# Patient Record
Sex: FEMALE | Race: BLACK OR AFRICAN AMERICAN | Employment: UNEMPLOYED | URBAN - METROPOLITAN AREA
[De-identification: names, ages, dates, MRNs, and addresses within clinical notes are randomized per-mention and may not be internally consistent; named-entity substitution may affect disease eponyms.]

---

## 2018-10-16 ENCOUNTER — HOSPITAL ENCOUNTER (OUTPATIENT)
Dept: ULTRASOUND IMAGING | Age: 73
Discharge: HOME | End: 2018-10-18
Payer: MEDICARE

## 2018-10-16 ENCOUNTER — HOSPITAL ENCOUNTER (OUTPATIENT)
Age: 73
Discharge: HOME | End: 2018-10-18
Payer: MEDICARE

## 2018-10-16 DIAGNOSIS — M79.662 PAIN IN LEFT LOWER LEG: ICD-10-CM

## 2018-10-16 PROCEDURE — 93971 EXTREMITY STUDY: CPT

## 2018-11-14 ENCOUNTER — HOSPITAL ENCOUNTER (OUTPATIENT)
Dept: WOUND CARE | Age: 73
Discharge: HOME | End: 2018-11-14
Payer: MEDICARE

## 2018-11-14 VITALS
HEIGHT: 69 IN | SYSTOLIC BLOOD PRESSURE: 118 MMHG | DIASTOLIC BLOOD PRESSURE: 52 MMHG | TEMPERATURE: 98.7 F | RESPIRATION RATE: 18 BRPM | BODY MASS INDEX: 43.4 KG/M2 | HEART RATE: 72 BPM | WEIGHT: 293 LBS

## 2018-11-14 DIAGNOSIS — I89.0 LYMPHEDEMA OF BOTH LOWER EXTREMITIES: Chronic | ICD-10-CM

## 2018-11-14 DIAGNOSIS — I87.2 VENOUS INSUFFICIENCY OF BOTH LOWER EXTREMITIES: Chronic | ICD-10-CM

## 2018-11-14 PROCEDURE — 99213 OFFICE O/P EST LOW 20 MIN: CPT | Performed by: SURGERY

## 2018-11-14 PROCEDURE — 99213 OFFICE O/P EST LOW 20 MIN: CPT

## 2018-11-14 RX ORDER — OXYCODONE AND ACETAMINOPHEN 7.5; 325 MG/1; MG/1
1 TABLET ORAL 2 TIMES DAILY
COMMUNITY

## 2018-11-14 ASSESSMENT — PAIN DESCRIPTION - ONSET: ONSET: ON-GOING

## 2018-11-14 ASSESSMENT — PAIN DESCRIPTION - LOCATION: LOCATION: LEG

## 2018-11-14 ASSESSMENT — PAIN DESCRIPTION - PROGRESSION: CLINICAL_PROGRESSION: NOT CHANGED

## 2018-11-14 ASSESSMENT — PAIN DESCRIPTION - FREQUENCY: FREQUENCY: CONTINUOUS

## 2018-11-14 ASSESSMENT — PAIN SCALES - GENERAL: PAINLEVEL_OUTOF10: 8

## 2018-11-14 ASSESSMENT — PAIN DESCRIPTION - PAIN TYPE: TYPE: CHRONIC PAIN

## 2018-11-14 ASSESSMENT — PAIN DESCRIPTION - ORIENTATION: ORIENTATION: LEFT

## 2018-11-21 PROBLEM — I87.2 VENOUS INSUFFICIENCY OF BOTH LOWER EXTREMITIES: Chronic | Status: ACTIVE | Noted: 2018-11-21

## 2018-11-21 PROBLEM — I89.0 LYMPHEDEMA OF BOTH LOWER EXTREMITIES: Chronic | Status: ACTIVE | Noted: 2018-11-21

## 2019-02-04 ENCOUNTER — OFFICE VISIT (OUTPATIENT)
Dept: VASCULAR SURGERY | Age: 74
End: 2019-02-04
Payer: MEDICARE

## 2019-02-04 DIAGNOSIS — I87.2 VENOUS INSUFFICIENCY OF BOTH LOWER EXTREMITIES: Primary | ICD-10-CM

## 2019-02-04 DIAGNOSIS — I89.0 LYMPHEDEMA OF BOTH LOWER EXTREMITIES: ICD-10-CM

## 2019-02-04 PROCEDURE — 99213 OFFICE O/P EST LOW 20 MIN: CPT | Performed by: SURGERY

## 2019-09-21 ENCOUNTER — HOSPITAL ENCOUNTER (EMERGENCY)
Age: 74
Discharge: HOME | End: 2019-09-21
Payer: MEDICARE

## 2019-09-21 VITALS
HEIGHT: 69 IN | OXYGEN SATURATION: 93 % | DIASTOLIC BLOOD PRESSURE: 94 MMHG | SYSTOLIC BLOOD PRESSURE: 164 MMHG | BODY MASS INDEX: 43.4 KG/M2 | RESPIRATION RATE: 20 BRPM | WEIGHT: 293 LBS | TEMPERATURE: 98.9 F | HEART RATE: 89 BPM

## 2019-09-21 DIAGNOSIS — T63.444A BEE STING, UNDETERMINED INTENT, INITIAL ENCOUNTER: Primary | ICD-10-CM

## 2019-09-21 PROCEDURE — 99282 EMERGENCY DEPT VISIT SF MDM: CPT

## 2019-09-21 RX ORDER — DIAPER,BRIEF,INFANT-TODD,DISP
EACH MISCELLANEOUS
Qty: 1 TUBE | Refills: 1 | Status: SHIPPED | OUTPATIENT
Start: 2019-09-21 | End: 2019-09-28

## 2019-09-21 ASSESSMENT — PAIN DESCRIPTION - LOCATION: LOCATION: TOE (COMMENT WHICH ONE)

## 2019-09-21 ASSESSMENT — PAIN DESCRIPTION - PAIN TYPE: TYPE: ACUTE PAIN

## 2019-09-21 ASSESSMENT — PAIN SCALES - GENERAL: PAINLEVEL_OUTOF10: 7

## 2019-09-21 ASSESSMENT — PAIN DESCRIPTION - ORIENTATION: ORIENTATION: RIGHT

## 2020-03-12 ENCOUNTER — APPOINTMENT (OUTPATIENT)
Dept: GENERAL RADIOLOGY | Age: 75
DRG: 193 | End: 2020-03-12
Payer: MEDICARE

## 2020-03-12 ENCOUNTER — HOSPITAL ENCOUNTER (INPATIENT)
Age: 75
LOS: 6 days | Discharge: HOME | DRG: 193 | End: 2020-03-18
Attending: EMERGENCY MEDICINE | Admitting: FAMILY MEDICINE
Payer: MEDICARE

## 2020-03-12 ENCOUNTER — APPOINTMENT (OUTPATIENT)
Dept: ULTRASOUND IMAGING | Age: 75
DRG: 193 | End: 2020-03-12
Payer: MEDICARE

## 2020-03-12 PROBLEM — J10.1 INFLUENZA A: Status: ACTIVE | Noted: 2020-03-12

## 2020-03-12 LAB
ADENOVIRUS BY PCR: NOT DETECTED
ALBUMIN SERPL-MCNC: 4 G/DL (ref 3.5–5.2)
ALP BLD-CCNC: 121 U/L (ref 35–104)
ALT SERPL-CCNC: 22 U/L (ref 0–32)
ANION GAP SERPL CALCULATED.3IONS-SCNC: 14 MMOL/L (ref 7–16)
AST SERPL-CCNC: 37 U/L (ref 0–31)
BASOPHILS ABSOLUTE: 0.01 E9/L (ref 0–0.2)
BASOPHILS RELATIVE PERCENT: 0.2 % (ref 0–2)
BILIRUB SERPL-MCNC: 0.6 MG/DL (ref 0–1.2)
BORDETELLA PARAPERTUSSIS BY PCR: NOT DETECTED
BORDETELLA PERTUSSIS BY PCR: NOT DETECTED
BUN BLDV-MCNC: 8 MG/DL (ref 8–23)
CALCIUM SERPL-MCNC: 9.5 MG/DL (ref 8.6–10.2)
CHLAMYDOPHILIA PNEUMONIAE BY PCR: NOT DETECTED
CHLORIDE BLD-SCNC: 97 MMOL/L (ref 98–107)
CO2: 27 MMOL/L (ref 22–29)
CORONAVIRUS 229E BY PCR: NOT DETECTED
CORONAVIRUS HKU1 BY PCR: NOT DETECTED
CORONAVIRUS NL63 BY PCR: NOT DETECTED
CORONAVIRUS OC43 BY PCR: NOT DETECTED
CREAT SERPL-MCNC: 0.8 MG/DL (ref 0.5–1)
EKG ATRIAL RATE: 127 BPM
EKG Q-T INTERVAL: 404 MS
EKG QRS DURATION: 86 MS
EKG QTC CALCULATION (BAZETT): 585 MS
EKG R AXIS: 36 DEGREES
EKG T AXIS: 51 DEGREES
EKG VENTRICULAR RATE: 126 BPM
EOSINOPHILS ABSOLUTE: 0.02 E9/L (ref 0.05–0.5)
EOSINOPHILS RELATIVE PERCENT: 0.3 % (ref 0–6)
GFR AFRICAN AMERICAN: >60
GFR NON-AFRICAN AMERICAN: >60 ML/MIN/1.73
GLUCOSE BLD-MCNC: 137 MG/DL (ref 74–99)
HCT VFR BLD CALC: 39.5 % (ref 34–48)
HEMOGLOBIN: 12.3 G/DL (ref 11.5–15.5)
HUMAN METAPNEUMOVIRUS BY PCR: NOT DETECTED
HUMAN RHINOVIRUS/ENTEROVIRUS BY PCR: NOT DETECTED
IMMATURE GRANULOCYTES #: 0.02 E9/L
IMMATURE GRANULOCYTES %: 0.3 % (ref 0–5)
INFLUENZA A BY PCR: DETECTED
INFLUENZA A H1-2009 BY PCR: DETECTED
INFLUENZA B BY PCR: NOT DETECTED
INFLUENZA B BY PCR: NOT DETECTED
LACTIC ACID: 1.5 MMOL/L (ref 0.5–2.2)
LYMPHOCYTES ABSOLUTE: 0.85 E9/L (ref 1.5–4)
LYMPHOCYTES RELATIVE PERCENT: 14.4 % (ref 20–42)
MAGNESIUM: 1.9 MG/DL (ref 1.6–2.6)
MCH RBC QN AUTO: 30.5 PG (ref 26–35)
MCHC RBC AUTO-ENTMCNC: 31.1 % (ref 32–34.5)
MCV RBC AUTO: 98 FL (ref 80–99.9)
MONOCYTES ABSOLUTE: 0.53 E9/L (ref 0.1–0.95)
MONOCYTES RELATIVE PERCENT: 9 % (ref 2–12)
MYCOPLASMA PNEUMONIAE BY PCR: NOT DETECTED
NEUTROPHILS ABSOLUTE: 4.49 E9/L (ref 1.8–7.3)
NEUTROPHILS RELATIVE PERCENT: 75.8 % (ref 43–80)
PARAINFLUENZA VIRUS 1 BY PCR: NOT DETECTED
PARAINFLUENZA VIRUS 2 BY PCR: NOT DETECTED
PARAINFLUENZA VIRUS 3 BY PCR: NOT DETECTED
PARAINFLUENZA VIRUS 4 BY PCR: NOT DETECTED
PDW BLD-RTO: 12.2 FL (ref 11.5–15)
PLATELET # BLD: 164 E9/L (ref 130–450)
PMV BLD AUTO: 10.3 FL (ref 7–12)
POTASSIUM REFLEX MAGNESIUM: 3.5 MMOL/L (ref 3.5–5)
PRO-BNP: 682 PG/ML (ref 0–125)
RBC # BLD: 4.03 E12/L (ref 3.5–5.5)
RESPIRATORY SYNCYTIAL VIRUS BY PCR: NOT DETECTED
SODIUM BLD-SCNC: 138 MMOL/L (ref 132–146)
TOTAL PROTEIN: 7.7 G/DL (ref 6.4–8.3)
TROPONIN: <0.01 NG/ML (ref 0–0.03)
WBC # BLD: 5.9 E9/L (ref 4.5–11.5)

## 2020-03-12 PROCEDURE — 87502 INFLUENZA DNA AMP PROBE: CPT

## 2020-03-12 PROCEDURE — 83735 ASSAY OF MAGNESIUM: CPT

## 2020-03-12 PROCEDURE — 6360000002 HC RX W HCPCS: Performed by: FAMILY MEDICINE

## 2020-03-12 PROCEDURE — 99285 EMERGENCY DEPT VISIT HI MDM: CPT

## 2020-03-12 PROCEDURE — 83880 ASSAY OF NATRIURETIC PEPTIDE: CPT

## 2020-03-12 PROCEDURE — 85025 COMPLETE CBC W/AUTO DIFF WBC: CPT

## 2020-03-12 PROCEDURE — 93970 EXTREMITY STUDY: CPT

## 2020-03-12 PROCEDURE — 1200000000 HC SEMI PRIVATE

## 2020-03-12 PROCEDURE — 83605 ASSAY OF LACTIC ACID: CPT

## 2020-03-12 PROCEDURE — 93005 ELECTROCARDIOGRAM TRACING: CPT | Performed by: PHYSICAL MEDICINE & REHABILITATION

## 2020-03-12 PROCEDURE — 0100U HC RESPIRPTHGN MULT REV TRANS & AMP PRB TECH 21 TRGT: CPT

## 2020-03-12 PROCEDURE — 71045 X-RAY EXAM CHEST 1 VIEW: CPT

## 2020-03-12 PROCEDURE — 80053 COMPREHEN METABOLIC PANEL: CPT

## 2020-03-12 PROCEDURE — 6370000000 HC RX 637 (ALT 250 FOR IP): Performed by: FAMILY MEDICINE

## 2020-03-12 PROCEDURE — 93010 ELECTROCARDIOGRAM REPORT: CPT | Performed by: INTERNAL MEDICINE

## 2020-03-12 PROCEDURE — 84484 ASSAY OF TROPONIN QUANT: CPT

## 2020-03-12 RX ORDER — GABAPENTIN 300 MG/1
300 CAPSULE ORAL 3 TIMES DAILY
Status: DISCONTINUED | OUTPATIENT
Start: 2020-03-12 | End: 2020-03-18 | Stop reason: HOSPADM

## 2020-03-12 RX ORDER — OSELTAMIVIR PHOSPHATE 75 MG/1
75 CAPSULE ORAL DAILY
Status: COMPLETED | OUTPATIENT
Start: 2020-03-12 | End: 2020-03-16

## 2020-03-12 RX ORDER — OXYCODONE HYDROCHLORIDE 5 MG/1
2.5 TABLET ORAL 2 TIMES DAILY
Status: DISCONTINUED | OUTPATIENT
Start: 2020-03-12 | End: 2020-03-18 | Stop reason: HOSPADM

## 2020-03-12 RX ORDER — GABAPENTIN 300 MG/1
300 CAPSULE ORAL 3 TIMES DAILY
COMMUNITY

## 2020-03-12 RX ORDER — CALCIUM CARBONATE 500(1250)
1000 TABLET ORAL DAILY
Status: DISCONTINUED | OUTPATIENT
Start: 2020-03-13 | End: 2020-03-13

## 2020-03-12 RX ORDER — ALBUTEROL SULFATE 90 UG/1
2 AEROSOL, METERED RESPIRATORY (INHALATION) EVERY 6 HOURS PRN
Status: DISCONTINUED | OUTPATIENT
Start: 2020-03-12 | End: 2020-03-18 | Stop reason: HOSPADM

## 2020-03-12 RX ORDER — METHYLPREDNISOLONE SODIUM SUCCINATE 40 MG/ML
40 INJECTION, POWDER, LYOPHILIZED, FOR SOLUTION INTRAMUSCULAR; INTRAVENOUS EVERY 8 HOURS
Status: DISCONTINUED | OUTPATIENT
Start: 2020-03-12 | End: 2020-03-15

## 2020-03-12 RX ORDER — OXYCODONE AND ACETAMINOPHEN 7.5; 325 MG/1; MG/1
1 TABLET ORAL 2 TIMES DAILY
Status: DISCONTINUED | OUTPATIENT
Start: 2020-03-12 | End: 2020-03-12 | Stop reason: CLARIF

## 2020-03-12 RX ORDER — ASCORBIC ACID 500 MG
2000 TABLET ORAL DAILY
Status: DISCONTINUED | OUTPATIENT
Start: 2020-03-13 | End: 2020-03-13

## 2020-03-12 RX ORDER — FERROUS SULFATE 325(65) MG
325 TABLET ORAL NIGHTLY
Status: DISCONTINUED | OUTPATIENT
Start: 2020-03-12 | End: 2020-03-18 | Stop reason: HOSPADM

## 2020-03-12 RX ORDER — SIMVASTATIN 40 MG
40 TABLET ORAL NIGHTLY
COMMUNITY

## 2020-03-12 RX ORDER — CHOLECALCIFEROL (VITAMIN D3) 50 MCG
2000 TABLET ORAL DAILY
Status: DISCONTINUED | OUTPATIENT
Start: 2020-03-12 | End: 2020-03-13

## 2020-03-12 RX ORDER — OSELTAMIVIR PHOSPHATE 75 MG/1
75 CAPSULE ORAL 2 TIMES DAILY
Status: DISCONTINUED | OUTPATIENT
Start: 2020-03-12 | End: 2020-03-12 | Stop reason: SDUPTHER

## 2020-03-12 RX ORDER — IPRATROPIUM BROMIDE AND ALBUTEROL SULFATE 2.5; .5 MG/3ML; MG/3ML
1 SOLUTION RESPIRATORY (INHALATION) EVERY 4 HOURS PRN
Status: DISCONTINUED | OUTPATIENT
Start: 2020-03-12 | End: 2020-03-18 | Stop reason: HOSPADM

## 2020-03-12 RX ORDER — ONDANSETRON 2 MG/ML
4 INJECTION INTRAMUSCULAR; INTRAVENOUS EVERY 6 HOURS PRN
Status: DISCONTINUED | OUTPATIENT
Start: 2020-03-12 | End: 2020-03-18 | Stop reason: HOSPADM

## 2020-03-12 RX ORDER — PYRIDOXINE HCL (VITAMIN B6) 100 MG
1000 TABLET ORAL DAILY
Status: DISCONTINUED | OUTPATIENT
Start: 2020-03-12 | End: 2020-03-12 | Stop reason: CLARIF

## 2020-03-12 RX ORDER — IPRATROPIUM BROMIDE AND ALBUTEROL SULFATE 2.5; .5 MG/3ML; MG/3ML
1 SOLUTION RESPIRATORY (INHALATION) EVERY 4 HOURS PRN
COMMUNITY
End: 2022-03-30 | Stop reason: ALTCHOICE

## 2020-03-12 RX ORDER — DIPHENHYDRAMINE HCL 25 MG
50 TABLET ORAL DAILY PRN
Status: DISCONTINUED | OUTPATIENT
Start: 2020-03-13 | End: 2020-03-17 | Stop reason: SDUPTHER

## 2020-03-12 RX ORDER — METOPROLOL TARTRATE 50 MG/1
50 TABLET, FILM COATED ORAL 2 TIMES DAILY
Status: DISCONTINUED | OUTPATIENT
Start: 2020-03-12 | End: 2020-03-18 | Stop reason: HOSPADM

## 2020-03-12 RX ORDER — OXYCODONE HYDROCHLORIDE AND ACETAMINOPHEN 5; 325 MG/1; MG/1
1 TABLET ORAL 2 TIMES DAILY
Status: DISCONTINUED | OUTPATIENT
Start: 2020-03-12 | End: 2020-03-18 | Stop reason: HOSPADM

## 2020-03-12 RX ORDER — ASPIRIN 81 MG/1
81 TABLET, CHEWABLE ORAL DAILY
Status: DISCONTINUED | OUTPATIENT
Start: 2020-03-13 | End: 2020-03-18 | Stop reason: HOSPADM

## 2020-03-12 RX ORDER — MORPHINE SULFATE 15 MG/1
15 TABLET, FILM COATED, EXTENDED RELEASE ORAL 2 TIMES DAILY
Status: DISCONTINUED | OUTPATIENT
Start: 2020-03-12 | End: 2020-03-18 | Stop reason: HOSPADM

## 2020-03-12 RX ORDER — PANTOPRAZOLE SODIUM 40 MG/1
40 TABLET, DELAYED RELEASE ORAL
Status: DISCONTINUED | OUTPATIENT
Start: 2020-03-13 | End: 2020-03-18 | Stop reason: HOSPADM

## 2020-03-12 RX ADMIN — OSELTAMIVIR PHOSPHATE 75 MG: 75 CAPSULE ORAL at 22:27

## 2020-03-12 RX ADMIN — MORPHINE SULFATE 15 MG: 15 TABLET, FILM COATED, EXTENDED RELEASE ORAL at 22:28

## 2020-03-12 RX ADMIN — GABAPENTIN 300 MG: 300 CAPSULE ORAL at 22:28

## 2020-03-12 RX ADMIN — Medication 2000 UNITS: at 22:28

## 2020-03-12 RX ADMIN — METOPROLOL TARTRATE 50 MG: 50 TABLET, FILM COATED ORAL at 22:28

## 2020-03-12 RX ADMIN — ONDANSETRON 4 MG: 2 INJECTION INTRAMUSCULAR; INTRAVENOUS at 23:48

## 2020-03-12 RX ADMIN — OXYCODONE HYDROCHLORIDE 2.5 MG: 5 TABLET ORAL at 22:31

## 2020-03-12 RX ADMIN — METHYLPREDNISOLONE SODIUM SUCCINATE 40 MG: 40 INJECTION, POWDER, FOR SOLUTION INTRAMUSCULAR; INTRAVENOUS at 22:29

## 2020-03-12 RX ADMIN — FERROUS SULFATE TAB 325 MG (65 MG ELEMENTAL FE) 325 MG: 325 (65 FE) TAB at 22:27

## 2020-03-12 RX ADMIN — OXYCODONE AND ACETAMINOPHEN 1 TABLET: 5; 325 TABLET ORAL at 22:27

## 2020-03-12 RX ADMIN — ENOXAPARIN SODIUM 40 MG: 40 INJECTION SUBCUTANEOUS at 22:28

## 2020-03-12 ASSESSMENT — ENCOUNTER SYMPTOMS
TROUBLE SWALLOWING: 0
DIARRHEA: 0
BLOOD IN STOOL: 0
VOMITING: 0
SHORTNESS OF BREATH: 1
EYES NEGATIVE: 1
SINUS PAIN: 0
WHEEZING: 1
SINUS PRESSURE: 0
ABDOMINAL PAIN: 0
CONSTIPATION: 0
CHEST TIGHTNESS: 1
SORE THROAT: 0
ABDOMINAL DISTENTION: 0
NAUSEA: 1
ALLERGIC/IMMUNOLOGIC NEGATIVE: 1

## 2020-03-12 ASSESSMENT — PAIN SCALES - GENERAL
PAINLEVEL_OUTOF10: 9
PAINLEVEL_OUTOF10: 9

## 2020-03-12 NOTE — ED PROVIDER NOTES
Patient is a 71 yo female who presents with complaints of SOB. Her symptoms began last night while she was watching television. Symptoms not remitted with rescue inhaler. She also complained of subjective fever, chills, nausea, myalgias/arthralgias, and loss of appetite. She does admit to recent sick contacts; she lives with her grandson who recently tested positive for influenza. She denies recent travel or new pets. In triage, saturating at 89% on room air. She placed on 2L NC and saturating at 98%. Review of Systems   Constitutional: Positive for appetite change, chills, fatigue and fever. Negative for diaphoresis and unexpected weight change. HENT: Negative for congestion, sinus pressure, sinus pain, sore throat and trouble swallowing. +Productive cough with clear sputum   Eyes: Negative. Respiratory: Positive for chest tightness, shortness of breath and wheezing. Cardiovascular: Negative. Gastrointestinal: Positive for nausea. Negative for abdominal distention, abdominal pain, blood in stool, constipation, diarrhea and vomiting. Endocrine: Negative. Genitourinary: Negative. Musculoskeletal: Positive for arthralgias and myalgias. Skin: Negative. Allergic/Immunologic: Negative. Neurological: Negative. Hematological: Negative. Psychiatric/Behavioral: Negative. Physical Exam  Constitutional:       General: She is not in acute distress. Appearance: She is ill-appearing. HENT:      Head: Normocephalic and atraumatic. Nose: Nose normal.      Mouth/Throat:      Mouth: Mucous membranes are moist.      Pharynx: Oropharynx is clear. No oropharyngeal exudate or posterior oropharyngeal erythema. Eyes:      Extraocular Movements: Extraocular movements intact. Conjunctiva/sclera: Conjunctivae normal.      Pupils: Pupils are equal, round, and reactive to light. Neck:      Musculoskeletal: Normal range of motion and neck supple.    Cardiovascular: Rate and Rhythm: Tachycardia present. Pulses: Normal pulses. Heart sounds: Normal heart sounds. No murmur. No friction rub. Pulmonary:      Effort: Pulmonary effort is normal.      Breath sounds: No stridor. Wheezing present. No rhonchi or rales. Chest:      Chest wall: No tenderness. Abdominal:      General: Bowel sounds are normal. There is no distension. Palpations: Abdomen is soft. Tenderness: There is no abdominal tenderness. There is no guarding or rebound. Musculoskeletal:      Comments: 3+ pitting edema of the bilateral LEs   Skin:     General: Skin is warm. Neurological:      General: No focal deficit present. Mental Status: She is alert and oriented to person, place, and time. Psychiatric:         Mood and Affect: Mood normal.         Behavior: Behavior normal.         Judgment: Judgment normal.          Procedures     MDM  Number of Diagnoses or Management Options  Acute respiratory failure with hypoxia [facility]):   Diagnosis management comments: Patient in acute hypoxic respiratory failure 2/2 influenza A infection. Patient tachycardic in the 130s and wheezing bilaterally on auscultation. Started on Tamiflu.                 --------------------------------------------- PAST HISTORY ---------------------------------------------  Past Medical History:  has a past medical history of Bursitis, CAD (coronary artery disease), COPD (chronic obstructive pulmonary disease) ([facility] [address] Emphysema, GERD (gastroesophageal reflux disease), Hiatal hernia, Hip pain, Hyperlipidemia, Hypertension, Lymphedema of both lower extremities, Venous insufficiency of both lower extremities, and Venous ulcer with fat layer exposed ([facility] [address] Past Surgical History:  has a past surgical history that includes Cholecystectomy; Hysterectomy; Endoscopy, colon, diagnostic; Colonoscopy; Appendectomy; ECHO Compl W Dop Color Flow (3/11/2013);  Leg Debridement (Left, 11/18/2015); joint replacement (2009 2011); Not Detected Not Detected    Respiratory Syncytial Virus by PCR Not Detected Not Detected   CBC Auto Differential   Result Value Ref Range    WBC 5.9 4.5 - 11.5 E9/L    RBC 4.03 3.50 - 5.50 E12/L    Hemoglobin 12.3 11.5 - 15.5 g/dL    Hematocrit 39.5 34.0 - 48.0 %    MCV 98.0 80.0 - 99.9 fL    MCH 30.5 26.0 - 35.0 pg    MCHC 31.1 (L) 32.0 - 34.5 %    RDW 12.2 11.5 - 15.0 fL    Platelets 953 962 - 946 E9/L    MPV 10.3 7.0 - 12.0 fL    Neutrophils % 75.8 43.0 - 80.0 %    Immature Granulocytes % 0.3 0.0 - 5.0 %    Lymphocytes % 14.4 (L) 20.0 - 42.0 %    Monocytes % 9.0 2.0 - 12.0 %    Eosinophils % 0.3 0.0 - 6.0 %    Basophils % 0.2 0.0 - 2.0 %    Neutrophils Absolute 4.49 1.80 - 7.30 E9/L    Immature Granulocytes # 0.02 E9/L    Lymphocytes Absolute 0.85 (L) 1.50 - 4.00 E9/L    Monocytes Absolute 0.53 0.10 - 0.95 E9/L    Eosinophils Absolute 0.02 (L) 0.05 - 0.50 E9/L    Basophils Absolute 0.01 0.00 - 0.20 E9/L   Comprehensive Metabolic Panel w/ Reflex to MG   Result Value Ref Range    Sodium 138 132 - 146 mmol/L    Potassium reflex Magnesium 3.5 3.5 - 5.0 mmol/L    Chloride 97 (L) 98 - 107 mmol/L    CO2 27 22 - 29 mmol/L    Anion Gap 14 7 - 16 mmol/L    Glucose 137 (H) 74 - 99 mg/dL    BUN 8 8 - 23 mg/dL    CREATININE 0.8 0.5 - 1.0 mg/dL    GFR Non-African American >60 >=60 mL/min/1.73    GFR African American >60     Calcium 9.5 8.6 - 10.2 mg/dL    Total Protein 7.7 6.4 - 8.3 g/dL    Alb 4.0 3.5 - 5.2 g/dL    Total Bilirubin 0.6 0.0 - 1.2 mg/dL    Alkaline Phosphatase 121 (H) 35 - 104 U/L    ALT 22 0 - 32 U/L    AST 37 (H) 0 - 31 U/L   Troponin   Result Value Ref Range    Troponin <0.01 0.00 - 0.03 ng/mL   Brain Natriuretic Peptide   Result Value Ref Range    Pro- (H) 0 - 125 pg/mL   Lactic Acid, Plasma   Result Value Ref Range    Lactic Acid 1.5 0.5 - 2.2 mmol/L   Magnesium   Result Value Ref Range    Magnesium 1.9 1.6 - 2.6 mg/dL   EKG 12 Lead   Result Value Ref Range    Ventricular Rate 126 BPM    Atrial Rate re-evaluations, IV medications, cardiac monitoring, continuous pulse oximetry and complex medical decision making and emergency management    This patient has remained hemodynamically stable during their ED course. Diagnosis:  1. Acute respiratory failure with hypoxia (HCC)        Disposition:  Patient's disposition: Admit to telemetry  Patient's condition is stable.            Deanne Fernandes DO  Resident  03/12/20 1112

## 2020-03-13 LAB
ANION GAP SERPL CALCULATED.3IONS-SCNC: 13 MMOL/L (ref 7–16)
BUN BLDV-MCNC: 7 MG/DL (ref 8–23)
CALCIUM SERPL-MCNC: 9.4 MG/DL (ref 8.6–10.2)
CHLORIDE BLD-SCNC: 94 MMOL/L (ref 98–107)
CO2: 28 MMOL/L (ref 22–29)
CREAT SERPL-MCNC: 0.7 MG/DL (ref 0.5–1)
GFR AFRICAN AMERICAN: >60
GFR NON-AFRICAN AMERICAN: >60 ML/MIN/1.73
GLUCOSE BLD-MCNC: 137 MG/DL (ref 74–99)
HCT VFR BLD CALC: 40.7 % (ref 34–48)
HEMOGLOBIN: 12.9 G/DL (ref 11.5–15.5)
MCH RBC QN AUTO: 30.9 PG (ref 26–35)
MCHC RBC AUTO-ENTMCNC: 31.7 % (ref 32–34.5)
MCV RBC AUTO: 97.6 FL (ref 80–99.9)
PDW BLD-RTO: 12.1 FL (ref 11.5–15)
PLATELET # BLD: 168 E9/L (ref 130–450)
PMV BLD AUTO: 10.2 FL (ref 7–12)
POTASSIUM SERPL-SCNC: 3.6 MMOL/L (ref 3.5–5)
RBC # BLD: 4.17 E12/L (ref 3.5–5.5)
SODIUM BLD-SCNC: 135 MMOL/L (ref 132–146)
WBC # BLD: 4.3 E9/L (ref 4.5–11.5)

## 2020-03-13 PROCEDURE — 6370000000 HC RX 637 (ALT 250 FOR IP): Performed by: FAMILY MEDICINE

## 2020-03-13 PROCEDURE — 1200000000 HC SEMI PRIVATE

## 2020-03-13 PROCEDURE — 2580000003 HC RX 258: Performed by: FAMILY MEDICINE

## 2020-03-13 PROCEDURE — 80048 BASIC METABOLIC PNL TOTAL CA: CPT

## 2020-03-13 PROCEDURE — 85027 COMPLETE CBC AUTOMATED: CPT

## 2020-03-13 PROCEDURE — 6360000002 HC RX W HCPCS: Performed by: FAMILY MEDICINE

## 2020-03-13 PROCEDURE — 36415 COLL VENOUS BLD VENIPUNCTURE: CPT

## 2020-03-13 RX ORDER — ASCORBIC ACID 500 MG
2000 TABLET ORAL DAILY
Status: DISCONTINUED | OUTPATIENT
Start: 2020-03-14 | End: 2020-03-13

## 2020-03-13 RX ORDER — CALCIUM CARBONATE 500(1250)
1000 TABLET ORAL NIGHTLY
Status: DISCONTINUED | OUTPATIENT
Start: 2020-03-14 | End: 2020-03-18 | Stop reason: HOSPADM

## 2020-03-13 RX ORDER — CALCIUM CARBONATE 500(1250)
1000 TABLET ORAL DAILY
Status: DISCONTINUED | OUTPATIENT
Start: 2020-03-14 | End: 2020-03-13

## 2020-03-13 RX ORDER — CHOLECALCIFEROL (VITAMIN D3) 50 MCG
2000 TABLET ORAL DAILY
Status: DISCONTINUED | OUTPATIENT
Start: 2020-03-14 | End: 2020-03-13

## 2020-03-13 RX ORDER — ACETAMINOPHEN 325 MG/1
650 TABLET ORAL EVERY 4 HOURS PRN
Status: DISCONTINUED | OUTPATIENT
Start: 2020-03-13 | End: 2020-03-18 | Stop reason: HOSPADM

## 2020-03-13 RX ORDER — SODIUM CHLORIDE 9 MG/ML
INJECTION, SOLUTION INTRAVENOUS CONTINUOUS
Status: DISCONTINUED | OUTPATIENT
Start: 2020-03-13 | End: 2020-03-15

## 2020-03-13 RX ORDER — CHOLECALCIFEROL (VITAMIN D3) 50 MCG
2000 TABLET ORAL NIGHTLY
Status: DISCONTINUED | OUTPATIENT
Start: 2020-03-14 | End: 2020-03-18 | Stop reason: HOSPADM

## 2020-03-13 RX ORDER — HYDRALAZINE HYDROCHLORIDE 20 MG/ML
10 INJECTION INTRAMUSCULAR; INTRAVENOUS EVERY 4 HOURS PRN
Status: DISCONTINUED | OUTPATIENT
Start: 2020-03-13 | End: 2020-03-18 | Stop reason: HOSPADM

## 2020-03-13 RX ORDER — ASCORBIC ACID 500 MG
2000 TABLET ORAL NIGHTLY
Status: DISCONTINUED | OUTPATIENT
Start: 2020-03-14 | End: 2020-03-18 | Stop reason: HOSPADM

## 2020-03-13 RX ADMIN — GABAPENTIN 300 MG: 300 CAPSULE ORAL at 20:24

## 2020-03-13 RX ADMIN — Medication 2000 MG: at 08:45

## 2020-03-13 RX ADMIN — METHYLPREDNISOLONE SODIUM SUCCINATE 40 MG: 40 INJECTION, POWDER, FOR SOLUTION INTRAMUSCULAR; INTRAVENOUS at 21:29

## 2020-03-13 RX ADMIN — MORPHINE SULFATE 15 MG: 15 TABLET, FILM COATED, EXTENDED RELEASE ORAL at 20:24

## 2020-03-13 RX ADMIN — Medication 2000 UNITS: at 08:44

## 2020-03-13 RX ADMIN — Medication 1000 MG: at 08:45

## 2020-03-13 RX ADMIN — METHYLPREDNISOLONE SODIUM SUCCINATE 40 MG: 40 INJECTION, POWDER, FOR SOLUTION INTRAMUSCULAR; INTRAVENOUS at 05:21

## 2020-03-13 RX ADMIN — OXYCODONE AND ACETAMINOPHEN 1 TABLET: 5; 325 TABLET ORAL at 21:25

## 2020-03-13 RX ADMIN — OSELTAMIVIR PHOSPHATE 75 MG: 75 CAPSULE ORAL at 08:45

## 2020-03-13 RX ADMIN — OXYCODONE AND ACETAMINOPHEN 1 TABLET: 5; 325 TABLET ORAL at 08:44

## 2020-03-13 RX ADMIN — SODIUM CHLORIDE: 9 INJECTION, SOLUTION INTRAVENOUS at 06:47

## 2020-03-13 RX ADMIN — ASPIRIN 81 MG 81 MG: 81 TABLET ORAL at 08:44

## 2020-03-13 RX ADMIN — PANTOPRAZOLE SODIUM 40 MG: 40 TABLET, DELAYED RELEASE ORAL at 05:21

## 2020-03-13 RX ADMIN — FERROUS SULFATE TAB 325 MG (65 MG ELEMENTAL FE) 325 MG: 325 (65 FE) TAB at 20:24

## 2020-03-13 RX ADMIN — METHYLPREDNISOLONE SODIUM SUCCINATE 40 MG: 40 INJECTION, POWDER, FOR SOLUTION INTRAMUSCULAR; INTRAVENOUS at 16:00

## 2020-03-13 RX ADMIN — DIPHENHYDRAMINE HCL 50 MG: 25 TABLET ORAL at 21:24

## 2020-03-13 RX ADMIN — GABAPENTIN 300 MG: 300 CAPSULE ORAL at 16:00

## 2020-03-13 RX ADMIN — METOPROLOL TARTRATE 50 MG: 50 TABLET, FILM COATED ORAL at 20:24

## 2020-03-13 RX ADMIN — OXYCODONE HYDROCHLORIDE 2.5 MG: 5 TABLET ORAL at 21:25

## 2020-03-13 RX ADMIN — OXYCODONE HYDROCHLORIDE 2.5 MG: 5 TABLET ORAL at 08:44

## 2020-03-13 RX ADMIN — METOPROLOL TARTRATE 50 MG: 50 TABLET, FILM COATED ORAL at 08:45

## 2020-03-13 RX ADMIN — GABAPENTIN 300 MG: 300 CAPSULE ORAL at 08:45

## 2020-03-13 RX ADMIN — ONDANSETRON 4 MG: 2 INJECTION INTRAMUSCULAR; INTRAVENOUS at 06:47

## 2020-03-13 RX ADMIN — ONDANSETRON 4 MG: 2 INJECTION INTRAMUSCULAR; INTRAVENOUS at 21:25

## 2020-03-13 RX ADMIN — ENOXAPARIN SODIUM 40 MG: 40 INJECTION SUBCUTANEOUS at 08:45

## 2020-03-13 RX ADMIN — ACETAMINOPHEN 650 MG: 325 TABLET ORAL at 20:24

## 2020-03-13 ASSESSMENT — ENCOUNTER SYMPTOMS
ABDOMINAL PAIN: 0
WHEEZING: 1
COLOR CHANGE: 0
COUGH: 1
SHORTNESS OF BREATH: 1

## 2020-03-13 ASSESSMENT — PAIN SCALES - GENERAL
PAINLEVEL_OUTOF10: 10
PAINLEVEL_OUTOF10: 10
PAINLEVEL_OUTOF10: 4
PAINLEVEL_OUTOF10: 10
PAINLEVEL_OUTOF10: 8
PAINLEVEL_OUTOF10: 4
PAINLEVEL_OUTOF10: 8
PAINLEVEL_OUTOF10: 0

## 2020-03-13 ASSESSMENT — PAIN DESCRIPTION - ONSET
ONSET: GRADUAL
ONSET: GRADUAL
ONSET: ON-GOING

## 2020-03-13 ASSESSMENT — PAIN DESCRIPTION - DESCRIPTORS
DESCRIPTORS: HEADACHE
DESCRIPTORS: ACHING;CONSTANT;DISCOMFORT
DESCRIPTORS: HEADACHE

## 2020-03-13 ASSESSMENT — PAIN DESCRIPTION - LOCATION
LOCATION: HEAD
LOCATION: HEAD
LOCATION: LEG;HIP

## 2020-03-13 ASSESSMENT — PAIN DESCRIPTION - PROGRESSION
CLINICAL_PROGRESSION: GRADUALLY WORSENING
CLINICAL_PROGRESSION: NOT CHANGED
CLINICAL_PROGRESSION: GRADUALLY WORSENING

## 2020-03-13 ASSESSMENT — PAIN - FUNCTIONAL ASSESSMENT
PAIN_FUNCTIONAL_ASSESSMENT: ACTIVITIES ARE NOT PREVENTED

## 2020-03-13 ASSESSMENT — PAIN DESCRIPTION - FREQUENCY
FREQUENCY: INTERMITTENT
FREQUENCY: CONTINUOUS
FREQUENCY: INTERMITTENT

## 2020-03-13 ASSESSMENT — PAIN DESCRIPTION - ORIENTATION
ORIENTATION: RIGHT;LEFT

## 2020-03-13 ASSESSMENT — PAIN DESCRIPTION - PAIN TYPE
TYPE: ACUTE PAIN
TYPE: CHRONIC PAIN
TYPE: ACUTE PAIN

## 2020-03-13 NOTE — PLAN OF CARE
Problem: Falls - Risk of:  Goal: Will remain free from falls  Description: Will remain free from falls  Outcome: Not Met This Shift  Goal: Absence of physical injury  Description: Absence of physical injury  Outcome: Not Met This Shift

## 2020-03-13 NOTE — H&P
2,000 Units Oral Daily Balwinder Maxwell MD   2,000 Units at 03/12/20 2228    diphenhydrAMINE (BENADRYL) tablet 50 mg  50 mg Oral Daily PRN Balwinder Maxwell MD        ferrous sulfate (IRON 325) tablet 325 mg  325 mg Oral Nightly Balwinder Maxwell MD   325 mg at 03/12/20 2227    gabapentin (NEURONTIN) capsule 300 mg  300 mg Oral TID Balwinder Maxwell MD   300 mg at 03/12/20 2228    ipratropium-albuterol (DUONEB) nebulizer solution 3 mL  1 vial Nebulization Q4H PRN Balwinder Maxwell MD        metoprolol tartrate (LOPRESSOR) tablet 50 mg  50 mg Oral BID Balwinder Maxwell MD   50 mg at 03/12/20 2228    morphine (MS CONTIN) extended release tablet 15 mg  15 mg Oral BID Balwinder Maxwell MD   15 mg at 03/12/20 2228    pantoprazole (PROTONIX) tablet 40 mg  40 mg Oral QAM AC Balwinder Maxwell MD   40 mg at 03/13/20 0521    oseltamivir (TAMIFLU) capsule 75 mg  75 mg Oral Daily Balwinder Maxwell MD   75 mg at 03/12/20 2227    methylPREDNISolone sodium (SOLU-MEDROL) injection 40 mg  40 mg Intravenous Q8H Balwinder Maxwell MD   40 mg at 03/13/20 0521    enoxaparin (LOVENOX) injection 40 mg  40 mg Subcutaneous Daily Balwinder Maxwell MD   40 mg at 03/12/20 2228    oxyCODONE-acetaminophen (PERCOCET) 5-325 MG per tablet 1 tablet  1 tablet Oral BID Balwinder Maxwell MD   1 tablet at 03/12/20 2227    And    oxyCODONE (ROXICODONE) immediate release tablet 2.5 mg  2.5 mg Oral BID Balwinder Maxwell MD   2.5 mg at 03/12/20 2231    ondansetron (ZOFRAN) injection 4 mg  4 mg Intravenous Q6H PRN Balwinder Maxwell MD   4 mg at 03/12/20 2348       Allergies:    Allergies   Allergen Reactions    Codeine Nausea And Vomiting and Other (See Comments)     hallucinate    Dilaudid [Hydromorphone Hcl] Rash    Naproxen Other (See Comments)     Shuts down kidney function    Singulair [Montelukast Sodium] Shortness Of Breath     Mucus in chest    Black Cohosh     MD  3/13/2020

## 2020-03-13 NOTE — CARE COORDINATION
Patient admitted with Acute hypoxic respiratory failure, COPD exacerbation and Influenza A. She is currently on Tamiflu, IV Solumedrol 40 mg q 8 hrs, IVFS and oxygen 3 liters. Cm/Sw following for any discharge needs. Will also need PT/OT eval orders to assist with transition of care determination.  Kinjal Montemayor RN CM

## 2020-03-14 LAB
ANION GAP SERPL CALCULATED.3IONS-SCNC: 14 MMOL/L (ref 7–16)
BUN BLDV-MCNC: 10 MG/DL (ref 8–23)
CALCIUM SERPL-MCNC: 9.2 MG/DL (ref 8.6–10.2)
CHLORIDE BLD-SCNC: 97 MMOL/L (ref 98–107)
CO2: 29 MMOL/L (ref 22–29)
CREAT SERPL-MCNC: 0.7 MG/DL (ref 0.5–1)
GFR AFRICAN AMERICAN: >60
GFR NON-AFRICAN AMERICAN: >60 ML/MIN/1.73
GLUCOSE BLD-MCNC: 130 MG/DL (ref 74–99)
HCT VFR BLD CALC: 40.8 % (ref 34–48)
HEMOGLOBIN: 13 G/DL (ref 11.5–15.5)
MCH RBC QN AUTO: 31.2 PG (ref 26–35)
MCHC RBC AUTO-ENTMCNC: 31.9 % (ref 32–34.5)
MCV RBC AUTO: 97.8 FL (ref 80–99.9)
PDW BLD-RTO: 11.9 FL (ref 11.5–15)
PLATELET # BLD: 156 E9/L (ref 130–450)
PMV BLD AUTO: 9.9 FL (ref 7–12)
POTASSIUM SERPL-SCNC: 4 MMOL/L (ref 3.5–5)
RBC # BLD: 4.17 E12/L (ref 3.5–5.5)
SODIUM BLD-SCNC: 140 MMOL/L (ref 132–146)
WBC # BLD: 3.6 E9/L (ref 4.5–11.5)

## 2020-03-14 PROCEDURE — 36415 COLL VENOUS BLD VENIPUNCTURE: CPT

## 2020-03-14 PROCEDURE — 1200000000 HC SEMI PRIVATE

## 2020-03-14 PROCEDURE — 85027 COMPLETE CBC AUTOMATED: CPT

## 2020-03-14 PROCEDURE — 6370000000 HC RX 637 (ALT 250 FOR IP): Performed by: FAMILY MEDICINE

## 2020-03-14 PROCEDURE — 80048 BASIC METABOLIC PNL TOTAL CA: CPT

## 2020-03-14 PROCEDURE — 6360000002 HC RX W HCPCS: Performed by: FAMILY MEDICINE

## 2020-03-14 PROCEDURE — 2580000003 HC RX 258: Performed by: FAMILY MEDICINE

## 2020-03-14 RX ADMIN — METHYLPREDNISOLONE SODIUM SUCCINATE 40 MG: 40 INJECTION, POWDER, FOR SOLUTION INTRAMUSCULAR; INTRAVENOUS at 13:12

## 2020-03-14 RX ADMIN — OXYCODONE AND ACETAMINOPHEN 1 TABLET: 5; 325 TABLET ORAL at 21:13

## 2020-03-14 RX ADMIN — METHYLPREDNISOLONE SODIUM SUCCINATE 40 MG: 40 INJECTION, POWDER, FOR SOLUTION INTRAMUSCULAR; INTRAVENOUS at 23:23

## 2020-03-14 RX ADMIN — DIPHENHYDRAMINE HCL 50 MG: 25 TABLET ORAL at 23:23

## 2020-03-14 RX ADMIN — GABAPENTIN 300 MG: 300 CAPSULE ORAL at 21:11

## 2020-03-14 RX ADMIN — Medication 1000 MG: at 21:10

## 2020-03-14 RX ADMIN — GABAPENTIN 300 MG: 300 CAPSULE ORAL at 09:04

## 2020-03-14 RX ADMIN — GABAPENTIN 300 MG: 300 CAPSULE ORAL at 13:12

## 2020-03-14 RX ADMIN — METOPROLOL TARTRATE 50 MG: 50 TABLET, FILM COATED ORAL at 21:11

## 2020-03-14 RX ADMIN — ENOXAPARIN SODIUM 40 MG: 40 INJECTION SUBCUTANEOUS at 09:04

## 2020-03-14 RX ADMIN — METOPROLOL TARTRATE 50 MG: 50 TABLET, FILM COATED ORAL at 09:06

## 2020-03-14 RX ADMIN — OXYCODONE AND ACETAMINOPHEN 1 TABLET: 5; 325 TABLET ORAL at 09:04

## 2020-03-14 RX ADMIN — OXYCODONE HYDROCHLORIDE 2.5 MG: 5 TABLET ORAL at 09:06

## 2020-03-14 RX ADMIN — Medication 2000 MG: at 21:10

## 2020-03-14 RX ADMIN — SODIUM CHLORIDE: 9 INJECTION, SOLUTION INTRAVENOUS at 13:12

## 2020-03-14 RX ADMIN — MORPHINE SULFATE 15 MG: 15 TABLET, FILM COATED, EXTENDED RELEASE ORAL at 10:34

## 2020-03-14 RX ADMIN — OXYCODONE HYDROCHLORIDE 2.5 MG: 5 TABLET ORAL at 21:12

## 2020-03-14 RX ADMIN — FERROUS SULFATE TAB 325 MG (65 MG ELEMENTAL FE) 325 MG: 325 (65 FE) TAB at 21:11

## 2020-03-14 RX ADMIN — OSELTAMIVIR PHOSPHATE 75 MG: 75 CAPSULE ORAL at 09:05

## 2020-03-14 RX ADMIN — MORPHINE SULFATE 15 MG: 15 TABLET, FILM COATED, EXTENDED RELEASE ORAL at 23:22

## 2020-03-14 RX ADMIN — Medication 2000 UNITS: at 21:09

## 2020-03-14 RX ADMIN — ASPIRIN 81 MG 81 MG: 81 TABLET ORAL at 09:07

## 2020-03-14 RX ADMIN — DIPHENHYDRAMINE HCL 50 MG: 25 TABLET ORAL at 09:13

## 2020-03-14 RX ADMIN — PANTOPRAZOLE SODIUM 40 MG: 40 TABLET, DELAYED RELEASE ORAL at 05:17

## 2020-03-14 RX ADMIN — METHYLPREDNISOLONE SODIUM SUCCINATE 40 MG: 40 INJECTION, POWDER, FOR SOLUTION INTRAMUSCULAR; INTRAVENOUS at 05:17

## 2020-03-14 ASSESSMENT — PAIN DESCRIPTION - DESCRIPTORS: DESCRIPTORS: SHARP;THROBBING;ACHING;DISCOMFORT

## 2020-03-14 ASSESSMENT — PAIN SCALES - GENERAL
PAINLEVEL_OUTOF10: 5
PAINLEVEL_OUTOF10: 4
PAINLEVEL_OUTOF10: 5

## 2020-03-14 ASSESSMENT — PAIN DESCRIPTION - PAIN TYPE: TYPE: CHRONIC PAIN

## 2020-03-14 ASSESSMENT — ENCOUNTER SYMPTOMS: SHORTNESS OF BREATH: 1

## 2020-03-14 ASSESSMENT — PAIN DESCRIPTION - LOCATION: LOCATION: HEAD;HIP;LEG

## 2020-03-15 LAB
ANION GAP SERPL CALCULATED.3IONS-SCNC: 9 MMOL/L (ref 7–16)
BUN BLDV-MCNC: 11 MG/DL (ref 8–23)
CALCIUM SERPL-MCNC: 9 MG/DL (ref 8.6–10.2)
CHLORIDE BLD-SCNC: 98 MMOL/L (ref 98–107)
CO2: 32 MMOL/L (ref 22–29)
CREAT SERPL-MCNC: 0.8 MG/DL (ref 0.5–1)
GFR AFRICAN AMERICAN: >60
GFR NON-AFRICAN AMERICAN: >60 ML/MIN/1.73
GLUCOSE BLD-MCNC: 135 MG/DL (ref 74–99)
HCT VFR BLD CALC: 41.3 % (ref 34–48)
HEMOGLOBIN: 12.9 G/DL (ref 11.5–15.5)
MCH RBC QN AUTO: 30.8 PG (ref 26–35)
MCHC RBC AUTO-ENTMCNC: 31.2 % (ref 32–34.5)
MCV RBC AUTO: 98.6 FL (ref 80–99.9)
PDW BLD-RTO: 11.8 FL (ref 11.5–15)
PLATELET # BLD: 161 E9/L (ref 130–450)
PMV BLD AUTO: 10.1 FL (ref 7–12)
POTASSIUM SERPL-SCNC: 4.2 MMOL/L (ref 3.5–5)
RBC # BLD: 4.19 E12/L (ref 3.5–5.5)
SODIUM BLD-SCNC: 139 MMOL/L (ref 132–146)
WBC # BLD: 4.2 E9/L (ref 4.5–11.5)

## 2020-03-15 PROCEDURE — 2700000000 HC OXYGEN THERAPY PER DAY

## 2020-03-15 PROCEDURE — 36415 COLL VENOUS BLD VENIPUNCTURE: CPT

## 2020-03-15 PROCEDURE — 1200000000 HC SEMI PRIVATE

## 2020-03-15 PROCEDURE — 6360000002 HC RX W HCPCS: Performed by: FAMILY MEDICINE

## 2020-03-15 PROCEDURE — 6370000000 HC RX 637 (ALT 250 FOR IP): Performed by: FAMILY MEDICINE

## 2020-03-15 PROCEDURE — 80048 BASIC METABOLIC PNL TOTAL CA: CPT

## 2020-03-15 PROCEDURE — 85027 COMPLETE CBC AUTOMATED: CPT

## 2020-03-15 RX ORDER — METHYLPREDNISOLONE SODIUM SUCCINATE 40 MG/ML
40 INJECTION, POWDER, LYOPHILIZED, FOR SOLUTION INTRAMUSCULAR; INTRAVENOUS EVERY 12 HOURS
Status: DISCONTINUED | OUTPATIENT
Start: 2020-03-15 | End: 2020-03-18 | Stop reason: HOSPADM

## 2020-03-15 RX ADMIN — ASPIRIN 81 MG 81 MG: 81 TABLET ORAL at 08:22

## 2020-03-15 RX ADMIN — Medication 2000 MG: at 21:27

## 2020-03-15 RX ADMIN — OXYCODONE HYDROCHLORIDE 2.5 MG: 5 TABLET ORAL at 08:22

## 2020-03-15 RX ADMIN — GABAPENTIN 300 MG: 300 CAPSULE ORAL at 13:06

## 2020-03-15 RX ADMIN — GABAPENTIN 300 MG: 300 CAPSULE ORAL at 08:21

## 2020-03-15 RX ADMIN — OXYCODONE HYDROCHLORIDE 2.5 MG: 5 TABLET ORAL at 21:29

## 2020-03-15 RX ADMIN — DIPHENHYDRAMINE HCL 50 MG: 25 TABLET ORAL at 08:21

## 2020-03-15 RX ADMIN — HYDRALAZINE HYDROCHLORIDE 10 MG: 20 INJECTION INTRAMUSCULAR; INTRAVENOUS at 08:21

## 2020-03-15 RX ADMIN — GABAPENTIN 300 MG: 300 CAPSULE ORAL at 21:28

## 2020-03-15 RX ADMIN — METHYLPREDNISOLONE SODIUM SUCCINATE 40 MG: 40 INJECTION, POWDER, FOR SOLUTION INTRAMUSCULAR; INTRAVENOUS at 06:49

## 2020-03-15 RX ADMIN — OSELTAMIVIR PHOSPHATE 75 MG: 75 CAPSULE ORAL at 08:21

## 2020-03-15 RX ADMIN — OXYCODONE AND ACETAMINOPHEN 1 TABLET: 5; 325 TABLET ORAL at 21:28

## 2020-03-15 RX ADMIN — METHYLPREDNISOLONE SODIUM SUCCINATE 40 MG: 40 INJECTION, POWDER, FOR SOLUTION INTRAMUSCULAR; INTRAVENOUS at 21:27

## 2020-03-15 RX ADMIN — ENOXAPARIN SODIUM 40 MG: 40 INJECTION SUBCUTANEOUS at 08:20

## 2020-03-15 RX ADMIN — Medication 2000 UNITS: at 21:27

## 2020-03-15 RX ADMIN — MORPHINE SULFATE 15 MG: 15 TABLET, FILM COATED, EXTENDED RELEASE ORAL at 09:51

## 2020-03-15 RX ADMIN — METOPROLOL TARTRATE 50 MG: 50 TABLET, FILM COATED ORAL at 21:29

## 2020-03-15 RX ADMIN — DIPHENHYDRAMINE HCL 50 MG: 25 TABLET ORAL at 21:28

## 2020-03-15 RX ADMIN — PANTOPRAZOLE SODIUM 40 MG: 40 TABLET, DELAYED RELEASE ORAL at 06:49

## 2020-03-15 RX ADMIN — FERROUS SULFATE TAB 325 MG (65 MG ELEMENTAL FE) 325 MG: 325 (65 FE) TAB at 21:28

## 2020-03-15 RX ADMIN — Medication 1000 MG: at 21:28

## 2020-03-15 RX ADMIN — OXYCODONE AND ACETAMINOPHEN 1 TABLET: 5; 325 TABLET ORAL at 08:22

## 2020-03-15 ASSESSMENT — PAIN SCALES - GENERAL
PAINLEVEL_OUTOF10: 5
PAINLEVEL_OUTOF10: 4
PAINLEVEL_OUTOF10: 5

## 2020-03-15 ASSESSMENT — PAIN DESCRIPTION - ONSET
ONSET: ON-GOING
ONSET: ON-GOING

## 2020-03-15 ASSESSMENT — PAIN DESCRIPTION - DESCRIPTORS
DESCRIPTORS: ACHING;CONSTANT;DISCOMFORT
DESCRIPTORS: ACHING;DISCOMFORT;SHARP

## 2020-03-15 ASSESSMENT — PAIN DESCRIPTION - ORIENTATION
ORIENTATION: RIGHT;LEFT
ORIENTATION: RIGHT;LEFT

## 2020-03-15 ASSESSMENT — PAIN DESCRIPTION - LOCATION
LOCATION: HIP;KNEE

## 2020-03-15 ASSESSMENT — PAIN DESCRIPTION - PAIN TYPE
TYPE: CHRONIC PAIN

## 2020-03-15 ASSESSMENT — PAIN DESCRIPTION - FREQUENCY
FREQUENCY: CONTINUOUS
FREQUENCY: CONTINUOUS

## 2020-03-15 ASSESSMENT — ENCOUNTER SYMPTOMS: SHORTNESS OF BREATH: 1

## 2020-03-15 ASSESSMENT — PAIN DESCRIPTION - PROGRESSION: CLINICAL_PROGRESSION: NOT CHANGED

## 2020-03-15 NOTE — PROGRESS NOTES
(TAMIFLU) capsule 75 mg  75 mg Oral Daily Armani Chance MD   75 mg at 03/14/20 0905    enoxaparin (LOVENOX) injection 40 mg  40 mg Subcutaneous Daily Armani Chance MD   40 mg at 03/14/20 6722    oxyCODONE-acetaminophen (PERCOCET) 5-325 MG per tablet 1 tablet  1 tablet Oral BID Armani Chance MD   1 tablet at 03/14/20 2113    And    oxyCODONE (ROXICODONE) immediate release tablet 2.5 mg  2.5 mg Oral BID Armani Chance MD   2.5 mg at 03/14/20 2112    ondansetron (ZOFRAN) injection 4 mg  4 mg Intravenous Q6H PRN Armani Chance MD   4 mg at 03/13/20 2125     Allergies   Allergen Reactions    Codeine Nausea And Vomiting and Other (See Comments)     hallucinate    Dilaudid [Hydromorphone Hcl] Rash    Naproxen Other (See Comments)     Shuts down kidney function    Singulair [Montelukast Sodium] Shortness Of Breath     Mucus in chest    Black Cohosh     Black Cohosh [Cimicifuga [place] (Black Cohosh)] Rash     Active Problems:    Influenza A  Resolved Problems:    * No resolved hospital problems. *    Blood pressure (!) 166/80, pulse (!) 48, temperature 96.7 °F (35.9 °C), temperature source Temporal, resp. rate 18, height 5' 7\" (1.702 m), weight 295 lb (133.8 kg), SpO2 97 %, not currently breastfeeding. Subjective:  Symptoms:  Improved. She reports shortness of breath. Diet:  Adequate intake. Activity level: Impaired due to weakness. Pain:  She complains of pain that is mild. Objective:  General Appearance:  Comfortable. Vital signs: (most recent): Blood pressure (!) 166/80, pulse (!) 48, temperature 96.7 °F (35.9 °C), temperature source Temporal, resp. rate 18, height 5' 7\" (1.702 m), weight 295 lb (133.8 kg), SpO2 97 %, not currently breastfeeding. No fever. Lungs:  Normal effort and normal respiratory rate. Breath sounds clear to auscultation. Heart: Normal rate. Regular rhythm.   S1 normal and S2 normal.      Assessment:  (Acute hypoxic respiratory failure  COPD exacerbation  Influenza A  CAD  HTN  Hyperlipidemia     Plan:  IV steroids, nebs, oxygen  Wean steroids, stop fluiods  tamiflu  Monitor labs and exam.  Continue home meds. Anticipate home tomorrow).        Todd Babin MD  3/15/2020

## 2020-03-16 LAB
ANION GAP SERPL CALCULATED.3IONS-SCNC: 14 MMOL/L (ref 7–16)
BUN BLDV-MCNC: 10 MG/DL (ref 8–23)
CALCIUM SERPL-MCNC: 9.6 MG/DL (ref 8.6–10.2)
CHLORIDE BLD-SCNC: 97 MMOL/L (ref 98–107)
CO2: 29 MMOL/L (ref 22–29)
CREAT SERPL-MCNC: 0.8 MG/DL (ref 0.5–1)
GFR AFRICAN AMERICAN: >60
GFR NON-AFRICAN AMERICAN: >60 ML/MIN/1.73
GLUCOSE BLD-MCNC: 143 MG/DL (ref 74–99)
HCT VFR BLD CALC: 45.3 % (ref 34–48)
HEMOGLOBIN: 14 G/DL (ref 11.5–15.5)
MCH RBC QN AUTO: 30.3 PG (ref 26–35)
MCHC RBC AUTO-ENTMCNC: 30.9 % (ref 32–34.5)
MCV RBC AUTO: 98.1 FL (ref 80–99.9)
PDW BLD-RTO: 11.9 FL (ref 11.5–15)
PLATELET # BLD: 160 E9/L (ref 130–450)
PMV BLD AUTO: 10 FL (ref 7–12)
POTASSIUM SERPL-SCNC: 3.9 MMOL/L (ref 3.5–5)
RBC # BLD: 4.62 E12/L (ref 3.5–5.5)
SODIUM BLD-SCNC: 140 MMOL/L (ref 132–146)
WBC # BLD: 4.4 E9/L (ref 4.5–11.5)

## 2020-03-16 PROCEDURE — 6370000000 HC RX 637 (ALT 250 FOR IP): Performed by: FAMILY MEDICINE

## 2020-03-16 PROCEDURE — 80048 BASIC METABOLIC PNL TOTAL CA: CPT

## 2020-03-16 PROCEDURE — 6360000002 HC RX W HCPCS: Performed by: FAMILY MEDICINE

## 2020-03-16 PROCEDURE — 97530 THERAPEUTIC ACTIVITIES: CPT | Performed by: PHYSICAL THERAPIST

## 2020-03-16 PROCEDURE — 97165 OT EVAL LOW COMPLEX 30 MIN: CPT

## 2020-03-16 PROCEDURE — 97535 SELF CARE MNGMENT TRAINING: CPT

## 2020-03-16 PROCEDURE — 36415 COLL VENOUS BLD VENIPUNCTURE: CPT

## 2020-03-16 PROCEDURE — 97161 PT EVAL LOW COMPLEX 20 MIN: CPT | Performed by: PHYSICAL THERAPIST

## 2020-03-16 PROCEDURE — 85027 COMPLETE CBC AUTOMATED: CPT

## 2020-03-16 PROCEDURE — 1200000000 HC SEMI PRIVATE

## 2020-03-16 RX ADMIN — OSELTAMIVIR PHOSPHATE 75 MG: 75 CAPSULE ORAL at 08:58

## 2020-03-16 RX ADMIN — OXYCODONE HYDROCHLORIDE 2.5 MG: 5 TABLET ORAL at 08:57

## 2020-03-16 RX ADMIN — OXYCODONE AND ACETAMINOPHEN 1 TABLET: 5; 325 TABLET ORAL at 22:55

## 2020-03-16 RX ADMIN — OXYCODONE HYDROCHLORIDE 2.5 MG: 5 TABLET ORAL at 22:55

## 2020-03-16 RX ADMIN — FERROUS SULFATE TAB 325 MG (65 MG ELEMENTAL FE) 325 MG: 325 (65 FE) TAB at 22:55

## 2020-03-16 RX ADMIN — Medication 1000 MG: at 22:56

## 2020-03-16 RX ADMIN — ASPIRIN 81 MG 81 MG: 81 TABLET ORAL at 08:54

## 2020-03-16 RX ADMIN — MORPHINE SULFATE 15 MG: 15 TABLET, FILM COATED, EXTENDED RELEASE ORAL at 09:58

## 2020-03-16 RX ADMIN — ENOXAPARIN SODIUM 40 MG: 40 INJECTION SUBCUTANEOUS at 08:54

## 2020-03-16 RX ADMIN — METHYLPREDNISOLONE SODIUM SUCCINATE 40 MG: 40 INJECTION, POWDER, FOR SOLUTION INTRAMUSCULAR; INTRAVENOUS at 19:00

## 2020-03-16 RX ADMIN — HYDRALAZINE HYDROCHLORIDE 10 MG: 20 INJECTION INTRAMUSCULAR; INTRAVENOUS at 08:58

## 2020-03-16 RX ADMIN — Medication 2000 UNITS: at 22:56

## 2020-03-16 RX ADMIN — ALBUTEROL SULFATE 2 PUFF: 90 AEROSOL, METERED RESPIRATORY (INHALATION) at 22:58

## 2020-03-16 RX ADMIN — GABAPENTIN 300 MG: 300 CAPSULE ORAL at 09:02

## 2020-03-16 RX ADMIN — METOPROLOL TARTRATE 50 MG: 50 TABLET, FILM COATED ORAL at 22:55

## 2020-03-16 RX ADMIN — Medication 2000 MG: at 22:56

## 2020-03-16 RX ADMIN — PANTOPRAZOLE SODIUM 40 MG: 40 TABLET, DELAYED RELEASE ORAL at 07:13

## 2020-03-16 RX ADMIN — METHYLPREDNISOLONE SODIUM SUCCINATE 40 MG: 40 INJECTION, POWDER, FOR SOLUTION INTRAMUSCULAR; INTRAVENOUS at 07:12

## 2020-03-16 RX ADMIN — GABAPENTIN 300 MG: 300 CAPSULE ORAL at 13:20

## 2020-03-16 RX ADMIN — GABAPENTIN 300 MG: 300 CAPSULE ORAL at 22:55

## 2020-03-16 RX ADMIN — MORPHINE SULFATE 15 MG: 15 TABLET, FILM COATED, EXTENDED RELEASE ORAL at 00:03

## 2020-03-16 RX ADMIN — OXYCODONE AND ACETAMINOPHEN 1 TABLET: 5; 325 TABLET ORAL at 08:57

## 2020-03-16 RX ADMIN — DIPHENHYDRAMINE HCL 50 MG: 25 TABLET ORAL at 08:54

## 2020-03-16 ASSESSMENT — PAIN DESCRIPTION - FREQUENCY
FREQUENCY: CONTINUOUS
FREQUENCY: CONTINUOUS

## 2020-03-16 ASSESSMENT — PAIN DESCRIPTION - DESCRIPTORS
DESCRIPTORS: ACHING;DISCOMFORT;DULL
DESCRIPTORS: ACHING;CONSTANT;DISCOMFORT

## 2020-03-16 ASSESSMENT — PAIN DESCRIPTION - ONSET
ONSET: ON-GOING
ONSET: ON-GOING

## 2020-03-16 ASSESSMENT — PAIN DESCRIPTION - ORIENTATION: ORIENTATION: RIGHT

## 2020-03-16 ASSESSMENT — PAIN - FUNCTIONAL ASSESSMENT: PAIN_FUNCTIONAL_ASSESSMENT: PREVENTS OR INTERFERES SOME ACTIVE ACTIVITIES AND ADLS

## 2020-03-16 ASSESSMENT — PAIN DESCRIPTION - PAIN TYPE
TYPE: CHRONIC PAIN
TYPE: ACUTE PAIN

## 2020-03-16 ASSESSMENT — ENCOUNTER SYMPTOMS: SHORTNESS OF BREATH: 1

## 2020-03-16 ASSESSMENT — PAIN DESCRIPTION - LOCATION: LOCATION: HIP;KNEE

## 2020-03-16 ASSESSMENT — PAIN SCALES - GENERAL
PAINLEVEL_OUTOF10: 5

## 2020-03-16 ASSESSMENT — PAIN DESCRIPTION - PROGRESSION: CLINICAL_PROGRESSION: GRADUALLY WORSENING

## 2020-03-16 NOTE — PROGRESS NOTES
numbness/tingling   Tone:  WFL  Edema: BLEs                            Comments:Cleared by RN to see pt. Upon arrival, patient supine in bed and agreeable to OT session. At end of session, patient sitting EOB (with PT) with call light and phone within reach, all lines and tubes intact. Pt would benefit from continued OT to increase functional independence and quality of life. Treatment: Pt required vc's for proper technique/safety with hand placement/body mechanics/posture for bed mobility ADLs/functional tranfers/mobility. Pt able to stand at sink ~10 mins to increase standing strength/balance/activity tolerance for ease with ADLs. Pt appeared to have tolerated session well and appears motivated/cooperative/pleasan. Pt demo'ing good understanding of education provided. Continue to educate. Eval Complexity: Low    Assessment of current deficits   Functional mobility [x]  ADLs [x] Strength [x]  Cognition []  Functional transfers  [x] IADLs [x] Safety Awareness [x]  Endurance [x]  Fine Motor Coordination [] Balance [x] Vision/perception [] Sensation []   Gross Motor Coordination [] ROM [] Delirium []                  Motor Control []    Plan of Care:   ADL retraining [x]   Equipment needs [x]   Neuromuscular re-education [x] Energy Conservation Techniques [x]  Functional Transfer training [x] Patient and/or Family Education [x]  Functional Mobility training [x]  Environmental Modifications [x]  Cognitive re-training []   Compensatory techniques for ADLs [x]  Splinting Needs []   Positioning to improve overall function [x]   Therapeutic Activity [x]  Therapeutic Exercise  [x]  Visual/Perceptual: []    Delirium prevention/treatment  []   Other:  []    Rehab Potential: Good for established goals, pt. assisted in establishment of goals. LTG: maximize independence with ADLs to return to PLOF    Patient  instructed on diagnosis, prognosis/goals and plan of care. Demonstrated good understanding.     []

## 2020-03-16 NOTE — CARE COORDINATION
Per internal med note today, Still very short of breath with minimal exertion. I walked patient today from bathroom to bed and very weak. She lives alone. Patient has a bedrest order. Charge nurse notified to check for discontinuation of bedrest order and PT/OT eval orders to assist with transition of care determination. She is currently on IV Solumedrol 40 mg q12 hrs and continues on Tamiflu.    Genesis Leiva RN CM

## 2020-03-16 NOTE — PROGRESS NOTES
Physical Therapy Initial Assessment     Name: Marck Turner  : 1945  MRN: [id number]    Referring Provider:  Dr. Chip Estrada    Date of Service: 3/16/2020    Evaluating PT:  Aixa Carrasco PT, DPT   [id number]    Room #:  6970/7367-C  Diagnosis:  Influenza A  PMHx:  HTN, COPD, CAD  Precautions:  SOB, 2L of O2  Equipment Needs:  None    SUBJECTIVE:    Home: Pt lives with alone (grandson lives there intermittently) in a 2 story home with 4 stairs to enter and 2 rail(s) with 12 + 3 stairs and 1 HR to 2nd floor bed and bath. PLOF: Pt ambulated with IND with no AD PTA. OBJECTIVE:   Initial Evaluation  Date: 3/16/2020 Treatment Short Term/ Long Term   Goals   AM-PAC 6 Clicks 61/73     Was pt agreeable to Eval/treatment? Y     Does pt have pain? N     Bed Mobility  Rolling: SBA  Supine to sit: SBA  Sit to supine: SBA  Scooting: SBA  IND   Transfers Sit to stand: SBA  Stand to sit: SBA  Stand pivot: SBA  IND   Ambulation    45 feet with no AD with SBA  > 150 feet with no AD with IND   Stair negotiation: ascended and descended  NA  15 steps with 1 rail with Mod I   ROM BUE:  See OT eval  BLE:  WFL     Strength BUE:  See OT eval  BLE:  4-/5  4/5   Balance Sitting EOB:  SBA  Dynamic Standing:  SBA  Sitting EOB:  IND  Dynamic Standing:  IND     Pt is A & O x 4  Sensation:  Pt denies numbness and tingling to extremities  Edema:  None    Therapeutic Exercises:  None    Patient education  Pt educated on safety awareness, energy conservation, proper hand placement and sequencing for bed mobility and transfers, PLB throughout function to help regulate HR and O2 Saturation. Patient response to education:   Pt verbalized understanding Pt demonstrated skill Pt requires further education in this area   y y y     ASSESSMENT:    Patient exhibits decreased strength, balance, coordination impairing functional mobility.  Educated pt on techniques to improve safety and independence with bed mobility, transfers, balance, functional transfers, and functional mobility. Pt sat EOB for 10 minutes with SBA in order to improve static and dynamic sitting balance and activity tolerance. Pt ambulated with decreased jasmin and stride length 45' with SBA with no AD. Pt was slightly unsteady and would cruise furniture in room and pt stated \"this is how I always walk\" and did not want any hands on assist. Pt demonstrated fair+ understanding of education/techniques requiring additional education/training as well as fair+ safety awareness. At end of session, pt was left in chair with call light in reach and all needs met. Pt would benefit from continued skilled PT services to increase functional independence and overall quality of life. Treatment:    Pt given VC for hand placement and sequencing to address deficits for bed mobility, transfers, ambulation. Pt given VC for safety throughout session to reduce risk of falls. Pt was given instruction for sit >< stand transfers regarding weight shifting, sequencing, and proper foot/hand placement to achieve an effective stand. Pt was given VC to increase stride length and jasmin to normalize gait mechanics and improve stability as well as instruction regarding heel to toe step through gait to improve dynamic standing balance. Pt was instructed to walk with better posture to be more upright instead of reaching outside of her DAVID during function to increase stability. Pt's/ family goals   To improve strength, endurance, balance, improve breathing. Patient and or family understand(s) diagnosis, prognosis, and plan of care. PLAN:    PT care will be provided in accordance with the objectives noted above. Exercises and functional mobility practice will be used as well as appropriate assistive devices or modalities to obtain goals. Patient and family education will also be administered as needed. Frequency of treatments: 1x daily for 3-7x per week for 2-4 days.     Time in  1420  Time out

## 2020-03-16 NOTE — PROGRESS NOTES
Normal rate. Regular rhythm. S1 normal and S2 normal.      Assessment:  (Acute hypoxic respiratory failure  COPD exacerbation  Influenza A  CAD  HTN  Hyperlipidemia     Plan:  IV steroids, nebs, oxygen  Wean steroids, stop fluiods  tamiflu  Monitor labs and exam.  Continue home meds. I walked patient today from bathroom to bed and very weak. We discussed as she lives alone one more day wound be advisable so she has more time for improvement. Anticipate home tomorrow).        Mandeep Early MD  3/16/2020

## 2020-03-16 NOTE — PLAN OF CARE
Problem: Gas Exchange - Impaired:  Goal: Levels of oxygenation will improve  Description: Levels of oxygenation will improve  Outcome: Met This Shift

## 2020-03-17 LAB
ANION GAP SERPL CALCULATED.3IONS-SCNC: 13 MMOL/L (ref 7–16)
BUN BLDV-MCNC: 10 MG/DL (ref 8–23)
CALCIUM SERPL-MCNC: 10.3 MG/DL (ref 8.6–10.2)
CHLORIDE BLD-SCNC: 94 MMOL/L (ref 98–107)
CO2: 36 MMOL/L (ref 22–29)
CREAT SERPL-MCNC: 0.8 MG/DL (ref 0.5–1)
GFR AFRICAN AMERICAN: >60
GFR NON-AFRICAN AMERICAN: >60 ML/MIN/1.73
GLUCOSE BLD-MCNC: 115 MG/DL (ref 74–99)
HCT VFR BLD CALC: 52 % (ref 34–48)
HEMOGLOBIN: 16.1 G/DL (ref 11.5–15.5)
MCH RBC QN AUTO: 30.3 PG (ref 26–35)
MCHC RBC AUTO-ENTMCNC: 31 % (ref 32–34.5)
MCV RBC AUTO: 97.9 FL (ref 80–99.9)
PDW BLD-RTO: 11.9 FL (ref 11.5–15)
PLATELET # BLD: 181 E9/L (ref 130–450)
PMV BLD AUTO: 10.1 FL (ref 7–12)
POTASSIUM SERPL-SCNC: 4 MMOL/L (ref 3.5–5)
RBC # BLD: 5.31 E12/L (ref 3.5–5.5)
SODIUM BLD-SCNC: 143 MMOL/L (ref 132–146)
WBC # BLD: 4.8 E9/L (ref 4.5–11.5)

## 2020-03-17 PROCEDURE — 80048 BASIC METABOLIC PNL TOTAL CA: CPT

## 2020-03-17 PROCEDURE — 85027 COMPLETE CBC AUTOMATED: CPT

## 2020-03-17 PROCEDURE — 6360000002 HC RX W HCPCS: Performed by: FAMILY MEDICINE

## 2020-03-17 PROCEDURE — 6370000000 HC RX 637 (ALT 250 FOR IP): Performed by: FAMILY MEDICINE

## 2020-03-17 PROCEDURE — 1200000000 HC SEMI PRIVATE

## 2020-03-17 PROCEDURE — 36415 COLL VENOUS BLD VENIPUNCTURE: CPT

## 2020-03-17 RX ORDER — DIPHENHYDRAMINE HCL 25 MG
50 TABLET ORAL EVERY 12 HOURS PRN
Status: DISCONTINUED | OUTPATIENT
Start: 2020-03-17 | End: 2020-03-18 | Stop reason: HOSPADM

## 2020-03-17 RX ORDER — LISINOPRIL 20 MG/1
20 TABLET ORAL DAILY
Status: DISCONTINUED | OUTPATIENT
Start: 2020-03-17 | End: 2020-03-18 | Stop reason: HOSPADM

## 2020-03-17 RX ADMIN — PANTOPRAZOLE SODIUM 40 MG: 40 TABLET, DELAYED RELEASE ORAL at 06:23

## 2020-03-17 RX ADMIN — ACETAMINOPHEN 650 MG: 325 TABLET ORAL at 04:05

## 2020-03-17 RX ADMIN — FERROUS SULFATE TAB 325 MG (65 MG ELEMENTAL FE) 325 MG: 325 (65 FE) TAB at 21:50

## 2020-03-17 RX ADMIN — GABAPENTIN 300 MG: 300 CAPSULE ORAL at 21:49

## 2020-03-17 RX ADMIN — ASPIRIN 81 MG 81 MG: 81 TABLET ORAL at 08:34

## 2020-03-17 RX ADMIN — OXYCODONE AND ACETAMINOPHEN 1 TABLET: 5; 325 TABLET ORAL at 08:35

## 2020-03-17 RX ADMIN — MORPHINE SULFATE 15 MG: 15 TABLET, FILM COATED, EXTENDED RELEASE ORAL at 00:27

## 2020-03-17 RX ADMIN — LISINOPRIL 20 MG: 20 TABLET ORAL at 08:35

## 2020-03-17 RX ADMIN — METOPROLOL TARTRATE 50 MG: 50 TABLET, FILM COATED ORAL at 08:34

## 2020-03-17 RX ADMIN — MORPHINE SULFATE 15 MG: 15 TABLET, FILM COATED, EXTENDED RELEASE ORAL at 09:37

## 2020-03-17 RX ADMIN — ENOXAPARIN SODIUM 40 MG: 40 INJECTION SUBCUTANEOUS at 08:34

## 2020-03-17 RX ADMIN — DIPHENHYDRAMINE HCL 50 MG: 25 TABLET ORAL at 23:52

## 2020-03-17 RX ADMIN — MORPHINE SULFATE 15 MG: 15 TABLET, FILM COATED, EXTENDED RELEASE ORAL at 23:51

## 2020-03-17 RX ADMIN — GABAPENTIN 300 MG: 300 CAPSULE ORAL at 08:35

## 2020-03-17 RX ADMIN — OXYCODONE HYDROCHLORIDE 2.5 MG: 5 TABLET ORAL at 08:35

## 2020-03-17 RX ADMIN — OXYCODONE AND ACETAMINOPHEN 1 TABLET: 5; 325 TABLET ORAL at 22:05

## 2020-03-17 RX ADMIN — Medication 2000 MG: at 21:49

## 2020-03-17 RX ADMIN — DIPHENHYDRAMINE HCL 50 MG: 25 TABLET ORAL at 08:34

## 2020-03-17 RX ADMIN — METHYLPREDNISOLONE SODIUM SUCCINATE 40 MG: 40 INJECTION, POWDER, FOR SOLUTION INTRAMUSCULAR; INTRAVENOUS at 06:23

## 2020-03-17 RX ADMIN — HYDRALAZINE HYDROCHLORIDE 10 MG: 20 INJECTION INTRAMUSCULAR; INTRAVENOUS at 00:36

## 2020-03-17 RX ADMIN — METOPROLOL TARTRATE 50 MG: 50 TABLET, FILM COATED ORAL at 21:50

## 2020-03-17 RX ADMIN — HYDRALAZINE HYDROCHLORIDE 10 MG: 20 INJECTION INTRAMUSCULAR; INTRAVENOUS at 06:28

## 2020-03-17 RX ADMIN — GABAPENTIN 300 MG: 300 CAPSULE ORAL at 14:05

## 2020-03-17 RX ADMIN — OXYCODONE HYDROCHLORIDE 2.5 MG: 5 TABLET ORAL at 22:05

## 2020-03-17 RX ADMIN — Medication 2000 UNITS: at 21:49

## 2020-03-17 RX ADMIN — Medication 1000 MG: at 21:49

## 2020-03-17 RX ADMIN — METHYLPREDNISOLONE SODIUM SUCCINATE 40 MG: 40 INJECTION, POWDER, FOR SOLUTION INTRAMUSCULAR; INTRAVENOUS at 18:10

## 2020-03-17 ASSESSMENT — PAIN DESCRIPTION - ONSET
ONSET: AWAKENED FROM SLEEP
ONSET: ON-GOING

## 2020-03-17 ASSESSMENT — PAIN DESCRIPTION - LOCATION: LOCATION: HEAD

## 2020-03-17 ASSESSMENT — PAIN DESCRIPTION - DESCRIPTORS
DESCRIPTORS: ACHING;DISCOMFORT;DULL
DESCRIPTORS: ACHING;DISCOMFORT;DULL

## 2020-03-17 ASSESSMENT — PAIN DESCRIPTION - FREQUENCY
FREQUENCY: CONTINUOUS
FREQUENCY: CONTINUOUS

## 2020-03-17 ASSESSMENT — PAIN - FUNCTIONAL ASSESSMENT
PAIN_FUNCTIONAL_ASSESSMENT: ACTIVITIES ARE NOT PREVENTED
PAIN_FUNCTIONAL_ASSESSMENT: ACTIVITIES ARE NOT PREVENTED

## 2020-03-17 ASSESSMENT — PAIN SCALES - GENERAL
PAINLEVEL_OUTOF10: 2
PAINLEVEL_OUTOF10: 4
PAINLEVEL_OUTOF10: 4
PAINLEVEL_OUTOF10: 5
PAINLEVEL_OUTOF10: 4
PAINLEVEL_OUTOF10: 5

## 2020-03-17 ASSESSMENT — PAIN DESCRIPTION - PROGRESSION
CLINICAL_PROGRESSION: GRADUALLY WORSENING
CLINICAL_PROGRESSION: GRADUALLY WORSENING

## 2020-03-17 ASSESSMENT — PAIN DESCRIPTION - PAIN TYPE
TYPE: ACUTE PAIN
TYPE: ACUTE PAIN;CHRONIC PAIN

## 2020-03-17 ASSESSMENT — PAIN DESCRIPTION - ORIENTATION
ORIENTATION: RIGHT
ORIENTATION: RIGHT;LEFT

## 2020-03-17 ASSESSMENT — ENCOUNTER SYMPTOMS: SHORTNESS OF BREATH: 1

## 2020-03-17 NOTE — CARE COORDINATION
Per RN, patient is agreeable to home health care. Referral made to [facility] at [id number] [facility]. Will need home health care orders for CPA, med compliance and PT/OT prior to discharge.   Fawn Patterson RN CM

## 2020-03-17 NOTE — PROGRESS NOTES
Clementine Kemp is a 70 y.o. female patient. Patient says breathing is somewhat improved today. She complains of a headache.     Current Facility-Administered Medications   Medication Dose Route Frequency Provider Last Rate Last Dose    lisinopril (PRINIVIL;ZESTRIL) tablet 20 mg  20 mg Oral Daily Carolyn Celestin MD        methylPREDNISolone sodium (SOLU-MEDROL) injection 40 mg  40 mg Intravenous Q12H Carolyn Celestin MD   40 mg at 03/17/20 9910    hydrALAZINE (APRESOLINE) injection 10 mg  10 mg Intravenous Q4H PRN Carolyn Celestin MD   10 mg at 03/17/20 5782    acetaminophen (TYLENOL) tablet 650 mg  650 mg Oral Q4H PRN Carolyn Celestin MD   650 mg at 03/17/20 0405    calcium elemental (OSCAL) tablet 1,000 mg  1,000 mg Oral Nightly Carolyn Celestin MD   1,000 mg at 03/16/20 2256    vitamin C (ASCORBIC ACID) tablet 2,000 mg  2,000 mg Oral Nightly Carolyn Celestin MD   2,000 mg at 03/16/20 2256    vitamin D tablet 2,000 Units  2,000 Units Oral Nightly Carolyn Celestin MD   2,000 Units at 03/16/20 2256    albuterol sulfate  (90 Base) MCG/ACT inhaler 2 puff  2 puff Inhalation Q6H PRN Carolyn Celestin MD   2 puff at 03/16/20 2258    aspirin chewable tablet 81 mg  81 mg Oral Daily Carolyn Celestin MD   81 mg at 03/16/20 0854    diphenhydrAMINE (BENADRYL) tablet 50 mg  50 mg Oral Daily PRN Carolyn Celestin MD   50 mg at 03/16/20 0396    ferrous sulfate (IRON 325) tablet 325 mg  325 mg Oral Nightly Carolyn Celestin MD   325 mg at 03/16/20 2255    gabapentin (NEURONTIN) capsule 300 mg  300 mg Oral TID Carolyn Celestin MD   300 mg at 03/16/20 2255    ipratropium-albuterol (DUONEB) nebulizer solution 3 mL  1 vial Nebulization Q4H PRN Carolyn Celestin MD        metoprolol tartrate (LOPRESSOR) tablet 50 mg  50 mg Oral BID Carolyn Celestin MD   50 mg at 03/16/20 5745    morphine (MS CONTIN) extended release tablet 15 mg  15

## 2020-03-17 NOTE — PLAN OF CARE
Problem: Falls - Risk of:  Goal: Will remain free from falls  Description: Will remain free from falls  3/17/2020 1012 by Dong Pearson RN  Outcome: Met This Shift     Problem: Falls - Risk of:  Goal: Absence of physical injury  Description: Absence of physical injury  Outcome: Met This Shift     Problem: Pain:  Goal: Pain level will decrease  Description: Pain level will decrease  Outcome: Met This Shift     Problem: Gas Exchange - Impaired:  Goal: Levels of oxygenation will improve  Description: Levels of oxygenation will improve  Outcome: Met This Shift

## 2020-03-17 NOTE — PROGRESS NOTES
Message sent to Dr. Shonda Giordano regarding patient's blood pressure being elevated through night. Will await further orders.

## 2020-03-17 NOTE — PLAN OF CARE
Problem: Falls - Risk of:  Goal: Will remain free from falls  Description: Will remain free from falls  3/17/2020 1830 by Cullen Bermudez RN  Outcome: Met This Shift  3/17/2020 1012 by Justo Way RN  Outcome: Met This Shift     Problem: Falls - Risk of:  Goal: Absence of physical injury  Description: Absence of physical injury  3/17/2020 1830 by Cullen Bermudez RN  Outcome: Met This Shift  3/17/2020 1012 by Justo Way RN  Outcome: Met This Shift     Problem: Pain:  Goal: Pain level will decrease  Description: Pain level will decrease  3/17/2020 1830 by Cullen Bermudez RN  Outcome: Met This Shift  3/17/2020 1012 by Justo Way RN  Outcome: Met This Shift     Problem: Pain:  Goal: Control of acute pain  Description: Control of acute pain  Outcome: Met This Shift     Problem: Pain:  Goal: Control of chronic pain  Description: Control of chronic pain  Outcome: Met This Shift     Problem: Gas Exchange - Impaired:  Goal: Levels of oxygenation will improve  Description: Levels of oxygenation will improve  3/17/2020 1830 by Cullen Bermudez RN  Outcome: Met This Shift  3/17/2020 1012 by Justo Way RN  Outcome: Met This Shift

## 2020-03-17 NOTE — PROGRESS NOTES
Dr Xenia Baer was messaged to notify him that the patient is wanting the lisinopril discontinued. She spoke to members of her family and they are concerned about potential side effects.

## 2020-03-17 NOTE — CARE COORDINATION
Therapy evals reviewed. Patient is currently in droplet isolation. RN to check with patient to see if she would like home health care at discharge. Await response.   Sunita Candelaria RN CM

## 2020-03-18 VITALS
DIASTOLIC BLOOD PRESSURE: 72 MMHG | BODY MASS INDEX: 45.99 KG/M2 | SYSTOLIC BLOOD PRESSURE: 159 MMHG | OXYGEN SATURATION: 91 % | TEMPERATURE: 99.1 F | HEIGHT: 67 IN | RESPIRATION RATE: 18 BRPM | WEIGHT: 293 LBS | HEART RATE: 64 BPM

## 2020-03-18 LAB
ANION GAP SERPL CALCULATED.3IONS-SCNC: 11 MMOL/L (ref 7–16)
BUN BLDV-MCNC: 12 MG/DL (ref 8–23)
CALCIUM SERPL-MCNC: 9.4 MG/DL (ref 8.6–10.2)
CHLORIDE BLD-SCNC: 96 MMOL/L (ref 98–107)
CO2: 31 MMOL/L (ref 22–29)
CREAT SERPL-MCNC: 0.8 MG/DL (ref 0.5–1)
GFR AFRICAN AMERICAN: >60
GFR NON-AFRICAN AMERICAN: >60 ML/MIN/1.73
GLUCOSE BLD-MCNC: 113 MG/DL (ref 74–99)
HCT VFR BLD CALC: 45.1 % (ref 34–48)
HEMOGLOBIN: 14.3 G/DL (ref 11.5–15.5)
MCH RBC QN AUTO: 31 PG (ref 26–35)
MCHC RBC AUTO-ENTMCNC: 31.7 % (ref 32–34.5)
MCV RBC AUTO: 97.8 FL (ref 80–99.9)
PDW BLD-RTO: 12.3 FL (ref 11.5–15)
PLATELET # BLD: 160 E9/L (ref 130–450)
PMV BLD AUTO: 10 FL (ref 7–12)
POTASSIUM SERPL-SCNC: 4.4 MMOL/L (ref 3.5–5)
RBC # BLD: 4.61 E12/L (ref 3.5–5.5)
SODIUM BLD-SCNC: 138 MMOL/L (ref 132–146)
WBC # BLD: 5 E9/L (ref 4.5–11.5)

## 2020-03-18 PROCEDURE — 6370000000 HC RX 637 (ALT 250 FOR IP): Performed by: FAMILY MEDICINE

## 2020-03-18 PROCEDURE — 6360000002 HC RX W HCPCS: Performed by: FAMILY MEDICINE

## 2020-03-18 PROCEDURE — 97530 THERAPEUTIC ACTIVITIES: CPT

## 2020-03-18 PROCEDURE — 97535 SELF CARE MNGMENT TRAINING: CPT

## 2020-03-18 PROCEDURE — 80048 BASIC METABOLIC PNL TOTAL CA: CPT

## 2020-03-18 PROCEDURE — 85027 COMPLETE CBC AUTOMATED: CPT

## 2020-03-18 PROCEDURE — 36415 COLL VENOUS BLD VENIPUNCTURE: CPT

## 2020-03-18 RX ORDER — GUAIFENESIN/DEXTROMETHORPHAN 100-10MG/5
5 SYRUP ORAL 3 TIMES DAILY PRN
Qty: 120 ML | Refills: 1 | Status: SHIPPED | OUTPATIENT
Start: 2020-03-18 | End: 2020-03-28

## 2020-03-18 RX ORDER — PREDNISONE 20 MG/1
20 TABLET ORAL 2 TIMES DAILY
Qty: 10 TABLET | Refills: 0 | Status: SHIPPED | OUTPATIENT
Start: 2020-03-18 | End: 2020-03-23

## 2020-03-18 RX ADMIN — MORPHINE SULFATE 15 MG: 15 TABLET, FILM COATED, EXTENDED RELEASE ORAL at 10:40

## 2020-03-18 RX ADMIN — GABAPENTIN 300 MG: 300 CAPSULE ORAL at 14:18

## 2020-03-18 RX ADMIN — OXYCODONE HYDROCHLORIDE 2.5 MG: 5 TABLET ORAL at 09:22

## 2020-03-18 RX ADMIN — LISINOPRIL 20 MG: 20 TABLET ORAL at 09:22

## 2020-03-18 RX ADMIN — METOPROLOL TARTRATE 50 MG: 50 TABLET, FILM COATED ORAL at 09:22

## 2020-03-18 RX ADMIN — OXYCODONE AND ACETAMINOPHEN 1 TABLET: 5; 325 TABLET ORAL at 09:22

## 2020-03-18 RX ADMIN — ASPIRIN 81 MG 81 MG: 81 TABLET ORAL at 09:22

## 2020-03-18 RX ADMIN — METHYLPREDNISOLONE SODIUM SUCCINATE 40 MG: 40 INJECTION, POWDER, FOR SOLUTION INTRAMUSCULAR; INTRAVENOUS at 06:24

## 2020-03-18 RX ADMIN — GABAPENTIN 300 MG: 300 CAPSULE ORAL at 09:22

## 2020-03-18 RX ADMIN — PANTOPRAZOLE SODIUM 40 MG: 40 TABLET, DELAYED RELEASE ORAL at 06:24

## 2020-03-18 RX ADMIN — ENOXAPARIN SODIUM 40 MG: 40 INJECTION SUBCUTANEOUS at 09:22

## 2020-03-18 ASSESSMENT — PAIN DESCRIPTION - PAIN TYPE
TYPE: CHRONIC PAIN
TYPE: CHRONIC PAIN

## 2020-03-18 ASSESSMENT — PAIN SCALES - GENERAL
PAINLEVEL_OUTOF10: 4
PAINLEVEL_OUTOF10: 8
PAINLEVEL_OUTOF10: 2
PAINLEVEL_OUTOF10: 5

## 2020-03-18 ASSESSMENT — PAIN DESCRIPTION - DESCRIPTORS
DESCRIPTORS: ACHING;DISCOMFORT;DULL
DESCRIPTORS: ACHING;DISCOMFORT;DULL

## 2020-03-18 ASSESSMENT — PAIN DESCRIPTION - LOCATION
LOCATION: GENERALIZED
LOCATION: GENERALIZED

## 2020-03-18 NOTE — PROGRESS NOTES
CLINICAL PHARMACY NOTE: MEDS TO [address] Select Patient?: No  Total # of Prescriptions Filled: 1   The following medications were delivered to the patient:  · Prednisone 20  Total # of Interventions Completed: 3  Time Spent (min): 30    Additional Documentation:

## 2020-03-18 NOTE — PROGRESS NOTES
OT BEDSIDE TREATMENT NOTE      Date:3/18/2020  Patient Name: Frank Tompkins  MRN: [id number]  : 1945  Room: [address] [place], [place] [address]     Evaluating OT: Eliodoro Kayser, MOT, OTR/L [id number]     AM-PAC Daily Activity Raw Score:   Recommended Adaptive Equipment:  TBD      Diagnosis: influenza A  Referring Practitioner: Lobo Wilhelm MD  Surgery: n/a   Pertinent Medical History: CAD, COPD, HTN, lymphedema   Precautions:  Falls, droplet/contact     Home Living: Pt lives alone (son lives intermittently) in a 2 story home with 4 step(s) to enter and B rail(s); bed/bath on 2nd level (1 flight +3)  Bathroom setup: tub/shower unit   Equipment owned: shower bench, canes, BSC, reacher, sock aid  Prior Level of Function: IND with ADLs/IADLs; using no AD for functional mobility   Driving: yes    Per OT Eval:           Pain Level: R hips/knee/ankle (longstanding) 5/10  Cognition: A&O: 4/4; Follows multi step directions               Memory:  good               Sequencing:  good               Problem solving:  good               Judgement/safety:  fair                 Functional Assessment:    Initial Eval Status  Date: 3/16/2020 Treatment Status  Date: 3/18/20 Short Term Goals  Treatment frequency: 2-5x/wk   Feeding IND  Independent       Grooming SBA (standing at sink for oral hygiene)  n/t IND   UB Dressing SUP  Supervision  IND   LB Dressing Max A (assist with B socks, has AE at home) SBA pt seated EOB to jaden/doff pants flexing at waist  IND    Bathing Mod A (simulated) MOD A; pt educated with regards to DME, and AE   SUP    Toileting SBA n/t  IND   Bed Mobility  Log roll: sba  Supine to sit: sba   Sit to supine: sba  Supine<>sit supervision v/c's for safety  Log roll IND  Supine to sit: IND   Sit to supine: IND   Functional Transfers Sit to stand:SBA   Stand to sit:SBA  Commode: SBA SBA from EOB  IND   Functional Mobility SBA (no AD, to/from bathroom) N/t  IND   Balance Sitting:     Static:  SBA    Dynamic:SBA  Standing: SBA

## 2020-03-19 ENCOUNTER — CARE COORDINATION (OUTPATIENT)
Dept: CASE MANAGEMENT | Age: 75
End: 2020-03-19

## 2020-04-02 ENCOUNTER — CARE COORDINATION (OUTPATIENT)
Dept: CASE MANAGEMENT | Age: 75
End: 2020-04-02

## 2020-04-11 PROBLEM — J10.1 INFLUENZA A: Status: RESOLVED | Noted: 2020-03-12 | Resolved: 2020-04-11

## 2021-04-15 ENCOUNTER — ANESTHESIA (OUTPATIENT)
Dept: OPERATING ROOM | Age: 76
DRG: 351 | End: 2021-04-15
Payer: MEDICARE

## 2021-04-15 ENCOUNTER — ANESTHESIA EVENT (OUTPATIENT)
Dept: OPERATING ROOM | Age: 76
DRG: 351 | End: 2021-04-15
Payer: MEDICARE

## 2021-04-15 ENCOUNTER — APPOINTMENT (OUTPATIENT)
Dept: CT IMAGING | Age: 76
DRG: 351 | End: 2021-04-15
Attending: SURGERY
Payer: MEDICARE

## 2021-04-15 ENCOUNTER — HOSPITAL ENCOUNTER (INPATIENT)
Age: 76
LOS: 2 days | Discharge: HOME | DRG: 351 | End: 2021-04-17
Attending: SURGERY | Admitting: SURGERY
Payer: MEDICARE

## 2021-04-15 DIAGNOSIS — K46.0 INCARCERATED HERNIA: Primary | ICD-10-CM

## 2021-04-15 LAB
ALBUMIN SERPL-MCNC: 4.2 G/DL (ref 3.5–5.2)
ALP BLD-CCNC: 119 U/L (ref 35–104)
ALT SERPL-CCNC: 14 U/L (ref 0–32)
ANION GAP SERPL CALCULATED.3IONS-SCNC: 8 MMOL/L (ref 7–16)
AST SERPL-CCNC: 21 U/L (ref 0–31)
BASOPHILS ABSOLUTE: 0.02 E9/L (ref 0–0.2)
BASOPHILS RELATIVE PERCENT: 0.4 % (ref 0–2)
BILIRUB SERPL-MCNC: 0.5 MG/DL (ref 0–1.2)
BUN BLDV-MCNC: 11 MG/DL (ref 8–23)
CALCIUM SERPL-MCNC: 9.2 MG/DL (ref 8.6–10.2)
CHLORIDE BLD-SCNC: 102 MMOL/L (ref 98–107)
CO2: 30 MMOL/L (ref 22–29)
CREAT SERPL-MCNC: 0.8 MG/DL (ref 0.5–1)
EOSINOPHILS ABSOLUTE: 0.15 E9/L (ref 0.05–0.5)
EOSINOPHILS RELATIVE PERCENT: 2.9 % (ref 0–6)
GFR AFRICAN AMERICAN: >60
GFR NON-AFRICAN AMERICAN: >60 ML/MIN/1.73
GLUCOSE BLD-MCNC: 107 MG/DL (ref 74–99)
HCT VFR BLD CALC: 42.1 % (ref 34–48)
HEMOGLOBIN: 13.6 G/DL (ref 11.5–15.5)
IMMATURE GRANULOCYTES #: 0.02 E9/L
IMMATURE GRANULOCYTES %: 0.4 % (ref 0–5)
LYMPHOCYTES ABSOLUTE: 1.2 E9/L (ref 1.5–4)
LYMPHOCYTES RELATIVE PERCENT: 23.3 % (ref 20–42)
MCH RBC QN AUTO: 32 PG (ref 26–35)
MCHC RBC AUTO-ENTMCNC: 32.3 % (ref 32–34.5)
MCV RBC AUTO: 99.1 FL (ref 80–99.9)
MONOCYTES ABSOLUTE: 0.4 E9/L (ref 0.1–0.95)
MONOCYTES RELATIVE PERCENT: 7.8 % (ref 2–12)
NEUTROPHILS ABSOLUTE: 3.35 E9/L (ref 1.8–7.3)
NEUTROPHILS RELATIVE PERCENT: 65.2 % (ref 43–80)
PDW BLD-RTO: 11.9 FL (ref 11.5–15)
PLATELET # BLD: 167 E9/L (ref 130–450)
PMV BLD AUTO: 9.9 FL (ref 7–12)
POTASSIUM SERPL-SCNC: 3.9 MMOL/L (ref 3.5–5)
RBC # BLD: 4.25 E12/L (ref 3.5–5.5)
SODIUM BLD-SCNC: 140 MMOL/L (ref 132–146)
TOTAL PROTEIN: 8.1 G/DL (ref 6.4–8.3)
WBC # BLD: 5.1 E9/L (ref 4.5–11.5)

## 2021-04-15 PROCEDURE — G0378 HOSPITAL OBSERVATION PER HR: HCPCS

## 2021-04-15 PROCEDURE — 99222 1ST HOSP IP/OBS MODERATE 55: CPT | Performed by: INTERNAL MEDICINE

## 2021-04-15 PROCEDURE — 6360000004 HC RX CONTRAST MEDICATION: Performed by: RADIOLOGY

## 2021-04-15 PROCEDURE — 6370000000 HC RX 637 (ALT 250 FOR IP): Performed by: FAMILY MEDICINE

## 2021-04-15 PROCEDURE — G0379 DIRECT REFER HOSPITAL OBSERV: HCPCS

## 2021-04-15 PROCEDURE — 36415 COLL VENOUS BLD VENIPUNCTURE: CPT

## 2021-04-15 PROCEDURE — 1200000000 HC SEMI PRIVATE

## 2021-04-15 PROCEDURE — 85025 COMPLETE CBC W/AUTO DIFF WBC: CPT

## 2021-04-15 PROCEDURE — 2500000003 HC RX 250 WO HCPCS: Performed by: SURGERY

## 2021-04-15 PROCEDURE — 80053 COMPREHEN METABOLIC PANEL: CPT

## 2021-04-15 PROCEDURE — 74177 CT ABD & PELVIS W/CONTRAST: CPT

## 2021-04-15 PROCEDURE — 6370000000 HC RX 637 (ALT 250 FOR IP): Performed by: INTERNAL MEDICINE

## 2021-04-15 RX ORDER — IPRATROPIUM BROMIDE AND ALBUTEROL SULFATE 2.5; .5 MG/3ML; MG/3ML
1 SOLUTION RESPIRATORY (INHALATION) EVERY 4 HOURS PRN
Status: DISCONTINUED | OUTPATIENT
Start: 2021-04-15 | End: 2021-04-17 | Stop reason: HOSPADM

## 2021-04-15 RX ORDER — DEXTROSE, SODIUM CHLORIDE, AND POTASSIUM CHLORIDE 5; .45; .15 G/100ML; G/100ML; G/100ML
INJECTION INTRAVENOUS CONTINUOUS
Status: DISCONTINUED | OUTPATIENT
Start: 2021-04-16 | End: 2021-04-16

## 2021-04-15 RX ORDER — OXYCODONE AND ACETAMINOPHEN 7.5; 325 MG/1; MG/1
1 TABLET ORAL 2 TIMES DAILY
Status: DISCONTINUED | OUTPATIENT
Start: 2021-04-15 | End: 2021-04-15 | Stop reason: CLARIF

## 2021-04-15 RX ORDER — ATORVASTATIN CALCIUM 40 MG/1
40 TABLET, FILM COATED ORAL DAILY
Status: DISCONTINUED | OUTPATIENT
Start: 2021-04-15 | End: 2021-04-17 | Stop reason: HOSPADM

## 2021-04-15 RX ORDER — OXYCODONE HYDROCHLORIDE 5 MG/1
2.5 TABLET ORAL EVERY 12 HOURS
Status: DISCONTINUED | OUTPATIENT
Start: 2021-04-15 | End: 2021-04-16

## 2021-04-15 RX ORDER — ASCORBIC ACID 500 MG
2000 TABLET ORAL DAILY
Status: DISCONTINUED | OUTPATIENT
Start: 2021-04-15 | End: 2021-04-17 | Stop reason: HOSPADM

## 2021-04-15 RX ORDER — ASPIRIN 81 MG/1
81 TABLET ORAL DAILY
Status: DISCONTINUED | OUTPATIENT
Start: 2021-04-15 | End: 2021-04-17 | Stop reason: HOSPADM

## 2021-04-15 RX ORDER — LANOLIN ALCOHOL/MO/W.PET/CERES
400 CREAM (GRAM) TOPICAL NIGHTLY
COMMUNITY

## 2021-04-15 RX ORDER — FERROUS SULFATE 325(65) MG
325 TABLET ORAL NIGHTLY
Status: DISCONTINUED | OUTPATIENT
Start: 2021-04-15 | End: 2021-04-17 | Stop reason: HOSPADM

## 2021-04-15 RX ORDER — PANTOPRAZOLE SODIUM 40 MG/1
40 TABLET, DELAYED RELEASE ORAL
Status: DISCONTINUED | OUTPATIENT
Start: 2021-04-16 | End: 2021-04-17 | Stop reason: HOSPADM

## 2021-04-15 RX ORDER — DIPHENHYDRAMINE HCL 25 MG
50 TABLET ORAL EVERY 8 HOURS PRN
Status: DISCONTINUED | OUTPATIENT
Start: 2021-04-15 | End: 2021-04-17 | Stop reason: HOSPADM

## 2021-04-15 RX ORDER — FOLIC ACID 1 MG/1
500 TABLET ORAL NIGHTLY
Status: DISCONTINUED | OUTPATIENT
Start: 2021-04-15 | End: 2021-04-17 | Stop reason: HOSPADM

## 2021-04-15 RX ORDER — MORPHINE SULFATE 15 MG/1
15 TABLET, FILM COATED, EXTENDED RELEASE ORAL 2 TIMES DAILY
Status: DISCONTINUED | OUTPATIENT
Start: 2021-04-15 | End: 2021-04-16

## 2021-04-15 RX ORDER — ZINC GLUCONATE 50 MG
50 TABLET ORAL DAILY
COMMUNITY

## 2021-04-15 RX ORDER — GABAPENTIN 300 MG/1
300 CAPSULE ORAL 3 TIMES DAILY
Status: DISCONTINUED | OUTPATIENT
Start: 2021-04-15 | End: 2021-04-17 | Stop reason: HOSPADM

## 2021-04-15 RX ORDER — METOPROLOL TARTRATE 50 MG/1
50 TABLET, FILM COATED ORAL 2 TIMES DAILY
Status: DISCONTINUED | OUTPATIENT
Start: 2021-04-15 | End: 2021-04-17 | Stop reason: HOSPADM

## 2021-04-15 RX ORDER — OXYCODONE HYDROCHLORIDE AND ACETAMINOPHEN 5; 325 MG/1; MG/1
1 TABLET ORAL EVERY 12 HOURS
Status: DISCONTINUED | OUTPATIENT
Start: 2021-04-15 | End: 2021-04-16

## 2021-04-15 RX ORDER — ZINC SULFATE 50(220)MG
50 CAPSULE ORAL DAILY
Status: DISCONTINUED | OUTPATIENT
Start: 2021-04-15 | End: 2021-04-17 | Stop reason: HOSPADM

## 2021-04-15 RX ORDER — CALCIUM CARBONATE 500(1250)
1000 TABLET ORAL DAILY
Status: DISCONTINUED | OUTPATIENT
Start: 2021-04-15 | End: 2021-04-17 | Stop reason: HOSPADM

## 2021-04-15 RX ORDER — FOLIC ACID/MULTIVIT,IRON,MINER 0.4MG-18MG
350 TABLET ORAL NIGHTLY
COMMUNITY

## 2021-04-15 RX ADMIN — FERROUS SULFATE TAB 325 MG (65 MG ELEMENTAL FE) 325 MG: 325 (65 FE) TAB at 20:31

## 2021-04-15 RX ADMIN — ATORVASTATIN CALCIUM 40 MG: 40 TABLET, FILM COATED ORAL at 20:29

## 2021-04-15 RX ADMIN — OXYCODONE 2.5 MG: 5 TABLET ORAL at 20:30

## 2021-04-15 RX ADMIN — IOPAMIDOL 75 ML: 755 INJECTION, SOLUTION INTRAVENOUS at 20:05

## 2021-04-15 RX ADMIN — DIPHENHYDRAMINE HCL 50 MG: 25 TABLET ORAL at 22:20

## 2021-04-15 RX ADMIN — FOLIC ACID 500 MCG: 1 TABLET ORAL at 20:31

## 2021-04-15 RX ADMIN — OXYCODONE HYDROCHLORIDE AND ACETAMINOPHEN 1 TABLET: 5; 325 TABLET ORAL at 20:29

## 2021-04-15 RX ADMIN — MORPHINE SULFATE 15 MG: 15 TABLET, FILM COATED, EXTENDED RELEASE ORAL at 20:29

## 2021-04-15 RX ADMIN — GABAPENTIN 300 MG: 300 CAPSULE ORAL at 20:30

## 2021-04-15 RX ADMIN — METOPROLOL TARTRATE 50 MG: 50 TABLET, FILM COATED ORAL at 20:30

## 2021-04-15 RX ADMIN — POTASSIUM CHLORIDE, DEXTROSE MONOHYDRATE AND SODIUM CHLORIDE: 150; 5; 450 INJECTION, SOLUTION INTRAVENOUS at 23:50

## 2021-04-15 ASSESSMENT — PAIN DESCRIPTION - PAIN TYPE
TYPE_2: CHRONIC PAIN
TYPE: CHRONIC PAIN

## 2021-04-15 ASSESSMENT — PAIN SCALES - GENERAL
PAINLEVEL_OUTOF10: 0
PAINLEVEL_OUTOF10: 4

## 2021-04-15 ASSESSMENT — PAIN DESCRIPTION - DESCRIPTORS: DESCRIPTORS_2: ACHING

## 2021-04-15 ASSESSMENT — PAIN DESCRIPTION - ONSET: ONSET_2: ON-GOING

## 2021-04-15 NOTE — CONSULTS
for Wheezing or Shortness of Breath     Historical Provider, MD   calcium carbonate (OYSTER SHELL CALCIUM 500 MG) 1250 MG tablet Take 2 tablets by mouth daily    Historical Provider, MD   Ascorbic Acid (VITAMIN C) 500 MG tablet Take 2,000 mg by mouth daily    Historical Provider, MD   diphenhydrAMINE (BENADRYL) 50 MG capsule Take 50 mg by mouth daily as needed for Allergies     Historical Provider, MD       Allergies:  Codeine, Dilaudid [hydromorphone hcl], Naproxen, Singulair [montelukast sodium], Black cohosh, and Black cohosh [cimicifuga racemosa (black cohosh)]    Social History:   TOBACCO:   reports that she quit smoking about 25 years ago. Her smoking use included cigarettes. She has a 20.00 pack-year smoking history. She has never used smokeless tobacco.  ETOH:   reports no history of alcohol use. Family History:   No family history on file. Or deferred/otherwise considered non contributory to current admission  PHYSICAL EXAM:    VS: /63   Pulse 67   Temp 97.4 °F (36.3 °C) (Oral)   Resp 18   Ht 5' 8\" (1.727 m)   Wt 300 lb (136.1 kg)   SpO2 95%   BMI 45.61 kg/m²     General Appearance:     no acute distress. Psych:  HEENT:    A.O. As per HPI details  NC/AT, PERRL, no pallor no icterus, lips/ext mucous membrane grossly N    Neck:   Supple, trachea midline, no obvious JVD   Resp:     Dec bs No wheezes, No rhonchi   Chest wall:    No tenderness or deformity   Heart:    Regular rate and rhythm, S1 and S2 normal, no rub or gallop. Abdomen:     Soft, non-tender, bowel sounds active  L abdominal wall distended    Inc habitus   Genitalia & Rectal:    Deferred.    Extremities x4:   Extremities normal, atraumatic, no cyanosis, no clubbing   Musculoskeletal:      NO active synovitis or swollen b/l wrists, 2-5 MCPs examined   Skin:   Skin color, texture, turgor fairly normal, no ACUTE rashes or lesions in lower legs and arms examined   Lymph nodes:   Cervical nodes grossly normal   Neurologic: Faith Smiles Grossly symmetric  strength in UEs and LEs with symmetric grossly intact to light touch sensation     LABS:  CBC:   Lab Results   Component Value Date    WBC 5.1 04/15/2021    RBC 4.25 04/15/2021    HGB 13.6 04/15/2021    HCT 42.1 04/15/2021     04/15/2021    MCV 99.1 04/15/2021     BMP:    Lab Results   Component Value Date     04/15/2021    K 3.9 04/15/2021    K 3.5 03/12/2020     04/15/2021    CO2 30 04/15/2021    BUN 11 04/15/2021    CREATININE 0.8 04/15/2021    GLUCOSE 107 04/15/2021    GLUCOSE 94 02/14/2011    CALCIUM 9.2 04/15/2021     Hepatic Function Panel:    Lab Results   Component Value Date    ALKPHOS 119 04/15/2021    AST 21 04/15/2021    ALT 14 04/15/2021    PROT 8.1 04/15/2021    LABALBU 4.2 04/15/2021    BILITOT 0.5 04/15/2021     Magnesium:    Lab Results   Component Value Date    MG 1.9 03/12/2020       PT/INR:    Lab Results   Component Value Date    PROTIME 10.8 08/03/2016    PROTIME 14.3 02/16/2011    INR 1.0 08/03/2016     U/A: No results found for: NITRITE, LEUKOCYTESUR, PHUR, WBCUA, RBCUA, BACTERIA, SPECGRAV, BLOODU, GLUCOSEU  ABG:  No results found for: PHART, CTA1RNG, PO2ART, W5ITQADD, ZSJ7BFM, BEART  TSH:    Lab Results   Component Value Date    TSH 0.586 02/19/2014     Cardiac Enzymes:   Lab Results   Component Value Date    CKTOTAL 167 06/19/2013    CKMB 0.9 06/19/2013    TROPONINI <0.01 03/12/2020    TROPONINI <0.01 02/18/2014    TROPONINI <0.01 06/19/2013       Radiology: No results found.     EKG:  Sinus tachycardia  Inferior infarct , age undetermined  Abnormal ECG  No previous ECGs available  Confirmed by Yady Salazar ([id number]) on 3/12/2020    Assessment & Plan   ACTIVE hospital problems being addressed/reassessed for this admission:  Principal Problem:    Incarcerated hernia  Active Problems:    COPD (chronic obstructive pulmonary disease) ([facility] [address]    Essential hypertension, benign    Diastolic CHF, chronic (HCC)    Lymphedema of both lower extremities  Resolved Problems:    * No resolved hospital problems. *    Code status/DVT prophylaxis and PLAN --see orders   Note extensive time spent coordinating care between ER docs, ER and floor nurses, and transitioning care over to day providers  Plan of care/ clinical impressions/communication specifics detailed below:    78 y.o. female    +CAD, copd/emphysema, HTN, b/l LE edema, hot flashes, GERD,On chronic pain meds  Sent in by [facility], GI/surg--Dx: incarcerated  hernia     No difficulties passing gas, or stool, able to eat without discomfort  Moderate abd distended acutely- but now decreased in size sent in by dr. Yuri Silva- plans for OR tomorrow    I doubt ishcemic or strangulated  Medically cleared for OR, no active cardiac or respriatory issues:    CT done this evening:    A large left inguinal hernia with herniation of distal transverse colon which   is partially incarcerated. Mena Pilon is also diverticulosis of colon with   constipation.          Render Grandchild MD   Night Officer, overnight admitting doctor at [facility] call day time doctor   for questions after 7:30am    Covering for [facility] Service  If Qs please call [phone]  Electronically signed by Nas Rosales MD on 4/15/2021 at 7:45 PM

## 2021-04-16 ENCOUNTER — TELEPHONE (OUTPATIENT)
Dept: VASCULAR SURGERY | Age: 76
End: 2021-04-16

## 2021-04-16 VITALS
OXYGEN SATURATION: 100 % | DIASTOLIC BLOOD PRESSURE: 97 MMHG | RESPIRATION RATE: 27 BRPM | TEMPERATURE: 94.6 F | SYSTOLIC BLOOD PRESSURE: 211 MMHG

## 2021-04-16 LAB
ABO/RH: NORMAL
ANTIBODY SCREEN: NORMAL
METER GLUCOSE: 121 MG/DL (ref 74–99)

## 2021-04-16 PROCEDURE — 3600000019 HC SURGERY ROBOT ADDTL 15MIN: Performed by: SURGERY

## 2021-04-16 PROCEDURE — 6370000000 HC RX 637 (ALT 250 FOR IP): Performed by: FAMILY MEDICINE

## 2021-04-16 PROCEDURE — 2580000003 HC RX 258: Performed by: NURSE ANESTHETIST, CERTIFIED REGISTERED

## 2021-04-16 PROCEDURE — 6370000000 HC RX 637 (ALT 250 FOR IP): Performed by: ANESTHESIOLOGY

## 2021-04-16 PROCEDURE — 6370000000 HC RX 637 (ALT 250 FOR IP): Performed by: SURGERY

## 2021-04-16 PROCEDURE — 0YU64JZ SUPPLEMENT LEFT INGUINAL REGION WITH SYNTHETIC SUBSTITUTE, PERCUTANEOUS ENDOSCOPIC APPROACH: ICD-10-PCS | Performed by: SURGERY

## 2021-04-16 PROCEDURE — 2500000003 HC RX 250 WO HCPCS

## 2021-04-16 PROCEDURE — 3700000000 HC ANESTHESIA ATTENDED CARE: Performed by: SURGERY

## 2021-04-16 PROCEDURE — 2500000003 HC RX 250 WO HCPCS: Performed by: SURGERY

## 2021-04-16 PROCEDURE — S2900 ROBOTIC SURGICAL SYSTEM: HCPCS | Performed by: SURGERY

## 2021-04-16 PROCEDURE — 2500000003 HC RX 250 WO HCPCS: Performed by: NURSE ANESTHETIST, CERTIFIED REGISTERED

## 2021-04-16 PROCEDURE — 94640 AIRWAY INHALATION TREATMENT: CPT

## 2021-04-16 PROCEDURE — 99232 SBSQ HOSP IP/OBS MODERATE 35: CPT | Performed by: INTERNAL MEDICINE

## 2021-04-16 PROCEDURE — 6360000002 HC RX W HCPCS

## 2021-04-16 PROCEDURE — 6360000002 HC RX W HCPCS: Performed by: ANESTHESIOLOGY

## 2021-04-16 PROCEDURE — 1200000000 HC SEMI PRIVATE

## 2021-04-16 PROCEDURE — 36415 COLL VENOUS BLD VENIPUNCTURE: CPT

## 2021-04-16 PROCEDURE — G0378 HOSPITAL OBSERVATION PER HR: HCPCS

## 2021-04-16 PROCEDURE — 2709999900 HC NON-CHARGEABLE SUPPLY: Performed by: SURGERY

## 2021-04-16 PROCEDURE — 6370000000 HC RX 637 (ALT 250 FOR IP)

## 2021-04-16 PROCEDURE — 86901 BLOOD TYPING SEROLOGIC RH(D): CPT

## 2021-04-16 PROCEDURE — 7100000000 HC PACU RECOVERY - FIRST 15 MIN: Performed by: SURGERY

## 2021-04-16 PROCEDURE — C1781 MESH (IMPLANTABLE): HCPCS | Performed by: SURGERY

## 2021-04-16 PROCEDURE — 3600000009 HC SURGERY ROBOT BASE: Performed by: SURGERY

## 2021-04-16 PROCEDURE — 82962 GLUCOSE BLOOD TEST: CPT

## 2021-04-16 PROCEDURE — 86900 BLOOD TYPING SEROLOGIC ABO: CPT

## 2021-04-16 PROCEDURE — 7100000001 HC PACU RECOVERY - ADDTL 15 MIN: Performed by: SURGERY

## 2021-04-16 PROCEDURE — 86850 RBC ANTIBODY SCREEN: CPT

## 2021-04-16 PROCEDURE — 6360000002 HC RX W HCPCS: Performed by: NURSE ANESTHETIST, CERTIFIED REGISTERED

## 2021-04-16 PROCEDURE — 8E0W4CZ ROBOTIC ASSISTED PROCEDURE OF TRUNK REGION, PERCUTANEOUS ENDOSCOPIC APPROACH: ICD-10-PCS | Performed by: SURGERY

## 2021-04-16 PROCEDURE — 3700000001 HC ADD 15 MINUTES (ANESTHESIA): Performed by: SURGERY

## 2021-04-16 DEVICE — MESH HERN W10XL15CM POLY POLYLACTIC ACID 70% CLLGN 30% GLYC: Type: IMPLANTABLE DEVICE | Site: INGUINAL | Status: FUNCTIONAL

## 2021-04-16 RX ORDER — ROCURONIUM BROMIDE 10 MG/ML
INJECTION, SOLUTION INTRAVENOUS PRN
Status: DISCONTINUED | OUTPATIENT
Start: 2021-04-16 | End: 2021-04-16 | Stop reason: SDUPTHER

## 2021-04-16 RX ORDER — DIMETHICONE, OXYBENZONE, AND PADIMATE O 2; 2.5; 6.6 G/100G; G/100G; G/100G
STICK TOPICAL
Status: COMPLETED
Start: 2021-04-16 | End: 2021-04-16

## 2021-04-16 RX ORDER — SODIUM CHLORIDE, SODIUM LACTATE, POTASSIUM CHLORIDE, CALCIUM CHLORIDE 600; 310; 30; 20 MG/100ML; MG/100ML; MG/100ML; MG/100ML
INJECTION, SOLUTION INTRAVENOUS CONTINUOUS PRN
Status: DISCONTINUED | OUTPATIENT
Start: 2021-04-16 | End: 2021-04-16 | Stop reason: SDUPTHER

## 2021-04-16 RX ORDER — FENTANYL CITRATE 50 UG/ML
INJECTION, SOLUTION INTRAMUSCULAR; INTRAVENOUS PRN
Status: DISCONTINUED | OUTPATIENT
Start: 2021-04-16 | End: 2021-04-16 | Stop reason: SDUPTHER

## 2021-04-16 RX ORDER — FENTANYL CITRATE 50 UG/ML
50 INJECTION, SOLUTION INTRAMUSCULAR; INTRAVENOUS EVERY 5 MIN PRN
Status: DISCONTINUED | OUTPATIENT
Start: 2021-04-16 | End: 2021-04-16 | Stop reason: HOSPADM

## 2021-04-16 RX ORDER — ONDANSETRON 2 MG/ML
INJECTION INTRAMUSCULAR; INTRAVENOUS PRN
Status: DISCONTINUED | OUTPATIENT
Start: 2021-04-16 | End: 2021-04-16 | Stop reason: SDUPTHER

## 2021-04-16 RX ORDER — IPRATROPIUM BROMIDE AND ALBUTEROL SULFATE 2.5; .5 MG/3ML; MG/3ML
1 SOLUTION RESPIRATORY (INHALATION) ONCE
Status: COMPLETED | OUTPATIENT
Start: 2021-04-16 | End: 2021-04-16

## 2021-04-16 RX ORDER — FENTANYL CITRATE 50 UG/ML
25 INJECTION, SOLUTION INTRAMUSCULAR; INTRAVENOUS EVERY 5 MIN PRN
Status: DISCONTINUED | OUTPATIENT
Start: 2021-04-16 | End: 2021-04-16 | Stop reason: HOSPADM

## 2021-04-16 RX ORDER — DEXAMETHASONE SODIUM PHOSPHATE 4 MG/ML
INJECTION, SOLUTION INTRA-ARTICULAR; INTRALESIONAL; INTRAMUSCULAR; INTRAVENOUS; SOFT TISSUE PRN
Status: DISCONTINUED | OUTPATIENT
Start: 2021-04-16 | End: 2021-04-16 | Stop reason: SDUPTHER

## 2021-04-16 RX ORDER — BUPIVACAINE HYDROCHLORIDE AND EPINEPHRINE 2.5; 5 MG/ML; UG/ML
INJECTION, SOLUTION EPIDURAL; INFILTRATION; INTRACAUDAL; PERINEURAL PRN
Status: DISCONTINUED | OUTPATIENT
Start: 2021-04-16 | End: 2021-04-16 | Stop reason: ALTCHOICE

## 2021-04-16 RX ORDER — LIDOCAINE HYDROCHLORIDE 20 MG/ML
INJECTION, SOLUTION EPIDURAL; INFILTRATION; INTRACAUDAL; PERINEURAL PRN
Status: DISCONTINUED | OUTPATIENT
Start: 2021-04-16 | End: 2021-04-16 | Stop reason: SDUPTHER

## 2021-04-16 RX ORDER — MIDAZOLAM HYDROCHLORIDE 1 MG/ML
INJECTION INTRAMUSCULAR; INTRAVENOUS PRN
Status: DISCONTINUED | OUTPATIENT
Start: 2021-04-16 | End: 2021-04-16 | Stop reason: SDUPTHER

## 2021-04-16 RX ORDER — CEFAZOLIN SODIUM 1 G/3ML
INJECTION, POWDER, FOR SOLUTION INTRAMUSCULAR; INTRAVENOUS PRN
Status: DISCONTINUED | OUTPATIENT
Start: 2021-04-16 | End: 2021-04-16 | Stop reason: SDUPTHER

## 2021-04-16 RX ORDER — GLYCOPYRROLATE 1 MG/5 ML
SYRINGE (ML) INTRAVENOUS PRN
Status: DISCONTINUED | OUTPATIENT
Start: 2021-04-16 | End: 2021-04-16 | Stop reason: SDUPTHER

## 2021-04-16 RX ORDER — OXYCODONE HYDROCHLORIDE AND ACETAMINOPHEN 5; 325 MG/1; MG/1
2 TABLET ORAL EVERY 4 HOURS PRN
Status: DISCONTINUED | OUTPATIENT
Start: 2021-04-16 | End: 2021-04-17 | Stop reason: HOSPADM

## 2021-04-16 RX ORDER — PROMETHAZINE HYDROCHLORIDE 25 MG/ML
6.25 INJECTION, SOLUTION INTRAMUSCULAR; INTRAVENOUS
Status: DISCONTINUED | OUTPATIENT
Start: 2021-04-16 | End: 2021-04-16 | Stop reason: HOSPADM

## 2021-04-16 RX ORDER — PROPOFOL 10 MG/ML
INJECTION, EMULSION INTRAVENOUS PRN
Status: DISCONTINUED | OUTPATIENT
Start: 2021-04-16 | End: 2021-04-16 | Stop reason: SDUPTHER

## 2021-04-16 RX ADMIN — FENTANYL CITRATE 25 MCG: 50 INJECTION, SOLUTION INTRAMUSCULAR; INTRAVENOUS at 17:15

## 2021-04-16 RX ADMIN — PROPOFOL 150 MG: 10 INJECTION, EMULSION INTRAVENOUS at 14:33

## 2021-04-16 RX ADMIN — MORPHINE SULFATE 15 MG: 15 TABLET, FILM COATED, EXTENDED RELEASE ORAL at 09:28

## 2021-04-16 RX ADMIN — MIDAZOLAM 1 MG: 1 INJECTION INTRAMUSCULAR; INTRAVENOUS at 14:13

## 2021-04-16 RX ADMIN — IPRATROPIUM BROMIDE AND ALBUTEROL SULFATE 1 AMPULE: .5; 3 SOLUTION RESPIRATORY (INHALATION) at 13:32

## 2021-04-16 RX ADMIN — LIDOCAINE HYDROCHLORIDE 100 MG: 20 INJECTION, SOLUTION EPIDURAL; INFILTRATION; INTRACAUDAL; PERINEURAL at 14:33

## 2021-04-16 RX ADMIN — OXYCODONE HYDROCHLORIDE AND ACETAMINOPHEN 1 TABLET: 5; 325 TABLET ORAL at 09:28

## 2021-04-16 RX ADMIN — ONDANSETRON 4 MG: 2 INJECTION INTRAMUSCULAR; INTRAVENOUS at 15:50

## 2021-04-16 RX ADMIN — FOLIC ACID 500 MCG: 1 TABLET ORAL at 20:22

## 2021-04-16 RX ADMIN — Medication 0.1 MG: at 15:58

## 2021-04-16 RX ADMIN — FENTANYL CITRATE 100 MCG: 50 INJECTION, SOLUTION INTRAMUSCULAR; INTRAVENOUS at 15:00

## 2021-04-16 RX ADMIN — OXYCODONE 2.5 MG: 5 TABLET ORAL at 09:28

## 2021-04-16 RX ADMIN — CEFAZOLIN 2000 MG: 1 INJECTION, POWDER, FOR SOLUTION INTRAMUSCULAR; INTRAVENOUS at 14:30

## 2021-04-16 RX ADMIN — SODIUM CHLORIDE, POTASSIUM CHLORIDE, SODIUM LACTATE AND CALCIUM CHLORIDE: 600; 310; 30; 20 INJECTION, SOLUTION INTRAVENOUS at 14:15

## 2021-04-16 RX ADMIN — DEXAMETHASONE SODIUM PHOSPHATE 10 MG: 4 INJECTION, SOLUTION INTRAMUSCULAR; INTRAVENOUS at 14:37

## 2021-04-16 RX ADMIN — GABAPENTIN 300 MG: 300 CAPSULE ORAL at 20:23

## 2021-04-16 RX ADMIN — FENTANYL CITRATE 150 MCG: 50 INJECTION, SOLUTION INTRAMUSCULAR; INTRAVENOUS at 14:33

## 2021-04-16 RX ADMIN — FERROUS SULFATE TAB 325 MG (65 MG ELEMENTAL FE) 325 MG: 325 (65 FE) TAB at 20:23

## 2021-04-16 RX ADMIN — METOPROLOL TARTRATE 50 MG: 50 TABLET, FILM COATED ORAL at 20:22

## 2021-04-16 RX ADMIN — FENTANYL CITRATE 50 MCG: 50 INJECTION, SOLUTION INTRAMUSCULAR; INTRAVENOUS at 16:47

## 2021-04-16 RX ADMIN — ROCURONIUM BROMIDE 50 MG: 10 INJECTION, SOLUTION INTRAVENOUS at 14:33

## 2021-04-16 RX ADMIN — SUGAMMADEX 500 MG: 100 INJECTION, SOLUTION INTRAVENOUS at 16:09

## 2021-04-16 RX ADMIN — OXYCODONE HYDROCHLORIDE AND ACETAMINOPHEN 2 TABLET: 5; 325 TABLET ORAL at 20:23

## 2021-04-16 RX ADMIN — Medication: at 17:30

## 2021-04-16 RX ADMIN — POTASSIUM CHLORIDE, DEXTROSE MONOHYDRATE AND SODIUM CHLORIDE: 150; 5; 450 INJECTION, SOLUTION INTRAVENOUS at 09:37

## 2021-04-16 RX ADMIN — ROCURONIUM BROMIDE 30 MG: 10 INJECTION, SOLUTION INTRAVENOUS at 14:49

## 2021-04-16 RX ADMIN — MIDAZOLAM 1 MG: 1 INJECTION INTRAMUSCULAR; INTRAVENOUS at 14:16

## 2021-04-16 RX ADMIN — METOPROLOL TARTRATE 50 MG: 50 TABLET, FILM COATED ORAL at 09:28

## 2021-04-16 RX ADMIN — ROCURONIUM BROMIDE 20 MG: 10 INJECTION, SOLUTION INTRAVENOUS at 15:00

## 2021-04-16 ASSESSMENT — PULMONARY FUNCTION TESTS
PIF_VALUE: 0
PIF_VALUE: 26
PIF_VALUE: 24
PIF_VALUE: 34
PIF_VALUE: 27
PIF_VALUE: 32
PIF_VALUE: 36
PIF_VALUE: 29
PIF_VALUE: 27
PIF_VALUE: 35
PIF_VALUE: 36
PIF_VALUE: 23
PIF_VALUE: 35
PIF_VALUE: 35
PIF_VALUE: 32
PIF_VALUE: 25
PIF_VALUE: 36
PIF_VALUE: 32
PIF_VALUE: 35
PIF_VALUE: 27
PIF_VALUE: 22
PIF_VALUE: 3
PIF_VALUE: 36
PIF_VALUE: 16
PIF_VALUE: 27
PIF_VALUE: 35
PIF_VALUE: 1
PIF_VALUE: 31
PIF_VALUE: 36
PIF_VALUE: 32
PIF_VALUE: 32
PIF_VALUE: 1
PIF_VALUE: 36
PIF_VALUE: 20
PIF_VALUE: 5
PIF_VALUE: 32
PIF_VALUE: 30
PIF_VALUE: 35
PIF_VALUE: 31
PIF_VALUE: 36
PIF_VALUE: 32
PIF_VALUE: 36
PIF_VALUE: 26
PIF_VALUE: 28
PIF_VALUE: 32
PIF_VALUE: 35
PIF_VALUE: 32
PIF_VALUE: 35
PIF_VALUE: 27
PIF_VALUE: 21
PIF_VALUE: 1
PIF_VALUE: 32
PIF_VALUE: 26
PIF_VALUE: 30
PIF_VALUE: 35
PIF_VALUE: 27
PIF_VALUE: 35
PIF_VALUE: 35
PIF_VALUE: 1
PIF_VALUE: 36

## 2021-04-16 ASSESSMENT — PAIN DESCRIPTION - LOCATION
LOCATION: ABDOMEN
LOCATION: GROIN

## 2021-04-16 ASSESSMENT — COPD QUESTIONNAIRES: CAT_SEVERITY: MILD

## 2021-04-16 ASSESSMENT — PAIN SCALES - GENERAL
PAINLEVEL_OUTOF10: 5
PAINLEVEL_OUTOF10: 10
PAINLEVEL_OUTOF10: 4
PAINLEVEL_OUTOF10: 4

## 2021-04-16 ASSESSMENT — PAIN DESCRIPTION - PAIN TYPE: TYPE: ACUTE PAIN

## 2021-04-16 ASSESSMENT — LIFESTYLE VARIABLES: SMOKING_STATUS: 0

## 2021-04-16 ASSESSMENT — PAIN DESCRIPTION - PROGRESSION: CLINICAL_PROGRESSION: GRADUALLY WORSENING

## 2021-04-16 NOTE — PROGRESS NOTES
Report called to [facility] on [address] [facility]. Waiting room called to send family up to floor.

## 2021-04-16 NOTE — ANESTHESIA POSTPROCEDURE EVALUATION
Department of Anesthesiology  Postprocedure Note    Patient: Jeremias Gimenez  MRN: [id number]  YOB: 1945  Date of evaluation: 4/16/2021  Time:  6:43 PM     Procedure Summary     Date: 04/16/21 Room / Location: [facility] 09 / [facility]    Anesthesia Start: 4491 Anesthesia Stop: 1622    Procedures:       [address] (N/A )      Procedure Not Yet Scheduled Diagnosis: (/)    Surgeons: Juwan Mccollum MD Responsible Provider: Que oHng MD    Anesthesia Type: general ASA Status: 3          Anesthesia Type: general    Mehreen Phase I: Mehreen Score: 9    Mehreen Phase II:      Last vitals: Reviewed and per EMR flowsheets.        Anesthesia Post Evaluation    Patient location during evaluation: PACU  Patient participation: complete - patient participated  Level of consciousness: awake and alert  Pain score: 3  Airway patency: patent  Nausea & Vomiting: no vomiting and no nausea  Complications: no  Cardiovascular status: hemodynamically stable  Respiratory status: spontaneous ventilation  Hydration status: stable

## 2021-04-16 NOTE — BRIEF OP NOTE
Brief Postoperative Note      Patient: Sabrina Zapata  YOB: 1945  MRN: [id number]    Date of Procedure: 4/16/2021    Pre-Op Diagnosis: incarcerated left inguinal hernia      Post-Op Diagnosis: Same       Procedure(s):  LAPAROSCOPIC ROBOTIC ASSISTED INGUINAL HERNIA REPAIR    Surgeon(s):  Gatito Rhodes MD    Assistant:  Resident: Yanet Anderson MD    Anesthesia: General    Estimated Blood Loss (mL): Minimal    Complications: None    Specimens:   * No specimens in log *    Implants:  Implant Name Type Inv.  Item Serial No.  Lot No. LRB No. Used Action   MESH KALIN [id number] POLY POLYLACTIC ACID 70% CLLGN 30% GLYC  MESH KALIN [id number] POLY POLYLACTIC ACID 70% CLLGN 30% GLYC  [facility] [id number] Left 1 Implanted         Drains:   Urethral Catheter Non-latex 16 fr (Active)       Findings:     Electronically signed by Gatito Rhodes MD on 4/16/2021 at 4:04 PM     [id number]

## 2021-04-16 NOTE — ANESTHESIA PRE PROCEDURE
Department of Anesthesiology  Preprocedure Note       Name:  Zoë Maya   Age:  78 y.o.  :  1945                                          MRN:  [id number]         Date:  4/15/2021      Surgeon: Marla Verduzco    Procedure: Robotic inguinal hernia repair    Medications prior to admission:   Prior to Admission medications    Medication Sig Start Date End Date Taking? Authorizing Provider   zinc gluconate 50 MG tablet Take 50 mg by mouth daily   Yes Historical Provider, MD   GENISTEIN PO Take 125 mg by mouth nightly   Yes Historical Provider, MD   folic acid (FOLVITE) 073 MCG tablet Take 400 mcg by mouth nightly   Yes Historical Provider, MD Quispe Heading Oil 350 MG CAPS Take 350 mg by mouth nightly   Yes Historical Provider, MD   gabapentin (NEURONTIN) 300 MG capsule Take 300 mg by mouth 3 times daily. Yes Historical Provider, MD   simvastatin (ZOCOR) 40 MG tablet Take 40 mg by mouth nightly   Yes Historical Provider, MD   oxyCODONE-acetaminophen (PERCOCET) 7.5-325 MG per tablet Take 1 tablet by mouth 2 times daily. .   Yes Historical Provider, MD   morphine (MS CONTIN) 15 MG extended release tablet Take 15 mg by mouth 2 times daily. Yes Historical Provider, MD   aspirin 81 MG tablet Take 81 mg by mouth daily   Yes Historical Provider, MD   Cholecalciferol (VITAMIN D) 2000 UNITS CAPS capsule Take 2,000 Units by mouth daily    Yes Historical Provider, MD   ferrous sulfate 325 (65 FE) MG tablet Take 325 mg by mouth nightly    Yes Historical Provider, MD   metoprolol (LOPRESSOR) 50 MG tablet Take 1 tablet by mouth 2 times daily 3/3/16  Yes Janny Goodwin PA-C   omeprazole (PRILOSEC) 40 MG capsule Take 40 mg by mouth daily.    Yes Historical Provider, MD   Garlic 805 MG TABS Take 1,000 mg by mouth daily    Yes Historical Provider, MD   ipratropium-albuterol (DUONEB) 0.5-2.5 (3) MG/3ML SOLN nebulizer solution Take 1 vial by nebulization every 4 hours as needed for Shortness of Breath    Historical Provider, MD albuterol (PROVENTIL HFA;VENTOLIN HFA) 108 (90 BASE) MCG/ACT inhaler Inhale 2 puffs into the lungs every 6 hours as needed for Wheezing or Shortness of Breath     Historical Provider, MD   calcium carbonate (OYSTER SHELL CALCIUM 500 MG) 1250 MG tablet Take 2 tablets by mouth daily    Historical Provider, MD   Ascorbic Acid (VITAMIN C) 500 MG tablet Take 2,000 mg by mouth daily    Historical Provider, MD   diphenhydrAMINE (BENADRYL) 50 MG capsule Take 50 mg by mouth daily as needed for Allergies     Historical Provider, MD       Current medications:    Current Facility-Administered Medications   Medication Dose Route Frequency Provider Last Rate Last Admin    [START ON 4/16/2021] dextrose 5 % and 0.45 % NaCl with KCl 20 mEq infusion   Intravenous Continuous Cali Park MD        ipratropium-albuterol (DUONEB) nebulizer solution 1 ampule  1 ampule Inhalation Q4H PRN Vaishali Crum MD        ascorbic acid (VITAMIN C) tablet 2,000 mg  2,000 mg Oral Daily Vaishali Crum MD        aspirin EC tablet 81 mg  81 mg Oral Daily Urszulanie MD Skyla        calcium elemental (OSCAL) tablet 1,000 mg  1,000 mg Oral Daily Vaishali Crum MD        ferrous sulfate (IRON 325) tablet 325 mg  325 mg Oral Nightly Vaishali Crum MD   325 mg at 58/99/00 2786    folic acid (FOLVITE) tablet 500 mcg  500 mcg Oral Nightly Vaishali Crum MD   500 mcg at 04/15/21 2031    gabapentin (NEURONTIN) capsule 300 mg  300 mg Oral TID Vaishali Crum MD   300 mg at 04/15/21 2030    ipratropium-albuterol (DUONEB) nebulizer solution 3 mL  1 vial Nebulization Q4H PRN Vaishali Crum MD        metoprolol tartrate (LOPRESSOR) tablet 50 mg  50 mg Oral BID Vaishali Crum MD   50 mg at 04/15/21 2030    morphine (MS CONTIN) extended release tablet 15 mg  15 mg Oral BID Vaishali Crum MD   15 mg at 04/15/21 2029    [START ON 4/16/2021] pantoprazole (PROTONIX) tablet 40 mg  40 mg Oral QAM AC Vaishali Crum MD        atorvastatin  BREAST ENHANCEMENT SURGERY      BREAST REDUCTION SURGERY      CHOLECYSTECTOMY      COLONOSCOPY      ECHO COMPL W DOP COLOR FLOW  3/11/2013         ENDOSCOPY, COLON, DIAGNOSTIC      HYSTERECTOMY      JOINT REPLACEMENT  2009    l knee r hip    LEG DEBRIDEMENT Left 2015    LEG DEBRIDEMENT Left 2016       Social History:    Social History     Tobacco Use    Smoking status: Former Smoker     Packs/day: 1.00     Years: 20.00     Pack years: 20.00     Types: Cigarettes     Quit date: 1995     Years since quittin.4    Smokeless tobacco: Never Used   Substance Use Topics    Alcohol use: No                                Counseling given: Not Answered      Vital Signs (Current):   Vitals:    04/15/21 1752 04/15/21 1931   BP: (!) 167/90 133/63   Pulse: 85 67   Resp: 18 18   Temp: 97.4 °F (36.3 °C) 97.4 °F (36.3 °C)   TempSrc: Oral Oral   SpO2: 94% 95%   Weight: 300 lb (136.1 kg)    Height: 5' 8\" (1.727 m)                                               BP Readings from Last 3 Encounters:   04/15/21 133/63   20 (!) 159/72   19 (!) 164/94       NPO Status:                                                                                 BMI:   Wt Readings from Last 3 Encounters:   04/15/21 300 lb (136.1 kg)   20 295 lb (133.8 kg)   19 300 lb (136.1 kg)     Body mass index is 45.61 kg/m².     CBC:   Lab Results   Component Value Date    WBC 5.1 04/15/2021    RBC 4.25 04/15/2021    HGB 13.6 04/15/2021    HCT 42.1 04/15/2021    MCV 99.1 04/15/2021    RDW 11.9 04/15/2021     04/15/2021       CMP:   Lab Results   Component Value Date     04/15/2021    K 3.9 04/15/2021    K 3.5 2020     04/15/2021    CO2 30 04/15/2021    BUN 11 04/15/2021    CREATININE 0.8 04/15/2021    GFRAA >60 04/15/2021    LABGLOM >60 04/15/2021    GLUCOSE 107 04/15/2021    GLUCOSE 94 2011    PROT 8.1 04/15/2021    CALCIUM 9.2 04/15/2021    BILITOT 0.5 04/15/2021 3       Induction: intravenous. MIPS: Postoperative opioids intended and Prophylactic antiemetics administered. Anesthetic plan and risks discussed with patient. Plan discussed with CRNA. Attending anesthesiologist reviewed and agrees with Pre Eval content            818 Aureliano Brant, DO   4/15/2021    History, data, and pertinent studies from chart review. Above represents information available via the shared medical record including previous anesthetic, medication, and allergy history. Confirmation of above and final disposition per DOS anesthesiologist.    818 Aureliano Brant, DO  Staff Anesthesiologist  April 15, 2021  10:00 PM        DOS STAFF ADDENDUM:    Pt seen and examined, physical exam updated, chart reviewed including anesthesia, drug and allergy history. H&P reviewed. No interval changes to history or physical examination (unless noted above). NPO status confirmed. Anesthetic plan, risks, benefits, alternatives discussed with patient. Patient verbalized an understanding and agrees to proceed.      Anastasiia Hunt MD   Anesthesiologist

## 2021-04-16 NOTE — PROGRESS NOTES
[facility] Progress Note    Admitting Date and Time: 4/15/2021  5:33 PM  Admit Dx: Incarcerated hernia [K46.0]    Subjective:  Patient is being followed for Incarcerated hernia [K46.0]   Pt feels ok    ROS: denies fever, chills, cp, sob, n/v, HA unless stated above.      ceFAZolin 2000 mg in 20 mL SWFI IV Syringe        vitamin C  2,000 mg Oral Daily    aspirin  81 mg Oral Daily    calcium elemental  1,000 mg Oral Daily    ferrous sulfate  325 mg Oral Nightly    folic acid  088 mcg Oral Nightly    gabapentin  300 mg Oral TID    metoprolol tartrate  50 mg Oral BID    morphine  15 mg Oral BID    pantoprazole  40 mg Oral QAM AC    atorvastatin  40 mg Oral Daily    zinc sulfate  50 mg Oral Daily    oxyCODONE-acetaminophen  1 tablet Oral Q12H    And    oxyCODONE  2.5 mg Oral Q12H    GENISTEIN 125mg TABS  125 mg Oral Nightly     fentanNYL, 25 mcg, Q5 Min PRN  fentanNYL, 50 mcg, Q5 Min PRN  promethazine, 6.25 mg, Once PRN  bupivacaine-EPINEPHrine PF, , PRN  ipratropium-albuterol, 1 ampule, Q4H PRN  ipratropium-albuterol, 1 vial, Q4H PRN  diphenhydrAMINE, 50 mg, Q8H PRN         Objective:    BP (!) 167/78   Pulse 85   Temp 97.3 °F (36.3 °C) (Temporal)   Resp 15   Ht 5' 8\" (1.727 m)   Wt 300 lb (136.1 kg)   SpO2 97%   BMI 45.61 kg/m²     General Appearance: alert and oriented to person, place and time  Skin: warm and dry  Head: normocephalic and atraumatic  Eyes: pupils equal, round, and reactive to light, extraocular eye movements intact, conjunctivae normal  Neck: neck supple and non tender without mass   Pulmonary/Chest: clear to auscultation bilaterally- no wheezes, rales or rhonchi, normal air movement, no respiratory distress  Cardiovascular: normal rate, normal S1 and S2 and no carotid bruits  Abdomen: soft, non-tender, non-distended, normal bowel sounds, left inguinal hernia  Extremities: no cyanosis, no clubbing and no edema  Neurologic: no cranial nerve deficit and speech normal        Recent Labs     04/15/21  1835      K 3.9      CO2 30*   BUN 11   CREATININE 0.8   GLUCOSE 107*   CALCIUM 9.2       Recent Labs     04/15/21  1835   WBC 5.1   RBC 4.25   HGB 13.6   HCT 42.1   MCV 99.1   MCH 32.0   MCHC 32.3   RDW 11.9      MPV 9.9         Assessment:    Principal Problem:    Incarcerated hernia  Active Problems:    COPD (chronic obstructive pulmonary disease) (HCC)    Essential hypertension, benign    Diastolic CHF, chronic (HCC)    Lymphedema of both lower extremities  Resolved Problems:    * No resolved hospital problems. *      Plan:  1. Large left inguinal hernia with partial incarceration, for OR today, low risk for moderate risk surgery, with risk in mind can proceed with surgery,   2. Hx of CAD, no chest pain, continue asa, statin and BB  3. HX of HTN, BP is controlled, only on BB  4. COPD, not in exacerbation, continue monitoring. NOTE: This report was transcribed using voice recognition software. Every effort was made to ensure accuracy; however, inadvertent computerized transcription errors may be present.   Electronically signed by Rocio Muniz MD on 4/16/2021 at 5:24 PM

## 2021-04-16 NOTE — PROGRESS NOTES
Pt is requesting Benadryl 50 mg po for sinus congestion and post nasal drainage. Per Dr. Alexa Rice ok to order it. Per Dr. Alexa Rice ok to order the med for hot flushes that pt brought from home even though pharmacy can not verify it.  It is ok for pt to take it

## 2021-04-16 NOTE — H&P
daily. Yes Historical Provider, MD   simvastatin (ZOCOR) 40 MG tablet Take 40 mg by mouth nightly   Yes Historical Provider, MD   oxyCODONE-acetaminophen (PERCOCET) 7.5-325 MG per tablet Take 1 tablet by mouth 2 times daily. .   Yes Historical Provider, MD   morphine (MS CONTIN) 15 MG extended release tablet Take 15 mg by mouth 2 times daily. Yes Historical Provider, MD   aspirin 81 MG tablet Take 81 mg by mouth daily   Yes Historical Provider, MD   Cholecalciferol (VITAMIN D) 2000 UNITS CAPS capsule Take 2,000 Units by mouth daily    Yes Historical Provider, MD   ferrous sulfate 325 (65 FE) MG tablet Take 325 mg by mouth nightly    Yes Historical Provider, MD   metoprolol (LOPRESSOR) 50 MG tablet Take 1 tablet by mouth 2 times daily 3/3/16  Yes Susan Esqueda PA-C   omeprazole (PRILOSEC) 40 MG capsule Take 40 mg by mouth daily.    Yes Historical Provider, MD   Garlic 728 MG TABS Take 1,000 mg by mouth daily    Yes Historical Provider, MD   ipratropium-albuterol (DUONEB) 0.5-2.5 (3) MG/3ML SOLN nebulizer solution Take 1 vial by nebulization every 4 hours as needed for Shortness of Breath    Historical Provider, MD   albuterol (PROVENTIL HFA;VENTOLIN HFA) 108 (90 BASE) MCG/ACT inhaler Inhale 2 puffs into the lungs every 6 hours as needed for Wheezing or Shortness of Breath     Historical Provider, MD   calcium carbonate (OYSTER SHELL CALCIUM 500 MG) 1250 MG tablet Take 2 tablets by mouth daily    Historical Provider, MD   Ascorbic Acid (VITAMIN C) 500 MG tablet Take 2,000 mg by mouth daily    Historical Provider, MD   diphenhydrAMINE (BENADRYL) 50 MG capsule Take 50 mg by mouth daily as needed for Allergies     Historical Provider, MD        Allergies   Codeine, Dilaudid [hydromorphone hcl], Naproxen, Singulair [montelukast sodium], Black cohosh, and Black cohosh [cimicifuga racemosa (black cohosh)]    Social History     Social History     Tobacco History     Smoking Status  Former Smoker Quit date  11/4/1995 Smoking Frequency  1 pack/day for 20 years (20 pk yrs) Smoking Tobacco Type  Cigarettes    Smokeless Tobacco Use  Never Used          Alcohol History     Alcohol Use Status  No          Drug Use     Drug Use Status  No          Sexual Activity     Sexually Active  Not Currently                Family History   No family history on file. Review of Systems   Pertinent ROS listed in HPI, all others negative    Physical Exam   /63   Pulse 67   Temp 97.4 °F (36.3 °C) (Oral)   Resp 18   Ht 5' 8\" (1.727 m)   Wt 300 lb (136.1 kg)   SpO2 95%   BMI 45.61 kg/m²     GENERAL:  No acute distress. Alert and oriented. HEAD:  Normocephalic, atraumatic. EYES:  No scleral icterus. Conjugate gaze. ENT/NECK: Obese neck, symmetric. LUNGS:  No cough. Nonlabored breathing on room air. CARDIOVASC:  Normal rate, no cyanosis. ABDOMEN:  Soft, non-distended, non-tender. Left mons pubis with incarcerated hernia and no skin changes not very tender. No guarding / rigidity / rebound. LIMBS:  No deformities, very obese with minimal edema. NEURO:  Face symmetric, moves all extremities  SKIN:  Warm, dry. Labs    CBC  Recent Labs     04/15/21  1835   WBC 5.1   HGB 13.6   HCT 42.1        BMP  Recent Labs     04/15/21  1835      K 3.9      CO2 30*   BUN 11   CREATININE 0.8   CALCIUM 9.2     Liver Function  Recent Labs     04/15/21  1835   BILITOT 0.5   AST 21   ALT 14   ALKPHOS 119*   PROT 8.1   LABALBU 4.2     No results for input(s): LACTATE in the last 72 hours. No results for input(s): INR, PTT in the last 72 hours. Invalid input(s): PT    Imaging/Diagnostics Last 24 Hours   Ct Abdomen Pelvis W Iv Contrast Additional Contrast? None    Result Date: 4/15/2021  EXAMINATION: CT OF THE ABDOMEN AND PELVIS WITH CONTRAST 4/15/2021 8:04 pm TECHNIQUE: CT of the abdomen and pelvis was performed with the administration of intravenous contrast. Multiplanar reformatted images are provided for review.  Dose modulation, iterative reconstruction, and/or weight based adjustment of the mA/kV was utilized to reduce the radiation dose to as low as reasonably achievable. COMPARISON: 01/30/2013 HISTORY: ORDERING SYSTEM PROVIDED HISTORY: incarcerated hernia TECHNOLOGIST PROVIDED HISTORY: Reason for exam:->incarcerated hernia Additional Contrast?->None FINDINGS: The lung bases demonstrate a previously described 3 mm nodule in the right lung base without change. There is coronary artery calcification. Liver is of normal architecture. The gallbladder is absent. Spleen, pancreas, the adrenals and the kidneys are normal except for cystic lesions in the kidneys, the largest 1 in the right kidney measuring 2.6 cm. There is calcification of aorta and degenerative changes in the thoracolumbar spine. Pelvis. Bladder is contracted. There is a large left inguinal hernia with herniation of distal transverse colon into the hernia sac which may be partially incarcerated. There is constipation. Scattered diverticulosis are identified in the colon. A large left inguinal hernia with herniation of distal transverse colon which is partially incarcerated. There is also diverticulosis of colon with constipation. Assessment      Hospital Problems           Last Modified POA    * (Principal) Incarcerated hernia 4/15/2021 Yes    COPD (chronic obstructive pulmonary disease) ([facility] [address] 4/15/2021 Yes    Essential hypertension, benign (Chronic) 5/26/7491 Yes    Diastolic CHF, chronic (HCC) (Chronic) 4/15/2021 Yes    Lymphedema of both lower extremities (Chronic) 4/15/2021 Yes          78 y.o. female with incarcerated left inguinal hernia    Plan   -OR for laparoscopic possible open repair today    Plan was discussed with Dr. Sophie Sauceda.     Electronically signed by Marina Calderón MD on 4/16/21 at 7:06 AM EDT

## 2021-04-17 VITALS
BODY MASS INDEX: 44.41 KG/M2 | WEIGHT: 293 LBS | TEMPERATURE: 98 F | SYSTOLIC BLOOD PRESSURE: 145 MMHG | DIASTOLIC BLOOD PRESSURE: 70 MMHG | RESPIRATION RATE: 20 BRPM | HEIGHT: 68 IN | HEART RATE: 75 BPM | OXYGEN SATURATION: 95 %

## 2021-04-17 PROCEDURE — 6370000000 HC RX 637 (ALT 250 FOR IP): Performed by: SURGERY

## 2021-04-17 PROCEDURE — G0378 HOSPITAL OBSERVATION PER HR: HCPCS

## 2021-04-17 PROCEDURE — 6370000000 HC RX 637 (ALT 250 FOR IP): Performed by: INTERNAL MEDICINE

## 2021-04-17 PROCEDURE — 99232 SBSQ HOSP IP/OBS MODERATE 35: CPT | Performed by: INTERNAL MEDICINE

## 2021-04-17 RX ORDER — PROCHLORPERAZINE MALEATE 10 MG
10 TABLET ORAL EVERY 6 HOURS PRN
Status: DISCONTINUED | OUTPATIENT
Start: 2021-04-17 | End: 2021-04-17 | Stop reason: HOSPADM

## 2021-04-17 RX ORDER — OXYCODONE HYDROCHLORIDE AND ACETAMINOPHEN 5; 325 MG/1; MG/1
2 TABLET ORAL EVERY 6 HOURS PRN
Qty: 20 TABLET | Refills: 0 | Status: SHIPPED | OUTPATIENT
Start: 2021-04-17 | End: 2021-04-20

## 2021-04-17 RX ADMIN — ZINC SULFATE 220 MG (50 MG) CAPSULE 50 MG: CAPSULE at 08:44

## 2021-04-17 RX ADMIN — ASPIRIN 81 MG: 81 TABLET, COATED ORAL at 08:43

## 2021-04-17 RX ADMIN — OXYCODONE HYDROCHLORIDE AND ACETAMINOPHEN 2000 MG: 500 TABLET ORAL at 08:45

## 2021-04-17 RX ADMIN — GABAPENTIN 300 MG: 300 CAPSULE ORAL at 08:43

## 2021-04-17 RX ADMIN — OXYCODONE HYDROCHLORIDE AND ACETAMINOPHEN 2 TABLET: 5; 325 TABLET ORAL at 08:47

## 2021-04-17 RX ADMIN — OXYCODONE HYDROCHLORIDE AND ACETAMINOPHEN 2 TABLET: 5; 325 TABLET ORAL at 02:15

## 2021-04-17 RX ADMIN — ATORVASTATIN CALCIUM 40 MG: 40 TABLET, FILM COATED ORAL at 08:43

## 2021-04-17 RX ADMIN — GABAPENTIN 300 MG: 300 CAPSULE ORAL at 13:16

## 2021-04-17 RX ADMIN — PROCHLORPERAZINE MALEATE 10 MG: 10 TABLET ORAL at 02:15

## 2021-04-17 RX ADMIN — PROCHLORPERAZINE MALEATE 10 MG: 10 TABLET ORAL at 10:41

## 2021-04-17 RX ADMIN — METOPROLOL TARTRATE 50 MG: 50 TABLET, FILM COATED ORAL at 08:44

## 2021-04-17 RX ADMIN — PANTOPRAZOLE SODIUM 40 MG: 40 TABLET, DELAYED RELEASE ORAL at 07:20

## 2021-04-17 ASSESSMENT — PAIN DESCRIPTION - LOCATION: LOCATION: KNEE;HIP

## 2021-04-17 ASSESSMENT — PAIN SCALES - GENERAL
PAINLEVEL_OUTOF10: 0
PAINLEVEL_OUTOF10: 5

## 2021-04-17 ASSESSMENT — PAIN DESCRIPTION - PAIN TYPE: TYPE: CHRONIC PAIN

## 2021-04-17 ASSESSMENT — PAIN DESCRIPTION - FREQUENCY: FREQUENCY: INTERMITTENT

## 2021-04-17 NOTE — PROGRESS NOTES
Pt reports burning sensation and prior extreme discomfort has subsided since ambulating to the bedside commode.

## 2021-04-17 NOTE — PROGRESS NOTES
Notified Dr. Iraida Power of patients ongoing complaints of frequent urination; \"burning in her urethra\" and frequent requests for the re-insertion of a ventura catheter. Per Dr. Iraida Power, no ventura catheter will be reinserted as she does not have any medical necessity to have one. Scheduled pain medications were adjusted to accommodate the PRN medication added on. Will discuss with the patient and provide education as needed regarding the ventura catheter. 8:32 PM--education provided to pt regarding a ventura catheter. Relayed the conversations between myself and Dr. Iraida Power pertaining to her frequent requests for a ventura and the absence of a medical necessity to insert one at this time. Urine out put is good, color is clear and yellow. Informed of the changes to her pain medication and explained the management that is now in place. Pt is upset and visually uncomfortable however she does verbalize an understanding. Encouraged her to try to get up to the restroom rather than use the pure-wick that is currently in place and to brace her abdomen with position changes. She stated she would try to get up to the restroom and is aware of the need to brace her abdomen; informed PCA's to assist her to the restroom when she is comfortable to do so. Will continue to monitor her pain and urine output over night.

## 2021-04-17 NOTE — OP NOTE
[id number] [address]                                OPERATIVE REPORT    PATIENT NAME: Whit Martinez                    :        1945  MED REC NO:   [id number]                            ROOM:       [id number]  ACCOUNT NO:   [de-identified]                           ADMIT DATE: 04/15/2021  PROVIDER:     Lexi Carrasquillo MD    DATE OF PROCEDURE:  2021    PREOPERATIVE DIAGNOSIS:  Incarcerated left inguinal hernia. POSTOPERATIVE DIAGNOSIS:  Incarcerated left inguinal hernia. PROCEDURE PERFORMED:  Robotic-assisted left inguinal hernia repair. SURGEON:  Lexi Carrasquillo MD    ANESTHESIA:  General.    COMPLICATIONS:  None. SPECIMEN:  None. DISPOSITION:  General floor. EBL: minimal     DESCRIPTION OF PROCEDURE:  The patient was taken back to the operating  suite. Anesthesia was administered by Anesthesia Team.  Abdomen was  prepped and draped in sterile fashion. Left upper quadrant stab  incision was created and insufflation needle was placed. We placed an  8-mm trocar in left upper quadrant area. Camera was advanced. We  placed an 8-mm trocar in the subxiphoid area. Right upper quadrant 8-mm  trocar was placed. Robot was docked while the patient was in reverse  Trendelenburg position. Incarcerated left inguinal hernia was  encountered. Meticulous dissection and extended dissection needed to  reduce the incarcerated left colon. We were able to incise the  peritoneum just above the incarcerated colon and with continued  dissection circumferentially, we were able to reduce the colon without  manipulating the colon. Myopectineal space was completely dissected out  exposing the pubic tubercle. ProGrip mesh was placed to cover the  entire myopectineal space. Peritoneum was reapproximated using the  V-Loc suture closing the defect completely. Robot was undocked.   All  the trocars were removed and all the incisions were closed with 4-0  Monocryl.         Susie Pillai MD    D: 04/16/2021 16:14:03       T: 04/16/2021 18:39:36     [email or web address]  Job#: [id number]     Doc#: [id number]    CC:

## 2021-04-17 NOTE — PROGRESS NOTES
RBC 4.25   HGB 13.6   HCT 42.1   MCV 99.1   MCH 32.0   MCHC 32.3   RDW 11.9      MPV 9.9         Assessment:    Principal Problem:    Incarcerated hernia  Active Problems:    COPD (chronic obstructive pulmonary disease) (HCC)    Essential hypertension, benign    Diastolic CHF, chronic (HCC)    Lymphedema of both lower extremities  Resolved Problems:    * No resolved hospital problems. *      Plan:  1. Large left inguinal hernia with partial incarceration, s/p robotic assisted L inguinal hernia repair   2. Hx of CAD, no chest pain, continue asa, statin and BB  3. HX of HTN, BP is controlled, only on BB  4. COPD, not in exacerbation, continue monitoring. NOTE: This report was transcribed using voice recognition software. Every effort was made to ensure accuracy; however, inadvertent computerized transcription errors may be present.   Electronically signed by Greg Porter MD on 4/17/2021 at 9:32 AM

## 2021-04-17 NOTE — PLAN OF CARE
Problem: Pain:  Goal: Pain level will decrease  Description: Pain level will decrease  4/17/2021 1022 by Gutierrez Catalan, RN  Outcome: Met This Shift  4/17/2021 0031 by Esmer Castillo RN  Outcome: Met This Shift

## 2021-04-17 NOTE — PROGRESS NOTES
Pt tolerates lunch ( general diet), still complains sharp pain in LLQ, worse than yesterday.     Talked to Dr. Mikel Sharma regarding the above, ok for discharge

## 2021-04-17 NOTE — PLAN OF CARE
Problem: Pain:  Goal: Pain level will decrease  Description: Pain level will decrease  4/17/2021 1344 by Gutierrez Catalan RN  Outcome: Completed  4/17/2021 1022 by Gutierrez Catalan RN  Outcome: Met This Shift  4/17/2021 0031 by Esmer Castillo RN  Outcome: Met This Shift  Goal: Control of acute pain  Description: Control of acute pain  4/17/2021 1344 by Gutierrez Catalan RN  Outcome: Completed  4/17/2021 0031 by Esmer Castillo RN  Outcome: Met This Shift  Goal: Control of chronic pain  Description: Control of chronic pain  4/17/2021 1344 by Gutierrez Catalan RN  Outcome: Completed  4/17/2021 0031 by Esmer Castillo RN  Outcome: Met This Shift

## 2021-04-18 NOTE — DISCHARGE SUMMARY
Surgery Discharge Summary    [address]  [facility] SUMMARY:                The patient is a 78 y.o. female who was admitted to the hospital on 4/15/2021  5:33 PM for treatment of incarcerated left inguinal hernia. .  The patient was discharged on 4/17/2021  2:31 PM tolerating a diet, moving bowels, and urinating without difficulty. The incisions were clean and intact. The patient was discharged to home in satisfactory condition with instructions to call for a follow up appointment. Hospital Problem List:  Principal Problem:    Incarcerated hernia  Active Problems:    COPD (chronic obstructive pulmonary disease) ([facility])    Essential hypertension, benign    Diastolic CHF, chronic (HCC)    Lymphedema of both lower extremities  Resolved Problems:    * No resolved hospital problems. *     Procedure(s) (LRB):  LAPAROSCOPIC ROBOTIC ASSISTED INGUINAL HERNIA REPAIR (N/A)    Discharge Medications:    Jacinto Garrison   Home Medication Instructions ZCT:[id number]    Printed on:04/17/21 8231   Medication Information                      albuterol (PROVENTIL HFA;VENTOLIN HFA) 108 (90 BASE) MCG/ACT inhaler  Inhale 2 puffs into the lungs every 6 hours as needed for Wheezing or Shortness of Breath              Ascorbic Acid (VITAMIN C) 500 MG tablet  Take 2,000 mg by mouth daily             aspirin 81 MG tablet  Take 81 mg by mouth daily             calcium carbonate (OYSTER SHELL CALCIUM 500 MG) 1250 MG tablet  Take 2 tablets by mouth daily             Cholecalciferol (VITAMIN D) 2000 UNITS CAPS capsule  Take 2,000 Units by mouth daily              diphenhydrAMINE (BENADRYL) 50 MG capsule  Take 50 mg by mouth daily as needed for Allergies              ferrous sulfate 325 (65 FE) MG tablet  Take 325 mg by mouth nightly              folic acid (FOLVITE) 181 MCG tablet  Take 400 mcg by mouth nightly             gabapentin (NEURONTIN) 300 MG capsule  Take 300 mg by mouth 3 times daily.              Garlic 523 MG TABS  Take 1,000 mg by mouth daily              GENISTEIN PO  Take 125 mg by mouth nightly             ipratropium-albuterol (DUONEB) 0.5-2.5 (3) MG/3ML SOLN nebulizer solution  Take 1 vial by nebulization every 4 hours as needed for Shortness of Breath             Krill Oil 350 MG CAPS  Take 350 mg by mouth nightly             metoprolol (LOPRESSOR) 50 MG tablet  Take 1 tablet by mouth 2 times daily             morphine (MS CONTIN) 15 MG extended release tablet  Take 15 mg by mouth 2 times daily. omeprazole (PRILOSEC) 40 MG capsule  Take 40 mg by mouth daily. oxyCODONE-acetaminophen (PERCOCET) 5-325 MG per tablet  Take 2 tablets by mouth every 6 hours as needed for Pain for up to 3 days. oxyCODONE-acetaminophen (PERCOCET) 7.5-325 MG per tablet  Take 1 tablet by mouth 2 times daily. .             simvastatin (ZOCOR) 40 MG tablet  Take 40 mg by mouth nightly             zinc gluconate 50 MG tablet  Take 50 mg by mouth daily                 Neva Munoz  4/17/2021

## 2021-04-19 ENCOUNTER — OFFICE VISIT (OUTPATIENT)
Dept: VASCULAR SURGERY | Age: 76
End: 2021-04-19
Payer: MEDICARE

## 2021-04-19 VITALS — BODY MASS INDEX: 44.41 KG/M2 | HEIGHT: 68 IN | WEIGHT: 293 LBS

## 2021-04-19 DIAGNOSIS — I87.2 VENOUS INSUFFICIENCY OF BOTH LOWER EXTREMITIES: Primary | ICD-10-CM

## 2021-04-19 PROCEDURE — 99214 OFFICE O/P EST MOD 30 MIN: CPT | Performed by: SURGERY

## 2021-04-19 NOTE — PATIENT INSTRUCTIONS
Patient Education        Varicose Veins: Care Instructions  Your Care Instructions  Varicose veins are twisted, enlarged veins near the surface of the skin. They develop most often in the legs and ankles. Some people may be more likely than others to get varicose veins because of aging or hormone changes or because a parent has them. Being overweight or pregnant can make varicose veins worse. Jobs that require standing for long periods of time also can make them worse. Follow-up care is a key part of your treatment and safety. Be sure to make and go to all appointments, and call your doctor if you are having problems. It's also a good idea to know your test results and keep a list of the medicines you take. How can you care for yourself at home? · Wear compression stockings during the day to help relieve symptoms. They improve blood flow and are the main treatment for varicose veins. Talk to your doctor about which ones to get and where to get them. · Prop up your legs at or above the level of your heart when possible. This helps keep the blood from pooling in your lower legs and improves blood flow to the rest of your body. · Avoid sitting and standing for long periods. This puts added stress on your veins. · Get regular exercise, and control your weight. Walk, bicycle, or swim to improve blood flow in your legs. · If you bump your leg so hard that you know it is likely to bruise, prop up your leg and put ice or a cold pack on the area for 10 to 20 minutes at a time. Try to do this every 1 to 2 hours for the next 3 days (when you are awake) or until the swelling goes down. Put a thin cloth between the ice and your skin. · If you cut or scratch the skin over a vein, it may bleed a lot. Prop up your leg and apply firm pressure with a clean bandage over the site of the bleeding. Continue to apply pressure for a full 15 minutes. Do not check sooner to see if the bleeding has stopped.  If the bleeding has not stopped after 15 minutes, apply pressure again for another 15 minutes. You can repeat this up to 3 times for a total of 45 minutes. If you have a blood clot in a varicose vein, you may have tenderness and swelling over the vein. The vein may feel firm. Be sure to call your doctor right away if you have these symptoms. If your doctor has told you how to care for the clot, follow his or her instructions. Care may include the following:  · Prop up your leg and apply heat with a warm, damp cloth or a heating pad set on low (put a towel or cloth between your leg and the heating pad to prevent burns). · Ask your doctor if you can take an over-the-counter pain medicine, such as acetaminophen (Tylenol), ibuprofen (Advil, Motrin), or naproxen (Aleve). Be safe with medicines. Read and follow all instructions on the label. When should you call for help? Call 911 anytime you think you may need emergency care. For example, call if:    · You have sudden chest pain and shortness of breath, or you cough up blood. Call your doctor now or seek immediate medical care if:    · You have signs of a blood clot, such as:  ? Pain in your calf, back of the knee, thigh, or groin. ? Redness and swelling in your leg or groin.     · A varicose vein begins to bleed and you cannot stop it.     · You have a tender lump in your leg.     · You get an open sore. Watch closely for changes in your health, and be sure to contact your doctor if:    · Your varicose vein symptoms do not improve with home treatment. Where can you learn more? Go to [email or web address] org and sign in to your [employer] account. Enter O301 in the Brain[employer] box to learn more about \"Varicose Veins: Care Instructions. \"     If you do not have an account, please click on the \"Sign Up Now\" link. Current as of: March 4, 2020               Content Version: 12.8  © 7273-2906 [employer], [employer].    Care instructions adapted under license by [facility] ([facility]). If you have questions about a medical condition or this instruction, always ask your healthcare professional. [place] [address] any warranty or liability for your use of this information.

## 2021-04-19 NOTE — PROGRESS NOTES
Vascular Surgery Outpatient Followup    PCP : Gus Donnelly MD    HISTORY OF PRESENT ILLNESS:    This is a 78 y.o. female who presents today for evaluation for leg pain. SHe has a known hx of venous insufficiency, lymphedema. She has a hx significant for venous stasis ulcerations of bilateral LE. She first was seen by myself in regards to these issues 8/2015. She eventually healed these wounds 11/2016. Last seen in 2019. She has been having more issues with pain in her calves bilaterally. She has areas that are raised and bumpy and than last about a month and goes down some. It will leave a firm area. Her pain is a 10/10 at its worst.  It will come for a month, resolve and than she will still have some soreness for a couple weeks and than resolve for a couple months and recur again. She hasn't been to lymphedema therapy since end of 2019. She has had a couple of stints with lymphedema therapy in past which has generally been beneficial for her. She admits to not wearing her stockings because of the pain. She was previously using knee high 20-30 mm hg stockings. SHe has no open wounds. She is still seeing pain management and is down to percocet 7/5/325 mg daily. She is unhappy and doesn't feel like her pain is well controlled. When last seen in 2019 she was having similar issues with her calves, problems with firmness and hardness in areas. It over time over about 1 month is tender and and improves and resolves. Her pain at that time was a 10/10.         Past Medical History:        Diagnosis Date    Bursitis     CAD (coronary artery disease) 1993    heart attack    COPD (chronic obstructive pulmonary disease) ([facility] [address] 6/19/2013    Emphysema     slight    GERD (gastroesophageal reflux disease)     Hiatal hernia     Hip pain     Hyperlipidemia     Hypertension     Lymphedema of both lower extremities 11/21/2018    Venous insufficiency of both lower extremities 11/21/2018    Venous ulcer with fat layer exposed ([address] 10/28/2015     Past Surgical History:        Procedure Laterality Date    APPENDECTOMY      BREAST ENHANCEMENT SURGERY      BREAST REDUCTION SURGERY      CHOLECYSTECTOMY      COLONOSCOPY      ECHO COMPL W DOP COLOR FLOW  3/11/2013         ENDOSCOPY, COLON, DIAGNOSTIC      HERNIA REPAIR N/A 4/16/2021    LAPAROSCOPIC ROBOTIC ASSISTED INGUINAL HERNIA REPAIR performed by Frank Orellana MD at [facility] [address] REPLACEMENT  2009 2011    l knee r hip    LEG DEBRIDEMENT Left 11/18/2015    LEG DEBRIDEMENT Left 08/03/2016     Current Medications:   Current Outpatient Medications   Medication Sig Dispense Refill    oxyCODONE-acetaminophen (PERCOCET) 5-325 MG per tablet Take 2 tablets by mouth every 6 hours as needed for Pain for up to 3 days. 20 tablet 0    zinc gluconate 50 MG tablet Take 50 mg by mouth daily      GENISTEIN PO Take 125 mg by mouth nightly      folic acid (FOLVITE) 424 MCG tablet Take 400 mcg by mouth nightly      Krill Oil 350 MG CAPS Take 350 mg by mouth nightly      gabapentin (NEURONTIN) 300 MG capsule Take 300 mg by mouth 3 times daily.  ipratropium-albuterol (DUONEB) 0.5-2.5 (3) MG/3ML SOLN nebulizer solution Take 1 vial by nebulization every 4 hours as needed for Shortness of Breath      simvastatin (ZOCOR) 40 MG tablet Take 40 mg by mouth nightly      oxyCODONE-acetaminophen (PERCOCET) 7.5-325 MG per tablet Take 1 tablet by mouth 2 times daily. Adele Elder morphine (MS CONTIN) 15 MG extended release tablet Take 15 mg by mouth 2 times daily.        aspirin 81 MG tablet Take 81 mg by mouth daily      Cholecalciferol (VITAMIN D) 2000 UNITS CAPS capsule Take 2,000 Units by mouth daily       ferrous sulfate 325 (65 FE) MG tablet Take 325 mg by mouth nightly       metoprolol (LOPRESSOR) 50 MG tablet Take 1 tablet by mouth 2 times daily 60 tablet 0    albuterol (PROVENTIL HFA;VENTOLIN HFA) 108 (90 BASE) MCG/ACT inhaler Inhale 2 puffs into the lungs every 6 hours as needed for Wheezing or Shortness of Breath       calcium carbonate (OYSTER SHELL CALCIUM 500 MG) 1250 MG tablet Take 2 tablets by mouth daily      Ascorbic Acid (VITAMIN C) 500 MG tablet Take 2,000 mg by mouth daily      omeprazole (PRILOSEC) 40 MG capsule Take 40 mg by mouth daily.  diphenhydrAMINE (BENADRYL) 50 MG capsule Take 50 mg by mouth daily as needed for Allergies       Garlic 543 MG TABS Take 1,000 mg by mouth daily        No current facility-administered medications for this visit.       Allergies:  Codeine, Dilaudid [hydromorphone hcl], Naproxen, Singulair [montelukast sodium], Black cohosh, and Black cohosh [cimicifuga racemosa (black cohosh)]  Social History     Socioeconomic History    Marital status:      Spouse name: Not on file    Number of children: Not on file    Years of education: Not on file    Highest education level: Not on file   Occupational History    Not on file   Social Needs    Financial resource strain: Not on file    Food insecurity     Worry: Not on file     Inability: Not on file    Transportation needs     Medical: Not on file     Non-medical: Not on file   Tobacco Use    Smoking status: Former Smoker     Packs/day: 1.00     Years: 20.00     Pack years: 20.00     Types: Cigarettes     Quit date: 1995     Years since quittin.4    Smokeless tobacco: Never Used   Substance and Sexual Activity    Alcohol use: No    Drug use: No    Sexual activity: Not Currently   Lifestyle    Physical activity     Days per week: Not on file     Minutes per session: Not on file    Stress: Not on file   Relationships    Social connections     Talks on phone: Not on file     Gets together: Not on file     Attends Yazidi service: Not on file     Active member of club or organization: Not on file     Attends meetings of clubs or organizations: Not on file     Relationship status: Not on file    Intimate partner violence     Fear of current or ex partner: Not on file     Emotionally abused: Not on file     Physically abused: Not on file     Forced sexual activity: Not on file   Other Topics Concern    Not on file   Social History Narrative    Not on file     History reviewed. No pertinent family history.   Labs  Lab Results   Component Value Date    WBC 5.1 04/15/2021    HGB 13.6 04/15/2021    HCT 42.1 04/15/2021     04/15/2021    PROTIME 10.8 08/03/2016    INR 1.0 08/03/2016    APTT 30.5 02/21/2015    K 3.9 04/15/2021    BUN 11 04/15/2021    CREATININE 0.8 04/15/2021       PHYSICAL EXAM:    CONSTITUTIONAL:   Awake, alert, cooperative  PSYCHIATRIC :  Oriented to time, place and person     Appropriate insight to disease process  EYES: Lids and lashes normal  ENT:  External ears and nose without lesions   Hearing deficits not noted  NECK: Supple, symmetrical, trachea midline   Carotid bruit not noted  LUNGS:  No increased work of breathing                 Clear to auscultation  CARDIOVASCULAR:  regular rate and rhythm   ABDOMEN:  Incisional soreness    SKIN:   Normal skin color   Texture and turgor abnormal - areas in leg lateral posterior bilaterally R > L of thickening, lumpy bumpy, firm, ttp   No cellulitis, no fluctuance  EXTREMITIES:   R LE Edema present  L LE Edema present  Bilateral venous stasis changes  No open wounds  B/L LE Lymphedema    RADIOLOGY:    A/P Symptomatic Venous insufficiency B/L LE   Lymphedema B/L LE  · Currently she is having significant pain  · She has had recurrent issues with this   · Recommended she resume lymphedema therapy as this seems to have the most benefit   · Script given for lymphedema therapy - [facility]  · Etiology is likely associated with venous reflux  · I explained to them the pathophysiology of venous reflux and the symptoms associated with it including swelling, pain, edema, heaviness, and even ulceration  · Elevate Bilateral LE while in bed or sitting  · Compression stockings 20-30 mm hg wear daily - she is having significant pain with usage, hopefully once legs are down after lymphedema therapy she will be able to start using them again  · F/U as outpatient in 2 months  · Call if patient develops worsening of swelling or wounds  · All ?s answered    Biju Abraham

## 2021-06-18 ENCOUNTER — TELEPHONE (OUTPATIENT)
Dept: VASCULAR SURGERY | Age: 76
End: 2021-06-18

## 2021-06-21 ENCOUNTER — TELEPHONE (OUTPATIENT)
Dept: VASCULAR SURGERY | Age: 76
End: 2021-06-21

## 2021-06-21 NOTE — TELEPHONE ENCOUNTER
Patient cancelled appt today with Dr. Edgar Goodman, has a family emergency. She states she will call back to r/s.

## 2021-11-24 ENCOUNTER — HOSPITAL ENCOUNTER (OUTPATIENT)
Dept: WOUND CARE | Age: 76
Discharge: HOME | End: 2021-11-24

## 2021-12-08 ENCOUNTER — HOSPITAL ENCOUNTER (OUTPATIENT)
Dept: WOUND CARE | Age: 76
Discharge: HOME | End: 2021-12-08
Payer: MEDICARE

## 2021-12-08 VITALS
RESPIRATION RATE: 18 BRPM | TEMPERATURE: 95.8 F | HEIGHT: 67 IN | BODY MASS INDEX: 45.99 KG/M2 | HEART RATE: 86 BPM | DIASTOLIC BLOOD PRESSURE: 70 MMHG | WEIGHT: 293 LBS | SYSTOLIC BLOOD PRESSURE: 124 MMHG

## 2021-12-08 DIAGNOSIS — I87.2 VENOUS STASIS ULCER OF LEFT CALF WITH FAT LAYER EXPOSED WITHOUT VARICOSE VEINS (HCC): Chronic | ICD-10-CM

## 2021-12-08 DIAGNOSIS — I87.2 VENOUS INSUFFICIENCY OF BOTH LOWER EXTREMITIES: Chronic | ICD-10-CM

## 2021-12-08 DIAGNOSIS — I89.0 LYMPHEDEMA OF BOTH LOWER EXTREMITIES: Primary | Chronic | ICD-10-CM

## 2021-12-08 DIAGNOSIS — L97.222 VENOUS STASIS ULCER OF LEFT CALF WITH FAT LAYER EXPOSED WITHOUT VARICOSE VEINS (HCC): Chronic | ICD-10-CM

## 2021-12-08 PROCEDURE — 99205 OFFICE O/P NEW HI 60 MIN: CPT

## 2021-12-08 PROCEDURE — 87075 CULTR BACTERIA EXCEPT BLOOD: CPT

## 2021-12-08 PROCEDURE — 99215 OFFICE O/P EST HI 40 MIN: CPT | Performed by: SURGERY

## 2021-12-08 PROCEDURE — 11042 DBRDMT SUBQ TIS 1ST 20SQCM/<: CPT | Performed by: SURGERY

## 2021-12-08 PROCEDURE — 11042 DBRDMT SUBQ TIS 1ST 20SQCM/<: CPT

## 2021-12-08 PROCEDURE — 87070 CULTURE OTHR SPECIMN AEROBIC: CPT

## 2021-12-08 ASSESSMENT — PAIN DESCRIPTION - ORIENTATION: ORIENTATION: LEFT

## 2021-12-08 ASSESSMENT — PAIN DESCRIPTION - ONSET: ONSET: ON-GOING

## 2021-12-08 ASSESSMENT — PAIN DESCRIPTION - LOCATION: LOCATION: LEG

## 2021-12-08 ASSESSMENT — PAIN DESCRIPTION - PAIN TYPE: TYPE: CHRONIC PAIN

## 2021-12-08 ASSESSMENT — PAIN DESCRIPTION - FREQUENCY: FREQUENCY: CONTINUOUS

## 2021-12-08 ASSESSMENT — PAIN - FUNCTIONAL ASSESSMENT: PAIN_FUNCTIONAL_ASSESSMENT: ACTIVITIES ARE NOT PREVENTED

## 2021-12-08 ASSESSMENT — PAIN DESCRIPTION - DESCRIPTORS: DESCRIPTORS: THROBBING

## 2021-12-08 ASSESSMENT — PAIN SCALES - GENERAL: PAINLEVEL_OUTOF10: 6

## 2021-12-08 NOTE — PROGRESS NOTES
Wound Healing Center Followup Visit Note    Referring Physician : Jade Silva MD  [id number] [facility] RECORD NUMBER:  [id number]  AGE: 78 y.o. GENDER: female  : 1945  EPISODE DATE:  2021    Subjective:     Chief Complaint   Patient presents with    Wound Check     left leg       HISTORY of PRESENT ILLNESS HPI   Jose Alberto Britt is a 78 y.o. female who presents today in regards to follow up evaluation and treatment of wound/ulcer. That patient's past medical, family and social hx were reviewed and changes were made if present. History of Wound Context:  Patient has had left calf wound since ~ 2021. She has been putting a dressing on it. The wound has not been improving. She has a hx significant for venous stasis ulcerations of bilateral LE. She first was seen by myself in regards to these issues 2015. She eventually healed these wounds 2016. I last saw her 2021 at which time I recommended lymphedema therapy. She states she went and said it caused her to much pain and stopped going. She has chronic issues with bilateral calf pain, swelling and edema.            She admits to not wearing her stockings because of the pain.     She has used knee high 20-30 mm hg stockings in the past.       She is still seeing pain management and is down to percocet 7/5/325 mg daily.       21  · aquacell  · Double tubigrip  · Culture done  · Emphasized importance of getting in to more significant compression in the future    Wound/Ulcer Pain Timing/Severity: waxing and waning, mild  Quality of pain: aching, throbbing, pressure  Severity:  2 / 10   Modifying Factors: Pain worsens with debridement, dressing changes  Associated Signs/Symptoms: edema, drainage and pain    Ulcer Identification:  Ulcer Type: venous and lymphedema  Contributing Factors: edema, venous stasis and lymphedema    Diabetic/Pressure/Non Pressure Ulcers only:  Ulcer: Non-Pressure ulcer, fat layer exposed    Wound: N/A PAST MEDICAL HISTORY      Diagnosis Date    Bursitis     CAD (coronary artery disease)     heart attack    COPD (chronic obstructive pulmonary disease) ([facility] [address] 2013    Emphysema     slight    GERD (gastroesophageal reflux disease)     Hiatal hernia     Hip pain     Hyperlipidemia     Hypertension     Lymphedema of both lower extremities 2018    Venous insufficiency of both lower extremities 2018    Venous stasis ulcer of left calf with fat layer exposed without varicose veins ([facility] [address] 2021    [facility]28/[facility]     Past Surgical History:   Procedure Laterality Date    APPENDECTOMY      BREAST ENHANCEMENT SURGERY      BREAST REDUCTION SURGERY      CHOLECYSTECTOMY      COLONOSCOPY      ECHO COMPL W DOP COLOR FLOW  3/11/2013         ENDOSCOPY, COLON, DIAGNOSTIC      HERNIA REPAIR N/A 2021    LAPAROSCOPIC ROBOTIC ASSISTED INGUINAL HERNIA REPAIR performed by Kyler Apple MD at [address]  2009    l knee r hip    LEG DEBRIDEMENT Left 2015    LEG DEBRIDEMENT Left 2016     History reviewed. No pertinent family history.   Social History     Tobacco Use    Smoking status: Former Smoker     Packs/day: 1.00     Years: 20.00     Pack years: 20.00     Types: Cigarettes     Quit date: 1995     Years since quittin.1    Smokeless tobacco: Never Used   Vaping Use    Vaping Use: Never used   Substance Use Topics    Alcohol use: No    Drug use: No     Allergies   Allergen Reactions    Codeine Nausea And Vomiting and Other (See Comments)     hallucinate    Dilaudid [Hydromorphone Hcl] Rash    Naproxen Other (See Comments)     Shuts down kidney function    Singulair [Montelukast Sodium] Shortness Of Breath     Mucus in chest    Black Cohosh     Black Cohosh [Cimicifuga Racemosa (Black Cohosh)] Rash     Current Outpatient Medications on File Prior to Encounter   Medication Sig Dispense Refill    zinc gluconate 50 MG tablet Take 50 mg by mouth daily      GENISTEIN PO Take 125 mg by mouth nightly      folic acid (FOLVITE) 278 MCG tablet Take 400 mcg by mouth nightly      gabapentin (NEURONTIN) 300 MG capsule Take 300 mg by mouth 3 times daily.  simvastatin (ZOCOR) 40 MG tablet Take 40 mg by mouth nightly      oxyCODONE-acetaminophen (PERCOCET) 7.5-325 MG per tablet Take 1 tablet by mouth 2 times daily. Sunita Flores morphine (MS CONTIN) 15 MG extended release tablet Take 15 mg by mouth 2 times daily.  aspirin 81 MG tablet Take 81 mg by mouth daily      Cholecalciferol (VITAMIN D) 2000 UNITS CAPS capsule Take 2,000 Units by mouth daily       ferrous sulfate 325 (65 FE) MG tablet Take 325 mg by mouth nightly       metoprolol (LOPRESSOR) 50 MG tablet Take 1 tablet by mouth 2 times daily 60 tablet 0    omeprazole (PRILOSEC) 40 MG capsule Take 40 mg by mouth daily.  Garlic 223 MG TABS Take 1,000 mg by mouth daily       Krill Oil 350 MG CAPS Take 350 mg by mouth nightly      ipratropium-albuterol (DUONEB) 0.5-2.5 (3) MG/3ML SOLN nebulizer solution Take 1 vial by nebulization every 4 hours as needed for Shortness of Breath      albuterol (PROVENTIL HFA;VENTOLIN HFA) 108 (90 BASE) MCG/ACT inhaler Inhale 2 puffs into the lungs every 6 hours as needed for Wheezing or Shortness of Breath       calcium carbonate (OYSTER SHELL CALCIUM 500 MG) 1250 MG tablet Take 2 tablets by mouth daily      Ascorbic Acid (VITAMIN C) 500 MG tablet Take 2,000 mg by mouth daily      diphenhydrAMINE (BENADRYL) 50 MG capsule Take 50 mg by mouth daily as needed for Allergies        No current facility-administered medications on file prior to encounter.        REVIEW OF SYSTEMS See HPI    Objective:    /70   Pulse 86   Temp 95.8 °F (35.4 °C) (Temporal)   Resp 18   Ht 5' 7\" (1.702 m)   Wt 300 lb (136.1 kg)   BMI 46.99 kg/m²   Wt Readings from Last 3 Encounters: 12/08/21 300 lb (136.1 kg)   04/19/21 300 lb (136.1 kg)   04/17/21 (!) 301 lb 1 oz (136.6 kg)     PHYSICAL EXAM  CONSTITUTIONAL:   Awake, alert, cooperative   EYES:  lids and lashes normal   ENT: external ears and nose without lesions   NECK:  supple, symmetrical, trachea midline   SKIN:  Open wound Present    Assessment:     Problem List Items Addressed This Visit     Venous insufficiency of both lower extremities (Chronic)    Lymphedema of both lower extremities - Primary (Chronic)    Venous stasis ulcer of left calf with fat layer exposed without varicose veins (HCC) (Chronic)          Pre Debridement Measurements:  Are located in the [place]  Documentation Flow Sheet  Post Debridement Measurements:  Wound/Ulcer Descriptions are Pre Debridement except measurements:     Incision 04/20/16 Leg Left (Active)   Number of days: 2057       Incision 08/03/16 Leg Left (Active)   Number of days: 1953       Wound 12/08/21 Pretibial Left #1 (Active)   Wound Image   12/08/21 1443   Wound Length (cm) 1 cm 12/08/21 1443   Wound Width (cm) 0.7 cm 12/08/21 1443   Wound Depth (cm) 0.1 cm 12/08/21 1443   Wound Surface Area (cm^2) 0.7 cm^2 12/08/21 1443   Wound Volume (cm^3) 0.07 cm^3 12/08/21 1443   Post-Procedure Length (cm) 1.2 cm 12/08/21 1530   Post-Procedure Width (cm) 0.9 cm 12/08/21 1530   Post-Procedure Depth (cm) 0.1 cm 12/08/21 1530   Post-Procedure Surface Area (cm^2) 1.08 cm^2 12/08/21 1530   Post-Procedure Volume (cm^3) 0.108 cm^3 12/08/21 1530   Wound Assessment Pink/red 12/08/21 1443   Drainage Amount Moderate 12/08/21 1443   Drainage Description Serosanguinous 12/08/21 1443   Odor None 12/08/21 1443   Kori-wound Assessment Intact 12/08/21 1443   Number of days: 0     Incision 04/16/21 Abdomen (Active)   Number of days: 236       Procedure Note  Indications:  Based on my examination of this patient's wound(s)/ulcer(s) today, debridement is required to promote healing and evaluate the wound base.     Performed by: Breezy Barron MD    Consent obtained:  Yes    Time out taken:  Yes    Pain Control: Anesthetic  Anesthetic: 4% Lidocaine Liquid Topical     Debridement:Excisional Debridement    Using curette the wound(s)/ulcer(s) was/were sharply debrided down through and including the removal of epidermis, dermis and subcutaneous tissue. Devitalized Tissue Debrided:  fibrin, biofilm, slough and exudate to stimulate bleeding to promote healing, post debridement good bleeding base and wound edges noted    Wound/Ulcer #: 1    Percent of Wound/Ulcer Debrided: 100%    Total Surface Area Debrided:  0.7 sq cm     Estimated Blood Loss:  Minimal  Hemostasis Achieved:  by pressure    Procedural Pain:  6 / 10   Post Procedural Pain:  5 / 10     Response to treatment:  Well tolerated by patient. Plan:   Treatment Note please see attached Discharge Instructions    Written patient dismissal instructions given to patient and signed by patient or POA. Discharge Instructions       Visit Discharge/Physician Orders    Discharge condition: Stable    Assessment of pain at discharge: mild    Anesthetic used: lido 4%    Discharge to: Home    Left via:Private automobile    Accompanied by: accompanied by self    [facility]F/[facility]: [facility]    Dressing Orders: To left pretib wound: cleanse with normal saline, apply aquacel, cover and secure with dry dressing. Apply Spandigrip     Treatment Orders:Eat a diet high in protein and vitamin C. Take a multiple vitamin daily unless contraindicated. [address] [place] [address],[address] followup visit 1 week____________________________  (Please note your next appointment above and if you are unable to keep, kindly give a 24 hour notice.  Thank you.)    Physician signature:__________________________      If you experience any of the following, please call the [address] [place] [address] during business hours:    * Increase in Pain  * Temperature over 101  * Increase in drainage from your wound  * Drainage with a foul odor  * Bleeding  * Increase in swelling  * Need for compression bandage changes due to slippage, breakthrough drainage. If you need medical attention outside of the business hours of the [address] [place] [address] please contact your PCP or go to the nearest emergency room.         Electronically signed by Cammy Combs MD on 12/8/2021 at 3:34 PM

## 2021-12-08 NOTE — PLAN OF CARE
Problem: Pain:  Goal: Pain level will decrease  Description: Pain level will decrease  Outcome: Ongoing  Goal: Control of acute pain  Description: Control of acute pain  Outcome: Ongoing  Goal: Control of chronic pain  Description: Control of chronic pain  Outcome: Ongoing     Problem: Wound:  Goal: Will show signs of wound healing; wound closure and no evidence of infection  Description: Will show signs of wound healing; wound closure and no evidence of infection  Outcome: Ongoing     Problem: Venous:  Goal: Signs of wound healing will improve  Description: Signs of wound healing will improve  Outcome: Ongoing     Problem: Compression therapy:  Goal: Will be free from complications associated with compression therapy  Description: Will be free from complications associated with compression therapy  Outcome: Ongoing     Problem: Weight control:  Goal: Ability to maintain an optimal weight for height and age will be supported  Description: Ability to maintain an optimal weight for height and age will be supported  Outcome: Ongoing     Problem: Falls - Risk of:  Goal: Will remain free from falls  Description: Will remain free from falls  Outcome: Ongoing

## 2021-12-08 NOTE — PROGRESS NOTES
(cm) 0.7 cm 12/08/21 1443   Wound Depth (cm) 0.1 cm 12/08/21 1443   Wound Surface Area (cm^2) 0.7 cm^2 12/08/21 1443   Wound Volume (cm^3) 0.07 cm^3 12/08/21 1443   Post-Procedure Length (cm) 1.2 cm 12/08/21 1530   Post-Procedure Width (cm) 0.9 cm 12/08/21 1530   Post-Procedure Depth (cm) 0.1 cm 12/08/21 1530   Post-Procedure Surface Area (cm^2) 1.08 cm^2 12/08/21 1530   Post-Procedure Volume (cm^3) 0.108 cm^3 12/08/21 1530   Wound Assessment Pink/red 12/08/21 1443   Drainage Amount Moderate 12/08/21 1443   Drainage Description Serosanguinous 12/08/21 1443   Odor None 12/08/21 1443   Kori-wound Assessment Intact 12/08/21 1443   Number of days: 0     Incision 04/16/21 Abdomen (Active)   Number of days: 236       Supplies Requested :      WOUND #: 1   PRIMARY DRESSING:  Alginate pad   Cover and Secure with:  Other silicone bordered dressing     FREQUENCY OF DRESSING CHANGES:  Daily       ADDITIONAL ITEMS:  [] Gloves Small  [x] Gloves Medium [] Gloves Large [] Gloves XLarge  [] Tape 1\" [x] Tape 2\" [] Tape 3\"  [] Medipore Tape  [x] Saline  [] Skin Prep   [] Adhesive Remover   [] Cotton Tip Applicators   [] Other:    Patient Wound(s) Debrided: [x] Yes if yes please add date 12/8/21   [] No    Debribement Type: Excisional/Sharp    Patient currently being seen by Home Health: [] Yes   [x] No    Duration for needed supplies:  []15  [x]30  []60  []90 Days    Electronically signed by Yarely Daniel RN on 12/8/2021 at 4:32 PM     Provider Information:      PROVIDER'S NAME:Dr. Gilbert Linton    NPI: [id number]

## 2021-12-10 LAB
ANAEROBIC CULTURE: NORMAL
ORGANISM: ABNORMAL
WOUND/ABSCESS: ABNORMAL

## 2021-12-15 ENCOUNTER — HOSPITAL ENCOUNTER (OUTPATIENT)
Dept: WOUND CARE | Age: 76
Discharge: HOME | End: 2021-12-15
Payer: MEDICARE

## 2021-12-15 VITALS
RESPIRATION RATE: 22 BRPM | WEIGHT: 293 LBS | HEIGHT: 67 IN | HEART RATE: 84 BPM | TEMPERATURE: 96.6 F | SYSTOLIC BLOOD PRESSURE: 146 MMHG | BODY MASS INDEX: 45.99 KG/M2 | DIASTOLIC BLOOD PRESSURE: 84 MMHG

## 2021-12-15 DIAGNOSIS — L97.222 VENOUS STASIS ULCER OF LEFT CALF WITH FAT LAYER EXPOSED WITHOUT VARICOSE VEINS (HCC): ICD-10-CM

## 2021-12-15 DIAGNOSIS — I87.2 VENOUS STASIS ULCER OF LEFT CALF WITH FAT LAYER EXPOSED WITHOUT VARICOSE VEINS (HCC): ICD-10-CM

## 2021-12-15 PROCEDURE — 11042 DBRDMT SUBQ TIS 1ST 20SQCM/<: CPT

## 2021-12-15 PROCEDURE — 11042 DBRDMT SUBQ TIS 1ST 20SQCM/<: CPT | Performed by: SURGERY

## 2021-12-15 RX ORDER — GINSENG 100 MG
CAPSULE ORAL ONCE
Status: CANCELLED | OUTPATIENT
Start: 2021-12-15 | End: 2021-12-15

## 2021-12-15 RX ORDER — LIDOCAINE HYDROCHLORIDE 20 MG/ML
JELLY TOPICAL ONCE
Status: CANCELLED | OUTPATIENT
Start: 2021-12-15 | End: 2021-12-15

## 2021-12-15 RX ORDER — LIDOCAINE HYDROCHLORIDE 40 MG/ML
SOLUTION TOPICAL ONCE
Status: CANCELLED | OUTPATIENT
Start: 2021-12-15 | End: 2021-12-15

## 2021-12-15 RX ORDER — BACITRACIN, NEOMYCIN, POLYMYXIN B 400; 3.5; 5 [USP'U]/G; MG/G; [USP'U]/G
OINTMENT TOPICAL ONCE
Status: CANCELLED | OUTPATIENT
Start: 2021-12-15 | End: 2021-12-15

## 2021-12-15 RX ORDER — LIDOCAINE 40 MG/G
CREAM TOPICAL ONCE
Status: CANCELLED | OUTPATIENT
Start: 2021-12-15 | End: 2021-12-15

## 2021-12-15 RX ORDER — LIDOCAINE 50 MG/G
OINTMENT TOPICAL ONCE
Status: CANCELLED | OUTPATIENT
Start: 2021-12-15 | End: 2021-12-15

## 2021-12-15 RX ORDER — BETAMETHASONE DIPROPIONATE 0.05 %
OINTMENT (GRAM) TOPICAL ONCE
Status: CANCELLED | OUTPATIENT
Start: 2021-12-15 | End: 2021-12-15

## 2021-12-15 RX ORDER — CLOBETASOL PROPIONATE 0.5 MG/G
OINTMENT TOPICAL ONCE
Status: CANCELLED | OUTPATIENT
Start: 2021-12-15 | End: 2021-12-15

## 2021-12-15 RX ORDER — GENTAMICIN SULFATE 1 MG/G
OINTMENT TOPICAL ONCE
Status: CANCELLED | OUTPATIENT
Start: 2021-12-15 | End: 2021-12-15

## 2021-12-15 RX ORDER — BACITRACIN ZINC AND POLYMYXIN B SULFATE 500; 1000 [USP'U]/G; [USP'U]/G
OINTMENT TOPICAL ONCE
Status: CANCELLED | OUTPATIENT
Start: 2021-12-15 | End: 2021-12-15

## 2021-12-15 ASSESSMENT — PAIN SCALES - GENERAL: PAINLEVEL_OUTOF10: 2

## 2021-12-15 ASSESSMENT — PAIN DESCRIPTION - DESCRIPTORS: DESCRIPTORS: THROBBING

## 2021-12-15 ASSESSMENT — PAIN DESCRIPTION - ONSET: ONSET: ON-GOING

## 2021-12-15 ASSESSMENT — PAIN DESCRIPTION - PAIN TYPE: TYPE: CHRONIC PAIN

## 2021-12-15 ASSESSMENT — PAIN DESCRIPTION - ORIENTATION: ORIENTATION: LEFT

## 2021-12-15 ASSESSMENT — PAIN DESCRIPTION - LOCATION: LOCATION: LEG

## 2021-12-15 ASSESSMENT — PAIN DESCRIPTION - FREQUENCY: FREQUENCY: CONTINUOUS

## 2021-12-15 NOTE — PLAN OF CARE
Problem: Wound:  Goal: Will show signs of wound healing; wound closure and no evidence of infection  Description: Will show signs of wound healing; wound closure and no evidence of infection  Outcome: Met This Shift     Problem: Venous:  Goal: Signs of wound healing will improve  Description: Signs of wound healing will improve  Outcome: Met This Shift     Problem: Compression therapy:  Goal: Will be free from complications associated with compression therapy  Description: Will be free from complications associated with compression therapy  Outcome: Met This Shift     Problem: Weight control:  Goal: Ability to maintain an optimal weight for height and age will be supported  Description: Ability to maintain an optimal weight for height and age will be supported  Outcome: Met This Shift     Problem: Falls - Risk of:  Goal: Will remain free from falls  Description: Will remain free from falls  Outcome: Met This Shift

## 2021-12-15 NOTE — PROGRESS NOTES
Wound Healing Center Followup Visit Note    Referring Physician : Gerardo Dodd MD  [id number] [facility] RECORD NUMBER:  [id number]  AGE: 78 y.o. GENDER: female  : 1945  EPISODE DATE:  12/15/2021    Subjective:     Chief Complaint   Patient presents with    Wound Check     left leg       HISTORY of PRESENT ILLNESS GIACOMO Tapia is a 78 y.o. female who presents today in regards to follow up evaluation and treatment of wound/ulcer. That patient's past medical, family and social hx were reviewed and changes were made if present. History of Wound Context:  Patient has had left calf wound since ~ 2021. She has been putting a dressing on it. The wound has not been improving. She has a hx significant for venous stasis ulcerations of bilateral LE. She first was seen by myself in regards to these issues 2015. She eventually healed these wounds 2016. I last saw her 2021 at which time I recommended lymphedema therapy. She states she went and said it caused her to much pain and stopped going. She has chronic issues with bilateral calf pain, swelling and edema.            She admits to not wearing her stockings because of the pain.     She has used knee high 20-30 mm hg stockings in the past.       She is still seeing pain management and is down to percocet /325 mg daily.       21  · aquacell  · Double tubigrip  · Culture done  · Emphasized importance of getting in to more significant compression in the future  12/15/21  · Culture reviewed - light growth, no tx  · Wound slightly improved  · Still significant drainage  · Plan on compression wrap next week    Wound/Ulcer Pain Timing/Severity: waxing and waning, mild  Quality of pain: aching, throbbing, pressure  Severity:  2 / 10   Modifying Factors: Pain worsens with debridement, dressing changes  Associated Signs/Symptoms: edema, drainage and pain    Ulcer Identification:  Ulcer Type: venous and lymphedema  Contributing Factors: edema, venous stasis and lymphedema    Diabetic/Pressure/Non Pressure Ulcers only:  Ulcer: Non-Pressure ulcer, fat layer exposed    Wound: N/A        PAST MEDICAL HISTORY      Diagnosis Date    Bursitis     CAD (coronary artery disease)     heart attack    COPD (chronic obstructive pulmonary disease) ([address] 2013    Emphysema     slight    GERD (gastroesophageal reflux disease)     Hiatal hernia     Hip pain     Hyperlipidemia     Hypertension     Lymphedema of both lower extremities 2018    Venous insufficiency of both lower extremities 2018    Venous stasis ulcer of left calf with fat layer exposed without varicose veins ([address] 2021    Venous ulcer with fat layer exposed [address] [address] 10/28/2015     Past Surgical History:   Procedure Laterality Date    APPENDECTOMY      BREAST ENHANCEMENT SURGERY      BREAST REDUCTION SURGERY      CHOLECYSTECTOMY      COLONOSCOPY      ECHO COMPL W DOP COLOR FLOW  3/11/2013         ENDOSCOPY, COLON, DIAGNOSTIC      HERNIA REPAIR N/A 2021    LAPAROSCOPIC ROBOTIC ASSISTED INGUINAL HERNIA REPAIR performed by Catherine Portillo MD at [address]  2009    l knee r hip    LEG DEBRIDEMENT Left 2015    LEG DEBRIDEMENT Left 2016     History reviewed. No pertinent family history.   Social History     Tobacco Use    Smoking status: Former Smoker     Packs/day: 1.00     Years: 20.00     Pack years: 20.00     Types: Cigarettes     Quit date: 1995     Years since quittin.1    Smokeless tobacco: Never Used   Vaping Use    Vaping Use: Never used   Substance Use Topics    Alcohol use: No    Drug use: No     Allergies   Allergen Reactions    Codeine Nausea And Vomiting and Other (See Comments)     hallucinate    Dilaudid [Hydromorphone Hcl] Rash    Naproxen Other (See Comments)     Shuts down kidney function    Singulair [Montelukast Sodium] Shortness Of Breath Mucus in chest    Black Cohosh     Black Cohosh [Cimicifuga [place] (Black Cohosh)] Rash     Current Outpatient Medications on File Prior to Encounter   Medication Sig Dispense Refill    zinc gluconate 50 MG tablet Take 50 mg by mouth daily      GENISTEIN PO Take 125 mg by mouth nightly      folic acid (FOLVITE) 894 MCG tablet Take 400 mcg by mouth nightly      Krill Oil 350 MG CAPS Take 350 mg by mouth nightly      gabapentin (NEURONTIN) 300 MG capsule Take 300 mg by mouth 3 times daily.  ipratropium-albuterol (DUONEB) 0.5-2.5 (3) MG/3ML SOLN nebulizer solution Take 1 vial by nebulization every 4 hours as needed for Shortness of Breath      simvastatin (ZOCOR) 40 MG tablet Take 40 mg by mouth nightly      oxyCODONE-acetaminophen (PERCOCET) 7.5-325 MG per tablet Take 1 tablet by mouth 2 times daily. Estefania Mera morphine (MS CONTIN) 15 MG extended release tablet Take 15 mg by mouth 2 times daily.  aspirin 81 MG tablet Take 81 mg by mouth daily      Cholecalciferol (VITAMIN D) 2000 UNITS CAPS capsule Take 2,000 Units by mouth daily       ferrous sulfate 325 (65 FE) MG tablet Take 325 mg by mouth nightly       metoprolol (LOPRESSOR) 50 MG tablet Take 1 tablet by mouth 2 times daily 60 tablet 0    albuterol (PROVENTIL HFA;VENTOLIN HFA) 108 (90 BASE) MCG/ACT inhaler Inhale 2 puffs into the lungs every 6 hours as needed for Wheezing or Shortness of Breath       calcium carbonate (OYSTER SHELL CALCIUM 500 MG) 1250 MG tablet Take 2 tablets by mouth daily      Ascorbic Acid (VITAMIN C) 500 MG tablet Take 2,000 mg by mouth daily      omeprazole (PRILOSEC) 40 MG capsule Take 40 mg by mouth daily.  diphenhydrAMINE (BENADRYL) 50 MG capsule Take 50 mg by mouth daily as needed for Allergies       Garlic 181 MG TABS Take 1,000 mg by mouth daily        No current facility-administered medications on file prior to encounter.        REVIEW OF SYSTEMS See HPI    Objective:    BP (!) 146/84   Pulse 84 Temp 96.6 °F (35.9 °C) (Temporal)   Resp 22   Ht 5' 7\" (1.702 m)   Wt 300 lb (136.1 kg)   BMI 46.99 kg/m²   Wt Readings from Last 3 Encounters:   12/15/21 300 lb (136.1 kg)   12/08/21 300 lb (136.1 kg)   04/19/21 300 lb (136.1 kg)     PHYSICAL EXAM  CONSTITUTIONAL:   Awake, alert, cooperative   EYES:  lids and lashes normal   ENT: external ears and nose without lesions   NECK:  supple, symmetrical, trachea midline   SKIN:  Open wound Present    Assessment:     Problem List Items Addressed This Visit     Venous stasis ulcer of left calf with fat layer exposed without varicose veins (HCC) (Chronic)          Pre Debridement Measurements:  Are located in the [facility]  Documentation Flow Sheet  Post Debridement Measurements:  Wound/Ulcer Descriptions are Pre Debridement except measurements:     Incision 04/20/16 Leg Left (Active)   Number of days: 2064       Incision 08/03/16 Leg Left (Active)   Number of days: 1960       Wound 12/08/21 Pretibial Left #1 (Active)   Wound Image   12/08/21 1443   Dressing Status New dressing applied; Clean; Dry; Intact 12/08/21 1543   Wound Cleansed Cleansed with saline 12/08/21 1543   Dressing/Treatment Alginate; Dry dressing 12/08/21 1543   Wound Length (cm) 1.1 cm 12/15/21 1554   Wound Width (cm) 0.6 cm 12/15/21 1554   Wound Depth (cm) 0.1 cm 12/15/21 1554   Wound Surface Area (cm^2) 0.66 cm^2 12/15/21 1554   Change in Wound Size % (l*w) 5.71 12/15/21 1554   Wound Volume (cm^3) 0.066 cm^3 12/15/21 1554   Wound Healing % 6 12/15/21 1554   Post-Procedure Length (cm) 1.2 cm 12/15/21 1617   Post-Procedure Width (cm) 0.7 cm 12/15/21 1617   Post-Procedure Depth (cm) 0.1 cm 12/15/21 1617   Post-Procedure Surface Area (cm^2) 0.84 cm^2 12/15/21 1617   Post-Procedure Volume (cm^3) 0.084 cm^3 12/15/21 1617   Wound Assessment Pink/red; Fibrin 12/15/21 1554   Drainage Amount Moderate 12/15/21 1554   Drainage Description Serosanguinous 12/15/21 1554   Odor None 12/15/21 1554   Kori-wound Assessment Hyperpigmented; Intact 12/15/21 1554   Number of days: 7     Incision 04/16/21 Abdomen (Active)   Number of days: 243       Procedure Note  Indications:  Based on my examination of this patient's wound(s)/ulcer(s) today, debridement is required to promote healing and evaluate the wound base. Performed by: Lucia Fernando MD    Consent obtained:  Yes    Time out taken:  Yes    Pain Control: Anesthetic  Anesthetic: 4% Lidocaine Liquid Topical     Debridement:Excisional Debridement    Using curette the wound(s)/ulcer(s) was/were sharply debrided down through and including the removal of epidermis, dermis and subcutaneous tissue. Devitalized Tissue Debrided:  fibrin, biofilm, slough and exudate to stimulate bleeding to promote healing, post debridement good bleeding base and wound edges noted    Wound/Ulcer #: 1    Percent of Wound/Ulcer Debrided: 100%    Total Surface Area Debrided:  0.66 sq cm     Estimated Blood Loss:  Minimal  Hemostasis Achieved:  by pressure    Procedural Pain:  6  / 10   Post Procedural Pain:  5 / 10     Response to treatment:  Well tolerated by patient. Plan:   Treatment Note please see attached Discharge Instructions    Written patient dismissal instructions given to patient and signed by patient or POA. Discharge Instructions       Visit Discharge/Physician Orders     Discharge condition: Stable     Assessment of pain at discharge: mild     Anesthetic used: lido 4%     Discharge to: Home     Left via:Private automobile     Accompanied by: accompanied by self     [facility]F/HHA: [facility]     Dressing Orders: To left pretib wound: cleanse with normal saline, apply aquacel, cover and secure with dry dressing. Apply Spandigrip      Treatment Orders:Eat a diet high in protein and vitamin C.  Take a multiple vitamin daily unless contraindicated.     [facility] followup visit 1 week____________________________  (Please note your next appointment above and if you are unable to keep, kindly give a 24 hour notice. Thank you.)     Physician signature:__________________________        If you experience any of the following, please call the [address] during business hours:     * Increase in Pain  * Temperature over 101  * Increase in drainage from your wound  * Drainage with a foul odor  * Bleeding  * Increase in swelling  * Need for compression bandage changes due to slippage, breakthrough drainage.     If you need medical attention outside of the business hours of the [address] please contact your PCP or go to the nearest emergency room.         Electronically signed by Lynn Crum MD on 12/15/2021 at 4:21 PM

## 2021-12-22 ENCOUNTER — HOSPITAL ENCOUNTER (OUTPATIENT)
Dept: WOUND CARE | Age: 76
Discharge: HOME | End: 2021-12-22
Payer: MEDICARE

## 2021-12-22 VITALS
TEMPERATURE: 96.7 F | DIASTOLIC BLOOD PRESSURE: 86 MMHG | HEART RATE: 86 BPM | BODY MASS INDEX: 46.99 KG/M2 | RESPIRATION RATE: 20 BRPM | SYSTOLIC BLOOD PRESSURE: 150 MMHG | WEIGHT: 293 LBS

## 2021-12-22 DIAGNOSIS — L97.222 VENOUS STASIS ULCER OF LEFT CALF WITH FAT LAYER EXPOSED WITHOUT VARICOSE VEINS (HCC): Primary | ICD-10-CM

## 2021-12-22 DIAGNOSIS — I87.2 VENOUS STASIS ULCER OF LEFT CALF WITH FAT LAYER EXPOSED WITHOUT VARICOSE VEINS (HCC): Primary | ICD-10-CM

## 2021-12-22 PROCEDURE — 6370000000 HC RX 637 (ALT 250 FOR IP): Performed by: SURGERY

## 2021-12-22 PROCEDURE — 11042 DBRDMT SUBQ TIS 1ST 20SQCM/<: CPT | Performed by: SURGERY

## 2021-12-22 PROCEDURE — 11042 DBRDMT SUBQ TIS 1ST 20SQCM/<: CPT | Performed by: NURSE PRACTITIONER

## 2021-12-22 RX ORDER — BACITRACIN ZINC AND POLYMYXIN B SULFATE 500; 1000 [USP'U]/G; [USP'U]/G
OINTMENT TOPICAL ONCE
Status: CANCELLED | OUTPATIENT
Start: 2021-12-22 | End: 2021-12-22

## 2021-12-22 RX ORDER — BACITRACIN, NEOMYCIN, POLYMYXIN B 400; 3.5; 5 [USP'U]/G; MG/G; [USP'U]/G
OINTMENT TOPICAL ONCE
Status: CANCELLED | OUTPATIENT
Start: 2021-12-22 | End: 2021-12-22

## 2021-12-22 RX ORDER — LIDOCAINE 40 MG/G
CREAM TOPICAL ONCE
Status: CANCELLED | OUTPATIENT
Start: 2021-12-22 | End: 2021-12-22

## 2021-12-22 RX ORDER — BETAMETHASONE DIPROPIONATE 0.05 %
OINTMENT (GRAM) TOPICAL ONCE
Status: CANCELLED | OUTPATIENT
Start: 2021-12-22 | End: 2021-12-22

## 2021-12-22 RX ORDER — GINSENG 100 MG
CAPSULE ORAL ONCE
Status: CANCELLED | OUTPATIENT
Start: 2021-12-22 | End: 2021-12-22

## 2021-12-22 RX ORDER — LIDOCAINE HYDROCHLORIDE 40 MG/ML
SOLUTION TOPICAL ONCE
Status: COMPLETED | OUTPATIENT
Start: 2021-12-22 | End: 2021-12-22

## 2021-12-22 RX ORDER — LIDOCAINE HYDROCHLORIDE 20 MG/ML
JELLY TOPICAL ONCE
Status: CANCELLED | OUTPATIENT
Start: 2021-12-22 | End: 2021-12-22

## 2021-12-22 RX ORDER — GENTAMICIN SULFATE 1 MG/G
OINTMENT TOPICAL ONCE
Status: CANCELLED | OUTPATIENT
Start: 2021-12-22 | End: 2021-12-22

## 2021-12-22 RX ORDER — LIDOCAINE HYDROCHLORIDE 40 MG/ML
SOLUTION TOPICAL ONCE
Status: CANCELLED | OUTPATIENT
Start: 2021-12-22 | End: 2021-12-22

## 2021-12-22 RX ORDER — CLOBETASOL PROPIONATE 0.5 MG/G
OINTMENT TOPICAL ONCE
Status: CANCELLED | OUTPATIENT
Start: 2021-12-22 | End: 2021-12-22

## 2021-12-22 RX ORDER — LIDOCAINE 50 MG/G
OINTMENT TOPICAL ONCE
Status: CANCELLED | OUTPATIENT
Start: 2021-12-22 | End: 2021-12-22

## 2021-12-22 RX ADMIN — LIDOCAINE HYDROCHLORIDE 10 ML: 40 SOLUTION TOPICAL at 16:05

## 2021-12-22 ASSESSMENT — PAIN DESCRIPTION - PROGRESSION: CLINICAL_PROGRESSION: NOT CHANGED

## 2021-12-22 ASSESSMENT — PAIN - FUNCTIONAL ASSESSMENT: PAIN_FUNCTIONAL_ASSESSMENT: PREVENTS OR INTERFERES SOME ACTIVE ACTIVITIES AND ADLS

## 2021-12-22 ASSESSMENT — PAIN DESCRIPTION - DESCRIPTORS: DESCRIPTORS: THROBBING;STABBING

## 2021-12-22 ASSESSMENT — PAIN DESCRIPTION - FREQUENCY: FREQUENCY: CONTINUOUS

## 2021-12-22 ASSESSMENT — PAIN SCALES - GENERAL: PAINLEVEL_OUTOF10: 5

## 2021-12-22 ASSESSMENT — PAIN DESCRIPTION - ORIENTATION: ORIENTATION: LEFT

## 2021-12-22 ASSESSMENT — PAIN DESCRIPTION - PAIN TYPE: TYPE: CHRONIC PAIN

## 2021-12-22 ASSESSMENT — PAIN DESCRIPTION - ONSET: ONSET: ON-GOING

## 2021-12-22 ASSESSMENT — PAIN DESCRIPTION - LOCATION: LOCATION: LEG

## 2021-12-29 ENCOUNTER — HOSPITAL ENCOUNTER (OUTPATIENT)
Dept: WOUND CARE | Age: 76
Discharge: HOME | End: 2021-12-29

## 2022-01-05 ENCOUNTER — HOSPITAL ENCOUNTER (OUTPATIENT)
Dept: WOUND CARE | Age: 77
Discharge: HOME | End: 2022-01-05

## 2022-01-12 ENCOUNTER — HOSPITAL ENCOUNTER (OUTPATIENT)
Dept: WOUND CARE | Age: 77
Discharge: HOME | End: 2022-01-12
Payer: MEDICARE

## 2022-01-12 VITALS
RESPIRATION RATE: 18 BRPM | DIASTOLIC BLOOD PRESSURE: 62 MMHG | TEMPERATURE: 96.3 F | SYSTOLIC BLOOD PRESSURE: 122 MMHG | HEART RATE: 74 BPM

## 2022-01-12 DIAGNOSIS — I87.2 VENOUS INSUFFICIENCY OF BOTH LOWER EXTREMITIES: Chronic | ICD-10-CM

## 2022-01-12 DIAGNOSIS — L97.222 VENOUS STASIS ULCER OF LEFT CALF WITH FAT LAYER EXPOSED WITHOUT VARICOSE VEINS (HCC): Primary | ICD-10-CM

## 2022-01-12 DIAGNOSIS — I87.2 VENOUS STASIS ULCER OF LEFT CALF WITH FAT LAYER EXPOSED WITHOUT VARICOSE VEINS (HCC): Primary | ICD-10-CM

## 2022-01-12 DIAGNOSIS — I89.0 LYMPHEDEMA OF BOTH LOWER EXTREMITIES: Chronic | ICD-10-CM

## 2022-01-12 PROCEDURE — 11042 DBRDMT SUBQ TIS 1ST 20SQCM/<: CPT | Performed by: SURGERY

## 2022-01-12 PROCEDURE — 11042 DBRDMT SUBQ TIS 1ST 20SQCM/<: CPT

## 2022-01-12 RX ORDER — LIDOCAINE 40 MG/G
CREAM TOPICAL ONCE
Status: CANCELLED | OUTPATIENT
Start: 2022-01-12 | End: 2022-01-12

## 2022-01-12 RX ORDER — LIDOCAINE 50 MG/G
OINTMENT TOPICAL ONCE
Status: CANCELLED | OUTPATIENT
Start: 2022-01-12 | End: 2022-01-12

## 2022-01-12 RX ORDER — BACITRACIN, NEOMYCIN, POLYMYXIN B 400; 3.5; 5 [USP'U]/G; MG/G; [USP'U]/G
OINTMENT TOPICAL ONCE
Status: CANCELLED | OUTPATIENT
Start: 2022-01-12 | End: 2022-01-12

## 2022-01-12 RX ORDER — LIDOCAINE HYDROCHLORIDE 40 MG/ML
SOLUTION TOPICAL ONCE
Status: DISCONTINUED | OUTPATIENT
Start: 2022-01-12 | End: 2022-01-13 | Stop reason: HOSPADM

## 2022-01-12 RX ORDER — BACITRACIN ZINC AND POLYMYXIN B SULFATE 500; 1000 [USP'U]/G; [USP'U]/G
OINTMENT TOPICAL ONCE
Status: CANCELLED | OUTPATIENT
Start: 2022-01-12 | End: 2022-01-12

## 2022-01-12 RX ORDER — GINSENG 100 MG
CAPSULE ORAL ONCE
Status: CANCELLED | OUTPATIENT
Start: 2022-01-12 | End: 2022-01-12

## 2022-01-12 RX ORDER — CLOBETASOL PROPIONATE 0.5 MG/G
OINTMENT TOPICAL ONCE
Status: CANCELLED | OUTPATIENT
Start: 2022-01-12 | End: 2022-01-12

## 2022-01-12 RX ORDER — LIDOCAINE HYDROCHLORIDE 20 MG/ML
JELLY TOPICAL ONCE
Status: CANCELLED | OUTPATIENT
Start: 2022-01-12 | End: 2022-01-12

## 2022-01-12 RX ORDER — GENTAMICIN SULFATE 1 MG/G
OINTMENT TOPICAL ONCE
Status: CANCELLED | OUTPATIENT
Start: 2022-01-12 | End: 2022-01-12

## 2022-01-12 RX ORDER — BETAMETHASONE DIPROPIONATE 0.05 %
OINTMENT (GRAM) TOPICAL ONCE
Status: CANCELLED | OUTPATIENT
Start: 2022-01-12 | End: 2022-01-12

## 2022-01-12 RX ORDER — LIDOCAINE HYDROCHLORIDE 40 MG/ML
SOLUTION TOPICAL ONCE
Status: CANCELLED | OUTPATIENT
Start: 2022-01-12 | End: 2022-01-12

## 2022-01-12 ASSESSMENT — PAIN SCALES - GENERAL: PAINLEVEL_OUTOF10: 0

## 2022-01-12 NOTE — PROGRESS NOTES
Wound Healing Center Followup Visit Note    Referring Physician : Mardee Severe, MD  [id number] [facility] RECORD NUMBER:  [id number]  AGE: 78 y.o. GENDER: female  : 1945  EPISODE DATE:  2022    Subjective:     Chief Complaint   Patient presents with    Wound Check     left leg      HISTORY of PRESENT ILLNESS HPI   Maria Elena Andrews is a 78 y.o. female who presents today in regards to follow up evaluation and treatment of wound/ulcer. That patient's past medical, family and social hx were reviewed and changes were made if present. History of Wound Context:  Patient has had left calf wound since ~ 2021. She has been putting a dressing on it. The wound has not been improving. She has a hx significant for venous stasis ulcerations of bilateral LE. She first was seen by myself in regards to these issues 2015. She eventually healed these wounds 2016. I last saw her 2021 at which time I recommended lymphedema therapy. She states she went and said it caused her to much pain and stopped going. She has chronic issues with bilateral calf pain, swelling and edema.            She admits to not wearing her stockings because of the pain.     She has used knee high 20-30 mm hg stockings in the past.       She is still seeing pain management and is down to percocet //325 mg daily.       21  · aquacell  · Double tubigrip  · Culture done  · Emphasized importance of getting in to more significant compression in the future  12/15/21  · Culture reviewed - light growth, no tx  · Wound slightly improved  · Still significant drainage  · Plan on compression wrap next week  21  · Stable wound  · Drainage slightly better  · Pt would like to wait till next week because of holidays to start wrap  22  · Wound larger  · Profore    Wound/Ulcer Pain Timing/Severity: waxing and waning, mild  Quality of pain: aching, throbbing, pressure  Severity:  2 / 10   Modifying Factors: Pain worsens with debridement, dressing changes  Associated Signs/Symptoms: edema, drainage and pain    Ulcer Identification:  Ulcer Type: venous and lymphedema  Contributing Factors: edema, venous stasis and lymphedema    Diabetic/Pressure/Non Pressure Ulcers only:  Ulcer: Non-Pressure ulcer, fat layer exposed    Wound: N/A        PAST MEDICAL HISTORY      Diagnosis Date    Bursitis     CAD (coronary artery disease)     heart attack    COPD (chronic obstructive pulmonary disease) ([address] 2013    Emphysema     slight    GERD (gastroesophageal reflux disease)     Hiatal hernia     Hip pain     Hyperlipidemia     Hypertension     Lymphedema of both lower extremities 2018    Venous insufficiency of both lower extremities 2018    Venous stasis ulcer of left calf with fat layer exposed without varicose veins ([facility] [address] 2021    Venous ulcer with fat layer exposed ([facility] [address] 10/28/2015     Past Surgical History:   Procedure Laterality Date    APPENDECTOMY      BREAST ENHANCEMENT SURGERY      BREAST REDUCTION SURGERY      CHOLECYSTECTOMY      COLONOSCOPY      ECHO COMPL W DOP COLOR FLOW  3/11/2013         ENDOSCOPY, COLON, DIAGNOSTIC      HERNIA REPAIR N/A 2021    LAPAROSCOPIC ROBOTIC ASSISTED INGUINAL HERNIA REPAIR performed by Alyssa Linda MD at [address] [facility] [address]  2009    l knee r hip    LEG DEBRIDEMENT Left 2015    LEG DEBRIDEMENT Left 2016     History reviewed. No pertinent family history.   Social History     Tobacco Use    Smoking status: Former Smoker     Packs/day: 1.00     Years: 20.00     Pack years: 20.00     Types: Cigarettes     Quit date: 1995     Years since quittin.2    Smokeless tobacco: Never Used   Vaping Use    Vaping Use: Never used   Substance Use Topics    Alcohol use: No    Drug use: No     Allergies   Allergen Reactions    Codeine Nausea And Vomiting and Other (See Comments)     hallucinate  Dilaudid [Hydromorphone Hcl] Rash    Naproxen Other (See Comments)     Shuts down kidney function    Singulair [Montelukast Sodium] Shortness Of Breath     Mucus in chest    Black Cohosh     Black Cohosh [Cimicifuga [place] (Black Cohosh)] Rash     Current Outpatient Medications on File Prior to Encounter   Medication Sig Dispense Refill    zinc gluconate 50 MG tablet Take 50 mg by mouth daily      GENISTEIN PO Take 125 mg by mouth nightly      folic acid (FOLVITE) 610 MCG tablet Take 400 mcg by mouth nightly      Krill Oil 350 MG CAPS Take 350 mg by mouth nightly      gabapentin (NEURONTIN) 300 MG capsule Take 300 mg by mouth 3 times daily.  ipratropium-albuterol (DUONEB) 0.5-2.5 (3) MG/3ML SOLN nebulizer solution Take 1 vial by nebulization every 4 hours as needed for Shortness of Breath      simvastatin (ZOCOR) 40 MG tablet Take 40 mg by mouth nightly      oxyCODONE-acetaminophen (PERCOCET) 7.5-325 MG per tablet Take 1 tablet by mouth 2 times daily. Arloa Edelmira morphine (MS CONTIN) 15 MG extended release tablet Take 15 mg by mouth 2 times daily.  aspirin 81 MG tablet Take 81 mg by mouth daily      Cholecalciferol (VITAMIN D) 2000 UNITS CAPS capsule Take 2,000 Units by mouth daily       ferrous sulfate 325 (65 FE) MG tablet Take 325 mg by mouth nightly       metoprolol (LOPRESSOR) 50 MG tablet Take 1 tablet by mouth 2 times daily 60 tablet 0    albuterol (PROVENTIL HFA;VENTOLIN HFA) 108 (90 BASE) MCG/ACT inhaler Inhale 2 puffs into the lungs every 6 hours as needed for Wheezing or Shortness of Breath       calcium carbonate (OYSTER SHELL CALCIUM 500 MG) 1250 MG tablet Take 2 tablets by mouth daily      Ascorbic Acid (VITAMIN C) 500 MG tablet Take 2,000 mg by mouth daily      omeprazole (PRILOSEC) 40 MG capsule Take 40 mg by mouth daily.       diphenhydrAMINE (BENADRYL) 50 MG capsule Take 50 mg by mouth daily as needed for Allergies       Garlic 984 MG TABS Take 1,000 mg by mouth daily No current facility-administered medications on file prior to encounter. REVIEW OF SYSTEMS See HPI    Objective:    /62   Pulse 74   Temp 96.3 °F (35.7 °C) (Temporal)   Resp 18   Wt Readings from Last 3 Encounters:   12/22/21 300 lb (136.1 kg)   12/15/21 300 lb (136.1 kg)   12/08/21 300 lb (136.1 kg)     PHYSICAL EXAM  CONSTITUTIONAL:   Awake, alert, cooperative   EYES:  lids and lashes normal   ENT: external ears and nose without lesions   NECK:  supple, symmetrical, trachea midline   SKIN:  Open wound Present    Assessment:     Problem List Items Addressed This Visit     Venous insufficiency of both lower extremities (Chronic)    Lymphedema of both lower extremities (Chronic)    Venous stasis ulcer of left calf with fat layer exposed without varicose veins (HCC) - Primary (Chronic)    Relevant Medications    lidocaine (XYLOCAINE) 4 % external solution    Other Relevant Orders    Initiate Outpatient Wound Care Protocol          Pre Debridement Measurements:  Are located in the [place]  Documentation Flow Sheet  Post Debridement Measurements:  Wound/Ulcer Descriptions are Pre Debridement except measurements:     Incision 04/20/16 Leg Left (Active)   Number of days: 2092       Incision 08/03/16 Leg Left (Active)   Number of days: 1988       Wound 12/08/21 Pretibial Left #1 (Active)   Wound Image   12/08/21 1443   Dressing Status New dressing applied 01/12/22 1552   Wound Cleansed Cleansed with saline 01/12/22 1552   Dressing/Treatment ABD; Alginate 01/12/22 1552   Offloading for Diabetic Foot Ulcers No offloading required 01/12/22 1552   Wound Length (cm) 1.7 cm 01/12/22 1517   Wound Width (cm) 1.4 cm 01/12/22 1517   Wound Depth (cm) 0.2 cm 01/12/22 1517   Wound Surface Area (cm^2) 2.38 cm^2 01/12/22 1517   Change in Wound Size % (l*w) -240 01/12/22 1517   Wound Volume (cm^3) 0.476 cm^3 01/12/22 1517   Wound Healing % -580 01/12/22 1517   Post-Procedure Length (cm) 1.8 cm 01/12/22 1541 Post-Procedure Width (cm) 1.5 cm 01/12/22 1541   Post-Procedure Depth (cm) 0.2 cm 01/12/22 1541   Post-Procedure Surface Area (cm^2) 2.7 cm^2 01/12/22 1541   Post-Procedure Volume (cm^3) 0.54 cm^3 01/12/22 1541   Wound Assessment Fibrin;Pink/red 01/12/22 1517   Drainage Amount Small 01/12/22 1517   Drainage Description Yellow 01/12/22 1517   Odor None 01/12/22 1517   Kori-wound Assessment Hyperpigmented 01/12/22 1517   Number of days: 35     Incision 04/16/21 Abdomen (Active)   Number of days: 271       Procedure Note  Indications:  Based on my examination of this patient's wound(s)/ulcer(s) today, debridement is required to promote healing and evaluate the wound base. Performed by: Sherrie Rodríguez MD    Consent obtained:  Yes    Time out taken:  Yes    Pain Control: Anesthetic  Anesthetic: 4% Lidocaine Liquid Topical     Debridement:Excisional Debridement    Using curette the wound(s)/ulcer(s) was/were sharply debrided down through and including the removal of epidermis, dermis and subcutaneous tissue. Devitalized Tissue Debrided:  fibrin, biofilm, slough and exudate to stimulate bleeding to promote healing, post debridement good bleeding base and wound edges noted    Wound/Ulcer #: 1    Percent of Wound/Ulcer Debrided: 100%    Total Surface Area Debrided:  2.38 sq cm     Estimated Blood Loss:  Minimal  Hemostasis Achieved:  by pressure    Procedural Pain:  6  / 10   Post Procedural Pain:  5 / 10     Response to treatment:  Well tolerated by patient. Plan:   Treatment Note please see attached Discharge Instructions    Written patient dismissal instructions given to patient and signed by patient or POA.          Discharge Instructions       Visit Discharge/Physician Orders     Discharge condition: Stable     Assessment of pain at discharge: mild     Anesthetic used: lido 4%     Discharge to: Home     Left via:Private automobile     Accompanied by: accompanied by self     ECF/HHA: [facility]     Dressing Orders: To left pretib wound: cleanse with normal saline, apply aquacel, cover and secure with dry dressing. Apply profore wrap, padd well, change weekly at [facility]. IF WRAP FALLS 2 INCHES, OR IF PAIN INCREASES AND BECOMES INTOLERABLE,  REMOVE WRAP AND APPLY DOUBLE TUBIGRIP. [address] Treatment Orders:Eat a diet high in protein and vitamin C. Take a multiple vitamin daily unless contraindicated.     [facility] followup visit 1 week____________________________  (Please note your next appointment above and if you are unable to keep, kindly give a 24 hour notice.  Thank you.)     Physician signature:__________________________        If you experience any of the following, please call the [address] during business hours:     * Increase in Pain  * Temperature over 101  * Increase in drainage from your wound  * Drainage with a foul odor  * Bleeding  * Increase in swelling  * Need for compression bandage changes due to slippage, breakthrough drainage.     If you need medical attention outside of the business hours of the [address] please contact your PCP or go to the nearest emergency room.                 Electronically signed by Ora Solorzano MD on 1/12/2022 at 4:07 PM

## 2022-01-12 NOTE — PLAN OF CARE
Problem: Wound:  Goal: Will show signs of wound healing; wound closure and no evidence of infection  Description: Will show signs of wound healing; wound closure and no evidence of infection  Outcome: Met This Shift     Problem: Venous:  Goal: Signs of wound healing will improve  Description: Signs of wound healing will improve  Outcome: Met This Shift     Problem: Falls - Risk of:  Goal: Will remain free from falls  Description: Will remain free from falls  Outcome: Met This Shift     Problem: Weight control:  Goal: Ability to maintain an optimal weight for height and age will be supported  Description: Ability to maintain an optimal weight for height and age will be supported  Outcome: Ongoing

## 2022-01-19 ENCOUNTER — HOSPITAL ENCOUNTER (OUTPATIENT)
Dept: WOUND CARE | Age: 77
Discharge: HOME | End: 2022-01-19

## 2022-01-26 ENCOUNTER — HOSPITAL ENCOUNTER (OUTPATIENT)
Dept: WOUND CARE | Age: 77
Discharge: HOME | End: 2022-01-26

## 2022-02-02 ENCOUNTER — HOSPITAL ENCOUNTER (OUTPATIENT)
Dept: WOUND CARE | Age: 77
Discharge: HOME | End: 2022-02-02

## 2022-02-09 ENCOUNTER — HOSPITAL ENCOUNTER (OUTPATIENT)
Dept: WOUND CARE | Age: 77
Discharge: HOME | End: 2022-02-09
Payer: MEDICARE

## 2022-02-09 VITALS
SYSTOLIC BLOOD PRESSURE: 126 MMHG | WEIGHT: 293 LBS | BODY MASS INDEX: 45.99 KG/M2 | HEART RATE: 80 BPM | HEIGHT: 67 IN | TEMPERATURE: 97 F | DIASTOLIC BLOOD PRESSURE: 62 MMHG | RESPIRATION RATE: 18 BRPM

## 2022-02-09 DIAGNOSIS — I87.2 VENOUS STASIS ULCER OF LEFT CALF WITH FAT LAYER EXPOSED WITHOUT VARICOSE VEINS (HCC): Primary | ICD-10-CM

## 2022-02-09 DIAGNOSIS — L97.222 VENOUS STASIS ULCER OF LEFT CALF WITH FAT LAYER EXPOSED WITHOUT VARICOSE VEINS (HCC): Primary | ICD-10-CM

## 2022-02-09 PROCEDURE — 6370000000 HC RX 637 (ALT 250 FOR IP): Performed by: SURGERY

## 2022-02-09 PROCEDURE — 11042 DBRDMT SUBQ TIS 1ST 20SQCM/<: CPT | Performed by: SURGERY

## 2022-02-09 PROCEDURE — 11042 DBRDMT SUBQ TIS 1ST 20SQCM/<: CPT

## 2022-02-09 RX ORDER — LIDOCAINE 50 MG/G
OINTMENT TOPICAL ONCE
Status: CANCELLED | OUTPATIENT
Start: 2022-02-09 | End: 2022-02-09

## 2022-02-09 RX ORDER — BACITRACIN ZINC AND POLYMYXIN B SULFATE 500; 1000 [USP'U]/G; [USP'U]/G
OINTMENT TOPICAL ONCE
Status: CANCELLED | OUTPATIENT
Start: 2022-02-09 | End: 2022-02-09

## 2022-02-09 RX ORDER — LIDOCAINE HYDROCHLORIDE 40 MG/ML
SOLUTION TOPICAL ONCE
Status: COMPLETED | OUTPATIENT
Start: 2022-02-09 | End: 2022-02-09

## 2022-02-09 RX ORDER — CLOBETASOL PROPIONATE 0.5 MG/G
OINTMENT TOPICAL ONCE
Status: CANCELLED | OUTPATIENT
Start: 2022-02-09 | End: 2022-02-09

## 2022-02-09 RX ORDER — BETAMETHASONE DIPROPIONATE 0.05 %
OINTMENT (GRAM) TOPICAL ONCE
Status: CANCELLED | OUTPATIENT
Start: 2022-02-09 | End: 2022-02-09

## 2022-02-09 RX ORDER — LIDOCAINE HYDROCHLORIDE 20 MG/ML
JELLY TOPICAL ONCE
Status: CANCELLED | OUTPATIENT
Start: 2022-02-09 | End: 2022-02-09

## 2022-02-09 RX ORDER — GENTAMICIN SULFATE 1 MG/G
OINTMENT TOPICAL ONCE
Status: CANCELLED | OUTPATIENT
Start: 2022-02-09 | End: 2022-02-09

## 2022-02-09 RX ORDER — LIDOCAINE 40 MG/G
CREAM TOPICAL ONCE
Status: CANCELLED | OUTPATIENT
Start: 2022-02-09 | End: 2022-02-09

## 2022-02-09 RX ORDER — BACITRACIN, NEOMYCIN, POLYMYXIN B 400; 3.5; 5 [USP'U]/G; MG/G; [USP'U]/G
OINTMENT TOPICAL ONCE
Status: CANCELLED | OUTPATIENT
Start: 2022-02-09 | End: 2022-02-09

## 2022-02-09 RX ORDER — LIDOCAINE HYDROCHLORIDE 40 MG/ML
SOLUTION TOPICAL ONCE
Status: CANCELLED | OUTPATIENT
Start: 2022-02-09 | End: 2022-02-09

## 2022-02-09 RX ORDER — GINSENG 100 MG
CAPSULE ORAL ONCE
Status: CANCELLED | OUTPATIENT
Start: 2022-02-09 | End: 2022-02-09

## 2022-02-09 RX ADMIN — LIDOCAINE HYDROCHLORIDE 3 ML: 40 SOLUTION TOPICAL at 14:41

## 2022-02-09 ASSESSMENT — PAIN SCALES - GENERAL: PAINLEVEL_OUTOF10: 0

## 2022-02-09 NOTE — PLAN OF CARE
Problem: Wound:  Goal: Will show signs of wound healing; wound closure and no evidence of infection  Description: Will show signs of wound healing; wound closure and no evidence of infection  Outcome: Met This Shift     Problem: Venous:  Goal: Signs of wound healing will improve  Description: Signs of wound healing will improve  Outcome: Met This Shift     Problem: Compression therapy:  Goal: Will be free from complications associated with compression therapy  Description: Will be free from complications associated with compression therapy  Outcome: Met This Shift     Problem: Falls - Risk of:  Goal: Will remain free from falls  Description: Will remain free from falls  Outcome: Met This Shift     Problem: Weight control:  Goal: Ability to maintain an optimal weight for height and age will be supported  Description: Ability to maintain an optimal weight for height and age will be supported  Outcome: Ongoing

## 2022-02-09 NOTE — PROGRESS NOTES
Wound Healing Center Followup Visit Note    Referring Physician : Bryn Moya MD  [address] [facility] RECORD NUMBER:  [id number]  AGE: 78 y.o. GENDER: female  : 1945  EPISODE DATE:  2022    Subjective:     Chief Complaint   Patient presents with    Wound Check     left leg      HISTORY of PRESENT ILLNESS HPI   Sabrina Zapata is a 78 y.o. female who presents today in regards to follow up evaluation and treatment of wound/ulcer. That patient's past medical, family and social hx were reviewed and changes were made if present. History of Wound Context:  Patient has had left calf wound since ~ 2021. She has been putting a dressing on it. The wound has not been improving. She has a hx significant for venous stasis ulcerations of bilateral LE. She first was seen by myself in regards to these issues 2015. She eventually healed these wounds 2016. I last saw her 2021 at which time I recommended lymphedema therapy. She states she went and said it caused her to much pain and stopped going. She has chronic issues with bilateral calf pain, swelling and edema.            She admits to not wearing her stockings because of the pain.     She has used knee high 20-30 mm hg stockings in the past.       She is still seeing pain management and is down to percocet /325 mg daily.       21  · aquacell  · Double tubigrip  · Culture done  · Emphasized importance of getting in to more significant compression in the future  12/15/21  · Culture reviewed - light growth, no tx  · Wound slightly improved  · Still significant drainage  · Plan on compression wrap next week  21  · Stable wound  · Drainage slightly better  · Pt would like to wait till next week because of holidays to start wrap  22  · Wound larger  · Profore  22  · Wound appearance better  · Refusing wrap - it rolled down last week and she cut off after 3 days    Wound/Ulcer Pain Timing/Severity: waxing and waning, mild  Quality of pain: aching, throbbing, pressure  Severity:  2 / 10   Modifying Factors: Pain worsens with debridement, dressing changes  Associated Signs/Symptoms: edema, drainage and pain    Ulcer Identification:  Ulcer Type: venous and lymphedema  Contributing Factors: edema, venous stasis and lymphedema    Diabetic/Pressure/Non Pressure Ulcers only:  Ulcer: Non-Pressure ulcer, fat layer exposed    Wound: N/A        PAST MEDICAL HISTORY      Diagnosis Date    Bursitis     CAD (coronary artery disease)     heart attack    COPD (chronic obstructive pulmonary disease) ([address] 2013    Emphysema     slight    GERD (gastroesophageal reflux disease)     Hiatal hernia     Hip pain     Hyperlipidemia     Hypertension     Lymphedema of both lower extremities 2018    Venous insufficiency of both lower extremities 2018    Venous stasis ulcer of left calf with fat layer exposed without varicose veins ([address] 2021    Venous ulcer with fat layer exposed ([facility] [address] 10/28/2015     Past Surgical History:   Procedure Laterality Date    APPENDECTOMY      BREAST ENHANCEMENT SURGERY      BREAST REDUCTION SURGERY      CHOLECYSTECTOMY      COLONOSCOPY      ECHO COMPL W DOP COLOR FLOW  3/11/2013         ENDOSCOPY, COLON, DIAGNOSTIC      HERNIA REPAIR N/A 2021    LAPAROSCOPIC ROBOTIC ASSISTED INGUINAL HERNIA REPAIR performed by Latha Prather MD at [address] [facility] [facility] [address]  2009    l knee r hip    LEG DEBRIDEMENT Left 2015    LEG DEBRIDEMENT Left 2016     History reviewed. No pertinent family history. Social History     Tobacco Use    Smoking status: Former Smoker     Packs/day: 1.00     Years: 20.00     Pack years: 20.00     Types: Cigarettes     Quit date: 1995     Years since quittin.2    Smokeless tobacco: Never Used   Vaping Use    Vaping Use: Never used   Substance Use Topics    Alcohol use: No    Drug use:  No Allergies   Allergen Reactions    Codeine Nausea And Vomiting and Other (See Comments)     hallucinate    Dilaudid [Hydromorphone Hcl] Rash    Naproxen Other (See Comments)     Shuts down kidney function    Singulair [Montelukast Sodium] Shortness Of Breath     Mucus in chest    Black Cohosh     Black Cohosh [Cimicifuga [place] (Black Cohosh)] Rash     Current Outpatient Medications on File Prior to Encounter   Medication Sig Dispense Refill    zinc gluconate 50 MG tablet Take 50 mg by mouth daily      GENISTEIN PO Take 125 mg by mouth nightly      folic acid (FOLVITE) 633 MCG tablet Take 400 mcg by mouth nightly      Krill Oil 350 MG CAPS Take 350 mg by mouth nightly      gabapentin (NEURONTIN) 300 MG capsule Take 300 mg by mouth 3 times daily.  ipratropium-albuterol (DUONEB) 0.5-2.5 (3) MG/3ML SOLN nebulizer solution Take 1 vial by nebulization every 4 hours as needed for Shortness of Breath      simvastatin (ZOCOR) 40 MG tablet Take 40 mg by mouth nightly      oxyCODONE-acetaminophen (PERCOCET) 7.5-325 MG per tablet Take 1 tablet by mouth 2 times daily. Bertchey Spry morphine (MS CONTIN) 15 MG extended release tablet Take 15 mg by mouth 2 times daily.  aspirin 81 MG tablet Take 81 mg by mouth daily      Cholecalciferol (VITAMIN D) 2000 UNITS CAPS capsule Take 2,000 Units by mouth daily       ferrous sulfate 325 (65 FE) MG tablet Take 325 mg by mouth nightly       metoprolol (LOPRESSOR) 50 MG tablet Take 1 tablet by mouth 2 times daily 60 tablet 0    albuterol (PROVENTIL HFA;VENTOLIN HFA) 108 (90 BASE) MCG/ACT inhaler Inhale 2 puffs into the lungs every 6 hours as needed for Wheezing or Shortness of Breath       calcium carbonate (OYSTER SHELL CALCIUM 500 MG) 1250 MG tablet Take 2 tablets by mouth daily      Ascorbic Acid (VITAMIN C) 500 MG tablet Take 2,000 mg by mouth daily      omeprazole (PRILOSEC) 40 MG capsule Take 40 mg by mouth daily.       diphenhydrAMINE (BENADRYL) 50 MG capsule Take 50 mg by mouth daily as needed for Allergies       Garlic 574 MG TABS Take 1,000 mg by mouth daily        No current facility-administered medications on file prior to encounter.        REVIEW OF SYSTEMS See HPI    Objective:    /62   Pulse 80   Temp 97 °F (36.1 °C) (Temporal)   Resp 18   Ht 5' 7\" (1.702 m)   Wt 300 lb (136.1 kg)   BMI 46.99 kg/m²   Wt Readings from Last 3 Encounters:   02/09/22 300 lb (136.1 kg)   12/22/21 300 lb (136.1 kg)   12/15/21 300 lb (136.1 kg)     PHYSICAL EXAM  CONSTITUTIONAL:   Awake, alert, cooperative   EYES:  lids and lashes normal   ENT: external ears and nose without lesions   NECK:  supple, symmetrical, trachea midline   SKIN:  Open wound Present    Assessment:     Problem List Items Addressed This Visit     Venous stasis ulcer of left calf with fat layer exposed without varicose veins ([address] - Primary (Chronic)    Relevant Orders    Initiate Outpatient Wound Care Protocol        Pre Debridement Measurements:  Are located in the [place]  Documentation Flow Sheet  Post Debridement Measurements:  Wound/Ulcer Descriptions are Pre Debridement except measurements:     Incision 04/20/16 Leg Left (Active)   Number of days: 2120       Incision 08/03/16 Leg Left (Active)   Number of days: 2016       Wound 12/08/21 Pretibial Left #1 (Active)   Wound Image   02/09/22 1434   Dressing Status New dressing applied 02/09/22 1504   Wound Cleansed Cleansed with saline 02/09/22 1504   Dressing/Treatment Alginate;Dry dressing 02/09/22 1504   Offloading for Diabetic Foot Ulcers Other (comment) 02/09/22 1504   Wound Length (cm) 1.8 cm 02/09/22 1434   Wound Width (cm) 1.7 cm 02/09/22 1434   Wound Depth (cm) 0.2 cm 02/09/22 1434   Wound Surface Area (cm^2) 3.06 cm^2 02/09/22 1434   Change in Wound Size % (l*w) -337.14 02/09/22 1434   Wound Volume (cm^3) 0.612 cm^3 02/09/22 1434   Wound Healing % -774 02/09/22 1434   Post-Procedure Length (cm) 1.8 cm 02/09/22 1454 Post-Procedure Width (cm) 1.8 cm 02/09/22 1454   Post-Procedure Depth (cm) 0.2 cm 02/09/22 1454   Post-Procedure Surface Area (cm^2) 3.24 cm^2 02/09/22 1454   Post-Procedure Volume (cm^3) 0.648 cm^3 02/09/22 1454   Wound Assessment Fibrin;Pink/red 02/09/22 1434   Drainage Amount Small 02/09/22 1434   Drainage Description Yellow 02/09/22 1434   Odor None 02/09/22 1434   Kori-wound Assessment Hyperpigmented 02/09/22 1434   Number of days: 63     Incision 04/16/21 Abdomen (Active)   Number of days: 299       Procedure Note  Indications:  Based on my examination of this patient's wound(s)/ulcer(s) today, debridement is required to promote healing and evaluate the wound base. Performed by: Michel Najera MD    Consent obtained:  Yes    Time out taken:  Yes    Pain Control: Anesthetic  Anesthetic: 4% Lidocaine Liquid Topical     Debridement:Excisional Debridement    Using curette the wound(s)/ulcer(s) was/were sharply debrided down through and including the removal of epidermis, dermis and subcutaneous tissue. Devitalized Tissue Debrided:  fibrin, biofilm, slough and exudate to stimulate bleeding to promote healing, post debridement good bleeding base and wound edges noted    Wound/Ulcer #: 1    Percent of Wound/Ulcer Debrided: 100%    Total Surface Area Debrided:  3 sq cm     Estimated Blood Loss:  Minimal  Hemostasis Achieved:  by pressure    Procedural Pain:  6  / 10   Post Procedural Pain:  5 / 10     Response to treatment:  Well tolerated by patient. Plan:   Treatment Note please see attached Discharge Instructions    Written patient dismissal instructions given to patient and signed by patient or POA.          Discharge Instructions       Visit Discharge/Physician Orders     Discharge condition: Stable     Assessment of pain at discharge: mild     Anesthetic used: lido 4%     Discharge to: Home     Left via:Private automobile     Accompanied by: accompanied by self     [facility]F/HHA: [facility]     Dressing Orders: To left pretib wound: cleanse with normal saline, apply calcium alginate, cover and secure with dry dressing.       Treatment Orders:Eat a diet high in protein and vitamin C. Take a multiple vitamin daily unless contraindicated.     [facility] followup visit 1 week____________________________  (Please note your next appointment above and if you are unable to keep, kindly give a 24 hour notice.  Thank you.)     Physician signature:__________________________        If you experience any of the following, please call the [facility] [address] during business hours:     * Increase in Pain  * Temperature over 101  * Increase in drainage from your wound  * Drainage with a foul odor  * Bleeding  * Increase in swelling  * Need for compression bandage changes due to slippage, breakthrough drainage.     If you need medical attention outside of the business hours of the [facility] [address] please contact your PCP or go to the nearest emergency room.                    Electronically signed by Medina Pulido MD on 2/9/2022 at 4:25 PM

## 2022-02-16 ENCOUNTER — HOSPITAL ENCOUNTER (OUTPATIENT)
Dept: WOUND CARE | Age: 77
Discharge: HOME | End: 2022-02-16

## 2022-02-23 ENCOUNTER — HOSPITAL ENCOUNTER (OUTPATIENT)
Dept: WOUND CARE | Age: 77
Discharge: HOME | End: 2022-02-23
Payer: MEDICARE

## 2022-02-23 VITALS
BODY MASS INDEX: 46.99 KG/M2 | SYSTOLIC BLOOD PRESSURE: 134 MMHG | RESPIRATION RATE: 18 BRPM | WEIGHT: 293 LBS | TEMPERATURE: 98.8 F | DIASTOLIC BLOOD PRESSURE: 72 MMHG | HEART RATE: 77 BPM

## 2022-02-23 DIAGNOSIS — L97.222 VENOUS STASIS ULCER OF LEFT CALF WITH FAT LAYER EXPOSED WITHOUT VARICOSE VEINS (HCC): Primary | ICD-10-CM

## 2022-02-23 DIAGNOSIS — I87.2 VENOUS STASIS ULCER OF LEFT CALF WITH FAT LAYER EXPOSED WITHOUT VARICOSE VEINS (HCC): Primary | ICD-10-CM

## 2022-02-23 PROCEDURE — 6370000000 HC RX 637 (ALT 250 FOR IP): Performed by: SURGERY

## 2022-02-23 PROCEDURE — 11042 DBRDMT SUBQ TIS 1ST 20SQCM/<: CPT | Performed by: NURSE PRACTITIONER

## 2022-02-23 PROCEDURE — 11042 DBRDMT SUBQ TIS 1ST 20SQCM/<: CPT

## 2022-02-23 RX ORDER — LIDOCAINE 40 MG/G
CREAM TOPICAL ONCE
Status: CANCELLED | OUTPATIENT
Start: 2022-02-23 | End: 2022-02-23

## 2022-02-23 RX ORDER — LIDOCAINE HYDROCHLORIDE 40 MG/ML
SOLUTION TOPICAL ONCE
Status: COMPLETED | OUTPATIENT
Start: 2022-02-23 | End: 2022-02-23

## 2022-02-23 RX ORDER — LIDOCAINE HYDROCHLORIDE 20 MG/ML
JELLY TOPICAL ONCE
Status: CANCELLED | OUTPATIENT
Start: 2022-02-23 | End: 2022-02-23

## 2022-02-23 RX ORDER — CLOBETASOL PROPIONATE 0.5 MG/G
OINTMENT TOPICAL ONCE
Status: CANCELLED | OUTPATIENT
Start: 2022-02-23 | End: 2022-02-23

## 2022-02-23 RX ORDER — LIDOCAINE 50 MG/G
OINTMENT TOPICAL ONCE
Status: CANCELLED | OUTPATIENT
Start: 2022-02-23 | End: 2022-02-23

## 2022-02-23 RX ORDER — LIDOCAINE HYDROCHLORIDE 40 MG/ML
SOLUTION TOPICAL ONCE
Status: CANCELLED | OUTPATIENT
Start: 2022-02-23 | End: 2022-02-23

## 2022-02-23 RX ORDER — BETAMETHASONE DIPROPIONATE 0.05 %
OINTMENT (GRAM) TOPICAL ONCE
Status: CANCELLED | OUTPATIENT
Start: 2022-02-23 | End: 2022-02-23

## 2022-02-23 RX ORDER — BACITRACIN, NEOMYCIN, POLYMYXIN B 400; 3.5; 5 [USP'U]/G; MG/G; [USP'U]/G
OINTMENT TOPICAL ONCE
Status: CANCELLED | OUTPATIENT
Start: 2022-02-23 | End: 2022-02-23

## 2022-02-23 RX ORDER — BACITRACIN ZINC AND POLYMYXIN B SULFATE 500; 1000 [USP'U]/G; [USP'U]/G
OINTMENT TOPICAL ONCE
Status: CANCELLED | OUTPATIENT
Start: 2022-02-23 | End: 2022-02-23

## 2022-02-23 RX ORDER — GINSENG 100 MG
CAPSULE ORAL ONCE
Status: CANCELLED | OUTPATIENT
Start: 2022-02-23 | End: 2022-02-23

## 2022-02-23 RX ORDER — GENTAMICIN SULFATE 1 MG/G
OINTMENT TOPICAL ONCE
Status: CANCELLED | OUTPATIENT
Start: 2022-02-23 | End: 2022-02-23

## 2022-02-23 RX ADMIN — LIDOCAINE HYDROCHLORIDE 10 ML: 40 SOLUTION TOPICAL at 14:46

## 2022-02-23 ASSESSMENT — PAIN SCALES - GENERAL: PAINLEVEL_OUTOF10: 0

## 2022-02-23 NOTE — PROGRESS NOTES
Wound Healing Center Followup Visit Note    Referring Physician : Jamie Daniels MD  [address] [facility] RECORD NUMBER:  [id number]  AGE: 78 y.o. GENDER: female  : 1945  EPISODE DATE:  2022    Subjective:     Chief Complaint   Patient presents with    Wound Check     Left leg      HISTORY of PRESENT ILLNESS HPI   Karen Rosas is a 78 y.o. female who presents today in regards to follow up evaluation and treatment of wound/ulcer. That patient's past medical, family and social hx were reviewed and changes were made if present. History of Wound Context:  Patient has had left calf wound since ~ 2021. She has been putting a dressing on it. The wound has not been improving. She has a hx significant for venous stasis ulcerations of bilateral LE. She first was seen by myself in regards to these issues 2015. She eventually healed these wounds 2016. I last saw her 2021 at which time I recommended lymphedema therapy. She states she went and said it caused her to much pain and stopped going. She has chronic issues with bilateral calf pain, swelling and edema.            She admits to not wearing her stockings because of the pain.     She has used knee high 20-30 mm hg stockings in the past.       She is still seeing pain management and is down to percocet /325 mg daily.       21  · aquacell  · Double tubigrip  · Culture done  · Emphasized importance of getting in to more significant compression in the future  12/15/21  · Culture reviewed - light growth, no tx  · Wound slightly improved  · Still significant drainage  · Plan on compression wrap next week  21  · Stable wound  · Drainage slightly better  · Pt would like to wait till next week because of holidays to start wrap  22  · Wound larger  · Profore  22  · Wound appearance better  · Refusing wrap - it rolled down last week and she cut off after 3 days  22  · Wound appearance better  · Still refusing wrap Wound/Ulcer Pain Timing/Severity: waxing and waning, mild  Quality of pain: aching, throbbing, pressure  Severity:  2 / 10   Modifying Factors: Pain worsens with debridement, dressing changes  Associated Signs/Symptoms: edema, drainage and pain    Ulcer Identification:  Ulcer Type: venous and lymphedema  Contributing Factors: edema, venous stasis and lymphedema    Diabetic/Pressure/Non Pressure Ulcers only:  Ulcer: Non-Pressure ulcer, fat layer exposed    Wound: N/A        PAST MEDICAL HISTORY      Diagnosis Date    Bursitis     CAD (coronary artery disease)     heart attack    COPD (chronic obstructive pulmonary disease) [facility] [address] 2013    Emphysema     slight    GERD (gastroesophageal reflux disease)     Hiatal hernia     Hip pain     Hyperlipidemia     Hypertension     Lymphedema of both lower extremities 2018    Venous insufficiency of both lower extremities 2018    Venous stasis ulcer of left calf with fat layer exposed without varicose veins ([facility] [address] 2021    Venous ulcer with fat layer exposed ([facility] [address] 10/28/2015     Past Surgical History:   Procedure Laterality Date    APPENDECTOMY      BREAST ENHANCEMENT SURGERY      BREAST REDUCTION SURGERY      CHOLECYSTECTOMY      COLONOSCOPY      ECHO COMPL W DOP COLOR FLOW  3/11/2013         ENDOSCOPY, COLON, DIAGNOSTIC      HERNIA REPAIR N/A 2021    LAPAROSCOPIC ROBOTIC ASSISTED INGUINAL HERNIA REPAIR performed by Riki Barker MD at [address]  2009    l knee r hip    LEG DEBRIDEMENT Left 2015    LEG DEBRIDEMENT Left 2016     History reviewed. No pertinent family history.   Social History     Tobacco Use    Smoking status: Former Smoker     Packs/day: 1.00     Years: 20.00     Pack years: 20.00     Types: Cigarettes     Quit date: 1995     Years since quittin.3    Smokeless tobacco: Never Used   Vaping Use    Vaping Use: Never used Substance Use Topics    Alcohol use: No    Drug use: No     Allergies   Allergen Reactions    Codeine Nausea And Vomiting and Other (See Comments)     hallucinate    Dilaudid [Hydromorphone Hcl] Rash    Naproxen Other (See Comments)     Shuts down kidney function    Singulair [Montelukast Sodium] Shortness Of Breath     Mucus in chest    Black Cohosh     Black Cohosh [Cimicifuga [place] (Black Cohosh)] Rash     Current Outpatient Medications on File Prior to Encounter   Medication Sig Dispense Refill    zinc gluconate 50 MG tablet Take 50 mg by mouth daily      GENISTEIN PO Take 125 mg by mouth nightly      folic acid (FOLVITE) 210 MCG tablet Take 400 mcg by mouth nightly      Krill Oil 350 MG CAPS Take 350 mg by mouth nightly      gabapentin (NEURONTIN) 300 MG capsule Take 300 mg by mouth 3 times daily.  ipratropium-albuterol (DUONEB) 0.5-2.5 (3) MG/3ML SOLN nebulizer solution Take 1 vial by nebulization every 4 hours as needed for Shortness of Breath      simvastatin (ZOCOR) 40 MG tablet Take 40 mg by mouth nightly      oxyCODONE-acetaminophen (PERCOCET) 7.5-325 MG per tablet Take 1 tablet by mouth 2 times daily. [place] morphine (MS CONTIN) 15 MG extended release tablet Take 15 mg by mouth 2 times daily.        aspirin 81 MG tablet Take 81 mg by mouth daily      Cholecalciferol (VITAMIN D) 2000 UNITS CAPS capsule Take 2,000 Units by mouth daily       ferrous sulfate 325 (65 FE) MG tablet Take 325 mg by mouth nightly       metoprolol (LOPRESSOR) 50 MG tablet Take 1 tablet by mouth 2 times daily 60 tablet 0    albuterol (PROVENTIL HFA;VENTOLIN HFA) 108 (90 BASE) MCG/ACT inhaler Inhale 2 puffs into the lungs every 6 hours as needed for Wheezing or Shortness of Breath       calcium carbonate (OYSTER SHELL CALCIUM 500 MG) 1250 MG tablet Take 2 tablets by mouth daily      Ascorbic Acid (VITAMIN C) 500 MG tablet Take 2,000 mg by mouth daily      omeprazole (PRILOSEC) 40 MG capsule Take 40 mg by mouth daily.  diphenhydrAMINE (BENADRYL) 50 MG capsule Take 50 mg by mouth daily as needed for Allergies       Garlic 922 MG TABS Take 1,000 mg by mouth daily        No current facility-administered medications on file prior to encounter.        REVIEW OF SYSTEMS See HPI    Objective:    /72   Pulse 77   Temp 98.8 °F (37.1 °C) (Tympanic)   Resp 18   Wt 300 lb (136.1 kg)   BMI 46.99 kg/m²   Wt Readings from Last 3 Encounters:   02/23/22 300 lb (136.1 kg)   02/09/22 300 lb (136.1 kg)   12/22/21 300 lb (136.1 kg)     PHYSICAL EXAM  CONSTITUTIONAL:   Awake, alert, cooperative   EYES:  lids and lashes normal   ENT: external ears and nose without lesions   NECK:  supple, symmetrical, trachea midline   SKIN:  Open wound Present    Assessment:     Problem List Items Addressed This Visit     Venous stasis ulcer of left calf with fat layer exposed without varicose veins (HCC) - Primary (Chronic)    Relevant Orders    Initiate Outpatient Wound Care Protocol        Pre Debridement Measurements:  Are located in the [place]  Documentation Flow Sheet  Post Debridement Measurements:  Wound/Ulcer Descriptions are Pre Debridement except measurements:     Incision 04/20/16 Leg Left (Active)   Number of days: 2134       Incision 08/03/16 Leg Left (Active)   Number of days: 2030       Wound 12/08/21 Pretibial Left #1 (Active)   Wound Image   02/09/22 1434   Dressing Status New dressing applied 02/09/22 1504   Wound Cleansed Cleansed with saline 02/09/22 1504   Dressing/Treatment Alginate;Dry dressing 02/09/22 1504   Offloading for Diabetic Foot Ulcers Other (comment) 02/09/22 1504   Wound Length (cm) 2.1 cm 02/23/22 1442   Wound Width (cm) 1.6 cm 02/23/22 1442   Wound Depth (cm) 0.3 cm 02/23/22 1442   Wound Surface Area (cm^2) 3.36 cm^2 02/23/22 1442   Change in Wound Size % (l*w) -380 02/23/22 1442   Wound Volume (cm^3) 1.008 cm^3 02/23/22 1442   Wound Healing % -1340 02/23/22 1442   Post-Procedure Length (cm) 2.2 cm 02/23/22 1514   Post-Procedure Width (cm) 1.7 cm 02/23/22 1514   Post-Procedure Depth (cm) 0.4 cm 02/23/22 1514   Post-Procedure Surface Area (cm^2) 3.74 cm^2 02/23/22 1514   Post-Procedure Volume (cm^3) 1.496 cm^3 02/23/22 1514   Wound Assessment Fibrin 02/23/22 1442   Drainage Amount Moderate 02/23/22 1442   Drainage Description Yellow 02/23/22 1442   Odor None 02/23/22 1442   Kori-wound Assessment Hyperpigmented;Non-blanchable erythema 02/23/22 1442   Number of days: 77     Incision 04/16/21 Abdomen (Active)   Number of days: 313       Procedure Note  Indications:  Based on my examination of this patient's wound(s)/ulcer(s) today, debridement is required to promote healing and evaluate the wound base. Performed by: MESSI Ni CNP    Consent obtained:  Yes    Time out taken:  Yes    Pain Control: Anesthetic  Anesthetic: 4% Lidocaine Liquid Topical     Debridement:Excisional Debridement    Using curette the wound(s)/ulcer(s) was/were sharply debrided down through and including the removal of epidermis, dermis and subcutaneous tissue. Devitalized Tissue Debrided:  fibrin, biofilm, slough and exudate to stimulate bleeding to promote healing, post debridement good bleeding base and wound edges noted    Wound/Ulcer #: 1    Percent of Wound/Ulcer Debrided: 100%    Total Surface Area Debrided:  3.36 sq cm     Estimated Blood Loss:  Minimal  Hemostasis Achieved:  by pressure    Procedural Pain:  6  / 10   Post Procedural Pain:  5 / 10     Response to treatment:  Well tolerated by patient. Plan:   Treatment Note please see attached Discharge Instructions    Written patient dismissal instructions given to patient and signed by patient or POA.          Discharge Instructions       Visit Discharge/Physician Orders     Discharge condition: Stable     Assessment of pain at discharge: mild     Anesthetic used: lido 4%     Discharge to: Home     Left via:Private automobile     Accompanied by: accompanied by self     ECF/HHA: [facility]     Dressing Orders: To left pretib wound: cleanse with normal saline, apply calcium alginate, cover and secure with dry dressing.    Cover with Spandigrip    Treatment Orders:Eat a diet high in protein and vitamin C. Take a multiple vitamin daily unless contraindicated.     [facility] followup visit 1 week____________________________  (Please note your next appointment above and if you are unable to keep, kindly give a 24 hour notice.  Thank you.)     Physician signature:__________________________        If you experience any of the following, please call the [facility] [address] during business hours:     * Increase in Pain  * Temperature over 101  * Increase in drainage from your wound  * Drainage with a foul odor  * Bleeding  * Increase in swelling  * Need for compression bandage changes due to slippage, breakthrough drainage.     If you need medical attention outside of the business hours of the [facility] [address] please contact your PCP or go to the nearest emergency room.            Electronically signed by MESSI Gonzalez CNP on 2/23/2022 at 3:18 PM

## 2022-03-02 ENCOUNTER — HOSPITAL ENCOUNTER (OUTPATIENT)
Dept: WOUND CARE | Age: 77
Discharge: HOME | End: 2022-03-02
Payer: MEDICARE

## 2022-03-02 VITALS
WEIGHT: 293 LBS | HEIGHT: 67 IN | TEMPERATURE: 97.8 F | RESPIRATION RATE: 20 BRPM | SYSTOLIC BLOOD PRESSURE: 126 MMHG | HEART RATE: 71 BPM | BODY MASS INDEX: 45.99 KG/M2 | DIASTOLIC BLOOD PRESSURE: 74 MMHG

## 2022-03-02 DIAGNOSIS — L97.222 VENOUS STASIS ULCER OF LEFT CALF WITH FAT LAYER EXPOSED WITHOUT VARICOSE VEINS (HCC): Primary | ICD-10-CM

## 2022-03-02 DIAGNOSIS — I87.2 VENOUS STASIS ULCER OF LEFT CALF WITH FAT LAYER EXPOSED WITHOUT VARICOSE VEINS (HCC): Primary | ICD-10-CM

## 2022-03-02 PROCEDURE — 11042 DBRDMT SUBQ TIS 1ST 20SQCM/<: CPT

## 2022-03-02 PROCEDURE — 87186 SC STD MICRODIL/AGAR DIL: CPT

## 2022-03-02 PROCEDURE — 87077 CULTURE AEROBIC IDENTIFY: CPT

## 2022-03-02 PROCEDURE — 87070 CULTURE OTHR SPECIMN AEROBIC: CPT

## 2022-03-02 PROCEDURE — 87075 CULTR BACTERIA EXCEPT BLOOD: CPT

## 2022-03-02 PROCEDURE — 11042 DBRDMT SUBQ TIS 1ST 20SQCM/<: CPT | Performed by: SURGERY

## 2022-03-02 RX ORDER — LIDOCAINE 50 MG/G
OINTMENT TOPICAL ONCE
Status: CANCELLED | OUTPATIENT
Start: 2022-03-02 | End: 2022-03-02

## 2022-03-02 RX ORDER — GENTAMICIN SULFATE 1 MG/G
OINTMENT TOPICAL ONCE
Status: CANCELLED | OUTPATIENT
Start: 2022-03-02 | End: 2022-03-02

## 2022-03-02 RX ORDER — LIDOCAINE HYDROCHLORIDE 40 MG/ML
SOLUTION TOPICAL ONCE
Status: COMPLETED | OUTPATIENT
Start: 2022-03-02 | End: 2022-03-02

## 2022-03-02 RX ORDER — BACITRACIN, NEOMYCIN, POLYMYXIN B 400; 3.5; 5 [USP'U]/G; MG/G; [USP'U]/G
OINTMENT TOPICAL ONCE
Status: CANCELLED | OUTPATIENT
Start: 2022-03-02 | End: 2022-03-02

## 2022-03-02 RX ORDER — BACITRACIN ZINC AND POLYMYXIN B SULFATE 500; 1000 [USP'U]/G; [USP'U]/G
OINTMENT TOPICAL ONCE
Status: CANCELLED | OUTPATIENT
Start: 2022-03-02 | End: 2022-03-02

## 2022-03-02 RX ORDER — LIDOCAINE HYDROCHLORIDE 20 MG/ML
JELLY TOPICAL ONCE
Status: CANCELLED | OUTPATIENT
Start: 2022-03-02 | End: 2022-03-02

## 2022-03-02 RX ORDER — CLOBETASOL PROPIONATE 0.5 MG/G
OINTMENT TOPICAL ONCE
Status: CANCELLED | OUTPATIENT
Start: 2022-03-02 | End: 2022-03-02

## 2022-03-02 RX ORDER — GINSENG 100 MG
CAPSULE ORAL ONCE
Status: CANCELLED | OUTPATIENT
Start: 2022-03-02 | End: 2022-03-02

## 2022-03-02 RX ORDER — BETAMETHASONE DIPROPIONATE 0.05 %
OINTMENT (GRAM) TOPICAL ONCE
Status: CANCELLED | OUTPATIENT
Start: 2022-03-02 | End: 2022-03-02

## 2022-03-02 RX ORDER — LIDOCAINE 40 MG/G
CREAM TOPICAL ONCE
Status: CANCELLED | OUTPATIENT
Start: 2022-03-02 | End: 2022-03-02

## 2022-03-02 RX ORDER — LIDOCAINE HYDROCHLORIDE 40 MG/ML
SOLUTION TOPICAL ONCE
Status: CANCELLED | OUTPATIENT
Start: 2022-03-02 | End: 2022-03-02

## 2022-03-02 RX ADMIN — LIDOCAINE HYDROCHLORIDE: 40 SOLUTION TOPICAL at 15:11

## 2022-03-02 ASSESSMENT — PAIN DESCRIPTION - ONSET: ONSET: ON-GOING

## 2022-03-02 ASSESSMENT — PAIN - FUNCTIONAL ASSESSMENT: PAIN_FUNCTIONAL_ASSESSMENT: ACTIVITIES ARE NOT PREVENTED

## 2022-03-02 ASSESSMENT — PAIN DESCRIPTION - PAIN TYPE: TYPE: CHRONIC PAIN

## 2022-03-02 ASSESSMENT — PAIN DESCRIPTION - LOCATION: LOCATION: LEG

## 2022-03-02 ASSESSMENT — PAIN DESCRIPTION - PROGRESSION: CLINICAL_PROGRESSION: NOT CHANGED

## 2022-03-02 ASSESSMENT — PAIN DESCRIPTION - ORIENTATION: ORIENTATION: LEFT

## 2022-03-02 ASSESSMENT — PAIN DESCRIPTION - FREQUENCY: FREQUENCY: INTERMITTENT

## 2022-03-02 ASSESSMENT — PAIN SCALES - GENERAL: PAINLEVEL_OUTOF10: 5

## 2022-03-02 ASSESSMENT — PAIN DESCRIPTION - DESCRIPTORS: DESCRIPTORS: SHARP;STABBING;THROBBING

## 2022-03-02 NOTE — PROGRESS NOTES
[address]:     [facility] [address] [place], [address] [place] [address]  p: [phone] f: [phone]     Ordering Center:     [address]  [address] [address]  [phone]  WOUND CARE Dept: [address] NUMBER     Patient Information:      Quinton Aviles  [facility] [address] [address]   I have attempted without success to contact this patient by phone for Preadmission Testing phone assessment. Message left for pt to return call. : 1945  AGE: 78 y.o. GENDER: female   EPISODE DATE: 3/2/2022    Insurance:      PRIMARY INSURANCE:  Plan: [employer] ESSENTIAL/PLUS  Coverage: BCBS MEDICARE  Effective Date: 2015  Group Number: [unfilled]  Subscriber Number: [id number] - (Medicare Managed)    Payor/Plan Subscr  Sex Relation Sub. Ins. ID Effective Group Num   1. BCBS MEDICARERamond Sake 1945 Female Self [id number] 1/1/15 [id number]                                   [address]   2.  [place]ICA[place] Ramond Sake 1945 Female Self [id number] 10/16/18                                    [address]       Patient Wound Information:      Problem List Items Addressed This Visit     Venous stasis ulcer of left calf with fat layer exposed without varicose veins ([address] - Primary (Chronic)    Relevant Orders    Initiate Outpatient Wound Care Protocol          WOUNDS REQUIRING DRESSING SUPPLIES:     Incision 16 Leg Left (Active)   Number of days: 2141       Incision 16 Leg Left (Active)   Number of days: 2037       Wound 21 Pretibial Left #1 (Active)   Wound Image   22 1434   Dressing Status New dressing applied 22 1504   Wound Cleansed Cleansed with saline 22 1504   Dressing/Treatment Alginate;Dry dressing 22 1504   Offloading for Diabetic Foot Ulcers Other (comment) 22 1504   Wound Length (cm) 2.1 cm 22 1442   Wound Width (cm) 1.6 cm 02/23/22 1442   Wound Depth (cm) 0.3 cm 02/23/22 1442   Wound Surface Area (cm^2) 3.36 cm^2 02/23/22 1442   Change in Wound Size % (l*w) -380 02/23/22 1442   Wound Volume (cm^3) 1.008 cm^3 02/23/22 1442   Wound Healing % -1340 02/23/22 1442   Post-Procedure Length (cm) 2.2 cm 03/02/22 1545   Post-Procedure Width (cm) 1.6 cm 03/02/22 1545   Post-Procedure Depth (cm) 0.3 cm 03/02/22 1545   Post-Procedure Surface Area (cm^2) 3.52 cm^2 03/02/22 1545   Post-Procedure Volume (cm^3) 1. 056 cm^3 03/02/22 1545   Wound Assessment Fibrin;Pink/red 03/02/22 1459   Drainage Amount Large 03/02/22 1459   Drainage Description Yellow;Serous 03/02/22 1459   Odor None 03/02/22 1459   Kori-wound Assessment Excoriated;Blisters;Edematous;Fragile 03/02/22 1459   Number of days: 84     Incision 04/16/21 Abdomen (Active)   Number of days: 320       Supplies Requested :      WOUND #: 1   PRIMARY DRESSING:  Alginate pad   Cover and Secure with: ABD pad  Bulky roll gauze     FREQUENCY OF DRESSING CHANGES:  Daily       ADDITIONAL ITEMS:  [] Gloves Small  [x] Gloves Medium [] Gloves Large [] Gloves XLarge  [] Tape 1\" [x] Tape 2\" [] Tape 3\"  [] Medipore Tape  [x] Saline  [] Skin Prep   [] Adhesive Remover   [] Cotton Tip Applicators   [] Other:    Patient Wound(s) Debrided: [x] Yes if yes please add date 3/2/2022   [] No    Debribement Type: Excisional/Sharp    Is the patient currently on an antibiotic for their Wound(s): [] Yes if yes please add name and dose    [x] No    Patient currently being seen by Home Health: [] Yes   [x] No    Duration for needed supplies:  []15  [x]30  []60  []90 Days    Electronically signed by Per Miller RN on 3/2/2022 at 4:23 PM     Provider Information:      Teressa Huitron NAME:Dr. Johny Sanchez    NPI: [id number]

## 2022-03-05 LAB
ANAEROBIC CULTURE: NORMAL
ORGANISM: ABNORMAL
WOUND/ABSCESS: ABNORMAL

## 2022-03-06 NOTE — PROGRESS NOTES
Wound Healing Center Followup Visit Note    Referring Physician : Earnestine Flowers MD  [id number] [facility] RECORD NUMBER:  [id number]  AGE: 78 y.o. GENDER: female  : 1945  EPISODE DATE:  3/2/2022    Subjective:     Chief Complaint   Patient presents with    Wound Check      HISTORY of PRESENT ILLNESS HPI   Radha Tavera is a 78 y.o. female who presents today in regards to follow up evaluation and treatment of wound/ulcer. That patient's past medical, family and social hx were reviewed and changes were made if present. History of Wound Context:  Patient has had left calf wound since ~ 2021. She has been putting a dressing on it. The wound has not been improving. She has a hx significant for venous stasis ulcerations of bilateral LE. She first was seen by myself in regards to these issues 2015. She eventually healed these wounds 2016. I last saw her 2021 at which time I recommended lymphedema therapy. She states she went and said it caused her to much pain and stopped going. She has chronic issues with bilateral calf pain, swelling and edema.            She admits to not wearing her stockings because of the pain.     She has used knee high 20-30 mm hg stockings in the past.       She is still seeing pain management and is down to percocet 7/5/325 mg daily.       21  · aquacell  · Double tubigrip  · Culture done  · Emphasized importance of getting in to more significant compression in the future  12/15/21  · Culture reviewed - light growth, no tx  · Wound slightly improved  · Still significant drainage  · Plan on compression wrap next week  21  · Stable wound  · Drainage slightly better  · Pt would like to wait till next week because of holidays to start wrap  22  · Wound larger  · Profore  22  · Wound appearance better  · Refusing wrap - it rolled down last week and she cut off after 3 days  22  · Wound appearance better  · Still refusing wrap 3/2/22  · periwound worse  · Culture  · drawtek    Wound/Ulcer Pain Timing/Severity: waxing and waning, mild  Quality of pain: aching, throbbing, pressure  Severity:   10   Modifying Factors: Pain worsens with debridement, dressing changes  Associated Signs/Symptoms: edema, drainage and pain    Ulcer Identification:  Ulcer Type: venous and lymphedema  Contributing Factors: edema, venous stasis and lymphedema    Diabetic/Pressure/Non Pressure Ulcers only:  Ulcer: Non-Pressure ulcer, fat layer exposed    Wound: N/A        PAST MEDICAL HISTORY      Diagnosis Date    Bursitis     CAD (coronary artery disease)     heart attack    COPD (chronic obstructive pulmonary disease) ([facility] 2013    Emphysema     slight    GERD (gastroesophageal reflux disease)     Hiatal hernia     Hip pain     Hyperlipidemia     Hypertension     Lymphedema of both lower extremities 2018    Venous insufficiency of both lower extremities 2018    Venous stasis ulcer of left calf with fat layer exposed without varicose veins ([address] 2021    Venous ulcer with fat layer exposed ([facility] 10/28/2015     Past Surgical History:   Procedure Laterality Date    APPENDECTOMY      BREAST ENHANCEMENT SURGERY      BREAST REDUCTION SURGERY      CHOLECYSTECTOMY      COLONOSCOPY      ECHO COMPL W DOP COLOR FLOW  3/11/2013         ENDOSCOPY, COLON, DIAGNOSTIC      HERNIA REPAIR N/A 2021    LAPAROSCOPIC ROBOTIC ASSISTED INGUINAL HERNIA REPAIR performed by Idalmis Hensley MD at [address]  2009    l knee r hip    LEG DEBRIDEMENT Left 2015    LEG DEBRIDEMENT Left 2016     History reviewed. No pertinent family history.   Social History     Tobacco Use    Smoking status: Former Smoker     Packs/day: 1.00     Years: 20.00     Pack years: 20.00     Types: Cigarettes     Quit date: 1995     Years since quittin.3    Smokeless tobacco: Never Used   Vaping Use    Vaping Use: Never used   Substance Use Topics    Alcohol use: No    Drug use: No     Allergies   Allergen Reactions    Codeine Nausea And Vomiting and Other (See Comments)     hallucinate    Dilaudid [Hydromorphone Hcl] Rash    Naproxen Other (See Comments)     Shuts down kidney function    Singulair [Montelukast Sodium] Shortness Of Breath     Mucus in chest    Black Cohosh     Black Cohosh [Cimicifuga [place] (Black Cohosh)] Rash     Current Outpatient Medications on File Prior to Encounter   Medication Sig Dispense Refill    zinc gluconate 50 MG tablet Take 50 mg by mouth daily      GENISTEIN PO Take 125 mg by mouth nightly      folic acid (FOLVITE) 998 MCG tablet Take 400 mcg by mouth nightly      Krill Oil 350 MG CAPS Take 350 mg by mouth nightly      gabapentin (NEURONTIN) 300 MG capsule Take 300 mg by mouth 3 times daily.  simvastatin (ZOCOR) 40 MG tablet Take 40 mg by mouth nightly      oxyCODONE-acetaminophen (PERCOCET) 7.5-325 MG per tablet Take 1 tablet by mouth 2 times daily. Mandy Angst morphine (MS CONTIN) 15 MG extended release tablet Take 15 mg by mouth 2 times daily.  aspirin 81 MG tablet Take 81 mg by mouth daily      Cholecalciferol (VITAMIN D) 2000 UNITS CAPS capsule Take 2,000 Units by mouth daily       ferrous sulfate 325 (65 FE) MG tablet Take 325 mg by mouth nightly       metoprolol (LOPRESSOR) 50 MG tablet Take 1 tablet by mouth 2 times daily 60 tablet 0    albuterol (PROVENTIL HFA;VENTOLIN HFA) 108 (90 BASE) MCG/ACT inhaler Inhale 2 puffs into the lungs every 6 hours as needed for Wheezing or Shortness of Breath       calcium carbonate (OYSTER SHELL CALCIUM 500 MG) 1250 MG tablet Take 2 tablets by mouth daily      Ascorbic Acid (VITAMIN C) 500 MG tablet Take 2,000 mg by mouth daily      omeprazole (PRILOSEC) 40 MG capsule Take 40 mg by mouth daily.       diphenhydrAMINE (BENADRYL) 50 MG capsule Take 50 mg by mouth daily as needed for Allergies       Garlic 558 MG TABS Take 1,000 mg by mouth daily       ipratropium-albuterol (DUONEB) 0.5-2.5 (3) MG/3ML SOLN nebulizer solution Take 1 vial by nebulization every 4 hours as needed for Shortness of Breath       No current facility-administered medications on file prior to encounter.        REVIEW OF SYSTEMS See HPI    Objective:    /74   Pulse 71   Temp 97.8 °F (36.6 °C) (Temporal)   Resp 20   Ht 5' 7\" (1.702 m)   Wt 300 lb (136.1 kg)   BMI 46.99 kg/m²   Wt Readings from Last 3 Encounters:   03/02/22 300 lb (136.1 kg)   02/23/22 300 lb (136.1 kg)   02/09/22 300 lb (136.1 kg)     PHYSICAL EXAM  CONSTITUTIONAL:   Awake, alert, cooperative   EYES:  lids and lashes normal   ENT: external ears and nose without lesions   NECK:  supple, symmetrical, trachea midline   SKIN:  Open wound Present    Assessment:     Problem List Items Addressed This Visit     Venous stasis ulcer of left calf with fat layer exposed without varicose veins (HCC) - Primary (Chronic)        Pre Debridement Measurements:  Are located in the [place]  Documentation Flow Sheet  Post Debridement Measurements:  Wound/Ulcer Descriptions are Pre Debridement except measurements:     Incision 04/20/16 Leg Left (Active)   Number of days: 2145       Incision 08/03/16 Leg Left (Active)   Number of days: 2040       Wound 12/08/21 Pretibial Left #1 (Active)   Wound Image   02/09/22 1434   Dressing Status New dressing applied 02/09/22 1504   Wound Cleansed Cleansed with saline 02/09/22 1504   Dressing/Treatment Alginate;Dry dressing 02/09/22 1504   Offloading for Diabetic Foot Ulcers Other (comment) 02/09/22 1504   Wound Length (cm) 2.1 cm 02/23/22 1442   Wound Width (cm) 1.6 cm 02/23/22 1442   Wound Depth (cm) 0.3 cm 02/23/22 1442   Wound Surface Area (cm^2) 3.36 cm^2 02/23/22 1442   Change in Wound Size % (l*w) -380 02/23/22 1442   Wound Volume (cm^3) 1.008 cm^3 02/23/22 1442   Wound Healing % -1340 02/23/22 1442   Post-Procedure Length (cm) 2.2 cm 03/02/22 1545   Post-Procedure Width (cm) 1.6 cm 03/02/22 1545   Post-Procedure Depth (cm) 0.3 cm 03/02/22 1545   Post-Procedure Surface Area (cm^2) 3.52 cm^2 03/02/22 1545   Post-Procedure Volume (cm^3) 1. 056 cm^3 03/02/22 1545   Wound Assessment Fibrin;Pink/red 03/02/22 1459   Drainage Amount Large 03/02/22 1459   Drainage Description Yellow;Serous 03/02/22 1459   Odor None 03/02/22 1459   Kori-wound Assessment Excoriated;Blisters;Edematous;Fragile 03/02/22 1459   Number of days: 87     Incision 04/16/21 Abdomen (Active)   Number of days: 323       Procedure Note  Indications:  Based on my examination of this patient's wound(s)/ulcer(s) today, debridement is required to promote healing and evaluate the wound base. Performed by: Xochitl Ferrari MD    Consent obtained:  Yes    Time out taken:  Yes    Pain Control: Anesthetic  Anesthetic: 4% Lidocaine Liquid Topical     Debridement:Excisional Debridement    Using curette the wound(s)/ulcer(s) was/were sharply debrided down through and including the removal of epidermis, dermis and subcutaneous tissue. Devitalized Tissue Debrided:  fibrin, biofilm, slough and exudate to stimulate bleeding to promote healing, post debridement good bleeding base and wound edges noted    Wound/Ulcer #: 1    Percent of Wound/Ulcer Debrided: 100%    Total Surface Area Debrided:  3.36 sq cm     Estimated Blood Loss:  Minimal  Hemostasis Achieved:  by pressure    Procedural Pain:  6  / 10   Post Procedural Pain:  5 / 10     Response to treatment:  Well tolerated by patient. Plan:   Treatment Note please see attached Discharge Instructions    Written patient dismissal instructions given to patient and signed by patient or POA.          Discharge Instructions       Visit Discharge/Physician Orders     Discharge condition: Stable     Assessment of pain at discharge: mild     Anesthetic used: lido 4%     Discharge to: Home     Left via:Private automobile     Accompanied by: accompanied by self     ECF/HHA: [facility]     Dressing Orders: To left pretib wound: cleanse with normal saline, apply calcium alginate, cover and secure with dry dressing.  Change daily  Cover with Spandigrip     Treatment Orders:Eat a diet high in protein and vitamin C. Take a multiple vitamin daily unless contraindicated. C&S taken 3/2 at [facility] followup visit 1 week____________________________  (Please note your next appointment above and if you are unable to keep, kindly give a 24 hour notice.  Thank you.)     Physician signature:__________________________        If you experience any of the following, please call the [employer] during business hours:     * Increase in Pain  * Temperature over 101  * Increase in drainage from your wound  * Drainage with a foul odor  * Bleeding  * Increase in swelling  * Need for compression bandage changes due to slippage, breakthrough drainage.     If you need medical attention outside of the business hours of the [address] [place] [address] [employer] please contact your PCP or go to the nearest emergency room.            Electronically signed by Luz Kearney MD on 3/6/2022 at 1:03 AM

## 2022-03-09 ENCOUNTER — HOSPITAL ENCOUNTER (OUTPATIENT)
Dept: WOUND CARE | Age: 77
Discharge: HOME | End: 2022-03-09
Payer: MEDICARE

## 2022-03-09 VITALS
WEIGHT: 293 LBS | SYSTOLIC BLOOD PRESSURE: 128 MMHG | RESPIRATION RATE: 18 BRPM | DIASTOLIC BLOOD PRESSURE: 78 MMHG | HEART RATE: 80 BPM | TEMPERATURE: 96.3 F | HEIGHT: 67 IN | BODY MASS INDEX: 45.99 KG/M2

## 2022-03-09 DIAGNOSIS — L97.222 VENOUS STASIS ULCER OF LEFT CALF WITH FAT LAYER EXPOSED WITHOUT VARICOSE VEINS (HCC): Primary | ICD-10-CM

## 2022-03-09 DIAGNOSIS — I87.2 VENOUS STASIS ULCER OF LEFT CALF WITH FAT LAYER EXPOSED WITHOUT VARICOSE VEINS (HCC): Primary | ICD-10-CM

## 2022-03-09 DIAGNOSIS — I89.0 LYMPHEDEMA OF BOTH LOWER EXTREMITIES: Chronic | ICD-10-CM

## 2022-03-09 PROCEDURE — 6370000000 HC RX 637 (ALT 250 FOR IP): Performed by: SURGERY

## 2022-03-09 PROCEDURE — 11042 DBRDMT SUBQ TIS 1ST 20SQCM/<: CPT | Performed by: SURGERY

## 2022-03-09 PROCEDURE — 11042 DBRDMT SUBQ TIS 1ST 20SQCM/<: CPT

## 2022-03-09 RX ORDER — TRIAMCINOLONE ACETONIDE 1 MG/G
CREAM TOPICAL 2 TIMES DAILY
COMMUNITY
End: 2022-03-30 | Stop reason: ALTCHOICE

## 2022-03-09 RX ORDER — LIDOCAINE 40 MG/G
CREAM TOPICAL ONCE
Status: CANCELLED | OUTPATIENT
Start: 2022-03-09 | End: 2022-03-09

## 2022-03-09 RX ORDER — GINSENG 100 MG
CAPSULE ORAL ONCE
Status: CANCELLED | OUTPATIENT
Start: 2022-03-09 | End: 2022-03-09

## 2022-03-09 RX ORDER — BACITRACIN, NEOMYCIN, POLYMYXIN B 400; 3.5; 5 [USP'U]/G; MG/G; [USP'U]/G
OINTMENT TOPICAL ONCE
Status: CANCELLED | OUTPATIENT
Start: 2022-03-09 | End: 2022-03-09

## 2022-03-09 RX ORDER — CLOBETASOL PROPIONATE 0.5 MG/G
OINTMENT TOPICAL ONCE
Status: CANCELLED | OUTPATIENT
Start: 2022-03-09 | End: 2022-03-09

## 2022-03-09 RX ORDER — BACITRACIN ZINC AND POLYMYXIN B SULFATE 500; 1000 [USP'U]/G; [USP'U]/G
OINTMENT TOPICAL ONCE
Status: CANCELLED | OUTPATIENT
Start: 2022-03-09 | End: 2022-03-09

## 2022-03-09 RX ORDER — LIDOCAINE HYDROCHLORIDE 20 MG/ML
JELLY TOPICAL ONCE
Status: CANCELLED | OUTPATIENT
Start: 2022-03-09 | End: 2022-03-09

## 2022-03-09 RX ORDER — GENTAMICIN SULFATE 1 MG/G
OINTMENT TOPICAL ONCE
Status: CANCELLED | OUTPATIENT
Start: 2022-03-09 | End: 2022-03-09

## 2022-03-09 RX ORDER — LIDOCAINE HYDROCHLORIDE 40 MG/ML
SOLUTION TOPICAL ONCE
Status: CANCELLED | OUTPATIENT
Start: 2022-03-09 | End: 2022-03-09

## 2022-03-09 RX ORDER — LEVOFLOXACIN 750 MG/1
750 TABLET ORAL DAILY
Qty: 10 TABLET | Refills: 0 | Status: SHIPPED | OUTPATIENT
Start: 2022-03-09 | End: 2022-03-19

## 2022-03-09 RX ORDER — BETAMETHASONE DIPROPIONATE 0.05 %
OINTMENT (GRAM) TOPICAL ONCE
Status: CANCELLED | OUTPATIENT
Start: 2022-03-09 | End: 2022-03-09

## 2022-03-09 RX ORDER — LIDOCAINE HYDROCHLORIDE 40 MG/ML
SOLUTION TOPICAL ONCE
Status: COMPLETED | OUTPATIENT
Start: 2022-03-09 | End: 2022-03-09

## 2022-03-09 RX ORDER — LIDOCAINE 50 MG/G
OINTMENT TOPICAL ONCE
Status: CANCELLED | OUTPATIENT
Start: 2022-03-09 | End: 2022-03-09

## 2022-03-09 RX ADMIN — LIDOCAINE HYDROCHLORIDE 3 ML: 40 SOLUTION TOPICAL at 15:20

## 2022-03-09 NOTE — PLAN OF CARE
Problem: Falls - Risk of:  Goal: Will remain free from falls  Description: Will remain free from falls  Outcome: Met This Shift     Problem: Wound:  Goal: Will show signs of wound healing; wound closure and no evidence of infection  Description: Will show signs of wound healing; wound closure and no evidence of infection  Outcome: Ongoing     Problem: Venous:  Goal: Signs of wound healing will improve  Description: Signs of wound healing will improve  Outcome: Ongoing     Problem: Weight control:  Goal: Ability to maintain an optimal weight for height and age will be supported  Description: Ability to maintain an optimal weight for height and age will be supported  Outcome: Ongoing

## 2022-03-09 NOTE — PROGRESS NOTES
Wound Healing Center Followup Visit Note    Referring Physician : Pardeep Johnson MD  [id number] [facility] RECORD NUMBER:  [id number]  AGE: 78 y.o. GENDER: female  : 1945  EPISODE DATE:  3/9/2022    Subjective:     Chief Complaint   Patient presents with    Wound Check     left leg wound      HISTORY of PRESENT ILLNESS HPI   Pardeep Gonzalez is a 78 y.o. female who presents today in regards to follow up evaluation and treatment of wound/ulcer. That patient's past medical, family and social hx were reviewed and changes were made if present. History of Wound Context:  Patient has had left calf wound since ~ 2021. She has been putting a dressing on it. The wound has not been improving. She has a hx significant for venous stasis ulcerations of bilateral LE. She first was seen by myself in regards to these issues 2015. She eventually healed these wounds 2016. I last saw her 2021 at which time I recommended lymphedema therapy. She states she went and said it caused her to much pain and stopped going. She has chronic issues with bilateral calf pain, swelling and edema.            She admits to not wearing her stockings because of the pain.     She has used knee high 20-30 mm hg stockings in the past.       She is still seeing pain management and is down to percocet //325 mg daily.       21  · aquacell  · Double tubigrip  · Culture done  · Emphasized importance of getting in to more significant compression in the future  12/15/21  · Culture reviewed - light growth, no tx  · Wound slightly improved  · Still significant drainage  · Plan on compression wrap next week  21  · Stable wound  · Drainage slightly better  · Pt would like to wait till next week because of holidays to start wrap  22  · Wound larger  · Profore  22  · Wound appearance better  · Refusing wrap - it rolled down last week and she cut off after 3 days  22  · Wound appearance better  · Still refusing wrap   3/2/22  · periwound worse  · Culture  · drawtek  3/9/22  · periwound much improved  · levaquin 750 mg daily #10 script given culture   · Wound itself stable    Wound/Ulcer Pain Timing/Severity: waxing and waning, mild  Quality of pain: aching, throbbing, pressure  Severity:  2 / 10   Modifying Factors: Pain worsens with debridement, dressing changes  Associated Signs/Symptoms: edema, drainage and pain    Ulcer Identification:  Ulcer Type: venous and lymphedema  Contributing Factors: edema, venous stasis and lymphedema    Diabetic/Pressure/Non Pressure Ulcers only:  Ulcer: Non-Pressure ulcer, fat layer exposed    Wound: N/A        PAST MEDICAL HISTORY      Diagnosis Date    Bursitis     CAD (coronary artery disease) 1993    heart attack    COPD (chronic obstructive pulmonary disease) ([address] 6/19/2013    Emphysema     slight    GERD (gastroesophageal reflux disease)     Hiatal hernia     Hip pain     Hyperlipidemia     Hypertension     Lymphedema of both lower extremities 11/21/2018    Venous insufficiency of both lower extremities 11/21/2018    Venous stasis ulcer of left calf with fat layer exposed without varicose veins ([address] 12/8/2021    Venous ulcer with fat layer exposed ([address] 10/28/2015     Past Surgical History:   Procedure Laterality Date    APPENDECTOMY      BREAST ENHANCEMENT SURGERY      BREAST REDUCTION SURGERY      CHOLECYSTECTOMY      COLONOSCOPY      ECHO COMPL W DOP COLOR FLOW  3/11/2013         ENDOSCOPY, COLON, DIAGNOSTIC      HERNIA REPAIR N/A 4/16/2021    LAPAROSCOPIC ROBOTIC ASSISTED INGUINAL HERNIA REPAIR performed by Charlie Alcala MD at [facility] [address] REPLACEMENT  2009 2011    l knee r hip    LEG DEBRIDEMENT Left 11/18/2015    LEG DEBRIDEMENT Left 08/03/2016     History reviewed. No pertinent family history.   Social History     Tobacco Use    Smoking status: Former Smoker     Packs/day: 1.00     Years: 20.00     Pack years: 20.00     Types: Cigarettes     Quit date: 1995     Years since quittin.3    Smokeless tobacco: Never Used   Vaping Use    Vaping Use: Never used   Substance Use Topics    Alcohol use: No    Drug use: No     Allergies   Allergen Reactions    Codeine Nausea And Vomiting and Other (See Comments)     hallucinate    Dilaudid [Hydromorphone Hcl] Rash    Naproxen Other (See Comments)     Shuts down kidney function    Singulair [Montelukast Sodium] Shortness Of Breath     Mucus in chest    Black Cohosh     Black Cohosh [Cimicifuga Racemosa (Black Cohosh)] Rash     Current Outpatient Medications on File Prior to Encounter   Medication Sig Dispense Refill    triamcinolone (KENALOG) 0.1 % cream Apply topically 2 times daily Apply topically 2 times daily.  zinc gluconate 50 MG tablet Take 50 mg by mouth daily      GENISTEIN PO Take 125 mg by mouth nightly      folic acid (FOLVITE) 149 MCG tablet Take 400 mcg by mouth nightly      Krill Oil 350 MG CAPS Take 350 mg by mouth nightly      gabapentin (NEURONTIN) 300 MG capsule Take 300 mg by mouth 3 times daily.  ipratropium-albuterol (DUONEB) 0.5-2.5 (3) MG/3ML SOLN nebulizer solution Take 1 vial by nebulization every 4 hours as needed for Shortness of Breath      simvastatin (ZOCOR) 40 MG tablet Take 40 mg by mouth nightly      oxyCODONE-acetaminophen (PERCOCET) 7.5-325 MG per tablet Take 1 tablet by mouth 2 times daily. Aneita Barrier morphine (MS CONTIN) 15 MG extended release tablet Take 15 mg by mouth 2 times daily.        aspirin 81 MG tablet Take 81 mg by mouth daily      Cholecalciferol (VITAMIN D) 2000 UNITS CAPS capsule Take 2,000 Units by mouth daily       ferrous sulfate 325 (65 FE) MG tablet Take 325 mg by mouth nightly       metoprolol (LOPRESSOR) 50 MG tablet Take 1 tablet by mouth 2 times daily 60 tablet 0    albuterol (PROVENTIL HFA;VENTOLIN HFA) 108 (90 BASE) MCG/ACT inhaler Inhale 2 puffs into the lungs every 6 hours as needed for Wheezing or Shortness of Breath       calcium carbonate (OYSTER SHELL CALCIUM 500 MG) 1250 MG tablet Take 2 tablets by mouth daily      Ascorbic Acid (VITAMIN C) 500 MG tablet Take 2,000 mg by mouth daily      omeprazole (PRILOSEC) 40 MG capsule Take 40 mg by mouth daily.  diphenhydrAMINE (BENADRYL) 50 MG capsule Take 50 mg by mouth daily as needed for Allergies       Garlic 885 MG TABS Take 1,000 mg by mouth daily        No current facility-administered medications on file prior to encounter.        REVIEW OF SYSTEMS See HPI    Objective:    /78   Pulse 80   Temp 96.3 °F (35.7 °C) (Temporal)   Resp 18   Ht 5' 7\" (1.702 m)   Wt 300 lb (136.1 kg)   BMI 46.99 kg/m²   Wt Readings from Last 3 Encounters:   03/09/22 300 lb (136.1 kg)   03/02/22 300 lb (136.1 kg)   02/23/22 300 lb (136.1 kg)     PHYSICAL EXAM  CONSTITUTIONAL:   Awake, alert, cooperative   EYES:  lids and lashes normal   ENT: external ears and nose without lesions   NECK:  supple, symmetrical, trachea midline   SKIN:  Open wound Present    Assessment:     Problem List Items Addressed This Visit     Lymphedema of both lower extremities (Chronic)    Venous stasis ulcer of left calf with fat layer exposed without varicose veins (HCC) - Primary (Chronic)    Relevant Orders    Initiate Outpatient Wound Care Protocol        Pre Debridement Measurements:  Are located in the [place]  Documentation Flow Sheet  Post Debridement Measurements:  Wound/Ulcer Descriptions are Pre Debridement except measurements:     Incision 04/20/16 Leg Left (Active)   Number of days: 2148       Incision 08/03/16 Leg Left (Active)   Number of days: 2044       Wound 12/08/21 Pretibial Left #1 (Active)   Wound Image   03/09/22 1518   Dressing Status New dressing applied 03/09/22 1610   Wound Cleansed Cleansed with saline 03/09/22 1610   Dressing/Treatment Alginate;Dry dressing 03/09/22 1610   Offloading for Diabetic Foot Ulcers Offloading not required 03/09/22 1610   Wound Length (cm) 2.3 cm 03/09/22 1518   Wound Width (cm) 1.7 cm 03/09/22 1518   Wound Depth (cm) 0.3 cm 03/09/22 1518   Wound Surface Area (cm^2) 3.91 cm^2 03/09/22 1518   Change in Wound Size % (l*w) -458.57 03/09/22 1518   Wound Volume (cm^3) 1.173 cm^3 03/09/22 1518   Wound Healing % -1576 03/09/22 1518   Post-Procedure Length (cm) 2.4 cm 03/09/22 1553   Post-Procedure Width (cm) 1.8 cm 03/09/22 1553   Post-Procedure Depth (cm) 0.4 cm 03/09/22 1553   Post-Procedure Surface Area (cm^2) 4.32 cm^2 03/09/22 1553   Post-Procedure Volume (cm^3) 1.728 cm^3 03/09/22 1553   Wound Assessment Fibrin;Pink/red 03/09/22 1518   Drainage Amount Moderate 03/09/22 1518   Drainage Description Yellow;Serous 03/09/22 1518   Odor None 03/09/22 1518   Kori-wound Assessment Intact 03/09/22 1518   Number of days: 91     Incision 04/16/21 Abdomen (Active)   Number of days: 327       Procedure Note  Indications:  Based on my examination of this patient's wound(s)/ulcer(s) today, debridement is required to promote healing and evaluate the wound base. Performed by: Nancy Mackey MD    Consent obtained:  Yes    Time out taken:  Yes    Pain Control: Anesthetic  Anesthetic: 4% Lidocaine Liquid Topical     Debridement:Excisional Debridement    Using curette the wound(s)/ulcer(s) was/were sharply debrided down through and including the removal of epidermis, dermis and subcutaneous tissue. Devitalized Tissue Debrided:  fibrin, biofilm, slough and exudate to stimulate bleeding to promote healing, post debridement good bleeding base and wound edges noted    Wound/Ulcer #: 1    Percent of Wound/Ulcer Debrided: 100%    Total Surface Area Debrided:  3.91 sq cm     Estimated Blood Loss:  Minimal  Hemostasis Achieved:  by pressure    Procedural Pain:  6  / 10   Post Procedural Pain:  5 / 10     Response to treatment:  Well tolerated by patient.      Plan:   Treatment Note please see attached Discharge Instructions    Written patient dismissal instructions given to patient and signed by patient or POA. Discharge Instructions       Visit Discharge/Physician Orders     Discharge condition: Stable     Assessment of pain at discharge: mild     Anesthetic used: lido 4%     Discharge to: Home     Left via:Private automobile     Accompanied by: accompanied by self     [facility]F/HHA: [facility]     Dressing Orders: To left pretib wound: cleanse with normal saline, apply calcium alginate, cover and secure with dry dressing.  Change daily  Cover with Spandigrip      Treatment Orders:Eat a diet high in protein and vitamin C. Take a multiple vitamin daily unless contraindicated. Start antibiotic as ordered     [facility] followup visit 1 week____________________________  (Please note your next appointment above and if you are unable to keep, kindly give a 24 hour notice. Thank you.)     Physician signature:__________________________        If you experience any of the following, please call the [facility] during business hours:     * Increase in Pain  * Temperature over 101  * Increase in drainage from your wound  * Drainage with a foul odor  * Bleeding  * Increase in swelling  * Need for compression bandage changes due to slippage, breakthrough drainage.     If you need medical attention outside of the business hours of the [facility] please contact your PCP or go to the nearest emergency room.       Electronically signed by Abdulaziz Strauss MD on 3/9/2022 at 4:18 PM

## 2022-03-16 ENCOUNTER — HOSPITAL ENCOUNTER (OUTPATIENT)
Dept: WOUND CARE | Age: 77
Discharge: HOME | End: 2022-03-16

## 2022-03-23 ENCOUNTER — HOSPITAL ENCOUNTER (OUTPATIENT)
Dept: WOUND CARE | Age: 77
Discharge: HOME | End: 2022-03-23
Payer: MEDICARE

## 2022-03-23 VITALS
DIASTOLIC BLOOD PRESSURE: 70 MMHG | TEMPERATURE: 97.6 F | HEART RATE: 66 BPM | RESPIRATION RATE: 18 BRPM | SYSTOLIC BLOOD PRESSURE: 140 MMHG

## 2022-03-23 DIAGNOSIS — I87.2 VENOUS STASIS ULCER OF LEFT CALF WITH FAT LAYER EXPOSED WITHOUT VARICOSE VEINS (HCC): Primary | ICD-10-CM

## 2022-03-23 DIAGNOSIS — L97.222 VENOUS STASIS ULCER OF LEFT CALF WITH FAT LAYER EXPOSED WITHOUT VARICOSE VEINS (HCC): Primary | ICD-10-CM

## 2022-03-23 PROCEDURE — 11042 DBRDMT SUBQ TIS 1ST 20SQCM/<: CPT

## 2022-03-23 PROCEDURE — 11042 DBRDMT SUBQ TIS 1ST 20SQCM/<: CPT | Performed by: NURSE PRACTITIONER

## 2022-03-23 RX ORDER — LIDOCAINE 50 MG/G
OINTMENT TOPICAL ONCE
Status: CANCELLED | OUTPATIENT
Start: 2022-03-23 | End: 2022-03-23

## 2022-03-23 RX ORDER — CLOBETASOL PROPIONATE 0.5 MG/G
OINTMENT TOPICAL ONCE
Status: CANCELLED | OUTPATIENT
Start: 2022-03-23 | End: 2022-03-23

## 2022-03-23 RX ORDER — BETAMETHASONE DIPROPIONATE 0.05 %
OINTMENT (GRAM) TOPICAL ONCE
Status: CANCELLED | OUTPATIENT
Start: 2022-03-23 | End: 2022-03-23

## 2022-03-23 RX ORDER — GENTAMICIN SULFATE 1 MG/G
OINTMENT TOPICAL ONCE
Status: CANCELLED | OUTPATIENT
Start: 2022-03-23 | End: 2022-03-23

## 2022-03-23 RX ORDER — LIDOCAINE HYDROCHLORIDE 40 MG/ML
SOLUTION TOPICAL ONCE
Status: CANCELLED | OUTPATIENT
Start: 2022-03-23 | End: 2022-03-23

## 2022-03-23 RX ORDER — LIDOCAINE HYDROCHLORIDE 20 MG/ML
JELLY TOPICAL ONCE
Status: CANCELLED | OUTPATIENT
Start: 2022-03-23 | End: 2022-03-23

## 2022-03-23 RX ORDER — LIDOCAINE 40 MG/G
CREAM TOPICAL ONCE
Status: CANCELLED | OUTPATIENT
Start: 2022-03-23 | End: 2022-03-23

## 2022-03-23 RX ORDER — LIDOCAINE HYDROCHLORIDE 40 MG/ML
SOLUTION TOPICAL ONCE
Status: DISCONTINUED | OUTPATIENT
Start: 2022-03-23 | End: 2022-03-24 | Stop reason: HOSPADM

## 2022-03-23 RX ORDER — BACITRACIN, NEOMYCIN, POLYMYXIN B 400; 3.5; 5 [USP'U]/G; MG/G; [USP'U]/G
OINTMENT TOPICAL ONCE
Status: CANCELLED | OUTPATIENT
Start: 2022-03-23 | End: 2022-03-23

## 2022-03-23 RX ORDER — BACITRACIN ZINC AND POLYMYXIN B SULFATE 500; 1000 [USP'U]/G; [USP'U]/G
OINTMENT TOPICAL ONCE
Status: CANCELLED | OUTPATIENT
Start: 2022-03-23 | End: 2022-03-23

## 2022-03-23 RX ORDER — GINSENG 100 MG
CAPSULE ORAL ONCE
Status: CANCELLED | OUTPATIENT
Start: 2022-03-23 | End: 2022-03-23

## 2022-03-23 ASSESSMENT — PAIN SCALES - GENERAL: PAINLEVEL_OUTOF10: 0

## 2022-03-23 NOTE — PROGRESS NOTES
Wound Healing Center Followup Visit Note    Referring Physician : Lisandra Farley MD  [id number] [facility] RECORD NUMBER:  [id number]  AGE: 78 y.o. GENDER: female  : 1945  EPISODE DATE:  3/23/2022    Subjective:     Chief Complaint   Patient presents with    Wound Check     BLE      HISTORY of PRESENT ILLNESS HPI   Danyelle Lee is a 78 y.o. female who presents today in regards to follow up evaluation and treatment of wound/ulcer. That patient's past medical, family and social hx were reviewed and changes were made if present. History of Wound Context:  Patient has had left calf wound since ~ 2021. She has been putting a dressing on it. The wound has not been improving. She has a hx significant for venous stasis ulcerations of bilateral LE. She first was seen by myself in regards to these issues 2015. She eventually healed these wounds 2016. I last saw her 2021 at which time I recommended lymphedema therapy. She states she went and said it caused her to much pain and stopped going. She has chronic issues with bilateral calf pain, swelling and edema.            She admits to not wearing her stockings because of the pain.     She has used knee high 20-30 mm hg stockings in the past.       She is still seeing pain management and is down to percocet /325 mg daily.       21  · aquacell  · Double tubigrip  · Culture done  · Emphasized importance of getting in to more significant compression in the future  12/15/21  · Culture reviewed - light growth, no tx  · Wound slightly improved  · Still significant drainage  · Plan on compression wrap next week  21  · Stable wound  · Drainage slightly better  · Pt would like to wait till next week because of holidays to start wrap  22  · Wound larger  · Profore  22  · Wound appearance better  · Refusing wrap - it rolled down last week and she cut off after 3 days  22  · Wound appearance better  · Still refusing wrap 3/2/22  · periwound worse  · Culture  · drawtek  3/9/22  · periwound much improved  · levaquin 750 mg daily #10 script given culture   · Wound itself stable  3/23/22  · Left anterior calf wound improving overall  · New blister/ulcerations left 3rd, 4th and 5th toes and lateral foot   · Instructed to keep toes/foot dry, no ointment or corn starch     Wound/Ulcer Pain Timing/Severity: waxing and waning, mild  Quality of pain: aching, throbbing, pressure  Severity:  7 / 10   Modifying Factors: Pain worsens with debridement, dressing changes  Associated Signs/Symptoms: edema, drainage and pain    Ulcer Identification:  Ulcer Type: venous and lymphedema  Contributing Factors: edema, venous stasis and lymphedema    Diabetic/Pressure/Non Pressure Ulcers only:  Ulcer: Non-Pressure ulcer, fat layer exposed    Wound: N/A        PAST MEDICAL HISTORY      Diagnosis Date    Bursitis     CAD (coronary artery disease) 1993    heart attack    COPD (chronic obstructive pulmonary disease) ([facility] 6/19/2013    Emphysema     slight    GERD (gastroesophageal reflux disease)     Hiatal hernia     Hip pain     Hyperlipidemia     Hypertension     Lymphedema of both lower extremities 11/21/2018    Venous insufficiency of both lower extremities 11/21/2018    Venous stasis ulcer of left calf with fat layer exposed without varicose veins ([facility] [address] [address] 12/8/2021    Venous ulcer with fat layer exposed ([facility] 10/28/2015     Past Surgical History:   Procedure Laterality Date    APPENDECTOMY      BREAST ENHANCEMENT SURGERY      BREAST REDUCTION SURGERY      CHOLECYSTECTOMY      COLONOSCOPY      ECHO COMPL W DOP COLOR FLOW  3/11/2013         ENDOSCOPY, COLON, DIAGNOSTIC      HERNIA REPAIR N/A 4/16/2021    LAPAROSCOPIC ROBOTIC ASSISTED INGUINAL HERNIA REPAIR performed by Naila Santos MD at [address]  2009 2011    l knee r hip    LEG DEBRIDEMENT Left 11/18/2015    LEG DEBRIDEMENT Left 2016     History reviewed. No pertinent family history. Social History     Tobacco Use    Smoking status: Former Smoker     Packs/day: 1.00     Years: 20.00     Pack years: 20.00     Types: Cigarettes     Quit date: 1995     Years since quittin.4    Smokeless tobacco: Never Used   Vaping Use    Vaping Use: Never used   Substance Use Topics    Alcohol use: No    Drug use: No     Allergies   Allergen Reactions    Codeine Nausea And Vomiting and Other (See Comments)     hallucinate    Dilaudid [Hydromorphone Hcl] Rash    Naproxen Other (See Comments)     Shuts down kidney function    Singulair [Montelukast Sodium] Shortness Of Breath     Mucus in chest    Black Cohosh     Black Cohosh [Cimicifuga [place] (Black Cohosh)] Rash     Current Outpatient Medications on File Prior to Encounter   Medication Sig Dispense Refill    triamcinolone (KENALOG) 0.1 % cream Apply topically 2 times daily Apply topically 2 times daily.  zinc gluconate 50 MG tablet Take 50 mg by mouth daily      GENISTEIN PO Take 125 mg by mouth nightly      folic acid (FOLVITE) 533 MCG tablet Take 400 mcg by mouth nightly      Krill Oil 350 MG CAPS Take 350 mg by mouth nightly      gabapentin (NEURONTIN) 300 MG capsule Take 300 mg by mouth 3 times daily.  ipratropium-albuterol (DUONEB) 0.5-2.5 (3) MG/3ML SOLN nebulizer solution Take 1 vial by nebulization every 4 hours as needed for Shortness of Breath      simvastatin (ZOCOR) 40 MG tablet Take 40 mg by mouth nightly      oxyCODONE-acetaminophen (PERCOCET) 7.5-325 MG per tablet Take 1 tablet by mouth 2 times daily. Almarie Feeler morphine (MS CONTIN) 15 MG extended release tablet Take 15 mg by mouth 2 times daily.        aspirin 81 MG tablet Take 81 mg by mouth daily      Cholecalciferol (VITAMIN D) 2000 UNITS CAPS capsule Take 2,000 Units by mouth daily       ferrous sulfate 325 (65 FE) MG tablet Take 325 mg by mouth nightly       metoprolol (LOPRESSOR) 50 MG tablet Take 1 tablet by mouth 2 times daily 60 tablet 0    albuterol (PROVENTIL HFA;VENTOLIN HFA) 108 (90 BASE) MCG/ACT inhaler Inhale 2 puffs into the lungs every 6 hours as needed for Wheezing or Shortness of Breath       calcium carbonate (OYSTER SHELL CALCIUM 500 MG) 1250 MG tablet Take 2 tablets by mouth daily      Ascorbic Acid (VITAMIN C) 500 MG tablet Take 2,000 mg by mouth daily      omeprazole (PRILOSEC) 40 MG capsule Take 40 mg by mouth daily.  diphenhydrAMINE (BENADRYL) 50 MG capsule Take 50 mg by mouth daily as needed for Allergies       Garlic 760 MG TABS Take 1,000 mg by mouth daily        No current facility-administered medications on file prior to encounter.        REVIEW OF SYSTEMS See HPI    Objective:    BP (!) 140/70   Pulse 66   Temp 97.6 °F (36.4 °C) (Temporal)   Resp 18   Wt Readings from Last 3 Encounters:   03/09/22 300 lb (136.1 kg)   03/02/22 300 lb (136.1 kg)   02/23/22 300 lb (136.1 kg)     PHYSICAL EXAM  CONSTITUTIONAL:   Awake, alert, cooperative   EYES:  lids and lashes normal   ENT: external ears and nose without lesions   NECK:  supple, symmetrical, trachea midline   SKIN:  Open wound Present    Assessment:     Problem List Items Addressed This Visit     Venous stasis ulcer of left calf with fat layer exposed without varicose veins (HCC) - Primary (Chronic)    Relevant Medications    lidocaine (XYLOCAINE) 4 % external solution    Other Relevant Orders    Initiate Outpatient Wound Care Protocol        Pre Debridement Measurements:  Are located in the [place]  Documentation Flow Sheet  Post Debridement Measurements:  Wound/Ulcer Descriptions are Pre Debridement except measurements:     Incision 04/20/16 Leg Left (Active)   Number of days: 2162       Incision 08/03/16 Leg Left (Active)   Number of days: 2058       Wound 12/08/21 Pretibial Left #1 (Active)   Wound Image   03/09/22 1518   Dressing Status New dressing applied 03/09/22 1610   Wound Cleansed Cleansed with saline 03/09/22 1610   Dressing/Treatment Alginate;Dry dressing 03/09/22 1610   Offloading for Diabetic Foot Ulcers Offloading not required 03/09/22 1610   Wound Length (cm) 2.4 cm 03/23/22 1440   Wound Width (cm) 1.3 cm 03/23/22 1440   Wound Depth (cm) 0.3 cm 03/23/22 1440   Wound Surface Area (cm^2) 3.12 cm^2 03/23/22 1440   Change in Wound Size % (l*w) -345.71 03/23/22 1440   Wound Volume (cm^3) 0.936 cm^3 03/23/22 1440   Wound Healing % -1237 03/23/22 1440   Post-Procedure Length (cm) 2.5 cm 03/23/22 1459   Post-Procedure Width (cm) 1.4 cm 03/23/22 1459   Post-Procedure Depth (cm) 0.4 cm 03/23/22 1459   Post-Procedure Surface Area (cm^2) 3.5 cm^2 03/23/22 1459   Post-Procedure Volume (cm^3) 1.4 cm^3 03/23/22 1459   Wound Assessment Fibrin;Pink/red 03/23/22 1440   Drainage Amount Moderate 03/23/22 1440   Drainage Description Yellow;Serous 03/23/22 1440   Odor None 03/23/22 1440   Kori-wound Assessment Intact 03/23/22 1440   Number of days: 104       Wound 03/23/22 Toe (Comment  which one) Left;Dorsal #2 5th toe (Active)   Wound Image   03/23/22 1445   Wound Etiology Traumatic 03/23/22 1445   Wound Length (cm) 0.8 cm 03/23/22 1445   Wound Width (cm) 0.4 cm 03/23/22 1445   Wound Depth (cm) 0.1 cm 03/23/22 1445   Wound Surface Area (cm^2) 0.32 cm^2 03/23/22 1445   Wound Volume (cm^3) 0.032 cm^3 03/23/22 1445   Wound Assessment Pink/red 03/23/22 1445   Drainage Amount Large 03/23/22 1445   Drainage Description Serous;Clear 03/23/22 1445   Odor None 03/23/22 1445   Kori-wound Assessment Maceration 03/23/22 1445   Number of days: 0     Incision 04/16/21 Abdomen (Active)   Number of days: 341       Procedure Note  Indications:  Based on my examination of this patient's wound(s)/ulcer(s) today, debridement is required to promote healing and evaluate the wound base.     Performed by: MESSI Limon CNP    Consent obtained:  Yes    Time out taken:  Yes    Pain Control: Anesthetic  Anesthetic: 4% Lidocaine Liquid Topical     Debridement:Excisional Debridement    Using curette the wound(s)/ulcer(s) was/were sharply debrided down through and including the removal of epidermis, dermis and subcutaneous tissue. Devitalized Tissue Debrided:  fibrin, biofilm, slough and exudate to stimulate bleeding to promote healing, post debridement good bleeding base and wound edges noted    Wound/Ulcer #: 1    Percent of Wound/Ulcer Debrided: 100%    Total Surface Area Debrided:  3.12 sq cm     Estimated Blood Loss:  Minimal  Hemostasis Achieved:  by pressure    Procedural Pain:  7  / 10   Post Procedural Pain:  8 / 10     Response to treatment:  With complaints of pain. Plan:   Treatment Note please see attached Discharge Instructions    Written patient dismissal instructions given to patient and signed by patient or POA. Discharge Instructions       Visit Discharge/Physician Orders     Discharge condition: Stable     Assessment of pain at discharge: mild     Anesthetic used: lido 4%     Discharge to: Home     Left via:Private automobile     Accompanied by: accompanied by self     [facility]F/HHA: [facility]     Dressing Orders: To left pretib wound: cleanse with normal saline, apply calcium alginate, cover and secure with dry dressing.  Change daily  Cover with Spandigrip     To left toe wounds: Cleanse with normal saline, apply calcium alginate between toes, cover and secure with dry dressing. Change daily. Stop cream on toes     Treatment Orders:Eat a diet high in protein and vitamin C. Take a multiple vitamin daily unless contraindicated.     [facility] followup visit 1 week____________________________  (Please note your next appointment above and if you are unable to keep, kindly give a 24 hour notice.  Thank you.)     Physician signature:__________________________        If you experience any of the following, please call the [facility] during business hours:     * Increase in Pain  * Temperature over 101  * Increase in drainage from your wound  * Drainage with a foul odor  * Bleeding  * Increase in swelling  * Need for compression bandage changes due to slippage, breakthrough drainage.     If you need medical attention outside of the business hours of the [facility] please contact your PCP or go to the nearest emergency room.       Electronically signed by MESSI Espinoza CNP on 3/23/2022 at 3:12 PM

## 2022-03-30 ENCOUNTER — HOSPITAL ENCOUNTER (OUTPATIENT)
Dept: WOUND CARE | Age: 77
Discharge: HOME | End: 2022-03-30
Payer: MEDICARE

## 2022-03-30 VITALS
TEMPERATURE: 97.4 F | WEIGHT: 293 LBS | BODY MASS INDEX: 45.99 KG/M2 | RESPIRATION RATE: 18 BRPM | HEART RATE: 68 BPM | HEIGHT: 67 IN | SYSTOLIC BLOOD PRESSURE: 138 MMHG | DIASTOLIC BLOOD PRESSURE: 76 MMHG

## 2022-03-30 DIAGNOSIS — I87.2 VENOUS STASIS ULCER OF LEFT CALF WITH FAT LAYER EXPOSED WITHOUT VARICOSE VEINS (HCC): Primary | ICD-10-CM

## 2022-03-30 DIAGNOSIS — L97.222 VENOUS STASIS ULCER OF LEFT CALF WITH FAT LAYER EXPOSED WITHOUT VARICOSE VEINS (HCC): Primary | ICD-10-CM

## 2022-03-30 PROCEDURE — 6370000000 HC RX 637 (ALT 250 FOR IP): Performed by: SURGERY

## 2022-03-30 PROCEDURE — 11042 DBRDMT SUBQ TIS 1ST 20SQCM/<: CPT | Performed by: SURGERY

## 2022-03-30 PROCEDURE — 11042 DBRDMT SUBQ TIS 1ST 20SQCM/<: CPT

## 2022-03-30 RX ORDER — CLOBETASOL PROPIONATE 0.5 MG/G
OINTMENT TOPICAL ONCE
Status: CANCELLED | OUTPATIENT
Start: 2022-03-30 | End: 2022-03-30

## 2022-03-30 RX ORDER — LIDOCAINE HYDROCHLORIDE 40 MG/ML
SOLUTION TOPICAL ONCE
Status: CANCELLED | OUTPATIENT
Start: 2022-03-30 | End: 2022-03-30

## 2022-03-30 RX ORDER — LIDOCAINE 50 MG/G
OINTMENT TOPICAL ONCE
Status: CANCELLED | OUTPATIENT
Start: 2022-03-30 | End: 2022-03-30

## 2022-03-30 RX ORDER — CYANOCOBALAMIN (VITAMIN B-12) 500 MCG
TABLET ORAL DAILY
COMMUNITY

## 2022-03-30 RX ORDER — BACITRACIN, NEOMYCIN, POLYMYXIN B 400; 3.5; 5 [USP'U]/G; MG/G; [USP'U]/G
OINTMENT TOPICAL ONCE
Status: CANCELLED | OUTPATIENT
Start: 2022-03-30 | End: 2022-03-30

## 2022-03-30 RX ORDER — LIDOCAINE HYDROCHLORIDE 40 MG/ML
SOLUTION TOPICAL ONCE
Status: COMPLETED | OUTPATIENT
Start: 2022-03-30 | End: 2022-03-30

## 2022-03-30 RX ORDER — GENTAMICIN SULFATE 1 MG/G
OINTMENT TOPICAL ONCE
Status: CANCELLED | OUTPATIENT
Start: 2022-03-30 | End: 2022-03-30

## 2022-03-30 RX ORDER — BACITRACIN ZINC AND POLYMYXIN B SULFATE 500; 1000 [USP'U]/G; [USP'U]/G
OINTMENT TOPICAL ONCE
Status: CANCELLED | OUTPATIENT
Start: 2022-03-30 | End: 2022-03-30

## 2022-03-30 RX ORDER — LIDOCAINE HYDROCHLORIDE 20 MG/ML
JELLY TOPICAL ONCE
Status: CANCELLED | OUTPATIENT
Start: 2022-03-30 | End: 2022-03-30

## 2022-03-30 RX ORDER — LIDOCAINE 40 MG/G
CREAM TOPICAL ONCE
Status: CANCELLED | OUTPATIENT
Start: 2022-03-30 | End: 2022-03-30

## 2022-03-30 RX ORDER — GINSENG 100 MG
CAPSULE ORAL ONCE
Status: CANCELLED | OUTPATIENT
Start: 2022-03-30 | End: 2022-03-30

## 2022-03-30 RX ORDER — M-VIT,TX,IRON,MINS/CALC/FOLIC 27MG-0.4MG
1 TABLET ORAL DAILY
COMMUNITY

## 2022-03-30 RX ORDER — BETAMETHASONE DIPROPIONATE 0.05 %
OINTMENT (GRAM) TOPICAL ONCE
Status: CANCELLED | OUTPATIENT
Start: 2022-03-30 | End: 2022-03-30

## 2022-03-30 RX ADMIN — LIDOCAINE HYDROCHLORIDE 5 ML: 40 SOLUTION TOPICAL at 15:19

## 2022-03-30 NOTE — PROGRESS NOTES
[facility] Followup Visit Note    Referring Physician : Claudio Prasad MD  [id number] [facility] RECORD NUMBER:  [id number]  AGE: 78 y.o. GENDER: female  : 1945  EPISODE DATE:  3/30/2022    Subjective:     Chief Complaint   Patient presents with    Wound Check     left 5th toe /left pretib      HISTORY of PRESENT ILLNESS HPI   Magan Leo is a 78 y.o. female who presents today in regards to follow up evaluation and treatment of wound/ulcer. That patient's past medical, family and social hx were reviewed and changes were made if present. History of Wound Context:  Patient has had left calf wound since ~ 2021. She has been putting a dressing on it. The wound has not been improving. She has a hx significant for venous stasis ulcerations of bilateral LE. She first was seen by myself in regards to these issues 2015. She eventually healed these wounds 2016. I last saw her 2021 at which time I recommended lymphedema therapy. She states she went and said it caused her to much pain and stopped going. She has chronic issues with bilateral calf pain, swelling and edema.            She admits to not wearing her stockings because of the pain.     She has used knee high 20-30 mm hg stockings in the past.       She is still seeing pain management and is down to percocet /325 mg daily.       21  · aquacell  · Double tubigrip  · Culture done  · Emphasized importance of getting in to more significant compression in the future  12/15/21  · Culture reviewed - light growth, no tx  · Wound slightly improved  · Still significant drainage  · Plan on compression wrap next week  21  · Stable wound  · Drainage slightly better  · Pt would like to wait till next week because of holidays to start wrap  22  · Wound larger  · Profore  22  · Wound appearance better  · Refusing wrap - it rolled down last week and she cut off after 3 days  22  · Wound appearance better  · Still refusing wrap   3/2/22  · periwound worse  · Culture  · drawtek  3/9/22  · periwound much improved  · levaquin 750 mg daily #10 script given culture   · Wound itself stable  3/23/22  · Left anterior calf wound improving overall  · New blister/ulcerations left 3rd, 4th and 5th toes and lateral foot   · Instructed to keep toes/foot dry, no ointment or corn starch   3/30/22  · bistering resolved, other skin issues resolved  · More dry than previously but overall stable  · Holly, aquacell  · Swelling is down, patient declining wrap    Wound/Ulcer Pain Timing/Severity: waxing and waning, mild  Quality of pain: aching, throbbing, pressure  Severity:  7 / 10   Modifying Factors: Pain worsens with debridement, dressing changes  Associated Signs/Symptoms: edema, drainage and pain    Ulcer Identification:  Ulcer Type: venous and lymphedema  Contributing Factors: edema, venous stasis and lymphedema    Diabetic/Pressure/Non Pressure Ulcers only:  Ulcer: Non-Pressure ulcer, fat layer exposed    Wound: N/A        PAST MEDICAL HISTORY      Diagnosis Date    Bursitis     CAD (coronary artery disease) 1993    heart attack    COPD (chronic obstructive pulmonary disease) ([facility] 6/19/2013    Emphysema     slight    GERD (gastroesophageal reflux disease)     Hiatal hernia     Hip pain     Hyperlipidemia     Hypertension     Lymphedema of both lower extremities 11/21/2018    Venous insufficiency of both lower extremities 11/21/2018    Venous stasis ulcer of left calf with fat layer exposed without varicose veins ([address] 12/8/2021    Venous ulcer with fat layer exposed ([facility] [address] 10/28/2015     Past Surgical History:   Procedure Laterality Date    APPENDECTOMY      BREAST ENHANCEMENT SURGERY      BREAST REDUCTION SURGERY      CHOLECYSTECTOMY      COLONOSCOPY      ECHO COMPL W DOP COLOR FLOW  3/11/2013         ENDOSCOPY, COLON, DIAGNOSTIC      HERNIA REPAIR N/A 4/16/2021    LAPAROSCOPIC ROBOTIC ASSISTED INGUINAL HERNIA REPAIR performed by Wu Suresh MD at [facility] 60 REPLACEMENT  2009    l knee r hip    LEG DEBRIDEMENT Left 2015    LEG DEBRIDEMENT Left 2016     History reviewed. No pertinent family history. Social History     Tobacco Use    Smoking status: Former Smoker     Packs/day: 1.00     Years: 20.00     Pack years: 20.00     Types: Cigarettes     Quit date: 1995     Years since quittin.4    Smokeless tobacco: Never Used   Vaping Use    Vaping Use: Never used   Substance Use Topics    Alcohol use: No    Drug use: No     Allergies   Allergen Reactions    Codeine Nausea And Vomiting and Other (See Comments)     hallucinate    Dilaudid [Hydromorphone Hcl] Rash    Naproxen Other (See Comments)     Shuts down kidney function    Singulair [Montelukast Sodium] Shortness Of Breath     Mucus in chest    Black Cohosh     Black Cohosh [Cimicifuga [place] (Black Cohosh)] Rash     Current Outpatient Medications on File Prior to Encounter   Medication Sig Dispense Refill    Cyanocobalamin (VITAMIN B 12) 500 MCG TABS Take by mouth daily      Multiple Vitamins-Minerals (THERAPEUTIC MULTIVITAMIN-MINERALS) tablet Take 1 tablet by mouth daily      zinc gluconate 50 MG tablet Take 50 mg by mouth daily      GENISTEIN PO Take 125 mg by mouth nightly      folic acid (FOLVITE) 100 MCG tablet Take 400 mcg by mouth nightly      Krill Oil 350 MG CAPS Take 350 mg by mouth nightly      gabapentin (NEURONTIN) 300 MG capsule Take 300 mg by mouth 3 times daily.  simvastatin (ZOCOR) 40 MG tablet Take 40 mg by mouth nightly      oxyCODONE-acetaminophen (PERCOCET) 7.5-325 MG per tablet Take 1 tablet by mouth 2 times daily. Almarie Feeler morphine (MS CONTIN) 15 MG extended release tablet Take 15 mg by mouth 2 times daily.        aspirin 81 MG tablet Take 81 mg by mouth daily      Cholecalciferol (VITAMIN D) 2000 UNITS CAPS capsule Take 2,000 Units by mouth daily       ferrous sulfate 325 (65 FE) MG tablet Take 325 mg by mouth nightly       metoprolol (LOPRESSOR) 50 MG tablet Take 1 tablet by mouth 2 times daily 60 tablet 0    albuterol (PROVENTIL HFA;VENTOLIN HFA) 108 (90 BASE) MCG/ACT inhaler Inhale 2 puffs into the lungs every 6 hours as needed for Wheezing or Shortness of Breath       calcium carbonate (OYSTER SHELL CALCIUM 500 MG) 1250 MG tablet Take 2 tablets by mouth daily      Ascorbic Acid (VITAMIN C) 500 MG tablet Take 2,000 mg by mouth daily      omeprazole (PRILOSEC) 40 MG capsule Take 40 mg by mouth daily.  diphenhydrAMINE (BENADRYL) 50 MG capsule Take 50 mg by mouth daily as needed for Allergies       Garlic 447 MG TABS Take 1,000 mg by mouth daily        No current facility-administered medications on file prior to encounter.        REVIEW OF SYSTEMS See HPI    Objective:    /76   Pulse 68   Temp 97.4 °F (36.3 °C) (Temporal)   Resp 18   Ht 5' 7\" (1.702 m)   Wt 295 lb (133.8 kg)   BMI 46.20 kg/m²   Wt Readings from Last 3 Encounters:   03/30/22 295 lb (133.8 kg)   03/09/22 300 lb (136.1 kg)   03/02/22 300 lb (136.1 kg)     PHYSICAL EXAM  CONSTITUTIONAL:   Awake, alert, cooperative   EYES:  lids and lashes normal   ENT: external ears and nose without lesions   NECK:  supple, symmetrical, trachea midline   SKIN:  Open wound Present    Assessment:     Problem List Items Addressed This Visit     Venous stasis ulcer of left calf with fat layer exposed without varicose veins ([facility] [address] - Primary (Chronic)    Relevant Orders    Initiate Outpatient Wound Care Protocol        Pre Debridement Measurements:  Are located in the [facility]  Documentation Flow Sheet  Post Debridement Measurements:  Wound/Ulcer Descriptions are Pre Debridement except measurements:     Incision 04/20/16 Leg Left (Active)   Number of days: 2169       Incision 08/03/16 Leg Left (Active)   Number of days: 2065       Wound 12/08/21 Pretibial Left #1 (Active) Wound Image   03/30/22 1504   Dressing Status New dressing applied 03/09/22 1610   Wound Cleansed Cleansed with saline 03/09/22 1610   Dressing/Treatment Alginate;Dry dressing 03/09/22 1610   Offloading for Diabetic Foot Ulcers Offloading not required 03/09/22 1610   Wound Length (cm) 2.4 cm 03/30/22 1504   Wound Width (cm) 1.5 cm 03/30/22 1504   Wound Depth (cm) 0.3 cm 03/30/22 1504   Wound Surface Area (cm^2) 3.6 cm^2 03/30/22 1504   Change in Wound Size % (l*w) -414.29 03/30/22 1504   Wound Volume (cm^3) 1.08 cm^3 03/30/22 1504   Wound Healing % -1443 03/30/22 1504   Post-Procedure Length (cm) 2.6 cm 03/30/22 1531   Post-Procedure Width (cm) 1.6 cm 03/30/22 1531   Post-Procedure Depth (cm) 0.3 cm 03/30/22 1531   Post-Procedure Surface Area (cm^2) 4.16 cm^2 03/30/22 1531   Post-Procedure Volume (cm^3) 1.248 cm^3 03/30/22 1531   Wound Assessment Pale granulation tissue;Fibrin 03/30/22 1504   Drainage Amount Moderate 03/30/22 1504   Drainage Description Serosanguinous; Yellow 03/30/22 1504   Odor None 03/30/22 1504   Kori-wound Assessment Non-blanchable erythema 03/30/22 1504   Number of days: 111       Wound 03/23/22 Toe (Comment  which one) Left;Dorsal #2 5th toe (Active)   Wound Image   03/30/22 1504   Wound Etiology Traumatic 03/23/22 1445   Wound Length (cm) 0 cm 03/30/22 1504   Wound Width (cm) 0 cm 03/30/22 1504   Wound Depth (cm) 0 cm 03/30/22 1504   Wound Surface Area (cm^2) 0 cm^2 03/30/22 1504   Change in Wound Size % (l*w) 100 03/30/22 1504   Wound Volume (cm^3) 0 cm^3 03/30/22 1504   Wound Healing % 100 03/30/22 1504   Wound Assessment Pink/red 03/23/22 1445   Drainage Amount Large 03/23/22 1445   Drainage Description Serous;Clear 03/23/22 1445   Odor None 03/23/22 1445   Kori-wound Assessment Maceration 03/23/22 1445   Number of days: 7     Incision 04/16/21 Abdomen (Active)   Number of days: 348       Procedure Note  Indications:  Based on my examination of this patient's wound(s)/ulcer(s) today, debridement is required to promote healing and evaluate the wound base. Performed by: Edwar Elizabeth MD    Consent obtained:  Yes    Time out taken:  Yes    Pain Control: Anesthetic  Anesthetic: 4% Lidocaine Liquid Topical     Debridement:Excisional Debridement    Using curette the wound(s)/ulcer(s) was/were sharply debrided down through and including the removal of epidermis, dermis and subcutaneous tissue. Devitalized Tissue Debrided:  fibrin, biofilm, slough and exudate to stimulate bleeding to promote healing, post debridement good bleeding base and wound edges noted    Wound/Ulcer #: 1    Percent of Wound/Ulcer Debrided: 100%    Total Surface Area Debrided:  3.6 sq cm     Estimated Blood Loss:  Minimal  Hemostasis Achieved:  by pressure    Procedural Pain:  7  / 10   Post Procedural Pain:  8 / 10     Response to treatment:  With complaints of pain. Plan:   Treatment Note please see attached Discharge Instructions    Written patient dismissal instructions given to patient and signed by patient or POA. Discharge Instructions       Visit Discharge/Physician Orders     Discharge condition: Stable     Assessment of pain at discharge: mild     Anesthetic used: lido 4%     Discharge to: Home     Left via:Private automobile     Accompanied by: accompanied by self     ECF/[facility]HA: [facility]     Dressing Orders: To left pretib wound: cleanse with normal saline, apply karen then cover with aquacel, cover and secure with dry dressing.  Change daily  Cover with Spandigrip     Treatment Orders:Eat a diet high in protein and vitamin C. Take a multiple vitamin daily unless contraindicated. Elevate as much as possible     [facility] followup visit 1 week____________________________  (Please note your next appointment above and if you are unable to keep, kindly give a 24 hour notice.  Thank you.)     Physician signature:__________________________        If you experience any of the following, please call the [address] during business hours:     * Increase in Pain  * Temperature over 101  * Increase in drainage from your wound  * Drainage with a foul odor  * Bleeding  * Increase in swelling  * Need for compression bandage changes due to slippage, breakthrough drainage.     If you need medical attention outside of the business hours of the [email or web address] [email or web address] please contact your PCP or go to the nearest emergency room.       Electronically signed by Medina Pulido MD on 3/30/2022 at 3:34 PM

## 2022-03-30 NOTE — PLAN OF CARE
Problem: Wound:  Goal: Will show signs of wound healing; wound closure and no evidence of infection  Description: Will show signs of wound healing; wound closure and no evidence of infection  Outcome: Ongoing     Problem: Venous:  Goal: Signs of wound healing will improve  Description: Signs of wound healing will improve  Outcome: Ongoing     Problem: Weight control:  Goal: Ability to maintain an optimal weight for height and age will be supported  Description: Ability to maintain an optimal weight for height and age will be supported  Outcome: Ongoing

## 2022-04-06 ENCOUNTER — HOSPITAL ENCOUNTER (OUTPATIENT)
Dept: WOUND CARE | Age: 77
Discharge: HOME | End: 2022-04-06

## 2022-04-13 ENCOUNTER — HOSPITAL ENCOUNTER (OUTPATIENT)
Dept: WOUND CARE | Age: 77
Discharge: HOME | End: 2022-04-13
Payer: MEDICARE

## 2022-04-13 VITALS
HEART RATE: 78 BPM | RESPIRATION RATE: 18 BRPM | SYSTOLIC BLOOD PRESSURE: 110 MMHG | DIASTOLIC BLOOD PRESSURE: 60 MMHG | HEIGHT: 67 IN | BODY MASS INDEX: 45.99 KG/M2 | TEMPERATURE: 97.1 F | WEIGHT: 293 LBS

## 2022-04-13 DIAGNOSIS — I87.2 VENOUS STASIS ULCER OF LEFT CALF WITH FAT LAYER EXPOSED WITHOUT VARICOSE VEINS (HCC): Primary | ICD-10-CM

## 2022-04-13 DIAGNOSIS — L97.222 VENOUS STASIS ULCER OF LEFT CALF WITH FAT LAYER EXPOSED WITHOUT VARICOSE VEINS (HCC): Primary | ICD-10-CM

## 2022-04-13 DIAGNOSIS — I89.0 LYMPHEDEMA OF BOTH LOWER EXTREMITIES: Chronic | ICD-10-CM

## 2022-04-13 PROCEDURE — 11042 DBRDMT SUBQ TIS 1ST 20SQCM/<: CPT

## 2022-04-13 PROCEDURE — 11042 DBRDMT SUBQ TIS 1ST 20SQCM/<: CPT | Performed by: SURGERY

## 2022-04-13 PROCEDURE — 6370000000 HC RX 637 (ALT 250 FOR IP): Performed by: SURGERY

## 2022-04-13 RX ORDER — LIDOCAINE HYDROCHLORIDE 40 MG/ML
SOLUTION TOPICAL ONCE
Status: COMPLETED | OUTPATIENT
Start: 2022-04-13 | End: 2022-04-13

## 2022-04-13 RX ORDER — CLOBETASOL PROPIONATE 0.5 MG/G
OINTMENT TOPICAL ONCE
Status: CANCELLED | OUTPATIENT
Start: 2022-04-13 | End: 2022-04-13

## 2022-04-13 RX ORDER — LIDOCAINE HYDROCHLORIDE 20 MG/ML
JELLY TOPICAL ONCE
Status: CANCELLED | OUTPATIENT
Start: 2022-04-13 | End: 2022-04-13

## 2022-04-13 RX ORDER — GINSENG 100 MG
CAPSULE ORAL ONCE
Status: CANCELLED | OUTPATIENT
Start: 2022-04-13 | End: 2022-04-13

## 2022-04-13 RX ORDER — LIDOCAINE 50 MG/G
OINTMENT TOPICAL ONCE
Status: CANCELLED | OUTPATIENT
Start: 2022-04-13 | End: 2022-04-13

## 2022-04-13 RX ORDER — LIDOCAINE HYDROCHLORIDE 40 MG/ML
SOLUTION TOPICAL ONCE
Status: CANCELLED | OUTPATIENT
Start: 2022-04-13 | End: 2022-04-13

## 2022-04-13 RX ORDER — GENTAMICIN SULFATE 1 MG/G
OINTMENT TOPICAL ONCE
Status: CANCELLED | OUTPATIENT
Start: 2022-04-13 | End: 2022-04-13

## 2022-04-13 RX ORDER — BETAMETHASONE DIPROPIONATE 0.05 %
OINTMENT (GRAM) TOPICAL ONCE
Status: CANCELLED | OUTPATIENT
Start: 2022-04-13 | End: 2022-04-13

## 2022-04-13 RX ORDER — LIDOCAINE 40 MG/G
CREAM TOPICAL ONCE
Status: CANCELLED | OUTPATIENT
Start: 2022-04-13 | End: 2022-04-13

## 2022-04-13 RX ORDER — BACITRACIN, NEOMYCIN, POLYMYXIN B 400; 3.5; 5 [USP'U]/G; MG/G; [USP'U]/G
OINTMENT TOPICAL ONCE
Status: CANCELLED | OUTPATIENT
Start: 2022-04-13 | End: 2022-04-13

## 2022-04-13 RX ORDER — BACITRACIN ZINC AND POLYMYXIN B SULFATE 500; 1000 [USP'U]/G; [USP'U]/G
OINTMENT TOPICAL ONCE
Status: CANCELLED | OUTPATIENT
Start: 2022-04-13 | End: 2022-04-13

## 2022-04-13 RX ADMIN — LIDOCAINE HYDROCHLORIDE 4 ML: 40 SOLUTION TOPICAL at 15:25

## 2022-04-13 NOTE — PROGRESS NOTES
1523   Wound Width (cm) 1.3 cm 04/13/22 1523   Wound Depth (cm) 0.3 cm 04/13/22 1523   Wound Surface Area (cm^2) 3.25 cm^2 04/13/22 1523   Change in Wound Size % (l*w) -364.29 04/13/22 1523   Wound Volume (cm^3) 0.975 cm^3 04/13/22 1523   Wound Healing % -1293 04/13/22 1523   Post-Procedure Length (cm) 2.6 cm 04/13/22 1536   Post-Procedure Width (cm) 1.4 cm 04/13/22 1536   Post-Procedure Depth (cm) 0.3 cm 04/13/22 1536   Post-Procedure Surface Area (cm^2) 3.64 cm^2 04/13/22 1536   Post-Procedure Volume (cm^3) 1.092 cm^3 04/13/22 1536   Wound Assessment Pink/red;Fibrin 04/13/22 1523   Drainage Amount Moderate 04/13/22 1523   Drainage Description Yellow 04/13/22 1523   Odor None 04/13/22 1523   Kori-wound Assessment Hemosiderin staining (brown yellow) 04/13/22 1523   Number of days: 125       Wound 03/23/22 Toe (Comment  which one) Left;Dorsal #2 5th toe (Active)   Wound Image   03/30/22 1504   Wound Etiology Traumatic 03/23/22 1445   Wound Length (cm) 0 cm 03/30/22 1504   Wound Width (cm) 0 cm 03/30/22 1504   Wound Depth (cm) 0 cm 03/30/22 1504   Wound Surface Area (cm^2) 0 cm^2 03/30/22 1504   Change in Wound Size % (l*w) 100 03/30/22 1504   Wound Volume (cm^3) 0 cm^3 03/30/22 1504   Wound Healing % 100 03/30/22 1504   Wound Assessment Pink/red 03/23/22 1445   Drainage Amount Large 03/23/22 1445   Drainage Description Serous;Clear 03/23/22 1445   Odor None 03/23/22 1445   Kori-wound Assessment Maceration 03/23/22 1445   Number of days: 21     Incision 04/16/21 Abdomen (Active)   Number of days: 362       Supplies Requested :      WOUND #: 1   PRIMARY DRESSING:  Other: karen & aquacel   Cover and Secure with: ABD pad  Conforming roll gauze     FREQUENCY OF DRESSING CHANGES:  Daily         ADDITIONAL ITEMS:  [] Gloves Small  [x] Gloves Medium [] Gloves Large [] Gloves XLarge  [] Tape 1\" [x] Tape 2\" [] Tape 3\"  [] Medipore Tape  [x] Saline  [] Skin Prep   [] Adhesive Remover   [] Cotton Tip Applicators   [] Other:    Patient Wound(s) Debrided: [x] Yes if yes please add date 4/13/22    [] No    Debribement Type: Excisional/Sharp    Is the patient currently on an antibiotic for their Wound(s): [] Yes if yes please add name and dose    [x] No    Patient currently being seen by Home Health: [] Yes   [x] No    Duration for needed supplies:  []15  []30  [x]60  []90 Days    Electronically signed by Nigel Leo RN on 4/13/2022 at 3:38 PM     Provider Information:      [facility] NAME:Dr. Elisha Aguilar    NPI: [id number]

## 2022-04-14 NOTE — PROGRESS NOTES
[facility] Followup Visit Note    Referring Physician : Miguel Ángel Domínguez MD  [id number] [facility] RECORD NUMBER:  [id number]  AGE: 78 y.o. GENDER: female  : 1945  EPISODE DATE:  2022    Subjective:     Chief Complaint   Patient presents with    Wound Check     left leg wound      HISTORY of PRESENT ILLNESS HPI   Morena Spore is a 78 y.o. female who presents today in regards to follow up evaluation and treatment of wound/ulcer. That patient's past medical, family and social hx were reviewed and changes were made if present. History of Wound Context:  Patient has had left calf wound since ~ 2021. She has been putting a dressing on it. The wound has not been improving. She has a hx significant for venous stasis ulcerations of bilateral LE. She first was seen by myself in regards to these issues 2015. She eventually healed these wounds 2016. I last saw her 2021 at which time I recommended lymphedema therapy. She states she went and said it caused her to much pain and stopped going. She has chronic issues with bilateral calf pain, swelling and edema.            She admits to not wearing her stockings because of the pain.     She has used knee high 20-30 mm hg stockings in the past.       She is still seeing pain management and is down to percocet /325 mg daily.       21  · aquacell  · Double tubigrip  · Culture done  · Emphasized importance of getting in to more significant compression in the future  12/15/21  · Culture reviewed - light growth, no tx  · Wound slightly improved  · Still significant drainage  · Plan on compression wrap next week  21  · Stable wound  · Drainage slightly better  · Pt would like to wait till next week because of holidays to start wrap  22  · Wound larger  · Profore  22  · Wound appearance better  · Refusing wrap - it rolled down last week and she cut off after 3 days  22  · Wound appearance better  · Still refusing wrap   3/2/22  · periwound worse  · Culture  · drawtek  3/9/22  · periwound much improved  · levaquin 750 mg daily #10 script given culture   · Wound itself stable  3/23/22  · Left anterior calf wound improving overall  · New blister/ulcerations left 3rd, 4th and 5th toes and lateral foot   · Instructed to keep toes/foot dry, no ointment or corn starch   3/30/22  · bistering resolved, other skin issues resolved  · More dry than previously but overall stable  · Holly, aquacell  · Swelling is down, patient declining wrap  4/13/22  · Wound slightly improved    Wound/Ulcer Pain Timing/Severity: waxing and waning, mild  Quality of pain: aching, throbbing, pressure  Severity:  7 / 10   Modifying Factors: Pain worsens with debridement, dressing changes  Associated Signs/Symptoms: edema, drainage and pain    Ulcer Identification:  Ulcer Type: venous and lymphedema  Contributing Factors: edema, venous stasis and lymphedema    Diabetic/Pressure/Non Pressure Ulcers only:  Ulcer: Non-Pressure ulcer, fat layer exposed    Wound: N/A        PAST MEDICAL HISTORY      Diagnosis Date    Bursitis     CAD (coronary artery disease) 1993    heart attack    COPD (chronic obstructive pulmonary disease) ([address] 6/19/2013    Emphysema     slight    GERD (gastroesophageal reflux disease)     Hiatal hernia     Hip pain     Hyperlipidemia     Hypertension     Lymphedema of both lower extremities 11/21/2018    Venous insufficiency of both lower extremities 11/21/2018    Venous stasis ulcer of left calf with fat layer exposed without varicose veins ([address] 12/8/2021    Venous ulcer with fat layer exposed ([address] 10/28/2015     Past Surgical History:   Procedure Laterality Date    APPENDECTOMY      BREAST ENHANCEMENT SURGERY      BREAST REDUCTION SURGERY      CHOLECYSTECTOMY      COLONOSCOPY      ECHO COMPL W DOP COLOR FLOW  3/11/2013         ENDOSCOPY, COLON, DIAGNOSTIC      HERNIA REPAIR N/A 4/16/2021    LAPAROSCOPIC ROBOTIC ASSISTED INGUINAL HERNIA REPAIR performed by Ashleigh Pike MD at [facility] 60 REPLACEMENT  2009    l knee r hip    LEG DEBRIDEMENT Left 2015    LEG DEBRIDEMENT Left 2016     History reviewed. No pertinent family history. Social History     Tobacco Use    Smoking status: Former Smoker     Packs/day: 1.00     Years: 20.00     Pack years: 20.00     Types: Cigarettes     Quit date: 1995     Years since quittin.4    Smokeless tobacco: Never Used   Vaping Use    Vaping Use: Never used   Substance Use Topics    Alcohol use: No    Drug use: No     Allergies   Allergen Reactions    Codeine Nausea And Vomiting and Other (See Comments)     hallucinate    Dilaudid [Hydromorphone Hcl] Rash    Naproxen Other (See Comments)     Shuts down kidney function    Singulair [Montelukast Sodium] Shortness Of Breath     Mucus in chest    Black Cohosh     Black Cohosh [Cimicifuga [place] (Black Cohosh)] Rash     Current Outpatient Medications on File Prior to Encounter   Medication Sig Dispense Refill    Cyanocobalamin (VITAMIN B 12) 500 MCG TABS Take by mouth daily      Multiple Vitamins-Minerals (THERAPEUTIC MULTIVITAMIN-MINERALS) tablet Take 1 tablet by mouth daily      zinc gluconate 50 MG tablet Take 50 mg by mouth daily      GENISTEIN PO Take 125 mg by mouth nightly      folic acid (FOLVITE) 043 MCG tablet Take 400 mcg by mouth nightly      Krill Oil 350 MG CAPS Take 350 mg by mouth nightly      gabapentin (NEURONTIN) 300 MG capsule Take 300 mg by mouth 3 times daily.  simvastatin (ZOCOR) 40 MG tablet Take 40 mg by mouth nightly      oxyCODONE-acetaminophen (PERCOCET) 7.5-325 MG per tablet Take 1 tablet by mouth 2 times daily. Francisco Love morphine (MS CONTIN) 15 MG extended release tablet Take 15 mg by mouth 2 times daily.        aspirin 81 MG tablet Take 81 mg by mouth daily      Cholecalciferol (VITAMIN D) 2000 UNITS CAPS capsule Take 2,000 Units by mouth daily       ferrous sulfate 325 (65 FE) MG tablet Take 325 mg by mouth nightly       metoprolol (LOPRESSOR) 50 MG tablet Take 1 tablet by mouth 2 times daily 60 tablet 0    albuterol (PROVENTIL HFA;VENTOLIN HFA) 108 (90 BASE) MCG/ACT inhaler Inhale 2 puffs into the lungs every 6 hours as needed for Wheezing or Shortness of Breath       calcium carbonate (OYSTER SHELL CALCIUM 500 MG) 1250 MG tablet Take 2 tablets by mouth daily      Ascorbic Acid (VITAMIN C) 500 MG tablet Take 2,000 mg by mouth daily      omeprazole (PRILOSEC) 40 MG capsule Take 40 mg by mouth daily.  diphenhydrAMINE (BENADRYL) 50 MG capsule Take 50 mg by mouth daily as needed for Allergies       Garlic 576 MG TABS Take 1,000 mg by mouth daily        No current facility-administered medications on file prior to encounter.        REVIEW OF SYSTEMS See HPI    Objective:    /60   Pulse 78   Temp 97.1 °F (36.2 °C) (Temporal)   Resp 18   Ht 5' 7\" (1.702 m)   Wt 295 lb (133.8 kg)   BMI 46.20 kg/m²   Wt Readings from Last 3 Encounters:   04/13/22 295 lb (133.8 kg)   03/30/22 295 lb (133.8 kg)   03/09/22 300 lb (136.1 kg)     PHYSICAL EXAM  CONSTITUTIONAL:   Awake, alert, cooperative   EYES:  lids and lashes normal   ENT: external ears and nose without lesions   NECK:  supple, symmetrical, trachea midline   SKIN:  Open wound Present    Assessment:     Problem List Items Addressed This Visit     Lymphedema of both lower extremities (Chronic)    Venous stasis ulcer of left calf with fat layer exposed without varicose veins (HCC) - Primary (Chronic)    Relevant Orders    Initiate Outpatient Wound Care Protocol        Pre Debridement Measurements:  Are located in the [place]  Documentation Flow Sheet  Post Debridement Measurements:  Wound/Ulcer Descriptions are Pre Debridement except measurements:     Incision 04/20/16 Leg Left (Active)   Number of days: 2184       Incision 08/03/16 Leg Left (Active)   Number of days: 2079       Wound 12/08/21 Pretibial Left #1 (Active)   Wound Image   03/30/22 1504   Dressing Status New dressing applied 04/13/22 1543   Wound Cleansed Cleansed with saline 04/13/22 1543   Dressing/Treatment Collagen with Ag;Alginate;ABD;Roll gauze 04/13/22 1543   Offloading for Diabetic Foot Ulcers Offloading not required 04/13/22 1543   Wound Length (cm) 2.5 cm 04/13/22 1523   Wound Width (cm) 1.3 cm 04/13/22 1523   Wound Depth (cm) 0.3 cm 04/13/22 1523   Wound Surface Area (cm^2) 3.25 cm^2 04/13/22 1523   Change in Wound Size % (l*w) -364.29 04/13/22 1523   Wound Volume (cm^3) 0.975 cm^3 04/13/22 1523   Wound Healing % -1293 04/13/22 1523   Post-Procedure Length (cm) 2.6 cm 04/13/22 1536   Post-Procedure Width (cm) 1.4 cm 04/13/22 1536   Post-Procedure Depth (cm) 0.3 cm 04/13/22 1536   Post-Procedure Surface Area (cm^2) 3.64 cm^2 04/13/22 1536   Post-Procedure Volume (cm^3) 1.092 cm^3 04/13/22 1536   Wound Assessment Pink/red;Fibrin 04/13/22 1523   Drainage Amount Moderate 04/13/22 1523   Drainage Description Yellow 04/13/22 1523   Odor None 04/13/22 1523   Kori-wound Assessment Hemosiderin staining (brown yellow) 04/13/22 1523   Number of days: 126       Wound 03/23/22 Toe (Comment  which one) Left;Dorsal #2 5th toe (Active)   Wound Image   03/30/22 1504   Wound Etiology Traumatic 03/23/22 1445   Wound Length (cm) 0 cm 03/30/22 1504   Wound Width (cm) 0 cm 03/30/22 1504   Wound Depth (cm) 0 cm 03/30/22 1504   Wound Surface Area (cm^2) 0 cm^2 03/30/22 1504   Change in Wound Size % (l*w) 100 03/30/22 1504   Wound Volume (cm^3) 0 cm^3 03/30/22 1504   Wound Healing % 100 03/30/22 1504   Wound Assessment Pink/red 03/23/22 1445   Drainage Amount Large 03/23/22 1445   Drainage Description Serous;Clear 03/23/22 1445   Odor None 03/23/22 1445   Kori-wound Assessment Maceration 03/23/22 1445   Number of days: 21     Incision 04/16/21 Abdomen (Active)   Number of days: 362       Procedure Note  Indications: Based on my examination of this patient's wound(s)/ulcer(s) today, debridement is required to promote healing and evaluate the wound base. Performed by: Robert Hand MD    Consent obtained:  Yes    Time out taken:  Yes    Pain Control: Anesthetic  Anesthetic: 4% Lidocaine Liquid Topical     Debridement:Excisional Debridement    Using curette the wound(s)/ulcer(s) was/were sharply debrided down through and including the removal of epidermis, dermis and subcutaneous tissue. Devitalized Tissue Debrided:  fibrin, biofilm, slough and exudate to stimulate bleeding to promote healing, post debridement good bleeding base and wound edges noted    Wound/Ulcer #: 1    Percent of Wound/Ulcer Debrided: 100%    Total Surface Area Debrided:  3.25 sq cm     Estimated Blood Loss:  Minimal  Hemostasis Achieved:  by pressure    Procedural Pain:  7  / 10   Post Procedural Pain:  8 / 10     Response to treatment:  With complaints of pain. Plan:   Treatment Note please see attached Discharge Instructions    Written patient dismissal instructions given to patient and signed by patient or POA. Discharge Instructions       Visit Discharge/Physician Orders     Discharge condition: Stable     Assessment of pain at discharge: mild     Anesthetic used: lido 4%     Discharge to: Home     Left via:Private automobile     Accompanied by: accompanied by self     ECF/[facility]: [facility]     Dressing Orders: To left pretib wound: cleanse with normal saline, apply karen then cover with aquacel, cover and secure with dry dressing.  Change daily  Cover with Spandigrip      Treatment Orders:Eat a diet high in protein and vitamin C. Take a multiple vitamin daily unless contraindicated. Elevate as much as possible     [facility] followup visit 1 week____________________________  (Please note your next appointment above and if you are unable to keep, kindly give a 24 hour notice.  Thank you.)     Physician signature:__________________________        If you experience any of the following, please call the [facility] [address] during business hours:     * Increase in Pain  * Temperature over 101  * Increase in drainage from your wound  * Drainage with a foul odor  * Bleeding  * Increase in swelling  * Need for compression bandage changes due to slippage, breakthrough drainage.     If you need medical attention outside of the business hours of the [address] please contact your PCP or go to the nearest emergency room.       Electronically signed by Ollie Gabriel MD on 4/13/2022 at 10:47 PM

## 2022-04-20 ENCOUNTER — HOSPITAL ENCOUNTER (OUTPATIENT)
Dept: WOUND CARE | Age: 77
Discharge: HOME | End: 2022-04-20
Payer: MEDICARE

## 2022-04-20 VITALS
DIASTOLIC BLOOD PRESSURE: 70 MMHG | RESPIRATION RATE: 20 BRPM | WEIGHT: 293 LBS | BODY MASS INDEX: 46.2 KG/M2 | SYSTOLIC BLOOD PRESSURE: 132 MMHG | TEMPERATURE: 96 F | HEART RATE: 86 BPM

## 2022-04-20 DIAGNOSIS — L97.222 VENOUS STASIS ULCER OF LEFT CALF WITH FAT LAYER EXPOSED WITHOUT VARICOSE VEINS (HCC): Primary | ICD-10-CM

## 2022-04-20 DIAGNOSIS — I87.2 VENOUS STASIS ULCER OF LEFT CALF WITH FAT LAYER EXPOSED WITHOUT VARICOSE VEINS (HCC): Primary | ICD-10-CM

## 2022-04-20 PROCEDURE — 11042 DBRDMT SUBQ TIS 1ST 20SQCM/<: CPT | Performed by: SURGERY

## 2022-04-20 PROCEDURE — 11042 DBRDMT SUBQ TIS 1ST 20SQCM/<: CPT

## 2022-04-20 PROCEDURE — 6370000000 HC RX 637 (ALT 250 FOR IP): Performed by: SURGERY

## 2022-04-20 RX ORDER — LIDOCAINE HYDROCHLORIDE 40 MG/ML
SOLUTION TOPICAL ONCE
Status: COMPLETED | OUTPATIENT
Start: 2022-04-20 | End: 2022-04-20

## 2022-04-20 RX ORDER — LIDOCAINE HYDROCHLORIDE 40 MG/ML
SOLUTION TOPICAL ONCE
Status: CANCELLED | OUTPATIENT
Start: 2022-04-20 | End: 2022-04-20

## 2022-04-20 RX ORDER — LIDOCAINE 40 MG/G
CREAM TOPICAL ONCE
Status: CANCELLED | OUTPATIENT
Start: 2022-04-20 | End: 2022-04-20

## 2022-04-20 RX ORDER — GINSENG 100 MG
CAPSULE ORAL ONCE
Status: CANCELLED | OUTPATIENT
Start: 2022-04-20 | End: 2022-04-20

## 2022-04-20 RX ORDER — BACITRACIN, NEOMYCIN, POLYMYXIN B 400; 3.5; 5 [USP'U]/G; MG/G; [USP'U]/G
OINTMENT TOPICAL ONCE
Status: CANCELLED | OUTPATIENT
Start: 2022-04-20 | End: 2022-04-20

## 2022-04-20 RX ORDER — LIDOCAINE HYDROCHLORIDE 20 MG/ML
JELLY TOPICAL ONCE
Status: CANCELLED | OUTPATIENT
Start: 2022-04-20 | End: 2022-04-20

## 2022-04-20 RX ORDER — GENTAMICIN SULFATE 1 MG/G
OINTMENT TOPICAL ONCE
Status: CANCELLED | OUTPATIENT
Start: 2022-04-20 | End: 2022-04-20

## 2022-04-20 RX ORDER — BETAMETHASONE DIPROPIONATE 0.05 %
OINTMENT (GRAM) TOPICAL ONCE
Status: CANCELLED | OUTPATIENT
Start: 2022-04-20 | End: 2022-04-20

## 2022-04-20 RX ORDER — BACITRACIN ZINC AND POLYMYXIN B SULFATE 500; 1000 [USP'U]/G; [USP'U]/G
OINTMENT TOPICAL ONCE
Status: CANCELLED | OUTPATIENT
Start: 2022-04-20 | End: 2022-04-20

## 2022-04-20 RX ORDER — CLOBETASOL PROPIONATE 0.5 MG/G
OINTMENT TOPICAL ONCE
Status: CANCELLED | OUTPATIENT
Start: 2022-04-20 | End: 2022-04-20

## 2022-04-20 RX ORDER — LIDOCAINE 50 MG/G
OINTMENT TOPICAL ONCE
Status: CANCELLED | OUTPATIENT
Start: 2022-04-20 | End: 2022-04-20

## 2022-04-20 RX ADMIN — LIDOCAINE HYDROCHLORIDE 10 ML: 40 SOLUTION TOPICAL at 15:15

## 2022-04-20 NOTE — PLAN OF CARE
Problem: Weight control:  Goal: Ability to maintain an optimal weight for height and age will be supported  Description: Ability to maintain an optimal weight for height and age will be supported  Outcome: Not Progressing     Problem: Chronic Conditions and Co-morbidities  Goal: Patient's chronic conditions and co-morbidity symptoms are monitored and maintained or improved  Outcome: Progressing     Problem: Wound:  Goal: Will show signs of wound healing; wound closure and no evidence of infection  Description: Will show signs of wound healing; wound closure and no evidence of infection  Outcome: Progressing     Problem: Venous:  Goal: Signs of wound healing will improve  Description: Signs of wound healing will improve  Outcome: Progressing

## 2022-04-20 NOTE — PROGRESS NOTES
Wound Healing Center Followup Visit Note    Referring Physician : Radha Duran MD  [address] [facility] RECORD NUMBER:  [id number]  AGE: 78 y.o. GENDER: female  : 1945  EPISODE DATE:  2022    Subjective:     Chief Complaint   Patient presents with    Wound Check     Left leg      HISTORY of PRESENT ILLNESS HPI   Ace Duckworth is a 78 y.o. female who presents today in regards to follow up evaluation and treatment of wound/ulcer. That patient's past medical, family and social hx were reviewed and changes were made if present. History of Wound Context:  Patient has had left calf wound since ~ 2021. She has been putting a dressing on it. The wound has not been improving. She has a hx significant for venous stasis ulcerations of bilateral LE. She first was seen by myself in regards to these issues 2015. She eventually healed these wounds 2016. I last saw her 2021 at which time I recommended lymphedema therapy. She states she went and said it caused her to much pain and stopped going. She has chronic issues with bilateral calf pain, swelling and edema.            She admits to not wearing her stockings because of the pain.     She has used knee high 20-30 mm hg stockings in the past.       She is still seeing pain management and is down to percocet //325 mg daily.       21  · aquacell  · Double tubigrip  · Culture done  · Emphasized importance of getting in to more significant compression in the future  12/15/21  · Culture reviewed - light growth, no tx  · Wound slightly improved  · Still significant drainage  · Plan on compression wrap next week  21  · Stable wound  · Drainage slightly better  · Pt would like to wait till next week because of holidays to start wrap  22  · Wound larger  · Profore  22  · Wound appearance better  · Refusing wrap - it rolled down last week and she cut off after 3 days  22  · Wound appearance better  · Still refusing wrap 3/2/22  · periwound worse  · Culture  · drawtek  3/9/22  · periwound much improved  · levaquin 750 mg daily #10 script given culture   · Wound itself stable  3/23/22  · Left anterior calf wound improving overall  · New blister/ulcerations left 3rd, 4th and 5th toes and lateral foot   · Instructed to keep toes/foot dry, no ointment or corn starch   3/30/22  · bistering resolved, other skin issues resolved  · More dry than previously but overall stable  · Holly, aquacell  · Swelling is down, patient declining wrap  4/13/22  · Wound slightly improved  4/20/2022  · Wound stable, patient refusing compression wrap  Wound/Ulcer Pain Timing/Severity: waxing and waning, mild  Quality of pain: aching, throbbing, pressure  Severity:  7 / 10   Modifying Factors: Pain worsens with debridement, dressing changes  Associated Signs/Symptoms: edema, drainage and pain    Ulcer Identification:  Ulcer Type: venous and lymphedema  Contributing Factors: edema, venous stasis and lymphedema    Diabetic/Pressure/Non Pressure Ulcers only:  Ulcer: Non-Pressure ulcer, fat layer exposed    Wound: N/A        PAST MEDICAL HISTORY      Diagnosis Date    Bursitis     CAD (coronary artery disease) 1993    heart attack    COPD (chronic obstructive pulmonary disease) ([facility] [address] 6/19/2013    Emphysema     slight    GERD (gastroesophageal reflux disease)     Hiatal hernia     Hip pain     Hyperlipidemia     Hypertension     Lymphedema of both lower extremities 11/21/2018    Venous insufficiency of both lower extremities 11/21/2018    Venous stasis ulcer of left calf with fat layer exposed without varicose veins ([facility] [address] 12/8/2021    Venous ulcer with fat layer exposed ([facility] [address] 10/28/2015     Past Surgical History:   Procedure Laterality Date    APPENDECTOMY      BREAST ENHANCEMENT SURGERY      BREAST REDUCTION SURGERY      CHOLECYSTECTOMY      COLONOSCOPY      ECHO COMPL W DOP COLOR FLOW  3/11/2013         ENDOSCOPY, COLON, DIAGNOSTIC      HERNIA REPAIR N/A 2021    LAPAROSCOPIC ROBOTIC ASSISTED INGUINAL HERNIA REPAIR performed by Varghese Griffin MD at [facility] 60 REPLACEMENT  2009    l knee r hip    LEG DEBRIDEMENT Left 2015    LEG DEBRIDEMENT Left 2016     History reviewed. No pertinent family history. Social History     Tobacco Use    Smoking status: Former Smoker     Packs/day: 1.00     Years: 20.00     Pack years: 20.00     Types: Cigarettes     Quit date: 1995     Years since quittin.4    Smokeless tobacco: Never Used   Vaping Use    Vaping Use: Never used   Substance Use Topics    Alcohol use: No    Drug use: No     Allergies   Allergen Reactions    Codeine Nausea And Vomiting and Other (See Comments)     hallucinate    Dilaudid [Hydromorphone Hcl] Rash    Naproxen Other (See Comments)     Shuts down kidney function    Singulair [Montelukast Sodium] Shortness Of Breath     Mucus in chest    Black Cohosh     Black Cohosh [Cimicifuga [place] (Black Cohosh)] Rash     Current Outpatient Medications on File Prior to Encounter   Medication Sig Dispense Refill    Cyanocobalamin (VITAMIN B 12) 500 MCG TABS Take by mouth daily      Multiple Vitamins-Minerals (THERAPEUTIC MULTIVITAMIN-MINERALS) tablet Take 1 tablet by mouth daily      zinc gluconate 50 MG tablet Take 50 mg by mouth daily      GENISTEIN PO Take 125 mg by mouth nightly      folic acid (FOLVITE) 171 MCG tablet Take 400 mcg by mouth nightly      Krill Oil 350 MG CAPS Take 350 mg by mouth nightly      gabapentin (NEURONTIN) 300 MG capsule Take 300 mg by mouth 3 times daily.  simvastatin (ZOCOR) 40 MG tablet Take 40 mg by mouth nightly      oxyCODONE-acetaminophen (PERCOCET) 7.5-325 MG per tablet Take 1 tablet by mouth 2 times daily. Bella All morphine (MS CONTIN) 15 MG extended release tablet Take 15 mg by mouth 2 times daily.        aspirin 81 MG tablet Take 81 mg by mouth daily      Cholecalciferol (VITAMIN D) 2000 UNITS CAPS capsule Take 2,000 Units by mouth daily       ferrous sulfate 325 (65 FE) MG tablet Take 325 mg by mouth nightly       metoprolol (LOPRESSOR) 50 MG tablet Take 1 tablet by mouth 2 times daily 60 tablet 0    albuterol (PROVENTIL HFA;VENTOLIN HFA) 108 (90 BASE) MCG/ACT inhaler Inhale 2 puffs into the lungs every 6 hours as needed for Wheezing or Shortness of Breath       calcium carbonate (OYSTER SHELL CALCIUM 500 MG) 1250 MG tablet Take 2 tablets by mouth daily      Ascorbic Acid (VITAMIN C) 500 MG tablet Take 2,000 mg by mouth daily      omeprazole (PRILOSEC) 40 MG capsule Take 40 mg by mouth daily.  diphenhydrAMINE (BENADRYL) 50 MG capsule Take 50 mg by mouth daily as needed for Allergies       Garlic 522 MG TABS Take 1,000 mg by mouth daily        No current facility-administered medications on file prior to encounter.        REVIEW OF SYSTEMS See HPI    Objective:    /70   Pulse 86   Temp 96 °F (35.6 °C) (Tympanic)   Resp 20   Wt 295 lb (133.8 kg)   BMI 46.20 kg/m²   Wt Readings from Last 3 Encounters:   04/20/22 295 lb (133.8 kg)   04/13/22 295 lb (133.8 kg)   03/30/22 295 lb (133.8 kg)     PHYSICAL EXAM  CONSTITUTIONAL:   Awake, alert, cooperative   EYES:  lids and lashes normal   ENT: external ears and nose without lesions   NECK:  supple, symmetrical, trachea midline   SKIN:  Open wound Present    Assessment:     Problem List Items Addressed This Visit     Venous stasis ulcer of left calf with fat layer exposed without varicose veins (HCC) - Primary (Chronic)    Relevant Orders    Initiate Outpatient Wound Care Protocol        Pre Debridement Measurements:  Are located in the [place]  Documentation Flow Sheet  Post Debridement Measurements:  Wound/Ulcer Descriptions are Pre Debridement except measurements:     Incision 04/20/16 Leg Left (Active)   Number of days: 2190       Incision 08/03/16 Leg Left (Active)   Number of days: 2086 Wound 12/08/21 Pretibial Left #1 (Active)   Wound Image   03/30/22 1504   Dressing Status New dressing applied 04/13/22 1543   Wound Cleansed Cleansed with saline 04/13/22 1543   Dressing/Treatment Collagen with Ag;Alginate;ABD;Roll gauze 04/13/22 1543   Offloading for Diabetic Foot Ulcers Offloading not required 04/13/22 1543   Wound Length (cm) 2.5 cm 04/20/22 1512   Wound Width (cm) 1.5 cm 04/20/22 1512   Wound Depth (cm) 0.2 cm 04/20/22 1512   Wound Surface Area (cm^2) 3.75 cm^2 04/20/22 1512   Change in Wound Size % (l*w) -435.71 04/20/22 1512   Wound Volume (cm^3) 0.75 cm^3 04/20/22 1512   Wound Healing % -971 04/20/22 1512   Post-Procedure Length (cm) 2.6 cm 04/20/22 1529   Post-Procedure Width (cm) 1.6 cm 04/20/22 1529   Post-Procedure Depth (cm) 0.2 cm 04/20/22 1529   Post-Procedure Surface Area (cm^2) 4.16 cm^2 04/20/22 1529   Post-Procedure Volume (cm^3) 0.832 cm^3 04/20/22 1529   Wound Assessment Fibrin;Pink/red 04/20/22 1512   Drainage Amount Moderate 04/20/22 1512   Drainage Description Yellow 04/20/22 1512   Odor None 04/20/22 1512   Kori-wound Assessment Hemosiderin staining (brown yellow) 04/20/22 1512   Number of days: 132     Incision 04/16/21 Abdomen (Active)   Number of days: 369       Procedure Note  Indications:  Based on my examination of this patient's wound(s)/ulcer(s) today, debridement is required to promote healing and evaluate the wound base. Performed by: Cristóbal Fortune MD    Consent obtained:  Yes    Time out taken:  Yes    Pain Control: Anesthetic  Anesthetic: 4% Lidocaine Liquid Topical     Debridement:Excisional Debridement    Using curette the wound(s)/ulcer(s) was/were sharply debrided down through and including the removal of epidermis, dermis and subcutaneous tissue.         Devitalized Tissue Debrided:  fibrin, biofilm, slough and exudate to stimulate bleeding to promote healing, post debridement good bleeding base and wound edges noted    Wound/Ulcer #: 1    Percent of Wound/Ulcer Debrided: 100%    Total Surface Area Debrided:  3.25 sq cm     Estimated Blood Loss:  Minimal  Hemostasis Achieved:  by pressure    Procedural Pain:  7  / 10   Post Procedural Pain:  8 / 10     Response to treatment:  With complaints of pain. Plan:   Treatment Note please see attached Discharge Instructions    Written patient dismissal instructions given to patient and signed by patient or POA. Discharge Instructions       Visit Discharge/Physician Orders     Discharge condition: Stable     Assessment of pain at discharge: mild     Anesthetic used: lido 4%     Discharge to: Home     Left via:Private automobile     Accompanied by: accompanied by self     ECF/HHA: [facility]     Dressing Orders: To left pretib wound: cleanse with normal saline, apply karen then cover with aquacel, cover and secure with dry dressing.  Change daily  Cover with Spandigrip      Treatment Orders:Eat a diet high in protein and vitamin C. Take a multiple vitamin daily unless contraindicated. Elevate as much as possible     [facility] followup visit 1 week____________________________  (Please note your next appointment above and if you are unable to keep, kindly give a 24 hour notice.  Thank you.)     Physician signature:__________________________        If you experience any of the following, please call the [facility] during business hours:     * Increase in Pain  * Temperature over 101  * Increase in drainage from your wound  * Drainage with a foul odor  * Bleeding  * Increase in swelling  * Need for compression bandage changes due to slippage, breakthrough drainage.     If you need medical attention outside of the business hours of the [facility] please contact your PCP or go to the nearest emergency room.               Electronically signed by Cynthia Bolden MD on 4/20/2022 at 3:32 PM

## 2022-04-27 ENCOUNTER — HOSPITAL ENCOUNTER (OUTPATIENT)
Dept: WOUND CARE | Age: 77
Discharge: HOME | End: 2022-04-27

## 2022-05-04 ENCOUNTER — HOSPITAL ENCOUNTER (OUTPATIENT)
Dept: WOUND CARE | Age: 77
Discharge: HOME | End: 2022-05-04
Payer: MEDICARE

## 2022-05-04 VITALS
HEART RATE: 77 BPM | SYSTOLIC BLOOD PRESSURE: 118 MMHG | TEMPERATURE: 96.2 F | RESPIRATION RATE: 20 BRPM | DIASTOLIC BLOOD PRESSURE: 70 MMHG

## 2022-05-04 DIAGNOSIS — I89.0 LYMPHEDEMA OF BOTH LOWER EXTREMITIES: Chronic | ICD-10-CM

## 2022-05-04 DIAGNOSIS — I87.2 VENOUS STASIS ULCER OF LEFT CALF WITH FAT LAYER EXPOSED WITHOUT VARICOSE VEINS (HCC): Primary | ICD-10-CM

## 2022-05-04 DIAGNOSIS — L97.222 VENOUS STASIS ULCER OF LEFT CALF WITH FAT LAYER EXPOSED WITHOUT VARICOSE VEINS (HCC): Primary | ICD-10-CM

## 2022-05-04 DIAGNOSIS — I87.2 VENOUS INSUFFICIENCY OF BOTH LOWER EXTREMITIES: Chronic | ICD-10-CM

## 2022-05-04 PROCEDURE — 11042 DBRDMT SUBQ TIS 1ST 20SQCM/<: CPT | Performed by: SURGERY

## 2022-05-04 PROCEDURE — 11042 DBRDMT SUBQ TIS 1ST 20SQCM/<: CPT

## 2022-05-04 PROCEDURE — 6370000000 HC RX 637 (ALT 250 FOR IP): Performed by: SURGERY

## 2022-05-04 RX ORDER — LIDOCAINE HYDROCHLORIDE 40 MG/ML
SOLUTION TOPICAL ONCE
Status: CANCELLED | OUTPATIENT
Start: 2022-05-04 | End: 2022-05-04

## 2022-05-04 RX ORDER — LIDOCAINE 40 MG/G
CREAM TOPICAL ONCE
Status: CANCELLED | OUTPATIENT
Start: 2022-05-04 | End: 2022-05-04

## 2022-05-04 RX ORDER — GINSENG 100 MG
CAPSULE ORAL ONCE
Status: CANCELLED | OUTPATIENT
Start: 2022-05-04 | End: 2022-05-04

## 2022-05-04 RX ORDER — BETAMETHASONE DIPROPIONATE 0.05 %
OINTMENT (GRAM) TOPICAL ONCE
Status: CANCELLED | OUTPATIENT
Start: 2022-05-04 | End: 2022-05-04

## 2022-05-04 RX ORDER — CLOBETASOL PROPIONATE 0.5 MG/G
OINTMENT TOPICAL ONCE
Status: CANCELLED | OUTPATIENT
Start: 2022-05-04 | End: 2022-05-04

## 2022-05-04 RX ORDER — LIDOCAINE HYDROCHLORIDE 20 MG/ML
JELLY TOPICAL ONCE
Status: CANCELLED | OUTPATIENT
Start: 2022-05-04 | End: 2022-05-04

## 2022-05-04 RX ORDER — LIDOCAINE 50 MG/G
OINTMENT TOPICAL ONCE
Status: CANCELLED | OUTPATIENT
Start: 2022-05-04 | End: 2022-05-04

## 2022-05-04 RX ORDER — BACITRACIN ZINC AND POLYMYXIN B SULFATE 500; 1000 [USP'U]/G; [USP'U]/G
OINTMENT TOPICAL ONCE
Status: CANCELLED | OUTPATIENT
Start: 2022-05-04 | End: 2022-05-04

## 2022-05-04 RX ORDER — GENTAMICIN SULFATE 1 MG/G
OINTMENT TOPICAL ONCE
Status: CANCELLED | OUTPATIENT
Start: 2022-05-04 | End: 2022-05-04

## 2022-05-04 RX ORDER — BACITRACIN, NEOMYCIN, POLYMYXIN B 400; 3.5; 5 [USP'U]/G; MG/G; [USP'U]/G
OINTMENT TOPICAL ONCE
Status: CANCELLED | OUTPATIENT
Start: 2022-05-04 | End: 2022-05-04

## 2022-05-04 RX ORDER — LIDOCAINE HYDROCHLORIDE 40 MG/ML
SOLUTION TOPICAL ONCE
Status: COMPLETED | OUTPATIENT
Start: 2022-05-04 | End: 2022-05-04

## 2022-05-04 RX ADMIN — LIDOCAINE HYDROCHLORIDE 10 ML: 40 SOLUTION TOPICAL at 15:31

## 2022-05-04 NOTE — PROGRESS NOTES
Wound Healing Center Followup Visit Note    Referring Physician : Luis E Barillas MD  [address] [facility] RECORD NUMBER:  [id number]  AGE: 78 y.o. GENDER: female  : 1945  EPISODE DATE:  2022    Subjective:     Chief Complaint   Patient presents with    Wound Check     Left leg      HISTORY of PRESENT ILLNESS HPI   Elmo Chandler is a 78 y.o. female who presents today in regards to follow up evaluation and treatment of wound/ulcer. That patient's past medical, family and social hx were reviewed and changes were made if present. History of Wound Context:  Patient has had left calf wound since ~ 2021. She has been putting a dressing on it. The wound has not been improving. She has a hx significant for venous stasis ulcerations of bilateral LE. She first was seen by myself in regards to these issues 2015. She eventually healed these wounds 2016. I last saw her 2021 at which time I recommended lymphedema therapy. She states she went and said it caused her to much pain and stopped going. She has chronic issues with bilateral calf pain, swelling and edema.            She admits to not wearing her stockings because of the pain.     She has used knee high 20-30 mm hg stockings in the past.       She is still seeing pain management and is down to percocet /325 mg daily.       21  · aquacell  · Double tubigrip  · Culture done  · Emphasized importance of getting in to more significant compression in the future  12/15/21  · Culture reviewed - light growth, no tx  · Wound slightly improved  · Still significant drainage  · Plan on compression wrap next week  21  · Stable wound  · Drainage slightly better  · Pt would like to wait till next week because of holidays to start wrap  22  · Wound larger  · Profore  22  · Wound appearance better  · Refusing wrap - it rolled down last week and she cut off after 3 days  22  · Wound appearance better  · Still refusing wrap 3/2/22  · periwound worse  · Culture  · drawtek  3/9/22  · periwound much improved  · levaquin 750 mg daily #10 script given culture   · Wound itself stable  3/23/22  · Left anterior calf wound improving overall  · New blister/ulcerations left 3rd, 4th and 5th toes and lateral foot   · Instructed to keep toes/foot dry, no ointment or corn starch   3/30/22  · bistering resolved, other skin issues resolved  · More dry than previously but overall stable  · Holly, aquacell  · Swelling is down, patient declining wrap  4/13/22  · Wound slightly improved  4/20/2022  · Wound stable, patient refusing compression wrap  5/4/22  · Wound worse  · Pt refusing wrap  · Will change diet per her report to decrease swelling    Wound/Ulcer Pain Timing/Severity: waxing and waning, mild  Quality of pain: aching, throbbing, pressure  Severity:  7 / 10   Modifying Factors: Pain worsens with debridement, dressing changes  Associated Signs/Symptoms: edema, drainage and pain    Ulcer Identification:  Ulcer Type: venous and lymphedema  Contributing Factors: edema, venous stasis and lymphedema    Diabetic/Pressure/Non Pressure Ulcers only:  Ulcer: Non-Pressure ulcer, fat layer exposed    Wound: N/A        PAST MEDICAL HISTORY      Diagnosis Date    Bursitis     CAD (coronary artery disease) 1993    heart attack    COPD (chronic obstructive pulmonary disease) ([address] 6/19/2013    Emphysema     slight    GERD (gastroesophageal reflux disease)     Hiatal hernia     Hip pain     Hyperlipidemia     Hypertension     Lymphedema of both lower extremities 11/21/2018    Venous insufficiency of both lower extremities 11/21/2018    Venous stasis ulcer of left calf with fat layer exposed without varicose veins ([address] 12/8/2021    Venous ulcer with fat layer exposed ([facility] [address] 10/28/2015     Past Surgical History:   Procedure Laterality Date    APPENDECTOMY      BREAST ENHANCEMENT SURGERY      BREAST REDUCTION SURGERY      CHOLECYSTECTOMY      COLONOSCOPY      ECHO COMPL W DOP COLOR FLOW  3/11/2013         ENDOSCOPY, COLON, DIAGNOSTIC      HERNIA REPAIR N/A 2021    LAPAROSCOPIC ROBOTIC ASSISTED INGUINAL HERNIA REPAIR performed by Ashleigh Roger MD at [facility] 60 REPLACEMENT  2009    l knee r hip    LEG DEBRIDEMENT Left 2015    LEG DEBRIDEMENT Left 2016     History reviewed. No pertinent family history. Social History     Tobacco Use    Smoking status: Former Smoker     Packs/day: 1.00     Years: 20.00     Pack years: 20.00     Types: Cigarettes     Quit date: 1995     Years since quittin.5    Smokeless tobacco: Never Used   Vaping Use    Vaping Use: Never used   Substance Use Topics    Alcohol use: No    Drug use: No     Allergies   Allergen Reactions    Codeine Nausea And Vomiting and Other (See Comments)     hallucinate    Dilaudid [Hydromorphone Hcl] Rash    Naproxen Other (See Comments)     Shuts down kidney function    Singulair [Montelukast Sodium] Shortness Of Breath     Mucus in chest    Black Cohosh     Black Cohosh [Cimicifuga [place] (Black Cohosh)] Rash     Current Outpatient Medications on File Prior to Encounter   Medication Sig Dispense Refill    Cyanocobalamin (VITAMIN B 12) 500 MCG TABS Take by mouth daily      Multiple Vitamins-Minerals (THERAPEUTIC MULTIVITAMIN-MINERALS) tablet Take 1 tablet by mouth daily      zinc gluconate 50 MG tablet Take 50 mg by mouth daily      GENISTEIN PO Take 125 mg by mouth nightly      folic acid (FOLVITE) 940 MCG tablet Take 400 mcg by mouth nightly      Krill Oil 350 MG CAPS Take 350 mg by mouth nightly      gabapentin (NEURONTIN) 300 MG capsule Take 300 mg by mouth 3 times daily.  simvastatin (ZOCOR) 40 MG tablet Take 40 mg by mouth nightly      oxyCODONE-acetaminophen (PERCOCET) 7.5-325 MG per tablet Take 1 tablet by mouth 2 times daily. Belinda Isidro morphine (MS CONTIN) 15 MG extended release tablet Take 15 mg by mouth 2 times daily.  aspirin 81 MG tablet Take 81 mg by mouth daily      Cholecalciferol (VITAMIN D) 2000 UNITS CAPS capsule Take 2,000 Units by mouth daily       ferrous sulfate 325 (65 FE) MG tablet Take 325 mg by mouth nightly       metoprolol (LOPRESSOR) 50 MG tablet Take 1 tablet by mouth 2 times daily 60 tablet 0    albuterol (PROVENTIL HFA;VENTOLIN HFA) 108 (90 BASE) MCG/ACT inhaler Inhale 2 puffs into the lungs every 6 hours as needed for Wheezing or Shortness of Breath       calcium carbonate (OYSTER SHELL CALCIUM 500 MG) 1250 MG tablet Take 2 tablets by mouth daily      Ascorbic Acid (VITAMIN C) 500 MG tablet Take 2,000 mg by mouth daily      omeprazole (PRILOSEC) 40 MG capsule Take 40 mg by mouth daily.  diphenhydrAMINE (BENADRYL) 50 MG capsule Take 50 mg by mouth daily as needed for Allergies       Garlic 689 MG TABS Take 1,000 mg by mouth daily        No current facility-administered medications on file prior to encounter.        REVIEW OF SYSTEMS See HPI    Objective:    /70   Pulse 77   Temp 96.2 °F (35.7 °C) (Tympanic)   Resp 20   Wt Readings from Last 3 Encounters:   04/20/22 295 lb (133.8 kg)   04/13/22 295 lb (133.8 kg)   03/30/22 295 lb (133.8 kg)     PHYSICAL EXAM  CONSTITUTIONAL:   Awake, alert, cooperative   EYES:  lids and lashes normal   ENT: external ears and nose without lesions   NECK:  supple, symmetrical, trachea midline   SKIN:  Open wound Present    Assessment:     Problem List Items Addressed This Visit     Venous insufficiency of both lower extremities (Chronic)    Lymphedema of both lower extremities (Chronic)    Venous stasis ulcer of left calf with fat layer exposed without varicose veins (HCC) - Primary (Chronic)    Relevant Orders    Initiate Outpatient Wound Care Protocol        Pre Debridement Measurements:  Are located in the [place]  Documentation Flow Sheet  Post Debridement Measurements:  Wound/Ulcer Descriptions are Pre Debridement except measurements:     Incision 04/20/16 Leg Left (Active)   Number of days: 8460       Incision 08/03/16 Leg Left (Active)   Number of days: 2100       Wound 12/08/21 Pretibial Left #1 (Active)   Wound Image   03/30/22 1504   Dressing Status New dressing applied;Clean;Dry; Intact 05/04/22 1554   Wound Cleansed Cleansed with saline 05/04/22 1554   Dressing/Treatment Collagen with Ag;Alginate;ABD;Roll gauze 05/04/22 1554   Offloading for Diabetic Foot Ulcers Offloading not required 04/13/22 1543   Wound Length (cm) 4.9 cm 05/04/22 1532   Wound Width (cm) 1.6 cm 05/04/22 1532   Wound Depth (cm) 0.3 cm 05/04/22 1532   Wound Surface Area (cm^2) 7.84 cm^2 05/04/22 1532   Change in Wound Size % (l*w) -1020 05/04/22 1532   Wound Volume (cm^3) 2.352 cm^3 05/04/22 1532   Wound Healing % -3260 05/04/22 1532   Post-Procedure Length (cm) 5 cm 05/04/22 1541   Post-Procedure Width (cm) 1.7 cm 05/04/22 1541   Post-Procedure Depth (cm) 0.3 cm 05/04/22 1541   Post-Procedure Surface Area (cm^2) 8.5 cm^2 05/04/22 1541   Post-Procedure Volume (cm^3) 2.55 cm^3 05/04/22 1541   Wound Assessment Fibrin;Pink/red 05/04/22 1532   Drainage Amount Moderate 05/04/22 1532   Drainage Description Yellow 05/04/22 1532   Odor None 05/04/22 1532   Kori-wound Assessment Hemosiderin staining (brown yellow) 05/04/22 1532   Number of days: 147     Incision 04/16/21 Abdomen (Active)   Number of days: 383       Procedure Note  Indications:  Based on my examination of this patient's wound(s)/ulcer(s) today, debridement is required to promote healing and evaluate the wound base. Performed by: Katya Avelar MD    Consent obtained:  Yes    Time out taken:  Yes    Pain Control: Anesthetic  Anesthetic: 4% Lidocaine Liquid Topical     Debridement:Excisional Debridement    Using curette the wound(s)/ulcer(s) was/were sharply debrided down through and including the removal of epidermis, dermis and subcutaneous tissue.         Devitalized Tissue Debrided:  fibrin, biofilm, slough and exudate to stimulate bleeding to promote healing, post debridement good bleeding base and wound edges noted    Wound/Ulcer #: 1    Percent of Wound/Ulcer Debrided: 100%    Total Surface Area Debrided:  7.84 sq cm     Estimated Blood Loss:  Minimal  Hemostasis Achieved:  by pressure    Procedural Pain:  7  / 10   Post Procedural Pain:  8 / 10     Response to treatment:  With complaints of pain. Plan:   Treatment Note please see attached Discharge Instructions    Written patient dismissal instructions given to patient and signed by patient or POA. Discharge Instructions       Visit Discharge/Physician Orders     Discharge condition: Stable     Assessment of pain at discharge: mild     Anesthetic used: lido 4%     Discharge to: Home     Left via:Private automobile     Accompanied by: accompanied by self     ECF/HHA: [facility]     Dressing Orders: To left pretib wound: cleanse with normal saline, apply karen then cover with aquacel, cover and secure with dry dressing.  Change daily  Cover with Spandigrip      Treatment Orders:Eat a diet high in protein and vitamin C. Take a multiple vitamin daily unless contraindicated. Elevate as much as possible     [facility] followup visit 1 week____________________________  (Please note your next appointment above and if you are unable to keep, kindly give a 24 hour notice. Thank you.)     Physician signature:__________________________        If you experience any of the following, please call the [facility] during business hours:     * Increase in Pain  * Temperature over 101  * Increase in drainage from your wound  * Drainage with a foul odor  * Bleeding  * Increase in swelling  * Need for compression bandage changes due to slippage, breakthrough drainage.     If you need medical attention outside of the business hours of the [facility] please contact your PCP or go to the nearest emergency room.             Electronically signed by Dick Brewer MD on 5/4/2022 at 4:30 PM

## 2022-05-04 NOTE — PLAN OF CARE
Problem: Chronic Conditions and Co-morbidities  Goal: Patient's chronic conditions and co-morbidity symptoms are monitored and maintained or improved  Outcome: Progressing     Problem: Wound:  Goal: Will show signs of wound healing; wound closure and no evidence of infection  Description: Will show signs of wound healing; wound closure and no evidence of infection  Outcome: Progressing     Problem: Venous:  Goal: Signs of wound healing will improve  Description: Signs of wound healing will improve  Outcome: Progressing     Problem: Weight control:  Goal: Ability to maintain an optimal weight for height and age will be supported  Description: Ability to maintain an optimal weight for height and age will be supported  Outcome: Progressing

## 2022-05-11 ENCOUNTER — HOSPITAL ENCOUNTER (OUTPATIENT)
Dept: WOUND CARE | Age: 77
Discharge: HOME | End: 2022-05-11
Payer: MEDICARE

## 2022-05-11 VITALS
WEIGHT: 293 LBS | TEMPERATURE: 96.4 F | HEART RATE: 80 BPM | DIASTOLIC BLOOD PRESSURE: 68 MMHG | HEIGHT: 67 IN | BODY MASS INDEX: 45.99 KG/M2 | SYSTOLIC BLOOD PRESSURE: 124 MMHG | RESPIRATION RATE: 18 BRPM

## 2022-05-11 DIAGNOSIS — L97.222 VENOUS STASIS ULCER OF LEFT CALF WITH FAT LAYER EXPOSED WITHOUT VARICOSE VEINS (HCC): Primary | ICD-10-CM

## 2022-05-11 DIAGNOSIS — I87.2 VENOUS STASIS ULCER OF LEFT CALF WITH FAT LAYER EXPOSED WITHOUT VARICOSE VEINS (HCC): Primary | ICD-10-CM

## 2022-05-11 PROCEDURE — 11042 DBRDMT SUBQ TIS 1ST 20SQCM/<: CPT

## 2022-05-11 PROCEDURE — 6370000000 HC RX 637 (ALT 250 FOR IP): Performed by: SURGERY

## 2022-05-11 PROCEDURE — 11042 DBRDMT SUBQ TIS 1ST 20SQCM/<: CPT | Performed by: SURGERY

## 2022-05-11 RX ORDER — GINSENG 100 MG
CAPSULE ORAL ONCE
Status: CANCELLED | OUTPATIENT
Start: 2022-05-11 | End: 2022-05-11

## 2022-05-11 RX ORDER — LIDOCAINE HYDROCHLORIDE 40 MG/ML
SOLUTION TOPICAL ONCE
Status: CANCELLED | OUTPATIENT
Start: 2022-05-11 | End: 2022-05-11

## 2022-05-11 RX ORDER — LIDOCAINE HYDROCHLORIDE 40 MG/ML
SOLUTION TOPICAL ONCE
Status: COMPLETED | OUTPATIENT
Start: 2022-05-11 | End: 2022-05-11

## 2022-05-11 RX ORDER — BACITRACIN, NEOMYCIN, POLYMYXIN B 400; 3.5; 5 [USP'U]/G; MG/G; [USP'U]/G
OINTMENT TOPICAL ONCE
Status: CANCELLED | OUTPATIENT
Start: 2022-05-11 | End: 2022-05-11

## 2022-05-11 RX ORDER — GENTAMICIN SULFATE 1 MG/G
OINTMENT TOPICAL ONCE
Status: CANCELLED | OUTPATIENT
Start: 2022-05-11 | End: 2022-05-11

## 2022-05-11 RX ORDER — LIDOCAINE HYDROCHLORIDE 20 MG/ML
JELLY TOPICAL ONCE
Status: CANCELLED | OUTPATIENT
Start: 2022-05-11 | End: 2022-05-11

## 2022-05-11 RX ORDER — LIDOCAINE 40 MG/G
CREAM TOPICAL ONCE
Status: CANCELLED | OUTPATIENT
Start: 2022-05-11 | End: 2022-05-11

## 2022-05-11 RX ORDER — BETAMETHASONE DIPROPIONATE 0.05 %
OINTMENT (GRAM) TOPICAL ONCE
Status: CANCELLED | OUTPATIENT
Start: 2022-05-11 | End: 2022-05-11

## 2022-05-11 RX ORDER — LIDOCAINE 50 MG/G
OINTMENT TOPICAL ONCE
Status: CANCELLED | OUTPATIENT
Start: 2022-05-11 | End: 2022-05-11

## 2022-05-11 RX ORDER — BACITRACIN ZINC AND POLYMYXIN B SULFATE 500; 1000 [USP'U]/G; [USP'U]/G
OINTMENT TOPICAL ONCE
Status: CANCELLED | OUTPATIENT
Start: 2022-05-11 | End: 2022-05-11

## 2022-05-11 RX ORDER — CLOBETASOL PROPIONATE 0.5 MG/G
OINTMENT TOPICAL ONCE
Status: CANCELLED | OUTPATIENT
Start: 2022-05-11 | End: 2022-05-11

## 2022-05-11 RX ADMIN — LIDOCAINE HYDROCHLORIDE 10 ML: 40 SOLUTION TOPICAL at 15:22

## 2022-05-11 ASSESSMENT — PAIN SCALES - GENERAL: PAINLEVEL_OUTOF10: 0

## 2022-05-11 NOTE — PROGRESS NOTES
[facility] Followup Visit Note    Referring Physician : Claudetta Parker, MD  [id number] [facility] RECORD NUMBER:  [id number]  AGE: 78 y.o. GENDER: female  : 1945  EPISODE DATE:  2022    Subjective:     Chief Complaint   Patient presents with    Wound Check     left leg      HISTORY of PRESENT ILLNESS HPI   Lori Candelaria is a 78 y.o. female who presents today in regards to follow up evaluation and treatment of wound/ulcer. That patient's past medical, family and social hx were reviewed and changes were made if present. History of Wound Context:  Patient has had left calf wound since ~ 2021. She has been putting a dressing on it. The wound has not been improving. She has a hx significant for venous stasis ulcerations of bilateral LE. She first was seen by myself in regards to these issues 2015. She eventually healed these wounds 2016. I last saw her 2021 at which time I recommended lymphedema therapy. She states she went and said it caused her to much pain and stopped going. She has chronic issues with bilateral calf pain, swelling and edema.            She admits to not wearing her stockings because of the pain.     She has used knee high 20-30 mm hg stockings in the past.       She is still seeing pain management and is down to percocet /325 mg daily.       21  · aquacell  · Double tubigrip  · Culture done  · Emphasized importance of getting in to more significant compression in the future  12/15/21  · Culture reviewed - light growth, no tx  · Wound slightly improved  · Still significant drainage  · Plan on compression wrap next week  21  · Stable wound  · Drainage slightly better  · Pt would like to wait till next week because of holidays to start wrap  22  · Wound larger  · Profore  22  · Wound appearance better  · Refusing wrap - it rolled down last week and she cut off after 3 days  22  · Wound appearance better  · Still refusing wrap 3/2/22  · periwound worse  · Culture  · drawtek  3/9/22  · periwound much improved  · levaquin 750 mg daily #10 script given culture   · Wound itself stable  3/23/22  · Left anterior calf wound improving overall  · New blister/ulcerations left 3rd, 4th and 5th toes and lateral foot   · Instructed to keep toes/foot dry, no ointment or corn starch   3/30/22  · bistering resolved, other skin issues resolved  · More dry than previously but overall stable  · kailyn Barrera  · Swelling is down, patient declining wrap  4/13/22  · Wound slightly improved  4/20/2022  · Wound stable, patient refusing compression wrap  5/4/22  · Wound worse  · Pt refusing wrap  · Will change diet per her report to decrease swelling  5/11/22  · Wound stable  · aquacell and koban 2 - pt agreeable now after significant persuasion    Wound/Ulcer Pain Timing/Severity: waxing and waning, mild  Quality of pain: aching, throbbing, pressure  Severity:  7 / 10   Modifying Factors: Pain worsens with debridement, dressing changes  Associated Signs/Symptoms: edema, drainage and pain    Ulcer Identification:  Ulcer Type: venous and lymphedema  Contributing Factors: edema, venous stasis and lymphedema    Diabetic/Pressure/Non Pressure Ulcers only:  Ulcer: Non-Pressure ulcer, fat layer exposed    Wound: N/A        PAST MEDICAL HISTORY      Diagnosis Date    Bursitis     CAD (coronary artery disease) 1993    heart attack    COPD (chronic obstructive pulmonary disease) ([address] 6/19/2013    Emphysema     slight    GERD (gastroesophageal reflux disease)     Hiatal hernia     Hip pain     Hyperlipidemia     Hypertension     Lymphedema of both lower extremities 11/21/2018    Venous insufficiency of both lower extremities 11/21/2018    Venous stasis ulcer of left calf with fat layer exposed without varicose veins ([address] 12/8/2021    Venous ulcer with fat layer exposed ([address] 10/28/2015     Past Surgical History:   Procedure Laterality Date    APPENDECTOMY      BREAST ENHANCEMENT SURGERY      BREAST REDUCTION SURGERY      CHOLECYSTECTOMY      COLONOSCOPY      ECHO COMPL W DOP COLOR FLOW  3/11/2013         ENDOSCOPY, COLON, DIAGNOSTIC      HERNIA REPAIR N/A 2021    LAPAROSCOPIC ROBOTIC ASSISTED INGUINAL HERNIA REPAIR performed by Bri Yuen MD at [facility] 60 REPLACEMENT  2009    l knee r hip    LEG DEBRIDEMENT Left 2015    LEG DEBRIDEMENT Left 2016     History reviewed. No pertinent family history. Social History     Tobacco Use    Smoking status: Former Smoker     Packs/day: 1.00     Years: 20.00     Pack years: 20.00     Types: Cigarettes     Quit date: 1995     Years since quittin.5    Smokeless tobacco: Never Used   Vaping Use    Vaping Use: Never used   Substance Use Topics    Alcohol use: No    Drug use: No     Allergies   Allergen Reactions    Codeine Nausea And Vomiting and Other (See Comments)     hallucinate    Dilaudid [Hydromorphone Hcl] Rash    Naproxen Other (See Comments)     Shuts down kidney function    Singulair [Montelukast Sodium] Shortness Of Breath     Mucus in chest    Black Cohosh     Black Cohosh [Cimicifuga [place] (Black Cohosh)] Rash     Current Outpatient Medications on File Prior to Encounter   Medication Sig Dispense Refill    Cyanocobalamin (VITAMIN B 12) 500 MCG TABS Take by mouth daily      Multiple Vitamins-Minerals (THERAPEUTIC MULTIVITAMIN-MINERALS) tablet Take 1 tablet by mouth daily      zinc gluconate 50 MG tablet Take 50 mg by mouth daily      GENISTEIN PO Take 125 mg by mouth nightly      folic acid (FOLVITE) 312 MCG tablet Take 400 mcg by mouth nightly      Krill Oil 350 MG CAPS Take 350 mg by mouth nightly      gabapentin (NEURONTIN) 300 MG capsule Take 300 mg by mouth 3 times daily.       simvastatin (ZOCOR) 40 MG tablet Take 40 mg by mouth nightly      oxyCODONE-acetaminophen (PERCOCET) 7.5-325 MG per tablet Take 1 tablet by mouth 2 times daily. Sakshi Broderick morphine (MS CONTIN) 15 MG extended release tablet Take 15 mg by mouth 2 times daily.  aspirin 81 MG tablet Take 81 mg by mouth daily      Cholecalciferol (VITAMIN D) 2000 UNITS CAPS capsule Take 2,000 Units by mouth daily       ferrous sulfate 325 (65 FE) MG tablet Take 325 mg by mouth nightly       metoprolol (LOPRESSOR) 50 MG tablet Take 1 tablet by mouth 2 times daily 60 tablet 0    albuterol (PROVENTIL HFA;VENTOLIN HFA) 108 (90 BASE) MCG/ACT inhaler Inhale 2 puffs into the lungs every 6 hours as needed for Wheezing or Shortness of Breath       calcium carbonate (OYSTER SHELL CALCIUM 500 MG) 1250 MG tablet Take 2 tablets by mouth daily      Ascorbic Acid (VITAMIN C) 500 MG tablet Take 2,000 mg by mouth daily      omeprazole (PRILOSEC) 40 MG capsule Take 40 mg by mouth daily.  diphenhydrAMINE (BENADRYL) 50 MG capsule Take 50 mg by mouth daily as needed for Allergies       Garlic 137 MG TABS Take 1,000 mg by mouth daily        No current facility-administered medications on file prior to encounter.        REVIEW OF SYSTEMS See HPI    Objective:    /68   Pulse 80   Temp 96.4 °F (35.8 °C) (Tympanic)   Resp 18   Ht 5' 7\" (1.702 m)   Wt 295 lb (133.8 kg)   BMI 46.20 kg/m²   Wt Readings from Last 3 Encounters:   05/11/22 295 lb (133.8 kg)   04/20/22 295 lb (133.8 kg)   04/13/22 295 lb (133.8 kg)     PHYSICAL EXAM  CONSTITUTIONAL:   Awake, alert, cooperative   EYES:  lids and lashes normal   ENT: external ears and nose without lesions   NECK:  supple, symmetrical, trachea midline   SKIN:  Open wound Present    Assessment:     Problem List Items Addressed This Visit     Venous stasis ulcer of left calf with fat layer exposed without varicose veins (HCC) - Primary (Chronic)    Relevant Orders    Initiate Outpatient Wound Care Protocol        Pre Debridement Measurements:  Are located in the [facility]  Documentation Flow Sheet  Post Debridement Measurements:  Wound/Ulcer Descriptions are Pre Debridement except measurements:     Incision 04/20/16 Leg Left (Active)   Number of days: 6158       Incision 08/03/16 Leg Left (Active)   Number of days: 2107       Wound 12/08/21 Pretibial Left #1 (Active)   Wound Image   03/30/22 1504   Dressing Status New dressing applied;Clean;Dry; Intact 05/04/22 1554   Wound Cleansed Cleansed with saline 05/04/22 1554   Dressing/Treatment Collagen with Ag;Alginate;ABD;Roll gauze 05/04/22 1554   Offloading for Diabetic Foot Ulcers Offloading not required 04/13/22 1543   Wound Length (cm) 4.8 cm 05/11/22 1519   Wound Width (cm) 1.7 cm 05/11/22 1519   Wound Depth (cm) 0.3 cm 05/11/22 1519   Wound Surface Area (cm^2) 8.16 cm^2 05/11/22 1519   Change in Wound Size % (l*w) -1065.71 05/11/22 1519   Wound Volume (cm^3) 2.448 cm^3 05/11/22 1519   Wound Healing % -3397 05/11/22 1519   Post-Procedure Length (cm) 4.8 cm 05/11/22 1536   Post-Procedure Width (cm) 1.8 cm 05/11/22 1536   Post-Procedure Depth (cm) 0.3 cm 05/11/22 1536   Post-Procedure Surface Area (cm^2) 8.64 cm^2 05/11/22 1536   Post-Procedure Volume (cm^3) 2.592 cm^3 05/11/22 1536   Wound Assessment Fibrin;Pale granulation tissue 05/11/22 1519   Drainage Amount Moderate 05/11/22 1519   Drainage Description Serosanguinous 05/11/22 1519   Odor None 05/11/22 1519   Kori-wound Assessment Blanchable erythema 05/11/22 1519   Number of days: 153     Incision 04/16/21 Abdomen (Active)   Number of days: 390       Procedure Note  Indications:  Based on my examination of this patient's wound(s)/ulcer(s) today, debridement is required to promote healing and evaluate the wound base.     Performed by: Selena Lopez MD    Consent obtained:  Yes    Time out taken:  Yes    Pain Control: Anesthetic  Anesthetic: 4% Lidocaine Liquid Topical     Debridement:Excisional Debridement    Using curette the wound(s)/ulcer(s) was/were sharply debrided down through and including the removal of epidermis, dermis and subcutaneous tissue. Devitalized Tissue Debrided:  fibrin, biofilm, slough and exudate to stimulate bleeding to promote healing, post debridement good bleeding base and wound edges noted    Wound/Ulcer #: 1    Percent of Wound/Ulcer Debrided: 100%    Total Surface Area Debrided:  8.16 sq cm     Estimated Blood Loss:  Minimal  Hemostasis Achieved:  by pressure    Procedural Pain:  7  / 10   Post Procedural Pain:  8 / 10     Response to treatment:  With complaints of pain. Plan:   Treatment Note please see attached Discharge Instructions    Written patient dismissal instructions given to patient and signed by patient or POA. Discharge Instructions       Visit Discharge/Physician Orders     Discharge condition: Stable     Assessment of pain at discharge: mild     Anesthetic used: lido 4%     Discharge to: Home     Left via:Private automobile     Accompanied by: accompanied by self     [facility]F/[facility]: [facility]     Dressing Orders: To left pretib wound: cleanse with normal saline, apply aquacel, cover and secure with dry dressing. Apply coban 2.  Leave in place for 1 week. Keep wrap dry at all times. If wrap becomes wet or falls 2 inches call [address] [place] [address],[address] ([phone])and may remove wrap and apply double tubigrip. Treatment Orders:Eat a diet high in protein and vitamin C. Take a multiple vitamin daily unless contraindicated. Elevate as much as possible     [facility] followup visit 1 week____________________________  (Please note your next appointment above and if you are unable to keep, kindly give a 24 hour notice.  Thank you.)     Physician signature:__________________________        If you experience any of the following, please call the [address] [place] [address] during business hours:     * Increase in Pain  * Temperature over 101  * Increase in drainage from your wound  * Drainage with a foul odor  * Bleeding  * Increase in swelling  * Need for compression bandage changes due to slippage, breakthrough drainage.     If you need medical attention outside of the business hours of the [address] [place] [address] please contact your PCP or go to the nearest emergency room.          Electronically signed by Joey Bourne MD on 5/11/2022 at 3:40 PM

## 2022-05-11 NOTE — PLAN OF CARE
Problem: Weight control:  Goal: Ability to maintain an optimal weight for height and age will be supported  Description: Ability to maintain an optimal weight for height and age will be supported  Outcome: Not Progressing     Problem: Wound:  Goal: Will show signs of wound healing; wound closure and no evidence of infection  Description: Will show signs of wound healing; wound closure and no evidence of infection  Outcome: Progressing     Problem: Venous:  Goal: Signs of wound healing will improve  Description: Signs of wound healing will improve  Outcome: Progressing

## 2022-05-18 ENCOUNTER — HOSPITAL ENCOUNTER (OUTPATIENT)
Dept: WOUND CARE | Age: 77
Discharge: HOME | End: 2022-05-18
Payer: MEDICARE

## 2022-05-18 VITALS
HEART RATE: 84 BPM | BODY MASS INDEX: 45.99 KG/M2 | TEMPERATURE: 97 F | HEIGHT: 67 IN | RESPIRATION RATE: 18 BRPM | DIASTOLIC BLOOD PRESSURE: 74 MMHG | SYSTOLIC BLOOD PRESSURE: 128 MMHG | WEIGHT: 293 LBS

## 2022-05-18 DIAGNOSIS — I89.0 LYMPHEDEMA OF BOTH LOWER EXTREMITIES: Chronic | ICD-10-CM

## 2022-05-18 DIAGNOSIS — L97.222 VENOUS STASIS ULCER OF LEFT CALF WITH FAT LAYER EXPOSED WITHOUT VARICOSE VEINS (HCC): Primary | ICD-10-CM

## 2022-05-18 DIAGNOSIS — I87.2 VENOUS STASIS ULCER OF LEFT CALF WITH FAT LAYER EXPOSED WITHOUT VARICOSE VEINS (HCC): Primary | ICD-10-CM

## 2022-05-18 PROCEDURE — 11042 DBRDMT SUBQ TIS 1ST 20SQCM/<: CPT | Performed by: SURGERY

## 2022-05-18 PROCEDURE — 6370000000 HC RX 637 (ALT 250 FOR IP): Performed by: SURGERY

## 2022-05-18 PROCEDURE — 11042 DBRDMT SUBQ TIS 1ST 20SQCM/<: CPT

## 2022-05-18 RX ORDER — BACITRACIN, NEOMYCIN, POLYMYXIN B 400; 3.5; 5 [USP'U]/G; MG/G; [USP'U]/G
OINTMENT TOPICAL ONCE
Status: CANCELLED | OUTPATIENT
Start: 2022-05-18 | End: 2022-05-18

## 2022-05-18 RX ORDER — GINSENG 100 MG
CAPSULE ORAL ONCE
Status: CANCELLED | OUTPATIENT
Start: 2022-05-18 | End: 2022-05-18

## 2022-05-18 RX ORDER — GENTAMICIN SULFATE 1 MG/G
OINTMENT TOPICAL ONCE
Status: CANCELLED | OUTPATIENT
Start: 2022-05-18 | End: 2022-05-18

## 2022-05-18 RX ORDER — LIDOCAINE HYDROCHLORIDE 20 MG/ML
JELLY TOPICAL ONCE
Status: CANCELLED | OUTPATIENT
Start: 2022-05-18 | End: 2022-05-18

## 2022-05-18 RX ORDER — BACITRACIN ZINC AND POLYMYXIN B SULFATE 500; 1000 [USP'U]/G; [USP'U]/G
OINTMENT TOPICAL ONCE
Status: CANCELLED | OUTPATIENT
Start: 2022-05-18 | End: 2022-05-18

## 2022-05-18 RX ORDER — BETAMETHASONE DIPROPIONATE 0.05 %
OINTMENT (GRAM) TOPICAL ONCE
Status: CANCELLED | OUTPATIENT
Start: 2022-05-18 | End: 2022-05-18

## 2022-05-18 RX ORDER — CLOBETASOL PROPIONATE 0.5 MG/G
OINTMENT TOPICAL ONCE
Status: CANCELLED | OUTPATIENT
Start: 2022-05-18 | End: 2022-05-18

## 2022-05-18 RX ORDER — LIDOCAINE HYDROCHLORIDE 40 MG/ML
SOLUTION TOPICAL ONCE
Status: CANCELLED | OUTPATIENT
Start: 2022-05-18 | End: 2022-05-18

## 2022-05-18 RX ORDER — LIDOCAINE 40 MG/G
CREAM TOPICAL ONCE
Status: CANCELLED | OUTPATIENT
Start: 2022-05-18 | End: 2022-05-18

## 2022-05-18 RX ORDER — LIDOCAINE HYDROCHLORIDE 40 MG/ML
SOLUTION TOPICAL ONCE
Status: COMPLETED | OUTPATIENT
Start: 2022-05-18 | End: 2022-05-18

## 2022-05-18 RX ORDER — LIDOCAINE 50 MG/G
OINTMENT TOPICAL ONCE
Status: CANCELLED | OUTPATIENT
Start: 2022-05-18 | End: 2022-05-18

## 2022-05-18 RX ADMIN — LIDOCAINE HYDROCHLORIDE 8 ML: 40 SOLUTION TOPICAL at 15:55

## 2022-05-18 NOTE — PLAN OF CARE
Problem: Wound:  Goal: Will show signs of wound healing; wound closure and no evidence of infection  Description: Will show signs of wound healing; wound closure and no evidence of infection  Outcome: Not Progressing     Problem: Venous:  Goal: Signs of wound healing will improve  Description: Signs of wound healing will improve  Outcome: Not Progressing     Problem: Weight control:  Goal: Ability to maintain an optimal weight for height and age will be supported  Description: Ability to maintain an optimal weight for height and age will be supported  Outcome: Not Progressing

## 2022-05-18 NOTE — PROGRESS NOTES
Wound Healing Center Followup Visit Note    Referring Physician : Yasmine Kohler MD  [id number] [facility] RECORD NUMBER:  [id number]  AGE: 78 y.o. GENDER: female  : 1945  EPISODE DATE:  2022    Subjective:     Chief Complaint   Patient presents with    Wound Check     left pretib      HISTORY of PRESENT ILLNESS HPI   Fredy Sim is a 78 y.o. female who presents today in regards to follow up evaluation and treatment of wound/ulcer. That patient's past medical, family and social hx were reviewed and changes were made if present. History of Wound Context:  Patient has had left calf wound since ~ 2021. She has been putting a dressing on it. The wound has not been improving. She has a hx significant for venous stasis ulcerations of bilateral LE. She first was seen by myself in regards to these issues 2015. She eventually healed these wounds 2016. I last saw her 2021 at which time I recommended lymphedema therapy. She states she went and said it caused her to much pain and stopped going. She has chronic issues with bilateral calf pain, swelling and edema.            She admits to not wearing her stockings because of the pain.     She has used knee high 20-30 mm hg stockings in the past.       She is still seeing pain management and is down to percocet /325 mg daily.       21  · aquacell  · Double tubigrip  · Culture done  · Emphasized importance of getting in to more significant compression in the future  12/15/21  · Culture reviewed - light growth, no tx  · Wound slightly improved  · Still significant drainage  · Plan on compression wrap next week  21  · Stable wound  · Drainage slightly better  · Pt would like to wait till next week because of holidays to start wrap  22  · Wound larger  · Profore  22  · Wound appearance better  · Refusing wrap - it rolled down last week and she cut off after 3 days  22  · Wound appearance better  · Still refusing wrap   3/2/22  · periwound worse  · Culture  · drawtek  3/9/22  · periwound much improved  · levaquin 750 mg daily #10 script given culture   · Wound itself stable  3/23/22  · Left anterior calf wound improving overall  · New blister/ulcerations left 3rd, 4th and 5th toes and lateral foot   · Instructed to keep toes/foot dry, no ointment or corn starch   3/30/22  · bistering resolved, other skin issues resolved  · More dry than previously but overall stable  · kailyn Barrera  · Swelling is down, patient declining wrap  4/13/22  · Wound slightly improved  4/20/2022  · Wound stable, patient refusing compression wrap  5/4/22  · Wound worse  · Pt refusing wrap  · Will change diet per her report to decrease swelling  5/11/22  · Wound stable  · aquacell and koban 2 - pt agreeable now after significant persuasion  5/18/22  · Took off wrap within 24 hours due to pain  · kailyn and spandigrip  · She agreed to use wrap again if swelling not down 2 more cm next week  · Wound slightly smaller    Wound/Ulcer Pain Timing/Severity: waxing and waning, mild  Quality of pain: aching, throbbing, pressure  Severity:  7 / 10   Modifying Factors: Pain worsens with debridement, dressing changes  Associated Signs/Symptoms: edema, drainage and pain    Ulcer Identification:  Ulcer Type: venous and lymphedema  Contributing Factors: edema, venous stasis and lymphedema    Diabetic/Pressure/Non Pressure Ulcers only:  Ulcer: Non-Pressure ulcer, fat layer exposed    Wound: N/A        PAST MEDICAL HISTORY      Diagnosis Date    Bursitis     CAD (coronary artery disease) 1993    heart attack    COPD (chronic obstructive pulmonary disease) ([facility] [address] 6/19/2013    Emphysema     slight    GERD (gastroesophageal reflux disease)     Hiatal hernia     Hip pain     Hyperlipidemia     Hypertension     Lymphedema of both lower extremities 11/21/2018    Venous insufficiency of both lower extremities 11/21/2018    Venous stasis ulcer of left calf with fat layer exposed without varicose veins ([facility] [address] 2021    Venous ulcer with fat layer exposed ([facility] [address] 10/28/2015     Past Surgical History:   Procedure Laterality Date    APPENDECTOMY      BREAST ENHANCEMENT SURGERY      BREAST REDUCTION SURGERY      CHOLECYSTECTOMY      COLONOSCOPY      ECHO COMPL W DOP COLOR FLOW  3/11/2013         ENDOSCOPY, COLON, DIAGNOSTIC      HERNIA REPAIR N/A 2021    LAPAROSCOPIC ROBOTIC ASSISTED INGUINAL HERNIA REPAIR performed by Tara Patiño MD at [facility] [address] REPLACEMENT  2009    l knee r hip    LEG DEBRIDEMENT Left 2015    LEG DEBRIDEMENT Left 2016     History reviewed. No pertinent family history.   Social History     Tobacco Use    Smoking status: Former Smoker     Packs/day: 1.00     Years: 20.00     Pack years: 20.00     Types: Cigarettes     Quit date: 1995     Years since quittin.5    Smokeless tobacco: Never Used   Vaping Use    Vaping Use: Never used   Substance Use Topics    Alcohol use: No    Drug use: No     Allergies   Allergen Reactions    Codeine Nausea And Vomiting and Other (See Comments)     hallucinate    Dilaudid [Hydromorphone Hcl] Rash    Naproxen Other (See Comments)     Shuts down kidney function    Singulair [Montelukast Sodium] Shortness Of Breath     Mucus in chest    Black Cohosh     Black Cohosh [Cimicifuga [place] (Black Cohosh)] Rash     Current Outpatient Medications on File Prior to Encounter   Medication Sig Dispense Refill    Cyanocobalamin (VITAMIN B 12) 500 MCG TABS Take by mouth daily      Multiple Vitamins-Minerals (THERAPEUTIC MULTIVITAMIN-MINERALS) tablet Take 1 tablet by mouth daily      zinc gluconate 50 MG tablet Take 50 mg by mouth daily      GENISTEIN PO Take 125 mg by mouth nightly      folic acid (FOLVITE) 919 MCG tablet Take 400 mcg by mouth nightly      Krill Oil 350 MG CAPS Take 350 mg by mouth nightly      gabapentin (NEURONTIN) 300 MG capsule Take 300 mg by mouth 3 times daily.  simvastatin (ZOCOR) 40 MG tablet Take 40 mg by mouth nightly      oxyCODONE-acetaminophen (PERCOCET) 7.5-325 MG per tablet Take 1 tablet by mouth 2 times daily. Montez Foreman morphine (MS CONTIN) 15 MG extended release tablet Take 15 mg by mouth 2 times daily.  aspirin 81 MG tablet Take 81 mg by mouth daily      Cholecalciferol (VITAMIN D) 2000 UNITS CAPS capsule Take 2,000 Units by mouth daily       ferrous sulfate 325 (65 FE) MG tablet Take 325 mg by mouth nightly       metoprolol (LOPRESSOR) 50 MG tablet Take 1 tablet by mouth 2 times daily 60 tablet 0    albuterol (PROVENTIL HFA;VENTOLIN HFA) 108 (90 BASE) MCG/ACT inhaler Inhale 2 puffs into the lungs every 6 hours as needed for Wheezing or Shortness of Breath       calcium carbonate (OYSTER SHELL CALCIUM 500 MG) 1250 MG tablet Take 2 tablets by mouth daily      Ascorbic Acid (VITAMIN C) 500 MG tablet Take 2,000 mg by mouth daily      omeprazole (PRILOSEC) 40 MG capsule Take 40 mg by mouth daily.  diphenhydrAMINE (BENADRYL) 50 MG capsule Take 50 mg by mouth daily as needed for Allergies       Garlic 464 MG TABS Take 1,000 mg by mouth daily        No current facility-administered medications on file prior to encounter.        REVIEW OF SYSTEMS See HPI    Objective:    /74   Pulse 84   Temp 97 °F (36.1 °C) (Temporal)   Resp 18   Ht 5' 7\" (1.702 m)   Wt 295 lb (133.8 kg)   BMI 46.20 kg/m²   Wt Readings from Last 3 Encounters:   05/18/22 295 lb (133.8 kg)   05/11/22 295 lb (133.8 kg)   04/20/22 295 lb (133.8 kg)     PHYSICAL EXAM  CONSTITUTIONAL:   Awake, alert, cooperative   EYES:  lids and lashes normal   ENT: external ears and nose without lesions   NECK:  supple, symmetrical, trachea midline   SKIN:  Open wound Present    Assessment:     Problem List Items Addressed This Visit     Lymphedema of both lower extremities (Chronic)    Venous stasis ulcer of left calf with fat layer exposed without varicose veins (HCC) - Primary (Chronic)    Relevant Orders    Initiate Outpatient Wound Care Protocol        Pre Debridement Measurements:  Are located in the [place]  Documentation Flow Sheet  Post Debridement Measurements:  Wound/Ulcer Descriptions are Pre Debridement except measurements:     Incision 04/20/16 Leg Left (Active)   Number of days: 2902       Incision 08/03/16 Leg Left (Active)   Number of days: 2114       Wound 12/08/21 Pretibial Left #1 (Active)   Wound Image   03/30/22 1504   Dressing Status New dressing applied;Clean;Dry; Intact 05/11/22 1613   Wound Cleansed Cleansed with saline 05/11/22 1613   Dressing/Treatment Alginate;ABD 05/11/22 1613   Offloading for Diabetic Foot Ulcers Offloading not required 04/13/22 1543   Wound Length (cm) 4.7 cm 05/18/22 1550   Wound Width (cm) 1.5 cm 05/18/22 1550   Wound Depth (cm) 0.3 cm 05/18/22 1550   Wound Surface Area (cm^2) 7.05 cm^2 05/18/22 1550   Change in Wound Size % (l*w) -907.14 05/18/22 1550   Wound Volume (cm^3) 2.115 cm^3 05/18/22 1550   Wound Healing % -2921 05/18/22 1550   Post-Procedure Length (cm) 4.8 cm 05/18/22 1601   Post-Procedure Width (cm) 1.6 cm 05/18/22 1601   Post-Procedure Depth (cm) 0.3 cm 05/18/22 1601   Post-Procedure Surface Area (cm^2) 7.68 cm^2 05/18/22 1601   Post-Procedure Volume (cm^3) 2. 304 cm^3 05/18/22 1601   Wound Assessment Fibrin;Pale granulation tissue 05/18/22 1550   Drainage Amount Moderate 05/18/22 1550   Drainage Description Serous; Yellow 05/18/22 1550   Odor None 05/18/22 1550   Kori-wound Assessment Hyperpigmented 05/18/22 1550   Number of days: 161     Incision 04/16/21 Abdomen (Active)   Number of days: 397       Procedure Note  Indications:  Based on my examination of this patient's wound(s)/ulcer(s) today, debridement is required to promote healing and evaluate the wound base.     Performed by: Selena Lopez MD    Consent obtained:  Yes    Time out taken:  Yes    Pain Control: Anesthetic  Anesthetic: 4% Lidocaine Liquid Topical     Debridement:Excisional Debridement    Using curette the wound(s)/ulcer(s) was/were sharply debrided down through and including the removal of epidermis, dermis and subcutaneous tissue. Devitalized Tissue Debrided:  fibrin, biofilm, slough and exudate to stimulate bleeding to promote healing, post debridement good bleeding base and wound edges noted    Wound/Ulcer #: 1    Percent of Wound/Ulcer Debrided: 100%    Total Surface Area Debrided:  7 sq cm     Estimated Blood Loss:  Minimal  Hemostasis Achieved:  by pressure    Procedural Pain:  7  / 10   Post Procedural Pain:  8 / 10     Response to treatment:  With complaints of pain. Plan:   Treatment Note please see attached Discharge Instructions    Written patient dismissal instructions given to patient and signed by patient or POA. Discharge Instructions       Visit Discharge/Physician Orders     Discharge condition: Stable     Assessment of pain at discharge: mild     Anesthetic used: lido 4%     Discharge to: Home     Left via:Private automobile     Accompanied by: accompanied by self     [facility]F/HHA: [facility]     Dressing Orders: To left pretib wound: cleanse with normal saline, apply aquacel, cover and secure with dry dressing. Change daily      Treatment Orders:Eat a diet high in protein and vitamin C. Take a multiple vitamin daily unless contraindicated. Elevate as much as possible     [facility] followup visit 1 week____________________________  (Please note your next appointment above and if you are unable to keep, kindly give a 24 hour notice.  Thank you.)     Physician signature:__________________________        If you experience any of the following, please call the [address] [place] [address] Road during business hours:     * Increase in Pain  * Temperature over 101  * Increase in drainage from your wound  * Drainage with a foul odor  * Bleeding  * Increase in swelling  * Need for compression bandage changes due to slippage, breakthrough drainage.     If you need medical attention outside of the business hours of the [address] [place] [address] Road please contact your PCP or go to the nearest emergency room.         Electronically signed by Aretha Noel MD on 5/18/2022 at 4:07 PM

## 2022-05-25 ENCOUNTER — HOSPITAL ENCOUNTER (OUTPATIENT)
Dept: WOUND CARE | Age: 77
Discharge: HOME | End: 2022-05-25
Payer: MEDICARE

## 2022-05-25 VITALS
SYSTOLIC BLOOD PRESSURE: 130 MMHG | DIASTOLIC BLOOD PRESSURE: 66 MMHG | HEART RATE: 72 BPM | RESPIRATION RATE: 20 BRPM | TEMPERATURE: 96.8 F | WEIGHT: 293 LBS | BODY MASS INDEX: 46.2 KG/M2

## 2022-05-25 DIAGNOSIS — L97.222 VENOUS STASIS ULCER OF LEFT CALF WITH FAT LAYER EXPOSED WITHOUT VARICOSE VEINS (HCC): Primary | ICD-10-CM

## 2022-05-25 DIAGNOSIS — I87.2 VENOUS STASIS ULCER OF LEFT CALF WITH FAT LAYER EXPOSED WITHOUT VARICOSE VEINS (HCC): Primary | ICD-10-CM

## 2022-05-25 PROCEDURE — 11042 DBRDMT SUBQ TIS 1ST 20SQCM/<: CPT | Performed by: NURSE PRACTITIONER

## 2022-05-25 PROCEDURE — 11042 DBRDMT SUBQ TIS 1ST 20SQCM/<: CPT

## 2022-05-25 RX ORDER — LIDOCAINE HYDROCHLORIDE 40 MG/ML
SOLUTION TOPICAL ONCE
Status: COMPLETED | OUTPATIENT
Start: 2022-05-25 | End: 2022-05-25

## 2022-05-25 RX ORDER — LIDOCAINE 40 MG/G
CREAM TOPICAL ONCE
Status: CANCELLED | OUTPATIENT
Start: 2022-05-25 | End: 2022-05-25

## 2022-05-25 RX ORDER — BACITRACIN ZINC AND POLYMYXIN B SULFATE 500; 1000 [USP'U]/G; [USP'U]/G
OINTMENT TOPICAL ONCE
Status: CANCELLED | OUTPATIENT
Start: 2022-05-25 | End: 2022-05-25

## 2022-05-25 RX ORDER — LIDOCAINE 50 MG/G
OINTMENT TOPICAL ONCE
Status: CANCELLED | OUTPATIENT
Start: 2022-05-25 | End: 2022-05-25

## 2022-05-25 RX ORDER — LIDOCAINE HYDROCHLORIDE 20 MG/ML
JELLY TOPICAL ONCE
Status: CANCELLED | OUTPATIENT
Start: 2022-05-25 | End: 2022-05-25

## 2022-05-25 RX ORDER — BETAMETHASONE DIPROPIONATE 0.05 %
OINTMENT (GRAM) TOPICAL ONCE
Status: CANCELLED | OUTPATIENT
Start: 2022-05-25 | End: 2022-05-25

## 2022-05-25 RX ORDER — GENTAMICIN SULFATE 1 MG/G
OINTMENT TOPICAL ONCE
Status: CANCELLED | OUTPATIENT
Start: 2022-05-25 | End: 2022-05-25

## 2022-05-25 RX ORDER — GINSENG 100 MG
CAPSULE ORAL ONCE
Status: CANCELLED | OUTPATIENT
Start: 2022-05-25 | End: 2022-05-25

## 2022-05-25 RX ORDER — LIDOCAINE HYDROCHLORIDE 40 MG/ML
SOLUTION TOPICAL ONCE
Status: CANCELLED | OUTPATIENT
Start: 2022-05-25 | End: 2022-05-25

## 2022-05-25 RX ORDER — CLOBETASOL PROPIONATE 0.5 MG/G
OINTMENT TOPICAL ONCE
Status: CANCELLED | OUTPATIENT
Start: 2022-05-25 | End: 2022-05-25

## 2022-05-25 RX ORDER — BACITRACIN, NEOMYCIN, POLYMYXIN B 400; 3.5; 5 [USP'U]/G; MG/G; [USP'U]/G
OINTMENT TOPICAL ONCE
Status: CANCELLED | OUTPATIENT
Start: 2022-05-25 | End: 2022-05-25

## 2022-05-25 RX ADMIN — LIDOCAINE HYDROCHLORIDE: 40 SOLUTION TOPICAL at 15:56

## 2022-05-25 ASSESSMENT — PAIN DESCRIPTION - ORIENTATION: ORIENTATION: LEFT

## 2022-05-25 ASSESSMENT — PAIN SCALES - GENERAL: PAINLEVEL_OUTOF10: 4

## 2022-05-25 ASSESSMENT — PAIN DESCRIPTION - DESCRIPTORS: DESCRIPTORS: STABBING;THROBBING;SHARP

## 2022-05-25 ASSESSMENT — PAIN - FUNCTIONAL ASSESSMENT: PAIN_FUNCTIONAL_ASSESSMENT: ACTIVITIES ARE NOT PREVENTED

## 2022-05-25 ASSESSMENT — PAIN DESCRIPTION - LOCATION: LOCATION: LEG

## 2022-05-25 NOTE — PROGRESS NOTES
Wound Healing Center Followup Visit Note    Referring Physician : Mele Mcclure MD  [id number] [facility] RECORD NUMBER:  [id number]  AGE: 78 y.o. GENDER: female  : 1945  EPISODE DATE:  2022    Subjective:     Chief Complaint   Patient presents with    Wound Check     Left leg      HISTORY of PRESENT ILLNESS HPI   Samuel Pedroza is a 78 y.o. female who presents today in regards to follow up evaluation and treatment of wound/ulcer. That patient's past medical, family and social hx were reviewed and changes were made if present. History of Wound Context:  Patient has had left calf wound since ~ 2021. She has been putting a dressing on it. The wound has not been improving. She has a hx significant for venous stasis ulcerations of bilateral LE. She first was seen by myself in regards to these issues 2015. She eventually healed these wounds 2016. I last saw her 2021 at which time I recommended lymphedema therapy. She states she went and said it caused her to much pain and stopped going. She has chronic issues with bilateral calf pain, swelling and edema.            She admits to not wearing her stockings because of the pain.     She has used knee high 20-30 mm hg stockings in the past.       She is still seeing pain management and is down to percocet /325 mg daily.       21  · aquacell  · Double tubigrip  · Culture done  · Emphasized importance of getting in to more significant compression in the future  12/15/21  · Culture reviewed - light growth, no tx  · Wound slightly improved  · Still significant drainage  · Plan on compression wrap next week  21  · Stable wound  · Drainage slightly better  · Pt would like to wait till next week because of holidays to start wrap  22  · Wound larger  · Profore  22  · Wound appearance better  · Refusing wrap - it rolled down last week and she cut off after 3 days  22  · Wound appearance better  · Still refusing wrap 3/2/22  · periwound worse  · Culture  · drawtek  3/9/22  · periwound much improved  · levaquin 750 mg daily #10 script given culture   · Wound itself stable  3/23/22  · Left anterior calf wound improving overall  · New blister/ulcerations left 3rd, 4th and 5th toes and lateral foot   · Instructed to keep toes/foot dry, no ointment or corn starch   3/30/22  · bistering resolved, other skin issues resolved  · More dry than previously but overall stable  · kailyn Barrera  · Swelling is down, patient declining wrap  4/13/22  · Wound slightly improved  4/20/2022  · Wound stable, patient refusing compression wrap  5/4/22  · Wound worse  · Pt refusing wrap  · Will change diet per her report to decrease swelling  5/11/22  · Wound stable  · aquacell and koban 2 - pt agreeable now after significant persuasion  5/18/22  · Took off wrap within 24 hours due to pain  · kailyn and spandigrip  · She agreed to use wrap again if swelling not down 2 more cm next week  · Wound slightly smaller    5/25/22  · Wound improved   · Left leg wound debrided   · Continue aquacel       Wound/Ulcer Pain Timing/Severity: waxing and waning, mild  Quality of pain: aching, throbbing, pressure  Severity:  7 / 10   Modifying Factors: Pain worsens with debridement, dressing changes  Associated Signs/Symptoms: edema, drainage and pain    Ulcer Identification:  Ulcer Type: venous and lymphedema  Contributing Factors: edema, venous stasis and lymphedema    Diabetic/Pressure/Non Pressure Ulcers only:  Ulcer: Non-Pressure ulcer, fat layer exposed    Wound: N/A        PAST MEDICAL HISTORY      Diagnosis Date    Bursitis     CAD (coronary artery disease) 1993    heart attack    COPD (chronic obstructive pulmonary disease) ([facility] [address] 6/19/2013    Emphysema     slight    GERD (gastroesophageal reflux disease)     Hiatal hernia     Hip pain     Hyperlipidemia     Hypertension     Lymphedema of both lower extremities 11/21/2018    Venous insufficiency Krill Oil 350 MG CAPS Take 350 mg by mouth nightly      gabapentin (NEURONTIN) 300 MG capsule Take 300 mg by mouth 3 times daily.  simvastatin (ZOCOR) 40 MG tablet Take 40 mg by mouth nightly      oxyCODONE-acetaminophen (PERCOCET) 7.5-325 MG per tablet Take 1 tablet by mouth 2 times daily. Karime Bare morphine (MS CONTIN) 15 MG extended release tablet Take 15 mg by mouth 2 times daily.  aspirin 81 MG tablet Take 81 mg by mouth daily      Cholecalciferol (VITAMIN D) 2000 UNITS CAPS capsule Take 2,000 Units by mouth daily       ferrous sulfate 325 (65 FE) MG tablet Take 325 mg by mouth nightly       metoprolol (LOPRESSOR) 50 MG tablet Take 1 tablet by mouth 2 times daily 60 tablet 0    albuterol (PROVENTIL HFA;VENTOLIN HFA) 108 (90 BASE) MCG/ACT inhaler Inhale 2 puffs into the lungs every 6 hours as needed for Wheezing or Shortness of Breath       calcium carbonate (OYSTER SHELL CALCIUM 500 MG) 1250 MG tablet Take 2 tablets by mouth daily      Ascorbic Acid (VITAMIN C) 500 MG tablet Take 2,000 mg by mouth daily      omeprazole (PRILOSEC) 40 MG capsule Take 40 mg by mouth daily.  diphenhydrAMINE (BENADRYL) 50 MG capsule Take 50 mg by mouth daily as needed for Allergies       Garlic 716 MG TABS Take 1,000 mg by mouth daily        No current facility-administered medications on file prior to encounter.        REVIEW OF SYSTEMS See HPI    Objective:    /66   Pulse 72   Temp 96.8 °F (36 °C) (Tympanic)   Resp 20   Wt 295 lb (133.8 kg)   BMI 46.20 kg/m²   Wt Readings from Last 3 Encounters:   05/25/22 295 lb (133.8 kg)   05/18/22 295 lb (133.8 kg)   05/11/22 295 lb (133.8 kg)     PHYSICAL EXAM  CONSTITUTIONAL:   Awake, alert, cooperative   EYES:  lids and lashes normal   ENT: external ears and nose without lesions   NECK:  supple, symmetrical, trachea midline   SKIN:  Open wound Present    Assessment:     Problem List Items Addressed This Visit     Venous stasis ulcer of left calf with fat layer exposed without varicose veins (HCC) - Primary (Chronic)    Relevant Orders    Initiate Outpatient Wound Care Protocol        Pre Debridement Measurements:  Are located in the [place]  Documentation Flow Sheet  Post Debridement Measurements:  Wound/Ulcer Descriptions are Pre Debridement except measurements:     Incision 04/20/16 Leg Left (Active)   Number of days: 9871       Incision 08/03/16 Leg Left (Active)   Number of days: 2121       Wound 12/08/21 Pretibial Left #1 (Active)   Wound Image   05/25/22 1534   Dressing Status New dressing applied;Clean;Dry; Intact 05/18/22 1632   Wound Cleansed Cleansed with saline 05/18/22 1632   Dressing/Treatment Alginate;ABD 05/18/22 1632   Offloading for Diabetic Foot Ulcers Offloading not required 04/13/22 1543   Wound Length (cm) 2.9 cm 05/25/22 1534   Wound Width (cm) 1.5 cm 05/25/22 1534   Wound Depth (cm) 0.2 cm 05/25/22 1534   Wound Surface Area (cm^2) 4.35 cm^2 05/25/22 1534   Change in Wound Size % (l*w) -521.43 05/25/22 1534   Wound Volume (cm^3) 0.87 cm^3 05/25/22 1534   Wound Healing % -1143 05/25/22 1534   Post-Procedure Length (cm) 3 cm 05/25/22 1555   Post-Procedure Width (cm) 1.5 cm 05/25/22 1555   Post-Procedure Depth (cm) 0.2 cm 05/25/22 1555   Post-Procedure Surface Area (cm^2) 4.5 cm^2 05/25/22 1555   Post-Procedure Volume (cm^3) 0.9 cm^3 05/25/22 1555   Wound Assessment Fibrin;Pink/red 05/25/22 1534   Drainage Amount Moderate 05/25/22 1534   Drainage Description Yellow 05/25/22 1534   Odor None 05/25/22 1534   Kori-wound Assessment Blanchable erythema 05/25/22 1534   Number of days: 168     Incision 04/16/21 Abdomen (Active)   Number of days: 404       Procedure Note  Indications:  Based on my examination of this patient's wound(s)/ulcer(s) today, debridement is required to promote healing and evaluate the wound base.     Performed by: MESSI Ramirez CNP    Consent obtained:  Yes    Time out taken:  Yes    Pain Control: Anesthetic  Anesthetic: 4% Lidocaine Liquid Topical     Debridement:Excisional Debridement    Using curette the wound(s)/ulcer(s) was/were sharply debrided down through and including the removal of epidermis, dermis and subcutaneous tissue. Devitalized Tissue Debrided:  fibrin, biofilm, slough and exudate to stimulate bleeding to promote healing, post debridement good bleeding base and wound edges noted    Wound/Ulcer #: 1    Percent of Wound/Ulcer Debrided: 100%    Total Surface Area Debrided:  4.5 sq cm     Estimated Blood Loss:  Minimal  Hemostasis Achieved:  by pressure    Procedural Pain:  7  / 10   Post Procedural Pain:  8 / 10     Response to treatment:  With complaints of pain. Plan:   Treatment Note please see attached Discharge Instructions    Written patient dismissal instructions given to patient and signed by patient or POA. Discharge Instructions       Visit Discharge/Physician Orders     Discharge condition: Stable     Assessment of pain at discharge: mild     Anesthetic used: lido 4%     Discharge to: Home     Left via:Private automobile     Accompanied by: accompanied by self     ECF/HHA: [facility]     Dressing Orders: To left pretib wound: cleanse with normal saline, apply aquacel, cover and secure with dry dressing. Change daily      Treatment Orders:Eat a diet high in protein and vitamin C. Take a multiple vitamin daily unless contraindicated. Elevate as much as possible     [facility] followup visit 1 week____________________________  (Please note your next appointment above and if you are unable to keep, kindly give a 24 hour notice.  Thank you.)     Physician signature:__________________________        If you experience any of the following, please call the [address] [place] [address] Road during business hours:     * Increase in Pain  * Temperature over 101  * Increase in drainage from your wound  * Drainage with a foul odor  * Bleeding  * Increase in swelling  * Need for compression bandage changes due to slippage, breakthrough drainage.     If you need medical attention outside of the business hours of the [address] [place] [address] Road please contact your PCP or go to the nearest emergency room.         Electronically signed by MESSI Alvarenga CNP on 5/25/2022 at 5:25 PM

## 2022-05-25 NOTE — PLAN OF CARE
Problem: Wound:  Goal: Will show signs of wound healing; wound closure and no evidence of infection  Description: Will show signs of wound healing; wound closure and no evidence of infection  Outcome: Progressing     Problem: Weight control:  Goal: Ability to maintain an optimal weight for height and age will be supported  Description: Ability to maintain an optimal weight for height and age will be supported  Outcome: Progressing     Problem: Venous:  Goal: Signs of wound healing will improve  Description: Signs of wound healing will improve  Outcome: Adequate for Discharge

## 2022-06-01 ENCOUNTER — HOSPITAL ENCOUNTER (OUTPATIENT)
Dept: WOUND CARE | Age: 77
Discharge: HOME | End: 2022-06-01

## 2022-06-08 ENCOUNTER — HOSPITAL ENCOUNTER (OUTPATIENT)
Dept: WOUND CARE | Age: 77
Discharge: HOME | End: 2022-06-08
Payer: MEDICARE

## 2022-06-08 VITALS
RESPIRATION RATE: 19 BRPM | DIASTOLIC BLOOD PRESSURE: 82 MMHG | TEMPERATURE: 97.4 F | SYSTOLIC BLOOD PRESSURE: 134 MMHG | HEART RATE: 77 BPM

## 2022-06-08 DIAGNOSIS — L97.222 VENOUS STASIS ULCER OF LEFT CALF WITH FAT LAYER EXPOSED WITHOUT VARICOSE VEINS (HCC): Primary | ICD-10-CM

## 2022-06-08 DIAGNOSIS — I89.0 LYMPHEDEMA OF BOTH LOWER EXTREMITIES: Chronic | ICD-10-CM

## 2022-06-08 DIAGNOSIS — I87.2 VENOUS STASIS ULCER OF LEFT CALF WITH FAT LAYER EXPOSED WITHOUT VARICOSE VEINS (HCC): Primary | ICD-10-CM

## 2022-06-08 PROCEDURE — 87186 SC STD MICRODIL/AGAR DIL: CPT

## 2022-06-08 PROCEDURE — 87077 CULTURE AEROBIC IDENTIFY: CPT

## 2022-06-08 PROCEDURE — 11042 DBRDMT SUBQ TIS 1ST 20SQCM/<: CPT | Performed by: SURGERY

## 2022-06-08 PROCEDURE — 87075 CULTR BACTERIA EXCEPT BLOOD: CPT

## 2022-06-08 PROCEDURE — 87070 CULTURE OTHR SPECIMN AEROBIC: CPT

## 2022-06-08 PROCEDURE — 11042 DBRDMT SUBQ TIS 1ST 20SQCM/<: CPT

## 2022-06-08 RX ORDER — CLOBETASOL PROPIONATE 0.5 MG/G
OINTMENT TOPICAL ONCE
Status: CANCELLED | OUTPATIENT
Start: 2022-06-08 | End: 2022-06-08

## 2022-06-08 RX ORDER — LIDOCAINE HYDROCHLORIDE 20 MG/ML
JELLY TOPICAL ONCE
Status: CANCELLED | OUTPATIENT
Start: 2022-06-08 | End: 2022-06-08

## 2022-06-08 RX ORDER — BACITRACIN, NEOMYCIN, POLYMYXIN B 400; 3.5; 5 [USP'U]/G; MG/G; [USP'U]/G
OINTMENT TOPICAL ONCE
Status: CANCELLED | OUTPATIENT
Start: 2022-06-08 | End: 2022-06-08

## 2022-06-08 RX ORDER — GINSENG 100 MG
CAPSULE ORAL ONCE
Status: CANCELLED | OUTPATIENT
Start: 2022-06-08 | End: 2022-06-08

## 2022-06-08 RX ORDER — GENTAMICIN SULFATE 1 MG/G
OINTMENT TOPICAL ONCE
Status: CANCELLED | OUTPATIENT
Start: 2022-06-08 | End: 2022-06-08

## 2022-06-08 RX ORDER — BETAMETHASONE DIPROPIONATE 0.05 %
OINTMENT (GRAM) TOPICAL ONCE
Status: CANCELLED | OUTPATIENT
Start: 2022-06-08 | End: 2022-06-08

## 2022-06-08 RX ORDER — LIDOCAINE HYDROCHLORIDE 40 MG/ML
SOLUTION TOPICAL ONCE
Status: DISCONTINUED | OUTPATIENT
Start: 2022-06-08 | End: 2022-06-09 | Stop reason: HOSPADM

## 2022-06-08 RX ORDER — LIDOCAINE HYDROCHLORIDE 40 MG/ML
SOLUTION TOPICAL ONCE
Status: CANCELLED | OUTPATIENT
Start: 2022-06-08 | End: 2022-06-08

## 2022-06-08 RX ORDER — LIDOCAINE 40 MG/G
CREAM TOPICAL ONCE
Status: CANCELLED | OUTPATIENT
Start: 2022-06-08 | End: 2022-06-08

## 2022-06-08 RX ORDER — BACITRACIN ZINC AND POLYMYXIN B SULFATE 500; 1000 [USP'U]/G; [USP'U]/G
OINTMENT TOPICAL ONCE
Status: CANCELLED | OUTPATIENT
Start: 2022-06-08 | End: 2022-06-08

## 2022-06-08 RX ORDER — LIDOCAINE 50 MG/G
OINTMENT TOPICAL ONCE
Status: CANCELLED | OUTPATIENT
Start: 2022-06-08 | End: 2022-06-08

## 2022-06-09 NOTE — PROGRESS NOTES
[facility] Followup Visit Note    Referring Physician : Kody Pavon MD  [id number] [facility] RECORD NUMBER:  [id number]  AGE: 78 y.o. GENDER: female  : 1945  EPISODE DATE:  2022    Subjective:     Chief Complaint   Patient presents with    Wound Check     LLE      HISTORY of PRESENT ILLNESS HPI   Debbie Watson is a 78 y.o. female who presents today in regards to follow up evaluation and treatment of wound/ulcer. That patient's past medical, family and social hx were reviewed and changes were made if present. History of Wound Context:  Patient has had left calf wound since ~ 2021. She has been putting a dressing on it. The wound has not been improving. She has a hx significant for venous stasis ulcerations of bilateral LE. She first was seen by myself in regards to these issues 2015. She eventually healed these wounds 2016. I last saw her 2021 at which time I recommended lymphedema therapy. She states she went and said it caused her to much pain and stopped going. She has chronic issues with bilateral calf pain, swelling and edema.            She admits to not wearing her stockings because of the pain.     She has used knee high 20-30 mm hg stockings in the past.       She is still seeing pain management and is down to percocet //325 mg daily.       21  · aquacell  · Double tubigrip  · Culture done  · Emphasized importance of getting in to more significant compression in the future  12/15/21  · Culture reviewed - light growth, no tx  · Wound slightly improved  · Still significant drainage  · Plan on compression wrap next week  21  · Stable wound  · Drainage slightly better  · Pt would like to wait till next week because of holidays to start wrap  22  · Wound larger  · Profore  22  · Wound appearance better  · Refusing wrap - it rolled down last week and she cut off after 3 days  22  · Wound appearance better  · Still refusing wrap 3/2/22  · periwound worse  · Culture  · drawtek  3/9/22  · periwound much improved  · levaquin 750 mg daily #10 script given culture   · Wound itself stable  3/23/22  · Left anterior calf wound improving overall  · New blister/ulcerations left 3rd, 4th and 5th toes and lateral foot   · Instructed to keep toes/foot dry, no ointment or corn starch   3/30/22  · bistering resolved, other skin issues resolved  · More dry than previously but overall stable  · kailyn Barrera  · Swelling is down, patient declining wrap  4/13/22  · Wound slightly improved  4/20/2022  · Wound stable, patient refusing compression wrap  5/4/22  · Wound worse  · Pt refusing wrap  · Will change diet per her report to decrease swelling  5/11/22  · Wound stable  · kailyn and giacomo 2 - pt agreeable now after significant persuasion  5/18/22  · Took off wrap within 24 hours due to pain  · kailyn and spandigrip  · She agreed to use wrap again if swelling not down 2 more cm next week  · Wound slightly smaller  5/25/22  · Wound improved   · Left leg wound debrided   · Continue aquacel   6/8/22  · Wound larger  · Agreeable to wrap - kailyn sena ag  · Culture done    Wound/Ulcer Pain Timing/Severity: waxing and waning, mild  Quality of pain: aching, throbbing, pressure  Severity:  7 / 10   Modifying Factors: Pain worsens with debridement, dressing changes  Associated Signs/Symptoms: edema, drainage and pain    Ulcer Identification:  Ulcer Type: venous and lymphedema  Contributing Factors: edema, venous stasis and lymphedema    Diabetic/Pressure/Non Pressure Ulcers only:  Ulcer: Non-Pressure ulcer, fat layer exposed    Wound: N/A        PAST MEDICAL HISTORY      Diagnosis Date    Bursitis     CAD (coronary artery disease) 1993    heart attack    COPD (chronic obstructive pulmonary disease) ([facility] [address] 6/19/2013    Emphysema     slight    GERD (gastroesophageal reflux disease)     Hiatal hernia     Hip pain     Hyperlipidemia     Hypertension     Lymphedema of both lower extremities 2018    Venous insufficiency of both lower extremities 2018    Venous stasis ulcer of left calf with fat layer exposed without varicose veins ([facility] [address] 2021    Venous ulcer with fat layer exposed ([facility] [address] 10/28/2015     Past Surgical History:   Procedure Laterality Date    APPENDECTOMY      BREAST ENHANCEMENT SURGERY      BREAST REDUCTION SURGERY      CHOLECYSTECTOMY      COLONOSCOPY      ECHO COMPL W DOP COLOR FLOW  3/11/2013         ENDOSCOPY, COLON, DIAGNOSTIC      HERNIA REPAIR N/A 2021    LAPAROSCOPIC ROBOTIC ASSISTED INGUINAL HERNIA REPAIR performed by Bri Yuen MD at [address] ([address] [place] [address])      JOINT REPLACEMENT  2009    l knee r hip    LEG DEBRIDEMENT Left 2015    LEG DEBRIDEMENT Left 2016     History reviewed. No pertinent family history.   Social History     Tobacco Use    Smoking status: Former Smoker     Packs/day: 1.00     Years: 20.00     Pack years: 20.00     Types: Cigarettes     Quit date: 1995     Years since quittin.6    Smokeless tobacco: Never Used   Vaping Use    Vaping Use: Never used   Substance Use Topics    Alcohol use: No    Drug use: No     Allergies   Allergen Reactions    Codeine Nausea And Vomiting and Other (See Comments)     hallucinate    Dilaudid [Hydromorphone Hcl] Rash    Naproxen Other (See Comments)     Shuts down kidney function    Singulair [Montelukast Sodium] Shortness Of Breath     Mucus in chest    Black Cohosh     Black Cohosh [Cimicifuga [place] (Black Cohosh)] Rash     Current Outpatient Medications on File Prior to Encounter   Medication Sig Dispense Refill    Cyanocobalamin (VITAMIN B 12) 500 MCG TABS Take by mouth daily      Multiple Vitamins-Minerals (THERAPEUTIC MULTIVITAMIN-MINERALS) tablet Take 1 tablet by mouth daily      zinc gluconate 50 MG tablet Take 50 mg by mouth daily      GENISTEIN PO Take 125 mg by mouth nightly      folic acid (FOLVITE) 204 MCG tablet Take 400 mcg by mouth nightly      Krill Oil 350 MG CAPS Take 350 mg by mouth nightly      gabapentin (NEURONTIN) 300 MG capsule Take 300 mg by mouth 3 times daily.  simvastatin (ZOCOR) 40 MG tablet Take 40 mg by mouth nightly      oxyCODONE-acetaminophen (PERCOCET) 7.5-325 MG per tablet Take 1 tablet by mouth 2 times daily. Karime Bare morphine (MS CONTIN) 15 MG extended release tablet Take 15 mg by mouth 2 times daily.  aspirin 81 MG tablet Take 81 mg by mouth daily      Cholecalciferol (VITAMIN D) 2000 UNITS CAPS capsule Take 2,000 Units by mouth daily       ferrous sulfate 325 (65 FE) MG tablet Take 325 mg by mouth nightly       metoprolol (LOPRESSOR) 50 MG tablet Take 1 tablet by mouth 2 times daily 60 tablet 0    albuterol (PROVENTIL HFA;VENTOLIN HFA) 108 (90 BASE) MCG/ACT inhaler Inhale 2 puffs into the lungs every 6 hours as needed for Wheezing or Shortness of Breath       calcium carbonate (OYSTER SHELL CALCIUM 500 MG) 1250 MG tablet Take 2 tablets by mouth daily      Ascorbic Acid (VITAMIN C) 500 MG tablet Take 2,000 mg by mouth daily      omeprazole (PRILOSEC) 40 MG capsule Take 40 mg by mouth daily.  diphenhydrAMINE (BENADRYL) 50 MG capsule Take 50 mg by mouth daily as needed for Allergies       Garlic 605 MG TABS Take 1,000 mg by mouth daily        No current facility-administered medications on file prior to encounter.        REVIEW OF SYSTEMS See HPI    Objective:    /82   Pulse 77   Temp 97.4 °F (36.3 °C) (Tympanic)   Resp 19   Wt Readings from Last 3 Encounters:   05/25/22 295 lb (133.8 kg)   05/18/22 295 lb (133.8 kg)   05/11/22 295 lb (133.8 kg)     PHYSICAL EXAM  CONSTITUTIONAL:   Awake, alert, cooperative   EYES:  lids and lashes normal   ENT: external ears and nose without lesions   NECK:  supple, symmetrical, trachea midline   SKIN:  Open wound Present    Assessment:     Problem List Items Addressed This Visit     Lymphedema of both lower extremities (Chronic)    Venous stasis ulcer of left calf with fat layer exposed without varicose veins (HCC) - Primary (Chronic)    Relevant Medications    lidocaine (XYLOCAINE) 4 % external solution    Other Relevant Orders    Initiate Outpatient Wound Care Protocol        Pre Debridement Measurements:  Are located in the [place]  Documentation Flow Sheet  Post Debridement Measurements:  Wound/Ulcer Descriptions are Pre Debridement except measurements:     Incision 04/20/16 Leg Left (Active)   Number of days: 1373       Incision 08/03/16 Leg Left (Active)   Number of days: 2135       Wound 12/08/21 Pretibial Left #1 (Active)   Wound Image   05/25/22 1534   Dressing Status New dressing applied;Clean;Dry; Intact 06/08/22 1614   Wound Cleansed Cleansed with saline 06/08/22 1614   Dressing/Treatment Alginate with Ag;Dry dressing 06/08/22 1614   Offloading for Diabetic Foot Ulcers Offloading not required 04/13/22 1543   Wound Length (cm) 3 cm 06/08/22 1527   Wound Width (cm) 1.9 cm 06/08/22 1527   Wound Depth (cm) 0.4 cm 06/08/22 1527   Wound Surface Area (cm^2) 5.7 cm^2 06/08/22 1527   Change in Wound Size % (l*w) -714.29 06/08/22 1527   Wound Volume (cm^3) 2.28 cm^3 06/08/22 1527   Wound Healing % -3157 06/08/22 1527   Post-Procedure Length (cm) 3.1 cm 06/08/22 1552   Post-Procedure Width (cm) 2 cm 06/08/22 1552   Post-Procedure Depth (cm) 0.5 cm 06/08/22 1552   Post-Procedure Surface Area (cm^2) 6.2 cm^2 06/08/22 1552   Post-Procedure Volume (cm^3) 3.1 cm^3 06/08/22 1552   Wound Assessment Fibrin;Pink/red;Slough 06/08/22 1527   Drainage Amount Moderate 06/08/22 1527   Drainage Description Yellow;Serosanguinous 06/08/22 1527   Odor None 06/08/22 1527   Kori-wound Assessment Maceration 06/08/22 1527   Number of days: 182     Incision 04/16/21 Abdomen (Active)   Number of days: 418       Procedure Note  Indications:  Based on my examination of this patient's wound(s)/ulcer(s) today, debridement is required to promote healing and evaluate the wound base. Performed by: Meme Chaves MD    Consent obtained:  Yes    Time out taken:  Yes    Pain Control: Anesthetic  Anesthetic: 4% Lidocaine Liquid Topical     Debridement:Excisional Debridement    Using curette the wound(s)/ulcer(s) was/were sharply debrided down through and including the removal of epidermis, dermis and subcutaneous tissue. Devitalized Tissue Debrided:  fibrin, biofilm, slough and exudate to stimulate bleeding to promote healing, post debridement good bleeding base and wound edges noted    Wound/Ulcer #: 1    Percent of Wound/Ulcer Debrided: 100%    Total Surface Area Debrided:  5.7 sq cm     Estimated Blood Loss:  Minimal  Hemostasis Achieved:  by pressure    Procedural Pain:  7  / 10   Post Procedural Pain:  8 / 10     Response to treatment:  With complaints of pain. Plan:   Treatment Note please see attached Discharge Instructions    Written patient dismissal instructions given to patient and signed by patient or POA. Discharge Instructions       Visit Discharge/Physician Orders     Discharge condition: Stable     Assessment of pain at discharge: mild     Anesthetic used: lido 4%     Discharge to: Home     Left via:Private automobile     Accompanied by: accompanied by self     [facility]: [facility]     Dressing Orders: To left pretib wound: cleanse with normal saline, apply aquacel AG, cover and secure with dry dressing. Apply profore wrap. Xhange weekly at [facility]. Keep wrap dry at all times. If wrap becomes wet or falls 2 inches call [facility] ([phone])and may remove wrap and apply double tubigrip. Treatment Orders:Eat a diet high in protein and vitamin C. Take a multiple vitamin daily unless contraindicated.   Elevate as much as possible     [facility] followup visit 1 week____________________________  (Please note your next appointment above and if you are unable to keep,

## 2022-06-10 LAB — ANAEROBIC CULTURE: NORMAL

## 2022-06-11 LAB
ORGANISM: ABNORMAL
WOUND/ABSCESS: ABNORMAL

## 2022-06-15 ENCOUNTER — HOSPITAL ENCOUNTER (OUTPATIENT)
Dept: WOUND CARE | Age: 77
Discharge: HOME | End: 2022-06-15

## 2022-06-15 RX ORDER — CLINDAMYCIN HYDROCHLORIDE 300 MG/1
300 CAPSULE ORAL 3 TIMES DAILY
Qty: 21 CAPSULE | Refills: 0 | Status: SHIPPED | OUTPATIENT
Start: 2022-06-15 | End: 2022-06-22

## 2022-06-22 ENCOUNTER — HOSPITAL ENCOUNTER (OUTPATIENT)
Dept: WOUND CARE | Age: 77
Discharge: HOME | End: 2022-06-22
Payer: MEDICARE

## 2022-06-22 VITALS
SYSTOLIC BLOOD PRESSURE: 138 MMHG | DIASTOLIC BLOOD PRESSURE: 90 MMHG | TEMPERATURE: 96.4 F | HEART RATE: 64 BPM | WEIGHT: 293 LBS | HEIGHT: 67 IN | RESPIRATION RATE: 18 BRPM | BODY MASS INDEX: 45.99 KG/M2

## 2022-06-22 DIAGNOSIS — I87.2 VENOUS STASIS ULCER OF LEFT CALF WITH FAT LAYER EXPOSED WITHOUT VARICOSE VEINS (HCC): Primary | ICD-10-CM

## 2022-06-22 DIAGNOSIS — L97.222 VENOUS STASIS ULCER OF LEFT CALF WITH FAT LAYER EXPOSED WITHOUT VARICOSE VEINS (HCC): Primary | ICD-10-CM

## 2022-06-22 PROCEDURE — 11042 DBRDMT SUBQ TIS 1ST 20SQCM/<: CPT | Performed by: SURGERY

## 2022-06-22 PROCEDURE — 11042 DBRDMT SUBQ TIS 1ST 20SQCM/<: CPT

## 2022-06-22 PROCEDURE — 6370000000 HC RX 637 (ALT 250 FOR IP): Performed by: SURGERY

## 2022-06-22 RX ORDER — BACITRACIN, NEOMYCIN, POLYMYXIN B 400; 3.5; 5 [USP'U]/G; MG/G; [USP'U]/G
OINTMENT TOPICAL ONCE
Status: CANCELLED | OUTPATIENT
Start: 2022-06-22 | End: 2022-06-22

## 2022-06-22 RX ORDER — LIDOCAINE HYDROCHLORIDE 40 MG/ML
SOLUTION TOPICAL ONCE
Status: CANCELLED | OUTPATIENT
Start: 2022-06-22 | End: 2022-06-22

## 2022-06-22 RX ORDER — BETAMETHASONE DIPROPIONATE 0.05 %
OINTMENT (GRAM) TOPICAL ONCE
Status: CANCELLED | OUTPATIENT
Start: 2022-06-22 | End: 2022-06-22

## 2022-06-22 RX ORDER — LIDOCAINE HYDROCHLORIDE 40 MG/ML
SOLUTION TOPICAL ONCE
Status: COMPLETED | OUTPATIENT
Start: 2022-06-22 | End: 2022-06-22

## 2022-06-22 RX ORDER — GENTAMICIN SULFATE 1 MG/G
OINTMENT TOPICAL ONCE
Status: CANCELLED | OUTPATIENT
Start: 2022-06-22 | End: 2022-06-22

## 2022-06-22 RX ORDER — BACITRACIN ZINC AND POLYMYXIN B SULFATE 500; 1000 [USP'U]/G; [USP'U]/G
OINTMENT TOPICAL ONCE
Status: CANCELLED | OUTPATIENT
Start: 2022-06-22 | End: 2022-06-22

## 2022-06-22 RX ORDER — LIDOCAINE 40 MG/G
CREAM TOPICAL ONCE
Status: CANCELLED | OUTPATIENT
Start: 2022-06-22 | End: 2022-06-22

## 2022-06-22 RX ORDER — GINSENG 100 MG
CAPSULE ORAL ONCE
Status: CANCELLED | OUTPATIENT
Start: 2022-06-22 | End: 2022-06-22

## 2022-06-22 RX ORDER — LIDOCAINE 50 MG/G
OINTMENT TOPICAL ONCE
Status: CANCELLED | OUTPATIENT
Start: 2022-06-22 | End: 2022-06-22

## 2022-06-22 RX ORDER — LIDOCAINE HYDROCHLORIDE 20 MG/ML
JELLY TOPICAL ONCE
Status: CANCELLED | OUTPATIENT
Start: 2022-06-22 | End: 2022-06-22

## 2022-06-22 RX ORDER — CLOBETASOL PROPIONATE 0.5 MG/G
OINTMENT TOPICAL ONCE
Status: CANCELLED | OUTPATIENT
Start: 2022-06-22 | End: 2022-06-22

## 2022-06-22 RX ADMIN — LIDOCAINE HYDROCHLORIDE 10 ML: 40 SOLUTION TOPICAL at 15:51

## 2022-06-29 ENCOUNTER — HOSPITAL ENCOUNTER (OUTPATIENT)
Dept: WOUND CARE | Age: 77
Discharge: HOME | End: 2022-06-29
Payer: MEDICARE

## 2022-06-29 VITALS
HEIGHT: 67 IN | DIASTOLIC BLOOD PRESSURE: 62 MMHG | HEART RATE: 74 BPM | TEMPERATURE: 97.2 F | SYSTOLIC BLOOD PRESSURE: 142 MMHG | WEIGHT: 293 LBS | BODY MASS INDEX: 45.99 KG/M2 | RESPIRATION RATE: 18 BRPM

## 2022-06-29 DIAGNOSIS — L97.222 VENOUS STASIS ULCER OF LEFT CALF WITH FAT LAYER EXPOSED WITHOUT VARICOSE VEINS (HCC): Primary | ICD-10-CM

## 2022-06-29 DIAGNOSIS — I87.2 VENOUS STASIS ULCER OF LEFT CALF WITH FAT LAYER EXPOSED WITHOUT VARICOSE VEINS (HCC): Primary | ICD-10-CM

## 2022-06-29 DIAGNOSIS — I89.0 LYMPHEDEMA OF BOTH LOWER EXTREMITIES: Chronic | ICD-10-CM

## 2022-06-29 PROCEDURE — 11042 DBRDMT SUBQ TIS 1ST 20SQCM/<: CPT

## 2022-06-29 PROCEDURE — 11042 DBRDMT SUBQ TIS 1ST 20SQCM/<: CPT | Performed by: SURGERY

## 2022-06-29 PROCEDURE — 6370000000 HC RX 637 (ALT 250 FOR IP): Performed by: SURGERY

## 2022-06-29 RX ORDER — CLOBETASOL PROPIONATE 0.5 MG/G
OINTMENT TOPICAL ONCE
Status: CANCELLED | OUTPATIENT
Start: 2022-06-29 | End: 2022-06-29

## 2022-06-29 RX ORDER — LIDOCAINE 50 MG/G
OINTMENT TOPICAL ONCE
Status: CANCELLED | OUTPATIENT
Start: 2022-06-29 | End: 2022-06-29

## 2022-06-29 RX ORDER — LIDOCAINE HYDROCHLORIDE 40 MG/ML
SOLUTION TOPICAL ONCE
Status: CANCELLED | OUTPATIENT
Start: 2022-06-29 | End: 2022-06-29

## 2022-06-29 RX ORDER — BETAMETHASONE DIPROPIONATE 0.05 %
OINTMENT (GRAM) TOPICAL ONCE
Status: CANCELLED | OUTPATIENT
Start: 2022-06-29 | End: 2022-06-29

## 2022-06-29 RX ORDER — BACITRACIN, NEOMYCIN, POLYMYXIN B 400; 3.5; 5 [USP'U]/G; MG/G; [USP'U]/G
OINTMENT TOPICAL ONCE
Status: CANCELLED | OUTPATIENT
Start: 2022-06-29 | End: 2022-06-29

## 2022-06-29 RX ORDER — BACITRACIN ZINC AND POLYMYXIN B SULFATE 500; 1000 [USP'U]/G; [USP'U]/G
OINTMENT TOPICAL ONCE
Status: CANCELLED | OUTPATIENT
Start: 2022-06-29 | End: 2022-06-29

## 2022-06-29 RX ORDER — GENTAMICIN SULFATE 1 MG/G
OINTMENT TOPICAL ONCE
Status: CANCELLED | OUTPATIENT
Start: 2022-06-29 | End: 2022-06-29

## 2022-06-29 RX ORDER — LIDOCAINE HYDROCHLORIDE 20 MG/ML
JELLY TOPICAL ONCE
Status: CANCELLED | OUTPATIENT
Start: 2022-06-29 | End: 2022-06-29

## 2022-06-29 RX ORDER — GINSENG 100 MG
CAPSULE ORAL ONCE
Status: CANCELLED | OUTPATIENT
Start: 2022-06-29 | End: 2022-06-29

## 2022-06-29 RX ORDER — LIDOCAINE HYDROCHLORIDE 40 MG/ML
SOLUTION TOPICAL ONCE
Status: COMPLETED | OUTPATIENT
Start: 2022-06-29 | End: 2022-06-29

## 2022-06-29 RX ORDER — LIDOCAINE 40 MG/G
CREAM TOPICAL ONCE
Status: CANCELLED | OUTPATIENT
Start: 2022-06-29 | End: 2022-06-29

## 2022-06-29 RX ADMIN — LIDOCAINE HYDROCHLORIDE 8 ML: 40 SOLUTION TOPICAL at 15:53

## 2022-06-29 ASSESSMENT — PAIN DESCRIPTION - PAIN TYPE: TYPE: CHRONIC PAIN

## 2022-06-29 ASSESSMENT — PAIN DESCRIPTION - DESCRIPTORS: DESCRIPTORS: THROBBING

## 2022-06-29 ASSESSMENT — PAIN SCALES - GENERAL: PAINLEVEL_OUTOF10: 5

## 2022-06-29 ASSESSMENT — PAIN - FUNCTIONAL ASSESSMENT: PAIN_FUNCTIONAL_ASSESSMENT: ACTIVITIES ARE NOT PREVENTED

## 2022-06-29 ASSESSMENT — PAIN DESCRIPTION - FREQUENCY: FREQUENCY: INTERMITTENT

## 2022-06-29 ASSESSMENT — PAIN DESCRIPTION - ORIENTATION: ORIENTATION: LEFT;LOWER

## 2022-06-29 ASSESSMENT — PAIN DESCRIPTION - LOCATION: LOCATION: LEG

## 2022-06-29 ASSESSMENT — PAIN DESCRIPTION - ONSET: ONSET: ON-GOING

## 2022-06-29 NOTE — PROGRESS NOTES
refusing wrap   3/2/22  · periwound worse  · Culture  · drawtek  3/9/22  · periwound much improved  · levaquin 750 mg daily #10 script given culture   · Wound itself stable  3/23/22  · Left anterior calf wound improving overall  · New blister/ulcerations left 3rd, 4th and 5th toes and lateral foot   · Instructed to keep toes/foot dry, no ointment or corn starch   3/30/22  · bistering resolved, other skin issues resolved  · More dry than previously but overall stable  · kailyn Barrera  · Swelling is down, patient declining wrap  4/13/22  · Wound slightly improved  4/20/2022  · Wound stable, patient refusing compression wrap  5/4/22  · Wound worse  · Pt refusing wrap  · Will change diet per her report to decrease swelling  5/11/22  · Wound stable  · kailyn and giacomo 2 - pt agreeable now after significant persuasion  5/18/22  · Took off wrap within 24 hours due to pain  · kailyn and spandigrip  · She agreed to use wrap again if swelling not down 2 more cm next week  · Wound slightly smaller  5/25/22  · Wound improved   · Left leg wound debrided   · Continue aquacel   6/8/22  · Wound larger  · Agreeable to wrap - kailyn sena ag  · Culture done  6/22/22  · Wound stable in size    Wound/Ulcer Pain Timing/Severity: waxing and waning, mild  Quality of pain: aching, throbbing, pressure  Severity:  7 / 10   Modifying Factors: Pain worsens with debridement, dressing changes  Associated Signs/Symptoms: edema, drainage and pain    Ulcer Identification:  Ulcer Type: venous and lymphedema  Contributing Factors: edema, venous stasis and lymphedema    Diabetic/Pressure/Non Pressure Ulcers only:  Ulcer: Non-Pressure ulcer, fat layer exposed    Wound: N/A        PAST MEDICAL HISTORY      Diagnosis Date    Bursitis     CAD (coronary artery disease) 1993    heart attack    COPD [facility]) [facility] 6/19/2013    Emphysema     slight    GERD (gastroesophageal reflux disease)     Hiatal hernia daily      GENISTEIN PO Take 125 mg by mouth nightly      folic acid (FOLVITE) 080 MCG tablet Take 400 mcg by mouth nightly      Krill Oil 350 MG CAPS Take 350 mg by mouth nightly      gabapentin (NEURONTIN) 300 MG capsule Take 300 mg by mouth 3 times daily.  simvastatin (ZOCOR) 40 MG tablet Take 40 mg by mouth nightly      oxyCODONE-acetaminophen (PERCOCET) 7.5-325 MG per tablet Take 1 tablet by mouth 2 times daily. Olene Ran morphine (MS CONTIN) 15 MG extended release tablet Take 15 mg by mouth 2 times daily.  aspirin 81 MG tablet Take 81 mg by mouth daily      Cholecalciferol (VITAMIN D) 2000 UNITS CAPS capsule Take 2,000 Units by mouth daily       ferrous sulfate 325 (65 FE) MG tablet Take 325 mg by mouth nightly       metoprolol (LOPRESSOR) 50 MG tablet Take 1 tablet by mouth 2 times daily 60 tablet 0    albuterol (PROVENTIL HFA;VENTOLIN HFA) 108 (90 BASE) MCG/ACT inhaler Inhale 2 puffs into the lungs every 6 hours as needed for Wheezing or Shortness of Breath       calcium carbonate (OYSTER SHELL CALCIUM 500 MG) 1250 MG tablet Take 2 tablets by mouth daily      Ascorbic Acid (VITAMIN C) 500 MG tablet Take 2,000 mg by mouth daily      omeprazole (PRILOSEC) 40 MG capsule Take 40 mg by mouth daily.  diphenhydrAMINE (BENADRYL) 50 MG capsule Take 50 mg by mouth daily as needed for Allergies       Garlic 883 MG TABS Take 1,000 mg by mouth daily        No current facility-administered medications on file prior to encounter.        REVIEW OF SYSTEMS See HPI    Objective:    BP (!) 138/90   Pulse 64   Temp (!) 96.4 °F (35.8 °C) (Temporal)   Resp 18   Ht 5' 7\" (1.702 m)   Wt 295 lb (133.8 kg)   BMI 46.20 kg/m²   Wt Readings from Last 3 Encounters:   06/22/22 295 lb (133.8 kg)   05/25/22 295 lb (133.8 kg)   05/18/22 295 lb (133.8 kg)     PHYSICAL EXAM  CONSTITUTIONAL:   Awake, alert, cooperative   EYES:  lids and lashes normal   ENT: external ears and nose without lesions   NECK:  supple, symmetrical, trachea midline   SKIN:  Open wound Present    Assessment:     Problem List Items Addressed This Visit     Venous stasis ulcer of left calf with fat layer exposed without varicose veins (HCC) - Primary (Chronic)        Pre Debridement Measurements:  Are located in the [place]  Documentation Flow Sheet  Post Debridement Measurements:  Wound/Ulcer Descriptions are Pre Debridement except measurements:     Incision 04/20/16 Leg Left (Active)   Number of days: 2260       Incision 08/03/16 Leg Left (Active)   Number of days: 2155       Wound 12/08/21 Pretibial Left #1 (Active)   Wound Image   05/25/22 1534   Dressing Status New dressing applied;Clean;Dry; Intact 06/22/22 1619   Wound Cleansed Cleansed with saline 06/22/22 1619   Dressing/Treatment Alginate;ABD 06/22/22 1619   Offloading for Diabetic Foot Ulcers Offloading not required 04/13/22 1543   Wound Length (cm) 3 cm 06/22/22 1547   Wound Width (cm) 1.9 cm 06/22/22 1547   Wound Depth (cm) 0.3 cm 06/22/22 1547   Wound Surface Area (cm^2) 5.7 cm^2 06/22/22 1547   Change in Wound Size % (l*w) -714.29 06/22/22 1547   Wound Volume (cm^3) 1.71 cm^3 06/22/22 1547   Wound Healing % -2343 06/22/22 1547   Post-Procedure Length (cm) 3.1 cm 06/22/22 1619   Post-Procedure Width (cm) 2 cm 06/22/22 1619   Post-Procedure Depth (cm) 0.4 cm 06/22/22 1619   Post-Procedure Surface Area (cm^2) 6.2 cm^2 06/22/22 1619   Post-Procedure Volume (cm^3) 2.48 cm^3 06/22/22 1619   Wound Assessment Fibrin;Pink/red;Slough 06/22/22 1547   Drainage Amount Moderate 06/22/22 1547   Drainage Description Yellow 06/22/22 1547   Odor None 06/22/22 1547   Kori-wound Assessment Maceration 06/22/22 1547   Number of days: 202     Incision 04/16/21 Abdomen (Active)   Number of days: 438       Procedure Note  Indications:  Based on my examination of this patient's wound(s)/ulcer(s) today, debridement is required to promote healing and evaluate the wound base.     Performed by: Bina Lloyd following, please call the [facility] during business hours:     * Increase in Pain  * Temperature over 101  * Increase in drainage from your wound  * Drainage with a foul odor  * Bleeding  * Increase in swelling  * Need for compression bandage changes due to slippage, breakthrough drainage.     If you need medical attention outside of the business hours of the [facility] please contact your PCP or go to the nearest emergency room.         Electronically signed by Robe Eason MD

## 2022-06-29 NOTE — PLAN OF CARE
Problem: Wound:  Goal: Will show signs of wound healing; wound closure and no evidence of infection  Description: Will show signs of wound healing; wound closure and no evidence of infection  Outcome: Adequate for Discharge     Problem: Venous:  Goal: Signs of wound healing will improve  Description: Signs of wound healing will improve  Outcome: Adequate for Discharge

## 2022-06-29 NOTE — PROGRESS NOTES
[facility] Followup Visit Note    Referring Physician : Isaac Bedoya MD  [id number] [facility] RECORD NUMBER:  [id number]  AGE: 78 y.o. GENDER: female  : 1945  EPISODE DATE:  2022    Subjective:     Chief Complaint   Patient presents with    Wound Check     left pretib      HISTORY of PRESENT ILLNESS HPI   Mally Blas is a 78 y.o. female who presents today in regards to follow up evaluation and treatment of wound/ulcer. That patient's past medical, family and social hx were reviewed and changes were made if present. History of Wound Context:  Patient has had left calf wound since ~ 2021. She has been putting a dressing on it. The wound has not been improving. She has a hx significant for venous stasis ulcerations of bilateral LE. She first was seen by myself in regards to these issues 2015. She eventually healed these wounds 2016. I last saw her 2021 at which time I recommended lymphedema therapy. She states she went and said it caused her to much pain and stopped going. She has chronic issues with bilateral calf pain, swelling and edema.            She admits to not wearing her stockings because of the pain.     She has used knee high 20-30 mm hg stockings in the past.       She is still seeing pain management and is down to percocet //325 mg daily.       21  · aquacell  · Double tubigrip  · Culture done  · Emphasized importance of getting in to more significant compression in the future  12/15/21  · Culture reviewed - light growth, no tx  · Wound slightly improved  · Still significant drainage  · Plan on compression wrap next week  21  · Stable wound  · Drainage slightly better  · Pt would like to wait till next week because of holidays to start wrap  22  · Wound larger  · Profore  22  · Wound appearance better  · Refusing wrap - it rolled down last week and she cut off after 3 days  22  · Wound appearance better  · Still refusing wrap   3/2/22  · periwound worse  · Culture  · drawtek  3/9/22  · periwound much improved  · levaquin 750 mg daily #10 script given culture   · Wound itself stable  3/23/22  · Left anterior calf wound improving overall  · New blister/ulcerations left 3rd, 4th and 5th toes and lateral foot   · Instructed to keep toes/foot dry, no ointment or corn starch   3/30/22  · bistering resolved, other skin issues resolved  · More dry than previously but overall stable  · kailyn Barrera  · Swelling is down, patient declining wrap  4/13/22  · Wound slightly improved  4/20/2022  · Wound stable, patient refusing compression wrap  5/4/22  · Wound worse  · Pt refusing wrap  · Will change diet per her report to decrease swelling  5/11/22  · Wound stable  · kailyn and giacomo 2 - pt agreeable now after significant persuasion  5/18/22  · Took off wrap within 24 hours due to pain  · kailyn and spandigrip  · She agreed to use wrap again if swelling not down 2 more cm next week  · Wound slightly smaller  5/25/22  · Wound improved   · Left leg wound debrided   · Continue aquacel   6/8/22  · Wound larger  · Agreeable to wrap - kailyn sena ag  · Culture done  6/22/22  · Wound stable in size  6/29/22  · Wound slightly larger  · Still having issues with wrap falling down  · Will start hh - MWF wraps    Wound/Ulcer Pain Timing/Severity: waxing and waning, mild  Quality of pain: aching, throbbing, pressure  Severity:  7 / 10   Modifying Factors: Pain worsens with debridement, dressing changes  Associated Signs/Symptoms: edema, drainage and pain    Ulcer Identification:  Ulcer Type: venous and lymphedema  Contributing Factors: edema, venous stasis and lymphedema    Diabetic/Pressure/Non Pressure Ulcers only:  Ulcer: Non-Pressure ulcer, fat layer exposed    Wound: N/A        PAST MEDICAL HISTORY      Diagnosis Date    Bursitis     CAD (coronary artery disease) 1993    heart attack    [facility]    Emphysema     slight    GERD (gastroesophageal reflux disease)     Hiatal hernia     Hip pain     Hyperlipidemia     Hypertension     Lymphedema of both lower extremities 2018    Venous insufficiency of both lower extremities 2018    Venous stasis ulcer of left calf with fat layer exposed without varicose veins ([address] 2021    Venous ulcer with fat layer exposed ([address] 10/28/2015     Past Surgical History:   Procedure Laterality Date    APPENDECTOMY      BREAST ENHANCEMENT SURGERY      BREAST REDUCTION SURGERY      CHOLECYSTECTOMY      COLONOSCOPY      ECHO COMPL W DOP COLOR FLOW  3/11/2013         ENDOSCOPY, COLON, DIAGNOSTIC      HERNIA REPAIR N/A 2021    LAPAROSCOPIC ROBOTIC ASSISTED INGUINAL HERNIA REPAIR performed by Kandace Huizar MD at [address] ([address] [place] [address])      JOINT REPLACEMENT  2009    l knee r hip    LEG DEBRIDEMENT Left 2015    LEG DEBRIDEMENT Left 2016     History reviewed. No pertinent family history.   Social History     Tobacco Use    Smoking status: Former Smoker     Packs/day: 1.00     Years: 20.00     Pack years: 20.00     Types: Cigarettes     Quit date: 1995     Years since quittin.6    Smokeless tobacco: Never Used   Vaping Use    Vaping Use: Never used   Substance Use Topics    Alcohol use: No    Drug use: No     Allergies   Allergen Reactions    Codeine Nausea And Vomiting and Other (See Comments)     hallucinate    Dilaudid [Hydromorphone Hcl] Rash    Naproxen Other (See Comments)     Shuts down kidney function    Singulair [Montelukast Sodium] Shortness Of Breath     Mucus in chest    Black Cohosh     Black Cohosh [Cimicifuga [place] (Black Cohosh)] Rash     Current Outpatient Medications on File Prior to Encounter   Medication Sig Dispense Refill    Cyanocobalamin (VITAMIN B 12) 500 MCG TABS Take by mouth daily      Multiple Vitamins-Minerals (THERAPEUTIC MULTIVITAMIN-MINERALS) tablet Take 1 tablet by mouth daily      zinc gluconate 50 MG tablet Take 50 mg by mouth daily      GENISTEIN PO Take 125 mg by mouth nightly      folic acid (FOLVITE) 219 MCG tablet Take 400 mcg by mouth nightly      Krill Oil 350 MG CAPS Take 350 mg by mouth nightly      gabapentin (NEURONTIN) 300 MG capsule Take 300 mg by mouth 3 times daily.  simvastatin (ZOCOR) 40 MG tablet Take 40 mg by mouth nightly      oxyCODONE-acetaminophen (PERCOCET) 7.5-325 MG per tablet Take 1 tablet by mouth 2 times daily. [place] Shingles morphine (MS CONTIN) 15 MG extended release tablet Take 15 mg by mouth 2 times daily.  aspirin 81 MG tablet Take 81 mg by mouth daily      Cholecalciferol (VITAMIN D) 2000 UNITS CAPS capsule Take 2,000 Units by mouth daily       ferrous sulfate 325 (65 FE) MG tablet Take 325 mg by mouth nightly       metoprolol (LOPRESSOR) 50 MG tablet Take 1 tablet by mouth 2 times daily 60 tablet 0    albuterol (PROVENTIL HFA;VENTOLIN HFA) 108 (90 BASE) MCG/ACT inhaler Inhale 2 puffs into the lungs every 6 hours as needed for Wheezing or Shortness of Breath       calcium carbonate (OYSTER SHELL CALCIUM 500 MG) 1250 MG tablet Take 2 tablets by mouth daily      Ascorbic Acid (VITAMIN C) 500 MG tablet Take 2,000 mg by mouth daily      omeprazole (PRILOSEC) 40 MG capsule Take 40 mg by mouth daily.  diphenhydrAMINE (BENADRYL) 50 MG capsule Take 50 mg by mouth daily as needed for Allergies       Garlic 371 MG TABS Take 1,000 mg by mouth daily        No current facility-administered medications on file prior to encounter.        REVIEW OF SYSTEMS See HPI    Objective:    BP (!) 142/62   Pulse 74   Temp 97.2 °F (36.2 °C) (Temporal)   Resp 18   Ht 5' 7\" (1.702 m)   Wt 295 lb (133.8 kg)   BMI 46.20 kg/m²   Wt Readings from Last 3 Encounters:   06/29/22 295 lb (133.8 kg)   06/22/22 295 lb (133.8 kg)   05/25/22 295 lb (133.8 kg)     PHYSICAL EXAM  CONSTITUTIONAL: Awake, alert, cooperative   EYES:  lids and lashes normal   ENT: external ears and nose without lesions   NECK:  supple, symmetrical, trachea midline   SKIN:  Open wound Present    Assessment:     Problem List Items Addressed This Visit     Lymphedema of both lower extremities (Chronic)    Venous stasis ulcer of left calf with fat layer exposed without varicose veins (HCC) - Primary (Chronic)    Relevant Orders    Initiate Outpatient Wound Care Protocol        Pre Debridement Measurements:  Are located in the [facility]  Documentation Flow Sheet  Post Debridement Measurements:  Wound/Ulcer Descriptions are Pre Debridement except measurements:     Incision 04/20/16 Leg Left (Active)   Number of days: 9416       Incision 08/03/16 Leg Left (Active)   Number of days: 2156       Wound 12/08/21 Pretibial Left #1 (Active)   Wound Image   05/25/22 1534   Dressing Status New dressing applied;Clean;Dry; Intact 06/22/22 1619   Wound Cleansed Cleansed with saline 06/22/22 1619   Dressing/Treatment Alginate;ABD 06/22/22 1619   Offloading for Diabetic Foot Ulcers Offloading not required 04/13/22 1543   Wound Length (cm) 3.2 cm 06/29/22 1547   Wound Width (cm) 1.9 cm 06/29/22 1547   Wound Depth (cm) 0.3 cm 06/29/22 1547   Wound Surface Area (cm^2) 6.08 cm^2 06/29/22 1547   Change in Wound Size % (l*w) -768.57 06/29/22 1547   Wound Volume (cm^3) 1.824 cm^3 06/29/22 1547   Wound Healing % -2506 06/29/22 1547   Post-Procedure Length (cm) 3.2 cm 06/29/22 1603   Post-Procedure Width (cm) 2 cm 06/29/22 1603   Post-Procedure Depth (cm) 0.3 cm 06/29/22 1603   Post-Procedure Surface Area (cm^2) 6.4 cm^2 06/29/22 1603   Post-Procedure Volume (cm^3) 1.92 cm^3 06/29/22 1603   Wound Assessment Fibrin;Purple/maroon 06/29/22 1547   Drainage Amount Moderate 06/29/22 1547   Drainage Description Yellow;Serous 06/29/22 1547   Odor None 06/29/22 1547   Kori-wound Assessment Intact 06/29/22 1547   Number of days: 203     Incision 04/16/21 Abdomen (Active)   Number of days: 439       Procedure Note  Indications:  Based on my examination of this patient's wound(s)/ulcer(s) today, debridement is required to promote healing and evaluate the wound base. Performed by: Eula Sterling MD    Consent obtained:  Yes    Time out taken:  Yes    Pain Control: Anesthetic  Anesthetic: 4% Lidocaine Liquid Topical     Debridement:Excisional Debridement    Using curette the wound(s)/ulcer(s) was/were sharply debrided down through and including the removal of epidermis, dermis and subcutaneous tissue. Devitalized Tissue Debrided:  fibrin, biofilm, slough and exudate to stimulate bleeding to promote healing, post debridement good bleeding base and wound edges noted    Wound/Ulcer #: 1    Percent of Wound/Ulcer Debrided: 100%    Total Surface Area Debrided:  6 sq cm     Estimated Blood Loss:  Minimal  Hemostasis Achieved:  by pressure    Procedural Pain:  7  / 10   Post Procedural Pain:  8 / 10     Response to treatment:  With complaints of pain. Plan:   Treatment Note please see attached Discharge Instructions    Written patient dismissal instructions given to patient and signed by patient or POA. Discharge Instructions       Visit Discharge/Physician Orders     Discharge condition: Stable     Assessment of pain at discharge: mild     Anesthetic used: lido 4%     Discharge to: Home     Left via:Private automobile     Accompanied by: accompanied by self     ECF/HHA: [facility]     Dressing Orders: To left pretib wound: cleanse with normal saline, apply aquacel AG, cover and secure with dry dressing. Apply profore wrap. Change M- W( at [address] [facility],[address] F.     Keep wrap dry at all times. If wrap becomes wet or falls 2 inches call [address] [place] [address],[address] ([phone])and may remove wrap and apply double tubigrip.      Treatment Orders:Eat a diet high in protein and vitamin C. Take a multiple vitamin daily unless contraindicated.   Elevate as much as possible     [facility] followup visit 1 week____________________________  (Please note your next appointment above and if you are unable to keep, kindly give a 24 hour notice.  Thank you.)     Physician signature:__________________________        If you experience any of the following, please call the [facility] during business hours:     * Increase in Pain  * Temperature over 101  * Increase in drainage from your wound  * Drainage with a foul odor  * Bleeding  * Increase in swelling  * Need for compression bandage changes due to slippage, breakthrough drainage.     If you need medical attention outside of the business hours of the [facility] please contact your PCP or go to the nearest emergency room.                  Electronically signed by Bon De La Fuente MD

## 2022-07-06 ENCOUNTER — HOSPITAL ENCOUNTER (OUTPATIENT)
Dept: WOUND CARE | Age: 77
Discharge: HOME | End: 2022-07-06
Payer: MEDICARE

## 2022-07-06 VITALS
HEART RATE: 76 BPM | WEIGHT: 293 LBS | BODY MASS INDEX: 45.99 KG/M2 | SYSTOLIC BLOOD PRESSURE: 144 MMHG | HEIGHT: 67 IN | TEMPERATURE: 96.7 F | DIASTOLIC BLOOD PRESSURE: 76 MMHG | RESPIRATION RATE: 18 BRPM

## 2022-07-06 DIAGNOSIS — I87.2 VENOUS STASIS ULCER OF LEFT CALF WITH FAT LAYER EXPOSED WITHOUT VARICOSE VEINS (HCC): Primary | ICD-10-CM

## 2022-07-06 DIAGNOSIS — I89.0 LYMPHEDEMA OF BOTH LOWER EXTREMITIES: Chronic | ICD-10-CM

## 2022-07-06 DIAGNOSIS — L97.222 VENOUS STASIS ULCER OF LEFT CALF WITH FAT LAYER EXPOSED WITHOUT VARICOSE VEINS (HCC): Primary | ICD-10-CM

## 2022-07-06 PROCEDURE — 11042 DBRDMT SUBQ TIS 1ST 20SQCM/<: CPT

## 2022-07-06 PROCEDURE — 11042 DBRDMT SUBQ TIS 1ST 20SQCM/<: CPT | Performed by: SURGERY

## 2022-07-06 PROCEDURE — 6370000000 HC RX 637 (ALT 250 FOR IP): Performed by: SURGERY

## 2022-07-06 RX ORDER — CLOBETASOL PROPIONATE 0.5 MG/G
OINTMENT TOPICAL ONCE
Status: CANCELLED | OUTPATIENT
Start: 2022-07-06 | End: 2022-07-06

## 2022-07-06 RX ORDER — GENTAMICIN SULFATE 1 MG/G
OINTMENT TOPICAL ONCE
Status: CANCELLED | OUTPATIENT
Start: 2022-07-06 | End: 2022-07-06

## 2022-07-06 RX ORDER — LIDOCAINE HYDROCHLORIDE 20 MG/ML
JELLY TOPICAL ONCE
Status: CANCELLED | OUTPATIENT
Start: 2022-07-06 | End: 2022-07-06

## 2022-07-06 RX ORDER — GINSENG 100 MG
CAPSULE ORAL ONCE
Status: CANCELLED | OUTPATIENT
Start: 2022-07-06 | End: 2022-07-06

## 2022-07-06 RX ORDER — BETAMETHASONE DIPROPIONATE 0.05 %
OINTMENT (GRAM) TOPICAL ONCE
Status: CANCELLED | OUTPATIENT
Start: 2022-07-06 | End: 2022-07-06

## 2022-07-06 RX ORDER — BACITRACIN, NEOMYCIN, POLYMYXIN B 400; 3.5; 5 [USP'U]/G; MG/G; [USP'U]/G
OINTMENT TOPICAL ONCE
Status: CANCELLED | OUTPATIENT
Start: 2022-07-06 | End: 2022-07-06

## 2022-07-06 RX ORDER — LIDOCAINE HYDROCHLORIDE 40 MG/ML
SOLUTION TOPICAL ONCE
Status: COMPLETED | OUTPATIENT
Start: 2022-07-06 | End: 2022-07-06

## 2022-07-06 RX ORDER — LIDOCAINE HYDROCHLORIDE 40 MG/ML
SOLUTION TOPICAL ONCE
Status: CANCELLED | OUTPATIENT
Start: 2022-07-06 | End: 2022-07-06

## 2022-07-06 RX ORDER — BACITRACIN ZINC AND POLYMYXIN B SULFATE 500; 1000 [USP'U]/G; [USP'U]/G
OINTMENT TOPICAL ONCE
Status: CANCELLED | OUTPATIENT
Start: 2022-07-06 | End: 2022-07-06

## 2022-07-06 RX ORDER — LIDOCAINE 50 MG/G
OINTMENT TOPICAL ONCE
Status: CANCELLED | OUTPATIENT
Start: 2022-07-06 | End: 2022-07-06

## 2022-07-06 RX ORDER — LIDOCAINE 40 MG/G
CREAM TOPICAL ONCE
Status: CANCELLED | OUTPATIENT
Start: 2022-07-06 | End: 2022-07-06

## 2022-07-06 RX ADMIN — LIDOCAINE HYDROCHLORIDE 8 ML: 40 SOLUTION TOPICAL at 15:27

## 2022-07-06 ASSESSMENT — PAIN DESCRIPTION - PROGRESSION: CLINICAL_PROGRESSION: NOT CHANGED

## 2022-07-06 NOTE — PROGRESS NOTES
Wound Healing Center Followup Visit Note    Referring Physician : Bard Alyse MD  [address] [facility] RECORD NUMBER:  [id number]  AGE: 78 y.o. GENDER: female  : 1945  EPISODE DATE:  2022    Subjective:     Chief Complaint   Patient presents with    Wound Check     left lower leg      HISTORY of PRESENT ILLNESS HPI   Lulú Miller is a 78 y.o. female who presents today in regards to follow up evaluation and treatment of wound/ulcer. That patient's past medical, family and social hx were reviewed and changes were made if present. History of Wound Context:  Patient has had left calf wound since ~ 2021. She has been putting a dressing on it. The wound has not been improving. She has a hx significant for venous stasis ulcerations of bilateral LE. She first was seen by myself in regards to these issues 2015. She eventually healed these wounds 2016. I last saw her 2021 at which time I recommended lymphedema therapy. She states she went and said it caused her to much pain and stopped going. She has chronic issues with bilateral calf pain, swelling and edema.            She admits to not wearing her stockings because of the pain.     She has used knee high 20-30 mm hg stockings in the past.       She is still seeing pain management and is down to percocet /325 mg daily.       21  · aquacell  · Double tubigrip  · Culture done  · Emphasized importance of getting in to more significant compression in the future  12/15/21  · Culture reviewed - light growth, no tx  · Wound slightly improved  · Still significant drainage  · Plan on compression wrap next week  21  · Stable wound  · Drainage slightly better  · Pt would like to wait till next week because of holidays to start wrap  22  · Wound larger  · Profore  22  · Wound appearance better  · Refusing wrap - it rolled down last week and she cut off after 3 days  22  · Wound appearance better  · Still refusing wrap   3/2/22  · periwound worse  · Culture  · drawtek  3/9/22  · periwound much improved  · levaquin 750 mg daily #10 script given culture   · Wound itself stable  3/23/22  · Left anterior calf wound improving overall  · New blister/ulcerations left 3rd, 4th and 5th toes and lateral foot   · Instructed to keep toes/foot dry, no ointment or corn starch   3/30/22  · bistering resolved, other skin issues resolved  · More dry than previously but overall stable  · kailyn Barrera  · Swelling is down, patient declining wrap  4/13/22  · Wound slightly improved  4/20/2022  · Wound stable, patient refusing compression wrap  5/4/22  · Wound worse  · Pt refusing wrap  · Will change diet per her report to decrease swelling  5/11/22  · Wound stable  · kailyn and giacomo 2 - pt agreeable now after significant persuasion  5/18/22  · Took off wrap within 24 hours due to pain  · maryell and spandigrip  · She agreed to use wrap again if swelling not down 2 more cm next week  · Wound slightly smaller  5/25/22  · Wound improved   · Left leg wound debrided   · Continue aquacel   6/8/22  · Wound larger  · Agreeable to wrap - kailyn sena ag  · Culture done  6/22/22  · Wound stable in size  6/29/22  · Wound slightly larger  · Still having issues with wrap falling down  · Will start hhc - MWF wraps  7/6/22  · Improved  · hhc starts this week    \Wound/Ulcer Pain Timing/Severity: waxing and waning, mild  Quality of pain: aching, throbbing, pressure  Severity:  7 / 10   Modifying Factors: Pain worsens with debridement, dressing changes  Associated Signs/Symptoms: edema, drainage and pain    Ulcer Identification:  Ulcer Type: venous and lymphedema  Contributing Factors: edema, venous stasis and lymphedema    Diabetic/Pressure/Non Pressure Ulcers only:  Ulcer: Non-Pressure ulcer, fat layer exposed    Wound: N/A        PAST MEDICAL HISTORY      Diagnosis Date    Bursitis     CAD (coronary artery disease) 1993    heart attack    COPD [facility] obstructive [facility] disease) [facility] 2013    Emphysema     slight    GERD (gastroesophageal reflux disease)     Hiatal hernia     Hip pain     Hyperlipidemia     Hypertension     Lymphedema of both lower extremities 2018    Venous insufficiency of both lower extremities 2018    Venous stasis ulcer of left calf with fat layer exposed without varicose veins ([facility] 2021    Venous ulcer with [facility] layer exposed ([facility] 10/28/2015     Past Surgical History:   Procedure Laterality Date    APPENDECTOMY      BREAST ENHANCEMENT SURGERY      BREAST REDUCTION SURGERY      CHOLECYSTECTOMY      COLONOSCOPY      ECHO COMPL W DOP COLOR FLOW  3/11/2013         ENDOSCOPY, COLON, DIAGNOSTIC      HERNIA REPAIR N/A 2021    LAPAROSCOPIC ROBOTIC ASSISTED INGUINAL HERNIA REPAIR performed by Yara Ulrich MD at [address] [place] [address] ([address] [place] [address])      JOINT REPLACEMENT  2009    l knee r hip    LEG DEBRIDEMENT Left 2015    LEG DEBRIDEMENT Left 2016     History reviewed. No pertinent family history.   Social History     Tobacco Use    Smoking status: Former Smoker     Packs/day: 1.00     Years: 20.00     Pack years: 20.00     Types: Cigarettes     Quit date: 1995     Years since quittin.6    Smokeless tobacco: Never Used   Vaping Use    Vaping Use: Never used   Substance Use Topics    Alcohol use: No    Drug use: No     Allergies   Allergen Reactions    Codeine Nausea And Vomiting and Other (See Comments)     hallucinate    Dilaudid [Hydromorphone Hcl] Rash    Naproxen Other (See Comments)     Shuts down kidney function    Singulair [Montelukast Sodium] Shortness Of Breath     Mucus in chest    Black Cohosh     Black Cohosh [Cimicifuga [place] (Black Cohosh)] Rash     Current Outpatient Medications on File Prior to Encounter   Medication Sig Dispense Refill    Cyanocobalamin (VITAMIN B 12) 500 MCG TABS Take by mouth daily      Multiple Vitamins-Minerals (THERAPEUTIC MULTIVITAMIN-MINERALS) tablet Take 1 tablet by mouth daily      zinc gluconate 50 MG tablet Take 50 mg by mouth daily      GENISTEIN PO Take 125 mg by mouth nightly      folic acid (FOLVITE) 790 MCG tablet Take 400 mcg by mouth nightly      Krill Oil 350 MG CAPS Take 350 mg by mouth nightly      gabapentin (NEURONTIN) 300 MG capsule Take 300 mg by mouth 3 times daily.  simvastatin (ZOCOR) 40 MG tablet Take 40 mg by mouth nightly      oxyCODONE-acetaminophen (PERCOCET) 7.5-325 MG per tablet Take 1 tablet by mouth 2 times daily. Ulysses Hickory morphine (MS CONTIN) 15 MG extended release tablet Take 15 mg by mouth 2 times daily.  aspirin 81 MG tablet Take 81 mg by mouth daily      Cholecalciferol (VITAMIN D) 2000 UNITS CAPS capsule Take 2,000 Units by mouth daily       ferrous sulfate 325 (65 FE) MG tablet Take 325 mg by mouth nightly       metoprolol (LOPRESSOR) 50 MG tablet Take 1 tablet by mouth 2 times daily 60 tablet 0    albuterol (PROVENTIL HFA;VENTOLIN HFA) 108 (90 BASE) MCG/ACT inhaler Inhale 2 puffs into the lungs every 6 hours as needed for Wheezing or Shortness of Breath       calcium carbonate (OYSTER SHELL CALCIUM 500 MG) 1250 MG tablet Take 2 tablets by mouth daily      Ascorbic Acid (VITAMIN C) 500 MG tablet Take 2,000 mg by mouth daily      omeprazole (PRILOSEC) 40 MG capsule Take 40 mg by mouth daily.  diphenhydrAMINE (BENADRYL) 50 MG capsule Take 50 mg by mouth daily as needed for Allergies       Garlic 025 MG TABS Take 1,000 mg by mouth daily        No current facility-administered medications on file prior to encounter.        REVIEW OF SYSTEMS See HPI    Objective:    BP (!) 144/76   Pulse 76   Temp (!) 96.7 °F (35.9 °C) (Temporal)   Resp 18   Ht 5' 7\" (1.702 m)   Wt 295 lb (133.8 kg)   BMI 46.20 kg/m²   Wt Readings from Last 3 Encounters:   07/06/22 295 lb (133.8 kg)   06/29/22 295 lb (133.8 kg)   06/22/22 295 lb (133.8 kg)     PHYSICAL EXAM  CONSTITUTIONAL:   Awake, alert, cooperative   EYES:  lids and lashes normal   ENT: external ears and nose without lesions   NECK:  supple, symmetrical, trachea midline   SKIN:  Open wound Present    Assessment:     Problem List Items Addressed This Visit     Lymphedema of both lower extremities (Chronic)    Venous stasis ulcer of left calf with fat layer exposed without varicose veins (HCC) - Primary (Chronic)    Relevant Orders    Initiate Outpatient Wound Care Protocol        Pre Debridement Measurements:  Are located in the [place]  Documentation Flow Sheet  Post Debridement Measurements:  Wound/Ulcer Descriptions are Pre Debridement except measurements:     Incision 04/20/16 Leg Left (Active)   Number of days: 2267       Incision 08/03/16 Leg Left (Active)   Number of days: 2163       Wound 12/08/21 Pretibial Left #1 (Active)   Wound Image   05/25/22 1534   Dressing Status New dressing applied;Clean;Dry; Intact 07/06/22 1615   Wound Cleansed Cleansed with saline 07/06/22 1615   Dressing/Treatment Alginate with Ag;ABD 07/06/22 1615   Offloading for Diabetic Foot Ulcers Offloading not required 07/06/22 1615   Wound Length (cm) 3.2 cm 07/06/22 1523   Wound Width (cm) 1.8 cm 07/06/22 1523   Wound Depth (cm) 0.2 cm 07/06/22 1523   Wound Surface Area (cm^2) 5.76 cm^2 07/06/22 1523   Change in Wound Size % (l*w) -722.86 07/06/22 1523   Wound Volume (cm^3) 1.152 cm^3 07/06/22 1523   Wound Healing % -1546 07/06/22 1523   Post-Procedure Length (cm) 3.3 cm 07/06/22 1552   Post-Procedure Width (cm) 1.8 cm 07/06/22 1552   Post-Procedure Depth (cm) 0.3 cm 07/06/22 1552   Post-Procedure Surface Area (cm^2) 5.94 cm^2 07/06/22 1552   Post-Procedure Volume (cm^3) 1.782 cm^3 07/06/22 1552   Wound Assessment Pale granulation tissue;Fibrin 07/06/22 1523   Drainage Amount Moderate 07/06/22 1523   Drainage Description Serous; Yellow 07/06/22 1523   Odor None 07/06/22 1523   Kori-wound Assessment Intact 07/06/22 1523   Number of days: 210     Incision 04/16/21 Abdomen (Active)   Number of days: 446       Procedure Note  Indications:  Based on my examination of this patient's wound(s)/ulcer(s) today, debridement is required to promote healing and evaluate the wound base. Performed by: Du Patel MD    Consent obtained:  Yes    Time out taken:  Yes    Pain Control: Anesthetic  Anesthetic: 4% Lidocaine Liquid Topical     Debridement:Excisional Debridement    Using curette the wound(s)/ulcer(s) was/were sharply debrided down through and including the removal of epidermis, dermis and subcutaneous tissue. Devitalized Tissue Debrided:  fibrin, biofilm, slough and exudate to stimulate bleeding to promote healing, post debridement good bleeding base and wound edges noted    Wound/Ulcer #: 1    Percent of Wound/Ulcer Debrided: 100%    Total Surface Area Debrided:  5.76 sq cm     Estimated Blood Loss:  Minimal  Hemostasis Achieved:  by pressure    Procedural Pain:  7  / 10   Post Procedural Pain:  8 / 10     Response to treatment:  With complaints of pain. Plan:   Treatment Note please see attached Discharge Instructions    Written patient dismissal instructions given to patient and signed by patient or POA. Discharge Instructions       Visit Discharge/Physician Orders     Discharge condition: Stable     Assessment of pain at discharge: mild     Anesthetic used: lido 4%     Discharge to: Home     Left via:Private automobile     Accompanied by: accompanied by self     ECF/HHA: [facility]     Dressing Orders: To left pretib wound: cleanse with normal saline, apply aquacel AG, cover and secure with dry dressing. Apply profore wrap. Change M- W( at [address] [place] [address],[address])- F.     Keep wrap dry at all times. If wrap becomes wet or falls 2 inches call [address] [place] [address],[address] ([phone])and may remove wrap and apply double tubigrip.      Treatment Orders:Eat a diet high in protein and vitamin C.  Take a multiple vitamin daily unless contraindicated. Elevate as much as possible     [facility] followup visit 1 week____________________________  (Please note your next appointment above and if you are unable to keep, kindly give a 24 hour notice.  Thank you.)     Physician signature:__________________________        If you experience any of the following, please call the [employer] during business hours:     * Increase in Pain  * Temperature over 101  * Increase in drainage from your wound  * Drainage with a foul odor  * Bleeding  * Increase in swelling  * Need for compression bandage changes due to slippage, breakthrough drainage.     If you need medical attention outside of the business hours of the [employer] please contact your PCP or go to the nearest emergency room.          Electronically signed by Lynn Crum MD

## 2022-07-13 ENCOUNTER — HOSPITAL ENCOUNTER (OUTPATIENT)
Dept: WOUND CARE | Age: 77
Discharge: HOME | End: 2022-07-13

## 2022-07-20 ENCOUNTER — HOSPITAL ENCOUNTER (OUTPATIENT)
Dept: WOUND CARE | Age: 77
Discharge: HOME | End: 2022-07-20
Payer: MEDICARE

## 2022-07-20 VITALS
HEART RATE: 78 BPM | SYSTOLIC BLOOD PRESSURE: 118 MMHG | BODY MASS INDEX: 45.99 KG/M2 | RESPIRATION RATE: 18 BRPM | WEIGHT: 293 LBS | HEIGHT: 67 IN | DIASTOLIC BLOOD PRESSURE: 62 MMHG | TEMPERATURE: 97 F

## 2022-07-20 DIAGNOSIS — I87.2 VENOUS STASIS ULCER OF LEFT CALF WITH FAT LAYER EXPOSED WITHOUT VARICOSE VEINS (HCC): Primary | ICD-10-CM

## 2022-07-20 DIAGNOSIS — L97.222 VENOUS STASIS ULCER OF LEFT CALF WITH FAT LAYER EXPOSED WITHOUT VARICOSE VEINS (HCC): Primary | ICD-10-CM

## 2022-07-20 PROCEDURE — 6370000000 HC RX 637 (ALT 250 FOR IP): Performed by: SURGERY

## 2022-07-20 PROCEDURE — 11042 DBRDMT SUBQ TIS 1ST 20SQCM/<: CPT | Performed by: SURGERY

## 2022-07-20 PROCEDURE — 11042 DBRDMT SUBQ TIS 1ST 20SQCM/<: CPT

## 2022-07-20 RX ORDER — LIDOCAINE HYDROCHLORIDE 40 MG/ML
SOLUTION TOPICAL ONCE
Status: CANCELLED | OUTPATIENT
Start: 2022-07-20 | End: 2022-07-20

## 2022-07-20 RX ORDER — LIDOCAINE 40 MG/G
CREAM TOPICAL ONCE
Status: CANCELLED | OUTPATIENT
Start: 2022-07-20 | End: 2022-07-20

## 2022-07-20 RX ORDER — LIDOCAINE 50 MG/G
OINTMENT TOPICAL ONCE
Status: CANCELLED | OUTPATIENT
Start: 2022-07-20 | End: 2022-07-20

## 2022-07-20 RX ORDER — LIDOCAINE HYDROCHLORIDE 20 MG/ML
JELLY TOPICAL ONCE
Status: CANCELLED | OUTPATIENT
Start: 2022-07-20 | End: 2022-07-20

## 2022-07-20 RX ORDER — GENTAMICIN SULFATE 1 MG/G
OINTMENT TOPICAL ONCE
Status: CANCELLED | OUTPATIENT
Start: 2022-07-20 | End: 2022-07-20

## 2022-07-20 RX ORDER — LIDOCAINE HYDROCHLORIDE 40 MG/ML
SOLUTION TOPICAL ONCE
Status: COMPLETED | OUTPATIENT
Start: 2022-07-20 | End: 2022-07-20

## 2022-07-20 RX ORDER — BACITRACIN ZINC AND POLYMYXIN B SULFATE 500; 1000 [USP'U]/G; [USP'U]/G
OINTMENT TOPICAL ONCE
Status: CANCELLED | OUTPATIENT
Start: 2022-07-20 | End: 2022-07-20

## 2022-07-20 RX ORDER — CLOBETASOL PROPIONATE 0.5 MG/G
OINTMENT TOPICAL ONCE
Status: CANCELLED | OUTPATIENT
Start: 2022-07-20 | End: 2022-07-20

## 2022-07-20 RX ORDER — GINSENG 100 MG
CAPSULE ORAL ONCE
Status: CANCELLED | OUTPATIENT
Start: 2022-07-20 | End: 2022-07-20

## 2022-07-20 RX ORDER — BACITRACIN, NEOMYCIN, POLYMYXIN B 400; 3.5; 5 [USP'U]/G; MG/G; [USP'U]/G
OINTMENT TOPICAL ONCE
Status: CANCELLED | OUTPATIENT
Start: 2022-07-20 | End: 2022-07-20

## 2022-07-20 RX ORDER — BETAMETHASONE DIPROPIONATE 0.05 %
OINTMENT (GRAM) TOPICAL ONCE
Status: CANCELLED | OUTPATIENT
Start: 2022-07-20 | End: 2022-07-20

## 2022-07-20 RX ADMIN — LIDOCAINE HYDROCHLORIDE 10 ML: 40 SOLUTION TOPICAL at 15:17

## 2022-07-20 NOTE — PROGRESS NOTES
Wound Healing Center Followup Visit Note    Referring Physician : Jade Silva MD  [address] [facility] RECORD NUMBER:  [id number]  AGE: 78 y.o. GENDER: female  : 1945  EPISODE DATE:  2022    Subjective:     Chief Complaint   Patient presents with    Wound Check     Left lower leg      HISTORY of PRESENT ILLNESS HPI   Jose Alberto Britt is a 78 y.o. female who presents today in regards to follow up evaluation and treatment of wound/ulcer. That patient's past medical, family and social hx were reviewed and changes were made if present. History of Wound Context:  Patient has had left calf wound since ~ 2021. She has been putting a dressing on it. The wound has not been improving. She has a hx significant for venous stasis ulcerations of bilateral LE. She first was seen by myself in regards to these issues 2015. She eventually healed these wounds 2016. I last saw her 2021 at which time I recommended lymphedema therapy. She states she went and said it caused her to much pain and stopped going. She has chronic issues with bilateral calf pain, swelling and edema. She admits to not wearing her stockings because of the pain. She has used knee high 20-30 mm hg stockings in the past.       She is still seeing pain management and is down to percocet //325 mg daily.        21  aquacell  Double tubigrip  Culture done  Emphasized importance of getting in to more significant compression in the future  12/15/21  Culture reviewed - light growth, no tx  Wound slightly improved  Still significant drainage  Plan on compression wrap next week  21  Stable wound  Drainage slightly better  Pt would like to wait till next week because of holidays to start wrap  22  Wound larger  Profore  22  Wound appearance better  Refusing wrap - it rolled down last week and she cut off after 3 days  22  Wound appearance better  Still refusing wrap   3/2/22  periwound worse  Culture  drawtek  3/9/22  periwound much improved  levaquin 750 mg daily #10 script given culture   Wound itself stable  3/23/22  Left anterior calf wound improving overall  New blister/ulcerations left 3rd, 4th and 5th toes and lateral foot   Instructed to keep toes/foot dry, no ointment or corn starch   3/30/22  bistering resolved, other skin issues resolved  More dry than previously but overall stable  kailyn Barrera  Swelling is down, patient declining wrap  4/13/22  Wound slightly improved  4/20/2022  Wound stable, patient refusing compression wrap  5/4/22  Wound worse  Pt refusing wrap  Will change diet per her report to decrease swelling  5/11/22  Wound stable  kailyn and giacomo 2 - pt agreeable now after significant persuasion  5/18/22  Took off wrap within 24 hours due to pain  aquacell and spandigrip  She agreed to use wrap again if swelling not down 2 more cm next week  Wound slightly smaller  5/25/22  Wound improved   Left leg wound debrided   Continue aquacel   6/8/22  Wound larger  Agreeable to wrap - kailyn sena ag  Culture done  6/22/22  Wound stable in size  6/29/22  Wound slightly larger  Still having issues with wrap falling down  Will start hhc - MWF wraps  7/6/22  Improved  hhc starts this week  7/20/22  Appearance better, wound measuring larger  Continue with wraps  Leg edema improved    \Wound/Ulcer Pain Timing/Severity: waxing and waning, mild  Quality of pain: aching, throbbing, pressure  Severity:  7 / 10   Modifying Factors: Pain worsens with debridement, dressing changes  Associated Signs/Symptoms: edema, drainage and pain    Ulcer Identification:  Ulcer Type: venous and lymphedema  Contributing Factors: edema, venous stasis and lymphedema    Diabetic/Pressure/Non Pressure Ulcers only:  Ulcer: Non-Pressure ulcer, fat layer exposed    Wound: N/A        PAST MEDICAL HISTORY      Diagnosis Date    Bursitis     CAD (coronary artery disease) 1993    heart attack    COPD (chronic obstructive pulmonary disease) ([facility] [address] 2013    Emphysema     slight    GERD (gastroesophageal reflux disease)     Hiatal hernia     Hip pain     Hyperlipidemia     Hypertension     Lymphedema of both lower extremities 2018    Venous insufficiency of both lower extremities 2018    Venous stasis ulcer of left calf with fat layer exposed without varicose veins ([facility] 2021    [facility] 10/28/2015     Past Surgical History:   Procedure Laterality Date    APPENDECTOMY      BREAST ENHANCEMENT SURGERY      BREAST REDUCTION SURGERY      CHOLECYSTECTOMY      COLONOSCOPY      ECHO COMPL W DOP COLOR FLOW  3/11/2013         ENDOSCOPY, COLON, DIAGNOSTIC      HERNIA REPAIR N/A 2021    LAPAROSCOPIC ROBOTIC ASSISTED INGUINAL HERNIA REPAIR performed by Analia Reyes MD at [address] ([address] [place] [address])      JOINT REPLACEMENT  2009    l knee r hip    LEG DEBRIDEMENT Left 2015    LEG DEBRIDEMENT Left 2016     History reviewed. No pertinent family history.   Social History     Tobacco Use    Smoking status: Former     Packs/day: 1.00     Years: 20.00     Pack years: 20.00     Types: Cigarettes     Quit date: 1995     Years since quittin.7    Smokeless tobacco: Never    Tobacco comments:     Quit   Vaping Use    Vaping Use: Never used   Substance Use Topics    Alcohol use: No    Drug use: No     Allergies   Allergen Reactions    Codeine Nausea And Vomiting and Other (See Comments)     hallucinate    Dilaudid [Hydromorphone Hcl] Rash    Naproxen Other (See Comments)     Shuts down kidney function    Singulair [Montelukast Sodium] Shortness Of Breath     Mucus in chest    Black Cohosh     Black Cohosh [Cimicifuga [place] (Black Cohosh)] Rash     Current Outpatient Medications on File Prior to Encounter   Medication Sig Dispense Refill    Cyanocobalamin (VITAMIN B 12) 500 MCG TABS Take by mouth daily Multiple Vitamins-Minerals (THERAPEUTIC MULTIVITAMIN-MINERALS) tablet Take 1 tablet by mouth daily      zinc gluconate 50 MG tablet Take 50 mg by mouth daily      GENISTEIN PO Take 125 mg by mouth nightly      folic acid (FOLVITE) 562 MCG tablet Take 400 mcg by mouth nightly      Krill Oil 350 MG CAPS Take 350 mg by mouth nightly      gabapentin (NEURONTIN) 300 MG capsule Take 300 mg by mouth 3 times daily. simvastatin (ZOCOR) 40 MG tablet Take 40 mg by mouth nightly      oxyCODONE-acetaminophen (PERCOCET) 7.5-325 MG per tablet Take 1 tablet by mouth 2 times daily. Gordo Chime morphine (MS CONTIN) 15 MG extended release tablet Take 15 mg by mouth 2 times daily. aspirin 81 MG tablet Take 81 mg by mouth daily      Cholecalciferol (VITAMIN D) 2000 UNITS CAPS capsule Take 2,000 Units by mouth daily       ferrous sulfate 325 (65 FE) MG tablet Take 325 mg by mouth nightly       metoprolol (LOPRESSOR) 50 MG tablet Take 1 tablet by mouth 2 times daily 60 tablet 0    albuterol (PROVENTIL HFA;VENTOLIN HFA) 108 (90 BASE) MCG/ACT inhaler Inhale 2 puffs into the lungs every 6 hours as needed for Wheezing or Shortness of Breath       calcium carbonate (OYSTER SHELL CALCIUM 500 MG) 1250 MG tablet Take 2 tablets by mouth daily      Ascorbic Acid (VITAMIN C) 500 MG tablet Take 2,000 mg by mouth daily      omeprazole (PRILOSEC) 40 MG delayed release capsule Take 40 mg by mouth daily. diphenhydrAMINE (BENADRYL) 50 MG capsule Take 50 mg by mouth daily as needed for Allergies       Garlic 805 MG TABS Take 1,000 mg by mouth daily        No current facility-administered medications on file prior to encounter.        REVIEW OF SYSTEMS See HPI    Objective:    /62   Pulse 78   Temp 97 °F (36.1 °C) (Temporal)   Resp 18   Ht 5' 7\" (1.702 m)   Wt 295 lb (133.8 kg)   BMI 46.20 kg/m²   Wt Readings from Last 3 Encounters:   07/20/22 295 lb (133.8 kg)   07/06/22 295 lb (133.8 kg)   06/29/22 295 lb (133.8 kg)     PHYSICAL 460       Procedure Note  Indications:  Based on my examination of this patient's wound(s)/ulcer(s) today, debridement is required to promote healing and evaluate the wound base. Performed by: Daniel Colin MD    Consent obtained:  Yes    Time out taken:  Yes    Pain Control: Anesthetic  Anesthetic: 4% Lidocaine Liquid Topical     Debridement:Excisional Debridement    Using curette the wound(s)/ulcer(s) was/were sharply debrided down through and including the removal of epidermis, dermis and subcutaneous tissue. Devitalized Tissue Debrided:  fibrin, biofilm, slough and exudate to stimulate bleeding to promote healing, post debridement good bleeding base and wound edges noted    Wound/Ulcer #: 1    Percent of Wound/Ulcer Debrided: 100%    Total Surface Area Debrided:  7.14sq cm     Estimated Blood Loss:  Minimal  Hemostasis Achieved:  by pressure    Procedural Pain:  7  / 10   Post Procedural Pain:  8 / 10     Response to treatment:  With complaints of pain. Plan:   Treatment Note please see attached Discharge Instructions    Written patient dismissal instructions given to patient and signed by patient or POA. Discharge Instructions         Visit Discharge/Physician Orders     Discharge condition: Stable     Assessment of pain at discharge: mild     Anesthetic used: lido 4%     Discharge to: Home     Left via:Private automobile     Accompanied by: accompanied by self     ECF/HHA: [facility]     Dressing Orders: To left pretib wound: cleanse with normal saline, apply aquacel AG, cover and secure with dry dressing. Apply profore wrap. Change M- W( at [address] [place] [address],[address])- F.     Keep wrap dry at all times. If wrap becomes wet or falls 2 inches call [address] [place] [address],[address] ([phone])and may remove wrap and apply double tubigrip. Treatment Orders:Eat a diet high in protein and vitamin C. Take a multiple vitamin daily unless contraindicated.   Elevate as much as possible     [address] [place] [address],[address] followup visit 1

## 2022-07-20 NOTE — DISCHARGE INSTRUCTIONS
Visit Discharge/Physician Orders     Discharge condition: Stable     Assessment of pain at discharge: mild     Anesthetic used: lido 4%     Discharge to: Home     Left via:Private automobile     Accompanied by: accompanied by self     [facility]HHA: [facility]     Dressing Orders: To left pretib wound: cleanse with normal saline, apply aquacel AG, cover and secure with dry dressing. Apply profore wrap. Change M- W( at [facility])- F.     Keep wrap dry at all times. If wrap becomes wet or falls 2 inches call [facility] ([phone])and may remove wrap and apply double tubigrip. Treatment Orders:Eat a diet high in protein and vitamin C. Take a multiple vitamin daily unless contraindicated. Elevate as much as possible     [facility] followup visit 1 week____________________________  (Please note your next appointment above and if you are unable to keep, kindly give a 24 hour notice. Thank you.)     Physician signature:__________________________        If you experience any of the following, please call the [facility] during business hours:     * Increase in Pain  * Temperature over 101  * Increase in drainage from your wound  * Drainage with a foul odor  * Bleeding  * Increase in swelling  * Need for compression bandage changes due to slippage, breakthrough drainage. If you need medical attention outside of the business hours of the [address] please contact your PCP or go to the nearest emergency room.

## 2022-07-25 NOTE — DISCHARGE INSTRUCTIONS
Visit Discharge/Physician Orders     Discharge condition: Stable     Assessment of pain at discharge: mild     Anesthetic used: lido 4%     Discharge to: Home     Left via:Private automobile     Accompanied by: accompanied by self     ECF/HHA: [facility]     Dressing Orders: To left pretib wound: cleanse with normal saline, apply aquacel AG, cover and secure with dry dressing. Apply profore wrap. Change M- W( at [address] [place] [address],[address])- F.     Keep wrap dry at all times. If wrap becomes wet or falls 2 inches call [address] [place] [address],[address] ([phone])and may remove wrap and apply double tubigrip. Treatment Orders:Eat a diet high in protein and vitamin C. Take a multiple vitamin daily unless contraindicated. Elevate as much as possible     [address] [place] [address],[address] [place] [address] followup visit 1 week____________________________  (Please note your next appointment above and if you are unable to keep, kindly give a 24 hour notice. Thank you.)     Physician signature:__________________________        If you experience any of the following, please call the [facility] during business hours:     * Increase in Pain  * Temperature over 101  * Increase in drainage from your wound  * Drainage with a foul odor  * Bleeding  * Increase in swelling  * Need for compression bandage changes due to slippage, breakthrough drainage. If you need medical attention outside of the business hours of the [facility] please contact your PCP or go to the nearest emergency room.

## 2022-07-27 ENCOUNTER — HOSPITAL ENCOUNTER (OUTPATIENT)
Dept: WOUND CARE | Age: 77
Discharge: HOME | End: 2022-07-27
Payer: MEDICARE

## 2022-07-27 VITALS
HEIGHT: 67 IN | WEIGHT: 293 LBS | TEMPERATURE: 97.1 F | BODY MASS INDEX: 45.99 KG/M2 | HEART RATE: 80 BPM | DIASTOLIC BLOOD PRESSURE: 68 MMHG | RESPIRATION RATE: 18 BRPM | SYSTOLIC BLOOD PRESSURE: 112 MMHG

## 2022-07-27 DIAGNOSIS — I89.0 LYMPHEDEMA OF BOTH LOWER EXTREMITIES: Chronic | ICD-10-CM

## 2022-07-27 DIAGNOSIS — L97.222 VENOUS STASIS ULCER OF LEFT CALF WITH FAT LAYER EXPOSED WITHOUT VARICOSE VEINS (HCC): Primary | ICD-10-CM

## 2022-07-27 DIAGNOSIS — I87.2 VENOUS STASIS ULCER OF LEFT CALF WITH FAT LAYER EXPOSED WITHOUT VARICOSE VEINS (HCC): Primary | ICD-10-CM

## 2022-07-27 DIAGNOSIS — I87.2 VENOUS INSUFFICIENCY OF BOTH LOWER EXTREMITIES: Chronic | ICD-10-CM

## 2022-07-27 PROCEDURE — 11042 DBRDMT SUBQ TIS 1ST 20SQCM/<: CPT | Performed by: SURGERY

## 2022-07-27 PROCEDURE — 11042 DBRDMT SUBQ TIS 1ST 20SQCM/<: CPT

## 2022-07-27 RX ORDER — LIDOCAINE 50 MG/G
OINTMENT TOPICAL ONCE
Status: CANCELLED | OUTPATIENT
Start: 2022-07-27 | End: 2022-07-27

## 2022-07-27 RX ORDER — CLOBETASOL PROPIONATE 0.5 MG/G
OINTMENT TOPICAL ONCE
Status: CANCELLED | OUTPATIENT
Start: 2022-07-27 | End: 2022-07-27

## 2022-07-27 RX ORDER — LIDOCAINE HYDROCHLORIDE 20 MG/ML
JELLY TOPICAL ONCE
Status: CANCELLED | OUTPATIENT
Start: 2022-07-27 | End: 2022-07-27

## 2022-07-27 RX ORDER — BETAMETHASONE DIPROPIONATE 0.05 %
OINTMENT (GRAM) TOPICAL ONCE
Status: CANCELLED | OUTPATIENT
Start: 2022-07-27 | End: 2022-07-27

## 2022-07-27 RX ORDER — BACITRACIN, NEOMYCIN, POLYMYXIN B 400; 3.5; 5 [USP'U]/G; MG/G; [USP'U]/G
OINTMENT TOPICAL ONCE
Status: CANCELLED | OUTPATIENT
Start: 2022-07-27 | End: 2022-07-27

## 2022-07-27 RX ORDER — LIDOCAINE 40 MG/G
CREAM TOPICAL ONCE
Status: CANCELLED | OUTPATIENT
Start: 2022-07-27 | End: 2022-07-27

## 2022-07-27 RX ORDER — GINSENG 100 MG
CAPSULE ORAL ONCE
Status: CANCELLED | OUTPATIENT
Start: 2022-07-27 | End: 2022-07-27

## 2022-07-27 RX ORDER — LIDOCAINE HYDROCHLORIDE 40 MG/ML
SOLUTION TOPICAL ONCE
Status: CANCELLED | OUTPATIENT
Start: 2022-07-27 | End: 2022-07-27

## 2022-07-27 RX ORDER — GENTAMICIN SULFATE 1 MG/G
OINTMENT TOPICAL ONCE
Status: CANCELLED | OUTPATIENT
Start: 2022-07-27 | End: 2022-07-27

## 2022-07-27 RX ORDER — BACITRACIN ZINC AND POLYMYXIN B SULFATE 500; 1000 [USP'U]/G; [USP'U]/G
OINTMENT TOPICAL ONCE
Status: CANCELLED | OUTPATIENT
Start: 2022-07-27 | End: 2022-07-27

## 2022-07-27 RX ORDER — LIDOCAINE HYDROCHLORIDE 40 MG/ML
SOLUTION TOPICAL ONCE
Status: DISCONTINUED | OUTPATIENT
Start: 2022-07-27 | End: 2022-07-28 | Stop reason: HOSPADM

## 2022-07-27 ASSESSMENT — PAIN DESCRIPTION - ONSET: ONSET: ON-GOING

## 2022-07-27 ASSESSMENT — PAIN DESCRIPTION - LOCATION: LOCATION: LEG

## 2022-07-27 ASSESSMENT — PAIN DESCRIPTION - ORIENTATION: ORIENTATION: LEFT

## 2022-07-27 ASSESSMENT — PAIN DESCRIPTION - PAIN TYPE: TYPE: CHRONIC PAIN

## 2022-07-27 ASSESSMENT — PAIN - FUNCTIONAL ASSESSMENT: PAIN_FUNCTIONAL_ASSESSMENT: ACTIVITIES ARE NOT PREVENTED

## 2022-07-27 ASSESSMENT — PAIN SCALES - GENERAL: PAINLEVEL_OUTOF10: 6

## 2022-07-27 ASSESSMENT — PAIN DESCRIPTION - FREQUENCY: FREQUENCY: INTERMITTENT

## 2022-08-01 NOTE — PROGRESS NOTES
[facility] Followup Visit Note    Referring Physician : Ashley Carrington MD  [id number] [facility] RECORD NUMBER:  [id number]  AGE: 78 y.o. GENDER: female  : 1945  EPISODE DATE:  2022    Subjective:     Chief Complaint   Patient presents with    Wound Check     Left pretib      HISTORY of PRESENT ILLNESS HPI   Sheyla Boo is a 78 y.o. female who presents today in regards to follow up evaluation and treatment of wound/ulcer. That patient's past medical, family and social hx were reviewed and changes were made if present. History of Wound Context:  Patient has had left calf wound since ~ 2021. She has been putting a dressing on it. The wound has not been improving. She has a hx significant for venous stasis ulcerations of bilateral LE. She first was seen by myself in regards to these issues 2015. She eventually healed these wounds 2016. I last saw her 2021 at which time I recommended lymphedema therapy. She states she went and said it caused her to much pain and stopped going. She has chronic issues with bilateral calf pain, swelling and edema. She admits to not wearing her stockings because of the pain. She has used knee high 20-30 mm hg stockings in the past.       She is still seeing pain management and is down to percocet /325 mg daily.        21  aquacell  Double tubigrip  Culture done  Emphasized importance of getting in to more significant compression in the future  12/15/21  Culture reviewed - light growth, no tx  Wound slightly improved  Still significant drainage  Plan on compression wrap next week  21  Stable wound  Drainage slightly better  Pt would like to wait till next week because of holidays to start wrap  22  Wound larger  Profore  22  Wound appearance better  Refusing wrap - it rolled down last week and she cut off after 3 days  22  Wound appearance better  Still refusing wrap   3/2/22  periwound worse  Culture  drawtek  3/9/22  periwound much improved  levaquin 750 mg daily #10 script given culture   Wound itself stable  3/23/22  Left anterior calf wound improving overall  New blister/ulcerations left 3rd, 4th and 5th toes and lateral foot   Instructed to keep toes/foot dry, no ointment or corn starch   3/30/22  bistering resolved, other skin issues resolved  More dry than previously but overall stable  kailyn Barrera  Swelling is down, patient declining wrap  4/13/22  Wound slightly improved  4/20/2022  Wound stable, patient refusing compression wrap  5/4/22  Wound worse  Pt refusing wrap  Will change diet per her report to decrease swelling  5/11/22  Wound stable  kailyn and giacomo 2 - pt agreeable now after significant persuasion  5/18/22  Took off wrap within 24 hours due to pain  aquacbritney and spandigrip  She agreed to use wrap again if swelling not down 2 more cm next week  Wound slightly smaller  5/25/22  Wound improved   Left leg wound debrided   Continue aquacel   6/8/22  Wound larger  Agreeable to wrap - kailyn sena ag  Culture done  6/22/22  Wound stable in size  6/29/22  Wound slightly larger  Still having issues with wrap falling down  Will start hhc - MWF wraps  7/6/22  Improved  hhc starts this week  7/20/22  Appearance better, wound measuring larger  Continue with wraps  Leg edema improved  7/27/22  Slight improvement    \Wound/Ulcer Pain Timing/Severity: waxing and waning, mild  Quality of pain: aching, throbbing, pressure  Severity:  7 / 10   Modifying Factors: Pain worsens with debridement, dressing changes  Associated Signs/Symptoms: edema, drainage and pain    Ulcer Identification:  Ulcer Type: venous and lymphedema  Contributing Factors: edema, venous stasis and lymphedema    Diabetic/Pressure/Non Pressure Ulcers only:  Ulcer: Non-Pressure ulcer, fat layer exposed    Wound: N/A        PAST MEDICAL HISTORY      Diagnosis Date    Bursitis     CAD (coronary artery disease) 1993 heart attack    COPD (chronic obstructive pulmonary disease) ([facility] 2013    Emphysema     slight    GERD (gastroesophageal reflux disease)     Hiatal hernia     Hip pain     Hyperlipidemia     Hypertension     Lymphedema of both lower extremities 2018    Venous insufficiency of both lower extremities 2018    Venous stasis ulcer of left calf with fat layer exposed without varicose veins ([facility] [address] 2021    [facility] 10/28/2015     Past Surgical History:   Procedure Laterality Date    APPENDECTOMY      BREAST ENHANCEMENT SURGERY      BREAST REDUCTION SURGERY      CHOLECYSTECTOMY      COLONOSCOPY      ECHO COMPL W DOP COLOR FLOW  3/11/2013         ENDOSCOPY, COLON, DIAGNOSTIC      HERNIA REPAIR N/A 2021    LAPAROSCOPIC ROBOTIC ASSISTED INGUINAL HERNIA REPAIR performed by Juan Townsend MD at [address] ([address] [place] [address])      JOINT REPLACEMENT  2009    l knee r hip    LEG DEBRIDEMENT Left 2015    LEG DEBRIDEMENT Left 2016     History reviewed. No pertinent family history.   Social History     Tobacco Use    Smoking status: Former     Packs/day: 1.00     Years: 20.00     Pack years: 20.00     Types: Cigarettes     Quit date: 1995     Years since quittin.7    Smokeless tobacco: Never    Tobacco comments:     Quit   Vaping Use    Vaping Use: Never used   Substance Use Topics    Alcohol use: No    Drug use: No     Allergies   Allergen Reactions    Codeine Nausea And Vomiting and Other (See Comments)     hallucinate    Dilaudid [Hydromorphone Hcl] Rash    Naproxen Other (See Comments)     Shuts down kidney function    Singulair [Montelukast Sodium] Shortness Of Breath     Mucus in chest    Black Cohosh     Black Cohosh [Cimicifuga [place] (Black Cohosh)] Rash     Current Outpatient Medications on File Prior to Encounter   Medication Sig Dispense Refill    Cyanocobalamin (VITAMIN B 12) 500 MCG TABS Take by mouth daily      Multiple Vitamins-Minerals (THERAPEUTIC MULTIVITAMIN-MINERALS) tablet Take 1 tablet by mouth daily      zinc gluconate 50 MG tablet Take 50 mg by mouth daily      GENISTEIN PO Take 125 mg by mouth nightly      folic acid (FOLVITE) 596 MCG tablet Take 400 mcg by mouth nightly      Krill Oil 350 MG CAPS Take 350 mg by mouth nightly      gabapentin (NEURONTIN) 300 MG capsule Take 300 mg by mouth 3 times daily. simvastatin (ZOCOR) 40 MG tablet Take 40 mg by mouth nightly      oxyCODONE-acetaminophen (PERCOCET) 7.5-325 MG per tablet Take 1 tablet by mouth 2 times daily. Bharti Remedies morphine (MS CONTIN) 15 MG extended release tablet Take 15 mg by mouth 2 times daily. aspirin 81 MG tablet Take 81 mg by mouth daily      Cholecalciferol (VITAMIN D) 2000 UNITS CAPS capsule Take 2,000 Units by mouth daily       ferrous sulfate 325 (65 FE) MG tablet Take 325 mg by mouth nightly       metoprolol (LOPRESSOR) 50 MG tablet Take 1 tablet by mouth 2 times daily 60 tablet 0    albuterol (PROVENTIL HFA;VENTOLIN HFA) 108 (90 BASE) MCG/ACT inhaler Inhale 2 puffs into the lungs every 6 hours as needed for Wheezing or Shortness of Breath       calcium carbonate (OYSTER SHELL CALCIUM 500 MG) 1250 MG tablet Take 2 tablets by mouth daily      Ascorbic Acid (VITAMIN C) 500 MG tablet Take 2,000 mg by mouth daily      omeprazole (PRILOSEC) 40 MG delayed release capsule Take 40 mg by mouth daily. diphenhydrAMINE (BENADRYL) 50 MG capsule Take 50 mg by mouth daily as needed for Allergies       Garlic 682 MG TABS Take 1,000 mg by mouth daily        No current facility-administered medications on file prior to encounter.        REVIEW OF SYSTEMS See HPI    Objective:    /68   Pulse 80   Temp 97.1 °F (36.2 °C) (Temporal)   Resp 18   Ht 5' 7\" (1.702 m)   Wt 295 lb (133.8 kg)   BMI 46.20 kg/m²   Wt Readings from Last 3 Encounters:   07/27/22 295 lb (133.8 kg)   07/20/22 295 lb (133.8 kg)   07/06/22 295 lb (133.8 kg) PHYSICAL EXAM  CONSTITUTIONAL:   Awake, alert, cooperative   EYES:  lids and lashes normal   ENT: external ears and nose without lesions   NECK:  supple, symmetrical, trachea midline   SKIN:  Open wound Present    Assessment:     Problem List Items Addressed This Visit       Venous insufficiency of both lower extremities (Chronic)    Lymphedema of both lower extremities (Chronic)    Venous stasis ulcer of left calf with fat layer exposed without varicose veins (HCC) - Primary (Chronic)     Pre Debridement Measurements:  Are located in the [facility]  Documentation Flow Sheet  Post Debridement Measurements:  Wound/Ulcer Descriptions are Pre Debridement except measurements:     Incision 04/20/16 Leg Left (Active)   Number of days: 2293       Incision 08/03/16 Leg Left (Active)   Number of days: 2189       Wound 12/08/21 Pretibial Left #1 (Active)   Wound Image   05/25/22 1534   Dressing Status New dressing applied;Clean;Dry; Intact 07/20/22 1611   Wound Cleansed Cleansed with saline 07/20/22 1611   Dressing/Treatment Alginate with Ag;ABD 07/20/22 1611   Offloading for Diabetic Foot Ulcers Offloading not required 07/20/22 1611   Wound Length (cm) 3.4 cm 07/27/22 1525   Wound Width (cm) 2 cm 07/27/22 1525   Wound Depth (cm) 0.3 cm 07/27/22 1525   Wound Surface Area (cm^2) 6.8 cm^2 07/27/22 1525   Change in Wound Size % (l*w) -871.43 07/27/22 1525   Wound Volume (cm^3) 2.04 cm^3 07/27/22 1525   Wound Healing % -2814 07/27/22 1525   Post-Procedure Length (cm) 3.5 cm 07/27/22 1559   Post-Procedure Width (cm) 2.1 cm 07/27/22 1559   Post-Procedure Depth (cm) 0.3 cm 07/27/22 1559   Post-Procedure Surface Area (cm^2) 7.35 cm^2 07/27/22 1559   Post-Procedure Volume (cm^3) 2.205 cm^3 07/27/22 1559   Wound Assessment Pink/red;Fibrin 07/27/22 1525   Drainage Amount Moderate 07/27/22 1525   Drainage Description Yellow;Serosanguinous 07/27/22 1525   Odor None 07/27/22 1525   Kori-wound Assessment Intact; Maceration 07/27/22 1522 Number of days: 236     Incision 04/16/21 Abdomen (Active)   Number of days: 472       Procedure Note  Indications:  Based on my examination of this patient's wound(s)/ulcer(s) today, debridement is required to promote healing and evaluate the wound base. Performed by: Ofelia Miller MD    Consent obtained:  Yes    Time out taken:  Yes    Pain Control: Anesthetic  Anesthetic: 4% Lidocaine Liquid Topical     Debridement:Excisional Debridement    Using curette the wound(s)/ulcer(s) was/were sharply debrided down through and including the removal of epidermis, dermis and subcutaneous tissue. Devitalized Tissue Debrided:  fibrin, biofilm, slough and exudate to stimulate bleeding to promote healing, post debridement good bleeding base and wound edges noted    Wound/Ulcer #: 1    Percent of Wound/Ulcer Debrided: 100%    Total Surface Area Debrided:  6.8 sq cm     Estimated Blood Loss:  Minimal  Hemostasis Achieved:  by pressure    Procedural Pain:  7  / 10   Post Procedural Pain:  8 / 10     Response to treatment:  With complaints of pain. Plan:   Treatment Note please see attached Discharge Instructions    Written patient dismissal instructions given to patient and signed by patient or POA. Discharge Instructions         Visit Discharge/Physician Orders     Discharge condition: Stable     Assessment of pain at discharge: mild     Anesthetic used: lido 4%     Discharge to: Home     Left via:Private automobile     Accompanied by: accompanied by self     ECF/HHA: [facility]     Dressing Orders: To left pretib wound: cleanse with normal saline, apply aquacel AG, cover and secure with dry dressing. Apply profore wrap. Change M- W( at [address] [place] [address],[address])- F.     Keep wrap dry at all times. If wrap becomes wet or falls 2 inches call [address] [place] [address],[address] ([phone])and may remove wrap and apply double tubigrip. Treatment Orders:Eat a diet high in protein and vitamin C.  Take a multiple vitamin daily unless contraindicated. Elevate as much as possible     [facility] followup visit 1 week____________________________  (Please note your next appointment above and if you are unable to keep, kindly give a 24 hour notice. Thank you.)     Physician signature:__________________________        If you experience any of the following, please call the "[facility]" during business hours:     * Increase in Pain  * Temperature over 101  * Increase in drainage from your wound  * Drainage with a foul odor  * Bleeding  * Increase in swelling  * Need for compression bandage changes due to slippage, breakthrough drainage. If you need medical attention outside of the business hours of the "[facility]" please contact your PCP or go to the nearest emergency room.            Electronically signed by Jeffrey Daniels MD

## 2022-08-01 NOTE — DISCHARGE INSTRUCTIONS
Visit Discharge/Physician Orders     Discharge condition: Stable     Assessment of pain at discharge: mild     Anesthetic used: lido 4%     Discharge to: Home     Left via:Private automobile     Accompanied by: accompanied by self     ECF/HHA: [facility]     Dressing Orders: To left pretib wound: cleanse with normal saline, apply aquacel AG, cover and secure with dry dressing. Apply profore wrap. Change M- W( at [facility])- F.     Keep wrap dry at all times. If wrap becomes wet or falls 2 inches call [facility] ([phone])and may remove wrap and apply double tubigrip. Treatment Orders:Eat a diet high in protein and vitamin C. Take a multiple vitamin daily unless contraindicated. Elevate as much as possible     [facility] followup visit 1 week____________________________  (Please note your next appointment above and if you are unable to keep, kindly give a 24 hour notice. Thank you.)     Physician signature:__________________________        If you experience any of the following, please call the [facility] during business hours:     * Increase in Pain  * Temperature over 101  * Increase in drainage from your wound  * Drainage with a foul odor  * Bleeding  * Increase in swelling  * Need for compression bandage changes due to slippage, breakthrough drainage. If you need medical attention outside of the business hours of the [facility] please contact your PCP or go to the nearest emergency room.

## 2022-08-03 ENCOUNTER — HOSPITAL ENCOUNTER (OUTPATIENT)
Dept: WOUND CARE | Age: 77
Discharge: HOME | End: 2022-08-03
Payer: MEDICARE

## 2022-08-03 DIAGNOSIS — I87.2 VENOUS STASIS ULCER OF LEFT CALF WITH FAT LAYER EXPOSED WITHOUT VARICOSE VEINS (HCC): Primary | ICD-10-CM

## 2022-08-03 DIAGNOSIS — L97.222 VENOUS STASIS ULCER OF LEFT CALF WITH FAT LAYER EXPOSED WITHOUT VARICOSE VEINS (HCC): Primary | ICD-10-CM

## 2022-08-03 PROCEDURE — 87070 CULTURE OTHR SPECIMN AEROBIC: CPT

## 2022-08-03 PROCEDURE — 87186 SC STD MICRODIL/AGAR DIL: CPT

## 2022-08-03 PROCEDURE — 87075 CULTR BACTERIA EXCEPT BLOOD: CPT

## 2022-08-03 PROCEDURE — 11042 DBRDMT SUBQ TIS 1ST 20SQCM/<: CPT | Performed by: SURGERY

## 2022-08-03 PROCEDURE — 11042 DBRDMT SUBQ TIS 1ST 20SQCM/<: CPT

## 2022-08-03 PROCEDURE — 87077 CULTURE AEROBIC IDENTIFY: CPT

## 2022-08-03 RX ORDER — BACITRACIN ZINC AND POLYMYXIN B SULFATE 500; 1000 [USP'U]/G; [USP'U]/G
OINTMENT TOPICAL ONCE
Status: CANCELLED | OUTPATIENT
Start: 2022-08-03 | End: 2022-08-03

## 2022-08-03 RX ORDER — GENTAMICIN SULFATE 1 MG/G
OINTMENT TOPICAL ONCE
Status: CANCELLED | OUTPATIENT
Start: 2022-08-03 | End: 2022-08-03

## 2022-08-03 RX ORDER — LIDOCAINE 50 MG/G
OINTMENT TOPICAL ONCE
Status: CANCELLED | OUTPATIENT
Start: 2022-08-03 | End: 2022-08-03

## 2022-08-03 RX ORDER — CLOBETASOL PROPIONATE 0.5 MG/G
OINTMENT TOPICAL ONCE
Status: CANCELLED | OUTPATIENT
Start: 2022-08-03 | End: 2022-08-03

## 2022-08-03 RX ORDER — BACITRACIN, NEOMYCIN, POLYMYXIN B 400; 3.5; 5 [USP'U]/G; MG/G; [USP'U]/G
OINTMENT TOPICAL ONCE
Status: CANCELLED | OUTPATIENT
Start: 2022-08-03 | End: 2022-08-03

## 2022-08-03 RX ORDER — LIDOCAINE HYDROCHLORIDE 20 MG/ML
JELLY TOPICAL ONCE
Status: CANCELLED | OUTPATIENT
Start: 2022-08-03 | End: 2022-08-03

## 2022-08-03 RX ORDER — BETAMETHASONE DIPROPIONATE 0.05 %
OINTMENT (GRAM) TOPICAL ONCE
Status: CANCELLED | OUTPATIENT
Start: 2022-08-03 | End: 2022-08-03

## 2022-08-03 RX ORDER — GINSENG 100 MG
CAPSULE ORAL ONCE
Status: CANCELLED | OUTPATIENT
Start: 2022-08-03 | End: 2022-08-03

## 2022-08-03 RX ORDER — LIDOCAINE HYDROCHLORIDE 40 MG/ML
SOLUTION TOPICAL ONCE
Status: CANCELLED | OUTPATIENT
Start: 2022-08-03 | End: 2022-08-03

## 2022-08-03 RX ORDER — LIDOCAINE HYDROCHLORIDE 40 MG/ML
SOLUTION TOPICAL ONCE
Status: COMPLETED | OUTPATIENT
Start: 2022-08-03 | End: 2022-08-03

## 2022-08-03 RX ORDER — LIDOCAINE 40 MG/G
CREAM TOPICAL ONCE
Status: CANCELLED | OUTPATIENT
Start: 2022-08-03 | End: 2022-08-03

## 2022-08-03 RX ADMIN — LIDOCAINE HYDROCHLORIDE: 40 SOLUTION TOPICAL at 15:26

## 2022-08-05 LAB
ANAEROBIC CULTURE: NORMAL
ORGANISM: ABNORMAL
ORGANISM: ABNORMAL
WOUND/ABSCESS: ABNORMAL
WOUND/ABSCESS: ABNORMAL

## 2022-08-05 NOTE — PROGRESS NOTES
Wound Healing Center Followup Visit Note    Referring Physician : Renée Tom MD  [id number] RECORD NUMBER:  [id number]  AGE: 78 y.o. GENDER: female  : 1945  EPISODE DATE:  8/3/2022    Subjective:     Chief Complaint   Patient presents with    Wound Check     Leg lower left      HISTORY of PRESENT ILLNESS HPI   Joya Mancilla is a 78 y.o. female who presents today in regards to follow up evaluation and treatment of wound/ulcer. That patient's past medical, family and social hx were reviewed and changes were made if present. History of Wound Context:  Patient has had left calf wound since ~ 2021. She has been putting a dressing on it. The wound has not been improving. She has a hx significant for venous stasis ulcerations of bilateral LE. She first was seen by myself in regards to these issues 2015. She eventually healed these wounds 2016. I last saw her 2021 at which time I recommended lymphedema therapy. She states she went and said it caused her to much pain and stopped going. She has chronic issues with bilateral calf pain, swelling and edema. She admits to not wearing her stockings because of the pain. She has used knee high 20-30 mm hg stockings in the past.       She is still seeing pain management and is down to percocet //325 mg daily.        21  aquacell  Double tubigrip  Culture done  Emphasized importance of getting in to more significant compression in the future  12/15/21  Culture reviewed - light growth, no tx  Wound slightly improved  Still significant drainage  Plan on compression wrap next week  21  Stable wound  Drainage slightly better  Pt would like to wait till next week because of holidays to start wrap  22  Wound larger  Profore  22  Wound appearance better  Refusing wrap - it rolled down last week and she cut off after 3 days  22  Wound appearance better  Still refusing wrap   3/2/22  periwound worse  Culture  drawtek  3/9/22  periwound much improved  levaquin 750 mg daily #10 script given culture   Wound itself stable  3/23/22  Left anterior calf wound improving overall  New blister/ulcerations left 3rd, 4th and 5th toes and lateral foot   Instructed to keep toes/foot dry, no ointment or corn starch   3/30/22  bistering resolved, other skin issues resolved  More dry than previously but overall stable  kailyn Barrera  Swelling is down, patient declining wrap  4/13/22  Wound slightly improved  4/20/2022  Wound stable, patient refusing compression wrap  5/4/22  Wound worse  Pt refusing wrap  Will change diet per her report to decrease swelling  5/11/22  Wound stable  kailyn and giacomo 2 - pt agreeable now after significant persuasion  5/18/22  Took off wrap within 24 hours due to pain  aquacell and spandigrip  She agreed to use wrap again if swelling not down 2 more cm next week  Wound slightly smaller  5/25/22  Wound improved   Left leg wound debrided   Continue aquacel   6/8/22  Wound larger  Agreeable to wrap - kailyn sena ag  Culture done  6/22/22  Wound stable in size  6/29/22  Wound slightly larger  Still having issues with wrap falling down  Will start hhc - MWF wraps  7/6/22  Improved  hhc starts this week  7/20/22  Appearance better, wound measuring larger  Continue with wraps  Leg edema improved  7/27/22  Slight improvement  8/3/22  Wound appearance and size worse  Issues still with wraps falling down  When doesn't have wraps on than usually uses spandigrips    \Wound/Ulcer Pain Timing/Severity: waxing and waning, mild  Quality of pain: aching, throbbing, pressure  Severity:  7 / 10   Modifying Factors: Pain worsens with debridement, dressing changes  Associated Signs/Symptoms: edema, drainage and pain    Ulcer Identification:  Ulcer Type: venous and lymphedema  Contributing Factors: edema, venous stasis and lymphedema    Diabetic/Pressure/Non Pressure Ulcers only:  Ulcer: Non-Pressure ulcer, fat layer exposed    Wound: N/A      PAST MEDICAL HISTORY      Diagnosis Date    Bursitis     CAD (coronary artery disease)     heart attack    COPD (chronic obstructive pulmonary disease) ([facility] 2013    Emphysema     slight    GERD (gastroesophageal reflux disease)     Hiatal hernia     Hip pain     Hyperlipidemia     Hypertension     Lymphedema of both lower extremities 2018    Venous insufficiency of both lower extremities 2018    Venous stasis ulcer of left calf with fat layer exposed without varicose veins ([facility] [address] 2021    Venous ulcer with fat layer exposed ([facility] [address] 10/28/2015     Past Surgical History:   Procedure Laterality Date    APPENDECTOMY      BREAST ENHANCEMENT SURGERY      BREAST REDUCTION SURGERY      CHOLECYSTECTOMY      COLONOSCOPY      ECHO COMPL W DOP COLOR FLOW  3/11/2013         ENDOSCOPY, COLON, DIAGNOSTIC      HERNIA REPAIR N/A 2021    LAPAROSCOPIC ROBOTIC ASSISTED INGUINAL HERNIA REPAIR performed by Carleen Prather MD at [address] [facility] ([address] [place] [address])      JOINT REPLACEMENT  2009    l knee r hip    LEG DEBRIDEMENT Left 2015    LEG DEBRIDEMENT Left 2016     History reviewed. No pertinent family history.   Social History     Tobacco Use    Smoking status: Former     Packs/day: 1.00     Years: 20.00     Pack years: 20.00     Types: Cigarettes     Quit date: 1995     Years since quittin.7    Smokeless tobacco: Never    Tobacco comments:     Quit   Vaping Use    Vaping Use: Never used   Substance Use Topics    Alcohol use: No    Drug use: No     Allergies   Allergen Reactions    Codeine Nausea And Vomiting and Other (See Comments)     hallucinate    Dilaudid [Hydromorphone Hcl] Rash    Naproxen Other (See Comments)     Shuts down kidney function    Singulair [Montelukast Sodium] Shortness Of Breath     Mucus in chest    Black Cohosh     Black Cohosh [Cimicifuga [place] (Black Cohosh)] Rash     Current Outpatient Medications on File Prior to Encounter   Medication Sig Dispense Refill    Cyanocobalamin (VITAMIN B 12) 500 MCG TABS Take by mouth daily      Multiple Vitamins-Minerals (THERAPEUTIC MULTIVITAMIN-MINERALS) tablet Take 1 tablet by mouth daily      zinc gluconate 50 MG tablet Take 50 mg by mouth daily      GENISTEIN PO Take 125 mg by mouth nightly      folic acid (FOLVITE) 651 MCG tablet Take 400 mcg by mouth nightly      Krill Oil 350 MG CAPS Take 350 mg by mouth nightly      gabapentin (NEURONTIN) 300 MG capsule Take 300 mg by mouth 3 times daily. simvastatin (ZOCOR) 40 MG tablet Take 40 mg by mouth nightly      oxyCODONE-acetaminophen (PERCOCET) 7.5-325 MG per tablet Take 1 tablet by mouth 2 times daily. [place] Wilkins morphine (MS CONTIN) 15 MG extended release tablet Take 15 mg by mouth 2 times daily. aspirin 81 MG tablet Take 81 mg by mouth daily      Cholecalciferol (VITAMIN D) 2000 UNITS CAPS capsule Take 2,000 Units by mouth daily       ferrous sulfate 325 (65 FE) MG tablet Take 325 mg by mouth nightly       metoprolol (LOPRESSOR) 50 MG tablet Take 1 tablet by mouth 2 times daily 60 tablet 0    albuterol (PROVENTIL HFA;VENTOLIN HFA) 108 (90 BASE) MCG/ACT inhaler Inhale 2 puffs into the lungs every 6 hours as needed for Wheezing or Shortness of Breath       calcium carbonate (OYSTER SHELL CALCIUM 500 MG) 1250 MG tablet Take 2 tablets by mouth daily      Ascorbic Acid (VITAMIN C) 500 MG tablet Take 2,000 mg by mouth daily      omeprazole (PRILOSEC) 40 MG delayed release capsule Take 40 mg by mouth daily. diphenhydrAMINE (BENADRYL) 50 MG capsule Take 50 mg by mouth daily as needed for Allergies       Garlic 670 MG TABS Take 1,000 mg by mouth daily        No current facility-administered medications on file prior to encounter. REVIEW OF SYSTEMS See HPI    Objective: There were no vitals taken for this visit.   Wt Readings from Last 3 Encounters:   07/27/22 295 lb (133.8 kg)   07/20/22 295 lb (133.8 kg)   07/06/22 295 lb (133.8 kg)     PHYSICAL EXAM  CONSTITUTIONAL:   Awake, alert, cooperative   EYES:  lids and lashes normal   ENT: external ears and nose without lesions   NECK:  supple, symmetrical, trachea midline   SKIN:  Open wound Present    Assessment:     Problem List Items Addressed This Visit       Venous stasis ulcer of left calf with fat layer exposed without varicose veins (HCC) - Primary (Chronic)       Pre Debridement Measurements:  Are located in the [place]  Documentation Flow Sheet  Post Debridement Measurements:  Wound/Ulcer Descriptions are Pre Debridement except measurements:    Incision 04/20/16 Leg Left (Active)   Number of days: 2297       Incision 08/03/16 Leg Left (Active)   Number of days: 2192       Wound 12/08/21 Pretibial Left #1 (Active)   Wound Image   08/03/22 1518   Dressing Status New dressing applied;Clean;Dry; Intact 08/03/22 1618   Wound Cleansed Cleansed with saline 08/03/22 1618   Dressing/Treatment Alginate with Ag;ABD 08/03/22 1618   Offloading for Diabetic Foot Ulcers Offloading not required 07/20/22 1611   Wound Length (cm) 3.5 cm 08/03/22 1518   Wound Width (cm) 2.4 cm 08/03/22 1518   Wound Depth (cm) 0.3 cm 08/03/22 1518   Wound Surface Area (cm^2) 8.4 cm^2 08/03/22 1518   Change in Wound Size % (l*w) -1100 08/03/22 1518   Wound Volume (cm^3) 2.52 cm^3 08/03/22 1518   Wound Healing % -3500 08/03/22 1518   Post-Procedure Length (cm) 3.6 cm 08/03/22 1600   Post-Procedure Width (cm) 2.5 cm 08/03/22 1600   Post-Procedure Depth (cm) 0.3 cm 08/03/22 1600   Post-Procedure Surface Area (cm^2) 9 cm^2 08/03/22 1600   Post-Procedure Volume (cm^3) 2.7 cm^3 08/03/22 1600   Wound Assessment Pink/red;Fibrin 08/03/22 1518   Drainage Amount Moderate 08/03/22 1518   Drainage Description Yellow;Serosanguinous 08/03/22 1518   Odor None 08/03/22 1518   Kori-wound Assessment Fragile 08/03/22 1518   Number of days: 239     Incision 04/16/21 Abdomen (Active)   Number of days: 475       Procedure Note  Indications:  Based on my examination of this patient's wound(s)/ulcer(s) today, debridement is required to promote healing and evaluate the wound base. Performed by: Krystal Duncan MD    Consent obtained:  Yes    Time out taken:  Yes    Pain Control: Anesthetic  Anesthetic: 4% Lidocaine Liquid Topical     Debridement:Excisional Debridement    Using curette the wound(s)/ulcer(s) was/were sharply debrided down through and including the removal of epidermis, dermis, and subcutaneous tissue. Devitalized Tissue Debrided:  fibrin, biofilm, slough, and exudate to stimulate bleeding to promote healing, post debridement good bleeding base and wound edges noted    Wound/Ulcer #: 1    Percent of Wound/Ulcer Debrided: 100%    Total Surface Area Debrided:  8 sq cm     Estimated Blood Loss:  Minimal  Hemostasis Achieved:  by pressure    Procedural Pain:  10  / 10   Post Procedural Pain:  9 / 10     Response to treatment:  With complaints of pain. Plan:   Treatment Note please see attached Discharge Instructions    Written patient dismissal instructions given to patient and signed by patient or POA. Discharge Instructions         Visit Discharge/Physician Orders     Discharge condition: Stable     Assessment of pain at discharge: mild     Anesthetic used: lido 4%     Discharge to: Home     Left via:Private automobile     Accompanied by: accompanied by self     ECF/HHA: [facility]     Dressing Orders: To left pretib wound: cleanse with normal saline, apply aquacel AG, cover and secure with dry dressing. Apply profore wrap. Change M- W( at [facility])- F.     Keep wrap dry at all times. If wrap becomes wet or falls 2 inches call [facility] ([phone])and may remove wrap and apply double tubigrip. Treatment Orders:Eat a diet high in protein and vitamin C. Take a multiple vitamin daily unless contraindicated.   Elevate as much as possible     [facility] followup visit 1 week____________________________  (Please note your next appointment above and if you are unable to keep, kindly give a 24 hour notice. Thank you.)     Physician signature:__________________________        If you experience any of the following, please call the [facility] during business hours:     * Increase in Pain  * Temperature over 101  * Increase in drainage from your wound  * Drainage with a foul odor  * Bleeding  * Increase in swelling  * Need for compression bandage changes due to slippage, breakthrough drainage. If you need medical attention outside of the business hours of the [facility] please contact your PCP or go to the nearest emergency room.                Electronically signed by Stanley Alonso MD

## 2022-08-10 ENCOUNTER — HOSPITAL ENCOUNTER (OUTPATIENT)
Dept: WOUND CARE | Age: 77
Discharge: HOME | End: 2022-08-10
Payer: MEDICARE

## 2022-08-10 ENCOUNTER — HOSPITAL ENCOUNTER (OUTPATIENT)
Age: 77
Discharge: HOME | End: 2022-08-10
Payer: MEDICARE

## 2022-08-10 VITALS
HEART RATE: 64 BPM | SYSTOLIC BLOOD PRESSURE: 132 MMHG | RESPIRATION RATE: 16 BRPM | DIASTOLIC BLOOD PRESSURE: 68 MMHG | TEMPERATURE: 96 F

## 2022-08-10 DIAGNOSIS — L97.222 VENOUS STASIS ULCER OF LEFT CALF WITH FAT LAYER EXPOSED WITHOUT VARICOSE VEINS (HCC): ICD-10-CM

## 2022-08-10 DIAGNOSIS — I87.2 VENOUS STASIS ULCER OF LEFT CALF WITH FAT LAYER EXPOSED WITHOUT VARICOSE VEINS (HCC): Primary | ICD-10-CM

## 2022-08-10 DIAGNOSIS — I89.0 LYMPHEDEMA OF BOTH LOWER EXTREMITIES: Chronic | ICD-10-CM

## 2022-08-10 DIAGNOSIS — L97.222 VENOUS STASIS ULCER OF LEFT CALF WITH FAT LAYER EXPOSED WITHOUT VARICOSE VEINS (HCC): Primary | ICD-10-CM

## 2022-08-10 DIAGNOSIS — I87.2 VENOUS STASIS ULCER OF LEFT CALF WITH FAT LAYER EXPOSED WITHOUT VARICOSE VEINS (HCC): ICD-10-CM

## 2022-08-10 LAB
ALBUMIN SERPL-MCNC: 3.9 G/DL (ref 3.5–5.2)
ALP BLD-CCNC: 110 U/L (ref 35–104)
ALT SERPL-CCNC: 18 U/L (ref 0–32)
ANION GAP SERPL CALCULATED.3IONS-SCNC: 11 MMOL/L (ref 7–16)
AST SERPL-CCNC: 28 U/L (ref 0–31)
BILIRUB SERPL-MCNC: 0.4 MG/DL (ref 0–1.2)
BUN BLDV-MCNC: 11 MG/DL (ref 6–23)
CALCIUM SERPL-MCNC: 9.3 MG/DL (ref 8.6–10.2)
CHLORIDE BLD-SCNC: 99 MMOL/L (ref 98–107)
CO2: 28 MMOL/L (ref 22–29)
CREAT SERPL-MCNC: 0.9 MG/DL (ref 0.5–1)
GFR AFRICAN AMERICAN: >60
GFR NON-AFRICAN AMERICAN: >60 ML/MIN/1.73
GLUCOSE BLD-MCNC: 96 MG/DL (ref 74–99)
HCT VFR BLD CALC: 39.5 % (ref 34–48)
HEMOGLOBIN: 12.6 G/DL (ref 11.5–15.5)
MCH RBC QN AUTO: 32.6 PG (ref 26–35)
MCHC RBC AUTO-ENTMCNC: 31.9 % (ref 32–34.5)
MCV RBC AUTO: 102.1 FL (ref 80–99.9)
PDW BLD-RTO: 11.9 FL (ref 11.5–15)
PLATELET # BLD: 143 E9/L (ref 130–450)
PMV BLD AUTO: 10.1 FL (ref 7–12)
POTASSIUM SERPL-SCNC: 4.1 MMOL/L (ref 3.5–5)
RBC # BLD: 3.87 E12/L (ref 3.5–5.5)
SODIUM BLD-SCNC: 138 MMOL/L (ref 132–146)
TOTAL PROTEIN: 8.3 G/DL (ref 6.4–8.3)
WBC # BLD: 4.3 E9/L (ref 4.5–11.5)

## 2022-08-10 PROCEDURE — 85027 COMPLETE CBC AUTOMATED: CPT

## 2022-08-10 PROCEDURE — 80053 COMPREHEN METABOLIC PANEL: CPT

## 2022-08-10 PROCEDURE — 11042 DBRDMT SUBQ TIS 1ST 20SQCM/<: CPT

## 2022-08-10 PROCEDURE — 11042 DBRDMT SUBQ TIS 1ST 20SQCM/<: CPT | Performed by: SURGERY

## 2022-08-10 PROCEDURE — 36415 COLL VENOUS BLD VENIPUNCTURE: CPT

## 2022-08-10 RX ORDER — LIDOCAINE 40 MG/G
CREAM TOPICAL ONCE
Status: CANCELLED | OUTPATIENT
Start: 2022-08-10 | End: 2022-08-10

## 2022-08-10 RX ORDER — LIDOCAINE HYDROCHLORIDE 40 MG/ML
SOLUTION TOPICAL ONCE
Status: CANCELLED | OUTPATIENT
Start: 2022-08-10 | End: 2022-08-10

## 2022-08-10 RX ORDER — GINSENG 100 MG
CAPSULE ORAL ONCE
Status: CANCELLED | OUTPATIENT
Start: 2022-08-10 | End: 2022-08-10

## 2022-08-10 RX ORDER — LIDOCAINE 50 MG/G
OINTMENT TOPICAL ONCE
Status: CANCELLED | OUTPATIENT
Start: 2022-08-10 | End: 2022-08-10

## 2022-08-10 RX ORDER — LIDOCAINE HYDROCHLORIDE 20 MG/ML
JELLY TOPICAL ONCE
Status: CANCELLED | OUTPATIENT
Start: 2022-08-10 | End: 2022-08-10

## 2022-08-10 RX ORDER — CLOBETASOL PROPIONATE 0.5 MG/G
OINTMENT TOPICAL ONCE
Status: CANCELLED | OUTPATIENT
Start: 2022-08-10 | End: 2022-08-10

## 2022-08-10 RX ORDER — SULFAMETHOXAZOLE AND TRIMETHOPRIM 800; 160 MG/1; MG/1
1 TABLET ORAL 2 TIMES DAILY
Qty: 20 TABLET | Refills: 0 | Status: SHIPPED | OUTPATIENT
Start: 2022-08-10 | End: 2022-08-30

## 2022-08-10 RX ORDER — LIDOCAINE HYDROCHLORIDE 40 MG/ML
SOLUTION TOPICAL ONCE
Status: DISCONTINUED | OUTPATIENT
Start: 2022-08-10 | End: 2022-08-11 | Stop reason: HOSPADM

## 2022-08-10 RX ORDER — BACITRACIN, NEOMYCIN, POLYMYXIN B 400; 3.5; 5 [USP'U]/G; MG/G; [USP'U]/G
OINTMENT TOPICAL ONCE
Status: CANCELLED | OUTPATIENT
Start: 2022-08-10 | End: 2022-08-10

## 2022-08-10 RX ORDER — BETAMETHASONE DIPROPIONATE 0.05 %
OINTMENT (GRAM) TOPICAL ONCE
Status: CANCELLED | OUTPATIENT
Start: 2022-08-10 | End: 2022-08-10

## 2022-08-10 RX ORDER — BACITRACIN ZINC AND POLYMYXIN B SULFATE 500; 1000 [USP'U]/G; [USP'U]/G
OINTMENT TOPICAL ONCE
Status: CANCELLED | OUTPATIENT
Start: 2022-08-10 | End: 2022-08-10

## 2022-08-10 RX ORDER — GENTAMICIN SULFATE 1 MG/G
OINTMENT TOPICAL ONCE
Status: CANCELLED | OUTPATIENT
Start: 2022-08-10 | End: 2022-08-10

## 2022-08-10 NOTE — PROGRESS NOTES
Wound Healing Center Followup Visit Note    Referring Physician : Giovanna Clay MD  [address] [facility] RECORD NUMBER:  [id number]  AGE: 78 y.o. GENDER: female  : 1945  EPISODE DATE:  8/10/2022    Subjective:     Chief Complaint   Patient presents with    Wound Check     Left leg      HISTORY of PRESENT ILLNESS HPI   Kath Eaton is a 78 y.o. female who presents today in regards to follow up evaluation and treatment of wound/ulcer. That patient's past medical, family and social hx were reviewed and changes were made if present. History of Wound Context:  Patient has had left calf wound since ~ 2021. She has been putting a dressing on it. The wound has not been improving. She has a hx significant for venous stasis ulcerations of bilateral LE. She first was seen by myself in regards to these issues 2015. She eventually healed these wounds 2016. I last saw her 2021 at which time I recommended lymphedema therapy. She states she went and said it caused her to much pain and stopped going. She has chronic issues with bilateral calf pain, swelling and edema. She admits to not wearing her stockings because of the pain. She has used knee high 20-30 mm hg stockings in the past.       She is still seeing pain management and is down to percocet /5/325 mg daily.        21  aquacell  Double tubigrip  Culture done  Emphasized importance of getting in to more significant compression in the future  12/15/21  Culture reviewed - light growth, no tx  Wound slightly improved  Still significant drainage  Plan on compression wrap next week  21  Stable wound  Drainage slightly better  Pt would like to wait till next week because of holidays to start wrap  22  Wound larger  Profore  22  Wound appearance better  Refusing wrap - it rolled down last week and she cut off after 3 days  22  Wound appearance better  Still refusing wrap   3/2/22  periwound worse  Culture  drawtek  3/9/22  periwound much improved  levaquin 750 mg daily #10 script given culture   Wound itself stable  3/23/22  Left anterior calf wound improving overall  New blister/ulcerations left 3rd, 4th and 5th toes and lateral foot   Instructed to keep toes/foot dry, no ointment or corn starch   3/30/22  bistering resolved, other skin issues resolved  More dry than previously but overall stable  kailyn Barrera  Swelling is down, patient declining wrap  4/13/22  Wound slightly improved  4/20/2022  Wound stable, patient refusing compression wrap  5/4/22  Wound worse  Pt refusing wrap  Will change diet per her report to decrease swelling  5/11/22  Wound stable  kailyn and giacomo 2 - pt agreeable now after significant persuasion  5/18/22  Took off wrap within 24 hours due to pain  aquacell and spandigrip  She agreed to use wrap again if swelling not down 2 more cm next week  Wound slightly smaller  5/25/22  Wound improved   Left leg wound debrided   Continue aquacel   6/8/22  Wound larger  Agreeable to wrap - kailyn sena ag  Culture done  6/22/22  Wound stable in size  6/29/22  Wound slightly larger  Still having issues with wrap falling down  Will start hhc - MWF wraps  7/6/22  Improved  hhc starts this week  7/20/22  Appearance better, wound measuring larger  Continue with wraps  Leg edema improved  7/27/22  Slight improvement  8/3/22  Wound appearance and size worse  Issues still with wraps falling down  When doesn't have wraps on than usually uses spandigrips  8/10/2022  Wound cultures noted, Bactrim DS 1 tablet p.o. twice daily, wound looks fairly clean with some exudate only, mild recent lab work, patient recommended CBC and CMP      \Wound/Ulcer Pain Timing/Severity: waxing and waning, mild  Quality of pain: aching, throbbing, pressure  Severity:  7 / 10   Modifying Factors: Pain worsens with debridement, dressing changes  Associated Signs/Symptoms: edema, drainage and pain    Ulcer Identification:  Ulcer Type: venous and lymphedema  Contributing Factors: edema, venous stasis and lymphedema    Diabetic/Pressure/Non Pressure Ulcers only:  Ulcer: Non-Pressure ulcer, fat layer exposed    Wound: N/A      PAST MEDICAL HISTORY      Diagnosis Date    Bursitis     CAD (coronary artery disease)     heart attack    COPD (chronic obstructive pulmonary disease) ([facility] [address] 2013    Emphysema     slight    GERD (gastroesophageal reflux disease)     Hiatal hernia     Hip pain     Hyperlipidemia     Hypertension     Lymphedema of both lower extremities 2018    Venous insufficiency of both lower extremities 2018    Venous stasis ulcer of left calf with fat layer exposed without varicose veins ([facility] [address] 2021    [facility] 10/28/2015     Past Surgical History:   Procedure Laterality Date    APPENDECTOMY      BREAST ENHANCEMENT SURGERY      BREAST REDUCTION SURGERY      CHOLECYSTECTOMY      COLONOSCOPY      ECHO COMPL W DOP COLOR FLOW  3/11/2013         ENDOSCOPY, COLON, DIAGNOSTIC      HERNIA REPAIR N/A 2021    LAPAROSCOPIC ROBOTIC ASSISTED INGUINAL HERNIA REPAIR performed by Merlene Mendoza MD at [address] [facility] ([address] [place] [address])      JOINT REPLACEMENT  2009    l knee r hip    LEG DEBRIDEMENT Left 2015    LEG DEBRIDEMENT Left 2016     No family history on file.   Social History     Tobacco Use    Smoking status: Former     Packs/day: 1.00     Years: 20.00     Pack years: 20.00     Types: Cigarettes     Quit date: 1995     Years since quittin.7    Smokeless tobacco: Never    Tobacco comments:     Quit   Vaping Use    Vaping Use: Never used   Substance Use Topics    Alcohol use: No    Drug use: No     Allergies   Allergen Reactions    Codeine Nausea And Vomiting and Other (See Comments)     hallucinate    Dilaudid [Hydromorphone Hcl] Rash    Naproxen Other (See Comments)     Shuts down kidney function OF SYSTEMS See HPI    Objective:    /68   Pulse 64   Temp (!) 96 °F (35.6 °C) (Temporal)   Resp 16   Wt Readings from Last 3 Encounters:   07/27/22 295 lb (133.8 kg)   07/20/22 295 lb (133.8 kg)   07/06/22 295 lb (133.8 kg)     PHYSICAL EXAM  CONSTITUTIONAL:   Awake, alert, cooperative   EYES:  lids and lashes normal   ENT: external ears and nose without lesions   NECK:  supple, symmetrical, trachea midline   SKIN:  Open wound Present    Assessment:     Problem List Items Addressed This Visit          Medium    Venous stasis ulcer of left calf with fat layer exposed without varicose veins (HCC) - Primary (Chronic)    Relevant Medications    lidocaine (XYLOCAINE) 4 % external solution (Start on 8/10/2022  3:30 PM)    Other Relevant Orders    Initiate Outpatient Wound Care Protocol    Comprehensive Metabolic Panel    CBC       Unprioritized    Lymphedema of both lower extremities (Chronic)    Relevant Orders    Comprehensive Metabolic Panel    CBC       Pre Debridement Measurements:  Are located in the [place]  Documentation Flow Sheet  Post Debridement Measurements:  Wound/Ulcer Descriptions are Pre Debridement except measurements:    Incision 04/20/16 Leg Left (Active)   Number of days: 8099       Incision 08/03/16 Leg Left (Active)   Number of days: 2198       Wound 12/08/21 Pretibial Left #1 (Active)   Wound Image   08/03/22 1518   Dressing Status New dressing applied;Clean;Dry; Intact 08/03/22 1618   Wound Cleansed Cleansed with saline 08/03/22 1618   Dressing/Treatment Alginate with Ag;ABD 08/03/22 1618   Offloading for Diabetic Foot Ulcers Offloading not required 07/20/22 1611   Wound Length (cm) 4 cm 08/10/22 1504   Wound Width (cm) 2.6 cm 08/10/22 1504   Wound Depth (cm) 0.2 cm 08/10/22 1504   Wound Surface Area (cm^2) 10.4 cm^2 08/10/22 1504   Change in Wound Size % (l*w) -1385.71 08/10/22 1504   Wound Volume (cm^3) 2.08 cm^3 08/10/22 1504   Wound Healing % -2871 08/10/22 1504   Post-Procedure Length (cm) 4 cm 08/10/22 1519   Post-Procedure Width (cm) 2.9 cm 08/10/22 1519   Post-Procedure Depth (cm) 0.3 cm 08/10/22 1519   Post-Procedure Surface Area (cm^2) 11.6 cm^2 08/10/22 1519   Post-Procedure Volume (cm^3) 3.48 cm^3 08/10/22 1519   Wound Assessment Pink/red;Fibrin 08/10/22 1504   Drainage Amount Moderate 08/10/22 1504   Drainage Description Yellow;Serosanguinous 08/10/22 1504   Odor None 08/10/22 1504   Kori-wound Assessment Intact 08/10/22 1504   Number of days: 244     Incision 04/16/21 Abdomen (Active)   Number of days: 874       Procedure Note  Indications:  Based on my examination of this patient's wound(s)/ulcer(s) today, debridement is required to promote healing and evaluate the wound base. Performed by: Samir Galindo MD    Consent obtained:  Yes    Time out taken:  Yes    Pain Control: Anesthetic  Anesthetic: 4% Lidocaine Liquid Topical     Debridement:Excisional Debridement    Using curette the wound(s)/ulcer(s) was/were sharply debrided down through and including the removal of epidermis, dermis, and subcutaneous tissue. Devitalized Tissue Debrided:  fibrin, biofilm, slough, and exudate to stimulate bleeding to promote healing, post debridement good bleeding base and wound edges noted    Wound/Ulcer #: 1    Percent of Wound/Ulcer Debrided: 100%    Total Surface Area Debrided:  8 sq cm     Estimated Blood Loss:  Minimal  Hemostasis Achieved:  by pressure    Procedural Pain:  10  / 10   Post Procedural Pain:  9 / 10     Response to treatment:  With complaints of pain. Plan:   Treatment Note please see attached Discharge Instructions    Written patient dismissal instructions given to patient and signed by patient or POA.          Discharge Instructions         Visit Discharge/Physician Orders     Discharge condition: Stable     Assessment of pain at discharge: mild     Anesthetic used: lido 4%     Discharge to: Home     Left via:Private automobile     Accompanied by: accompanied by self     [facility]: [facility]     Dressing Orders: To left pretib wound: cleanse with normal saline, apply aquacel AG, cover and secure with dry dressing. Apply profore wrap. Change M- W( at [facility])- F.     Keep wrap dry at all times. If wrap becomes wet or falls 2 inches call [facility] ([phone])and may remove wrap and apply double tubigrip. Treatment Orders:Eat a diet high in protein and vitamin C. Take a multiple vitamin daily unless contraindicated. Elevate as much as possible     [facility] followup visit 1 week____________________________  (Please note your next appointment above and if you are unable to keep, kindly give a 24 hour notice. Thank you.)     Physician signature:__________________________      If you experience any of the following, please call the [facility] during business hours:     * Increase in Pain  * Temperature over 101  * Increase in drainage from your wound  * Drainage with a foul odor  * Bleeding  * Increase in swelling  * Need for compression bandage changes due to slippage, breakthrough drainage. If you need medical attention outside of the business hours of the [facility] please contact your PCP or go to the nearest emergency room.                            Electronically signed by Opal Sheridan MD

## 2022-08-10 NOTE — PLAN OF CARE
Problem: Chronic Conditions and Co-morbidities  Goal: Patient's chronic conditions and co-morbidity symptoms are monitored and maintained or improved  Outcome: Progressing     Problem: Wound:  Goal: Will show signs of wound healing; wound closure and no evidence of infection  Description: Will show signs of wound healing; wound closure and no evidence of infection  Outcome: Progressing     Problem: Venous:  Goal: Signs of wound healing will improve  Description: Signs of wound healing will improve  Outcome: Adequate for Discharge

## 2022-08-16 NOTE — DISCHARGE INSTRUCTIONS
Visit Discharge/Physician Orders     Discharge condition: Stable     Assessment of pain at discharge: mild     Anesthetic used: lido 4%     Discharge to: Home     Left via:Private automobile     Accompanied by: accompanied by self     ECF/HHA: [id number]     Dressing Orders: To left pretib wound: cleanse with normal saline, apply aquacel, cover and secure with dry dressing. Apply profore wrap. Change M- W( at [facility])- F.     Keep wrap dry at all times. If wrap becomes wet or falls 2 inches call [facility] ([phone])and may remove wrap and apply double tubigrip. Treatment Orders:Eat a diet high in protein and vitamin C. Take a multiple vitamin daily unless contraindicated. Elevate as much as possible     [facility] followup visit 1 week____________________________  (Please note your next appointment above and if you are unable to keep, kindly give a 24 hour notice. Thank you.)     Physician signature:__________________________      If you experience any of the following, please call the [facility] during business hours:     * Increase in Pain  * Temperature over 101  * Increase in drainage from your wound  * Drainage with a foul odor  * Bleeding  * Increase in swelling  * Need for compression bandage changes due to slippage, breakthrough drainage. If you need medical attention outside of the business hours of the [facility] please contact your PCP or go to the nearest emergency room.

## 2022-08-17 ENCOUNTER — HOSPITAL ENCOUNTER (OUTPATIENT)
Dept: WOUND CARE | Age: 77
Discharge: HOME | End: 2022-08-17
Payer: MEDICARE

## 2022-08-17 VITALS
HEIGHT: 67 IN | DIASTOLIC BLOOD PRESSURE: 70 MMHG | HEART RATE: 92 BPM | TEMPERATURE: 96.7 F | SYSTOLIC BLOOD PRESSURE: 138 MMHG | BODY MASS INDEX: 45.99 KG/M2 | WEIGHT: 293 LBS | RESPIRATION RATE: 18 BRPM

## 2022-08-17 DIAGNOSIS — L97.222 VENOUS STASIS ULCER OF LEFT CALF WITH FAT LAYER EXPOSED WITHOUT VARICOSE VEINS (HCC): Primary | ICD-10-CM

## 2022-08-17 DIAGNOSIS — I89.0 LYMPHEDEMA OF BOTH LOWER EXTREMITIES: Chronic | ICD-10-CM

## 2022-08-17 DIAGNOSIS — I87.2 VENOUS STASIS ULCER OF LEFT CALF WITH FAT LAYER EXPOSED WITHOUT VARICOSE VEINS (HCC): Primary | ICD-10-CM

## 2022-08-17 PROCEDURE — 11042 DBRDMT SUBQ TIS 1ST 20SQCM/<: CPT | Performed by: SURGERY

## 2022-08-17 PROCEDURE — 11042 DBRDMT SUBQ TIS 1ST 20SQCM/<: CPT

## 2022-08-17 PROCEDURE — 6370000000 HC RX 637 (ALT 250 FOR IP): Performed by: SURGERY

## 2022-08-17 RX ORDER — LIDOCAINE HYDROCHLORIDE 20 MG/ML
JELLY TOPICAL ONCE
Status: CANCELLED | OUTPATIENT
Start: 2022-08-17 | End: 2022-08-17

## 2022-08-17 RX ORDER — GENTAMICIN SULFATE 1 MG/G
OINTMENT TOPICAL ONCE
Status: CANCELLED | OUTPATIENT
Start: 2022-08-17 | End: 2022-08-17

## 2022-08-17 RX ORDER — BACITRACIN ZINC AND POLYMYXIN B SULFATE 500; 1000 [USP'U]/G; [USP'U]/G
OINTMENT TOPICAL ONCE
Status: CANCELLED | OUTPATIENT
Start: 2022-08-17 | End: 2022-08-17

## 2022-08-17 RX ORDER — LIDOCAINE 50 MG/G
OINTMENT TOPICAL ONCE
Status: CANCELLED | OUTPATIENT
Start: 2022-08-17 | End: 2022-08-17

## 2022-08-17 RX ORDER — LIDOCAINE HYDROCHLORIDE 40 MG/ML
SOLUTION TOPICAL ONCE
Status: CANCELLED | OUTPATIENT
Start: 2022-08-17 | End: 2022-08-17

## 2022-08-17 RX ORDER — LIDOCAINE 40 MG/G
CREAM TOPICAL ONCE
Status: CANCELLED | OUTPATIENT
Start: 2022-08-17 | End: 2022-08-17

## 2022-08-17 RX ORDER — CLOBETASOL PROPIONATE 0.5 MG/G
OINTMENT TOPICAL ONCE
Status: CANCELLED | OUTPATIENT
Start: 2022-08-17 | End: 2022-08-17

## 2022-08-17 RX ORDER — BETAMETHASONE DIPROPIONATE 0.05 %
OINTMENT (GRAM) TOPICAL ONCE
Status: CANCELLED | OUTPATIENT
Start: 2022-08-17 | End: 2022-08-17

## 2022-08-17 RX ORDER — GINSENG 100 MG
CAPSULE ORAL ONCE
Status: CANCELLED | OUTPATIENT
Start: 2022-08-17 | End: 2022-08-17

## 2022-08-17 RX ORDER — LIDOCAINE HYDROCHLORIDE 40 MG/ML
SOLUTION TOPICAL ONCE
Status: COMPLETED | OUTPATIENT
Start: 2022-08-17 | End: 2022-08-17

## 2022-08-17 RX ORDER — BACITRACIN, NEOMYCIN, POLYMYXIN B 400; 3.5; 5 [USP'U]/G; MG/G; [USP'U]/G
OINTMENT TOPICAL ONCE
Status: CANCELLED | OUTPATIENT
Start: 2022-08-17 | End: 2022-08-17

## 2022-08-17 RX ADMIN — LIDOCAINE HYDROCHLORIDE 10 ML: 40 SOLUTION TOPICAL at 15:17

## 2022-08-17 ASSESSMENT — PAIN DESCRIPTION - FREQUENCY: FREQUENCY: INTERMITTENT

## 2022-08-17 ASSESSMENT — PAIN DESCRIPTION - LOCATION: LOCATION: LEG

## 2022-08-17 ASSESSMENT — PAIN DESCRIPTION - ONSET: ONSET: ON-GOING

## 2022-08-17 ASSESSMENT — PAIN DESCRIPTION - PAIN TYPE: TYPE: CHRONIC PAIN

## 2022-08-17 ASSESSMENT — PAIN - FUNCTIONAL ASSESSMENT: PAIN_FUNCTIONAL_ASSESSMENT: ACTIVITIES ARE NOT PREVENTED

## 2022-08-17 ASSESSMENT — PAIN DESCRIPTION - ORIENTATION: ORIENTATION: LEFT

## 2022-08-17 ASSESSMENT — PAIN SCALES - GENERAL: PAINLEVEL_OUTOF10: 10

## 2022-08-17 NOTE — PROGRESS NOTES
worse  Culture  drawtek  3/9/22  periwound much improved  levaquin 750 mg daily #10 script given culture   Wound itself stable  3/23/22  Left anterior calf wound improving overall  New blister/ulcerations left 3rd, 4th and 5th toes and lateral foot   Instructed to keep toes/foot dry, no ointment or corn starch   3/30/22  bistering resolved, other skin issues resolved  More dry than previously but overall stable  kailyn Barrera  Swelling is down, patient declining wrap  4/13/22  Wound slightly improved  4/20/2022  Wound stable, patient refusing compression wrap  5/4/22  Wound worse  Pt refusing wrap  Will change diet per her report to decrease swelling  5/11/22  Wound stable  kailyn and giacomo 2 - pt agreeable now after significant persuasion  5/18/22  Took off wrap within 24 hours due to pain  aquacell and spandigrip  She agreed to use wrap again if swelling not down 2 more cm next week  Wound slightly smaller  5/25/22  Wound improved   Left leg wound debrided   Continue aquacel   6/8/22  Wound larger  Agreeable to wrap - kailyn sena ag  Culture done  6/22/22  Wound stable in size  6/29/22  Wound slightly larger  Still having issues with wrap falling down  Will start [facility] - MWF wraps  7/6/22  Improved  hhc starts this week  7/20/22  Appearance better, wound measuring larger  Continue with wraps  Leg edema improved  7/27/22  Slight improvement  8/3/22  Wound appearance and size worse  Issues still with wraps falling down  When doesn't have wraps on than usually uses spandigrips  8/10/2022  Wound cultures noted, Bactrim DS 1 tablet p.o. twice daily, wound looks fairly clean with some exudate only, mild recent lab work, patient recommended CBC and CMP  8/17/22  Refusing wrap today due to pain associated with  Will discuss with Dr Karl Mckeon her pain management  Slightly larger, appearance improved      \Wound/Ulcer Pain Timing/Severity: waxing and waning, mild  Quality of pain: aching, throbbing, pressure  Severity:  7 / 10   Modifying Factors: Pain worsens with debridement, dressing changes  Associated Signs/Symptoms: edema, drainage and pain    Ulcer Identification:  Ulcer Type: venous and lymphedema  Contributing Factors: edema, venous stasis and lymphedema    Diabetic/Pressure/Non Pressure Ulcers only:  Ulcer: Non-Pressure ulcer, fat layer exposed    Wound: N/A      PAST MEDICAL HISTORY      Diagnosis Date    Bursitis     CAD (coronary artery disease)     heart attack    COPD (chronic obstructive pulmonary disease) ([address] 2013    Emphysema     slight    GERD (gastroesophageal reflux disease)     Hiatal hernia     Hip pain     Hyperlipidemia     Hypertension     Lymphedema of both lower extremities 2018    Venous insufficiency of both lower extremities 2018    Venous stasis ulcer of left calf with fat layer exposed without varicose veins ([address] 2021    Venous ulcer with fat layer exposed ([address] 10/28/2015     Past Surgical History:   Procedure Laterality Date    APPENDECTOMY      BREAST ENHANCEMENT SURGERY      BREAST REDUCTION SURGERY      CHOLECYSTECTOMY      COLONOSCOPY      ECHO COMPL W DOP COLOR FLOW  3/11/2013         ENDOSCOPY, COLON, DIAGNOSTIC      HERNIA REPAIR N/A 2021    LAPAROSCOPIC ROBOTIC ASSISTED INGUINAL HERNIA REPAIR performed by Elizabeth Jefferson MD at [facility] ([address] [place] [address])      JOINT REPLACEMENT  2009    l knee r hip    LEG DEBRIDEMENT Left 2015    LEG DEBRIDEMENT Left 2016     History reviewed. No pertinent family history.   Social History     Tobacco Use    Smoking status: Former     Packs/day: 1.00     Years: 20.00     Pack years: 20.00     Types: Cigarettes     Quit date: 1995     Years since quittin.8    Smokeless tobacco: Never    Tobacco comments:     Quit   Vaping Use    Vaping Use: Never used   Substance Use Topics    Alcohol use: No    Drug use: No     Allergies   Allergen Reactions    Codeine Nausea And Vomiting and Other (See Comments)     hallucinate    Dilaudid [Hydromorphone Hcl] Rash    Naproxen Other (See Comments)     Shuts down kidney function    Singulair [Montelukast Sodium] Shortness Of Breath     Mucus in chest    Black Cohosh     Black Cohosh [Cimicifuga [place] (Black Cohosh)] Rash     Current Outpatient Medications on File Prior to Encounter   Medication Sig Dispense Refill    sulfamethoxazole-trimethoprim (BACTRIM DS) 800-160 MG per tablet Take 1 tablet by mouth in the morning and 1 tablet before bedtime. Do all this for 20 days. 20 tablet 0    Cyanocobalamin (VITAMIN B 12) 500 MCG TABS Take by mouth daily      Multiple Vitamins-Minerals (THERAPEUTIC MULTIVITAMIN-MINERALS) tablet Take 1 tablet by mouth daily      zinc gluconate 50 MG tablet Take 50 mg by mouth daily      GENISTEIN PO Take 125 mg by mouth nightly      folic acid (FOLVITE) 272 MCG tablet Take 400 mcg by mouth nightly      Krill Oil 350 MG CAPS Take 350 mg by mouth nightly      gabapentin (NEURONTIN) 300 MG capsule Take 300 mg by mouth 3 times daily. simvastatin (ZOCOR) 40 MG tablet Take 40 mg by mouth nightly      oxyCODONE-acetaminophen (PERCOCET) 7.5-325 MG per tablet Take 1 tablet by mouth 2 times daily. Estil Fang morphine (MS CONTIN) 15 MG extended release tablet Take 15 mg by mouth 2 times daily.        aspirin 81 MG tablet Take 81 mg by mouth daily      Cholecalciferol (VITAMIN D) 2000 UNITS CAPS capsule Take 2,000 Units by mouth daily       ferrous sulfate 325 (65 FE) MG tablet Take 325 mg by mouth nightly       metoprolol (LOPRESSOR) 50 MG tablet Take 1 tablet by mouth 2 times daily 60 tablet 0    albuterol (PROVENTIL HFA;VENTOLIN HFA) 108 (90 BASE) MCG/ACT inhaler Inhale 2 puffs into the lungs every 6 hours as needed for Wheezing or Shortness of Breath       calcium carbonate (OYSTER SHELL CALCIUM 500 MG) 1250 MG tablet Take 2 tablets by mouth daily      Ascorbic Acid (VITAMIN C) 500 MG tablet Take 2,000 mg by mouth daily      omeprazole (PRILOSEC) 40 MG delayed release capsule Take 40 mg by mouth daily. diphenhydrAMINE (BENADRYL) 50 MG capsule Take 50 mg by mouth daily as needed for Allergies       Garlic 074 MG TABS Take 1,000 mg by mouth daily        No current facility-administered medications on file prior to encounter. REVIEW OF SYSTEMS See HPI    Objective:    /70   Pulse 92   Temp (!) 96.7 °F (35.9 °C) (Temporal)   Resp 18   Ht 5' 7\" (1.702 m)   Wt 295 lb (133.8 kg)   BMI 46.20 kg/m²   Wt Readings from Last 3 Encounters:   08/17/22 295 lb (133.8 kg)   07/27/22 295 lb (133.8 kg)   07/20/22 295 lb (133.8 kg)     PHYSICAL EXAM  CONSTITUTIONAL:   Awake, alert, cooperative   EYES:  lids and lashes normal   ENT: external ears and nose without lesions   NECK:  supple, symmetrical, trachea midline   SKIN:  Open wound Present    Assessment:     Problem List Items Addressed This Visit       Lymphedema of both lower extremities (Chronic)    Venous stasis ulcer of left calf with fat layer exposed without varicose veins (HCC) - Primary (Chronic)    Relevant Orders    Initiate Outpatient Wound Care Protocol       Pre Debridement Measurements:  Are located in the [facility]  Documentation Flow Sheet  Post Debridement Measurements:  Wound/Ulcer Descriptions are Pre Debridement except measurements:    Incision 04/20/16 Leg Left (Active)   Number of days: 1459       Incision 08/03/16 Leg Left (Active)   Number of days: 2725       Wound 12/08/21 Pretibial Left #1 (Active)   Wound Image   08/03/22 1518   Dressing Status New dressing applied;Clean;Dry; Intact 08/10/22 1540   Wound Cleansed Cleansed with saline 08/10/22 1540   Dressing/Treatment Alginate with Ag;ABD 08/10/22 1540   Offloading for Diabetic Foot Ulcers Offloading not required 08/10/22 1540   Wound Length (cm) 4.2 cm 08/17/22 1513   Wound Width (cm) 2.7 cm 08/17/22 1513   Wound Depth (cm) 0.3 cm 08/17/22 1513   Wound Surface Area (cm^2) 11.34 cm^2 08/17/22 1513   Change in Wound Size % (l*w) -1520 08/17/22 1513   Wound Volume (cm^3) 3.402 cm^3 08/17/22 1513   Wound Healing % -4760 08/17/22 1513   Post-Procedure Length (cm) 4.3 cm 08/17/22 1529   Post-Procedure Width (cm) 2.8 cm 08/17/22 1529   Post-Procedure Depth (cm) 0.4 cm 08/17/22 1529   Post-Procedure Surface Area (cm^2) 12.04 cm^2 08/17/22 1529   Post-Procedure Volume (cm^3) 4.816 cm^3 08/17/22 1529   Wound Assessment Pink/red;Fibrin;Purple/maroon 08/17/22 1513   Drainage Amount Moderate 08/17/22 1513   Drainage Description Yellow;Serosanguinous 08/17/22 1513   Odor None 08/17/22 1513   Kori-wound Assessment Intact 08/17/22 1513   Number of days: 251     Incision 04/16/21 Abdomen (Active)   Number of days: 488       Procedure Note  Indications:  Based on my examination of this patient's wound(s)/ulcer(s) today, debridement is required to promote healing and evaluate the wound base. Performed by: Ashley Maldonado MD    Consent obtained:  Yes    Time out taken:  Yes    Pain Control: Anesthetic  Anesthetic: 4% Lidocaine Liquid Topical     Debridement:Excisional Debridement    Using curette the wound(s)/ulcer(s) was/were sharply debrided down through and including the removal of epidermis, dermis, and subcutaneous tissue. Devitalized Tissue Debrided:  fibrin, biofilm, slough, and exudate to stimulate bleeding to promote healing, post debridement good bleeding base and wound edges noted    Wound/Ulcer #: 1    Percent of Wound/Ulcer Debrided: 100%    Total Surface Area Debrided:  11.34 sq cm     Estimated Blood Loss:  Minimal  Hemostasis Achieved:  by pressure    Procedural Pain:  10  / 10   Post Procedural Pain:  9 / 10     Response to treatment:  With complaints of pain. Plan:   Treatment Note please see attached Discharge Instructions    Written patient dismissal instructions given to patient and signed by patient or POA.          Discharge Instructions         Visit Discharge/Physician Orders     Discharge condition: Stable     Assessment of pain at discharge: mild     Anesthetic used: lido 4%     Discharge to: Home     Left via:Private automobile     Accompanied by: accompanied by self     ECF/HHA: [id number] [facility]     Dressing Orders: To left pretib wound: cleanse with normal saline, apply aquacel, cover and secure with dry dressing. Apply profore wrap. Change M- W( at [facility])- F.     Keep wrap dry at all times. If wrap becomes wet or falls 2 inches call [facility] ([phone])and may remove wrap and apply double tubigrip. Treatment Orders:Eat a diet high in protein and vitamin C. Take a multiple vitamin daily unless contraindicated. Elevate as much as possible     [facility] followup visit 1 week____________________________  (Please note your next appointment above and if you are unable to keep, kindly give a 24 hour notice. Thank you.)     Physician signature:__________________________      If you experience any of the following, please call the [facility] during business hours:     * Increase in Pain  * Temperature over 101  * Increase in drainage from your wound  * Drainage with a foul odor  * Bleeding  * Increase in swelling  * Need for compression bandage changes due to slippage, breakthrough drainage. If you need medical attention outside of the business hours of the [facility] please contact your PCP or go to the nearest emergency room.           Electronically signed by Maura Billings MD

## 2022-08-24 ENCOUNTER — HOSPITAL ENCOUNTER (OUTPATIENT)
Dept: WOUND CARE | Age: 77
Discharge: HOME | End: 2022-08-24
Payer: MEDICARE

## 2022-08-24 VITALS
BODY MASS INDEX: 45.99 KG/M2 | RESPIRATION RATE: 18 BRPM | HEIGHT: 67 IN | TEMPERATURE: 97.8 F | DIASTOLIC BLOOD PRESSURE: 82 MMHG | WEIGHT: 293 LBS | SYSTOLIC BLOOD PRESSURE: 136 MMHG | HEART RATE: 72 BPM

## 2022-08-24 DIAGNOSIS — L97.222 VENOUS STASIS ULCER OF LEFT CALF WITH FAT LAYER EXPOSED WITHOUT VARICOSE VEINS (HCC): Primary | ICD-10-CM

## 2022-08-24 DIAGNOSIS — I87.2 VENOUS STASIS ULCER OF LEFT CALF WITH FAT LAYER EXPOSED WITHOUT VARICOSE VEINS (HCC): Primary | ICD-10-CM

## 2022-08-24 DIAGNOSIS — I89.0 LYMPHEDEMA OF BOTH LOWER EXTREMITIES: Chronic | ICD-10-CM

## 2022-08-24 DIAGNOSIS — I87.2 VENOUS INSUFFICIENCY OF BOTH LOWER EXTREMITIES: Chronic | ICD-10-CM

## 2022-08-24 PROCEDURE — 6370000000 HC RX 637 (ALT 250 FOR IP): Performed by: SURGERY

## 2022-08-24 PROCEDURE — 11042 DBRDMT SUBQ TIS 1ST 20SQCM/<: CPT | Performed by: SURGERY

## 2022-08-24 PROCEDURE — 11042 DBRDMT SUBQ TIS 1ST 20SQCM/<: CPT

## 2022-08-24 RX ORDER — LIDOCAINE HYDROCHLORIDE 40 MG/ML
SOLUTION TOPICAL ONCE
Status: CANCELLED | OUTPATIENT
Start: 2022-08-24 | End: 2022-08-24

## 2022-08-24 RX ORDER — BACITRACIN ZINC AND POLYMYXIN B SULFATE 500; 1000 [USP'U]/G; [USP'U]/G
OINTMENT TOPICAL ONCE
Status: CANCELLED | OUTPATIENT
Start: 2022-08-24 | End: 2022-08-24

## 2022-08-24 RX ORDER — LIDOCAINE HYDROCHLORIDE 20 MG/ML
JELLY TOPICAL ONCE
Status: CANCELLED | OUTPATIENT
Start: 2022-08-24 | End: 2022-08-24

## 2022-08-24 RX ORDER — GENTAMICIN SULFATE 1 MG/G
OINTMENT TOPICAL ONCE
Status: CANCELLED | OUTPATIENT
Start: 2022-08-24 | End: 2022-08-24

## 2022-08-24 RX ORDER — BETAMETHASONE DIPROPIONATE 0.05 %
OINTMENT (GRAM) TOPICAL ONCE
Status: CANCELLED | OUTPATIENT
Start: 2022-08-24 | End: 2022-08-24

## 2022-08-24 RX ORDER — BACITRACIN, NEOMYCIN, POLYMYXIN B 400; 3.5; 5 [USP'U]/G; MG/G; [USP'U]/G
OINTMENT TOPICAL ONCE
Status: CANCELLED | OUTPATIENT
Start: 2022-08-24 | End: 2022-08-24

## 2022-08-24 RX ORDER — CLOBETASOL PROPIONATE 0.5 MG/G
OINTMENT TOPICAL ONCE
Status: CANCELLED | OUTPATIENT
Start: 2022-08-24 | End: 2022-08-24

## 2022-08-24 RX ORDER — LIDOCAINE HYDROCHLORIDE 40 MG/ML
SOLUTION TOPICAL ONCE
Status: COMPLETED | OUTPATIENT
Start: 2022-08-24 | End: 2022-08-24

## 2022-08-24 RX ORDER — LIDOCAINE 40 MG/G
CREAM TOPICAL ONCE
Status: CANCELLED | OUTPATIENT
Start: 2022-08-24 | End: 2022-08-24

## 2022-08-24 RX ORDER — LIDOCAINE 50 MG/G
OINTMENT TOPICAL ONCE
Status: CANCELLED | OUTPATIENT
Start: 2022-08-24 | End: 2022-08-24

## 2022-08-24 RX ORDER — GINSENG 100 MG
CAPSULE ORAL ONCE
Status: CANCELLED | OUTPATIENT
Start: 2022-08-24 | End: 2022-08-24

## 2022-08-24 RX ADMIN — LIDOCAINE HYDROCHLORIDE 10 ML: 40 SOLUTION TOPICAL at 15:29

## 2022-08-24 ASSESSMENT — PAIN DESCRIPTION - ORIENTATION: ORIENTATION: LEFT

## 2022-08-24 ASSESSMENT — PAIN - FUNCTIONAL ASSESSMENT: PAIN_FUNCTIONAL_ASSESSMENT: ACTIVITIES ARE NOT PREVENTED

## 2022-08-24 ASSESSMENT — PAIN DESCRIPTION - DESCRIPTORS: DESCRIPTORS: STABBING;THROBBING;ACHING

## 2022-08-24 ASSESSMENT — PAIN DESCRIPTION - FREQUENCY: FREQUENCY: CONTINUOUS

## 2022-08-24 ASSESSMENT — PAIN SCALES - GENERAL: PAINLEVEL_OUTOF10: 8

## 2022-08-24 ASSESSMENT — PAIN DESCRIPTION - LOCATION: LOCATION: LEG

## 2022-08-24 ASSESSMENT — PAIN DESCRIPTION - PAIN TYPE: TYPE: CHRONIC PAIN

## 2022-08-24 ASSESSMENT — PAIN DESCRIPTION - ONSET: ONSET: ON-GOING

## 2022-08-24 NOTE — DISCHARGE INSTRUCTIONS
Visit Discharge/Physician Orders     Discharge condition: Stable     Assessment of pain at discharge: mild     Anesthetic used: lido 4%     Discharge to: Home     Left via:Private automobile     Accompanied by: accompanied by self     [facility]F/H[facility]: [facility]      Dressing Orders: To left pretib wound: cleanse with normal saline, apply aquacel, cover and secure with dry dressing. Apply profore wrap. Change M- W( at [address] [place] [address],[address])- F.     Keep wrap dry at all times. If wrap becomes wet or falls 2 inches call [address] [place] [address],[address] ([phone])and may remove wrap and apply double tubigrip. Treatment Orders:Eat a diet high in protein and vitamin C. Take a multiple vitamin daily unless contraindicated. Elevate as much as possible     [address] [place] [address],[facility] Floor followup visit 1 week____________________________  (Please note your next appointment above and if you are unable to keep, kindly give a 24 hour notice. Thank you.)     Physician signature:__________________________      If you experience any of the following, please call the [facility] during business hours:     * Increase in Pain  * Temperature over 101  * Increase in drainage from your wound  * Drainage with a foul odor  * Bleeding  * Increase in swelling  * Need for compression bandage changes due to slippage, breakthrough drainage. If you need medical attention outside of the business hours of the [facility] please contact your PCP or go to the nearest emergency room.

## 2022-08-24 NOTE — PROGRESS NOTES
[facility] Followup Visit Note    Referring Physician : Faraz Dan MD  [id number] [facility] RECORD NUMBER:  [id number]  AGE: 78 y.o. GENDER: female  : 1945  EPISODE DATE:  2022    Subjective:     Chief Complaint   Patient presents with    Wound Check      HISTORY of PRESENT ILLNESS HPI   Beckie Padron is a 78 y.o. female who presents today in regards to follow up evaluation and treatment of wound/ulcer. That patient's past medical, family and social hx were reviewed and changes were made if present. History of Wound Context:  Patient has had left calf wound since ~ 2021. She has been putting a dressing on it. The wound has not been improving. She has a hx significant for venous stasis ulcerations of bilateral LE. She first was seen by myself in regards to these issues 2015. She eventually healed these wounds 2016. I last saw her 2021 at which time I recommended lymphedema therapy. She states she went and said it caused her to much pain and stopped going. She has chronic issues with bilateral calf pain, swelling and edema. She admits to not wearing her stockings because of the pain. She has used knee high 20-30 mm hg stockings in the past.       She is still seeing pain management and is down to percocet //325 mg daily.        21  aquacell  Double tubigrip  Culture done  Emphasized importance of getting in to more significant compression in the future  12/15/21  Culture reviewed - light growth, no tx  Wound slightly improved  Still significant drainage  Plan on compression wrap next week  21  Stable wound  Drainage slightly better  Pt would like to wait till next week because of holidays to start wrap  22  Wound larger  Profore  22  Wound appearance better  Refusing wrap - it rolled down last week and she cut off after 3 days  22  Wound appearance better  Still refusing wrap   3/2/22  periwound worse  Culture  drawtek  3/9/22  periwound much improved  levaquin 750 mg daily #10 script given culture   Wound itself stable  3/23/22  Left anterior calf wound improving overall  New blister/ulcerations left 3rd, 4th and 5th toes and lateral foot   Instructed to keep toes/foot dry, no ointment or corn starch   3/30/22  bistering resolved, other skin issues resolved  More dry than previously but overall stable  kailyn Barrera  Swelling is down, patient declining wrap  4/13/22  Wound slightly improved  4/20/2022  Wound stable, patient refusing compression wrap  5/4/22  Wound worse  Pt refusing wrap  Will change diet per her report to decrease swelling  5/11/22  Wound stable  kailyn and giacomo 2 - pt agreeable now after significant persuasion  5/18/22  Took off wrap within 24 hours due to pain  aquacell and spandigrip  She agreed to use wrap again if swelling not down 2 more cm next week  Wound slightly smaller  5/25/22  Wound improved   Left leg wound debrided   Continue aquacel   6/8/22  Wound larger  Agreeable to wrap - kailyn sena ag  Culture done  6/22/22  Wound stable in size  6/29/22  Wound slightly larger  Still having issues with wrap falling down  Will start hhc - MWF wraps  7/6/22  Improved  hhc starts this week  7/20/22  Appearance better, wound measuring larger  Continue with wraps  Leg edema improved  7/27/22  Slight improvement  8/3/22  Wound appearance and size worse  Issues still with wraps falling down  When doesn't have wraps on than usually uses spandigrips  8/10/2022  Wound cultures noted, Bactrim DS 1 tablet p.o. twice daily, wound looks fairly clean with some exudate only, mild recent lab work, patient recommended CBC and CMP  8/17/22  Refusing wrap today due to pain associated with  Will discuss with Dr Benedicto Christiansen her pain management  Slightly larger, appearance improved  8/24/22  Poor tolerance of wraps  Wound stable in size    \Wound/Ulcer Pain Timing/Severity: waxing and waning, mild  Quality of pain: aching, throbbing, pressure  Severity:  7 / 10   Modifying Factors: Pain worsens with debridement, dressing changes  Associated Signs/Symptoms: edema, drainage and pain    Ulcer Identification:  Ulcer Type: venous and lymphedema  Contributing Factors: edema, venous stasis and lymphedema    Diabetic/Pressure/Non Pressure Ulcers only:  Ulcer: Non-Pressure ulcer, fat layer exposed    Wound: N/A    Physical Exam Findings present involving Bilateral lower extremity. There is evidence of trunkal edema. Positive if checked   Findings   [x]  hyperkeratosis   [x]  hyperplasia   [x]  hyperpigmentation   [x]  papillomas   [x]  Skin breakdown with lymphorrhea (weeping)   []  Elephantiasis     Patient continues to have significant symptomatology. The following symptoms remain uncontrolled for this patient. Positive if checked    symptoms    [x]   swelling    [x]   fibrosis    [x]   pain    [x]   skin breakdown      Patient has undergone the following conservative therapy of elevation, exercise, (gradient compression of at least 30 mm hg distally) and continues to have significant symptomatology after at least 4 weeks of this therapy. The patient has not had significant improvement in their symptomatology with use of compression wraps. Prognosis : fair    Venous Ulcer patients  Location a venous stasis ulcer - L calf  Edema in the affected area is present  The ulcer has been continuously present for more than 6 months   Previous treatments for the past 6 months include   Elevation  Exercise  Appropriate dressings for the ulcer  Compression wraps - bandages  Local wound care with sharp debridement has been regularly preformed for more than 6 months   Patient has been coming on a regular basis for treatment of their venous stasis ulcers for more than 6 months     This is a recurrent issues and previously it took over 1 year for her wounds to heal.  She is at high risk for recurrence also. PAST MEDICAL HISTORY      Diagnosis Date    Bursitis     CAD (coronary artery disease)     heart attack    COPD (chronic obstructive pulmonary disease) ([facility] 2013    Emphysema     slight    GERD (gastroesophageal reflux disease)     Hiatal hernia     Hip pain     Hyperlipidemia     Hypertension     Lymphedema of both lower extremities 2018    Venous insufficiency of both lower extremities 2018    Venous stasis ulcer of left calf with fat layer exposed without varicose veins ([facility] [address] 2021    Venous ulcer with [facility] layer exposed [facility] 10/28/2015     Past Surgical History:   Procedure Laterality Date    APPENDECTOMY      BREAST ENHANCEMENT SURGERY      BREAST REDUCTION SURGERY      CHOLECYSTECTOMY      COLONOSCOPY      ECHO COMPL W DOP COLOR FLOW  3/11/2013         ENDOSCOPY, COLON, DIAGNOSTIC      HERNIA REPAIR N/A 2021    LAPAROSCOPIC ROBOTIC ASSISTED INGUINAL HERNIA REPAIR performed by Aida Levin MD at [facility] ([address] [place] [address])      JOINT REPLACEMENT  2009    l knee r hip    LEG DEBRIDEMENT Left 2015    LEG DEBRIDEMENT Left 2016     History reviewed. No pertinent family history.   Social History     Tobacco Use    Smoking status: Former     Packs/day: 1.00     Years: 20.00     Pack years: 20.00     Types: Cigarettes     Quit date: 1995     Years since quittin.8    Smokeless tobacco: Never    Tobacco comments:     Quit   Vaping Use    Vaping Use: Never used   Substance Use Topics    Alcohol use: No    Drug use: No     Allergies   Allergen Reactions    Codeine Nausea And Vomiting and Other (See Comments)     hallucinate    Dilaudid [Hydromorphone Hcl] Rash    Naproxen Other (See Comments)     Shuts down kidney function    Singulair [Montelukast Sodium] Shortness Of Breath     Mucus in chest    Black Cohosh     Black Cohosh [Cimicifuga [place] (Black Cohosh)] Rash     Current Outpatient Medications on File Prior to Encounter   Medication Sig Dispense Refill    sulfamethoxazole-trimethoprim (BACTRIM DS) 800-160 MG per tablet Take 1 tablet by mouth in the morning and 1 tablet before bedtime. Do all this for 20 days. 20 tablet 0    Cyanocobalamin (VITAMIN B 12) 500 MCG TABS Take by mouth daily      Multiple Vitamins-Minerals (THERAPEUTIC MULTIVITAMIN-MINERALS) tablet Take 1 tablet by mouth daily      zinc gluconate 50 MG tablet Take 50 mg by mouth daily      GENISTEIN PO Take 125 mg by mouth nightly      folic acid (FOLVITE) 162 MCG tablet Take 400 mcg by mouth nightly      Krill Oil 350 MG CAPS Take 350 mg by mouth nightly      gabapentin (NEURONTIN) 300 MG capsule Take 300 mg by mouth 3 times daily. simvastatin (ZOCOR) 40 MG tablet Take 40 mg by mouth nightly      oxyCODONE-acetaminophen (PERCOCET) 7.5-325 MG per tablet Take 1 tablet by mouth 2 times daily. [employer] Power morphine (MS CONTIN) 15 MG extended release tablet Take 15 mg by mouth 2 times daily. aspirin 81 MG tablet Take 81 mg by mouth daily      Cholecalciferol (VITAMIN D) 2000 UNITS CAPS capsule Take 2,000 Units by mouth daily       ferrous sulfate 325 (65 FE) MG tablet Take 325 mg by mouth nightly       metoprolol (LOPRESSOR) 50 MG tablet Take 1 tablet by mouth 2 times daily 60 tablet 0    albuterol (PROVENTIL HFA;VENTOLIN HFA) 108 (90 BASE) MCG/ACT inhaler Inhale 2 puffs into the lungs every 6 hours as needed for Wheezing or Shortness of Breath       calcium carbonate (OYSTER SHELL CALCIUM 500 MG) 1250 MG tablet Take 2 tablets by mouth daily      Ascorbic Acid (VITAMIN C) 500 MG tablet Take 2,000 mg by mouth daily      omeprazole (PRILOSEC) 40 MG delayed release capsule Take 40 mg by mouth daily. diphenhydrAMINE (BENADRYL) 50 MG capsule Take 50 mg by mouth daily as needed for Allergies       Garlic 779 MG TABS Take 1,000 mg by mouth daily        No current facility-administered medications on file prior to encounter.        REVIEW OF SYSTEMS See HPI    Objective:    /82   Pulse 72   Temp 97.8 °F (36.6 °C) (Temporal)   Resp 18   Ht 5' 7\" (1.702 m)   Wt 295 lb (133.8 kg)   BMI 46.20 kg/m²   Wt Readings from Last 3 Encounters:   08/24/22 295 lb (133.8 kg)   08/17/22 295 lb (133.8 kg)   07/27/22 295 lb (133.8 kg)     PHYSICAL EXAM  CONSTITUTIONAL:   Awake, alert, cooperative   EYES:  lids and lashes normal   ENT: external ears and nose without lesions   NECK:  supple, symmetrical, trachea midline   SKIN:  Open wound Present    Assessment:     Problem List Items Addressed This Visit       Venous insufficiency of both lower extremities (Chronic)    Lymphedema of both lower extremities (Chronic)    Venous stasis ulcer of left calf with fat layer exposed without varicose veins (HCC) - Primary (Chronic)    Relevant Orders    Initiate Outpatient Wound Care Protocol       Pre Debridement Measurements:  Are located in the [place]  Documentation Flow Sheet  Post Debridement Measurements:  Wound/Ulcer Descriptions are Pre Debridement except measurements:    Incision 04/20/16 Leg Left (Active)   Number of days: 2316       Incision 08/03/16 Leg Left (Active)   Number of days: 8521       Wound 12/08/21 Pretibial Left #1 (Active)   Wound Image   08/03/22 1518   Dressing Status New dressing applied;Clean;Dry; Intact 08/17/22 1549   Wound Cleansed Cleansed with saline 08/17/22 1549   Dressing/Treatment Alginate with Ag;ABD 08/17/22 1549   Offloading for Diabetic Foot Ulcers Offloading not required 08/10/22 1540   Wound Length (cm) 4.2 cm 08/24/22 1532   Wound Width (cm) 2.8 cm 08/24/22 1532   Wound Depth (cm) 0.3 cm 08/24/22 1532   Wound Surface Area (cm^2) 11.76 cm^2 08/24/22 1532   Change in Wound Size % (l*w) -1580 08/24/22 1532   Wound Volume (cm^3) 3.528 cm^3 08/24/22 1532   Wound Healing % -4940 08/24/22 1532   Post-Procedure Length (cm) 4.3 cm 08/24/22 1609   Post-Procedure Width (cm) 2.9 cm 08/24/22 1609   Post-Procedure Depth (cm) 0.3 cm 08/24/22 1606 Post-Procedure Surface Area (cm^2) 12.47 cm^2 08/24/22 1609   Post-Procedure Volume (cm^3) 3.741 cm^3 08/24/22 1609   Wound Assessment Fibrin;Pink/red;Slough;Granulation tissue 08/24/22 1532   Drainage Amount Large 08/24/22 1532   Drainage Description Yellow;Serosanguinous 08/24/22 1532   Odor None 08/24/22 1532   Kori-wound Assessment Blanchable erythema 08/24/22 1532   Number of days: 259     Incision 04/16/21 Abdomen (Active)   Number of days: 495       Procedure Note  Indications:  Based on my examination of this patient's wound(s)/ulcer(s) today, debridement is required to promote healing and evaluate the wound base. Performed by: Amaryllis Prader, MD    Consent obtained:  Yes    Time out taken:  Yes    Pain Control: Anesthetic  Anesthetic: 4% Lidocaine Liquid Topical     Debridement:Excisional Debridement    Using curette the wound(s)/ulcer(s) was/were sharply debrided down through and including the removal of epidermis, dermis, and subcutaneous tissue. Devitalized Tissue Debrided:  fibrin, biofilm, slough, and exudate to stimulate bleeding to promote healing, post debridement good bleeding base and wound edges noted    Wound/Ulcer #: 1    Percent of Wound/Ulcer Debrided: 100%    Total Surface Area Debrided:  11.76sq cm     Estimated Blood Loss:  Minimal  Hemostasis Achieved:  by pressure    Procedural Pain:  10  / 10   Post Procedural Pain:  9 / 10     Response to treatment:  With complaints of pain. Plan:   Treatment Note please see attached Discharge Instructions    Written patient dismissal instructions given to patient and signed by patient or POA. Discharge Instructions         Visit Discharge/Physician Orders     Discharge condition: Stable     Assessment of pain at discharge: mild     Anesthetic used: lido 4%     Discharge to: Home     Left via:Private automobile     Accompanied by: accompanied by self     ECF/HHA: [facility]      Dressing Orders:  To left pretib wound: 3

## 2022-08-31 ENCOUNTER — HOSPITAL ENCOUNTER (OUTPATIENT)
Dept: WOUND CARE | Age: 77
Discharge: HOME | End: 2022-08-31

## 2022-09-07 ENCOUNTER — HOSPITAL ENCOUNTER (OUTPATIENT)
Dept: WOUND CARE | Age: 77
Discharge: HOME | End: 2022-09-07
Payer: MEDICARE

## 2022-09-07 DIAGNOSIS — I89.0 LYMPHEDEMA OF BOTH LOWER EXTREMITIES: Chronic | ICD-10-CM

## 2022-09-07 DIAGNOSIS — I87.2 VENOUS STASIS ULCER OF LEFT CALF WITH FAT LAYER EXPOSED WITHOUT VARICOSE VEINS (HCC): Primary | ICD-10-CM

## 2022-09-07 DIAGNOSIS — L97.222 VENOUS STASIS ULCER OF LEFT CALF WITH FAT LAYER EXPOSED WITHOUT VARICOSE VEINS (HCC): Primary | ICD-10-CM

## 2022-09-07 PROCEDURE — 11042 DBRDMT SUBQ TIS 1ST 20SQCM/<: CPT | Performed by: SURGERY

## 2022-09-07 PROCEDURE — 6370000000 HC RX 637 (ALT 250 FOR IP): Performed by: SURGERY

## 2022-09-07 PROCEDURE — 11042 DBRDMT SUBQ TIS 1ST 20SQCM/<: CPT

## 2022-09-07 RX ORDER — CLOBETASOL PROPIONATE 0.5 MG/G
OINTMENT TOPICAL ONCE
Status: CANCELLED | OUTPATIENT
Start: 2022-09-07 | End: 2022-09-07

## 2022-09-07 RX ORDER — LIDOCAINE HYDROCHLORIDE 20 MG/ML
JELLY TOPICAL ONCE
Status: CANCELLED | OUTPATIENT
Start: 2022-09-07 | End: 2022-09-07

## 2022-09-07 RX ORDER — LIDOCAINE 40 MG/G
CREAM TOPICAL ONCE
Status: CANCELLED | OUTPATIENT
Start: 2022-09-07 | End: 2022-09-07

## 2022-09-07 RX ORDER — BETAMETHASONE DIPROPIONATE 0.05 %
OINTMENT (GRAM) TOPICAL ONCE
Status: CANCELLED | OUTPATIENT
Start: 2022-09-07 | End: 2022-09-07

## 2022-09-07 RX ORDER — BACITRACIN, NEOMYCIN, POLYMYXIN B 400; 3.5; 5 [USP'U]/G; MG/G; [USP'U]/G
OINTMENT TOPICAL ONCE
Status: CANCELLED | OUTPATIENT
Start: 2022-09-07 | End: 2022-09-07

## 2022-09-07 RX ORDER — LIDOCAINE HYDROCHLORIDE 40 MG/ML
SOLUTION TOPICAL ONCE
Status: COMPLETED | OUTPATIENT
Start: 2022-09-07 | End: 2022-09-07

## 2022-09-07 RX ORDER — GENTAMICIN SULFATE 1 MG/G
OINTMENT TOPICAL ONCE
Status: CANCELLED | OUTPATIENT
Start: 2022-09-07 | End: 2022-09-07

## 2022-09-07 RX ORDER — LIDOCAINE HYDROCHLORIDE 40 MG/ML
SOLUTION TOPICAL ONCE
Status: CANCELLED | OUTPATIENT
Start: 2022-09-07 | End: 2022-09-07

## 2022-09-07 RX ORDER — BACITRACIN ZINC AND POLYMYXIN B SULFATE 500; 1000 [USP'U]/G; [USP'U]/G
OINTMENT TOPICAL ONCE
Status: CANCELLED | OUTPATIENT
Start: 2022-09-07 | End: 2022-09-07

## 2022-09-07 RX ORDER — LIDOCAINE 50 MG/G
OINTMENT TOPICAL ONCE
Status: CANCELLED | OUTPATIENT
Start: 2022-09-07 | End: 2022-09-07

## 2022-09-07 RX ORDER — GINSENG 100 MG
CAPSULE ORAL ONCE
Status: CANCELLED | OUTPATIENT
Start: 2022-09-07 | End: 2022-09-07

## 2022-09-07 RX ADMIN — LIDOCAINE HYDROCHLORIDE 10 ML: 40 SOLUTION TOPICAL at 15:28

## 2022-09-07 NOTE — PROGRESS NOTES
[facility] Followup Visit Note    Referring Physician : Siddharth Darnell MD  [id number] [facility] RECORD NUMBER:  [id number]  AGE: 78 y.o. GENDER: female  : 1945  EPISODE DATE:  2022    Subjective:     Chief Complaint   Patient presents with    Wound Check      HISTORY of PRESENT ILLNESS HPI   Opal Hernandez is a 78 y.o. female who presents today in regards to follow up evaluation and treatment of wound/ulcer. That patient's past medical, family and social hx were reviewed and changes were made if present. History of Wound Context:  Patient has had left calf wound since ~ 2021. She has been putting a dressing on it. The wound has not been improving. She has a hx significant for venous stasis ulcerations of bilateral LE. She first was seen by myself in regards to these issues 2015. She eventually healed these wounds 2016. I last saw her 2021 at which time I recommended lymphedema therapy. She states she went and said it caused her to much pain and stopped going. She has chronic issues with bilateral calf pain, swelling and edema. She admits to not wearing her stockings because of the pain. She has used knee high 20-30 mm hg stockings in the past.       She is still seeing pain management and is down to percocet /325 mg daily.        [facility]  Double tub[facility]rip  Culture done  Emphasized importance of getting in to more significant compression in the future  12/15/21  Culture reviewed - light growth, no tx  Wound slightly improved  Still significant drainage  Plan on compression wrap next week  21  Stable wound  Drainage slightly better  Pt would like to wait till next week because of holidays to start wrap  22  Wound larger  Profore  22  Wound appearance better  Refusing wrap - it rolled down last week and she cut off after 3 days  22  Wound appearance better  Still refusing wrap   3/2/22  periwound worse  Culture  drawtek  3/9/22  periwound much improved  levaquin 750 mg daily #10 script given culture   Wound itself stable  3/23/22  Left anterior calf wound improving overall  New blister/ulcerations left 3rd, 4th and 5th toes and lateral foot   Instructed to keep toes/foot dry, no ointment or corn starch   3/30/22  bistering resolved, other skin issues resolved  More dry than previously but overall stable  kailyn Barrera  Swelling is down, patient declining wrap  4/13/22  Wound slightly improved  4/20/2022  Wound stable, patient refusing compression wrap  5/4/22  Wound worse  Pt refusing wrap  Will change diet per her report to decrease swelling  5/11/22  Wound stable  kailyn and giacomo 2 - pt agreeable now after significant persuasion  5/18/22  Took off wrap within 24 hours due to pain  aquacell and spandigrip  She agreed to use wrap again if swelling not down 2 more cm next week  Wound slightly smaller  5/25/22  Wound improved   Left leg wound debrided   Continue aquacel   6/8/22  Wound larger  Agreeable to wrap - kailyn sena ag  Culture done  6/22/22  Wound stable in size  6/29/22  Wound slightly larger  Still having issues with wrap falling down  Will start hhc - MWF wraps  7/6/22  Improved  hhc starts this week  7/20/22  Appearance better, wound measuring larger  Continue with wraps  Leg edema improved  7/27/22  Slight improvement  8/3/22  Wound appearance and size worse  Issues still with wraps falling down  When doesn't have wraps on than usually uses spandigrips  8/10/2022  Wound cultures noted, Bactrim DS 1 tablet p.o. twice daily, wound looks fairly clean with some exudate only, mild recent lab work, patient recommended CBC and CMP  8/17/22  Refusing wrap today due to pain associated with  Will discuss with Dr Luis E Manriquez her pain management  Slightly larger, appearance improved  8/24/22  Poor tolerance of wraps  Wound stable in size  9/7/22  Not tolerating wraps - emphasized importance of use  Change to drawtek  Wound slightly improved in size, appearance still concerning  Pumps are going to be deliver    \Wound/Ulcer Pain Timing/Severity: waxing and waning, mild  Quality of pain: aching, throbbing, pressure  Severity:  7 / 10   Modifying Factors: Pain worsens with debridement, dressing changes  Associated Signs/Symptoms: edema, drainage and pain    Ulcer Identification:  Ulcer Type: venous and lymphedema  Contributing Factors: edema, venous stasis and lymphedema    Diabetic/Pressure/Non Pressure Ulcers only:  Ulcer: Non-Pressure ulcer, fat layer exposed    Wound: N/A  PAST MEDICAL HISTORY      Diagnosis Date    Bursitis     CAD (coronary artery disease) 1993    heart attack    COPD (chronic obstructive pulmonary disease) ([address] 6/19/2013    Emphysema     slight    GERD (gastroesophageal reflux disease)     Hiatal hernia     Hip pain     Hyperlipidemia     Hypertension     Lymphedema of both lower extremities 11/21/2018    Venous insufficiency of both lower extremities 11/21/2018    Venous stasis ulcer of left calf with fat layer exposed without varicose veins ([address] 12/8/2021    Venous ulcer with fat layer exposed ([facility] 10/28/2015     Past Surgical History:   Procedure Laterality Date    APPENDECTOMY      BREAST ENHANCEMENT SURGERY      BREAST REDUCTION SURGERY      CHOLECYSTECTOMY      COLONOSCOPY      ECHO COMPL W DOP COLOR FLOW  3/11/2013         ENDOSCOPY, COLON, DIAGNOSTIC      HERNIA REPAIR N/A 4/16/2021    LAPAROSCOPIC ROBOTIC ASSISTED INGUINAL HERNIA REPAIR performed by Carl Mendes MD at [facility] ([address] [place] [address])      JOINT REPLACEMENT  2009 2011    l knee r hip    LEG DEBRIDEMENT Left 11/18/2015    LEG DEBRIDEMENT Left 08/03/2016     History reviewed. No pertinent family history.   Social History     Tobacco Use    Smoking status: Former     Packs/day: 1.00     Years: 20.00     Pack years: 20.00     Types: Cigarettes     Quit date: 11/4/1995 Years since quittin.8    Smokeless tobacco: Never    Tobacco comments:     Quit   Vaping Use    Vaping Use: Never used   Substance Use Topics    Alcohol use: No    Drug use: No     Allergies   Allergen Reactions    Codeine Nausea And Vomiting and Other (See Comments)     hallucinate    Dilaudid [Hydromorphone Hcl] Rash    Naproxen Other (See Comments)     Shuts down kidney function    Singulair [Montelukast Sodium] Shortness Of Breath     Mucus in chest    Black Cohosh     Black Cohosh [Cimicifuga [place] (Black Cohosh)] Rash     Current Outpatient Medications on File Prior to Encounter   Medication Sig Dispense Refill    Cyanocobalamin (VITAMIN B 12) 500 MCG TABS Take by mouth daily      Multiple Vitamins-Minerals (THERAPEUTIC MULTIVITAMIN-MINERALS) tablet Take 1 tablet by mouth daily      zinc gluconate 50 MG tablet Take 50 mg by mouth daily      GENISTEIN PO Take 125 mg by mouth nightly      folic acid (FOLVITE) 563 MCG tablet Take 400 mcg by mouth nightly      Krill Oil 350 MG CAPS Take 350 mg by mouth nightly      gabapentin (NEURONTIN) 300 MG capsule Take 300 mg by mouth 3 times daily. simvastatin (ZOCOR) 40 MG tablet Take 40 mg by mouth nightly      oxyCODONE-acetaminophen (PERCOCET) 7.5-325 MG per tablet Take 1 tablet by mouth 2 times daily. Rhenda Vaishnavi morphine (MS CONTIN) 15 MG extended release tablet Take 15 mg by mouth 2 times daily.        aspirin 81 MG tablet Take 81 mg by mouth daily      Cholecalciferol (VITAMIN D) 2000 UNITS CAPS capsule Take 2,000 Units by mouth daily       ferrous sulfate 325 (65 FE) MG tablet Take 325 mg by mouth nightly       metoprolol (LOPRESSOR) 50 MG tablet Take 1 tablet by mouth 2 times daily 60 tablet 0    albuterol (PROVENTIL HFA;VENTOLIN HFA) 108 (90 BASE) MCG/ACT inhaler Inhale 2 puffs into the lungs every 6 hours as needed for Wheezing or Shortness of Breath       calcium carbonate (OYSTER SHELL CALCIUM 500 MG) 1250 MG tablet Take 2 tablets by mouth daily Ascorbic Acid (VITAMIN C) 500 MG tablet Take 2,000 mg by mouth daily      omeprazole (PRILOSEC) 40 MG delayed release capsule Take 40 mg by mouth daily. diphenhydrAMINE (BENADRYL) 50 MG capsule Take 50 mg by mouth daily as needed for Allergies       Garlic 897 MG TABS Take 1,000 mg by mouth daily        No current facility-administered medications on file prior to encounter. REVIEW OF SYSTEMS See HPI    Objective: There were no vitals taken for this visit. Wt Readings from Last 3 Encounters:   08/24/22 295 lb (133.8 kg)   08/17/22 295 lb (133.8 kg)   07/27/22 295 lb (133.8 kg)     PHYSICAL EXAM  CONSTITUTIONAL:   Awake, alert, cooperative   EYES:  lids and lashes normal   ENT: external ears and nose without lesions   NECK:  supple, symmetrical, trachea midline   SKIN:  Open wound Present    Assessment:     Problem List Items Addressed This Visit       Lymphedema of both lower extremities (Chronic)    Venous stasis ulcer of left calf with fat layer exposed without varicose veins (HCC) - Primary (Chronic)    Relevant Orders    Initiate Outpatient Wound Care Protocol       Pre Debridement Measurements:  Are located in the [place]  Documentation Flow Sheet  Post Debridement Measurements:  Wound/Ulcer Descriptions are Pre Debridement except measurements:    Incision 04/20/16 Leg Left (Active)   Number of days: 6545       Incision 08/03/16 Leg Left (Active)   Number of days: 3053       Wound 12/08/21 Pretibial Left #1 (Active)   Wound Image   08/03/22 1518   Dressing Status New dressing applied;Clean;Dry; Intact 09/07/22 1621   Wound Cleansed Cleansed with saline 09/07/22 1621   Dressing/Treatment Dry dressing 09/07/22 1621   Offloading for Diabetic Foot Ulcers Offloading not required 08/10/22 1540   Wound Length (cm) 4.2 cm 09/07/22 1531   Wound Width (cm) 2.2 cm 09/07/22 1531   Wound Depth (cm) 0.2 cm 09/07/22 1531   Wound Surface Area (cm^2) 9.24 cm^2 09/07/22 1531   Change in Wound Size % (l*w) -1220 09/07/22 1531   Wound Volume (cm^3) 1.848 cm^3 09/07/22 1531   Wound Healing % -2540 09/07/22 1531   Post-Procedure Length (cm) 4.2 cm 09/07/22 1605   Post-Procedure Width (cm) 2.3 cm 09/07/22 1605   Post-Procedure Depth (cm) 0.2 cm 09/07/22 1605   Post-Procedure Surface Area (cm^2) 9.66 cm^2 09/07/22 1605   Post-Procedure Volume (cm^3) 1.932 cm^3 09/07/22 1605   Wound Assessment Fibrin;Pink/red 09/07/22 1531   Drainage Amount Moderate 09/07/22 1531   Drainage Description Serosanguinous 09/07/22 1531   Odor None 09/07/22 1531   Kori-wound Assessment Intact 09/07/22 1531   Number of days: 273     Incision 04/16/21 Abdomen (Active)   Number of days: 509       Procedure Note  Indications:  Based on my examination of this patient's wound(s)/ulcer(s) today, debridement is required to promote healing and evaluate the wound base. Performed by: Yanci Mcgraw MD    Consent obtained:  Yes    Time out taken:  Yes    Pain Control: Anesthetic  Anesthetic: 4% Lidocaine Liquid Topical     Debridement:Excisional Debridement    Using curette the wound(s)/ulcer(s) was/were sharply debrided down through and including the removal of epidermis, dermis, and subcutaneous tissue. Devitalized Tissue Debrided:  fibrin, biofilm, slough, and exudate to stimulate bleeding to promote healing, post debridement good bleeding base and wound edges noted    Wound/Ulcer #: 1    Percent of Wound/Ulcer Debrided: 100%    Total Surface Area Debrided:  9.24sq cm     Estimated Blood Loss:  Minimal  Hemostasis Achieved:  by pressure    Procedural Pain:  10  / 10   Post Procedural Pain:  9 / 10     Response to treatment:  With complaints of pain. Plan:   Treatment Note please see attached Discharge Instructions    Written patient dismissal instructions given to patient and signed by patient or POA.          Discharge Instructions         Visit Discharge/Physician Orders     Discharge condition: Stable     Assessment of pain at discharge: mild     Anesthetic used: lido 4%     Discharge to: Home     Left via:Private automobile     Accompanied by: accompanied by self     ECF/HHA: [facility]     Dressing Orders: To left pretib wound: cleanse with normal saline, apply drawtex, cover and secure with dry dressing. Apply profore wrap. Change M- W( at [facility])- F.     Keep wrap dry at all times. If wrap becomes wet or falls 2 inches call [facility] ([phone])and may remove wrap and apply double tubigrip. Treatment Orders:Eat a diet high in protein and vitamin C. Take a multiple vitamin daily unless contraindicated. Elevate as much as possible     [facility] followup visit 1 week____________________________  (Please note your next appointment above and if you are unable to keep, kindly give a 24 hour notice. Thank you.)     Physician signature:__________________________      If you experience any of the following, please call the [facility] during business hours:     * Increase in Pain  * Temperature over 101  * Increase in drainage from your wound  * Drainage with a foul odor  * Bleeding  * Increase in swelling  * Need for compression bandage changes due to slippage, breakthrough drainage. If you need medical attention outside of the business hours of the [facility] please contact your PCP or go to the nearest emergency room.          Electronically signed by Elliot Ramos MD

## 2022-09-07 NOTE — DISCHARGE INSTRUCTIONS
Visit Discharge/Physician Orders     Discharge condition: Stable     Assessment of pain at discharge: mild     Anesthetic used: lido 4%     Discharge to: Home     Left via:Private automobile     Accompanied by: accompanied by self     [facility]: [facility]     Dressing Orders: To left pretib wound: cleanse with normal saline, apply drawtex, cover and secure with dry dressing. Apply profore wrap. Change M- W( at [facility])- F.     Keep wrap dry at all times. If wrap becomes wet or falls 2 inches call [facility] ([phone])and may remove wrap and apply double tubigrip. Treatment Orders:Eat a diet high in protein and vitamin C. Take a multiple vitamin daily unless contraindicated. Elevate as much as possible     [facility] followup visit 1 week____________________________  (Please note your next appointment above and if you are unable to keep, kindly give a 24 hour notice. Thank you.)     Physician signature:__________________________      If you experience any of the following, please call the [facility] during business hours:     * Increase in Pain  * Temperature over 101  * Increase in drainage from your wound  * Drainage with a foul odor  * Bleeding  * Increase in swelling  * Need for compression bandage changes due to slippage, breakthrough drainage. If you need medical attention outside of the business hours of the [facility] please contact your PCP or go to the nearest emergency room.

## 2022-09-14 ENCOUNTER — HOSPITAL ENCOUNTER (OUTPATIENT)
Dept: WOUND CARE | Age: 77
Discharge: HOME | End: 2022-09-14
Payer: MEDICARE

## 2022-09-14 VITALS
WEIGHT: 293 LBS | RESPIRATION RATE: 20 BRPM | DIASTOLIC BLOOD PRESSURE: 66 MMHG | TEMPERATURE: 97.6 F | BODY MASS INDEX: 45.99 KG/M2 | SYSTOLIC BLOOD PRESSURE: 146 MMHG | HEIGHT: 67 IN | HEART RATE: 72 BPM

## 2022-09-14 DIAGNOSIS — L97.222 VENOUS STASIS ULCER OF LEFT CALF WITH FAT LAYER EXPOSED WITHOUT VARICOSE VEINS (HCC): Primary | ICD-10-CM

## 2022-09-14 DIAGNOSIS — I87.2 VENOUS STASIS ULCER OF LEFT CALF WITH FAT LAYER EXPOSED WITHOUT VARICOSE VEINS (HCC): Primary | ICD-10-CM

## 2022-09-14 PROCEDURE — 11042 DBRDMT SUBQ TIS 1ST 20SQCM/<: CPT

## 2022-09-14 PROCEDURE — 11042 DBRDMT SUBQ TIS 1ST 20SQCM/<: CPT | Performed by: SURGERY

## 2022-09-14 PROCEDURE — 6370000000 HC RX 637 (ALT 250 FOR IP): Performed by: SURGERY

## 2022-09-14 RX ORDER — BETAMETHASONE DIPROPIONATE 0.05 %
OINTMENT (GRAM) TOPICAL ONCE
Status: CANCELLED | OUTPATIENT
Start: 2022-09-14 | End: 2022-09-14

## 2022-09-14 RX ORDER — BACITRACIN, NEOMYCIN, POLYMYXIN B 400; 3.5; 5 [USP'U]/G; MG/G; [USP'U]/G
OINTMENT TOPICAL ONCE
Status: CANCELLED | OUTPATIENT
Start: 2022-09-14 | End: 2022-09-14

## 2022-09-14 RX ORDER — LIDOCAINE HYDROCHLORIDE 40 MG/ML
SOLUTION TOPICAL ONCE
Status: CANCELLED | OUTPATIENT
Start: 2022-09-14 | End: 2022-09-14

## 2022-09-14 RX ORDER — GINSENG 100 MG
CAPSULE ORAL ONCE
Status: CANCELLED | OUTPATIENT
Start: 2022-09-14 | End: 2022-09-14

## 2022-09-14 RX ORDER — GENTAMICIN SULFATE 1 MG/G
OINTMENT TOPICAL ONCE
Status: CANCELLED | OUTPATIENT
Start: 2022-09-14 | End: 2022-09-14

## 2022-09-14 RX ORDER — LIDOCAINE 50 MG/G
OINTMENT TOPICAL ONCE
Status: CANCELLED | OUTPATIENT
Start: 2022-09-14 | End: 2022-09-14

## 2022-09-14 RX ORDER — LIDOCAINE HYDROCHLORIDE 20 MG/ML
JELLY TOPICAL ONCE
Status: CANCELLED | OUTPATIENT
Start: 2022-09-14 | End: 2022-09-14

## 2022-09-14 RX ORDER — LIDOCAINE HYDROCHLORIDE 40 MG/ML
SOLUTION TOPICAL ONCE
Status: COMPLETED | OUTPATIENT
Start: 2022-09-14 | End: 2022-09-14

## 2022-09-14 RX ORDER — BACITRACIN ZINC AND POLYMYXIN B SULFATE 500; 1000 [USP'U]/G; [USP'U]/G
OINTMENT TOPICAL ONCE
Status: CANCELLED | OUTPATIENT
Start: 2022-09-14 | End: 2022-09-14

## 2022-09-14 RX ORDER — LIDOCAINE 40 MG/G
CREAM TOPICAL ONCE
Status: CANCELLED | OUTPATIENT
Start: 2022-09-14 | End: 2022-09-14

## 2022-09-14 RX ORDER — CLOBETASOL PROPIONATE 0.5 MG/G
OINTMENT TOPICAL ONCE
Status: CANCELLED | OUTPATIENT
Start: 2022-09-14 | End: 2022-09-14

## 2022-09-14 RX ADMIN — LIDOCAINE HYDROCHLORIDE 10 ML: 40 SOLUTION TOPICAL at 15:23

## 2022-09-14 ASSESSMENT — PAIN DESCRIPTION - LOCATION: LOCATION: LEG

## 2022-09-14 ASSESSMENT — PAIN SCALES - GENERAL: PAINLEVEL_OUTOF10: 6

## 2022-09-14 ASSESSMENT — PAIN DESCRIPTION - PROGRESSION: CLINICAL_PROGRESSION: NOT CHANGED

## 2022-09-14 ASSESSMENT — PAIN DESCRIPTION - PAIN TYPE: TYPE: CHRONIC PAIN

## 2022-09-14 ASSESSMENT — PAIN DESCRIPTION - ORIENTATION: ORIENTATION: LEFT

## 2022-09-14 ASSESSMENT — PAIN - FUNCTIONAL ASSESSMENT: PAIN_FUNCTIONAL_ASSESSMENT: ACTIVITIES ARE NOT PREVENTED

## 2022-09-14 ASSESSMENT — PAIN DESCRIPTION - DESCRIPTORS: DESCRIPTORS: BURNING;SHARP;THROBBING

## 2022-09-14 NOTE — DISCHARGE INSTRUCTIONS
Visit Discharge/Physician Orders     Discharge condition: Stable     Assessment of pain at discharge: mild     Anesthetic used: lido 4%     Discharge to: Home     Left via:Private automobile     Accompanied by: accompanied by self     [facility]: [facility]     Dressing Orders: To left pretib wound: cleanse with normal saline, apply drawtex, cover and secure with dry dressing. Apply profore wrap. Change M- W( at [address] [place] [address],[address])- F.     Keep wrap dry at all times. If wrap becomes wet or falls 2 inches call [address] [place] [address],[address] ([phone])and may remove wrap and apply double tubigrip. Treatment Orders:Eat a diet high in protein and vitamin C. Take a multiple vitamin daily unless contraindicated. Elevate as much as possible     [address] [place] [address],[address] followup visit 1 week____________________________  (Please note your next appointment above and if you are unable to keep, kindly give a 24 hour notice. Thank you.)     Physician signature:__________________________      If you experience any of the following, please call the [address] during business hours:     * Increase in Pain  * Temperature over 101  * Increase in drainage from your wound  * Drainage with a foul odor  * Bleeding  * Increase in swelling  * Need for compression bandage changes due to slippage, breakthrough drainage. If you need medical attention outside of the business hours of the [facility] [address] please contact your PCP or go to the nearest emergency room.

## 2022-09-15 NOTE — PROGRESS NOTES
[facility] Followup Visit Note    Referring Physician : Gerardo Dodd MD  [address] [facility] RECORD NUMBER:  [id number]  AGE: 78 y.o. GENDER: female  : 1945  EPISODE DATE:  2022    Subjective:     Chief Complaint   Patient presents with    Wound Check     Left pretib      HISTORY of PRESENT ILLNESS GIACOMO Tapia is a 78 y.o. female who presents today in regards to follow up evaluation and treatment of wound/ulcer. That patient's past medical, family and social hx were reviewed and changes were made if present. History of Wound Context:  Patient has had left calf wound since ~ 2021. She has been putting a dressing on it. The wound has not been improving. She has a hx significant for venous stasis ulcerations of bilateral LE. She first was seen by myself in regards to these issues 2015. She eventually healed these wounds 2016. I last saw her 2021 at which time I recommended lymphedema therapy. She states she went and said it caused her to much pain and stopped going. She has chronic issues with bilateral calf pain, swelling and edema. She admits to not wearing her stockings because of the pain. She has used knee high 20-30 mm hg stockings in the past.       She is still seeing pain management and is down to percocet //325 mg daily.        21  aquacell  Double tubigrip  Culture done  Emphasized importance of getting in to more significant compression in the future  12/15/21  Culture reviewed - light growth, no tx  Wound slightly improved  Still significant drainage  Plan on compression wrap next week  21  Stable wound  Drainage slightly better  Pt would like to wait till next week because of holidays to start wrap  22  Wound larger  Profore  22  Wound appearance better  Refusing wrap - it rolled down last week and she cut off after 3 days  22  Wound appearance better  Still refusing wrap   3/2/22  periwound worse  Culture  drawtek  3/9/22  periwound much improved  levaquin 750 mg daily #10 script given culture   Wound itself stable  3/23/22  Left anterior calf wound improving overall  New blister/ulcerations left 3rd, 4th and 5th toes and lateral foot   Instructed to keep toes/foot dry, no ointment or corn starch   3/30/22  bistering resolved, other skin issues resolved  More dry than previously but overall stable  kailyn Barrera  Swelling is down, patient declining wrap  4/13/22  Wound slightly improved  4/20/2022  Wound stable, patient refusing compression wrap  5/4/22  Wound worse  Pt refusing wrap  Will change diet per her report to decrease swelling  5/11/22  Wound stable  kailyn and giacomo 2 - pt agreeable now after significant persuasion  5/18/22  Took off wrap within 24 hours due to pain  aquacell and spandigrip  She agreed to use wrap again if swelling not down 2 more cm next week  Wound slightly smaller  5/25/22  Wound improved   Left leg wound debrided   Continue aquacel   6/8/22  Wound larger  Agreeable to wrap - kailyn sena ag  Culture done  6/22/22  Wound stable in size  6/29/22  Wound slightly larger  Still having issues with wrap falling down  Will start hhc - MWF wraps  7/6/22  Improved  hhc starts this week  7/20/22  Appearance better, wound measuring larger  Continue with wraps  Leg edema improved  7/27/22  Slight improvement  8/3/22  Wound appearance and size worse  Issues still with wraps falling down  When doesn't have wraps on than usually uses spandigrips  8/10/2022  Wound cultures noted, Bactrim DS 1 tablet p.o. twice daily, wound looks fairly clean with some exudate only, mild recent lab work, patient recommended CBC and CMP  8/17/22  Refusing wrap today due to pain associated with  Will discuss with Dr Gladys Parra her pain management  Slightly larger, appearance improved  8/24/22  Poor tolerance of wraps  Wound stable in size  9/7/22  Not tolerating wraps - emphasized importance of use  Change to drawtek  Wound slightly improved in size, appearance still concerning  Pumps are going to be deliver  9/14/22  Still not tolerating wraps  Wound appearance improved, size slightly better  Pumps del[employer]- she has yet to be in serviced on them     \Wound/Ulcer Pain Timing/Severity: waxing and waning, mild  Quality of pain: aching, throbbing, pressure  Severity:  7 / 10   Modifying Factors: Pain worsens with debridement, dressing changes  Associated Signs/Symptoms: edema, drainage and pain    Ulcer Identification:  Ulcer Type: venous and lymphedema  Contributing Factors: edema, venous stasis and lymphedema    Diabetic/Pressure/Non Pressure Ulcers only:  Ulcer: Non-Pressure ulcer, fat layer exposed    Wound: N/A  PAST MEDICAL HISTORY      Diagnosis Date    Bursitis     CAD (coronary artery disease) 1993    heart attack    COPD (chronic obstructive pulmonary disease) ([address] 6/19/2013    Emphysema     slight    GERD (gastroesophageal reflux disease)     Hiatal hernia     Hip pain     Hyperlipidemia     Hypertension     Lymphedema of both lower extremities 11/21/2018    Venous insufficiency of both lower extremities 11/21/2018    Venous stasis ulcer of left calf with fat layer exposed without varicose veins ([address] 12/8/2021    Venous ulcer with fat layer exposed ([address] 10/28/2015     Past Surgical History:   Procedure Laterality Date    APPENDECTOMY      BREAST ENHANCEMENT SURGERY      BREAST REDUCTION SURGERY      CHOLECYSTECTOMY      COLONOSCOPY      ECHO COMPL W DOP COLOR FLOW  3/11/2013         ENDOSCOPY, COLON, DIAGNOSTIC      HERNIA REPAIR N/A 4/16/2021    LAPAROSCOPIC ROBOTIC ASSISTED INGUINAL HERNIA REPAIR performed by Jonathan Andrade MD at [facility] ([address] [place] [address])      JOINT REPLACEMENT  2009 2011    l knee r hip    LEG DEBRIDEMENT Left 11/18/2015    LEG DEBRIDEMENT Left 08/03/2016     No family history on file.   Social History     Tobacco Use    Smoking status: Former     Packs/day: 1.00     Years: 20.00     Pack years: 20.00     Types: Cigarettes     Quit date: 1995     Years since quittin.8    Smokeless tobacco: Never    Tobacco comments:     Quit   Vaping Use    Vaping Use: Never used   Substance Use Topics    Alcohol use: No    Drug use: No     Allergies   Allergen Reactions    Codeine Nausea And Vomiting and Other (See Comments)     hallucinate    Dilaudid [Hydromorphone Hcl] Rash    Naproxen Other (See Comments)     Shuts down kidney function    Singulair [Montelukast Sodium] Shortness Of Breath     Mucus in chest    Black Cohosh     Black Cohosh [Cimicifuga [place] (Black Cohosh)] Rash     Current Outpatient Medications on File Prior to Encounter   Medication Sig Dispense Refill    Cyanocobalamin (VITAMIN B 12) 500 MCG TABS Take by mouth daily      Multiple Vitamins-Minerals (THERAPEUTIC MULTIVITAMIN-MINERALS) tablet Take 1 tablet by mouth daily      zinc gluconate 50 MG tablet Take 50 mg by mouth daily      GENISTEIN PO Take 125 mg by mouth nightly      folic acid (FOLVITE) 385 MCG tablet Take 400 mcg by mouth nightly      Krill Oil 350 MG CAPS Take 350 mg by mouth nightly      gabapentin (NEURONTIN) 300 MG capsule Take 300 mg by mouth 3 times daily. simvastatin (ZOCOR) 40 MG tablet Take 40 mg by mouth nightly      oxyCODONE-acetaminophen (PERCOCET) 7.5-325 MG per tablet Take 1 tablet by mouth 2 times daily. Shahab Wilkins morphine (MS CONTIN) 15 MG extended release tablet Take 15 mg by mouth 2 times daily.        aspirin 81 MG tablet Take 81 mg by mouth daily      Cholecalciferol (VITAMIN D) 2000 UNITS CAPS capsule Take 2,000 Units by mouth daily       ferrous sulfate 325 (65 FE) MG tablet Take 325 mg by mouth nightly       metoprolol (LOPRESSOR) 50 MG tablet Take 1 tablet by mouth 2 times daily 60 tablet 0    albuterol (PROVENTIL HFA;VENTOLIN HFA) 108 (90 BASE) MCG/ACT inhaler Inhale 2 puffs into the lungs every 6 hours as needed for Wheezing or Shortness of Breath       calcium carbonate (OYSTER SHELL CALCIUM 500 MG) 1250 MG tablet Take 2 tablets by mouth daily      Ascorbic Acid (VITAMIN C) 500 MG tablet Take 2,000 mg by mouth daily      omeprazole (PRILOSEC) 40 MG delayed release capsule Take 40 mg by mouth daily. diphenhydrAMINE (BENADRYL) 50 MG capsule Take 50 mg by mouth daily as needed for Allergies       Garlic 552 MG TABS Take 1,000 mg by mouth daily        No current facility-administered medications on file prior to encounter. REVIEW OF SYSTEMS See HPI    Objective:    BP (!) 146/66   Pulse 72   Temp 97.6 °F (36.4 °C) (Temporal)   Resp 20   Ht 5' 7\" (1.702 m)   Wt 295 lb (133.8 kg)   BMI 46.20 kg/m²   Wt Readings from Last 3 Encounters:   09/14/22 295 lb (133.8 kg)   08/24/22 295 lb (133.8 kg)   08/17/22 295 lb (133.8 kg)     PHYSICAL EXAM  CONSTITUTIONAL:   Awake, alert, cooperative   EYES:  lids and lashes normal   ENT: external ears and nose without lesions   NECK:  supple, symmetrical, trachea midline   SKIN:  Open wound Present    Assessment:     Problem List Items Addressed This Visit       Venous stasis ulcer of left calf with fat layer exposed without varicose veins (HCC) - Primary (Chronic)    Relevant Orders    Initiate Outpatient Wound Care Protocol       Pre Debridement Measurements:  Are located in the [place]  Documentation Flow Sheet  Post Debridement Measurements:  Wound/Ulcer Descriptions are Pre Debridement except measurements:     Incision 04/20/16 Leg Left (Active)   Number of days: 0549       Incision 08/03/16 Leg Left (Active)   Number of days: 4777       Wound 12/08/21 Pretibial Left #1 (Active)   Wound Image   09/14/22 1520   Dressing Status New dressing applied;Clean;Dry; Intact 09/14/22 1615   Wound Cleansed Cleansed with saline 09/14/22 1615   Dressing/Treatment ABD;Dry dressing 09/14/22 1615   Offloading for Diabetic Foot Ulcers Offloading not required 08/10/22 1540   Wound Length (cm) 4.1 cm 09/14/22 1520   Wound Width (cm) 2.9 cm 09/14/22 1520   Wound Depth (cm) 0.2 cm 09/14/22 1520   Wound Surface Area (cm^2) 11.89 cm^2 09/14/22 1520   Change in Wound Size % (l*w) -1598.57 09/14/22 1520   Wound Volume (cm^3) 2.378 cm^3 09/14/22 1520   Wound Healing % -3297 09/14/22 1520   Post-Procedure Length (cm) 4.1 cm 09/14/22 1540   Post-Procedure Width (cm) 3 cm 09/14/22 1540   Post-Procedure Depth (cm) 0.2 cm 09/14/22 1540   Post-Procedure Surface Area (cm^2) 12.3 cm^2 09/14/22 1540   Post-Procedure Volume (cm^3) 2.46 cm^3 09/14/22 1540   Wound Assessment Pink/red;Fibrin 09/14/22 1520   Drainage Amount Large 09/14/22 1520   Drainage Description Yellow 09/14/22 1520   Odor None 09/14/22 1520   Kori-wound Assessment Maceration 09/14/22 1520   Number of days: 280     Incision 04/16/21 Abdomen (Active)   Number of days: 516       Procedure Note  Indications:  Based on my examination of this patient's wound(s)/ulcer(s) today, debridement is required to promote healing and evaluate the wound base. Performed by: Yanci Mcgraw MD    Consent obtained:  Yes    Time out taken:  Yes    Pain Control: Anesthetic  Anesthetic: 4% Lidocaine Liquid Topical     Debridement:Excisional Debridement    Using curette the wound(s)/ulcer(s) was/were sharply debrided down through and including the removal of epidermis, dermis, and subcutaneous tissue. Devitalized Tissue Debrided:  fibrin, biofilm, slough, and exudate to stimulate bleeding to promote healing, post debridement good bleeding base and wound edges noted    Wound/Ulcer #: 1    Percent of Wound/Ulcer Debrided: 100%    Total Surface Area Debrided:  11.9 sq cm     Estimated Blood Loss:  Minimal  Hemostasis Achieved:  by pressure    Procedural Pain:  10  / 10   Post Procedural Pain:  9 / 10     Response to treatment:  With complaints of pain.    Plan:   Treatment Note please see attached Discharge Instructions    Written patient dismissal instructions given to patient and signed by patient or POA. Discharge Instructions         Visit Discharge/Physician Orders     Discharge condition: Stable     Assessment of pain at discharge: mild     Anesthetic used: lido 4%     Discharge to: Home     Left via:Private automobile     Accompanied by: accompanied by self     ECF/HHA: [address]     Dressing Orders: To left pretib wound: cleanse with normal saline, apply drawtex, cover and secure with dry dressing. Apply profore wrap. Change M- W( at [facility])- F.     Keep wrap dry at all times. If wrap becomes wet or falls 2 inches call [facility] ([phone])and may remove wrap and apply double tubigrip. Treatment Orders:Eat a diet high in protein and vitamin C. Take a multiple vitamin daily unless contraindicated. Elevate as much as possible     [facility] followup visit 1 week____________________________  (Please note your next appointment above and if you are unable to keep, kindly give a 24 hour notice. Thank you.)     Physician signature:__________________________      If you experience any of the following, please call the [address] during business hours:     * Increase in Pain  * Temperature over 101  * Increase in drainage from your wound  * Drainage with a foul odor  * Bleeding  * Increase in swelling  * Need for compression bandage changes due to slippage, breakthrough drainage. If you need medical attention outside of the business hours of the [address] please contact your PCP or go to the nearest emergency room.             Electronically signed by Dmitri Broussard MD

## 2022-09-21 ENCOUNTER — HOSPITAL ENCOUNTER (OUTPATIENT)
Dept: WOUND CARE | Age: 77
Discharge: HOME | End: 2022-09-21

## 2022-09-21 NOTE — DISCHARGE INSTRUCTIONS
Visit Discharge/Physician Orders     Discharge condition: Stable     Assessment of pain at discharge: mild     Anesthetic used: lido 4%     Discharge to: Home     Left via:Private automobile     Accompanied by: accompanied by self     [facility]: [address] [facility]     Dressing Orders: To left pretib wound: cleanse with normal saline, apply drawtex, cover and secure with dry dressing. Apply profore wrap. Change M- W( at [facility])- F.     Keep wrap dry at all times. If wrap becomes wet or falls 2 inches call [facility] ([phone])and may remove wrap and apply double tubigrip. Treatment Orders:Eat a diet high in protein and vitamin C. Take a multiple vitamin daily unless contraindicated. Elevate as much as possible     [facility] followup visit 1 week____________________________  (Please note your next appointment above and if you are unable to keep, kindly give a 24 hour notice. Thank you.)     Physician signature:__________________________      If you experience any of the following, please call the [facility] during business hours:     * Increase in Pain  * Temperature over 101  * Increase in drainage from your wound  * Drainage with a foul odor  * Bleeding  * Increase in swelling  * Need for compression bandage changes due to slippage, breakthrough drainage. If you need medical attention outside of the business hours of the [address] please contact your PCP or go to the nearest emergency room.

## 2022-09-27 NOTE — DISCHARGE INSTRUCTIONS
Visit Discharge/Physician Orders     Discharge condition: Stable     Assessment of pain at discharge: mild     Anesthetic used: lido 4%     Discharge to: Home     Left via:Private automobile     Accompanied by: accompanied by self     [facility]: [facility]     Dressing Orders: To left pretib wound: cleanse with normal saline, apply drawtex, cover and secure with dry dressing. Apply profore wrap. Change M- W( at [address] [place] [address],[address])- F.     Keep wrap dry at all times. If wrap becomes wet or falls 2 inches call [address] [place] [address],[address] ([phone])and may remove wrap and apply double tubigrip. Treatment Orders:Eat a diet high in protein and vitamin C. Take a multiple vitamin daily unless contraindicated. Elevate as much as possible     [address] [place] [address],[address] followup visit 1 week____________________________  (Please note your next appointment above and if you are unable to keep, kindly give a 24 hour notice. Thank you.)     Physician signature:__________________________      If you experience any of the following, please call the [address] during business hours:     * Increase in Pain  * Temperature over 101  * Increase in drainage from your wound  * Drainage with a foul odor  * Bleeding  * Increase in swelling  * Need for compression bandage changes due to slippage, breakthrough drainage. If you need medical attention outside of the business hours of the [facility] [address] please contact your PCP or go to the nearest emergency room.

## 2022-09-28 ENCOUNTER — HOSPITAL ENCOUNTER (OUTPATIENT)
Dept: WOUND CARE | Age: 77
Discharge: HOME | End: 2022-09-28

## 2022-10-02 ENCOUNTER — APPOINTMENT (OUTPATIENT)
Dept: GENERAL RADIOLOGY | Age: 77
DRG: 872 | End: 2022-10-02
Payer: MEDICARE

## 2022-10-02 ENCOUNTER — HOSPITAL ENCOUNTER (INPATIENT)
Age: 77
LOS: 10 days | Discharge: NURSING, REHAB OR LONG-TERM CARE | DRG: 872 | End: 2022-10-13
Attending: STUDENT IN AN ORGANIZED HEALTH CARE EDUCATION/TRAINING PROGRAM | Admitting: FAMILY MEDICINE
Payer: MEDICARE

## 2022-10-02 DIAGNOSIS — L03.116 CELLULITIS OF LEFT LOWER EXTREMITY: Primary | ICD-10-CM

## 2022-10-02 LAB
ALBUMIN SERPL-MCNC: 3.4 G/DL (ref 3.5–5.2)
ALP BLD-CCNC: 203 U/L (ref 35–104)
ALT SERPL-CCNC: 37 U/L (ref 0–32)
ANION GAP SERPL CALCULATED.3IONS-SCNC: 13 MMOL/L (ref 7–16)
APTT: 30.4 SEC (ref 24.5–35.1)
AST SERPL-CCNC: 44 U/L (ref 0–31)
BASOPHILS ABSOLUTE: 0.03 E9/L (ref 0–0.2)
BASOPHILS RELATIVE PERCENT: 0.4 % (ref 0–2)
BILIRUB SERPL-MCNC: 0.7 MG/DL (ref 0–1.2)
BILIRUBIN URINE: NEGATIVE
BLOOD, URINE: NEGATIVE
BUN BLDV-MCNC: 7 MG/DL (ref 6–23)
CALCIUM SERPL-MCNC: 9.9 MG/DL (ref 8.6–10.2)
CHLORIDE BLD-SCNC: 98 MMOL/L (ref 98–107)
CLARITY: CLEAR
CO2: 29 MMOL/L (ref 22–29)
COLOR: YELLOW
CREAT SERPL-MCNC: 0.7 MG/DL (ref 0.5–1)
EOSINOPHILS ABSOLUTE: 0.06 E9/L (ref 0.05–0.5)
EOSINOPHILS RELATIVE PERCENT: 0.7 % (ref 0–6)
GFR AFRICAN AMERICAN: >60
GFR NON-AFRICAN AMERICAN: >60 ML/MIN/1.73
GLUCOSE BLD-MCNC: 104 MG/DL (ref 74–99)
GLUCOSE URINE: NEGATIVE MG/DL
HCT VFR BLD CALC: 36.1 % (ref 34–48)
HEMOGLOBIN: 11.5 G/DL (ref 11.5–15.5)
IMMATURE GRANULOCYTES #: 0.04 E9/L
IMMATURE GRANULOCYTES %: 0.5 % (ref 0–5)
INR BLD: 1.3
KETONES, URINE: NEGATIVE MG/DL
LACTIC ACID, SEPSIS: 1.1 MMOL/L (ref 0.5–1.9)
LEUKOCYTE ESTERASE, URINE: NEGATIVE
LIPASE: 9 U/L (ref 13–60)
LYMPHOCYTES ABSOLUTE: 0.8 E9/L (ref 1.5–4)
LYMPHOCYTES RELATIVE PERCENT: 9.3 % (ref 20–42)
MCH RBC QN AUTO: 32.1 PG (ref 26–35)
MCHC RBC AUTO-ENTMCNC: 31.9 % (ref 32–34.5)
MCV RBC AUTO: 100.8 FL (ref 80–99.9)
MONOCYTES ABSOLUTE: 0.84 E9/L (ref 0.1–0.95)
MONOCYTES RELATIVE PERCENT: 9.8 % (ref 2–12)
NEUTROPHILS ABSOLUTE: 6.8 E9/L (ref 1.8–7.3)
NEUTROPHILS RELATIVE PERCENT: 79.3 % (ref 43–80)
NITRITE, URINE: NEGATIVE
PDW BLD-RTO: 11.8 FL (ref 11.5–15)
PH UA: 7 (ref 5–9)
PLATELET # BLD: 268 E9/L (ref 130–450)
PMV BLD AUTO: 9.2 FL (ref 7–12)
POTASSIUM REFLEX MAGNESIUM: 3.7 MMOL/L (ref 3.5–5)
PROTEIN UA: NEGATIVE MG/DL
PROTHROMBIN TIME: 14 SEC (ref 9.3–12.4)
RBC # BLD: 3.58 E12/L (ref 3.5–5.5)
SODIUM BLD-SCNC: 140 MMOL/L (ref 132–146)
SPECIFIC GRAVITY UA: 1.01 (ref 1–1.03)
TOTAL PROTEIN: 8.8 G/DL (ref 6.4–8.3)
TROPONIN, HIGH SENSITIVITY: 14 NG/L (ref 0–9)
TROPONIN, HIGH SENSITIVITY: 15 NG/L (ref 0–9)
UROBILINOGEN, URINE: 2 E.U./DL
WBC # BLD: 8.6 E9/L (ref 4.5–11.5)

## 2022-10-02 PROCEDURE — 85610 PROTHROMBIN TIME: CPT

## 2022-10-02 PROCEDURE — 83690 ASSAY OF LIPASE: CPT

## 2022-10-02 PROCEDURE — 87040 BLOOD CULTURE FOR BACTERIA: CPT

## 2022-10-02 PROCEDURE — 96361 HYDRATE IV INFUSION ADD-ON: CPT

## 2022-10-02 PROCEDURE — 80053 COMPREHEN METABOLIC PANEL: CPT

## 2022-10-02 PROCEDURE — 2580000003 HC RX 258: Performed by: STUDENT IN AN ORGANIZED HEALTH CARE EDUCATION/TRAINING PROGRAM

## 2022-10-02 PROCEDURE — 96374 THER/PROPH/DIAG INJ IV PUSH: CPT

## 2022-10-02 PROCEDURE — 6360000002 HC RX W HCPCS: Performed by: STUDENT IN AN ORGANIZED HEALTH CARE EDUCATION/TRAINING PROGRAM

## 2022-10-02 PROCEDURE — 87088 URINE BACTERIA CULTURE: CPT

## 2022-10-02 PROCEDURE — 81003 URINALYSIS AUTO W/O SCOPE: CPT

## 2022-10-02 PROCEDURE — 93005 ELECTROCARDIOGRAM TRACING: CPT | Performed by: STUDENT IN AN ORGANIZED HEALTH CARE EDUCATION/TRAINING PROGRAM

## 2022-10-02 PROCEDURE — 71045 X-RAY EXAM CHEST 1 VIEW: CPT

## 2022-10-02 PROCEDURE — 85025 COMPLETE CBC W/AUTO DIFF WBC: CPT

## 2022-10-02 PROCEDURE — 83605 ASSAY OF LACTIC ACID: CPT

## 2022-10-02 PROCEDURE — 84484 ASSAY OF TROPONIN QUANT: CPT

## 2022-10-02 PROCEDURE — 73590 X-RAY EXAM OF LOWER LEG: CPT

## 2022-10-02 PROCEDURE — 99285 EMERGENCY DEPT VISIT HI MDM: CPT

## 2022-10-02 PROCEDURE — 85730 THROMBOPLASTIN TIME PARTIAL: CPT

## 2022-10-02 PROCEDURE — 96376 TX/PRO/DX INJ SAME DRUG ADON: CPT

## 2022-10-02 RX ORDER — 0.9 % SODIUM CHLORIDE 0.9 %
500 INTRAVENOUS SOLUTION INTRAVENOUS ONCE
Status: COMPLETED | OUTPATIENT
Start: 2022-10-02 | End: 2022-10-02

## 2022-10-02 RX ORDER — FENTANYL CITRATE 50 UG/ML
100 INJECTION, SOLUTION INTRAMUSCULAR; INTRAVENOUS ONCE
Status: COMPLETED | OUTPATIENT
Start: 2022-10-02 | End: 2022-10-02

## 2022-10-02 RX ORDER — SODIUM CHLORIDE 0.9 % (FLUSH) 0.9 %
5-40 SYRINGE (ML) INJECTION EVERY 12 HOURS SCHEDULED
Status: DISCONTINUED | OUTPATIENT
Start: 2022-10-02 | End: 2022-10-03 | Stop reason: SDUPTHER

## 2022-10-02 RX ORDER — FENTANYL CITRATE 50 UG/ML
75 INJECTION, SOLUTION INTRAMUSCULAR; INTRAVENOUS ONCE
Status: COMPLETED | OUTPATIENT
Start: 2022-10-02 | End: 2022-10-02

## 2022-10-02 RX ORDER — SODIUM CHLORIDE 9 MG/ML
INJECTION, SOLUTION INTRAVENOUS PRN
Status: DISCONTINUED | OUTPATIENT
Start: 2022-10-02 | End: 2022-10-03 | Stop reason: SDUPTHER

## 2022-10-02 RX ORDER — SODIUM CHLORIDE 0.9 % (FLUSH) 0.9 %
5-40 SYRINGE (ML) INJECTION PRN
Status: DISCONTINUED | OUTPATIENT
Start: 2022-10-02 | End: 2022-10-03 | Stop reason: SDUPTHER

## 2022-10-02 RX ADMIN — FENTANYL CITRATE 75 MCG: 50 INJECTION, SOLUTION INTRAMUSCULAR; INTRAVENOUS at 20:27

## 2022-10-02 RX ADMIN — SODIUM CHLORIDE 500 ML: 9 INJECTION, SOLUTION INTRAVENOUS at 22:05

## 2022-10-02 RX ADMIN — SODIUM CHLORIDE, PRESERVATIVE FREE 10 ML: 5 INJECTION INTRAVENOUS at 22:39

## 2022-10-02 RX ADMIN — FENTANYL CITRATE 100 MCG: 50 INJECTION, SOLUTION INTRAMUSCULAR; INTRAVENOUS at 22:38

## 2022-10-02 ASSESSMENT — PAIN SCALES - GENERAL
PAINLEVEL_OUTOF10: 10

## 2022-10-02 ASSESSMENT — PAIN DESCRIPTION - DESCRIPTORS: DESCRIPTORS: SHARP

## 2022-10-02 ASSESSMENT — PAIN DESCRIPTION - LOCATION
LOCATION: LEG
LOCATION: LEG

## 2022-10-02 ASSESSMENT — PAIN - FUNCTIONAL ASSESSMENT: PAIN_FUNCTIONAL_ASSESSMENT: 0-10

## 2022-10-02 ASSESSMENT — PAIN DESCRIPTION - ORIENTATION
ORIENTATION: LEFT
ORIENTATION: LEFT

## 2022-10-02 NOTE — ED NOTES
Assumed care of patient no report given on patient condition     Zechariah Alvarenga RN  10/02/22 8548

## 2022-10-02 NOTE — LETTER
[address] Medicaid  CERTIFICATION OF NECESSITY  FOR TRANSPORTATION   BY WHEELCHAIR VAN     Individual Information   1. Name: Tanna Cash 2. [place] Medicaid Billing Number:    3. Address: [place] [address]      Transportation Provider Information   4. Provider Name:    5. [place] Medicaid Provider Number:  National Provider Identifier (NPI):      Certification  7. Criteria:  By signing this document, the practitioner certifies that two statements are true:  A. This individual must be accompanied by a mobility-related assistive device from the point of pick-up to the point of drop-off. B. Transport of this individual by standard passenger vehicle or common carrier is precluded or contraindicated. 8. Period Beginning Date: 10/10/2022     9. Length  [x] Not more than 5 day(s)  [x] One Year     Additional Information Relevant to Certification   10. Comments or Explanations, If Necessary or Appropriate     Leg pain     Certifying Practitioner Information   11. Name of Practitioner: Eve Avilez MD   12. [place] Medicaid Provider Number, If Applicable:  [address] Provider Identifier (NPI):      Signature Information   14. Date of Signature: 10/10/2022   15. Name of Person Signing: Joo Gregory RN     16. Signature and Professional Designation: Electronically signed by Joo Gregory RN on 10/10/2022 at 10:22 AM     [facility] [address]  Rev. 7/2015    [address] [place] [address] Encounter Date/Time: 10/2/2022 [address] [place] [address] Account: [de-identified]    MRN: [id number]    Patient: Tanna Cash    Contact Serial #: [id number]      ENCOUNTER          Patient Class: I Private Enc? No Unit  [facility]   Hospital Service:  INM   Encounter DX: Cellulitis of left lower*   ADM Provider: Renee Deng MD   Procedure:     ATT Provider: Eve Avilez MD   REF Provider:        Admission DX: Cellulitis of left lower extremity, Wounds, multiple open, lower extremity, left, initial encounter, Sepsis due to cellulitis [facility]) and DX codes: K61.987, S81.802A, L03.90, A41.9      PATIENT                 Name: Tanna Cash : 1945 (77 yrs)   Address: [address] [place] [address] [place] Sex: Female   [facility] [address]         Marital Status:    Employer: NONE         Jewish: Kyra Ayala   Primary Care Provider: Donn Olszewski, MD         Primary Phone: [phone]   EMERGENCY CONTACT   Contact Name Legal Guardian? Relationship to Patient Home Phone Work Phone   1. Erin Parks  2. [facility]      Brother/Sister  Child [phone]                 GUARANTOR            Guarantor: Tanna Cash     : 1945   Address: [address] [place] [address] Sex: Female   [place] [address]     Relation to Patient: Self       Home Phone: [phone]   Guarantor ID: [id number]       Work Phone:     Guarantor Employer: NONE         Status: NOT EMPLO*      COVERAGE        PRIMARY INSURANCE   Payor: [facility] MEDICARE Plan: Laura CHERRY*   Payor Address: ,          Group Number:   Insurance Type: INDEMNITY   Subscriber Name: Rogerio Mccain : 1945   Subscriber ID: [id number] Pat. Rel. to Sub: Self   SECONDARY INSURANCE   Payor: [place]ICAID [place] Plan: [facility] DEPT OF*   Payor Address:  [place] [address]          Group Number:   Insurance Type: INDEMNITY   Subscriber Name: Rogerio Mccain : 1945   Subscriber ID: [id number] Pat.  Rel. to Sub: SELF         CSN: [id number]

## 2022-10-02 NOTE — ED PROVIDER NOTES
[facility] [address]  Department of Emergency Medicine     Written by: Denzel Quinn DO  Patient Name: Gail Mohr  Attending Provider: Nancy Fields MD  Admit Date: 10/2/2022  7:33 PM  MRN: [id number]                   : 1945        Chief Complaint   Patient presents with    Leg Pain     Pt has wound to left leg sees wound care    - Chief complaint    Ms. Susan Lucas is a 78year old female who presents to the ED due to leg pain. Patient has chronic left shin wound that she follows with wound care for and they have been dressing it appropriately. States that over the past week she has been developing a ulceration on her left calf. She notes that it is draining purulent fluid and extremely painful. Her entire left leg has become swollen and erythematous. She has excruciating pain that is aggravated with palpation and movement and relieved by nothing. Symptoms have been constant progressively worsening since onset. She endorses associated fatigue with her symptoms. She denies any recent fever, chills, nausea, vomiting, chest pain, shortness of breath, abdominal pain, bowel or urinary changes, headaches or vision changes. Review of Systems   Constitutional:  Positive for fatigue. Negative for chills and fever. HENT:  Negative for congestion, sinus pressure and sinus pain. Eyes:  Negative for pain and redness. Respiratory:  Negative for cough, chest tightness and shortness of breath. Cardiovascular:  Negative for chest pain, palpitations and leg swelling. Gastrointestinal:  Negative for abdominal pain, constipation, diarrhea, nausea and vomiting. Genitourinary:  Negative for dysuria, flank pain, frequency, hematuria and urgency. Musculoskeletal:  Positive for gait problem (Due to leg pain). Negative for arthralgias, back pain, joint swelling and myalgias. Skin:  Positive for wound (Ulcerated lesions left leg). Negative for color change and rash.    Neurological: Negative for dizziness, syncope, weakness, light-headedness, numbness and headaches. Physical Exam  Constitutional:       General: She is in acute distress. Appearance: Normal appearance. She is normal weight. She is ill-appearing. She is not toxic-appearing. HENT:      Head: Normocephalic and atraumatic. Right Ear: External ear normal.      Left Ear: External ear normal.      Nose: Nose normal.      Mouth/Throat:      Mouth: Mucous membranes are moist.      Pharynx: Oropharynx is clear. Eyes:      Extraocular Movements: Extraocular movements intact. Conjunctiva/sclera: Conjunctivae normal.   Cardiovascular:      Rate and Rhythm: Normal rate and regular rhythm. Pulses: Normal pulses. Heart sounds: Normal heart sounds. Pulmonary:      Effort: Pulmonary effort is normal. No respiratory distress. Breath sounds: Normal breath sounds. No wheezing. Abdominal:      General: Abdomen is flat. Bowel sounds are normal. There is no distension. Palpations: Abdomen is soft. Tenderness: There is no abdominal tenderness. There is no guarding or rebound. Musculoskeletal:         General: Swelling (Induration of the leg) and tenderness (Left leg) present. Normal range of motion. Cervical back: Normal range of motion and neck supple. No rigidity or tenderness. Skin:     General: Skin is warm and dry. Findings: Erythema and lesion (Open ulceration to left shin with with purulent ulceration to posterior left calf and a smaller one on her lateral lower leg) present. Neurological:      General: No focal deficit present. Mental Status: She is alert and oriented to person, place, and time. Mental status is at baseline. Cranial Nerves: No cranial nerve deficit. Sensory: No sensory deficit. Motor: No weakness.    Psychiatric:         Mood and Affect: Mood normal.         Behavior: Behavior normal.        Procedures       MDM  Number of Diagnoses or She has a 20.00 pack-year smoking history. She has never used smokeless tobacco. She reports that she does not drink alcohol and does not use drugs. Family History: family history is not on file. The patients home medications have been reviewed.     Allergies: Codeine, Dilaudid [hydromorphone hcl], Naproxen, Singulair [montelukast sodium], Black cohosh, and Black cohosh [cimicifuga racemosa (black cohosh)]    -------------------------------------------------- RESULTS -------------------------------------------------    LABS:  Results for orders placed or performed during the hospital encounter of 10/02/22   CBC auto differential   Result Value Ref Range    WBC 8.6 4.5 - 11.5 E9/L    RBC 3.58 3.50 - 5.50 E12/L    Hemoglobin 11.5 11.5 - 15.5 g/dL    Hematocrit 36.1 34.0 - 48.0 %    .8 (H) 80.0 - 99.9 fL    MCH 32.1 26.0 - 35.0 pg    MCHC 31.9 (L) 32.0 - 34.5 %    RDW 11.8 11.5 - 15.0 fL    Platelets 571 805 - 766 E9/L    MPV 9.2 7.0 - 12.0 fL    Neutrophils % 79.3 43.0 - 80.0 %    Immature Granulocytes % 0.5 0.0 - 5.0 %    Lymphocytes % 9.3 (L) 20.0 - 42.0 %    Monocytes % 9.8 2.0 - 12.0 %    Eosinophils % 0.7 0.0 - 6.0 %    Basophils % 0.4 0.0 - 2.0 %    Neutrophils Absolute 6.80 1.80 - 7.30 E9/L    Immature Granulocytes # 0.04 E9/L    Lymphocytes Absolute 0.80 (L) 1.50 - 4.00 E9/L    Monocytes Absolute 0.84 0.10 - 0.95 E9/L    Eosinophils Absolute 0.06 0.05 - 0.50 E9/L    Basophils Absolute 0.03 0.00 - 0.20 E9/L   Comprehensive Metabolic Panel w/ Reflex to MG   Result Value Ref Range    Sodium 140 132 - 146 mmol/L    Potassium reflex Magnesium 3.7 3.5 - 5.0 mmol/L    Chloride 98 98 - 107 mmol/L    CO2 29 22 - 29 mmol/L    Anion Gap 13 7 - 16 mmol/L    Glucose 104 (H) 74 - 99 mg/dL    BUN 7 6 - 23 mg/dL    Creatinine 0.7 0.5 - 1.0 mg/dL    GFR Non-African American >60 >=60 mL/min/1.73    GFR African American >60     Calcium 9.9 8.6 - 10.2 mg/dL    Total Protein 8.8 (H) 6.4 - 8.3 g/dL    Albumin 3.4 (L) 3.5 - 5.2 g/dL    Total Bilirubin 0.7 0.0 - 1.2 mg/dL    Alkaline Phosphatase 203 (H) 35 - 104 U/L    ALT 37 (H) 0 - 32 U/L    AST 44 (H) 0 - 31 U/L   Urinalysis   Result Value Ref Range    Color, UA Yellow Straw/Yellow    Clarity, UA Clear Clear    Glucose, Ur Negative Negative mg/dL    Bilirubin Urine Negative Negative    Ketones, Urine Negative Negative mg/dL    Specific Gravity, UA 1.015 1.005 - 1.030    Blood, Urine Negative Negative    pH, UA 7.0 5.0 - 9.0    Protein, UA Negative Negative mg/dL    Urobilinogen, Urine 2.0 (A) <2.0 E.U./dL    Nitrite, Urine Negative Negative    Leukocyte Esterase, Urine Negative Negative   Lactate, Sepsis   Result Value Ref Range    Lactic Acid, Sepsis 1.1 0.5 - 1.9 mmol/L   APTT   Result Value Ref Range    aPTT 30.4 24.5 - 35.1 sec   Protime-INR   Result Value Ref Range    Protime 14.0 (H) 9.3 - 12.4 sec    INR 1.3    Lipase   Result Value Ref Range    Lipase 9 (L) 13 - 60 U/L   Troponin   Result Value Ref Range    Troponin, High Sensitivity 14 (H) 0 - 9 ng/L   Troponin   Result Value Ref Range    Troponin, High Sensitivity 15 (H) 0 - 9 ng/L   EKG 12 lead   Result Value Ref Range    Ventricular Rate 116 BPM    Atrial Rate 116 BPM    P-R Interval 156 ms    QRS Duration 88 ms    Q-T Interval 304 ms    QTc Calculation (Bazett) 422 ms    P Axis 67 degrees    R Axis 58 degrees    T Axis 60 degrees       RADIOLOGY:  XR TIBIA FIBULA LEFT (2 VIEWS)   Final Result   No acute osseous abnormality is identified in the lower leg. XR CHEST PORTABLE   Final Result   No acute process. EKG:  This EKG is signed and interpreted by me.     Rate: 116  Rhythm: Sinus  Interpretation: sinus tachycardia  Comparison: changes compared to previous EKG      ------------------------- NURSING NOTES AND VITALS REVIEWED ---------------------------  Date / Time Roomed:  10/2/2022  7:33 PM  ED Bed Assignment:  18B/18B-18    The nursing notes within the ED encounter and vital signs as below have been reviewed. Patient Vitals for the past 24 hrs:   BP Temp Pulse Resp SpO2 Height Weight   10/02/22 1930 (!) 170/104 98.5 °F (36.9 °C) (!) 115 18 93 % 5' 9\" (1.753 m) 290 lb (131.5 kg)       Oxygen Saturation Interpretation: Normal    ------------------------------------------ PROGRESS NOTES ------------------------------------------  Re-evaluation(s):  Time: 7916  Patients symptoms show no change  Repeat physical examination is not changed    Counseling:  I have spoken with the patient and discussed todays results, in addition to providing specific details for the plan of care and counseling regarding the diagnosis and prognosis. Their questions are answered at this time and they are agreeable with the plan of admission.    --------------------------------- ADDITIONAL PROVIDER NOTES ---------------------------------  Consultations:  Time: 0015. Spoke with Dr. Kvng Jennings. Discussed case. They will admit the patient. This patient's ED course included: a personal history and physicial examination, re-evaluation prior to disposition, multiple bedside re-evaluations, IV medications, cardiac monitoring, and continuous pulse oximetry    This patient has remained hemodynamically stable during their ED course. Diagnosis:  1. Cellulitis of left lower extremity        Disposition:  Patient's disposition: Admit to telemetry  Patient's condition is stable. Patient was seen and evaluated by myself and my attending Yang Roca MD. Assessment and Plan discussed with attending provider, please see attestation for final plan of care.      DO Lillie Campbell DO  Resident  10/03/22 [id number]

## 2022-10-03 ENCOUNTER — APPOINTMENT (OUTPATIENT)
Dept: CT IMAGING | Age: 77
DRG: 872 | End: 2022-10-03
Payer: MEDICARE

## 2022-10-03 ENCOUNTER — APPOINTMENT (OUTPATIENT)
Dept: ULTRASOUND IMAGING | Age: 77
DRG: 872 | End: 2022-10-03
Payer: MEDICARE

## 2022-10-03 ENCOUNTER — APPOINTMENT (OUTPATIENT)
Dept: INTERVENTIONAL RADIOLOGY/VASCULAR | Age: 77
DRG: 872 | End: 2022-10-03
Payer: MEDICARE

## 2022-10-03 PROBLEM — L03.116 CELLULITIS OF LEG, LEFT: Status: ACTIVE | Noted: 2022-10-03

## 2022-10-03 PROBLEM — K46.0 INCARCERATED HERNIA: Status: RESOLVED | Noted: 2021-04-15 | Resolved: 2022-10-03

## 2022-10-03 PROBLEM — L03.90 SEPSIS DUE TO CELLULITIS (HCC): Status: ACTIVE | Noted: 2022-10-03

## 2022-10-03 PROBLEM — L97.222 ULCER OF CALF WITH FAT LAYER EXPOSED, LEFT (HCC): Status: ACTIVE | Noted: 2021-12-08

## 2022-10-03 PROBLEM — A41.9 SEPSIS DUE TO CELLULITIS (HCC): Status: ACTIVE | Noted: 2022-10-03

## 2022-10-03 PROBLEM — S81.802A WOUNDS, MULTIPLE OPEN, LOWER EXTREMITY, LEFT, INITIAL ENCOUNTER: Status: ACTIVE | Noted: 2022-10-03

## 2022-10-03 LAB
ALBUMIN SERPL-MCNC: 2.7 G/DL (ref 3.5–5.2)
ALP BLD-CCNC: 156 U/L (ref 35–104)
ALT SERPL-CCNC: 29 U/L (ref 0–32)
ANION GAP SERPL CALCULATED.3IONS-SCNC: 11 MMOL/L (ref 7–16)
AST SERPL-CCNC: 38 U/L (ref 0–31)
BILIRUB SERPL-MCNC: 0.5 MG/DL (ref 0–1.2)
BUN BLDV-MCNC: 6 MG/DL (ref 6–23)
C-REACTIVE PROTEIN: 17 MG/DL (ref 0–0.4)
CALCIUM SERPL-MCNC: 8.9 MG/DL (ref 8.6–10.2)
CHLORIDE BLD-SCNC: 97 MMOL/L (ref 98–107)
CO2: 28 MMOL/L (ref 22–29)
CREAT SERPL-MCNC: 0.7 MG/DL (ref 0.5–1)
EKG ATRIAL RATE: 116 BPM
EKG P AXIS: 67 DEGREES
EKG P-R INTERVAL: 156 MS
EKG Q-T INTERVAL: 304 MS
EKG QRS DURATION: 88 MS
EKG QTC CALCULATION (BAZETT): 422 MS
EKG R AXIS: 58 DEGREES
EKG T AXIS: 60 DEGREES
EKG VENTRICULAR RATE: 116 BPM
GFR AFRICAN AMERICAN: >60
GFR NON-AFRICAN AMERICAN: >60 ML/MIN/1.73
GLUCOSE BLD-MCNC: 130 MG/DL (ref 74–99)
LACTIC ACID, SEPSIS: 1 MMOL/L (ref 0.5–1.9)
POTASSIUM SERPL-SCNC: 3.6 MMOL/L (ref 3.5–5)
SEDIMENTATION RATE, ERYTHROCYTE: 130 MM/HR (ref 0–20)
SODIUM BLD-SCNC: 136 MMOL/L (ref 132–146)
TOTAL PROTEIN: 7.1 G/DL (ref 6.4–8.3)

## 2022-10-03 PROCEDURE — 93922 UPR/L XTREMITY ART 2 LEVELS: CPT

## 2022-10-03 PROCEDURE — 6360000002 HC RX W HCPCS: Performed by: STUDENT IN AN ORGANIZED HEALTH CARE EDUCATION/TRAINING PROGRAM

## 2022-10-03 PROCEDURE — 6370000000 HC RX 637 (ALT 250 FOR IP): Performed by: STUDENT IN AN ORGANIZED HEALTH CARE EDUCATION/TRAINING PROGRAM

## 2022-10-03 PROCEDURE — 6360000004 HC RX CONTRAST MEDICATION: Performed by: RADIOLOGY

## 2022-10-03 PROCEDURE — 2580000003 HC RX 258: Performed by: STUDENT IN AN ORGANIZED HEALTH CARE EDUCATION/TRAINING PROGRAM

## 2022-10-03 PROCEDURE — 6360000002 HC RX W HCPCS: Performed by: FAMILY MEDICINE

## 2022-10-03 PROCEDURE — 87186 SC STD MICRODIL/AGAR DIL: CPT

## 2022-10-03 PROCEDURE — 87075 CULTR BACTERIA EXCEPT BLOOD: CPT

## 2022-10-03 PROCEDURE — 83605 ASSAY OF LACTIC ACID: CPT

## 2022-10-03 PROCEDURE — 80053 COMPREHEN METABOLIC PANEL: CPT

## 2022-10-03 PROCEDURE — 2060000000 HC ICU INTERMEDIATE R&B

## 2022-10-03 PROCEDURE — 6370000000 HC RX 637 (ALT 250 FOR IP): Performed by: FAMILY MEDICINE

## 2022-10-03 PROCEDURE — 93971 EXTREMITY STUDY: CPT

## 2022-10-03 PROCEDURE — 93971 EXTREMITY STUDY: CPT | Performed by: RADIOLOGY

## 2022-10-03 PROCEDURE — 2580000003 HC RX 258: Performed by: FAMILY MEDICINE

## 2022-10-03 PROCEDURE — 86140 C-REACTIVE PROTEIN: CPT

## 2022-10-03 PROCEDURE — 85651 RBC SED RATE NONAUTOMATED: CPT

## 2022-10-03 PROCEDURE — 99222 1ST HOSP IP/OBS MODERATE 55: CPT | Performed by: SURGERY

## 2022-10-03 PROCEDURE — 87070 CULTURE OTHR SPECIMN AEROBIC: CPT

## 2022-10-03 PROCEDURE — 87077 CULTURE AEROBIC IDENTIFY: CPT

## 2022-10-03 PROCEDURE — 73702 CT LWR EXTREMITY W/O&W/DYE: CPT

## 2022-10-03 RX ORDER — OXYCODONE HYDROCHLORIDE AND ACETAMINOPHEN 5; 325 MG/1; MG/1
2 TABLET ORAL ONCE
Status: COMPLETED | OUTPATIENT
Start: 2022-10-03 | End: 2022-10-03

## 2022-10-03 RX ORDER — ONDANSETRON 2 MG/ML
4 INJECTION INTRAMUSCULAR; INTRAVENOUS ONCE
Status: COMPLETED | OUTPATIENT
Start: 2022-10-03 | End: 2022-10-03

## 2022-10-03 RX ORDER — POTASSIUM CHLORIDE 7.45 MG/ML
10 INJECTION INTRAVENOUS PRN
Status: DISCONTINUED | OUTPATIENT
Start: 2022-10-03 | End: 2022-10-13 | Stop reason: HOSPADM

## 2022-10-03 RX ORDER — OXYCODONE AND ACETAMINOPHEN 7.5; 325 MG/1; MG/1
1 TABLET ORAL 2 TIMES DAILY
Status: DISCONTINUED | OUTPATIENT
Start: 2022-10-03 | End: 2022-10-03 | Stop reason: SDUPTHER

## 2022-10-03 RX ORDER — POLYETHYLENE GLYCOL 3350 17 G/17G
17 POWDER, FOR SOLUTION ORAL DAILY PRN
Status: DISCONTINUED | OUTPATIENT
Start: 2022-10-03 | End: 2022-10-13 | Stop reason: HOSPADM

## 2022-10-03 RX ORDER — ACETAMINOPHEN 650 MG/1
650 SUPPOSITORY RECTAL EVERY 6 HOURS PRN
Status: DISCONTINUED | OUTPATIENT
Start: 2022-10-03 | End: 2022-10-13 | Stop reason: HOSPADM

## 2022-10-03 RX ORDER — MAGNESIUM HYDROXIDE/ALUMINUM HYDROXICE/SIMETHICONE 120; 1200; 1200 MG/30ML; MG/30ML; MG/30ML
30 SUSPENSION ORAL EVERY 6 HOURS PRN
Status: DISCONTINUED | OUTPATIENT
Start: 2022-10-03 | End: 2022-10-13 | Stop reason: HOSPADM

## 2022-10-03 RX ORDER — ATORVASTATIN CALCIUM 20 MG/1
20 TABLET, FILM COATED ORAL DAILY
Status: DISCONTINUED | OUTPATIENT
Start: 2022-10-03 | End: 2022-10-13 | Stop reason: HOSPADM

## 2022-10-03 RX ORDER — ACETAMINOPHEN 325 MG/1
650 TABLET ORAL EVERY 6 HOURS PRN
Status: DISCONTINUED | OUTPATIENT
Start: 2022-10-03 | End: 2022-10-13 | Stop reason: HOSPADM

## 2022-10-03 RX ORDER — SODIUM CHLORIDE 0.9 % (FLUSH) 0.9 %
10 SYRINGE (ML) INJECTION PRN
Status: DISCONTINUED | OUTPATIENT
Start: 2022-10-03 | End: 2022-10-05 | Stop reason: SDUPTHER

## 2022-10-03 RX ORDER — ONDANSETRON 2 MG/ML
4 INJECTION INTRAMUSCULAR; INTRAVENOUS EVERY 6 HOURS PRN
Status: DISCONTINUED | OUTPATIENT
Start: 2022-10-03 | End: 2022-10-13 | Stop reason: HOSPADM

## 2022-10-03 RX ORDER — FERROUS SULFATE 325(65) MG
325 TABLET ORAL NIGHTLY
Status: DISCONTINUED | OUTPATIENT
Start: 2022-10-03 | End: 2022-10-13 | Stop reason: HOSPADM

## 2022-10-03 RX ORDER — MORPHINE SULFATE 2 MG/ML
2 INJECTION, SOLUTION INTRAMUSCULAR; INTRAVENOUS EVERY 4 HOURS PRN
Status: DISCONTINUED | OUTPATIENT
Start: 2022-10-03 | End: 2022-10-13 | Stop reason: HOSPADM

## 2022-10-03 RX ORDER — SODIUM CHLORIDE 0.9 % (FLUSH) 0.9 %
5-40 SYRINGE (ML) INJECTION PRN
Status: DISCONTINUED | OUTPATIENT
Start: 2022-10-03 | End: 2022-10-13 | Stop reason: HOSPADM

## 2022-10-03 RX ORDER — ALBUTEROL SULFATE 2.5 MG/3ML
2.5 SOLUTION RESPIRATORY (INHALATION) EVERY 6 HOURS PRN
Status: DISCONTINUED | OUTPATIENT
Start: 2022-10-03 | End: 2022-10-13 | Stop reason: HOSPADM

## 2022-10-03 RX ORDER — POTASSIUM CHLORIDE 20 MEQ/1
40 TABLET, EXTENDED RELEASE ORAL PRN
Status: DISCONTINUED | OUTPATIENT
Start: 2022-10-03 | End: 2022-10-13 | Stop reason: HOSPADM

## 2022-10-03 RX ORDER — OXYCODONE HYDROCHLORIDE AND ACETAMINOPHEN 5; 325 MG/1; MG/1
1 TABLET ORAL EVERY 12 HOURS PRN
Status: DISCONTINUED | OUTPATIENT
Start: 2022-10-03 | End: 2022-10-05

## 2022-10-03 RX ORDER — FOLIC ACID 1 MG/1
500 TABLET ORAL NIGHTLY
Status: DISCONTINUED | OUTPATIENT
Start: 2022-10-03 | End: 2022-10-13 | Stop reason: HOSPADM

## 2022-10-03 RX ORDER — ENOXAPARIN SODIUM 100 MG/ML
30 INJECTION SUBCUTANEOUS 2 TIMES DAILY
Status: DISCONTINUED | OUTPATIENT
Start: 2022-10-03 | End: 2022-10-13 | Stop reason: HOSPADM

## 2022-10-03 RX ORDER — MORPHINE SULFATE 15 MG/1
15 TABLET, FILM COATED, EXTENDED RELEASE ORAL 2 TIMES DAILY
Status: DISCONTINUED | OUTPATIENT
Start: 2022-10-03 | End: 2022-10-13 | Stop reason: HOSPADM

## 2022-10-03 RX ORDER — SODIUM CHLORIDE 0.9 % (FLUSH) 0.9 %
5-40 SYRINGE (ML) INJECTION EVERY 12 HOURS SCHEDULED
Status: DISCONTINUED | OUTPATIENT
Start: 2022-10-03 | End: 2022-10-13 | Stop reason: HOSPADM

## 2022-10-03 RX ORDER — ASPIRIN 81 MG/1
81 TABLET, CHEWABLE ORAL DAILY
Status: DISCONTINUED | OUTPATIENT
Start: 2022-10-03 | End: 2022-10-13 | Stop reason: HOSPADM

## 2022-10-03 RX ORDER — GABAPENTIN 300 MG/1
300 CAPSULE ORAL 3 TIMES DAILY
Status: DISCONTINUED | OUTPATIENT
Start: 2022-10-03 | End: 2022-10-13 | Stop reason: HOSPADM

## 2022-10-03 RX ORDER — OXYCODONE HYDROCHLORIDE 5 MG/1
2.5 TABLET ORAL EVERY 12 HOURS PRN
Status: DISCONTINUED | OUTPATIENT
Start: 2022-10-03 | End: 2022-10-05

## 2022-10-03 RX ORDER — ZINC SULFATE 50(220)MG
50 CAPSULE ORAL DAILY
Status: DISCONTINUED | OUTPATIENT
Start: 2022-10-03 | End: 2022-10-13 | Stop reason: HOSPADM

## 2022-10-03 RX ORDER — PANTOPRAZOLE SODIUM 40 MG/1
40 TABLET, DELAYED RELEASE ORAL
Status: DISCONTINUED | OUTPATIENT
Start: 2022-10-03 | End: 2022-10-13 | Stop reason: HOSPADM

## 2022-10-03 RX ORDER — ONDANSETRON 4 MG/1
4 TABLET, ORALLY DISINTEGRATING ORAL EVERY 8 HOURS PRN
Status: DISCONTINUED | OUTPATIENT
Start: 2022-10-03 | End: 2022-10-13 | Stop reason: HOSPADM

## 2022-10-03 RX ORDER — SODIUM CHLORIDE 9 MG/ML
INJECTION, SOLUTION INTRAVENOUS PRN
Status: DISCONTINUED | OUTPATIENT
Start: 2022-10-03 | End: 2022-10-13 | Stop reason: HOSPADM

## 2022-10-03 RX ORDER — METOPROLOL TARTRATE 50 MG/1
50 TABLET, FILM COATED ORAL 2 TIMES DAILY
Status: DISCONTINUED | OUTPATIENT
Start: 2022-10-03 | End: 2022-10-13 | Stop reason: HOSPADM

## 2022-10-03 RX ADMIN — IOPAMIDOL 75 ML: 755 INJECTION, SOLUTION INTRAVENOUS at 01:39

## 2022-10-03 RX ADMIN — SODIUM CHLORIDE, PRESERVATIVE FREE 10 ML: 5 INJECTION INTRAVENOUS at 08:45

## 2022-10-03 RX ADMIN — MORPHINE SULFATE 15 MG: 15 TABLET, FILM COATED, EXTENDED RELEASE ORAL at 21:51

## 2022-10-03 RX ADMIN — OXYCODONE AND ACETAMINOPHEN 2 TABLET: 5; 325 TABLET ORAL at 00:27

## 2022-10-03 RX ADMIN — CEFEPIME 2000 MG: 2 INJECTION, POWDER, FOR SOLUTION INTRAVENOUS at 18:06

## 2022-10-03 RX ADMIN — VANCOMYCIN HYDROCHLORIDE 2500 MG: 10 INJECTION, POWDER, LYOPHILIZED, FOR SOLUTION INTRAVENOUS at 00:38

## 2022-10-03 RX ADMIN — CEFEPIME 2000 MG: 2 INJECTION, POWDER, FOR SOLUTION INTRAVENOUS at 00:01

## 2022-10-03 RX ADMIN — SODIUM CHLORIDE, PRESERVATIVE FREE 10 ML: 5 INJECTION INTRAVENOUS at 21:59

## 2022-10-03 RX ADMIN — FOLIC ACID 500 MCG: 1 TABLET ORAL at 21:51

## 2022-10-03 RX ADMIN — GABAPENTIN 300 MG: 300 CAPSULE ORAL at 18:12

## 2022-10-03 RX ADMIN — ATORVASTATIN CALCIUM 20 MG: 20 TABLET, FILM COATED ORAL at 08:39

## 2022-10-03 RX ADMIN — GABAPENTIN 300 MG: 300 CAPSULE ORAL at 08:39

## 2022-10-03 RX ADMIN — GABAPENTIN 300 MG: 300 CAPSULE ORAL at 21:51

## 2022-10-03 RX ADMIN — METOPROLOL TARTRATE 50 MG: 50 TABLET, FILM COATED ORAL at 00:57

## 2022-10-03 RX ADMIN — MORPHINE SULFATE 15 MG: 15 TABLET, FILM COATED, EXTENDED RELEASE ORAL at 08:43

## 2022-10-03 RX ADMIN — PANTOPRAZOLE SODIUM 40 MG: 40 TABLET, DELAYED RELEASE ORAL at 08:39

## 2022-10-03 RX ADMIN — ASPIRIN 81 MG 81 MG: 81 TABLET ORAL at 08:39

## 2022-10-03 RX ADMIN — METOPROLOL TARTRATE 50 MG: 50 TABLET, FILM COATED ORAL at 21:51

## 2022-10-03 RX ADMIN — METOPROLOL TARTRATE 50 MG: 50 TABLET, FILM COATED ORAL at 08:43

## 2022-10-03 RX ADMIN — OXYCODONE AND ACETAMINOPHEN 1 TABLET: 5; 325 TABLET ORAL at 22:53

## 2022-10-03 RX ADMIN — ENOXAPARIN SODIUM 30 MG: 100 INJECTION SUBCUTANEOUS at 08:39

## 2022-10-03 RX ADMIN — ONDANSETRON HYDROCHLORIDE 4 MG: 2 SOLUTION INTRAMUSCULAR; INTRAVENOUS at 00:29

## 2022-10-03 RX ADMIN — FERROUS SULFATE TAB 325 MG (65 MG ELEMENTAL FE) 325 MG: 325 (65 FE) TAB at 21:52

## 2022-10-03 RX ADMIN — ENOXAPARIN SODIUM 30 MG: 100 INJECTION SUBCUTANEOUS at 21:52

## 2022-10-03 RX ADMIN — ACETAMINOPHEN 650 MG: 325 TABLET, FILM COATED ORAL at 05:46

## 2022-10-03 ASSESSMENT — PAIN DESCRIPTION - PAIN TYPE: TYPE: CHRONIC PAIN

## 2022-10-03 ASSESSMENT — PAIN SCALES - GENERAL
PAINLEVEL_OUTOF10: 10
PAINLEVEL_OUTOF10: 9
PAINLEVEL_OUTOF10: 10
PAINLEVEL_OUTOF10: 10
PAINLEVEL_OUTOF10: 9

## 2022-10-03 ASSESSMENT — PAIN DESCRIPTION - DESCRIPTORS: DESCRIPTORS: SHOOTING;BURNING

## 2022-10-03 ASSESSMENT — PAIN DESCRIPTION - ORIENTATION
ORIENTATION: LEFT;POSTERIOR
ORIENTATION: LEFT

## 2022-10-03 ASSESSMENT — ENCOUNTER SYMPTOMS
COUGH: 0
CONSTIPATION: 0
NAUSEA: 0
COLOR CHANGE: 0
SINUS PRESSURE: 0
SINUS PAIN: 0
BACK PAIN: 0
EYE REDNESS: 0
SHORTNESS OF BREATH: 0
CHEST TIGHTNESS: 0
VOMITING: 0
EYE PAIN: 0
ABDOMINAL PAIN: 0
DIARRHEA: 0

## 2022-10-03 ASSESSMENT — PAIN DESCRIPTION - LOCATION
LOCATION: LEG
LOCATION: LEG

## 2022-10-03 NOTE — CARE COORDINATION
GAURAV transition of care. Pt presented to [facility] ER secondary to leg pain. Pt has chronic wounds and follows at the wound care clinic once a week. Pt admitted with the wounds. Vascular and ID on consult. Vancomycin and cefepime ordered. Met with pt at bedside to discuss d/c planning. Pt lives alone in 2 story home, 5 steps to enter, with bed and bath on second floor. Pt ciaran periodically stays with her. She report she is independent with ADLs and is actively driving. She recently received a lymphedema pump. She is active with [facility]. Pt is currently requiring supplemental oxygen that she does not have at home. If needed at d/c pt would like to use [facility] DME. PCP is Dr Denver Cason and uses [employer] mail order meds and [facility] on [address] for short term meds. Pt plans to d/c to home with [facility] when medically stable. Will need SARAH orders. Pt will get an uber at d/c.  CM/SW to follow. Samina Barton RN CM.

## 2022-10-03 NOTE — CONSULTS
Chief Complaint: Patient seen for evaluation of left leg ulcers      HPI: This patient, known to Dr. Dawn Dorsey, nonhealing wound over the shin of the left leg, of longstanding duration due to chronic venous sufficiency, tells me that she has leaky valves in the veins but has satisfactory artery circulation, does follow-up with Dr. Dawn Dorsey the wound care center but did not see him last week    Patient developed an area of tenderness and ulceration skin breakdown of the posterior asp left calf, because of pain, with the purulent drainage, and patient came to the emergency room because of increased erythema as well and swelling, did undergo venous ultrasound study, revealed no evidence of deep vein thrombosis, CT scan of the tibia and fibula revealed no evidence of osteomyelitis    Patient complains of generalized weakness, chilly feeling    Denies any history of chest pain, palpitation or shortness of breath      Patient denies any focal lateralizing neurological symptoms like loss of speech, vision or loss of function of extremity    Patient can walk a few steps at a time at the maximum, has severe osteoarthritis limiting her ambulation of the knee joints, and denies any symptoms of rest pain    Allergies   Allergen Reactions    Codeine Nausea And Vomiting and Other (See Comments)     hallucinate    Dilaudid [Hydromorphone Hcl] Rash    Naproxen Other (See Comments)     Shuts down kidney function    Singulair [Montelukast Sodium] Shortness Of Breath     Mucus in chest    Black Cohosh     Black Cohosh [Cimicifuga Racemosa (Black Cohosh)] Rash       Current Facility-Administered Medications   Medication Dose Route Frequency Provider Last Rate Last Admin    albuterol (PROVENTIL) nebulizer solution 2.5 mg  2.5 mg Nebulization Q6H PRN Lisa Esteves MD        aspirin chewable tablet 81 mg  81 mg Oral Daily Lisa Esteves MD   81 mg at 10/03/22 0839    ferrous sulfate (IRON 325) tablet 325 mg  325 mg Oral Nightly Josi Wallace MD        folic acid (FOLVITE) tablet 500 mcg  500 mcg Oral Nightly Josi Wallace MD        gabapentin (NEURONTIN) capsule 300 mg  300 mg Oral TID Josi Wallace MD   300 mg at 10/03/22 0839    metoprolol tartrate (LOPRESSOR) tablet 50 mg  50 mg Oral BID Josi Wallace MD   50 mg at 10/03/22 0843    morphine (MS CONTIN) extended release tablet 15 mg  15 mg Oral BID Josi Wallace MD   15 mg at 10/03/22 0843    pantoprazole (PROTONIX) tablet 40 mg  40 mg Oral QAM AC Josi Wallace MD   40 mg at 10/03/22 0839    atorvastatin (LIPITOR) tablet 20 mg  20 mg Oral Daily Josi Wallace MD   20 mg at 10/03/22 3850    zinc sulfate (ZINCATE) capsule 50 mg  50 mg Oral Daily Josi Wallace MD        cefepime (MAXIPIME) 2,000 mg in sodium chloride 0.9 % 50 mL IVPB ([id number])  2,000 mg IntraVENous Q12H Josi Wallace MD        sodium chloride flush 0.9 % injection 5-40 mL  5-40 mL IntraVENous 2 times per day Josi Wallace MD   10 mL at 10/03/22 0845    sodium chloride flush 0.9 % injection 5-40 mL  5-40 mL IntraVENous PRN Josi Wallace MD        0.9 % sodium chloride infusion   IntraVENous PRN Josi Wallace MD        potassium chloride (KLOR-CON M) extended release tablet 40 mEq  40 mEq Oral PRN Josi Wallace MD        Or    potassium bicarb-citric acid (EFFER-K) effervescent tablet 40 mEq  40 mEq Oral PRN Josi Wallace MD        Or    potassium chloride 10 mEq/100 mL IVPB (Peripheral Line)  10 mEq IntraVENous PRN Josi Wallace MD        enoxaparin Sodium (LOVENOX) injection 30 mg  30 mg SubCUTAneous BID Josi Wallace MD   30 mg at 10/03/22 0839    ondansetron (ZOFRAN-ODT) disintegrating tablet 4 mg  4 mg Oral Q8H PRN Josi Wallace MD        Or    ondansetron [facility]) injection 4 mg  4 mg IntraVENous Q6H PRN Josi Wallace MD        polyethylene glycol (GLYCOLAX) packet 17 g  17 g Oral Daily PRN Josi Wallace MD        acetaminophen (TYLENOL) tablet 650 mg  650 mg Oral Q6H PRN Benoit Catalan MD Mayda   650 mg at 10/03/22 0546    Or    acetaminophen (TYLENOL) suppository 650 mg  650 mg Rectal Q6H PRN Ty Walls MD        aluminum & magnesium hydroxide-simethicone (MAALOX) 200-200-20 MG/5ML suspension 30 mL  30 mL Oral Q6H PRN Ty Walls MD        morphine (PF) injection 2 mg  2 mg IntraVENous Q4H PRN Ty Walls MD        sodium chloride flush 0.9 % injection 10 mL  10 mL IntraVENous PRN Yakov Burr DO        oxyCODONE-acetaminophen (PERCOCET) 5-325 MG per tablet 1 tablet  1 tablet Oral Q12H PRN Ty Walls MD        And    oxyCODONE (ROXICODONE) immediate release tablet 2.5 mg  2.5 mg Oral Q12H PRN Ty Walls MD        vancomycin 1500 mg in dextrose 5% 300 mL IVPB  1,500 mg IntraVENous Q18H Ty Walls MD         Current Outpatient Medications   Medication Sig Dispense Refill    Cyanocobalamin (VITAMIN B 12) 500 MCG TABS Take by mouth daily      Multiple Vitamins-Minerals (THERAPEUTIC MULTIVITAMIN-MINERALS) tablet Take 1 tablet by mouth daily      zinc gluconate 50 MG tablet Take 50 mg by mouth daily      GENISTEIN PO Take 125 mg by mouth nightly      folic acid (FOLVITE) 269 MCG tablet Take 400 mcg by mouth nightly      Krill Oil 350 MG CAPS Take 350 mg by mouth nightly      gabapentin (NEURONTIN) 300 MG capsule Take 300 mg by mouth 3 times daily. simvastatin (ZOCOR) 40 MG tablet Take 40 mg by mouth nightly      oxyCODONE-acetaminophen (PERCOCET) 7.5-325 MG per tablet Take 1 tablet by mouth 2 times daily. Sallyanne Chain morphine (MS CONTIN) 15 MG extended release tablet Take 15 mg by mouth 2 times daily.        aspirin 81 MG tablet Take 81 mg by mouth daily      Cholecalciferol (VITAMIN D) 2000 UNITS CAPS capsule Take 2,000 Units by mouth daily       ferrous sulfate 325 (65 FE) MG tablet Take 325 mg by mouth nightly       metoprolol (LOPRESSOR) 50 MG tablet Take 1 tablet by mouth 2 times daily 60 tablet 0    albuterol (PROVENTIL HFA;VENTOLIN HFA) 108 (90 BASE) MCG/ACT inhaler Inhale 2 puffs into the lungs every 6 hours as needed for Wheezing or Shortness of Breath       calcium carbonate (OYSTER SHELL CALCIUM 500 MG) 1250 MG tablet Take 2 tablets by mouth daily      Ascorbic Acid (VITAMIN C) 500 MG tablet Take 2,000 mg by mouth daily      omeprazole (PRILOSEC) 40 MG delayed release capsule Take 40 mg by mouth daily. diphenhydrAMINE (BENADRYL) 50 MG capsule Take 50 mg by mouth daily as needed for Allergies       Garlic 509 MG TABS Take 1,000 mg by mouth daily          Past Medical History:   Diagnosis Date    Bursitis     CAD (coronary artery disease) 1993    heart attack    COPD (chronic obstructive pulmonary disease) ([facility] 6/19/2013    Emphysema     slight    GERD (gastroesophageal reflux disease)     Hiatal hernia     Hip pain     Hyperlipidemia     Hypertension     Lymphedema of both lower extremities 11/21/2018    Venous insufficiency of both lower extremities 11/21/2018    Venous stasis ulcer of left calf with fat layer exposed without varicose veins ([facility] [address] 12/8/2021    [facility] 10/28/2015       Past Surgical History:   Procedure Laterality Date    APPENDECTOMY      BREAST ENHANCEMENT SURGERY      BREAST REDUCTION SURGERY      CHOLECYSTECTOMY      COLONOSCOPY      ECHO COMPL W DOP COLOR FLOW  3/11/2013         ENDOSCOPY, COLON, DIAGNOSTIC      HERNIA REPAIR N/A 4/16/2021    LAPAROSCOPIC ROBOTIC ASSISTED INGUINAL HERNIA REPAIR performed by Shayla Carmichael MD at [address] [facility] ([address] [place] [address])      JOINT REPLACEMENT  2009 2011    l knee r hip    LEG DEBRIDEMENT Left 11/18/2015    LEG DEBRIDEMENT Left 08/03/2016       History reviewed. No pertinent family history.     Social History     Socioeconomic History    Marital status:      Spouse name: Not on file    Number of children: Not on file    Years of education: Not on file    Highest education level: Not on file   Occupational History    Not on file Tobacco Use    Smoking status: Former     Packs/day: 1.00     Years: 20.00     Pack years: 20.00     Types: Cigarettes     Quit date: 1995     Years since quittin.9    Smokeless tobacco: Never    Tobacco comments:     Quit   Vaping Use    Vaping Use: Never used   Substance and Sexual Activity    Alcohol use: No    Drug use: No    Sexual activity: Not Currently   Other Topics Concern    Not on file   Social History Narrative    Not on file     Social Determinants of Health     Financial Resource Strain: Not on file   Food Insecurity: Not on file   Transportation Needs: Not on file   Physical Activity: Not on file   Stress: Not on file   Social Connections: Not on file   Intimate Partner Violence: Not on file   Housing Stability: Not on file       Review of Systems:  Skin:  No abnormal pigmentation or rash. Eyes:  No blurring, diplopia or vision loss. Ears/Nose/Throat:  No hearing loss or vertigo. Respiratory:  No cough, pleuritic chest pain, dyspnea, or wheezing. Chronic obstructive lung disease    Cardiovascular: No angina, palpitations . Coronary artery disease, hypertension, hyperlipidemia    Gastrointestinal:  No nausea or vomiting; no abdominal pain or rectal bleeding. GERD, hiatal hernia    Musculoskeletal:  No arthritis or weakness. Generalized osteoarthritis particular of the knee joints, nonhealing ulcers particularly left leg, currently following up with Dr. Eric Norton at the wound care center    Neurologic:  No paralysis, paresis, seizures or headaches. Hematologic/Lymphatic/Immunologic:  No anemia, abnormal bleeding/bruising. Endocrine:  No heat or cold intolerance. No polyphagia, polydipsia or polyuria. Physical Exam:  /63   Pulse 75   Temp 99.2 °F (37.3 °C) (Oral)   Resp 18   Ht 5' 9\" (1.753 m)   Wt 290 lb (131.5 kg)   SpO2 94%   BMI 42.83 kg/m²   General appearance:  Alert, awake, oriented x 3.   No distress.,  Supplemental oxygen, patient tells me she is not on oxygen at home  Skin:  Warm and dry. Head:  Normocephalic. No masses, lesions or tenderness. Eyes:  Conjunctivae appear normal; PERRL. Ears:  External ears normal.  Nose/Sinuses:  Septum midline, mucosa normal; no drainage. Oropharynx:  Clear, no exudate noted. Neck:  No jugular venous distention, lymphadenopathy or thyromegaly. No evidence of carotid bruit      Lungs:  Clear to ausculation bilaterally. No rhonchi, crackles, wheezes. Heart:  Regular rate and rhythm. No rub or murmur. .    Abdomen:  Soft, non-tender. No masses, organomegaly. Musculoskeletal: No joint effusions, tenderness swelling or warmth. Neuro: Speech is intact. Moving all extremities. No focal motor or sensory deficits. Extremities:  Both feet are warm to touch. The color of both feet is normal.    Patient does have severe osteoarthritis of both knee joints    Swelling of both legs, left leg more than right leg noted due to combination lymphedema, venous insufficiency, with tenderness into light touch particularly over the posterior aspect left calf    Patient does have ulcer of the shin of the left leg with some exudate, also has ulcer, with the areas of skin breakdown minimal fat layer exposed over the posterior asp left calf there is some bogginess but no actual abscess and the clinical examination, but tenderness noted    Erythema with generalized tenderness noticed of the left leg mainly below the knee due to cellulitis    Good dorsalis pedis pulses noted bilaterally      Pulses Right  Left    Brachial 3 3    Radial    3=normal   Femoral 2 2  2=diminished   Popliteal    1=barely palpable   Dorsalis pedis 2-3 2-3  0=absent   Posterior tibial    4=aneurysmal           Other pertinent information:1. The past medical records were reviewed.     2.    Lab Results   Component Value Date    WBC 8.6 10/02/2022    HGB 11.5 10/02/2022    HCT 36.1 10/02/2022    .8 (H) 10/02/2022     10/02/2022 Lab Results   Component Value Date     10/03/2022    K 3.6 10/03/2022    CL 97 (L) 10/03/2022    CO2 28 10/03/2022    BUN 6 10/03/2022    CREATININE 0.7 10/03/2022    GLUCOSE 130 (H) 10/03/2022    CALCIUM 8.9 10/03/2022    PROT 7.1 10/03/2022    LABALBU 2.7 (L) 10/03/2022    BILITOT 0.5 10/03/2022    ALKPHOS 156 (H) 10/03/2022    AST 38 (H) 10/03/2022    ALT 29 10/03/2022    LABGLOM >60 10/03/2022    GFRAA >60 10/03/2022     Lab Results   Component Value Date    APTT 30.4 10/02/2022      Lab Results   Component Value Date    INR 1.3 10/02/2022    INR 1.0 08/03/2016    INR 1.0 04/20/2016    PROTIME 14.0 (H) 10/02/2022    PROTIME 10.8 08/03/2016    PROTIME 11.1 04/20/2016        3. US DUP LOWER EXTREMITY LEFT YAMINI   Final Result   Within the visualized vessels there is no evidence for deep venous   thrombosis               CT TIBIA FIBULA LEFT W WO CONTRAST   Final Result   Diffuse subcutaneous edema with skin thickening. No drainable fluid   collection. XR TIBIA FIBULA LEFT (2 VIEWS)   Final Result   No acute osseous abnormality is identified in the lower leg. XR CHEST PORTABLE   Final Result   No acute process. 4.  The ER notes, the history and physical were reviewed    5. Dr. Zena Litten wound care nurse was reviewed    6. Last several venous ultrasound studies, revealed no evidence of deep vein thrombosis    Venous ultrasound with reflux done in 2015, competent saphenofemoral junction with no reflux but did have incompetent perforators,     The ankle-brachial knitter was done at that time was normal     7. CT scan of the left leg tibia and fibula revealed no evidence of osteomyelitis and no evidence of abscess    Assessment:    1. Cellulitis left leg, with infected wound over the shin of the left leg with new ulcer of the posterior aspect left calf    2.   No evidence of deep vein thrombosis on the venous ultrasound study that was done today and no evidence of abscess on the CT scan of the two-bedroom was done today    3. Satisfactory artery circulation with palpable dorsalis pedis pulses    4.   Multiple comorbid risk factors including coronary artery disease, hypertension, hyperlipidemia, chronic obstructive pulm disease, lymphedema, chronic venous insufficiency, generalized osteoarthritis      Patient Active Problem List   Diagnosis    COPD (chronic obstructive pulmonary disease) (HCC)    Essential hypertension, benign    Hyperlipidemia with target LDL less than 100    GERD (gastroesophageal reflux disease)    Diastolic CHF, chronic (HCC)    Bursitis    Venous insufficiency of both lower extremities    Lymphedema of both lower extremities    Incarcerated hernia    Venous stasis ulcer of left calf with fat layer exposed without varicose veins (HCC)    Sepsis due to cellulitis (HCC)    Wounds, multiple open, lower extremity, left, initial encounter            Plan:           Discussed in detail with the patient regarding all options, risks benefits and alternatives    Inform her, clinically she has evidence of infection, with new ulceration of the posterior left calf but no abscess to be drained at the present time, recommend local wound care with Aquacel Ag, 4 x 4, Kerlix and compression sleeve, antibiotics per the ID consultants that are already consulted    As the patient is still concerned about her vascular status inform her the venous ultrasound was normal, will document an ankle-brachial index to alleviate her anxiety regarding the status of the arterial circulation    Inform the patient that I will notify Dr. Codie Mcmahon, who will see the patient tomorrow and make additional recommendations    All her questions were answered        Electronically signed by Elissa Delgado MD on 10/3/2022 at 12:09 PM

## 2022-10-03 NOTE — PROGRESS NOTES
Patient has been admitted earlier today by colleague. I agree with assessment and plan for admission. Surgery had seen the patient and stated no left calf abscess to be drained, recommended wound care.   ID on board for antibiotics

## 2022-10-03 NOTE — ACP (ADVANCE CARE PLANNING)
Advance Care Planning   Healthcare Decision Maker:    Primary Decision Maker: Jas Armendariz - [phone]    Click here to complete Healthcare Decision Makers including selection of the Healthcare Decision Maker Relationship (ie \"Primary\"). Today we documented Decision Maker(s) consistent with Legal Next of Kin hierarchy.        If the relationship to the patient does NOT follow our state's Next of Kin hierarchy, the patient MUST complete an ACP Document to allow him/her to act on the patient's behalf.: # lluvia reticular non-blanching rash from upper thighs to knees b/l  - f/u outpatient w/ derm  - no change on exam Home meds: lantus 45, humalog 40 TID  Plan:  - lantus 25 at night  - sliding scale coverage for now    #Diarrhea  PLAN:  - c/w PO diet DASH/TLC  - c/w 2mg loperamide Q3hrs PRN as pt was having diarrhea   - diarrhea improved while on loperamide

## 2022-10-03 NOTE — ED NOTES
Patient placed on tele monitor with VS updated. Antibiotics initiated.       Winsome Varghese RN  10/03/22 3843

## 2022-10-03 NOTE — PROGRESS NOTES
Pharmacy Consultation Note  (Antibiotic Dosing and Monitoring)    Initial consult date: 10-3-2022  Consulting physician/provider: Dr Mayito Lawrence  Drug: Vancomycin  Indication: Skin/Soft Tissue infection    Age/  Gender Height Weight IBW  Allergy Information   77 y.o./female 5' 9\" (175.3 cm) 290 lb (131.5 kg)     Ideal body weight: 66.2 kg (145 lb 15.1 oz)  Adjusted ideal body weight: 92.3 kg (203 lb 9.1 oz)   Codeine, Dilaudid [hydromorphone hcl], Naproxen, Singulair [montelukast sodium], Black cohosh, and Black cohosh [cimicifuga racemosa (black cohosh)]      Renal Function:  Recent Labs     10/02/22  2020   BUN 7   CREATININE 0.7       Intake/Output Summary (Last 24 hours) at 10/3/2022 0056  Last data filed at 10/3/2022 0035  Gross per 24 hour   Intake 54.44 ml   Output --   Net 54.44 ml       Vancomycin Monitoring:  Trough:  No results for input(s): VANCOTROUGH in the last 72 hours. Random:  No results for input(s): VANCORANDOM in the last 72 hours. No results for input(s): Letta Banco in the last 72 hours. Historical Cultures:  Organism   Date Value Ref Range Status   08/03/2022 Acinetobacter baumannii (A)  Final   08/03/2022 Staphylococcus coagulase-negative (A)  Final     No results for input(s): BC in the last 72 hours. Vancomycin Administration Times:  Recent vancomycin administrations                     vancomycin (VANCOCIN) 2,500 mg in dextrose 5 % 500 mL IVPB (mg) 2,500 mg New Bag 10/03/22 0038                    Assessment:  Patient is a 78 y.o. female who has been initiated on vancomycin  Estimated Creatinine Clearance: 101 mL/min (based on SCr of 0.7 mg/dL). Plan:   Will continue vancomycin 1500 mg IV every 12 hours  Will check vancomycin levels when appropriate  Will continue to monitor renal function   Pharmacy to follow      DEMETRIUS Reyes [facility] 10/3/2022 12:56 AM

## 2022-10-03 NOTE — ED NOTES
Communicated shift report to [address], [address] [place] [address]  10/02/22 1202

## 2022-10-03 NOTE — HOME CARE
Current with [facility] for SN. Will need SARAH orders prior to discharge if appropriate.   Candace Fuentes LPN, [facility]

## 2022-10-03 NOTE — H&P
Hospital Medicine History & Physical      PCP: Georgia Lopez MD    Date of Admission: 10/2/2022    Date of Service: Pt seen/examined on 10/3/2022 and Admitted to Inpatient with expected LOS greater than two midnights due to medical therapy. Chief Complaint:  left leg pain       History Of Present Illness:    78 y.o. female with past medical history of CAD COPD GERD HLD hypertension venous insufficiency . Patient presents to the Ed due to left leg pain . Patient has a chronic left calf wound since 6/2021 and follows with wound care  . She states that within the last weeks she has an ulceration on her left calf . She states that it is draining purulent fluid and it is painful . She repots fatigue fever chills no chest pain shortness of breath nausea ort vomiting. In the ED patient was febrile at 37.3  /104 93 % on RA Lab data reveal sodium 140 potassium 3.7 BUN 7 Scr 0.7 lactic acid 1.1 glucose 104 Trop 14 WBC 8.8 HGB 11.5  Alk phos 203 ALT 37 AST 44 CXR neg XR left tibula /fibula neg INR 1.3 . Patient received Vanc and cefpime in ED .  Patient will be admitted for further evaluation       Past Medical History:          Diagnosis Date    Bursitis     CAD (coronary artery disease) 1993    heart attack    COPD (chronic obstructive pulmonary disease) ([address] 6/19/2013    Emphysema     slight    GERD (gastroesophageal reflux disease)     Hiatal hernia     Hip pain     Hyperlipidemia     Hypertension     Lymphedema of both lower extremities 11/21/2018    Venous insufficiency of both lower extremities 11/21/2018    Venous stasis ulcer of left calf with fat layer exposed without varicose veins ([address] 12/8/2021    Venous ulcer with fat layer exposed ([address] 10/28/2015       Past Surgical History:          Procedure Laterality Date    APPENDECTOMY      BREAST ENHANCEMENT SURGERY      BREAST REDUCTION SURGERY      CHOLECYSTECTOMY      COLONOSCOPY      ECHO COMPL W DOP COLOR FLOW  3/11/2013 ENDOSCOPY, COLON, DIAGNOSTIC      HERNIA REPAIR N/A 4/16/2021    LAPAROSCOPIC ROBOTIC ASSISTED INGUINAL HERNIA REPAIR performed by Marisol Berman MD at [address] [facility] ([facility]      JOINT REPLACEMENT  2009 2011    l knee r hip    LEG DEBRIDEMENT Left 11/18/2015    LEG DEBRIDEMENT Left 08/03/2016       Medications Prior to Admission:      Prior to Admission medications    Medication Sig Start Date End Date Taking? Authorizing Provider   Cyanocobalamin (VITAMIN B 12) 500 MCG TABS Take by mouth daily    Historical Provider, MD   Multiple Vitamins-Minerals (THERAPEUTIC MULTIVITAMIN-MINERALS) tablet Take 1 tablet by mouth daily    Historical Provider, MD   zinc gluconate 50 MG tablet Take 50 mg by mouth daily    Historical Provider, MD   GENISTEIN PO Take 125 mg by mouth nightly    Historical Provider, MD   folic acid (FOLVITE) 358 MCG tablet Take 400 mcg by mouth nightly    Historical Provider, MD   Jose Carlos Carpenter Oil 350 MG CAPS Take 350 mg by mouth nightly    Historical Provider, MD   gabapentin (NEURONTIN) 300 MG capsule Take 300 mg by mouth 3 times daily. Historical Provider, MD   simvastatin (ZOCOR) 40 MG tablet Take 40 mg by mouth nightly    Historical Provider, MD   oxyCODONE-acetaminophen (PERCOCET) 7.5-325 MG per tablet Take 1 tablet by mouth 2 times daily. Gricelda Araujo Historical Provider, MD   morphine (MS CONTIN) 15 MG extended release tablet Take 15 mg by mouth 2 times daily.      Historical Provider, MD   aspirin 81 MG tablet Take 81 mg by mouth daily    Historical Provider, MD   Cholecalciferol (VITAMIN D) 2000 UNITS CAPS capsule Take 2,000 Units by mouth daily     Historical Provider, MD   ferrous sulfate 325 (65 FE) MG tablet Take 325 mg by mouth nightly     Historical Provider, MD   metoprolol (LOPRESSOR) 50 MG tablet Take 1 tablet by mouth 2 times daily 3/3/16   Mercedes Guerin PA-C   albuterol (PROVENTIL HFA;VENTOLIN HFA) 108 (90 BASE) MCG/ACT inhaler Inhale 2 puffs Rales/Wheezes/Rhonchi. Cardiovascular:  Regular rate and rhythm with normal S1/S2 without murmurs, rubs or gallops. Abdomen: Soft, non-tender, non-distended with normal bowel sounds. Musculoskeletal:  No clubbing, cyanosis or edema bilaterally. Full range of motion without deformity. Skin:   open wounds left leg ulcer to back of calf  swelling and erythema left leg purulent ulceration to posterior left calf . Neurologic:  Neurovascularly intact without any focal sensory/motor deficits. Cranial nerves: II-XII intact, grossly non-focal.  Psychiatric:  Alert and oriented, thought content appropriate, normal insight    C  Labs:     Recent Labs     10/02/22  2020   WBC 8.6   HGB 11.5   HCT 36.1        Recent Labs     10/02/22  2020      K 3.7   CL 98   CO2 29   BUN 7   CREATININE 0.7   CALCIUM 9.9     Recent Labs     10/02/22  2020   AST 44*   ALT 37*   BILITOT 0.7   ALKPHOS 203*     Recent Labs     10/02/22  2020   INR 1.3     No results for input(s): Ten Rodriguez in the last 72 hours. Urinalysis:      Lab Results   Component Value Date/Time    NITRU Negative 10/02/2022 10:07 PM    BLOODU Negative 10/02/2022 10:07 PM    SPECGRAV 1.015 10/02/2022 10:07 PM    GLUCOSEU Negative 10/02/2022 10:07 PM     XR tib/fib     No acute osseous abnormality is identified in the lower leg. ASSESSMENT:    Active Hospital Problems    Diagnosis Date Noted    [facility] [L03.90, A41.9] 10/03/2022     Priority: Medium    Wounds, multiple open, lower extremity, left, initial encounter Elena Godoy 10/03/2022     Priority: Medium       PLAN:  Left lower extremity cellulitis with ulcerations:  patient has a  history of venous stasis ulceration and has has a left calf wound since 6/2021  lactic acid 1.1 WBC 8.8 XR tib fib revealed no acute pathology  wound culture 8/3 revealed coag neg staph and acinetobacter baumanni  .  Admit inpatient vitals telemetry Vanc and cefepime blood cultures wound cultures ID and vascular surgery consult      Transaminitis :alk phos 203 ALT 37 AST 44     Hypertension:  BP was elevated in Ed likely due to pain C/w  Lopressor     CAD : ASA Lipitor     Hyperlipidemia :Lipitor         DVT Prophylaxis: Lovenix   Diet: ADULT DIET;  Regular; Low Fat/Low Chol/High Fiber/MARIBEL  Code Status: Full Code    PT/OT Eval Status: ordered     Dispo - pending clinical course        Ryan Camarena MD

## 2022-10-03 NOTE — ED NOTES
Pt changed into gown and cleaned up.  Placed in transport     Etta, [address] [place] [address]  10/03/22 1943

## 2022-10-03 NOTE — PROGRESS NOTES
Pharmacy Consultation Note  (Antibiotic Dosing and Monitoring)    Initial consult date: 10-3-2022  Consulting physician/provider: Dr. Alberto Perez  Drug: Vancomycin  Indication: ABSSSI; LLE cellulitis/purulent drainage. Age/  Gender Height Weight IBW  Allergy Information   77 y.o./female 5' 9\" (175.3 cm) 290 lb (131.5 kg)     Ideal body weight: 66.2 kg (145 lb 15.1 oz)  Adjusted ideal body weight: 92.3 kg (203 lb 9.1 oz)   Codeine, Dilaudid [hydromorphone hcl], Naproxen, Singulair [montelukast sodium], Black cohosh, and Black cohosh [cimicifuga racemosa (black cohosh)]      Renal Function:  Recent Labs     10/02/22  2020 10/03/22  0755   BUN 7 6   CREATININE 0.7 0.7       Intake/Output Summary (Last 24 hours) at 10/3/2022 1239  Last data filed at 10/3/2022 0035  Gross per 24 hour   Intake 54.44 ml   Output --   Net 54.44 ml       Vancomycin Monitoring:  Trough:  No results for input(s): VANCOTROUGH in the last 72 hours. Random:  No results for input(s): VANCORANDOM in the last 72 hours. No results for input(s): Ghassan Pablito in the last 72 hours. Historical Cultures:  Organism   Date Value Ref Range Status   08/03/2022 Acinetobacter baumannii (A)  Final   08/03/2022 Staphylococcus coagulase-negative (A)  Final     No results for input(s): BC in the last 72 hours. Vancomycin Administration Times:  Recent vancomycin administrations                     vancomycin (VANCOCIN) 2,500 mg in dextrose 5 % 500 mL IVPB (mg) 2,500 mg New Bag 10/03/22 0038                    Assessment:  78 yo/F, admitted for worsening chronic L calf wound (swelling, erythema, purulent drainage). Empiric ATBs (vanco and cefepime) started in ED. Received vancomycin 2500 mg x1 on 10/3 @ 0038. ID has been consulted. Estimated Creatinine Clearance: 101 mL/min (based on SCr of 0.7 mg/dL).   To dose vancomycin, pharmacy will be utilizing [facility] calculation software for goal AUC/CRUZ 400-600 mg/L-hr    Plan:  Change vancomycin to 1500 mg

## 2022-10-04 ENCOUNTER — APPOINTMENT (OUTPATIENT)
Dept: MRI IMAGING | Age: 77
DRG: 872 | End: 2022-10-04
Payer: MEDICARE

## 2022-10-04 LAB
ALBUMIN SERPL-MCNC: 2.6 G/DL (ref 3.5–5.2)
ALP BLD-CCNC: 154 U/L (ref 35–104)
ALT SERPL-CCNC: 29 U/L (ref 0–32)
ANION GAP SERPL CALCULATED.3IONS-SCNC: 10 MMOL/L (ref 7–16)
AST SERPL-CCNC: 31 U/L (ref 0–31)
BASOPHILS ABSOLUTE: 0.02 E9/L (ref 0–0.2)
BASOPHILS RELATIVE PERCENT: 0.2 % (ref 0–2)
BILIRUB SERPL-MCNC: 0.3 MG/DL (ref 0–1.2)
BUN BLDV-MCNC: 9 MG/DL (ref 6–23)
C-REACTIVE PROTEIN: 16.5 MG/DL (ref 0–0.4)
CALCIUM SERPL-MCNC: 9 MG/DL (ref 8.6–10.2)
CHLORIDE BLD-SCNC: 100 MMOL/L (ref 98–107)
CO2: 28 MMOL/L (ref 22–29)
CREAT SERPL-MCNC: 0.7 MG/DL (ref 0.5–1)
EOSINOPHILS ABSOLUTE: 0.23 E9/L (ref 0.05–0.5)
EOSINOPHILS RELATIVE PERCENT: 2.7 % (ref 0–6)
GFR AFRICAN AMERICAN: >60
GFR NON-AFRICAN AMERICAN: >60 ML/MIN/1.73
GLUCOSE BLD-MCNC: 124 MG/DL (ref 74–99)
HCT VFR BLD CALC: 32.5 % (ref 34–48)
HEMOGLOBIN: 9.9 G/DL (ref 11.5–15.5)
IMMATURE GRANULOCYTES #: 0.03 E9/L
IMMATURE GRANULOCYTES %: 0.4 % (ref 0–5)
LACTIC ACID: 0.9 MMOL/L (ref 0.5–2.2)
LYMPHOCYTES ABSOLUTE: 0.87 E9/L (ref 1.5–4)
LYMPHOCYTES RELATIVE PERCENT: 10.2 % (ref 20–42)
MCH RBC QN AUTO: 31.8 PG (ref 26–35)
MCHC RBC AUTO-ENTMCNC: 30.5 % (ref 32–34.5)
MCV RBC AUTO: 104.5 FL (ref 80–99.9)
MONOCYTES ABSOLUTE: 0.89 E9/L (ref 0.1–0.95)
MONOCYTES RELATIVE PERCENT: 10.5 % (ref 2–12)
NEUTROPHILS ABSOLUTE: 6.47 E9/L (ref 1.8–7.3)
NEUTROPHILS RELATIVE PERCENT: 76 % (ref 43–80)
PDW BLD-RTO: 11.8 FL (ref 11.5–15)
PLATELET # BLD: 255 E9/L (ref 130–450)
PMV BLD AUTO: 9.6 FL (ref 7–12)
POTASSIUM SERPL-SCNC: 4.4 MMOL/L (ref 3.5–5)
RBC # BLD: 3.11 E12/L (ref 3.5–5.5)
SEDIMENTATION RATE, ERYTHROCYTE: 127 MM/HR (ref 0–20)
SODIUM BLD-SCNC: 138 MMOL/L (ref 132–146)
TOTAL PROTEIN: 7.1 G/DL (ref 6.4–8.3)
WBC # BLD: 8.5 E9/L (ref 4.5–11.5)

## 2022-10-04 PROCEDURE — 6360000002 HC RX W HCPCS: Performed by: FAMILY MEDICINE

## 2022-10-04 PROCEDURE — 86140 C-REACTIVE PROTEIN: CPT

## 2022-10-04 PROCEDURE — 85651 RBC SED RATE NONAUTOMATED: CPT

## 2022-10-04 PROCEDURE — 2500000003 HC RX 250 WO HCPCS: Performed by: INTERNAL MEDICINE

## 2022-10-04 PROCEDURE — 2580000003 HC RX 258: Performed by: FAMILY MEDICINE

## 2022-10-04 PROCEDURE — 80053 COMPREHEN METABOLIC PANEL: CPT

## 2022-10-04 PROCEDURE — 97161 PT EVAL LOW COMPLEX 20 MIN: CPT

## 2022-10-04 PROCEDURE — 36415 COLL VENOUS BLD VENIPUNCTURE: CPT

## 2022-10-04 PROCEDURE — 6370000000 HC RX 637 (ALT 250 FOR IP): Performed by: FAMILY MEDICINE

## 2022-10-04 PROCEDURE — 85025 COMPLETE CBC W/AUTO DIFF WBC: CPT

## 2022-10-04 PROCEDURE — 97530 THERAPEUTIC ACTIVITIES: CPT

## 2022-10-04 PROCEDURE — 99231 SBSQ HOSP IP/OBS SF/LOW 25: CPT

## 2022-10-04 PROCEDURE — 2060000000 HC ICU INTERMEDIATE R&B

## 2022-10-04 PROCEDURE — 73718 MRI LOWER EXTREMITY W/O DYE: CPT

## 2022-10-04 PROCEDURE — 83605 ASSAY OF LACTIC ACID: CPT

## 2022-10-04 RX ORDER — METRONIDAZOLE 500 MG/100ML
500 INJECTION, SOLUTION INTRAVENOUS EVERY 8 HOURS
Status: DISCONTINUED | OUTPATIENT
Start: 2022-10-04 | End: 2022-10-06

## 2022-10-04 RX ADMIN — FERROUS SULFATE TAB 325 MG (65 MG ELEMENTAL FE) 325 MG: 325 (65 FE) TAB at 20:22

## 2022-10-04 RX ADMIN — SODIUM CHLORIDE, PRESERVATIVE FREE 10 ML: 5 INJECTION INTRAVENOUS at 20:22

## 2022-10-04 RX ADMIN — CEFEPIME 2000 MG: 2 INJECTION, POWDER, FOR SOLUTION INTRAVENOUS at 01:48

## 2022-10-04 RX ADMIN — OXYCODONE 2.5 MG: 5 TABLET ORAL at 21:53

## 2022-10-04 RX ADMIN — METOPROLOL TARTRATE 50 MG: 50 TABLET, FILM COATED ORAL at 20:22

## 2022-10-04 RX ADMIN — OXYCODONE 2.5 MG: 5 TABLET ORAL at 13:50

## 2022-10-04 RX ADMIN — Medication 50 MG: at 09:21

## 2022-10-04 RX ADMIN — SODIUM CHLORIDE, PRESERVATIVE FREE 10 ML: 5 INJECTION INTRAVENOUS at 09:27

## 2022-10-04 RX ADMIN — MORPHINE SULFATE 15 MG: 15 TABLET, FILM COATED, EXTENDED RELEASE ORAL at 20:21

## 2022-10-04 RX ADMIN — OXYCODONE AND ACETAMINOPHEN 1 TABLET: 5; 325 TABLET ORAL at 06:50

## 2022-10-04 RX ADMIN — VANCOMYCIN HYDROCHLORIDE 1750 MG: 10 INJECTION, POWDER, LYOPHILIZED, FOR SOLUTION INTRAVENOUS at 09:33

## 2022-10-04 RX ADMIN — ATORVASTATIN CALCIUM 20 MG: 20 TABLET, FILM COATED ORAL at 09:21

## 2022-10-04 RX ADMIN — ENOXAPARIN SODIUM 30 MG: 100 INJECTION SUBCUTANEOUS at 20:21

## 2022-10-04 RX ADMIN — ASPIRIN 81 MG 81 MG: 81 TABLET ORAL at 09:21

## 2022-10-04 RX ADMIN — METRONIDAZOLE 500 MG: 5 INJECTION, SOLUTION INTRAVENOUS at 13:53

## 2022-10-04 RX ADMIN — GABAPENTIN 300 MG: 300 CAPSULE ORAL at 20:21

## 2022-10-04 RX ADMIN — CEFEPIME 2000 MG: 2 INJECTION, POWDER, FOR SOLUTION INTRAVENOUS at 17:08

## 2022-10-04 RX ADMIN — PANTOPRAZOLE SODIUM 40 MG: 40 TABLET, DELAYED RELEASE ORAL at 06:38

## 2022-10-04 RX ADMIN — MORPHINE SULFATE 2 MG: 2 INJECTION, SOLUTION INTRAMUSCULAR; INTRAVENOUS at 17:05

## 2022-10-04 RX ADMIN — OXYCODONE AND ACETAMINOPHEN 1 TABLET: 5; 325 TABLET ORAL at 21:53

## 2022-10-04 RX ADMIN — ENOXAPARIN SODIUM 30 MG: 100 INJECTION SUBCUTANEOUS at 09:21

## 2022-10-04 RX ADMIN — FOLIC ACID 500 MCG: 1 TABLET ORAL at 20:22

## 2022-10-04 RX ADMIN — MORPHINE SULFATE 15 MG: 15 TABLET, FILM COATED, EXTENDED RELEASE ORAL at 09:21

## 2022-10-04 RX ADMIN — METOPROLOL TARTRATE 50 MG: 50 TABLET, FILM COATED ORAL at 09:21

## 2022-10-04 RX ADMIN — MORPHINE SULFATE 2 MG: 2 INJECTION, SOLUTION INTRAMUSCULAR; INTRAVENOUS at 11:09

## 2022-10-04 RX ADMIN — GABAPENTIN 300 MG: 300 CAPSULE ORAL at 09:20

## 2022-10-04 RX ADMIN — METRONIDAZOLE 500 MG: 5 INJECTION, SOLUTION INTRAVENOUS at 20:27

## 2022-10-04 RX ADMIN — ACETAMINOPHEN 650 MG: 325 TABLET, FILM COATED ORAL at 13:50

## 2022-10-04 RX ADMIN — GABAPENTIN 300 MG: 300 CAPSULE ORAL at 17:00

## 2022-10-04 RX ADMIN — VANCOMYCIN HYDROCHLORIDE 1000 MG: 1 INJECTION, POWDER, LYOPHILIZED, FOR SOLUTION INTRAVENOUS at 22:54

## 2022-10-04 ASSESSMENT — PAIN DESCRIPTION - LOCATION
LOCATION: LEG
LOCATION: LEG
LOCATION: BACK;LEG
LOCATION: LEG

## 2022-10-04 ASSESSMENT — PAIN DESCRIPTION - ORIENTATION
ORIENTATION: LEFT
ORIENTATION: LEFT;POSTERIOR
ORIENTATION: LEFT
ORIENTATION: LEFT;POSTERIOR

## 2022-10-04 ASSESSMENT — PAIN SCALES - GENERAL
PAINLEVEL_OUTOF10: 8
PAINLEVEL_OUTOF10: 10
PAINLEVEL_OUTOF10: 10
PAINLEVEL_OUTOF10: 4
PAINLEVEL_OUTOF10: 10
PAINLEVEL_OUTOF10: 10
PAINLEVEL_OUTOF10: 5
PAINLEVEL_OUTOF10: 10
PAINLEVEL_OUTOF10: 8
PAINLEVEL_OUTOF10: 10
PAINLEVEL_OUTOF10: 5

## 2022-10-04 ASSESSMENT — PAIN DESCRIPTION - DESCRIPTORS
DESCRIPTORS: ACHING
DESCRIPTORS: ACHING
DESCRIPTORS: DISCOMFORT

## 2022-10-04 NOTE — CARE COORDINATION
Met with pt at bedside to verify discharge plan; pt offered SNF list d/t c/o inability to ambulate at home however, pt declined and verified that her plan is to return home at discharge with [facility], will need SARAH prior to discharge.

## 2022-10-04 NOTE — PROGRESS NOTES
OT SESSION ATTEMPT     Date:10/4/2022  Patient Name: Jeanine Payne  MRN: [id number]  : 1945  Room: [address] [place] [address]-A     Occupational therapy orders received/chart review completed and OT session attempted this date:    [] unavailable due to other medical staff currently with pt   [] on hold, await MRI/ neurosurgical recommendations. [] on hold per nursing staff secondary to lab / radiology results    [x] declined Occupational Therapy  this date due to increased LLE pain. Benefits of participation in therapy reviewed with pt. RN in room at time & made aware. [] off unit   [] Other:     Will reattempt OT eval at a later time/date.     Willie Rowell OTR/L; [id number]

## 2022-10-04 NOTE — PLAN OF CARE
Patient's chart updated to reflect:      . - HF care plan, HF education points and HF discharge instructions.  -Orders: 2 gram sodium diet, daily weights, I/O.  -PCP and/or Cardiologist appointment to be scheduled within 7 days of hospital discharge.  -History of HF, not primary admission Dx.   Patient admitted for treatment of ASSESSMENT:           Active Hospital Problems     Diagnosis Date Noted    [facility] [L03.90, A41.9] 10/03/2022       Priority: Medium    Wounds, multiple open, lower extremity, left, initial encounter Elena Godoy 10/03/2022       Priority: Rachel Berrios, RN RN, BSN  Heart Failure Navigator

## 2022-10-04 NOTE — PROGRESS NOTES
Pharmacy Consultation Note  (Antibiotic Dosing and Monitoring)    Initial consult date: 10-3-2022  Consulting physician/provider: Dr. Alberto Perez  Drug: Vancomycin  Indication: ABSSSI; LLE cellulitis/purulent drainage. Age/  Gender Height Weight IBW  Allergy Information   77 y.o./female 5' 9\" (175.3 cm) 290 lb (131.5 kg)     Ideal body weight: 66.2 kg (145 lb 15.1 oz)  Adjusted ideal body weight: 92.7 kg (204 lb 6.5 oz)   Codeine, Dilaudid [hydromorphone hcl], Naproxen, Singulair [montelukast sodium], Black cohosh, and Black cohosh [cimicifuga racemosa (black cohosh)]      Renal Function:  Recent Labs     10/02/22  2020 10/03/22  0755 10/04/22  0503   BUN 7 6 9   CREATININE 0.7 0.7 0.7       No intake or output data in the 24 hours ending 10/04/22 0723    Vancomycin Monitoring:  Trough:  No results for input(s): VANCOTROUGH in the last 72 hours. Random:  No results for input(s): VANCORANDOM in the last 72 hours. Recent Labs     10/02/22  2034   BLOODCULT2 24 Hours no growth       Historical Cultures:  Organism   Date Value Ref Range Status   08/03/2022 Acinetobacter baumannii (A)  Final   08/03/2022 Staphylococcus coagulase-negative (A)  Final     Recent Labs     10/02/22  2020   BC 24 Hours no growth     Vancomycin Administration Times:  Recent vancomycin administrations                     vancomycin (VANCOCIN) 2,500 mg in dextrose 5 % 500 mL IVPB (mg) 2,500 mg New Bag 10/03/22 0038               Assessment:  76 yo/F, admitted for worsening chronic L calf wound (swelling, erythema, purulent drainage). Empiric ATBs (vanco and cefepime) started in ED. Received vancomycin 2500 mg x1 on 10/3 @ 0038. ID has been consulted. Estimated Creatinine Clearance: 102 mL/min (based on SCr of 0.7 mg/dL). To dose vancomycin, pharmacy will be utilizing [employer] calculation software for goal AUC/CRUZ 400-600 mg/L-hr  10/4: Yesterday's vanco 1500 mg dose at 1800 was not given?     Plan:  Give vanco 1750 mg IV x1 this morning  Initiate vancomycin 1000 mg IV q12h: AUC/CRUZ = 527, Tr = 15.5 mCg/mL. Will check vancomycin levels when appropriate - tomorrow AM  Will continue to monitor renal function. Pharmacy to follow.     Bridgette Schwab, [facility] 10/4/2022 7:23 AM

## 2022-10-04 NOTE — PROGRESS NOTES
Vascular Surgery Progress Note    Pt is being seen in f/u today regarding L LE wound    Subjective  Pt s/e. Pt having a lot of pain in the left posterior calf. Pain medications helping. She was unable to walk for the past 2 weeks because the pain was so bad in the left calf. That is why she was not able to make her wound care appts.      Current Medications:    sodium chloride        albuterol, sodium chloride flush, sodium chloride, potassium chloride **OR** potassium alternative oral replacement **OR** potassium chloride, ondansetron **OR** ondansetron, polyethylene glycol, acetaminophen **OR** acetaminophen, aluminum & magnesium hydroxide-simethicone, morphine, sodium chloride flush, oxyCODONE-acetaminophen **AND** oxyCODONE    vancomycin  1,000 mg IntraVENous Q12H    vancomycin  1,750 mg IntraVENous Once    cefepime  2,000 mg IntraVENous Q12H    aspirin  81 mg Oral Daily    ferrous sulfate  325 mg Oral Nightly    folic acid  309 mcg Oral Nightly    gabapentin  300 mg Oral TID    metoprolol tartrate  50 mg Oral BID    morphine  15 mg Oral BID    pantoprazole  40 mg Oral QAM AC    atorvastatin  20 mg Oral Daily    zinc sulfate  50 mg Oral Daily    sodium chloride flush  5-40 mL IntraVENous 2 times per day    enoxaparin  30 mg SubCUTAneous BID      PHYSICAL EXAM:    BP (!) 110/54   Pulse 86   Temp 96.8 °F (36 °C) (Temporal)   Resp 18   Ht 5' 9\" (1.753 m)   Wt 292 lb 1.6 oz (132.5 kg)   SpO2 97%   BMI 43.14 kg/m²   No intake or output data in the 24 hours ending 10/04/22 1024     Gen Awake, alert, oriented x3, in no apparent distress   CVS S1S2   Resp + resp excursion - on 2L nc  Abd Soft, non-tender, non-distended    L LE DP/PT 1+   Pretibial wound with exudate   Posterior calf wound cellulitis   Lateral calf blister    LABS:    Lab Results   Component Value Date    WBC 8.5 10/04/2022    HGB 9.9 (L) 10/04/2022    HCT 32.5 (L) 10/04/2022     10/04/2022    PROTIME 14.0 (H) 10/02/2022    INR 1.3 10/02/2022    APTT 30.4 10/02/2022    K 4.4 10/04/2022    BUN 9 10/04/2022    CREATININE 0.7 10/04/2022     A/P L LE wounds  Continue Medical management with ASA and statin  Cholesterol panel was ordered  HgbA1C ordered    optimization per PCP  Prelabumin ordered  Nutrition will need optimized, consult nutrition  Discussed with pt tobacco use and relationship to PVD  pt currently is not a smoker  Pt understands tobacco use can contribute to worsening of pvd and development of  limb loss   Emphasized importance of foot care and to call if develops any wounds, ulcerations, changes in appearance, claudication and/or symptoms  local wound care - aquacel to anterior and posterior calf, abd, wrap with kerlix.   Apply tubigrip  Offloading  Abx per ID: flagyl, cefepime and vanc  Will need MRI of the left leg to rule out OM  Pain control: PRN meds  Arterial studies reviewed: AMANUEL R 1.09, L 1.13    Fredis Spicer, APRN - CNP

## 2022-10-04 NOTE — CONSULTS
[address] [place] [address] [facility]    Consultation Note     Admit Date: 10/2/2022  7:33 PM    Reason for Consult:   Left leg ulcer    Attending Physician:  Nurys Carbajal MD     Chief Complaint: Nonhealing ulcer of left leg, leg pain    HISTORY OF PRESENT ILLNESS:   78 y.o. female with past medical history of CAD COPD GERD HLD hypertension venous insufficiency presents to the Ed due to left leg pain. Has a chronic nonhealing ulcer in the left leg for 2 years and now has developed a new ulceration for about 2 to 3 months the back of the left calf. She was initially followed to have MRSA in the infected leg wound and was treated for that. But her ulcer never healed. She had multiple course of antibiotics and she was followed in the wound care center. Admitted for infected left leg wound and ulcer. ID consulted for infected left leg wound.   Past Medical History:        Diagnosis Date    Bursitis     CAD (coronary artery disease) 1993    heart attack    Cellulitis of leg, left 10/3/2022    COPD (chronic obstructive pulmonary disease) ([address] 6/19/2013    Emphysema     slight    GERD (gastroesophageal reflux disease)     Hiatal hernia     Hip pain     Hyperlipidemia     Hypertension     Lymphedema of both lower extremities 11/21/2018    Venous insufficiency of both lower extremities 11/21/2018    Venous stasis ulcer of left calf with fat layer exposed without varicose veins ([address] 12/8/2021    Venous ulcer with fat layer exposed ([address] 10/28/2015     Past Surgical History:        Procedure Laterality Date    APPENDECTOMY      BREAST ENHANCEMENT SURGERY      BREAST REDUCTION SURGERY      CHOLECYSTECTOMY      COLONOSCOPY      ECHO COMPL W DOP COLOR FLOW  3/11/2013         ENDOSCOPY, COLON, DIAGNOSTIC      HERNIA REPAIR N/A 4/16/2021    LAPAROSCOPIC ROBOTIC ASSISTED INGUINAL HERNIA REPAIR performed by Melissa Starr MD at [address] [facility] ([address] [place] [address] JOINT REPLACEMENT  2009    l knee r hip    LEG DEBRIDEMENT Left 2015    LEG DEBRIDEMENT Left 2016     Current Medications:   Scheduled Meds:   vancomycin  1,000 mg IntraVENous Q12H    cefepime  2,000 mg IntraVENous Q12H    metroNIDAZOLE  500 mg IntraVENous Q8H    aspirin  81 mg Oral Daily    ferrous sulfate  325 mg Oral Nightly    folic acid  353 mcg Oral Nightly    gabapentin  300 mg Oral TID    metoprolol tartrate  50 mg Oral BID    morphine  15 mg Oral BID    pantoprazole  40 mg Oral QAM AC    atorvastatin  20 mg Oral Daily    zinc sulfate  50 mg Oral Daily    sodium chloride flush  5-40 mL IntraVENous 2 times per day    enoxaparin  30 mg SubCUTAneous BID     Continuous Infusions:   sodium chloride       PRN Meds:albuterol, sodium chloride flush, sodium chloride, potassium chloride **OR** potassium alternative oral replacement **OR** potassium chloride, ondansetron **OR** ondansetron, polyethylene glycol, acetaminophen **OR** acetaminophen, aluminum & magnesium hydroxide-simethicone, morphine, sodium chloride flush, oxyCODONE-acetaminophen **AND** oxyCODONE    Allergies:  Codeine, Dilaudid [hydromorphone hcl], Naproxen, Singulair [montelukast sodium], Black cohosh, and Black cohosh [cimicifuga racemosa (black cohosh)]    Social History:   Social History     Socioeconomic History    Marital status:      Spouse name: None    Number of children: None    Years of education: None    Highest education level: None   Tobacco Use    Smoking status: Former     Packs/day: 1.00     Years: 20.00     Pack years: 20.00     Types: Cigarettes     Quit date: 1995     Years since quittin.9    Smokeless tobacco: Never    Tobacco comments:     Quit   Vaping Use    Vaping Use: Never used   Substance and Sexual Activity    Alcohol use: No    Drug use: No    Sexual activity: Not Currently     Tobacco: No  Alcohol: No  Pets: No  Travel: No    Family History:   History reviewed.  No pertinent family history. . Otherwise non-pertinent to the chief complaint. REVIEW OF SYSTEMS:    CONSTITUTIONAL:  No chills, fevers or night sweats. No loss of weight. EYES:  No double vision or drainage from eyes, ears or throat. HEENT:  No neck stiffness. No dysphagia. No drainage from eyes, ears or throat  RESPIRATORY:  No cough, productive sputum or hemoptysis. CARDIOVASCULAR:  No chest pain, palpitations, orthopnea or dyspnea on exertion. GASTROINTESTINAL:  No nausea, vomiting, diarrhea or constipation or hematochezia   GENITOURINARY:  No frequency burning dysuria or hematuria. INTEGUMENT/BREAST:  No rash or breast masses. HEMATOLOGIC/LYMPHATIC:  No lymphadenopathy or blood dyscrasics. ALLERGIC/IMMUNOLOGIC:  No anaphylaxis. ENDOCRINE:  No polyuria or polydipsia or temperature intolerance. MUSCULOSKELETAL:  No myalgia or arthralgia. Full ROM. NEUROLOGICAL:  No focal motor sensory deficit. BEHAVIOR/PSYCH:  No psychosis. PHYSICAL EXAM:    Vitals:    BP (!) 171/92   Pulse 76   Temp 97.4 °F (36.3 °C) (Temporal)   Resp 18   Ht 5' 9\" (1.753 m)   Wt 292 lb 1.6 oz (132.5 kg)   SpO2 99%   BMI 43.14 kg/m²   General appearance:  No apparent distress, appears stated age and cooperative. HEENT:  Normal cephalic, atraumatic without obvious deformity. Pupils equal, round, and reactive to light. Extra ocular muscles intact. Conjunctivae/corneas clear. Neck: Supple, with full range of motion. No jugular venous distention. Trachea midline. Respiratory:  Normal respiratory effort. Clear to auscultation, bilaterally without Rales/Wheezes/Rhonchi. Cardiovascular:  Regular rate and rhythm with normal S1/S2 without murmurs, rubs or gallops. Abdomen: Soft, non-tender, non-distended with normal bowel sounds. Musculoskeletal:  No clubbing, cyanosis or edema bilaterally. Full range of motion without deformity.   Skin:   open wounds left leg ulcer to back of calf  swelling and erythema left leg purulent ulceration to posterior left calf . Neurologic:  Neurovascularly intact without any focal sensory/motor deficits.  Cranial nerves: II-XII intact, grossly non-focal.  Psychiatric:  Alert and oriented, thought content appropriate, normal insight  Lines: Peripheral      CBC+dif:  Recent Labs     10/02/22  2020 10/04/22  0503   WBC 8.6 8.5   HGB 11.5 9.9*   HCT 36.1 32.5*   .8* 104.5*    255   NEUTROABS 6.80 6.47     Lab Results   Component Value Date    CRP 16.5 (H) 10/04/2022    CRP 17.0 (H) 10/03/2022    CRP 0.6 (H) 06/08/2015     No results found for: CRPHS  Lab Results   Component Value Date    SEDRATE 127 (H) 10/04/2022    SEDRATE 130 (H) 10/03/2022    SEDRATE 57 (H) 06/08/2015     Lab Results   Component Value Date    ALT 29 10/04/2022    AST 31 10/04/2022    ALKPHOS 154 (H) 10/04/2022    BILITOT 0.3 10/04/2022     Lab Results   Component Value Date/Time     10/04/2022 05:03 AM    K 4.4 10/04/2022 05:03 AM    K 3.7 10/02/2022 08:20 PM     10/04/2022 05:03 AM    CO2 28 10/04/2022 05:03 AM    BUN 9 10/04/2022 05:03 AM    CREATININE 0.7 10/04/2022 05:03 AM    GFRAA >60 10/04/2022 05:03 AM    LABGLOM >60 10/04/2022 05:03 AM    GLUCOSE 124 10/04/2022 05:03 AM    GLUCOSE 94 02/14/2011 05:20 AM    PROT 7.1 10/04/2022 05:03 AM    LABALBU 2.6 10/04/2022 05:03 AM    CALCIUM 9.0 10/04/2022 05:03 AM    BILITOT 0.3 10/04/2022 05:03 AM    ALKPHOS 154 10/04/2022 05:03 AM    AST 31 10/04/2022 05:03 AM    ALT 29 10/04/2022 05:03 AM       Lab Results   Component Value Date/Time    PROTIME 14.0 10/02/2022 08:20 PM    PROTIME 14.3 02/16/2011 05:10 AM    INR 1.3 10/02/2022 08:20 PM       Lab Results   Component Value Date/Time    TSH 0.586 02/19/2014 09:40 AM       Lab Results   Component Value Date/Time    COLORU Yellow 10/02/2022 10:07 PM    PHUR 7.0 10/02/2022 10:07 PM    CLARITYU Clear 10/02/2022 10:07 PM    SPECGRAV 1.015 10/02/2022 10:07 PM    LEUKOCYTESUR Negative 10/02/2022 10:07 PM    UROBILINOGEN 2.0 10/02/2022 10:07 PM    BILIRUBINUR Negative 10/02/2022 10:07 PM    BLOODU Negative 10/02/2022 10:07 PM    GLUCOSEU Negative 10/02/2022 10:07 PM       No results found for: [address], [id number], PHART, THGBART, WKO2PTS, PO2ART, NHB0PVN  Radiology:  VL AMANUEL BILATERAL LIMITED 1-2 LEVELS   Final Result      US DUP LOWER EXTREMITY LEFT YAMINI   Final Result   Within the visualized vessels there is no evidence for deep venous   thrombosis               CT TIBIA FIBULA LEFT W WO CONTRAST   Final Result   Diffuse subcutaneous edema with skin thickening. No drainable fluid   collection. XR TIBIA FIBULA LEFT (2 VIEWS)   Final Result   No acute osseous abnormality is identified in the lower leg. XR CHEST PORTABLE   Final Result   No acute process. MRI TIBIA FIBULA LEFT WO CONTRAST    (Results Pending)       Microbiology:  Pending  Recent Labs     10/02/22  2020   BC 24 Hours no growth     Recent Labs     10/03/22  0251   ORG Staphylococcus aureus*     Recent Labs     10/02/22  2034   BLOODCULT2 24 Hours no growth     No results for input(s): STREPNEUMAGU in the last 72 hours. No results for input(s): LP1UAG in the last 72 hours. No results for input(s): ASO in the last 72 hours. No results for input(s): CULTRESP in the last 72 hours. Assessment:  Left leg nonhealing ulcer  Left calf new ulcer for the last 2 months with suspected sinus tracts  High suspicion of osteomyelitis  Elevated CRP and ESR    Plan:    Continue cefepime Vanco and Flagyl  Get MRI left foot to rule out osteomyelitis  Arterial duplex with normal AMANUEL, no DVT in the venous duplex  Further plan will be decided based on the MRI report    Thank you for having us see this patient in consultation. I will be discussing this case with the treating physicians.       Electronically signed by Chitra Perales MD on 10/4/2022 at 3:30 PM

## 2022-10-04 NOTE — PROGRESS NOTES
Hospitalist Progress Note      Synopsis: Patient admitted on 10/2/2022 78 y.o. female with past medical history of CAD COPD GERD HLD hypertension venous insufficiency . Patient presents to the Ed due to left leg pain . Patient has a chronic left calf wound since 6/2021 and follows with wound care  . She states that within the last weeks she has an ulceration on her left calf . She states that it is draining purulent fluid and it is painful . She repots fatigue fever chills no chest pain shortness of breath nausea ort vomiting. In the ED patient was febrile at 37.3  /104 93 % on RA Lab data reveal sodium 140 potassium 3.7 BUN 7 Scr 0.7 lactic acid 1.1 glucose 104 Trop 14 WBC 8.8 HGB 11.5  Alk phos 203 ALT 37 AST 44 CXR neg XR left tibula /fibula neg INR 1.3 . Patient received Vanc and cefpime in ED . Patient will be admitted for further evaluation       Subjective    Feeling better since admission  No CP or SOB  No fever or chills   No uncontrolled pain  No vomiting or diarrhea   No events reported overnight     Exam:  BP (!) 110/54   Pulse 86   Temp 96.8 °F (36 °C) (Temporal)   Resp 18   Ht 5' 9\" (1.753 m)   Wt 292 lb 1.6 oz (132.5 kg)   SpO2 97%   BMI 43.14 kg/m²   General appearance: No apparent distress, appears stated age and cooperative. HEENT: Pupils equal, round, and reactive to light. Conjunctivae/corneas clear. Neck: Supple. No jugular venous distention. Trachea midline. Respiratory:  Normal respiratory effort. Clear to auscultation, bilaterally without Rales/Wheezes/Rhonchi. Cardiovascular: Regular rate and rhythm with normal S1/S2 without murmurs, rubs or gallops. Abdomen: Soft, non-tender, non-distended with normal bowel sounds. Musculoskeletal: No clubbing, cyanosis or edema bilaterally. Brisk capillary refill. 2+radial pulses.    Skin: Ulcer left lower extremity as below  Neurologic: awake, alert and following commands    Medications:  Reviewed    Infusion Medications sodium chloride       Scheduled Medications    vancomycin  1,000 mg IntraVENous Q12H    vancomycin  1,750 mg IntraVENous Once    cefepime  2,000 mg IntraVENous Q12H    aspirin  81 mg Oral Daily    ferrous sulfate  325 mg Oral Nightly    folic acid  394 mcg Oral Nightly    gabapentin  300 mg Oral TID    metoprolol tartrate  50 mg Oral BID    morphine  15 mg Oral BID    pantoprazole  40 mg Oral QAM AC    atorvastatin  20 mg Oral Daily    zinc sulfate  50 mg Oral Daily    sodium chloride flush  5-40 mL IntraVENous 2 times per day    enoxaparin  30 mg SubCUTAneous BID     PRN Meds: albuterol, sodium chloride flush, sodium chloride, potassium chloride **OR** potassium alternative oral replacement **OR** potassium chloride, ondansetron **OR** ondansetron, polyethylene glycol, acetaminophen **OR** acetaminophen, aluminum & magnesium hydroxide-simethicone, morphine, sodium chloride flush, oxyCODONE-acetaminophen **AND** oxyCODONE    I/O  No intake or output data in the 24 hours ending 10/04/22 1129    Labs:   Recent Labs     10/02/22  2020 10/04/22  0503   WBC 8.6 8.5   HGB 11.5 9.9*   HCT 36.1 32.5*    255       Recent Labs     10/02/22  2020 10/03/22  0755 10/04/22  0503    136 138   K 3.7 3.6 4.4   CL 98 97* 100   CO2 29 28 28   BUN 7 6 9   CREATININE 0.7 0.7 0.7   CALCIUM 9.9 8.9 9.0       Recent Labs     10/02/22  2020 10/03/22  0755 10/04/22  0503   PROT 8.8* 7.1 7.1   ALKPHOS 203* 156* 154*   ALT 37* 29 29   AST 44* 38* 31   BILITOT 0.7 0.5 0.3   LIPASE 9*  --   --        Recent Labs     10/02/22  2020   INR 1.3       No results for input(s): CKTOTAL, TROPONINI in the last 72 hours. Chronic labs:  Lab Results   Component Value Date    CHOL 218 (H) 02/19/2014    TRIG 79 02/19/2014    HDL 77 02/19/2014    LDLCALC 125 (H) 02/19/2014    TSH 0.586 02/19/2014    INR 1.3 10/02/2022       Radiology:  Imaging studies reviewed today.     ASSESSMENT:    Principal Problem:    Sepsis due to cellulitis [facility])  Active Problems:    Wounds, multiple open, lower extremity, left, initial encounter    Cellulitis of left lower extremity    Venous insufficiency of both lower extremities    Lymphedema of both lower extremities    Ulcer of calf with fat layer exposed, left ([address]  Resolved Problems:    * No resolved hospital problems. *       PLAN:    Left lower extremity cellulitis with ulcerations:  patient has a  history of venous stasis ulceration and has has a left calf wound since 6/2021  lactic acid 1.1 WBC 8.8 XR tib fib revealed no acute pathology    wound culture 8/3 revealed coag neg staph and acinetobacter baumanni   Vanc, Flagyl and cefepime   blood cultures   wound cultures   ID consult   vascular surgery consult appreciated     Transaminitis :alk phos 203 ALT 37 AST 44, mild monitor     Hypertension:  BP was elevated in Ed likely due to pain C/w  Lopressor-improved-monitor     CAD : ASA Lipitor      Hyperlipidemia :Lipitor     Anemia  Transfuse PRN for hemoglobin less than 7  Iron studies  B12 folate  Monitor        DVT Prophylaxis: Lovenix   Diet: ADULT DIET; Regular; Low Fat/Low Chol/High Fiber/MARIBEL  Code Status: Full Code     PT/OT Eval Status: ordered       Diet: ADULT DIET; Regular; Low Fat/Low Chol/High Fiber/MARIBEL  Code Status: Full Code  PT/OT Eval Status:     DVT Prophylaxis:     Recommended disposition at discharge:    pending clinical course   +++++++++++++++++++++++++++++++++++++++++++++++++  Luis Antonio Lovell MD   [facility].  +++++++++++++++++++++++++++++++++++++++++++++++++  NOTE: This report was transcribed using voice recognition software. Every effort was made to ensure accuracy; however, inadvertent computerized transcription errors may be present.

## 2022-10-04 NOTE — PROGRESS NOTES
Physical Therapy  Physical Therapy Initial Assessment     Name: Jeremy Marvin  : 1945  MRN: [id number]      Date of Service: 10/4/2022    Evaluating PT:  Tiago Motta PT, DPT [id number]    Room #:  3841/3334-F  Diagnosis:  Cellulitis of left lower extremity [H52.405]  Wounds, multiple open, lower extremity, left, initial encounter [S81.802A]  Sepsis due to cellulitis ([facility] [L03.90, A41.9]  PMHx/PSHx:  CAD, cellulitis, COPD, GERD, HLD, HTN, venous insufficiency   Procedure/Surgery:  none  Precautions:  Falls, O2  Equipment Needs:  TBD, pt has cane    SUBJECTIVE:    Pt lives alone in a 2 story home with 5 stairs to enter and 1 rail. Bed is on 2nd floor and bath is on 2nd floor. Pt uses commode chair on main level during the day. Pt ambulated with cane PTA. OBJECTIVE:   Initial Evaluation  Date: 10/4/22 Treatment Short Term/ Long Term   Goals   AM-PAC 6 Clicks 80/64     Was pt agreeable to Eval/treatment? yes     Does pt have pain? Severe LLE pain     Bed Mobility  Rolling: SBA  Supine to sit: SBA  Sit to supine: SBA  Scooting: SBA  Rolling: Independent   Supine to sit: Independent   Sit to supine: Independent   Scooting: Independent    Transfers Sit to stand: SBA  Stand to sit: SBA  Stand pivot: SBA  Sit to stand: Independent   Stand to sit: Independent   Stand pivot: Mod I with cane   Ambulation    NT  25 feet with cane Mod I   Stair negotiation: ascended and descended  NT  13 steps with 1 rail Mod I   ROM BUE:  Defer to OT note  BLE:  limited by body habitus      Strength BUE:  Defer to OT note  BLE:  grossly 3/5, not formally assessed  [facility]   Balance Sitting EOB:  SBA  Dynamic Standing:  SBA  Sitting EOB:  Independent   Dynamic Standing:   Mod I with cane     Pt is A & O x 4  Sensation:  NT  Edema:  BLE swelling    Vitals:  SPO2 on 2.5L: 93-95%    Patient education  Pt educated on role of PT, safety during mobility    Patient response to education:   Pt verbalized understanding Pt demonstrated skill family understand(s) diagnosis, prognosis, and plan of care. yes    PHYSICAL THERAPY PLAN OF CARE:    PT POC is established based on physician order and patient diagnosis     Referring provider/PT Order:    10/03/22 0045  PT evaluation and treat  Start:  10/03/22 0045,   End:  10/03/22 0045,   ONE TIME,   Standing Count:  1 Occurrences,   Caitie Thakkar MD     Diagnosis:  Cellulitis of left lower extremity [L03.116]  Wounds, multiple open, lower extremity, left, initial encounter [S81.562A]  Sepsis due to cellulitis ([address] [L03.90, A41.9]  Specific instructions for next treatment:  progress mobility as appropriate    Current Treatment Recommendations:     [x] Strengthening to improve independence with functional mobility   [x] ROM to improve independence with functional mobility   [x] Balance Training to improve static/dynamic balance and to reduce fall risk  [x] Endurance Training to improve activity tolerance during functional mobility   [x] Transfer Training to improve safety and independence with all functional transfers   [x] Gait Training to improve gait mechanics, endurance and assess need for appropriate assistive device  [x] Stair Training in preparation for safe discharge home and/or into the community   [] Positioning to prevent skin breakdown and contractures  [x] Safety and Education Training   [x] Patient/Caregiver Education   [] HEP  [x] Other     PT long term treatment goals are located in above grid    Frequency of treatments: 2-5x/week x 1-2 weeks. Time in  0825  Time out  0855    Total Treatment Time  15 minutes     Evaluation Time includes thorough review of current medical information, gathering information on past medical history/social history and prior level of function, completion of standardized testing/informal observation of tasks, assessment of data and education on plan of care and goals.     CPT codes:  [x] Low Complexity PT evaluation 37845  [] Moderate Complexity PT evaluation [id number]  [] High Complexity PT evaluation [id number]  [] PT Re-evaluation [id number]  [] Gait training 13724 - minutes  [] Manual therapy 58101 - minutes  [x] Therapeutic activities 03769 15 minutes  [] Therapeutic exercises 16237 - minutes  [] Neuromuscular reeducation 25636 - minutes     Tiago Motta PT, DPT  [id number]

## 2022-10-05 ENCOUNTER — HOSPITAL ENCOUNTER (OUTPATIENT)
Dept: WOUND CARE | Age: 77
Discharge: HOME | DRG: 872 | End: 2022-10-05
Payer: MEDICARE

## 2022-10-05 LAB
ALBUMIN SERPL-MCNC: 2.9 G/DL (ref 3.5–5.2)
ALP BLD-CCNC: 157 U/L (ref 35–104)
ALT SERPL-CCNC: 23 U/L (ref 0–32)
ANAEROBIC CULTURE: NORMAL
ANION GAP SERPL CALCULATED.3IONS-SCNC: 6 MMOL/L (ref 7–16)
AST SERPL-CCNC: 25 U/L (ref 0–31)
BASOPHILS ABSOLUTE: 0.03 E9/L (ref 0–0.2)
BASOPHILS RELATIVE PERCENT: 0.3 % (ref 0–2)
BILIRUB SERPL-MCNC: 0.4 MG/DL (ref 0–1.2)
BUN BLDV-MCNC: 8 MG/DL (ref 6–23)
C-REACTIVE PROTEIN: 16.7 MG/DL (ref 0–0.4)
CALCIUM SERPL-MCNC: 9.2 MG/DL (ref 8.6–10.2)
CHLORIDE BLD-SCNC: 97 MMOL/L (ref 98–107)
CHOLESTEROL, TOTAL: 117 MG/DL (ref 0–199)
CO2: 33 MMOL/L (ref 22–29)
CREAT SERPL-MCNC: 0.7 MG/DL (ref 0.5–1)
EOSINOPHILS ABSOLUTE: 0.27 E9/L (ref 0.05–0.5)
EOSINOPHILS RELATIVE PERCENT: 3.1 % (ref 0–6)
FERRITIN: 425 NG/ML
FOLATE: 17.2 NG/ML (ref 4.8–24.2)
GFR AFRICAN AMERICAN: >60
GFR NON-AFRICAN AMERICAN: >60 ML/MIN/1.73
GLUCOSE BLD-MCNC: 93 MG/DL (ref 74–99)
HBA1C MFR BLD: 5.9 % (ref 4–5.6)
HCT VFR BLD CALC: 36.2 % (ref 34–48)
HDLC SERPL-MCNC: 37 MG/DL
HEMOGLOBIN: 11.2 G/DL (ref 11.5–15.5)
IMMATURE GRANULOCYTES #: 0.03 E9/L
IMMATURE GRANULOCYTES %: 0.3 % (ref 0–5)
IRON SATURATION: 13 % (ref 15–50)
IRON: 30 MCG/DL (ref 37–145)
LDL CHOLESTEROL CALCULATED: 69 MG/DL (ref 0–99)
LYMPHOCYTES ABSOLUTE: 1.08 E9/L (ref 1.5–4)
LYMPHOCYTES RELATIVE PERCENT: 12.4 % (ref 20–42)
MCH RBC QN AUTO: 32.8 PG (ref 26–35)
MCHC RBC AUTO-ENTMCNC: 30.9 % (ref 32–34.5)
MCV RBC AUTO: 106.2 FL (ref 80–99.9)
MONOCYTES ABSOLUTE: 0.87 E9/L (ref 0.1–0.95)
MONOCYTES RELATIVE PERCENT: 10 % (ref 2–12)
NEUTROPHILS ABSOLUTE: 6.42 E9/L (ref 1.8–7.3)
NEUTROPHILS RELATIVE PERCENT: 73.9 % (ref 43–80)
ORGANISM: ABNORMAL
PDW BLD-RTO: 11.8 FL (ref 11.5–15)
PLATELET # BLD: 288 E9/L (ref 130–450)
PMV BLD AUTO: 9.6 FL (ref 7–12)
POTASSIUM SERPL-SCNC: 4.5 MMOL/L (ref 3.5–5)
PREALBUMIN: 9 MG/DL (ref 20–40)
RBC # BLD: 3.41 E12/L (ref 3.5–5.5)
SEDIMENTATION RATE, ERYTHROCYTE: 125 MM/HR (ref 0–20)
SODIUM BLD-SCNC: 136 MMOL/L (ref 132–146)
TOTAL IRON BINDING CAPACITY: 225 MCG/DL (ref 250–450)
TOTAL PROTEIN: 8 G/DL (ref 6.4–8.3)
TRIGL SERPL-MCNC: 57 MG/DL (ref 0–149)
URINE CULTURE, ROUTINE: NORMAL
VANCOMYCIN RANDOM: 15.8 MCG/ML (ref 5–40)
VITAMIN B-12: 1082 PG/ML (ref 211–946)
VLDLC SERPL CALC-MCNC: 11 MG/DL
WBC # BLD: 8.7 E9/L (ref 4.5–11.5)
WOUND/ABSCESS: ABNORMAL

## 2022-10-05 PROCEDURE — 6360000002 HC RX W HCPCS: Performed by: FAMILY MEDICINE

## 2022-10-05 PROCEDURE — 80061 LIPID PANEL: CPT

## 2022-10-05 PROCEDURE — 84134 ASSAY OF PREALBUMIN: CPT

## 2022-10-05 PROCEDURE — 2700000000 HC OXYGEN THERAPY PER DAY

## 2022-10-05 PROCEDURE — 83540 ASSAY OF IRON: CPT

## 2022-10-05 PROCEDURE — 82607 VITAMIN B-12: CPT

## 2022-10-05 PROCEDURE — 2580000003 HC RX 258: Performed by: FAMILY MEDICINE

## 2022-10-05 PROCEDURE — 36415 COLL VENOUS BLD VENIPUNCTURE: CPT

## 2022-10-05 PROCEDURE — 2060000000 HC ICU INTERMEDIATE R&B

## 2022-10-05 PROCEDURE — 80053 COMPREHEN METABOLIC PANEL: CPT

## 2022-10-05 PROCEDURE — 83550 IRON BINDING TEST: CPT

## 2022-10-05 PROCEDURE — 6370000000 HC RX 637 (ALT 250 FOR IP): Performed by: FAMILY MEDICINE

## 2022-10-05 PROCEDURE — 85025 COMPLETE CBC W/AUTO DIFF WBC: CPT

## 2022-10-05 PROCEDURE — 2500000003 HC RX 250 WO HCPCS: Performed by: INTERNAL MEDICINE

## 2022-10-05 PROCEDURE — 82728 ASSAY OF FERRITIN: CPT

## 2022-10-05 PROCEDURE — 82746 ASSAY OF FOLIC ACID SERUM: CPT

## 2022-10-05 PROCEDURE — 86140 C-REACTIVE PROTEIN: CPT

## 2022-10-05 PROCEDURE — 99231 SBSQ HOSP IP/OBS SF/LOW 25: CPT

## 2022-10-05 PROCEDURE — 85651 RBC SED RATE NONAUTOMATED: CPT

## 2022-10-05 PROCEDURE — 80202 ASSAY OF VANCOMYCIN: CPT

## 2022-10-05 PROCEDURE — 6370000000 HC RX 637 (ALT 250 FOR IP)

## 2022-10-05 PROCEDURE — 83036 HEMOGLOBIN GLYCOSYLATED A1C: CPT

## 2022-10-05 RX ORDER — OXYCODONE HYDROCHLORIDE AND ACETAMINOPHEN 5; 325 MG/1; MG/1
2 TABLET ORAL EVERY 6 HOURS PRN
Status: DISCONTINUED | OUTPATIENT
Start: 2022-10-05 | End: 2022-10-11

## 2022-10-05 RX ORDER — OSTOMY ADHESIVE
1 STRIP MISCELLANEOUS ONCE
Status: COMPLETED | OUTPATIENT
Start: 2022-10-05 | End: 2022-10-05

## 2022-10-05 RX ORDER — OXYCODONE HYDROCHLORIDE AND ACETAMINOPHEN 5; 325 MG/1; MG/1
1 TABLET ORAL EVERY 6 HOURS PRN
Status: DISCONTINUED | OUTPATIENT
Start: 2022-10-05 | End: 2022-10-11

## 2022-10-05 RX ADMIN — MORPHINE SULFATE 15 MG: 15 TABLET, FILM COATED, EXTENDED RELEASE ORAL at 08:31

## 2022-10-05 RX ADMIN — SODIUM CHLORIDE, PRESERVATIVE FREE 10 ML: 5 INJECTION INTRAVENOUS at 08:37

## 2022-10-05 RX ADMIN — CEFEPIME 2000 MG: 2 INJECTION, POWDER, FOR SOLUTION INTRAVENOUS at 01:14

## 2022-10-05 RX ADMIN — ENOXAPARIN SODIUM 30 MG: 100 INJECTION SUBCUTANEOUS at 08:32

## 2022-10-05 RX ADMIN — GABAPENTIN 300 MG: 300 CAPSULE ORAL at 14:04

## 2022-10-05 RX ADMIN — METOPROLOL TARTRATE 50 MG: 50 TABLET, FILM COATED ORAL at 08:32

## 2022-10-05 RX ADMIN — OXYCODONE AND ACETAMINOPHEN 2 TABLET: 5; 325 TABLET ORAL at 19:11

## 2022-10-05 RX ADMIN — GABAPENTIN 300 MG: 300 CAPSULE ORAL at 08:32

## 2022-10-05 RX ADMIN — POLYETHYLENE GLYCOL 3350 17 G: 17 POWDER, FOR SOLUTION ORAL at 23:32

## 2022-10-05 RX ADMIN — OXYCODONE AND ACETAMINOPHEN 1 TABLET: 5; 325 TABLET ORAL at 08:37

## 2022-10-05 RX ADMIN — METRONIDAZOLE 500 MG: 5 INJECTION, SOLUTION INTRAVENOUS at 20:53

## 2022-10-05 RX ADMIN — MORPHINE SULFATE 2 MG: 2 INJECTION, SOLUTION INTRAMUSCULAR; INTRAVENOUS at 05:09

## 2022-10-05 RX ADMIN — MORPHINE SULFATE 15 MG: 15 TABLET, FILM COATED, EXTENDED RELEASE ORAL at 20:48

## 2022-10-05 RX ADMIN — ATORVASTATIN CALCIUM 20 MG: 20 TABLET, FILM COATED ORAL at 08:31

## 2022-10-05 RX ADMIN — VANCOMYCIN HYDROCHLORIDE 1250 MG: 10 INJECTION, POWDER, LYOPHILIZED, FOR SOLUTION INTRAVENOUS at 11:17

## 2022-10-05 RX ADMIN — SODIUM CHLORIDE, PRESERVATIVE FREE 10 ML: 5 INJECTION INTRAVENOUS at 20:49

## 2022-10-05 RX ADMIN — MORPHINE SULFATE 2 MG: 2 INJECTION, SOLUTION INTRAMUSCULAR; INTRAVENOUS at 10:02

## 2022-10-05 RX ADMIN — METRONIDAZOLE 500 MG: 5 INJECTION, SOLUTION INTRAVENOUS at 05:29

## 2022-10-05 RX ADMIN — ACETAMINOPHEN 650 MG: 325 TABLET, FILM COATED ORAL at 08:32

## 2022-10-05 RX ADMIN — Medication 50 MG: at 08:32

## 2022-10-05 RX ADMIN — ASPIRIN 81 MG 81 MG: 81 TABLET ORAL at 08:32

## 2022-10-05 RX ADMIN — VANCOMYCIN HYDROCHLORIDE 1250 MG: 10 INJECTION, POWDER, LYOPHILIZED, FOR SOLUTION INTRAVENOUS at 22:16

## 2022-10-05 RX ADMIN — ENOXAPARIN SODIUM 30 MG: 100 INJECTION SUBCUTANEOUS at 20:50

## 2022-10-05 RX ADMIN — PANTOPRAZOLE SODIUM 40 MG: 40 TABLET, DELAYED RELEASE ORAL at 08:32

## 2022-10-05 RX ADMIN — OXYCODONE AND ACETAMINOPHEN 2 TABLET: 5; 325 TABLET ORAL at 12:57

## 2022-10-05 RX ADMIN — GABAPENTIN 300 MG: 300 CAPSULE ORAL at 20:48

## 2022-10-05 RX ADMIN — FERROUS SULFATE TAB 325 MG (65 MG ELEMENTAL FE) 325 MG: 325 (65 FE) TAB at 20:48

## 2022-10-05 RX ADMIN — Medication 1 EACH: at 14:47

## 2022-10-05 RX ADMIN — CEFEPIME 2000 MG: 2 INJECTION, POWDER, FOR SOLUTION INTRAVENOUS at 14:00

## 2022-10-05 RX ADMIN — METRONIDAZOLE 500 MG: 5 INJECTION, SOLUTION INTRAVENOUS at 13:01

## 2022-10-05 RX ADMIN — METOPROLOL TARTRATE 50 MG: 50 TABLET, FILM COATED ORAL at 20:49

## 2022-10-05 RX ADMIN — FOLIC ACID 500 MCG: 1 TABLET ORAL at 20:48

## 2022-10-05 ASSESSMENT — PAIN DESCRIPTION - ORIENTATION
ORIENTATION: LEFT

## 2022-10-05 ASSESSMENT — PAIN DESCRIPTION - LOCATION
LOCATION: LEG

## 2022-10-05 ASSESSMENT — PAIN DESCRIPTION - DESCRIPTORS
DESCRIPTORS: ACHING;BURNING;DISCOMFORT;THROBBING
DESCRIPTORS: DISCOMFORT
DESCRIPTORS: DISCOMFORT
DESCRIPTORS: ACHING;POUNDING;THROBBING;DISCOMFORT
DESCRIPTORS: ACHING

## 2022-10-05 ASSESSMENT — PAIN SCALES - GENERAL
PAINLEVEL_OUTOF10: 10
PAINLEVEL_OUTOF10: 6
PAINLEVEL_OUTOF10: 2
PAINLEVEL_OUTOF10: 10
PAINLEVEL_OUTOF10: 6
PAINLEVEL_OUTOF10: 10
PAINLEVEL_OUTOF10: 10

## 2022-10-05 NOTE — PROGRESS NOTES
Comprehensive Nutrition Assessment    Type and Reason for Visit:  Initial, Wound, Consult    Nutrition Recommendations/Plan:   Recommend and start Ensure high protein supplement BID and [place] wound healing supplement BID to help meet increased nutritional needs from wound healing. Malnutrition Assessment:  Malnutrition Status: At risk for malnutrition (Comment) (10/05/22 1025)    Context:  Acute Illness     Findings of the 6 clinical characteristics of malnutrition:  Energy Intake:  Mild decrease in energy intake (Comment) (x 2 days since admission)  Weight Loss:  No significant weight loss     Body Fat Loss:  Unable to assess     Muscle Mass Loss:  Unable to assess    Fluid Accumulation:  No significant fluid accumulation     Strength:  Not Performed    Nutrition Assessment:    Patient adm w/ chronic non-healing L leg ulcer ; high suspicion of osteomyelitis per physician ; noted sepsis ; hx of COPD/CHF/CAD ; will start ONS    Nutrition Related Findings:    I&Os WNL, trace edema, obesity, active BS, wound ; Wound Type: Open Wounds, Wound Consult Pending (wound x 1 noted to L pretibial)       Current Nutrition Intake & Therapies:    Average Meal Intake: 51-75%     ADULT DIET; Regular; Low Fat/Low Chol/High Fiber/MARIBEL    Anthropometric Measures:  Height: 5' 9\" (175.3 cm)  Ideal Body Weight (IBW): 145 lbs (66 kg)    Admission Body Weight: 292 lb (132.5 kg) (10/4, first bedscale)  Current Body Weight: 292 lb (132.5 kg) (10/4, bedscale ; will not use 302# on 10/5 d/t likely fluid accumulation), 201.4 % IBW.     Current BMI (kg/m2): 43.1  Usual Body Weight:  (EMR shows past weight of 295# no method on 5/11/22)                       BMI Categories: Obese Class 3 (BMI 40.0 or greater)    Estimated Daily Nutrient Needs:  Energy Requirements Based On: Formula  Weight Used for Energy Requirements: Current  Energy (kcal/day): 9319-2540 (REE 1887 x 1.2 SF)  Weight Used for Protein Requirements: Ideal  Protein (g/day): 130-140 (2.0g/kg IBW)  Method Used for Fluid Requirements: 1 ml/kcal  Fluid (ml/day): 4271-2124    Nutrition Diagnosis:   Increased nutrient needs related to increase demand for energy/nutrients as evidenced by wounds    Nutrition Interventions:   Food and/or Nutrient Delivery: Continue Current Diet, Start Oral Nutrition Supplement  Nutrition Education/Counseling: Education not indicated  Coordination of Nutrition Care: Continue to monitor while inpatient       Goals:  Previous Goal Met: Progressing toward Goal(s)  Goals: PO intake 75% or greater, by next RD assessment       Nutrition Monitoring and Evaluation:   Behavioral-Environmental Outcomes: None Identified  Food/Nutrient Intake Outcomes: Food and Nutrient Intake, Supplement Intake  Physical Signs/Symptoms Outcomes: Biochemical Data, Chewing or Swallowing, GI Status, Fluid Status or Edema, Meal Time Behavior, Hemodynamic Status, Nutrition Focused Physical Findings, Weight, Skin    Discharge Planning:     Too soon to determine     Vi ISREAL Harris, LD  Contact: [phone]

## 2022-10-05 NOTE — DISCHARGE INSTRUCTIONS
Visit Discharge/Physician Orders     Discharge condition: Stable     Assessment of pain at discharge: mild     Anesthetic used: lido 4%     Discharge to: Home     Left via:Private automobile     Accompanied by: accompanied by self     [facility]: [address] [facility]     Dressing Orders: To left pretib wound: cleanse with normal saline, apply drawtex, cover and secure with dry dressing. Apply profore wrap. Change M- W( at [facility])- F.     Keep wrap dry at all times. If wrap becomes wet or falls 2 inches call [facility] ([phone])and may remove wrap and apply double tubigrip. Treatment Orders:Eat a diet high in protein and vitamin C. Take a multiple vitamin daily unless contraindicated. Elevate as much as possible     [facility] followup visit 1 week____________________________  (Please note your next appointment above and if you are unable to keep, kindly give a 24 hour notice. Thank you.)     Physician signature:__________________________      If you experience any of the following, please call the [email or web address] during business hours:     * Increase in Pain  * Temperature over 101  * Increase in drainage from your wound  * Drainage with a foul odor  * Bleeding  * Increase in swelling  * Need for compression bandage changes due to slippage, breakthrough drainage. If you need medical attention outside of the business hours of the [address] please contact your PCP or go to the nearest emergency room.

## 2022-10-05 NOTE — PROGRESS NOTES
Vascular Surgery Progress Note    Pt is being seen in f/u today regarding L LE wound    Subjective  Pt s/e. Pain is not controlled. Currently PO pain meds scheduled every 12 hours. She states she takes them every 12 hours at home but given her acute issues the pain is much worse now. Refused to work with therapy today because of the pain.       Current Medications:    sodium chloride        oxyCODONE-acetaminophen **OR** oxyCODONE-acetaminophen, albuterol, sodium chloride flush, sodium chloride, potassium chloride **OR** potassium alternative oral replacement **OR** potassium chloride, ondansetron **OR** ondansetron, polyethylene glycol, acetaminophen **OR** acetaminophen, aluminum & magnesium hydroxide-simethicone, morphine, sodium chloride flush    vancomycin  1,250 mg IntraVENous Q12H    cefepime  2,000 mg IntraVENous Q12H    metroNIDAZOLE  500 mg IntraVENous Q8H    aspirin  81 mg Oral Daily    ferrous sulfate  325 mg Oral Nightly    folic acid  242 mcg Oral Nightly    gabapentin  300 mg Oral TID    metoprolol tartrate  50 mg Oral BID    morphine  15 mg Oral BID    pantoprazole  40 mg Oral QAM AC    atorvastatin  20 mg Oral Daily    zinc sulfate  50 mg Oral Daily    sodium chloride flush  5-40 mL IntraVENous 2 times per day    enoxaparin  30 mg SubCUTAneous BID      PHYSICAL EXAM:    BP (!) 117/56   Pulse 90   Temp 97 °F (36.1 °C) (Temporal)   Resp 18   Ht 5' 9\" (1.753 m)   Wt (!) 302 lb 8 oz (137.2 kg)   SpO2 97%   BMI 44.67 kg/m²     Intake/Output Summary (Last 24 hours) at 10/5/2022 0958  Last data filed at 10/5/2022 0265  Gross per 24 hour   Intake 1521.89 ml   Output 1500 ml   Net 21.89 ml        Gen Awake, alert, oriented x3, in no apparent distress   CVS S1S2   Resp + resp excursion - on 2L nc  Abd Soft, non-tender, non-distended    L LE DP/PT 1+   Pretibial wound with exudate   Posterior calf wound cellulitis, purulent drainage   Lateral calf blister    LABS:    Lab Results   Component Value Date    WBC 8.7 10/05/2022    HGB 11.2 (L) 10/05/2022    HCT 36.2 10/05/2022     10/05/2022    PROTIME 14.0 (H) 10/02/2022    INR 1.3 10/02/2022    APTT 30.4 10/02/2022    K 4.5 10/05/2022    BUN 8 10/05/2022    CREATININE 0.7 10/05/2022     A/P L LE wounds  Continue Medical management with ASA and statin  Abx per ID: flagyl, cefepime and vanc  MRI of the left leg pending to rule out OM  Pain control: PRN meds adjusted to percocet 5/325 one or two tabs Q6PRN  HgbA1C 5.9   optimization per PCP  Prelabumin 9  Nutrition will need optimized, nutrition has been consulted  Arterial studies reviewed: AMANUEL R 1.09, L 1.13  Daily dressing changes: opticell, abd, kerlix - changed today  Apply tubigrip to L LE   Offload L LE with prevalon boot    Fredis Spicer, APRN - CNP    Pt seen and examined  Wrap placed  Pt feels like helping with swelling  Pain medication    No osteo on MRI    Michael Chaudhry MD

## 2022-10-05 NOTE — PROGRESS NOTES
Pharmacy Consultation Note  (Antibiotic Dosing and Monitoring)    Initial consult date: 10-3-2022  Consulting physician/provider: Dr. Lillie Mcdonald  Drug: Vancomycin  Indication: ABSSSI; LLE cellulitis/purulent drainage. Age/  Gender Height Weight IBW  Allergy Information   77 y.o./female 5' 9\" (175.3 cm) 290 lb (131.5 kg)     Ideal body weight: 66.2 kg (145 lb 15.1 oz)  Adjusted ideal body weight: 94.6 kg (208 lb 9.1 oz)   Codeine, Dilaudid [hydromorphone hcl], Naproxen, Singulair [montelukast sodium], Black cohosh, and Black cohosh [cimicifuga racemosa (black cohosh)]      Renal Function:  Recent Labs     10/02/22  2020 10/03/22  0755 10/04/22  0503   BUN 7 6 9   CREATININE 0.7 0.7 0.7         Intake/Output Summary (Last 24 hours) at 10/5/2022 0729  Last data filed at 10/5/2022 0616  Gross per 24 hour   Intake 1521.89 ml   Output 1500 ml   Net 21.89 ml       Vancomycin Monitoring:  Trough:  No results for input(s): VANCOTROUGH in the last 72 hours. Random:    Recent Labs     10/05/22  0540   VANCORANDOM 15.8       Recent Labs     10/02/22  2034   BLOODCULT2 24 Hours no growth       Historical Cultures:  Organism   Date Value Ref Range Status   10/03/2022 Staphylococcus aureus (A)  Preliminary     Recent Labs     10/02/22  2020   BC 24 Hours no growth       Vancomycin Administration Times:  Vancomycin Administration Times:  Recent vancomycin administrations                     vancomycin (VANCOCIN) 1,000 mg in dextrose 5 % 250 mL IVPB (Dcli0Gur) (mg) 1,000 mg New Bag 10/04/22 2254    vancomycin (VANCOCIN) 1,750 mg in dextrose 5 % 500 mL IVPB (mg) 1,750 mg New Bag 10/04/22 0933    vancomycin (VANCOCIN) 2,500 mg in dextrose 5 % 500 mL IVPB (mg) 2,500 mg New Bag 10/03/22 0038               Assessment:  78 yo/F, admitted for worsening chronic L calf wound (swelling, erythema, purulent drainage). Empiric ATBs (vanco and cefepime) started in ED. Received vancomycin 2500 mg x1 on 10/3 @ 0038. ID has been consulted. Estimated Creatinine Clearance: 104 mL/min (based on SCr of 0.7 mg/dL). To dose vancomycin, pharmacy will be utilizing [employer] calculation software for goal AUC/CRUZ 400-600 mg/L-hr  10/5: Random level = 15.8 mcg/mL (~6.75 hrs post dose)    Plan:  Increase dose to vancomycin 1250 mg IV q12h: AUC/CRUZ = 498, Tr = 13.5 mCg/mL. Will check vancomycin levels when appropriate  Will continue to monitor renal function. Pharmacy to follow.     [address], [facility] 10/5/2022 7:29 AM

## 2022-10-05 NOTE — PROGRESS NOTES
OT SESSION ATTEMPT     Date:10/5/2022  Patient Name: Hannah Mc  MRN: [id number]  : 1945  Room: [address] [place] [address]-A     Occupational therapy orders received/chart review completed and OT session attempted this date:    [] unavailable due to other medical staff currently with pt   [] on hold, await MRI/ neurosurgical recommendations. [] on hold per nursing staff secondary to lab / radiology results    [x] declined Occupational Therapy  this date due to LLE pain. Benefits of participation in therapy reviewed with pt. When reviewing safety concerns upon returning home alone (as pt reports unable to engage in [address] [place] [address] activities at this time d/t pain) pt stated \"you're agitating me\". RN & CM made aware. [] off unit   [] Other:     Will reattempt OT eval at a later time/date.     Pedro Fernandez OTR/L; [id number]

## 2022-10-05 NOTE — CARE COORDINATION
This CM reached out to pt's daughter Steffanie Hong d/t concern regarding discharge plan to return home with [facility]. Per Steffanie Hong, pt will be returning home, her nephew will be staying with her (although he does work and won't be available 24/7), also pt's sister will assist, and Steffanie Hong will be heading home in a couple of days and staying for 4-5 days to assist pt when she returns home. Discussed possibility of home going IV Abx, and Steffanie Hong was comfortable that pt would have assistance with that if needed as well. Discharge plan remains home with [facility], will need SARAH order prior to discharge.

## 2022-10-05 NOTE — PROGRESS NOTES
Hospitalist Progress Note      Synopsis: Patient admitted on 10/2/2022 78 y.o. female with past medical history of CAD COPD GERD HLD hypertension venous insufficiency . Patient presents to the Ed due to left leg pain . Patient has a chronic left calf wound since 6/2021 and follows with wound care  . She states that within the last weeks she has an ulceration on her left calf . She states that it is draining purulent fluid and it is painful . She repots fatigue fever chills no chest pain shortness of breath nausea ort vomiting. In the ED patient was febrile at 37.3  /104 93 % on RA Lab data reveal sodium 140 potassium 3.7 BUN 7 Scr 0.7 lactic acid 1.1 glucose 104 Trop 14 WBC 8.8 HGB 11.5  Alk phos 203 ALT 37 AST 44 CXR neg XR left tibula /fibula neg INR 1.3 . Patient received Vanc and cefpime in ED . Patient will be admitted for further evaluation   . Evaluated by ID-continuing cefepime, vancomycin, Flagyl. MRI leg pending. Subjective    Feeling better since admission  No CP or SOB  No fever or chills   No uncontrolled pain  No vomiting or diarrhea   No events reported overnight     Exam:  BP (!) 117/56   Pulse 90   Temp 97 °F (36.1 °C) (Temporal)   Resp 18   Ht 5' 9\" (1.753 m)   Wt (!) 302 lb 8 oz (137.2 kg)   SpO2 97%   BMI 44.67 kg/m²   General appearance: No apparent distress, appears stated age and cooperative. HEENT: Pupils equal, round, and reactive to light. Conjunctivae/corneas clear. Neck: Supple. No jugular venous distention. Trachea midline. Respiratory:  Normal respiratory effort. Clear to auscultation, bilaterally without Rales/Wheezes/Rhonchi. Cardiovascular: Regular rate and rhythm with normal S1/S2 without murmurs, rubs or gallops. Abdomen: Soft, non-tender, non-distended with normal bowel sounds. Musculoskeletal: No clubbing, cyanosis or edema bilaterally. Brisk capillary refill. 2+radial pulses.    Skin: Ulcer left lower extremity as below  Neurologic: awake, alert and following commands    Medications:  Reviewed    Infusion Medications    sodium chloride       Scheduled Medications    vancomycin  1,250 mg IntraVENous Q12H    cefepime  2,000 mg IntraVENous Q12H    metroNIDAZOLE  500 mg IntraVENous Q8H    aspirin  81 mg Oral Daily    ferrous sulfate  325 mg Oral Nightly    folic acid  961 mcg Oral Nightly    gabapentin  300 mg Oral TID    metoprolol tartrate  50 mg Oral BID    morphine  15 mg Oral BID    pantoprazole  40 mg Oral QAM AC    atorvastatin  20 mg Oral Daily    zinc sulfate  50 mg Oral Daily    sodium chloride flush  5-40 mL IntraVENous 2 times per day    enoxaparin  30 mg SubCUTAneous BID     PRN Meds: oxyCODONE-acetaminophen **OR** oxyCODONE-acetaminophen, albuterol, sodium chloride flush, sodium chloride, potassium chloride **OR** potassium alternative oral replacement **OR** potassium chloride, ondansetron **OR** ondansetron, polyethylene glycol, acetaminophen **OR** acetaminophen, aluminum & magnesium hydroxide-simethicone, morphine    I/O    Intake/Output Summary (Last 24 hours) at 10/5/2022 1058  Last data filed at 10/5/2022 0616  Gross per 24 hour   Intake 1521.89 ml   Output 1500 ml   Net 21.89 ml       Labs:   Recent Labs     10/02/22  2020 10/04/22  0503 10/05/22  0540   WBC 8.6 8.5 8.7   HGB 11.5 9.9* 11.2*   HCT 36.1 32.5* 36.2    255 288         Recent Labs     10/03/22  0755 10/04/22  0503 10/05/22  0540    138 136   K 3.6 4.4 4.5   CL 97* 100 97*   CO2 28 28 33*   BUN 6 9 8   CREATININE 0.7 0.7 0.7   CALCIUM 8.9 9.0 9.2         Recent Labs     10/02/22  2020 10/03/22  0755 10/04/22  0503 10/05/22  0540   PROT 8.8* 7.1 7.1 8.0   ALKPHOS 203* 156* 154* 157*   ALT 37* 29 29 23   AST 44* 38* 31 25   BILITOT 0.7 0.5 0.3 0.4   LIPASE 9*  --   --   --          Recent Labs     10/02/22  2020   INR 1.3         No results for input(s): CKTOTAL, TROPONINI in the last 72 hours.     Chronic labs:  Lab Results   Component Value Date    CHOL 117 10/05/2022    TRIG 57 10/05/2022    HDL 37 10/05/2022    LDLCALC 69 10/05/2022    TSH 0.586 02/19/2014    INR 1.3 10/02/2022    LABA1C 5.9 (H) 10/05/2022       Radiology:  Imaging studies reviewed today. ASSESSMENT:    Principal Problem:    Sepsis due to cellulitis [facility])  Active Problems:    Wounds, multiple open, lower extremity, left, initial encounter    Cellulitis of left lower extremity    Venous insufficiency of both lower extremities    Lymphedema of both lower extremities    Ulcer of calf with fat layer exposed, left ([address]  Resolved Problems:    * No resolved hospital problems. *       PLAN:    Left lower extremity cellulitis with ulcerations:  patient has a  history of venous stasis ulceration and has has a left calf wound since 6/2021  lactic acid 1.1 WBC 8.8 XR tib fib revealed no acute pathology    wound culture 8/3 revealed coag neg staph and acinetobacter baumanni   Vanc, Flagyl and cefepime   MRI left leg pending  blood cultures   wound cultures   ID consult   vascular surgery consult appreciated     Transaminitis :alk phos 203 ALT 37 AST 44, mild monitor     Hypertension:  BP was elevated in Ed likely due to pain C/w  Lopressor-improved-monitor     CAD : ASA Lipitor      Hyperlipidemia :Lipitor     Anemia  Transfuse PRN for hemoglobin less than 7  Iron studies, B12 folate-reviewed   Monitor        DVT Prophylaxis: Lovenix   Diet: ADULT DIET; Regular; Low Fat/Low Chol/High Fiber/MARIBEL  Code Status: Full Code     PT/OT Eval Status: ordered       Diet: ADULT DIET; Regular; Low Fat/Low Chol/High Fiber/MARIBEL  ADULT ORAL NUTRITION SUPPLEMENT; Lunch, Dinner; Low Calorie/High Protein Oral Supplement  ADULT ORAL NUTRITION SUPPLEMENT; Breakfast, Dinner;  Wound Healing Oral Supplement  Code Status: Full Code  PT/OT Eval Status:     DVT Prophylaxis:     Recommended disposition at discharge:    Tentatively home with home health care pending clinical course- culture results, MRI results, final antibiotic plan  +++++++++++++++++++++++++++++++++++++++++++++++++  Antonio Issa MD   [facility].  +++++++++++++++++++++++++++++++++++++++++++++++++  NOTE: This report was transcribed using voice recognition software. Every effort was made to ensure accuracy; however, inadvertent computerized transcription errors may be present.

## 2022-10-05 NOTE — CARE COORDINATION
Chart reviewed, ID continued IV cefepime Q 12 hr, IV Vanco Q 12 hr, IV Flagyl Q 8 hr, MRI of the foot pending since 10/4 at 1300 to r/o osteomyelitis; Vascular following; discharge plan is to return home with [facility], will need SARAH order prior to discharge;  pt seen by PT yesterday with SBA for bed mobility and transfers however, ambulation not attempted d/t pt c/o LLE pain, will need ambulation assessed prior to discharge vs short stay SNF (although pt is uninterested at this time). Will continue to follow for discharge needs.

## 2022-10-05 NOTE — PROGRESS NOTES
[address] [place] [address] [facility]  Progress Note      Chief Complaint   Patient presents with    Leg Pain     Pt has wound to left leg sees wound care       SUBJECTIVE:  78 y.o. female with past medical history of CAD COPD GERD HLD hypertension venous insufficiency presents to the Ed due to left leg pain. Has a chronic nonhealing ulcer in the left leg for 2 years and now has developed a new ulceration for about 2 to 3 months the back of the left calf. She was initially followed to have MRSA in the infected leg wound and was treated for that. But her ulcer never healed. She had multiple course of antibiotics and she was followed in the wound care center. Admitted for infected left leg wound and ulcer. ID consulted for infected left leg wound. Patient is tolerating medications. No reported adverse drug reactions. No nausea, vomiting, diarrhea. Review of systems:  As stated above in the chief complaint, otherwise negative.     Medications:  Scheduled Meds:   vancomycin  1,250 mg IntraVENous Q12H    cefepime  2,000 mg IntraVENous Q12H    metroNIDAZOLE  500 mg IntraVENous Q8H    aspirin  81 mg Oral Daily    ferrous sulfate  325 mg Oral Nightly    folic acid  950 mcg Oral Nightly    gabapentin  300 mg Oral TID    metoprolol tartrate  50 mg Oral BID    morphine  15 mg Oral BID    pantoprazole  40 mg Oral QAM AC    atorvastatin  20 mg Oral Daily    zinc sulfate  50 mg Oral Daily    sodium chloride flush  5-40 mL IntraVENous 2 times per day    enoxaparin  30 mg SubCUTAneous BID     Continuous Infusions:   sodium chloride       PRN Meds:oxyCODONE-acetaminophen **OR** oxyCODONE-acetaminophen, albuterol, sodium chloride flush, sodium chloride, potassium chloride **OR** potassium alternative oral replacement **OR** potassium chloride, ondansetron **OR** ondansetron, polyethylene glycol, acetaminophen **OR** acetaminophen, aluminum & magnesium hydroxide-simethicone, morphine    OBJECTIVE:  BP (!) 117/56   Pulse 90   Temp 97 °F (36.1 °C) (Temporal)   Resp 16   Ht 5' 9\" (1.753 m)   Wt (!) 302 lb 8 oz (137.2 kg)   SpO2 97%   BMI 44.67 kg/m²   Temp  Av.7 °F (35.9 °C)  Min: 96.5 °F (35.8 °C)  Max: 97 °F (36.1 °C)  General appearance:  No apparent distress, appears stated age and cooperative. HEENT:  Normal cephalic, atraumatic without obvious deformity. Pupils equal, round, and reactive to light. Extra ocular muscles intact. Conjunctivae/corneas clear. Neck: Supple, with full range of motion. No jugular venous distention. Trachea midline. Respiratory:  Normal respiratory effort. Clear to auscultation, bilaterally without Rales/Wheezes/Rhonchi. Cardiovascular:  Regular rate and rhythm with normal S1/S2 without murmurs, rubs or gallops. Abdomen: Soft, non-tender, non-distended with normal bowel sounds. Musculoskeletal:  No clubbing, cyanosis or edema bilaterally. Full range of motion without deformity. Skin:   open wounds left leg ulcer to back of calf  swelling and erythema left leg purulent ulceration to posterior left calf . Neurologic:  Neurovascularly intact without any focal sensory/motor deficits.  Cranial nerves: II-XII intact, grossly non-focal.  Psychiatric:  Alert and oriented, thought content appropriate, normal insight  Lines: Peripheral  Lines: peripheral    Laboratory and Tests Review:  Lab Results   Component Value Date    WBC 8.7 10/05/2022    WBC 8.5 10/04/2022    WBC 8.6 10/02/2022    HGB 11.2 (L) 10/05/2022    HCT 36.2 10/05/2022    .2 (H) 10/05/2022     10/05/2022     Lab Results   Component Value Date    NEUTROABS 6.42 10/05/2022    NEUTROABS 6.47 10/04/2022    NEUTROABS 6.80 10/02/2022     No results found for: [facility]  Lab Results   Component Value Date    ALT 23 10/05/2022    AST 25 10/05/2022    ALKPHOS 157 (H) 10/05/2022    BILITOT 0.4 10/05/2022     Lab Results   Component Value Date/Time     10/05/2022 05:40 AM    K 4.5 10/05/2022 05:40 AM    K 3.7 10/02/2022 08:20 PM    CL 97 10/05/2022 05:40 AM    CO2 33 10/05/2022 05:40 AM    BUN 8 10/05/2022 05:40 AM    CREATININE 0.7 10/05/2022 05:40 AM    CREATININE 0.7 10/04/2022 05:03 AM    CREATININE 0.7 10/03/2022 07:55 AM    GFRAA >60 10/05/2022 05:40 AM    LABGLOM >60 10/05/2022 05:40 AM    GLUCOSE 93 10/05/2022 05:40 AM    GLUCOSE 94 02/14/2011 05:20 AM    PROT 8.0 10/05/2022 05:40 AM    LABALBU 2.9 10/05/2022 05:40 AM    CALCIUM 9.2 10/05/2022 05:40 AM    BILITOT 0.4 10/05/2022 05:40 AM    ALKPHOS 157 10/05/2022 05:40 AM    AST 25 10/05/2022 05:40 AM    ALT 23 10/05/2022 05:40 AM     Lab Results   Component Value Date    CRP 16.7 (H) 10/05/2022    CRP 16.5 (H) 10/04/2022    CRP 17.0 (H) 10/03/2022     Lab Results   Component Value Date    SEDRATE 125 (H) 10/05/2022    SEDRATE 127 (H) 10/04/2022    SEDRATE 130 (H) 10/03/2022     Radiology:      Microbiology:   Lab Results   Component Value Date/Time    BC 24 Hours no growth 10/02/2022 08:20 PM    BC 5 Days- no growth 02/21/2015 08:20 PM    ORG Staphylococcus aureus 10/03/2022 02:51 AM    ORG Acinetobacter baumannii 08/03/2022 04:00 PM    ORG Staphylococcus coagulase-negative 08/03/2022 04:00 PM     Lab Results   Component Value Date/Time    BLOODCULT2 24 Hours no growth 10/02/2022 08:34 PM    BLOODCULT2 5 Days- no growth 02/21/2015 08:35 PM    ORG Staphylococcus aureus 10/03/2022 02:51 AM    ORG Acinetobacter baumannii 08/03/2022 04:00 PM    ORG Staphylococcus coagulase-negative 08/03/2022 04:00 PM     WOUND/ABSCESS   Date Value Ref Range Status   10/03/2022   Final    Heavy growth  This isolate is presumed to be resistant based on the  detection of inducible Clindamycin resistance. Clindamycin  may still be effective in some patients.      08/03/2022 Moderate growth  Final   08/03/2022 Light growth  Final     No results found for: RESPSMEAR  No results found for: MPNEUMO, CLAMYDCU, LABLEGI, AFBCX, FUNGSM, LABFUNG  No results found for: CULTRESP  No results found for: CXCATHTIP  No results found for: BFCS  Culture Surgical   Date Value Ref Range Status   05/18/2016 Light growth  Final     Urine Culture, Routine   Date Value Ref Range Status   10/02/2022   Final    10 to 100,000 CFU/mL  Mixed vinny isolated. Further workup and sensitivity testing  is not routinely indicated and will not be performed.   Mixed vinny isolated includes:  Mixed gram positive organisms  Mixed gram negative rods       No results found for: [facility]    ASSESSMENT:  Left leg nonhealing ulcer  Left calf new ulcer for the last 2 months with suspected sinus tracts  High suspicion of osteomyelitis  Elevated CRP and ESR     Plan:    Continue cefepime Vanco and Flagyl  Waiting for MRI left leg   Arterial duplex with normal AMANUEL, no DVT in the venous duplex  Further plan will be decided based on the MRI report    Shabnam Lopez MD  3:39 PM  10/5/2022

## 2022-10-06 LAB
BASOPHILS ABSOLUTE: 0.03 E9/L (ref 0–0.2)
BASOPHILS RELATIVE PERCENT: 0.3 % (ref 0–2)
EOSINOPHILS ABSOLUTE: 0.21 E9/L (ref 0.05–0.5)
EOSINOPHILS RELATIVE PERCENT: 2.2 % (ref 0–6)
HCT VFR BLD CALC: 36.1 % (ref 34–48)
HEMOGLOBIN: 11.4 G/DL (ref 11.5–15.5)
IMMATURE GRANULOCYTES #: 0.04 E9/L
IMMATURE GRANULOCYTES %: 0.4 % (ref 0–5)
LYMPHOCYTES ABSOLUTE: 1.06 E9/L (ref 1.5–4)
LYMPHOCYTES RELATIVE PERCENT: 11 % (ref 20–42)
MCH RBC QN AUTO: 32.5 PG (ref 26–35)
MCHC RBC AUTO-ENTMCNC: 31.6 % (ref 32–34.5)
MCV RBC AUTO: 102.8 FL (ref 80–99.9)
MONOCYTES ABSOLUTE: 0.8 E9/L (ref 0.1–0.95)
MONOCYTES RELATIVE PERCENT: 8.3 % (ref 2–12)
NEUTROPHILS ABSOLUTE: 7.51 E9/L (ref 1.8–7.3)
NEUTROPHILS RELATIVE PERCENT: 77.8 % (ref 43–80)
PDW BLD-RTO: 11.6 FL (ref 11.5–15)
PLATELET # BLD: 310 E9/L (ref 130–450)
PMV BLD AUTO: 9.5 FL (ref 7–12)
RBC # BLD: 3.51 E12/L (ref 3.5–5.5)
WBC # BLD: 9.7 E9/L (ref 4.5–11.5)

## 2022-10-06 PROCEDURE — 6370000000 HC RX 637 (ALT 250 FOR IP)

## 2022-10-06 PROCEDURE — 6360000002 HC RX W HCPCS: Performed by: FAMILY MEDICINE

## 2022-10-06 PROCEDURE — 6370000000 HC RX 637 (ALT 250 FOR IP): Performed by: FAMILY MEDICINE

## 2022-10-06 PROCEDURE — 97165 OT EVAL LOW COMPLEX 30 MIN: CPT

## 2022-10-06 PROCEDURE — 2060000000 HC ICU INTERMEDIATE R&B

## 2022-10-06 PROCEDURE — 36415 COLL VENOUS BLD VENIPUNCTURE: CPT

## 2022-10-06 PROCEDURE — 2700000000 HC OXYGEN THERAPY PER DAY

## 2022-10-06 PROCEDURE — 6360000002 HC RX W HCPCS: Performed by: INTERNAL MEDICINE

## 2022-10-06 PROCEDURE — 2580000003 HC RX 258: Performed by: INTERNAL MEDICINE

## 2022-10-06 PROCEDURE — 97535 SELF CARE MNGMENT TRAINING: CPT

## 2022-10-06 PROCEDURE — 2580000003 HC RX 258: Performed by: FAMILY MEDICINE

## 2022-10-06 PROCEDURE — 85025 COMPLETE CBC W/AUTO DIFF WBC: CPT

## 2022-10-06 PROCEDURE — 2500000003 HC RX 250 WO HCPCS: Performed by: INTERNAL MEDICINE

## 2022-10-06 RX ADMIN — FOLIC ACID 500 MCG: 1 TABLET ORAL at 20:10

## 2022-10-06 RX ADMIN — METOPROLOL TARTRATE 50 MG: 50 TABLET, FILM COATED ORAL at 20:10

## 2022-10-06 RX ADMIN — ATORVASTATIN CALCIUM 20 MG: 20 TABLET, FILM COATED ORAL at 08:28

## 2022-10-06 RX ADMIN — VANCOMYCIN HYDROCHLORIDE 1250 MG: 10 INJECTION, POWDER, LYOPHILIZED, FOR SOLUTION INTRAVENOUS at 08:32

## 2022-10-06 RX ADMIN — METRONIDAZOLE 500 MG: 5 INJECTION, SOLUTION INTRAVENOUS at 04:26

## 2022-10-06 RX ADMIN — Medication 50 MG: at 08:27

## 2022-10-06 RX ADMIN — ENOXAPARIN SODIUM 30 MG: 100 INJECTION SUBCUTANEOUS at 20:10

## 2022-10-06 RX ADMIN — OXYCODONE AND ACETAMINOPHEN 2 TABLET: 5; 325 TABLET ORAL at 21:42

## 2022-10-06 RX ADMIN — GABAPENTIN 300 MG: 300 CAPSULE ORAL at 08:28

## 2022-10-06 RX ADMIN — ASPIRIN 81 MG 81 MG: 81 TABLET ORAL at 08:28

## 2022-10-06 RX ADMIN — ENOXAPARIN SODIUM 30 MG: 100 INJECTION SUBCUTANEOUS at 08:28

## 2022-10-06 RX ADMIN — OXYCODONE AND ACETAMINOPHEN 2 TABLET: 5; 325 TABLET ORAL at 08:36

## 2022-10-06 RX ADMIN — PANTOPRAZOLE SODIUM 40 MG: 40 TABLET, DELAYED RELEASE ORAL at 05:20

## 2022-10-06 RX ADMIN — CEFAZOLIN 2000 MG: 2 INJECTION, POWDER, FOR SOLUTION INTRAMUSCULAR; INTRAVENOUS at 20:10

## 2022-10-06 RX ADMIN — CEFEPIME 2000 MG: 2 INJECTION, POWDER, FOR SOLUTION INTRAVENOUS at 00:46

## 2022-10-06 RX ADMIN — METOPROLOL TARTRATE 50 MG: 50 TABLET, FILM COATED ORAL at 08:28

## 2022-10-06 RX ADMIN — GABAPENTIN 300 MG: 300 CAPSULE ORAL at 20:10

## 2022-10-06 RX ADMIN — GABAPENTIN 300 MG: 300 CAPSULE ORAL at 14:16

## 2022-10-06 RX ADMIN — FERROUS SULFATE TAB 325 MG (65 MG ELEMENTAL FE) 325 MG: 325 (65 FE) TAB at 20:10

## 2022-10-06 RX ADMIN — SODIUM CHLORIDE, PRESERVATIVE FREE 10 ML: 5 INJECTION INTRAVENOUS at 11:02

## 2022-10-06 RX ADMIN — CEFAZOLIN 2000 MG: 2 INJECTION, POWDER, FOR SOLUTION INTRAMUSCULAR; INTRAVENOUS at 11:01

## 2022-10-06 RX ADMIN — OXYCODONE AND ACETAMINOPHEN 2 TABLET: 5; 325 TABLET ORAL at 15:31

## 2022-10-06 RX ADMIN — MORPHINE SULFATE 15 MG: 15 TABLET, FILM COATED, EXTENDED RELEASE ORAL at 08:28

## 2022-10-06 RX ADMIN — OXYCODONE AND ACETAMINOPHEN 2 TABLET: 5; 325 TABLET ORAL at 02:26

## 2022-10-06 RX ADMIN — MORPHINE SULFATE 15 MG: 15 TABLET, FILM COATED, EXTENDED RELEASE ORAL at 20:10

## 2022-10-06 ASSESSMENT — PAIN SCALES - GENERAL
PAINLEVEL_OUTOF10: 0
PAINLEVEL_OUTOF10: 3
PAINLEVEL_OUTOF10: 0
PAINLEVEL_OUTOF10: 0
PAINLEVEL_OUTOF10: 4
PAINLEVEL_OUTOF10: 10
PAINLEVEL_OUTOF10: 0
PAINLEVEL_OUTOF10: 10
PAINLEVEL_OUTOF10: 0
PAINLEVEL_OUTOF10: 10
PAINLEVEL_OUTOF10: 7
PAINLEVEL_OUTOF10: 8

## 2022-10-06 ASSESSMENT — PAIN - FUNCTIONAL ASSESSMENT
PAIN_FUNCTIONAL_ASSESSMENT: PREVENTS OR INTERFERES SOME ACTIVE ACTIVITIES AND ADLS
PAIN_FUNCTIONAL_ASSESSMENT: PREVENTS OR INTERFERES SOME ACTIVE ACTIVITIES AND ADLS
PAIN_FUNCTIONAL_ASSESSMENT: ACTIVITIES ARE NOT PREVENTED
PAIN_FUNCTIONAL_ASSESSMENT: ACTIVITIES ARE NOT PREVENTED

## 2022-10-06 ASSESSMENT — PAIN DESCRIPTION - ORIENTATION
ORIENTATION: LEFT

## 2022-10-06 ASSESSMENT — PAIN DESCRIPTION - LOCATION
LOCATION: LEG

## 2022-10-06 ASSESSMENT — PAIN DESCRIPTION - DESCRIPTORS
DESCRIPTORS: BURNING;ACHING;THROBBING
DESCRIPTORS: ACHING;BURNING;DISCOMFORT
DESCRIPTORS: ACHING;BURNING
DESCRIPTORS: ACHING;DISCOMFORT
DESCRIPTORS: BURNING;ACHING;THROBBING

## 2022-10-06 NOTE — PROGRESS NOTES
Hospitalist Progress Note      Synopsis: Patient admitted on 10/2/2022 78 y.o. female with past medical history of CAD COPD GERD HLD hypertension venous insufficiency . Patient presents to the Ed due to left leg pain . Patient has a chronic left calf wound since 6/2021 and follows with wound care  . She states that within the last weeks she has an ulceration on her left calf . She states that it is draining purulent fluid and it is painful . She repots fatigue fever chills no chest pain shortness of breath nausea ort vomiting. In the ED patient was febrile at 37.3  /104 93 % on RA Lab data reveal sodium 140 potassium 3.7 BUN 7 Scr 0.7 lactic acid 1.1 glucose 104 Trop 14 WBC 8.8 HGB 11.5  Alk phos 203 ALT 37 AST 44 CXR neg XR left tibula /fibula neg INR 1.3 . Patient received Vanc and cefpime in ED . Patient will be admitted for further evaluation   . Evaluated by ID-continuing cefepime, vancomycin, Flagyl. MRI leg pending. Subjective    Feeling better since admission  No CP or SOB  No fever or chills   No uncontrolled pain  No vomiting or diarrhea   No events reported overnight     Exam:  BP (!) 155/91   Pulse 88   Temp 97.3 °F (36.3 °C) (Temporal)   Resp 16   Ht 5' 9\" (1.753 m)   Wt 300 lb (136.1 kg)   SpO2 98%   BMI 44.30 kg/m²   General appearance: No apparent distress, appears stated age and cooperative. HEENT: Pupils equal, round, and reactive to light. Conjunctivae/corneas clear. Neck: Supple. No jugular venous distention. Trachea midline. Respiratory:  Normal respiratory effort. Clear to auscultation, bilaterally without Rales/Wheezes/Rhonchi. Cardiovascular: Regular rate and rhythm with normal S1/S2 without murmurs, rubs or gallops. Abdomen: Soft, non-tender, non-distended with normal bowel sounds. Musculoskeletal: No clubbing, cyanosis or edema bilaterally. Brisk capillary refill. 2+radial pulses.    Skin: Ulcer left lower extremity as below  Neurologic: awake, alert and following commands    Medications:  Reviewed    Infusion Medications    sodium chloride       Scheduled Medications    ceFAZolin  2,000 mg IntraVENous Q8H    aspirin  81 mg Oral Daily    ferrous sulfate  325 mg Oral Nightly    folic acid  954 mcg Oral Nightly    gabapentin  300 mg Oral TID    metoprolol tartrate  50 mg Oral BID    morphine  15 mg Oral BID    pantoprazole  40 mg Oral QAM AC    atorvastatin  20 mg Oral Daily    zinc sulfate  50 mg Oral Daily    sodium chloride flush  5-40 mL IntraVENous 2 times per day    enoxaparin  30 mg SubCUTAneous BID     PRN Meds: oxyCODONE-acetaminophen **OR** oxyCODONE-acetaminophen, albuterol, sodium chloride flush, sodium chloride, potassium chloride **OR** potassium alternative oral replacement **OR** potassium chloride, ondansetron **OR** ondansetron, polyethylene glycol, acetaminophen **OR** acetaminophen, aluminum & magnesium hydroxide-simethicone, morphine    I/O    Intake/Output Summary (Last 24 hours) at 10/6/2022 1220  Last data filed at 10/6/2022 0947  Gross per 24 hour   Intake 420 ml   Output 2900 ml   Net -2480 ml       Labs:   Recent Labs     10/04/22  0503 10/05/22  0540 10/06/22  0536   WBC 8.5 8.7 9.7   HGB 9.9* 11.2* 11.4*   HCT 32.5* 36.2 36.1    288 310       Recent Labs     10/04/22  0503 10/05/22  0540    136   K 4.4 4.5    97*   CO2 28 33*   BUN 9 8   CREATININE 0.7 0.7   CALCIUM 9.0 9.2       Recent Labs     10/04/22  0503 10/05/22  0540   PROT 7.1 8.0   ALKPHOS 154* 157*   ALT 29 23   AST 31 25   BILITOT 0.3 0.4       No results for input(s): INR in the last 72 hours. No results for input(s): [place] in the last 72 hours.     Chronic labs:  Lab Results   Component Value Date    CHOL 117 10/05/2022    TRIG 57 10/05/2022    HDL 37 10/05/2022    LDLCALC 69 10/05/2022    TSH 0.586 02/19/2014    INR 1.3 10/02/2022    LABA1C 5.9 (H) 10/05/2022       Radiology:  Imaging studies reviewed today.    ASSESSMENT:    Principal Problem:    Sepsis due to cellulitis [facility])  Active Problems:    Wounds, multiple open, lower extremity, left, initial encounter    Cellulitis of left lower extremity    Venous insufficiency of both lower extremities    Lymphedema of both lower extremities    Ulcer of calf with fat layer exposed, left ([address]  Resolved Problems:    * No resolved hospital problems. *       PLAN:    Left lower extremity cellulitis with ulcerations:  patient has a  history of venous stasis ulceration and has has a left calf wound since 6/2021  lactic acid 1.1 WBC 8.8 XR tib fib revealed no acute pathology    wound culture 8/3 revealed coag neg staph and acinetobacter baumanni   Vanc, Flagyl and cefepime   MRI left leg pending  blood cultures   wound cultures   ID consult   vascular surgery consult appreciated     Transaminitis :alk phos 203 ALT 37 AST 44, mild monitor     Hypertension:  BP was elevated in Ed likely due to pain C/w  Lopressor-improved-monitor     CAD : ASA Lipitor      Hyperlipidemia :Lipitor     Anemia  Transfuse PRN for hemoglobin less than 7  Iron studies, B12 folate-reviewed   Monitor       DVT Prophylaxis: Lovenix   Diet: ADULT DIET; Regular; Low Fat/Low Chol/High Fiber/MARIBEL  Code Status: Full Code     PT/OT Eval Status: ordered       Diet: ADULT DIET; Regular; Low Fat/Low Chol/High Fiber/MARIBEL  ADULT ORAL NUTRITION SUPPLEMENT; Lunch, Dinner; Low Calorie/High Protein Oral Supplement  ADULT ORAL NUTRITION SUPPLEMENT; Breakfast, Dinner;  Wound Healing Oral Supplement  Code Status: Full Code  PT/OT Eval Status:     DVT Prophylaxis:     Recommended disposition at discharge:    Tentatively home with home health care pending clinical course- culture results, MRI results, final antibiotic plan  +++++++++++++++++++++++++++++++++++++++++++++++++  Gerard Meade MD   [facility].  +++++++++++++++++++++++++++++++++++++++++++++++++  NOTE: This report was transcribed using voice recognition software. Every effort was made to ensure accuracy; however, inadvertent computerized transcription errors may be present.

## 2022-10-06 NOTE — PLAN OF CARE
Problem: Discharge Planning  Goal: Discharge to home or other facility with appropriate resources  Outcome: Progressing     Problem: Pain  Goal: Verbalizes/displays adequate comfort level or baseline comfort level  Outcome: Progressing     Problem: ABCDS Injury Assessment  Goal: Absence of physical injury  Outcome: Progressing  Flowsheets (Taken 10/6/2022 1053)  Absence of Physical Injury: Implement safety measures based on patient assessment     Problem: Safety - Adult  Goal: Free from fall injury  Outcome: Progressing  Flowsheets (Taken 10/6/2022 1053)  Free From Fall Injury: Instruct family/caregiver on patient safety     Problem: Chronic Conditions and Co-morbidities  Goal: Patient's chronic conditions and co-morbidity symptoms are monitored and maintained or improved  Outcome: Progressing     Problem: Cardiovascular - Adult  Goal: Maintains optimal cardiac output and hemodynamic stability  Outcome: Progressing     Problem: Metabolic/Fluid and Electrolytes - Adult  Goal: Hemodynamic stability and optimal renal function maintained  Outcome: Progressing     Problem: Nutrition Deficit:  Goal: Optimize nutritional status  Outcome: Progressing

## 2022-10-06 NOTE — PROGRESS NOTES
[address] [place] [address] [facility]  Progress Note      Chief Complaint   Patient presents with    Leg Pain     Pt has wound to left leg sees wound care       SUBJECTIVE:  78 y.o. female with past medical history of CAD COPD GERD HLD hypertension venous insufficiency presents to the Ed due to left leg pain. Has a chronic nonhealing ulcer in the left leg for 2 years and now has developed a new ulceration for about 2 to 3 months the back of the left calf. She was initially followed to have MRSA in the infected leg wound and was treated for that. But her ulcer never healed. She had multiple course of antibiotics and she was followed in the wound care center. Admitted for infected left leg wound and ulcer. ID consulted for infected left leg wound. Patient is tolerating medications. No reported adverse drug reactions. No nausea, vomiting, diarrhea. Review of systems:  As stated above in the chief complaint, otherwise negative.     Medications:  Scheduled Meds:   ceFAZolin  2,000 mg IntraVENous Q8H    aspirin  81 mg Oral Daily    ferrous sulfate  325 mg Oral Nightly    folic acid  722 mcg Oral Nightly    gabapentin  300 mg Oral TID    metoprolol tartrate  50 mg Oral BID    morphine  15 mg Oral BID    pantoprazole  40 mg Oral QAM AC    atorvastatin  20 mg Oral Daily    zinc sulfate  50 mg Oral Daily    sodium chloride flush  5-40 mL IntraVENous 2 times per day    enoxaparin  30 mg SubCUTAneous BID     Continuous Infusions:   sodium chloride       PRN Meds:oxyCODONE-acetaminophen **OR** oxyCODONE-acetaminophen, albuterol, sodium chloride flush, sodium chloride, potassium chloride **OR** potassium alternative oral replacement **OR** potassium chloride, ondansetron **OR** ondansetron, polyethylene glycol, acetaminophen **OR** acetaminophen, aluminum & magnesium hydroxide-simethicone, morphine    OBJECTIVE:  /63   Pulse 80   Temp 97.2 °F (36.2 °C) (Temporal)   Resp 16   Ht 5' 9\" (1.753 m) Wt 300 lb (136.1 kg)   SpO2 96%   BMI 44.30 kg/m²   Temp  Av.2 °F (36.2 °C)  Min: 97.2 °F (36.2 °C)  Max: 97.3 °F (36.3 °C)  General appearance:  No apparent distress, appears stated age and cooperative. HEENT:  Normal cephalic, atraumatic without obvious deformity. Pupils equal, round, and reactive to light. Extra ocular muscles intact. Conjunctivae/corneas clear. Neck: Supple, with full range of motion. No jugular venous distention. Trachea midline. Respiratory:  Normal respiratory effort. Clear to auscultation, bilaterally without Rales/Wheezes/Rhonchi. Cardiovascular:  Regular rate and rhythm with normal S1/S2 without murmurs, rubs or gallops. Abdomen: Soft, non-tender, non-distended with normal bowel sounds. Musculoskeletal:  No clubbing, cyanosis or edema bilaterally. Full range of motion without deformity. Skin:   open wounds left leg ulcer to back of calf  swelling and erythema left leg purulent ulceration to posterior left calf . Neurologic:  Neurovascularly intact without any focal sensory/motor deficits.  Cranial nerves: II-XII intact, grossly non-focal.  Psychiatric:  Alert and oriented, thought content appropriate, normal insight  Lines: Peripheral  Lines: peripheral    Laboratory and Tests Review:  Lab Results   Component Value Date    WBC 9.7 10/06/2022    WBC 8.7 10/05/2022    WBC 8.5 10/04/2022    HGB 11.4 (L) 10/06/2022    HCT 36.1 10/06/2022    .8 (H) 10/06/2022     10/06/2022     Lab Results   Component Value Date    NEUTROABS 7.51 (H) 10/06/2022    NEUTROABS 6.42 10/05/2022    NEUTROABS 6.47 10/04/2022     No results found for: [facility]  Lab Results   Component Value Date    ALT 23 10/05/2022    AST 25 10/05/2022    ALKPHOS 157 (H) 10/05/2022    BILITOT 0.4 10/05/2022     Lab Results   Component Value Date/Time     10/05/2022 05:40 AM    K 4.5 10/05/2022 05:40 AM    K 3.7 10/02/2022 08:20 PM    CL 97 10/05/2022 05:40 AM    CO2 33 10/05/2022 05:40 AM    BUN 8 10/05/2022 05:40 AM    CREATININE 0.7 10/05/2022 05:40 AM    CREATININE 0.7 10/04/2022 05:03 AM    CREATININE 0.7 10/03/2022 07:55 AM    GFRAA >60 10/05/2022 05:40 AM    LABGLOM >60 10/05/2022 05:40 AM    GLUCOSE 93 10/05/2022 05:40 AM    GLUCOSE 94 02/14/2011 05:20 AM    PROT 8.0 10/05/2022 05:40 AM    LABALBU 2.9 10/05/2022 05:40 AM    CALCIUM 9.2 10/05/2022 05:40 AM    BILITOT 0.4 10/05/2022 05:40 AM    ALKPHOS 157 10/05/2022 05:40 AM    AST 25 10/05/2022 05:40 AM    ALT 23 10/05/2022 05:40 AM     Lab Results   Component Value Date    CRP 16.7 (H) 10/05/2022    CRP 16.5 (H) 10/04/2022    CRP 17.0 (H) 10/03/2022     Lab Results   Component Value Date    SEDRATE 125 (H) 10/05/2022    SEDRATE 127 (H) 10/04/2022    SEDRATE 130 (H) 10/03/2022     Radiology:      Microbiology:   Lab Results   Component Value Date/Time    BC 24 Hours no growth 10/02/2022 08:20 PM    BC 5 Days- no growth 02/21/2015 08:20 PM    ORG Staphylococcus aureus 10/03/2022 02:51 AM    ORG Acinetobacter baumannii 08/03/2022 04:00 PM    ORG Staphylococcus coagulase-negative 08/03/2022 04:00 PM     Lab Results   Component Value Date/Time    BLOODCULT2 24 Hours no growth 10/02/2022 08:34 PM    BLOODCULT2 5 Days- no growth 02/21/2015 08:35 PM    ORG Staphylococcus aureus 10/03/2022 02:51 AM    ORG Acinetobacter baumannii 08/03/2022 04:00 PM    ORG Staphylococcus coagulase-negative 08/03/2022 04:00 PM     WOUND/ABSCESS   Date Value Ref Range Status   10/03/2022   Final    Heavy growth  This isolate is presumed to be resistant based on the  detection of inducible Clindamycin resistance. Clindamycin  may still be effective in some patients.      08/03/2022 Moderate growth  Final   08/03/2022 Light growth  Final     No results found for: RESPSMEAR  No results found for: MPNEUMO, CLAMYDCU, LABLEGI, AFBCX, FUNGSM, LABFUNG  No results found for: CULTRESP  No results found for: CXCATHTIP  No results found for: BFCS  Culture Surgical   Date Value Ref Range Status   05/18/2016 Light growth  Final     Urine Culture, Routine   Date Value Ref Range Status   10/02/2022   Final    10 to 100,000 CFU/mL  Mixed vinny isolated. Further workup and sensitivity testing  is not routinely indicated and will not be performed. Mixed vinny isolated includes:  Mixed gram positive organisms  Mixed gram negative rods       No results found for: [facility]    ASSESSMENT:  Left leg nonhealing ulcer  Left calf new ulcer   MSSA cellulitis     Plan:    Start cefazolin, DC cefepime Vanco and Flagyl. MRI left leg show no evidence of osteomyelitis  Local wound care  Arterial duplex with normal AMANUEL, no DVT in the venous duplex  Plan to discharge with cephalexin for 7 to 10 days.     Chi Bardales MD  4:16 PM  10/6/2022

## 2022-10-06 NOTE — PROGRESS NOTES
[address]  [address] [place] [address]    Date:10/6/2022                                                  Patient Name: Irina Barone    MRN: [id number]    : 1945    Room: [address] [place] [address]      Evaluating OT: Kristin Mack OTR/L, [id number]    Referring Elfego Hopson MD  Specific Provider Orders/Date: OT Eval and Treat 10/3/22    Diagnosis: Cellulitis of left lower extremity [L03.116]  Wounds, multiple open, lower extremity, left, initial encounter [S81.802A]  Sepsis due to cellulitis ([address] [L03.90, A41.9]   Surgery: NA     Pertinent Medical History:      Past Medical History:   Diagnosis Date    Bursitis     CAD (coronary artery disease) 1993    heart attack    Cellulitis of leg, left 10/3/2022    COPD (chronic obstructive pulmonary disease) ([address] 2013    Emphysema     slight    GERD (gastroesophageal reflux disease)     Hiatal hernia     Hip pain     Hyperlipidemia     Hypertension     Lymphedema of both lower extremities 2018    Venous insufficiency of both lower extremities 2018    Venous stasis ulcer of left calf with fat layer exposed without varicose veins ([address] 2021    Venous ulcer with fat layer exposed ([address] 10/28/2015         Past Surgical History:   Procedure Laterality Date    APPENDECTOMY      BREAST ENHANCEMENT SURGERY      BREAST REDUCTION SURGERY      CHOLECYSTECTOMY      COLONOSCOPY      ECHO COMPL W DOP COLOR FLOW  3/11/2013         ENDOSCOPY, COLON, DIAGNOSTIC      HERNIA REPAIR N/A 2021    LAPAROSCOPIC ROBOTIC ASSISTED INGUINAL HERNIA REPAIR performed by Justin Luna MD at [facility] ([address] [place] [address])      JOINT REPLACEMENT  2009    l knee r hip    LEG DEBRIDEMENT Left 2015    LEG DEBRIDEMENT Left 2016     Precautions:  Fall Risk, O2, L LE prevalon boot, Purewick catheter    Assessment of current deficits    [x] Functional mobility  [x]ADLs  [x] Strength               []Cognition    [x] Functional transfers   [x] IADLs         [x] Safety Awareness   [x]Endurance    [] Fine Coordination              [x] Balance      [] Vision/perception   [x]Sensation     []Gross Motor Coordination  [] ROM  [] Delirium                   [] Motor Control     OT PLAN OF CARE   OT POC based on physician orders, patient diagnosis and results of clinical assessment    Frequency/Duration 1-3 days/wk for 2 weeks PRN   Specific OT Treatment Interventions to include:   * Instruction/training on adapted ADL techniques and AE recommendations to increase functional independence within precautions       * Training on energy conservation strategies, correct breathing pattern and techniques to improve independence/tolerance for self-care routine  * Functional transfer/mobility training/DME recommendations for increased independence, safety, and fall prevention  * Patient/Family education to increase follow through with safety techniques and functional independence  * Recommendation of environmental modifications for increased safety with functional transfers/mobility and ADLs  * Therapeutic exercise to improve motor endurance, ROM, and functional strength for ADLs/functional transfers  * Therapeutic activities to facilitate/challenge dynamic balance, stand tolerance for increased safety and independence with ADLs  * Therapeutic activities to facilitate gross/fine motor skills for increased independence with ADLs  * Positioning to improve skin integrity, interaction with environment and functional independence  * Delirium prevention/treatment    Recommended Adaptive Equipment: TBD, potential needs include hospital bed (to assist with positioning for edema and for hand rails to assist with bed mobility), LB dressing equipment, mobility device as determined by PT (potentially ww)    Comments: Based on patient's functional performance as stated below and level of assistance needed prior to admission, this therapist believes that the patient would benefit from further skilled OT following hospital stay in an effort to increase safety, functional independence, and quality of life. Home Living: Pt lives alone - Plans to d/c to sister's home- 1st floor set up, one DO no rail. Tub only  Equipment owned: 3-in-1 commode, BSC, shower chair, cane, ww    Prior Level of Function: independent with ADLs and with IADLs; using cane for functional mobility. Driving: yes  Occupation: NA    Pain Level: initially reports 53/10 pain in LLE; after further pain meds administered and increased time given pt reports 10/10 pain  Cognition: A&O: 4/4; Follows 2-3 step directions- perseverative regarding pain.     Memory: G   Sequencing: G   Problem solving: F   Judgement/safety: F     Functional Assessment: AM-PAC Daily Activity Raw Score: 14/24   Initial Eval Status  Date: 10/6/22 Treatment Status  Date: STGs = LTGs  Time frame: 10-14 days   Feeding Set up A        Grooming Set up A  seated    SBA while seated/standing at sink    UB Dressing Min A  To [facility] gown as robe    Set up A   LB Dressing DEP  To doff/jaden socks at bed level    Mod A   Bathing Mod A  simulated    Min A with AE PRN   Toileting Mod A  Overall; limited functional reach d/t habitus- min A for tfr onto bedpan on chair    SBA   Bed Mobility  Rolling: SBA  Supine to sit: HOB elevated SBA with use of handrails  Sit to supine: SBA with use of handrails  supervision   Functional Transfers Sit to stand: SBA with increased time  Stand to sit: Min A  supervision   Functional Mobility Min A  For short steps to pivot to bedside chair, decline use of ww  Supervision with AE PRN for in-home distance   Balance Sitting:     Static:  supervision    Dynamic:SBA  Standing: CGA     Endurance/Activity Tolerance Poor+  Fair-   Visual/  Perceptual Glasses: yes   -appears WFL           Hand Dominance R   AROM (PROM) Strength Additional Info:    RUE  WFL WFL good  and wfl FMC/dexterity noted during ADL tasks   LUE WFL WFL good  and wfl FMC/dexterity noted during ADL tasks     Hearing: [facility] [address]  Sensation: B N/T in feet at baseline  Tone: WNL  Edema: grossly edematous with >BLE                            Comments: Upon arrival patient supine with HOB slightly elevated, patient with RN in room ,receiving morning medications including morphine. Patient defers therapy until she is able to take her ordered oxycodone. Returned at a later time, patient agreeable to OT intervention and reports decrease in pain as noted above. Patient found long-sitting with bedpan in place. Bed mobility, functional transfers, toileting, UB dressing, LB dressing addressed. Patient demo's significant difficulty with completion of light functional activities d/t pain. Discussed likely continues difficulty upon d/c home, potential barriers, and potential equipment and strategies to increase independence and comfort. Discussed detriments of sedentary lifestyle. After session, patient long sitting with all devices within reach, all lines and tubes intact. Food tray in place. Some bleeding noted from leg wound with short distance mobility, pt states pain too intense to remain up in chair. Informed RN of session events and patient needs. Pt required cues and education as noted above for safe facilitation and completion of tasks. Therapist provided skilled monitoring of HR, SpO2, and patient's response during treatment session. Prior to and at the end of session, environmental modifications/line management completed for patients safety and efficiency of treatment session. Overall, patient demonstrates moderate difficulties with completion of BADLs and IADLs. Factors contributing to these difficulties include LLE pain, decreased endurance, and generalized weakness.  As noted above, patient likely to benefit from further OT intervention to increase independence, safety, and overall quality of life. Eval Complexity:   Low Complexity    Treatment:   Bed mobility: Facilitated bed mobility with cues for proper body mechanics and sequencing to prepare for ADL completion. Functional transfers: Facilitated transfers from various surfaces with cues for body alignment, safety and hand placement. ADL completion: Self-care retraining for the above-mentioned ADLs; training on proper hand placement, safety technique, sequencing, and energy conservation techniques. Skilled positioning: Proper positioning to improve interaction with environment, overall functioning and decrease/prevent edema and contractures. Delirium Prevention/Management: Implementation of non-pharmacologic interventions for delirium prevention incorporated throughout session to patient. Including environmental and sensory modifications to decrease patient's internal distraction caused by delirium, and improve overall mentation. Rehab Potential:  Good for established goals     Patient / Family Goal: None stated      Patient and/or family were instructed on functional diagnosis, prognosis/goals and OT plan of care. Demonstrated fair understanding. Eval Complexity: Low      Time In: 5691-8123  Time Out: 1004  Total Time: 32 minutes    Min Units   OT Eval Low 97165  X 1    OT Eval Medium 66293      OT Eval High 31899       OT Re-Eval [id number]       Therapeutic Ex 08422       Therapeutic Activities [id number]       ADL/Self Care 36280  17 1    Orthotic Management 53469       Neuro Re-Ed 72270       Non-Billable Time          Evaluation Time additionally includes thorough review of current medical information, gathering information on past medical history/social history and prior level of function, completion of standardized testing/informal observation of tasks, assessment of data and education on plan of care and goals.     Tanvir Schultz, OTR/L  [id number]

## 2022-10-06 NOTE — PROGRESS NOTES
Vancomycin has been discontinued   Clinical Pharmacy to sign-off  Physician to re-consult pharmacy if future dosing is needed    Thank you for the consult,    Hai Marie PharmD, BCPS 10/6/2022 11:27 AM

## 2022-10-06 NOTE — PROGRESS NOTES
Pharmacy Consultation Note  (Antibiotic Dosing and Monitoring)    Initial consult date: 10-3-2022  Consulting physician/provider: Dr. Leni Dancer  Drug: Vancomycin  Indication: ABSSSI; LLE cellulitis/purulent drainage. Age/  Gender Height Weight IBW  Allergy Information   77 y.o./female 5' 9\" (175.3 cm) 290 lb (131.5 kg)     Ideal body weight: 66.2 kg (145 lb 15.1 oz)  Adjusted ideal body weight: 94.2 kg (207 lb 9.1 oz)   Codeine, Dilaudid [hydromorphone hcl], Naproxen, Singulair [montelukast sodium], Black cohosh, and Black cohosh [cimicifuga racemosa (black cohosh)]      Renal Function:  Recent Labs     10/04/22  0503 10/05/22  0540   BUN 9 8   CREATININE 0.7 0.7         Intake/Output Summary (Last 24 hours) at 10/6/2022 1021  Last data filed at 10/6/2022 0947  Gross per 24 hour   Intake 420 ml   Output 2900 ml   Net -2480 ml       Vancomycin Monitoring:  Trough:  No results for input(s): VANCOTROUGH in the last 72 hours. Random:    Recent Labs     10/05/22  0540   VANCORANDOM 15.8         No results for input(s): Brissa Alfaro in the last 72 hours. Historical Cultures:  Organism   Date Value Ref Range Status   10/03/2022 Staphylococcus aureus (A)  Final     No results for input(s): BC in the last 72 hours. Vancomycin Administration Times:  Recent vancomycin administrations                     vancomycin (VANCOCIN) 1,250 mg in dextrose 5 % 250 mL IVPB (mg) 1,250 mg New Bag 10/06/22 0832     1,250 mg New Bag 10/05/22 2216     1,250 mg New Bag  1117    vancomycin (VANCOCIN) 1,000 mg in dextrose 5 % 250 mL IVPB ([id number]) (mg) 1,000 mg New Bag 10/04/22 2254    vancomycin (VANCOCIN) 1,750 mg in dextrose 5 % 500 mL IVPB (mg) 1,750 mg New Bag 10/04/22 0933                    Assessment:  76 yo/F, admitted for worsening chronic L calf wound (swelling, erythema, purulent drainage). Empiric ATBs (vanco and cefepime) started in ED. Received vancomycin 2500 mg x1 on 10/3 @ 0038. ID has been consulted.    Estimated Creatinine Clearance: 103 mL/min (based on SCr of 0.7 mg/dL). To dose vancomycin, pharmacy will be utilizing [employer] GI calculation software for goal AUC/CRUZ 400-600 mg/L-hr  10/5: Random level = 15.8 mcg/mL (~6.75 hrs post dose)    Plan:  Continue vancomycin 1250 mg IV q12h: AUC/CRUZ = 498   Will check vancomycin levels when appropriate  Will continue to monitor renal function. Pharmacy to follow.     Austin WelchD, BCPS 10/6/2022 10:21 AM

## 2022-10-07 LAB
BLOOD CULTURE, ROUTINE: NORMAL
CULTURE, BLOOD 2: NORMAL

## 2022-10-07 PROCEDURE — 6370000000 HC RX 637 (ALT 250 FOR IP): Performed by: INTERNAL MEDICINE

## 2022-10-07 PROCEDURE — 6360000002 HC RX W HCPCS: Performed by: FAMILY MEDICINE

## 2022-10-07 PROCEDURE — 6370000000 HC RX 637 (ALT 250 FOR IP)

## 2022-10-07 PROCEDURE — 99231 SBSQ HOSP IP/OBS SF/LOW 25: CPT

## 2022-10-07 PROCEDURE — 6370000000 HC RX 637 (ALT 250 FOR IP): Performed by: FAMILY MEDICINE

## 2022-10-07 PROCEDURE — 2580000003 HC RX 258: Performed by: INTERNAL MEDICINE

## 2022-10-07 PROCEDURE — 2580000003 HC RX 258: Performed by: FAMILY MEDICINE

## 2022-10-07 PROCEDURE — 6360000002 HC RX W HCPCS: Performed by: INTERNAL MEDICINE

## 2022-10-07 PROCEDURE — 2060000000 HC ICU INTERMEDIATE R&B

## 2022-10-07 RX ORDER — OSTOMY ADHESIVE
1 STRIP MISCELLANEOUS ONCE
Status: COMPLETED | OUTPATIENT
Start: 2022-10-07 | End: 2022-10-07

## 2022-10-07 RX ORDER — CEPHALEXIN 500 MG/1
500 CAPSULE ORAL 4 TIMES DAILY
Qty: 56 CAPSULE | Refills: 0 | Status: SHIPPED | OUTPATIENT
Start: 2022-10-07 | End: 2022-10-21

## 2022-10-07 RX ORDER — CEPHALEXIN 250 MG/1
500 CAPSULE ORAL EVERY 6 HOURS SCHEDULED
Status: DISCONTINUED | OUTPATIENT
Start: 2022-10-07 | End: 2022-10-13 | Stop reason: HOSPADM

## 2022-10-07 RX ADMIN — OXYCODONE AND ACETAMINOPHEN 2 TABLET: 5; 325 TABLET ORAL at 14:32

## 2022-10-07 RX ADMIN — Medication 50 MG: at 08:45

## 2022-10-07 RX ADMIN — SODIUM CHLORIDE, PRESERVATIVE FREE 10 ML: 5 INJECTION INTRAVENOUS at 20:35

## 2022-10-07 RX ADMIN — SODIUM CHLORIDE, PRESERVATIVE FREE 10 ML: 5 INJECTION INTRAVENOUS at 08:47

## 2022-10-07 RX ADMIN — CEPHALEXIN 500 MG: 250 CAPSULE ORAL at 12:58

## 2022-10-07 RX ADMIN — GABAPENTIN 300 MG: 300 CAPSULE ORAL at 08:46

## 2022-10-07 RX ADMIN — CEPHALEXIN 500 MG: 250 CAPSULE ORAL at 18:07

## 2022-10-07 RX ADMIN — OXYCODONE AND ACETAMINOPHEN 2 TABLET: 5; 325 TABLET ORAL at 08:46

## 2022-10-07 RX ADMIN — MORPHINE SULFATE 2 MG: 2 INJECTION, SOLUTION INTRAMUSCULAR; INTRAVENOUS at 18:13

## 2022-10-07 RX ADMIN — ATORVASTATIN CALCIUM 20 MG: 20 TABLET, FILM COATED ORAL at 08:45

## 2022-10-07 RX ADMIN — ONDANSETRON 4 MG: 2 INJECTION INTRAMUSCULAR; INTRAVENOUS at 08:47

## 2022-10-07 RX ADMIN — MORPHINE SULFATE 2 MG: 2 INJECTION, SOLUTION INTRAMUSCULAR; INTRAVENOUS at 10:54

## 2022-10-07 RX ADMIN — Medication 1 EACH: at 12:59

## 2022-10-07 RX ADMIN — OXYCODONE AND ACETAMINOPHEN 2 TABLET: 5; 325 TABLET ORAL at 04:10

## 2022-10-07 RX ADMIN — GABAPENTIN 300 MG: 300 CAPSULE ORAL at 12:58

## 2022-10-07 RX ADMIN — PANTOPRAZOLE SODIUM 40 MG: 40 TABLET, DELAYED RELEASE ORAL at 05:05

## 2022-10-07 RX ADMIN — GABAPENTIN 300 MG: 300 CAPSULE ORAL at 20:34

## 2022-10-07 RX ADMIN — METOPROLOL TARTRATE 50 MG: 50 TABLET, FILM COATED ORAL at 08:45

## 2022-10-07 RX ADMIN — OXYCODONE AND ACETAMINOPHEN 2 TABLET: 5; 325 TABLET ORAL at 20:35

## 2022-10-07 RX ADMIN — FOLIC ACID 500 MCG: 1 TABLET ORAL at 20:34

## 2022-10-07 RX ADMIN — MORPHINE SULFATE 15 MG: 15 TABLET, FILM COATED, EXTENDED RELEASE ORAL at 09:19

## 2022-10-07 RX ADMIN — CEFAZOLIN 2000 MG: 2 INJECTION, POWDER, FOR SOLUTION INTRAMUSCULAR; INTRAVENOUS at 04:10

## 2022-10-07 RX ADMIN — ASPIRIN 81 MG 81 MG: 81 TABLET ORAL at 08:46

## 2022-10-07 RX ADMIN — METOPROLOL TARTRATE 50 MG: 50 TABLET, FILM COATED ORAL at 20:34

## 2022-10-07 RX ADMIN — ENOXAPARIN SODIUM 30 MG: 100 INJECTION SUBCUTANEOUS at 08:47

## 2022-10-07 RX ADMIN — FERROUS SULFATE TAB 325 MG (65 MG ELEMENTAL FE) 325 MG: 325 (65 FE) TAB at 20:34

## 2022-10-07 RX ADMIN — ENOXAPARIN SODIUM 30 MG: 100 INJECTION SUBCUTANEOUS at 20:35

## 2022-10-07 ASSESSMENT — PAIN - FUNCTIONAL ASSESSMENT
PAIN_FUNCTIONAL_ASSESSMENT: ACTIVITIES ARE NOT PREVENTED

## 2022-10-07 ASSESSMENT — PAIN SCALES - GENERAL
PAINLEVEL_OUTOF10: 10
PAINLEVEL_OUTOF10: 0
PAINLEVEL_OUTOF10: 10
PAINLEVEL_OUTOF10: 10
PAINLEVEL_OUTOF10: 8
PAINLEVEL_OUTOF10: 10
PAINLEVEL_OUTOF10: 9
PAINLEVEL_OUTOF10: 5

## 2022-10-07 ASSESSMENT — PAIN DESCRIPTION - LOCATION
LOCATION: LEG

## 2022-10-07 ASSESSMENT — PAIN DESCRIPTION - DESCRIPTORS
DESCRIPTORS: ACHING;THROBBING
DESCRIPTORS: ACHING;THROBBING;BURNING
DESCRIPTORS: ACHING;DISCOMFORT
DESCRIPTORS: ACHING;BURNING;THROBBING

## 2022-10-07 ASSESSMENT — PAIN DESCRIPTION - ORIENTATION
ORIENTATION: LEFT

## 2022-10-07 NOTE — PROGRESS NOTES
Vascular Surgery Progress Note    Pt is being seen in f/u today regarding L LE wound    Subjective  Pt s/e. Pain is better controlled. Unna boot that was applied on Wed had to be cut off Wed evening because it hurt her calf too much. New unna boot applied today, pt had n/v while changing. Says she felt better after vomiting. Possibly going home today, she will be staying at her sisters house.     Current Medications:    sodium chloride        oxyCODONE-acetaminophen **OR** oxyCODONE-acetaminophen, albuterol, sodium chloride flush, sodium chloride, potassium chloride **OR** potassium alternative oral replacement **OR** potassium chloride, ondansetron **OR** ondansetron, polyethylene glycol, acetaminophen **OR** acetaminophen, aluminum & magnesium hydroxide-simethicone, morphine    Unna-Flex Elastic Unna Boot  1 each Topical Once    ceFAZolin  2,000 mg IntraVENous Q8H    aspirin  81 mg Oral Daily    ferrous sulfate  325 mg Oral Nightly    folic acid  091 mcg Oral Nightly    gabapentin  300 mg Oral TID    metoprolol tartrate  50 mg Oral BID    morphine  15 mg Oral BID    pantoprazole  40 mg Oral QAM AC    atorvastatin  20 mg Oral Daily    zinc sulfate  50 mg Oral Daily    sodium chloride flush  5-40 mL IntraVENous 2 times per day    enoxaparin  30 mg SubCUTAneous BID      PHYSICAL EXAM:    BP (!) 151/99   Pulse (!) 103   Temp 96.8 °F (36 °C) (Temporal)   Resp 16   Ht 5' 9\" (1.753 m)   Wt 300 lb (136.1 kg)   SpO2 94%   BMI 44.30 kg/m²     Intake/Output Summary (Last 24 hours) at 10/7/2022 1013  Last data filed at 10/7/2022 0506  Gross per 24 hour   Intake 240 ml   Output 1200 ml   Net -960 ml        Gen Awake, alert, oriented x3, in no apparent distress   CVS S1S2   Resp + resp excursion - on RA  Abd Soft, non-tender, non-distended    L LE DP/PT 1+   Pretibial wound with exudate   Posterior calf wound cellulitis, drainage   Lateral calf blister    LABS:    Lab Results   Component Value Date    WBC 9.7 10/06/2022    HGB 11.4 (L) 10/06/2022    HCT 36.1 10/06/2022     10/06/2022    PROTIME 14.0 (H) 10/02/2022    INR 1.3 10/02/2022    APTT 30.4 10/02/2022    K 4.5 10/05/2022    BUN 8 10/05/2022    CREATININE 0.7 10/05/2022     A/P L LE wounds  Continue Medical management with ASA and statin  Abx per ID: cefazolin, possible cephalexin at d/c  MRI of the left leg shows no OM  Pain control: PRN meds  Arterial studies reviewed: AMANUEL R 1.09, L 1.13  Dressing changes: opticell, abd, unna boot, coban - changed today  Offload L LE with prevalon boot  Ok for discharge from vascular surgery pov  Resume home health care and dressing changes MWF - will see pt in [address] [place] [address],[address] on Wed 10/19    Isma Diaz, MESSI - CNP

## 2022-10-07 NOTE — PROGRESS NOTES
[address] [place] [address] [facility]  [facility]  Progress Note      Chief Complaint   Patient presents with    Leg Pain     Pt has wound to left leg sees wound care       SUBJECTIVE:  78 y.o. female with past medical history of CAD COPD GERD HLD hypertension venous insufficiency presents to the Ed due to left leg pain. Has a chronic nonhealing ulcer in the left leg for 2 years and now has developed a new ulceration for about 2 to 3 months the back of the left calf. She was initially followed to have MRSA in the infected leg wound and was treated for that. But her ulcer never healed. She had multiple course of antibiotics and she was followed in the wound care center. Admitted for infected left leg wound and ulcer. ID consulted for infected left leg wound. Patient is tolerating medications. No reported adverse drug reactions. No nausea, vomiting, diarrhea. Review of systems:  As stated above in the chief complaint, otherwise negative.     Medications:  Scheduled Meds:   cephALEXin  500 mg Oral 4 times per day    aspirin  81 mg Oral Daily    ferrous sulfate  325 mg Oral Nightly    folic acid  943 mcg Oral Nightly    gabapentin  300 mg Oral TID    metoprolol tartrate  50 mg Oral BID    morphine  15 mg Oral BID    pantoprazole  40 mg Oral QAM AC    atorvastatin  20 mg Oral Daily    zinc sulfate  50 mg Oral Daily    sodium chloride flush  5-40 mL IntraVENous 2 times per day    enoxaparin  30 mg SubCUTAneous BID     Continuous Infusions:   sodium chloride       PRN Meds:oxyCODONE-acetaminophen **OR** oxyCODONE-acetaminophen, albuterol, sodium chloride flush, sodium chloride, potassium chloride **OR** potassium alternative oral replacement **OR** potassium chloride, ondansetron **OR** ondansetron, polyethylene glycol, acetaminophen **OR** acetaminophen, aluminum & magnesium hydroxide-simethicone, morphine    OBJECTIVE:  BP (!) 155/102   Pulse 88   Temp 97.3 °F (36.3 °C) (Temporal)   Resp 16   Ht 5' 9\" BUN 8 10/05/2022 05:40 AM    CREATININE 0.7 10/05/2022 05:40 AM    CREATININE 0.7 10/04/2022 05:03 AM    CREATININE 0.7 10/03/2022 07:55 AM    GFRAA >60 10/05/2022 05:40 AM    LABGLOM >60 10/05/2022 05:40 AM    GLUCOSE 93 10/05/2022 05:40 AM    GLUCOSE 94 02/14/2011 05:20 AM    PROT 8.0 10/05/2022 05:40 AM    LABALBU 2.9 10/05/2022 05:40 AM    CALCIUM 9.2 10/05/2022 05:40 AM    BILITOT 0.4 10/05/2022 05:40 AM    ALKPHOS 157 10/05/2022 05:40 AM    AST 25 10/05/2022 05:40 AM    ALT 23 10/05/2022 05:40 AM     Lab Results   Component Value Date    CRP 16.7 (H) 10/05/2022    CRP 16.5 (H) 10/04/2022    CRP 17.0 (H) 10/03/2022     Lab Results   Component Value Date    SEDRATE 125 (H) 10/05/2022    SEDRATE 127 (H) 10/04/2022    SEDRATE 130 (H) 10/03/2022     Radiology:      Microbiology:   Lab Results   Component Value Date/Time    BC 24 Hours no growth 10/02/2022 08:20 PM    BC 5 Days- no growth 02/21/2015 08:20 PM    ORG Staphylococcus aureus 10/03/2022 02:51 AM    ORG Acinetobacter baumannii 08/03/2022 04:00 PM    ORG Staphylococcus coagulase-negative 08/03/2022 04:00 PM     Lab Results   Component Value Date/Time    BLOODCULT2 24 Hours no growth 10/02/2022 08:34 PM    BLOODCULT2 5 Days- no growth 02/21/2015 08:35 PM    ORG Staphylococcus aureus 10/03/2022 02:51 AM    ORG Acinetobacter baumannii 08/03/2022 04:00 PM    ORG Staphylococcus coagulase-negative 08/03/2022 04:00 PM     WOUND/ABSCESS   Date Value Ref Range Status   10/03/2022   Final    Heavy growth  This isolate is presumed to be resistant based on the  detection of inducible Clindamycin resistance. Clindamycin  may still be effective in some patients.      08/03/2022 Moderate growth  Final   08/03/2022 Light growth  Final     No results found for: RESPSMEAR  No results found for: MPNEUMO, CLAMYDCU, LABLEGI, AFBCX, FUNGSM, LABFUNG  No results found for: CULTRESP  No results found for: CXCATHTIP  No results found for: BFCS  Culture Surgical   Date Value Ref Range Status   05/18/2016 Light growth  Final     Urine Culture, Routine   Date Value Ref Range Status   10/02/2022   Final    10 to 100,000 CFU/mL  Mixed vinny isolated. Further workup and sensitivity testing  is not routinely indicated and will not be performed. Mixed vinny isolated includes:  Mixed gram positive organisms  Mixed gram negative rods       No results found for: [facility]    ASSESSMENT:  Left leg nonhealing ulcer  Left calf new ulcer   MSSA cellulitis     Plan:    Patient will be discharged with 14-day course of Keflex. Patient needs to follow with wound clinic about 3 times a week for proper wound cleaning and management.   Follow-up with ID clinic is not necessary at this point but if wound clinic decides any further need of ID, patient can be sent to ID clinic    Carolina Dick MD  4:36 PM  10/7/2022

## 2022-10-07 NOTE — PLAN OF CARE
Problem: Discharge Planning  Goal: Discharge to home or other facility with appropriate resources  Outcome: Progressing     Problem: Pain  Goal: Verbalizes/displays adequate comfort level or baseline comfort level  Outcome: Progressing     Problem: ABCDS Injury Assessment  Goal: Absence of physical injury  Outcome: Progressing  Flowsheets (Taken 10/7/2022 0849)  Absence of Physical Injury: Implement safety measures based on patient assessment     Problem: Safety - Adult  Goal: Free from fall injury  Outcome: Progressing  Flowsheets (Taken 10/7/2022 0849)  Free From Fall Injury: Instruct family/caregiver on patient safety     Problem: Chronic Conditions and Co-morbidities  Goal: Patient's chronic conditions and co-morbidity symptoms are monitored and maintained or improved  Outcome: Progressing     Problem: Cardiovascular - Adult  Goal: Maintains optimal cardiac output and hemodynamic stability  Outcome: Progressing     Problem: Metabolic/Fluid and Electrolytes - Adult  Goal: Hemodynamic stability and optimal renal function maintained  Outcome: Progressing     Problem: Nutrition Deficit:  Goal: Optimize nutritional status  Outcome: Progressing     Problem: Skin/Tissue Integrity  Goal: Absence of new skin breakdown  Description: 1. Monitor for areas of redness and/or skin breakdown  2. Assess vascular access sites hourly  3. Every 4-6 hours minimum:  Change oxygen saturation probe site  4. Every 4-6 hours:  If on nasal continuous positive airway pressure, respiratory therapy assess nares and determine need for appliance change or resting period.   Outcome: Progressing

## 2022-10-07 NOTE — CARE COORDINATION
Met with pt at bedside, call light on with c/o nausea, this CM attempted to notify bedside RN however, unable to reach via phone, notified charge RN Emerson Mora; per pt report she is returning to her sister's home at [address] [place], [address]; this CM notified Steph with [facility] of discharging Abx IV Ancef 2 g Q 8hr and address change, Steph will notify this CM of cost and availability. Will discuss need for midline in morning Nx rounds. Discharge plan is to sister's home ([place]) with [facility].

## 2022-10-07 NOTE — DISCHARGE INSTRUCTIONS
Dressing Orders: To left pretib and calf wound: cleanse with normal saline, apply drawtex, cover and secure with dry dressing. Apply profore wrap. Change M - W( at [address]) - F.     Keep wrap dry at all times. If wrap becomes wet or falls 2 inches call [address] [place] [address],[address] ([phone]) and may remove wrap and apply double tubigrip. Follow up in the wound care center on 10/19/22 at 2:45pm.    INFECTIOUS DISEASE DISCHARGE INSTRUCTIONS   Complete 14 days course of Keflex. Follow with wound clinic 2-3 times a week.

## 2022-10-07 NOTE — CARE COORDINATION
Discussed with bedside RN Bala Paul, pt's nausea r/t sinus drainage, need IV Abx script and order for midline; this CM called ID's office (j) [phone] and spoke with [facility] and provided the aforementioned request; Aldo with [facility] stated that if pt is discharged today they can put pt on today's schedule as she has an opening, will need IV agreement which she is faxing. Called Violet back she will have Dr Aileen Wilson call this CM back to clarify discharging abx d/t most recent note looks more like the plan is for PO Keflex at discharge, awaiting call back at time of review.

## 2022-10-07 NOTE — CARE COORDINATION
Per Dr Annie Rose, plan is to send pt home on PO Keflex after he checks pt's wounds likely to discharge today after ID rounds and if attending is in agreement; SARAH order was obtained from Vascular this morning; this CM updated Latrel with [facility] of the aforementioned.

## 2022-10-07 NOTE — PROGRESS NOTES
Hospitalist Progress Note      PCP: Tania Brown MD    Date of Admission: 10/2/2022    Chief Complaint: Fever, chills, fatigue, wound infection of lower extremity    Hospital Course:   78 y.o. female with past medical history of CAD COPD GERD HLD hypertension venous insufficiency . Patient presents to the Ed due to left leg pain . Patient has a chronic left calf wound since 6/2021 and follows with wound care presented to ER with complaint of purulent discharge from her lower extremity wound along with ulceration, fever, chills, fatigue x1 week. In ER, patient was tachycardic with  and hypothermic. Labs were remarkable for hypoalbuminemia, elevated alk phos. Patient was admitted for sepsis secondary to lower extremity wound and was started on cefepime, Vanco and Flagyl. ID was consulted. MRI ruled out osteomyelitis. Venous Doppler was unremarkable for DVT. Blood culture grew MSSA. Antibiotics has been switched to cefazolin as per ID. Patient reported vomiting today, will monitor, if she stays stable, will be discharged tomorrow. Subjective:   Patient was seen at the bedside, complaining of vomiting.   Review of systems  Constitutional: Denies fever, chills, generalized weakness, Weight loss, changes in appetite  Eye: Denies visual loss, blurriness of vision, photophobia, discharge, redness  ENT: Denies ear discharge, ear pain, epistaxis, throat swelling, tongue swelling, dysphagia  Respiratory: Denies cough, shortness of breath, pleuritic chest pain, hemoptysis, VALLEJO  Cardiac: Denies chest pain, palpitations, edema, PND, orthopnea, VALLEJO  GI: Denies abdominal pain, nausea, vomiting, diarrhea, constipation, hematemesis, hematochezia, melena, abdominal distention  : Denies flank pain, burning urination, dysuria,  suprapubic discomfort hematuria  Neuro: Denies headache, neck pain, tingling, numbness, seizure, LOC, confusion, paralysis or paresis, bladder dysfunction, bowel dysfunction  Musculoskeletal: Denies arthralgia, myalgia, arthritis of any joint  Hematology/immunology: Denies bleeding, bruising  Psych: Denies suicidal ideation, homicidal ideation, visual/auditory hallucinations    Medications:  Reviewed    Infusion Medications    sodium chloride       Scheduled Medications    cephALEXin  500 mg Oral 4 times per day    aspirin  81 mg Oral Daily    ferrous sulfate  325 mg Oral Nightly    folic acid  401 mcg Oral Nightly    gabapentin  300 mg Oral TID    metoprolol tartrate  50 mg Oral BID    morphine  15 mg Oral BID    pantoprazole  40 mg Oral QAM AC    atorvastatin  20 mg Oral Daily    zinc sulfate  50 mg Oral Daily    sodium chloride flush  5-40 mL IntraVENous 2 times per day    enoxaparin  30 mg SubCUTAneous BID     PRN Meds: oxyCODONE-acetaminophen **OR** oxyCODONE-acetaminophen, albuterol, sodium chloride flush, sodium chloride, potassium chloride **OR** potassium alternative oral replacement **OR** potassium chloride, ondansetron **OR** ondansetron, polyethylene glycol, acetaminophen **OR** acetaminophen, aluminum & magnesium hydroxide-simethicone, morphine      Intake/Output Summary (Last 24 hours) at 10/7/2022 1502  Last data filed at 10/7/2022 1024  Gross per 24 hour   Intake 360 ml   Output 700 ml   Net -340 ml       Exam:    BP (!) 151/99   Pulse (!) 103   Temp 96.8 °F (36 °C) (Temporal)   Resp 16   Ht 5' 9\" (1.753 m)   Wt 300 lb (136.1 kg)   SpO2 94%   BMI 44.30 kg/m²     General appearance: No apparent distress, appears stated age and cooperative. HEENT: Pupils equal, round, and reactive to light. Conjunctivae/corneas clear. Neck: Supple, with full range of motion. No jugular venous distention. Trachea midline. Respiratory:  Normal respiratory effort. Clear to auscultation, bilaterally without Rales/Wheezes/Rhonchi. Cardiovascular: Regular rate and rhythm with normal S1/S2 without murmurs, rubs or gallops.   Abdomen: Soft, non-tender, non-distended with normal bowel sounds. Musculoskeletal: No clubbing, cyanosis or edema bilaterally. Full range of motion without deformity. Skin: Skin color, texture, turgor normal.  No rashes or lesions. Neurologic:  Neurovascularly intact without any focal sensory/motor deficits. Cranial nerves: II-XII intact, grossly non-focal.  Psychiatric: Alert and oriented, thought content appropriate, normal insight    Labs:   Recent Labs     10/05/22  0540 10/06/22  0536   WBC 8.7 9.7   HGB 11.2* 11.4*   HCT 36.2 36.1    310     Recent Labs     10/05/22  0540      K 4.5   CL 97*   CO2 33*   BUN 8   CREATININE 0.7   CALCIUM 9.2     Recent Labs     10/05/22  0540   AST 25   ALT 23   BILITOT 0.4   ALKPHOS 157*     No results for input(s): INR in the last 72 hours. No results for input(s): Alexia Macleod in the last 72 hours. Assessment/Plan:    Active Hospital Problems    Diagnosis Date Noted    Sepsis due to cellulitis ([facility] [place] [address] [L03.90, A41.9] 10/03/2022     Priority: Medium    Wounds, multiple open, lower extremity, left, initial encounter Shelbi Marrero 10/03/2022     Priority: Medium    Cellulitis of left lower extremity [L03.116] 10/03/2022     Priority: Medium    Ulcer of calf with fat layer exposed, left ([facility] [place] [address] [L97.222] 12/08/2021    Venous insufficiency of both lower extremities [I87.2] 11/21/2018    Lymphedema of both lower extremities [I89.0] 11/21/2018     Wound infection of left lower extremity  Patient, presented to ER with complaint of fever, chills, fatigue, apparently discharged from wound of lower extremity  MRI: No osteomyelitis. Superficial soft tissue edema throughout the leg  BCx: MSSA  Continue Keflex  Follow-up ID    COPD  Continue DuoNeb needed    HTN  Monitor BP  DASH diet    Hyperlipidemia  Continue Lipitor    CAD  Continue aspirin, Lipitor      DVT Prophylaxis: Lovenox  Diet: ADULT DIET; Regular; Low Fat/Low Chol/High Fiber/MARIBEL  ADULT ORAL NUTRITION SUPPLEMENT; Lunch, Dinner;  Low Calorie/High Protein Oral Supplement  ADULT ORAL NUTRITION SUPPLEMENT; Breakfast, Dinner; Wound Healing Oral Supplement  Code Status: Full Code    PT/OT Eval Status:  Kalpana Palacios MD

## 2022-10-08 LAB
ANION GAP SERPL CALCULATED.3IONS-SCNC: 10 MMOL/L (ref 7–16)
BUN BLDV-MCNC: 11 MG/DL (ref 6–23)
CALCIUM SERPL-MCNC: 9.5 MG/DL (ref 8.6–10.2)
CHLORIDE BLD-SCNC: 97 MMOL/L (ref 98–107)
CO2: 30 MMOL/L (ref 22–29)
CREAT SERPL-MCNC: 0.8 MG/DL (ref 0.5–1)
GFR AFRICAN AMERICAN: >60
GFR NON-AFRICAN AMERICAN: >60 ML/MIN/1.73
GLUCOSE BLD-MCNC: 147 MG/DL (ref 74–99)
HCT VFR BLD CALC: 35.5 % (ref 34–48)
HEMOGLOBIN: 11.4 G/DL (ref 11.5–15.5)
MCH RBC QN AUTO: 32.6 PG (ref 26–35)
MCHC RBC AUTO-ENTMCNC: 32.1 % (ref 32–34.5)
MCV RBC AUTO: 101.4 FL (ref 80–99.9)
PDW BLD-RTO: 12.1 FL (ref 11.5–15)
PLATELET # BLD: 299 E9/L (ref 130–450)
PMV BLD AUTO: 9.1 FL (ref 7–12)
POTASSIUM SERPL-SCNC: 4.2 MMOL/L (ref 3.5–5)
RBC # BLD: 3.5 E12/L (ref 3.5–5.5)
SODIUM BLD-SCNC: 137 MMOL/L (ref 132–146)
WBC # BLD: 7.4 E9/L (ref 4.5–11.5)

## 2022-10-08 PROCEDURE — 2580000003 HC RX 258: Performed by: FAMILY MEDICINE

## 2022-10-08 PROCEDURE — 36415 COLL VENOUS BLD VENIPUNCTURE: CPT

## 2022-10-08 PROCEDURE — 2060000000 HC ICU INTERMEDIATE R&B

## 2022-10-08 PROCEDURE — 6370000000 HC RX 637 (ALT 250 FOR IP): Performed by: FAMILY MEDICINE

## 2022-10-08 PROCEDURE — 6370000000 HC RX 637 (ALT 250 FOR IP): Performed by: INTERNAL MEDICINE

## 2022-10-08 PROCEDURE — 80048 BASIC METABOLIC PNL TOTAL CA: CPT

## 2022-10-08 PROCEDURE — 6360000002 HC RX W HCPCS: Performed by: FAMILY MEDICINE

## 2022-10-08 PROCEDURE — 6370000000 HC RX 637 (ALT 250 FOR IP)

## 2022-10-08 PROCEDURE — 85027 COMPLETE CBC AUTOMATED: CPT

## 2022-10-08 RX ORDER — KETOROLAC TROMETHAMINE 10 MG/1
10 TABLET, FILM COATED ORAL EVERY 8 HOURS PRN
Qty: 15 TABLET | Refills: 0 | Status: SHIPPED | OUTPATIENT
Start: 2022-10-08 | End: 2022-10-13

## 2022-10-08 RX ADMIN — OXYCODONE AND ACETAMINOPHEN 2 TABLET: 5; 325 TABLET ORAL at 18:13

## 2022-10-08 RX ADMIN — OXYCODONE AND ACETAMINOPHEN 2 TABLET: 5; 325 TABLET ORAL at 22:45

## 2022-10-08 RX ADMIN — METOPROLOL TARTRATE 50 MG: 50 TABLET, FILM COATED ORAL at 20:48

## 2022-10-08 RX ADMIN — FOLIC ACID 500 MCG: 1 TABLET ORAL at 20:49

## 2022-10-08 RX ADMIN — ENOXAPARIN SODIUM 30 MG: 100 INJECTION SUBCUTANEOUS at 08:51

## 2022-10-08 RX ADMIN — MORPHINE SULFATE 2 MG: 2 INJECTION, SOLUTION INTRAMUSCULAR; INTRAVENOUS at 00:56

## 2022-10-08 RX ADMIN — MORPHINE SULFATE 15 MG: 15 TABLET, FILM COATED, EXTENDED RELEASE ORAL at 20:49

## 2022-10-08 RX ADMIN — CEPHALEXIN 500 MG: 250 CAPSULE ORAL at 19:29

## 2022-10-08 RX ADMIN — SODIUM CHLORIDE, PRESERVATIVE FREE 10 ML: 5 INJECTION INTRAVENOUS at 08:53

## 2022-10-08 RX ADMIN — CEPHALEXIN 500 MG: 250 CAPSULE ORAL at 00:56

## 2022-10-08 RX ADMIN — MORPHINE SULFATE 2 MG: 2 INJECTION, SOLUTION INTRAMUSCULAR; INTRAVENOUS at 07:46

## 2022-10-08 RX ADMIN — MORPHINE SULFATE 15 MG: 15 TABLET, FILM COATED, EXTENDED RELEASE ORAL at 10:55

## 2022-10-08 RX ADMIN — CEPHALEXIN 500 MG: 250 CAPSULE ORAL at 05:29

## 2022-10-08 RX ADMIN — ATORVASTATIN CALCIUM 20 MG: 20 TABLET, FILM COATED ORAL at 08:51

## 2022-10-08 RX ADMIN — CEPHALEXIN 500 MG: 250 CAPSULE ORAL at 13:15

## 2022-10-08 RX ADMIN — GABAPENTIN 300 MG: 300 CAPSULE ORAL at 08:50

## 2022-10-08 RX ADMIN — GABAPENTIN 300 MG: 300 CAPSULE ORAL at 20:47

## 2022-10-08 RX ADMIN — ASPIRIN 81 MG 81 MG: 81 TABLET ORAL at 08:51

## 2022-10-08 RX ADMIN — METOPROLOL TARTRATE 50 MG: 50 TABLET, FILM COATED ORAL at 08:50

## 2022-10-08 RX ADMIN — PANTOPRAZOLE SODIUM 40 MG: 40 TABLET, DELAYED RELEASE ORAL at 05:29

## 2022-10-08 RX ADMIN — OXYCODONE AND ACETAMINOPHEN 2 TABLET: 5; 325 TABLET ORAL at 08:51

## 2022-10-08 RX ADMIN — FERROUS SULFATE TAB 325 MG (65 MG ELEMENTAL FE) 325 MG: 325 (65 FE) TAB at 20:47

## 2022-10-08 RX ADMIN — OXYCODONE AND ACETAMINOPHEN 2 TABLET: 5; 325 TABLET ORAL at 02:29

## 2022-10-08 RX ADMIN — GABAPENTIN 300 MG: 300 CAPSULE ORAL at 13:16

## 2022-10-08 RX ADMIN — Medication 50 MG: at 08:49

## 2022-10-08 ASSESSMENT — PAIN DESCRIPTION - DESCRIPTORS: DESCRIPTORS: ACHING;BURNING;DISCOMFORT

## 2022-10-08 ASSESSMENT — PAIN DESCRIPTION - LOCATION
LOCATION: LEG

## 2022-10-08 ASSESSMENT — PAIN SCALES - GENERAL
PAINLEVEL_OUTOF10: 10
PAINLEVEL_OUTOF10: 8
PAINLEVEL_OUTOF10: 6

## 2022-10-08 ASSESSMENT — PAIN DESCRIPTION - ORIENTATION
ORIENTATION: LEFT

## 2022-10-08 NOTE — PROGRESS NOTES
Notified Dr Flor Reynolds of patient refusing discharge and wanting to go to rehab now.  notified as well.      Stanislav Jiménez RN   10/08/22  3:11 PM

## 2022-10-08 NOTE — CARE COORDINATION
Pt wants to go To ARU. Explained that she does not meet criteria. Spoke with Pt's Sister Erin. Explained that Pt is discharge and how she wants ARU but does not meet criteria. Erin will  Pt  by 8pm.and take her home. Called Pt's room. to inform that her Sister is pick ing her up at 8pm. Pt die not understand that she can not go to ARU . Pt refuses RICARDO.    Jazmín Sheffield, L.S.W.  [phone]

## 2022-10-08 NOTE — DISCHARGE SUMMARY
Hospital Medicine Discharge Summary    Patient ID: Tia Peoples      Patient's PCP: Duane Dillard MD    Admit Date: 10/2/2022     Discharge Date:   10/8/2022    Admitting Physician: Adolfo Singer MD     Discharge Physician: Lesly Ordonez MD     Discharge Diagnoses:  Sepsis  Cellulitis of left lower extremity  Nonhealing infected wound     Active Hospital Problems    Diagnosis Date Noted    Sepsis due to cellulitis ([address] [L03.90, A41.9] 10/03/2022     Priority: Medium    Wounds, multiple open, lower extremity, left, initial encounter [S81.802A] 10/03/2022     Priority: Medium    Cellulitis of left lower extremity [L03.116] 10/03/2022     Priority: Medium    Ulcer of calf with fat layer exposed, left ([address] [L97.222] 12/08/2021    Venous insufficiency of both lower extremities [I87.2] 11/21/2018    Lymphedema of both lower extremities [I89.0] 11/21/2018       The patient was seen and examined on day of discharge and this discharge summary is in conjunction with any daily progress note from day of discharge. Hospital Course:     78 y.o. female with past medical history of CAD COPD GERD HLD hypertension venous insufficiency . Patient presents to the Ed due to left leg pain . Patient has a chronic left calf wound since 6/2021 and follows with wound care presented to ER with complaint of purulent discharge from her lower extremity wound along with ulceration, fever, chills, fatigue x1 week. In ER, patient was tachycardic with  and hypothermic. Labs were remarkable for hypoalbuminemia, elevated alk phos. Patient was admitted for sepsis secondary to lower extremity wound and was started on cefepime, Vanco and Flagyl. ID was consulted. MRI ruled out osteomyelitis. Venous Doppler was unremarkable for DVT. Blood culture grew MSSA. Antibiotics has been switched to cefazolin as per ID.   Patient is stable to be discharged on oral cephalexin as per ID, to follow-up outpatient with vascular surgery, wound clinic, PCP. Exam:     /63   Pulse 94   Temp (!) 96.4 °F (35.8 °C) (Temporal)   Resp 18   Ht 5' 9\" (1.753 m)   Wt 292 lb 12.8 oz (132.8 kg)   SpO2 92%   BMI 43.24 kg/m²     General appearance: No apparent distress, appears stated age and cooperative. HEENT: Pupils equal, round, and reactive to light. Conjunctivae/corneas clear. Neck: Supple, with full range of motion. No jugular venous distention. Trachea midline. Respiratory:  Normal respiratory effort. Clear to auscultation, bilaterally without Rales/Wheezes/Rhonchi. Cardiovascular: Regular rate and rhythm with normal S1/S2 without murmurs, rubs or gallops. Abdomen: Soft, non-tender, non-distended with normal bowel sounds. Musculoskeletal: No clubbing, cyanosis or edema bilaterally. Full range of motion without deformity. Skin: Skin color, texture, turgor normal.  No rashes or lesions. Neurologic:  Neurovascularly intact without any focal sensory/motor deficits. Cranial nerves: II-XII intact, grossly non-focal.  Psychiatric: Alert and oriented, thought content appropriate, normal insight      Consults:     IP CONSULT TO INTERNAL MEDICINE  IP CONSULT TO INTERNAL MEDICINE  IP CONSULT TO INFECTIOUS DISEASES  IP CONSULT TO VASCULAR SURGERY  IP CONSULT TO DIETITIAN    Significant Diagnostic Studies:       Disposition: Home    Discharge Instructions/Follow-up: Vascular surgery, wound clinic, PCP    Code Status:  Full Code     Activity: activity as tolerated    Diet: Cardiac healthy    Labs:  For convenience and continuity at follow-up the following most recent labs are provided:      CBC:    Lab Results   Component Value Date/Time    WBC 7.4 10/08/2022 11:01 AM    HGB 11.4 10/08/2022 11:01 AM    HCT 35.5 10/08/2022 11:01 AM     10/08/2022 11:01 AM       Renal:    Lab Results   Component Value Date/Time     10/08/2022 11:01 AM    K 4.2 10/08/2022 11:01 AM    K 3.7 10/02/2022 08:20 PM    CL 97 10/08/2022 11:01 AM    CO2 2,000 mg by mouth daily      omeprazole (PRILOSEC) 40 MG delayed release capsule Take 40 mg by mouth daily. diphenhydrAMINE (BENADRYL) 50 MG capsule Take 50 mg by mouth daily as needed for Allergies       Garlic 690 MG TABS Take 1,000 mg by mouth daily              Time Spent on discharge is more than > 35 min in the examination, evaluation, counseling and review of medications and discharge plan. Signed:    Manuel Calles MD   10/8/2022      Thank you Liane Miller MD for the opportunity to be involved in this patient's care. If you have any questions or concerns please feel free to contact me at [phone].

## 2022-10-08 NOTE — PLAN OF CARE
Problem: Discharge Planning  Goal: Discharge to home or other facility with appropriate resources  Outcome: Progressing     Problem: Pain  Goal: Verbalizes/displays adequate comfort level or baseline comfort level  Outcome: Progressing     Problem: ABCDS Injury Assessment  Goal: Absence of physical injury  Outcome: Progressing     Problem: Safety - Adult  Goal: Free from fall injury  Outcome: Progressing     Problem: Chronic Conditions and Co-morbidities  Goal: Patient's chronic conditions and co-morbidity symptoms are monitored and maintained or improved  Outcome: Progressing     Problem: Cardiovascular - Adult  Goal: Maintains optimal cardiac output and hemodynamic stability  Outcome: Progressing     Problem: Nutrition Deficit:  Goal: Optimize nutritional status  Outcome: Progressing

## 2022-10-08 NOTE — PLAN OF CARE
Problem: Discharge Planning  Goal: Discharge to home or other facility with appropriate resources  10/8/2022 1036 by Jocelyn Falcon RN  Outcome: Progressing  Flowsheets (Taken 10/8/2022 0800)  Discharge to home or other facility with appropriate resources:   Identify barriers to discharge with patient and caregiver   Arrange for needed discharge resources and transportation as appropriate  10/8/2022 0252 by David Hutchinson RN  Outcome: Progressing     Problem: Pain  Goal: Verbalizes/displays adequate comfort level or baseline comfort level  10/8/2022 1036 by Jocelyn Falcon RN  Outcome: Progressing  10/8/2022 0252 by David Hutchinson RN  Outcome: Progressing     Problem: ABCDS Injury Assessment  Goal: Absence of physical injury  10/8/2022 1036 by Jocelyn Falcon RN  Outcome: Progressing  Flowsheets (Taken 10/8/2022 1035)  Absence of Physical Injury: Implement safety measures based on patient assessment  10/8/2022 0252 by David Hutchinson RN  Outcome: Progressing     Problem: Safety - Adult  Goal: Free from fall injury  10/8/2022 1036 by Jocelyn Falcon RN  Outcome: Progressing  [address] (Taken 10/8/2022 1035)  Free From Fall Injury: Instruct family/caregiver on patient safety  10/8/2022 0252 by David Hutchinson RN  Outcome: Progressing     Problem: Chronic Conditions and Co-morbidities  Goal: Patient's chronic conditions and co-morbidity symptoms are monitored and maintained or improved  10/8/2022 1036 by Jocelyn Falcon RN  Outcome: Progressing  Flowsheets (Taken 10/8/2022 0800)  Care Plan - Patient's Chronic Conditions and Co-Morbidity Symptoms are Monitored and Maintained or Improved: Monitor and assess patient's chronic conditions and comorbid symptoms for stability, deterioration, or improvement  10/8/2022 0252 by David Hutchinson RN  Outcome: Progressing     Problem: Cardiovascular - Adult  Goal: Maintains optimal cardiac output and hemodynamic stability  10/8/2022 1036 by Jocelyn Falcon RN  Outcome: Progressing  10/8/2022 0252 by Luis Mohr RN  Outcome: Progressing     Problem: Metabolic/Fluid and Electrolytes - Adult  Goal: Hemodynamic stability and optimal renal function maintained  10/8/2022 1036 by Adele Brown RN  Outcome: Progressing  Flowsheets (Taken 10/8/2022 0800)  Hemodynamic stability and optimal renal function maintained:   Monitor labs and assess for signs and symptoms of volume excess or deficit   Monitor intake, output and patient weight  10/8/2022 0252 by Luis Mohr RN  Outcome: Progressing     Problem: Nutrition Deficit:  Goal: Optimize nutritional status  10/8/2022 1036 by Adele Brown RN  Outcome: Progressing  10/8/2022 0252 by Luis Mohr RN  Outcome: Progressing     Problem: Skin/Tissue Integrity  Goal: Absence of new skin breakdown  Description: 1. Monitor for areas of redness and/or skin breakdown  2. Assess vascular access sites hourly  3. Every 4-6 hours minimum:  Change oxygen saturation probe site  4. Every 4-6 hours:  If on nasal continuous positive airway pressure, respiratory therapy assess nares and determine need for appliance change or resting period.   10/8/2022 1036 by Adele Brown RN  Outcome: Progressing  10/8/2022 0252 by Luis Mohr RN  Outcome: Progressing

## 2022-10-09 LAB
ANION GAP SERPL CALCULATED.3IONS-SCNC: 9 MMOL/L (ref 7–16)
BUN BLDV-MCNC: 15 MG/DL (ref 6–23)
CALCIUM SERPL-MCNC: 9.3 MG/DL (ref 8.6–10.2)
CHLORIDE BLD-SCNC: 94 MMOL/L (ref 98–107)
CO2: 31 MMOL/L (ref 22–29)
CREAT SERPL-MCNC: 0.8 MG/DL (ref 0.5–1)
GFR AFRICAN AMERICAN: >60
GFR NON-AFRICAN AMERICAN: >60 ML/MIN/1.73
GLUCOSE BLD-MCNC: 164 MG/DL (ref 74–99)
HCT VFR BLD CALC: 39.6 % (ref 34–48)
HEMOGLOBIN: 12.6 G/DL (ref 11.5–15.5)
MCH RBC QN AUTO: 32.4 PG (ref 26–35)
MCHC RBC AUTO-ENTMCNC: 31.8 % (ref 32–34.5)
MCV RBC AUTO: 101.8 FL (ref 80–99.9)
PDW BLD-RTO: 12.2 FL (ref 11.5–15)
PLATELET # BLD: 341 E9/L (ref 130–450)
PMV BLD AUTO: 9.2 FL (ref 7–12)
POTASSIUM SERPL-SCNC: 4.1 MMOL/L (ref 3.5–5)
RBC # BLD: 3.89 E12/L (ref 3.5–5.5)
SODIUM BLD-SCNC: 134 MMOL/L (ref 132–146)
WBC # BLD: 7.8 E9/L (ref 4.5–11.5)

## 2022-10-09 PROCEDURE — 6370000000 HC RX 637 (ALT 250 FOR IP): Performed by: INTERNAL MEDICINE

## 2022-10-09 PROCEDURE — 6370000000 HC RX 637 (ALT 250 FOR IP)

## 2022-10-09 PROCEDURE — 80048 BASIC METABOLIC PNL TOTAL CA: CPT

## 2022-10-09 PROCEDURE — 97530 THERAPEUTIC ACTIVITIES: CPT

## 2022-10-09 PROCEDURE — 85027 COMPLETE CBC AUTOMATED: CPT

## 2022-10-09 PROCEDURE — 97535 SELF CARE MNGMENT TRAINING: CPT

## 2022-10-09 PROCEDURE — 36415 COLL VENOUS BLD VENIPUNCTURE: CPT

## 2022-10-09 PROCEDURE — 6360000002 HC RX W HCPCS: Performed by: FAMILY MEDICINE

## 2022-10-09 PROCEDURE — 2060000000 HC ICU INTERMEDIATE R&B

## 2022-10-09 PROCEDURE — 6370000000 HC RX 637 (ALT 250 FOR IP): Performed by: FAMILY MEDICINE

## 2022-10-09 RX ADMIN — METOPROLOL TARTRATE 50 MG: 50 TABLET, FILM COATED ORAL at 08:51

## 2022-10-09 RX ADMIN — FOLIC ACID 500 MCG: 1 TABLET ORAL at 20:15

## 2022-10-09 RX ADMIN — ENOXAPARIN SODIUM 30 MG: 100 INJECTION SUBCUTANEOUS at 20:22

## 2022-10-09 RX ADMIN — OXYCODONE AND ACETAMINOPHEN 2 TABLET: 5; 325 TABLET ORAL at 12:53

## 2022-10-09 RX ADMIN — Medication 50 MG: at 08:51

## 2022-10-09 RX ADMIN — ENOXAPARIN SODIUM 30 MG: 100 INJECTION SUBCUTANEOUS at 08:51

## 2022-10-09 RX ADMIN — ATORVASTATIN CALCIUM 20 MG: 20 TABLET, FILM COATED ORAL at 08:51

## 2022-10-09 RX ADMIN — GABAPENTIN 300 MG: 300 CAPSULE ORAL at 20:15

## 2022-10-09 RX ADMIN — ASPIRIN 81 MG 81 MG: 81 TABLET ORAL at 08:51

## 2022-10-09 RX ADMIN — MORPHINE SULFATE 15 MG: 15 TABLET, FILM COATED, EXTENDED RELEASE ORAL at 20:15

## 2022-10-09 RX ADMIN — FERROUS SULFATE TAB 325 MG (65 MG ELEMENTAL FE) 325 MG: 325 (65 FE) TAB at 20:15

## 2022-10-09 RX ADMIN — PANTOPRAZOLE SODIUM 40 MG: 40 TABLET, DELAYED RELEASE ORAL at 05:48

## 2022-10-09 RX ADMIN — GABAPENTIN 300 MG: 300 CAPSULE ORAL at 12:53

## 2022-10-09 RX ADMIN — OXYCODONE AND ACETAMINOPHEN 2 TABLET: 5; 325 TABLET ORAL at 05:48

## 2022-10-09 RX ADMIN — CEPHALEXIN 500 MG: 250 CAPSULE ORAL at 00:13

## 2022-10-09 RX ADMIN — CEPHALEXIN 500 MG: 250 CAPSULE ORAL at 05:48

## 2022-10-09 RX ADMIN — CEPHALEXIN 500 MG: 250 CAPSULE ORAL at 18:57

## 2022-10-09 RX ADMIN — MORPHINE SULFATE 15 MG: 15 TABLET, FILM COATED, EXTENDED RELEASE ORAL at 08:51

## 2022-10-09 RX ADMIN — CEPHALEXIN 500 MG: 250 CAPSULE ORAL at 12:53

## 2022-10-09 RX ADMIN — GABAPENTIN 300 MG: 300 CAPSULE ORAL at 08:51

## 2022-10-09 RX ADMIN — METOPROLOL TARTRATE 50 MG: 50 TABLET, FILM COATED ORAL at 20:16

## 2022-10-09 RX ADMIN — OXYCODONE AND ACETAMINOPHEN 2 TABLET: 5; 325 TABLET ORAL at 20:21

## 2022-10-09 ASSESSMENT — PAIN DESCRIPTION - LOCATION
LOCATION: LEG

## 2022-10-09 ASSESSMENT — PAIN DESCRIPTION - ORIENTATION
ORIENTATION: LEFT
ORIENTATION: LEFT
ORIENTATION: LEFT;POSTERIOR

## 2022-10-09 ASSESSMENT — PAIN DESCRIPTION - DESCRIPTORS
DESCRIPTORS: ACHING
DESCRIPTORS: DISCOMFORT;ACHING;SORE
DESCRIPTORS: DISCOMFORT

## 2022-10-09 ASSESSMENT — PAIN SCALES - GENERAL
PAINLEVEL_OUTOF10: 10

## 2022-10-09 NOTE — PLAN OF CARE
Problem: Discharge Planning  Goal: Discharge to home or other facility with appropriate resources  10/9/2022 0844 by Michelle Monahan RN  Outcome: Progressing  Flowsheets (Taken 10/9/2022 0800)  Discharge to home or other facility with appropriate resources:   Identify barriers to discharge with patient and caregiver   Arrange for needed discharge resources and transportation as appropriate     Problem: Pain  Goal: Verbalizes/displays adequate comfort level or baseline comfort level  10/9/2022 0844 by Michelle Monahan RN  Outcome: Progressing     Problem: ABCDS Injury Assessment  Goal: Absence of physical injury  10/9/2022 0844 by Michelle Monahan RN  Outcome: Progressing  Flowsheets (Taken 10/9/2022 0843)  Absence of Physical Injury: Implement safety measures based on patient assessment     Problem: Safety - Adult  Goal: Free from fall injury  10/9/2022 0844 by Michelle Monahan RN  Outcome: Progressing  Flowsheets (Taken 10/9/2022 0843)  Free From Fall Injury: Instruct family/caregiver on patient safety     Problem: Chronic Conditions and Co-morbidities  Goal: Patient's chronic conditions and co-morbidity symptoms are monitored and maintained or improved  10/9/2022 0844 by Michelle Monahan RN  Outcome: Progressing  Flowsheets (Taken 10/9/2022 0800)  Care Plan - Patient's Chronic Conditions and Co-Morbidity Symptoms are Monitored and Maintained or Improved: Monitor and assess patient's chronic conditions and comorbid symptoms for stability, deterioration, or improvement     Problem: Cardiovascular - Adult  Goal: Maintains optimal cardiac output and hemodynamic stability  10/9/2022 0844 by Michelle Monahan RN  Outcome: Progressing  Flowsheets (Taken 10/9/2022 0800)  Maintains optimal cardiac output and hemodynamic stability: Monitor blood pressure and heart rate     Problem: Metabolic/Fluid and Electrolytes - Adult  Goal: Hemodynamic stability and optimal renal function maintained  10/9/2022 0844 by Samuel Loredo LINDSAY Rocha  Outcome: Progressing  Flowsheets (Taken 10/9/2022 0800)  Hemodynamic stability and optimal renal function maintained: Monitor intake, output and patient weight     Problem: Nutrition Deficit:  Goal: Optimize nutritional status  10/9/2022 0844 by Bridger Almendarez RN  Outcome: Progressing     Problem: Skin/Tissue Integrity  Goal: Absence of new skin breakdown  Description: 1. Monitor for areas of redness and/or skin breakdown  2. Assess vascular access sites hourly  3. Every 4-6 hours minimum:  Change oxygen saturation probe site  4. Every 4-6 hours:  If on nasal continuous positive airway pressure, respiratory therapy assess nares and determine need for appliance change or resting period.   10/9/2022 0844 by Bridger Almendarez RN  Outcome: Progressing

## 2022-10-09 NOTE — PROGRESS NOTES
Occupational Therapy  OT BEDSIDE TREATMENT NOTE    [address] [address] [address]      Date:10/9/2022  Patient Name: Bhavesh Quinones  MRN: [id number]  : 1945  Room: [address] [place] [address]       Evaluating OT: JUICE Cooper/HERMELINDA, [id number]     Referring Ellis Finch MD  Specific Provider Orders/Date: OT Eval and Treat 10/3/22     Diagnosis: Cellulitis of left lower extremity [L03.116]  Wounds, multiple open, lower extremity, left, initial encounter [S81.382A]  Sepsis due to cellulitis ([address] [L03.90, A41.9]   Surgery: NA     Pertinent Medical History:      Past Medical History        Past Medical History:   Diagnosis Date    Bursitis      CAD (coronary artery disease)      heart attack    Cellulitis of leg, left 10/3/2022    COPD (chronic obstructive pulmonary disease) ([address] 2013    Emphysema       slight    GERD (gastroesophageal reflux disease)      Hiatal hernia      Hip pain      Hyperlipidemia      Hypertension      Lymphedema of both lower extremities 2018    Venous insufficiency of both lower extremities 2018    Venous stasis ulcer of left calf with fat layer exposed without varicose veins ([address] 2021    Venous ulcer with fat layer exposed ([address] 10/28/2015            Past Surgical History         Past Surgical History:   Procedure Laterality Date    APPENDECTOMY        BREAST ENHANCEMENT SURGERY        BREAST REDUCTION SURGERY        CHOLECYSTECTOMY        COLONOSCOPY        ECHO COMPL W DOP COLOR FLOW   3/11/2013          ENDOSCOPY, COLON, DIAGNOSTIC        HERNIA REPAIR N/A 2021     LAPAROSCOPIC ROBOTIC ASSISTED INGUINAL HERNIA REPAIR performed by Prabhakar Darnell MD at [address] [place] [address] ([address] [place] [address])        JOINT REPLACEMENT   2009     l knee r hip    LEG DEBRIDEMENT Left 2015    LEG DEBRIDEMENT Left 2016         Precautions:  Fall Risk, O2, L LE prevalon boot, Purewick catheter     Assessment of current deficits    [x] Functional mobility          [x]ADLs           [x] Strength                  []Cognition    [x] Functional transfers         [x] IADLs         [x] Safety Awareness   [x]Endurance    [] Fine Coordination                        [x] Balance      [] Vision/perception   [x]Sensation      []Gross Motor Coordination            [] ROM           [] Delirium                   [] Motor Control      OT PLAN OF CARE   OT POC based on physician orders, patient diagnosis and results of clinical assessment     Frequency/Duration 1-3 days/wk for 2 weeks PRN   Specific OT Treatment Interventions to include:   * Instruction/training on adapted ADL techniques and AE recommendations to increase functional independence within precautions       * Training on energy conservation strategies, correct breathing pattern and techniques to improve independence/tolerance for self-care routine  * Functional transfer/mobility training/DME recommendations for increased independence, safety, and fall prevention  * Patient/Family education to increase follow through with safety techniques and functional independence  * Recommendation of environmental modifications for increased safety with functional transfers/mobility and ADLs  * Therapeutic exercise to improve motor endurance, ROM, and functional strength for ADLs/functional transfers  * Therapeutic activities to facilitate/challenge dynamic balance, stand tolerance for increased safety and independence with ADLs  * Therapeutic activities to facilitate gross/fine motor skills for increased independence with ADLs  * Positioning to improve skin integrity, interaction with environment and functional independence  * Delirium prevention/treatment     Recommended Adaptive Equipment: TBD, potential needs include hospital bed (to assist with positioning for edema and for hand rails to assist with bed mobility), LB dressing equipment, mobility device as determined by PT (potentially ww)     Comments: Based on patient's functional performance as stated below and level of assistance needed prior to admission, this therapist believes that the patient would benefit from further skilled OT following hospital stay in an effort to increase safety, functional independence, and quality of life. Home Living: Pt lives alone - Plans to d/c to sister's home- 1st floor set up, one DO no rail. Tub only  Equipment owned: 3-in-1 commode, BSC, shower chair, cane, ww     Prior Level of Function: independent with ADLs and with IADLs; using cane for functional mobility. Driving: yes  Occupation: NA     Pain Level: severe LLE pain  Cognition: A&O: 4/4; Follows 2-3 step directions- perseverative regarding pain. Memory: G              Sequencing: G              Problem solving: F              Judgement/safety: F                Functional Assessment: AM-PAC Daily Activity Raw Score: 14/24    Initial Eval Status  Date: 10/6/22 Treatment Status  Date: 10/9/22 STGs = LTGs  Time frame: 10-14 days   Feeding Set up A  set up        Grooming Set up A  seated Set up  To wash face seated EOB     SBA while seated/standing at sink    UB Dressing Min A  To [facility] gown as robe SBA  To don/doff gown seated EOB     Set up A   LB Dressing DEP  To doff/jaden socks at bed level  dependent  To don/doff socks seated EOB    Mod A   Bathing Mod A  simulated Mod A  Simulated seated EOB and standing for posterior     Min A with AE PRN   Toileting Mod A  Overall; limited functional reach d/t habitus- min A for tfr onto bedpan on chair  Max A- hygiene  SBA   Bed Mobility  Rolling: SBA  Supine to sit: HOB elevated SBA with use of handrails  Sit to supine: SBA with use of handrails  SBA- supine>sit using bedrail  Educated pt on technique to increase independence.    supervision   Functional Transfers Sit to stand: SBA with increased time  Stand to sit: Min A Min A- sit<->stand  Cuing for hand placement and body mechanics  supervision   Functional Mobility Min A  For short steps to pivot to bedside chair, decline use of ww Min A- stand pivot transfer no AD  Supervision with AE PRN for in-home distance   Balance Sitting:     Static:  supervision    Dynamic:SBA  Standing: CGA Sitting:     Static: Ind    Dynamic:SBA  Standing:  Min A      Endurance/Activity Tolerance Poor+ Poor  Decreased by pain  Fair-   Visual/  Perceptual Glasses: yes   -appears WFL                     Comments: Upon arrival pt supine in bed. Pt educated on techniques to increase independence and safety during ADL's, bed mobility, and functional transfers. At end of session pt left seated in bedside chair, call light within reach LE's elevated, lab present. Increased time required for positioning at end of session. Pt has made limited progress towards set goals.      Continue with current plan of care    Treatment Time In:  8:30           Treatment Time Out: 8:53             Treatment Charges: Mins Units   Ther Ex  44348     Manual Therapy 73691     Thera Activities 77906 13 1   ADL/Home Mgt 53134 10 1   Neuro Re-ed 74798     Group Therapy      Orthotic manage/training  16242     Non-Billable Time     Total Timed Treatment 23 2     Silvia Mayen

## 2022-10-09 NOTE — PROGRESS NOTES
Pt is refusing discharge at this time. She states she cannot stand up and is unsafe for her to go home. Dr. Safia Aguilar via perfect serve to notify. Per Dr. Zora Roberts ok for pt to not discharge at this time and stay over night. Can consult social work in the am to determine what to do. Pt and sister who is at the bedside notified. Per Dr. Zora Roberts ok to cancel discharge.

## 2022-10-09 NOTE — PROGRESS NOTES
Call placed to pt sister Emaline to confirm  of pt. Spoke with pt nephew. He stated her sister was not at home but would be coming to  pt this evening Stated that pt is discharged and needs to be picked up this evening. Nephew stated that they understood and would be here to  pt.

## 2022-10-09 NOTE — PROGRESS NOTES
Physical Therapy  Physical Therapy Treatment    Name: Cesar Blood  : 1945  MRN: [id number]      Date of Service: 10/9/2022    Evaluating PT:  Sindhu Hui PT, DPT [id number]    Room #:  3733/3925-O  Diagnosis:  Cellulitis of left lower extremity [J46.991]  Wounds, multiple open, lower extremity, left, initial encounter [S81.802A]  Sepsis due to cellulitis ([address] [L03.90, A41.9]  PMHx/PSHx:  CAD, cellulitis, COPD, GERD, HLD, HTN, venous insufficiency   Procedure/Surgery:  none  Precautions:  Falls, O2  Equipment Needs:  TBD, pt has cane    SUBJECTIVE:    Pt lives alone in a 2 story home with 5 stairs to enter and 1 rail. Bed is on 2nd floor and bath is on 2nd floor. Pt uses commode chair on main level during the day. Pt ambulated with cane PTA. OBJECTIVE:   Initial Evaluation  Date: 10/4/22 Treatment  10/9/22 Short Term/ Long Term   Goals   AM-PAC 6 Clicks 90/04 72/64    Was pt agreeable to Eval/treatment? yes yes    Does pt have pain? Severe LLE pain Severe LLE pain    Bed Mobility  Rolling: SBA  Supine to sit: SBA  Sit to supine: SBA  Scooting: SBA Rolling: SBA  Supine to sit: SBA  Sit to supine: NT  Scooting: SBA Rolling: Independent   Supine to sit: Independent   Sit to supine: Independent   Scooting: Independent    Transfers Sit to stand: SBA  Stand to sit: SBA  Stand pivot: SBA Sit to stand: Gregg  Stand to sit: Gregg  Stand pivot: Gregg Sit to stand: Independent   Stand to sit: Independent   Stand pivot: Mod I with cane   Ambulation    NT  25 feet with cane Mod I   Stair negotiation: ascended and descended  NT  13 steps with 1 rail Mod I   ROM BUE:  Defer to OT note  BLE:  limited by body habitus      Strength BUE:  Defer to OT note  BLE:  grossly 3/5, not formally assessed  [facility]   Balance Sitting EOB:  SBA  Dynamic Standing:  SBA Sitting EOB:  SBA  Dynamic Standing:  Gregg Sitting EOB:  Independent   Dynamic Standing:   Mod I with cane     Pt is A & O x 4  Sensation:  NT  Edema:  none noted    Patient education  Pt educated on role of PT, safety during mobility     Patient response to education:   Pt verbalized understanding Pt demonstrated skill Pt requires further education in this area   yes yes yes     ASSESSMENT:    Comments:  patient semi-supine in bed upon entry and agreeable to PT treatment. Pt able to complete bed mobility with no assist with elevated HOB. Pt with increased pain with LLE in dependent position. Pt able to stand for assist with hygiene with light HHA. Pivot to chair with light HHA with one minor LOB requiring light assist to correct. Pt extensively positioned in chair for decreased LLE pain. All needs met and call light in reach. Treatment:  Patient practiced and was instructed in the following treatment:    Bed Mobility: VCs provided for sequencing and safety during mobility. Transfer Training: Verbal and tactile cueing provided for sequencing and safety during mobility. Manual assist provided for completion of task  Therapeutic Activities: pt sat EOB for approximately 5 minutes during session with no assist for static and dynamic sitting balance. PLAN:    Patient is making fair progress towards established goals. Will continue with current POC.       Time in  0830  Time out  0853    Total Treatment Time  23 minutes     CPT codes:  [] Gait training 25191 - minutes  [] Manual therapy 59551 - minutes  [x] Therapeutic activities 82593 23 minutes  [] Therapeutic exercises 17930 - minutes  [] Neuromuscular reeducation 74283 - minutes    George Garibay PT, DPT  [id number]

## 2022-10-09 NOTE — PLAN OF CARE
Problem: Pain  Goal: Verbalizes/displays adequate comfort level or baseline comfort level  Outcome: Progressing     Problem: Discharge Planning  Goal: Discharge to home or other facility with appropriate resources  Outcome: Progressing     Problem: Safety - Adult  Goal: Free from fall injury  Outcome: Progressing  Flowsheets (Taken 10/8/2022 2833)  Free From Fall Injury: Instruct family/caregiver on patient safety

## 2022-10-09 NOTE — PROGRESS NOTES
Hospitalist Progress Note      PCP: Mynor Guthrie MD    Date of Admission: 10/2/2022    Chief Complaint: Fever, chills, fatigue, wound infection of lower extremity    Hospital Course:   78 y.o. female with past medical history of CAD COPD GERD HLD hypertension venous insufficiency . Patient presents to the Ed due to left leg pain . Patient has a chronic left calf wound since 6/2021 and follows with wound care presented to ER with complaint of purulent discharge from her lower extremity wound along with ulceration, fever, chills, fatigue x1 week. In ER, patient was tachycardic with  and hypothermic. Labs were remarkable for hypoalbuminemia, elevated alk phos. Patient was admitted for sepsis secondary to lower extremity wound and was started on cefepime, Vanco and Flagyl. ID was consulted. MRI ruled out osteomyelitis. Venous Doppler was unremarkable for DVT. Blood culture grew MSSA. Antibiotics has been switched to cefazolin as per ID. Patient is stable to be discharged on oral cephalexin as per ID, to follow-up outpatient with vascular surgery, wound clinic, PCP. Patient wished to go to Rehab and waiting for placement. Subjective:     Patient was seen at the bedside, complaining of vomiting.   Review of systems  Constitutional: Denies fever, chills, generalized weakness, Weight loss, changes in appetite  Eye: Denies visual loss, blurriness of vision, photophobia, discharge, redness  ENT: Denies ear discharge, ear pain, epistaxis, throat swelling, tongue swelling, dysphagia  Respiratory: Denies cough, shortness of breath, pleuritic chest pain, hemoptysis, VALLEJO  Cardiac: Denies chest pain, palpitations, edema, PND, orthopnea, VALLEJO  GI: Denies abdominal pain, nausea, vomiting, diarrhea, constipation, hematemesis, hematochezia, melena, abdominal distention  : Denies flank pain, burning urination, dysuria,  suprapubic discomfort hematuria  Neuro: Denies headache, neck pain, tingling, numbness, seizure, LOC, confusion, paralysis or paresis, bladder dysfunction, bowel dysfunction  Musculoskeletal: Denies arthralgia, myalgia, arthritis of any joint  Hematology/immunology: Denies bleeding, bruising  Psych: Denies suicidal ideation, homicidal ideation, visual/auditory hallucinations  Medications:  Reviewed    Infusion Medications    sodium chloride       Scheduled Medications    cephALEXin  500 mg Oral 4 times per day    aspirin  81 mg Oral Daily    ferrous sulfate  325 mg Oral Nightly    folic acid  683 mcg Oral Nightly    gabapentin  300 mg Oral TID    metoprolol tartrate  50 mg Oral BID    morphine  15 mg Oral BID    pantoprazole  40 mg Oral QAM AC    atorvastatin  20 mg Oral Daily    zinc sulfate  50 mg Oral Daily    sodium chloride flush  5-40 mL IntraVENous 2 times per day    enoxaparin  30 mg SubCUTAneous BID     PRN Meds: oxyCODONE-acetaminophen **OR** oxyCODONE-acetaminophen, albuterol, sodium chloride flush, sodium chloride, potassium chloride **OR** potassium alternative oral replacement **OR** potassium chloride, ondansetron **OR** ondansetron, polyethylene glycol, acetaminophen **OR** acetaminophen, aluminum & magnesium hydroxide-simethicone, morphine      Intake/Output Summary (Last 24 hours) at 10/9/2022 1210  Last data filed at 10/9/2022 1115  Gross per 24 hour   Intake 360 ml   Output 650 ml   Net -290 ml       Exam:    BP (!) 120/58   Pulse 96   Temp (!) 96.7 °F (35.9 °C) (Temporal)   Resp 18   Ht 5' 9\" (1.753 m)   Wt 293 lb (132.9 kg)   SpO2 93%   BMI 43.27 kg/m²     General appearance: No apparent distress, appears stated age and cooperative. HEENT: Pupils equal, round, and reactive to light. Conjunctivae/corneas clear. Neck: Supple, with full range of motion. No jugular venous distention. Trachea midline. Respiratory:  Normal respiratory effort. Clear to auscultation, bilaterally without Rales/Wheezes/Rhonchi.   Cardiovascular: Regular rate and rhythm with normal S1/S2 without murmurs, rubs or gallops. Abdomen: Soft, non-tender, non-distended with normal bowel sounds. Musculoskeletal: No clubbing, cyanosis or edema bilaterally. Full range of motion without deformity. Skin: Skin color, texture, turgor normal.  No rashes or lesions. Neurologic:  Neurovascularly intact without any focal sensory/motor deficits. Cranial nerves: II-XII intact, grossly non-focal.  Psychiatric: Alert and oriented, thought content appropriate, normal insight    Labs:   Recent Labs     10/08/22  1101 10/09/22  0851   WBC 7.4 7.8   HGB 11.4* 12.6   HCT 35.5 39.6    341     Recent Labs     10/08/22  1101 10/09/22  0851    134   K 4.2 4.1   CL 97* 94*   CO2 30* 31*   BUN 11 15   CREATININE 0.8 0.8   CALCIUM 9.5 9.3     No results for input(s): AST, ALT, BILIDIR, BILITOT, ALKPHOS in the last 72 hours. No results for input(s): INR in the last 72 hours. No results for input(s): Angelina Coombes in the last 72 hours. Assessment/Plan:    Active Hospital Problems    Diagnosis Date Noted    Sepsis due to cellulitis ([facility] [address] [L03.90, A41.9] 10/03/2022     Priority: Medium    Wounds, multiple open, lower extremity, left, initial encounter Shahzad Westfallnirav 10/03/2022     Priority: Medium    Cellulitis of left lower extremity [L03.116] 10/03/2022     Priority: Medium    Ulcer of calf with fat layer exposed, left ([facility] [address] [L97.222] 12/08/2021    Venous insufficiency of both lower extremities [I87.2] 11/21/2018    Lymphedema of both lower extremities [I89.0] 11/21/2018     Wound infection of left lower extremity  Patient, presented to ER with complaint of fever, chills, fatigue, apparently discharged from wound of lower extremity  MRI: No osteomyelitis.   Superficial soft tissue edema throughout the leg  BCx: MSSA  Continue Keflex  Follow-up ID     COPD  Continue DuoNeb needed     HTN  Monitor BP  DASH diet     Hyperlipidemia  Continue Lipitor    CAD  Continue aspirin, Lipitor      DVT Prophylaxis: Lovenox  Diet: ADULT DIET; Regular; Low Fat/Low Chol/High Fiber/MARIBEL  ADULT ORAL NUTRITION SUPPLEMENT; Lunch, Dinner; Low Calorie/High Protein Oral Supplement  ADULT ORAL NUTRITION SUPPLEMENT; Breakfast, Dinner; Wound Healing Oral Supplement  Code Status: Full Code    PT/OT Eval Status:  Crystal Mccartney MD

## 2022-10-10 LAB
ANION GAP SERPL CALCULATED.3IONS-SCNC: 11 MMOL/L (ref 7–16)
BUN BLDV-MCNC: 14 MG/DL (ref 6–23)
CALCIUM SERPL-MCNC: 8.9 MG/DL (ref 8.6–10.2)
CHLORIDE BLD-SCNC: 97 MMOL/L (ref 98–107)
CO2: 30 MMOL/L (ref 22–29)
CREAT SERPL-MCNC: 0.8 MG/DL (ref 0.5–1)
GFR AFRICAN AMERICAN: >60
GFR NON-AFRICAN AMERICAN: >60 ML/MIN/1.73
GLUCOSE BLD-MCNC: 95 MG/DL (ref 74–99)
HCT VFR BLD CALC: 35.2 % (ref 34–48)
HEMOGLOBIN: 10.7 G/DL (ref 11.5–15.5)
MCH RBC QN AUTO: 31.2 PG (ref 26–35)
MCHC RBC AUTO-ENTMCNC: 30.4 % (ref 32–34.5)
MCV RBC AUTO: 102.6 FL (ref 80–99.9)
PDW BLD-RTO: 12.3 FL (ref 11.5–15)
PLATELET # BLD: 293 E9/L (ref 130–450)
PMV BLD AUTO: 9.1 FL (ref 7–12)
POTASSIUM SERPL-SCNC: 4.2 MMOL/L (ref 3.5–5)
RBC # BLD: 3.43 E12/L (ref 3.5–5.5)
SODIUM BLD-SCNC: 138 MMOL/L (ref 132–146)
WBC # BLD: 7.7 E9/L (ref 4.5–11.5)

## 2022-10-10 PROCEDURE — 99231 SBSQ HOSP IP/OBS SF/LOW 25: CPT

## 2022-10-10 PROCEDURE — 6370000000 HC RX 637 (ALT 250 FOR IP): Performed by: INTERNAL MEDICINE

## 2022-10-10 PROCEDURE — 36415 COLL VENOUS BLD VENIPUNCTURE: CPT

## 2022-10-10 PROCEDURE — 85027 COMPLETE CBC AUTOMATED: CPT

## 2022-10-10 PROCEDURE — 6370000000 HC RX 637 (ALT 250 FOR IP)

## 2022-10-10 PROCEDURE — 6360000002 HC RX W HCPCS: Performed by: FAMILY MEDICINE

## 2022-10-10 PROCEDURE — 6370000000 HC RX 637 (ALT 250 FOR IP): Performed by: FAMILY MEDICINE

## 2022-10-10 PROCEDURE — 80048 BASIC METABOLIC PNL TOTAL CA: CPT

## 2022-10-10 PROCEDURE — 1200000000 HC SEMI PRIVATE

## 2022-10-10 RX ORDER — OSTOMY ADHESIVE
1 STRIP MISCELLANEOUS ONCE
Status: DISCONTINUED | OUTPATIENT
Start: 2022-10-10 | End: 2022-10-13 | Stop reason: HOSPADM

## 2022-10-10 RX ADMIN — GABAPENTIN 300 MG: 300 CAPSULE ORAL at 08:48

## 2022-10-10 RX ADMIN — MORPHINE SULFATE 15 MG: 15 TABLET, FILM COATED, EXTENDED RELEASE ORAL at 21:42

## 2022-10-10 RX ADMIN — GABAPENTIN 300 MG: 300 CAPSULE ORAL at 21:41

## 2022-10-10 RX ADMIN — CEPHALEXIN 500 MG: 250 CAPSULE ORAL at 06:22

## 2022-10-10 RX ADMIN — ENOXAPARIN SODIUM 30 MG: 100 INJECTION SUBCUTANEOUS at 08:49

## 2022-10-10 RX ADMIN — OXYCODONE AND ACETAMINOPHEN 2 TABLET: 5; 325 TABLET ORAL at 16:22

## 2022-10-10 RX ADMIN — CEPHALEXIN 500 MG: 250 CAPSULE ORAL at 12:59

## 2022-10-10 RX ADMIN — ENOXAPARIN SODIUM 30 MG: 100 INJECTION SUBCUTANEOUS at 21:43

## 2022-10-10 RX ADMIN — MORPHINE SULFATE 15 MG: 15 TABLET, FILM COATED, EXTENDED RELEASE ORAL at 08:48

## 2022-10-10 RX ADMIN — ATORVASTATIN CALCIUM 20 MG: 20 TABLET, FILM COATED ORAL at 08:48

## 2022-10-10 RX ADMIN — Medication 50 MG: at 08:48

## 2022-10-10 RX ADMIN — GABAPENTIN 300 MG: 300 CAPSULE ORAL at 13:58

## 2022-10-10 RX ADMIN — CEPHALEXIN 500 MG: 250 CAPSULE ORAL at 18:28

## 2022-10-10 RX ADMIN — FERROUS SULFATE TAB 325 MG (65 MG ELEMENTAL FE) 325 MG: 325 (65 FE) TAB at 21:41

## 2022-10-10 RX ADMIN — METOPROLOL TARTRATE 50 MG: 50 TABLET, FILM COATED ORAL at 08:48

## 2022-10-10 RX ADMIN — ASPIRIN 81 MG 81 MG: 81 TABLET ORAL at 08:48

## 2022-10-10 RX ADMIN — OXYCODONE AND ACETAMINOPHEN 2 TABLET: 5; 325 TABLET ORAL at 10:12

## 2022-10-10 RX ADMIN — METOPROLOL TARTRATE 50 MG: 50 TABLET, FILM COATED ORAL at 21:41

## 2022-10-10 RX ADMIN — PANTOPRAZOLE SODIUM 40 MG: 40 TABLET, DELAYED RELEASE ORAL at 06:22

## 2022-10-10 RX ADMIN — OXYCODONE AND ACETAMINOPHEN 2 TABLET: 5; 325 TABLET ORAL at 22:41

## 2022-10-10 RX ADMIN — OXYCODONE AND ACETAMINOPHEN 2 TABLET: 5; 325 TABLET ORAL at 03:02

## 2022-10-10 RX ADMIN — FOLIC ACID 500 MCG: 1 TABLET ORAL at 21:41

## 2022-10-10 RX ADMIN — CEPHALEXIN 500 MG: 250 CAPSULE ORAL at 00:22

## 2022-10-10 ASSESSMENT — PAIN DESCRIPTION - ORIENTATION: ORIENTATION: LEFT;RIGHT

## 2022-10-10 ASSESSMENT — PAIN SCALES - GENERAL
PAINLEVEL_OUTOF10: 9
PAINLEVEL_OUTOF10: 0
PAINLEVEL_OUTOF10: 10
PAINLEVEL_OUTOF10: 2
PAINLEVEL_OUTOF10: 9
PAINLEVEL_OUTOF10: 10

## 2022-10-10 ASSESSMENT — PAIN DESCRIPTION - DESCRIPTORS
DESCRIPTORS: ACHING;SORE;SHARP
DESCRIPTORS: ACHING;BURNING;DISCOMFORT

## 2022-10-10 ASSESSMENT — PAIN DESCRIPTION - LOCATION
LOCATION: LEG;BACK
LOCATION: LEG
LOCATION: LEG

## 2022-10-10 NOTE — PROGRESS NOTES
Message sent Dr. Anjum Mora via perfect serve to see if the patient could be downgraded to a general floor.

## 2022-10-10 NOTE — PLAN OF CARE
Problem: Discharge Planning  Goal: Discharge to home or other facility with appropriate resources  Outcome: Progressing  Flowsheets (Taken 10/9/2022 2000)  Discharge to home or other facility with appropriate resources: Identify barriers to discharge with patient and caregiver     Problem: Pain  Goal: Verbalizes/displays adequate comfort level or baseline comfort level  Outcome: Progressing     Problem: Skin/Tissue Integrity  Goal: Absence of new skin breakdown  Description: 1. Monitor for areas of redness and/or skin breakdown  2. Assess vascular access sites hourly  3. Every 4-6 hours minimum:  Change oxygen saturation probe site  4. Every 4-6 hours:  If on nasal continuous positive airway pressure, respiratory therapy assess nares and determine need for appliance change or resting period.   Outcome: Progressing

## 2022-10-10 NOTE — CARE COORDINATION
This CM met with pt at bedside to obtain AR vs SNF choices, [employer] provided; pt chose 1. [facility], 2. [facility], 1. [facility], 4. [facility]; referrals made to [facility] with Leob[facility], and Aman Majano with [facility] [address]; awaiting acceptance at time of review. The Plan for Transition of Care is related to the following treatment goals: medical stability    The Patient was provided with a choice of provider and agrees   with the discharge plan. [x] Yes [] No    Freedom of choice list was provided with basic dialogue that supports the patient's individualized plan of care/goals, treatment preferences and shares the quality data associated with the providers.  [x] Yes [] No

## 2022-10-10 NOTE — PROGRESS NOTES
Vascular Surgery Progress Note    Pt is being seen in f/u today regarding L LE wound    Subjective  Pt s/e. Pain is better controlled. She was not able to tolerate the last unna boot that was placed on Fri. It had to be cut off later that day as well. She does not want another one placed. She is now going to rehab.     Current Medications:    sodium chloride        oxyCODONE-acetaminophen **OR** oxyCODONE-acetaminophen, albuterol, sodium chloride flush, sodium chloride, potassium chloride **OR** potassium alternative oral replacement **OR** potassium chloride, ondansetron **OR** ondansetron, polyethylene glycol, acetaminophen **OR** acetaminophen, aluminum & magnesium hydroxide-simethicone, morphine    Unna-Flex Elastic Unna Boot  1 each Topical Once    cephALEXin  500 mg Oral 4 times per day    aspirin  81 mg Oral Daily    ferrous sulfate  325 mg Oral Nightly    folic acid  633 mcg Oral Nightly    gabapentin  300 mg Oral TID    metoprolol tartrate  50 mg Oral BID    morphine  15 mg Oral BID    pantoprazole  40 mg Oral QAM AC    atorvastatin  20 mg Oral Daily    zinc sulfate  50 mg Oral Daily    sodium chloride flush  5-40 mL IntraVENous 2 times per day    enoxaparin  30 mg SubCUTAneous BID      PHYSICAL EXAM:    BP (!) 120/56   Pulse 88   Temp 97.1 °F (36.2 °C) (Temporal)   Resp 20   Ht 5' 9\" (1.753 m)   Wt 293 lb (132.9 kg)   SpO2 94%   BMI 43.27 kg/m²     Intake/Output Summary (Last 24 hours) at 10/10/2022 0904  Last data filed at 10/9/2022 2000  Gross per 24 hour   Intake 720 ml   Output 650 ml   Net 70 ml        Gen Awake, alert, oriented x3, in no apparent distress   CVS S1S2   Resp + resp excursion - on RA  Abd Soft, non-tender, non-distended    L LE DP/PT 1+   Pretibial wound with exudate   Posterior calf wound cellulitis, drainage   Lateral calf blister    LABS:    Lab Results   Component Value Date    WBC 7.7 10/10/2022    HGB 10.7 (L) 10/10/2022    HCT 35.2 10/10/2022     10/10/2022 PROTIME 14.0 (H) 10/02/2022    INR 1.3 10/02/2022    APTT 30.4 10/02/2022    K 4.2 10/10/2022    BUN 14 10/10/2022    CREATININE 0.8 10/10/2022     A/P L LE wounds  Continue Medical management with ASA and statin  Abx per ID: cephalexin   MRI of the left leg shows no OM  Pain control: PRN meds  Arterial studies reviewed: AMANUEL R 1.09, L 1.13  Dressing changes: opticell, abd, kerlix - changed today  Pt refusing unna boot  Offload L LE with prevalon boot  Ok for discharge from vascular surgery pov  She now wants to go to rehab   We will see pt in [facility] on Wed 10/19    MESSI Mandujano - CNP

## 2022-10-10 NOTE — CARE COORDINATION
Received call from [facility] with [facility], pt does not have appropriate diagnosis for Acute Rehab (pt was made aware prior to referral this may be the case). Referral pending for [facility]; referral made to [facility] for [facility] made.

## 2022-10-10 NOTE — PROGRESS NOTES
Hospitalist Progress Note      PCP: Valorie Bahena MD    Date of Admission: 10/2/2022    Chief Complaint: Fever, chills, fatigue, wound infection of lower extremity    Hospital Course:   78 y.o. female with past medical history of CAD COPD GERD HLD hypertension venous insufficiency . Patient presents to the Ed due to left leg pain . Patient has a chronic left calf wound since 6/2021 and follows with wound care presented to ER with complaint of purulent discharge from her lower extremity wound along with ulceration, fever, chills, fatigue x1 week. In ER, patient was tachycardic with  and hypothermic. Labs were remarkable for hypoalbuminemia, elevated alk phos. Patient was admitted for sepsis secondary to lower extremity wound and was started on cefepime, Vanco and Flagyl. ID was consulted. MRI ruled out osteomyelitis. Venous Doppler was unremarkable for DVT. Blood culture grew MSSA. Antibiotics has been switched to cefazolin as per ID. Patient is stable to be discharged on oral cephalexin as per ID, to follow-up outpatient with vascular surgery, wound clinic, PCP. Patient wished to go to Rehab and waiting for placement.      Subjective:     Patient was seen at the bedside, stable  Review of systems  Constitutional: Denies fever, chills, generalized weakness, Weight loss, changes in appetite  Eye: Denies visual loss, blurriness of vision, photophobia, discharge, redness  ENT: Denies ear discharge, ear pain, epistaxis, throat swelling, tongue swelling, dysphagia  Respiratory: Denies cough, shortness of breath, pleuritic chest pain, hemoptysis, VALLEJO  Cardiac: Denies chest pain, palpitations, edema, PND, orthopnea, VALLEJO  GI: Denies abdominal pain, nausea, vomiting, diarrhea, constipation, hematemesis, hematochezia, melena, abdominal distention  : Denies flank pain, burning urination, dysuria,  suprapubic discomfort hematuria  Neuro: Denies headache, neck pain, tingling, numbness, seizure, LOC, confusion, paralysis or paresis, bladder dysfunction, bowel dysfunction  Musculoskeletal: Denies arthralgia, myalgia, arthritis of any joint  Hematology/immunology: Denies bleeding, bruising  Psych: Denies suicidal ideation, homicidal ideation, visual/auditory hallucinations  Medications:  Reviewed    Infusion Medications    sodium chloride       Scheduled Medications    Unna-Flex Elastic Unna Boot  1 each Topical Once    cephALEXin  500 mg Oral 4 times per day    aspirin  81 mg Oral Daily    ferrous sulfate  325 mg Oral Nightly    folic acid  894 mcg Oral Nightly    gabapentin  300 mg Oral TID    metoprolol tartrate  50 mg Oral BID    morphine  15 mg Oral BID    pantoprazole  40 mg Oral QAM AC    atorvastatin  20 mg Oral Daily    zinc sulfate  50 mg Oral Daily    sodium chloride flush  5-40 mL IntraVENous 2 times per day    enoxaparin  30 mg SubCUTAneous BID     PRN Meds: oxyCODONE-acetaminophen **OR** oxyCODONE-acetaminophen, albuterol, sodium chloride flush, sodium chloride, potassium chloride **OR** potassium alternative oral replacement **OR** potassium chloride, ondansetron **OR** ondansetron, polyethylene glycol, acetaminophen **OR** acetaminophen, aluminum & magnesium hydroxide-simethicone, morphine      Intake/Output Summary (Last 24 hours) at 10/10/2022 1151  Last data filed at 10/9/2022 2000  Gross per 24 hour   Intake 480 ml   Output 650 ml   Net -170 ml       Exam:    BP (!) 120/56   Pulse 88   Temp 97.1 °F (36.2 °C) (Temporal)   Resp 20   Ht 5' 9\" (1.753 m)   Wt 293 lb (132.9 kg)   SpO2 94%   BMI 43.27 kg/m²     General appearance: No apparent distress, appears stated age and cooperative. HEENT: Pupils equal, round, and reactive to light. Conjunctivae/corneas clear. Neck: Supple, with full range of motion. No jugular venous distention. Trachea midline. Respiratory:  Normal respiratory effort. Clear to auscultation, bilaterally without Rales/Wheezes/Rhonchi.   Cardiovascular: Regular rate and rhythm with normal S1/S2 without murmurs, rubs or gallops. Abdomen: Soft, non-tender, non-distended with normal bowel sounds. Musculoskeletal: No clubbing, cyanosis or edema bilaterally. Full range of motion without deformity. Skin: Skin color, texture, turgor normal.  No rashes or lesions. Neurologic:  Neurovascularly intact without any focal sensory/motor deficits. Cranial nerves: II-XII intact, grossly non-focal.  Psychiatric: Alert and oriented, thought content appropriate, normal insight    Labs:   Recent Labs     10/08/22  1101 10/09/22  0851 10/10/22  0640   WBC 7.4 7.8 7.7   HGB 11.4* 12.6 10.7*   HCT 35.5 39.6 35.2    341 293     Recent Labs     10/08/22  1101 10/09/22  0851 10/10/22  0640    134 138   K 4.2 4.1 4.2   CL 97* 94* 97*   CO2 30* 31* 30*   BUN 11 15 14   CREATININE 0.8 0.8 0.8   CALCIUM 9.5 9.3 8.9     No results for input(s): AST, ALT, BILIDIR, BILITOT, ALKPHOS in the last 72 hours. No results for input(s): INR in the last 72 hours. No results for input(s): Estle Carrow in the last 72 hours. Assessment/Plan:    Active Hospital Problems    Diagnosis Date Noted    Sepsis due to cellulitis ([facility] [address] [L03.90, A41.9] 10/03/2022     Priority: Medium    Wounds, multiple open, lower extremity, left, initial encounter Amena Guerin 10/03/2022     Priority: Medium    Cellulitis of left lower extremity [L03.116] 10/03/2022     Priority: Medium    Ulcer of calf with fat layer exposed, left ([address] [L97.222] 12/08/2021    Venous insufficiency of both lower extremities [I87.2] 11/21/2018    Lymphedema of both lower extremities [I89.0] 11/21/2018       Wound infection of left lower extremity  Patient, presented to ER with complaint of fever, chills, fatigue, apparently discharged from wound of lower extremity  MRI: No osteomyelitis.   Superficial soft tissue edema throughout the leg  BCx: MSSA  Continue Keflex  Follow-up ID  Follow-up with vascular outpatient     COPD  Continue DuoNeb needed     HTN  Monitor BP  DASH diet     Hyperlipidemia  Continue Lipitor    CAD  Continue aspirin, Lipitor      DVT Prophylaxis: Lovenox  Diet: ADULT DIET; Regular; Low Fat/Low Chol/High Fiber/MARIBEL  ADULT ORAL NUTRITION SUPPLEMENT; Lunch, Dinner; Low Calorie/High Protein Oral Supplement  ADULT ORAL NUTRITION SUPPLEMENT; Breakfast, Dinner;  Wound Healing Oral Supplement  Code Status: Full Code    PT/OT Eval Status: Hua Hong MD

## 2022-10-11 LAB — METER GLUCOSE: 154 MG/DL (ref 74–99)

## 2022-10-11 PROCEDURE — 6370000000 HC RX 637 (ALT 250 FOR IP)

## 2022-10-11 PROCEDURE — 6360000002 HC RX W HCPCS: Performed by: FAMILY MEDICINE

## 2022-10-11 PROCEDURE — 97535 SELF CARE MNGMENT TRAINING: CPT

## 2022-10-11 PROCEDURE — 82962 GLUCOSE BLOOD TEST: CPT

## 2022-10-11 PROCEDURE — 99231 SBSQ HOSP IP/OBS SF/LOW 25: CPT

## 2022-10-11 PROCEDURE — 97530 THERAPEUTIC ACTIVITIES: CPT

## 2022-10-11 PROCEDURE — 2580000003 HC RX 258: Performed by: FAMILY MEDICINE

## 2022-10-11 PROCEDURE — 6370000000 HC RX 637 (ALT 250 FOR IP): Performed by: FAMILY MEDICINE

## 2022-10-11 PROCEDURE — 6370000000 HC RX 637 (ALT 250 FOR IP): Performed by: INTERNAL MEDICINE

## 2022-10-11 PROCEDURE — 1200000000 HC SEMI PRIVATE

## 2022-10-11 RX ORDER — OXYCODONE HYDROCHLORIDE AND ACETAMINOPHEN 5; 325 MG/1; MG/1
1 TABLET ORAL EVERY 8 HOURS PRN
Status: DISCONTINUED | OUTPATIENT
Start: 2022-10-11 | End: 2022-10-13 | Stop reason: HOSPADM

## 2022-10-11 RX ORDER — OXYCODONE HYDROCHLORIDE AND ACETAMINOPHEN 5; 325 MG/1; MG/1
2 TABLET ORAL EVERY 8 HOURS PRN
Status: DISCONTINUED | OUTPATIENT
Start: 2022-10-11 | End: 2022-10-13 | Stop reason: HOSPADM

## 2022-10-11 RX ADMIN — SODIUM CHLORIDE, PRESERVATIVE FREE 10 ML: 5 INJECTION INTRAVENOUS at 21:00

## 2022-10-11 RX ADMIN — Medication 50 MG: at 09:57

## 2022-10-11 RX ADMIN — MORPHINE SULFATE 15 MG: 15 TABLET, FILM COATED, EXTENDED RELEASE ORAL at 13:21

## 2022-10-11 RX ADMIN — GABAPENTIN 300 MG: 300 CAPSULE ORAL at 09:58

## 2022-10-11 RX ADMIN — OXYCODONE AND ACETAMINOPHEN 2 TABLET: 5; 325 TABLET ORAL at 05:42

## 2022-10-11 RX ADMIN — GABAPENTIN 300 MG: 300 CAPSULE ORAL at 20:58

## 2022-10-11 RX ADMIN — CEPHALEXIN 500 MG: 250 CAPSULE ORAL at 11:20

## 2022-10-11 RX ADMIN — ENOXAPARIN SODIUM 30 MG: 100 INJECTION SUBCUTANEOUS at 09:58

## 2022-10-11 RX ADMIN — CEPHALEXIN 500 MG: 250 CAPSULE ORAL at 17:54

## 2022-10-11 RX ADMIN — METOPROLOL TARTRATE 50 MG: 50 TABLET, FILM COATED ORAL at 20:59

## 2022-10-11 RX ADMIN — ASPIRIN 81 MG 81 MG: 81 TABLET ORAL at 09:58

## 2022-10-11 RX ADMIN — OXYCODONE AND ACETAMINOPHEN 2 TABLET: 5; 325 TABLET ORAL at 17:53

## 2022-10-11 RX ADMIN — FOLIC ACID 500 MCG: 1 TABLET ORAL at 20:59

## 2022-10-11 RX ADMIN — FERROUS SULFATE TAB 325 MG (65 MG ELEMENTAL FE) 325 MG: 325 (65 FE) TAB at 20:59

## 2022-10-11 RX ADMIN — OXYCODONE AND ACETAMINOPHEN 2 TABLET: 5; 325 TABLET ORAL at 09:56

## 2022-10-11 RX ADMIN — MORPHINE SULFATE 15 MG: 15 TABLET, FILM COATED, EXTENDED RELEASE ORAL at 20:59

## 2022-10-11 RX ADMIN — PANTOPRAZOLE SODIUM 40 MG: 40 TABLET, DELAYED RELEASE ORAL at 05:43

## 2022-10-11 RX ADMIN — ATORVASTATIN CALCIUM 20 MG: 20 TABLET, FILM COATED ORAL at 09:57

## 2022-10-11 RX ADMIN — ENOXAPARIN SODIUM 30 MG: 100 INJECTION SUBCUTANEOUS at 20:59

## 2022-10-11 RX ADMIN — METOPROLOL TARTRATE 50 MG: 50 TABLET, FILM COATED ORAL at 09:57

## 2022-10-11 RX ADMIN — MORPHINE SULFATE 2 MG: 2 INJECTION, SOLUTION INTRAMUSCULAR; INTRAVENOUS at 16:51

## 2022-10-11 RX ADMIN — CEPHALEXIN 500 MG: 250 CAPSULE ORAL at 00:26

## 2022-10-11 RX ADMIN — CEPHALEXIN 500 MG: 250 CAPSULE ORAL at 05:42

## 2022-10-11 ASSESSMENT — PAIN DESCRIPTION - LOCATION
LOCATION: LEG
LOCATION: LEG

## 2022-10-11 ASSESSMENT — PAIN SCALES - GENERAL
PAINLEVEL_OUTOF10: 7
PAINLEVEL_OUTOF10: 7
PAINLEVEL_OUTOF10: 10
PAINLEVEL_OUTOF10: 6
PAINLEVEL_OUTOF10: 10

## 2022-10-11 ASSESSMENT — PAIN DESCRIPTION - DESCRIPTORS: DESCRIPTORS: ACHING;SORE;DISCOMFORT;TENDER

## 2022-10-11 ASSESSMENT — PAIN DESCRIPTION - ORIENTATION
ORIENTATION: LEFT
ORIENTATION: LEFT

## 2022-10-11 ASSESSMENT — PAIN DESCRIPTION - FREQUENCY: FREQUENCY: CONTINUOUS

## 2022-10-11 NOTE — CARE COORDINATION
Received message from [facility] that she may be able to accept after all, she is to update this CM in a couple of minutes. Acceptance to [facility] pending as well. Per [facility], she has accepted to [facility]; this CM requested Jakob Rosa with PT obtain therapy updates today. TANJA, and 7000 started; envelope and ambulette form completed in soft-chart, will need rapid covid on day of discharge; discharge plan is [facility] pending pre-cert started 54/13. Pt updated at bedside.

## 2022-10-11 NOTE — CARE COORDINATION
Re-visited pt at bedside for more choices as [facility] and [facility] were unable to provide transport for wound clinic; this CM was also asked to with [facility] however, they have the same issue with transport; [facility]; ref[facility] made to [facility] and [facility], awaiting acceptance to [place] [id number] at time of review. This CM also spoke with [facility] as pt's original plan was to discharge to her sister's home with [facility]; per Santiago Em would require new orders however, will be able to see Thursday. This CM will update [facility] should [facility] to [facility] as pt does not want to go to any other facilities. Discharge plan is [facility] vs Sister's home with [facility] resuming services.

## 2022-10-11 NOTE — PROGRESS NOTES
Hospitalist Progress Note      PCP: Jose Bro MD    Date of Admission: 10/2/2022    Chief Complaint: Fever, chills, fatigue, wound infection of lower extremity    Hospital Course:   78 y.o. female with past medical history of CAD COPD GERD HLD hypertension venous insufficiency . Patient presents to the Ed due to left leg pain . Patient has a chronic left calf wound since 6/2021 and follows with wound care presented to ER with complaint of purulent discharge from her lower extremity wound along with ulceration, fever, chills, fatigue x1 week. In ER, patient was tachycardic with  and hypothermic. Labs were remarkable for hypoalbuminemia, elevated alk phos. Patient was admitted for sepsis secondary to lower extremity wound and was started on cefepime, Vanco and Flagyl. ID was consulted. MRI ruled out osteomyelitis. Venous Doppler was unremarkable for DVT. Blood culture grew MSSA. Antibiotics has been switched to cefazolin as per ID. Patient is stable to be discharged on oral cephalexin as per ID, to follow-up outpatient with vascular surgery, wound clinic, PCP. Patient wished to go to Rehab and waiting for placement.        Subjective:   Patient was seen at the bedside, stable  Review of systems  Constitutional: Denies fever, chills, generalized weakness, Weight loss, changes in appetite  Eye: Denies visual loss, blurriness of vision, photophobia, discharge, redness  ENT: Denies ear discharge, ear pain, epistaxis, throat swelling, tongue swelling, dysphagia  Respiratory: Denies cough, shortness of breath, pleuritic chest pain, hemoptysis, VALLEJO  Cardiac: Denies chest pain, palpitations, edema, PND, orthopnea, VALLEJO  GI: Denies abdominal pain, nausea, vomiting, diarrhea, constipation, hematemesis, hematochezia, melena, abdominal distention  : Denies flank pain, burning urination, dysuria,  suprapubic discomfort hematuria  Neuro: Denies headache, neck pain, tingling, numbness, seizure, LOC, confusion, paralysis or paresis, bladder dysfunction, bowel dysfunction  Musculoskeletal: Denies arthralgia, myalgia, arthritis of any joint  Hematology/immunology: Denies bleeding, bruising  Psych: Denies suicidal ideation, homicidal ideation, visual/auditory hallucinations    Medications:  Reviewed    Infusion Medications    sodium chloride       Scheduled Medications    Unna-Flex Elastic Unna Boot  1 each Topical Once    cephALEXin  500 mg Oral 4 times per day    aspirin  81 mg Oral Daily    ferrous sulfate  325 mg Oral Nightly    folic acid  078 mcg Oral Nightly    gabapentin  300 mg Oral TID    metoprolol tartrate  50 mg Oral BID    morphine  15 mg Oral BID    pantoprazole  40 mg Oral QAM AC    atorvastatin  20 mg Oral Daily    zinc sulfate  50 mg Oral Daily    sodium chloride flush  5-40 mL IntraVENous 2 times per day    enoxaparin  30 mg SubCUTAneous BID     PRN Meds: oxyCODONE-acetaminophen **OR** oxyCODONE-acetaminophen, albuterol, sodium chloride flush, sodium chloride, potassium chloride **OR** potassium alternative oral replacement **OR** potassium chloride, ondansetron **OR** ondansetron, polyethylene glycol, acetaminophen **OR** acetaminophen, aluminum & magnesium hydroxide-simethicone, morphine      Intake/Output Summary (Last 24 hours) at 10/11/2022 1404  Last data filed at 10/11/2022 1324  Gross per 24 hour   Intake 480 ml   Output 1102 ml   Net -622 ml       Exam:    BP (!) 141/75   Pulse 76   Temp 97.6 °F (36.4 °C) (Temporal)   Resp 17   Ht 5' 9\" (1.753 m)   Wt 293 lb (132.9 kg)   SpO2 96%   BMI 43.27 kg/m²     General appearance: No apparent distress, appears stated age and cooperative. HEENT: Pupils equal, round, and reactive to light. Conjunctivae/corneas clear. Neck: Supple, with full range of motion. No jugular venous distention. Trachea midline. Respiratory:  Normal respiratory effort. Clear to auscultation, bilaterally without Rales/Wheezes/Rhonchi.   Cardiovascular: Regular rate and rhythm with normal S1/S2 without murmurs, rubs or gallops. Abdomen: Soft, non-tender, non-distended with normal bowel sounds. Musculoskeletal: No clubbing, cyanosis or edema bilaterally. Full range of motion without deformity. Skin: Skin color, texture, turgor normal.  No rashes or lesions. Neurologic:  Neurovascularly intact without any focal sensory/motor deficits. Cranial nerves: II-XII intact, grossly non-focal.  Psychiatric: Alert and oriented, thought content appropriate, normal insight    Labs:   Recent Labs     10/09/22  0851 10/10/22  0640   WBC 7.8 7.7   HGB 12.6 10.7*   HCT 39.6 35.2    293     Recent Labs     10/09/22  0851 10/10/22  0640    138   K 4.1 4.2   CL 94* 97*   CO2 31* 30*   BUN 15 14   CREATININE 0.8 0.8   CALCIUM 9.3 8.9     No results for input(s): AST, ALT, BILIDIR, BILITOT, ALKPHOS in the last 72 hours. No results for input(s): INR in the last 72 hours. No results for input(s): Arlen Earnest in the last 72 hours. Assessment/Plan:    Active Hospital Problems    Diagnosis Date Noted    Sepsis due to cellulitis ([facility] [address] [L03.90, A41.9] 10/03/2022     Priority: Medium    Wounds, multiple open, lower extremity, left, initial encounter [facility] 10/03/2022     Priority: Medium    Cellulitis of left lower extremity [L03.116] 10/03/2022     Priority: Medium    Ulcer of calf with fat layer exposed, left ([address] [L97.222] 12/08/2021    Venous insufficiency of both lower extremities [I87.2] 11/21/2018    Lymphedema of both lower extremities [I89.0] 11/21/2018     Wound infection of left lower extremity  Patient, presented to ER with complaint of fever, chills, fatigue, apparently discharged from wound of lower extremity  MRI: No osteomyelitis.   Superficial soft tissue edema throughout the leg  BCx: MSSA  Continue Keflex  Follow-up ID  Follow-up with vascular outpatient     COPD  Continue DuoNeb needed     HTN  Monitor BP  DASH diet     Hyperlipidemia  Continue Lipitor    CAD  Continue aspirin, Lipitor    DVT Prophylaxis: Lovenox  Diet: ADULT DIET; Regular; Low Fat/Low Chol/High Fiber/MARIBEL  ADULT ORAL NUTRITION SUPPLEMENT; Lunch, Dinner; Low Calorie/High Protein Oral Supplement  ADULT ORAL NUTRITION SUPPLEMENT; Breakfast, Dinner; Wound Healing Oral Supplement  Code Status: Full Code    PT/OT Eval Status:  Jai Scott MD

## 2022-10-11 NOTE — PROGRESS NOTES
OCCUPATIONAL THERAPY TREATMENT NOTE     [facility]  OT BEDSIDE TREATMENT NOTE      Date:10/11/2022  Patient Name: Hannah Mc  MRN: [id number]  : 1945  Room: [address] [place], [place] [address]     Per OT Eval:     Evaluating OT: Reggy Lesch, OTR/L, [id number]     Referring Shon Olivo MD  Specific Provider Orders/Date: OT Eval and Treat 10/3/22     Diagnosis: Cellulitis of left lower extremity [L03.116]  Wounds, multiple open, lower extremity, left, initial encounter [S81.802A]  Sepsis due to cellulitis ([address] [L03.90, A41.9]   Surgery: NA     Pertinent Medical History:      Past Medical History           Past Medical History:   Diagnosis Date    Bursitis      CAD (coronary artery disease)      heart attack    Cellulitis of leg, left 10/3/2022    COPD (chronic obstructive pulmonary disease) ([address] 2013    Emphysema       slight    GERD (gastroesophageal reflux disease)      Hiatal hernia      Hip pain      Hyperlipidemia      Hypertension      Lymphedema of both lower extremities 2018    Venous insufficiency of both lower extremities 2018    Venous stasis ulcer of left calf with fat layer exposed without varicose veins ([address] 2021    Venous ulcer with fat layer exposed ([address] 10/28/2015            Past Surgical History             Past Surgical History:   Procedure Laterality Date    APPENDECTOMY        BREAST ENHANCEMENT SURGERY        BREAST REDUCTION SURGERY        CHOLECYSTECTOMY        COLONOSCOPY        ECHO COMPL W DOP COLOR FLOW   3/11/2013          ENDOSCOPY, COLON, DIAGNOSTIC        HERNIA REPAIR N/A 2021     LAPAROSCOPIC ROBOTIC ASSISTED INGUINAL HERNIA REPAIR performed by Gonzalo Beltran MD at [address] [place] [address] ([address] [place] [address])        JOINT REPLACEMENT   2009     l knee r hip    LEG DEBRIDEMENT Left 2015    LEG DEBRIDEMENT Left 2016         Precautions:  Fall Risk,  L LE prevalon boot, Purewick catheter Assessment of current deficits    [x] Functional mobility          [x]ADLs           [x] Strength                  []Cognition    [x] Functional transfers         [x] IADLs         [x] Safety Awareness   [x]Endurance    [] Fine Coordination                        [x] Balance      [] Vision/perception   [x]Sensation      []Gross Motor Coordination            [] ROM           [] Delirium                   [] Motor Control      OT PLAN OF CARE   OT POC based on physician orders, patient diagnosis and results of clinical assessment     Frequency/Duration 1-3 days/wk for 2 weeks PRN   Specific OT Treatment Interventions to include:   * Instruction/training on adapted ADL techniques and AE recommendations to increase functional independence within precautions       * Training on energy conservation strategies, correct breathing pattern and techniques to improve independence/tolerance for self-care routine  * Functional transfer/mobility training/DME recommendations for increased independence, safety, and fall prevention  * Patient/Family education to increase follow through with safety techniques and functional independence  * Recommendation of environmental modifications for increased safety with functional transfers/mobility and ADLs  * Therapeutic exercise to improve motor endurance, ROM, and functional strength for ADLs/functional transfers  * Therapeutic activities to facilitate/challenge dynamic balance, stand tolerance for increased safety and independence with ADLs  * Therapeutic activities to facilitate gross/fine motor skills for increased independence with ADLs  * Positioning to improve skin integrity, interaction with environment and functional independence  * Delirium prevention/treatment     Recommended Adaptive Equipment: TBD, potential needs include hospital bed (to assist with positioning for edema and for hand rails to assist with bed mobility), LB dressing equipment, mobility device as determined by PT (potentially ww)     Comments: Based on patient's functional performance as stated below and level of assistance needed prior to admission, this therapist believes that the patient would benefit from further skilled OT following hospital stay in an effort to increase safety, functional independence, and quality of life. Home Living: Pt lives alone - Plans to d/c to sister's home- 1st floor set up, one DO no rail. Tub only  Equipment owned: 3-in-1 commode, BSC, shower chair, cane, ww     Prior Level of Function: independent with ADLs and with IADLs; using cane for functional mobility. Driving: yes  Occupation: NA     Pain Level: Pt c/o LLE pain however did not rate on scale; therapist provided repositioning techniques. Cognition: A&O: 4/4; Follows 2-3 step directions. Memory: G              Sequencing: G              Problem solving: F              Judgement/safety: F                Functional Assessment: AM-PAC Daily Activity Raw Score: 15/24    Initial Eval Status  Date: 10/6/22 Treatment Status  Date: 10/11/22 STGs = LTGs  Time frame: 10-14 days   Feeding Set up A  Per previous session Setup     Grooming Set up A  seated Per previous session Setup    SBA while seated/standing at sink    UB Dressing Min A  To [facility] gown as robe SBA  To tie gown seated unsupported. Set up A   LB Dressing DEP  To doff/jaden socks at bed level Max A overall/Min A to don/doff RLE sock seated EOB demo'ing giorgi hip flexion. Mod A   Bathing Mod A  simulated Per previous session Mod A    Min A with AE PRN   Toileting Mod A  Overall; limited functional reach d/t habitus- min A for tfr onto bedpan on chair  Mod A  Pt able to complete pericare & posterior hygiene care while standing with assist/cues to maintain dynamic standing balance & safety awareness.   SBA   Bed Mobility  Rolling: SBA  Supine to sit: HOB elevated SBA with use of handrails  Sit to supine: SBA with use of handrails  Supine to sit: SBA  Sit to supine: SBA supervision   Functional Transfers Sit to stand: SBA with increased time  Stand to sit: Min A Sit to stand: Min A  Stand to sit: Min A supervision   Functional Mobility Min A  For short steps to pivot to bedside chair, decline use of ww Min A  Use of ww shorter than household distance ~5 steps forward & back. Supervision with AE PRN for in-home distance   Balance Sitting:     Static:  supervision    Dynamic:SBA  Standing: CGA Sitting:     Static: Ind    Dynamic:SBA  Standing:  Min A      Endurance/Activity Tolerance Poor+ Fair-    Fair-   Visual/  Perceptual Glasses: yes   -appears WFL                       Comments: Upon arrival pt supine in bed & agreeable to OT session. Pt required increased time to engage in ADLs d/t BLE edema & LLE pain with light movement. Pt sat EOB to don/doff RLE sock demo'ing giorgi hip flexion. Pt required assist/cues for proper ww management/safety during functional transfers/mobility. Pt positioning in bedside chair with BLEs elevated. RN requesting assistance to assist pt to [facility]. Pt able to void self of urine - pt completed pericare & posterior hygiene care while standing with assist/cues for clothing management, to maintain dynamic standing balance & proper body mechanics to implement fall prevention strategies. At end of session pt supine in bed with giorgi prevalon boots, all lines and tubes intact, call light within reach. Pt has made fair progress towards set goals. Continue with current plan of care      Treatment Time In:11:40a            Treatment Time Out: 12:04p  RN request assistance to assist pt to [facility].   Treatment Time In: 1:00p          Treatment Time Out: 1:10p                Treatment Charges: Mins Units   Ther Ex  [id number]     Manual Therapy Soniya Ba [phone] [id number]     ADL/Home Mgt 24190 34 2   Neuro Re-ed [id number]     Group Therapy      Orthotic manage/training  [id number]     Non-Billable Time     Total Timed Treatment 34 2         Micca Luba Chance OTR/L; [id number]

## 2022-10-11 NOTE — PROGRESS NOTES
Physical Therapy  Physical Therapy Treatment    Name: Jeanine Payne  : 1945  MRN: [id number]      Date of Service: 10/11/2022    Evaluating PT:  Gabrielle Prather PT, DPT [id number]    Room #:  0229/1460-Y  Diagnosis:  Cellulitis of left lower extremity [K76.072]  Wounds, multiple open, lower extremity, left, initial encounter [S81.802A]  Sepsis due to cellulitis ([address] [L03.90, A41.9]  PMHx/PSHx:  CAD, cellulitis, COPD, GERD, HLD, HTN, venous insufficiency   Procedure/Surgery:  none  Precautions:  Falls, Equipment Needs:  TBD, pt has cane    SUBJECTIVE:    Pt lives alone in a 2 story home with 5 stairs to enter and 1 rail. Bed is on 2nd floor and bath is on 2nd floor. Pt uses commode chair on main level during the day. Pt ambulated with cane PTA. OBJECTIVE:   Initial Evaluation  Date: 10/4/22 Treatment  10/11/22 Short Term/ Long Term   Goals   AM-PAC 6 Clicks 90/35 15/71    Was pt agreeable to Eval/treatment? yes yes    Does pt have pain? Severe LLE pain LLE pain with mobility    Bed Mobility  Rolling: SBA  Supine to sit: SBA  Sit to supine: SBA  Scooting: SBA Rolling: SBA  Supine to sit: SBA  Sit to supine: NT  Scooting: SBA Rolling: Independent   Supine to sit: Independent   Sit to supine: Independent   Scooting: Independent    Transfers Sit to stand: SBA  Stand to sit: SBA  Stand pivot: SBA Sit to stand: Gregg  Stand to sit: Gregg  Stand pivot: Gregg with [employer] Sit to stand: Independent   Stand to sit: Independent   Stand pivot: Mod I with cane   Ambulation    NT 5 feet forward and backward with [employer] Gregg 25 feet with cane Mod I   Stair negotiation: ascended and descended  NT NT 13 steps with 1 rail Mod I   ROM BUE:  Defer to OT note  BLE:  limited by body habitus      Strength BUE:  Defer to OT note  BLE:  grossly 3/5, not formally assessed  [facility]   Balance Sitting EOB:  SBA  Dynamic Standing:  SBA Sitting EOB:  SBA  Dynamic Standing:  Gregg with [employer] Sitting EOB:  Independent   Dynamic Standing:   Mod I with cane Pt is A & O x 4  Sensation:  NT  Edema:  none noted    Patient education  Pt educated on role of PT, safety during mobility    Patient response to education:   Pt verbalized understanding Pt demonstrated skill Pt requires further education in this area   yes yes yes     ASSESSMENT:    Comments:  patient semi-supine in bed upon entry and agreeable to PT treatment. Extra time required for all mobility d/t pain and pt distracted by other providers/phone ringing. Pt able to complete bed mobility with maximally elevated HOB and no hands on assist. Pt with increased LLE pain in dependent positioning. Pt able to stand and complete short distance of ambulation with [employer] and light steadying assist. Strong use of UE support on WW to offload painful LLE. Pt positioned in chair at end of session with BLEs elevated and all needs met. Treatment:  Patient practiced and was instructed in the following treatment:    Bed Mobility: VCs provided for sequencing and safety during mobility. Transfer Training: Verbal and tactile cueing provided for sequencing and safety during mobility. Manual assist provided for completion of task  Gait Training: Ambulation with WW and verbal cues for proper technique and safety. Manual assist provided for completion of task     PLAN:    Patient is making good progress towards established goals. Will continue with current POC.       Time in  1143  Time out  1206    Total Treatment Time  23 minutes     CPT codes:  [] Gait training 67381 - minutes  [] Manual therapy 43533 - minutes  [x] Therapeutic activities 99372 23 minutes  [] Therapeutic exercises 79159 - minutes  [] Neuromuscular reeducation 16744 - minutes    Gabrielle Prather PT, DPT  [id number]

## 2022-10-11 NOTE — PROGRESS NOTES
Vascular Surgery Progress Note    Pt is being seen in f/u today regarding L LE wound    Subjective  Pt s/e. Pain is better controlled. Frustrated that she hasn't been out of bed. Has been accepted to a facility, now waiting on pre-cert.     Current Medications:    sodium chloride        oxyCODONE-acetaminophen **OR** oxyCODONE-acetaminophen, albuterol, sodium chloride flush, sodium chloride, potassium chloride **OR** potassium alternative oral replacement **OR** potassium chloride, ondansetron **OR** ondansetron, polyethylene glycol, acetaminophen **OR** acetaminophen, aluminum & magnesium hydroxide-simethicone, morphine    Unna-Flex Elastic Unna Boot  1 each Topical Once    cephALEXin  500 mg Oral 4 times per day    aspirin  81 mg Oral Daily    ferrous sulfate  325 mg Oral Nightly    folic acid  505 mcg Oral Nightly    gabapentin  300 mg Oral TID    metoprolol tartrate  50 mg Oral BID    morphine  15 mg Oral BID    pantoprazole  40 mg Oral QAM AC    atorvastatin  20 mg Oral Daily    zinc sulfate  50 mg Oral Daily    sodium chloride flush  5-40 mL IntraVENous 2 times per day    enoxaparin  30 mg SubCUTAneous BID      PHYSICAL EXAM:    BP (!) 141/75   Pulse 76   Temp 97.6 °F (36.4 °C) (Temporal)   Resp 17   Ht 5' 9\" (1.753 m)   Wt 293 lb (132.9 kg)   SpO2 96%   BMI 43.27 kg/m²     Intake/Output Summary (Last 24 hours) at 10/11/2022 0928  Last data filed at 10/11/2022 0854  Gross per 24 hour   Intake 240 ml   Output 1102 ml   Net -862 ml        Gen Awake, alert, oriented x3, in no apparent distress   CVS S1S2   Resp + resp excursion - on RA  Abd Soft, non-tender, non-distended    L LE DP/PT 1+   Pretibial wound with exudate   Posterior calf wound cellulitis, drainage   Lateral calf blister scabbed over    LABS:    Lab Results   Component Value Date    WBC 7.7 10/10/2022    HGB 10.7 (L) 10/10/2022    HCT 35.2 10/10/2022     10/10/2022    PROTIME 14.0 (H) 10/02/2022    INR 1.3 10/02/2022    APTT 30.4

## 2022-10-12 VITALS
WEIGHT: 293 LBS | TEMPERATURE: 97.6 F | HEIGHT: 69 IN | SYSTOLIC BLOOD PRESSURE: 124 MMHG | HEART RATE: 82 BPM | DIASTOLIC BLOOD PRESSURE: 58 MMHG | BODY MASS INDEX: 43.4 KG/M2 | OXYGEN SATURATION: 96 % | RESPIRATION RATE: 18 BRPM

## 2022-10-12 LAB — SARS-COV-2, NAAT: NOT DETECTED

## 2022-10-12 PROCEDURE — 87635 SARS-COV-2 COVID-19 AMP PRB: CPT

## 2022-10-12 PROCEDURE — 6370000000 HC RX 637 (ALT 250 FOR IP): Performed by: FAMILY MEDICINE

## 2022-10-12 PROCEDURE — 99231 SBSQ HOSP IP/OBS SF/LOW 25: CPT

## 2022-10-12 PROCEDURE — 2580000003 HC RX 258: Performed by: FAMILY MEDICINE

## 2022-10-12 PROCEDURE — 6370000000 HC RX 637 (ALT 250 FOR IP): Performed by: INTERNAL MEDICINE

## 2022-10-12 PROCEDURE — 1200000000 HC SEMI PRIVATE

## 2022-10-12 PROCEDURE — 6370000000 HC RX 637 (ALT 250 FOR IP)

## 2022-10-12 PROCEDURE — 6360000002 HC RX W HCPCS: Performed by: FAMILY MEDICINE

## 2022-10-12 RX ORDER — OSTOMY ADHESIVE
1 STRIP MISCELLANEOUS ONCE
Refills: 0 | DISCHARGE
Start: 2022-10-12 | End: 2022-10-12

## 2022-10-12 RX ADMIN — SODIUM CHLORIDE, PRESERVATIVE FREE 10 ML: 5 INJECTION INTRAVENOUS at 08:09

## 2022-10-12 RX ADMIN — CEPHALEXIN 500 MG: 250 CAPSULE ORAL at 12:45

## 2022-10-12 RX ADMIN — CEPHALEXIN 500 MG: 250 CAPSULE ORAL at 05:59

## 2022-10-12 RX ADMIN — OXYCODONE AND ACETAMINOPHEN 2 TABLET: 5; 325 TABLET ORAL at 18:50

## 2022-10-12 RX ADMIN — FOLIC ACID 500 MCG: 1 TABLET ORAL at 21:19

## 2022-10-12 RX ADMIN — FERROUS SULFATE TAB 325 MG (65 MG ELEMENTAL FE) 325 MG: 325 (65 FE) TAB at 21:19

## 2022-10-12 RX ADMIN — MORPHINE SULFATE 15 MG: 15 TABLET, FILM COATED, EXTENDED RELEASE ORAL at 12:45

## 2022-10-12 RX ADMIN — ENOXAPARIN SODIUM 30 MG: 100 INJECTION SUBCUTANEOUS at 21:19

## 2022-10-12 RX ADMIN — OXYCODONE AND ACETAMINOPHEN 2 TABLET: 5; 325 TABLET ORAL at 02:02

## 2022-10-12 RX ADMIN — GABAPENTIN 300 MG: 300 CAPSULE ORAL at 15:08

## 2022-10-12 RX ADMIN — MORPHINE SULFATE 2 MG: 2 INJECTION, SOLUTION INTRAMUSCULAR; INTRAVENOUS at 08:09

## 2022-10-12 RX ADMIN — ASPIRIN 81 MG 81 MG: 81 TABLET ORAL at 09:50

## 2022-10-12 RX ADMIN — Medication 50 MG: at 09:49

## 2022-10-12 RX ADMIN — OXYCODONE AND ACETAMINOPHEN 2 TABLET: 5; 325 TABLET ORAL at 09:48

## 2022-10-12 RX ADMIN — ATORVASTATIN CALCIUM 20 MG: 20 TABLET, FILM COATED ORAL at 09:49

## 2022-10-12 RX ADMIN — CEPHALEXIN 500 MG: 250 CAPSULE ORAL at 00:02

## 2022-10-12 RX ADMIN — GABAPENTIN 300 MG: 300 CAPSULE ORAL at 09:50

## 2022-10-12 RX ADMIN — GABAPENTIN 300 MG: 300 CAPSULE ORAL at 21:19

## 2022-10-12 RX ADMIN — METOPROLOL TARTRATE 50 MG: 50 TABLET, FILM COATED ORAL at 21:19

## 2022-10-12 RX ADMIN — PANTOPRAZOLE SODIUM 40 MG: 40 TABLET, DELAYED RELEASE ORAL at 05:59

## 2022-10-12 RX ADMIN — METOPROLOL TARTRATE 50 MG: 50 TABLET, FILM COATED ORAL at 09:50

## 2022-10-12 RX ADMIN — MORPHINE SULFATE 15 MG: 15 TABLET, FILM COATED, EXTENDED RELEASE ORAL at 21:19

## 2022-10-12 RX ADMIN — CEPHALEXIN 500 MG: 250 CAPSULE ORAL at 23:35

## 2022-10-12 RX ADMIN — MORPHINE SULFATE 2 MG: 2 INJECTION, SOLUTION INTRAMUSCULAR; INTRAVENOUS at 15:08

## 2022-10-12 RX ADMIN — ENOXAPARIN SODIUM 30 MG: 100 INJECTION SUBCUTANEOUS at 09:50

## 2022-10-12 ASSESSMENT — PAIN DESCRIPTION - ORIENTATION
ORIENTATION: RIGHT
ORIENTATION: RIGHT
ORIENTATION: LEFT

## 2022-10-12 ASSESSMENT — PAIN DESCRIPTION - LOCATION
LOCATION: LEG

## 2022-10-12 ASSESSMENT — PAIN DESCRIPTION - DESCRIPTORS
DESCRIPTORS: ACHING;BURNING;DISCOMFORT
DESCRIPTORS: ACHING;BURNING;DISCOMFORT
DESCRIPTORS: ACHING;BURNING

## 2022-10-12 ASSESSMENT — PAIN SCALES - GENERAL
PAINLEVEL_OUTOF10: 10
PAINLEVEL_OUTOF10: 7
PAINLEVEL_OUTOF10: 10

## 2022-10-12 NOTE — CARE COORDINATION
Received call from Elvira Sanders, pt has auth for [facility]; this CM notified attending whom was in agreement regarding discharge; will complete 7000 once discharge order is enter and set-up transport.

## 2022-10-12 NOTE — CARE COORDINATION
7000 completed, rapid covid provided to bedside RN Sammi Huntley, transportation set-up for  to [facility], [facility] notified bedside RN Sammi Huntley, pt, and Aniceto Griffin liaison; envelope and ambulette form completed in soft-chart.

## 2022-10-12 NOTE — PROGRESS NOTES
Comprehensive Nutrition Assessment    Type and Reason for Visit:  Reassess    Nutrition Recommendations/Plan:   Continue current diet w/ ONS to optimize intake and promote wound healing. Will coninue to monitor. Malnutrition Assessment:  Malnutrition Status: At risk for malnutrition (Comment) (10/05/22 1025)    Context:  Acute Illness     Findings of the 6 clinical characteristics of malnutrition:  Energy Intake:  Mild decrease in energy intake (Comment) (x 2 days since admission)  Weight Loss:  No significant weight loss     Body Fat Loss:  Unable to assess     Muscle Mass Loss:  Unable to assess    Fluid Accumulation:  No significant fluid accumulation     Strength:  Not Performed    Nutrition Assessment:    Pt. remains at risk d/t increased nutritent needs for wound healing. Patient adm w/ chronic non-healing L leg ulcer ;sepsis. Noted MSSA -MRI ruled out OM; ; hx of COPD/CHF/CAD ;will continue ONS and monitor. Nutrition Related Findings:    -I&Os (5L), A&Ox4, +1 non-pitting edema, obesity, GI WDL, wound ; Wound Type: Open Wounds (wound x 1 noted to L pretibial)       Current Nutrition Intake & Therapies:    Average Meal Intake: 51-75%, % (slightly varied)  Average Supplements Intake: Unable to assess  ADULT DIET; Regular; Low Fat/Low Chol/High Fiber/MARIBEL  ADULT ORAL NUTRITION SUPPLEMENT; Lunch, Dinner; Low Calorie/High Protein Oral Supplement  ADULT ORAL NUTRITION SUPPLEMENT; Breakfast, Dinner; Wound Healing Oral Supplement    Anthropometric Measures:  Height: 5' 9\" (175.3 cm)  Ideal Body Weight (IBW): 145 lbs (66 kg)    Admission Body Weight: 292 lb (132.5 kg) (10/4, first bedscale)  Current Body Weight: 293 lb (132.9 kg) (10/09 ), 201.4 % IBW.     Current BMI (kg/m2): 43.2  Usual Body Weight:  (EMR shows past weight of 295# no method on 5/11/22)                       BMI Categories: Obese Class 3 (BMI 40.0 or greater)    Estimated Daily Nutrient Needs:  Energy Requirements Based On: Formula  Weight Used for Energy Requirements: Current  Energy (kcal/day): 6851-1670 (REE 1887 x 1.2 SF)  Weight Used for Protein Requirements: Ideal  Protein (g/day): 130-140 (2.0g/kg IBW)  Method Used for Fluid Requirements: 1 ml/kcal  Fluid (ml/day): 0020-4817    Nutrition Diagnosis:   Increased nutrient needs related to increase demand for energy/nutrients as evidenced by wounds    Nutrition Interventions:   Food and/or Nutrient Delivery: Continue Current Diet, Continue Oral Nutrition Supplement  Nutrition Education/Counseling: Education not indicated  Coordination of Nutrition Care: Continue to monitor while inpatient       Goals:  Previous Goal Met: Progressing toward Goal(s)  Goals: PO intake 75% or greater, by next RD assessment       Nutrition Monitoring and Evaluation:   Behavioral-Environmental Outcomes: None Identified  Food/Nutrient Intake Outcomes: Food and Nutrient Intake, Supplement Intake  Physical Signs/Symptoms Outcomes: Biochemical Data, Chewing or Swallowing, GI Status, Fluid Status or Edema, Meal Time Behavior, Hemodynamic Status, Nutrition Focused Physical Findings, Weight, Skin    Discharge Planning:     Too soon to determine     Viji Love RD  Contact: ext [phone]

## 2022-10-12 NOTE — DISCHARGE SUMMARY
Discharge Summary    Raisa Elena  :  1945  MRN:  [id number]    Admit date:  10/2/2022  Discharge date:  10/12/2022 12:02 PM    Admitting Physician:  Garth Tineo MD    Discharge Diagnoses:    Patient Active Problem List    Diagnosis Date Noted    Sepsis due to cellulitis ([facility] 10/03/2022    Wounds, multiple open, lower extremity, left, initial encounter 10/03/2022    Cellulitis of left lower extremity 10/03/2022    Ulcer of calf with fat layer exposed, left ([facility] 2021    Venous insufficiency of both lower extremities 2018    Lymphedema of both lower extremities 2018    Bursitis     COPD (chronic obstructive pulmonary disease) ([facility] 2013    Essential hypertension, benign 2013    Hyperlipidemia with target LDL less than 100 2013    GERD (gastroesophageal reflux disease)     Diastolic CHF, chronic ([facility] 2013       Past Medical Hx :   Past Medical History:   Diagnosis Date    Bursitis     CAD (coronary artery disease) 1993    heart attack    Cellulitis of leg, left 10/3/2022    COPD (chronic obstructive pulmonary disease) ([facility] 2013    Emphysema     slight    GERD (gastroesophageal reflux disease)     Hiatal hernia     Hip pain     Hyperlipidemia     Hypertension     Lymphedema of both lower extremities 2018    Venous insufficiency of both lower extremities 2018    Venous stasis ulcer of left calf with fat layer exposed without varicose veins ([facility] 2021    Venous ulcer with fat layer exposed ([facility] 10/28/2015       Past Surgical Hx :   Past Surgical History:   Procedure Laterality Date    APPENDECTOMY      BREAST ENHANCEMENT SURGERY      BREAST REDUCTION SURGERY      CHOLECYSTECTOMY      COLONOSCOPY      ECHO COMPL W DOP COLOR FLOW  3/11/2013         ENDOSCOPY, COLON, DIAGNOSTIC      HERNIA REPAIR N/A 2021    LAPAROSCOPIC ROBOTIC ASSISTED INGUINAL HERNIA REPAIR performed by Andrew Estrada MD at [place] [address]. HYSTERECTOMY (CERVIX STATUS UNKNOWN)      JOINT REPLACEMENT  2009 2011    l knee r hip    LEG DEBRIDEMENT Left 11/18/2015    LEG DEBRIDEMENT Left 08/03/2016       Admission Condition:  poor    Discharged Condition:  fair    Labs:  CBC with Differential:    Lab Results   Component Value Date/Time    WBC 7.7 10/10/2022 06:40 AM    RBC 3.43 10/10/2022 06:40 AM    HGB 10.7 10/10/2022 06:40 AM    HCT 35.2 10/10/2022 06:40 AM     10/10/2022 06:40 AM    .6 10/10/2022 06:40 AM    MCH 31.2 10/10/2022 06:40 AM    MCHC 30.4 10/10/2022 06:40 AM    RDW 12.3 10/10/2022 06:40 AM    SEGSPCT 93 02/19/2014 09:40 AM    LYMPHOPCT 11.0 10/06/2022 05:36 AM    MONOPCT 8.3 10/06/2022 05:36 AM    BASOPCT 0.3 10/06/2022 05:36 AM    MONOSABS 0.80 10/06/2022 05:36 AM    LYMPHSABS 1.06 10/06/2022 05:36 AM    EOSABS 0.21 10/06/2022 05:36 AM    BASOSABS 0.03 10/06/2022 05:36 AM     CMP:    Lab Results   Component Value Date/Time     10/10/2022 06:40 AM    K 4.2 10/10/2022 06:40 AM    K 3.7 10/02/2022 08:20 PM    CL 97 10/10/2022 06:40 AM    CO2 30 10/10/2022 06:40 AM    BUN 14 10/10/2022 06:40 AM    CREATININE 0.8 10/10/2022 06:40 AM    GFRAA >60 10/10/2022 06:40 AM    LABGLOM >60 10/10/2022 06:40 AM    GLUCOSE 95 10/10/2022 06:40 AM    GLUCOSE 94 02/14/2011 05:20 AM    PROT 8.0 10/05/2022 05:40 AM    LABALBU 2.9 10/05/2022 05:40 AM    CALCIUM 8.9 10/10/2022 06:40 AM    BILITOT 0.4 10/05/2022 05:40 AM    ALKPHOS 157 10/05/2022 05:40 AM    AST 25 10/05/2022 05:40 AM    ALT 23 10/05/2022 05:40 AM        Radiology Results: No results found. Hospital Course:  78 y.o. female with past medical history of CAD COPD GERD HLD hypertension venous insufficiency . Patient presents to the Ed due to left leg pain . Patient has a chronic left calf wound since 6/2021 and follows with wound care presented to ER with complaint of purulent discharge from her lower extremity wound along with ulceration, fever, chills, fatigue x1 week.   In ER, patient was tachycardic with  and hypothermic. Labs were remarkable for hypoalbuminemia, elevated alk phos. Patient was admitted for sepsis secondary to lower extremity wound and was started on cefepime, Vanco and Flagyl. ID was consulted. MRI ruled out osteomyelitis. Venous Doppler was unremarkable for DVT. Blood culture grew MSSA. Antibiotics has been switched to cefazolin as per ID. Patient is stable to be discharged on oral cephalexin as per ID, to follow-up outpatient with vascular surgery, wound clinic, PCP. Patient wished to go to Rehab and waiting for placement.          Subjective:   Patient was seen at the bedside, stable  Currently she states she feels better and is improving  She voices no complaints  System review was essentially negative except for the left foot     Vascular update noted indicates of pain better controlled  Discharge Medications:      Medication List        START taking these medications      cephALEXin 500 MG capsule  Commonly known as: KEFLEX  Take 1 capsule by mouth 4 times daily for 14 days     ketorolac 10 MG tablet  Commonly known as: TORADOL  Take 1 tablet by mouth every 8 hours as needed for Pain     Unna-Flex Elastic Unna Boot Misc  Apply 1 each topically once for 1 dose     white petrolatum Oint ointment  Apply topically 2 times daily as needed (dry skin coccyx area)            CONTINUE taking these medications      albuterol sulfate  (90 Base) MCG/ACT inhaler  Commonly known as: PROVENTIL;VENTOLIN;PROAIR     aspirin 81 MG tablet     calcium carbonate 1250 (500 Ca) MG tablet  Commonly known as: OYSTER SHELL CALCIUM 500 mg     diphenhydrAMINE 50 MG capsule  Commonly known as: BENADRYL     ferrous sulfate 325 (65 Fe) MG tablet  Commonly known as: IRON 079     folic acid 332 MCG tablet  Commonly known as: FOLVITE     gabapentin 300 MG capsule  Commonly known as: NEURONTIN     Garlic 377 MG Tabs     GENISTEIN PO     Krill Oil 350 MG Caps     metoprolol tartrate 50 MG tablet  Commonly known as: LOPRESSOR  Take 1 tablet by mouth 2 times daily     morphine 15 MG extended release tablet  Commonly known as: MS CONTIN     omeprazole 40 MG delayed release capsule  Commonly known as: PRILOSEC     oxyCODONE-acetaminophen 7.5-325 MG per tablet  Commonly known as: PERCOCET     simvastatin 40 MG tablet  Commonly known as: ZOCOR     therapeutic multivitamin-minerals tablet     Vitamin B 12 500 MCG Tabs     vitamin C 500 MG tablet  Commonly known as: ASCORBIC ACID     vitamin D 50 MCG (2000 UT) Caps capsule     zinc gluconate 50 MG tablet               Where to Get Your Medications        These medications were sent to [address] [place] [address], [address] [place] [address]. Ami Punt [phone] Asael Minaya [phone]  [facility], [address] [place] [address]      Phone: [phone]   ketorolac 10 MG tablet       You can get these medications from any pharmacy    Bring a paper prescription for each of these medications  cephALEXin 500 MG capsule       Information about where to get these medications is not yet available    Ask your nurse or doctor about these medications  Unna-Flex Elastic Unna Boot Misc  white petrolatum Oint ointment         Discharge Exam:     /60   Pulse 82   Temp 97.6 °F (36.4 °C) (Temporal)   Resp 16   Ht 5' 9\" (1.753 m)   Wt 293 lb (132.9 kg)   SpO2 95%   BMI 43.27 kg/m²      General appearance: No apparent distress, appears stated age and cooperative. HEENT: Pupils equal, round, and reactive to light. Conjunctivae/corneas clear. Neck: No jugular venous distention. Trachea midline. Respiratory:  Normal respiratory effort. Clear to auscultation, bilaterally without Rales/Wheezes/Rhonchi. Cardiovascular: Regular rate and rhythm with normal S1/S2 without murmurs, rubs or gallops. Abdomen: Soft, non-tender, non-distended with normal bowel sounds. Musculoskeletal: No clubbing, cyanosis or edema bilaterally.   Full range of motion without deformity. Skin: Skin color, texture, turgor normal.  No rashes or lesions. Except as defined regarding her left foot and leg  L LE     DP/PT 1+              Pretibial wound with exudate              Posterior calf wound cellulitis, drainage              Lateral calf blister scabbed over  Neurologic:  Neurovascularly intact without any focal sensory/motor deficits.  Cranial nerves: II-XII intact, grossly non-focal.  Psychiatric: Alert and oriented, thought content appropriate, normal insight         Disposition: SNF    Patient Instructions:   REFER TO AVR or TANJA document    Signed:  Sophy Figueredo  [facility]  [employer] Ger[employer]  10/12/2022, 12:02 PM

## 2022-10-12 NOTE — PROGRESS NOTES
Hospitalist Progress Note      PCP: Bharathi Mitchell MD    Date of Admission: 10/2/2022    Chief Complaint: Fever, chills, fatigue, wound infection of lower extremity    Hospital Course:   78 y.o. female with past medical history of CAD COPD GERD HLD hypertension venous insufficiency . Patient presents to the Ed due to left leg pain . Patient has a chronic left calf wound since 6/2021 and follows with wound care presented to ER with complaint of purulent discharge from her lower extremity wound along with ulceration, fever, chills, fatigue x1 week. In ER, patient was tachycardic with  and hypothermic. Labs were remarkable for hypoalbuminemia, elevated alk phos. Patient was admitted for sepsis secondary to lower extremity wound and was started on cefepime, Vanco and Flagyl. ID was consulted. MRI ruled out osteomyelitis. Venous Doppler was unremarkable for DVT. Blood culture grew MSSA. Antibiotics has been switched to cefazolin as per ID. Patient is stable to be discharged on oral cephalexin as per ID, to follow-up outpatient with vascular surgery, wound clinic, PCP. Patient wished to go to Rehab and waiting for placement.        Subjective:   Patient was seen at the bedside, stable  Currently she states she feels better and is improving  She voices no complaints  System review was essentially negative except for the left foot    Vascular update noted indicates of pain better controlled      Medications:  Reviewed    Infusion Medications    sodium chloride       Scheduled Medications    Unna-Flex Elastic Unna Boot  1 each Topical Once    cephALEXin  500 mg Oral 4 times per day    aspirin  81 mg Oral Daily    ferrous sulfate  325 mg Oral Nightly    folic acid  322 mcg Oral Nightly    gabapentin  300 mg Oral TID    metoprolol tartrate  50 mg Oral BID    morphine  15 mg Oral BID    pantoprazole  40 mg Oral QAM AC    atorvastatin  20 mg Oral Daily    zinc sulfate  50 mg Oral Daily    sodium chloride flush  5-40 mL IntraVENous 2 times per day    enoxaparin  30 mg SubCUTAneous BID     PRN Meds: white petrolatum, oxyCODONE-acetaminophen **OR** oxyCODONE-acetaminophen, albuterol, sodium chloride flush, sodium chloride, potassium chloride **OR** potassium alternative oral replacement **OR** potassium chloride, ondansetron **OR** ondansetron, polyethylene glycol, acetaminophen **OR** acetaminophen, aluminum & magnesium hydroxide-simethicone, morphine      Intake/Output Summary (Last 24 hours) at 10/12/2022 0954  Last data filed at 10/12/2022 0558  Gross per 24 hour   Intake 240 ml   Output 1200 ml   Net -960 ml       Exam:    /60   Pulse 82   Temp 97.6 °F (36.4 °C) (Temporal)   Resp 16   Ht 5' 9\" (1.753 m)   Wt 293 lb (132.9 kg)   SpO2 95%   BMI 43.27 kg/m²     General appearance: No apparent distress, appears stated age and cooperative. HEENT: Pupils equal, round, and reactive to light. Conjunctivae/corneas clear. Neck: No jugular venous distention. Trachea midline. Respiratory:  Normal respiratory effort. Clear to auscultation, bilaterally without Rales/Wheezes/Rhonchi. Cardiovascular: Regular rate and rhythm with normal S1/S2 without murmurs, rubs or gallops. Abdomen: Soft, non-tender, non-distended with normal bowel sounds. Musculoskeletal: No clubbing, cyanosis or edema bilaterally. Full range of motion without deformity. Skin: Skin color, texture, turgor normal.  No rashes or lesions. Except as defined regarding her left foot and leg  L LE     DP/PT 1+              Pretibial wound with exudate              Posterior calf wound cellulitis, drainage              Lateral calf blister scabbed over  Neurologic:  Neurovascularly intact without any focal sensory/motor deficits.  Cranial nerves: II-XII intact, grossly non-focal.  Psychiatric: Alert and oriented, thought content appropriate, normal insight    Labs:   Recent Labs     10/10/22  0640   WBC 7.7   HGB 10.7*   HCT 35.2    Recent Labs     10/10/22  0640      K 4.2   CL 97*   CO2 30*   BUN 14   CREATININE 0.8   CALCIUM 8.9     No results for input(s): AST, ALT, BILIDIR, BILITOT, ALKPHOS in the last 72 hours. No results for input(s): INR in the last 72 hours. No results for input(s): [place] Bounds in the last 72 hours. Assessment/Plan:    Active Hospital Problems    Diagnosis Date Noted    Sepsis due to cellulitis ([facility] [L03.90, A41.9] 10/03/2022     Priority: Medium    Wounds, multiple open, lower extremity, left, initial encounter Gwynda Gone 10/03/2022     Priority: Medium    Cellulitis of left lower extremity [L03.116] 10/03/2022     Priority: Medium    Ulcer of calf with fat layer exposed, left ([facility] [L97.222] 12/08/2021    Venous insufficiency of both lower extremities [I87.2] 11/21/2018    Lymphedema of both lower extremities [I89.0] 11/21/2018     Wound infection of left lower extremity  Patient, presented to ER with complaint of fever, chills, fatigue, apparently discharged from wound of lower extremity  MRI: No osteomyelitis. Superficial soft tissue edema throughout the leg  BCx: MSSA  Continue Keflex  Follow-up ID  Follow-up with vascular outpatient     COPD  Continue DuoNeb needed     HTN  Monitor BP  DASH diet     Hyperlipidemia  Continue Lipitor    CAD  Continue aspirin, Lipitor    DVT Prophylaxis: Lovenox  Diet: ADULT DIET; Regular; Low Fat/Low Chol/High Fiber/MARIBEL  ADULT ORAL NUTRITION SUPPLEMENT; Lunch, Dinner; Low Calorie/High Protein Oral Supplement  ADULT ORAL NUTRITION SUPPLEMENT; Breakfast, Dinner; Wound Healing Oral Supplement  Code Status: Full Code    PT/OT Eval Status:  Following    Dispo - Rehab-as soon as certification is completed    Kavita Mosley MD

## 2022-10-12 NOTE — PROGRESS NOTES
Vascular Surgery Progress Note    Pt is being seen in f/u today regarding L LE wound    Subjective  Pt s/e. Pain is better controlled. Not willing to try a wrap on the leg again. Waiting on pre-cert to [facility].     Current Medications:    sodium chloride        white petrolatum, oxyCODONE-acetaminophen **OR** oxyCODONE-acetaminophen, albuterol, sodium chloride flush, sodium chloride, potassium chloride **OR** potassium alternative oral replacement **OR** potassium chloride, ondansetron **OR** ondansetron, polyethylene glycol, acetaminophen **OR** acetaminophen, aluminum & magnesium hydroxide-simethicone, morphine    Unna-Flex Elastic Unna Boot  1 each Topical Once    cephALEXin  500 mg Oral 4 times per day    aspirin  81 mg Oral Daily    ferrous sulfate  325 mg Oral Nightly    folic acid  200 mcg Oral Nightly    gabapentin  300 mg Oral TID    metoprolol tartrate  50 mg Oral BID    morphine  15 mg Oral BID    pantoprazole  40 mg Oral QAM AC    atorvastatin  20 mg Oral Daily    zinc sulfate  50 mg Oral Daily    sodium chloride flush  5-40 mL IntraVENous 2 times per day    enoxaparin  30 mg SubCUTAneous BID      PHYSICAL EXAM:    /60   Pulse 82   Temp 97.6 °F (36.4 °C) (Temporal)   Resp 16 Comment: 18  Ht 5' 9\" (1.753 m)   Wt 293 lb (132.9 kg)   SpO2 95%   BMI 43.27 kg/m²     Intake/Output Summary (Last 24 hours) at 10/12/2022 1129  Last data filed at 10/12/2022 0558  Gross per 24 hour   Intake 240 ml   Output 1200 ml   Net -960 ml        Gen Awake, alert, oriented x3, in no apparent distress   CVS S1S2   Resp + resp excursion - on RA  Abd Soft, non-tender, non-distended    L LE DP/PT 1+   Pretibial wound with exudate   Posterior calf wound cellulitis, soft with increased drainage   Lateral calf blister scabbed over    LABS:    Lab Results   Component Value Date    WBC 7.7 10/10/2022    HGB 10.7 (L) 10/10/2022    HCT 35.2 10/10/2022     10/10/2022    PROTIME 14.0 (H) 10/02/2022    INR 1.3 10/02/2022    APTT 30.4 10/02/2022    K 4.2 10/10/2022    BUN 14 10/10/2022    CREATININE 0.8 10/10/2022     A/P L LE wounds  Continue Medical management with ASA and statin  Abx per ID: PO cephalexin  MRI of the left leg shows no OM  Pain control: PRN meds  Arterial studies reviewed: AMANUEL R 1.09, L 1.13  Dressing changes: opticell, abd, kerlix - changed today  Pt still refusing unna boot  Offload L LE with prevalon boot  Ok for discharge from vascular surgery pov  Pending pre-cert to [facility] - continue dressing changes at the facility.   Order placed in discharge instructions  We will see pt in [facility] on Wed 10/19    MESSI Nair - CNP

## 2022-10-17 NOTE — DISCHARGE INSTRUCTIONS
Visit Discharge/Physician Orders     Discharge condition: Stable     Assessment of pain at discharge: mild     Anesthetic used: lido 4%     Discharge to: Home     Left via:Private automobile     Accompanied by: self     ECF/H[facility]: [facility]     Dressing Orders: To LEFT PRETIB : cleanse with normal saline, apply calcium alginate, cover with dry dressing  and secure . Change daily. Apply spandagrip. On in am and off in pm. Elevate legs as much as possible above level of the heart. Treatment Orders:Eat a diet high in protein and vitamin C. Take a multiple vitamin daily unless contraindicated. Elevate as much as possible     [address] [place] [address],[address] followup visit: to follow with Sosa Conway at facility____________________________  (Please note your next appointment above and if you are unable to keep, kindly give a 24 hour notice. Thank you.)     Physician signature:__________________________      If you experience any of the following, please call the [facility] during business hours:     * Increase in Pain  * Temperature over 101  * Increase in drainage from your wound  * Drainage with a foul odor  * Bleeding  * Increase in swelling  * Need for compression bandage changes due to slippage, breakthrough drainage. If you need medical attention outside of the business hours of the [facility] please contact your PCP or go to the nearest emergency room. Size Of Lesion In Cm (Optional): 0

## 2022-10-18 ENCOUNTER — TELEPHONE (OUTPATIENT)
Dept: VASCULAR SURGERY | Age: 77
End: 2022-10-18

## 2022-10-19 ENCOUNTER — HOSPITAL ENCOUNTER (OUTPATIENT)
Dept: WOUND CARE | Age: 77
Discharge: HOME | End: 2022-10-19
Payer: MEDICARE

## 2022-10-19 VITALS
HEART RATE: 82 BPM | TEMPERATURE: 99.3 F | RESPIRATION RATE: 18 BRPM | SYSTOLIC BLOOD PRESSURE: 124 MMHG | DIASTOLIC BLOOD PRESSURE: 76 MMHG

## 2022-10-19 DIAGNOSIS — L97.222 VENOUS STASIS ULCER OF LEFT CALF WITH FAT LAYER EXPOSED WITHOUT VARICOSE VEINS (HCC): ICD-10-CM

## 2022-10-19 DIAGNOSIS — I87.2 VENOUS STASIS ULCER OF LEFT CALF WITH FAT LAYER EXPOSED WITHOUT VARICOSE VEINS (HCC): ICD-10-CM

## 2022-10-19 DIAGNOSIS — L97.222 ULCER OF CALF WITH FAT LAYER EXPOSED, LEFT (HCC): Primary | ICD-10-CM

## 2022-10-19 PROCEDURE — 11042 DBRDMT SUBQ TIS 1ST 20SQCM/<: CPT | Performed by: SURGERY

## 2022-10-19 PROCEDURE — 6370000000 HC RX 637 (ALT 250 FOR IP): Performed by: SURGERY

## 2022-10-19 PROCEDURE — 11042 DBRDMT SUBQ TIS 1ST 20SQCM/<: CPT

## 2022-10-19 RX ORDER — GINSENG 100 MG
CAPSULE ORAL ONCE
OUTPATIENT
Start: 2022-10-19 | End: 2022-10-19

## 2022-10-19 RX ORDER — LIDOCAINE 40 MG/G
CREAM TOPICAL ONCE
OUTPATIENT
Start: 2022-10-19 | End: 2022-10-19

## 2022-10-19 RX ORDER — LIDOCAINE HYDROCHLORIDE 40 MG/ML
SOLUTION TOPICAL ONCE
Status: COMPLETED | OUTPATIENT
Start: 2022-10-19 | End: 2022-10-19

## 2022-10-19 RX ORDER — LIDOCAINE HYDROCHLORIDE 20 MG/ML
JELLY TOPICAL ONCE
OUTPATIENT
Start: 2022-10-19 | End: 2022-10-19

## 2022-10-19 RX ORDER — BACITRACIN, NEOMYCIN, POLYMYXIN B 400; 3.5; 5 [USP'U]/G; MG/G; [USP'U]/G
OINTMENT TOPICAL ONCE
OUTPATIENT
Start: 2022-10-19 | End: 2022-10-19

## 2022-10-19 RX ORDER — LIDOCAINE HYDROCHLORIDE 40 MG/ML
SOLUTION TOPICAL ONCE
OUTPATIENT
Start: 2022-10-19 | End: 2022-10-19

## 2022-10-19 RX ORDER — BETAMETHASONE DIPROPIONATE 0.05 %
OINTMENT (GRAM) TOPICAL ONCE
OUTPATIENT
Start: 2022-10-19 | End: 2022-10-19

## 2022-10-19 RX ORDER — CLOBETASOL PROPIONATE 0.5 MG/G
OINTMENT TOPICAL ONCE
OUTPATIENT
Start: 2022-10-19 | End: 2022-10-19

## 2022-10-19 RX ORDER — GENTAMICIN SULFATE 1 MG/G
OINTMENT TOPICAL ONCE
OUTPATIENT
Start: 2022-10-19 | End: 2022-10-19

## 2022-10-19 RX ORDER — BACITRACIN ZINC AND POLYMYXIN B SULFATE 500; 1000 [USP'U]/G; [USP'U]/G
OINTMENT TOPICAL ONCE
OUTPATIENT
Start: 2022-10-19 | End: 2022-10-19

## 2022-10-19 RX ORDER — LIDOCAINE 50 MG/G
OINTMENT TOPICAL ONCE
OUTPATIENT
Start: 2022-10-19 | End: 2022-10-19

## 2022-10-19 RX ADMIN — LIDOCAINE HYDROCHLORIDE 10 ML: 40 SOLUTION TOPICAL at 15:46

## 2022-10-19 ASSESSMENT — PAIN SCALES - GENERAL: PAINLEVEL_OUTOF10: 10

## 2022-10-19 ASSESSMENT — PAIN DESCRIPTION - LOCATION: LOCATION: LEG

## 2022-10-19 ASSESSMENT — PAIN DESCRIPTION - ORIENTATION: ORIENTATION: LEFT

## 2022-10-19 ASSESSMENT — PAIN - FUNCTIONAL ASSESSMENT: PAIN_FUNCTIONAL_ASSESSMENT: PREVENTS OR INTERFERES SOME ACTIVE ACTIVITIES AND ADLS

## 2022-10-19 NOTE — PLAN OF CARE
Problem: Chronic Conditions and Co-morbidities  Goal: Patient's chronic conditions and co-morbidity symptoms are monitored and maintained or improved  Outcome: Progressing     Problem: Pain  Goal: Verbalizes/displays adequate comfort level or baseline comfort level  Outcome: Progressing     Problem: Wound:  Goal: Will show signs of wound healing; wound closure and no evidence of infection  Description: Will show signs of wound healing; wound closure and no evidence of infection  Outcome: Progressing     Problem: Venous:  Goal: Signs of wound healing will improve  Description: Signs of wound healing will improve  Outcome: Adequate for Discharge

## 2022-10-19 NOTE — PROGRESS NOTES
[facility] Followup Visit Note    Referring Physician : Tarik Nicholson MD  [id number] RECORD NUMBER:  [id number]  AGE: 78 y.o. GENDER: female  : 1945  EPISODE DATE:  10/19/2022    Subjective:     Chief Complaint   Patient presents with    Wound Check     Leg left       HISTORY of PRESENT ILLNESS HPI   Vandana Drew is a 78 y.o. female who presents today in regards to follow up evaluation and treatment of wound/ulcer. That patient's past medical, family and social hx were reviewed and changes were made if present. History of Wound Context:  Patient has had left calf wound since ~ 2021. She has been putting a dressing on it. The wound has not been improving. She has a hx significant for venous stasis ulcerations of bilateral LE. She first was seen by myself in regards to these issues 2015. She eventually healed these wounds 2016. I last saw her 2021 at which time I recommended lymphedema therapy. She states she went and said it caused her to much pain and stopped going. She has chronic issues with bilateral calf pain, swelling and edema. She admits to not wearing her stockings because of the pain. She has used knee high 20-30 mm hg stockings in the past.       She is still seeing pain management and is down to percocet 7/5/325 mg daily.        21  [facility]  Double tubigrip  Culture done  Emphasized importance of getting in to more significant compression in the future  12/15/21  Culture reviewed - light growth, no tx  Wound slightly improved  Still significant drainage  Plan on compression wrap next week  21  Stable wound  Drainage slightly better  Pt would like to wait till next week because of holidays to start wrap  22  Wound larger  Profore  22  Wound appearance better  Refusing wrap - it rolled down last week and she cut off after 3 days  22  Wound appearance better  Still refusing wrap   3/2/22  periwound worse  Culture  drawtek  3/9/22  periwound much improved  levaquin 750 mg daily #10 script given culture   Wound itself stable  3/23/22  Left anterior calf wound improving overall  New blister/ulcerations left 3rd, 4th and 5th toes and lateral foot   Instructed to keep toes/foot dry, no ointment or corn starch   3/30/22  bistering resolved, other skin issues resolved  More dry than previously but overall stable  kailyn Barrera  Swelling is down, patient declining wrap  4/13/22  Wound slightly improved  4/20/2022  Wound stable, patient refusing compression wrap  5/4/22  Wound worse  Pt refusing wrap  Will change diet per her report to decrease swelling  5/11/22  Wound stable  kailyn and giacomo 2 - pt agreeable now after significant persuasion  5/18/22  Took off wrap within 24 hours due to pain  aquacell and spandigrip  She agreed to use wrap again if swelling not down 2 more cm next week  Wound slightly smaller  5/25/22  Wound improved   Left leg wound debrided   Continue aquacel   6/8/22  Wound larger  Agreeable to wrap - kailyn sena ag  Culture done  6/22/22  Wound stable in size  6/29/22  Wound slightly larger  Still having issues with wrap falling down  Will start hhc - MWF wraps  7/6/22  Improved  hhc starts this week  7/20/22  Appearance better, wound measuring larger  Continue with wraps  Leg edema improved  7/27/22  Slight improvement  8/3/22  Wound appearance and size worse  Issues still with wraps falling down  When doesn't have wraps on than usually uses spandigrips  8/10/2022  Wound cultures noted, Bactrim DS 1 tablet p.o. twice daily, wound looks fairly clean with some exudate only, mild recent lab work, patient recommended CBC and CMP  8/17/22  Refusing wrap today due to pain associated with  Will discuss with Dr Nahum Hair her pain management  Slightly larger, appearance improved  8/24/22  Poor tolerance of wraps  Wound stable in size  9/7/22  Not tolerating wraps - emphasized importance of use  Change to drawtek  Wound slightly improved in size, appearance still concerning  Pumps are going to be deliver  9/14/22  Still not tolerating wraps  Wound appearance improved, size slightly better  Pumps deliovered- she has yet to be in serviced on them   10/2-12/22  Admitted with cellultis  10/19/22  Currently at [facility]  Wound much improved  Posterior wound healed  Tyson Medley  She will follow at [facility] while admitted    \Wound/Ulcer Pain Timing/Severity: waxing and waning, mild  Quality of pain: aching, throbbing, pressure  Severity:  7 / 10   Modifying Factors: Pain worsens with debridement, dressing changes  Associated Signs/Symptoms: edema, drainage and pain    Ulcer Identification:  Ulcer Type: venous and lymphedema  Contributing Factors: edema, venous stasis and lymphedema    Diabetic/Pressure/Non Pressure Ulcers only:  Ulcer: Non-Pressure ulcer, fat layer exposed    Wound: N/A  PAST MEDICAL HISTORY      Diagnosis Date    Bursitis     CAD (coronary artery disease) 1993    heart attack    Cellulitis of leg, left 10/3/2022    COPD (chronic obstructive pulmonary disease) ([address] 6/19/2013    Emphysema     slight    GERD (gastroesophageal reflux disease)     Hiatal hernia     Hip pain     Hyperlipidemia     Hypertension     Lymphedema of both lower extremities 11/21/2018    Venous insufficiency of both lower extremities 11/21/2018    Venous stasis ulcer of left calf with fat layer exposed without varicose veins ([address] 12/8/2021    Venous stasis ulcer of left calf with fat layer exposed without varicose veins ([address] 12/8/2021    Longstanding ulcer over the shin of the left calf, with a new ulcer over the posterior aspect of the left calf, for the last 1 week    Venous ulcer with fat layer exposed ([address] 10/28/2015     Past Surgical History:   Procedure Laterality Date    APPENDECTOMY      BREAST ENHANCEMENT SURGERY      BREAST REDUCTION SURGERY      CHOLECYSTECTOMY      COLONOSCOPY      ECHO COMPL W (NEURONTIN) 300 MG capsule Take 300 mg by mouth 3 times daily. simvastatin (ZOCOR) 40 MG tablet Take 40 mg by mouth nightly      oxyCODONE-acetaminophen (PERCOCET) 7.5-325 MG per tablet Take 1 tablet by mouth 2 times daily. Armindasergio Ma morphine (MS CONTIN) 15 MG extended release tablet Take 15 mg by mouth 2 times daily. aspirin 81 MG tablet Take 81 mg by mouth daily      Cholecalciferol (VITAMIN D) 2000 UNITS CAPS capsule Take 2,000 Units by mouth daily       ferrous sulfate 325 (65 FE) MG tablet Take 325 mg by mouth nightly       metoprolol (LOPRESSOR) 50 MG tablet Take 1 tablet by mouth 2 times daily 60 tablet 0    albuterol (PROVENTIL HFA;VENTOLIN HFA) 108 (90 BASE) MCG/ACT inhaler Inhale 2 puffs into the lungs every 6 hours as needed for Wheezing or Shortness of Breath       calcium carbonate (OYSTER SHELL CALCIUM 500 MG) 1250 MG tablet Take 2 tablets by mouth daily      Ascorbic Acid (VITAMIN C) 500 MG tablet Take 2,000 mg by mouth daily      omeprazole (PRILOSEC) 40 MG delayed release capsule Take 40 mg by mouth daily. diphenhydrAMINE (BENADRYL) 50 MG capsule Take 50 mg by mouth daily as needed for Allergies       Garlic 072 MG TABS Take 1,000 mg by mouth daily        No current facility-administered medications on file prior to encounter.        REVIEW OF SYSTEMS See HPI    Objective:    /76   Pulse 82   Temp 99.3 °F (37.4 °C) (Temporal)   Resp 18   Wt Readings from Last 3 Encounters:   10/09/22 293 lb (132.9 kg)   09/14/22 295 lb (133.8 kg)   08/24/22 295 lb (133.8 kg)     PHYSICAL EXAM  CONSTITUTIONAL:   Awake, alert, cooperative   EYES:  lids and lashes normal   ENT: external ears and nose without lesions   NECK:  supple, symmetrical, trachea midline   SKIN:  Open wound Present    Assessment:     Problem List Items Addressed This Visit       * (Principal) Venous stasis ulcer of left calf with fat layer exposed without varicose veins (HCC) - Primary (Chronic)       Pre Debridement Measurements:  Are located in the [place]  Documentation Flow Sheet  Post Debridement Measurements:  Wound/Ulcer Descriptions are Pre Debridement except measurements:     Incision 04/20/16 Leg Left (Active)   Number of days: 2979       Incision 08/03/16 Leg Left (Active)   Number of days: 2268       Wound 12/08/21 Pretibial Left #1 (Active)   Wound Image   09/14/22 1520   Dressing Status New dressing applied 10/19/22 1643   Wound Cleansed Cleansed with saline 10/19/22 1643   Dressing/Treatment Alginate;ABD;Roll gauze 10/19/22 1643   Offloading for Diabetic Foot Ulcers Offloading not required 08/10/22 1540   Wound Length (cm) 3.2 cm 10/19/22 1539   Wound Width (cm) 2.5 cm 10/19/22 1539   Wound Depth (cm) 0.2 cm 10/19/22 1539   Wound Surface Area (cm^2) 8 cm^2 10/19/22 1539   Change in Wound Size % (l*w) -1042.86 10/19/22 1539   Wound Volume (cm^3) 1.6 cm^3 10/19/22 1539   Wound Healing % -2186 10/19/22 1539   Post-Procedure Length (cm) 3.3 cm 10/19/22 1624   Post-Procedure Width (cm) 2.7 cm 10/19/22 1624   Post-Procedure Depth (cm) 0.2 cm 10/19/22 1624   Post-Procedure Surface Area (cm^2) 8.91 cm^2 10/19/22 1624   Post-Procedure Volume (cm^3) 1.782 cm^3 10/19/22 1624   Wound Assessment Fibrin;Pink/red 10/19/22 1539   Drainage Amount Moderate 10/19/22 1539   Drainage Description Serosanguinous 10/19/22 1539   Odor None 10/19/22 1539   Kori-wound Assessment Fragile 10/19/22 1539   Number of days: 315     Incision 04/16/21 Abdomen (Active)   Number of days: 551       Procedure Note  Indications:  Based on my examination of this patient's wound(s)/ulcer(s) today, debridement is required to promote healing and evaluate the wound base.     Performed by: Epi Allison MD    Consent obtained:  Yes    Time out taken:  Yes    Pain Control: Anesthetic  Anesthetic: 4% Lidocaine Liquid Topical     Debridement:Excisional Debridement    Using curette the wound(s)/ulcer(s) was/were sharply debrided down through and breakthrough drainage. If you need medical attention outside of the business hours of the [facility] please contact your PCP or go to the nearest emergency room.        Electronically signed by Sakshi Garrido MD

## 2022-11-03 NOTE — DISCHARGE INSTRUCTIONS
Visit Discharge/Physician Orders     Discharge condition: Stable     Assessment of pain at discharge: mild     Anesthetic used: lido 4%     Discharge to: Home     Left via:Private automobile     Accompanied by: self     ECF/HHA: [facility]     Dressing Orders: To LEFT PRETIB : cleanse with normal saline, apply calcium alginate, cover with dry dressing  and secure . Change daily. Apply spandagrip. On in am and off in pm. Elevate legs as much as possible above level of the heart. Treatment Orders:Eat a diet high in protein and vitamin C. Take a multiple vitamin daily unless contraindicated. Elevate as much as possible     [address] [place] [address],[address] followup visit: 1 week____________________________  (Please note your next appointment above and if you are unable to keep, kindly give a 24 hour notice. Thank you.)     Physician signature:__________________________      If you experience any of the following, please call the [facility] during business hours:     * Increase in Pain  * Temperature over 101  * Increase in drainage from your wound  * Drainage with a foul odor  * Bleeding  * Increase in swelling  * Need for compression bandage changes due to slippage, breakthrough drainage. If you need medical attention outside of the business hours of the [facility] please contact your PCP or go to the nearest emergency room.

## 2022-11-03 NOTE — DISCHARGE INSTR - COC
Continuity of Care Form    Patient Name: Todd Dick   :  1945  MRN:  [id number]    Admit date:  (Not on file)  Discharge date:  ***    Code Status Order: Prior   Advance Directives:     Admitting Physician:  No admitting provider for patient encounter. PCP: Saskia Saenz MD    Discharging Nurse: [facility] Unit/Room#: No information available for this encounter. Discharging Unit Phone Number: ***    Emergency Contact:   Extended Emergency Contact Information  Primary Emergency Contact: Erin Parks  Address: [address] [facility], [address] Phone: [phone]  Relation: Brother/Sister  Secondary Emergency Contact: [facility]  Mobile Phone: [phone]  Relation: Child    Past Surgical History:  Past Surgical History:   Procedure Laterality Date    APPENDECTOMY      BREAST ENHANCEMENT SURGERY      BREAST REDUCTION SURGERY      CHOLECYSTECTOMY      COLONOSCOPY      ECHO COMPL W DOP COLOR FLOW  3/11/2013         ENDOSCOPY, COLON, DIAGNOSTIC      HERNIA REPAIR N/A 2021    LAPAROSCOPIC ROBOTIC ASSISTED INGUINAL HERNIA REPAIR performed by Raegan Gonzalez MD at [address] [facility] ([facility])      JOINT REPLACEMENT  2009    l knee r hip    LEG DEBRIDEMENT Left 2015    LEG DEBRIDEMENT Left 2016       Immunization History: There is no immunization history on file for this patient.     Active Problems:  Patient Active Problem List   Diagnosis Code    COPD (chronic obstructive pulmonary disease) ([facility] J44.9    Essential hypertension, benign I10    Hyperlipidemia with target LDL less than 100 E78.5    GERD (gastroesophageal reflux disease) N06.2    Diastolic CHF, chronic (HCC) I50.32    Bursitis M71.9    Venous insufficiency of both lower extremities I87.2    Lymphedema of both lower extremities I89.0    Venous stasis ulcer of left calf with fat layer exposed without varicose veins ([facility] I87.2, [id number]    Sepsis due to cellulitis ([facility] L03.90, A41.9    Wounds, multiple open, lower extremity, left, initial encounter S81.802A    Cellulitis of left lower extremity L03.116       Isolation/Infection:   Isolation            No Isolation          Patient Infection Status       Infection Onset Added Last Indicated Last Indicated By Review Planned Expiration Resolved Resolved By    None active    Resolved    Influenza 20 Respiratory Panel, Molecular   20             Nurse Assessment:  Last Vital Signs: There were no vitals taken for this visit.     Last documented pain score (0-10 scale):    Last Weight:   Wt Readings from Last 1 Encounters:   10/09/22 293 lb (132.9 kg)     Mental Status:  {IP PT MENTAL STATUS:}    IV Access:  { TANJA IV ACCESS:824971888}    Nursing Mobility/ADLs:  Walking   {CHP DME GUQJ:386101740}  Transfer  {CHP DME TQZO:319558879}  Bathing  {CHP DME IXAY:589889783}  Dressing  {CHP DME GSMO:327131137}  Toileting  {CHP DME POE}  Feeding  {P DME VFQE:854806853}  Med Admin  {P DME QAPU:691081352}  Med Delivery   { TANJA MED Delivery:183745755}    Wound Care Documentation and Therapy:  Incision 16 Leg Left (Active)   Number of days: 2387       Incision 16 Leg Left (Active)   Number of days: 8838       Wound 21 Pretibial Left #1 (Active)   Dressing Status New dressing applied 10/19/22 1643   Wound Cleansed Cleansed with saline 10/19/22 1643   Dressing/Treatment Alginate;ABD;Roll gauze 10/19/22 1643   Wound Length (cm) 3.2 cm 10/19/22 1539   Wound Width (cm) 2.5 cm 10/19/22 1539   Wound Depth (cm) 0.2 cm 10/19/22 1539   Wound Surface Area (cm^2) 8 cm^2 10/19/22 1539   Change in Wound Size % (l*w) -1042.86 10/19/22 1539   Wound Volume (cm^3) 1.6 cm^3 10/19/22 1539   Wound Healing % -2186 10/19/22 1539   Post-Procedure Length (cm) 3.3 cm 10/19/22 162   Post-Procedure Width (cm) 2.7 cm 10/19/22 1624   Post-Procedure Depth (cm) 0.2 cm 10/19/22 1624   Post-Procedure Surface Area (cm^2) 8.91 cm^2 10/19/22 1624   Post-Procedure Volume (cm^3) 1.782 cm^3 10/19/22 1624   Wound Assessment Fibrin;Pink/red 10/19/22 1539   Drainage Amount Moderate 10/19/22 1539   Drainage Description Serosanguinous 10/19/22 1539   Odor None 10/19/22 1539   Kori-wound Assessment Fragile 10/19/22 1539   Number of days: 329       Incision 21 Abdomen (Active)   Number of days: 565        Elimination:  Continence: Bowel: {YES / PT:72905}  Bladder: {YES / ZN:41579}  Urinary Catheter: {Urinary Catheter:220459581}   Colostomy/Ileostomy/Ileal Conduit: {YES / VK:25175}       Date of Last BM: ***  No intake or output data in the 24 hours ending 22 1152  No intake/output data recorded.     Safety Concerns:     [address] [place] Safety Concerns:582066070}    Impairments/Disabilities:      [address] [place] Impairments/Disabilities:233569922}    Nutrition Therapy:  Current Nutrition Therapy:   [address] [place] Diet List:512737749}    Routes of Feeding: {CHP DME Other Feedings:528653937}  Liquids: {Slp liquid thickness:93332}  Daily Fluid Restriction: {CHP DME Yes amt example:193505815}  Last Modified Barium Swallow with Video (Video Swallowing Test): {Done Not Done EZGR:774143062}    Treatments at the Time of Hospital Discharge:   Respiratory Treatments: ***  Oxygen Therapy:  {Therapy; copd oxygen:15407}  Ventilator:    { CC Vent JIHA:270391178}    Rehab Therapies: {THERAPEUTIC INTERVENTION:0206689953}  Weight Bearing Status/Restrictions: [address] Weight Bearin}  Other Medical Equipment (for information only, NOT a DME order):  {EQUIPMENT:460876718}  Other Treatments: ***    Patient's personal belongings (please select all that are sent with patient):  {P DME Belongings:103790580}    RN SIGNATURE:  {Esignature:267714277}    CASE MANAGEMENT/SOCIAL WORK SECTION    Inpatient Status Date: ***    Readmission Risk Assessment Score:  Readmission Risk              Risk of Unplanned Readmission:  0           Discharging to Facility/ Agency   Name:   Address:  Phone:  Fax:    Dialysis Facility (if applicable)   Name:  Address:  Dialysis Schedule:  Phone:  Fax:    / signature: {Esignature:546521240}    PHYSICIAN SECTION    Prognosis: {Prognosis:3995698489}    Condition at Discharge: Raj8 Shiela Mohan Patient Condition:192963365}    Rehab Potential (if transferring to Rehab): {Prognosis:7749607445}    Recommended Labs or Other Treatments After Discharge: ***    Physician Certification: I certify the above information and transfer of Tia Peoples  is necessary for the continuing treatment of the diagnosis listed and that she requires {Admit to Appropriate Level of Care:93380} for {GREATER/LESS:565629392} 30 days.      Update Admission H&P: {CHP DME Changes in PMDQY:729429626}    PHYSICIAN SIGNATURE:  {Esignature:171452012}

## 2022-11-09 ENCOUNTER — HOSPITAL ENCOUNTER (OUTPATIENT)
Dept: WOUND CARE | Age: 77
Discharge: HOME | End: 2022-11-09
Payer: MEDICARE

## 2022-11-09 VITALS
DIASTOLIC BLOOD PRESSURE: 78 MMHG | TEMPERATURE: 97.4 F | RESPIRATION RATE: 18 BRPM | SYSTOLIC BLOOD PRESSURE: 158 MMHG | HEART RATE: 78 BPM

## 2022-11-09 DIAGNOSIS — L97.222 VENOUS STASIS ULCER OF LEFT CALF WITH FAT LAYER EXPOSED WITHOUT VARICOSE VEINS (HCC): Primary | ICD-10-CM

## 2022-11-09 DIAGNOSIS — I89.0 LYMPHEDEMA OF BOTH LOWER EXTREMITIES: Chronic | ICD-10-CM

## 2022-11-09 DIAGNOSIS — I87.2 VENOUS STASIS ULCER OF LEFT CALF WITH FAT LAYER EXPOSED WITHOUT VARICOSE VEINS (HCC): Primary | ICD-10-CM

## 2022-11-09 PROCEDURE — 11042 DBRDMT SUBQ TIS 1ST 20SQCM/<: CPT | Performed by: SURGERY

## 2022-11-09 PROCEDURE — 11042 DBRDMT SUBQ TIS 1ST 20SQCM/<: CPT

## 2022-11-09 RX ORDER — MORPHINE SULFATE 15 MG/1
15 TABLET, FILM COATED, EXTENDED RELEASE ORAL 2 TIMES DAILY
Qty: 60 TABLET | Refills: 0 | Status: SHIPPED | OUTPATIENT
Start: 2022-11-09 | End: 2022-11-16

## 2022-11-09 RX ORDER — LIDOCAINE 40 MG/G
CREAM TOPICAL ONCE
Status: CANCELLED | OUTPATIENT
Start: 2022-11-09 | End: 2022-11-09

## 2022-11-09 RX ORDER — GENTAMICIN SULFATE 1 MG/G
OINTMENT TOPICAL ONCE
Status: CANCELLED | OUTPATIENT
Start: 2022-11-09 | End: 2022-11-09

## 2022-11-09 RX ORDER — LIDOCAINE HYDROCHLORIDE 20 MG/ML
JELLY TOPICAL ONCE
Status: CANCELLED | OUTPATIENT
Start: 2022-11-09 | End: 2022-11-09

## 2022-11-09 RX ORDER — BACITRACIN ZINC AND POLYMYXIN B SULFATE 500; 1000 [USP'U]/G; [USP'U]/G
OINTMENT TOPICAL ONCE
Status: CANCELLED | OUTPATIENT
Start: 2022-11-09 | End: 2022-11-09

## 2022-11-09 RX ORDER — LIDOCAINE 50 MG/G
OINTMENT TOPICAL ONCE
Status: CANCELLED | OUTPATIENT
Start: 2022-11-09 | End: 2022-11-09

## 2022-11-09 RX ORDER — LIDOCAINE HYDROCHLORIDE 40 MG/ML
SOLUTION TOPICAL ONCE
Status: COMPLETED | OUTPATIENT
Start: 2022-11-09 | End: 2022-11-09

## 2022-11-09 RX ORDER — GINSENG 100 MG
CAPSULE ORAL ONCE
Status: CANCELLED | OUTPATIENT
Start: 2022-11-09 | End: 2022-11-09

## 2022-11-09 RX ORDER — BACITRACIN, NEOMYCIN, POLYMYXIN B 400; 3.5; 5 [USP'U]/G; MG/G; [USP'U]/G
OINTMENT TOPICAL ONCE
Status: CANCELLED | OUTPATIENT
Start: 2022-11-09 | End: 2022-11-09

## 2022-11-09 RX ORDER — CLOBETASOL PROPIONATE 0.5 MG/G
OINTMENT TOPICAL ONCE
Status: CANCELLED | OUTPATIENT
Start: 2022-11-09 | End: 2022-11-09

## 2022-11-09 RX ORDER — LIDOCAINE HYDROCHLORIDE 40 MG/ML
SOLUTION TOPICAL ONCE
Status: CANCELLED | OUTPATIENT
Start: 2022-11-09 | End: 2022-11-09

## 2022-11-09 RX ORDER — OXYCODONE AND ACETAMINOPHEN 7.5; 325 MG/1; MG/1
1 TABLET ORAL 2 TIMES DAILY
Qty: 60 TABLET | Refills: 0 | Status: SHIPPED | OUTPATIENT
Start: 2022-11-09 | End: 2022-12-09

## 2022-11-09 RX ORDER — BETAMETHASONE DIPROPIONATE 0.05 %
OINTMENT (GRAM) TOPICAL ONCE
Status: CANCELLED | OUTPATIENT
Start: 2022-11-09 | End: 2022-11-09

## 2022-11-09 RX ADMIN — LIDOCAINE HYDROCHLORIDE 5 ML: 40 SOLUTION TOPICAL at 15:17

## 2022-11-09 ASSESSMENT — PAIN DESCRIPTION - LOCATION: LOCATION: LEG

## 2022-11-09 ASSESSMENT — PAIN DESCRIPTION - DESCRIPTORS: DESCRIPTORS: ACHING

## 2022-11-09 ASSESSMENT — PAIN SCALES - GENERAL: PAINLEVEL_OUTOF10: 7

## 2022-11-09 ASSESSMENT — PAIN DESCRIPTION - ORIENTATION: ORIENTATION: LEFT

## 2022-11-09 NOTE — PROGRESS NOTES
[facility] Followup Visit Note    Referring Physician : Charleen Alexandra MD  [id number] [facility] RECORD NUMBER:  [id number]  AGE: 78 y.o. GENDER: female  : 1945  EPISODE DATE:  2022    Subjective:     Chief Complaint   Patient presents with    Wound Check      HISTORY of PRESENT ILLNESS HPI   Jacob Ramirez is a 78 y.o. female who presents today in regards to follow up evaluation and treatment of wound/ulcer. That patient's past medical, family and social hx were reviewed and changes were made if present. History of Wound Context:  Patient has had left calf wound since ~ 2021. She has been putting a dressing on it. The wound has not been improving. She has a hx significant for venous stasis ulcerations of bilateral LE. She first was seen by myself in regards to these issues 2015. She eventually healed these wounds 2016. I last saw her 2021 at which time I recommended lymphedema therapy. She states she went and said it caused her to much pain and stopped going. She has chronic issues with bilateral calf pain, swelling and edema. She admits to not wearing her stockings because of the pain. She has used knee high 20-30 mm hg stockings in the past.       She is still seeing pain management and is down to percocet 7//325 mg daily.        [facility]  [facility]  Culture done  Emphasized importance of getting in to more significant compression in the future  12/15/21  Culture reviewed - light growth, no tx  Wound slightly improved  Still significant drainage  Plan on compression wrap next week  21  Stable wound  Drainage slightly better  Pt would like to wait till next week because of holidays to start wrap  22  Wound larger  Profore  22  Wound appearance better  Refusing wrap - it rolled down last week and she cut off after 3 days  22  Wound appearance better  Still refusing wrap   3/2/22  periwound worse  Culture  drawtek  3/9/22  periwound much improved  levaquin 750 mg daily #10 script given culture   Wound itself stable  3/23/22  Left anterior calf wound improving overall  New blister/ulcerations left 3rd, 4th and 5th toes and lateral foot   Instructed to keep toes/foot dry, no ointment or corn starch   3/30/22  bistering resolved, other skin issues resolved  More dry than previously but overall stable  kailyn Barrera  Swelling is down, patient declining wrap  4/13/22  Wound slightly improved  4/20/2022  Wound stable, patient refusing compression wrap  5/4/22  Wound worse  Pt refusing wrap  Will change diet per her report to decrease swelling  5/11/22  Wound stable  kailyn and giacomo 2 - pt agreeable now after significant persuasion  5/18/22  Took off wrap within 24 hours due to pain  aquacell and spandigrip  She agreed to use wrap again if swelling not down 2 more cm next week  Wound slightly smaller  5/25/22  Wound improved   Left leg wound debrided   Continue aquacel   6/8/22  Wound larger  Agreeable to wrap - kailyn sena ag  Culture done  6/22/22  Wound stable in size  6/29/22  Wound slightly larger  Still having issues with wrap falling down  Will start hhc - MWF wraps  7/6/22  Improved  hhc starts this week  7/20/22  Appearance better, wound measuring larger  Continue with wraps  Leg edema improved  7/27/22  Slight improvement  8/3/22  Wound appearance and size worse  Issues still with wraps falling down  When doesn't have wraps on than usually uses spandigrips  8/10/2022  Wound cultures noted, Bactrim DS 1 tablet p.o. twice daily, wound looks fairly clean with some exudate only, mild recent lab work, patient recommended CBC and CMP  8/17/22  Refusing wrap today due to pain associated with  Will discuss with Dr Christen Velásquez her pain management  Slightly larger, appearance improved  8/24/22  Poor tolerance of wraps  Wound stable in size  9/7/22  Not tolerating wraps - emphasized importance of use  Change to drawtek  Wound slightly improved in size, appearance still concerning  Pumps are going to be deliver  9/14/22  Still not tolerating wraps  Wound appearance improved, size slightly better  Pumps deliovered- she has yet to be in serviced on them   10/2-12/22  Admitted with cellultis  10/19/22  Currently at [facility]  Wound much improved  Posterior wound healed  [place] [address]  She will follow at [facility] while admitted  11/9/22  She is now home  She is no longer with pain management - there was a disagreement as she states she was taking more because of more pain  Will make referral to [facility] - I left my phone number for them to call me re this pt  Wound has improved  Oarrs reviewed  Percocet 7.5/325 mg #60 (30 days), Ms Contin 15 mg #60 (30 days)    \Wound/Ulcer Pain Timing/Severity: waxing and waning, mild  Quality of pain: aching, throbbing, pressure  Severity:  7 / 10   Modifying Factors: Pain worsens with debridement, dressing changes  Associated Signs/Symptoms: edema, drainage and pain    Ulcer Identification:  Ulcer Type: venous and lymphedema  Contributing Factors: edema, venous stasis and lymphedema    Diabetic/Pressure/Non Pressure Ulcers only:  Ulcer: Non-Pressure ulcer, fat layer exposed    Wound: N/A  PAST MEDICAL HISTORY      Diagnosis Date    Bursitis     CAD (coronary artery disease) 1993    heart attack    Cellulitis of leg, left 10/3/2022    COPD (chronic obstructive pulmonary disease) ([address] 6/19/2013    Emphysema     slight    GERD (gastroesophageal reflux disease)     Hiatal hernia     Hip pain     Hyperlipidemia     Hypertension     Lymphedema of both lower extremities 11/21/2018    Venous insufficiency of both lower extremities 11/21/2018    Venous stasis ulcer of left calf with fat layer exposed without varicose veins ([address] 12/8/2021    Venous stasis ulcer of left calf with fat layer exposed without varicose veins ([address] 12/8/2021    Longstanding ulcer over the shin of the left calf, with a new ulcer over the posterior aspect of the left calf, for the last 1 week    Venous ulcer with fat layer exposed ([address] 10/28/2015     Past Surgical History:   Procedure Laterality Date    APPENDECTOMY      BREAST ENHANCEMENT SURGERY      BREAST REDUCTION SURGERY      CHOLECYSTECTOMY      COLONOSCOPY      ECHO COMPL W DOP COLOR FLOW  3/11/2013         ENDOSCOPY, COLON, DIAGNOSTIC      HERNIA REPAIR N/A 2021    LAPAROSCOPIC ROBOTIC ASSISTED INGUINAL HERNIA REPAIR performed by Juan Jose Ha MD at [address]      JOINT REPLACEMENT  2009    l knee r hip    LEG DEBRIDEMENT Left 2015    LEG DEBRIDEMENT Left 2016     History reviewed. No pertinent family history.   Social History     Tobacco Use    Smoking status: Former     Packs/day: 1.00     Years: 20.00     Pack years: 20.00     Types: Cigarettes     Quit date: 1995     Years since quittin.0    Smokeless tobacco: Never    Tobacco comments:     Quit   Vaping Use    Vaping Use: Never used   Substance Use Topics    Alcohol use: No    Drug use: No     Allergies   Allergen Reactions    Codeine Nausea And Vomiting and Other (See Comments)     hallucinate    Dilaudid [Hydromorphone Hcl] Rash    Naproxen Other (See Comments)     Shuts down kidney function    Singulair [Montelukast Sodium] Shortness Of Breath     Mucus in chest    Black Cohosh     Black Cohosh [Cimicifuga [place] (Black Cohosh)] Rash     Current Outpatient Medications on File Prior to Encounter   Medication Sig Dispense Refill    white petrolatum OINT ointment Apply topically 2 times daily as needed (dry skin coccyx area)  0    ketorolac (TORADOL) 10 MG tablet Take 1 tablet by mouth every 8 hours as needed for Pain 15 tablet 0    Cyanocobalamin (VITAMIN B 12) 500 MCG TABS Take by mouth daily      Multiple Vitamins-Minerals (THERAPEUTIC MULTIVITAMIN-MINERALS) tablet Take 1 tablet by mouth daily      zinc gluconate 50 MG tablet Take 50 mg by mouth daily      GENISTEIN PO Take 125 mg by mouth nightly      folic acid (FOLVITE) 723 MCG tablet Take 400 mcg by mouth nightly      Krill Oil 350 MG CAPS Take 350 mg by mouth nightly      gabapentin (NEURONTIN) 300 MG capsule Take 300 mg by mouth 3 times daily. simvastatin (ZOCOR) 40 MG tablet Take 40 mg by mouth nightly      oxyCODONE-acetaminophen (PERCOCET) 7.5-325 MG per tablet Take 1 tablet by mouth 2 times daily. Charles Le morphine (MS CONTIN) 15 MG extended release tablet Take 15 mg by mouth 2 times daily. aspirin 81 MG tablet Take 81 mg by mouth daily      Cholecalciferol (VITAMIN D) 2000 UNITS CAPS capsule Take 2,000 Units by mouth daily       ferrous sulfate 325 (65 FE) MG tablet Take 325 mg by mouth nightly       metoprolol (LOPRESSOR) 50 MG tablet Take 1 tablet by mouth 2 times daily 60 tablet 0    albuterol (PROVENTIL HFA;VENTOLIN HFA) 108 (90 BASE) MCG/ACT inhaler Inhale 2 puffs into the lungs every 6 hours as needed for Wheezing or Shortness of Breath       calcium carbonate (OYSTER SHELL CALCIUM 500 MG) 1250 MG tablet Take 2 tablets by mouth daily      Ascorbic Acid (VITAMIN C) 500 MG tablet Take 2,000 mg by mouth daily      omeprazole (PRILOSEC) 40 MG delayed release capsule Take 40 mg by mouth daily. diphenhydrAMINE (BENADRYL) 50 MG capsule Take 50 mg by mouth daily as needed for Allergies       Garlic 715 MG TABS Take 1,000 mg by mouth daily        No current facility-administered medications on file prior to encounter.        REVIEW OF SYSTEMS See HPI    Objective:    BP (!) 158/78   Pulse 78   Temp 97.4 °F (36.3 °C) (Temporal)   Resp 18   Wt Readings from Last 3 Encounters:   10/09/22 293 lb (132.9 kg)   09/14/22 295 lb (133.8 kg)   08/24/22 295 lb (133.8 kg)     PHYSICAL EXAM  CONSTITUTIONAL:   Awake, alert, cooperative   EYES:  lids and lashes normal   ENT: external ears and nose without lesions   NECK:  supple, symmetrical, trachea midline   SKIN:  Open wound Present    Assessment:     Problem List Items Addressed This Visit       Lymphedema of both lower extremities (Chronic)    Relevant Medications    oxyCODONE-acetaminophen (PERCOCET) 7.5-325 MG per tablet    morphine (MS CONTIN) 15 MG extended release tablet    Other Relevant Orders    [facility] [place]    Venous stasis ulcer of left calf with fat layer exposed without varicose veins (HCC) - Primary (Chronic)    Relevant Medications    oxyCODONE-acetaminophen (PERCOCET) 7.5-325 MG per tablet    morphine (MS CONTIN) 15 MG extended release tablet    Other Relevant Orders    Initiate Outpatient Wound Care Protocol       Pre Debridement Measurements:  Are located in the [place]  Documentation Flow Sheet  Post Debridement Measurements:  Wound/Ulcer Descriptions are Pre Debridement except measurements:     Incision 04/20/16 Leg Left (Active)   Number of days: 2394       Incision 08/03/16 Leg Left (Active)   Number of days: 6743       Wound 12/08/21 Pretibial Left #1 (Active)   Wound Image   11/09/22 1515   Dressing Status New dressing applied 11/09/22 1625   Wound Cleansed Cleansed with saline 11/09/22 1625   Dressing/Treatment Alginate;Silicone border 94/43/13 1625   Offloading for Diabetic Foot Ulcers Offloading not required 08/10/22 1540   Wound Length (cm) 2.8 cm 11/09/22 1515   Wound Width (cm) 2.4 cm 11/09/22 1515   Wound Depth (cm) 0.1 cm 11/09/22 1515   Wound Surface Area (cm^2) 6.72 cm^2 11/09/22 1515   Change in Wound Size % (l*w) -860 11/09/22 1515   Wound Volume (cm^3) 0.672 cm^3 11/09/22 1515   Wound Healing % -860 11/09/22 1515   Post-Procedure Length (cm) 2.8 cm 11/09/22 1617   Post-Procedure Width (cm) 2.6 cm 11/09/22 1617   Post-Procedure Depth (cm) 0.1 cm 11/09/22 1617   Post-Procedure Surface Area (cm^2) 7.28 cm^2 11/09/22 1617   Post-Procedure Volume (cm^3) 0.728 cm^3 11/09/22 1617   Wound Assessment Granulation tissue;Pink/red;Fibrin 11/09/22 1515   Drainage Amount Moderate 11/09/22 1515   Drainage Description Yellow;Brown 11/09/22 1515   Odor None 11/09/22 1515   Kori-wound Assessment Dry/flaky; Intact 11/09/22 1515   Number of days: 336     Incision 04/16/21 Abdomen (Active)   Number of days: 509       Procedure Note  Indications:  Based on my examination of this patient's wound(s)/ulcer(s) today, debridement is required to promote healing and evaluate the wound base. Performed by: Mary Ann Russell MD    Consent obtained:  Yes    Time out taken:  Yes    Pain Control: Anesthetic  Anesthetic: 4% Lidocaine Liquid Topical     Debridement:Excisional Debridement    Using curette the wound(s)/ulcer(s) was/were sharply debrided down through and including the removal of epidermis, dermis, and subcutaneous tissue. Devitalized Tissue Debrided:  fibrin, biofilm, slough, and exudate to stimulate bleeding to promote healing, post debridement good bleeding base and wound edges noted    Wound/Ulcer #: 1    Percent of Wound/Ulcer Debrided: 100%    Total Surface Area Debrided:  6.72 sq cm     Estimated Blood Loss:  Minimal  Hemostasis Achieved:  by pressure    Procedural Pain:  10  / 10   Post Procedural Pain:  9 / 10     Response to treatment:  With complaints of pain. Plan:   Treatment Note please see attached Discharge Instructions    Written patient dismissal instructions given to patient and signed by patient or POA. Discharge Instructions         Visit Discharge/Physician Orders     Discharge condition: Stable     Assessment of pain at discharge: mild     Anesthetic used: lido 4%     Discharge to: Home     Left via:Private automobile     Accompanied by: self     ECF/HHA: [facility]     Dressing Orders: To LEFT PRETIB : cleanse with normal saline, apply calcium alginate, cover with dry dressing  and secure . Change daily. Apply spandagrip. On in am and off in pm. Elevate legs as much as possible above level of the heart. Treatment Orders:Eat a diet high in protein and vitamin C. Take a multiple vitamin daily unless contraindicated. Elevate as much as possible     [address] [place] [address] followup visit: 1 week____________________________  (Please note your next appointment above and if you are unable to keep, kindly give a 24 hour notice. Thank you.)     Physician signature:__________________________      If you experience any of the following, please call the [facility] during business hours:     * Increase in Pain  * Temperature over 101  * Increase in drainage from your wound  * Drainage with a foul odor  * Bleeding  * Increase in swelling  * Need for compression bandage changes due to slippage, breakthrough drainage. If you need medical attention outside of the business hours of the [facility] please contact your PCP or go to the nearest emergency room.        Electronically signed by Leanord Sacks, MD

## 2022-11-11 NOTE — DISCHARGE INSTRUCTIONS
Visit Discharge/Physician Orders     Discharge condition: Stable     Assessment of pain at discharge: mild     Anesthetic used: lido 4%     Discharge to: Home     Left via:Private automobile     Accompanied by: self     ECF/HHA: [facility]     Dressing Orders: To LEFT PRETIB : cleanse with normal saline, apply calcium alginate, cover with dry dressing  and secure . Change daily. Apply spandagrip. On in am and off in pm. Elevate legs as much as possible above level of the heart. Treatment Orders:Eat a diet high in protein and vitamin C. Take a multiple vitamin daily unless contraindicated. Elevate as much as possible     [facility] followup visit: 1 week____________________________  (Please note your next appointment above and if you are unable to keep, kindly give a 24 hour notice. Thank you.)     Physician signature:__________________________      If you experience any of the following, please call the [facility] during business hours:     * Increase in Pain  * Temperature over 101  * Increase in drainage from your wound  * Drainage with a foul odor  * Bleeding  * Increase in swelling  * Need for compression bandage changes due to slippage, breakthrough drainage. If you need medical attention outside of the business hours of the [facility] please contact your PCP or go to the nearest emergency room.

## 2022-11-16 ENCOUNTER — HOSPITAL ENCOUNTER (OUTPATIENT)
Dept: WOUND CARE | Age: 77
Discharge: HOME | End: 2022-11-16
Payer: MEDICARE

## 2022-11-16 VITALS
HEIGHT: 67 IN | SYSTOLIC BLOOD PRESSURE: 132 MMHG | RESPIRATION RATE: 18 BRPM | BODY MASS INDEX: 45.99 KG/M2 | WEIGHT: 293 LBS | TEMPERATURE: 96.9 F | DIASTOLIC BLOOD PRESSURE: 76 MMHG | HEART RATE: 90 BPM

## 2022-11-16 DIAGNOSIS — I89.0 LYMPHEDEMA OF BOTH LOWER EXTREMITIES: Chronic | ICD-10-CM

## 2022-11-16 DIAGNOSIS — I87.2 VENOUS STASIS ULCER OF LEFT CALF WITH FAT LAYER EXPOSED WITHOUT VARICOSE VEINS (HCC): Primary | ICD-10-CM

## 2022-11-16 DIAGNOSIS — L97.222 VENOUS STASIS ULCER OF LEFT CALF WITH FAT LAYER EXPOSED WITHOUT VARICOSE VEINS (HCC): Primary | ICD-10-CM

## 2022-11-16 PROCEDURE — 11042 DBRDMT SUBQ TIS 1ST 20SQCM/<: CPT

## 2022-11-16 PROCEDURE — 11042 DBRDMT SUBQ TIS 1ST 20SQCM/<: CPT | Performed by: SURGERY

## 2022-11-16 RX ORDER — BETAMETHASONE DIPROPIONATE 0.05 %
OINTMENT (GRAM) TOPICAL ONCE
Status: CANCELLED | OUTPATIENT
Start: 2022-11-16 | End: 2022-11-16

## 2022-11-16 RX ORDER — BACITRACIN ZINC AND POLYMYXIN B SULFATE 500; 1000 [USP'U]/G; [USP'U]/G
OINTMENT TOPICAL ONCE
Status: CANCELLED | OUTPATIENT
Start: 2022-11-16 | End: 2022-11-16

## 2022-11-16 RX ORDER — LIDOCAINE HYDROCHLORIDE 40 MG/ML
SOLUTION TOPICAL ONCE
Status: CANCELLED | OUTPATIENT
Start: 2022-11-16 | End: 2022-11-16

## 2022-11-16 RX ORDER — LIDOCAINE 50 MG/G
OINTMENT TOPICAL ONCE
Status: CANCELLED | OUTPATIENT
Start: 2022-11-16 | End: 2022-11-16

## 2022-11-16 RX ORDER — LIDOCAINE HYDROCHLORIDE 20 MG/ML
JELLY TOPICAL ONCE
Status: CANCELLED | OUTPATIENT
Start: 2022-11-16 | End: 2022-11-16

## 2022-11-16 RX ORDER — LIDOCAINE HYDROCHLORIDE 40 MG/ML
SOLUTION TOPICAL ONCE
Status: COMPLETED | OUTPATIENT
Start: 2022-11-16 | End: 2022-11-16

## 2022-11-16 RX ORDER — CLOBETASOL PROPIONATE 0.5 MG/G
OINTMENT TOPICAL ONCE
Status: CANCELLED | OUTPATIENT
Start: 2022-11-16 | End: 2022-11-16

## 2022-11-16 RX ORDER — GINSENG 100 MG
CAPSULE ORAL ONCE
Status: CANCELLED | OUTPATIENT
Start: 2022-11-16 | End: 2022-11-16

## 2022-11-16 RX ORDER — GENTAMICIN SULFATE 1 MG/G
OINTMENT TOPICAL ONCE
Status: CANCELLED | OUTPATIENT
Start: 2022-11-16 | End: 2022-11-16

## 2022-11-16 RX ORDER — BACITRACIN, NEOMYCIN, POLYMYXIN B 400; 3.5; 5 [USP'U]/G; MG/G; [USP'U]/G
OINTMENT TOPICAL ONCE
Status: CANCELLED | OUTPATIENT
Start: 2022-11-16 | End: 2022-11-16

## 2022-11-16 RX ORDER — LIDOCAINE 40 MG/G
CREAM TOPICAL ONCE
Status: CANCELLED | OUTPATIENT
Start: 2022-11-16 | End: 2022-11-16

## 2022-11-16 RX ADMIN — LIDOCAINE HYDROCHLORIDE 10 ML: 40 SOLUTION TOPICAL at 16:18

## 2022-11-16 ASSESSMENT — PAIN DESCRIPTION - FREQUENCY: FREQUENCY: CONTINUOUS

## 2022-11-16 ASSESSMENT — PAIN DESCRIPTION - ORIENTATION: ORIENTATION: LEFT

## 2022-11-16 ASSESSMENT — PAIN DESCRIPTION - ONSET: ONSET: ON-GOING

## 2022-11-16 ASSESSMENT — PAIN DESCRIPTION - PAIN TYPE: TYPE: CHRONIC PAIN

## 2022-11-16 ASSESSMENT — PAIN - FUNCTIONAL ASSESSMENT: PAIN_FUNCTIONAL_ASSESSMENT: PREVENTS OR INTERFERES SOME ACTIVE ACTIVITIES AND ADLS

## 2022-11-16 ASSESSMENT — PAIN DESCRIPTION - DESCRIPTORS: DESCRIPTORS: THROBBING

## 2022-11-16 ASSESSMENT — PAIN DESCRIPTION - LOCATION: LOCATION: LEG

## 2022-11-17 NOTE — PROGRESS NOTES
[facility] Followup Visit Note    Referring Physician : Donn Olszewski, MD  [id number] [facility] RECORD NUMBER:  [id number]  AGE: 78 y.o. GENDER: female  : 1945  EPISODE DATE:  2022    Subjective:     Chief Complaint   Patient presents with    Wound Check     Left lower leg      HISTORY of PRESENT ILLNESS HPI   Tanna Cash is a 78 y.o. female who presents today in regards to follow up evaluation and treatment of wound/ulcer. That patient's past medical, family and social hx were reviewed and changes were made if present. History of Wound Context:  Patient has had left calf wound since ~ 2021. She has been putting a dressing on it. The wound has not been improving. She has a hx significant for venous stasis ulcerations of bilateral LE. She first was seen by myself in regards to these issues 2015. She eventually healed these wounds 2016. I last saw her 2021 at which time I recommended lymphedema therapy. She states she went and said it caused her to much pain and stopped going. She has chronic issues with bilateral calf pain, swelling and edema. She admits to not wearing her stockings because of the pain. She has used knee high 20-30 mm hg stockings in the past.       She is still seeing pain management and is down to percocet 7/5/325 mg daily.        21  aquacell  Double tubigrip  Culture done  Emphasized importance of getting in to more significant compression in the future  12/15/21  Culture reviewed - light growth, no tx  Wound slightly improved  Still significant drainage  Plan on compression wrap next week  21  Stable wound  Drainage slightly better  Pt would like to wait till next week because of holidays to start wrap  22  Wound larger  Profore  22  Wound appearance better  Refusing wrap - it rolled down last week and she cut off after 3 days  22  Wound appearance better  Still refusing wrap 3/2/22  periwound worse  Culture  drawtek  3/9/22  periwound much improved  levaquin 750 mg daily #10 script given culture   Wound itself stable  3/23/22  Left anterior calf wound improving overall  New blister/ulcerations left 3rd, 4th and 5th toes and lateral foot   Instructed to keep toes/foot dry, no ointment or corn starch   3/30/22  bistering resolved, other skin issues resolved  More dry than previously but overall stable  kailyn Barrera  Swelling is down, patient declining wrap  4/13/22  Wound slightly improved  4/20/2022  Wound stable, patient refusing compression wrap  5/4/22  Wound worse  Pt refusing wrap  Will change diet per her report to decrease swelling  5/11/22  Wound stable  kailyn and giacomo 2 - pt agreeable now after significant persuasion  5/18/22  Took off wrap within 24 hours due to pain  aquacell and spandigrip  She agreed to use wrap again if swelling not down 2 more cm next week  Wound slightly smaller  5/25/22  Wound improved   Left leg wound debrided   Continue aquacel   6/8/22  Wound larger  Agreeable to wrap - kailyn sena ag  Culture done  6/22/22  Wound stable in size  6/29/22  Wound slightly larger  Still having issues with wrap falling down  Will start hhc - MWF wraps  7/6/22  Improved  hhc starts this week  7/20/22  Appearance better, wound measuring larger  Continue with wraps  Leg edema improved  7/27/22  Slight improvement  8/3/22  Wound appearance and size worse  Issues still with wraps falling down  When doesn't have wraps on than usually uses spandigrips  8/10/2022  Wound cultures noted, Bactrim DS 1 tablet p.o. twice daily, wound looks fairly clean with some exudate only, mild recent lab work, patient recommended CBC and CMP  8/17/22  Refusing wrap today due to pain associated with  Will discuss with Dr Davey Galvez her pain management  Slightly larger, appearance improved  8/24/22  Poor tolerance of wraps  Wound stable in size  9/7/22  Not tolerating wraps - emphasized importance of use  Change to drawtek  Wound slightly improved in size, appearance still concerning  Pumps are going to be deliver  9/14/22  Still not tolerating wraps  Wound appearance improved, size slightly better  Pumps deliovered- she has yet to be in serviced on them   10/2-12/22  Admitted with cellultis  10/19/22  Currently at [facility]  Wound much improved  Posterior wound healed  [place] [address]  She will follow at [facility] while admitted  11/9/22  She is now home  She is no longer with pain management - there was a disagreement as she states she was taking more because of more pain  [facility] - I left my phone number for them to call me re this pt  Wound has improved  Oarrs reviewed  Percocet 7.5/325 mg #60 (30 days), Ms Contin 15 mg #60 (30 days)  11/16/22  Wound improved but superficial areas medially and laterally cause measurements to be larger    \Wound/Ulcer Pain Timing/Severity: waxing and waning, mild  Quality of pain: aching, throbbing, pressure  Severity:  7 / 10   Modifying Factors: Pain worsens with debridement, dressing changes  Associated Signs/Symptoms: edema, drainage and pain    Ulcer Identification:  Ulcer Type: venous and lymphedema  Contributing Factors: edema, venous stasis and lymphedema    Diabetic/Pressure/Non Pressure Ulcers only:  Ulcer: Non-Pressure ulcer, fat layer exposed    Wound: N/A  PAST MEDICAL HISTORY      Diagnosis Date    Bursitis     CAD (coronary artery disease) 1993    heart attack    Cellulitis of leg, left 10/3/2022    COPD (chronic obstructive pulmonary disease) ([address] 6/19/2013    Emphysema     slight    GERD (gastroesophageal reflux disease)     Hiatal hernia     Hip pain     Hyperlipidemia     Hypertension     Lymphedema of both lower extremities 11/21/2018    Venous insufficiency of both lower extremities 11/21/2018    Venous stasis ulcer of left calf with fat layer exposed without varicose veins ([address] 2021    Venous stasis ulcer of left calf with fat layer exposed without varicose veins ([facility] 2021    Longstanding ulcer over the shin of the left calf, with a new ulcer over the posterior aspect of the left calf, for the last 1 week    Venous ulcer with fat layer exposed ([facility] 10/28/2015     Past Surgical History:   Procedure Laterality Date    APPENDECTOMY      BREAST ENHANCEMENT SURGERY      BREAST REDUCTION SURGERY      CHOLECYSTECTOMY      COLONOSCOPY      ECHO COMPL W DOP COLOR FLOW  3/11/2013         ENDOSCOPY, COLON, DIAGNOSTIC      HERNIA REPAIR N/A 2021    LAPAROSCOPIC ROBOTIC ASSISTED INGUINAL HERNIA REPAIR performed by Lilly Pineda MD at [address] ([facility])      JOINT REPLACEMENT  2009    l knee r hip    LEG DEBRIDEMENT Left 2015    LEG DEBRIDEMENT Left 2016     History reviewed. No pertinent family history. Social History     Tobacco Use    Smoking status: Former     Packs/day: 1.00     Years: 20.00     Pack years: 20.00     Types: Cigarettes     Quit date: 1995     Years since quittin.0    Smokeless tobacco: Never    Tobacco comments:     Quit   Vaping Use    Vaping Use: Never used   Substance Use Topics    Alcohol use: No    Drug use: No     Allergies   Allergen Reactions    Codeine Nausea And Vomiting and Other (See Comments)     hallucinate    Dilaudid [Hydromorphone Hcl] Rash    Naproxen Other (See Comments)     Shuts down kidney function    Singulair [Montelukast Sodium] Shortness Of Breath     Mucus in chest    Black Cohosh     Black Cohosh [Cimicifuga [place] (Black Cohosh)] Rash     Current Outpatient Medications on File Prior to Encounter   Medication Sig Dispense Refill    oxyCODONE-acetaminophen (PERCOCET) 7.5-325 MG per tablet Take 1 tablet by mouth 2 times daily for 30 days.  Intended supply: 30 days 60 tablet 0    white petrolatum OINT ointment Apply topically 2 times daily as needed (dry skin coccyx area)  0    Cyanocobalamin (VITAMIN B 12) 500 MCG TABS Take by mouth daily      Multiple Vitamins-Minerals (THERAPEUTIC MULTIVITAMIN-MINERALS) tablet Take 1 tablet by mouth daily      zinc gluconate 50 MG tablet Take 50 mg by mouth daily      GENISTEIN PO Take 125 mg by mouth nightly      folic acid (FOLVITE) 932 MCG tablet Take 400 mcg by mouth nightly      Krill Oil 350 MG CAPS Take 350 mg by mouth nightly      gabapentin (NEURONTIN) 300 MG capsule Take 300 mg by mouth 3 times daily. simvastatin (ZOCOR) 40 MG tablet Take 40 mg by mouth nightly      aspirin 81 MG tablet Take 81 mg by mouth daily      Cholecalciferol (VITAMIN D) 2000 UNITS CAPS capsule Take 2,000 Units by mouth daily       ferrous sulfate 325 (65 FE) MG tablet Take 325 mg by mouth nightly       metoprolol (LOPRESSOR) 50 MG tablet Take 1 tablet by mouth 2 times daily 60 tablet 0    albuterol (PROVENTIL HFA;VENTOLIN HFA) 108 (90 BASE) MCG/ACT inhaler Inhale 2 puffs into the lungs every 6 hours as needed for Wheezing or Shortness of Breath       calcium carbonate (OYSTER SHELL CALCIUM 500 MG) 1250 MG tablet Take 2 tablets by mouth daily      Ascorbic Acid (VITAMIN C) 500 MG tablet Take 2,000 mg by mouth daily      omeprazole (PRILOSEC) 40 MG delayed release capsule Take 40 mg by mouth daily. diphenhydrAMINE (BENADRYL) 50 MG capsule Take 50 mg by mouth daily as needed for Allergies       Garlic 114 MG TABS Take 1,000 mg by mouth daily        No current facility-administered medications on file prior to encounter.        REVIEW OF SYSTEMS See HPI    Objective:    /76   Pulse 90   Temp 96.9 °F (36.1 °C) (Temporal)   Resp 18   Ht 5' 7\" (1.702 m)   Wt 295 lb (133.8 kg)   BMI 46.20 kg/m²   Wt Readings from Last 3 Encounters:   11/16/22 295 lb (133.8 kg)   10/09/22 293 lb (132.9 kg)   09/14/22 295 lb (133.8 kg)     PHYSICAL EXAM  CONSTITUTIONAL:   Awake, alert, cooperative   EYES:  lids and lashes normal   ENT: external ears and nose without lesions   NECK:  supple, symmetrical, trachea midline   SKIN:  Open wound Present    Assessment:     Problem List Items Addressed This Visit       Lymphedema of both lower extremities (Chronic)    Venous stasis ulcer of left calf with fat layer exposed without varicose veins (HCC) - Primary (Chronic)    Relevant Orders    Initiate Outpatient Wound Care Protocol       Pre Debridement Measurements:  Are located in the [place]  Documentation Flow Sheet  Post Debridement Measurements:  Wound/Ulcer Descriptions are Pre Debridement except measurements:     Incision 04/20/16 Leg Left (Active)   Number of days: 2473       Incision 08/03/16 Leg Left (Active)   Number of days: 2296       Wound 12/08/21 Pretibial Left #1 (Active)   Wound Image   11/09/22 1515   Dressing Status New dressing applied 11/16/22 1704   Wound Cleansed Cleansed with saline 11/16/22 1704   Dressing/Treatment Alginate;Dry dressing 11/16/22 1704   Offloading for Diabetic Foot Ulcers Offloading not required 11/16/22 1704   Wound Length (cm) 3.5 cm 11/16/22 1614   Wound Width (cm) 2.7 cm 11/16/22 1614   Wound Depth (cm) 0.1 cm 11/16/22 1614   Wound Surface Area (cm^2) 9.45 cm^2 11/16/22 1614   Change in Wound Size % (l*w) -1250 11/16/22 1614   Wound Volume (cm^3) 0.945 cm^3 11/16/22 1614   Wound Healing % -1250 11/16/22 1614   Post-Procedure Length (cm) 3.6 cm 11/16/22 1654   Post-Procedure Width (cm) 2.7 cm 11/16/22 1654   Post-Procedure Depth (cm) 0.1 cm 11/16/22 1654   Post-Procedure Surface Area (cm^2) 9.72 cm^2 11/16/22 1654   Post-Procedure Volume (cm^3) 0.972 cm^3 11/16/22 1654   Wound Assessment Granulation tissue;Pink/red;Fibrin 11/16/22 1614   Drainage Amount Moderate 11/16/22 1614   Drainage Description Serosanguinous 11/16/22 1614   Odor None 11/16/22 1614   Kori-wound Assessment Maceration 11/16/22 1614   Number of days: 343     Incision 04/16/21 Abdomen (Active)   Number of days: 579       Procedure Note  Indications:  Based on my examination of this patient's wound(s)/ulcer(s) today, debridement is required to promote healing and evaluate the wound base. Performed by: Toi Emery MD    Consent obtained:  Yes    Time out taken:  Yes    Pain Control: Anesthetic  Anesthetic: 4% Lidocaine Liquid Topical     Debridement:Excisional Debridement    Using curette the wound(s)/ulcer(s) was/were sharply debrided down through and including the removal of epidermis, dermis, and subcutaneous tissue. Devitalized Tissue Debrided:  fibrin, biofilm, slough, and exudate to stimulate bleeding to promote healing, post debridement good bleeding base and wound edges noted    Wound/Ulcer #: 1    Percent of Wound/Ulcer Debrided: 100%    Total Surface Area Debrided:  9/45 sq cm     Estimated Blood Loss:  Minimal  Hemostasis Achieved:  by pressure    Procedural Pain:  10  / 10   Post Procedural Pain:  9 / 10     Response to treatment:  With complaints of pain. Plan:   Treatment Note please see attached Discharge Instructions    Written patient dismissal instructions given to patient and signed by patient or POA. Discharge Instructions         Visit Discharge/Physician Orders     Discharge condition: Stable     Assessment of pain at discharge: mild     Anesthetic used: lido 4%     Discharge to: Home     Left via:Private automobile     Accompanied by: self     ECF/HHA: [facility]     Dressing Orders: To LEFT PRETIB : cleanse with normal saline, apply calcium alginate, cover with dry dressing  and secure . Change daily. Apply spandagrip. On in am and off in pm. Elevate legs as much as possible above level of the heart. Treatment Orders:Eat a diet high in protein and vitamin C. Take a multiple vitamin daily unless contraindicated. Elevate as much as possible     [address] [place] [address] followup visit: 1 week____________________________  (Please note your next appointment above and if you are unable to keep, kindly give a 24 hour notice. Thank you.)     Physician signature:__________________________      If you experience any of the following, please call the [facility] during business hours:     * Increase in Pain  * Temperature over 101  * Increase in drainage from your wound  * Drainage with a foul odor  * Bleeding  * Increase in swelling  * Need for compression bandage changes due to slippage, breakthrough drainage. If you need medical attention outside of the business hours of the [facility] please contact your PCP or go to the nearest emergency room.        Electronically signed by Clay Paul MD

## 2022-11-21 NOTE — DISCHARGE INSTRUCTIONS
Visit Discharge/Physician Orders     Discharge condition: Stable     Assessment of pain at discharge: mild     Anesthetic used: lido 4%     Discharge to: Home     Left via:Private automobile     Accompanied by: self     ECF/HHA: [facility]     Dressing Orders: To LEFT PRETIB : cleanse with normal saline, apply calcium alginate, cover with dry dressing  and secure . Change daily. Apply spandagrip. On in am and off in pm. Elevate legs as much as possible above level of the heart. Treatment Orders:Eat a diet high in protein and vitamin C. Take a multiple vitamin daily unless contraindicated. Elevate as much as possible     [facility] followup visit: 1 week____________________________  (Please note your next appointment above and if you are unable to keep, kindly give a 24 hour notice. Thank you.)     Physician signature:__________________________      If you experience any of the following, please call the [facility] during business hours:     * Increase in Pain  * Temperature over 101  * Increase in drainage from your wound  * Drainage with a foul odor  * Bleeding  * Increase in swelling  * Need for compression bandage changes due to slippage, breakthrough drainage. If you need medical attention outside of the business hours of the [facility] please contact your PCP or go to the nearest emergency room.

## 2022-11-23 ENCOUNTER — HOSPITAL ENCOUNTER (OUTPATIENT)
Dept: WOUND CARE | Age: 77
Discharge: HOME | End: 2022-11-23

## 2022-11-29 NOTE — DISCHARGE INSTRUCTIONS
Visit Discharge/Physician Orders     Discharge condition: Stable     Assessment of pain at discharge: mild     Anesthetic used: lido 4%     Discharge to: Home     Left via:Private automobile     Accompanied by: self     ECF/HHA: [facility]     Dressing Orders: To LEFT PRETIB : cleanse with normal saline, apply calcium alginate, cover with dry dressing  and secure . Change daily. Apply spandagrip. On in am and off in pm. Elevate legs as much as possible above level of the heart. Treatment Orders:Eat a diet high in protein and vitamin C. Take a multiple vitamin daily unless contraindicated. Elevate as much as possible     [facility] followup visit: 1 week____________________________  (Please note your next appointment above and if you are unable to keep, kindly give a 24 hour notice. Thank you.)     Physician signature:__________________________      If you experience any of the following, please call the [facility] during business hours:     * Increase in Pain  * Temperature over 101  * Increase in drainage from your wound  * Drainage with a foul odor  * Bleeding  * Increase in swelling  * Need for compression bandage changes due to slippage, breakthrough drainage. If you need medical attention outside of the business hours of the [facility] please contact your PCP or go to the nearest emergency room.

## 2022-11-30 ENCOUNTER — HOSPITAL ENCOUNTER (OUTPATIENT)
Dept: WOUND CARE | Age: 77
Discharge: HOME | End: 2022-11-30
Payer: MEDICARE

## 2022-11-30 VITALS
RESPIRATION RATE: 18 BRPM | HEART RATE: 74 BPM | TEMPERATURE: 96 F | SYSTOLIC BLOOD PRESSURE: 167 MMHG | DIASTOLIC BLOOD PRESSURE: 85 MMHG

## 2022-11-30 DIAGNOSIS — L97.222 VENOUS STASIS ULCER OF LEFT CALF WITH FAT LAYER EXPOSED WITHOUT VARICOSE VEINS (HCC): Primary | ICD-10-CM

## 2022-11-30 DIAGNOSIS — I89.0 LYMPHEDEMA OF BOTH LOWER EXTREMITIES: Chronic | ICD-10-CM

## 2022-11-30 DIAGNOSIS — I87.2 VENOUS STASIS ULCER OF LEFT CALF WITH FAT LAYER EXPOSED WITHOUT VARICOSE VEINS (HCC): Primary | ICD-10-CM

## 2022-11-30 DIAGNOSIS — I87.2 VENOUS INSUFFICIENCY OF BOTH LOWER EXTREMITIES: Chronic | ICD-10-CM

## 2022-11-30 PROCEDURE — 11042 DBRDMT SUBQ TIS 1ST 20SQCM/<: CPT

## 2022-11-30 RX ORDER — LIDOCAINE HYDROCHLORIDE 40 MG/ML
SOLUTION TOPICAL ONCE
Status: COMPLETED | OUTPATIENT
Start: 2022-11-30 | End: 2022-11-30

## 2022-11-30 RX ORDER — LIDOCAINE 50 MG/G
OINTMENT TOPICAL ONCE
OUTPATIENT
Start: 2022-11-30 | End: 2022-11-30

## 2022-11-30 RX ORDER — LIDOCAINE 40 MG/G
CREAM TOPICAL ONCE
OUTPATIENT
Start: 2022-11-30 | End: 2022-11-30

## 2022-11-30 RX ORDER — GINSENG 100 MG
CAPSULE ORAL ONCE
OUTPATIENT
Start: 2022-11-30 | End: 2022-11-30

## 2022-11-30 RX ORDER — CLOBETASOL PROPIONATE 0.5 MG/G
OINTMENT TOPICAL ONCE
OUTPATIENT
Start: 2022-11-30 | End: 2022-11-30

## 2022-11-30 RX ORDER — BACITRACIN ZINC AND POLYMYXIN B SULFATE 500; 1000 [USP'U]/G; [USP'U]/G
OINTMENT TOPICAL ONCE
OUTPATIENT
Start: 2022-11-30 | End: 2022-11-30

## 2022-11-30 RX ORDER — BETAMETHASONE DIPROPIONATE 0.05 %
OINTMENT (GRAM) TOPICAL ONCE
OUTPATIENT
Start: 2022-11-30 | End: 2022-11-30

## 2022-11-30 RX ORDER — LIDOCAINE HYDROCHLORIDE 20 MG/ML
JELLY TOPICAL ONCE
OUTPATIENT
Start: 2022-11-30 | End: 2022-11-30

## 2022-11-30 RX ORDER — LIDOCAINE HYDROCHLORIDE 40 MG/ML
SOLUTION TOPICAL ONCE
OUTPATIENT
Start: 2022-11-30 | End: 2022-11-30

## 2022-11-30 RX ORDER — BACITRACIN, NEOMYCIN, POLYMYXIN B 400; 3.5; 5 [USP'U]/G; MG/G; [USP'U]/G
OINTMENT TOPICAL ONCE
OUTPATIENT
Start: 2022-11-30 | End: 2022-11-30

## 2022-11-30 RX ORDER — GENTAMICIN SULFATE 1 MG/G
OINTMENT TOPICAL ONCE
OUTPATIENT
Start: 2022-11-30 | End: 2022-11-30

## 2022-11-30 RX ADMIN — LIDOCAINE HYDROCHLORIDE 10 ML: 40 SOLUTION TOPICAL at 15:10

## 2022-11-30 ASSESSMENT — PAIN - FUNCTIONAL ASSESSMENT: PAIN_FUNCTIONAL_ASSESSMENT: PREVENTS OR INTERFERES SOME ACTIVE ACTIVITIES AND ADLS

## 2022-11-30 ASSESSMENT — PAIN DESCRIPTION - DESCRIPTORS: DESCRIPTORS: ACHING;BURNING;THROBBING

## 2022-11-30 ASSESSMENT — PAIN DESCRIPTION - ORIENTATION: ORIENTATION: LEFT

## 2022-11-30 ASSESSMENT — PAIN DESCRIPTION - LOCATION: LOCATION: LEG

## 2022-11-30 ASSESSMENT — PAIN SCALES - GENERAL: PAINLEVEL_OUTOF10: 7

## 2022-11-30 NOTE — PROGRESS NOTES
[facility] Followup Visit Note    Referring Physician : Charleen Alexandra MD  [id number] [facility] RECORD NUMBER:  [id number]  AGE: 78 y.o. GENDER: female  : 1945  EPISODE DATE:  2022    Subjective:     Chief Complaint   Patient presents with    Wound Check     Leg left      HISTORY of PRESENT ILLNESS HPI   Jacob Ramirez is a 78 y.o. female who presents today in regards to follow up evaluation and treatment of wound/ulcer. That patient's past medical, family and social hx were reviewed and changes were made if present. History of Wound Context:  Patient has had left calf wound since ~ 2021. She has been putting a dressing on it. The wound has not been improving. She has a hx significant for venous stasis ulcerations of bilateral LE. She first was seen by myself in regards to these issues 2015. She eventually healed these wounds 2016. I last saw her 2021 at which time I recommended lymphedema therapy. She states she went and said it caused her to much pain and stopped going. She has chronic issues with bilateral calf pain, swelling and edema. She admits to not wearing her stockings because of the pain. She has used knee high 20-30 mm hg stockings in the past.       She is still seeing pain management and is down to percocet 7/5/325 mg daily.        21  [facility]  Double tubigrip  Culture done  Emphasized importance of getting in to more significant compression in the future  12/15/21  Culture reviewed - light growth, no tx  Wound slightly improved  Still significant drainage  Plan on compression wrap next week  21  Stable wound  Drainage slightly better  Pt would like to wait till next week because of holidays to start wrap  22  Wound larger  Profore  22  Wound appearance better  Refusing wrap - it rolled down last week and she cut off after 3 days  22  Wound appearance better  Still refusing wrap   3/2/22  periwound worse  Culture  drawtek  3/9/22  periwound much improved  levaquin 750 mg daily #10 script given culture   Wound itself stable  3/23/22  Left anterior calf wound improving overall  New blister/ulcerations left 3rd, 4th and 5th toes and lateral foot   Instructed to keep toes/foot dry, no ointment or corn starch   3/30/22  bistering resolved, other skin issues resolved  More dry than previously but overall stable  kailyn Barrera  Swelling is down, patient declining wrap  4/13/22  Wound slightly improved  4/20/2022  Wound stable, patient refusing compression wrap  5/4/22  Wound worse  Pt refusing wrap  Will change diet per her report to decrease swelling  5/11/22  Wound stable  kailyn and giacomo 2 - pt agreeable now after significant persuasion  5/18/22  Took off wrap within 24 hours due to pain  aquacell and spandigrip  She agreed to use wrap again if swelling not down 2 more cm next week  Wound slightly smaller  5/25/22  Wound improved   Left leg wound debrided   Continue aquacel   6/8/22  Wound larger  Agreeable to wrap - kailyn sena ag  Culture done  6/22/22  Wound stable in size  6/29/22  Wound slightly larger  Still having issues with wrap falling down  Will start hhc - MWF wraps  7/6/22  Improved  hhc starts this week  7/20/22  Appearance better, wound measuring larger  Continue with wraps  Leg edema improved  7/27/22  Slight improvement  8/3/22  Wound appearance and size worse  Issues still with wraps falling down  When doesn't have wraps on than usually uses spandigrips  8/10/2022  Wound cultures noted, Bactrim DS 1 tablet p.o. twice daily, wound looks fairly clean with some exudate only, mild recent lab work, patient recommended CBC and CMP  8/17/22  Refusing wrap today due to pain associated with  Will discuss with Dr Keena Smith her pain management  Slightly larger, appearance improved  8/24/22  Poor tolerance of wraps  Wound stable in size  9/7/22  Not tolerating wraps - emphasized importance of use  Change to drawtek  Wound slightly improved in size, appearance still concerning  Pumps are going to be deliver  9/14/22  Still not tolerating wraps  Wound appearance improved, size slightly better  Pumps deliovered- she has yet to be in serviced on them   10/2-12/22  Admitted with cellultis  10/19/22  Currently at [facility]  Wound much improved  Posterior wound healed  Tyson jones  [address]  She will follow at [facility] while admitted  11/9/22  She is now home  She is no longer with pain management - there was a disagreement as she states she was taking more because of more pain  Will make referral to [facility] - I left my phone number for them to call me re this pt  Wound has improved  Oarrs reviewed  Percocet 7.5/325 mg #60 (30 days), Ms Contin 15 mg #60 (30 days)  11/16/22  Wound improved but superficial areas medially and laterally cause measurements to be larger  11/30/22  Smaller  More superficial    \Wound/Ulcer Pain Timing/Severity: waxing and waning, mild  Quality of pain: aching, throbbing, pressure  Severity:  7 / 10   Modifying Factors: Pain worsens with debridement, dressing changes  Associated Signs/Symptoms: edema, drainage and pain    Ulcer Identification:  Ulcer Type: venous and lymphedema  Contributing Factors: edema, venous stasis and lymphedema    Diabetic/Pressure/Non Pressure Ulcers only:  Ulcer: Non-Pressure ulcer, fat layer exposed    Wound: N/A  PAST MEDICAL HISTORY      Diagnosis Date    Bursitis     CAD (coronary artery disease) 1993    heart attack    Cellulitis of leg, left 10/3/2022    COPD (chronic obstructive pulmonary disease) ([facility] 6/19/2013    Emphysema     slight    GERD (gastroesophageal reflux disease)     Hiatal hernia     Hip pain     Hyperlipidemia     Hypertension     Lymphedema of both lower extremities 11/21/2018    Venous insufficiency of both lower extremities 11/21/2018    Venous stasis ulcer of left calf with fat layer exposed without varicose veins ([facility] [address] 2021    Venous stasis ulcer of left calf with fat layer exposed without varicose veins ([address] 2021    Longstanding ulcer over the shin of the left calf, with a new ulcer over the posterior aspect of the left calf, for the last 1 week    Venous ulcer with fat layer exposed ([address] 10/28/2015     Past Surgical History:   Procedure Laterality Date    APPENDECTOMY      BREAST ENHANCEMENT SURGERY      BREAST REDUCTION SURGERY      CHOLECYSTECTOMY      COLONOSCOPY      ECHO COMPL W DOP COLOR FLOW  3/11/2013         ENDOSCOPY, COLON, DIAGNOSTIC      HERNIA REPAIR N/A 2021    LAPAROSCOPIC ROBOTIC ASSISTED INGUINAL HERNIA REPAIR performed by Madelaine Escobedo MD at [facility] ([facility])      JOINT REPLACEMENT  2009    l knee r hip    LEG DEBRIDEMENT Left 2015    LEG DEBRIDEMENT Left 2016     History reviewed. No pertinent family history. Social History     Tobacco Use    Smoking status: Former     Packs/day: 1.00     Years: 20.00     Pack years: 20.00     Types: Cigarettes     Quit date: 1995     Years since quittin.0    Smokeless tobacco: Never    Tobacco comments:     Quit   Vaping Use    Vaping Use: Never used   Substance Use Topics    Alcohol use: No    Drug use: No     Allergies   Allergen Reactions    Codeine Nausea And Vomiting and Other (See Comments)     hallucinate    Dilaudid [Hydromorphone Hcl] Rash    Naproxen Other (See Comments)     Shuts down kidney function    Singulair [Montelukast Sodium] Shortness Of Breath     Mucus in chest    Black Cohosh     Black Cohosh [Cimicifuga [place] (Black Cohosh)] Rash     Current Outpatient Medications on File Prior to Encounter   Medication Sig Dispense Refill    oxyCODONE-acetaminophen (PERCOCET) 7.5-325 MG per tablet Take 1 tablet by mouth 2 times daily for 30 days.  Intended supply: 30 days 60 tablet 0    white petrolatum OINT ointment Apply topically 2 times daily as needed (dry skin coccyx area)  0    Cyanocobalamin (VITAMIN B 12) 500 MCG TABS Take by mouth daily      Multiple Vitamins-Minerals (THERAPEUTIC MULTIVITAMIN-MINERALS) tablet Take 1 tablet by mouth daily      zinc gluconate 50 MG tablet Take 50 mg by mouth daily      GENISTEIN PO Take 125 mg by mouth nightly      folic acid (FOLVITE) 828 MCG tablet Take 400 mcg by mouth nightly      Krill Oil 350 MG CAPS Take 350 mg by mouth nightly      gabapentin (NEURONTIN) 300 MG capsule Take 300 mg by mouth 3 times daily. simvastatin (ZOCOR) 40 MG tablet Take 40 mg by mouth nightly      aspirin 81 MG tablet Take 81 mg by mouth daily      Cholecalciferol (VITAMIN D) 2000 UNITS CAPS capsule Take 2,000 Units by mouth daily       ferrous sulfate 325 (65 FE) MG tablet Take 325 mg by mouth nightly       metoprolol (LOPRESSOR) 50 MG tablet Take 1 tablet by mouth 2 times daily 60 tablet 0    albuterol (PROVENTIL HFA;VENTOLIN HFA) 108 (90 BASE) MCG/ACT inhaler Inhale 2 puffs into the lungs every 6 hours as needed for Wheezing or Shortness of Breath       calcium carbonate (OYSTER SHELL CALCIUM 500 MG) 1250 MG tablet Take 2 tablets by mouth daily      Ascorbic Acid (VITAMIN C) 500 MG tablet Take 2,000 mg by mouth daily      omeprazole (PRILOSEC) 40 MG delayed release capsule Take 40 mg by mouth daily. diphenhydrAMINE (BENADRYL) 50 MG capsule Take 50 mg by mouth daily as needed for Allergies       Garlic 685 MG TABS Take 1,000 mg by mouth daily        No current facility-administered medications on file prior to encounter.        REVIEW OF SYSTEMS See HPI    Objective:    BP (!) 167/85   Pulse 74   Temp (!) 96 °F (35.6 °C) (Temporal)   Resp 18   Wt Readings from Last 3 Encounters:   11/16/22 295 lb (133.8 kg)   10/09/22 293 lb (132.9 kg)   09/14/22 295 lb (133.8 kg)     PHYSICAL EXAM  CONSTITUTIONAL:   Awake, alert, cooperative   EYES:  lids and lashes normal   ENT: external ears and nose without lesions   NECK:  supple, symmetrical, trachea midline   SKIN:  Open wound Present    Assessment:     Problem List Items Addressed This Visit       Venous insufficiency of both lower extremities (Chronic)    Lymphedema of both lower extremities (Chronic)    Venous stasis ulcer of left calf with fat layer exposed without varicose veins (HCC) - Primary (Chronic)    Relevant Orders    Initiate Outpatient Wound Care Protocol       Pre Debridement Measurements:  Are located in the [place]  Documentation Flow Sheet  Post Debridement Measurements:  Wound/Ulcer Descriptions are Pre Debridement except measurements:     Incision 04/20/16 Leg Left (Active)   Number of days: 8175       Incision 08/03/16 Leg Left (Active)   Number of days: 0672       Wound 12/08/21 Pretibial Left #1 (Active)   Wound Image   11/30/22 1505   Dressing Status New dressing applied 11/16/22 1704   Wound Cleansed Cleansed with saline 11/16/22 1704   Dressing/Treatment Alginate;Dry dressing 11/16/22 1704   Offloading for Diabetic Foot Ulcers Offloading not required 11/16/22 1704   Wound Length (cm) 3.4 cm 11/30/22 1505   Wound Width (cm) 2.6 cm 11/30/22 1505   Wound Depth (cm) 0.1 cm 11/30/22 1505   Wound Surface Area (cm^2) 8.84 cm^2 11/30/22 1505   Change in Wound Size % (l*w) -1162.86 11/30/22 1505   Wound Volume (cm^3) 0.884 cm^3 11/30/22 1505   Wound Healing % -1163 11/30/22 1505   Post-Procedure Length (cm) 3.6 cm 11/30/22 1525   Post-Procedure Width (cm) 2.7 cm 11/30/22 1525   Post-Procedure Depth (cm) 0.2 cm 11/30/22 1525   Post-Procedure Surface Area (cm^2) 9.72 cm^2 11/30/22 1525   Post-Procedure Volume (cm^3) 1.944 cm^3 11/30/22 1525   Wound Assessment Granulation tissue;Pink/red;Fibrin 11/30/22 1505   Drainage Amount Moderate 11/30/22 1505   Drainage Description Serosanguinous; Yellow 11/30/22 1505   Odor None 11/30/22 1505   Kori-wound Assessment Maceration 11/30/22 1505   Number of days: 357     Incision 04/16/21 Abdomen (Active)   Number of days: 593       Procedure Note  Indications:  Based on my examination of this patient's wound(s)/ulcer(s) today, debridement is required to promote healing and evaluate the wound base. Performed by: Jessica Chris MD    Consent obtained:  Yes    Time out taken:  Yes    Pain Control: Anesthetic  Anesthetic: 4% Lidocaine Liquid Topical     Debridement:Excisional Debridement    Using curette the wound(s)/ulcer(s) was/were sharply debrided down through and including the removal of epidermis, dermis, and subcutaneous tissue. Devitalized Tissue Debrided:  fibrin, biofilm, slough, and exudate to stimulate bleeding to promote healing, post debridement good bleeding base and wound edges noted    Wound/Ulcer #: 1    Percent of Wound/Ulcer Debrided: 100%    Total Surface Area Debrided: 8.84 sq cm     Estimated Blood Loss:  Minimal  Hemostasis Achieved:  by pressure    Procedural Pain:  10  / 10   Post Procedural Pain:  9 / 10     Response to treatment:  With complaints of pain. Plan:   Treatment Note please see attached Discharge Instructions    Written patient dismissal instructions given to patient and signed by patient or POA. Discharge Instructions         Visit Discharge/Physician Orders     Discharge condition: Stable     Assessment of pain at discharge: mild     Anesthetic used: lido 4%     Discharge to: Home     Left via:Private automobile     Accompanied by: self     ECF/HHA: [facility]     Dressing Orders: To LEFT PRETIB : cleanse with normal saline, apply calcium alginate, cover with dry dressing  and secure . Change daily. Apply spandagrip. On in am and off in pm. Elevate legs as much as possible above level of the heart. Treatment Orders:Eat a diet high in protein and vitamin C. Take a multiple vitamin daily unless contraindicated.      Elevate as much as possible     [address] [place] [address],[address] followup visit: 1 week____________________________  (Please note your next appointment above and if you are unable to keep, kindly give a 24 hour notice. Thank you.)     Physician signature:__________________________      If you experience any of the following, please call the [facility] during business hours:     * Increase in Pain  * Temperature over 101  * Increase in drainage from your wound  * Drainage with a foul odor  * Bleeding  * Increase in swelling  * Need for compression bandage changes due to slippage, breakthrough drainage. If you need medical attention outside of the business hours of the [facility] [address] please contact your PCP or go to the nearest emergency room.        Electronically signed by Alison Isidro MD

## 2022-12-05 NOTE — DISCHARGE INSTRUCTIONS
Visit Discharge/Physician Orders     Discharge condition: Stable     Assessment of pain at discharge: mild     Anesthetic used: lido 4%     Discharge to: Home     Left via:Private automobile     Accompanied by: self     ECF/HHA: [facility]     Dressing Orders: To LEFT PRETIB : cleanse with normal saline, apply calcium alginate, cover with dry dressing  and secure . Change daily. Apply spandagrip. On in am and off in pm. Elevate legs as much as possible above level of the heart. Treatment Orders:Eat a diet high in protein and vitamin C. Take a multiple vitamin daily unless contraindicated. Elevate as much as possible     [address] [place] [address],[address] followup visit: 1 week____________________________  (Please note your next appointment above and if you are unable to keep, kindly give a 24 hour notice. Thank you.)     Physician signature:__________________________      If you experience any of the following, please call the [facility] during business hours:     * Increase in Pain  * Temperature over 101  * Increase in drainage from your wound  * Drainage with a foul odor  * Bleeding  * Increase in swelling  * Need for compression bandage changes due to slippage, breakthrough drainage. If you need medical attention outside of the business hours of the [facility] please contact your PCP or go to the nearest emergency room.

## 2022-12-07 ENCOUNTER — HOSPITAL ENCOUNTER (OUTPATIENT)
Dept: WOUND CARE | Age: 77
Discharge: HOME | End: 2022-12-07
Payer: MEDICARE

## 2022-12-07 VITALS
HEART RATE: 85 BPM | RESPIRATION RATE: 18 BRPM | BODY MASS INDEX: 45.99 KG/M2 | TEMPERATURE: 96.7 F | DIASTOLIC BLOOD PRESSURE: 100 MMHG | WEIGHT: 293 LBS | HEIGHT: 67 IN | SYSTOLIC BLOOD PRESSURE: 162 MMHG

## 2022-12-07 DIAGNOSIS — L97.222 VENOUS STASIS ULCER OF LEFT CALF WITH FAT LAYER EXPOSED WITHOUT VARICOSE VEINS (HCC): Primary | ICD-10-CM

## 2022-12-07 DIAGNOSIS — I87.2 VENOUS STASIS ULCER OF LEFT CALF WITH FAT LAYER EXPOSED WITHOUT VARICOSE VEINS (HCC): Primary | ICD-10-CM

## 2022-12-07 PROCEDURE — 11042 DBRDMT SUBQ TIS 1ST 20SQCM/<: CPT

## 2022-12-07 RX ORDER — LIDOCAINE HYDROCHLORIDE 20 MG/ML
JELLY TOPICAL ONCE
OUTPATIENT
Start: 2022-12-07 | End: 2022-12-07

## 2022-12-07 RX ORDER — HYDROCHLOROTHIAZIDE 25 MG/1
1 TABLET ORAL DAILY
COMMUNITY

## 2022-12-07 RX ORDER — CLOBETASOL PROPIONATE 0.5 MG/G
OINTMENT TOPICAL ONCE
OUTPATIENT
Start: 2022-12-07 | End: 2022-12-07

## 2022-12-07 RX ORDER — LIDOCAINE 50 MG/G
OINTMENT TOPICAL ONCE
OUTPATIENT
Start: 2022-12-07 | End: 2022-12-07

## 2022-12-07 RX ORDER — OXYCODONE AND ACETAMINOPHEN 7.5; 325 MG/1; MG/1
1 TABLET ORAL 2 TIMES DAILY
Qty: 60 TABLET | Refills: 0 | Status: SHIPPED | OUTPATIENT
Start: 2022-12-07 | End: 2023-01-06

## 2022-12-07 RX ORDER — LIDOCAINE HYDROCHLORIDE 40 MG/ML
SOLUTION TOPICAL ONCE
OUTPATIENT
Start: 2022-12-07 | End: 2022-12-07

## 2022-12-07 RX ORDER — MORPHINE SULFATE 15 MG/1
15 TABLET, FILM COATED, EXTENDED RELEASE ORAL 2 TIMES DAILY
Qty: 60 TABLET | Refills: 0 | Status: SHIPPED | OUTPATIENT
Start: 2022-12-07 | End: 2023-01-06

## 2022-12-07 RX ORDER — BETAMETHASONE DIPROPIONATE 0.05 %
OINTMENT (GRAM) TOPICAL ONCE
OUTPATIENT
Start: 2022-12-07 | End: 2022-12-07

## 2022-12-07 RX ORDER — BACITRACIN ZINC AND POLYMYXIN B SULFATE 500; 1000 [USP'U]/G; [USP'U]/G
OINTMENT TOPICAL ONCE
OUTPATIENT
Start: 2022-12-07 | End: 2022-12-07

## 2022-12-07 RX ORDER — BACITRACIN, NEOMYCIN, POLYMYXIN B 400; 3.5; 5 [USP'U]/G; MG/G; [USP'U]/G
OINTMENT TOPICAL ONCE
OUTPATIENT
Start: 2022-12-07 | End: 2022-12-07

## 2022-12-07 RX ORDER — LIDOCAINE HYDROCHLORIDE 40 MG/ML
SOLUTION TOPICAL ONCE
Status: COMPLETED | OUTPATIENT
Start: 2022-12-07 | End: 2022-12-07

## 2022-12-07 RX ORDER — GINSENG 100 MG
CAPSULE ORAL ONCE
OUTPATIENT
Start: 2022-12-07 | End: 2022-12-07

## 2022-12-07 RX ORDER — LIDOCAINE 40 MG/G
CREAM TOPICAL ONCE
OUTPATIENT
Start: 2022-12-07 | End: 2022-12-07

## 2022-12-07 RX ORDER — GENTAMICIN SULFATE 1 MG/G
OINTMENT TOPICAL ONCE
OUTPATIENT
Start: 2022-12-07 | End: 2022-12-07

## 2022-12-07 RX ADMIN — LIDOCAINE HYDROCHLORIDE 10 ML: 40 SOLUTION TOPICAL at 15:02

## 2022-12-07 ASSESSMENT — PAIN DESCRIPTION - DESCRIPTORS: DESCRIPTORS: ACHING;BURNING

## 2022-12-07 ASSESSMENT — PAIN DESCRIPTION - PAIN TYPE: TYPE: CHRONIC PAIN

## 2022-12-07 ASSESSMENT — PAIN - FUNCTIONAL ASSESSMENT: PAIN_FUNCTIONAL_ASSESSMENT: PREVENTS OR INTERFERES SOME ACTIVE ACTIVITIES AND ADLS

## 2022-12-07 ASSESSMENT — PAIN DESCRIPTION - ORIENTATION: ORIENTATION: LEFT

## 2022-12-07 ASSESSMENT — PAIN DESCRIPTION - FREQUENCY: FREQUENCY: CONTINUOUS

## 2022-12-07 ASSESSMENT — PAIN DESCRIPTION - ONSET: ONSET: ON-GOING

## 2022-12-07 ASSESSMENT — PAIN SCALES - GENERAL: PAINLEVEL_OUTOF10: 8

## 2022-12-07 ASSESSMENT — PAIN DESCRIPTION - LOCATION: LOCATION: LEG

## 2022-12-07 NOTE — PROGRESS NOTES
[facility] Followup Visit Note    Referring Physician : Ivanna Benjamin MD  [address] [facility] RECORD NUMBER:  [id number]  AGE: 78 y.o. GENDER: female  : 1945  EPISODE DATE:  2022    Subjective:     Chief Complaint   Patient presents with    Wound Check     Legs        HISTORY of PRESENT ILLNESS HPI   Elisabet Sim is a 78 y.o. female who presents today in regards to follow up evaluation and treatment of wound/ulcer. That patient's past medical, family and social hx were reviewed and changes were made if present. History of Wound Context:  Patient has had left calf wound since ~ 2021. She has been putting a dressing on it. The wound has not been improving. She has a hx significant for venous stasis ulcerations of bilateral LE. She first was seen by myself in regards to these issues 2015. She eventually healed these wounds 2016. I last saw her 2021 at which time I recommended lymphedema therapy. She states she went and said it caused her to much pain and stopped going. She has chronic issues with bilateral calf pain, swelling and edema. She admits to not wearing her stockings because of the pain. She has used knee high 20-30 mm hg stockings in the past.       She is still seeing pain management and is down to percocet /325 mg daily.        21  aquacell  Double tubigrip  Culture done  Emphasized importance of getting in to more significant compression in the future  12/15/21  Culture reviewed - light growth, no tx  Wound slightly improved  Still significant drainage  Plan on compression wrap next week  21  Stable wound  Drainage slightly better  Pt would like to wait till next week because of holidays to start wrap  22  Wound larger  Profore  22  Wound appearance better  Refusing wrap - it rolled down last week and she cut off after 3 days  22  Wound appearance better  Still refusing wrap   3/2/22  periwound worse  Culture  drawtek  3/9/22  periwound much improved  levaquin 750 mg daily #10 script given culture   Wound itself stable  3/23/22  Left anterior calf wound improving overall  New blister/ulcerations left 3rd, 4th and 5th toes and lateral foot   Instructed to keep toes/foot dry, no ointment or corn starch   3/30/22  bistering resolved, other skin issues resolved  More dry than previously but overall stable  kailyn Barrera  Swelling is down, patient declining wrap  4/13/22  Wound slightly improved  4/20/2022  Wound stable, patient refusing compression wrap  5/4/22  Wound worse  Pt refusing wrap  Will change diet per her report to decrease swelling  5/11/22  Wound stable  kailyn and giacomo 2 - pt agreeable now after significant persuasion  5/18/22  Took off wrap within 24 hours due to pain  aquacell and spandigrip  She agreed to use wrap again if swelling not down 2 more cm next week  Wound slightly smaller  5/25/22  Wound improved   Left leg wound debrided   Continue aquacel   6/8/22  Wound larger  Agreeable to wrap - kailyn sena ag  Culture done  6/22/22  Wound stable in size  6/29/22  Wound slightly larger  Still having issues with wrap falling down  Will start hhc - MWF wraps  7/6/22  Improved  hhc starts this week  7/20/22  Appearance better, wound measuring larger  Continue with wraps  Leg edema improved  7/27/22  Slight improvement  8/3/22  Wound appearance and size worse  Issues still with wraps falling down  When doesn't have wraps on than usually uses spandigrips  8/10/2022  Wound cultures noted, Bactrim DS 1 tablet p.o. twice daily, wound looks fairly clean with some exudate only, mild recent lab work, patient recommended CBC and CMP  8/17/22  Refusing wrap today due to pain associated with  Will discuss with Dr Jesica Louis her pain management  Slightly larger, appearance improved  8/24/22  Poor tolerance of wraps  Wound stable in size  9/7/22  Not tolerating wraps - emphasized importance of use  Change to drawtek  Wound slightly improved in size, appearance still concerning  Pumps are going to be deliver  9/14/22  Still not tolerating wraps  Wound appearance improved, size slightly better  Pumps deliovered- she has yet to be in serviced on them   10/2-12/22  Admitted with cellultis  10/19/22  Currently at [facility]  Wound much improved  Posterior wound healed  Tyson jones  [address]  She will follow at [facility] while admitted  11/9/22  She is now home  She is no longer with pain management - there was a disagreement as she states she was taking more because of more pain  [facility] - I left my phone number for them to call me re this pt  Wound has improved  Oarrs reviewed  Percocet 7.5/325 mg #60 (30 days), Ms Contin 15 mg #60 (30 days)  11/16/22  Wound improved but superficial areas medially and laterally cause measurements to be larger  11/30/22  Smaller  More superficial  12/7/22  Improved  Oarrs reviewed  Percocet 7.5/325 mg #60 (30 days), Ms Contin 15 mg #60 (30 days)  Emphasized importance of pain management    \Wound/Ulcer Pain Timing/Severity: waxing and waning, mild  Quality of pain: aching, throbbing, pressure  Severity:  7 / 10   Modifying Factors: Pain worsens with debridement, dressing changes  Associated Signs/Symptoms: edema, drainage and pain    Ulcer Identification:  Ulcer Type: venous and lymphedema  Contributing Factors: edema, venous stasis and lymphedema    Diabetic/Pressure/Non Pressure Ulcers only:  Ulcer: Non-Pressure ulcer, fat layer exposed    Wound: N/A  PAST MEDICAL HISTORY      Diagnosis Date    Bursitis     CAD (coronary artery disease) 1993    heart attack    Cellulitis of leg, left 10/3/2022    COPD (chronic obstructive pulmonary disease) ([facility] [address] 6/19/2013    Emphysema     slight    GERD (gastroesophageal reflux disease)     Hiatal hernia     Hip pain     Hyperlipidemia     Hypertension     Lymphedema of both lower extremities 2018    Venous insufficiency of both lower extremities 2018    Venous stasis ulcer of left calf with fat layer exposed without varicose veins ([facility] [address] 2021    Venous stasis ulcer of left calf with fat layer exposed without varicose veins ([address] 2021    Longstanding ulcer over the shin of the left calf, with a new ulcer over the posterior aspect of the left calf, for the last 1 week    Venous ulcer with fat layer exposed ([facility] [address] 10/28/2015     Past Surgical History:   Procedure Laterality Date    APPENDECTOMY      BREAST ENHANCEMENT SURGERY      BREAST REDUCTION SURGERY      CHOLECYSTECTOMY      COLONOSCOPY      ECHO COMPL W DOP COLOR FLOW  3/11/2013         ENDOSCOPY, COLON, DIAGNOSTIC      HERNIA REPAIR N/A 2021    LAPAROSCOPIC ROBOTIC ASSISTED INGUINAL HERNIA REPAIR performed by Mayito Harris MD at [address] [place] [address] ([address] [place] [address])      JOINT REPLACEMENT  2009    l knee r hip    LEG DEBRIDEMENT Left 2015    LEG DEBRIDEMENT Left 2016     History reviewed. No pertinent family history.   Social History     Tobacco Use    Smoking status: Former     Packs/day: 1.00     Years: 20.00     Pack years: 20.00     Types: Cigarettes     Quit date: 1995     Years since quittin.1    Smokeless tobacco: Never    Tobacco comments:     Quit   Vaping Use    Vaping Use: Never used   Substance Use Topics    Alcohol use: No    Drug use: No     Allergies   Allergen Reactions    Codeine Nausea And Vomiting and Other (See Comments)     hallucinate    Dilaudid [Hydromorphone Hcl] Rash    Naproxen Other (See Comments)     Shuts down kidney function    Singulair [Montelukast Sodium] Shortness Of Breath     Mucus in chest    Black Cohosh     Black Cohosh [Cimicifuga [place] (Black Cohosh)] Rash     Current Outpatient Medications on File Prior to Encounter   Medication Sig Dispense Refill    hydroCHLOROthiazide (HYDRODIURIL) 25 MG tablet Take 1 tablet by mouth daily      oxyCODONE-acetaminophen (PERCOCET) 7.5-325 MG per tablet Take 1 tablet by mouth 2 times daily for 30 days. Intended supply: 30 days 60 tablet 0    white petrolatum OINT ointment Apply topically 2 times daily as needed (dry skin coccyx area)  0    Cyanocobalamin (VITAMIN B 12) 500 MCG TABS Take by mouth daily      Multiple Vitamins-Minerals (THERAPEUTIC MULTIVITAMIN-MINERALS) tablet Take 1 tablet by mouth daily      zinc gluconate 50 MG tablet Take 50 mg by mouth daily      GENISTEIN PO Take 125 mg by mouth nightly      folic acid (FOLVITE) 119 MCG tablet Take 400 mcg by mouth nightly      Krill Oil 350 MG CAPS Take 350 mg by mouth nightly      gabapentin (NEURONTIN) 300 MG capsule Take 300 mg by mouth 3 times daily. simvastatin (ZOCOR) 40 MG tablet Take 40 mg by mouth nightly      aspirin 81 MG tablet Take 81 mg by mouth daily      Cholecalciferol (VITAMIN D) 2000 UNITS CAPS capsule Take 2,000 Units by mouth daily       ferrous sulfate 325 (65 FE) MG tablet Take 325 mg by mouth nightly       metoprolol (LOPRESSOR) 50 MG tablet Take 1 tablet by mouth 2 times daily 60 tablet 0    albuterol (PROVENTIL HFA;VENTOLIN HFA) 108 (90 BASE) MCG/ACT inhaler Inhale 2 puffs into the lungs every 6 hours as needed for Wheezing or Shortness of Breath       calcium carbonate (OYSTER SHELL CALCIUM 500 MG) 1250 MG tablet Take 2 tablets by mouth daily      Ascorbic Acid (VITAMIN C) 500 MG tablet Take 2,000 mg by mouth daily      omeprazole (PRILOSEC) 40 MG delayed release capsule Take 40 mg by mouth daily. diphenhydrAMINE (BENADRYL) 50 MG capsule Take 50 mg by mouth daily as needed for Allergies       Garlic 659 MG TABS Take 1,000 mg by mouth daily        No current facility-administered medications on file prior to encounter.        REVIEW OF SYSTEMS See HPI    Objective:    BP (!) 162/100   Pulse 85   Temp (!) 96.7 °F (35.9 °C) (Temporal)   Resp 18   Ht 5' 7\" (1.702 m)   Wt 295 lb (133.8 kg)   BMI 46.20 kg/m² Wt Readings from Last 3 Encounters:   12/07/22 295 lb (133.8 kg)   11/16/22 295 lb (133.8 kg)   10/09/22 293 lb (132.9 kg)     PHYSICAL EXAM  CONSTITUTIONAL:   Awake, alert, cooperative   EYES:  lids and lashes normal   ENT: external ears and nose without lesions   NECK:  supple, symmetrical, trachea midline   SKIN:  Open wound Present    Assessment:     Problem List Items Addressed This Visit       Venous stasis ulcer of left calf with fat layer exposed without varicose veins (HCC) - Primary (Chronic)    Relevant Medications    morphine (MS CONTIN) 15 MG extended release tablet    oxyCODONE-acetaminophen (PERCOCET) 7.5-325 MG per tablet    Other Relevant Orders    Initiate Outpatient Wound Care Protocol       Pre Debridement Measurements:  Are located in the [place]  Documentation Flow Sheet  Post Debridement Measurements:  Wound/Ulcer Descriptions are Pre Debridement except measurements:     Incision 04/20/16 Leg Left (Active)   Number of days: 7382       Incision 08/03/16 Leg Left (Active)   Number of days: 8248       Wound 12/08/21 Pretibial Left #1 (Active)   Wound Image   11/30/22 1505   Dressing Status New dressing applied 12/07/22 1538   Wound Cleansed Cleansed with saline 12/07/22 1538   Dressing/Treatment Alginate;Dry dressing 12/07/22 1538   Offloading for Diabetic Foot Ulcers Offloading not required 11/30/22 1530   Wound Length (cm) 2.8 cm 12/07/22 1459   Wound Width (cm) 2.7 cm 12/07/22 1459   Wound Depth (cm) 0.1 cm 12/07/22 1459   Wound Surface Area (cm^2) 7.56 cm^2 12/07/22 1459   Change in Wound Size % (l*w) -980 12/07/22 1459   Wound Volume (cm^3) 0.756 cm^3 12/07/22 1459   Wound Healing % -980 12/07/22 1459   Post-Procedure Length (cm) 2.8 cm 12/07/22 1531   Post-Procedure Width (cm) 2.8 cm 12/07/22 1531   Post-Procedure Depth (cm) 0.1 cm 12/07/22 1531   Post-Procedure Surface Area (cm^2) 7.84 cm^2 12/07/22 1531   Post-Procedure Volume (cm^3) 0.784 cm^3 12/07/22 1531   Wound Assessment Granulation tissue;Pink/red;Fibrin 12/07/22 1459   Drainage Amount Moderate 12/07/22 1459   Drainage Description Serosanguinous; Yellow 12/07/22 1459   Odor None 12/07/22 1459   Kori-wound Assessment Maceration 12/07/22 1459   Number of days: 364     Incision 04/16/21 Abdomen (Active)   Number of days: 600       Procedure Note  Indications:  Based on my examination of this patient's wound(s)/ulcer(s) today, debridement is required to promote healing and evaluate the wound base. Performed by: Yeimy Granados MD    Consent obtained:  Yes    Time out taken:  Yes    Pain Control: Anesthetic  Anesthetic: 4% Lidocaine Liquid Topical     Debridement:Excisional Debridement    Using curette the wound(s)/ulcer(s) was/were sharply debrided down through and including the removal of epidermis, dermis, and subcutaneous tissue. Devitalized Tissue Debrided:  fibrin, biofilm, slough, and exudate to stimulate bleeding to promote healing, post debridement good bleeding base and wound edges noted    Wound/Ulcer #: 1    Percent of Wound/Ulcer Debrided: 100%    Total Surface Area Debrided: 7.56 sq cm     Estimated Blood Loss:  Minimal  Hemostasis Achieved:  by pressure    Procedural Pain:  10  / 10   Post Procedural Pain:  9 / 10     Response to treatment:  With complaints of pain. Plan:   Treatment Note please see attached Discharge Instructions    Written patient dismissal instructions given to patient and signed by patient or POA. Discharge Instructions         Visit Discharge/Physician Orders     Discharge condition: Stable     Assessment of pain at discharge: mild     Anesthetic used: lido 4%     Discharge to: Home     Left via:Private automobile     Accompanied by: self     ECF/HHA: [facility]     Dressing Orders: To LEFT PRETIB : cleanse with normal saline, apply calcium alginate, cover with dry dressing  and secure . Change daily. Apply spandagrip.  On in am and off in pm. Elevate legs as much as possible above level of the heart. Treatment Orders:Eat a diet high in protein and vitamin C. Take a multiple vitamin daily unless contraindicated. Elevate as much as possible     [address] [facility] followup visit: 1 week____________________________  (Please note your next appointment above and if you are unable to keep, kindly give a 24 hour notice. Thank you.)     Physician signature:__________________________      If you experience any of the following, please call the [facility] during business hours:     * Increase in Pain  * Temperature over 101  * Increase in drainage from your wound  * Drainage with a foul odor  * Bleeding  * Increase in swelling  * Need for compression bandage changes due to slippage, breakthrough drainage. If you need medical attention outside of the business hours of the [address] please contact your PCP or go to the nearest emergency room.        Electronically signed by Chelsy Jurado MD

## 2022-12-08 NOTE — DISCHARGE INSTRUCTIONS
Visit Discharge/Physician Orders     Discharge condition: Stable     Assessment of pain at discharge: mild     Anesthetic used: lido 4%     Discharge to: Home     Left via:Private automobile     Accompanied by: self     ECF/HHA: [facility]     Dressing Orders: To LEFT PRETIB : cleanse with normal saline, apply calcium alginate, cover with dry dressing  and secure . Change daily. Apply spandagrip. On in am and off in pm. Elevate legs as much as possible above level of the heart. Treatment Orders:Eat a diet high in protein and vitamin C. Take a multiple vitamin daily unless contraindicated. Elevate as much as possible     [address] [place] [address],[address] followup visit: 1 week____________________________  (Please note your next appointment above and if you are unable to keep, kindly give a 24 hour notice. Thank you.)     Physician signature:__________________________      If you experience any of the following, please call the [facility] [employer] [facility] during business hours:     * Increase in Pain  * Temperature over 101  * Increase in drainage from your wound  * Drainage with a foul odor  * Bleeding  * Increase in swelling  * Need for compression bandage changes due to slippage, breakthrough drainage. If you need medical attention outside of the business hours of the [facility] please contact your PCP or go to the nearest emergency room.

## 2022-12-14 ENCOUNTER — HOSPITAL ENCOUNTER (OUTPATIENT)
Dept: WOUND CARE | Age: 77
Discharge: HOME | End: 2022-12-14

## 2022-12-15 NOTE — DISCHARGE INSTRUCTIONS
Visit Discharge/Physician Orders     Discharge condition: Stable     Assessment of pain at discharge: mild     Anesthetic used: lido 4%     Discharge to: Home     Left via:Private automobile     Accompanied by: self     ECF/HHA: [facility]     Dressing Orders: To LEFT PRETIB : cleanse with normal saline, apply calcium alginate, cover with dry dressing  and secure . Change daily. Apply spandagrip. On in am and off in pm. Elevate legs as much as possible above level of the heart. Treatment Orders:Eat a diet high in protein and vitamin C. Take a multiple vitamin daily unless contraindicated. Elevate as much as possible     [facility] followup visit: 1 week____________________________  (Please note your next appointment above and if you are unable to keep, kindly give a 24 hour notice. Thank you.)     Physician signature:__________________________      If you experience any of the following, please call the [facility] during business hours:     * Increase in Pain  * Temperature over 101  * Increase in drainage from your wound  * Drainage with a foul odor  * Bleeding  * Increase in swelling  * Need for compression bandage changes due to slippage, breakthrough drainage. If you need medical attention outside of the business hours of the [facility] please contact your PCP or go to the nearest emergency room.

## 2022-12-21 ENCOUNTER — HOSPITAL ENCOUNTER (OUTPATIENT)
Dept: WOUND CARE | Age: 77
Discharge: HOME | End: 2022-12-21
Payer: MEDICARE

## 2022-12-21 DIAGNOSIS — I87.2 VENOUS STASIS ULCER OF LEFT CALF WITH FAT LAYER EXPOSED WITHOUT VARICOSE VEINS (HCC): Primary | ICD-10-CM

## 2022-12-21 DIAGNOSIS — L97.222 VENOUS STASIS ULCER OF LEFT CALF WITH FAT LAYER EXPOSED WITHOUT VARICOSE VEINS (HCC): Primary | ICD-10-CM

## 2022-12-21 DIAGNOSIS — I89.0 LYMPHEDEMA OF BOTH LOWER EXTREMITIES: Chronic | ICD-10-CM

## 2022-12-21 PROCEDURE — 11042 DBRDMT SUBQ TIS 1ST 20SQCM/<: CPT

## 2022-12-21 RX ORDER — LIDOCAINE 40 MG/G
CREAM TOPICAL ONCE
OUTPATIENT
Start: 2022-12-21 | End: 2022-12-21

## 2022-12-21 RX ORDER — CLOBETASOL PROPIONATE 0.5 MG/G
OINTMENT TOPICAL ONCE
OUTPATIENT
Start: 2022-12-21 | End: 2022-12-21

## 2022-12-21 RX ORDER — GABAPENTIN 300 MG/1
300 CAPSULE ORAL 3 TIMES DAILY
Qty: 90 CAPSULE | Refills: 0 | Status: SHIPPED | OUTPATIENT
Start: 2022-12-21 | End: 2023-01-20

## 2022-12-21 RX ORDER — LIDOCAINE HYDROCHLORIDE 40 MG/ML
SOLUTION TOPICAL ONCE
Status: COMPLETED | OUTPATIENT
Start: 2022-12-21 | End: 2022-12-21

## 2022-12-21 RX ORDER — BACITRACIN, NEOMYCIN, POLYMYXIN B 400; 3.5; 5 [USP'U]/G; MG/G; [USP'U]/G
OINTMENT TOPICAL ONCE
OUTPATIENT
Start: 2022-12-21 | End: 2022-12-21

## 2022-12-21 RX ORDER — LIDOCAINE HYDROCHLORIDE 20 MG/ML
JELLY TOPICAL ONCE
OUTPATIENT
Start: 2022-12-21 | End: 2022-12-21

## 2022-12-21 RX ORDER — BACITRACIN ZINC AND POLYMYXIN B SULFATE 500; 1000 [USP'U]/G; [USP'U]/G
OINTMENT TOPICAL ONCE
OUTPATIENT
Start: 2022-12-21 | End: 2022-12-21

## 2022-12-21 RX ORDER — GINSENG 100 MG
CAPSULE ORAL ONCE
OUTPATIENT
Start: 2022-12-21 | End: 2022-12-21

## 2022-12-21 RX ORDER — LIDOCAINE HYDROCHLORIDE 40 MG/ML
SOLUTION TOPICAL ONCE
OUTPATIENT
Start: 2022-12-21 | End: 2022-12-21

## 2022-12-21 RX ORDER — LIDOCAINE 50 MG/G
OINTMENT TOPICAL ONCE
OUTPATIENT
Start: 2022-12-21 | End: 2022-12-21

## 2022-12-21 RX ORDER — GENTAMICIN SULFATE 1 MG/G
OINTMENT TOPICAL ONCE
OUTPATIENT
Start: 2022-12-21 | End: 2022-12-21

## 2022-12-21 RX ORDER — BETAMETHASONE DIPROPIONATE 0.05 %
OINTMENT (GRAM) TOPICAL ONCE
OUTPATIENT
Start: 2022-12-21 | End: 2022-12-21

## 2022-12-21 RX ADMIN — LIDOCAINE HYDROCHLORIDE 5 ML: 40 SOLUTION TOPICAL at 15:50

## 2022-12-21 NOTE — PROGRESS NOTES
[facility] Followup Visit Note    Referring Physician : Magdalena Stein MD  [id number] [facility] RECORD NUMBER:  [id number]  AGE: 78 y.o. GENDER: female  : 1945  EPISODE DATE:  2022    Subjective:     Chief Complaint   Patient presents with    Wound Check     Left pretib      HISTORY of PRESENT ILLNESS HPI   Irina Barone is a 78 y.o. female who presents today in regards to follow up evaluation and treatment of wound/ulcer. That patient's past medical, family and social hx were reviewed and changes were made if present. History of Wound Context:  Patient has had left calf wound since ~ 2021. She has been putting a dressing on it. The wound has not been improving. She has a hx significant for venous stasis ulcerations of bilateral LE. She first was seen by myself in regards to these issues 2015. She eventually healed these wounds 2016. I last saw her 2021 at which time I recommended lymphedema therapy. She states she went and said it caused her to much pain and stopped going. She has chronic issues with bilateral calf pain, swelling and edema. She admits to not wearing her stockings because of the pain. She has used knee high 20-30 mm hg stockings in the past.       She is still seeing pain management and is down to percocet 7/5/325 mg daily.        21  aquacell  Double tubigrip  Culture done  Emphasized importance of getting in to more significant compression in the future  12/15/21  Culture reviewed - light growth, no tx  Wound slightly improved  Still significant drainage  Plan on compression wrap next week  21  Stable wound  Drainage slightly better  Pt would like to wait till next week because of holidays to start wrap  22  Wound larger  Profore  22  Wound appearance better  Refusing wrap - it rolled down last week and she cut off after 3 days  22  Wound appearance better  Still refusing wrap   3/2/22  periwound worse  Culture  drawtek  3/9/22  periwound much improved  levaquin 750 mg daily #10 script given culture   Wound itself stable  3/23/22  Left anterior calf wound improving overall  New blister/ulcerations left 3rd, 4th and 5th toes and lateral foot   Instructed to keep toes/foot dry, no ointment or corn starch   3/30/22  bistering resolved, other skin issues resolved  More dry than previously but overall stable  kailyn Barrera  Swelling is down, patient declining wrap  4/13/22  Wound slightly improved  4/20/2022  Wound stable, patient refusing compression wrap  5/4/22  Wound worse  Pt refusing wrap  Will change diet per her report to decrease swelling  5/11/22  Wound stable  kailyn and giacomo 2 - pt agreeable now after significant persuasion  5/18/22  Took off wrap within 24 hours due to pain  aquacell and spandigrip  She agreed to use wrap again if swelling not down 2 more cm next week  Wound slightly smaller  5/25/22  Wound improved   Left leg wound debrided   Continue aquacel   6/8/22  Wound larger  Agreeable to wrap - kailyn sena ag  Culture done  6/22/22  Wound stable in size  6/29/22  Wound slightly larger  Still having issues with wrap falling down  Will start hhc - MWF wraps  7/6/22  Improved  hhc starts this week  7/20/22  Appearance better, wound measuring larger  Continue with wraps  Leg edema improved  7/27/22  Slight improvement  8/3/22  Wound appearance and size worse  Issues still with wraps falling down  When doesn't have wraps on than usually uses spandigrips  8/10/2022  Wound cultures noted, Bactrim DS 1 tablet p.o. twice daily, wound looks fairly clean with some exudate only, mild recent lab work, patient recommended CBC and CMP  8/17/22  Refusing wrap today due to pain associated with  Will discuss with Dr Davey Galvez her pain management  Slightly larger, appearance improved  8/24/22  Poor tolerance of wraps  Wound stable in size  9/7/22  Not tolerating wraps - emphasized importance of use  Change to drawtek  Wound slightly improved in size, appearance still concerning  Pumps are going to be deliver  9/14/22  Still not tolerating wraps  Wound appearance improved, size slightly better  Pumps deliovered- she has yet to be in serviced on them   10/2-12/22  Admitted with cellultis  10/19/22  Currently at [facility]  Wound much improved  Posterior wound healed  Tyson jones  [address]  She will follow at [facility] while admitted  11/9/22  She is now home  She is no longer with pain management - there was a disagreement as she states she was taking more because of more pain  [facility] - I left my phone number for them to call me re this pt  Wound has improved  Oarrs reviewed  Percocet 7.5/325 mg #60 (30 days), Ms Contin 15 mg #60 (30 days)  11/16/22  Wound improved but superficial areas medially and laterally cause measurements to be larger  11/30/22  Smaller  More superficial  12/7/22  Improved  Oarrs reviewed  Percocet 7.5/325 mg #60 (30 days), Ms Contin 15 mg #60 (30 days)  Emphasized importance of pain management  12/21/22  Slightly larger  Oarrs reviewed  Gabapentin 300 mg tid    \Wound/Ulcer Pain Timing/Severity: waxing and waning, mild  Quality of pain: aching, throbbing, pressure  Severity:  7 / 10   Modifying Factors: Pain worsens with debridement, dressing changes  Associated Signs/Symptoms: edema, drainage and pain    Ulcer Identification:  Ulcer Type: venous and lymphedema  Contributing Factors: edema, venous stasis and lymphedema    Diabetic/Pressure/Non Pressure Ulcers only:  Ulcer: Non-Pressure ulcer, fat layer exposed    Wound: N/A  PAST MEDICAL HISTORY      Diagnosis Date    Bursitis     CAD (coronary artery disease) 1993    heart attack    Cellulitis of leg, left 10/3/2022    COPD (chronic obstructive pulmonary disease) ([facility]) 6/19/2013    Emphysema     slight    GERD (gastroesophageal reflux disease)     Hiatal hernia     Hip pain Hyperlipidemia     Hypertension     Lymphedema of both lower extremities 2018    Venous insufficiency of both lower extremities 2018    Venous stasis ulcer of left calf with fat layer exposed without varicose veins ([facility] [address] 2021    Venous stasis ulcer of left calf with fat layer exposed without varicose veins ([address] 2021    Longstanding ulcer over the shin of the left calf, with a new ulcer over the posterior aspect of the left calf, for the last 1 week    Venous ulcer with fat layer exposed ([address] 10/28/2015     Past Surgical History:   Procedure Laterality Date    APPENDECTOMY      BREAST ENHANCEMENT SURGERY      BREAST REDUCTION SURGERY      CHOLECYSTECTOMY      COLONOSCOPY      ECHO COMPL W DOP COLOR FLOW  3/11/2013         ENDOSCOPY, COLON, DIAGNOSTIC      HERNIA REPAIR N/A 2021    LAPAROSCOPIC ROBOTIC ASSISTED INGUINAL HERNIA REPAIR performed by Srinath Mann MD at [address] [facility] ([address] [place] [address])      JOINT REPLACEMENT  2009    l knee r hip    LEG DEBRIDEMENT Left 2015    LEG DEBRIDEMENT Left 2016     History reviewed. No pertinent family history.   Social History     Tobacco Use    Smoking status: Former     Packs/day: 1.00     Years: 20.00     Pack years: 20.00     Types: Cigarettes     Quit date: 1995     Years since quittin.1    Smokeless tobacco: Never    Tobacco comments:     Quit   Vaping Use    Vaping Use: Never used   Substance Use Topics    Alcohol use: No    Drug use: No     Allergies   Allergen Reactions    Codeine Nausea And Vomiting and Other (See Comments)     hallucinate    Dilaudid [Hydromorphone Hcl] Rash    Naproxen Other (See Comments)     Shuts down kidney function    Singulair [Montelukast Sodium] Shortness Of Breath     Mucus in chest    Black Cohosh     Black Cohosh [Cimicifuga [place] (Black Cohosh)] Rash     Current Outpatient Medications on File Prior to Encounter   Medication Sig Dispense Refill hydroCHLOROthiazide (HYDRODIURIL) 25 MG tablet Take 1 tablet by mouth daily      morphine (MS CONTIN) 15 MG extended release tablet Take 1 tablet by mouth 2 times daily for 30 days. 60 tablet 0    oxyCODONE-acetaminophen (PERCOCET) 7.5-325 MG per tablet Take 1 tablet by mouth 2 times daily for 30 days. Intended supply: 30 days 60 tablet 0    white petrolatum OINT ointment Apply topically 2 times daily as needed (dry skin coccyx area)  0    Cyanocobalamin (VITAMIN B 12) 500 MCG TABS Take by mouth daily      Multiple Vitamins-Minerals (THERAPEUTIC MULTIVITAMIN-MINERALS) tablet Take 1 tablet by mouth daily      zinc gluconate 50 MG tablet Take 50 mg by mouth daily      GENISTEIN PO Take 125 mg by mouth nightly      folic acid (FOLVITE) 228 MCG tablet Take 400 mcg by mouth nightly      Krill Oil 350 MG CAPS Take 350 mg by mouth nightly      gabapentin (NEURONTIN) 300 MG capsule Take 300 mg by mouth 3 times daily. simvastatin (ZOCOR) 40 MG tablet Take 40 mg by mouth nightly      aspirin 81 MG tablet Take 81 mg by mouth daily      Cholecalciferol (VITAMIN D) 2000 UNITS CAPS capsule Take 2,000 Units by mouth daily       ferrous sulfate 325 (65 FE) MG tablet Take 325 mg by mouth nightly       metoprolol (LOPRESSOR) 50 MG tablet Take 1 tablet by mouth 2 times daily 60 tablet 0    albuterol (PROVENTIL HFA;VENTOLIN HFA) 108 (90 BASE) MCG/ACT inhaler Inhale 2 puffs into the lungs every 6 hours as needed for Wheezing or Shortness of Breath       calcium carbonate (OYSTER SHELL CALCIUM 500 MG) 1250 MG tablet Take 2 tablets by mouth daily      Ascorbic Acid (VITAMIN C) 500 MG tablet Take 2,000 mg by mouth daily      omeprazole (PRILOSEC) 40 MG delayed release capsule Take 40 mg by mouth daily. diphenhydrAMINE (BENADRYL) 50 MG capsule Take 50 mg by mouth daily as needed for Allergies       Garlic 718 MG TABS Take 1,000 mg by mouth daily        No current facility-administered medications on file prior to encounter. REVIEW OF SYSTEMS See HPI    Objective: There were no vitals taken for this visit.   Wt Readings from Last 3 Encounters:   12/07/22 295 lb (133.8 kg)   11/16/22 295 lb (133.8 kg)   10/09/22 293 lb (132.9 kg)     PHYSICAL EXAM  CONSTITUTIONAL:   Awake, alert, cooperative   EYES:  lids and lashes normal   ENT: external ears and nose without lesions   NECK:  supple, symmetrical, trachea midline   SKIN:  Open wound Present    Assessment:     Problem List Items Addressed This Visit       Lymphedema of both lower extremities (Chronic)    Venous stasis ulcer of left calf with fat layer exposed without varicose veins (HCC) - Primary (Chronic)    Relevant Orders    Initiate Outpatient Wound Care Protocol       Pre Debridement Measurements:  Are located in the [facility]  Documentation Flow Sheet  Post Debridement Measurements:  Wound/Ulcer Descriptions are Pre Debridement except measurements:     Incision 04/20/16 Leg Left (Active)   Number of days: 2436       Incision 08/03/16 Leg Left (Active)   Number of days: 4603       Wound 12/08/21 Pretibial Left #1 (Active)   Wound Image   11/30/22 1505   Dressing Status New dressing applied 12/21/22 1654   Wound Cleansed Cleansed with saline 12/21/22 1654   Dressing/Treatment Alginate;Dry dressing 12/21/22 1654   Offloading for Diabetic Foot Ulcers Offloading not required 11/30/22 1530   Wound Length (cm) 3.5 cm 12/21/22 1547   Wound Width (cm) 2.7 cm 12/21/22 1547   Wound Depth (cm) 0.1 cm 12/21/22 1547   Wound Surface Area (cm^2) 9.45 cm^2 12/21/22 1547   Change in Wound Size % (l*w) -1250 12/21/22 1547   Wound Volume (cm^3) 0.945 cm^3 12/21/22 1547   Wound Healing % -1250 12/21/22 1547   Post-Procedure Length (cm) 3.7 cm 12/21/22 1632   Post-Procedure Width (cm) 2.7 cm 12/21/22 1632   Post-Procedure Depth (cm) 0.1 cm 12/21/22 1632   Post-Procedure Surface Area (cm^2) 9.99 cm^2 12/21/22 1632   Post-Procedure Volume (cm^3) 0.999 cm^3 12/21/22 1632   Wound Assessment Fibrin;Pink/red;Granulation tissue 12/21/22 1547   Drainage Amount Moderate 12/21/22 1547   Drainage Description Yellow 12/21/22 1547   Odor None 12/21/22 1547   Kori-wound Assessment Maceration 12/21/22 1547   Number of days: 378     Incision 04/16/21 Abdomen (Active)   Number of days: 614       Procedure Note  Indications:  Based on my examination of this patient's wound(s)/ulcer(s) today, debridement is required to promote healing and evaluate the wound base. Performed by: Vega Bosch MD    Consent obtained:  Yes    Time out taken:  Yes    Pain Control: Anesthetic  Anesthetic: 4% Lidocaine Liquid Topical     Debridement:Excisional Debridement    Using curette the wound(s)/ulcer(s) was/were sharply debrided down through and including the removal of epidermis, dermis, and subcutaneous tissue. Devitalized Tissue Debrided:  fibrin, biofilm, slough, and exudate to stimulate bleeding to promote healing, post debridement good bleeding base and wound edges noted    Wound/Ulcer #: 1    Percent of Wound/Ulcer Debrided: 100%    Total Surface Area Debrided: 9.45 sq cm     Estimated Blood Loss:  Minimal  Hemostasis Achieved:  by pressure    Procedural Pain:  10  / 10   Post Procedural Pain:  9 / 10     Response to treatment:  With complaints of pain. Plan:   Treatment Note please see attached Discharge Instructions    Written patient dismissal instructions given to patient and signed by patient or POA. Discharge Instructions         Visit Discharge/Physician Orders     Discharge condition: Stable     Assessment of pain at discharge: mild     Anesthetic used: lido 4%     Discharge to: Home     Left via:Private automobile     Accompanied by: self     ECF/HHA: [facility]     Dressing Orders: To LEFT PRETIB : cleanse with normal saline, apply calcium alginate, cover with dry dressing  and secure . Change daily. Apply spandagrip.  On in am and off in pm. Elevate legs as much as possible above level of the heart. Treatment Orders:Eat a diet high in protein and vitamin C. Take a multiple vitamin daily unless contraindicated. Elevate as much as possible     [facility] followup visit: 1 week____________________________  (Please note your next appointment above and if you are unable to keep, kindly give a 24 hour notice. Thank you.)     Physician signature:__________________________      If you experience any of the following, please call the [email or web address] during business hours:     * Increase in Pain  * Temperature over 101  * Increase in drainage from your wound  * Drainage with a foul odor  * Bleeding  * Increase in swelling  * Need for compression bandage changes due to slippage, breakthrough drainage. If you need medical attention outside of the business hours of the [email or web address] please contact your PCP or go to the nearest emergency room.        Electronically signed by Portia Ochoa MD

## 2022-12-28 ENCOUNTER — HOSPITAL ENCOUNTER (OUTPATIENT)
Dept: WOUND CARE | Age: 77
Discharge: HOME | End: 2022-12-28

## 2023-01-03 NOTE — DISCHARGE INSTRUCTIONS
Visit Discharge/Physician Orders     Discharge condition: Stable     Assessment of pain at discharge: mild     Anesthetic used: lido 4%     Discharge to: Home     Left via:Private automobile     Accompanied by: self     ECF/HHA: [facility]     Dressing Orders:  LEFT PRETIB : cleanse with normal saline, apply calcium alginate, cover with dry dressing  and secure . Change daily. Apply spandagrip. On in am and off in pm. Elevate legs as much as possible above level of the heart. Treatment Orders:Eat a diet high in protein and vitamin C. Take a multiple vitamin daily unless contraindicated. Elevate as much as possible     [address] [place] [address] followup visit: 1 week____________________________  (Please note your next appointment above and if you are unable to keep, kindly give a 24 hour notice. Thank you.)     Physician signature:__________________________      If you experience any of the following, please call the [facility] during business hours:     * Increase in Pain  * Temperature over 101  * Increase in drainage from your wound  * Drainage with a foul odor  * Bleeding  * Increase in swelling  * Need for compression bandage changes due to slippage, breakthrough drainage. If you need medical attention outside of the business hours of the [facility] please contact your PCP or go to the nearest emergency room.

## 2023-01-04 ENCOUNTER — HOSPITAL ENCOUNTER (OUTPATIENT)
Dept: WOUND CARE | Age: 78
Discharge: HOME | End: 2023-01-04
Payer: MEDICARE

## 2023-01-04 VITALS
SYSTOLIC BLOOD PRESSURE: 158 MMHG | TEMPERATURE: 97 F | DIASTOLIC BLOOD PRESSURE: 80 MMHG | RESPIRATION RATE: 16 BRPM | HEART RATE: 74 BPM

## 2023-01-04 DIAGNOSIS — I89.0 LYMPHEDEMA OF BOTH LOWER EXTREMITIES: Chronic | ICD-10-CM

## 2023-01-04 DIAGNOSIS — I87.2 VENOUS STASIS ULCER OF LEFT CALF WITH FAT LAYER EXPOSED WITHOUT VARICOSE VEINS (HCC): Primary | ICD-10-CM

## 2023-01-04 DIAGNOSIS — L97.222 VENOUS STASIS ULCER OF LEFT CALF WITH FAT LAYER EXPOSED WITHOUT VARICOSE VEINS (HCC): Primary | ICD-10-CM

## 2023-01-04 PROCEDURE — 11045 DBRDMT SUBQ TISS EACH ADDL: CPT

## 2023-01-04 PROCEDURE — 11042 DBRDMT SUBQ TIS 1ST 20SQCM/<: CPT

## 2023-01-04 RX ORDER — GINSENG 100 MG
CAPSULE ORAL ONCE
OUTPATIENT
Start: 2023-01-04 | End: 2023-01-04

## 2023-01-04 RX ORDER — LIDOCAINE HYDROCHLORIDE 20 MG/ML
JELLY TOPICAL ONCE
OUTPATIENT
Start: 2023-01-04 | End: 2023-01-04

## 2023-01-04 RX ORDER — BETAMETHASONE DIPROPIONATE 0.05 %
OINTMENT (GRAM) TOPICAL ONCE
OUTPATIENT
Start: 2023-01-04 | End: 2023-01-04

## 2023-01-04 RX ORDER — LIDOCAINE HYDROCHLORIDE 40 MG/ML
SOLUTION TOPICAL ONCE
OUTPATIENT
Start: 2023-01-04 | End: 2023-01-04

## 2023-01-04 RX ORDER — LIDOCAINE HYDROCHLORIDE 40 MG/ML
SOLUTION TOPICAL ONCE
Status: COMPLETED | OUTPATIENT
Start: 2023-01-04 | End: 2023-01-04

## 2023-01-04 RX ORDER — LIDOCAINE 40 MG/G
CREAM TOPICAL ONCE
OUTPATIENT
Start: 2023-01-04 | End: 2023-01-04

## 2023-01-04 RX ORDER — CLOBETASOL PROPIONATE 0.5 MG/G
OINTMENT TOPICAL ONCE
OUTPATIENT
Start: 2023-01-04 | End: 2023-01-04

## 2023-01-04 RX ORDER — BACITRACIN, NEOMYCIN, POLYMYXIN B 400; 3.5; 5 [USP'U]/G; MG/G; [USP'U]/G
OINTMENT TOPICAL ONCE
OUTPATIENT
Start: 2023-01-04 | End: 2023-01-04

## 2023-01-04 RX ORDER — GENTAMICIN SULFATE 1 MG/G
OINTMENT TOPICAL ONCE
OUTPATIENT
Start: 2023-01-04 | End: 2023-01-04

## 2023-01-04 RX ORDER — BACITRACIN ZINC AND POLYMYXIN B SULFATE 500; 1000 [USP'U]/G; [USP'U]/G
OINTMENT TOPICAL ONCE
OUTPATIENT
Start: 2023-01-04 | End: 2023-01-04

## 2023-01-04 RX ORDER — LIDOCAINE 50 MG/G
OINTMENT TOPICAL ONCE
OUTPATIENT
Start: 2023-01-04 | End: 2023-01-04

## 2023-01-04 RX ADMIN — LIDOCAINE HYDROCHLORIDE 5 ML: 40 SOLUTION TOPICAL at 15:07

## 2023-01-04 ASSESSMENT — PAIN DESCRIPTION - LOCATION: LOCATION: LEG

## 2023-01-04 ASSESSMENT — PAIN DESCRIPTION - ORIENTATION: ORIENTATION: LEFT

## 2023-01-04 ASSESSMENT — PAIN SCALES - GENERAL: PAINLEVEL_OUTOF10: 8

## 2023-01-04 ASSESSMENT — PAIN DESCRIPTION - DESCRIPTORS: DESCRIPTORS: ACHING

## 2023-01-04 NOTE — PROGRESS NOTES
[facility] Followup Visit Note    Referring Physician : Bora Estrada MD  [id number] [facility] RECORD NUMBER:  [id number]  AGE: 78 y.o. GENDER: female  : 1945  EPISODE DATE:  2023    Subjective:     Chief Complaint   Patient presents with    Wound Check     Left leg        HISTORY of PRESENT ILLNESS HPI   Opal Hernandez is a 78 y.o. female who presents today in regards to follow up evaluation and treatment of wound/ulcer. That patient's past medical, family and social hx were reviewed and changes were made if present. History of Wound Context:  Patient has had left calf wound since ~ 2021. She has been putting a dressing on it. The wound has not been improving. She has a hx significant for venous stasis ulcerations of bilateral LE. She first was seen by myself in regards to these issues 2015. She eventually healed these wounds 2016. I last saw her 2021 at which time I recommended lymphedema therapy. She states she went and said it caused her to much pain and stopped going. She has chronic issues with bilateral calf pain, swelling and edema. She admits to not wearing her stockings because of the pain. She has used knee high 20-30 mm hg stockings in the past.       She is still seeing pain management and is down to percocet //325 mg daily.        21  aquacell  Double tubigrip  Culture done  Emphasized importance of getting in to more significant compression in the future  12/15/21  Culture reviewed - light growth, no tx  Wound slightly improved  Still significant drainage  Plan on compression wrap next week  21  Stable wound  Drainage slightly better  Pt would like to wait till next week because of holidays to start wrap  22  Wound larger  Profore  22  Wound appearance better  Refusing wrap - it rolled down last week and she cut off after 3 days  22  Wound appearance better  Still refusing wrap   3/2/22  periwound worse  Culture  drawtek  3/9/22  periwound much improved  levaquin 750 mg daily #10 script given culture   Wound itself stable  3/23/22  Left anterior calf wound improving overall  New blister/ulcerations left 3rd, 4th and 5th toes and lateral foot   Instructed to keep toes/foot dry, no ointment or corn starch   3/30/22  bistering resolved, other skin issues resolved  More dry than previously but overall stable  kailyn Barrera  Swelling is down, patient declining wrap  4/13/22  Wound slightly improved  4/20/2022  Wound stable, patient refusing compression wrap  5/4/22  Wound worse  Pt refusing wrap  Will change diet per her report to decrease swelling  5/11/22  Wound stable  kailyn and giacomo 2 - pt agreeable now after significant persuasion  5/18/22  Took off wrap within 24 hours due to pain  aquacell and spandigrip  She agreed to use wrap again if swelling not down 2 more cm next week  Wound slightly smaller  5/25/22  Wound improved   Left leg wound debrided   Continue aquacel   6/8/22  Wound larger  Agreeable to wrap - kailyn sena ag  Culture done  6/22/22  Wound stable in size  6/29/22  Wound slightly larger  Still having issues with wrap falling down  Will start hhc - MWF wraps  7/6/22  Improved  hhc starts this week  7/20/22  Appearance better, wound measuring larger  Continue with wraps  Leg edema improved  7/27/22  Slight improvement  8/3/22  Wound appearance and size worse  Issues still with wraps falling down  When doesn't have wraps on than usually uses spandigrips  8/10/2022  Wound cultures noted, Bactrim DS 1 tablet p.o. twice daily, wound looks fairly clean with some exudate only, mild recent lab work, patient recommended CBC and CMP  8/17/22  Refusing wrap today due to pain associated with  Will discuss with Dr Briana Miranda her pain management  Slightly larger, appearance improved  8/24/22  Poor tolerance of wraps  Wound stable in size  9/7/22  Not tolerating wraps - emphasized importance of use  Change to drawtek  Wound slightly improved in size, appearance still concerning  Pumps are going to be deliver  9/14/22  Still not tolerating wraps  Wound appearance improved, size slightly better  Pumps deliovered- she has yet to be in serviced on them   10/2-12/22  Admitted with cellultis  10/19/22  Currently at [facility]  Wound much improved  Posterior wound healed  Tyson jones  [address]  She will follow at [facility] while admitted  11/9/22  She is now home  She is no longer with pain management - there was a disagreement as she states she was taking more because of more pain  [facility] - I left my phone number for them to call me re this pt  Wound has improved  Oarrs reviewed  Percocet 7.5/325 mg #60 (30 days), Ms Contin 15 mg #60 (30 days)  11/16/22  Wound improved but superficial areas medially and laterally cause measurements to be larger  11/30/22  Smaller  More superficial  12/7/22  Improved  Oarrs reviewed  Percocet 7.5/325 mg #60 (30 days), Ms Contin 15 mg #60 (30 days)  Emphasized importance of pain management  12/21/22  Slightly larger  Oarrs reviewed  Gabapentin 300 mg tid  1/4/23  Larger - new wound - so blocked  Some new skin is present  Pt will be calling to remind to get new scripts for pain med - she has still not established with pain management    \Wound/Ulcer Pain Timing/Severity: waxing and waning, mild  Quality of pain: aching, throbbing, pressure  Severity:  7 / 10   Modifying Factors: Pain worsens with debridement, dressing changes  Associated Signs/Symptoms: edema, drainage and pain    Ulcer Identification:  Ulcer Type: venous and lymphedema  Contributing Factors: edema, venous stasis and lymphedema    Diabetic/Pressure/Non Pressure Ulcers only:  Ulcer: Non-Pressure ulcer, fat layer exposed    Wound: N/A  PAST MEDICAL HISTORY      Diagnosis Date    Bursitis     CAD (coronary artery disease) 1993    heart attack    Cellulitis of leg, left 10/3/2022    COPD (chronic obstructive pulmonary disease) ([address] 2013    Emphysema     slight    GERD (gastroesophageal reflux disease)     Hiatal hernia     Hip pain     Hyperlipidemia     Hypertension     Lymphedema of both lower extremities 2018    Venous insufficiency of both lower extremities 2018    Venous stasis ulcer of left calf with fat layer exposed without varicose veins ([address] 2021    Venous stasis ulcer of left calf with fat layer exposed without varicose veins ([address] 2021    Longstanding ulcer over the shin of the left calf, with a new ulcer over the posterior aspect of the left calf, for the last 1 week    Venous ulcer with fat layer exposed ([address] 10/28/2015     Past Surgical History:   Procedure Laterality Date    APPENDECTOMY      BREAST ENHANCEMENT SURGERY      BREAST REDUCTION SURGERY      CHOLECYSTECTOMY      COLONOSCOPY      ECHO COMPL W DOP COLOR FLOW  3/11/2013         ENDOSCOPY, COLON, DIAGNOSTIC      HERNIA REPAIR N/A 2021    LAPAROSCOPIC ROBOTIC ASSISTED INGUINAL HERNIA REPAIR performed by Edmund Vera MD at [address] [place] [address] ([address] [place] [address])      JOINT REPLACEMENT  2009    l knee r hip    LEG DEBRIDEMENT Left 2015    LEG DEBRIDEMENT Left 2016     History reviewed. No pertinent family history.   Social History     Tobacco Use    Smoking status: Former     Packs/day: 1.00     Years: 20.00     Pack years: 20.00     Types: Cigarettes     Quit date: 1995     Years since quittin.1    Smokeless tobacco: Never    Tobacco comments:     Quit   Vaping Use    Vaping Use: Never used   Substance Use Topics    Alcohol use: No    Drug use: No     Allergies   Allergen Reactions    Codeine Nausea And Vomiting and Other (See Comments)     hallucinate    Dilaudid [Hydromorphone Hcl] Rash    Naproxen Other (See Comments)     Shuts down kidney function    Singulair [Montelukast Sodium] Shortness Of Breath     Mucus in chest    Black Cohosh     Black Cohosh [Cimicifuga [place] (Black Cohosh)] Rash     Current Outpatient Medications on File Prior to Encounter   Medication Sig Dispense Refill    gabapentin (NEURONTIN) 300 MG capsule Take 1 capsule by mouth 3 times daily for 30 days. Intended supply: 30 days 90 capsule 0    hydroCHLOROthiazide (HYDRODIURIL) 25 MG tablet Take 1 tablet by mouth daily      morphine (MS CONTIN) 15 MG extended release tablet Take 1 tablet by mouth 2 times daily for 30 days. 60 tablet 0    oxyCODONE-acetaminophen (PERCOCET) 7.5-325 MG per tablet Take 1 tablet by mouth 2 times daily for 30 days. Intended supply: 30 days 60 tablet 0    white petrolatum OINT ointment Apply topically 2 times daily as needed (dry skin coccyx area)  0    Cyanocobalamin (VITAMIN B 12) 500 MCG TABS Take by mouth daily      Multiple Vitamins-Minerals (THERAPEUTIC MULTIVITAMIN-MINERALS) tablet Take 1 tablet by mouth daily      zinc gluconate 50 MG tablet Take 50 mg by mouth daily      GENISTEIN PO Take 125 mg by mouth nightly      folic acid (FOLVITE) 264 MCG tablet Take 400 mcg by mouth nightly      Krill Oil 350 MG CAPS Take 350 mg by mouth nightly      gabapentin (NEURONTIN) 300 MG capsule Take 300 mg by mouth 3 times daily.       simvastatin (ZOCOR) 40 MG tablet Take 40 mg by mouth nightly      aspirin 81 MG tablet Take 81 mg by mouth daily      Cholecalciferol (VITAMIN D) 2000 UNITS CAPS capsule Take 2,000 Units by mouth daily       ferrous sulfate 325 (65 FE) MG tablet Take 325 mg by mouth nightly       metoprolol (LOPRESSOR) 50 MG tablet Take 1 tablet by mouth 2 times daily 60 tablet 0    albuterol (PROVENTIL HFA;VENTOLIN HFA) 108 (90 BASE) MCG/ACT inhaler Inhale 2 puffs into the lungs every 6 hours as needed for Wheezing or Shortness of Breath       calcium carbonate (OYSTER SHELL CALCIUM 500 MG) 1250 MG tablet Take 2 tablets by mouth daily      Ascorbic Acid (VITAMIN C) 500 MG tablet Take 2,000 mg by mouth daily omeprazole (PRILOSEC) 40 MG delayed release capsule Take 40 mg by mouth daily. diphenhydrAMINE (BENADRYL) 50 MG capsule Take 50 mg by mouth daily as needed for Allergies       Garlic 015 MG TABS Take 1,000 mg by mouth daily        No current facility-administered medications on file prior to encounter.        REVIEW OF SYSTEMS See HPI    Objective:    BP (!) 158/80   Pulse 74   Temp 97 °F (36.1 °C) (Temporal)   Resp 16   Wt Readings from Last 3 Encounters:   12/07/22 295 lb (133.8 kg)   11/16/22 295 lb (133.8 kg)   10/09/22 293 lb (132.9 kg)     PHYSICAL EXAM  CONSTITUTIONAL:   Awake, alert, cooperative   EYES:  lids and lashes normal   ENT: external ears and nose without lesions   NECK:  supple, symmetrical, trachea midline   SKIN:  Open wound Present    Assessment:     Problem List Items Addressed This Visit       Lymphedema of both lower extremities (Chronic)    Venous stasis ulcer of left calf with fat layer exposed without varicose veins (HCC) - Primary (Chronic)    Relevant Orders    Initiate Outpatient Wound Care Protocol       Pre Debridement Measurements:  Are located in the [facility]  Documentation Flow Sheet  Post Debridement Measurements:  Wound/Ulcer Descriptions are Pre Debridement except measurements:     Incision 04/20/16 Leg Left (Active)   Number of days: 3579       Incision 08/03/16 Leg Left (Active)   Number of days: 2188       Wound 12/08/21 Pretibial Left #1 (Active)   Wound Image   01/04/23 1512   Dressing Status New dressing applied 12/21/22 1654   Wound Cleansed Cleansed with saline 12/21/22 1654   Dressing/Treatment Alginate;Dry dressing 12/21/22 1654   Offloading for Diabetic Foot Ulcers Offloading not required 11/30/22 1530   Wound Length (cm) 5.8 cm 01/04/23 1512   Wound Width (cm) 4 cm 01/04/23 1512   Wound Depth (cm) 0.2 cm 01/04/23 1512   Wound Surface Area (cm^2) 23.2 cm^2 01/04/23 1512   Change in Wound Size % (l*w) -3214.29 01/04/23 1512   Wound Volume (cm^3) 4.64 cm^3 01/04/23 1512   Wound Healing % -6529 01/04/23 1512   Post-Procedure Length (cm) 5.8 cm 01/04/23 1536   Post-Procedure Width (cm) 4.2 cm 01/04/23 1536   Post-Procedure Depth (cm) 0.2 cm 01/04/23 1536   Post-Procedure Surface Area (cm^2) 24.36 cm^2 01/04/23 1536   Post-Procedure Volume (cm^3) 4.872 cm^3 01/04/23 1536   Wound Assessment Fibrin;Pink/red 01/04/23 1512   Drainage Amount Small 01/04/23 1512   Drainage Description Serosanguinous; Yellow 01/04/23 1512   Odor None 01/04/23 1512   Kori-wound Assessment Intact 01/04/23 1512   Number of days: 392     Incision 04/16/21 Abdomen (Active)   Number of days: 628       Procedure Note  Indications:  Based on my examination of this patient's wound(s)/ulcer(s) today, debridement is required to promote healing and evaluate the wound base. Performed by: Dmitri Broussard MD    Consent obtained:  Yes    Time out taken:  Yes    Pain Control: Anesthetic  Anesthetic: 4% Lidocaine Liquid Topical     Debridement:Excisional Debridement    Using curette the wound(s)/ulcer(s) was/were sharply debrided down through and including the removal of epidermis, dermis, and subcutaneous tissue. Devitalized Tissue Debrided:  fibrin, biofilm, slough, and exudate to stimulate bleeding to promote healing, post debridement good bleeding base and wound edges noted    Wound/Ulcer #: 1    Percent of Wound/Ulcer Debrided: 100%    Total Surface Area Debrided: 23.2 sq cm     Estimated Blood Loss:  Minimal  Hemostasis Achieved:  by pressure    Procedural Pain:  10  / 10   Post Procedural Pain:  9 / 10     Response to treatment:  With complaints of pain. Plan:   Treatment Note please see attached Discharge Instructions    Written patient dismissal instructions given to patient and signed by patient or POA.          Discharge Instructions         Visit Discharge/Physician Orders     Discharge condition: Stable     Assessment of pain at discharge: mild     Anesthetic used: lido 4% Discharge to: Home     Left via:Private automobile     Accompanied by: self     ECF/HHA: [facility]     Dressing Orders:  LEFT PRETIB : cleanse with normal saline, apply calcium alginate, cover with dry dressing  and secure . Change daily. Apply spandagrip. On in am and off in pm. Elevate legs as much as possible above level of the heart. Treatment Orders:Eat a diet high in protein and vitamin C. Take a multiple vitamin daily unless contraindicated. Elevate as much as possible     [address] [place] [address] followup visit: 1 week____________________________  (Please note your next appointment above and if you are unable to keep, kindly give a 24 hour notice. Thank you.)     Physician signature:__________________________      If you experience any of the following, please call the [facility] during business hours:     * Increase in Pain  * Temperature over 101  * Increase in drainage from your wound  * Drainage with a foul odor  * Bleeding  * Increase in swelling  * Need for compression bandage changes due to slippage, breakthrough drainage. If you need medical attention outside of the business hours of the [facility] please contact your PCP or go to the nearest emergency room.                      Electronically signed by Pillo Melgar MD

## 2023-01-09 NOTE — DISCHARGE INSTRUCTIONS
Visit Discharge/Physician Orders     Discharge condition: Stable     Assessment of pain at discharge: mild     Anesthetic used: lido 4%     Discharge to: Home     Left via:Private automobile     Accompanied by: self     ECF/HHA: [facility]     Dressing Orders:  LEFT PRETIB : cleanse with normal saline, apply calcium alginate, cover with dry dressing  and secure . Change daily. Apply spandagrip. On in am and off in pm. Elevate legs as much as possible above level of the heart. Treatment Orders:Eat a diet high in protein and vitamin C. Take a multiple vitamin daily unless contraindicated. Elevate as much as possible     Tissue culture obtained at [facility] today 1-11-23    [facility] followup visit: 1 week____________________________  (Please note your next appointment above and if you are unable to keep, kindly give a 24 hour notice. Thank you.)     Physician signature:__________________________      If you experience any of the following, please call the [facility] during business hours:     * Increase in Pain  * Temperature over 101  * Increase in drainage from your wound  * Drainage with a foul odor  * Bleeding  * Increase in swelling  * Need for compression bandage changes due to slippage, breakthrough drainage. If you need medical attention outside of the business hours of the [facility] please contact your PCP or go to the nearest emergency room.

## 2023-01-10 DIAGNOSIS — I87.2 VENOUS STASIS ULCER OF LEFT CALF WITH FAT LAYER EXPOSED WITHOUT VARICOSE VEINS (HCC): Primary | ICD-10-CM

## 2023-01-10 DIAGNOSIS — L97.222 VENOUS STASIS ULCER OF LEFT CALF WITH FAT LAYER EXPOSED WITHOUT VARICOSE VEINS (HCC): Primary | ICD-10-CM

## 2023-01-10 RX ORDER — MORPHINE SULFATE 15 MG/1
15 TABLET, FILM COATED, EXTENDED RELEASE ORAL 2 TIMES DAILY
Qty: 60 TABLET | Refills: 0 | Status: SHIPPED | OUTPATIENT
Start: 2023-01-10 | End: 2023-02-09

## 2023-01-10 RX ORDER — OXYCODONE AND ACETAMINOPHEN 7.5; 325 MG/1; MG/1
1 TABLET ORAL 2 TIMES DAILY
Qty: 60 TABLET | Refills: 0 | Status: SHIPPED | OUTPATIENT
Start: 2023-01-10 | End: 2023-02-09

## 2023-01-11 ENCOUNTER — HOSPITAL ENCOUNTER (OUTPATIENT)
Dept: WOUND CARE | Age: 78
Discharge: HOME | End: 2023-01-11
Payer: MEDICARE

## 2023-01-11 VITALS
BODY MASS INDEX: 45.99 KG/M2 | SYSTOLIC BLOOD PRESSURE: 153 MMHG | DIASTOLIC BLOOD PRESSURE: 81 MMHG | HEIGHT: 67 IN | RESPIRATION RATE: 18 BRPM | WEIGHT: 293 LBS | HEART RATE: 67 BPM | TEMPERATURE: 97 F

## 2023-01-11 DIAGNOSIS — L97.222 VENOUS STASIS ULCER OF LEFT CALF WITH FAT LAYER EXPOSED WITHOUT VARICOSE VEINS (HCC): Primary | Chronic | ICD-10-CM

## 2023-01-11 DIAGNOSIS — I87.2 VENOUS STASIS ULCER OF LEFT CALF WITH FAT LAYER EXPOSED WITHOUT VARICOSE VEINS (HCC): Primary | Chronic | ICD-10-CM

## 2023-01-11 DIAGNOSIS — I89.0 LYMPHEDEMA OF BOTH LOWER EXTREMITIES: Chronic | ICD-10-CM

## 2023-01-11 DIAGNOSIS — S81.802A WOUNDS, MULTIPLE OPEN, LOWER EXTREMITY, LEFT, INITIAL ENCOUNTER: ICD-10-CM

## 2023-01-11 PROCEDURE — 87077 CULTURE AEROBIC IDENTIFY: CPT

## 2023-01-11 PROCEDURE — 87075 CULTR BACTERIA EXCEPT BLOOD: CPT

## 2023-01-11 PROCEDURE — 87070 CULTURE OTHR SPECIMN AEROBIC: CPT

## 2023-01-11 PROCEDURE — 87186 SC STD MICRODIL/AGAR DIL: CPT

## 2023-01-11 PROCEDURE — 11042 DBRDMT SUBQ TIS 1ST 20SQCM/<: CPT

## 2023-01-11 RX ORDER — LIDOCAINE HYDROCHLORIDE 20 MG/ML
JELLY TOPICAL ONCE
OUTPATIENT
Start: 2023-01-11 | End: 2023-01-11

## 2023-01-11 RX ORDER — BETAMETHASONE DIPROPIONATE 0.05 %
OINTMENT (GRAM) TOPICAL ONCE
OUTPATIENT
Start: 2023-01-11 | End: 2023-01-11

## 2023-01-11 RX ORDER — CLOBETASOL PROPIONATE 0.5 MG/G
OINTMENT TOPICAL ONCE
OUTPATIENT
Start: 2023-01-11 | End: 2023-01-11

## 2023-01-11 RX ORDER — LIDOCAINE HYDROCHLORIDE 40 MG/ML
SOLUTION TOPICAL ONCE
Status: COMPLETED | OUTPATIENT
Start: 2023-01-11 | End: 2023-01-11

## 2023-01-11 RX ORDER — GENTAMICIN SULFATE 1 MG/G
OINTMENT TOPICAL ONCE
OUTPATIENT
Start: 2023-01-11 | End: 2023-01-11

## 2023-01-11 RX ORDER — LIDOCAINE 40 MG/G
CREAM TOPICAL ONCE
OUTPATIENT
Start: 2023-01-11 | End: 2023-01-11

## 2023-01-11 RX ORDER — LIDOCAINE HYDROCHLORIDE 40 MG/ML
SOLUTION TOPICAL ONCE
OUTPATIENT
Start: 2023-01-11 | End: 2023-01-11

## 2023-01-11 RX ORDER — BACITRACIN ZINC AND POLYMYXIN B SULFATE 500; 1000 [USP'U]/G; [USP'U]/G
OINTMENT TOPICAL ONCE
OUTPATIENT
Start: 2023-01-11 | End: 2023-01-11

## 2023-01-11 RX ORDER — GINSENG 100 MG
CAPSULE ORAL ONCE
OUTPATIENT
Start: 2023-01-11 | End: 2023-01-11

## 2023-01-11 RX ORDER — LIDOCAINE 50 MG/G
OINTMENT TOPICAL ONCE
OUTPATIENT
Start: 2023-01-11 | End: 2023-01-11

## 2023-01-11 RX ORDER — BACITRACIN, NEOMYCIN, POLYMYXIN B 400; 3.5; 5 [USP'U]/G; MG/G; [USP'U]/G
OINTMENT TOPICAL ONCE
OUTPATIENT
Start: 2023-01-11 | End: 2023-01-11

## 2023-01-11 RX ADMIN — LIDOCAINE HYDROCHLORIDE 10 ML: 40 SOLUTION TOPICAL at 15:14

## 2023-01-11 ASSESSMENT — PAIN DESCRIPTION - ONSET: ONSET: ON-GOING

## 2023-01-11 ASSESSMENT — PAIN DESCRIPTION - LOCATION: LOCATION: LEG

## 2023-01-11 ASSESSMENT — PAIN DESCRIPTION - DESCRIPTORS: DESCRIPTORS: BURNING

## 2023-01-11 ASSESSMENT — PAIN - FUNCTIONAL ASSESSMENT: PAIN_FUNCTIONAL_ASSESSMENT: ACTIVITIES ARE NOT PREVENTED

## 2023-01-11 ASSESSMENT — PAIN DESCRIPTION - ORIENTATION: ORIENTATION: LEFT

## 2023-01-11 ASSESSMENT — PAIN DESCRIPTION - FREQUENCY: FREQUENCY: CONTINUOUS

## 2023-01-11 ASSESSMENT — PAIN DESCRIPTION - PAIN TYPE: TYPE: CHRONIC PAIN

## 2023-01-11 ASSESSMENT — PAIN SCALES - GENERAL: PAINLEVEL_OUTOF10: 5

## 2023-01-11 NOTE — PROGRESS NOTES
[facility] Followup Visit Note    Referring Physician : Charleen Alexandra MD  [id number] [facility] RECORD NUMBER:  [id number]  AGE: 78 y.o. GENDER: female  : 1945  EPISODE DATE:  2023    Subjective:     Chief Complaint   Patient presents with    Wound Check     Left leg      HISTORY of PRESENT ILLNESS HPI   Jacob Ramirez is a 78 y.o. female who presents today in regards to follow up evaluation and treatment of wound/ulcer. That patient's past medical, family and social hx were reviewed and changes were made if present. History of Wound Context:  Patient has had left calf wound since ~ 2021. She has been putting a dressing on it. The wound has not been improving. She has a hx significant for venous stasis ulcerations of bilateral LE. She first was seen by myself in regards to these issues 2015. She eventually healed these wounds 2016. I last saw her 2021 at which time I recommended lymphedema therapy. She states she went and said it caused her to much pain and stopped going. She has chronic issues with bilateral calf pain, swelling and edema. She admits to not wearing her stockings because of the pain. She has used knee high 20-30 mm hg stockings in the past.       She is still seeing pain management and is down to percocet 7/5/325 mg daily.        21  [facility]  Double tubigrip  Culture done  Emphasized importance of getting in to more significant compression in the future  12/15/21  Culture reviewed - light growth, no tx  Wound slightly improved  Still significant drainage  Plan on compression wrap next week  21  Stable wound  Drainage slightly better  Pt would like to wait till next week because of holidays to start wrap  22  Wound larger  Profore  22  Wound appearance better  Refusing wrap - it rolled down last week and she cut off after 3 days  22  Wound appearance better  Still refusing wrap   3/2/22  periwound worse  Culture  drawtek  3/9/22  periwound much improved  levaquin 750 mg daily #10 script given culture   Wound itself stable  3/23/22  Left anterior calf wound improving overall  New blister/ulcerations left 3rd, 4th and 5th toes and lateral foot   Instructed to keep toes/foot dry, no ointment or corn starch   3/30/22  bistering resolved, other skin issues resolved  More dry than previously but overall stable  kailyn Barrera  Swelling is down, patient declining wrap  4/13/22  Wound slightly improved  4/20/2022  Wound stable, patient refusing compression wrap  5/4/22  Wound worse  Pt refusing wrap  Will change diet per her report to decrease swelling  5/11/22  Wound stable  kailyn and giacomo 2 - pt agreeable now after significant persuasion  5/18/22  Took off wrap within 24 hours due to pain  aquacell and spandigrip  She agreed to use wrap again if swelling not down 2 more cm next week  Wound slightly smaller  5/25/22  Wound improved   Left leg wound debrided   Continue aquacel   6/8/22  Wound larger  Agreeable to wrap - kailyn sena ag  Culture done  6/22/22  Wound stable in size  6/29/22  Wound slightly larger  Still having issues with wrap falling down  Will start hhc - MWF wraps  7/6/22  Improved  hhc starts this week  7/20/22  Appearance better, wound measuring larger  Continue with wraps  Leg edema improved  7/27/22  Slight improvement  8/3/22  Wound appearance and size worse  Issues still with wraps falling down  When doesn't have wraps on than usually uses spandigrips  8/10/2022  Wound cultures noted, Bactrim DS 1 tablet p.o. twice daily, wound looks fairly clean with some exudate only, mild recent lab work, patient recommended CBC and CMP  8/17/22  Refusing wrap today due to pain associated with  Will discuss with Dr Mohan Parsons her pain management  Slightly larger, appearance improved  8/24/22  Poor tolerance of wraps  Wound stable in size  9/7/22  Not tolerating wraps - emphasized importance of use  Change to drawtek  Wound slightly improved in size, appearance still concerning  Pumps are going to be deliver  9/14/22  Still not tolerating wraps  Wound appearance improved, size slightly better  Pumps deliovered- she has yet to be in serviced on them   10/2-12/22  Admitted with cellultis  10/19/22  Currently at [facility]  Wound much improved  Posterior wound healed  Tyson jones  [address]  She will follow at [facility] while admitted  11/9/22  She is now home  She is no longer with pain management - there was a disagreement as she states she was taking more because of more pain  [facility] - I left my phone number for them to call me re this pt  Wound has improved  Oarrs reviewed  Percocet 7.5/325 mg #60 (30 days), Ms Contin 15 mg #60 (30 days)  11/16/22  Wound improved but superficial areas medially and laterally cause measurements to be larger  11/30/22  Smaller  More superficial  12/7/22  Improved  Oarrs reviewed  Percocet 7.5/325 mg #60 (30 days), Ms Contin 15 mg #60 (30 days)  Emphasized importance of pain management  12/21/22  Slightly larger  Oarrs reviewed  Gabapentin 300 mg tid  1/4/23  Larger - new wound - so blocked  Some new skin is present  Pt will be calling to remind to get new scripts for pain med - she has still not established with pain management  1/11/23  Stable in size  Will culture - possible advance skin therapy    \Wound/Ulcer Pain Timing/Severity: waxing and waning, mild  Quality of pain: aching, throbbing, pressure  Severity:  7 / 10   Modifying Factors: Pain worsens with debridement, dressing changes  Associated Signs/Symptoms: edema, drainage and pain    Ulcer Identification:  Ulcer Type: venous and lymphedema  Contributing Factors: edema, venous stasis and lymphedema    Diabetic/Pressure/Non Pressure Ulcers only:  Ulcer: Non-Pressure ulcer, fat layer exposed    Wound: N/A  PAST MEDICAL HISTORY      Diagnosis Date    Bursitis     CAD (coronary artery disease) 1993    heart attack    Cellulitis of leg, left 10/3/2022    COPD (chronic obstructive pulmonary disease) ([facility]) 2013    Emphysema     slight    GERD (gastroesophageal reflux disease)     Hiatal hernia     Hip pain     Hyperlipidemia     Hypertension     Lymphedema of both lower extremities 2018    Venous insufficiency of both lower extremities 2018    Venous stasis ulcer of left calf with fat layer exposed without varicose veins ([address] 2021    Venous stasis ulcer of left calf with fat layer exposed without varicose veins ([address] 2021    Longstanding ulcer over the shin of the left calf, with a new ulcer over the posterior aspect of the left calf, for the last 1 week    Venous ulcer with fat layer exposed ([address] 10/28/2015     Past Surgical History:   Procedure Laterality Date    APPENDECTOMY      BREAST ENHANCEMENT SURGERY      BREAST REDUCTION SURGERY      CHOLECYSTECTOMY      COLONOSCOPY      ECHO COMPL W DOP COLOR FLOW  3/11/2013         ENDOSCOPY, COLON, DIAGNOSTIC      HERNIA REPAIR N/A 2021    LAPAROSCOPIC ROBOTIC ASSISTED INGUINAL HERNIA REPAIR performed by Cinthia Brito MD at [address] [facility] ([address] [place] [address])      JOINT REPLACEMENT  2009    l knee r hip    LEG DEBRIDEMENT Left 2015    LEG DEBRIDEMENT Left 2016     History reviewed. No pertinent family history.   Social History     Tobacco Use    Smoking status: Former     Packs/day: 1.00     Years: 20.00     Pack years: 20.00     Types: Cigarettes     Quit date: 1995     Years since quittin.2    Smokeless tobacco: Never    Tobacco comments:     Quit   Vaping Use    Vaping Use: Never used   Substance Use Topics    Alcohol use: No    Drug use: No     Allergies   Allergen Reactions    Codeine Nausea And Vomiting and Other (See Comments)     hallucinate    Dilaudid [Hydromorphone Hcl] Rash    Naproxen Other (See Comments)     Shuts down kidney function Singulair [Montelukast Sodium] Shortness Of Breath     Mucus in chest    Black Cohosh     Black Cohosh [Cimicifuga [place] (Black Cohosh)] Rash     Current Outpatient Medications on File Prior to Encounter   Medication Sig Dispense Refill    morphine (MS CONTIN) 15 MG extended release tablet Take 1 tablet by mouth 2 times daily for 30 days. Max Daily Amount: 30 mg 60 tablet 0    oxyCODONE-acetaminophen (PERCOCET) 7.5-325 MG per tablet Take 1 tablet by mouth 2 times daily for 30 days. Intended supply: 30 days Max Daily Amount: 2 tablets 60 tablet 0    gabapentin (NEURONTIN) 300 MG capsule Take 1 capsule by mouth 3 times daily for 30 days. Intended supply: 30 days 90 capsule 0    hydroCHLOROthiazide (HYDRODIURIL) 25 MG tablet Take 1 tablet by mouth daily      white petrolatum OINT ointment Apply topically 2 times daily as needed (dry skin coccyx area)  0    Cyanocobalamin (VITAMIN B 12) 500 MCG TABS Take by mouth daily      Multiple Vitamins-Minerals (THERAPEUTIC MULTIVITAMIN-MINERALS) tablet Take 1 tablet by mouth daily      zinc gluconate 50 MG tablet Take 50 mg by mouth daily      GENISTEIN PO Take 125 mg by mouth nightly      folic acid (FOLVITE) 761 MCG tablet Take 400 mcg by mouth nightly      Krill Oil 350 MG CAPS Take 350 mg by mouth nightly      gabapentin (NEURONTIN) 300 MG capsule Take 300 mg by mouth 3 times daily.       simvastatin (ZOCOR) 40 MG tablet Take 40 mg by mouth nightly      aspirin 81 MG tablet Take 81 mg by mouth daily      Cholecalciferol (VITAMIN D) 2000 UNITS CAPS capsule Take 2,000 Units by mouth daily       ferrous sulfate 325 (65 FE) MG tablet Take 325 mg by mouth nightly       metoprolol (LOPRESSOR) 50 MG tablet Take 1 tablet by mouth 2 times daily 60 tablet 0    albuterol (PROVENTIL HFA;VENTOLIN HFA) 108 (90 BASE) MCG/ACT inhaler Inhale 2 puffs into the lungs every 6 hours as needed for Wheezing or Shortness of Breath       calcium carbonate (OYSTER SHELL CALCIUM 500 MG) 1250 MG tablet Take 2 tablets by mouth daily      Ascorbic Acid (VITAMIN C) 500 MG tablet Take 2,000 mg by mouth daily      omeprazole (PRILOSEC) 40 MG delayed release capsule Take 40 mg by mouth daily. diphenhydrAMINE (BENADRYL) 50 MG capsule Take 50 mg by mouth daily as needed for Allergies       Garlic 796 MG TABS Take 1,000 mg by mouth daily        No current facility-administered medications on file prior to encounter. REVIEW OF SYSTEMS See HPI    Objective:    BP (!) 153/81   Pulse 67   Temp 97 °F (36.1 °C) (Temporal)   Resp 18   Ht 5' 7\" (1.702 m)   Wt 295 lb (133.8 kg)   BMI 46.20 kg/m²   Wt Readings from Last 3 Encounters:   01/11/23 295 lb (133.8 kg)   12/07/22 295 lb (133.8 kg)   11/16/22 295 lb (133.8 kg)     PHYSICAL EXAM  CONSTITUTIONAL:   Awake, alert, cooperative   EYES:  lids and lashes normal   ENT: external ears and nose without lesions   NECK:  supple, symmetrical, trachea midline   SKIN:  Open wound Present    Assessment:     Problem List Items Addressed This Visit       Lymphedema of both lower extremities (Chronic)    Venous stasis ulcer of left calf with fat layer exposed without varicose veins (HCC) - Primary (Chronic)    Relevant Orders    Initiate Outpatient Wound Care Protocol    Wounds, multiple open, lower extremity, left, initial encounter       Pre Debridement Measurements:  Are located in the [place]  Documentation Flow Sheet  Post Debridement Measurements:  Wound/Ulcer Descriptions are Pre Debridement except measurements:     Incision 04/20/16 Leg Left (Active)   Number of days: 1734       Incision 08/03/16 Leg Left (Active)   Number of days: 5350       Wound 12/08/21 Pretibial Left #1 (Active)   Wound Image   01/04/23 1512   Dressing Status New dressing applied;Clean;Dry; Intact 01/11/23 1608   Wound Cleansed Cleansed with saline 01/11/23 1608   Dressing/Treatment Alginate;Dry dressing 01/11/23 1608   Offloading for Diabetic Foot Ulcers Offloading not required 01/04/23 1605 Wound Length (cm) 5.7 cm 01/11/23 1517   Wound Width (cm) 4 cm 01/11/23 1517   Wound Depth (cm) 0.2 cm 01/11/23 1517   Wound Surface Area (cm^2) 22.8 cm^2 01/11/23 1517   Change in Wound Size % (l*w) -3157.14 01/11/23 1517   Wound Volume (cm^3) 4.56 cm^3 01/11/23 1517   Wound Healing % -6414 01/11/23 1517   Post-Procedure Length (cm) 5.7 cm 01/11/23 1546   Post-Procedure Width (cm) 4 cm 01/11/23 1546   Post-Procedure Depth (cm) 0.2 cm 01/11/23 1546   Post-Procedure Surface Area (cm^2) 22.8 cm^2 01/11/23 1546   Post-Procedure Volume (cm^3) 4.56 cm^3 01/11/23 1546   Wound Assessment Fibrin;Pink/red 01/11/23 1517   Drainage Amount Moderate 01/11/23 1517   Drainage Description Serosanguinous; Yellow 01/11/23 1517   Odor None 01/11/23 1517   Kori-wound Assessment Intact 01/11/23 1517   Number of days: 399     Incision 04/16/21 Abdomen (Active)   Number of days: 339       Procedure Note  Indications:  Based on my examination of this patient's wound(s)/ulcer(s) today, debridement is required to promote healing and evaluate the wound base. Performed by: Mary Ann Russell MD    Consent obtained:  Yes    Time out taken:  Yes    Pain Control: Anesthetic  Anesthetic: 4% Lidocaine Liquid Topical     Debridement:Excisional Debridement    Using curette the wound(s)/ulcer(s) was/were sharply debrided down through and including the removal of epidermis, dermis, and subcutaneous tissue. Devitalized Tissue Debrided:  fibrin, biofilm, slough, and exudate to stimulate bleeding to promote healing, post debridement good bleeding base and wound edges noted    Wound/Ulcer #: 1    Percent of Wound/Ulcer Debrided: 90%    Total Surface Area Debrided: 20 sq cm     Estimated Blood Loss:  Minimal  Hemostasis Achieved:  by pressure    Procedural Pain:  10  / 10   Post Procedural Pain:  9 / 10     Response to treatment:  With complaints of pain.    Plan:   Treatment Note please see attached Discharge Instructions    Written patient dismissal instructions given to patient and signed by patient or POA. Discharge Instructions         Visit Discharge/Physician Orders     Discharge condition: Stable     Assessment of pain at discharge: mild     Anesthetic used: lido 4%     Discharge to: Home     Left via:Private automobile     Accompanied by: self     ECF/HHA: [facility]     Dressing Orders:  LEFT PRETIB : cleanse with normal saline, apply calcium alginate, cover with dry dressing  and secure . Change daily. Apply spandagrip. On in am and off in pm. Elevate legs as much as possible above level of the heart. Treatment Orders:Eat a diet high in protein and vitamin C. Take a multiple vitamin daily unless contraindicated. Elevate as much as possible     Tissue culture obtained at [facility] today 1-11-23    [facility] followup visit: 1 week____________________________  (Please note your next appointment above and if you are unable to keep, kindly give a 24 hour notice. Thank you.)     Physician signature:__________________________      If you experience any of the following, please call the [facility] during business hours:     * Increase in Pain  * Temperature over 101  * Increase in drainage from your wound  * Drainage with a foul odor  * Bleeding  * Increase in swelling  * Need for compression bandage changes due to slippage, breakthrough drainage. If you need medical attention outside of the business hours of the [facility] [address] please contact your PCP or go to the nearest emergency room.        Electronically signed by Portia Ochoa MD

## 2023-01-11 NOTE — PLAN OF CARE
Problem: Chronic Conditions and Co-morbidities  Goal: Patient's chronic conditions and co-morbidity symptoms are monitored and maintained or improved  Outcome: Progressing     Problem: Pain  Goal: Verbalizes/displays adequate comfort level or baseline comfort level  Outcome: Progressing     Problem: Wound:  Goal: Will show signs of wound healing; wound closure and no evidence of infection  Description: Will show signs of wound healing; wound closure and no evidence of infection  Outcome: Progressing     Problem: Venous:  Goal: Signs of wound healing will improve  Description: Signs of wound healing will improve  Outcome: Adequate for Discharge     Problem: Compression therapy:  Goal: Will be free from complications associated with compression therapy  Description: Will be free from complications associated with compression therapy  Outcome: Progressing

## 2023-01-14 LAB
ANAEROBIC CULTURE: NORMAL
ORGANISM: ABNORMAL
WOUND/ABSCESS: ABNORMAL

## 2023-01-16 RX ORDER — GABAPENTIN 300 MG/1
CAPSULE ORAL
Qty: 90 CAPSULE | Refills: 3 | Status: SHIPPED | OUTPATIENT
Start: 2023-01-16 | End: 2023-02-15

## 2023-01-16 NOTE — DISCHARGE INSTRUCTIONS
Visit Discharge/Physician Orders     Discharge condition: Stable     Assessment of pain at discharge: mild     Anesthetic used: lido 4%     Discharge to: Home     Left via:Private automobile     Accompanied by: self     ECF/HHA: [facility]     Dressing Orders:  LEFT PRETIB : cleanse with normal saline, apply calcium alginate ag, cover with dry dressing  and secure . Change daily. Apply spandagrip. On in am and off in pm. Elevate legs as much as possible above level of the heart. Treatment Orders:Eat a diet high in protein and vitamin C. Take a multiple vitamin daily unless contraindicated. Elevate as much as possible     Start levaquin and bactrim as prescribed-take until finished     [address] [facility] followup visit: 1 week____________________________  (Please note your next appointment above and if you are unable to keep, kindly give a 24 hour notice. Thank you.)     Physician signature:__________________________      If you experience any of the following, please call the [facility] during business hours:     * Increase in Pain  * Temperature over 101  * Increase in drainage from your wound  * Drainage with a foul odor  * Bleeding  * Increase in swelling  * Need for compression bandage changes due to slippage, breakthrough drainage. If you need medical attention outside of the business hours of the [facility] please contact your PCP or go to the nearest emergency room.

## 2023-01-18 ENCOUNTER — HOSPITAL ENCOUNTER (OUTPATIENT)
Dept: WOUND CARE | Age: 78
Discharge: HOME | End: 2023-01-18
Payer: MEDICARE

## 2023-01-18 VITALS
TEMPERATURE: 97.1 F | DIASTOLIC BLOOD PRESSURE: 82 MMHG | HEART RATE: 93 BPM | RESPIRATION RATE: 18 BRPM | SYSTOLIC BLOOD PRESSURE: 178 MMHG

## 2023-01-18 DIAGNOSIS — L97.222 VENOUS STASIS ULCER OF LEFT CALF WITH FAT LAYER EXPOSED WITHOUT VARICOSE VEINS (HCC): Primary | ICD-10-CM

## 2023-01-18 DIAGNOSIS — I87.2 VENOUS STASIS ULCER OF LEFT CALF WITH FAT LAYER EXPOSED WITHOUT VARICOSE VEINS (HCC): Primary | ICD-10-CM

## 2023-01-18 DIAGNOSIS — I89.0 LYMPHEDEMA OF BOTH LOWER EXTREMITIES: Chronic | ICD-10-CM

## 2023-01-18 PROCEDURE — 11042 DBRDMT SUBQ TIS 1ST 20SQCM/<: CPT

## 2023-01-18 RX ORDER — LIDOCAINE HYDROCHLORIDE 20 MG/ML
JELLY TOPICAL ONCE
OUTPATIENT
Start: 2023-01-18 | End: 2023-01-18

## 2023-01-18 RX ORDER — BACITRACIN, NEOMYCIN, POLYMYXIN B 400; 3.5; 5 [USP'U]/G; MG/G; [USP'U]/G
OINTMENT TOPICAL ONCE
OUTPATIENT
Start: 2023-01-18 | End: 2023-01-18

## 2023-01-18 RX ORDER — LIDOCAINE HYDROCHLORIDE 40 MG/ML
SOLUTION TOPICAL ONCE
Status: COMPLETED | OUTPATIENT
Start: 2023-01-18 | End: 2023-01-18

## 2023-01-18 RX ORDER — BETAMETHASONE DIPROPIONATE 0.05 %
OINTMENT (GRAM) TOPICAL ONCE
OUTPATIENT
Start: 2023-01-18 | End: 2023-01-18

## 2023-01-18 RX ORDER — SULFAMETHOXAZOLE AND TRIMETHOPRIM 800; 160 MG/1; MG/1
1 TABLET ORAL 2 TIMES DAILY
Qty: 20 TABLET | Refills: 0 | Status: SHIPPED | OUTPATIENT
Start: 2023-01-18 | End: 2023-01-28

## 2023-01-18 RX ORDER — BACITRACIN ZINC AND POLYMYXIN B SULFATE 500; 1000 [USP'U]/G; [USP'U]/G
OINTMENT TOPICAL ONCE
OUTPATIENT
Start: 2023-01-18 | End: 2023-01-18

## 2023-01-18 RX ORDER — CLOBETASOL PROPIONATE 0.5 MG/G
OINTMENT TOPICAL ONCE
OUTPATIENT
Start: 2023-01-18 | End: 2023-01-18

## 2023-01-18 RX ORDER — GINSENG 100 MG
CAPSULE ORAL ONCE
OUTPATIENT
Start: 2023-01-18 | End: 2023-01-18

## 2023-01-18 RX ORDER — GENTAMICIN SULFATE 1 MG/G
OINTMENT TOPICAL ONCE
OUTPATIENT
Start: 2023-01-18 | End: 2023-01-18

## 2023-01-18 RX ORDER — LIDOCAINE 50 MG/G
OINTMENT TOPICAL ONCE
OUTPATIENT
Start: 2023-01-18 | End: 2023-01-18

## 2023-01-18 RX ORDER — LIDOCAINE HYDROCHLORIDE 40 MG/ML
SOLUTION TOPICAL ONCE
OUTPATIENT
Start: 2023-01-18 | End: 2023-01-18

## 2023-01-18 RX ORDER — LIDOCAINE 40 MG/G
CREAM TOPICAL ONCE
OUTPATIENT
Start: 2023-01-18 | End: 2023-01-18

## 2023-01-18 RX ORDER — LEVOFLOXACIN 500 MG/1
500 TABLET, FILM COATED ORAL DAILY
Qty: 10 TABLET | Refills: 0 | Status: SHIPPED | OUTPATIENT
Start: 2023-01-18 | End: 2023-01-28

## 2023-01-18 RX ADMIN — LIDOCAINE HYDROCHLORIDE 10 ML: 40 SOLUTION TOPICAL at 15:04

## 2023-01-18 ASSESSMENT — PAIN - FUNCTIONAL ASSESSMENT: PAIN_FUNCTIONAL_ASSESSMENT: ACTIVITIES ARE NOT PREVENTED

## 2023-01-18 ASSESSMENT — PAIN DESCRIPTION - DESCRIPTORS: DESCRIPTORS: BURNING;THROBBING

## 2023-01-18 ASSESSMENT — PAIN DESCRIPTION - ORIENTATION: ORIENTATION: LEFT

## 2023-01-18 ASSESSMENT — PAIN SCALES - GENERAL: PAINLEVEL_OUTOF10: 10

## 2023-01-18 ASSESSMENT — PAIN DESCRIPTION - LOCATION: LOCATION: LEG

## 2023-01-18 NOTE — PROGRESS NOTES
[facility] Followup Visit Note    Referring Physician : Blaire Motta MD  [id number] [facility] RECORD NUMBER:  [id number]  AGE: 70 y.o. GENDER: female  : 1945  EPISODE DATE:  2023    Subjective:     Chief Complaint   Patient presents with    Wound Check     Left leg       HISTORY of PRESENT ILLNESS HPI   Sharath Nevarez is a 70 y.o. female who presents today in regards to follow up evaluation and treatment of wound/ulcer. That patient's past medical, family and social hx were reviewed and changes were made if present. History of Wound Context:  Patient has had left calf wound since ~ 2021. She has been putting a dressing on it. The wound has not been improving. She has a hx significant for venous stasis ulcerations of bilateral LE. She first was seen by myself in regards to these issues 2015. She eventually healed these wounds 2016. I last saw her 2021 at which time I recommended lymphedema therapy. She states she went and said it caused her to much pain and stopped going. She has chronic issues with bilateral calf pain, swelling and edema. She admits to not wearing her stockings because of the pain. She has used knee high 20-30 mm hg stockings in the past.       She is still seeing pain management and is down to percocet 7/5/325 mg daily.        21  [facility]  Double tubigrip  Culture done  Emphasized importance of getting in to more significant compression in the future  12/15/21  Culture reviewed - light growth, no tx  Wound slightly improved  Still significant drainage  Plan on compression wrap next week  21  Stable wound  Drainage slightly better  Pt would like to wait till next week because of holidays to start wrap  22  Wound larger  Profore  22  Wound appearance better  Refusing wrap - it rolled down last week and she cut off after 3 days  22  Wound appearance better  Still refusing wrap   3/2/22  periwound worse  Culture  drawtek  3/9/22  periwound much improved  levaquin 750 mg daily #10 script given culture   Wound itself stable  3/23/22  Left anterior calf wound improving overall  New blister/ulcerations left 3rd, 4th and 5th toes and lateral foot   Instructed to keep toes/foot dry, no ointment or corn starch   3/30/22  bistering resolved, other skin issues resolved  More dry than previously but overall stable  kailyn Barrera  Swelling is down, patient declining wrap  4/13/22  Wound slightly improved  4/20/2022  Wound stable, patient refusing compression wrap  5/4/22  Wound worse  Pt refusing wrap  Will change diet per her report to decrease swelling  5/11/22  Wound stable  kailyn and giacomo 2 - pt agreeable now after significant persuasion  5/18/22  Took off wrap within 24 hours due to pain  aquacell and spandigrip  She agreed to use wrap again if swelling not down 2 more cm next week  Wound slightly smaller  5/25/22  Wound improved   Left leg wound debrided   Continue aquacel   6/8/22  Wound larger  Agreeable to wrap - kailyn sena ag  Culture done  6/22/22  Wound stable in size  6/29/22  Wound slightly larger  Still having issues with wrap falling down  Will start hhc - MWF wraps  7/6/22  Improved  hhc starts this week  7/20/22  Appearance better, wound measuring larger  Continue with wraps  Leg edema improved  7/27/22  Slight improvement  8/3/22  Wound appearance and size worse  Issues still with wraps falling down  When doesn't have wraps on than usually uses spandigrips  8/10/2022  Wound cultures noted, Bactrim DS 1 tablet p.o. twice daily, wound looks fairly clean with some exudate only, mild recent lab work, patient recommended CBC and CMP  8/17/22  Refusing wrap today due to pain associated with  Will discuss with Dr Jame Erwin her pain management  Slightly larger, appearance improved  8/24/22  Poor tolerance of wraps  Wound stable in size  9/7/22  Not tolerating wraps - emphasized importance of use  Change to drawtek  Wound slightly improved in size, appearance still concerning  Pumps are going to be deliver  9/14/22  Still not tolerating wraps  Wound appearance improved, size slightly better  Pumps deliovered- she has yet to be in serviced on them   10/2-12/22  Admitted with cellultis  10/19/22  Currently at [facility]  Wound much improved  Posterior wound healed  Tyson jones  [address]  She will follow at [facility] while admitted  11/9/22  She is now home  She is no longer with pain management - there was a disagreement as she states she was taking more because of more pain  Will make referral to [facility] - I left my phone number for them to call me re this pt  Wound has improved  Oarrs reviewed  Percocet 7.5/325 mg #60 (30 days), Ms Contin 15 mg #60 (30 days)  11/16/22  Wound improved but superficial areas medially and laterally cause measurements to be larger  11/30/22  Smaller  More superficial  12/7/22  Improved  Oarrs reviewed  Percocet 7.5/325 mg #60 (30 days), Ms Contin 15 mg #60 (30 days)  Emphasized importance of pain management  12/21/22  Slightly larger  Oarrs reviewed  Gabapentin 300 mg tid  1/4/23  Larger - new wound - so blocked  Some new skin is present  Pt will be calling to remind to get new scripts for pain med - she has still not established with pain management  1/11/23  Stable in size  Will culture - possible advance skin therapy  1/18/23  Stable, appearanc better  Cx - S Aureus, Pseudomonas, Acinetobacter - disc with DR Jodee Reynolds - will tx with levaquin and bactrim  If not improving after will have pt see him in office re possible IV abx    \Wound/Ulcer Pain Timing/Severity: waxing and waning, mild  Quality of pain: aching, throbbing, pressure  Severity:  7 / 10   Modifying Factors: Pain worsens with debridement, dressing changes  Associated Signs/Symptoms: edema, drainage and pain    Ulcer Identification:  Ulcer Type: venous and lymphedema  Contributing Factors: edema, venous stasis and lymphedema    Diabetic/Pressure/Non Pressure Ulcers only:  Ulcer: Non-Pressure ulcer, fat layer exposed    Wound: N/A  PAST MEDICAL HISTORY      Diagnosis Date    Bursitis     CAD (coronary artery disease)     heart attack    Cellulitis of leg, left 10/3/2022    COPD (chronic obstructive pulmonary disease) ([facility]) 2013    Emphysema     slight    GERD (gastroesophageal reflux disease)     Hiatal hernia     Hip pain     Hyperlipidemia     Hypertension     Lymphedema of both lower extremities 2018    Venous insufficiency of both lower extremities 2018    Venous stasis ulcer of left calf with fat layer exposed without varicose veins ([facility]) 2021    Venous stasis ulcer of left calf with fat layer exposed without varicose veins ([facility]) 2021    Longstanding ulcer over the shin of the left calf, with a new ulcer over the posterior aspect of the left calf, for the last 1 week    Venous ulcer with fat layer exposed ([facility]) 10/28/2015     Past Surgical History:   Procedure Laterality Date    APPENDECTOMY      BREAST ENHANCEMENT SURGERY      BREAST REDUCTION SURGERY      CHOLECYSTECTOMY      COLONOSCOPY      ECHO COMPL W DOP COLOR FLOW  3/11/2013         ENDOSCOPY, COLON, DIAGNOSTIC      HERNIA REPAIR N/A 2021    LAPAROSCOPIC ROBOTIC ASSISTED INGUINAL HERNIA REPAIR performed by Gregory Tao MD at [facility] OR    HERNIA REPAIR      HYSTERECTOMY (CERVIX STATUS UNKNOWN)      JOINT REPLACEMENT  2009    l knee r hip    LEG DEBRIDEMENT Left 2015    LEG DEBRIDEMENT Left 2016     History reviewed. No pertinent family history.  Social History     Tobacco Use    Smoking status: Former     Packs/day: 1.00     Years: 20.00     Pack years: 20.00     Types: Cigarettes     Quit date: 1995     Years since quittin.2    Smokeless tobacco: Never    Tobacco comments:     Quit   Vaping Use    Vaping Use: Never used   Substance Use Topics    Alcohol use: No    Drug use: No  Allergies   Allergen Reactions    Codeine Nausea And Vomiting and Other (See Comments)     hallucinate    Dilaudid [Hydromorphone Hcl] Rash    Naproxen Other (See Comments)     Shuts down kidney function    Singulair [Montelukast Sodium] Shortness Of Breath     Mucus in chest    Black Cohosh     Black Cohosh [Cimicifuga [place] (Black Cohosh)] Rash     Current Outpatient Medications on File Prior to Encounter   Medication Sig Dispense Refill    gabapentin (NEURONTIN) 300 MG capsule take 1 capsule by mouth three times a day 90 capsule 3    morphine (MS CONTIN) 15 MG extended release tablet Take 1 tablet by mouth 2 times daily for 30 days. Max Daily Amount: 30 mg 60 tablet 0    oxyCODONE-acetaminophen (PERCOCET) 7.5-325 MG per tablet Take 1 tablet by mouth 2 times daily for 30 days. Intended supply: 30 days Max Daily Amount: 2 tablets 60 tablet 0    hydroCHLOROthiazide (HYDRODIURIL) 25 MG tablet Take 1 tablet by mouth daily      white petrolatum OINT ointment Apply topically 2 times daily as needed (dry skin coccyx area)  0    Cyanocobalamin (VITAMIN B 12) 500 MCG TABS Take by mouth daily      Multiple Vitamins-Minerals (THERAPEUTIC MULTIVITAMIN-MINERALS) tablet Take 1 tablet by mouth daily      zinc gluconate 50 MG tablet Take 50 mg by mouth daily      GENISTEIN PO Take 125 mg by mouth nightly      folic acid (FOLVITE) 881 MCG tablet Take 400 mcg by mouth nightly      Krill Oil 350 MG CAPS Take 350 mg by mouth nightly      gabapentin (NEURONTIN) 300 MG capsule Take 300 mg by mouth 3 times daily.       simvastatin (ZOCOR) 40 MG tablet Take 40 mg by mouth nightly      aspirin 81 MG tablet Take 81 mg by mouth daily      Cholecalciferol (VITAMIN D) 2000 UNITS CAPS capsule Take 2,000 Units by mouth daily       ferrous sulfate 325 (65 FE) MG tablet Take 325 mg by mouth nightly       metoprolol (LOPRESSOR) 50 MG tablet Take 1 tablet by mouth 2 times daily 60 tablet 0    albuterol (PROVENTIL HFA;VENTOLIN HFA) 108 (90 BASE) MCG/ACT inhaler Inhale 2 puffs into the lungs every 6 hours as needed for Wheezing or Shortness of Breath       calcium carbonate (OYSTER SHELL CALCIUM 500 MG) 1250 MG tablet Take 2 tablets by mouth daily      Ascorbic Acid (VITAMIN C) 500 MG tablet Take 2,000 mg by mouth daily      omeprazole (PRILOSEC) 40 MG delayed release capsule Take 40 mg by mouth daily. diphenhydrAMINE (BENADRYL) 50 MG capsule Take 50 mg by mouth daily as needed for Allergies       Garlic 253 MG TABS Take 1,000 mg by mouth daily        No current facility-administered medications on file prior to encounter. REVIEW OF SYSTEMS See HPI    Objective:    BP (!) 178/82   Pulse 93   Temp 97.1 °F (36.2 °C) (Temporal)   Resp 18   Wt Readings from Last 3 Encounters:   01/11/23 295 lb (133.8 kg)   12/07/22 295 lb (133.8 kg)   11/16/22 295 lb (133.8 kg)     PHYSICAL EXAM  CONSTITUTIONAL:   Awake, alert, cooperative   EYES:  lids and lashes normal   ENT: external ears and nose without lesions   NECK:  supple, symmetrical, trachea midline   SKIN:  Open wound Present    Assessment:     Problem List Items Addressed This Visit       Lymphedema of both lower extremities (Chronic)    Venous stasis ulcer of left calf with fat layer exposed without varicose veins (HCC) - Primary (Chronic)    Relevant Orders    Initiate Outpatient Wound Care Protocol       Pre Debridement Measurements:  Are located in the [facility]  Documentation Flow Sheet  Post Debridement Measurements:  Wound/Ulcer Descriptions are Pre Debridement except measurements:     Incision 04/20/16 Leg Left (Active)   Number of days: 2463       Incision 08/03/16 Leg Left (Active)   Number of days: 4135       Wound 12/08/21 Pretibial Left #1 (Active)   Wound Image   01/18/23 1503   Dressing Status New dressing applied;Clean;Dry; Intact 01/11/23 1608   Wound Cleansed Cleansed with saline 01/11/23 1608   Dressing/Treatment Alginate;Dry dressing 01/11/23 1608   Offloading for Diabetic Foot Ulcers Offloading not required 01/04/23 1605   Wound Length (cm) 5.8 cm 01/18/23 1503   Wound Width (cm) 4 cm 01/18/23 1503   Wound Depth (cm) 0.2 cm 01/18/23 1503   Wound Surface Area (cm^2) 23.2 cm^2 01/18/23 1503   Change in Wound Size % (l*w) -3214.29 01/18/23 1503   Wound Volume (cm^3) 4.64 cm^3 01/18/23 1503   Wound Healing % -6529 01/18/23 1503   Post-Procedure Length (cm) 5.7 cm 01/11/23 1546   Post-Procedure Width (cm) 4 cm 01/11/23 1546   Post-Procedure Depth (cm) 0.2 cm 01/11/23 1546   Post-Procedure Surface Area (cm^2) 22.8 cm^2 01/11/23 1546   Post-Procedure Volume (cm^3) 4.56 cm^3 01/11/23 1546   Wound Assessment Fibrin;Pink/red 01/18/23 1503   Drainage Amount Large 01/18/23 1503   Drainage Description Serosanguinous; Yellow 01/18/23 1503   Odor None 01/18/23 1503   Kori-wound Assessment Intact 01/18/23 1503   Number of days: 406     Incision 04/16/21 Abdomen (Active)   Number of days: 204       Procedure Note  Indications:  Based on my examination of this patient's wound(s)/ulcer(s) today, debridement is required to promote healing and evaluate the wound base. Performed by: Rosey Good MD    Consent obtained:  Yes    Time out taken:  Yes    Pain Control: Anesthetic  Anesthetic: 4% Lidocaine Liquid Topical     Debridement:Excisional Debridement    Using curette the wound(s)/ulcer(s) was/were sharply debrided down through and including the removal of epidermis, dermis, and subcutaneous tissue. Devitalized Tissue Debrided:  fibrin, biofilm, slough, and exudate to stimulate bleeding to promote healing, post debridement good bleeding base and wound edges noted    Wound/Ulcer #: 1    Percent of Wound/Ulcer Debrided: 90%    Total Surface Area Debrided: 20 sq cm     Estimated Blood Loss:  Minimal  Hemostasis Achieved:  by pressure    Procedural Pain:  10  / 10   Post Procedural Pain:  9 / 10     Response to treatment:  With complaints of pain.    Plan:   Treatment Note please see attached Discharge Instructions    Written patient dismissal instructions given to patient and signed by patient or POA. Discharge Instructions         Visit Discharge/Physician Orders     Discharge condition: Stable     Assessment of pain at discharge: mild     Anesthetic used: lido 4%     Discharge to: Home     Left via:Private automobile     Accompanied by: self     ECF/HHA: [facility]     Dressing Orders:  LEFT PRETIB : cleanse with normal saline, apply calcium alginate ag, cover with dry dressing  and secure . Change daily. Apply spandagrip. On in am and off in pm. Elevate legs as much as possible above level of the heart. Treatment Orders:Eat a diet high in protein and vitamin C. Take a multiple vitamin daily unless contraindicated. Elevate as much as possible     Start levaquin and bactrim as prescribed-take until finished     [address] [facility] followup visit: 1 week____________________________  (Please note your next appointment above and if you are unable to keep, kindly give a 24 hour notice. Thank you.)     Physician signature:__________________________      If you experience any of the following, please call the [facility] during business hours:     * Increase in Pain  * Temperature over 101  * Increase in drainage from your wound  * Drainage with a foul odor  * Bleeding  * Increase in swelling  * Need for compression bandage changes due to slippage, breakthrough drainage. If you need medical attention outside of the business hours of the [facility] please contact your PCP or go to the nearest emergency room.        Electronically signed by Tami Gottron, MD

## 2023-01-23 NOTE — DISCHARGE INSTRUCTIONS
Visit Discharge/Physician Orders     Discharge condition: Stable     Assessment of pain at discharge: mild     Anesthetic used: lido 4%     Discharge to: Home     Left via:Private automobile     Accompanied by: self     ECF/HHA: [facility]     Dressing Orders:  LEFT PRETIB : cleanse with normal saline, apply calcium alginate ag, cover with dry dressing  and secure . Change daily. Apply spandagrip. On in am and off in pm. Elevate legs as much as possible above level of the heart. Treatment Orders:Eat a diet high in protein and vitamin C. Take a multiple vitamin daily unless contraindicated. Elevate as much as possible     levaquin and bactrim as prescribed-take until finished     [facility] ER followup visit: 1 week____________________________  (Please note your next appointment above and if you are unable to keep, kindly give a 24 hour notice. Thank you.)     Physician signature:__________________________      If you experience any of the following, please call the [facility] during business hours:     * Increase in Pain  * Temperature over 101  * Increase in drainage from your wound  * Drainage with a foul odor  * Bleeding  * Increase in swelling  * Need for compression bandage changes due to slippage, breakthrough drainage. If you need medical attention outside of the business hours of the [facility] please contact your PCP or go to the nearest emergency room.

## 2023-01-25 ENCOUNTER — HOSPITAL ENCOUNTER (OUTPATIENT)
Dept: WOUND CARE | Age: 78
Discharge: HOME | End: 2023-01-25
Payer: MEDICARE

## 2023-01-25 VITALS
HEIGHT: 67 IN | DIASTOLIC BLOOD PRESSURE: 66 MMHG | HEART RATE: 64 BPM | SYSTOLIC BLOOD PRESSURE: 118 MMHG | WEIGHT: 293 LBS | TEMPERATURE: 96.8 F | BODY MASS INDEX: 45.99 KG/M2 | RESPIRATION RATE: 18 BRPM

## 2023-01-25 DIAGNOSIS — I87.2 VENOUS STASIS ULCER OF LEFT CALF WITH FAT LAYER EXPOSED WITHOUT VARICOSE VEINS (HCC): Primary | ICD-10-CM

## 2023-01-25 DIAGNOSIS — L97.222 VENOUS STASIS ULCER OF LEFT CALF WITH FAT LAYER EXPOSED WITHOUT VARICOSE VEINS (HCC): Primary | ICD-10-CM

## 2023-01-25 PROCEDURE — 11042 DBRDMT SUBQ TIS 1ST 20SQCM/<: CPT

## 2023-01-25 RX ORDER — BACITRACIN ZINC AND POLYMYXIN B SULFATE 500; 1000 [USP'U]/G; [USP'U]/G
OINTMENT TOPICAL ONCE
OUTPATIENT
Start: 2023-01-25 | End: 2023-01-25

## 2023-01-25 RX ORDER — BACITRACIN, NEOMYCIN, POLYMYXIN B 400; 3.5; 5 [USP'U]/G; MG/G; [USP'U]/G
OINTMENT TOPICAL ONCE
OUTPATIENT
Start: 2023-01-25 | End: 2023-01-25

## 2023-01-25 RX ORDER — LIDOCAINE HYDROCHLORIDE 40 MG/ML
SOLUTION TOPICAL ONCE
Status: COMPLETED | OUTPATIENT
Start: 2023-01-25 | End: 2023-01-25

## 2023-01-25 RX ORDER — LIDOCAINE HYDROCHLORIDE 20 MG/ML
JELLY TOPICAL ONCE
OUTPATIENT
Start: 2023-01-25 | End: 2023-01-25

## 2023-01-25 RX ORDER — CLOBETASOL PROPIONATE 0.5 MG/G
OINTMENT TOPICAL ONCE
OUTPATIENT
Start: 2023-01-25 | End: 2023-01-25

## 2023-01-25 RX ORDER — LIDOCAINE 40 MG/G
CREAM TOPICAL ONCE
OUTPATIENT
Start: 2023-01-25 | End: 2023-01-25

## 2023-01-25 RX ORDER — GENTAMICIN SULFATE 1 MG/G
OINTMENT TOPICAL ONCE
OUTPATIENT
Start: 2023-01-25 | End: 2023-01-25

## 2023-01-25 RX ORDER — GINSENG 100 MG
CAPSULE ORAL ONCE
OUTPATIENT
Start: 2023-01-25 | End: 2023-01-25

## 2023-01-25 RX ORDER — LIDOCAINE 50 MG/G
OINTMENT TOPICAL ONCE
OUTPATIENT
Start: 2023-01-25 | End: 2023-01-25

## 2023-01-25 RX ORDER — LIDOCAINE HYDROCHLORIDE 40 MG/ML
SOLUTION TOPICAL ONCE
OUTPATIENT
Start: 2023-01-25 | End: 2023-01-25

## 2023-01-25 RX ORDER — BETAMETHASONE DIPROPIONATE 0.05 %
OINTMENT (GRAM) TOPICAL ONCE
OUTPATIENT
Start: 2023-01-25 | End: 2023-01-25

## 2023-01-25 RX ADMIN — LIDOCAINE HYDROCHLORIDE 20 ML: 40 SOLUTION TOPICAL at 15:25

## 2023-01-25 ASSESSMENT — PAIN DESCRIPTION - PROGRESSION: CLINICAL_PROGRESSION: NOT CHANGED

## 2023-01-25 NOTE — PROGRESS NOTES
[facility] Followup Visit Note    Referring Physician : Colleen Hahn MD  [id number] [facility] RECORD NUMBER:  [id number]  AGE: 70 y.o. GENDER: female  : 1945  EPISODE DATE:  2023    Subjective:     Chief Complaint   Patient presents with    Wound Check     Left pretib      HISTORY of PRESENT ILLNESS HPI   Carolyn Corrales is a 70 y.o. female who presents today in regards to follow up evaluation and treatment of wound/ulcer. That patient's past medical, family and social hx were reviewed and changes were made if present. History of Wound Context:  Patient has had left calf wound since ~ 2021. She has been putting a dressing on it. The wound has not been improving. She has a hx significant for venous stasis ulcerations of bilateral LE. She first was seen by myself in regards to these issues 2015. She eventually healed these wounds 2016. I last saw her 2021 at which time I recommended lymphedema therapy. She states she went and said it caused her to much pain and stopped going. She has chronic issues with bilateral calf pain, swelling and edema. She admits to not wearing her stockings because of the pain. She has used knee high 20-30 mm hg stockings in the past.       She is still seeing pain management and is down to percocet 7/5/325 mg daily.        21  aquacell  Double tubigrip  Culture done  Emphasized importance of getting in to more significant compression in the future  12/15/21  Culture reviewed - light growth, no tx  Wound slightly improved  Still significant drainage  Plan on compression wrap next week  21  Stable wound  Drainage slightly better  Pt would like to wait till next week because of holidays to start wrap  22  Wound larger  Profore  22  Wound appearance better  Refusing wrap - it rolled down last week and she cut off after 3 days  22  Wound appearance better  Still refusing wrap   3/2/22  periwound worse  Culture  drawtek  3/9/22  periwound much improved  levaquin 750 mg daily #10 script given culture   Wound itself stable  3/23/22  Left anterior calf wound improving overall  New blister/ulcerations left 3rd, 4th and 5th toes and lateral foot   Instructed to keep toes/foot dry, no ointment or corn starch   3/30/22  bistering resolved, other skin issues resolved  More dry than previously but overall stable  kailyn Barrera  Swelling is down, patient declining wrap  4/13/22  Wound slightly improved  4/20/2022  Wound stable, patient refusing compression wrap  5/4/22  Wound worse  Pt refusing wrap  Will change diet per her report to decrease swelling  5/11/22  Wound stable  kailyn and giacomo 2 - pt agreeable now after significant persuasion  5/18/22  Took off wrap within 24 hours due to pain  aquacell and spandigrip  She agreed to use wrap again if swelling not down 2 more cm next week  Wound slightly smaller  5/25/22  Wound improved   Left leg wound debrided   Continue aquacel   6/8/22  Wound larger  Agreeable to wrap - kailyn sena ag  Culture done  6/22/22  Wound stable in size  6/29/22  Wound slightly larger  Still having issues with wrap falling down  Will start hhc - MWF wraps  7/6/22  Improved  hhc starts this week  7/20/22  Appearance better, wound measuring larger  Continue with wraps  Leg edema improved  7/27/22  Slight improvement  8/3/22  Wound appearance and size worse  Issues still with wraps falling down  When doesn't have wraps on than usually uses spandigrips  8/10/2022  Wound cultures noted, Bactrim DS 1 tablet p.o. twice daily, wound looks fairly clean with some exudate only, mild recent lab work, patient recommended CBC and CMP  8/17/22  Refusing wrap today due to pain associated with  Will discuss with Dr Mino Pandya her pain management  Slightly larger, appearance improved  8/24/22  Poor tolerance of wraps  Wound stable in size  9/7/22  Not tolerating wraps - emphasized importance of use  Change to drawtek  Wound slightly improved in size, appearance still concerning  Pumps are going to be deliver  9/14/22  Still not tolerating wraps  Wound appearance improved, size slightly better  Pumps deliovered- she has yet to be in serviced on them   10/2-12/22  Admitted with cellultis  10/19/22  Currently at [facility]  Wound much improved  Posterior wound healed  Tyson jones  [address]  She will follow at [facility] while admitted  11/9/22  She is now home  She is no longer with pain management - there was a disagreement as she states she was taking more because of more pain  [facility] - I left my phone number for them to call me re this pt  Wound has improved  Oarrs reviewed  Percocet 7.5/325 mg #60 (30 days), Ms Contin 15 mg #60 (30 days)  11/16/22  Wound improved but superficial areas medially and laterally cause measurements to be larger  11/30/22  Smaller  More superficial  12/7/22  Improved  Oarrs reviewed  Percocet 7.5/325 mg #60 (30 days), Ms Contin 15 mg #60 (30 days)  Emphasized importance of pain management  12/21/22  Slightly larger  Oarrs reviewed  Gabapentin 300 mg tid  1/4/23  Larger - new wound - so blocked  Some new skin is present  Pt will be calling to remind to get new scripts for pain med - she has still not established with pain management  1/11/23  Stable in size  Will culture - possible advance skin therapy  1/18/23  Stable, appearanc better  Cx - S Aureus, Pseudomonas, Acinetobacter - disc with DR Polo Vargas - will tx with levaquin and bactrim  If not improving after will have pt see him in office re possible IV abx  1/25/23  On abx  Wound larger, appearance worse but pain improved    \Wound/Ulcer Pain Timing/Severity: waxing and waning, mild  Quality of pain: aching, throbbing, pressure  Severity:  5 / 10   Modifying Factors: Pain worsens with debridement, dressing changes  Associated Signs/Symptoms: edema, drainage and pain    Ulcer Identification:  Ulcer Type: venous and lymphedema  Contributing Factors: edema, venous stasis and lymphedema    Diabetic/Pressure/Non Pressure Ulcers only:  Ulcer: Non-Pressure ulcer, fat layer exposed    Wound: N/A  PAST MEDICAL HISTORY      Diagnosis Date    Bursitis     CAD (coronary artery disease)     heart attack    Cellulitis of leg, left 10/3/2022    COPD (chronic obstructive pulmonary disease) [facility] 2013    Emphysema     slight    GERD (gastroesophageal reflux disease)     Hiatal hernia     Hip pain     Hyperlipidemia     Hypertension     Lymphedema of both lower extremities 2018    Venous insufficiency of both lower extremities 2018    Venous stasis ulcer of left calf with fat layer exposed without varicose veins ([address] 2021    Venous stasis ulcer of left calf with fat layer exposed without varicose veins ([address] 2021    Longstanding ulcer over the shin of the left calf, with a new ulcer over the posterior aspect of the left calf, for the last 1 week    Venous ulcer with fat layer exposed ([address] 10/28/2015     Past Surgical History:   Procedure Laterality Date    APPENDECTOMY      BREAST ENHANCEMENT SURGERY      BREAST REDUCTION SURGERY      CHOLECYSTECTOMY      COLONOSCOPY      ECHO COMPL W DOP COLOR FLOW  3/11/2013         ENDOSCOPY, COLON, DIAGNOSTIC      HERNIA REPAIR N/A 2021    LAPAROSCOPIC ROBOTIC ASSISTED INGUINAL HERNIA REPAIR performed by Monique Gregory MD at [address] [place] [address] ([address] [place] [address])      JOINT REPLACEMENT  2009    l knee r hip    LEG DEBRIDEMENT Left 2015    LEG DEBRIDEMENT Left 2016     History reviewed. No pertinent family history.   Social History     Tobacco Use    Smoking status: Former     Packs/day: 1.00     Years: 20.00     Pack years: 20.00     Types: Cigarettes     Quit date: 1995     Years since quittin.2    Smokeless tobacco: Never    Tobacco comments:     Quit   Vaping Use    Vaping Use: Never used   Substance Use Topics    Alcohol use: No    Drug use: No     Allergies   Allergen Reactions    Codeine Nausea And Vomiting and Other (See Comments)     hallucinate    Dilaudid [Hydromorphone Hcl] Rash    Naproxen Other (See Comments)     Shuts down kidney function    Singulair [Montelukast Sodium] Shortness Of Breath     Mucus in chest    Black Cohosh     Black Cohosh [Cimicifuga [place] (Black Cohosh)] Rash     Current Outpatient Medications on File Prior to Encounter   Medication Sig Dispense Refill    levoFLOXacin (LEVAQUIN) 500 MG tablet Take 1 tablet by mouth daily for 10 days 10 tablet 0    sulfamethoxazole-trimethoprim (BACTRIM DS;SEPTRA DS) 800-160 MG per tablet Take 1 tablet by mouth 2 times daily for 10 days 20 tablet 0    morphine (MS CONTIN) 15 MG extended release tablet Take 1 tablet by mouth 2 times daily for 30 days. Max Daily Amount: 30 mg 60 tablet 0    oxyCODONE-acetaminophen (PERCOCET) 7.5-325 MG per tablet Take 1 tablet by mouth 2 times daily for 30 days. Intended supply: 30 days Max Daily Amount: 2 tablets 60 tablet 0    hydroCHLOROthiazide (HYDRODIURIL) 25 MG tablet Take 1 tablet by mouth daily      Cyanocobalamin (VITAMIN B 12) 500 MCG TABS Take by mouth daily      Multiple Vitamins-Minerals (THERAPEUTIC MULTIVITAMIN-MINERALS) tablet Take 1 tablet by mouth daily      zinc gluconate 50 MG tablet Take 50 mg by mouth daily      GENISTEIN PO Take 125 mg by mouth nightly      folic acid (FOLVITE) 114 MCG tablet Take 400 mcg by mouth nightly      Krill Oil 350 MG CAPS Take 350 mg by mouth nightly      gabapentin (NEURONTIN) 300 MG capsule Take 300 mg by mouth 3 times daily.       simvastatin (ZOCOR) 40 MG tablet Take 40 mg by mouth nightly      aspirin 81 MG tablet Take 81 mg by mouth daily      Cholecalciferol (VITAMIN D) 2000 UNITS CAPS capsule Take 2,000 Units by mouth daily       ferrous sulfate 325 (65 FE) MG tablet Take 325 mg by mouth nightly       metoprolol (LOPRESSOR) 50 MG tablet Take 1 tablet by mouth 2 times daily 60 tablet 0    albuterol (PROVENTIL HFA;VENTOLIN HFA) 108 (90 BASE) MCG/ACT inhaler Inhale 2 puffs into the lungs every 6 hours as needed for Wheezing or Shortness of Breath       calcium carbonate (OYSTER SHELL CALCIUM 500 MG) 1250 MG tablet Take 2 tablets by mouth daily      Ascorbic Acid (VITAMIN C) 500 MG tablet Take 2,000 mg by mouth daily      omeprazole (PRILOSEC) 40 MG delayed release capsule Take 40 mg by mouth daily. diphenhydrAMINE (BENADRYL) 50 MG capsule Take 50 mg by mouth daily as needed for Allergies       Garlic 745 MG TABS Take 1,000 mg by mouth daily        No current facility-administered medications on file prior to encounter. REVIEW OF SYSTEMS See HPI    Objective:    /66   Pulse 64   Temp 96.8 °F (36 °C)   Resp 18   Ht 5' 7\" (1.702 m)   Wt 295 lb (133.8 kg)   BMI 46.20 kg/m²   Wt Readings from Last 3 Encounters:   01/25/23 295 lb (133.8 kg)   01/11/23 295 lb (133.8 kg)   12/07/22 295 lb (133.8 kg)     PHYSICAL EXAM  CONSTITUTIONAL:   Awake, alert, cooperative   EYES:  lids and lashes normal   ENT: external ears and nose without lesions   NECK:  supple, symmetrical, trachea midline   SKIN:  Open wound Present    Assessment:     Problem List Items Addressed This Visit       Venous stasis ulcer of left calf with fat layer exposed without varicose veins (HCC) - Primary (Chronic)    Relevant Orders    Initiate Outpatient Wound Care Protocol       Pre Debridement Measurements:  Are located in the [facility]  Documentation Flow Sheet  Post Debridement Measurements:  Wound/Ulcer Descriptions are Pre Debridement except measurements:     Incision 04/20/16 Leg Left (Active)   Number of days: 7478       Incision 08/03/16 Leg Left (Active)   Number of days: 2366       Wound 12/08/21 Pretibial Left #1 (Active)   Wound Image   01/18/23 1503   Dressing Status New dressing applied;Clean;Dry; Intact 01/18/23 1626   Wound Cleansed Cleansed with saline 01/18/23 1626   Dressing/Treatment Alginate;Dry dressing 01/18/23 1626   Offloading for Diabetic Foot Ulcers Offloading not required 01/18/23 1626   Wound Length (cm) 6.3 cm 01/25/23 1521   Wound Width (cm) 5.2 cm 01/25/23 1521   Wound Depth (cm) 0.3 cm 01/25/23 1521   Wound Surface Area (cm^2) 32.76 cm^2 01/25/23 1521   Change in Wound Size % (l*w) -4580 01/25/23 1521   Wound Volume (cm^3) 9.828 cm^3 01/25/23 1521   Wound Healing % -69197 01/25/23 1521   Post-Procedure Length (cm) 6.4 cm 01/25/23 1543   Post-Procedure Width (cm) 5.3 cm 01/25/23 1543   Post-Procedure Depth (cm) 0.3 cm 01/25/23 1543   Post-Procedure Surface Area (cm^2) 33.92 cm^2 01/25/23 1543   Post-Procedure Volume (cm^3) 10.176 cm^3 01/25/23 1543   Wound Assessment Fibrin;Pink/red 01/25/23 1521   Drainage Amount Large 01/25/23 1521   Drainage Description Serosanguinous; Serous 01/25/23 1521   Odor None 01/25/23 1521   Kori-wound Assessment Maceration 01/25/23 1521   Number of days: 413     Incision 04/16/21 Abdomen (Active)   Number of days: 448       Procedure Note  Indications:  Based on my examination of this patient's wound(s)/ulcer(s) today, debridement is required to promote healing and evaluate the wound base. Performed by: Fareed Gerber MD    Consent obtained:  Yes    Time out taken:  Yes    Pain Control: Anesthetic  Anesthetic: 4% Lidocaine Liquid Topical     Debridement:Excisional Debridement    Using curette the wound(s)/ulcer(s) was/were sharply debrided down through and including the removal of epidermis, dermis, and subcutaneous tissue.         Devitalized Tissue Debrided:  fibrin, biofilm, slough, and exudate to stimulate bleeding to promote healing, post debridement good bleeding base and wound edges noted    Wound/Ulcer #: 1    Percent of Wound/Ulcer Debrided: 70%    Total Surface Area Debrided: 20 sq cm     Estimated Blood Loss:  Minimal  Hemostasis Achieved:  by pressure    Procedural Pain:  10  / 10   Post Procedural Pain:  9 / 10     Response to treatment:  With complaints of pain. Plan:   Treatment Note please see attached Discharge Instructions    Written patient dismissal instructions given to patient and signed by patient or POA. Discharge Instructions         Visit Discharge/Physician Orders     Discharge condition: Stable     Assessment of pain at discharge: mild     Anesthetic used: lido 4%     Discharge to: Home     Left via:Private automobile     Accompanied by: self     ECF/[facility]HA: [facility]     Dressing Orders:  LEFT PRETIB : cleanse with normal saline, apply calcium alginate ag, cover with dry dressing  and secure . Change daily. Apply spandagrip. On in am and off in pm. Elevate legs as much as possible above level of the heart. Treatment Orders:Eat a diet high in protein and vitamin C. Take a multiple vitamin daily unless contraindicated. Elevate as much as possible     levaquin and bactrim as prescribed-take until finished     [address] [facility] followup visit: 1 week____________________________  (Please note your next appointment above and if you are unable to keep, kindly give a 24 hour notice. Thank you.)     Physician signature:__________________________      If you experience any of the following, please call the [address] [place] [address] [address] during business hours:     * Increase in Pain  * Temperature over 101  * Increase in drainage from your wound  * Drainage with a foul odor  * Bleeding  * Increase in swelling  * Need for compression bandage changes due to slippage, breakthrough drainage. If you need medical attention outside of the business hours of the [address] [place] [address] [address] please contact your PCP or go to the nearest emergency room.        Electronically signed by Edyta Montalvo MD

## 2023-01-26 ENCOUNTER — APPOINTMENT (OUTPATIENT)
Dept: ULTRASOUND IMAGING | Age: 78
DRG: 682 | End: 2023-01-26
Payer: MEDICARE

## 2023-01-26 ENCOUNTER — APPOINTMENT (OUTPATIENT)
Dept: GENERAL RADIOLOGY | Age: 78
DRG: 682 | End: 2023-01-26
Payer: MEDICARE

## 2023-01-26 ENCOUNTER — APPOINTMENT (OUTPATIENT)
Dept: CT IMAGING | Age: 78
DRG: 682 | End: 2023-01-26
Payer: MEDICARE

## 2023-01-26 ENCOUNTER — HOSPITAL ENCOUNTER (INPATIENT)
Age: 78
DRG: 682 | End: 2023-01-26
Attending: EMERGENCY MEDICINE | Admitting: INTERNAL MEDICINE
Payer: MEDICARE

## 2023-01-26 DIAGNOSIS — N17.9 AKI (ACUTE KIDNEY INJURY) (HCC): Primary | ICD-10-CM

## 2023-01-26 PROBLEM — R55 SYNCOPE: Status: ACTIVE | Noted: 2023-01-26

## 2023-01-26 PROBLEM — I89.0 LYMPHEDEMA: Status: ACTIVE | Noted: 2018-11-21

## 2023-01-26 PROBLEM — G89.4 CHRONIC PAIN SYNDROME: Status: ACTIVE | Noted: 2023-01-26

## 2023-01-26 PROBLEM — K12.32: Status: ACTIVE | Noted: 2023-01-26

## 2023-01-26 PROBLEM — I73.9 PVD (PERIPHERAL VASCULAR DISEASE) (HCC): Status: ACTIVE | Noted: 2023-01-26

## 2023-01-26 LAB
ALBUMIN SERPL-MCNC: 3.8 G/DL (ref 3.5–5.2)
ALP BLD-CCNC: 119 U/L (ref 35–104)
ALT SERPL-CCNC: 17 U/L (ref 0–32)
ANION GAP SERPL CALCULATED.3IONS-SCNC: 11 MMOL/L (ref 7–16)
AST SERPL-CCNC: 27 U/L (ref 0–31)
BACTERIA: ABNORMAL /HPF
BACTERIA: NORMAL /HPF
BASOPHILS ABSOLUTE: 0.02 E9/L (ref 0–0.2)
BASOPHILS RELATIVE PERCENT: 0.4 % (ref 0–2)
BILIRUB SERPL-MCNC: 0.3 MG/DL (ref 0–1.2)
BILIRUBIN URINE: NEGATIVE
BILIRUBIN URINE: NEGATIVE
BLOOD, URINE: ABNORMAL
BLOOD, URINE: NEGATIVE
BUN BLDV-MCNC: 45 MG/DL (ref 6–23)
CALCIUM SERPL-MCNC: 9.7 MG/DL (ref 8.6–10.2)
CHLORIDE BLD-SCNC: 97 MMOL/L (ref 98–107)
CHLORIDE URINE RANDOM: 85 MMOL/L
CLARITY: CLEAR
CLARITY: CLEAR
CO2: 25 MMOL/L (ref 22–29)
COLOR: YELLOW
COLOR: YELLOW
CREAT SERPL-MCNC: 1.8 MG/DL (ref 0.5–1)
CREATININE URINE: 89 MG/DL (ref 29–226)
EOSINOPHILS ABSOLUTE: 0.14 E9/L (ref 0.05–0.5)
EOSINOPHILS RELATIVE PERCENT: 2.6 % (ref 0–6)
GFR SERPL CREATININE-BSD FRML MDRD: 29 ML/MIN/1.73
GLUCOSE BLD-MCNC: 98 MG/DL (ref 74–99)
GLUCOSE URINE: NEGATIVE MG/DL
GLUCOSE URINE: NEGATIVE MG/DL
HCT VFR BLD CALC: 37.1 % (ref 34–48)
HEMOGLOBIN: 11.5 G/DL (ref 11.5–15.5)
IMMATURE GRANULOCYTES #: 0.01 E9/L
IMMATURE GRANULOCYTES %: 0.2 % (ref 0–5)
INR BLD: 1.1
KETONES, URINE: NEGATIVE MG/DL
KETONES, URINE: NEGATIVE MG/DL
LACTIC ACID: 0.9 MMOL/L (ref 0.5–2.2)
LEUKOCYTE ESTERASE, URINE: NEGATIVE
LEUKOCYTE ESTERASE, URINE: NEGATIVE
LIPASE: 18 U/L (ref 13–60)
LYMPHOCYTES ABSOLUTE: 0.79 E9/L (ref 1.5–4)
LYMPHOCYTES RELATIVE PERCENT: 14.5 % (ref 20–42)
MCH RBC QN AUTO: 30.9 PG (ref 26–35)
MCHC RBC AUTO-ENTMCNC: 31 % (ref 32–34.5)
MCV RBC AUTO: 99.7 FL (ref 80–99.9)
MICROALBUMIN UR-MCNC: 24.1 MG/L
MICROALBUMIN/CREAT UR-RTO: 27.1 (ref 0–30)
MONOCYTES ABSOLUTE: 0.4 E9/L (ref 0.1–0.95)
MONOCYTES RELATIVE PERCENT: 7.3 % (ref 2–12)
NEUTROPHILS ABSOLUTE: 4.1 E9/L (ref 1.8–7.3)
NEUTROPHILS RELATIVE PERCENT: 75 % (ref 43–80)
NITRITE, URINE: NEGATIVE
NITRITE, URINE: NEGATIVE
OSMOLALITY URINE: 627 MOSM/KG (ref 300–900)
PDW BLD-RTO: 12.4 FL (ref 11.5–15)
PH UA: 6 (ref 5–9)
PH UA: 6 (ref 5–9)
PLATELET # BLD: 177 E9/L (ref 130–450)
PMV BLD AUTO: 9.6 FL (ref 7–12)
POTASSIUM SERPL-SCNC: 4.5 MMOL/L (ref 3.5–5)
POTASSIUM, UR: 14.6 MMOL/L
PROTEIN UA: NEGATIVE MG/DL
PROTEIN UA: NEGATIVE MG/DL
PROTHROMBIN TIME: 12.2 SEC (ref 9.3–12.4)
RBC # BLD: 3.72 E12/L (ref 3.5–5.5)
RBC UA: ABNORMAL /HPF (ref 0–2)
RBC UA: NORMAL /HPF (ref 0–2)
SARS-COV-2, NAAT: NOT DETECTED
SODIUM BLD-SCNC: 133 MMOL/L (ref 132–146)
SODIUM URINE: 93 MMOL/L
SPECIFIC GRAVITY UA: 1.02 (ref 1–1.03)
SPECIFIC GRAVITY UA: 1.02 (ref 1–1.03)
TOTAL CK: 182 U/L (ref 20–180)
TOTAL PROTEIN: 8.6 G/DL (ref 6.4–8.3)
TROPONIN, HIGH SENSITIVITY: 60 NG/L (ref 0–9)
TROPONIN, HIGH SENSITIVITY: 77 NG/L (ref 0–9)
UREA NITROGEN, UR: 997 MG/DL (ref 800–1666)
UROBILINOGEN, URINE: 0.2 E.U./DL
UROBILINOGEN, URINE: 0.2 E.U./DL
WBC # BLD: 5.5 E9/L (ref 4.5–11.5)
WBC UA: ABNORMAL /HPF (ref 0–5)
WBC UA: NORMAL /HPF (ref 0–5)
YEAST: PRESENT /HPF

## 2023-01-26 PROCEDURE — 6370000000 HC RX 637 (ALT 250 FOR IP): Performed by: FAMILY MEDICINE

## 2023-01-26 PROCEDURE — 85025 COMPLETE CBC W/AUTO DIFF WBC: CPT

## 2023-01-26 PROCEDURE — 96374 THER/PROPH/DIAG INJ IV PUSH: CPT

## 2023-01-26 PROCEDURE — 81001 URINALYSIS AUTO W/SCOPE: CPT

## 2023-01-26 PROCEDURE — 80053 COMPREHEN METABOLIC PANEL: CPT

## 2023-01-26 PROCEDURE — 73590 X-RAY EXAM OF LOWER LEG: CPT

## 2023-01-26 PROCEDURE — 84540 ASSAY OF URINE/UREA-N: CPT

## 2023-01-26 PROCEDURE — 2700000000 HC OXYGEN THERAPY PER DAY

## 2023-01-26 PROCEDURE — 84300 ASSAY OF URINE SODIUM: CPT

## 2023-01-26 PROCEDURE — 82550 ASSAY OF CK (CPK): CPT

## 2023-01-26 PROCEDURE — 76770 US EXAM ABDO BACK WALL COMP: CPT

## 2023-01-26 PROCEDURE — 87040 BLOOD CULTURE FOR BACTERIA: CPT

## 2023-01-26 PROCEDURE — 87088 URINE BACTERIA CULTURE: CPT

## 2023-01-26 PROCEDURE — 99285 EMERGENCY DEPT VISIT HI MDM: CPT

## 2023-01-26 PROCEDURE — 82436 ASSAY OF URINE CHLORIDE: CPT

## 2023-01-26 PROCEDURE — 87635 SARS-COV-2 COVID-19 AMP PRB: CPT

## 2023-01-26 PROCEDURE — 84133 ASSAY OF URINE POTASSIUM: CPT

## 2023-01-26 PROCEDURE — 1200000000 HC SEMI PRIVATE

## 2023-01-26 PROCEDURE — 2580000003 HC RX 258: Performed by: FAMILY MEDICINE

## 2023-01-26 PROCEDURE — 83935 ASSAY OF URINE OSMOLALITY: CPT

## 2023-01-26 PROCEDURE — 70450 CT HEAD/BRAIN W/O DYE: CPT

## 2023-01-26 PROCEDURE — 6360000002 HC RX W HCPCS: Performed by: EMERGENCY MEDICINE

## 2023-01-26 PROCEDURE — 87070 CULTURE OTHR SPECIMN AEROBIC: CPT

## 2023-01-26 PROCEDURE — 82044 UR ALBUMIN SEMIQUANTITATIVE: CPT

## 2023-01-26 PROCEDURE — 84484 ASSAY OF TROPONIN QUANT: CPT

## 2023-01-26 PROCEDURE — 71045 X-RAY EXAM CHEST 1 VIEW: CPT

## 2023-01-26 PROCEDURE — 87075 CULTR BACTERIA EXCEPT BLOOD: CPT

## 2023-01-26 PROCEDURE — 83605 ASSAY OF LACTIC ACID: CPT

## 2023-01-26 PROCEDURE — 99223 1ST HOSP IP/OBS HIGH 75: CPT | Performed by: FAMILY MEDICINE

## 2023-01-26 PROCEDURE — 72125 CT NECK SPINE W/O DYE: CPT

## 2023-01-26 PROCEDURE — 87205 SMEAR GRAM STAIN: CPT

## 2023-01-26 PROCEDURE — 73502 X-RAY EXAM HIP UNI 2-3 VIEWS: CPT

## 2023-01-26 PROCEDURE — 6360000002 HC RX W HCPCS: Performed by: FAMILY MEDICINE

## 2023-01-26 PROCEDURE — 85610 PROTHROMBIN TIME: CPT

## 2023-01-26 PROCEDURE — 83690 ASSAY OF LIPASE: CPT

## 2023-01-26 PROCEDURE — 6370000000 HC RX 637 (ALT 250 FOR IP)

## 2023-01-26 PROCEDURE — 82570 ASSAY OF URINE CREATININE: CPT

## 2023-01-26 RX ORDER — ACETAMINOPHEN 325 MG/1
650 TABLET ORAL EVERY 6 HOURS PRN
Status: DISCONTINUED | OUTPATIENT
Start: 2023-01-26 | End: 2023-02-01 | Stop reason: HOSPADM

## 2023-01-26 RX ORDER — ENOXAPARIN SODIUM 100 MG/ML
30 INJECTION SUBCUTANEOUS 2 TIMES DAILY
Status: DISCONTINUED | OUTPATIENT
Start: 2023-01-26 | End: 2023-02-01 | Stop reason: HOSPADM

## 2023-01-26 RX ORDER — SODIUM CHLORIDE 0.9 % (FLUSH) 0.9 %
5-40 SYRINGE (ML) INJECTION PRN
Status: DISCONTINUED | OUTPATIENT
Start: 2023-01-26 | End: 2023-02-01 | Stop reason: HOSPADM

## 2023-01-26 RX ORDER — SODIUM CHLORIDE 0.9 % (FLUSH) 0.9 %
5-40 SYRINGE (ML) INJECTION EVERY 12 HOURS SCHEDULED
Status: DISCONTINUED | OUTPATIENT
Start: 2023-01-26 | End: 2023-02-01 | Stop reason: HOSPADM

## 2023-01-26 RX ORDER — ACETAMINOPHEN 650 MG/1
650 SUPPOSITORY RECTAL EVERY 6 HOURS PRN
Status: DISCONTINUED | OUTPATIENT
Start: 2023-01-26 | End: 2023-02-01 | Stop reason: HOSPADM

## 2023-01-26 RX ORDER — OXYCODONE HYDROCHLORIDE AND ACETAMINOPHEN 5; 325 MG/1; MG/1
1 TABLET ORAL 2 TIMES DAILY
Status: DISCONTINUED | OUTPATIENT
Start: 2023-01-26 | End: 2023-01-26

## 2023-01-26 RX ORDER — CHOLECALCIFEROL (VITAMIN D3) 50 MCG
2000 TABLET ORAL DAILY
Status: DISCONTINUED | OUTPATIENT
Start: 2023-01-26 | End: 2023-02-01 | Stop reason: HOSPADM

## 2023-01-26 RX ORDER — ATORVASTATIN CALCIUM 20 MG/1
20 TABLET, FILM COATED ORAL DAILY
Status: DISCONTINUED | OUTPATIENT
Start: 2023-01-26 | End: 2023-02-01 | Stop reason: HOSPADM

## 2023-01-26 RX ORDER — GABAPENTIN 300 MG/1
300 CAPSULE ORAL 3 TIMES DAILY
Status: DISCONTINUED | OUTPATIENT
Start: 2023-01-26 | End: 2023-02-01 | Stop reason: HOSPADM

## 2023-01-26 RX ORDER — HYDROCODONE BITARTRATE AND ACETAMINOPHEN 7.5; 325 MG/1; MG/1
1 TABLET ORAL 2 TIMES DAILY
Status: DISCONTINUED | OUTPATIENT
Start: 2023-01-26 | End: 2023-01-26

## 2023-01-26 RX ORDER — SODIUM CHLORIDE 9 MG/ML
INJECTION, SOLUTION INTRAVENOUS PRN
Status: DISCONTINUED | OUTPATIENT
Start: 2023-01-26 | End: 2023-02-01 | Stop reason: HOSPADM

## 2023-01-26 RX ORDER — LANOLIN ALCOHOL/MO/W.PET/CERES
500 CREAM (GRAM) TOPICAL DAILY
Status: DISCONTINUED | OUTPATIENT
Start: 2023-01-26 | End: 2023-02-01 | Stop reason: HOSPADM

## 2023-01-26 RX ORDER — FENTANYL CITRATE 50 UG/ML
50 INJECTION, SOLUTION INTRAMUSCULAR; INTRAVENOUS ONCE
Status: COMPLETED | OUTPATIENT
Start: 2023-01-26 | End: 2023-01-26

## 2023-01-26 RX ORDER — HYDROCHLOROTHIAZIDE 25 MG/1
25 TABLET ORAL DAILY
Status: DISCONTINUED | OUTPATIENT
Start: 2023-01-26 | End: 2023-01-26

## 2023-01-26 RX ORDER — HYDROCODONE BITARTRATE AND ACETAMINOPHEN 5; 325 MG/1; MG/1
1 TABLET ORAL 2 TIMES DAILY PRN
Status: DISCONTINUED | OUTPATIENT
Start: 2023-01-26 | End: 2023-01-27

## 2023-01-26 RX ORDER — PANTOPRAZOLE SODIUM 40 MG/1
40 TABLET, DELAYED RELEASE ORAL
Status: DISCONTINUED | OUTPATIENT
Start: 2023-01-27 | End: 2023-02-01 | Stop reason: HOSPADM

## 2023-01-26 RX ORDER — SODIUM CHLORIDE 9 MG/ML
INJECTION, SOLUTION INTRAVENOUS CONTINUOUS
Status: DISCONTINUED | OUTPATIENT
Start: 2023-01-26 | End: 2023-01-31

## 2023-01-26 RX ORDER — OXYCODONE HYDROCHLORIDE 5 MG/1
2.5 TABLET ORAL 2 TIMES DAILY
Status: DISCONTINUED | OUTPATIENT
Start: 2023-01-26 | End: 2023-01-26

## 2023-01-26 RX ORDER — FERROUS SULFATE 325(65) MG
325 TABLET ORAL NIGHTLY
Status: DISCONTINUED | OUTPATIENT
Start: 2023-01-26 | End: 2023-02-01 | Stop reason: HOSPADM

## 2023-01-26 RX ORDER — METOPROLOL TARTRATE 50 MG/1
50 TABLET, FILM COATED ORAL 2 TIMES DAILY
Status: DISCONTINUED | OUTPATIENT
Start: 2023-01-26 | End: 2023-02-01 | Stop reason: HOSPADM

## 2023-01-26 RX ORDER — HYDROCODONE BITARTRATE AND ACETAMINOPHEN 7.5; 325 MG/1; MG/1
1 TABLET ORAL 2 TIMES DAILY PRN
Status: DISCONTINUED | OUTPATIENT
Start: 2023-01-26 | End: 2023-01-27

## 2023-01-26 RX ORDER — ZINC SULFATE 50(220)MG
50 CAPSULE ORAL DAILY
Status: DISCONTINUED | OUTPATIENT
Start: 2023-01-26 | End: 2023-02-01 | Stop reason: HOSPADM

## 2023-01-26 RX ORDER — ASPIRIN 81 MG/1
81 TABLET ORAL DAILY
Status: DISCONTINUED | OUTPATIENT
Start: 2023-01-26 | End: 2023-02-01 | Stop reason: HOSPADM

## 2023-01-26 RX ORDER — MORPHINE SULFATE 15 MG/1
15 TABLET, FILM COATED, EXTENDED RELEASE ORAL 2 TIMES DAILY
Status: DISCONTINUED | OUTPATIENT
Start: 2023-01-26 | End: 2023-02-01 | Stop reason: HOSPADM

## 2023-01-26 RX ORDER — ALBUTEROL SULFATE 2.5 MG/3ML
2.5 SOLUTION RESPIRATORY (INHALATION) EVERY 6 HOURS PRN
Status: DISCONTINUED | OUTPATIENT
Start: 2023-01-26 | End: 2023-01-29

## 2023-01-26 RX ORDER — OXYCODONE AND ACETAMINOPHEN 7.5; 325 MG/1; MG/1
1 TABLET ORAL 2 TIMES DAILY
Status: DISCONTINUED | OUTPATIENT
Start: 2023-01-26 | End: 2023-01-26

## 2023-01-26 RX ORDER — ONDANSETRON 4 MG/1
4 TABLET, ORALLY DISINTEGRATING ORAL EVERY 8 HOURS PRN
Status: DISCONTINUED | OUTPATIENT
Start: 2023-01-26 | End: 2023-02-01 | Stop reason: HOSPADM

## 2023-01-26 RX ORDER — ONDANSETRON 2 MG/ML
4 INJECTION INTRAMUSCULAR; INTRAVENOUS EVERY 6 HOURS PRN
Status: DISCONTINUED | OUTPATIENT
Start: 2023-01-26 | End: 2023-02-01 | Stop reason: HOSPADM

## 2023-01-26 RX ORDER — CALCIUM CARBONATE 500(1250)
2500 TABLET ORAL DAILY
Status: DISCONTINUED | OUTPATIENT
Start: 2023-01-26 | End: 2023-02-01 | Stop reason: HOSPADM

## 2023-01-26 RX ORDER — M-VIT,TX,IRON,MINS/CALC/FOLIC 27MG-0.4MG
1 TABLET ORAL DAILY
Status: DISCONTINUED | OUTPATIENT
Start: 2023-01-26 | End: 2023-02-01 | Stop reason: HOSPADM

## 2023-01-26 RX ADMIN — FENTANYL CITRATE 50 MCG: 50 INJECTION, SOLUTION INTRAMUSCULAR; INTRAVENOUS at 12:12

## 2023-01-26 RX ADMIN — HYDROCODONE BITARTRATE AND ACETAMINOPHEN 1 TABLET: 7.5; 325 TABLET ORAL at 17:35

## 2023-01-26 RX ADMIN — METOPROLOL TARTRATE 50 MG: 50 TABLET, FILM COATED ORAL at 20:19

## 2023-01-26 RX ADMIN — ENOXAPARIN SODIUM 30 MG: 100 INJECTION SUBCUTANEOUS at 20:21

## 2023-01-26 RX ADMIN — MORPHINE SULFATE 15 MG: 15 TABLET, FILM COATED, EXTENDED RELEASE ORAL at 20:19

## 2023-01-26 RX ADMIN — FERROUS SULFATE TAB 325 MG (65 MG ELEMENTAL FE) 325 MG: 325 (65 FE) TAB at 20:19

## 2023-01-26 RX ADMIN — GABAPENTIN 300 MG: 300 CAPSULE ORAL at 17:35

## 2023-01-26 RX ADMIN — SODIUM CHLORIDE: 9 INJECTION, SOLUTION INTRAVENOUS at 16:16

## 2023-01-26 RX ADMIN — SODIUM CHLORIDE, PRESERVATIVE FREE 10 ML: 5 INJECTION INTRAVENOUS at 20:19

## 2023-01-26 RX ADMIN — ATORVASTATIN CALCIUM 20 MG: 20 TABLET, FILM COATED ORAL at 17:35

## 2023-01-26 RX ADMIN — GABAPENTIN 300 MG: 300 CAPSULE ORAL at 20:19

## 2023-01-26 RX ADMIN — SODIUM CHLORIDE, PRESERVATIVE FREE 10 ML: 5 INJECTION INTRAVENOUS at 20:05

## 2023-01-26 ASSESSMENT — PAIN SCALES - GENERAL
PAINLEVEL_OUTOF10: 7
PAINLEVEL_OUTOF10: 8
PAINLEVEL_OUTOF10: 0
PAINLEVEL_OUTOF10: 9

## 2023-01-26 ASSESSMENT — PAIN DESCRIPTION - PAIN TYPE: TYPE: ACUTE PAIN

## 2023-01-26 ASSESSMENT — LIFESTYLE VARIABLES
HOW OFTEN DO YOU HAVE A DRINK CONTAINING ALCOHOL: NEVER
HOW MANY STANDARD DRINKS CONTAINING ALCOHOL DO YOU HAVE ON A TYPICAL DAY: PATIENT DOES NOT DRINK

## 2023-01-26 ASSESSMENT — PAIN DESCRIPTION - DESCRIPTORS
DESCRIPTORS: ACHING
DESCRIPTORS: ACHING

## 2023-01-26 ASSESSMENT — PAIN DESCRIPTION - LOCATION
LOCATION: ANKLE
LOCATION: HIP
LOCATION: BACK;HIP
LOCATION: GENERALIZED

## 2023-01-26 ASSESSMENT — ENCOUNTER SYMPTOMS
NAUSEA: 0
ABDOMINAL PAIN: 0
CONSTIPATION: 0
DIARRHEA: 0
COUGH: 1
VOMITING: 0
SHORTNESS OF BREATH: 1

## 2023-01-26 ASSESSMENT — PAIN DESCRIPTION - ORIENTATION
ORIENTATION: RIGHT;LEFT
ORIENTATION: RIGHT

## 2023-01-26 ASSESSMENT — PAIN - FUNCTIONAL ASSESSMENT
PAIN_FUNCTIONAL_ASSESSMENT: PREVENTS OR INTERFERES SOME ACTIVE ACTIVITIES AND ADLS
PAIN_FUNCTIONAL_ASSESSMENT: 0-10

## 2023-01-26 NOTE — PROGRESS NOTES
Spoke with Dr. Davidson Stiles regarding pain meds ordered. See new orders.     Electronically signed by Mitali Ngo RN on 1/26/2023 at 5:16 PM

## 2023-01-26 NOTE — PROGRESS NOTES
Wound care consult sent through [facility].     Electronically signed by Louise Dumas RN on 1/26/2023 at 4:28 PM

## 2023-01-26 NOTE — PROGRESS NOTES
Pt. IV placed in right AC - pt. Refusing to keep arm straight for fluids to infuse. Attempted x2 to place new site, but unsuccessful. IV team consult placed.     Electronically signed by Joe Baldwin RN on 1/26/2023 at 5557

## 2023-01-26 NOTE — H&P
[place] [id number]  Resident History and Physical      Chief Complaint:    Chief Complaint   Patient presents with    Fall     Pt family was trying to get pt to car to bring her to ER pt fell on steps outside and hit head also has a laceration above the right eye. No thnners    Extremity Weakness     past week pt has been having generalized weakness and being off balance for the past week         History of Present Illness:   Daniel Ayala  is a 70 y.o. female patient of Phoebe Junior MD  with a pertinent PMHx of HTN, CHF,COPD, nonhealing wound of left calf who presented to the ED from home with chief complaint of a fall. Patient states she was passing her sister on steps and fell to the ground due to poor balance. She hit her head on concrete. She denies losing consciousness. She states that she has felt off for the past 8 days since starting antibiotics. She has a history of a nonhealing wound culture on 1/11 grew Staph aureus with presumed resistance, pseudomonas, and Acinetobacter. She was prescribed levaquin and bactrim. Since then she has felt weak, constipated, and off balance. She also complains of a productive cough for the past several weeks. She denies any fevers chills, nausea, vomiting, or chest pain. In the ED vitals were unremarkable. Labs were remarkable for creatine 1.8 baseline 0.9, troponin 77 and 60. CT head showed no acute abnormalities. CT spine shows degenerative changes. X ray of right hip showed no acute abnormalities. Chest xray shows no acute abnormalities. Xray of the tibia fibula shows no acute problems. She was admitted for DAYTON.            Past Medical History:   Diagnosis Date    Bursitis     CAD (coronary artery disease) 1993    heart attack    Cellulitis of leg, left 10/3/2022    COPD (chronic obstructive pulmonary disease) ([facility]) 6/19/2013    Emphysema     slight    GERD (gastroesophageal reflux disease)     Hiatal hernia     Hip pain Hyperlipidemia     Hypertension     Lymphedema of both lower extremities 11/21/2018    Venous insufficiency of both lower extremities 11/21/2018    Venous stasis ulcer of left calf with fat layer exposed without varicose veins ([address] 12/8/2021    Venous stasis ulcer of left calf with fat layer exposed without varicose veins ([address] 12/8/2021    Longstanding ulcer over the shin of the left calf, with a new ulcer over the posterior aspect of the left calf, for the last 1 week    Venous ulcer with fat layer exposed ([address] 10/28/2015         Past Surgical History:   Procedure Laterality Date    APPENDECTOMY      BREAST ENHANCEMENT SURGERY      BREAST REDUCTION SURGERY      CHOLECYSTECTOMY      COLONOSCOPY      ECHO COMPL W DOP COLOR FLOW  3/11/2013         ENDOSCOPY, COLON, DIAGNOSTIC      HERNIA REPAIR N/A 4/16/2021    LAPAROSCOPIC ROBOTIC ASSISTED INGUINAL HERNIA REPAIR performed by Jonathan Andrade MD at [facility] ([address] [place] [address])      JOINT REPLACEMENT  2009 2011    l knee r hip    LEG DEBRIDEMENT Left 11/18/2015    LEG DEBRIDEMENT Left 08/03/2016       Medications Prior to Admission:    Prior to Admission medications    Medication Sig Start Date End Date Taking? Authorizing Provider   levoFLOXacin (LEVAQUIN) 500 MG tablet Take 1 tablet by mouth daily for 10 days 1/18/23 1/28/23 Yes Cammy Combs MD   sulfamethoxazole-trimethoprim (BACTRIM DS;SEPTRA DS) 800-160 MG per tablet Take 1 tablet by mouth 2 times daily for 10 days 1/18/23 1/28/23 Yes Cammy Combs MD   morphine (MS CONTIN) 15 MG extended release tablet Take 1 tablet by mouth 2 times daily for 30 days. Max Daily Amount: 30 mg 1/10/23 2/9/23 Yes Cammy Combs MD   oxyCODONE-acetaminophen (PERCOCET) 7.5-325 MG per tablet Take 1 tablet by mouth 2 times daily for 30 days.  Intended supply: 30 days Max Daily Amount: 2 tablets 1/10/23 2/9/23 Yes Cammy Combs MD   hydroCHLOROthiazide (HYDRODIURIL) 25 MG tablet Take 1 tablet by mouth daily   Yes Historical Provider, MD   Cyanocobalamin (VITAMIN B 12) 500 MCG TABS Take by mouth daily   Yes Historical Provider, MD   Multiple Vitamins-Minerals (THERAPEUTIC MULTIVITAMIN-MINERALS) tablet Take 1 tablet by mouth daily   Yes Historical Provider, MD   zinc gluconate 50 MG tablet Take 50 mg by mouth daily   Yes Historical Provider, MD   GENISTEIN PO Take 125 mg by mouth nightly   Yes Historical Provider, MD   folic acid (FOLVITE) 907 MCG tablet Take 400 mcg by mouth nightly   Yes Historical Provider, MD   Sh[employer]ne [employer] Oil 350 MG CAPS Take 350 mg by mouth nightly   Yes Historical Provider, MD   gabapentin (NEURONTIN) 300 MG capsule Take 300 mg by mouth 3 times daily. Yes Historical Provider, MD   simvastatin (ZOCOR) 40 MG tablet Take 40 mg by mouth nightly   Yes Historical Provider, MD   aspirin 81 MG tablet Take 81 mg by mouth daily   Yes Historical Provider, MD   Cholecalciferol (VITAMIN D) 2000 UNITS CAPS capsule Take 2,000 Units by mouth daily    Yes Historical Provider, MD   ferrous sulfate 325 (65 FE) MG tablet Take 325 mg by mouth nightly    Yes Historical Provider, MD   metoprolol (LOPRESSOR) 50 MG tablet Take 1 tablet by mouth 2 times daily 3/3/16  Yes Ivonne Pritchard PA-C   albuterol (PROVENTIL HFA;VENTOLIN HFA) 108 (90 BASE) MCG/ACT inhaler Inhale 2 puffs into the lungs every 6 hours as needed for Wheezing or Shortness of Breath    Yes Historical Provider, MD   calcium carbonate (OYSTER SHELL CALCIUM 500 MG) 1250 MG tablet Take 2 tablets by mouth daily   Yes Historical Provider, MD   Ascorbic Acid (VITAMIN C) 500 MG tablet Take 2,000 mg by mouth daily   Yes Historical Provider, MD   omeprazole (PRILOSEC) 40 MG delayed release capsule Take 40 mg by mouth daily.    Yes Historical Provider, MD   diphenhydrAMINE (BENADRYL) 50 MG capsule Take 50 mg by mouth daily as needed for Allergies    Yes Historical Provider, MD   Garlic 768 MG TABS Take 1,000 mg by mouth daily    Yes Historical Provider, MD        Allergies:   Codeine, Dilaudid [hydromorphone hcl], Naproxen, Singulair [montelukast sodium], Black cohosh, and Black cohosh [cimicifuga racemosa (black cohosh)]    Social History:    reports that she quit smoking about 27 years ago. Her smoking use included cigarettes. She has a 20.00 pack-year smoking history. She has never used smokeless tobacco. She reports that she does not drink alcohol and does not use drugs. Family History:   family history is not on file. ROS:   Review of Systems   Constitutional:  Negative for chills and fever. Respiratory:  Positive for cough and shortness of breath. Cardiovascular:  Negative for chest pain and palpitations. Gastrointestinal:  Negative for abdominal pain, constipation, diarrhea, nausea and vomiting. Genitourinary:  Negative for dysuria. Neurological:  Positive for weakness. Negative for syncope and headaches. Physical Exam:    Vitals:  BP 98/77   Pulse 77   Temp 97.1 °F (36.2 °C) (Oral)   Resp 15   Ht 5' 7\" (1.702 m)   Wt 295 lb (133.8 kg)   SpO2 94%   BMI 46.20 kg/m²     Physical Exam  Constitutional:       Appearance: Normal appearance. She is obese. HENT:      Head: Normocephalic and atraumatic. Mouth/Throat:      Mouth: Mucous membranes are moist.      Pharynx: Oropharynx is clear. Comments: Ulcers on right side of mouth and midline of the inside upper lip   Eyes:      Extraocular Movements: Extraocular movements intact. Pupils: Pupils are equal, round, and reactive to light. Comments: Right eye injected    Cardiovascular:      Rate and Rhythm: Normal rate and regular rhythm. Pulses: Normal pulses. Heart sounds: No murmur heard. Pulmonary:      Effort: Pulmonary effort is normal.      Breath sounds: Normal breath sounds. No wheezing. Abdominal:      General: Abdomen is flat.  Bowel sounds are normal.   Musculoskeletal:      Right lower leg: No edema. Left lower leg: No edema. Skin:     General: Skin is warm and dry. Capillary Refill: Capillary refill takes less than 2 seconds. Coloration: Skin is not jaundiced. Comments: Large scrape on forehead above right eye    Neurological:      General: No focal deficit present. Mental Status: She is alert.           Labs:  Recent Results (from the past 24 hour(s))   CMP    Collection Time: 01/26/23 10:00 AM   Result Value Ref Range    Sodium 133 132 - 146 mmol/L    Potassium 4.5 3.5 - 5.0 mmol/L    Chloride 97 (L) 98 - 107 mmol/L    CO2 25 22 - 29 mmol/L    Anion Gap 11 7 - 16 mmol/L    Glucose 98 74 - 99 mg/dL    BUN 45 (H) 6 - 23 mg/dL    Creatinine 1.8 (H) 0.5 - 1.0 mg/dL    Est, Glom Filt Rate 29 >=60 mL/min/1.73    Calcium 9.7 8.6 - 10.2 mg/dL    Total Protein 8.6 (H) 6.4 - 8.3 g/dL    Albumin 3.8 3.5 - 5.2 g/dL    Total Bilirubin 0.3 0.0 - 1.2 mg/dL    Alkaline Phosphatase 119 (H) 35 - 104 U/L    ALT 17 0 - 32 U/L    AST 27 0 - 31 U/L   CBC with Auto Differential    Collection Time: 01/26/23 10:00 AM   Result Value Ref Range    WBC 5.5 4.5 - 11.5 E9/L    RBC 3.72 3.50 - 5.50 E12/L    Hemoglobin 11.5 11.5 - 15.5 g/dL    Hematocrit 37.1 34.0 - 48.0 %    MCV 99.7 80.0 - 99.9 fL    MCH 30.9 26.0 - 35.0 pg    MCHC 31.0 (L) 32.0 - 34.5 %    RDW 12.4 11.5 - 15.0 fL    Platelets 051 027 - 559 E9/L    MPV 9.6 7.0 - 12.0 fL    Neutrophils % 75.0 43.0 - 80.0 %    Immature Granulocytes % 0.2 0.0 - 5.0 %    Lymphocytes % 14.5 (L) 20.0 - 42.0 %    Monocytes % 7.3 2.0 - 12.0 %    Eosinophils % 2.6 0.0 - 6.0 %    Basophils % 0.4 0.0 - 2.0 %    Neutrophils Absolute 4.10 1.80 - 7.30 E9/L    Immature Granulocytes # 0.01 E9/L    Lymphocytes Absolute 0.79 (L) 1.50 - 4.00 E9/L    Monocytes Absolute 0.40 0.10 - 0.95 E9/L    Eosinophils Absolute 0.14 0.05 - 0.50 E9/L    Basophils Absolute 0.02 0.00 - 0.20 E9/L   Troponin    Collection Time: 01/26/23 10:00 AM   Result Value Ref Range    Troponin, High Sensitivity 77 (H) 0 - 9 ng/L   Lipase    Collection Time: 01/26/23 10:00 AM   Result Value Ref Range    Lipase 18 13 - 60 U/L   Lactic Acid    Collection Time: 01/26/23 10:00 AM   Result Value Ref Range    Lactic Acid 0.9 0.5 - 2.2 mmol/L   COVID-19, Rapid    Collection Time: 01/26/23 10:00 AM    Specimen: Nasopharyngeal Swab   Result Value Ref Range    SARS-CoV-2, NAAT Not Detected Not Detected   Protime-INR    Collection Time: 01/26/23 10:00 AM   Result Value Ref Range    Protime 12.2 9.3 - 12.4 sec    INR 1.1    Troponin    Collection Time: 01/26/23 11:44 AM   Result Value Ref Range    Troponin, High Sensitivity 60 (H) 0 - 9 ng/L   Urinalysis with Microscopic    Collection Time: 01/26/23 12:39 PM   Result Value Ref Range    Color, UA Yellow Straw/Yellow    Clarity, UA Clear Clear    Glucose, Ur Negative Negative mg/dL    Bilirubin Urine Negative Negative    Ketones, Urine Negative Negative mg/dL    Specific Gravity, UA 1.025 1.005 - 1.030    Blood, Urine Negative Negative    pH, UA 6.0 5.0 - 9.0    Protein, UA Negative Negative mg/dL    Urobilinogen, Urine 0.2 <2.0 E.U./dL    Nitrite, Urine Negative Negative    Leukocyte Esterase, Urine Negative Negative    WBC, UA NONE 0 - 5 /HPF    RBC, UA NONE 0 - 2 /HPF    Bacteria, UA NONE SEEN None Seen /HPF       XR CHEST PORTABLE   Final Result   No acute process. Mild cardiomegaly         XR HIP RIGHT (2-3 VIEWS)   Final Result   No acute bony abnormalities or prosthetic complications. XR TIBIA FIBULA LEFT (2 VIEWS)   Final Result   No acute osseous abnormality or prosthetic complications. .      Flattening deformity of the visualized foot, may be related to old injury of   the talus and calcaneus. Recommend dedicated ankle radiographs. CT HEAD WO CONTRAST   Final Result   CT HEAD WITHOUT CONTRAST:      1. No skull fracture or acute intracranial abnormality. CT CERVICAL SPINE WITHOUT CONTRAST:      1. No fracture or joint dislocation is seen. 2. Degenerative changes, as described. CT CERVICAL SPINE WO CONTRAST   Final Result   CT HEAD WITHOUT CONTRAST:      1. No skull fracture or acute intracranial abnormality. CT CERVICAL SPINE WITHOUT CONTRAST:      1. No fracture or joint dislocation is seen. 2. Degenerative changes, as described. US RETROPERITONEAL COMPLETE    (Results Pending)       Assessment/Plan:      Active Hospital Problems    Diagnosis Date Noted    DAYTON (acute kidney injury) ([facility] [N17.9] 01/26/2023     Priority: Medium     DAYTON stage 2   Likely secondary to Bactrim use. Creatinine 1.8 baseline 0.9   Avoid nephrotoxic medications   cc/hr   Microalbumin/creatinine ratio  Urine osmolality  Urine urea nitrogen  Urine electrolytes   CK   CMP  Urine culture  Will consider nephrology consult     Nonehealing right leg wound  Wound culture from 1/11 grew  Staph aureus with presumed resistance, pseudomonas, and Acinetobacter Staph aureus with presumed resistance, pseudomonas, and Acinetobacter. Was on bactrim and levaquin outpatient for 8 days without noticeable healing. Antibiotics held in setting of DAYTON.   Morphine 15 mg BID  Percocet BID  Gabapentin 300 mg daily  Consult vascular surgery  Consult infectious disease     GERD  Protonix 40    CAD  Aspirin 81 mg    HTN  Hydrochlorothiazide 25 mg daily  Lopressor 50 mg    HLD  Lipitor 20 mg     Iron deficiency anemia  325 mg daily        German Ortiz MD   Family Medicine Resident Physician PGY-1  1/26/2023

## 2023-01-26 NOTE — PROGRESS NOTES
Database initiated. Patient is A&O from home usually alone but currently staying with sister. States she uses a cane and is RA at baseline. She has open wounds.

## 2023-01-27 PROBLEM — B35.9 DERMATOPHYTOSIS: Status: ACTIVE | Noted: 2023-01-27

## 2023-01-27 LAB
ALBUMIN SERPL-MCNC: 3.1 G/DL (ref 3.5–5.2)
ALP BLD-CCNC: 99 U/L (ref 35–104)
ALT SERPL-CCNC: 13 U/L (ref 0–32)
ANION GAP SERPL CALCULATED.3IONS-SCNC: 6 MMOL/L (ref 7–16)
AST SERPL-CCNC: 22 U/L (ref 0–31)
BASOPHILS ABSOLUTE: 0.01 E9/L (ref 0–0.2)
BASOPHILS RELATIVE PERCENT: 0.2 % (ref 0–2)
BILIRUB SERPL-MCNC: 0.4 MG/DL (ref 0–1.2)
BLOOD CULTURE, ROUTINE: NORMAL
BUN BLDV-MCNC: 20 MG/DL (ref 6–23)
CALCIUM SERPL-MCNC: 8.9 MG/DL (ref 8.6–10.2)
CHLORIDE BLD-SCNC: 101 MMOL/L (ref 98–107)
CO2: 29 MMOL/L (ref 22–29)
CREAT SERPL-MCNC: 0.8 MG/DL (ref 0.5–1)
CULTURE, BLOOD 2: NORMAL
EOSINOPHIL, URINE: 0 % (ref 0–1)
EOSINOPHILS ABSOLUTE: 0.15 E9/L (ref 0.05–0.5)
EOSINOPHILS RELATIVE PERCENT: 3.5 % (ref 0–6)
GFR SERPL CREATININE-BSD FRML MDRD: >60 ML/MIN/1.73
GLUCOSE BLD-MCNC: 96 MG/DL (ref 74–99)
HCT VFR BLD CALC: 33 % (ref 34–48)
HEMOGLOBIN: 10.3 G/DL (ref 11.5–15.5)
IMMATURE GRANULOCYTES #: 0.01 E9/L
IMMATURE GRANULOCYTES %: 0.2 % (ref 0–5)
LYMPHOCYTES ABSOLUTE: 0.88 E9/L (ref 1.5–4)
LYMPHOCYTES RELATIVE PERCENT: 20.5 % (ref 20–42)
MAGNESIUM: 1.7 MG/DL (ref 1.6–2.6)
MCH RBC QN AUTO: 31.6 PG (ref 26–35)
MCHC RBC AUTO-ENTMCNC: 31.2 % (ref 32–34.5)
MCV RBC AUTO: 101.2 FL (ref 80–99.9)
MONOCYTES ABSOLUTE: 0.4 E9/L (ref 0.1–0.95)
MONOCYTES RELATIVE PERCENT: 9.3 % (ref 2–12)
NEUTROPHILS ABSOLUTE: 2.85 E9/L (ref 1.8–7.3)
NEUTROPHILS RELATIVE PERCENT: 66.3 % (ref 43–80)
PDW BLD-RTO: 12.2 FL (ref 11.5–15)
PLATELET # BLD: 154 E9/L (ref 130–450)
PMV BLD AUTO: 9.5 FL (ref 7–12)
POTASSIUM REFLEX MAGNESIUM: 4.5 MMOL/L (ref 3.5–5)
RBC # BLD: 3.26 E12/L (ref 3.5–5.5)
SODIUM BLD-SCNC: 136 MMOL/L (ref 132–146)
TOTAL CK: 105 U/L (ref 20–180)
TOTAL PROTEIN: 7 G/DL (ref 6.4–8.3)
WBC # BLD: 4.3 E9/L (ref 4.5–11.5)

## 2023-01-27 PROCEDURE — 2580000003 HC RX 258: Performed by: FAMILY MEDICINE

## 2023-01-27 PROCEDURE — 2700000000 HC OXYGEN THERAPY PER DAY

## 2023-01-27 PROCEDURE — 6370000000 HC RX 637 (ALT 250 FOR IP)

## 2023-01-27 PROCEDURE — 99222 1ST HOSP IP/OBS MODERATE 55: CPT | Performed by: SURGERY

## 2023-01-27 PROCEDURE — 99231 SBSQ HOSP IP/OBS SF/LOW 25: CPT | Performed by: INTERNAL MEDICINE

## 2023-01-27 PROCEDURE — 1200000000 HC SEMI PRIVATE

## 2023-01-27 PROCEDURE — 82550 ASSAY OF CK (CPK): CPT

## 2023-01-27 PROCEDURE — 6370000000 HC RX 637 (ALT 250 FOR IP): Performed by: FAMILY MEDICINE

## 2023-01-27 PROCEDURE — 36415 COLL VENOUS BLD VENIPUNCTURE: CPT

## 2023-01-27 PROCEDURE — 6360000002 HC RX W HCPCS: Performed by: FAMILY MEDICINE

## 2023-01-27 PROCEDURE — 85025 COMPLETE CBC W/AUTO DIFF WBC: CPT

## 2023-01-27 PROCEDURE — 80053 COMPREHEN METABOLIC PANEL: CPT

## 2023-01-27 PROCEDURE — 83735 ASSAY OF MAGNESIUM: CPT

## 2023-01-27 RX ORDER — FLUCONAZOLE 150 MG/1
150 TABLET ORAL ONCE
Status: COMPLETED | OUTPATIENT
Start: 2023-01-27 | End: 2023-01-27

## 2023-01-27 RX ORDER — MAGNESIUM SULFATE IN WATER 40 MG/ML
2000 INJECTION, SOLUTION INTRAVENOUS ONCE
Status: COMPLETED | OUTPATIENT
Start: 2023-01-27 | End: 2023-01-27

## 2023-01-27 RX ADMIN — MORPHINE SULFATE 15 MG: 15 TABLET, FILM COATED, EXTENDED RELEASE ORAL at 08:30

## 2023-01-27 RX ADMIN — ACETAMINOPHEN 650 MG: 325 TABLET ORAL at 15:28

## 2023-01-27 RX ADMIN — MULTIPLE VITAMINS W/ MINERALS TAB 1 TABLET: TAB at 08:30

## 2023-01-27 RX ADMIN — PANTOPRAZOLE SODIUM 40 MG: 40 TABLET, DELAYED RELEASE ORAL at 05:35

## 2023-01-27 RX ADMIN — GABAPENTIN 300 MG: 300 CAPSULE ORAL at 08:28

## 2023-01-27 RX ADMIN — SODIUM CHLORIDE, PRESERVATIVE FREE 10 ML: 5 INJECTION INTRAVENOUS at 08:32

## 2023-01-27 RX ADMIN — ASPIRIN 81 MG: 81 TABLET, COATED ORAL at 08:29

## 2023-01-27 RX ADMIN — ATORVASTATIN CALCIUM 20 MG: 20 TABLET, FILM COATED ORAL at 08:29

## 2023-01-27 RX ADMIN — HYDROCODONE BITARTRATE AND ACETAMINOPHEN 1 TABLET: 5; 325 TABLET ORAL at 03:55

## 2023-01-27 RX ADMIN — SODIUM CHLORIDE: 9 INJECTION, SOLUTION INTRAVENOUS at 15:44

## 2023-01-27 RX ADMIN — CYANOCOBALAMIN TAB 1000 MCG 500 MCG: 1000 TAB at 08:30

## 2023-01-27 RX ADMIN — ENOXAPARIN SODIUM 30 MG: 100 INJECTION SUBCUTANEOUS at 20:43

## 2023-01-27 RX ADMIN — METOPROLOL TARTRATE 50 MG: 50 TABLET, FILM COATED ORAL at 08:31

## 2023-01-27 RX ADMIN — Medication 2000 UNITS: at 08:29

## 2023-01-27 RX ADMIN — ZINC SULFATE 220 MG (50 MG) CAPSULE 50 MG: CAPSULE at 08:30

## 2023-01-27 RX ADMIN — ENOXAPARIN SODIUM 30 MG: 100 INJECTION SUBCUTANEOUS at 08:30

## 2023-01-27 RX ADMIN — FLUCONAZOLE 150 MG: 150 TABLET ORAL at 12:34

## 2023-01-27 RX ADMIN — MAGNESIUM SULFATE HEPTAHYDRATE 2000 MG: 40 INJECTION, SOLUTION INTRAVENOUS at 15:46

## 2023-01-27 RX ADMIN — GABAPENTIN 300 MG: 300 CAPSULE ORAL at 15:28

## 2023-01-27 RX ADMIN — Medication 2500 MG: at 08:29

## 2023-01-27 ASSESSMENT — PAIN DESCRIPTION - LOCATION
LOCATION: ANKLE;HIP
LOCATION: LEG
LOCATION: HIP

## 2023-01-27 ASSESSMENT — PAIN DESCRIPTION - ORIENTATION
ORIENTATION: RIGHT
ORIENTATION: RIGHT
ORIENTATION: LEFT

## 2023-01-27 ASSESSMENT — PAIN SCALES - GENERAL
PAINLEVEL_OUTOF10: 4
PAINLEVEL_OUTOF10: 8
PAINLEVEL_OUTOF10: 8
PAINLEVEL_OUTOF10: 7

## 2023-01-27 ASSESSMENT — PAIN DESCRIPTION - DESCRIPTORS
DESCRIPTORS: ACHING;DISCOMFORT
DESCRIPTORS: SHARP;SHOOTING;SORE
DESCRIPTORS: ACHING;DISCOMFORT

## 2023-01-27 ASSESSMENT — PAIN - FUNCTIONAL ASSESSMENT: PAIN_FUNCTIONAL_ASSESSMENT: ACTIVITIES ARE NOT PREVENTED

## 2023-01-27 NOTE — CARE COORDINATION
Introduced my self and provided explanation of CM role to patient. Patient is awake, alert, and aware of current diagnosis and treatment plan including wound care, ID consult, vascular consult. She voices she resides at home with her sister and completes her adl's with independence. Patient did experience a fall en route to emergency. She adds she is active with [facility]. Verified same with Safia Motley at [facility]. Will need resumption of care order at time of discharge. Patient is established with a pcp and denies any issue with retail pharmaceutical coverage. She states she had past stay at [facility] in October 2022, adding she would consider return there if necessary. PT/OT evals ordered/pending. She is on oxygen here which she does not have at home. Chart review does not reveal supporting diagnosis. Will check ambulatory pulse ox and review with primary care. Will follow along with  and assist with discharge planning as necessary. Leopold Midget.  Jj, MSN RN  [facility] Case Management  [phone]

## 2023-01-27 NOTE — PLAN OF CARE
Problem: Skin/Tissue Integrity  Goal: Absence of new skin breakdown  Description: 1. Monitor for areas of redness and/or skin breakdown  2. Assess vascular access sites hourly  3. Every 4-6 hours minimum:  Change oxygen saturation probe site  4. Every 4-6 hours:  If on nasal continuous positive airway pressure, respiratory therapy assess nares and determine need for appliance change or resting period.   Outcome: Progressing     Problem: Discharge Planning  Goal: Discharge to home or other facility with appropriate resources  Outcome: Progressing     Problem: Pain  Goal: Verbalizes/displays adequate comfort level or baseline comfort level  Outcome: Progressing     Problem: Safety - Adult  Goal: Free from fall injury  Outcome: Progressing     Problem: Chronic Conditions and Co-morbidities  Goal: Patient's chronic conditions and co-morbidity symptoms are monitored and maintained or improved  Outcome: Progressing

## 2023-01-27 NOTE — PROGRESS NOTES
[facility] Progress Note    Admitting Date and Time: 1/26/2023  9:37 AM  Admit Dx: DAYTON (acute kidney injury) ([facility] [address] [N17.9]    Subjective:  Patient is being followed for DAYTON (acute kidney injury) ([facility] [address] [N17.9]     Seen at bedside. Awakens easily but appears drowsy. Nodding off. Denies complaints other than chronic leg pain. States she didn't sleep well. ROS: denies fever, chills, cp, sob, n/v, HA unless stated above.      sodium chloride flush  5-40 mL IntraVENous 2 times per day    enoxaparin  30 mg SubCUTAneous BID    aspirin  81 mg Oral Daily    calcium elemental  2,500 mg Oral Daily    vitamin D  2,000 Units Oral Daily    vitamin B-12  500 mcg Oral Daily    ferrous sulfate  325 mg Oral Nightly    gabapentin  300 mg Oral TID    metoprolol tartrate  50 mg Oral BID    morphine  15 mg Oral BID    therapeutic multivitamin-minerals  1 tablet Oral Daily    pantoprazole  40 mg Oral QAM AC    atorvastatin  20 mg Oral Daily    zinc sulfate  50 mg Oral Daily     sodium chloride flush, 5-40 mL, PRN  sodium chloride, , PRN  ondansetron, 4 mg, Q8H PRN   Or  ondansetron, 4 mg, Q6H PRN  acetaminophen, 650 mg, Q6H PRN   Or  acetaminophen, 650 mg, Q6H PRN  albuterol, 2.5 mg, Q6H PRN  HYDROcodone 5 mg - acetaminophen, 1 tablet, BID PRN         Objective:    /64   Pulse 82   Temp 98.5 °F (36.9 °C) (Oral)   Resp 19   Ht 5' 7\" (1.702 m)   Wt 294 lb 1.6 oz (133.4 kg)   SpO2 95%   BMI 46.06 kg/m²     General Appearance: alert and oriented to person, place and time and in no acute distress  Subcutaneous hematoma over the right eyebrow and extending to the midline. Subconjuctival hemorrhage on the lower area extending around the iris from 3 pm - 8 pm. Oral cavity with ulcerations on the left upper palate   Skin abrasions to the dorsum of the hands and right side of face. Skin: warm and dry  Bilateral lymphedema. Wound to the left anterior shin - Hyperpigmented shins, bilateral venous stasis. Head: normocephalic and atraumatic  Eyes: pupils equal, round, and reactive to light, extraocular eye movements intact, conjunctivae normal  Neck: neck supple and non tender without mass   Pulmonary/Chest: clear to auscultation bilaterally- no wheezes, rales or rhonchi, normal air movement, no respiratory distress  Cardiovascular: normal rate, normal S1 and S2 and no carotid bruits  Abdomen: soft, non-tender, non-distended, normal bowel sounds, no masses or organomegaly  Extremities: no cyanosis, no clubbing and no edema  Neurologic: no cranial nerve deficit and speech normal    Recent Labs     01/26/23  1000      K 4.5   CL 97*   CO2 25   BUN 45*   CREATININE 1.8*   GLUCOSE 98   CALCIUM 9.7       Recent Labs     01/26/23  1000   WBC 5.5   RBC 3.72   HGB 11.5   HCT 37.1   MCV 99.7   MCH 30.9   MCHC 31.0*   RDW 12.4      MPV 9.6       Radiology: reviewed       Assessment:    Principal Problem:    DAYTON (acute kidney injury) ([facility] [address]  Active Problems:    Wound of left leg    Syncope    Drug-induced mucositis    Chronic pain syndrome    PVD (peripheral vascular disease) ([facility])    Primary hypertension    Hyperlipidemia    Lymphedema  Resolved Problems:    * No resolved hospital problems. *      Plan:  1. DAYTON RESOLVED- 2/2 Bactrim along with the mucosal changes. DC Bactrim. Continue IV fluids. Trend labs - Cr 1.8 < 0.8. Routine urine electrolytes, osmolality and urine eosinophils. Renal ultrasound showing right renal cysts. Avoid nephrotoxins, ACEi, diuretics,etc.  Baseline creatinine 0.8 in 10/22. 2.   Left pretibial chronic leg wound since June 2021 - does not appear infected. wound culture from 1/11 + Staph aureus with resistance, pseudomonas, Acinetobacter. No antibiotics at this time. Has received 10 days of Levaquin and Bactrim. ID consulted, appreciate recs. Wound care consulted. 3.   HTN - monitor vitals. Continue meds. DC HCTZ due to the DAYTON.   4.   Hyperlipidemia - continue with statin. LFTs normal.    < 108. Ok to continue statin as < 3x ULN. 5.   Lymphedema - supportive care. 6. PVD - Vascular consult with Dr. Ysabel Doherty. Has been treating patient at the wound clinic, last saw 1/25.   7.   Chronic pain - patient taking MS Continue and prn Oxycodone 7.5/325. Fall exacerbated by opioid use. MS Contin continued. Discontinued Norco.   8. Candiduria - yeast present in UA. Diflucan x 1 given. CODE: Full  DVT prophylaxis: Lovenox   Discharge dispo: SNF vs home with [facility]    25 minutes time spent reviewing patient chart, assessing patient, discussing plan of care with patient and family, discussing plan of care with collaborating physician, and charting. NOTE: This report was transcribed using voice recognition software. Every effort was made to ensure accuracy; however, inadvertent computerized transcription errors may be present.   Electronically signed by MESSI Nino NP on 1/27/2023 at 8:16 AM

## 2023-01-27 NOTE — CONSULTS
[address] [place] [address] [facility]  Consultation Note     Admit Date: 1/26/2023  9:37 AM    Reason for Consult:   left calf wound/bilateral stasis ulceration    Attending Physician:  Angela Savage MD    HISTORY OF PRESENT ILLNESS:             The history is obtained from extensive review of available past medical records. The patient is a 70 y.o. female who is not known to the ID service. Patient presented to [address] [place] [address] on 1/26/23 with complaints of a fall. Patient reports she fell and hit her head. She denies loss of conciseness. She reports she has had this LLE ulcer for a few years that resolved and came back within the past 6 months. She follows at the wound care clinic for treatment. Wound culture from 1/11/23 showed MSSA, Pseudomonas aeruginosa and Acinetobacter baumannii. She was treated with Levaquin and Bactrim. She has been feeling \"off\" since starting antibiotics from the wound clinic. She complains of pain to her left lower extremity. She denies any fevers, chills, nausea, vomiting, diarrhea at home prior to admission. She is reporting a productive cough that began a few days ago. Denies any sick contacts. Since admission, patient has been afebrile, VSS, on 3 L nasal cannula. WBC has been normal, creatinine improved from 1.8-0.8 today, UA looks clean. Blood, wound, urine cultures were collected and pending. ID was consulted for further recommendations.      Past Medical History:        Diagnosis Date    Bursitis     CAD (coronary artery disease) 1993    heart attack    Cellulitis of leg, left 10/3/2022    COPD (chronic obstructive pulmonary disease) ([facility] [address] 6/19/2013    Emphysema     slight    GERD (gastroesophageal reflux disease)     Hiatal hernia     Hip pain     Hyperlipidemia     Hypertension     Lymphedema of both lower extremities 11/21/2018    Venous insufficiency of both lower extremities 11/21/2018    Venous stasis ulcer of left calf with fat layer exposed without varicose veins ([facility] [address] 12/8/2021    Venous stasis ulcer of left calf with fat layer exposed without varicose veins ([facility]) 12/8/2021    Longstanding ulcer over the shin of the left calf, with a new ulcer over the posterior aspect of the left calf, for the last 1 week    Venous ulcer with fat layer exposed ([facility] [address] 10/28/2015     Past Surgical History:        Procedure Laterality Date    APPENDECTOMY      BREAST ENHANCEMENT SURGERY      BREAST REDUCTION SURGERY      CHOLECYSTECTOMY      COLONOSCOPY      ECHO COMPL W DOP COLOR FLOW  3/11/2013         ENDOSCOPY, COLON, DIAGNOSTIC      HERNIA REPAIR N/A 4/16/2021    LAPAROSCOPIC ROBOTIC ASSISTED INGUINAL HERNIA REPAIR performed by Sayda Salcedo MD at [address] [facility] (CERVIX STATUS UNKNOWN)      JOINT REPLACEMENT  2009 2011    l knee r hip    LEG DEBRIDEMENT Left 11/18/2015    LEG DEBRIDEMENT Left 08/03/2016     Current Medications:   Scheduled Meds:   fluconazole  150 mg Oral Once    sodium chloride flush  5-40 mL IntraVENous 2 times per day    enoxaparin  30 mg SubCUTAneous BID    aspirin  81 mg Oral Daily    calcium elemental  2,500 mg Oral Daily    vitamin D  2,000 Units Oral Daily    vitamin B-12  500 mcg Oral Daily    ferrous sulfate  325 mg Oral Nightly    gabapentin  300 mg Oral TID    metoprolol tartrate  50 mg Oral BID    morphine  15 mg Oral BID    therapeutic multivitamin-minerals  1 tablet Oral Daily    pantoprazole  40 mg Oral QAM AC    atorvastatin  20 mg Oral Daily    zinc sulfate  50 mg Oral Daily     Continuous Infusions:   sodium chloride 100 mL/hr at 01/26/23 1616    sodium chloride       PRN Meds:sodium chloride flush, sodium chloride, ondansetron **OR** ondansetron, acetaminophen **OR** acetaminophen, albuterol    Allergies:  Codeine, Dilaudid [hydromorphone hcl], Naproxen, Singulair [montelukast sodium], Black cohosh, and Black cohosh [cimicifuga racemosa (black cohosh)]    Social History:   Social History Socioeconomic History    Marital status:    Tobacco Use    Smoking status: Former     Packs/day: 1.00     Years: 20.00     Pack years: 20.00     Types: Cigarettes     Quit date: 1995     Years since quittin.2    Smokeless tobacco: Never    Tobacco comments:     Quit   Vaping Use    Vaping Use: Never used   Substance and Sexual Activity    Alcohol use: No    Drug use: No    Sexual activity: Not Currently      Family History:   No family history on file. . Otherwise non-pertinent to the chief complaint. REVIEW OF SYSTEMS:    As mentioned in HPI, all other systems are negative. PHYSICAL EXAM:    Vitals:   /64   Pulse 82   Temp 98.5 °F (36.9 °C) (Oral)   Resp 18   Ht 5' 7\" (1.702 m)   Wt 294 lb 1.6 oz (133.4 kg)   SpO2 95%   BMI 46.06 kg/m²   Constitutional: The patient is awake, alert. In bed. Drowsy. Skin: Warm and dry. No rashes were noted. Abrasion noted above right thigh and forehead. HEENT: Eyes show round, and reactive pupils. No jaundice. Moist mucous membranes, no ulcerations, no thrush. Right periorbital edema with some bruising secondary to fall. Bloodshot right eye. Neck: Supple to movements. No lymphadenopathy. Chest: No use of accessory muscles to breathe. Symmetrical expansion. Auscultation reveals no wheezing, crackles, or rhonchi. Cardiovascular: S1 and S2 are rhythmic and regular. No murmurs appreciated. Abdomen: Positive bowel sounds to auscultation. Benign to palpation. Extremities: No clubbing, no cyanosis. Trace BLE edema.   Lines: Peripheral.      CBC+dif:  Recent Labs     23  1000 23  0950   WBC 5.5 4.3*   HGB 11.5 10.3*   HCT 37.1 33.0*   MCV 99.7 101.2*    154   NEUTROABS 4.10 2.85     Lab Results   Component Value Date    CRP 0.67 (H) 10/27/2022    CRP 16.7 (H) 10/05/2022    CRP 16.5 (H) 10/04/2022      No results found for: CRP  Lab Results   Component Value Date    SEDRATE 125 (H) 10/05/2022    SEDRATE 127 (H) 10/04/2022    SEDRATE 130 (H) 10/03/2022     Lab Results   Component Value Date    ALT 13 01/27/2023    AST 22 01/27/2023    ALKPHOS 99 01/27/2023    BILITOT 0.4 01/27/2023     Lab Results   Component Value Date/Time     01/27/2023 09:50 AM    K 4.5 01/27/2023 09:50 AM     01/27/2023 09:50 AM    CO2 29 01/27/2023 09:50 AM    BUN 20 01/27/2023 09:50 AM    CREATININE 0.8 01/27/2023 09:50 AM    GFRAA > 60 10/17/2022 05:50 AM    LABGLOM >60 01/27/2023 09:50 AM    GLUCOSE 96 01/27/2023 09:50 AM    GLUCOSE 98 10/17/2022 05:50 AM    PROT 7.0 01/27/2023 09:50 AM    LABALBU 3.1 01/27/2023 09:50 AM    LABALBU 2.5 10/17/2022 05:50 AM    CALCIUM 8.9 01/27/2023 09:50 AM    BILITOT 0.4 01/27/2023 09:50 AM    ALKPHOS 99 01/27/2023 09:50 AM    AST 22 01/27/2023 09:50 AM    ALT 13 01/27/2023 09:50 AM       Lab Results   Component Value Date/Time    PROTIME 12.2 01/26/2023 10:00 AM    PROTIME 14.3 02/16/2011 05:10 AM    INR 1.1 01/26/2023 10:00 AM       Lab Results   Component Value Date/Time    TSH 0.586 02/19/2014 09:40 AM       Lab Results   Component Value Date/Time    COLORU Yellow 01/26/2023 07:40 PM    PHUR 6.0 01/26/2023 07:40 PM    WBCUA NONE 01/26/2023 07:40 PM    RBCUA 10-20 01/26/2023 07:40 PM    YEAST Present 01/26/2023 07:40 PM    BACTERIA RARE 01/26/2023 07:40 PM    CLARITYU Clear 01/26/2023 07:40 PM    SPECGRAV 1.025 01/26/2023 07:40 PM    LEUKOCYTESUR Negative 01/26/2023 07:40 PM    UROBILINOGEN 0.2 01/26/2023 07:40 PM    BILIRUBINUR Negative 01/26/2023 07:40 PM    BLOODU LARGE 01/26/2023 07:40 PM    GLUCOSEU Negative 01/26/2023 07:40 PM       No results found for: HCO3, BE, O2SAT, PH, THGB, PCO2, PO2, TCO2  Radiology:  Noted    Microbiology:  Blood culture 1/26/23: Negative so far  Wound culture 1/26/23: Pending  UA showing yeast but has been on antibiotics recently, she has no symptoms  Urine culture 1/26/23: pending     Assessment:  Left lower extremity nonhealing ulcer:  Follows at wound clinic, it does not appear to be infected  DAYTON: resolved     Plan:    Continue to monitor off of antibiotics at this time  Monitor labs  ID signs off at this time, please call if needed    Thank you for having us see this patient in consultation. I will be discussing this case with the treating physicians. Ashlee Patel, MESSI - CNP  11:15 AM  1/27/2023     Pt seen and examined. Above discussed agree with advanced practice nurse. Labs, cultures, and radiographs reviewed. Face to Face encounter occurred. Changes made as necessary. She has superficial ulcer on the left leg: no sign of infection. No need of antibiotics.      Annmarie Pantoja MD

## 2023-01-27 NOTE — CONSULTS
Chief Complaint: Patient seen for evaluation of vascular status of the left leg      HPI: This patient, known to Dr. Soham Hauser, has venous stasis ulcer, over the anterior aspect of the left calf mainly with some superficial ulcers over the lateral aspect, recently, patient was treated with antibiotics by Dr. Soham Hauser based upon the wound cultures underlying condition, patient, was hospitalized at [facility], after a fall, at home, hit her head, sustained laceration above right eye with bruising without any history of loss of consciousness, underwent CT scan of the head, no evidence of intracerebral bleed, CT of the spine cervical, no fracture, patient was hospitalized, found to have evidence of acute kidney injury, with a BUN of 45 creatinine of 1.8, possibility of interstitial nephritis was suspected due to Bactrim DS and was discontinued, will start IV hydration her renal function have improved and because of presence of ulcer of the left leg, vascular service is consulted to see the patient    When I came to see patient, patient was resting comfortably on supplemental oxygen, denies any chest pain or shortness of breath, denies any history of fever or chills      Patient denies any focal lateralizing neurological symptoms like loss of speech, vision or loss of function of extremity    Patient can walk short distances slowly and denies any symptoms of rest pain    Allergies   Allergen Reactions    Codeine Nausea And Vomiting and Other (See Comments)     hallucinate    Dilaudid [Hydromorphone Hcl] Rash    Naproxen Other (See Comments)     Shuts down kidney function    Singulair [Montelukast Sodium] Shortness Of Breath     Mucus in chest    Black Cohosh     Black Cohosh [Cimicifuga Racemosa (Black Cohosh)] Rash       Current Facility-Administered Medications   Medication Dose Route Frequency Provider Last Rate Last Admin    white petrolatum ointment   Topical BID PRN Kasie Smith MD        0.9 % sodium chloride infusion   IntraVENous Continuous Daniele Foster  mL/hr at 01/27/23 1544 New Bag at 01/27/23 1544    sodium chloride flush 0.9 % injection 5-40 mL  5-40 mL IntraVENous 2 times per day Daniele Foster MD   10 mL at 01/27/23 0832    sodium chloride flush 0.9 % injection 5-40 mL  5-40 mL IntraVENous PRN Daniele Foster MD   10 mL at 01/26/23 2005    0.9 % sodium chloride infusion   IntraVENous PRN Daniele Foster MD        enoxaparin Sodium (LOVENOX) injection 30 mg  30 mg SubCUTAneous BID Rochelle Bruner MD   30 mg at 01/27/23 0830    ondansetron (ZOFRAN-ODT) disintegrating tablet 4 mg  4 mg Oral Q8H PRN Daniele Foster MD        Or    ondans[facility]ron [facility]) injection 4 mg  4 mg IntraVENous Q6H PRN Daniele Foster MD        acetaminophen (TYLENOL) tablet 650 mg  650 mg Oral Q6H PRN Daniele Foster MD   650 mg at 01/27/23 1528    Or    acetaminophen (TYLENOL) suppository 650 mg  650 mg Rectal Q6H PRN Daniele Foster MD        aspirin EC tablet 81 mg  81 mg Oral Daily Sweta James MD   81 mg at 01/27/23 0829    calcium elemental (OSCAL) tablet 2,500 mg  2,500 mg Oral Daily Sweta James MD   2,500 mg at 01/27/23 8741    vitamin D (CHOLECALCIFEROL) tablet 2,000 Units  2,000 Units Oral Daily Sweta James MD   2,000 Units at 01/27/23 5862    vitamin B-12 (CYANOCOBALAMIN) tablet 500 mcg  500 mcg Oral Daily Sweta James MD   500 mcg at 01/27/23 0830    ferrous sulfate (IRON 325) tablet 325 mg  325 mg Oral Nightly Sweta James MD   325 mg at 01/26/23 2019    gabapentin (NEURONTIN) capsule 300 mg  300 mg Oral TID Sweta James MD   300 mg at 01/27/23 1528    metoprolol tartrate (LOPRESSOR) tablet 50 mg  50 mg Oral BID Sweta James MD   50 mg at 01/27/23 0831    morphine (MS CONTIN) extended release tablet 15 mg  15 mg Oral BID Daniele Foster MD   15 mg at 01/27/23 0830    therapeutic multivitamin-minerals 1 tablet  1 tablet Oral Daily Sweta James MD   1 tablet at 01/27/23 0830    pantoprazole (PROTONIX) tablet 40 mg  40 mg Oral QAM AC Forrest London MD   40 mg at 01/27/23 0535    atorvastatin (LIPITOR) tablet 20 mg  20 mg Oral Daily Forrest London MD   20 mg at 01/27/23 1806    zinc sulfate (ZINCATE) capsule 50 mg  50 mg Oral Daily Forrest London MD   50 mg at 01/27/23 0830    albuterol (PROVENTIL) nebulizer solution 2.5 mg  2.5 mg Nebulization Q6H PRN Forrest London MD           Past Medical History:   Diagnosis Date    Bursitis     CAD (coronary artery disease) 1993    heart attack    Cellulitis of leg, left 10/3/2022    COPD (chronic obstructive pulmonary disease) ([address] 6/19/2013    Dermatophytosis 1/27/2023    Emphysema     slight    GERD (gastroesophageal reflux disease)     Hiatal hernia     Hip pain     Hyperlipidemia     Hypertension     Lymphedema of both lower extremities 11/21/2018    Venous insufficiency of both lower extremities 11/21/2018    Venous stasis ulcer of left calf with fat layer exposed without varicose veins ([address] 12/8/2021    Venous stasis ulcer of left calf with fat layer exposed without varicose veins ([address] 12/8/2021    Longstanding ulcer over the shin of the left calf, with a new ulcer over the posterior aspect of the left calf, for the last 1 week    Venous ulcer with fat layer exposed ([address] 10/28/2015       Past Surgical History:   Procedure Laterality Date    APPENDECTOMY      BREAST ENHANCEMENT SURGERY      BREAST REDUCTION SURGERY      CHOLECYSTECTOMY      COLONOSCOPY      ECHO COMPL W DOP COLOR FLOW  3/11/2013         ENDOSCOPY, COLON, DIAGNOSTIC      HERNIA REPAIR N/A 4/16/2021    LAPAROSCOPIC ROBOTIC ASSISTED INGUINAL HERNIA REPAIR performed by Armani Fernandez MD at [address] [facility] ([facility])      JOINT REPLACEMENT  2009 2011    l knee r hip    LEG DEBRIDEMENT Left 11/18/2015    LEG DEBRIDEMENT Left 08/03/2016       No family history on file.     Social History     Socioeconomic History    Marital status:      Spouse name: Not on file    Number of children: Not on file    Years of education: Not on file    Highest education level: Not on file   Occupational History    Not on file   Tobacco Use    Smoking status: Former     Packs/day: 1.00     Years: 20.00     Pack years: 20.00     Types: Cigarettes     Quit date: 1995     Years since quittin.2    Smokeless tobacco: Never    Tobacco comments:     Quit   Vaping Use    Vaping Use: Never used   Substance and Sexual Activity    Alcohol use: No    Drug use: No    Sexual activity: Not Currently   Other Topics Concern    Not on file   Social History Narrative    Not on file     Social Determinants of Health     Financial Resource Strain: Not on file   Food Insecurity: Not on file   Transportation Needs: Not on file   Physical Activity: Not on file   Stress: Not on file   Social Connections: Not on file   Intimate Partner Violence: Not on file   Housing Stability: Not on file       Review of Systems:  Skin:  No abnormal pigmentation or rash. Eyes:  No blurring, diplopia or vision loss. Ears/Nose/Throat:  No hearing loss or vertigo. Respiratory:  No cough, pleuritic chest pain, dyspnea, or wheezing. Chronic obstructive lung disease    Cardiovascular: No angina, palpitations . Coronary artery disease, hypertension, hyperlipidemia    Gastrointestinal:  No nausea or vomiting; no abdominal pain or rectal bleeding. GERD    Musculoskeletal:  No arthritis or weakness. Neurologic:  No paralysis, paresis, seizures or headaches. Hematologic/Lymphatic/Immunologic:  No anemia, abnormal bleeding/bruising. Endocrine:  No heat or cold intolerance. No polyphagia, polydipsia or polyuria. Physical Exam:  /64   Pulse 82   Temp 98.5 °F (36.9 °C) (Oral)   Resp 18   Ht 5' 7\" (1.702 m)   Wt 294 lb 1.6 oz (133.4 kg)   SpO2 95%   BMI 46.06 kg/m²   General appearance:  Alert, awake, oriented x 3. No distress. Skin:  Warm and dry.     Head: Normocephalic. No masses, lesions or tenderness. Patient does have bruising and ecchymosis of the scalp and the forehead on the right side, has a superficial laceration of the right, near the eyebrow    Eyes:  Conjunctivae appear normal; PERRL. Ears:  External ears normal.    Nose/Sinuses:  Septum midline, mucosa normal; no drainage. Oropharynx:  Clear, no exudate noted. Neck:  No jugular venous distention, lymphadenopathy or thyromegaly. No evidence of carotid bruit      Lungs:  Clear to ausculation bilaterally. No rhonchi, crackles, wheezes. Heart:  Regular rate and rhythm. No rub or murmur. .    Abdomen:  Soft, non-tender. No masses, organomegaly. Musculoskeletal: No joint effusions, tenderness swelling or warmth. Neuro: Speech is intact. Moving all extremities. No focal motor or sensory deficits. Extremities:  Both feet are warm to touch. The color of both feet is normal.    Patient does have venous dyspigmentation due to chronic venous insufficiency of both legs mainly between the ankle and the knees    Mild to moderate swelling of the legs noted stable, due to combination of venous insufficiency lymphedema with underlying dermatophytosis    Patient has a ulcer, 5 cm x 5 cm mainly over the anterior aspect of left calf, looks fairly clean      Pulses Right  Left    Brachial 3 3    Radial    3=normal   Femoral 2 2  2=diminished   Popliteal    1=barely palpable   Dorsalis pedis 2 2  0=absent   Posterior tibial    4=aneurysmal           Other pertinent information:1. The past medical records were reviewed.     2.    Lab Results   Component Value Date    WBC 4.3 (L) 01/27/2023    HGB 10.3 (L) 01/27/2023    HCT 33.0 (L) 01/27/2023    .2 (H) 01/27/2023     01/27/2023      Lab Results   Component Value Date     01/27/2023    K 4.5 01/27/2023     01/27/2023    CO2 29 01/27/2023    BUN 20 01/27/2023    CREATININE 0.8 01/27/2023    GLUCOSE 96 01/27/2023 CALCIUM 8.9 01/27/2023    PROT 7.0 01/27/2023    LABALBU 3.1 (L) 01/27/2023    BILITOT 0.4 01/27/2023    ALKPHOS 99 01/27/2023    AST 22 01/27/2023    ALT 13 01/27/2023    LABGLOM >60 01/27/2023    GFRAA > 60 10/17/2022     Lab Results   Component Value Date    APTT 30.4 10/02/2022      Lab Results   Component Value Date    INR 1.1 01/26/2023    INR 1.3 10/02/2022    INR 1.0 08/03/2016    PROTIME 12.2 01/26/2023    PROTIME 14.0 (H) 10/02/2022    PROTIME 10.8 08/03/2016        3. US RETROPERITONEAL COMPLETE   Final Result   1.9 cm right renal cysts, otherwise unremarkable renal ultrasound. XR CHEST PORTABLE   Final Result   No acute process. Mild cardiomegaly         XR HIP RIGHT (2-3 VIEWS)   Final Result   No acute bony abnormalities or prosthetic complications. XR TIBIA FIBULA LEFT (2 VIEWS)   Final Result   No acute osseous abnormality or prosthetic complications. .      Flattening deformity of the visualized foot, may be related to old injury of   the talus and calcaneus. Recommend dedicated ankle radiographs. CT HEAD WO CONTRAST   Final Result   CT HEAD WITHOUT CONTRAST:      1. No skull fracture or acute intracranial abnormality. CT CERVICAL SPINE WITHOUT CONTRAST:      1. No fracture or joint dislocation is seen. 2. Degenerative changes, as described. CT CERVICAL SPINE WO CONTRAST   Final Result   CT HEAD WITHOUT CONTRAST:      1. No skull fracture or acute intracranial abnormality. CT CERVICAL SPINE WITHOUT CONTRAST:      1. No fracture or joint dislocation is seen. 2. Degenerative changes, as described. 4.  X-ray of the tibia and fibula of the left side, no fracture    5. Ankle-brachial index, done on 3 October 2022, normal ankle numbness with good arterial flow    6. Venous ultrasound study done in October 2022, normal, no DVT        Assessment:        1.   Venous stasis ulcer left calf, with a fat layer exposed, looks fairly clean associated with venous ulceration and underlying dermatophytosis    2. Satisfactory arterial circulation with normal ankle arm index    3. Multiple comorbid risk factors including recent history of fall due to unsteady gait, chronic obstructive lung disease, hypertension, hyperlipidemia, coronary artery disease with history of congestive heart failure, GERD, acute kidney injury etc.        Patient Active Problem List   Diagnosis    COPD (chronic obstructive pulmonary disease) ([facility] [address]    Primary hypertension    Hyperlipidemia    GERD (gastroesophageal reflux disease)    Diastolic CHF, chronic ([facility])    Bursitis    Venous insufficiency of both lower extremities    Lymphedema    Venous stasis ulcer of left calf with fat layer exposed without varicose veins ([facility])    Sepsis due to cellulitis ([facility] [address]    Wound of left leg    Cellulitis of left lower extremity    DAYTON (acute kidney injury) ([facility] [address]    Syncope    Drug-induced mucositis    Chronic pain syndrome    PVD (peripheral vascular disease) ([facility])    Dermatophytosis            Plan:           Discussed in detail with the patient regarding all options, risks benefits and alternatives, patient was reassured from a vascular perspective, was recommended continue dressing changes with Aquacel Ag on a daily basis, wrapped leg from the foot to the knee with Kerlix, consider Tubigrip not available, consider wrapping with 4 inch Ace bandages on a daily basis    Patient was instructed, upon discharge, to make sure that she does follow-up with Dr. González Brooke at the wound care center at the [facility] on Wednesdays      All the questions were answered.     Thank you for letting us participate in the care of your patient    Patient may be discharged from vascular perspective and will be followed as an outpatient at the wound care center on Wednesday by Dr. González Brooke      Electronically signed by Rossy Means MD on 1/27/2023 at 6:13 PM

## 2023-01-27 NOTE — PROGRESS NOTES
[facility] Quality Flow/Interdisciplinary Rounds Progress Note        Quality Flow Rounds held on January 27, 2023    Disciplines Attending:  Bedside Nurse, , , and Nursing Unit Leadership    Brunilda Stiles was admitted on 1/26/2023  9:37 AM    Anticipated Discharge Date:       Disposition:    Rich Score:  Rich Scale Score: 16    Readmission Risk              Risk of Unplanned Readmission:  20           Discussed patient goal for the day, patient clinical progression, and barriers to discharge.   The following Goal(s) of the Day/Commitment(s) have been identified:  Diagnostics - Report Results and Labs - Report Results      Gia Pulliam RN  January 27, 2023

## 2023-01-27 NOTE — PROGRESS NOTES
Consult leg wound. ID saw and signed off. Vascular to see. Serene Freire.  Vicky Browning, CNS, Wound Care

## 2023-01-28 LAB
ALBUMIN SERPL-MCNC: 3.4 G/DL (ref 3.5–5.2)
ALP BLD-CCNC: 114 U/L (ref 35–104)
ALT SERPL-CCNC: 14 U/L (ref 0–32)
ANION GAP SERPL CALCULATED.3IONS-SCNC: 8 MMOL/L (ref 7–16)
AST SERPL-CCNC: 25 U/L (ref 0–31)
BASOPHILS ABSOLUTE: 0.02 E9/L (ref 0–0.2)
BASOPHILS RELATIVE PERCENT: 0.3 % (ref 0–2)
BILIRUB SERPL-MCNC: 0.5 MG/DL (ref 0–1.2)
BUN BLDV-MCNC: 8 MG/DL (ref 6–23)
CALCIUM SERPL-MCNC: 9.1 MG/DL (ref 8.6–10.2)
CHLORIDE BLD-SCNC: 101 MMOL/L (ref 98–107)
CO2: 28 MMOL/L (ref 22–29)
CREAT SERPL-MCNC: 0.5 MG/DL (ref 0.5–1)
EOSINOPHILS ABSOLUTE: 0.07 E9/L (ref 0.05–0.5)
EOSINOPHILS RELATIVE PERCENT: 1.2 % (ref 0–6)
GFR SERPL CREATININE-BSD FRML MDRD: >60 ML/MIN/1.73
GLUCOSE BLD-MCNC: 114 MG/DL (ref 74–99)
HCT VFR BLD CALC: 36 % (ref 34–48)
HEMOGLOBIN: 11.8 G/DL (ref 11.5–15.5)
IMMATURE GRANULOCYTES #: 0.01 E9/L
IMMATURE GRANULOCYTES %: 0.2 % (ref 0–5)
LYMPHOCYTES ABSOLUTE: 0.6 E9/L (ref 1.5–4)
LYMPHOCYTES RELATIVE PERCENT: 10.1 % (ref 20–42)
MCH RBC QN AUTO: 31.9 PG (ref 26–35)
MCHC RBC AUTO-ENTMCNC: 32.8 % (ref 32–34.5)
MCV RBC AUTO: 97.3 FL (ref 80–99.9)
MONOCYTES ABSOLUTE: 0.4 E9/L (ref 0.1–0.95)
MONOCYTES RELATIVE PERCENT: 6.7 % (ref 2–12)
NEUTROPHILS ABSOLUTE: 4.84 E9/L (ref 1.8–7.3)
NEUTROPHILS RELATIVE PERCENT: 81.5 % (ref 43–80)
ORGANISM: ABNORMAL
PDW BLD-RTO: 11.9 FL (ref 11.5–15)
PLATELET # BLD: 178 E9/L (ref 130–450)
PMV BLD AUTO: 9.6 FL (ref 7–12)
POTASSIUM REFLEX MAGNESIUM: 4.1 MMOL/L (ref 3.5–5)
RBC # BLD: 3.7 E12/L (ref 3.5–5.5)
SODIUM BLD-SCNC: 137 MMOL/L (ref 132–146)
TOTAL PROTEIN: 7.5 G/DL (ref 6.4–8.3)
WBC # BLD: 5.9 E9/L (ref 4.5–11.5)
WOUND/ABSCESS: ABNORMAL

## 2023-01-28 PROCEDURE — 99231 SBSQ HOSP IP/OBS SF/LOW 25: CPT | Performed by: INTERNAL MEDICINE

## 2023-01-28 PROCEDURE — 6370000000 HC RX 637 (ALT 250 FOR IP): Performed by: FAMILY MEDICINE

## 2023-01-28 PROCEDURE — 36415 COLL VENOUS BLD VENIPUNCTURE: CPT

## 2023-01-28 PROCEDURE — 85025 COMPLETE CBC W/AUTO DIFF WBC: CPT

## 2023-01-28 PROCEDURE — 2580000003 HC RX 258: Performed by: FAMILY MEDICINE

## 2023-01-28 PROCEDURE — 2700000000 HC OXYGEN THERAPY PER DAY

## 2023-01-28 PROCEDURE — 6360000002 HC RX W HCPCS: Performed by: FAMILY MEDICINE

## 2023-01-28 PROCEDURE — 1200000000 HC SEMI PRIVATE

## 2023-01-28 PROCEDURE — 80053 COMPREHEN METABOLIC PANEL: CPT

## 2023-01-28 PROCEDURE — 6370000000 HC RX 637 (ALT 250 FOR IP)

## 2023-01-28 RX ORDER — OXYCODONE HYDROCHLORIDE AND ACETAMINOPHEN 5; 325 MG/1; MG/1
1 TABLET ORAL 2 TIMES DAILY
Status: DISCONTINUED | OUTPATIENT
Start: 2023-01-28 | End: 2023-02-01 | Stop reason: HOSPADM

## 2023-01-28 RX ADMIN — MORPHINE SULFATE 15 MG: 15 TABLET, FILM COATED, EXTENDED RELEASE ORAL at 20:13

## 2023-01-28 RX ADMIN — SODIUM CHLORIDE: 9 INJECTION, SOLUTION INTRAVENOUS at 14:39

## 2023-01-28 RX ADMIN — PANTOPRAZOLE SODIUM 40 MG: 40 TABLET, DELAYED RELEASE ORAL at 04:32

## 2023-01-28 RX ADMIN — OXYCODONE AND ACETAMINOPHEN 1 TABLET: 5; 325 TABLET ORAL at 12:48

## 2023-01-28 RX ADMIN — ACETAMINOPHEN 650 MG: 325 TABLET ORAL at 04:31

## 2023-01-28 RX ADMIN — ATORVASTATIN CALCIUM 20 MG: 20 TABLET, FILM COATED ORAL at 08:56

## 2023-01-28 RX ADMIN — ENOXAPARIN SODIUM 30 MG: 100 INJECTION SUBCUTANEOUS at 08:56

## 2023-01-28 RX ADMIN — GABAPENTIN 300 MG: 300 CAPSULE ORAL at 15:23

## 2023-01-28 RX ADMIN — ENOXAPARIN SODIUM 30 MG: 100 INJECTION SUBCUTANEOUS at 20:13

## 2023-01-28 RX ADMIN — ASPIRIN 81 MG: 81 TABLET, COATED ORAL at 08:56

## 2023-01-28 RX ADMIN — MORPHINE SULFATE 15 MG: 15 TABLET, FILM COATED, EXTENDED RELEASE ORAL at 08:55

## 2023-01-28 RX ADMIN — METOPROLOL TARTRATE 50 MG: 50 TABLET, FILM COATED ORAL at 08:56

## 2023-01-28 RX ADMIN — METOPROLOL TARTRATE 50 MG: 50 TABLET, FILM COATED ORAL at 20:13

## 2023-01-28 RX ADMIN — SODIUM CHLORIDE, PRESERVATIVE FREE 10 ML: 5 INJECTION INTRAVENOUS at 08:55

## 2023-01-28 RX ADMIN — GABAPENTIN 300 MG: 300 CAPSULE ORAL at 20:13

## 2023-01-28 RX ADMIN — SODIUM CHLORIDE, PRESERVATIVE FREE 20 ML: 5 INJECTION INTRAVENOUS at 12:01

## 2023-01-28 RX ADMIN — Medication 2500 MG: at 08:55

## 2023-01-28 RX ADMIN — OXYCODONE AND ACETAMINOPHEN 1 TABLET: 5; 325 TABLET ORAL at 17:32

## 2023-01-28 RX ADMIN — FERROUS SULFATE TAB 325 MG (65 MG ELEMENTAL FE) 325 MG: 325 (65 FE) TAB at 20:13

## 2023-01-28 RX ADMIN — GABAPENTIN 300 MG: 300 CAPSULE ORAL at 08:56

## 2023-01-28 ASSESSMENT — PAIN DESCRIPTION - ORIENTATION
ORIENTATION: LEFT
ORIENTATION: LEFT
ORIENTATION: RIGHT

## 2023-01-28 ASSESSMENT — ENCOUNTER SYMPTOMS
SHORTNESS OF BREATH: 0
NAUSEA: 0

## 2023-01-28 ASSESSMENT — PAIN DESCRIPTION - DESCRIPTORS
DESCRIPTORS: SHARP;SHOOTING;SORE
DESCRIPTORS: ACHING;DULL
DESCRIPTORS: BURNING;ACHING

## 2023-01-28 ASSESSMENT — PAIN SCALES - GENERAL
PAINLEVEL_OUTOF10: 7
PAINLEVEL_OUTOF10: 7
PAINLEVEL_OUTOF10: 6
PAINLEVEL_OUTOF10: 8

## 2023-01-28 ASSESSMENT — PAIN - FUNCTIONAL ASSESSMENT: PAIN_FUNCTIONAL_ASSESSMENT: ACTIVITIES ARE NOT PREVENTED

## 2023-01-28 ASSESSMENT — PAIN DESCRIPTION - LOCATION
LOCATION: LEG
LOCATION: LEG
LOCATION: HIP

## 2023-01-28 NOTE — PROGRESS NOTES
This RN attempting to give patient nighttime medications. Patient falling asleep while holding med cup. Vital signs stable, SpO2 93%. Patient states she is just tired and does not want to take her medications at this time, she would just like to sleep. Medications returned at this time.     Electronically signed by Kirstin Angeles RN on 1/28/2023 at 8:45 PM

## 2023-01-28 NOTE — ED PROVIDER NOTES
This is a 70year old female with a PMH of Venous Stasis, COPD and CHF who presents to the ED for evaluation of a fall. Patient states that for the past several days she has been feeling fatigued and not like herself. She states that she has no energy to do anything. She states that she was on her way to come to the ED by private vehicle however tripped and fell to the ground and landed on her right side. Patient states that she never did pass out but did have a hard time getting up which prompted her to come to the ED for via EMS. Patient has no Loc. Patient does have some right head pain and right hip pain. There are no fevers or chills. There are no other reported mitigating or exacerbating factors. The history is provided by the patient. Review of Systems   Constitutional:  Positive for fatigue. Negative for fever. Respiratory:  Negative for shortness of breath. Cardiovascular:  Negative for chest pain. Gastrointestinal:  Negative for nausea. Skin:  Positive for wound. Hematological:  Does not bruise/bleed easily. Psychiatric/Behavioral:  Negative for confusion. All other systems reviewed and are negative. Physical Exam  Vitals and nursing note reviewed. Constitutional:       General: She is not in acute distress. Appearance: She is well-developed. She is not ill-appearing. HENT:      Head: Normocephalic and atraumatic. Mouth/Throat:      Mouth: Mucous membranes are dry. Eyes:      Extraocular Movements: Extraocular movements intact. Pupils: Pupils are equal, round, and reactive to light. Comments: Subconjunctival hemorrhage of right eye, superficial abrasion above right brow, no active bleeding   Neck:      Vascular: No JVD. Cardiovascular:      Rate and Rhythm: Normal rate and regular rhythm. Pulmonary:      Effort: Pulmonary effort is normal.   Abdominal:      General: There is no distension. Palpations: Abdomen is soft. Tenderness:  There is no abdominal tenderness. There is no guarding or rebound. Hernia: No hernia is present. Musculoskeletal:      Cervical back: Normal range of motion and neck supple. No rigidity or tenderness. Right lower leg: No edema. Left lower leg: No edema. Skin:     General: Skin is warm and dry. Capillary Refill: Capillary refill takes less than 2 seconds. Comments: Ulcerated wound to left lower extremity, no drainage, no crepitus, pulses intact   Neurological:      General: No focal deficit present. Mental Status: She is alert and oriented to person, place, and time. Cranial Nerves: No cranial nerve deficit. Psychiatric:         Mood and Affect: Mood normal.         Behavior: Behavior normal.        Procedures     MDM  Number of Diagnoses or Management Options  [place] (acute kidney injury) ([facility] [address]  Diagnosis management comments: Patient is a pleasant female who had been feeling weak for several days and tripped which promprted her to come to the ED. Patient had no significant abnormalities seen on CT imaging. She did appear quite stable but was clinically dehydrated. Patient was treated with gentle hydration. She had a chronic wound to her left leg for which she is being treated with Abx. She is on Bactrim which may be the cause of her worsening renal function. Given that this has dropped from 60 to 29, I did feel that it was appropriate for her to be admitted for further evaluation and monitoring.  I spoke with Dr. Luz Elena Walker who agreed to admit the patient                 Differential diagnosis: Cervical Spine Fracture, Intracranial bleed, renal failure, sepsis, hypoglycemia  Review of ED/ Outpatient Records: Reviewed Dr. Tony Pulido notes  Historians that case was discussed with: Son, EMS  Imaging interpretation by myself: CXR showed no signs of fracture, hemothorax or pneumothorax  Independent Interpretation of labs: increase in creatine from previous baseline  Discussed with other providers: Dr. Omega Carvalho Considered but not ordered: None  Decision making tools/risks stratification: None  Disposition decision making/shared decision making: Shared  Chronic Conditions affecting care: Venous insufficieny  Social Determinants of health impacting treatment or disposition: none  CODE status and Discussions: full                    --------------------------------------------- PAST HISTORY ---------------------------------------------  Past Medical History:  has a past medical history of Bursitis, CAD (coronary artery disease), Cellulitis of leg, left, COPD (chronic obstructive pulmonary disease) ([facility] [address] Dermatophytosis, Emphysema, GERD (gastroesophageal reflux disease), Hiatal hernia, Hip pain, Hyperlipidemia, Hypertension, Lymphedema of both lower extremities, Venous insufficiency of both lower extremities, Venous stasis ulcer of left calf with fat layer exposed without varicose veins (HCC), Venous stasis ulcer of left calf with fat layer exposed without varicose veins ([address] and Venous ulcer with fat layer exposed ([facility] [address] Past Surgical History:  has a past surgical history that includes Cholecystectomy; Hysterectomy; Endoscopy, colon, diagnostic; Colonoscopy; Appendectomy; ECHO Compl W Dop Color Flow (3/11/2013); Leg Debridement (Left, 11/18/2015); joint replacement (2009 2011); Breast reduction surgery; Breast enhancement surgery; Leg Debridement (Left, 08/03/2016); hernia repair (N/A, 4/16/2021); and hernia repair. Social History:  reports that she quit smoking about 27 years ago. Her smoking use included cigarettes. She has a 20.00 pack-year smoking history. She has never used smokeless tobacco. She reports that she does not drink alcohol and does not use drugs. Family History: family history is not on file. The patients home medications have been reviewed.     Allergies: Codeine, Dilaudid [hydromorphone hcl], Naproxen, Singulair [montelukast sodium], Black cohosh, and Black cohosh [cimicifuga racemosa (black cohosh)]    -------------------------------------------------- RESULTS -------------------------------------------------  Labs:  Results for orders placed or performed during the hospital encounter of 01/26/23   COVID-19, Rapid    Specimen: Nasopharyngeal Swab   Result Value Ref Range    SARS-CoV-2, NAAT Not Detected Not Detected   Culture, Blood 1    Specimen: Blood   Result Value Ref Range    Blood Culture, Routine 24 Hours no growth    Culture, Blood 2    Specimen: Blood   Result Value Ref Range    Culture, Blood 2 24 Hours no growth    Culture, Wound Aerobic Only    Specimen: Leg   Result Value Ref Range    Organism Corynebacterium species (A)     WOUND/ABSCESS Heavy growth  (mixed morphologies)      Culture, Urine    Specimen: Urine, clean catch   Result Value Ref Range    Urine Culture, Routine Growth not present, incubation continues    CMP   Result Value Ref Range    Sodium 133 132 - 146 mmol/L    Potassium 4.5 3.5 - 5.0 mmol/L    Chloride 97 (L) 98 - 107 mmol/L    CO2 25 22 - 29 mmol/L    Anion Gap 11 7 - 16 mmol/L    Glucose 98 74 - 99 mg/dL    BUN 45 (H) 6 - 23 mg/dL    Creatinine 1.8 (H) 0.5 - 1.0 mg/dL    Est, Glom Filt Rate 29 >=60 mL/min/1.73    Calcium 9.7 8.6 - 10.2 mg/dL    Total Protein 8.6 (H) 6.4 - 8.3 g/dL    Albumin 3.8 3.5 - 5.2 g/dL    Total Bilirubin 0.3 0.0 - 1.2 mg/dL    Alkaline Phosphatase 119 (H) 35 - 104 U/L    ALT 17 0 - 32 U/L    AST 27 0 - 31 U/L   CBC with Auto Differential   Result Value Ref Range    WBC 5.5 4.5 - 11.5 E9/L    RBC 3.72 3.50 - 5.50 E12/L    Hemoglobin 11.5 11.5 - 15.5 g/dL    Hematocrit 37.1 34.0 - 48.0 %    MCV 99.7 80.0 - 99.9 fL    MCH 30.9 26.0 - 35.0 pg    MCHC 31.0 (L) 32.0 - 34.5 %    RDW 12.4 11.5 - 15.0 fL    Platelets 926 310 - 373 E9/L    MPV 9.6 7.0 - 12.0 fL    Neutrophils % 75.0 43.0 - 80.0 %    Immature Granulocytes % 0.2 0.0 - 5.0 %    Lymphocytes % 14.5 (L) 20.0 - 42.0 %    Monocytes % 7.3 2.0 - 12.0 %    Eosinophils % 2.6 0.0 - 6.0 %    Basophils % 0.4 0.0 - 2.0 %    Neutrophils Absolute 4.10 1.80 - 7.30 E9/L    Immature Granulocytes # 0.01 E9/L    Lymphocytes Absolute 0.79 (L) 1.50 - 4.00 E9/L    Monocytes Absolute 0.40 0.10 - 0.95 E9/L    Eosinophils Absolute 0.14 0.05 - 0.50 E9/L    Basophils Absolute 0.02 0.00 - 0.20 E9/L   Troponin   Result Value Ref Range    Troponin, High Sensitivity 77 (H) 0 - 9 ng/L   Lipase   Result Value Ref Range    Lipase 18 13 - 60 U/L   Lactic Acid   Result Value Ref Range    Lactic Acid 0.9 0.5 - 2.2 mmol/L   Protime-INR   Result Value Ref Range    Protime 12.2 9.3 - 12.4 sec    INR 1.1    Troponin   Result Value Ref Range    Troponin, High Sensitivity 60 (H) 0 - 9 ng/L   Urinalysis with Microscopic   Result Value Ref Range    Color, UA Yellow Straw/Yellow    Clarity, UA Clear Clear    Glucose, Ur Negative Negative mg/dL    Bilirubin Urine Negative Negative    Ketones, Urine Negative Negative mg/dL    Specific Gravity, UA 1.025 1.005 - 1.030    Blood, Urine Negative Negative    pH, UA 6.0 5.0 - 9.0    Protein, UA Negative Negative mg/dL    Urobilinogen, Urine 0.2 <2.0 E.U./dL    Nitrite, Urine Negative Negative    Leukocyte Esterase, Urine Negative Negative    WBC, UA NONE 0 - 5 /HPF    RBC, UA NONE 0 - 2 /HPF    Bacteria, UA NONE SEEN None Seen /HPF   Urinalysis   Result Value Ref Range    Color, UA Yellow Straw/Yellow    Clarity, UA Clear Clear    Glucose, Ur Negative Negative mg/dL    Bilirubin Urine Negative Negative    Ketones, Urine Negative Negative mg/dL    Specific Gravity, UA 1.025 1.005 - 1.030    Blood, Urine LARGE (A) Negative    pH, UA 6.0 5.0 - 9.0    Protein, UA Negative Negative mg/dL    Urobilinogen, Urine 0.2 <2.0 E.U./dL    Nitrite, Urine Negative Negative    Leukocyte Esterase, Urine Negative Negative   Eosinophil Smear, Urine   Result Value Ref Range    Eosinophil, Urine 0 0 - 1 %   URINE ELECTROLYTES   Result Value Ref Range    Sodium, Ur 93 Not Established mmol/L Potassium, Ur 14.6 Not Established mmol/L    Chloride 85 Not Established mmol/L   Microalbumin / creatinine urine ratio   Result Value Ref Range    Microalbumin, Random Urine 24.1 (H) Not Established mg/L    Creatinine, Ur 89 29 - 226 mg/dL    Microalbumin Creatinine Ratio 27.1 0.0 - 30.0   UREA NITROGEN, URINE   Result Value Ref Range    Urea Nitrogen, Ur 997 800 - 1666 mg/dL   Osmolality, urine   Result Value Ref Range    Osmolality, Ur 627 300 - 900 mOsm/kg   CK   Result Value Ref Range    Total  (H) 20 - 180 U/L   CBC with Auto Differential   Result Value Ref Range    WBC 4.3 (L) 4.5 - 11.5 E9/L    RBC 3.26 (L) 3.50 - 5.50 E12/L    Hemoglobin 10.3 (L) 11.5 - 15.5 g/dL    Hematocrit 33.0 (L) 34.0 - 48.0 %    .2 (H) 80.0 - 99.9 fL    MCH 31.6 26.0 - 35.0 pg    MCHC 31.2 (L) 32.0 - 34.5 %    RDW 12.2 11.5 - 15.0 fL    Platelets 433 809 - 095 E9/L    MPV 9.5 7.0 - 12.0 fL    Neutrophils % 66.3 43.0 - 80.0 %    Immature Granulocytes % 0.2 0.0 - 5.0 %    Lymphocytes % 20.5 20.0 - 42.0 %    Monocytes % 9.3 2.0 - 12.0 %    Eosinophils % 3.5 0.0 - 6.0 %    Basophils % 0.2 0.0 - 2.0 %    Neutrophils Absolute 2.85 1.80 - 7.30 E9/L    Immature Granulocytes # 0.01 E9/L    Lymphocytes Absolute 0.88 (L) 1.50 - 4.00 E9/L    Monocytes Absolute 0.40 0.10 - 0.95 E9/L    Eosinophils Absolute 0.15 0.05 - 0.50 E9/L    Basophils Absolute 0.01 0.00 - 0.20 E9/L   Comprehensive Metabolic Panel w/ Reflex to MG   Result Value Ref Range    Sodium 136 132 - 146 mmol/L    Potassium reflex Magnesium 4.5 3.5 - 5.0 mmol/L    Chloride 101 98 - 107 mmol/L    CO2 29 22 - 29 mmol/L    Anion Gap 6 (L) 7 - 16 mmol/L    Glucose 96 74 - 99 mg/dL    BUN 20 6 - 23 mg/dL    Creatinine 0.8 0.5 - 1.0 mg/dL    Est, Glom Filt Rate >60 >=60 mL/min/1.73    Calcium 8.9 8.6 - 10.2 mg/dL    Total Protein 7.0 6.4 - 8.3 g/dL    Albumin 3.1 (L) 3.5 - 5.2 g/dL    Total Bilirubin 0.4 0.0 - 1.2 mg/dL    Alkaline Phosphatase 99 35 - 104 U/L    ALT 13 0 - 32 U/L    AST 22 0 - 31 U/L   CK   Result Value Ref Range    Total  20 - 180 U/L   Magnesium   Result Value Ref Range    Magnesium 1.7 1.6 - 2.6 mg/dL   Microscopic Urinalysis   Result Value Ref Range    WBC, UA NONE 0 - 5 /HPF    RBC, UA 10-20 (A) 0 - 2 /HPF    Bacteria, UA RARE (A) None Seen /HPF    Yeast, UA Present (A) None Seen /HPF   CBC with Auto Differential   Result Value Ref Range    WBC 5.9 4.5 - 11.5 E9/L    RBC 3.70 3.50 - 5.50 E12/L    Hemoglobin 11.8 11.5 - 15.5 g/dL    Hematocrit 36.0 34.0 - 48.0 %    MCV 97.3 80.0 - 99.9 fL    MCH 31.9 26.0 - 35.0 pg    MCHC 32.8 32.0 - 34.5 %    RDW 11.9 11.5 - 15.0 fL    Platelets 303 146 - 090 E9/L    MPV 9.6 7.0 - 12.0 fL    Neutrophils % 81.5 (H) 43.0 - 80.0 %    Immature Granulocytes % 0.2 0.0 - 5.0 %    Lymphocytes % 10.1 (L) 20.0 - 42.0 %    Monocytes % 6.7 2.0 - 12.0 %    Eosinophils % 1.2 0.0 - 6.0 %    Basophils % 0.3 0.0 - 2.0 %    Neutrophils Absolute 4.84 1.80 - 7.30 E9/L    Immature Granulocytes # 0.01 E9/L    Lymphocytes Absolute 0.60 (L) 1.50 - 4.00 E9/L    Monocytes Absolute 0.40 0.10 - 0.95 E9/L    Eosinophils Absolute 0.07 0.05 - 0.50 E9/L    Basophils Absolute 0.02 0.00 - 0.20 E9/L   Comprehensive Metabolic Panel w/ Reflex to MG   Result Value Ref Range    Sodium 137 132 - 146 mmol/L    Potassium reflex Magnesium 4.1 3.5 - 5.0 mmol/L    Chloride 101 98 - 107 mmol/L    CO2 28 22 - 29 mmol/L    Anion Gap 8 7 - 16 mmol/L    Glucose 114 (H) 74 - 99 mg/dL    BUN 8 6 - 23 mg/dL    Creatinine 0.5 0.5 - 1.0 mg/dL    Est, Glom Filt Rate >60 >=60 mL/min/1.73    Calcium 9.1 8.6 - 10.2 mg/dL    Total Protein 7.5 6.4 - 8.3 g/dL    Albumin 3.4 (L) 3.5 - 5.2 g/dL    Total Bilirubin 0.5 0.0 - 1.2 mg/dL    Alkaline Phosphatase 114 (H) 35 - 104 U/L    ALT 14 0 - 32 U/L    AST 25 0 - 31 U/L       Radiology:        --------------------------------------------- PAST HISTORY ---------------------------------------------  Past Medical History:  has a past medical history of Bursitis, CAD (coronary artery disease), Cellulitis of leg, left, COPD (chronic obstructive pulmonary disease) ([facility] [address] Dermatophytosis, Emphysema, GERD (gastroesophageal reflux disease), Hiatal hernia, Hip pain, Hyperlipidemia, Hypertension, Lymphedema of both lower extremities, Venous insufficiency of both lower extremities, Venous stasis ulcer of left calf with fat layer exposed without varicose veins (HCC), Venous stasis ulcer of left calf with fat layer exposed without varicose veins ([facility] [address] and Venous ulcer with [facility] layer [facility] Past Surgical History:  has a past surgical history that includes Cholecystectomy; Hysterectomy; Endoscopy, colon, diagnostic; Colonoscopy; Appendectomy; ECHO Compl W Dop Color Flow (3/11/2013); Leg Debridement (Left, 11/18/2015); joint replacement (2009 2011); Breast reduction surgery; Breast enhancement surgery; Leg Debridement (Left, 08/03/2016); hernia repair (N/A, 4/16/2021); and hernia repair. Social History:  reports that she quit smoking about 27 years ago. Her smoking use included cigarettes. She has a 20.00 pack-year smoking history. She has never used smokeless tobacco. She reports that she does not drink alcohol and does not use drugs. Family History: family history is not on file. The patients home medications have been reviewed.     Allergies: Codeine, Dilaudid [hydromorphone hcl], Naproxen, Singulair [montelukast sodium], Black cohosh, and Black cohosh [cimicifuga racemosa (black cohosh)]    -------------------------------------------------- RESULTS -------------------------------------------------    LABS:  Results for orders placed or performed during the hospital encounter of 01/26/23   COVID-19, Rapid    Specimen: Nasopharyngeal Swab   Result Value Ref Range    SARS-CoV-2, NAAT Not Detected Not Detected   Culture, Blood 1    Specimen: Blood   Result Value Ref Range    Blood Culture, Routine 24 Hours no growth    Culture, Blood 2 Specimen: Blood   Result Value Ref Range    Culture, Blood 2 24 Hours no growth    Culture, Wound Aerobic Only    Specimen: Leg   Result Value Ref Range    Organism Corynebacterium species (A)     WOUND/ABSCESS Heavy growth  (mixed morphologies)      Culture, Urine    Specimen: Urine, clean catch   Result Value Ref Range    Urine Culture, Routine Growth not present, incubation continues    CMP   Result Value Ref Range    Sodium 133 132 - 146 mmol/L    Potassium 4.5 3.5 - 5.0 mmol/L    Chloride 97 (L) 98 - 107 mmol/L    CO2 25 22 - 29 mmol/L    Anion Gap 11 7 - 16 mmol/L    Glucose 98 74 - 99 mg/dL    BUN 45 (H) 6 - 23 mg/dL    Creatinine 1.8 (H) 0.5 - 1.0 mg/dL    Est, Glom Filt Rate 29 >=60 mL/min/1.73    Calcium 9.7 8.6 - 10.2 mg/dL    Total Protein 8.6 (H) 6.4 - 8.3 g/dL    Albumin 3.8 3.5 - 5.2 g/dL    Total Bilirubin 0.3 0.0 - 1.2 mg/dL    Alkaline Phosphatase 119 (H) 35 - 104 U/L    ALT 17 0 - 32 U/L    AST 27 0 - 31 U/L   CBC with Auto Differential   Result Value Ref Range    WBC 5.5 4.5 - 11.5 E9/L    RBC 3.72 3.50 - 5.50 E12/L    Hemoglobin 11.5 11.5 - 15.5 g/dL    Hematocrit 37.1 34.0 - 48.0 %    MCV 99.7 80.0 - 99.9 fL    MCH 30.9 26.0 - 35.0 pg    MCHC 31.0 (L) 32.0 - 34.5 %    RDW 12.4 11.5 - 15.0 fL    Platelets 319 103 - 676 E9/L    MPV 9.6 7.0 - 12.0 fL    Neutrophils % 75.0 43.0 - 80.0 %    Immature Granulocytes % 0.2 0.0 - 5.0 %    Lymphocytes % 14.5 (L) 20.0 - 42.0 %    Monocytes % 7.3 2.0 - 12.0 %    Eosinophils % 2.6 0.0 - 6.0 %    Basophils % 0.4 0.0 - 2.0 %    Neutrophils Absolute 4.10 1.80 - 7.30 E9/L    Immature Granulocytes # 0.01 E9/L    Lymphocytes Absolute 0.79 (L) 1.50 - 4.00 E9/L    Monocytes Absolute 0.40 0.10 - 0.95 E9/L    Eosinophils Absolute 0.14 0.05 - 0.50 E9/L    Basophils Absolute 0.02 0.00 - 0.20 E9/L   Troponin   Result Value Ref Range    Troponin, High Sensitivity 77 (H) 0 - 9 ng/L   Lipase   Result Value Ref Range    Lipase 18 13 - 60 U/L   Lactic Acid   Result Value Ref Range    Lactic Acid 0.9 0.5 - 2.2 mmol/L   Protime-INR   Result Value Ref Range    Protime 12.2 9.3 - 12.4 sec    INR 1.1    Troponin   Result Value Ref Range    Troponin, High Sensitivity 60 (H) 0 - 9 ng/L   Urinalysis with Microscopic   Result Value Ref Range    Color, UA Yellow Straw/Yellow    Clarity, UA Clear Clear    Glucose, Ur Negative Negative mg/dL    Bilirubin Urine Negative Negative    Ketones, Urine Negative Negative mg/dL    Specific Gravity, UA 1.025 1.005 - 1.030    Blood, Urine Negative Negative    pH, UA 6.0 5.0 - 9.0    Protein, UA Negative Negative mg/dL    Urobilinogen, Urine 0.2 <2.0 E.U./dL    Nitrite, Urine Negative Negative    Leukocyte Esterase, Urine Negative Negative    WBC, UA NONE 0 - 5 /HPF    RBC, UA NONE 0 - 2 /HPF    Bacteria, UA NONE SEEN None Seen /HPF   Urinalysis   Result Value Ref Range    Color, UA Yellow Straw/Yellow    Clarity, UA Clear Clear    Glucose, Ur Negative Negative mg/dL    Bilirubin Urine Negative Negative    Ketones, Urine Negative Negative mg/dL    Specific Gravity, UA 1.025 1.005 - 1.030    Blood, Urine LARGE (A) Negative    pH, UA 6.0 5.0 - 9.0    Protein, UA Negative Negative mg/dL    Urobilinogen, Urine 0.2 <2.0 E.U./dL    Nitrite, Urine Negative Negative    Leukocyte Esterase, Urine Negative Negative   Eosinophil Smear, Urine   Result Value Ref Range    Eosinophil, Urine 0 0 - 1 %   URINE ELECTROLYTES   Result Value Ref Range    Sodium, Ur 93 Not Established mmol/L    Potassium, Ur 14.6 Not Established mmol/L    Chloride 85 Not Established mmol/L   Microalbumin / creatinine urine ratio   Result Value Ref Range    Microalbumin, Random Urine 24.1 (H) Not Established mg/L    Creatinine, Ur 89 29 - 226 mg/dL    Microalbumin Creatinine Ratio 27.1 0.0 - 30.0   UREA NITROGEN, URINE   Result Value Ref Range    Urea Nitrogen, Ur 997 800 - 1666 mg/dL   Osmolality, urine   Result Value Ref Range    Osmolality, Ur 627 300 - 900 mOsm/kg   CK   Result Value Ref Range Total  (H) 20 - 180 U/L   CBC with Auto Differential   Result Value Ref Range    WBC 4.3 (L) 4.5 - 11.5 E9/L    RBC 3.26 (L) 3.50 - 5.50 E12/L    Hemoglobin 10.3 (L) 11.5 - 15.5 g/dL    Hematocrit 33.0 (L) 34.0 - 48.0 %    .2 (H) 80.0 - 99.9 fL    MCH 31.6 26.0 - 35.0 pg    MCHC 31.2 (L) 32.0 - 34.5 %    RDW 12.2 11.5 - 15.0 fL    Platelets 142 254 - 808 E9/L    MPV 9.5 7.0 - 12.0 fL    Neutrophils % 66.3 43.0 - 80.0 %    Immature Granulocytes % 0.2 0.0 - 5.0 %    Lymphocytes % 20.5 20.0 - 42.0 %    Monocytes % 9.3 2.0 - 12.0 %    Eosinophils % 3.5 0.0 - 6.0 %    Basophils % 0.2 0.0 - 2.0 %    Neutrophils Absolute 2.85 1.80 - 7.30 E9/L    Immature Granulocytes # 0.01 E9/L    Lymphocytes Absolute 0.88 (L) 1.50 - 4.00 E9/L    Monocytes Absolute 0.40 0.10 - 0.95 E9/L    Eosinophils Absolute 0.15 0.05 - 0.50 E9/L    Basophils Absolute 0.01 0.00 - 0.20 E9/L   Comprehensive Metabolic Panel w/ Reflex to MG   Result Value Ref Range    Sodium 136 132 - 146 mmol/L    Potassium reflex Magnesium 4.5 3.5 - 5.0 mmol/L    Chloride 101 98 - 107 mmol/L    CO2 29 22 - 29 mmol/L    Anion Gap 6 (L) 7 - 16 mmol/L    Glucose 96 74 - 99 mg/dL    BUN 20 6 - 23 mg/dL    Creatinine 0.8 0.5 - 1.0 mg/dL    Est, Glom Filt Rate >60 >=60 mL/min/1.73    Calcium 8.9 8.6 - 10.2 mg/dL    Total Protein 7.0 6.4 - 8.3 g/dL    Albumin 3.1 (L) 3.5 - 5.2 g/dL    Total Bilirubin 0.4 0.0 - 1.2 mg/dL    Alkaline Phosphatase 99 35 - 104 U/L    ALT 13 0 - 32 U/L    AST 22 0 - 31 U/L   CK   Result Value Ref Range    Total  20 - 180 U/L   Magnesium   Result Value Ref Range    Magnesium 1.7 1.6 - 2.6 mg/dL   Microscopic Urinalysis   Result Value Ref Range    WBC, UA NONE 0 - 5 /HPF    RBC, UA 10-20 (A) 0 - 2 /HPF    Bacteria, UA RARE (A) None Seen /HPF    Yeast, UA Present (A) None Seen /HPF   CBC with Auto Differential   Result Value Ref Range    WBC 5.9 4.5 - 11.5 E9/L    RBC 3.70 3.50 - 5.50 E12/L    Hemoglobin 11.8 11.5 - 15.5 g/dL Hematocrit 36.0 34.0 - 48.0 %    MCV 97.3 80.0 - 99.9 fL    MCH 31.9 26.0 - 35.0 pg    MCHC 32.8 32.0 - 34.5 %    RDW 11.9 11.5 - 15.0 fL    Platelets 261 984 - 315 E9/L    MPV 9.6 7.0 - 12.0 fL    Neutrophils % 81.5 (H) 43.0 - 80.0 %    Immature Granulocytes % 0.2 0.0 - 5.0 %    Lymphocytes % 10.1 (L) 20.0 - 42.0 %    Monocytes % 6.7 2.0 - 12.0 %    Eosinophils % 1.2 0.0 - 6.0 %    Basophils % 0.3 0.0 - 2.0 %    Neutrophils Absolute 4.84 1.80 - 7.30 E9/L    Immature Granulocytes # 0.01 E9/L    Lymphocytes Absolute 0.60 (L) 1.50 - 4.00 E9/L    Monocytes Absolute 0.40 0.10 - 0.95 E9/L    Eosinophils Absolute 0.07 0.05 - 0.50 E9/L    Basophils Absolute 0.02 0.00 - 0.20 E9/L   Comprehensive Metabolic Panel w/ Reflex to MG   Result Value Ref Range    Sodium 137 132 - 146 mmol/L    Potassium reflex Magnesium 4.1 3.5 - 5.0 mmol/L    Chloride 101 98 - 107 mmol/L    CO2 28 22 - 29 mmol/L    Anion Gap 8 7 - 16 mmol/L    Glucose 114 (H) 74 - 99 mg/dL    BUN 8 6 - 23 mg/dL    Creatinine 0.5 0.5 - 1.0 mg/dL    Est, Glom Filt Rate >60 >=60 mL/min/1.73    Calcium 9.1 8.6 - 10.2 mg/dL    Total Protein 7.5 6.4 - 8.3 g/dL    Albumin 3.4 (L) 3.5 - 5.2 g/dL    Total Bilirubin 0.5 0.0 - 1.2 mg/dL    Alkaline Phosphatase 114 (H) 35 - 104 U/L    ALT 14 0 - 32 U/L    AST 25 0 - 31 U/L       RADIOLOGY:  US RETROPERITONEAL COMPLETE   Final Result   1.9 cm right renal cysts, otherwise unremarkable renal ultrasound. XR CHEST PORTABLE   Final Result   No acute process. Mild cardiomegaly         XR HIP RIGHT (2-3 VIEWS)   Final Result   No acute bony abnormalities or prosthetic complications. XR TIBIA FIBULA LEFT (2 VIEWS)   Final Result   No acute osseous abnormality or prosthetic complications. .      Flattening deformity of the visualized foot, may be related to old injury of   the talus and calcaneus. Recommend dedicated ankle radiographs. CT HEAD WO CONTRAST   Final Result   CT HEAD WITHOUT CONTRAST:      1. No skull fracture or acute intracranial abnormality. CT CERVICAL SPINE WITHOUT CONTRAST:      1. No fracture or joint dislocation is seen. 2. Degenerative changes, as described. CT CERVICAL SPINE WO CONTRAST   Final Result   CT HEAD WITHOUT CONTRAST:      1. No skull fracture or acute intracranial abnormality. CT CERVICAL SPINE WITHOUT CONTRAST:      1. No fracture or joint dislocation is seen. 2. Degenerative changes, as described.                 ------------------------- NURSING NOTES AND VITALS REVIEWED ---------------------------  Date / Time Roomed:  1/26/2023  9:37 AM  ED Bed Assignment:  1659/8824-N    The nursing notes within the ED encounter and vital signs as below have been reviewed. Patient Vitals for the past 24 hrs:   BP Temp Temp src Pulse Resp SpO2 Weight   01/28/23 1318 -- -- -- -- 16 -- --   01/28/23 1248 -- -- -- -- 16 -- --   01/28/23 1016 -- -- -- 95 -- -- --   01/28/23 0925 -- -- -- -- 18 -- --   01/28/23 0745 (!) 142/64 98.1 °F (36.7 °C) Oral (!) 106 18 93 % --   01/28/23 0431 -- -- -- -- -- 93 % --   01/28/23 0026 -- -- -- -- -- -- 295 lb (133.8 kg)   01/27/23 1840 128/68 97.4 °F (36.3 °C) Oral 70 18 93 % --       Oxygen Saturation Interpretation: Normal    ------------------------------------------ PROGRESS NOTES ------------------------------------------  Re-evaluation(s):  Time: 36  Patients symptoms are improving  Repeat physical examination is improved    Counseling:  I have spoken with the patient and discussed todays results, in addition to providing specific details for the plan of care and counseling regarding the diagnosis and prognosis. Their questions are answered at this time and they are agreeable with the plan of admission.    --------------------------------- ADDITIONAL PROVIDER NOTES ---------------------------------  Consultations:  Spoke with Dr. Zeke miller. They will admit the patient.   This patient's ED course included: a personal history and physicial examination, re-evaluation prior to disposition, multiple bedside re-evaluations, IV medications, cardiac monitoring, continuous pulse oximetry, and complex medical decision making and emergency management    This patient has remained hemodynamically stable during their ED course. Diagnosis:  1. DAYTON (acute kidney injury) ([facility]        Disposition:  Patient's disposition: Admit to telemetry  Patient's condition is stable.      Romulo Vogel,   01/28/23 1800

## 2023-01-28 NOTE — DISCHARGE INSTRUCTIONS
Follow up with Dr. Amaris Geronimo at the wound care center, main [facility] on Wednesdays.

## 2023-01-28 NOTE — PROGRESS NOTES
[facility] Progress Note    Admitting Date and Time: 1/26/2023  9:37 AM  Admit Dx: [facility] (acute [facility] [N17.9]    Subjective:  Patient is being followed for DAYTON (acute kidney injury) ([facility] [N17.9]     Patient In bed resting comfortably. Awaiting labs from this morning. She is very groggy and lethargic when asked questions. Does not remember refusing medications last night. States she is not sleeping well but every time she is seen, she is sleeping. Did not receive night time dose of MS Contin due to lethargy. Last took [facility] [facility] at 4 AM on 1/27, since been discontinued. Patient denies complaints. ROS: denies fever, chills, cp, sob, n/v, HA unless stated above.      sodium chloride flush  5-40 mL IntraVENous 2 times per day    enoxaparin  30 mg SubCUTAneous BID    aspirin  81 mg Oral Daily    calcium elemental  2,500 mg Oral Daily    vitamin D  2,000 Units Oral Daily    vitamin B-12  500 mcg Oral Daily    ferrous sulfate  325 mg Oral Nightly    gabapentin  300 mg Oral TID    metoprolol tartrate  50 mg Oral BID    morphine  15 mg Oral BID    therapeutic multivitamin-minerals  1 tablet Oral Daily    pantoprazole  40 mg Oral QAM AC    atorvastatin  20 mg Oral Daily    zinc sulfate  50 mg Oral Daily     white petrolatum, , BID PRN  sodium chloride flush, 5-40 mL, PRN  sodium chloride, , PRN  ondansetron, 4 mg, Q8H PRN   Or  ondansetron, 4 mg, Q6H PRN  acetaminophen, 650 mg, Q6H PRN   Or  acetaminophen, 650 mg, Q6H PRN  albuterol, 2.5 mg, Q6H PRN       Objective:    BP (!) 142/64   Pulse (!) 106   Temp 98.1 °F (36.7 °C) (Oral)   Resp 18   Ht 5' 7\" (1.702 m)   Wt 295 lb (133.8 kg)   SpO2 93%   BMI 46.20 kg/m²     General Appearance: alert and oriented to person, place and time and in no acute distress  Subcutaneous hematoma over the right eyebrow and extending to the midline.   Subconjuctival hemorrhage on the lower area extending around the iris from 3 pm - 8 pm. Oral cavity with ulcerations on the left upper palate   Skin abrasions to the dorsum of the hands and right side of face. Skin: warm and dry  Bilateral lymphedema. Wound to the left anterior shin - Hyperpigmented shins, bilateral venous stasis. Head: normocephalic and atraumatic  Eyes: pupils equal, round, and reactive to light, extraocular eye movements intact, conjunctivae normal  Neck: neck supple and non tender without mass   Pulmonary/Chest: clear to auscultation bilaterally- no wheezes, rales or rhonchi, normal air movement, no respiratory distress  Cardiovascular: normal rate, normal S1 and S2 and no carotid bruits  Abdomen: soft, non-tender, non-distended, normal bowel sounds, no masses or organomegaly  Extremities: no cyanosis, no clubbing and no edema  Neurologic: no cranial nerve deficit and speech normal    Recent Labs     01/26/23  1000 01/27/23  0950    136   K 4.5 4.5   CL 97* 101   CO2 25 29   BUN 45* 20   CREATININE 1.8* 0.8   GLUCOSE 98 96   CALCIUM 9.7 8.9       Recent Labs     01/26/23  1000 01/27/23  0950   WBC 5.5 4.3*   RBC 3.72 3.26*   HGB 11.5 10.3*   HCT 37.1 33.0*   MCV 99.7 101.2*   MCH 30.9 31.6   MCHC 31.0* 31.2*   RDW 12.4 12.2    154   MPV 9.6 9.5       Radiology: reviewed       Assessment:    Principal Problem:    DAYTON (acute kidney injury) ([facility]  Active Problems:    Wound of left leg    Syncope    Drug-induced mucositis    Chronic pain syndrome    PVD (peripheral vascular disease) ([facility]    Primary hypertension    Hyperlipidemia    Lymphedema  Resolved Problems:    * No resolved hospital problems. *      Plan:  1. DAYTON RESOLVED- 2/2 Bactrim along with the mucosal changes. DC Bactrim. Continue IV fluids. Trend labs - Cr 1.8 < 0.8. Routine urine electrolytes, osmolality and urine eosinophils. Renal ultrasound showing right renal cysts. Avoid nephrotoxins, ACEi, diuretics,etc.  Baseline creatinine 0.8 in 10/22.    2.   Left pretibial chronic leg wound since June 2021 - does not appear infected. wound culture from 1/11 + Staph aureus with resistance, pseudomonas, Acinetobacter. Has received 10 days of Levaquin and Bactrim. ID consulted, appreciate recs. Wound care consulted. Continue to follow with Dr HATFIELD outpatient, no further antibiotics warranted. 3. Acute metabolic encephalopathy - patient is groggy and lethargic since admission. Increased Opioid use likely contributing to AMS and fall. Deana Sharda has been discontinued. Patient did not take MS Contin last night due to refusing all night time meds. 3.   HTN - monitor vitals. Continue meds. DC HCTZ due to the DAYTON. 4.   Hyperlipidemia - continue with statin. LFTs normal.    < 108. Ok to continue statin as < 3x ULN. 5.   Lymphedema - supportive care. 6. PVD - Vascular consult with Dr. Akila Bonilla. Has been treating patient at the wound clinic, last saw 1/25.   7.   Chronic pain - patient taking MS Continue and prn Oxycodone 7.5/325. Fall exacerbated by opioid use. patient remains lethargic. MS Contin continued, Norco discontinued. 8. Candiduria - yeast present in UA. Diflucan x 1 given. CODE: Full  DVT prophylaxis: Lovenox   Discharge dispo: SNF vs home with [facility] - awaiting PT/OT eval.     25 minutes time spent reviewing patient chart, assessing patient, discussing plan of care with patient and family, discussing plan of care with collaborating physician, and charting.      Electronically signed by MESSI Verdugo NP on 1/28/2023 at 8:00 AM 138

## 2023-01-29 LAB
ALBUMIN SERPL-MCNC: 3.2 G/DL (ref 3.5–5.2)
ALP BLD-CCNC: 117 U/L (ref 35–104)
ALT SERPL-CCNC: 12 U/L (ref 0–32)
ANAEROBIC CULTURE: NORMAL
ANION GAP SERPL CALCULATED.3IONS-SCNC: 10 MMOL/L (ref 7–16)
AST SERPL-CCNC: 23 U/L (ref 0–31)
BASOPHILS ABSOLUTE: 0.01 E9/L (ref 0–0.2)
BASOPHILS RELATIVE PERCENT: 0.1 % (ref 0–2)
BILIRUB SERPL-MCNC: 0.5 MG/DL (ref 0–1.2)
BUN BLDV-MCNC: 5 MG/DL (ref 6–23)
CALCIUM SERPL-MCNC: 9.1 MG/DL (ref 8.6–10.2)
CHLORIDE BLD-SCNC: 97 MMOL/L (ref 98–107)
CO2: 29 MMOL/L (ref 22–29)
CREAT SERPL-MCNC: 0.5 MG/DL (ref 0.5–1)
EOSINOPHILS ABSOLUTE: 0.04 E9/L (ref 0.05–0.5)
EOSINOPHILS RELATIVE PERCENT: 0.6 % (ref 0–6)
GFR SERPL CREATININE-BSD FRML MDRD: >60 ML/MIN/1.73
GLUCOSE BLD-MCNC: 103 MG/DL (ref 74–99)
HCT VFR BLD CALC: 36.3 % (ref 34–48)
HEMOGLOBIN: 12.1 G/DL (ref 11.5–15.5)
IMMATURE GRANULOCYTES #: 0.01 E9/L
IMMATURE GRANULOCYTES %: 0.1 % (ref 0–5)
LYMPHOCYTES ABSOLUTE: 0.74 E9/L (ref 1.5–4)
LYMPHOCYTES RELATIVE PERCENT: 10.6 % (ref 20–42)
MCH RBC QN AUTO: 31.8 PG (ref 26–35)
MCHC RBC AUTO-ENTMCNC: 33.3 % (ref 32–34.5)
MCV RBC AUTO: 95.5 FL (ref 80–99.9)
MONOCYTES ABSOLUTE: 0.56 E9/L (ref 0.1–0.95)
MONOCYTES RELATIVE PERCENT: 8 % (ref 2–12)
NEUTROPHILS ABSOLUTE: 5.62 E9/L (ref 1.8–7.3)
NEUTROPHILS RELATIVE PERCENT: 80.6 % (ref 43–80)
PDW BLD-RTO: 12.1 FL (ref 11.5–15)
PLATELET # BLD: 179 E9/L (ref 130–450)
PMV BLD AUTO: 9.5 FL (ref 7–12)
POTASSIUM REFLEX MAGNESIUM: 3.6 MMOL/L (ref 3.5–5)
RBC # BLD: 3.8 E12/L (ref 3.5–5.5)
SODIUM BLD-SCNC: 136 MMOL/L (ref 132–146)
TOTAL PROTEIN: 7.6 G/DL (ref 6.4–8.3)
URINE CULTURE, ROUTINE: NORMAL
WBC # BLD: 7 E9/L (ref 4.5–11.5)

## 2023-01-29 PROCEDURE — 1200000000 HC SEMI PRIVATE

## 2023-01-29 PROCEDURE — 6360000002 HC RX W HCPCS: Performed by: FAMILY MEDICINE

## 2023-01-29 PROCEDURE — 36415 COLL VENOUS BLD VENIPUNCTURE: CPT

## 2023-01-29 PROCEDURE — 94664 DEMO&/EVAL PT USE INHALER: CPT

## 2023-01-29 PROCEDURE — 99233 SBSQ HOSP IP/OBS HIGH 50: CPT | Performed by: FAMILY MEDICINE

## 2023-01-29 PROCEDURE — 85025 COMPLETE CBC W/AUTO DIFF WBC: CPT

## 2023-01-29 PROCEDURE — 94640 AIRWAY INHALATION TREATMENT: CPT

## 2023-01-29 PROCEDURE — 97161 PT EVAL LOW COMPLEX 20 MIN: CPT

## 2023-01-29 PROCEDURE — 6370000000 HC RX 637 (ALT 250 FOR IP): Performed by: FAMILY MEDICINE

## 2023-01-29 PROCEDURE — 2700000000 HC OXYGEN THERAPY PER DAY

## 2023-01-29 PROCEDURE — 94669 MECHANICAL CHEST WALL OSCILL: CPT

## 2023-01-29 PROCEDURE — 2580000003 HC RX 258: Performed by: FAMILY MEDICINE

## 2023-01-29 PROCEDURE — 80053 COMPREHEN METABOLIC PANEL: CPT

## 2023-01-29 PROCEDURE — 6370000000 HC RX 637 (ALT 250 FOR IP)

## 2023-01-29 RX ORDER — GUAIFENESIN/DEXTROMETHORPHAN 100-10MG/5
10 SYRUP ORAL EVERY 4 HOURS PRN
Status: DISCONTINUED | OUTPATIENT
Start: 2023-01-29 | End: 2023-02-01 | Stop reason: HOSPADM

## 2023-01-29 RX ORDER — IPRATROPIUM BROMIDE AND ALBUTEROL SULFATE 2.5; .5 MG/3ML; MG/3ML
1 SOLUTION RESPIRATORY (INHALATION)
Status: DISCONTINUED | OUTPATIENT
Start: 2023-01-29 | End: 2023-02-01 | Stop reason: HOSPADM

## 2023-01-29 RX ADMIN — MORPHINE SULFATE 15 MG: 15 TABLET, FILM COATED, EXTENDED RELEASE ORAL at 08:09

## 2023-01-29 RX ADMIN — ONDANSETRON 4 MG: 4 TABLET, ORALLY DISINTEGRATING ORAL at 12:57

## 2023-01-29 RX ADMIN — METOPROLOL TARTRATE 50 MG: 50 TABLET, FILM COATED ORAL at 22:04

## 2023-01-29 RX ADMIN — FERROUS SULFATE TAB 325 MG (65 MG ELEMENTAL FE) 325 MG: 325 (65 FE) TAB at 22:04

## 2023-01-29 RX ADMIN — ATORVASTATIN CALCIUM 20 MG: 20 TABLET, FILM COATED ORAL at 08:09

## 2023-01-29 RX ADMIN — GUAIFENESIN AND DEXTROMETHORPHAN 10 ML: 100; 10 SYRUP ORAL at 11:46

## 2023-01-29 RX ADMIN — METOPROLOL TARTRATE 50 MG: 50 TABLET, FILM COATED ORAL at 08:11

## 2023-01-29 RX ADMIN — GABAPENTIN 300 MG: 300 CAPSULE ORAL at 13:51

## 2023-01-29 RX ADMIN — GABAPENTIN 300 MG: 300 CAPSULE ORAL at 08:09

## 2023-01-29 RX ADMIN — ONDANSETRON 4 MG: 2 INJECTION INTRAMUSCULAR; INTRAVENOUS at 03:46

## 2023-01-29 RX ADMIN — ASPIRIN 81 MG: 81 TABLET, COATED ORAL at 08:11

## 2023-01-29 RX ADMIN — IPRATROPIUM BROMIDE AND ALBUTEROL SULFATE 1 AMPULE: .5; 2.5 SOLUTION RESPIRATORY (INHALATION) at 16:00

## 2023-01-29 RX ADMIN — IPRATROPIUM BROMIDE AND ALBUTEROL SULFATE 1 AMPULE: .5; 2.5 SOLUTION RESPIRATORY (INHALATION) at 12:40

## 2023-01-29 RX ADMIN — OXYCODONE AND ACETAMINOPHEN 1 TABLET: 5; 325 TABLET ORAL at 17:15

## 2023-01-29 RX ADMIN — GABAPENTIN 300 MG: 300 CAPSULE ORAL at 22:03

## 2023-01-29 RX ADMIN — OXYCODONE AND ACETAMINOPHEN 1 TABLET: 5; 325 TABLET ORAL at 11:26

## 2023-01-29 RX ADMIN — ENOXAPARIN SODIUM 30 MG: 100 INJECTION SUBCUTANEOUS at 08:11

## 2023-01-29 RX ADMIN — IPRATROPIUM BROMIDE AND ALBUTEROL SULFATE 1 AMPULE: .5; 2.5 SOLUTION RESPIRATORY (INHALATION) at 19:19

## 2023-01-29 RX ADMIN — SODIUM CHLORIDE: 9 INJECTION, SOLUTION INTRAVENOUS at 12:56

## 2023-01-29 RX ADMIN — ENOXAPARIN SODIUM 30 MG: 100 INJECTION SUBCUTANEOUS at 22:03

## 2023-01-29 RX ADMIN — SODIUM CHLORIDE, PRESERVATIVE FREE 10 ML: 5 INJECTION INTRAVENOUS at 08:12

## 2023-01-29 RX ADMIN — MORPHINE SULFATE 15 MG: 15 TABLET, FILM COATED, EXTENDED RELEASE ORAL at 22:03

## 2023-01-29 ASSESSMENT — PAIN DESCRIPTION - PAIN TYPE: TYPE: ACUTE PAIN;CHRONIC PAIN

## 2023-01-29 ASSESSMENT — PAIN DESCRIPTION - LOCATION
LOCATION: GENERALIZED
LOCATION: GENERALIZED;BACK;HIP;LEG

## 2023-01-29 ASSESSMENT — PAIN DESCRIPTION - DESCRIPTORS
DESCRIPTORS: ACHING;DISCOMFORT;SORE
DESCRIPTORS: BURNING;THROBBING;DISCOMFORT

## 2023-01-29 ASSESSMENT — PAIN DESCRIPTION - FREQUENCY: FREQUENCY: CONTINUOUS

## 2023-01-29 ASSESSMENT — PAIN SCALES - GENERAL
PAINLEVEL_OUTOF10: 6
PAINLEVEL_OUTOF10: 7
PAINLEVEL_OUTOF10: 8
PAINLEVEL_OUTOF10: 5

## 2023-01-29 ASSESSMENT — PAIN DESCRIPTION - ORIENTATION: ORIENTATION: LEFT;RIGHT

## 2023-01-29 ASSESSMENT — PAIN DESCRIPTION - ONSET: ONSET: ON-GOING

## 2023-01-29 ASSESSMENT — PAIN - FUNCTIONAL ASSESSMENT: PAIN_FUNCTIONAL_ASSESSMENT: PREVENTS OR INTERFERES SOME ACTIVE ACTIVITIES AND ADLS

## 2023-01-29 NOTE — PROGRESS NOTES
[facility] Progress Note    Admitting Date and Time: 1/26/2023  9:37 AM  Admit Dx: DAYTON (acute kidney injury) ([facility] [N17.9]    Subjective:  Patient is being followed for DAYTON (acute kidney injury) ([facility] [N17.9]   Pt feels pain better controlled. Therapy here to evaluate patient. Per RN: reporting cough which sounds productive. Nebs changed to Duoneb, flutter valve ordered. Robitussin DM ordered. ROS: denies fever, chills, cp, sob, n/v, HA unless stated above.       oxyCODONE-acetaminophen  1 tablet Oral BID    sodium chloride flush  5-40 mL IntraVENous 2 times per day    enoxaparin  30 mg SubCUTAneous BID    aspirin  81 mg Oral Daily    calcium elemental  2,500 mg Oral Daily    vitamin D  2,000 Units Oral Daily    vitamin B-12  500 mcg Oral Daily    ferrous sulfate  325 mg Oral Nightly    gabapentin  300 mg Oral TID    metoprolol tartrate  50 mg Oral BID    morphine  15 mg Oral BID    therapeutic multivitamin-minerals  1 tablet Oral Daily    pantoprazole  40 mg Oral QAM AC    atorvastatin  20 mg Oral Daily    zinc sulfate  50 mg Oral Daily     white petrolatum, , BID PRN  sodium chloride flush, 5-40 mL, PRN  sodium chloride, , PRN  ondansetron, 4 mg, Q8H PRN   Or  ondansetron, 4 mg, Q6H PRN  acetaminophen, 650 mg, Q6H PRN   Or  acetaminophen, 650 mg, Q6H PRN  albuterol, 2.5 mg, Q6H PRN         Objective:    /79   Pulse (!) 103   Temp 98.5 °F (36.9 °C) (Oral)   Resp 20   Ht 5' 7\" (1.702 m)   Wt 292 lb (132.5 kg)   SpO2 92%   BMI 45.73 kg/m²     General Appearance: alert and oriented to person, place and time and in no acute distress  Skin: warm and dry  Head: normocephalic and atraumatic  Eyes: pupils equal, round, and reactive to light, extraocular eye movements intact, conjunctivae normal  Neck: neck supple and non tender without mass   Pulmonary/Chest: clear to auscultation bilaterally- no wheezes, rales or rhonchi, normal air movement, no respiratory distress  Cardiovascular: normal rate, normal S1 and S2 and no carotid bruits  Abdomen: soft, non-tender, non-distended, normal bowel sounds, no masses or organomegaly  Extremities: no cyanosis, no clubbing and no edema  Neurologic: more alert. To work with therapy      Recent Labs     01/27/23  0950 01/28/23  1200 01/29/23  0640    137 136   K 4.5 4.1 3.6    101 97*   CO2 29 28 29   BUN 20 8 5*   CREATININE 0.8 0.5 0.5   GLUCOSE 96 114* 103*   CALCIUM 8.9 9.1 9.1       Recent Labs     01/27/23  0950 01/28/23  1200 01/29/23  0640   WBC 4.3* 5.9 7.0   RBC 3.26* 3.70 3.80   HGB 10.3* 11.8 12.1   HCT 33.0* 36.0 36.3   .2* 97.3 95.5   MCH 31.6 31.9 31.8   MCHC 31.2* 32.8 33.3   RDW 12.2 11.9 12.1    178 179   MPV 9.5 9.6 9.5       Radiology:   No results found. Assessment:    Principal Problem:    DAYTON (acute kidney injury) ([facility]  Active Problems:    Wound of left leg    Syncope    Drug-induced mucositis    Chronic pain syndrome    PVD (peripheral vascular disease) (HCC)    Dermatophytosis    Primary hypertension    Hyperlipidemia    Lymphedema    Venous stasis ulcer of left calf with fat layer exposed without varicose veins (HCC)  Resolved Problems:    * No resolved hospital problems. *      Plan:  1. DAYTON, resolved - pending today's creatinine (baseline 0.8). Avoid further nephrotoxins. Trend labs. Treat accordingly. 2.   Left pretibial chronic leg wound - does not appear infected. No antibiotics at this time. ID concurs no need for antibiotics. No infection present. 3.   HTN - monitor vitals. Continue meds. DC HCTZ due to the DAYTON. 4.   Hyperlipidemia - continue with statin. , upper limit of normal 104. Rest of LFTs normal.   , normal yesterday. Ok to continue statin as < 3x ULN. 5.   Lymphedema - supportive care. 6. PVD - Vascular consulted. Appreciate recommendations. 7.   Chronic pain - patient taking MS Drew.   Did not get this last evening due to lethargy. Monitor and avoid dosage if lethargy persists. CT of head negative. Much clearer on reduced dosages. NOTE: This report was transcribed using voice recognition software. Every effort was made to ensure accuracy; however, inadvertent computerized transcription errors may be present.     Electronically signed by Grayson Romero MD on 1/29/2023 at 9:53 AM

## 2023-01-29 NOTE — PROGRESS NOTES
Physical Therapy  Facility/Department: [facility] SURG  Physical Therapy Initial Assessment    Name: Joel Hahn  : 1945  MRN: [id number]  Date of Service: 2023      Patient Diagnosis(es): The encounter diagnosis was DAYTON (acute kidney injury) ([facility] Past Medical History:  has a past medical history of Bursitis, CAD (coronary artery disease), Cellulitis of leg, left, COPD (chronic obstructive pulmonary disease) ([facility] Dermatophytosis, Emphysema, GERD (gastroesophageal reflux disease), Hiatal hernia, Hip pain, Hyperlipidemia, Hypertension, Lymphedema of both lower extremities, Venous insufficiency of both lower extremities, Venous stasis ulcer of left calf with fat layer exposed without varicose veins (HCC), Venous stasis ulcer of left calf with fat layer exposed without varicose veins ([facility] and Venous ulcer with fat layer exposed ([facility] Past Surgical History:  has a past surgical history that includes Cholecystectomy; Hysterectomy; Endoscopy, colon, diagnostic; Colonoscopy; Appendectomy; ECHO Compl W Dop Color Flow (3/11/2013); Leg Debridement (Left, 2015); joint replacement (2009); Breast reduction surgery; Breast enhancement surgery; Leg Debridement (Left, 2016); hernia repair (N/A, 2021); and hernia repair. Evaluating Therapist: Yesica Paige PT      Room #:  0847/1682-N  Diagnosis:  DAYTON (acute kidney injury) ([facility] [N17.9]  PMHx/PSHx:  CAD, cellulitis, COPD  Precautions:  falls, alarm      Social:  Pt lives with sister in a 1 floor plan 1 steps ands to enter. Prior to admission ambulates with cane. S/p fall at home     Initial Evaluation  Date: 23 Treatment      Short Term/ Long Term   Goals   Was pt agreeable to Eval/treatment? yes     Does pt have pain?  Pain all over (chronic) also c/o pain from L LE dressing, states it is too tight, nursing notified     Bed Mobility  Rolling: NT  Supine to sit: mod assist  Sit to supine: NT  Scooting: mod assist  Min assist Transfers Sit to stand: min assist  Stand to sit: min assist  Stand pivot: min assist with ww  SBA   Ambulation    5 feet with ww with min assist  50 feet with ww with SBA   Stair Negotiation  Ascended and descended  NT   N/A   LE strength     3+/5    4/5   balance      fair     AM-PAC Raw score               15/24         Pt is alert and Oriented   LE ROM: WFL  Sensation: intact  Edema: B LEs  Endurance: fair  Chair alarm: yes     ASSESSMENT:    Pt displays functional ability as noted in the objective portion of this evaluation. Patient education  Pt educated on PT objectives    Patient response to education:   Pt verbalized understanding Pt demonstrated skill Pt requires further education in this area   yes           Comments:  Pt limited by pain from L LE dressing. Pt attempting to take dressing off throughout session. Nursing notified    Conditions Requiring Skilled Therapeutic Intervention:    [x]Decreased strength     []Decreased ROM  [x]Decreased functional mobility  [x]Decreased balance   [x]Decreased endurance   []Decreased posture  []Decreased sensation  []Decreased coordination   []Decreased vision  []Decreased safety awareness   []Increased pain       Patient and or family understand(s) diagnosis, prognosis, and plan of care.     Prognosis is good for reaching above PT goals    PHYSICAL THERAPY PLAN OF CARE:    PT POC is established based on physician order and patient diagnosis     Referring provider/PT Order: Yasmeen Childs MD/ PT eval and treat      Current Treatment Recommendations:     [x] Strengthening to improve independence with functional mobility   [] ROM to improve independence with functional mobility   [x] Balance Training to improve static/dynamic balance and to reduce fall risk  [x] Endurance Training to improve activity tolerance during functional mobility   [x] Transfer Training to improve safety and independence with all functional transfers   [x] Gait Training to improve gait mechanics, endurance and assess need for appropriate assistive device  [] Stair Training in preparation for safe discharge home and/or into the community   [] Positioning to prevent skin breakdown and contractures  [x] Safety and Education Training   [x] Patient/Caregiver Education   [] HEP  [] Other     PT long term treatment goals are located in above grid    Frequency of treatments: 2-5x/week x 5 days. Time in  0955  Time out  1013      Evaluation Time includes thorough review of current medical information, gathering information on past medical history/social history and prior level of function, completion of standardized testing/informal observation of tasks, assessment of data and education on plan of care and goals.       CPT codes:  [x] Low Complexity PT evaluation 71121  [] Moderate Complexity PT evaluation 65728  [] High Complexity PT evaluation 23017  [] PT Re-evaluation 92796  [] Gait training 52382 minutes  [] Manual therapy 98560 minutes  [] Therapeutic activities 79657 minutes  [] Therapeutic exercises 74107 minutes  [] Neuromuscular reeducation 50396 minutes     [facility] PSYCHIATRY PT [id number]

## 2023-01-30 VITALS
HEART RATE: 67 BPM | DIASTOLIC BLOOD PRESSURE: 63 MMHG | SYSTOLIC BLOOD PRESSURE: 130 MMHG | HEIGHT: 67 IN | WEIGHT: 292 LBS | OXYGEN SATURATION: 96 % | RESPIRATION RATE: 18 BRPM | TEMPERATURE: 98.2 F | BODY MASS INDEX: 45.83 KG/M2

## 2023-01-30 LAB
ALBUMIN SERPL-MCNC: 3 G/DL (ref 3.5–5.2)
ALP BLD-CCNC: 107 U/L (ref 35–104)
ALT SERPL-CCNC: 11 U/L (ref 0–32)
ANAEROBIC CULTURE: NORMAL
ANION GAP SERPL CALCULATED.3IONS-SCNC: 9 MMOL/L (ref 7–16)
AST SERPL-CCNC: 21 U/L (ref 0–31)
BASOPHILS ABSOLUTE: 0.02 E9/L (ref 0–0.2)
BASOPHILS RELATIVE PERCENT: 0.3 % (ref 0–2)
BILIRUB SERPL-MCNC: 0.6 MG/DL (ref 0–1.2)
BUN BLDV-MCNC: 5 MG/DL (ref 6–23)
CALCIUM SERPL-MCNC: 8.6 MG/DL (ref 8.6–10.2)
CHLORIDE BLD-SCNC: 101 MMOL/L (ref 98–107)
CO2: 30 MMOL/L (ref 22–29)
CREAT SERPL-MCNC: 0.6 MG/DL (ref 0.5–1)
EOSINOPHILS ABSOLUTE: 0.14 E9/L (ref 0.05–0.5)
EOSINOPHILS RELATIVE PERCENT: 2.4 % (ref 0–6)
GFR SERPL CREATININE-BSD FRML MDRD: >60 ML/MIN/1.73
GLUCOSE BLD-MCNC: 99 MG/DL (ref 74–99)
HCT VFR BLD CALC: 37.5 % (ref 34–48)
HEMOGLOBIN: 11.6 G/DL (ref 11.5–15.5)
IMMATURE GRANULOCYTES #: 0.02 E9/L
IMMATURE GRANULOCYTES %: 0.3 % (ref 0–5)
LYMPHOCYTES ABSOLUTE: 0.83 E9/L (ref 1.5–4)
LYMPHOCYTES RELATIVE PERCENT: 14.2 % (ref 20–42)
MCH RBC QN AUTO: 31.3 PG (ref 26–35)
MCHC RBC AUTO-ENTMCNC: 30.9 % (ref 32–34.5)
MCV RBC AUTO: 101.1 FL (ref 80–99.9)
MONOCYTES ABSOLUTE: 0.6 E9/L (ref 0.1–0.95)
MONOCYTES RELATIVE PERCENT: 10.3 % (ref 2–12)
NEUTROPHILS ABSOLUTE: 4.22 E9/L (ref 1.8–7.3)
NEUTROPHILS RELATIVE PERCENT: 72.5 % (ref 43–80)
PDW BLD-RTO: 12.5 FL (ref 11.5–15)
PLATELET # BLD: 168 E9/L (ref 130–450)
PMV BLD AUTO: 9.8 FL (ref 7–12)
POTASSIUM REFLEX MAGNESIUM: 3.8 MMOL/L (ref 3.5–5)
RBC # BLD: 3.71 E12/L (ref 3.5–5.5)
SODIUM BLD-SCNC: 140 MMOL/L (ref 132–146)
TOTAL PROTEIN: 7.2 G/DL (ref 6.4–8.3)
WBC # BLD: 5.8 E9/L (ref 4.5–11.5)

## 2023-01-30 PROCEDURE — 6370000000 HC RX 637 (ALT 250 FOR IP): Performed by: FAMILY MEDICINE

## 2023-01-30 PROCEDURE — 36415 COLL VENOUS BLD VENIPUNCTURE: CPT

## 2023-01-30 PROCEDURE — 97535 SELF CARE MNGMENT TRAINING: CPT

## 2023-01-30 PROCEDURE — 1200000000 HC SEMI PRIVATE

## 2023-01-30 PROCEDURE — 80053 COMPREHEN METABOLIC PANEL: CPT

## 2023-01-30 PROCEDURE — 99231 SBSQ HOSP IP/OBS SF/LOW 25: CPT | Performed by: INTERNAL MEDICINE

## 2023-01-30 PROCEDURE — 94669 MECHANICAL CHEST WALL OSCILL: CPT

## 2023-01-30 PROCEDURE — 97166 OT EVAL MOD COMPLEX 45 MIN: CPT

## 2023-01-30 PROCEDURE — 94640 AIRWAY INHALATION TREATMENT: CPT

## 2023-01-30 PROCEDURE — 2700000000 HC OXYGEN THERAPY PER DAY

## 2023-01-30 PROCEDURE — 6370000000 HC RX 637 (ALT 250 FOR IP)

## 2023-01-30 PROCEDURE — 6360000002 HC RX W HCPCS: Performed by: FAMILY MEDICINE

## 2023-01-30 PROCEDURE — 2580000003 HC RX 258: Performed by: FAMILY MEDICINE

## 2023-01-30 PROCEDURE — 85025 COMPLETE CBC W/AUTO DIFF WBC: CPT

## 2023-01-30 RX ORDER — DIPHENHYDRAMINE HCL 25 MG
50 TABLET ORAL DAILY PRN
Status: DISCONTINUED | OUTPATIENT
Start: 2023-01-30 | End: 2023-02-01 | Stop reason: HOSPADM

## 2023-01-30 RX ADMIN — ENOXAPARIN SODIUM 30 MG: 100 INJECTION SUBCUTANEOUS at 09:25

## 2023-01-30 RX ADMIN — ENOXAPARIN SODIUM 30 MG: 100 INJECTION SUBCUTANEOUS at 21:09

## 2023-01-30 RX ADMIN — Medication 2500 MG: at 09:31

## 2023-01-30 RX ADMIN — PANTOPRAZOLE SODIUM 40 MG: 40 TABLET, DELAYED RELEASE ORAL at 05:17

## 2023-01-30 RX ADMIN — GUAIFENESIN AND DEXTROMETHORPHAN 10 ML: 100; 10 SYRUP ORAL at 00:01

## 2023-01-30 RX ADMIN — SODIUM CHLORIDE: 9 INJECTION, SOLUTION INTRAVENOUS at 09:20

## 2023-01-30 RX ADMIN — SODIUM CHLORIDE, PRESERVATIVE FREE 10 ML: 5 INJECTION INTRAVENOUS at 21:09

## 2023-01-30 RX ADMIN — CYANOCOBALAMIN TAB 1000 MCG 500 MCG: 1000 TAB at 09:31

## 2023-01-30 RX ADMIN — GABAPENTIN 300 MG: 300 CAPSULE ORAL at 13:30

## 2023-01-30 RX ADMIN — ACETAMINOPHEN 650 MG: 325 TABLET ORAL at 02:48

## 2023-01-30 RX ADMIN — IPRATROPIUM BROMIDE AND ALBUTEROL SULFATE 1 AMPULE: .5; 2.5 SOLUTION RESPIRATORY (INHALATION) at 15:57

## 2023-01-30 RX ADMIN — OXYCODONE AND ACETAMINOPHEN 1 TABLET: 5; 325 TABLET ORAL at 17:56

## 2023-01-30 RX ADMIN — MULTIPLE VITAMINS W/ MINERALS TAB 1 TABLET: TAB at 09:31

## 2023-01-30 RX ADMIN — SODIUM CHLORIDE: 9 INJECTION, SOLUTION INTRAVENOUS at 18:18

## 2023-01-30 RX ADMIN — ZINC SULFATE 220 MG (50 MG) CAPSULE 50 MG: CAPSULE at 09:31

## 2023-01-30 RX ADMIN — ATORVASTATIN CALCIUM 20 MG: 20 TABLET, FILM COATED ORAL at 09:25

## 2023-01-30 RX ADMIN — SODIUM CHLORIDE, PRESERVATIVE FREE 10 ML: 5 INJECTION INTRAVENOUS at 09:25

## 2023-01-30 RX ADMIN — MORPHINE SULFATE 15 MG: 15 TABLET, FILM COATED, EXTENDED RELEASE ORAL at 21:08

## 2023-01-30 RX ADMIN — GABAPENTIN 300 MG: 300 CAPSULE ORAL at 09:25

## 2023-01-30 RX ADMIN — MORPHINE SULFATE 15 MG: 15 TABLET, FILM COATED, EXTENDED RELEASE ORAL at 09:25

## 2023-01-30 RX ADMIN — Medication 2000 UNITS: at 09:31

## 2023-01-30 RX ADMIN — OXYCODONE AND ACETAMINOPHEN 1 TABLET: 5; 325 TABLET ORAL at 11:45

## 2023-01-30 RX ADMIN — IPRATROPIUM BROMIDE AND ALBUTEROL SULFATE 1 AMPULE: .5; 2.5 SOLUTION RESPIRATORY (INHALATION) at 07:58

## 2023-01-30 RX ADMIN — FERROUS SULFATE TAB 325 MG (65 MG ELEMENTAL FE) 325 MG: 325 (65 FE) TAB at 21:07

## 2023-01-30 RX ADMIN — ONDANSETRON 4 MG: 2 INJECTION INTRAMUSCULAR; INTRAVENOUS at 09:32

## 2023-01-30 RX ADMIN — ASPIRIN 81 MG: 81 TABLET, COATED ORAL at 09:25

## 2023-01-30 RX ADMIN — IPRATROPIUM BROMIDE AND ALBUTEROL SULFATE 1 AMPULE: .5; 2.5 SOLUTION RESPIRATORY (INHALATION) at 19:22

## 2023-01-30 RX ADMIN — GABAPENTIN 300 MG: 300 CAPSULE ORAL at 21:07

## 2023-01-30 RX ADMIN — METOPROLOL TARTRATE 50 MG: 50 TABLET, FILM COATED ORAL at 09:25

## 2023-01-30 RX ADMIN — GUAIFENESIN AND DEXTROMETHORPHAN 10 ML: 100; 10 SYRUP ORAL at 09:36

## 2023-01-30 RX ADMIN — METOPROLOL TARTRATE 50 MG: 50 TABLET, FILM COATED ORAL at 21:08

## 2023-01-30 ASSESSMENT — PAIN DESCRIPTION - ORIENTATION: ORIENTATION: LEFT

## 2023-01-30 ASSESSMENT — PAIN DESCRIPTION - LOCATION
LOCATION: GENERALIZED
LOCATION: LEG;HIP

## 2023-01-30 ASSESSMENT — PAIN SCALES - GENERAL
PAINLEVEL_OUTOF10: 8
PAINLEVEL_OUTOF10: 5
PAINLEVEL_OUTOF10: 6
PAINLEVEL_OUTOF10: 7

## 2023-01-30 ASSESSMENT — PAIN - FUNCTIONAL ASSESSMENT: PAIN_FUNCTIONAL_ASSESSMENT: PREVENTS OR INTERFERES SOME ACTIVE ACTIVITIES AND ADLS

## 2023-01-30 ASSESSMENT — PAIN DESCRIPTION - DESCRIPTORS
DESCRIPTORS: ACHING;THROBBING;BURNING
DESCRIPTORS: SHARP

## 2023-01-30 ASSESSMENT — PAIN DESCRIPTION - PAIN TYPE: TYPE: ACUTE PAIN;CHRONIC PAIN

## 2023-01-30 ASSESSMENT — PAIN DESCRIPTION - ONSET: ONSET: ON-GOING

## 2023-01-30 ASSESSMENT — PAIN DESCRIPTION - FREQUENCY: FREQUENCY: CONTINUOUS

## 2023-01-30 NOTE — CARE COORDINATION
Met with patient at bedside  She acknowledges she is not functioning independently, adding she has some concerns about a return to home especially since her sister is out of town. SNF choices are reviewed, patient voices selection of [facility] and referral is called to [facility] with same. Will await her review and response regarding bed availability/acceptance. .    The Plan for Transition of Care is related to the following treatment goals: SNF    The Patient   was provided with a choice of provider and agrees   with the discharge plan. [x] Yes [] No    Freedom of choice list was provided with basic dialogue that supports the patient's individualized plan of care/goals, treatment preferences and shares the quality data associated with the providers. [x] Yes [] No    Adan Blake.  Jj, BRENDAN RN  [facility] Case Management  [phone]

## 2023-01-30 NOTE — PLAN OF CARE
Problem: Skin/Tissue Integrity  Goal: Absence of new skin breakdown  Description: 1. Monitor for areas of redness and/or skin breakdown  2. Assess vascular access sites hourly  3. Every 4-6 hours minimum:  Change oxygen saturation probe site  4. Every 4-6 hours:  If on nasal continuous positive airway pressure, respiratory therapy assess nares and determine need for appliance change or resting period.   1/30/2023 0006 by Robin Santos RN  Outcome: Progressing  1/29/2023 1220 by Xin Ann RN  Outcome: Progressing     Problem: Discharge Planning  Goal: Discharge to home or other facility with appropriate resources  Outcome: Progressing     Problem: Pain  Goal: Verbalizes/displays adequate comfort level or baseline comfort level  1/30/2023 0006 by Robin Santos RN  Outcome: Progressing  1/29/2023 1220 by Xin Ann RN  Outcome: Progressing     Problem: Safety - Adult  Goal: Free from fall injury  1/30/2023 0006 by Robin Santos RN  Outcome: Progressing  1/29/2023 1220 by Xin Ann RN  Outcome: Progressing  Flowsheets (Taken 1/28/2023 2241 by William Ryan RN)  Free From Fall Injury: Instruct family/caregiver on patient safety     Problem: Chronic Conditions and Co-morbidities  Goal: Patient's chronic conditions and co-morbidity symptoms are monitored and maintained or improved  1/30/2023 0006 by Robin Santos RN  Outcome: Progressing  1/29/2023 1220 by Xin Ann RN  Outcome: Progressing

## 2023-01-30 NOTE — PROGRESS NOTES
[facility] Progress Note    Admitting Date and Time: 1/26/2023  9:37 AM  Admit Dx: [facility] [N17.9]    Subjective:  Patient is being followed for DAYTON (acute kidney injury) ([facility] [N17.9]   Pt seen and examined this morning. In no acute distress  No acute events overnight  Complaining of some nausea this morning, but denies any vomiting or abdominal pain  Denies any other acute complaints    ROS: denies fever, chills, cp, sob, n/v, HA unless stated above.       ipratropium-albuterol  1 ampule Inhalation Q4H WA    oxyCODONE-acetaminophen  1 tablet Oral BID    sodium chloride flush  5-40 mL IntraVENous 2 times per day    enoxaparin  30 mg SubCUTAneous BID    aspirin  81 mg Oral Daily    calcium elemental  2,500 mg Oral Daily    vitamin D  2,000 Units Oral Daily    vitamin B-12  500 mcg Oral Daily    ferrous sulfate  325 mg Oral Nightly    gabapentin  300 mg Oral TID    metoprolol tartrate  50 mg Oral BID    morphine  15 mg Oral BID    therapeutic multivitamin-minerals  1 tablet Oral Daily    pantoprazole  40 mg Oral QAM AC    atorvastatin  20 mg Oral Daily    zinc sulfate  50 mg Oral Daily     guaiFENesin-dextromethorphan, 10 mL, Q4H PRN  white petrolatum, , BID PRN  sodium chloride flush, 5-40 mL, PRN  sodium chloride, , PRN  ondansetron, 4 mg, Q8H PRN   Or  ondansetron, 4 mg, Q6H PRN  acetaminophen, 650 mg, Q6H PRN   Or  acetaminophen, 650 mg, Q6H PRN         Objective:    BP (!) 157/76   Pulse 69   Temp 98.6 °F (37 °C) (Oral)   Resp 18   Ht 5' 7\" (1.702 m)   Wt 295 lb (133.8 kg)   SpO2 93%   BMI 46.20 kg/m²     General Appearance: alert and oriented to person, place and time and in no acute distress  Skin: warm and dry  Head: normocephalic and atraumatic  Eyes: pupils equal, round, and reactive to light, extraocular eye movements intact, R eye conjunctivitis with bruising around both eyes  Neck: neck supple and non tender without mass   Pulmonary/Chest: clear to auscultation bilaterally- no wheezes, rales or rhonchi, normal air movement, no respiratory distress  Cardiovascular: normal rate, normal S1 and S2 and no carotid bruits  Abdomen: soft, non-tender, non-distended, normal bowel sounds, no masses or organomegaly  Extremities: no cyanosis, no clubbing, chronic stasis dermatitis with lymphedema bl  Neurologic: no cranial nerve deficit and speech normal        Recent Labs     01/28/23  1200 01/29/23  0640 01/30/23  0250    136 140   K 4.1 3.6 3.8    97* 101   CO2 28 29 30*   BUN 8 5* 5*   CREATININE 0.5 0.5 0.6   GLUCOSE 114* 103* 99   CALCIUM 9.1 9.1 8.6       Recent Labs     01/28/23  1200 01/29/23  0640 01/30/23  0250   WBC 5.9 7.0 5.8   RBC 3.70 3.80 3.71   HGB 11.8 12.1 11.6   HCT 36.0 36.3 37.5   MCV 97.3 95.5 101.1*   MCH 31.9 31.8 31.3   MCHC 32.8 33.3 30.9*   RDW 11.9 12.1 12.5    179 168   MPV 9.6 9.5 9.8         Assessment and Plan:     Principal Problem:    DAYTON (acute kidney injury) ([facility]  Active Problems:    Wound of left leg    Syncope    Drug-induced mucositis    Chronic pain syndrome    PVD (peripheral vascular disease) (HCC)    Dermatophytosis    Primary hypertension    Hyperlipidemia    Lymphedema    Venous stasis ulcer of left calf with fat layer exposed without varicose veins ([facility])  Resolved Problems:    * No resolved hospital problems. *    Left pretibial chronic leg wound, monitor off antibiotics at this time, vascular was consulted, continue wound care  DAYTON, resolved  Hypertension, currently controlled  Chronic lymphedema, continue supportive care  Chronic pain, on MS Contin      Time spent reviewing chart, clinical exam, discussing case and answering questions with staff/patient and daughter over the phone aprox 30 mins. NOTE: This report was transcribed using voice recognition software. Every effort was made to ensure accuracy; however, inadvertent computerized transcription errors may be present.   Electronically signed by Andrew Shore MD on 1/30/2023 at 3:23 PM

## 2023-01-30 NOTE — PROGRESS NOTES
[address], [place]       BNOW:                                                  Patient Name: Paula Hoover  MRN: [id number]  : 1945  Room: [address] [place] [address]    Evaluating OT: Mikel Spatz, MOT, OTR/L [id number]  Referring Torsten Hays MD  Specific Provider Orders: OT eval and treat   Recommended Adaptive Equipment:  TBD     Diagnosis: DAYTON   Surgery: none   Pertinent Medical History: CAD, COPD, HTN, HLD, emphysema, lymphedema   Precautions:  Fall Risk, O2    Assessment of current deficits   [x] Functional mobility  [x]ADLs  [x] Strength               [x]Cognition   [x] Functional transfers   [x] IADLs         [x] Safety Awareness   [x]Endurance   [] Fine Coordination              [x] Balance      [] Vision/perception   [x]Sensation    []Gross Motor Coordination  [] ROM  [] Delirium                   [] Motor Control     OT PLAN OF CARE   OT POC based on physician orders, patient diagnosis and results of clinical assessment    Frequency/Duration:  1-3 days/wk for 2 weeks PRN   Specific OT Treatment to include:   * Instruction/training on adapted ADL techniques and AE recommendations to increase functional independence within precautions       * Training on energy conservation strategies, correct breathing pattern and techniques to improve independence/tolerance for self-care routine  * Functional transfer/mobility training/DME recommendations for increased independence, safety, and fall prevention  * Patient/Family education to increase follow through with safety techniques and functional independence  * Recommendation of environmental modifications for increased safety with functional transfers/mobility and ADLs  * Therapeutic exercise to improve motor endurance, ROM, and functional strength for ADLs/functional transfers  * Therapeutic activities to facilitate/challenge dynamic balance, stand tolerance for increased safety and independence with ADLs  * Neuro-muscular re-education: facilitation of righting/equilibrium reactions, midline orientation, scapular stability/mobility, normalization of muscle tone, and facilitation of volitional active controled movement      Home Living: Pt lives with sister in a 2 story home; bed on main level, bath in basement (but pt has been sponge bathing)   Bathroom setup: tub shower unit   Equipment owned: shower chair, BSC  Prior Level of Function: IND with ADLs/IADLs; using cane for functional mobility     Pain Level: 0/10  Cognition: A&O: 3/4; Follows 1 step directions, with repetition and increased time   Memory:  good    Sequencing:  good    Problem solving:  fair    Judgement/safety:  fair     Functional Assessment:  AM-PAC Daily Activity Raw Score: 16/24   Initial Eval Status  Date: 1/30/23 Treatment Status  Date: STGs=LTGs  Time Frame: 10-14 days   Feeding Set-up (bed level)   IND   Grooming SBA (seated)   Set-up   UB Dressing SBA   IND   LB Dressing Mod A (assist with socks)  Min A    Bathing Mod A (simulated)  Min A    Toileting Min A  SBA   Bed Mobility  Log roll: SBA  Supine to sit: SBA   Sit to supine: NT   Log roll IND  Supine to sit: IND   Sit to supine: IND   Functional Transfers Sit to stand:SBA   Stand to sit:SBA  Commode: SBA  IND   Functional Mobility SBA (using ww, <household distance)  IND   Balance Sitting: SBA  Standing: SBA     Activity Tolerance fair     Visual/  Perceptual Glasses: yes                UE ROM: RUE:  WFL  LUE:  WFL  Strength: RUE: grossly 4/5 LUE: grossly 4/5   Strength: B WFL  Fine Motor Coordination:  WFL     Hearing: WFL  Sensation:  No c/o numbness/tingling   Tone:  WFL  Edema: BLEs                            Comments:Cleared by RN to see pt. Upon arrival, patient supine in bed and agreeable to OT session.  At end of session, patient sitting in chair with call light and phone within reach, all lines and tubes intact. Pt would benefit from continued OT to increase functional independence and quality of life. Treatment:  Pt required vc's and physical assist for proper technique/safety with hand placement/body mechanics/posture for bed mobility/ADLs/functional tranfers/mobility/ww management. Pt required vc's for sequencing/initiation of ADLs/functional transfers. Pt required skilled monitoring of SpO2 during session, which was >95% 3L. Pt appeared to have tolerated session well and appears motivated/cooperative/pleasant . Pt instructed on use of call light for assistance and fall prevention. Pt demo'ing fair understanding of education provided. Continue to educate. Eval Complexity: moderate  History: Expanded chart review of medical records and additional review of physical, cognitive, or psychosocial history related to current functional performance  Exam: 3+ performance deficits  Assistance/Modification: min/mod assistance or modifications required to perform tasks. May have comorbidities that affect occupational performance. Rehab Potential: Good for established goals, pt. assisted in establishment of goals. LTG: maximize independence with ADLs to return to PLOF    Patient  instructed on diagnosis, prognosis/goals and plan of care. Demonstrated fair understanding. [] Malnutrition indicators have been identified and nursing has been notified to ensure a dietitian consult is ordered. Evaluation time includes thorough review of current medical information, gathering information on past medical & social history & PLOF, completion of standardized testing, informal observation of tasks, consultation with other medical professions/disciplines, assessment of data & development of POC/goals.      Time In: 0800       Time Out: 0825     Total treatment time: 15       Treatment Charges: Mins Units   OT Eval Low 56664     OT Eval Medium 06811 X    OT Eval High 67348     OT Re-Eval [id number]     Ther Ex [address] [facility] [address]       ADL/Home Mgt 47254 15 1   Neuro Re-ed 11730       Group Therapy        Orthotic manage/training  49352       Non-Billable Time           Clayton Night, OTR/L [id number]

## 2023-01-30 NOTE — PROGRESS NOTES
Pt was c/o feeling a little light headed. Sitting pt's vitals were obtained. O2 on RA was 88-89%. 2L O2 were applied. Pt recovered to 95%.

## 2023-01-30 NOTE — PROGRESS NOTES
[facility] Quality Flow/Interdisciplinary Rounds Progress Note        Quality Flow Rounds held on January 30, 2023    Disciplines Attending:  Bedside Nurse, , , and Nursing Unit Leadership    Deneen Ortiz was admitted on 1/26/2023  9:37 AM    Anticipated Discharge Date:       Disposition:    Rich Score:  Rich Scale Score: 18    Readmission Risk              Risk of Unplanned Readmission:  17           Discussed patient goal for the day, patient clinical progression, and barriers to discharge.   The following Goal(s) of the Day/Commitment(s) have been identified:  Diagnostics - Report Results and Labs - Report Results, DC planning      Herminio Durham RN  January 30, 2023

## 2023-01-30 NOTE — PROGRESS NOTES
During dressing changes this morning (10am), pt stated she did not want ace wrap or any other compression over kerlix. She stated if it was put on, she would just take it off. Dressing was changed and no ace wrap added.          Electronically signed by Curt Correia RN on 1/30/2023 at 11:30 AM

## 2023-01-30 NOTE — CONSULTS
Comprehensive Nutrition Assessment    Type and Reason for Visit:  Initial, Consult    Nutrition Recommendations/Plan:   May consider Low Na diet  Will add Casper BID & Gelatein daily     Malnutrition Assessment:  Malnutrition Status:  Insufficient data (01/30/23 1796)    Context:  Chronic Illness     Findings of the 6 clinical characteristics of malnutrition:  Energy Intake:  No significant decrease in energy intake  Weight Loss:  No significant weight loss     Body Fat Loss:  No significant body fat loss     Muscle Mass Loss:  Unable to assess (d/t body habitus)    Fluid Accumulation:  Unable to assess (d/t multifactorial)     Strength:  Not Performed    Nutrition Assessment:    Pt presents s/p fall, admits w/ DAYTON (resolved). Hx COPD, CHF, lymphedema. Note chronic L pretibial wound, will add ONS & monitor. Nutrition Related Findings:    A&O X4, I&Os WDL, BLE +1 edema, abd obese/rounded, +BS Wound Type: Venous Stasis (venous stasis ulcer w/ a fat layer exposed)       Current Nutrition Intake & Therapies:    Average Meal Intake: 51-75%, %  Average Supplements Intake: None Ordered  ADULT DIET; Regular    Anthropometric Measures:  Height: 5' 7\" (170.2 cm)  Ideal Body Weight (IBW): 135 lbs (61 kg)    Admission Body Weight: 294 lb (133.4 kg) (1/27 bed)  Current Body Weight: 295 lb (133.8 kg), 218.5 % IBW.  Weight Source: Bed Scale (1/30)  Current BMI (kg/m2): 46.2  Usual Body Weight: 292 lb (132.5 kg) (10/2022 actual per EMR)  % Weight Change (Calculated): 1  Weight Adjustment For: No Adjustment                 BMI Categories: Obese Class 3 (BMI 40.0 or greater)    Estimated Daily Nutrient Needs:  Energy Requirements Based On: Kcal/kg  Weight Used for Energy Requirements: Current  Energy (kcal/day):   Weight Used for Protein Requirements: Ideal  Protein (g/day): 110-120 (1.8-2)  Method Used for Fluid Requirements: 1 ml/kcal  Fluid (ml/day):     Nutrition Diagnosis:   Increased nutrient needs DISCHARGE PLANNING     Discharge to home or other facility with appropriate resources Progressing        DISCHARGE PLANNING - CARE MANAGEMENT     Discharge to post-acute care or home with appropriate resources Progressing        INFECTION - ADULT     Absence or prevention of progression during hospitalization Progressing        Knowledge Deficit     Patient/family/caregiver demonstrates understanding of disease process, treatment plan, medications, and discharge instructions Progressing        PAIN - ADULT     Verbalizes/displays adequate comfort level or baseline comfort level Progressing        Potential for Falls     Patient will remain free of falls Progressing        Prexisting or High Potential for Compromised Skin Integrity     Skin integrity is maintained or improved Progressing        SAFETY ADULT     Maintain or return to baseline ADL function Progressing     Maintain or return mobility status to optimal level Progressing related to increase demand for energy/nutrients as evidenced by wounds    Nutrition Interventions:   Food and/or Nutrient Delivery: Start Oral Nutrition Supplement (May recommend Low Na diet)  Nutrition Education/Counseling: No recommendation at this time  Coordination of Nutrition Care: Continue to monitor while inpatient       Goals:     Goals: PO intake 75% or greater, by next RD assessment       Nutrition Monitoring and Evaluation:      Food/Nutrient Intake Outcomes: Food and Nutrient Intake, Supplement Intake  Physical Signs/Symptoms Outcomes: Biochemical Data, GI Status, Fluid Status or Edema, Nutrition Focused Physical Findings, Skin, Weight    Discharge Planning:     Too soon to determine     Etienne Honeycutt RD, LD  Contact: [phone]

## 2023-01-30 NOTE — PROGRESS NOTES
Found pt without oxygen in. She was unsure who took it off, but states she didn't. SPO2 on RA was 93%. Oxygen was not reapplied. Will continue to monitor.      Electronically signed by Darrell Wetzel RN on 1/30/2023 at 3:22 PM

## 2023-01-31 LAB
ALBUMIN SERPL-MCNC: 2.6 G/DL (ref 3.5–5.2)
ALP BLD-CCNC: 90 U/L (ref 35–104)
ALT SERPL-CCNC: 10 U/L (ref 0–32)
ANION GAP SERPL CALCULATED.3IONS-SCNC: 7 MMOL/L (ref 7–16)
AST SERPL-CCNC: 18 U/L (ref 0–31)
BASOPHILS ABSOLUTE: 0.01 E9/L (ref 0–0.2)
BASOPHILS RELATIVE PERCENT: 0.2 % (ref 0–2)
BILIRUB SERPL-MCNC: 0.4 MG/DL (ref 0–1.2)
BLOOD CULTURE, ROUTINE: NORMAL
BUN BLDV-MCNC: 6 MG/DL (ref 6–23)
CALCIUM SERPL-MCNC: 8.2 MG/DL (ref 8.6–10.2)
CHLORIDE BLD-SCNC: 104 MMOL/L (ref 98–107)
CO2: 29 MMOL/L (ref 22–29)
CREAT SERPL-MCNC: 0.7 MG/DL (ref 0.5–1)
CULTURE, BLOOD 2: NORMAL
EOSINOPHILS ABSOLUTE: 0.19 E9/L (ref 0.05–0.5)
EOSINOPHILS RELATIVE PERCENT: 3.8 % (ref 0–6)
GFR SERPL CREATININE-BSD FRML MDRD: >60 ML/MIN/1.73
GLUCOSE BLD-MCNC: 99 MG/DL (ref 74–99)
HCT VFR BLD CALC: 32.3 % (ref 34–48)
HEMOGLOBIN: 10.2 G/DL (ref 11.5–15.5)
IMMATURE GRANULOCYTES #: 0.02 E9/L
IMMATURE GRANULOCYTES %: 0.4 % (ref 0–5)
LYMPHOCYTES ABSOLUTE: 0.92 E9/L (ref 1.5–4)
LYMPHOCYTES RELATIVE PERCENT: 18.3 % (ref 20–42)
MCH RBC QN AUTO: 31.6 PG (ref 26–35)
MCHC RBC AUTO-ENTMCNC: 31.6 % (ref 32–34.5)
MCV RBC AUTO: 100 FL (ref 80–99.9)
MONOCYTES ABSOLUTE: 0.57 E9/L (ref 0.1–0.95)
MONOCYTES RELATIVE PERCENT: 11.3 % (ref 2–12)
NEUTROPHILS ABSOLUTE: 3.32 E9/L (ref 1.8–7.3)
NEUTROPHILS RELATIVE PERCENT: 66 % (ref 43–80)
PDW BLD-RTO: 12.7 FL (ref 11.5–15)
PLATELET # BLD: 149 E9/L (ref 130–450)
PMV BLD AUTO: 9.6 FL (ref 7–12)
POTASSIUM REFLEX MAGNESIUM: 3.8 MMOL/L (ref 3.5–5)
RBC # BLD: 3.23 E12/L (ref 3.5–5.5)
SODIUM BLD-SCNC: 140 MMOL/L (ref 132–146)
TOTAL PROTEIN: 6.2 G/DL (ref 6.4–8.3)
WBC # BLD: 5 E9/L (ref 4.5–11.5)

## 2023-01-31 PROCEDURE — 6370000000 HC RX 637 (ALT 250 FOR IP)

## 2023-01-31 PROCEDURE — 2700000000 HC OXYGEN THERAPY PER DAY

## 2023-01-31 PROCEDURE — 6370000000 HC RX 637 (ALT 250 FOR IP): Performed by: FAMILY MEDICINE

## 2023-01-31 PROCEDURE — 94640 AIRWAY INHALATION TREATMENT: CPT

## 2023-01-31 PROCEDURE — 85025 COMPLETE CBC W/AUTO DIFF WBC: CPT

## 2023-01-31 PROCEDURE — 80053 COMPREHEN METABOLIC PANEL: CPT

## 2023-01-31 PROCEDURE — 36415 COLL VENOUS BLD VENIPUNCTURE: CPT

## 2023-01-31 PROCEDURE — 99231 SBSQ HOSP IP/OBS SF/LOW 25: CPT | Performed by: INTERNAL MEDICINE

## 2023-01-31 PROCEDURE — 6360000002 HC RX W HCPCS: Performed by: FAMILY MEDICINE

## 2023-01-31 PROCEDURE — 97530 THERAPEUTIC ACTIVITIES: CPT

## 2023-01-31 PROCEDURE — 2580000003 HC RX 258: Performed by: FAMILY MEDICINE

## 2023-01-31 PROCEDURE — 1200000000 HC SEMI PRIVATE

## 2023-01-31 RX ADMIN — OXYCODONE AND ACETAMINOPHEN 1 TABLET: 5; 325 TABLET ORAL at 18:28

## 2023-01-31 RX ADMIN — ENOXAPARIN SODIUM 30 MG: 100 INJECTION SUBCUTANEOUS at 20:05

## 2023-01-31 RX ADMIN — CYANOCOBALAMIN TAB 1000 MCG 500 MCG: 1000 TAB at 09:31

## 2023-01-31 RX ADMIN — GABAPENTIN 300 MG: 300 CAPSULE ORAL at 09:30

## 2023-01-31 RX ADMIN — Medication 2000 UNITS: at 09:30

## 2023-01-31 RX ADMIN — SODIUM CHLORIDE, PRESERVATIVE FREE 10 ML: 5 INJECTION INTRAVENOUS at 20:06

## 2023-01-31 RX ADMIN — PANTOPRAZOLE SODIUM 40 MG: 40 TABLET, DELAYED RELEASE ORAL at 05:43

## 2023-01-31 RX ADMIN — GUAIFENESIN AND DEXTROMETHORPHAN 10 ML: 100; 10 SYRUP ORAL at 20:05

## 2023-01-31 RX ADMIN — IPRATROPIUM BROMIDE AND ALBUTEROL SULFATE 1 AMPULE: .5; 2.5 SOLUTION RESPIRATORY (INHALATION) at 19:15

## 2023-01-31 RX ADMIN — METOPROLOL TARTRATE 50 MG: 50 TABLET, FILM COATED ORAL at 20:05

## 2023-01-31 RX ADMIN — METOPROLOL TARTRATE 50 MG: 50 TABLET, FILM COATED ORAL at 09:30

## 2023-01-31 RX ADMIN — Medication 2500 MG: at 09:30

## 2023-01-31 RX ADMIN — ZINC SULFATE 220 MG (50 MG) CAPSULE 50 MG: CAPSULE at 09:30

## 2023-01-31 RX ADMIN — MORPHINE SULFATE 15 MG: 15 TABLET, FILM COATED, EXTENDED RELEASE ORAL at 20:05

## 2023-01-31 RX ADMIN — GABAPENTIN 300 MG: 300 CAPSULE ORAL at 20:05

## 2023-01-31 RX ADMIN — ATORVASTATIN CALCIUM 20 MG: 20 TABLET, FILM COATED ORAL at 09:30

## 2023-01-31 RX ADMIN — MORPHINE SULFATE 15 MG: 15 TABLET, FILM COATED, EXTENDED RELEASE ORAL at 09:31

## 2023-01-31 RX ADMIN — MULTIPLE VITAMINS W/ MINERALS TAB 1 TABLET: TAB at 09:30

## 2023-01-31 RX ADMIN — FERROUS SULFATE TAB 325 MG (65 MG ELEMENTAL FE) 325 MG: 325 (65 FE) TAB at 20:05

## 2023-01-31 RX ADMIN — ENOXAPARIN SODIUM 30 MG: 100 INJECTION SUBCUTANEOUS at 09:30

## 2023-01-31 RX ADMIN — OXYCODONE AND ACETAMINOPHEN 1 TABLET: 5; 325 TABLET ORAL at 11:58

## 2023-01-31 RX ADMIN — ASPIRIN 81 MG: 81 TABLET, COATED ORAL at 09:31

## 2023-01-31 RX ADMIN — GABAPENTIN 300 MG: 300 CAPSULE ORAL at 14:01

## 2023-01-31 ASSESSMENT — PAIN SCALES - GENERAL
PAINLEVEL_OUTOF10: 6

## 2023-01-31 ASSESSMENT — PAIN DESCRIPTION - LOCATION
LOCATION: ABDOMEN;HEAD;HIP
LOCATION: HIP;LEG
LOCATION: LEG;HIP

## 2023-01-31 ASSESSMENT — PAIN DESCRIPTION - DESCRIPTORS
DESCRIPTORS: SORE;DISCOMFORT
DESCRIPTORS: BURNING;THROBBING
DESCRIPTORS: SORE

## 2023-01-31 ASSESSMENT — PAIN DESCRIPTION - ORIENTATION
ORIENTATION: LEFT
ORIENTATION: LEFT

## 2023-01-31 NOTE — PATIENT CARE CONFERENCE
[facility] Quality Flow/Interdisciplinary Rounds Progress Note        Quality Flow Rounds held on January 31, 2023    Disciplines Attending:  Bedside Nurse, , , and Nursing Unit Leadership    Raisa Elena was admitted on 1/26/2023  9:37 AM    Anticipated Discharge Date:  Expected Discharge Date: 02/01/23    Disposition:    Rich Score:  Rich Scale Score: 18    Readmission Risk              Risk of Unplanned Readmission:  22           Discussed patient goal for the day, patient clinical progression, and barriers to discharge.   The following Goal(s) of the Day/Commitment(s) have been identified:  Discharge - Obtain Order planning      Shelley Castellon RN  January 31, 2023

## 2023-01-31 NOTE — PLAN OF CARE
Problem: Skin/Tissue Integrity  Goal: Absence of new skin breakdown  Description: 1. Monitor for areas of redness and/or skin breakdown  2. Assess vascular access sites hourly  3. Every 4-6 hours minimum:  Change oxygen saturation probe site  4. Every 4-6 hours:  If on nasal continuous positive airway pressure, respiratory therapy assess nares and determine need for appliance change or resting period.   1/30/2023 2317 by Pina Ruth RN  Outcome: Progressing     Problem: Discharge Planning  Goal: Discharge to home or other facility with appropriate resources  Outcome: Progressing     Problem: Pain  Goal: Verbalizes/displays adequate comfort level or baseline comfort level  1/30/2023 2317 by Pina Ruth RN  Outcome: Progressing     Problem: Safety - Adult  Goal: Free from fall injury  1/30/2023 2317 by Pina Ruth RN  Outcome: Progressing

## 2023-01-31 NOTE — PROGRESS NOTES
[facility]    Admitting Date and Time: 1/26/2023  9:37 AM  Admit Dx: DAYTON (acute kidney injury) ([facility] [address] [N17.9]    Subjective:  Patient is being followed for DAYTON (acute kidney injury) ([facility] [address] [N17.9]   Pt seen and examined this morning. No acute events overnight  Sitting up in chair, eating lunch, in no acute distress  Denies any acute complaints today    ROS: denies fever, chills, cp, sob, n/v, HA unless stated above.       ipratropium-albuterol  1 ampule Inhalation Q4H WA    oxyCODONE-acetaminophen  1 tablet Oral BID    sodium chloride flush  5-40 mL IntraVENous 2 times per day    enoxaparin  30 mg SubCUTAneous BID    aspirin  81 mg Oral Daily    calcium elemental  2,500 mg Oral Daily    vitamin D  2,000 Units Oral Daily    vitamin B-12  500 mcg Oral Daily    ferrous sulfate  325 mg Oral Nightly    gabapentin  300 mg Oral TID    metoprolol tartrate  50 mg Oral BID    morphine  15 mg Oral BID    therapeutic multivitamin-minerals  1 tablet Oral Daily    pantoprazole  40 mg Oral QAM AC    atorvastatin  20 mg Oral Daily    zinc sulfate  50 mg Oral Daily     diphenhydrAMINE, 50 mg, Daily PRN  guaiFENesin-dextromethorphan, 10 mL, Q4H PRN  white petrolatum, , BID PRN  sodium chloride flush, 5-40 mL, PRN  sodium chloride, , PRN  ondansetron, 4 mg, Q8H PRN   Or  ondansetron, 4 mg, Q6H PRN  acetaminophen, 650 mg, Q6H PRN   Or  acetaminophen, 650 mg, Q6H PRN         Objective:    BP (!) 151/64   Pulse 77   Temp 98.3 °F (36.8 °C) (Axillary)   Resp 16   Ht 5' 7\" (1.702 m)   Wt 296 lb (134.3 kg)   SpO2 93%   BMI 46.36 kg/m²     General Appearance: alert and oriented to person, place and time and in no acute distress  Skin: warm and dry  Head: normocephalic and atraumatic  Eyes: pupils equal, round, and reactive to light, extraocular eye movements intact, right eye conjunctivitis with bruising around both eyes, no double vision or ophthalmoplegia  Neck: neck supple and non tender without mass Pulmonary/Chest: clear to auscultation bilaterally- no wheezes, rales or rhonchi, normal air movement, no respiratory distress  Cardiovascular: normal rate, normal S1 and S2 and no carotid bruits  Abdomen: soft, non-tender, non-distended, normal bowel sounds, no masses or organomegaly  Extremities: no cyanosis, no clubbing, chronic stasis dermatitis with lymphedema bilaterally  Neurologic: no cranial nerve deficit and speech normal        Recent Labs     01/29/23  0640 01/30/23  0250 01/31/23  0340    140 140   K 3.6 3.8 3.8   CL 97* 101 104   CO2 29 30* 29   BUN 5* 5* 6   CREATININE 0.5 0.6 0.7   GLUCOSE 103* 99 99   CALCIUM 9.1 8.6 8.2*       Recent Labs     01/29/23  0640 01/30/23  0250 01/31/23  0340   WBC 7.0 5.8 5.0   RBC 3.80 3.71 3.23*   HGB 12.1 11.6 10.2*   HCT 36.3 37.5 32.3*   MCV 95.5 101.1* 100.0*   MCH 31.8 31.3 31.6   MCHC 33.3 30.9* 31.6*   RDW 12.1 12.5 12.7    168 149   MPV 9.5 9.8 9.6         Assessment and Plan:     Principal Problem:    DAYTON (acute kidney injury) ([facility] [address]  Active Problems:    Wound of left leg    Syncope    Drug-induced mucositis    Chronic pain syndrome    PVD (peripheral vascular disease) ([facility])    Dermatophytosis    Primary hypertension    Hyperlipidemia    Lymphedema    Venous stasis ulcer of left calf with fat layer exposed without varicose veins ([facility])  Resolved Problems:    * No resolved hospital problems. *    Left pretibial chronic leg wound, monitor off antibiotics at this time, vascular was consulted, continue wound care  DAYTON, resolved  Hypertension, currently controlled  Chronic lymphedema, continue supportive care  Chronic pain, on MS Contin      Time spent reviewing chart, clinical exam, discussing case and answering questions with staff/patient aprox 25 mins. NOTE: This report was transcribed using voice recognition software.  Every effort was made to ensure accuracy; however, inadvertent computerized transcription errors may be present.   Electronically signed by Laurent Aviles MD on 1/31/2023 at 4:36 PM

## 2023-01-31 NOTE — PLAN OF CARE
Problem: Skin/Tissue Integrity  Goal: Absence of new skin breakdown  Description: 1. Monitor for areas of redness and/or skin breakdown  2. Assess vascular access sites hourly  3. Every 4-6 hours minimum:  Change oxygen saturation probe site  4. Every 4-6 hours:  If on nasal continuous positive airway pressure, respiratory therapy assess nares and determine need for appliance change or resting period.   1/31/2023 1030 by Gael Ash RN  Outcome: Progressing     Problem: Discharge Planning  Goal: Discharge to home or other facility with appropriate resources  1/31/2023 1030 by Gael Ash RN  Outcome: Progressing     Problem: Pain  Goal: Verbalizes/displays adequate comfort level or baseline comfort level  1/31/2023 1030 by Gael Ash RN  Outcome: Progressing     Problem: Safety - Adult  Goal: Free from fall injury  1/31/2023 1030 by Gael Ash RN  Outcome: Progressing     Problem: Chronic Conditions and Co-morbidities  Goal: Patient's chronic conditions and co-morbidity symptoms are monitored and maintained or improved  Outcome: Progressing     Problem: Nutrition Deficit:  Goal: Optimize nutritional status  Outcome: Progressing

## 2023-01-31 NOTE — CARE COORDINATION
Notified by Charon Meigs at [facility] that facility can accept for SNF stay pending prior auth from [facility]. Facility indicated they cannot accept patient w/o authorization in hand. Request will be submitted this AM.  Will follow along with  and assist with discharge planning as necessary. Meeta Gardner, MSN RN  [facility] Case Management  [phone]    In anticipation of authorization being received, call placed to [address] [place] [address] Road transport [phone] and trip requested for 2/1/2023 3 PM, spoke with [facility]. Reference number [id number]. TANJA initiated, envelope completed with demos, transport forms and [id number]. Meeta Gardner, BRENDAN RN  [facility] Case Management  [phone]

## 2023-01-31 NOTE — PROGRESS NOTES
Physical Therapy  Facility/Department: [facility] SURG  Physical Therapy Treatment Note    Name: Hannah Mc  : 1945  MRN: [id number]  Date of Service: 2023      Patient Diagnosis(es): The encounter diagnosis was DAYTON (acute kidney injury) ([facility] [address] Past Medical History:  has a past medical history of Bursitis, CAD (coronary artery disease), Cellulitis of leg, left, COPD (chronic obstructive pulmonary disease) ([facility] [address] Dermatophytosis, Emphysema, GERD (gastroesophageal reflux disease), Hiatal hernia, Hip pain, Hyperlipidemia, Hypertension, Lymphedema of both lower extremities, Venous insufficiency of both lower extremities, Venous stasis ulcer of left calf with fat layer exposed without varicose veins (HCC), Venous stasis ulcer of left calf with fat layer exposed without varicose veins ([facility] [address] and Venous ulcer with fat layer exposed ([facility] [address] Past Surgical History:  has a past surgical history that includes Cholecystectomy; Hysterectomy; Endoscopy, colon, diagnostic; Colonoscopy; Appendectomy; ECHO Compl W Dop Color Flow (3/11/2013); Leg Debridement (Left, 2015); joint replacement (2009); Breast reduction surgery; Breast enhancement surgery; Leg Debridement (Left, 2016); hernia repair (N/A, 2021); and hernia repair. Evaluating Therapist: Sri Shaffer PT      Room #:  7684/4079-B  Diagnosis:  DAYTON (acute kidney injury) ([facility] [address] [N17.9]  PMHx/PSHx:  CAD, cellulitis, COPD  Precautions:  falls, alarm      Social:  Pt lives with sister in a 1 floor plan 1 steps ands to enter. Prior to admission ambulates with cane. S/p fall at home     Initial Evaluation  Date: 23 Treatment  2023    Short Term/ Long Term   Goals   Was pt agreeable to Eval/treatment? yes yes    Does pt have pain?  Pain all over (chronic) also c/o pain from L LE dressing, states it is too tight, nursing notified LBP from hip to mid back    Bed Mobility  Rolling: NT  Supine to sit: mod assist  Sit to supine: NT  Scooting: mod assist Supine to sit: Min A LE support Min assist   Transfers Sit to stand: min assist  Stand to sit: min assist  Stand pivot: min assist with ww Sit <> stand: Min A  Stand Pivot; Min A, see comments SBA   Ambulation    5 feet with ww with min assist NT 50 feet with ww with SBA   Stair Negotiation  Ascended and descended  NT   N/A   LE strength     3+/5    4/5   balance      fair     AM-PAC Raw score               15/24 15/24        Pt is alert and able to follow instruction  Balance: poor during pivot transfer    Pt performed therapeutic exercise of the following: NT    Patient education/treatment  Pt was educated on UE usage to assist with transfer safety    Patient response to education:   Pt verbalized understanding Pt demonstrated skill Pt requires further education in this area   yes With instruction yes     ASSESSMENT:   Comments: Nurse ok with Rx. Pt found in a bariatric bed, much adjustment of bed needed for bottom of the bed to go flat, was locked in flexed position. Once this figured out, Pt assisted to EOB, sat EOB SBA for balance. Pivot performed bed to chair, Pt declined to use the [employer], grabbed one [employer] hand grasp and use the armrest of the chair to complete pivot. Pt able to slowly WB and advance LEs, was fatigued after brief activity. Pt a fall risk without support, with poor [employer] safety, is unsafe to transfer alone presently. Pt was left in a bedside chair with call light in reach    Chair/bed alarm: chair alarm active    Time in 1015   Time out 1035   Total Treatment Time 20 minutes   CPT codes:     Therapeutic activities 26254 20 minutes   Therapeutic exercises 77405 0 minutes       Pt is making consistent progress toward established Physical Therapy goals. Continue with physical therapy current plan of care.     Vinod Landrum PTA   License Number: [id number]

## 2023-01-31 NOTE — DISCHARGE INSTR - COC
Continuity of Care Form    Patient Name: Tasha Jeffery   :  1945  MRN:  [id number]    Admit date:  2023  Discharge date: 2023    Code Status Order: Full Code   Advance Directives:     Admitting Physician:  Stefan Anthony DO  PCP: Mynor Guthrie MD    Discharging Nurse: JONATAN DENISE Unit/Room#: 4609/9348-X  Discharging Unit Phone Number: [phone]    Emergency Contact:   Extended Emergency Contact Information  Primary Emergency Contact: Erin Parks  Address: [address] Phone: [phone]  Mobile Phone: [phone]  Relation: Brother/Sister  Preferred language: English   needed? No  Secondary Emergency Contact: Emicoreen Basurto  Address: [address]           [address] Phone: [phone]  Mobile Phone: [phone]  Relation: Child    Past Surgical History:  Past Surgical History:   Procedure Laterality Date    APPENDECTOMY      BREAST ENHANCEMENT SURGERY      BREAST REDUCTION SURGERY      CHOLECYSTECTOMY      COLONOSCOPY      ECHO COMPL W DOP COLOR FLOW  3/11/2013         ENDOSCOPY, COLON, DIAGNOSTIC      HERNIA REPAIR N/A 2021    LAPAROSCOPIC ROBOTIC ASSISTED INGUINAL HERNIA REPAIR performed by Shyanne Ocampo MD at [address] [facility] ([address] [place] [address])      JOINT REPLACEMENT  2009    l knee r hip    LEG DEBRIDEMENT Left 2015    LEG DEBRIDEMENT Left 2016       Immunization History: There is no immunization history on file for this patient.     Active Problems:  Patient Active Problem List   Diagnosis Code    COPD (chronic obstructive pulmonary disease) ([facility] [address] J44.9    Primary hypertension I10    Hyperlipidemia E78.5    GERD (gastroesophageal reflux disease) Y13.9    Diastolic CHF, chronic (HCC) I50.32    Bursitis M71.9    Venous insufficiency of both lower extremities I87.2 Lymphedema I89.0    Venous stasis ulcer of left calf with fat layer exposed without varicose veins ([facility]) I87.2, L97.222    Sepsis due to cellulitis ([facility]) L03.90, A41.9    Wound of left leg S81.802A    Cellulitis of left lower extremity L03.116    DAYTON (acute kidney injury) ([facility] [address] N17.9    Syncope R55    Drug-induced mucositis K12.32    Chronic pain syndrome G89.4    PVD (peripheral vascular disease) ([facility]) I73.9    Dermatophytosis B35.9       Isolation/Infection:   Isolation            No Isolation          Patient Infection Status       Infection Onset Added Last Indicated Last Indicated By Review Planned Expiration Resolved Resolved By    None active    Resolved    COVID-19 (Rule Out) 23 COVID-19, Rapid (Ordered)   23 Rule-Out Test Resulted    Influenza 20 Respiratory Panel, Molecular   20             Nurse Assessment:  Last Vital Signs: BP (!) 151/64   Pulse 77   Temp 98.3 °F (36.8 °C) (Axillary)   Resp 18   Ht 5' 7\" (1.702 m)   Wt 296 lb (134.3 kg)   SpO2 93%   BMI 46.36 kg/m²     Last documented pain score (0-10 scale): Pain Level: 6  Last Weight:   Wt Readings from Last 1 Encounters:   23 296 lb (134.3 kg)     Mental Status:  oriented and alert    IV Access:  - None    Nursing Mobility/ADLs:  Walking   Assisted  Transfer  Assisted  Bathing  Assisted  Dressing  Assisted  Toileting  Assisted  Feeding  Independent  Med Admin  Assisted  Med Delivery   whole    Wound Care Documentation and Therapy:  Incision 16 Leg Left (Active)   Number of days: 1120       Incision 16 Leg Left (Active)   Number of days: 8585       Wound 21 Pretibial Left #1 (Active)   Wound Image   23 1503   Dressing Status Clean;Dry; Intact 23 2308   Wound Cleansed Cleansed with saline 23 1205   Dressing/Treatment Dry dressing; Other (comment) 23 1205   Offloading for Diabetic Foot Ulcers Offloading not required 23 2946 Dressing Change Due 01/29/23 01/28/23 1241   Wound Length (cm) 6.2 cm 01/26/23 1622   Wound Width (cm) 4.7 cm 01/26/23 1622   Wound Depth (cm) 0.2 cm 01/26/23 1622   Wound Surface Area (cm^2) 29.14 cm^2 01/26/23 1622   Change in Wound Size % (l*w) -4062.86 01/26/23 1622   Wound Volume (cm^3) 5.828 cm^3 01/26/23 1622   Wound Healing % -8226 01/26/23 1622   Post-Procedure Length (cm) 6.4 cm 01/25/23 1543   Post-Procedure Width (cm) 5.3 cm 01/25/23 1543   Post-Procedure Depth (cm) 0.3 cm 01/25/23 1543   Post-Procedure Surface Area (cm^2) 33.92 cm^2 01/25/23 1543   Post-Procedure Volume (cm^3) 10.176 cm^3 01/25/23 1543   Wound Assessment Pink/red;Erythema 01/29/23 1205   Drainage Amount Moderate 01/29/23 1205   Drainage Description Serosanguinous 01/29/23 1205   Odor None 01/29/23 1205   Kori-wound Assessment Edematous;Fragile 01/29/23 1205   Number of days: 418       Incision 04/16/21 Abdomen (Active)   Number of days: 654        Elimination:  Continence: Bowel: Yes  Bladder: Yes  Urinary Catheter: None   Colostomy/Ileostomy/Ileal Conduit: No       Date of Last BM: 01/31/2023    Intake/Output Summary (Last 24 hours) at 1/31/2023 0959  Last data filed at 1/31/2023 0928  Gross per 24 hour   Intake 2152.21 ml   Output 800 ml   Net 1352.21 ml     I/O last 3 completed shifts: In: 2632.2 [P.O.:480; I.V.:2152.2]  Out: 450 [Urine:450]    Safety Concerns:     History of Falls (last 30 days) and At Risk for Falls    Impairments/Disabilities:      None    Nutrition Therapy:  Current Nutrition Therapy:   - Oral Diet:  General    Routes of Feeding: Oral  Liquids: Thin Liquids  Daily Fluid Restriction: no  Last Modified Barium Swallow with Video (Video Swallowing Test): not done    Treatments at the Time of Hospital Discharge:   Respiratory Treatments: none  Oxygen Therapy:  is not on home oxygen therapy.   Ventilator:    - No ventilator support    Rehab Therapies: Physical Therapy and Occupational Therapy  Weight Bearing Status/Restrictions: No weight bearing restrictions  Other Medical Equipment (for information only, NOT a DME order):  wheelchair, walker, and bedside commode  Other Treatments: wound care    Patient's personal belongings (please select all that are sent with patient):  Glasses, Hearing Aides bilateral, Dentures      RN SIGNATURE:  Electronically signed by Terry Rowland RN on 2/1/23 at 3:04 PM EST    CASE MANAGEMENT/SOCIAL WORK SECTION    Inpatient Status Date: 1/26/2023    Readmission Risk Assessment Score:  Readmission Risk              Risk of Unplanned Readmission:  22           Discharging to Facility/ Agency   Name: [facility] Sampson  Address: [address] [place] [address]  Phone: [phone]  Fax: [phone]    Dialysis Facility (if applicable)   Name:  Address:  Dialysis Schedule:  Phone:  Fax:    / signature: Electronically signed by Ana Cristina Vidal RN on 1/31/23 at 10:00 AM EST    PHYSICIAN SECTION    Prognosis: Fair    Condition at Discharge: Stable    Rehab Potential (if transferring to Rehab): {Prognosis:[id number]}    Recommended Labs or Other Treatments After Discharge: ***    Physician Certification: I certify the above information and transfer of Sarahi Yeboah  is necessary for the continuing treatment of the diagnosis listed and that she requires [facility] for less 30 days.      Update Admission H&P: {CHP DME Changes in EMAYT:[id number]}    PHYSICIAN SIGNATURE:  {Esignature:[id number]}

## 2023-02-01 VITALS
RESPIRATION RATE: 19 BRPM | TEMPERATURE: 98.6 F | BODY MASS INDEX: 45.99 KG/M2 | HEART RATE: 82 BPM | OXYGEN SATURATION: 92 % | WEIGHT: 293 LBS | DIASTOLIC BLOOD PRESSURE: 84 MMHG | SYSTOLIC BLOOD PRESSURE: 180 MMHG | HEIGHT: 67 IN

## 2023-02-01 LAB — SARS-COV-2, NAAT: NOT DETECTED

## 2023-02-01 PROCEDURE — 2580000003 HC RX 258: Performed by: FAMILY MEDICINE

## 2023-02-01 PROCEDURE — 6370000000 HC RX 637 (ALT 250 FOR IP)

## 2023-02-01 PROCEDURE — 6370000000 HC RX 637 (ALT 250 FOR IP): Performed by: FAMILY MEDICINE

## 2023-02-01 PROCEDURE — 6370000000 HC RX 637 (ALT 250 FOR IP): Performed by: INTERNAL MEDICINE

## 2023-02-01 PROCEDURE — 87635 SARS-COV-2 COVID-19 AMP PRB: CPT

## 2023-02-01 PROCEDURE — 94640 AIRWAY INHALATION TREATMENT: CPT

## 2023-02-01 PROCEDURE — 6360000002 HC RX W HCPCS: Performed by: FAMILY MEDICINE

## 2023-02-01 PROCEDURE — 99239 HOSP IP/OBS DSCHRG MGMT >30: CPT | Performed by: INTERNAL MEDICINE

## 2023-02-01 RX ORDER — HYDROCHLOROTHIAZIDE 25 MG/1
25 TABLET ORAL DAILY
Status: DISCONTINUED | OUTPATIENT
Start: 2023-02-01 | End: 2023-02-01 | Stop reason: HOSPADM

## 2023-02-01 RX ORDER — HYDRALAZINE HYDROCHLORIDE 20 MG/ML
10 INJECTION INTRAMUSCULAR; INTRAVENOUS EVERY 4 HOURS PRN
Status: DISCONTINUED | OUTPATIENT
Start: 2023-02-01 | End: 2023-02-01 | Stop reason: HOSPADM

## 2023-02-01 RX ORDER — AMLODIPINE BESYLATE 10 MG/1
10 TABLET ORAL DAILY
Status: DISCONTINUED | OUTPATIENT
Start: 2023-02-01 | End: 2023-02-01

## 2023-02-01 RX ADMIN — GUAIFENESIN AND DEXTROMETHORPHAN 10 ML: 100; 10 SYRUP ORAL at 00:05

## 2023-02-01 RX ADMIN — ATORVASTATIN CALCIUM 20 MG: 20 TABLET, FILM COATED ORAL at 10:08

## 2023-02-01 RX ADMIN — IPRATROPIUM BROMIDE AND ALBUTEROL SULFATE 1 AMPULE: .5; 2.5 SOLUTION RESPIRATORY (INHALATION) at 09:51

## 2023-02-01 RX ADMIN — ASPIRIN 81 MG: 81 TABLET, COATED ORAL at 09:27

## 2023-02-01 RX ADMIN — GUAIFENESIN AND DEXTROMETHORPHAN 10 ML: 100; 10 SYRUP ORAL at 05:02

## 2023-02-01 RX ADMIN — MORPHINE SULFATE 15 MG: 15 TABLET, FILM COATED, EXTENDED RELEASE ORAL at 09:40

## 2023-02-01 RX ADMIN — PANTOPRAZOLE SODIUM 40 MG: 40 TABLET, DELAYED RELEASE ORAL at 05:02

## 2023-02-01 RX ADMIN — GABAPENTIN 300 MG: 300 CAPSULE ORAL at 09:27

## 2023-02-01 RX ADMIN — GABAPENTIN 300 MG: 300 CAPSULE ORAL at 14:50

## 2023-02-01 RX ADMIN — ENOXAPARIN SODIUM 30 MG: 100 INJECTION SUBCUTANEOUS at 09:29

## 2023-02-01 RX ADMIN — SODIUM CHLORIDE, PRESERVATIVE FREE 10 ML: 5 INJECTION INTRAVENOUS at 09:29

## 2023-02-01 RX ADMIN — METOPROLOL TARTRATE 50 MG: 50 TABLET, FILM COATED ORAL at 09:28

## 2023-02-01 RX ADMIN — HYDROCHLOROTHIAZIDE 25 MG: 25 TABLET ORAL at 14:50

## 2023-02-01 RX ADMIN — OXYCODONE AND ACETAMINOPHEN 1 TABLET: 5; 325 TABLET ORAL at 14:49

## 2023-02-01 RX ADMIN — AMLODIPINE BESYLATE 10 MG: 10 TABLET ORAL at 10:08

## 2023-02-01 NOTE — CARE COORDINATION
Received notification from Kiran Rosenberg at [facility] that facility has received authorization from [facility] and can accept patient for SNF stay today. Non emergency transportation has been arranged with [facility],  time 3PM.  Dr. Catalino Castanon, patient, facility liaison and bedside nurse notified of scheduled  time. TANJA initiated, envelope completed with demos, transport forms and [id number]. Rashad Miles.  Jj, MSN RN  [facility] Case Management  [phone]

## 2023-02-01 NOTE — DISCHARGE SUMMARY
[facility] Hospitalist Physician Discharge Summary       [facility]  [address] [address]  [phone]        [address]  [phone]          Activity level: As tolerated  Dispo: [facility]   Condition on discharge: Stable     Patient ID:  Anastacia Morel  [id number]  78 y.o.  1945    Admit date: 1/26/2023    Discharge date and time:  2/1/2023  12:18 PM    Admission Diagnoses: Principal Problem:    DAYTON (acute kidney injury) (HCC)  Active Problems:    Wound of left leg    Syncope    Drug-induced mucositis    Chronic pain syndrome    PVD (peripheral vascular disease) (HCC)    Dermatophytosis    Primary hypertension    Hyperlipidemia    Lymphedema    Venous stasis ulcer of left calf with fat layer exposed without varicose veins (HCC)  Resolved Problems:    * No resolved hospital problems. *      Discharge Diagnoses: Principal Problem:    DAYTON (acute kidney injury) (HCC)  Active Problems:    Wound of left leg    Syncope    Drug-induced mucositis    Chronic pain syndrome    PVD (peripheral vascular disease) (HCC)    Dermatophytosis    Primary hypertension    Hyperlipidemia    Lymphedema    Venous stasis ulcer of left calf with fat layer exposed without varicose veins (HCC)  Resolved Problems:    * No resolved hospital problems. *      Consults:  IP CONSULT TO VASCULAR SURGERY  IP CONSULT TO INFECTIOUS DISEASES  IP CONSULT TO IV TEAM  IP CONSULT TO IV TEAM  IP CONSULT TO IV TEAM  IP CONSULT TO IV TEAM  IP CONSULT TO DIETITIAN    Hospital Course:   Patient Anastacia Morel is a 78 y.o. presented with DAYTON (acute kidney injury) (HCC) [N17.9]    Anastacia Morel  is a 78 y.o. female with a pertinent PMHx of HTN, CHF,COPD, nonhealing wound of left calf who presented to the ED from home with chief complaint of a fall. Patient states she was passing her sister on steps and fell to the ground due to poor balance. She  hit her head on concrete. She denies losing consciousness. She states that she has felt off for the past 8 days since starting antibiotics. She has a history of a nonhealing wound culture on 1/11 grew Staph aureus with presumed resistance, pseudomonas, and Acinetobacter. She was prescribed levaquin and bactrim. Since then she has felt weak, constipated, and off balance. She also complains of a productive cough for the past several weeks. In the ED vitals were unremarkable. Labs were remarkable for creatine 1.8 baseline 0.9, troponin 77 and 60. CT head showed no acute abnormalities. CT spine shows degenerative changes. X ray of right hip showed no acute abnormalities. Chest xray shows no acute abnormalities. Xray of the tibia fibula shows no acute problems. She was admitted for DAYTON. The following problems were addressed during hospitalization:  1. DAYTON RESOLVED- Trend labs - Cr 1.8 < 0.7. Renal ultrasound showing right renal cysts. Avoid nephrotoxins, ACEi, diuretics,etc.  Baseline creatinine 0.8 in 10/22. 2.   Left pretibial chronic leg wound since June 2021 with chronic lymphadema- does not appear infected. Following off ATB  wound culture from 1/11 + Staph aureus with resistance, pseudomonas, Acinetobacter. Has received 10 days of Levaquin and Bactrim. ID consulted, appreciate recs. Wound care consulted. Continue to follow with Dr HATFIELD outpatient, no further antibiotics warranted. continue   3. Acute metabolic encephalopathy - Increased Opioid use likely contributing to AMS and fall. 3.   HTN - monitor vitals. Continue meds - norvasc, lopressor   4. Hyperlipidemia - continue with statin. LFTs normal.    < 108. Ok to continue statin as < 3x ULN. 5.   PVD - Vascular consult with Dr. Leatha Guillermo. Has been treating patient at the wound clinic, last saw 1/25. 6.   Chronic pain - patient taking MS Continue and prn Oxycodone 5/325.  Fall exacerbated by opioid use. judicious use of narcotics necessary. 7. HLD - continue lipitor     Patient is hemodynamically and medically stable for discharge to [address] for rehab. Follow up with PCP . Discharge Exam:    General Appearance: alert and oriented to person, place and time and in no acute distress  Skin: warm and dry  Head: normocephalic and atraumatic  Eyes: pupils equal, round, and reactive to light, extraocular eye movements intact, conjunctivae normal  Neck: neck supple and non tender without mass   Pulmonary/Chest: clear to auscultation bilaterally- no wheezes, rales or rhonchi, normal air movement, no respiratory distress  Cardiovascular: normal rate, normal S1 and S2 and no carotid bruits  Abdomen: soft, non-tender, non-distended, normal bowel sounds, no masses or organomegaly  Extremities: no cyanosis, no clubbing and no edema  Neurologic: no cranial nerve deficit and speech normal    I/O last 3 completed shifts: In: 360 [P.O.:360]  Out: 1100 [Urine:1100]  I/O this shift: In: 26 [P.O.:460]  Out: -       LABS:  Recent Labs     01/30/23  0250 01/31/23  0340    140   K 3.8 3.8    104   CO2 30* 29   BUN 5* 6   CREATININE 0.6 0.7   GLUCOSE 99 99   CALCIUM 8.6 8.2*       Recent Labs     01/30/23  0250 01/31/23  0340   WBC 5.8 5.0   RBC 3.71 3.23*   HGB 11.6 10.2*   HCT 37.5 32.3*   .1* 100.0*   MCH 31.3 31.6   MCHC 30.9* 31.6*   RDW 12.5 12.7    149   MPV 9.8 9.6       No results for input(s): POCGLU in the last 72 hours. Imaging:  XR HIP RIGHT (2-3 VIEWS)    Result Date: 1/26/2023  EXAMINATION: TWO XRAY VIEWS OF THE RIGHT HIP 1/26/2023 11:06 am COMPARISON: None. HISTORY: ORDERING SYSTEM PROVIDED HISTORY: right hip pain TECHNOLOGIST PROVIDED HISTORY: Reason for exam:->right hip pain FINDINGS: Total hip prosthesis in anatomic alignment. No evidence of acute bony abnormalities or prosthetic complications. Soft tissue swelling noted. No acute bony abnormalities or prosthetic complications.      XR TIBIA FIBULA LEFT (2 VIEWS)    Result Date: 1/26/2023  EXAMINATION: XRAY VIEWS OF THE LEFT TIBIA AND FIBULA 1/26/2023 11:06 am COMPARISON: None. HISTORY: ORDERING SYSTEM PROVIDED HISTORY: Fall TECHNOLOGIST PROVIDED HISTORY: Reason for exam:->Fall FINDINGS: There is no evidence of acute fracture. Partially visualized total knee prosthesis in anatomic alignment. There is normal alignment. No acute joint abnormality. No focal osseous lesion. There is flattening deformity of the foot. Diffuse soft tissue swelling noted. No acute osseous abnormality or prosthetic complications. . Flattening deformity of the visualized foot, may be related to old injury of the talus and calcaneus. Recommend dedicated ankle radiographs. CT HEAD WO CONTRAST    Result Date: 1/26/2023  EXAMINATION: CT OF THE HEAD WITHOUT CONTRAST  1/26/2023 10:49 am TECHNIQUE: CT of the head was performed without the administration of intravenous contrast. Automated exposure control, iterative reconstruction, and/or weight based adjustment of the mA/kV was utilized to reduce the radiation dose to as low as reasonably achievable. COMPARISON: Contrast, 08/15/2008 HISTORY: ORDERING SYSTEM PROVIDED HISTORY: Fall TECHNOLOGIST PROVIDED HISTORY: Has a \"code stroke\" or \"stroke alert\" been called? ->No Reason for exam:->Fall Decision Support Exception - unselect if not a suspected or confirmed emergency medical condition->Emergency Medical Condition (MA) FINDINGS: CT OF THE BRAIN: BRAIN/VENTRICLES: Evaluation is somewhat limited due to patient motion artifact. Within this limitation, no mass effect, edema or hemorrhage is seen. No volume loss is seen in the brain. Minimal chronic microvascular ischemic changes noted. No hydrocephalus or extra-axial fluid is seen. ORBITS: The visualized portion of the orbits demonstrate no acute abnormality. SINUSES: Minimal mucosal thickening in the ethmoid sinuses.   The remainder of the visualized paranasal sinuses and the mastoids are grossly clear. SOFT TISSUES/SKULL: The calvarium is intact without fracture or focal lesion. Small right frontal scalp hematoma. CT CERVICAL SPINE: BONES/ALIGNMENT: There is no acute fracture or traumatic malalignment. DEGENERATIVE CHANGES: Prominent loss of disc height at C5-6 with a small disc osteophyte complex which indents the ventral thecal sac, resulting in mild central canal stenosis. Mild neural foraminal stenoses at C5-6. SOFT TISSUES: There is no prevertebral soft tissue swelling. CT HEAD WITHOUT CONTRAST: 1. No skull fracture or acute intracranial abnormality. CT CERVICAL SPINE WITHOUT CONTRAST: 1. No fracture or joint dislocation is seen. 2. Degenerative changes, as described. CT CERVICAL SPINE WO CONTRAST    Result Date: 1/26/2023  EXAMINATION: CT OF THE HEAD WITHOUT CONTRAST  1/26/2023 10:49 am TECHNIQUE: CT of the head was performed without the administration of intravenous contrast. Automated exposure control, iterative reconstruction, and/or weight based adjustment of the mA/kV was utilized to reduce the radiation dose to as low as reasonably achievable. COMPARISON: Contrast, 08/15/2008 HISTORY: ORDERING SYSTEM PROVIDED HISTORY: Fall TECHNOLOGIST PROVIDED HISTORY: Has a \"code stroke\" or \"stroke alert\" been called? ->No Reason for exam:->Fall Decision Support Exception - unselect if not a suspected or confirmed emergency medical condition->Emergency Medical Condition (MA) FINDINGS: CT OF THE BRAIN: BRAIN/VENTRICLES: Evaluation is somewhat limited due to patient motion artifact. Within this limitation, no mass effect, edema or hemorrhage is seen. No volume loss is seen in the brain. Minimal chronic microvascular ischemic changes noted. No hydrocephalus or extra-axial fluid is seen. ORBITS: The visualized portion of the orbits demonstrate no acute abnormality. SINUSES: Minimal mucosal thickening in the ethmoid sinuses.   The remainder of the visualized paranasal sinuses and the mastoids are grossly clear. SOFT TISSUES/SKULL: The calvarium is intact without fracture or focal lesion. Small right frontal scalp hematoma. CT CERVICAL SPINE: BONES/ALIGNMENT: There is no acute fracture or traumatic malalignment. DEGENERATIVE CHANGES: Prominent loss of disc height at C5-6 with a small disc osteophyte complex which indents the ventral thecal sac, resulting in mild central canal stenosis. Mild neural foraminal stenoses at C5-6. SOFT TISSUES: There is no prevertebral soft tissue swelling. CT HEAD WITHOUT CONTRAST: 1. No skull fracture or acute intracranial abnormality. CT CERVICAL SPINE WITHOUT CONTRAST: 1. No fracture or joint dislocation is seen. 2. Degenerative changes, as described. XR CHEST PORTABLE    Result Date: 1/26/2023  EXAMINATION: ONE XRAY VIEW OF THE CHEST 1/26/2023 11:06 am COMPARISON: October 2, 2022 HISTORY: ORDERING SYSTEM PROVIDED HISTORY: Fatigue, fall TECHNOLOGIST PROVIDED HISTORY: Reason for exam:->Fatigue, fall FINDINGS: The lungs are without acute focal process. Bilateral hilar prominence likely related to underlying central pulmonary vasculature. There is no effusion or pneumothorax. The cardiomediastinal silhouette is without acute process. The osseous structures are without acute process. No acute process. Mild cardiomegaly     US RETROPERITONEAL COMPLETE    Result Date: 1/26/2023  EXAMINATION: RETROPERITONEAL ULTRASOUND OF THE KIDNEYS AND URINARY BLADDER 1/26/2023 COMPARISON: None HISTORY: ORDERING SYSTEM PROVIDED HISTORY: DAYTON TECHNOLOGIST PROVIDED HISTORY: Reason for exam:->DAYTON FINDINGS: Kidneys: The right kidney measures 10.1 cm in length and the left kidney measures 11.0 cm in length. Kidneys demonstrate normal cortical echogenicity. No evidence of hydronephrosis or intrarenal stones. 1.9 cm right renal cyst. Bladder: Unremarkable appearance of the bladder. Bilateral ureteral jets noted.      1.9 cm right renal cysts, otherwise unremarkable renal ultrasound. Patient Instructions:      Medication List        CONTINUE taking these medications      albuterol sulfate  (90 Base) MCG/ACT inhaler  Commonly known as: PROVENTIL;VENTOLIN;PROAIR     aspirin 81 MG tablet     calcium carbonate 1250 (500 Ca) MG tablet  Commonly known as: OYSTER SHELL CALCIUM 500 mg     diphenhydrAMINE 50 MG capsule  Commonly known as: BENADRYL     ferrous sulfate 325 (65 Fe) MG tablet  Commonly known as: IRON 429     folic acid 662 MCG tablet  Commonly known as: FOLVITE     gabapentin 300 MG capsule  Commonly known as: NEURONTIN     Garlic 870 MG Tabs     GENISTEIN PO     hydroCHLOROthiazide 25 MG tablet  Commonly known as: HYDRODIURIL     Krill Oil 350 MG Caps     metoprolol tartrate 50 MG tablet  Commonly known as: LOPRESSOR  Take 1 tablet by mouth 2 times daily     morphine 15 MG extended release tablet  Commonly known as: MS Contin  Take 1 tablet by mouth 2 times daily for 30 days. Max Daily Amount: 30 mg     omeprazole 40 MG delayed release capsule  Commonly known as: PRILOSEC     oxyCODONE-acetaminophen 7.5-325 MG per tablet  Commonly known as: Percocet  Take 1 tablet by mouth 2 times daily for 30 days.  Intended supply: 30 days Max Daily Amount: 2 tablets     simvastatin 40 MG tablet  Commonly known as: ZOCOR     therapeutic multivitamin-minerals tablet     Vitamin B 12 500 MCG Tabs     vitamin C 500 MG tablet  Commonly known as: ASCORBIC ACID     vitamin D 50 MCG (2000 UT) Caps capsule     zinc gluconate 50 MG tablet            STOP taking these medications      levoFLOXacin 500 MG tablet  Commonly known as: LEVAQUIN     sulfamethoxazole-trimethoprim 800-160 MG per tablet  Commonly known as: BACTRIM DS;SEPTRA DS            Note that more than 30 minutes was spent in preparing discharge papers, discussing discharge with patient, medication review, etc.    Signed:  Electronically signed by MESSI Spears NP on 2/1/2023 at 12:18 PM

## 2023-02-08 NOTE — PATIENT CARE CONFERENCE
[facility] Quality Flow/Interdisciplinary Rounds Progress Note        Quality Flow Rounds held on February 1, 2023    Disciplines Attending:  Bedside Nurse, , , and Nursing Unit Leadership    Mikie Dexter was admitted on 1/26/2023  9:37 AM    Anticipated Discharge Date:  Expected Discharge Date: 02/01/23    Disposition:    Rich Score:  Rich Scale Score: 18    Readmission Risk              Risk of Unplanned Readmission:  23           Discussed patient goal for the day, patient clinical progression, and barriers to discharge.   The following Goal(s) of the Day/Commitment(s) have been identified:  Discharge - Obtain Order      Guero Thompson RN  February 1, 2023 Low Dose Naltrexone Counseling- I discussed with the patient the potential risks and side effects of low dose naltrexone including but not limited to: more vivid dreams, headaches, nausea, vomiting, abdominal pain, fatigue, dizziness, and anxiety.

## 2023-02-15 ENCOUNTER — HOSPITAL ENCOUNTER (OUTPATIENT)
Dept: WOUND CARE | Age: 78
Discharge: HOME | End: 2023-02-15
Payer: MEDICARE

## 2023-02-15 VITALS
RESPIRATION RATE: 18 BRPM | HEART RATE: 70 BPM | DIASTOLIC BLOOD PRESSURE: 70 MMHG | TEMPERATURE: 97.3 F | SYSTOLIC BLOOD PRESSURE: 137 MMHG

## 2023-02-15 DIAGNOSIS — L97.222 VENOUS STASIS ULCER OF LEFT CALF WITH FAT LAYER EXPOSED WITHOUT VARICOSE VEINS (HCC): Primary | ICD-10-CM

## 2023-02-15 DIAGNOSIS — I87.2 VENOUS INSUFFICIENCY OF BOTH LOWER EXTREMITIES: Chronic | ICD-10-CM

## 2023-02-15 DIAGNOSIS — I87.2 VENOUS STASIS ULCER OF LEFT CALF WITH FAT LAYER EXPOSED WITHOUT VARICOSE VEINS (HCC): Primary | ICD-10-CM

## 2023-02-15 PROCEDURE — 11042 DBRDMT SUBQ TIS 1ST 20SQCM/<: CPT

## 2023-02-15 RX ORDER — LIDOCAINE HYDROCHLORIDE 20 MG/ML
JELLY TOPICAL ONCE
OUTPATIENT
Start: 2023-02-15 | End: 2023-02-15

## 2023-02-15 RX ORDER — CLOBETASOL PROPIONATE 0.5 MG/G
OINTMENT TOPICAL ONCE
OUTPATIENT
Start: 2023-02-15 | End: 2023-02-15

## 2023-02-15 RX ORDER — BACITRACIN, NEOMYCIN, POLYMYXIN B 400; 3.5; 5 [USP'U]/G; MG/G; [USP'U]/G
OINTMENT TOPICAL ONCE
OUTPATIENT
Start: 2023-02-15 | End: 2023-02-15

## 2023-02-15 RX ORDER — BETAMETHASONE DIPROPIONATE 0.05 %
OINTMENT (GRAM) TOPICAL ONCE
OUTPATIENT
Start: 2023-02-15 | End: 2023-02-15

## 2023-02-15 RX ORDER — BACITRACIN ZINC AND POLYMYXIN B SULFATE 500; 1000 [USP'U]/G; [USP'U]/G
OINTMENT TOPICAL ONCE
OUTPATIENT
Start: 2023-02-15 | End: 2023-02-15

## 2023-02-15 RX ORDER — LIDOCAINE 50 MG/G
OINTMENT TOPICAL ONCE
OUTPATIENT
Start: 2023-02-15 | End: 2023-02-15

## 2023-02-15 RX ORDER — LIDOCAINE 40 MG/G
CREAM TOPICAL ONCE
OUTPATIENT
Start: 2023-02-15 | End: 2023-02-15

## 2023-02-15 RX ORDER — MORPHINE SULFATE 15 MG/1
15 TABLET, FILM COATED, EXTENDED RELEASE ORAL 2 TIMES DAILY
Qty: 60 TABLET | Refills: 0 | Status: SHIPPED | OUTPATIENT
Start: 2023-02-15 | End: 2023-03-17

## 2023-02-15 RX ORDER — GENTAMICIN SULFATE 1 MG/G
OINTMENT TOPICAL ONCE
OUTPATIENT
Start: 2023-02-15 | End: 2023-02-15

## 2023-02-15 RX ORDER — LIDOCAINE HYDROCHLORIDE 40 MG/ML
SOLUTION TOPICAL ONCE
OUTPATIENT
Start: 2023-02-15 | End: 2023-02-15

## 2023-02-15 RX ORDER — OXYCODONE AND ACETAMINOPHEN 7.5; 325 MG/1; MG/1
1 TABLET ORAL 2 TIMES DAILY
Qty: 60 TABLET | Refills: 0 | Status: SHIPPED | OUTPATIENT
Start: 2023-02-15 | End: 2023-03-17

## 2023-02-15 RX ORDER — LIDOCAINE HYDROCHLORIDE 40 MG/ML
SOLUTION TOPICAL ONCE
Status: COMPLETED | OUTPATIENT
Start: 2023-02-15 | End: 2023-02-15

## 2023-02-15 RX ORDER — GINSENG 100 MG
CAPSULE ORAL ONCE
OUTPATIENT
Start: 2023-02-15 | End: 2023-02-15

## 2023-02-15 RX ADMIN — LIDOCAINE HYDROCHLORIDE 5 ML: 40 SOLUTION TOPICAL at 13:44

## 2023-02-15 ASSESSMENT — PAIN DESCRIPTION - ORIENTATION: ORIENTATION: LEFT

## 2023-02-15 ASSESSMENT — PAIN DESCRIPTION - LOCATION: LOCATION: LEG

## 2023-02-15 ASSESSMENT — PAIN SCALES - GENERAL: PAINLEVEL_OUTOF10: 6

## 2023-02-15 ASSESSMENT — PAIN DESCRIPTION - DESCRIPTORS: DESCRIPTORS: ACHING;BURNING

## 2023-02-15 NOTE — PROGRESS NOTES
[facility] Followup Visit Note    Referring Physician : Garcia Parikh MD  [id number] [facility] RECORD NUMBER:  [id number]  AGE: 70 y.o. GENDER: female  : 1945  EPISODE DATE:  2/15/2023    Subjective:     Chief Complaint   Patient presents with    Wound Check     Leg left      HISTORY of PRESENT ILLNESS HPI   Garry Colin is a 70 y.o. female who presents today in regards to follow up evaluation and treatment of wound/ulcer. That patient's past medical, family and social hx were reviewed and changes were made if present. History of Wound Context:  Patient has had left calf wound since ~ 2021. She has been putting a dressing on it. The wound has not been improving. She has a hx significant for venous stasis ulcerations of bilateral LE. She first was seen by myself in regards to these issues 2015. She eventually healed these wounds 2016. I last saw her 2021 at which time I recommended lymphedema therapy. She states she went and said it caused her to much pain and stopped going. She has chronic issues with bilateral calf pain, swelling and edema. She admits to not wearing her stockings because of the pain. She has used knee high 20-30 mm hg stockings in the past.       She is still seeing pain management and is down to percocet 7/5/325 mg daily.        21  aquacell  Double tubigrip  Culture done  Emphasized importance of getting in to more significant compression in the future  12/15/21  Culture reviewed - light growth, no tx  Wound slightly improved  Still significant drainage  Plan on compression wrap next week  21  Stable wound  Drainage slightly better  Pt would like to wait till next week because of holidays to start wrap  22  Wound larger  Profore  22  Wound appearance better  Refusing wrap - it rolled down last week and she cut off after 3 days  22  Wound appearance better  Still refusing wrap   3/2/22  periwound worse  Culture  drawtek  3/9/22  periwound much improved  levaquin 750 mg daily #10 script given culture   Wound itself stable  3/23/22  Left anterior calf wound improving overall  New blister/ulcerations left 3rd, 4th and 5th toes and lateral foot   Instructed to keep toes/foot dry, no ointment or corn starch   3/30/22  bistering resolved, other skin issues resolved  More dry than previously but overall stable  kailyn aBrrera  Swelling is down, patient declining wrap  4/13/22  Wound slightly improved  4/20/2022  Wound stable, patient refusing compression wrap  5/4/22  Wound worse  Pt refusing wrap  Will change diet per her report to decrease swelling  5/11/22  Wound stable  kailyn and giacomo 2 - pt agreeable now after significant persuasion  5/18/22  Took off wrap within 24 hours due to pain  aquacell and spandigrip  She agreed to use wrap again if swelling not down 2 more cm next week  Wound slightly smaller  5/25/22  Wound improved   Left leg wound debrided   Continue aquacel   6/8/22  Wound larger  Agreeable to wrap - kailyn sena ag  Culture done  6/22/22  Wound stable in size  6/29/22  Wound slightly larger  Still having issues with wrap falling down  Will start hhc - MWF wraps  7/6/22  Improved  hhc starts this week  7/20/22  Appearance better, wound measuring larger  Continue with wraps  Leg edema improved  7/27/22  Slight improvement  8/3/22  Wound appearance and size worse  Issues still with wraps falling down  When doesn't have wraps on than usually uses spandigrips  8/10/2022  Wound cultures noted, Bactrim DS 1 tablet p.o. twice daily, wound looks fairly clean with some exudate only, mild recent lab work, patient recommended CBC and CMP  8/17/22  Refusing wrap today due to pain associated with  Will discuss with Dr Ramón Larkin her pain management  Slightly larger, appearance improved  8/24/22  Poor tolerance of wraps  Wound stable in size  9/7/22  Not tolerating wraps - emphasized importance of use  Change to drawtek  Wound slightly improved in size, appearance still concerning  Pumps are going to be deliver  9/14/22  Still not tolerating wraps  Wound appearance improved, size slightly better  Pumps deliovered- she has yet to be in serviced on them   10/2-12/22  Admitted with cellultis  10/19/22  Currently at [facility]  Wound much improved  Posterior wound healed  Tyson jones  [address]  She will follow at [facility] while admitted  11/9/22  She is now home  She is no longer with pain management - there was a disagreement as she states she was taking more because of more pain  [facility] - I left my phone number for them to call me re this pt  Wound has improved  Oarrs reviewed  Percocet 7.5/325 mg #60 (30 days), Ms Contin 15 mg #60 (30 days)  11/16/22  Wound improved but superficial areas medially and laterally cause measurements to be larger  11/30/22  Smaller  More superficial  12/7/22  Improved  Oarrs reviewed  Percocet 7.5/325 mg #60 (30 days), Ms Contin 15 mg #60 (30 days)  Emphasized importance of pain management  12/21/22  Slightly larger  Oarrs reviewed  Gabapentin 300 mg tid  1/4/23  Larger - new wound - so blocked  Some new skin is present  Pt will be calling to remind to get new scripts for pain med - she has still not established with pain management  1/11/23  Stable in size  Will culture - possible advance skin therapy  1/18/23  Stable, appearanc better  Cx - S Aureus, Pseudomonas, Acinetobacter - disc with DR Esthela Velazquez - will tx with levaquin and bactrim  If not improving after will have pt see him in office re possible IV abx  1/25/23  On abx  Wound larger, appearance worse but pain improved  1/26-2/1/23  Admitted after a fall  She than was admitted to [facility] for 2 weeks  2/15/23  Slightly worse, deeper  Oarrs reviewed  MS contin 15 mg q12 #60, Perocet 7.5/325 mg q12 #60    \Wound/Ulcer Pain Timing/Severity: waxing and waning, mild  Quality of pain: aching, throbbing, pressure  Severity:  5 / 10   Modifying Factors: Pain worsens with debridement, dressing changes  Associated Signs/Symptoms: edema, drainage and pain    Ulcer Identification:  Ulcer Type: venous and lymphedema  Contributing Factors: edema, venous stasis and lymphedema    Diabetic/Pressure/Non Pressure Ulcers only:  Ulcer: Non-Pressure ulcer, fat layer exposed    Wound: N/A  PAST MEDICAL HISTORY      Diagnosis Date    Bursitis     CAD (coronary artery disease) 1993    heart attack    Cellulitis of leg, left 10/3/2022    COPD (chronic obstructive pulmonary disease) ([address] 6/19/2013    Dermatophytosis 1/27/2023    Emphysema     slight    GERD (gastroesophageal reflux disease)     Hiatal hernia     Hip pain     Hyperlipidemia     Hypertension     Lymphedema of both lower extremities 11/21/2018    Venous insufficiency of both lower extremities 11/21/2018    Venous stasis ulcer of left calf with fat layer exposed without varicose veins ([address] 12/8/2021    Venous stasis ulcer of left calf with fat layer exposed without varicose veins ([address] 12/8/2021    Longstanding ulcer over the shin of the left calf, with a new ulcer over the posterior aspect of the left calf, for the last 1 week    Venous ulcer with fat layer exposed ([address] 10/28/2015     Past Surgical History:   Procedure Laterality Date    APPENDECTOMY      BREAST ENHANCEMENT SURGERY      BREAST REDUCTION SURGERY      CHOLECYSTECTOMY      COLONOSCOPY      ECHO COMPL W DOP COLOR FLOW  3/11/2013         ENDOSCOPY, COLON, DIAGNOSTIC      HERNIA REPAIR N/A 4/16/2021    LAPAROSCOPIC ROBOTIC ASSISTED INGUINAL HERNIA REPAIR performed by Analia Reyes MD at [address] [facility] ([address] [place] [address])      JOINT REPLACEMENT  2009 2011    l knee r hip    LEG DEBRIDEMENT Left 11/18/2015    LEG DEBRIDEMENT Left 08/03/2016     History reviewed. No pertinent family history.   Social History     Tobacco Use    Smoking status: Former Packs/day: 1.00     Years: 20.00     Pack years: 20.00     Types: Cigarettes     Quit date: 1995     Years since quittin.3    Smokeless tobacco: Never    Tobacco comments:     Quit   Vaping Use    Vaping Use: Never used   Substance Use Topics    Alcohol use: No    Drug use: No     Allergies   Allergen Reactions    Codeine Nausea And Vomiting and Other (See Comments)     hallucinate    Dilaudid [Hydromorphone Hcl] Rash    Naproxen Other (See Comments)     Shuts down kidney function    Singulair [Montelukast Sodium] Shortness Of Breath     Mucus in chest    Black Cohosh     Black Cohosh [Cimicifuga [place] (Black Cohosh)] Rash     Current Outpatient Medications on File Prior to Encounter   Medication Sig Dispense Refill    hydroCHLOROthiazide (HYDRODIURIL) 25 MG tablet Take 1 tablet by mouth daily      Cyanocobalamin (VITAMIN B 12) 500 MCG TABS Take by mouth daily      Multiple Vitamins-Minerals (THERAPEUTIC MULTIVITAMIN-MINERALS) tablet Take 1 tablet by mouth daily      zinc gluconate 50 MG tablet Take 50 mg by mouth daily      GENISTEIN PO Take 125 mg by mouth nightly      folic acid (FOLVITE) 338 MCG tablet Take 400 mcg by mouth nightly      Krill Oil 350 MG CAPS Take 350 mg by mouth nightly      gabapentin (NEURONTIN) 300 MG capsule Take 300 mg by mouth 3 times daily.       simvastatin (ZOCOR) 40 MG tablet Take 40 mg by mouth nightly      aspirin 81 MG tablet Take 81 mg by mouth daily      Cholecalciferol (VITAMIN D) 2000 UNITS CAPS capsule Take 2,000 Units by mouth daily       ferrous sulfate 325 (65 FE) MG tablet Take 325 mg by mouth nightly       metoprolol (LOPRESSOR) 50 MG tablet Take 1 tablet by mouth 2 times daily 60 tablet 0    albuterol (PROVENTIL HFA;VENTOLIN HFA) 108 (90 BASE) MCG/ACT inhaler Inhale 2 puffs into the lungs every 6 hours as needed for Wheezing or Shortness of Breath       calcium carbonate (OYSTER SHELL CALCIUM 500 MG) 1250 MG tablet Take 2 tablets by mouth daily      Ascorbic Acid (VITAMIN C) 500 MG tablet Take 2,000 mg by mouth daily      omeprazole (PRILOSEC) 40 MG delayed release capsule Take 40 mg by mouth daily. diphenhydrAMINE (BENADRYL) 50 MG capsule Take 50 mg by mouth daily as needed for Allergies       Garlic 879 MG TABS Take 1,000 mg by mouth daily        No current facility-administered medications on file prior to encounter. REVIEW OF SYSTEMS See HPI    Objective:    /70   Pulse 70   Temp 97.3 °F (36.3 °C) (Temporal)   Resp 18   Wt Readings from Last 3 Encounters:   02/01/23 293 lb (132.9 kg)   01/25/23 295 lb (133.8 kg)   01/11/23 295 lb (133.8 kg)     PHYSICAL EXAM  CONSTITUTIONAL:   Awake, alert, cooperative   EYES:  lids and lashes normal   ENT: external ears and nose without lesions   NECK:  supple, symmetrical, trachea midline   SKIN:  Open wound Present    Assessment:     Problem List Items Addressed This Visit       Venous insufficiency of both lower extremities (Chronic)    Venous stasis ulcer of left calf with fat layer exposed without varicose veins (HCC) - Primary (Chronic)    Relevant Medications    morphine (MS CONTIN) 15 MG extended release tablet    oxyCODONE-acetaminophen (PERCOCET) 7.5-325 MG per tablet    Other Relevant Orders    Initiate Outpatient Wound Care Protocol       Pre Debridement Measurements:  Are located in the [facility]  Documentation Flow Sheet  Post Debridement Measurements:  Wound/Ulcer Descriptions are Pre Debridement except measurements:     Incision 04/20/16 Leg Left (Active)   Number of days: 5343       Incision 08/03/16 Leg Left (Active)   Number of days: 2387       Wound 12/08/21 Pretibial Left #1 (Active)   Wound Image   02/15/23 1342   Dressing Status Clean;Dry; Intact 01/31/23 2254   Wound Cleansed Cleansed with saline 01/31/23 1900   Dressing/Treatment Dry dressing 01/31/23 1900   Offloading for Diabetic Foot Ulcers Offloading not required 01/25/23 1623   Dressing Change Due 02/01/23 01/31/23 2254   Wound Length (cm) 6.1 cm 02/15/23 1342   Wound Width (cm) 4.2 cm 02/15/23 1342   Wound Depth (cm) 0.2 cm 02/15/23 1342   Wound Surface Area (cm^2) 25.62 cm^2 02/15/23 1342   Change in Wound Size % (l*w) -3560 02/15/23 1342   Wound Volume (cm^3) 5.124 cm^3 02/15/23 1342   Wound Healing % -7220 02/15/23 1342   Post-Procedure Length (cm) 6.3 cm 02/15/23 1358   Post-Procedure Width (cm) 4.3 cm 02/15/23 1358   Post-Procedure Depth (cm) 0.2 cm 02/15/23 1358   Post-Procedure Surface Area (cm^2) 27.09 cm^2 02/15/23 1358   Post-Procedure Volume (cm^3) 5.418 cm^3 02/15/23 1358   Wound Assessment Fibrin;Pink/red 02/15/23 1342   Drainage Amount Moderate 02/15/23 1342   Drainage Description Serosanguinous; Yellow 02/15/23 1342   Odor None 02/15/23 1342   Kori-wound Assessment Intact 02/15/23 1342   Margins Other (Comment) 01/31/23 0930   Number of days: 433     Incision 04/16/21 Abdomen (Active)   Number of days: 996       Procedure Note  Indications:  Based on my examination of this patient's wound(s)/ulcer(s) today, debridement is required to promote healing and evaluate the wound base. Performed by: Daniel Colin MD    Consent obtained:  Yes    Time out taken:  Yes    Pain Control: Anesthetic  Anesthetic: 4% Lidocaine Liquid Topical     Debridement:Excisional Debridement    Using curette the wound(s)/ulcer(s) was/were sharply debrided down through and including the removal of epidermis, dermis, and subcutaneous tissue. Devitalized Tissue Debrided:  fibrin, biofilm, slough, and exudate to stimulate bleeding to promote healing, post debridement good bleeding base and wound edges noted    Wound/Ulcer #: 1    Percent of Wound/Ulcer Debrided: 80%    Total Surface Area Debrided: 20 sq cm     Estimated Blood Loss:  Minimal  Hemostasis Achieved:  by pressure    Procedural Pain:  10  / 10   Post Procedural Pain:  9 / 10     Response to treatment:  With complaints of pain.    Plan:   Treatment Note please see attached Discharge Instructions    Written patient dismissal instructions given to patient and signed by patient or POA. Discharge Instructions         Visit Discharge/Physician Orders     Discharge condition: Stable     Assessment of pain at discharge: mild     Anesthetic used: lido 4%     Discharge to: Home     Left via:Private automobile     Accompanied by: self     ECF/HHA: [facility]     Dressing Orders:  LEFT PRETIB : cleanse with normal saline, apply calcium alginate ag, cover with dry dressing and secure . Change daily. Apply spandagrip. On in am and off in pm. Elevate legs as much as possible above level of the heart. Treatment Orders:Eat a diet high in protein and vitamin C. Take a multiple vitamin daily unless contraindicated. Elevate as much as possible     [facility] followup visit: 1 week____________________________  (Please note your next appointment above and if you are unable to keep, kindly give a 24 hour notice. Thank you.)     Physician signature:__________________________      If you experience any of the following, please call the [facility] during business hours:     * Increase in Pain  * Temperature over 101  * Increase in drainage from your wound  * Drainage with a foul odor  * Bleeding  * Increase in swelling  * Need for compression bandage changes due to slippage, breakthrough drainage. If you need medical attention outside of the business hours of the [facility] please contact your PCP or go to the nearest emergency room.       Electronically signed by Edyta Montalvo MD

## 2023-02-15 NOTE — DISCHARGE INSTRUCTIONS
Visit Discharge/Physician Orders     Discharge condition: Stable     Assessment of pain at discharge: mild     Anesthetic used: lido 4%     Discharge to: Home     Left via:Private automobile     Accompanied by: self     ECF/HHA: [facility]     Dressing Orders:  LEFT PRETIB : cleanse with normal saline, apply calcium alginate ag, cover with dry dressing and secure . Change daily. Apply spandagrip. On in am and off in pm. Elevate legs as much as possible above level of the heart. Treatment Orders:Eat a diet high in protein and vitamin C. Take a multiple vitamin daily unless contraindicated. Elevate as much as possible     [facility] followup visit: 1 week____________________________  (Please note your next appointment above and if you are unable to keep, kindly give a 24 hour notice. Thank you.)     Physician signature:__________________________      If you experience any of the following, please call the [facility] during business hours:     * Increase in Pain  * Temperature over 101  * Increase in drainage from your wound  * Drainage with a foul odor  * Bleeding  * Increase in swelling  * Need for compression bandage changes due to slippage, breakthrough drainage. If you need medical attention outside of the business hours of the [facility] please contact your PCP or go to the nearest emergency room.

## 2023-02-22 ENCOUNTER — HOSPITAL ENCOUNTER (OUTPATIENT)
Dept: WOUND CARE | Age: 78
Discharge: HOME | End: 2023-02-22
Payer: MEDICARE

## 2023-02-22 VITALS
TEMPERATURE: 96.7 F | HEIGHT: 67 IN | HEART RATE: 84 BPM | RESPIRATION RATE: 18 BRPM | SYSTOLIC BLOOD PRESSURE: 165 MMHG | DIASTOLIC BLOOD PRESSURE: 85 MMHG | WEIGHT: 293 LBS | BODY MASS INDEX: 45.99 KG/M2

## 2023-02-22 DIAGNOSIS — I87.2 VENOUS STASIS ULCER OF LEFT CALF WITH FAT LAYER EXPOSED WITHOUT VARICOSE VEINS (HCC): Primary | ICD-10-CM

## 2023-02-22 DIAGNOSIS — I87.2 VENOUS INSUFFICIENCY OF BOTH LOWER EXTREMITIES: Chronic | ICD-10-CM

## 2023-02-22 DIAGNOSIS — L97.222 VENOUS STASIS ULCER OF LEFT CALF WITH FAT LAYER EXPOSED WITHOUT VARICOSE VEINS (HCC): Primary | ICD-10-CM

## 2023-02-22 PROCEDURE — 11042 DBRDMT SUBQ TIS 1ST 20SQCM/<: CPT

## 2023-02-22 RX ORDER — LIDOCAINE HYDROCHLORIDE 40 MG/ML
SOLUTION TOPICAL ONCE
Status: COMPLETED | OUTPATIENT
Start: 2023-02-22 | End: 2023-02-22

## 2023-02-22 RX ORDER — BETAMETHASONE DIPROPIONATE 0.05 %
OINTMENT (GRAM) TOPICAL ONCE
OUTPATIENT
Start: 2023-02-22 | End: 2023-02-22

## 2023-02-22 RX ORDER — BACITRACIN ZINC AND POLYMYXIN B SULFATE 500; 1000 [USP'U]/G; [USP'U]/G
OINTMENT TOPICAL ONCE
OUTPATIENT
Start: 2023-02-22 | End: 2023-02-22

## 2023-02-22 RX ORDER — LIDOCAINE HYDROCHLORIDE 20 MG/ML
JELLY TOPICAL ONCE
OUTPATIENT
Start: 2023-02-22 | End: 2023-02-22

## 2023-02-22 RX ORDER — CLOBETASOL PROPIONATE 0.5 MG/G
OINTMENT TOPICAL ONCE
OUTPATIENT
Start: 2023-02-22 | End: 2023-02-22

## 2023-02-22 RX ORDER — GENTAMICIN SULFATE 1 MG/G
OINTMENT TOPICAL ONCE
OUTPATIENT
Start: 2023-02-22 | End: 2023-02-22

## 2023-02-22 RX ORDER — BACITRACIN, NEOMYCIN, POLYMYXIN B 400; 3.5; 5 [USP'U]/G; MG/G; [USP'U]/G
OINTMENT TOPICAL ONCE
OUTPATIENT
Start: 2023-02-22 | End: 2023-02-22

## 2023-02-22 RX ORDER — LIDOCAINE 40 MG/G
CREAM TOPICAL ONCE
OUTPATIENT
Start: 2023-02-22 | End: 2023-02-22

## 2023-02-22 RX ORDER — GINSENG 100 MG
CAPSULE ORAL ONCE
OUTPATIENT
Start: 2023-02-22 | End: 2023-02-22

## 2023-02-22 RX ORDER — LIDOCAINE 50 MG/G
OINTMENT TOPICAL ONCE
OUTPATIENT
Start: 2023-02-22 | End: 2023-02-22

## 2023-02-22 RX ORDER — LIDOCAINE HYDROCHLORIDE 40 MG/ML
SOLUTION TOPICAL ONCE
OUTPATIENT
Start: 2023-02-22 | End: 2023-02-22

## 2023-02-22 RX ADMIN — LIDOCAINE HYDROCHLORIDE 10 ML: 40 SOLUTION TOPICAL at 13:57

## 2023-02-22 ASSESSMENT — PAIN DESCRIPTION - ONSET: ONSET: ON-GOING

## 2023-02-22 ASSESSMENT — PAIN DESCRIPTION - PAIN TYPE: TYPE: CHRONIC PAIN

## 2023-02-22 ASSESSMENT — PAIN SCALES - GENERAL: PAINLEVEL_OUTOF10: 6

## 2023-02-22 ASSESSMENT — PAIN DESCRIPTION - DESCRIPTORS: DESCRIPTORS: BURNING;ACHING

## 2023-02-22 ASSESSMENT — PAIN DESCRIPTION - ORIENTATION: ORIENTATION: LEFT

## 2023-02-22 ASSESSMENT — PAIN - FUNCTIONAL ASSESSMENT: PAIN_FUNCTIONAL_ASSESSMENT: PREVENTS OR INTERFERES SOME ACTIVE ACTIVITIES AND ADLS

## 2023-02-22 ASSESSMENT — PAIN DESCRIPTION - FREQUENCY: FREQUENCY: CONTINUOUS

## 2023-02-22 ASSESSMENT — PAIN DESCRIPTION - LOCATION: LOCATION: LEG

## 2023-02-22 NOTE — DISCHARGE INSTRUCTIONS
Visit Discharge/Physician Orders     Discharge condition: Stable     Assessment of pain at discharge: mild     Anesthetic used: lido 4%     Discharge to: Home     Left via:Private automobile     Accompanied by: self     ECF/HHA: [facility]     Dressing Orders:  LEFT PRETIB : cleanse with normal saline, apply calcium alginate ag, cover with dry dressing and secure . Change daily. Apply spandagrip. On in am and off in pm. Elevate legs as much as possible above level of the heart. Treatment Orders:Eat a diet high in protein and vitamin C. Take a multiple vitamin daily unless contraindicated. Elevate as much as possible     [address] [place] [address] followup visit: 1 week____________________________  (Please note your next appointment above and if you are unable to keep, kindly give a 24 hour notice. Thank you.)     Physician signature:__________________________      If you experience any of the following, please call the [facility] during business hours:     * Increase in Pain  * Temperature over 101  * Increase in drainage from your wound  * Drainage with a foul odor  * Bleeding  * Increase in swelling  * Need for compression bandage changes due to slippage, breakthrough drainage. If you need medical attention outside of the business hours of the [facility] please contact your PCP or go to the nearest emergency room.

## 2023-02-24 NOTE — DISCHARGE INSTRUCTIONS
Visit Discharge/Physician Orders     Discharge condition: Stable     Assessment of pain at discharge: mild     Anesthetic used: lido 4%     Discharge to: Home     Left via:Private automobile     Accompanied by: self     ECF/HHA: [facility]     Dressing Orders:  LEFT PRETIB & LATERAL LEG: cleanse with normal saline, apply calcium alginate ag, cover with dry dressing and secure . Change daily. Apply spandagrip. On in am and off in pm. Elevate legs as much as possible above level of the heart. Treatment Orders:Eat a diet high in protein and vitamin C. Take a multiple vitamin daily unless contraindicated. Elevate as much as possible     [facility] followup visit: 1 week____________________________  (Please note your next appointment above and if you are unable to keep, kindly give a 24 hour notice. Thank you.)     Physician signature:__________________________      If you experience any of the following, please call the [facility] during business hours:     * Increase in Pain  * Temperature over 101  * Increase in drainage from your wound  * Drainage with a foul odor  * Bleeding  * Increase in swelling  * Need for compression bandage changes due to slippage, breakthrough drainage. If you need medical attention outside of the business hours of the [facility] please contact your PCP or go to the nearest emergency room.

## 2023-02-26 NOTE — PROGRESS NOTES
[facility] Followup Visit Note    Referring Physician : Jose Bro MD  [id number] RECORD NUMBER:  [id number]  AGE: 70 y.o. GENDER: female  : 1945  EPISODE DATE:  2023    Subjective:     Chief Complaint   Patient presents with    Wound Check     Left leg      HISTORY of PRESENT ILLNESS HPI   Deneen Ortiz is a 70 y.o. female who presents today in regards to follow up evaluation and treatment of wound/ulcer. That patient's past medical, family and social hx were reviewed and changes were made if present. History of Wound Context:  Patient has had left calf wound since ~ 2021. She has been putting a dressing on it. The wound has not been improving. She has a hx significant for venous stasis ulcerations of bilateral LE. She first was seen by myself in regards to these issues 2015. She eventually healed these wounds 2016. I last saw her 2021 at which time I recommended lymphedema therapy. She states she went and said it caused her to much pain and stopped going. She has chronic issues with bilateral calf pain, swelling and edema. She admits to not wearing her stockings because of the pain. She has used knee high 20-30 mm hg stockings in the past.       She is still seeing pain management and is down to percocet 7/5/325 mg daily.        [facility]  Double tubigrip  Culture done  Emphasized importance of getting in to more significant compression in the future  12/15/21  Culture reviewed - light growth, no tx  Wound slightly improved  Still significant drainage  Plan on compression wrap next week  21  Stable wound  Drainage slightly better  Pt would like to wait till next week because of holidays to start wrap  22  Wound larger  Profore  22  Wound appearance better  Refusing wrap - it rolled down last week and she cut off after 3 days  22  Wound appearance better  Still refusing wrap   3/2/22  periwound worse  Culture  drawtek  3/9/22  periwound much improved  levaquin 750 mg daily #10 script given culture   Wound itself stable  3/23/22  Left anterior calf wound improving overall  New blister/ulcerations left 3rd, 4th and 5th toes and lateral foot   Instructed to keep toes/foot dry, no ointment or corn starch   3/30/22  bistering resolved, other skin issues resolved  More dry than previously but overall stable  kailyn Barrera  Swelling is down, patient declining wrap  4/13/22  Wound slightly improved  4/20/2022  Wound stable, patient refusing compression wrap  5/4/22  Wound worse  Pt refusing wrap  Will change diet per her report to decrease swelling  5/11/22  Wound stable  kailyn and giacomo 2 - pt agreeable now after significant persuasion  5/18/22  Took off wrap within 24 hours due to pain  aquacell and spandigrip  She agreed to use wrap again if swelling not down 2 more cm next week  Wound slightly smaller  5/25/22  Wound improved   Left leg wound debrided   Continue aquacel   6/8/22  Wound larger  Agreeable to wrap - kailyn sena ag  Culture done  6/22/22  Wound stable in size  6/29/22  Wound slightly larger  Still having issues with wrap falling down  Will start hhc - MWF wraps  7/6/22  Improved  hhc starts this week  7/20/22  Appearance better, wound measuring larger  Continue with wraps  Leg edema improved  7/27/22  Slight improvement  8/3/22  Wound appearance and size worse  Issues still with wraps falling down  When doesn't have wraps on than usually uses spandigrips  8/10/2022  Wound cultures noted, Bactrim DS 1 tablet p.o. twice daily, wound looks fairly clean with some exudate only, mild recent lab work, patient recommended CBC and CMP  8/17/22  Refusing wrap today due to pain associated with  Will discuss with Dr Surjit Prince her pain management  Slightly larger, appearance improved  8/24/22  Poor tolerance of wraps  Wound stable in size  9/7/22  Not tolerating wraps - emphasized importance of use  Change to drawtek  Wound slightly improved in size, appearance still concerning  Pumps are going to be deliver  9/14/22  Still not tolerating wraps  Wound appearance improved, size slightly better  Pumps deliovered- she has yet to be in serviced on them   10/2-12/22  Admitted with cellultis  10/19/22  [facility]  Wound much improved  Posterior wound healed  Tyson jones  [address]  She will follow at [facility] while admitted  11/9/22  She is now home  She is no longer with pain management - there was a disagreement as she states she was taking more because of more pain  [facility] - I left my phone number for them to call me re this pt  Wound has improved  Oarrs reviewed  Percocet 7.5/325 mg #60 (30 days), Ms Contin 15 mg #60 (30 days)  11/16/22  Wound improved but superficial areas medially and laterally cause measurements to be larger  11/30/22  Smaller  More superficial  12/7/22  Improved  Oarrs reviewed  Percocet 7.5/325 mg #60 (30 days), Ms Contin 15 mg #60 (30 days)  Emphasized importance of pain management  12/21/22  Slightly larger  Oarrs reviewed  Gabapentin 300 mg tid  1/4/23  Larger - new wound - so blocked  Some new skin is present  Pt will be calling to remind to get new scripts for pain med - she has still not established with pain management  1/11/23  Stable in size  Will culture - possible advance skin therapy  1/18/23  Stable, appearanc better  Cx - S Aureus, Pseudomonas, Acinetobacter - disc with DR Celi Royal - will tx with levaquin and bactrim  If not improving after will have pt see him in office re possible IV abx  1/25/23  On abx  Wound larger, appearance worse but pain improved  1/26-2/1/23  Admitted after a fall  She than [facility]  2/15/23  Slightly worse, deeper  Oarrs reviewed  MS contin 15 mg q12 #60, Perocet 7.5/325 mg q12 #60  2/22/23  Measuring larger, appearance improved  Size 31 sq cm     \Wound/Ulcer Pain Timing/Severity: waxing and waning, mild  Quality of pain: aching, throbbing, pressure  Severity:  5 / 10   Modifying Factors: Pain worsens with debridement, dressing changes  Associated Signs/Symptoms: edema, drainage and pain    Ulcer Identification:  Ulcer Type: venous and lymphedema  Contributing Factors: edema, venous stasis and lymphedema    Diabetic/Pressure/Non Pressure Ulcers only:  Ulcer: Non-Pressure ulcer, fat layer exposed    Wound: N/A  PAST MEDICAL HISTORY      Diagnosis Date    Bursitis     CAD (coronary artery disease) 1993    heart attack    Cellulitis of leg, left 10/3/2022    COPD (chronic obstructive pulmonary disease) ([address] 6/19/2013    Dermatophytosis 1/27/2023    Emphysema     slight    GERD (gastroesophageal reflux disease)     Hiatal hernia     Hip pain     Hyperlipidemia     Hypertension     Lymphedema of both lower extremities 11/21/2018    Venous insufficiency of both lower extremities 11/21/2018    Venous stasis ulcer of left calf with fat layer exposed without varicose veins ([address] 12/8/2021    Venous stasis ulcer of left calf with fat layer exposed without varicose veins ([address] 12/8/2021    Longstanding ulcer over the shin of the left calf, with a new ulcer over the posterior aspect of the left calf, for the last 1 week    Venous ulcer with fat layer exposed ([address] 10/28/2015     Past Surgical History:   Procedure Laterality Date    APPENDECTOMY      BREAST ENHANCEMENT SURGERY      BREAST REDUCTION SURGERY      CHOLECYSTECTOMY      COLONOSCOPY      ECHO COMPL W DOP COLOR FLOW  3/11/2013         ENDOSCOPY, COLON, DIAGNOSTIC      HERNIA REPAIR N/A 4/16/2021    LAPAROSCOPIC ROBOTIC ASSISTED INGUINAL HERNIA REPAIR performed by Cinthia Brito MD at [address] [place] [address] ([address] [place] [address])      JOINT REPLACEMENT  2009 2011    l knee r hip    LEG DEBRIDEMENT Left 11/18/2015    LEG DEBRIDEMENT Left 08/03/2016     History reviewed. No pertinent family history.   Social History     Tobacco Use    Smoking status: Former     Packs/day: 1.00     Years: 20.00     Pack years: 20.00     Types: Cigarettes     Quit date: 1995     Years since quittin.3    Smokeless tobacco: Never    Tobacco comments:     Quit   Vaping Use    Vaping Use: Never used   Substance Use Topics    Alcohol use: No    Drug use: No     Allergies   Allergen Reactions    Codeine Nausea And Vomiting and Other (See Comments)     hallucinate    Dilaudid [Hydromorphone Hcl] Rash    Naproxen Other (See Comments)     Shuts down kidney function    Singulair [Montelukast Sodium] Shortness Of Breath     Mucus in chest    Black Cohosh     Black Cohosh [Cimicifuga [place] (Black Cohosh)] Rash     Current Outpatient Medications on File Prior to Encounter   Medication Sig Dispense Refill    morphine (MS CONTIN) 15 MG extended release tablet Take 1 tablet by mouth 2 times daily for 30 days. Max Daily Amount: 30 mg 60 tablet 0    oxyCODONE-acetaminophen (PERCOCET) 7.5-325 MG per tablet Take 1 tablet by mouth 2 times daily for 30 days. Intended supply: 30 days Max Daily Amount: 2 tablets 60 tablet 0    hydroCHLOROthiazide (HYDRODIURIL) 25 MG tablet Take 1 tablet by mouth daily      Cyanocobalamin (VITAMIN B 12) 500 MCG TABS Take by mouth daily      Multiple Vitamins-Minerals (THERAPEUTIC MULTIVITAMIN-MINERALS) tablet Take 1 tablet by mouth daily      zinc gluconate 50 MG tablet Take 50 mg by mouth daily      GENISTEIN PO Take 125 mg by mouth nightly      folic acid (FOLVITE) 189 MCG tablet Take 400 mcg by mouth nightly      Krill Oil 350 MG CAPS Take 350 mg by mouth nightly      gabapentin (NEURONTIN) 300 MG capsule Take 300 mg by mouth 3 times daily.       simvastatin (ZOCOR) 40 MG tablet Take 40 mg by mouth nightly      aspirin 81 MG tablet Take 81 mg by mouth daily      Cholecalciferol (VITAMIN D) 2000 UNITS CAPS capsule Take 2,000 Units by mouth daily       ferrous sulfate 325 (65 FE) MG tablet Take 325 mg by mouth nightly       metoprolol (LOPRESSOR) 50 MG tablet Take 1 tablet by mouth 2 times daily 60 tablet 0    albuterol (PROVENTIL HFA;VENTOLIN HFA) 108 (90 BASE) MCG/ACT inhaler Inhale 2 puffs into the lungs every 6 hours as needed for Wheezing or Shortness of Breath       calcium carbonate (OYSTER SHELL CALCIUM 500 MG) 1250 MG tablet Take 2 tablets by mouth daily      Ascorbic Acid (VITAMIN C) 500 MG tablet Take 2,000 mg by mouth daily      omeprazole (PRILOSEC) 40 MG delayed release capsule Take 40 mg by mouth daily. diphenhydrAMINE (BENADRYL) 50 MG capsule Take 50 mg by mouth daily as needed for Allergies       Garlic 409 MG TABS Take 1,000 mg by mouth daily        No current facility-administered medications on file prior to encounter. REVIEW OF SYSTEMS See HPI    Objective:    BP (!) 165/85   Pulse 84   Temp (!) 96.7 °F (35.9 °C) (Tympanic)   Resp 18   Ht 5' 7\" (1.702 m)   Wt 293 lb (132.9 kg)   BMI 45.89 kg/m²   Wt Readings from Last 3 Encounters:   02/22/23 293 lb (132.9 kg)   02/01/23 293 lb (132.9 kg)   01/25/23 295 lb (133.8 kg)     PHYSICAL EXAM  CONSTITUTIONAL:   Awake, alert, cooperative   EYES:  lids and lashes normal   ENT: external ears and nose without lesions   NECK:  supple, symmetrical, trachea midline   SKIN:  Open wound Present    Assessment:     Problem List Items Addressed This Visit       Venous insufficiency of both lower extremities (Chronic)    Venous stasis ulcer of left calf with fat layer exposed without varicose veins (HCC) - Primary (Chronic)       Pre Debridement Measurements:  Are located in the [facility]  Documentation Flow Sheet  Post Debridement Measurements:  Wound/Ulcer Descriptions are Pre Debridement except measurements:     Incision 04/20/16 Leg Left (Active)   Number of days: 4183       Incision 08/03/16 Leg Left (Active)   Number of days: 2398       Wound 12/08/21 Pretibial Left #1 (Active)   Wound Image   02/15/23 1342   Dressing Status Clean;Dry; Intact 02/22/23 1510   Wound Cleansed Cleansed with saline 02/22/23 1510   Dressing/Treatment Alginate;Dry dressing 02/22/23 1510   Offloading for Diabetic Foot Ulcers Offloading not required 01/25/23 1623   Dressing Change Due 02/01/23 01/31/23 2254   Wound Length (cm) 6.3 cm 02/22/23 1355   Wound Width (cm) 5 cm 02/22/23 1355   Wound Depth (cm) 0.4 cm 02/22/23 1355   Wound Surface Area (cm^2) 31.5 cm^2 02/22/23 1355   Change in Wound Size % (l*w) -4400 02/22/23 1355   Wound Volume (cm^3) 12.6 cm^3 02/22/23 1355   Wound Healing % -23354 02/22/23 1355   Post-Procedure Length (cm) 6.3 cm 02/22/23 1451   Post-Procedure Width (cm) 5.1 cm 02/22/23 1451   Post-Procedure Depth (cm) 0.4 cm 02/22/23 1451   Post-Procedure Surface Area (cm^2) 32.13 cm^2 02/22/23 1451   Post-Procedure Volume (cm^3) 12.852 cm^3 02/22/23 1451   Wound Assessment Fibrin;Pink/red 02/22/23 1355   Drainage Amount Large 02/22/23 1355   Drainage Description Serosanguinous; Yellow 02/22/23 1355   Odor None 02/22/23 1355   Kori-wound Assessment Intact 02/22/23 1355   Margins Other (Comment) 01/31/23 0930   Number of days: 445     Incision 04/16/21 Abdomen (Active)   Number of days: 516       Procedure Note  Indications:  Based on my examination of this patient's wound(s)/ulcer(s) today, debridement is required to promote healing and evaluate the wound base. Performed by: aZchery Mcneill MD    Consent obtained:  Yes    Time out taken:  Yes    Pain Control: Anesthetic  Anesthetic: 4% Lidocaine Liquid Topical     Debridement:Excisional Debridement    Using curette the wound(s)/ulcer(s) was/were sharply debrided down through and including the removal of epidermis, dermis, and subcutaneous tissue.         Devitalized Tissue Debrided:  fibrin, biofilm, slough, and exudate to stimulate bleeding to promote healing, post debridement good bleeding base and wound edges noted    Wound/Ulcer #: 1    Percent of Wound/Ulcer Debrided: 70%    Total Surface Area Debrided: 20 sq cm     Estimated Blood Loss:  Minimal  Hemostasis Achieved:  by pressure    Procedural Pain:  10  / 10   Post Procedural Pain:  9 / 10     Response to treatment:  With complaints of pain. Plan:   Treatment Note please see attached Discharge Instructions    Written patient dismissal instructions given to patient and signed by patient or POA. Discharge Instructions         Visit Discharge/Physician Orders     Discharge condition: Stable     Assessment of pain at discharge: mild     Anesthetic used: lido 4%     Discharge to: Home     Left via:Private automobile     Accompanied by: self     [facility]: [facility]     Dressing Orders:  LEFT PRETIB : cleanse with normal saline, apply calcium alginate ag, cover with dry dressing and secure . Change daily. Apply spandagrip. On in am and off in pm. Elevate legs as much as possible above level of the heart. Treatment Orders:Eat a diet high in protein and vitamin C. Take a multiple vitamin daily unless contraindicated. Elevate as much as possible     [address] [place] [address] followup visit: 1 week____________________________  (Please note your next appointment above and if you are unable to keep, kindly give a 24 hour notice. Thank you.)     Physician signature:__________________________      If you experience any of the following, please call the [facility] during business hours:     * Increase in Pain  * Temperature over 101  * Increase in drainage from your wound  * Drainage with a foul odor  * Bleeding  * Increase in swelling  * Need for compression bandage changes due to slippage, breakthrough drainage. If you need medical attention outside of the business hours of the [facility] please contact your PCP or go to the nearest emergency room.          Electronically signed by Brittany Pace MD

## 2023-03-01 ENCOUNTER — HOSPITAL ENCOUNTER (OUTPATIENT)
Dept: WOUND CARE | Age: 78
Discharge: HOME | End: 2023-03-01
Payer: MEDICARE

## 2023-03-01 VITALS
SYSTOLIC BLOOD PRESSURE: 179 MMHG | HEART RATE: 74 BPM | RESPIRATION RATE: 18 BRPM | DIASTOLIC BLOOD PRESSURE: 94 MMHG | TEMPERATURE: 96.7 F

## 2023-03-01 DIAGNOSIS — I87.2 VENOUS STASIS ULCER OF LEFT CALF WITH FAT LAYER EXPOSED WITHOUT VARICOSE VEINS (HCC): Primary | ICD-10-CM

## 2023-03-01 DIAGNOSIS — L97.222 VENOUS STASIS ULCER OF LEFT CALF WITH FAT LAYER EXPOSED WITHOUT VARICOSE VEINS (HCC): Primary | ICD-10-CM

## 2023-03-01 PROCEDURE — 11042 DBRDMT SUBQ TIS 1ST 20SQCM/<: CPT

## 2023-03-01 PROCEDURE — 11045 DBRDMT SUBQ TISS EACH ADDL: CPT

## 2023-03-01 RX ORDER — LIDOCAINE HYDROCHLORIDE 20 MG/ML
JELLY TOPICAL ONCE
OUTPATIENT
Start: 2023-03-01 | End: 2023-03-01

## 2023-03-01 RX ORDER — LIDOCAINE HYDROCHLORIDE 40 MG/ML
SOLUTION TOPICAL ONCE
OUTPATIENT
Start: 2023-03-01 | End: 2023-03-01

## 2023-03-01 RX ORDER — LIDOCAINE 40 MG/G
CREAM TOPICAL ONCE
OUTPATIENT
Start: 2023-03-01 | End: 2023-03-01

## 2023-03-01 RX ORDER — LIDOCAINE 50 MG/G
OINTMENT TOPICAL ONCE
OUTPATIENT
Start: 2023-03-01 | End: 2023-03-01

## 2023-03-01 RX ORDER — CLOBETASOL PROPIONATE 0.5 MG/G
OINTMENT TOPICAL ONCE
OUTPATIENT
Start: 2023-03-01 | End: 2023-03-01

## 2023-03-01 RX ORDER — BACITRACIN, NEOMYCIN, POLYMYXIN B 400; 3.5; 5 [USP'U]/G; MG/G; [USP'U]/G
OINTMENT TOPICAL ONCE
OUTPATIENT
Start: 2023-03-01 | End: 2023-03-01

## 2023-03-01 RX ORDER — BETAMETHASONE DIPROPIONATE 0.05 %
OINTMENT (GRAM) TOPICAL ONCE
OUTPATIENT
Start: 2023-03-01 | End: 2023-03-01

## 2023-03-01 RX ORDER — GENTAMICIN SULFATE 1 MG/G
OINTMENT TOPICAL ONCE
OUTPATIENT
Start: 2023-03-01 | End: 2023-03-01

## 2023-03-01 RX ORDER — LIDOCAINE HYDROCHLORIDE 40 MG/ML
SOLUTION TOPICAL ONCE
Status: COMPLETED | OUTPATIENT
Start: 2023-03-01 | End: 2023-03-01

## 2023-03-01 RX ORDER — BACITRACIN ZINC AND POLYMYXIN B SULFATE 500; 1000 [USP'U]/G; [USP'U]/G
OINTMENT TOPICAL ONCE
OUTPATIENT
Start: 2023-03-01 | End: 2023-03-01

## 2023-03-01 RX ORDER — GINSENG 100 MG
CAPSULE ORAL ONCE
OUTPATIENT
Start: 2023-03-01 | End: 2023-03-01

## 2023-03-01 RX ADMIN — LIDOCAINE HYDROCHLORIDE 5 ML: 40 SOLUTION TOPICAL at 14:13

## 2023-03-01 ASSESSMENT — PAIN DESCRIPTION - ORIENTATION: ORIENTATION: LEFT

## 2023-03-01 ASSESSMENT — PAIN DESCRIPTION - LOCATION: LOCATION: LEG

## 2023-03-01 ASSESSMENT — PAIN SCALES - GENERAL: PAINLEVEL_OUTOF10: 7

## 2023-03-01 ASSESSMENT — PAIN DESCRIPTION - DESCRIPTORS: DESCRIPTORS: ACHING

## 2023-03-01 NOTE — PROGRESS NOTES
[facility] Followup Visit Note    Referring Physician : Elva Jimenes MD  [id number] [facility] RECORD NUMBER:  [id number]  AGE: 70 y.o. GENDER: female  : 1945  EPISODE DATE:  3/1/2023    Subjective:     Chief Complaint   Patient presents with    Wound Check     Left ankle        HISTORY of PRESENT ILLNESS HPI   Angel Velasco is a 70 y.o. female who presents today in regards to follow up evaluation and treatment of wound/ulcer. That patient's past medical, family and social hx were reviewed and changes were made if present. History of Wound Context:  Patient has had left calf wound since ~ 2021. She has been putting a dressing on it. The wound has not been improving. She has a hx significant for venous stasis ulcerations of bilateral LE. She first was seen by myself in regards to these issues 2015. She eventually healed these wounds 2016. I last saw her 2021 at which time I recommended lymphedema therapy. She states she went and said it caused her to much pain and stopped going. She has chronic issues with bilateral calf pain, swelling and edema. She admits to not wearing her stockings because of the pain. She has used knee high 20-30 mm hg stockings in the past.       She is still seeing pain management and is down to percocet 7/5/325 mg daily.        21  aquacell  Double tubigrip  Culture done  Emphasized importance of getting in to more significant compression in the future  12/15/21  Culture reviewed - light growth, no tx  Wound slightly improved  Still significant drainage  Plan on compression wrap next week  21  Stable wound  Drainage slightly better  Pt would like to wait till next week because of holidays to start wrap  22  Wound larger  Profore  22  Wound appearance better  Refusing wrap - it rolled down last week and she cut off after 3 days  22  Wound appearance better  Still refusing wrap   3/2/22  periwound worse  Culture  drawtek  3/9/22  periwound much improved  levaquin 750 mg daily #10 script given culture   Wound itself stable  3/23/22  Left anterior calf wound improving overall  New blister/ulcerations left 3rd, 4th and 5th toes and lateral foot   Instructed to keep toes/foot dry, no ointment or corn starch   3/30/22  bistering resolved, other skin issues resolved  More dry than previously but overall stable  kailyn Barrera  Swelling is down, patient declining wrap  4/13/22  Wound slightly improved  4/20/2022  Wound stable, patient refusing compression wrap  5/4/22  Wound worse  Pt refusing wrap  Will change diet per her report to decrease swelling  5/11/22  Wound stable  kailyn and giacomo 2 - pt agreeable now after significant persuasion  5/18/22  Took off wrap within 24 hours due to pain  aquacell and spandigrip  She agreed to use wrap again if swelling not down 2 more cm next week  Wound slightly smaller  5/25/22  Wound improved   Left leg wound debrided   Continue aquacel   6/8/22  Wound larger  Agreeable to wrap - kailyn sena ag  Culture done  6/22/22  Wound stable in size  6/29/22  Wound slightly larger  Still having issues with wrap falling down  Will start hhc - MWF wraps  7/6/22  Improved  hhc starts this week  7/20/22  Appearance better, wound measuring larger  Continue with wraps  Leg edema improved  7/27/22  Slight improvement  8/3/22  Wound appearance and size worse  Issues still with wraps falling down  When doesn't have wraps on than usually uses spandigrips  8/10/2022  Wound cultures noted, Bactrim DS 1 tablet p.o. twice daily, wound looks fairly clean with some exudate only, mild recent lab work, patient recommended CBC and CMP  8/17/22  Refusing wrap today due to pain associated with  Will discuss with Dr Fredi Skelton her pain management  Slightly larger, appearance improved  8/24/22  Poor tolerance of wraps  Wound stable in size  9/7/22  Not tolerating wraps - emphasized importance of use  Change to drawtek  Wound slightly improved in size, appearance still concerning  Pumps are going to be deliver  9/14/22  Still not tolerating wraps  Wound appearance improved, size slightly better  Pumps deliovered- she has yet to be in serviced on them   10/2-12/22  Admitted with cellultis  10/19/22  Currently at [facility]  Wound much improved  Posterior wound healed  Tyson jones  [address]  She will follow at [facility] while admitted  11/9/22  She is now home  She is no longer with pain management - there was a disagreement as she states she was taking more because of more pain  Will make referral to [facility] - I left my phone number for them to call me re this pt  Wound has improved  Oarrs reviewed  Percocet 7.5/325 mg #60 (30 days), Ms Contin 15 mg #60 (30 days)  11/16/22  Wound improved but superficial areas medially and laterally cause measurements to be larger  11/30/22  Smaller  More superficial  12/7/22  Improved  Oarrs reviewed  Percocet 7.5/325 mg #60 (30 days), Ms Contin 15 mg #60 (30 days)  Emphasized importance of pain management  12/21/22  Slightly larger  Oarrs reviewed  Gabapentin 300 mg tid  1/4/23  Larger - new wound - so blocked  Some new skin is present  Pt will be calling to remind to get new scripts for pain med - she has still not established with pain management  1/11/23  Stable in size  Will culture - possible advance skin therapy  1/18/23  Stable, appearanc better  Cx - S Aureus, Pseudomonas, Acinetobacter - disc with DR Ebony Ordoenz - will tx with levaquin and bactrim  If not improving after will have pt see him in office re possible IV abx  1/25/23  On abx  Wound larger, appearance worse but pain improved  1/26-2/1/23  Admitted after a fall  She than was admitted to [facility] for 2 weeks  2/15/23  Slightly worse, deeper  Oarrs reviewed  MS contin 15 mg q12 #60, Perocet 7.5/325 mg q12 #60  2/22/23  Measuring larger, appearance improved  Size 31 sq cm   3/1/23  Size 28 sq cm  Improving  New wound to left lateral leg    \Wound/Ulcer Pain Timing/Severity: waxing and waning, mild  Quality of pain: aching, throbbing, pressure  Severity:  5 / 10   Modifying Factors: Pain worsens with debridement, dressing changes  Associated Signs/Symptoms: edema, drainage and pain    Ulcer Identification:  Ulcer Type: venous and lymphedema  Contributing Factors: edema, venous stasis and lymphedema    Diabetic/Pressure/Non Pressure Ulcers only:  Ulcer: Non-Pressure ulcer, fat layer exposed    Wound: N/A  PAST MEDICAL HISTORY      Diagnosis Date    Bursitis     CAD (coronary artery disease) 1993    heart attack    Cellulitis of leg, left 10/3/2022    COPD (chronic obstructive pulmonary disease) ([facility] 6/19/2013    Dermatophytosis 1/27/2023    Emphysema     slight    GERD (gastroesophageal reflux disease)     Hiatal hernia     Hip pain     Hyperlipidemia     Hypertension     Lymphedema of both lower extremities 11/21/2018    Venous insufficiency of both lower extremities 11/21/2018    Venous stasis ulcer of left calf with fat layer exposed without varicose veins (HCC) 12/8/2021    Venous stasis ulcer of left calf with fat layer exposed without varicose veins (HCC) 12/8/2021    Longstanding ulcer over the shin of the left calf, with a new ulcer over the posterior aspect of the left calf, for the last 1 week    Venous ulcer with fat layer exposed ([facility] [address] 10/28/2015     Past Surgical History:   Procedure Laterality Date    APPENDECTOMY      BREAST ENHANCEMENT SURGERY      BREAST REDUCTION SURGERY      CHOLECYSTECTOMY      COLONOSCOPY      ECHO COMPL W DOP COLOR FLOW  3/11/2013         ENDOSCOPY, COLON, DIAGNOSTIC      HERNIA REPAIR N/A 4/16/2021    LAPAROSCOPIC ROBOTIC ASSISTED INGUINAL HERNIA REPAIR performed by Luann Esparza MD at [address] [place] [address] ([address] [place] [address])      JOINT REPLACEMENT  2009 2011    l knee r hip    LEG DEBRIDEMENT Left 11/18/2015    LEG DEBRIDEMENT Left 2016     History reviewed. No pertinent family history.  Social History     Tobacco Use    Smoking status: Former     Packs/day: 1.00     Years: 20.00     Pack years: 20.00     Types: Cigarettes     Quit date: 1995     Years since quittin.3    Smokeless tobacco: Never    Tobacco comments:     Quit   Vaping Use    Vaping Use: Never used   Substance Use Topics    Alcohol use: No    Drug use: No     Allergies   Allergen Reactions    Codeine Nausea And Vomiting and Other (See Comments)     hallucinate    Dilaudid [Hydromorphone Hcl] Rash    Naproxen Other (See Comments)     Shuts down kidney function    Singulair [Montelukast Sodium] Shortness Of Breath     Mucus in chest    Black Cohosh     Black Cohosh [Cimicifuga [place] (Black Cohosh)] Rash     Current Outpatient Medications on File Prior to Encounter   Medication Sig Dispense Refill    morphine (MS CONTIN) 15 MG extended release tablet Take 1 tablet by mouth 2 times daily for 30 days. Max Daily Amount: 30 mg 60 tablet 0    oxyCODONE-acetaminophen (PERCOCET) 7.5-325 MG per tablet Take 1 tablet by mouth 2 times daily for 30 days. Intended supply: 30 days Max Daily Amount: 2 tablets 60 tablet 0    hydroCHLOROthiazide (HYDRODIURIL) 25 MG tablet Take 1 tablet by mouth daily      Cyanocobalamin (VITAMIN B 12) 500 MCG TABS Take by mouth daily      Multiple Vitamins-Minerals (THERAPEUTIC MULTIVITAMIN-MINERALS) tablet Take 1 tablet by mouth daily      zinc gluconate 50 MG tablet Take 50 mg by mouth daily      GENISTEIN PO Take 125 mg by mouth nightly      folic acid (FOLVITE) 400 MCG tablet Take 400 mcg by mouth nightly      Krill Oil 350 MG CAPS Take 350 mg by mouth nightly      gabapentin (NEURONTIN) 300 MG capsule Take 300 mg by mouth 3 times daily.      simvastatin (ZOCOR) 40 MG tablet Take 40 mg by mouth nightly      aspirin 81 MG tablet Take 81 mg by mouth daily      Cholecalciferol (VITAMIN D) 2000 UNITS CAPS capsule Take 2,000 Units by mouth daily    ferrous sulfate 325 (65 FE) MG tablet Take 325 mg by mouth nightly       metoprolol (LOPRESSOR) 50 MG tablet Take 1 tablet by mouth 2 times daily 60 tablet 0    albuterol (PROVENTIL HFA;VENTOLIN HFA) 108 (90 BASE) MCG/ACT inhaler Inhale 2 puffs into the lungs every 6 hours as needed for Wheezing or Shortness of Breath       calcium carbonate (OYSTER SHELL CALCIUM 500 MG) 1250 MG tablet Take 2 tablets by mouth daily      Ascorbic Acid (VITAMIN C) 500 MG tablet Take 2,000 mg by mouth daily      omeprazole (PRILOSEC) 40 MG delayed release capsule Take 40 mg by mouth daily. diphenhydrAMINE (BENADRYL) 50 MG capsule Take 50 mg by mouth daily as needed for Allergies       Garlic 775 MG TABS Take 1,000 mg by mouth daily        No current facility-administered medications on file prior to encounter.        REVIEW OF SYSTEMS See HPI    Objective:    BP (!) 179/94   Pulse 74   Temp (!) 96.7 °F (35.9 °C) (Temporal)   Resp 18   Wt Readings from Last 3 Encounters:   02/22/23 293 lb (132.9 kg)   02/01/23 293 lb (132.9 kg)   01/25/23 295 lb (133.8 kg)     PHYSICAL EXAM  CONSTITUTIONAL:   Awake, alert, cooperative   EYES:  lids and lashes normal   ENT: external ears and nose without lesions   NECK:  supple, symmetrical, trachea midline   SKIN:  Open wound Present    Assessment:     Problem List Items Addressed This Visit          Other    * (Principal) Venous stasis ulcer of left calf with fat layer exposed without varicose veins (HCC) - Primary (Chronic)    Relevant Orders    Initiate Outpatient Wound Care Protocol       Pre Debridement Measurements:  Are located in the [place]  Documentation Flow Sheet  Post Debridement Measurements:  Wound/Ulcer Descriptions are Pre Debridement except measurements:     Incision 04/20/16 Leg Left (Active)   Number of days: 4336       Incision 08/03/16 Leg Left (Active)   Number of days: 6855       Wound 12/08/21 Pretibial Left #1 (Active)   Wound Image   02/15/23 1342   Dressing Status Clean;Dry; Intact 02/22/23 1510   Wound Cleansed Cleansed with saline 02/22/23 1510   Dressing/Treatment Alginate;Dry dressing 02/22/23 1510   Offloading for Diabetic Foot Ulcers Offloading not required 01/25/23 1623   Dressing Change Due 02/01/23 01/31/23 2254   Wound Length (cm) 6.5 cm 03/01/23 1409   Wound Width (cm) 4.4 cm 03/01/23 1409   Wound Depth (cm) 0.8 cm 03/01/23 1409   Wound Surface Area (cm^2) 28.6 cm^2 03/01/23 1409   Change in Wound Size % (l*w) -3985.71 03/01/23 1409   Wound Volume (cm^3) 22.88 cm^3 03/01/23 1409   Wound Healing % -23789 03/01/23 1409   Post-Procedure Length (cm) 6.3 cm 02/22/23 1451   Post-Procedure Width (cm) 5.1 cm 02/22/23 1451   Post-Procedure Depth (cm) 0.4 cm 02/22/23 1451   Post-Procedure Surface Area (cm^2) 32.13 cm^2 02/22/23 1451   Post-Procedure Volume (cm^3) 12.852 cm^3 02/22/23 1451   Wound Assessment Fibrin;Pink/red 02/22/23 1355   Drainage Amount Large 02/22/23 1355   Drainage Description Serosanguinous; Yellow 02/22/23 1355   Odor None 02/22/23 1355   Kori-wound Assessment Intact 02/22/23 1355   Margins Other (Comment) 01/31/23 0930   Number of days: 447       Wound 03/01/23 Leg Left;Lateral;Lower #2 (Active)   Wound Image   03/01/23 1409   Wound Length (cm) 4.4 cm 03/01/23 1409   Wound Width (cm) 1.3 cm 03/01/23 1409   Wound Depth (cm) 0.1 cm 03/01/23 1409   Wound Surface Area (cm^2) 5.72 cm^2 03/01/23 1409   Wound Volume (cm^3) 0.572 cm^3 03/01/23 1409   Wound Assessment Pink/red 03/01/23 1409   Drainage Amount Small 03/01/23 1409   Drainage Description Serosanguinous 03/01/23 1409   Odor None 03/01/23 1409   Kori-wound Assessment Intact 03/01/23 1409   Number of days: 0     Incision 04/16/21 Abdomen (Active)   Number of days: 622       Procedure Note  Indications:  Based on my examination of this patient's wound(s)/ulcer(s) today, debridement is required to promote healing and evaluate the wound base.     Performed by: MESSI Kam - CNP    Consent obtained:  Yes    Time out taken:  Yes    Pain Control: Anesthetic  Anesthetic: 4% Lidocaine Liquid Topical     Debridement:Excisional Debridement    Using curette the wound(s)/ulcer(s) was/were sharply debrided down through and including the removal of epidermis, dermis, and subcutaneous tissue. Devitalized Tissue Debrided:  fibrin, biofilm, slough, and exudate to stimulate bleeding to promote healing, post debridement good bleeding base and wound edges noted    Wound/Ulcer #: 1    Percent of Wound/Ulcer Debrided: 75%    Total Surface Area Debrided: 25 sq cm     Estimated Blood Loss:  Minimal  Hemostasis Achieved:  by pressure    Procedural Pain:  10  / 10   Post Procedural Pain:  9 / 10     Response to treatment:  With complaints of pain. Plan:   Treatment Note please see attached Discharge Instructions    Written patient dismissal instructions given to patient and signed by patient or POA. Discharge Instructions            Visit Discharge/Physician Orders     Discharge condition: Stable     Assessment of pain at discharge: mild     Anesthetic used: lido 4%     Discharge to: Home     Left via:Private automobile     Accompanied by: self     [facility]F/[facility]: [facility]     Dressing Orders:  LEFT PRETIB : cleanse with normal saline, apply calcium alginate ag, cover with dry dressing and secure . Change daily. Apply spandagrip. On in am and off in pm. Elevate legs as much as possible above level of the heart. Treatment Orders:Eat a diet high in protein and vitamin C. Take a multiple vitamin daily unless contraindicated. Elevate as much as possible     [address] [place] [address],[address] followup visit: 1 week____________________________  (Please note your next appointment above and if you are unable to keep, kindly give a 24 hour notice.  Thank you.)     Physician signature:__________________________      If you experience any of the following, please call the [address] [facility] [address] during business hours:     * Increase in Pain  * Temperature over 101  * Increase in drainage from your wound  * Drainage with a foul odor  * Bleeding  * Increase in swelling  * Need for compression bandage changes due to slippage, breakthrough drainage. If you need medical attention outside of the business hours of the [facility] [place] please contact your PCP or go to the nearest emergency room.                Electronically signed by MESSI Elam CNP

## 2023-03-03 ENCOUNTER — TELEPHONE (OUTPATIENT)
Dept: VASCULAR SURGERY | Age: 78
End: 2023-03-03

## 2023-03-07 NOTE — DISCHARGE INSTRUCTIONS
Visit Discharge/Physician Orders     Discharge condition: Stable     Assessment of pain at discharge: mild     Anesthetic used: lido 4%     Discharge to: Home     Left via:Private automobile     Accompanied by: self     ECF/HHA: [facility]     Dressing Orders:  LEFT PRETIB & LATERAL LEG: cleanse with normal saline, apply calcium alginate ag, cover with dry dressing and secure . Change daily.     Apply spandagrip. On in am and off in pm. Elevate legs as much as possible above level of the heart.      Treatment Orders:Eat a diet high in protein and vitamin C. Take a multiple vitamin daily unless contraindicated.     Elevate as much as possible    C&S taken 3/8/23     [facility] followup visit: 1 week____________________________  (Please note your next appointment above and if you are unable to keep, kindly give a 24 hour notice. Thank you.)     Physician signature:__________________________      If you experience any of the following, please call the Wound Care Center during business hours:     * Increase in Pain  * Temperature over 101  * Increase in drainage from your wound  * Drainage with a foul odor  * Bleeding  * Increase in swelling  * Need for compression bandage changes due to slippage, breakthrough drainage.     If you need medical attention outside of the business hours of the Wound Care Centers please contact your PCP or go to the nearest emergency room.

## 2023-03-08 ENCOUNTER — HOSPITAL ENCOUNTER (OUTPATIENT)
Dept: WOUND CARE | Age: 78
Discharge: HOME | End: 2023-03-08
Payer: MEDICARE

## 2023-03-08 VITALS
HEART RATE: 78 BPM | WEIGHT: 293 LBS | BODY MASS INDEX: 45.99 KG/M2 | TEMPERATURE: 96.5 F | SYSTOLIC BLOOD PRESSURE: 139 MMHG | DIASTOLIC BLOOD PRESSURE: 66 MMHG | HEIGHT: 67 IN

## 2023-03-08 DIAGNOSIS — L97.222 VENOUS STASIS ULCER OF LEFT CALF WITH FAT LAYER EXPOSED WITHOUT VARICOSE VEINS (HCC): Primary | ICD-10-CM

## 2023-03-08 DIAGNOSIS — I87.2 VENOUS STASIS ULCER OF LEFT CALF WITH FAT LAYER EXPOSED WITHOUT VARICOSE VEINS (HCC): Primary | ICD-10-CM

## 2023-03-08 PROCEDURE — 87075 CULTR BACTERIA EXCEPT BLOOD: CPT

## 2023-03-08 PROCEDURE — 87205 SMEAR GRAM STAIN: CPT

## 2023-03-08 PROCEDURE — 87186 SC STD MICRODIL/AGAR DIL: CPT

## 2023-03-08 PROCEDURE — 11042 DBRDMT SUBQ TIS 1ST 20SQCM/<: CPT

## 2023-03-08 PROCEDURE — 87070 CULTURE OTHR SPECIMN AEROBIC: CPT

## 2023-03-08 PROCEDURE — 87077 CULTURE AEROBIC IDENTIFY: CPT

## 2023-03-08 RX ORDER — GENTAMICIN SULFATE 1 MG/G
OINTMENT TOPICAL ONCE
OUTPATIENT
Start: 2023-03-08 | End: 2023-03-08

## 2023-03-08 RX ORDER — LIDOCAINE 40 MG/G
CREAM TOPICAL ONCE
OUTPATIENT
Start: 2023-03-08 | End: 2023-03-08

## 2023-03-08 RX ORDER — CLOBETASOL PROPIONATE 0.5 MG/G
OINTMENT TOPICAL ONCE
OUTPATIENT
Start: 2023-03-08 | End: 2023-03-08

## 2023-03-08 RX ORDER — LIDOCAINE 50 MG/G
OINTMENT TOPICAL ONCE
OUTPATIENT
Start: 2023-03-08 | End: 2023-03-08

## 2023-03-08 RX ORDER — LIDOCAINE HYDROCHLORIDE 40 MG/ML
SOLUTION TOPICAL ONCE
Status: COMPLETED | OUTPATIENT
Start: 2023-03-08 | End: 2023-03-08

## 2023-03-08 RX ORDER — BACITRACIN ZINC AND POLYMYXIN B SULFATE 500; 1000 [USP'U]/G; [USP'U]/G
OINTMENT TOPICAL ONCE
OUTPATIENT
Start: 2023-03-08 | End: 2023-03-08

## 2023-03-08 RX ORDER — LIDOCAINE HYDROCHLORIDE 20 MG/ML
JELLY TOPICAL ONCE
OUTPATIENT
Start: 2023-03-08 | End: 2023-03-08

## 2023-03-08 RX ORDER — GINSENG 100 MG
CAPSULE ORAL ONCE
OUTPATIENT
Start: 2023-03-08 | End: 2023-03-08

## 2023-03-08 RX ORDER — BETAMETHASONE DIPROPIONATE 0.05 %
OINTMENT (GRAM) TOPICAL ONCE
OUTPATIENT
Start: 2023-03-08 | End: 2023-03-08

## 2023-03-08 RX ORDER — LIDOCAINE HYDROCHLORIDE 40 MG/ML
SOLUTION TOPICAL ONCE
OUTPATIENT
Start: 2023-03-08 | End: 2023-03-08

## 2023-03-08 RX ORDER — BACITRACIN, NEOMYCIN, POLYMYXIN B 400; 3.5; 5 [USP'U]/G; MG/G; [USP'U]/G
OINTMENT TOPICAL ONCE
OUTPATIENT
Start: 2023-03-08 | End: 2023-03-08

## 2023-03-08 RX ADMIN — LIDOCAINE HYDROCHLORIDE 10 ML: 40 SOLUTION TOPICAL at 14:16

## 2023-03-08 ASSESSMENT — PAIN - FUNCTIONAL ASSESSMENT: PAIN_FUNCTIONAL_ASSESSMENT: PREVENTS OR INTERFERES SOME ACTIVE ACTIVITIES AND ADLS

## 2023-03-08 ASSESSMENT — PAIN DESCRIPTION - FREQUENCY: FREQUENCY: CONTINUOUS

## 2023-03-08 ASSESSMENT — PAIN DESCRIPTION - LOCATION: LOCATION: LEG

## 2023-03-08 ASSESSMENT — PAIN SCALES - GENERAL: PAINLEVEL_OUTOF10: 7

## 2023-03-08 ASSESSMENT — PAIN DESCRIPTION - DESCRIPTORS: DESCRIPTORS: ACHING

## 2023-03-08 ASSESSMENT — PAIN DESCRIPTION - ORIENTATION: ORIENTATION: LEFT

## 2023-03-08 ASSESSMENT — PAIN DESCRIPTION - ONSET: ONSET: ON-GOING

## 2023-03-08 ASSESSMENT — PAIN DESCRIPTION - PAIN TYPE: TYPE: CHRONIC PAIN

## 2023-03-09 NOTE — PROGRESS NOTES
[facility] Followup Visit Note    Referring Physician : Tylor Ba MD  [id number] [facility] RECORD NUMBER:  [id number]  AGE: 70 y.o. GENDER: female  : 1945  EPISODE DATE:  3/8/2023    Subjective:     Chief Complaint   Patient presents with    Wound Check     Left leg      HISTORY of PRESENT ILLNESS HPI   Gricel Rod is a 70 y.o. female who presents today in regards to follow up evaluation and treatment of wound/ulcer. That patient's past medical, family and social hx were reviewed and changes were made if present. History of Wound Context:  Patient has had left calf wound since ~ 2021. She has been putting a dressing on it. The wound has not been improving. She has a hx significant for venous stasis ulcerations of bilateral LE. She first was seen by myself in regards to these issues 2015. She eventually healed these wounds 2016. I last saw her 2021 at which time I recommended lymphedema therapy. She states she went and said it caused her to much pain and stopped going. She has chronic issues with bilateral calf pain, swelling and edema. She admits to not wearing her stockings because of the pain. She has used knee high 20-30 mm hg stockings in the past.       She is still seeing pain management and is down to percocet 7/5/325 mg daily.        [facility]  [facility]rip  Culture done  Emphasized importance of getting in to more significant compression in the future  12/15/21  Culture reviewed - light growth, no tx  Wound slightly improved  Still significant drainage  Plan on compression wrap next week  21  Stable wound  Drainage slightly better  Pt would like to wait till next week because of holidays to start wrap  22  Wound larger  Profore  22  Wound appearance better  Refusing wrap - it rolled down last week and she cut off after 3 days  22  Wound appearance better  Still refusing wrap   3/2/22  periwound worse  Culture  drawtek  3/9/22  periwound much improved  levaquin 750 mg daily #10 script given culture   Wound itself stable  3/23/22  Left anterior calf wound improving overall  New blister/ulcerations left 3rd, 4th and 5th toes and lateral foot   Instructed to keep toes/foot dry, no ointment or corn starch   3/30/22  bistering resolved, other skin issues resolved  More dry than previously but overall stable  kailyn Barrera  Swelling is down, patient declining wrap  4/13/22  Wound slightly improved  4/20/2022  Wound stable, patient refusing compression wrap  5/4/22  Wound worse  Pt refusing wrap  Will change diet per her report to decrease swelling  5/11/22  Wound stable  kailyn and giacomo 2 - pt agreeable now after significant persuasion  5/18/22  Took off wrap within 24 hours due to pain  aquacell and spandigrip  She agreed to use wrap again if swelling not down 2 more cm next week  Wound slightly smaller  5/25/22  Wound improved   Left leg wound debrided   Continue aquacel   6/8/22  Wound larger  Agreeable to wrap - kailyn sena ag  Culture done  6/22/22  Wound stable in size  6/29/22  Wound slightly larger  Still having issues with wrap falling down  Will start hhc - MWF wraps  7/6/22  Improved  hhc starts this week  7/20/22  Appearance better, wound measuring larger  Continue with wraps  Leg edema improved  7/27/22  Slight improvement  8/3/22  Wound appearance and size worse  Issues still with wraps falling down  When doesn't have wraps on than usually uses spandigrips  8/10/2022  Wound cultures noted, Bactrim DS 1 tablet p.o. twice daily, wound looks fairly clean with some exudate only, mild recent lab work, patient recommended CBC and CMP  8/17/22  Refusing wrap today due to pain associated with  Will discuss with Dr Radha Magana her pain management  Slightly larger, appearance improved  8/24/22  Poor tolerance of wraps  Wound stable in size  9/7/22  Not tolerating wraps - emphasized importance of use  Change to drawtek  Wound slightly improved in size, appearance still concerning  Pumps are going to be deliver  9/14/22  Still not tolerating wraps  Wound appearance improved, size slightly better  Pumps deliovered- she has yet to be in serviced on them   10/2-12/22  Admitted with cellultis  10/19/22  Currently at [facility]  Wound much improved  Posterior wound healed  Tyson jones  [address]  She will follow at [facility] while admitted  11/9/22  She is now home  She is no longer with pain management - there was a disagreement as she states she was taking more because of more pain  [facility] - I left my phone number for them to call me re this pt  Wound has improved  Oarrs reviewed  Percocet 7.5/325 mg #60 (30 days), Ms Contin 15 mg #60 (30 days)  11/16/22  Wound improved but superficial areas medially and laterally cause measurements to be larger  11/30/22  Smaller  More superficial  12/7/22  Improved  Oarrs reviewed  Percocet 7.5/325 mg #60 (30 days), Ms Contin 15 mg #60 (30 days)  Emphasized importance of pain management  12/21/22  Slightly larger  Oarrs reviewed  Gabapentin 300 mg tid  1/4/23  Larger - new wound - so blocked  Some new skin is present  Pt will be calling to remind to get new scripts for pain med - she has still not established with pain management  1/11/23  Stable in size  Will culture - possible advance skin therapy  1/18/23  Stable, appearanc better  Cx - S Aureus, Pseudomonas, Acinetobacter - disc with DR Hoa Engle - will tx with levaquin and bactrim  If not improving after will have pt see him in office re possible IV abx  1/25/23  On abx  Wound larger, appearance worse but pain improved  1/26-2/1/23  Admitted after a fall  She than was admitted to [facility] for 2 weeks  2/15/23  Slightly worse, deeper  Oarrs reviewed  MS contin 15 mg q12 #60, Perocet 7.5/325 mg q12 #60  2/22/23  Measuring larger, appearance improved  Size 31 sq cm   3/1/23  Size 28 sq cm  Improving  New wound to left lateral leg  3/8/23  Size 33 sq cm , worse  Culture done    \Wound/Ulcer Pain Timing/Severity: waxing and waning, mild  Quality of pain: aching, throbbing, pressure  Severity:  5 / 10   Modifying Factors: Pain worsens with debridement, dressing changes  Associated Signs/Symptoms: edema, drainage and pain    Ulcer Identification:  Ulcer Type: venous and lymphedema  Contributing Factors: edema, venous stasis and lymphedema    Diabetic/Pressure/Non Pressure Ulcers only:  Ulcer: Non-Pressure ulcer, fat layer exposed    Wound: N/A  PAST MEDICAL HISTORY      Diagnosis Date    Bursitis     CAD (coronary artery disease) 1993    heart attack    Cellulitis of leg, left 10/3/2022    COPD (chronic obstructive pulmonary disease) ([facility]) 6/19/2013    Dermatophytosis 1/27/2023    Emphysema     slight    GERD (gastroesophageal reflux disease)     Hiatal hernia     Hip pain     Hyperlipidemia     Hypertension     Lymphedema of both lower extremities 11/21/2018    Venous insufficiency of both lower extremities 11/21/2018    Venous stasis ulcer of left calf with fat layer exposed without varicose veins ([facility]) 12/8/2021    Venous stasis ulcer of left calf with fat layer exposed without varicose veins ([facility]) 12/8/2021    Longstanding ulcer over the shin of the left calf, with a new ulcer over the posterior aspect of the left calf, for the last 1 week    Venous ulcer with fat layer exposed ([facility]) 10/28/2015     Past Surgical History:   Procedure Laterality Date    APPENDECTOMY      BREAST ENHANCEMENT SURGERY      BREAST REDUCTION SURGERY      CHOLECYSTECTOMY      COLONOSCOPY      ECHO COMPL W DOP COLOR FLOW  3/11/2013         ENDOSCOPY, COLON, DIAGNOSTIC      HERNIA REPAIR N/A 4/16/2021    LAPAROSCOPIC ROBOTIC ASSISTED INGUINAL HERNIA REPAIR performed by Gregory Tao MD at [facility] OR    HERNIA REPAIR      HYSTERECTOMY (CERVIX STATUS UNKNOWN)      JOINT REPLACEMENT  2009 2011    l knee r hip    LEG DEBRIDEMENT  Left 2015    LEG DEBRIDEMENT Left 2016     History reviewed. No pertinent family history. Social History     Tobacco Use    Smoking status: Former     Packs/day: 1.00     Years: 20.00     Pack years: 20.00     Types: Cigarettes     Quit date: 1995     Years since quittin.3    Smokeless tobacco: Never    Tobacco comments:     Quit   Vaping Use    Vaping Use: Never used   Substance Use Topics    Alcohol use: No    Drug use: No     Allergies   Allergen Reactions    Codeine Nausea And Vomiting and Other (See Comments)     hallucinate    Dilaudid [Hydromorphone Hcl] Rash    Naproxen Other (See Comments)     Shuts down kidney function    Singulair [Montelukast Sodium] Shortness Of Breath     Mucus in chest    Black Cohosh     Black Cohosh [Cimicifuga [place] (Black Cohosh)] Rash     Current Outpatient Medications on File Prior to Encounter   Medication Sig Dispense Refill    morphine (MS CONTIN) 15 MG extended release tablet Take 1 tablet by mouth 2 times daily for 30 days. Max Daily Amount: 30 mg 60 tablet 0    oxyCODONE-acetaminophen (PERCOCET) 7.5-325 MG per tablet Take 1 tablet by mouth 2 times daily for 30 days. Intended supply: 30 days Max Daily Amount: 2 tablets 60 tablet 0    hydroCHLOROthiazide (HYDRODIURIL) 25 MG tablet Take 1 tablet by mouth daily      Cyanocobalamin (VITAMIN B 12) 500 MCG TABS Take by mouth daily      Multiple Vitamins-Minerals (THERAPEUTIC MULTIVITAMIN-MINERALS) tablet Take 1 tablet by mouth daily      zinc gluconate 50 MG tablet Take 50 mg by mouth daily      GENISTEIN PO Take 125 mg by mouth nightly      folic acid (FOLVITE) 913 MCG tablet Take 400 mcg by mouth nightly      Krill Oil 350 MG CAPS Take 350 mg by mouth nightly      gabapentin (NEURONTIN) 300 MG capsule Take 300 mg by mouth 3 times daily.       simvastatin (ZOCOR) 40 MG tablet Take 40 mg by mouth nightly      aspirin 81 MG tablet Take 81 mg by mouth daily      Cholecalciferol (VITAMIN D) 2000 UNITS CAPS capsule Take 2,000 Units by mouth daily       ferrous sulfate 325 (65 FE) MG tablet Take 325 mg by mouth nightly       metoprolol (LOPRESSOR) 50 MG tablet Take 1 tablet by mouth 2 times daily 60 tablet 0    albuterol (PROVENTIL HFA;VENTOLIN HFA) 108 (90 BASE) MCG/ACT inhaler Inhale 2 puffs into the lungs every 6 hours as needed for Wheezing or Shortness of Breath       calcium carbonate (OYSTER SHELL CALCIUM 500 MG) 1250 MG tablet Take 2 tablets by mouth daily      Ascorbic Acid (VITAMIN C) 500 MG tablet Take 2,000 mg by mouth daily      omeprazole (PRILOSEC) 40 MG delayed release capsule Take 40 mg by mouth daily. diphenhydrAMINE (BENADRYL) 50 MG capsule Take 50 mg by mouth daily as needed for Allergies       Garlic 056 MG TABS Take 1,000 mg by mouth daily        No current facility-administered medications on file prior to encounter.        REVIEW OF SYSTEMS See HPI    Objective:    /66   Pulse 78   Temp (!) 96.5 °F (35.8 °C) (Tympanic)   Ht 5' 7\" (1.702 m)   Wt 293 lb (132.9 kg)   BMI 45.89 kg/m²   Wt Readings from Last 3 Encounters:   03/08/23 293 lb (132.9 kg)   02/22/23 293 lb (132.9 kg)   02/01/23 293 lb (132.9 kg)     PHYSICAL EXAM  CONSTITUTIONAL:   Awake, alert, cooperative   EYES:  lids and lashes normal   ENT: external ears and nose without lesions   NECK:  supple, symmetrical, trachea midline   SKIN:  Open wound Present    Assessment:     Problem List Items Addressed This Visit       Venous stasis ulcer of left calf with fat layer exposed without varicose veins ([facility] [address] - Primary (Chronic)    Relevant Orders    Initiate Outpatient Wound Care Protocol       Pre Debridement Measurements:  Are located in the [place]  Documentation Flow Sheet  Post Debridement Measurements:  Wound/Ulcer Descriptions are Pre Debridement except measurements:     Incision 04/20/16 Leg Left (Active)   Number of days: 1444       Incision 08/03/16 Leg Left (Active)   Number of days: 2408       Wound 12/08/21 Pretibial Left #1 (Active)   Wound Image   02/15/23 1342   Dressing Status New dressing applied 03/08/23 1620   Wound Cleansed Cleansed with saline 03/08/23 1620   Dressing/Treatment Alginate;Dry dressing 03/08/23 1620   Offloading for Diabetic Foot Ulcers Offloading not required 03/08/23 1620   Wound Length (cm) 7.1 cm 03/08/23 1414   Wound Width (cm) 4.7 cm 03/08/23 1414   Wound Depth (cm) 0.3 cm 03/08/23 1414   Wound Surface Area (cm^2) 33.37 cm^2 03/08/23 1414   Change in Wound Size % (l*w) -4667.14 03/08/23 1414   Wound Volume (cm^3) 10.011 cm^3 03/08/23 1414   Wound Healing % -81594 03/08/23 1414   Post-Procedure Length (cm) 7.1 cm 03/08/23 1458   Post-Procedure Width (cm) 4.7 cm 03/08/23 1458   Post-Procedure Depth (cm) 0.4 cm 03/08/23 1458   Post-Procedure Surface Area (cm^2) 33.37 cm^2 03/08/23 1458   Post-Procedure Volume (cm^3) 13.348 cm^3 03/08/23 1458   Wound Assessment Fibrin;Pink/red 03/08/23 1414   Drainage Amount Large 03/08/23 1414   Drainage Description Serosanguinous; Yellow 03/08/23 1414   Odor None 03/08/23 1414   Kori-wound Assessment Intact 03/08/23 1414   Number of days: 455       Wound 03/01/23 Leg Left;Lateral;Lower #2 (Active)   Wound Image   03/01/23 1409   Wound Etiology Venous 03/08/23 1620   Dressing Status New dressing applied 03/08/23 1620   Wound Cleansed Cleansed with saline 03/08/23 1620   Dressing/Treatment Alginate;Dry dressing 03/08/23 1620   Wound Length (cm) 1.8 cm 03/08/23 1414   Wound Width (cm) 0.3 cm 03/08/23 1414   Wound Depth (cm) 0.2 cm 03/08/23 1414   Wound Surface Area (cm^2) 0.54 cm^2 03/08/23 1414   Change in Wound Size % (l*w) 90.56 03/08/23 1414   Wound Volume (cm^3) 0.108 cm^3 03/08/23 1414   Wound Healing % 81 03/08/23 1414   Post-Procedure Length (cm) 1.9 cm 03/08/23 1458   Post-Procedure Width (cm) 0.3 cm 03/08/23 1458   Post-Procedure Depth (cm) 0.3 cm 03/08/23 1458   Post-Procedure Surface Area (cm^2) 0.57 cm^2 03/08/23 1458   Post-Procedure Volume (cm^3) 0.171 cm^3 03/08/23 1458   Wound Assessment Pink/red 03/08/23 1414   Drainage Amount Small 03/08/23 1414   Drainage Description Serosanguinous 03/08/23 1414   Odor None 03/08/23 1414   Kori-wound Assessment Intact 03/08/23 1414   Number of days: 7     Incision 04/16/21 Abdomen (Active)   Number of days: 691       Procedure Note  Indications:  Based on my examination of this patient's wound(s)/ulcer(s) today, debridement is required to promote healing and evaluate the wound base. Performed by: Dmitry Hudson MD    Consent obtained:  Yes    Time out taken:  Yes    Pain Control: Anesthetic  Anesthetic: 4% Lidocaine Liquid Topical     Debridement:Excisional Debridement    Using curette the wound(s)/ulcer(s) was/were sharply debrided down through and including the removal of epidermis, dermis, and subcutaneous tissue. Devitalized Tissue Debrided:  fibrin, biofilm, slough, and exudate to stimulate bleeding to promote healing, post debridement good bleeding base and wound edges noted    Wound/Ulcer #: 1    Percent of Wound/Ulcer Debrided: 100%    Total Surface Area Debrided: 33 sq cm     Estimated Blood Loss:  Minimal  Hemostasis Achieved:  by pressure    Procedural Pain:  10  / 10   Post Procedural Pain:  9 / 10     Response to treatment:  With complaints of pain. Plan:   Treatment Note please see attached Discharge Instructions    Written patient dismissal instructions given to patient and signed by patient or POA. Discharge Instructions         Visit Discharge/Physician Orders     Discharge condition: Stable     Assessment of pain at discharge: mild     Anesthetic used: lido 4%     Discharge to: Home     Left via:Private automobile     Accompanied by: self     ECF/HHA: [facility]     Dressing Orders:  LEFT PRETIB & LATERAL LEG: cleanse with normal saline, apply calcium alginate ag, cover with dry dressing and secure . Change daily. Apply spandagrip.  On in am and off in pm. Elevate legs as much as possible above level of the heart. Treatment Orders:Eat a diet high in protein and vitamin C. Take a multiple vitamin daily unless contraindicated. Elevate as much as possible    C&S taken 3/8/23     [address] [place] [address],[address] followup visit: 1 week____________________________  (Please note your next appointment above and if you are unable to keep, kindly give a 24 hour notice. Thank you.)     Physician signature:__________________________      If you experience any of the following, please call the [facility] during business hours:     * Increase in Pain  * Temperature over 101  * Increase in drainage from your wound  * Drainage with a foul odor  * Bleeding  * Increase in swelling  * Need for compression bandage changes due to slippage, breakthrough drainage. If you need medical attention outside of the business hours of the [facility] please contact your PCP or go to the nearest emergency room.          Electronically signed by Ciaran Miranda MD

## 2023-03-11 LAB — ANAEROBIC CULTURE: NORMAL

## 2023-03-12 LAB
GRAM STAIN RESULT: ABNORMAL
ORGANISM: ABNORMAL
WOUND/ABSCESS: ABNORMAL

## 2023-03-15 ENCOUNTER — HOSPITAL ENCOUNTER (OUTPATIENT)
Dept: WOUND CARE | Age: 78
Discharge: HOME | End: 2023-03-15
Payer: MEDICARE

## 2023-03-15 VITALS
HEART RATE: 72 BPM | TEMPERATURE: 97.2 F | HEIGHT: 67 IN | WEIGHT: 293 LBS | DIASTOLIC BLOOD PRESSURE: 66 MMHG | BODY MASS INDEX: 45.99 KG/M2 | RESPIRATION RATE: 18 BRPM | SYSTOLIC BLOOD PRESSURE: 124 MMHG

## 2023-03-15 DIAGNOSIS — L97.222 VENOUS STASIS ULCER OF LEFT CALF WITH FAT LAYER EXPOSED WITHOUT VARICOSE VEINS (HCC): Primary | ICD-10-CM

## 2023-03-15 DIAGNOSIS — I87.2 VENOUS INSUFFICIENCY OF BOTH LOWER EXTREMITIES: Chronic | ICD-10-CM

## 2023-03-15 DIAGNOSIS — I87.2 VENOUS STASIS ULCER OF LEFT CALF WITH FAT LAYER EXPOSED WITHOUT VARICOSE VEINS (HCC): Primary | ICD-10-CM

## 2023-03-15 PROCEDURE — 11042 DBRDMT SUBQ TIS 1ST 20SQCM/<: CPT

## 2023-03-15 RX ORDER — CLOBETASOL PROPIONATE 0.5 MG/G
OINTMENT TOPICAL ONCE
OUTPATIENT
Start: 2023-03-15 | End: 2023-03-15

## 2023-03-15 RX ORDER — LIDOCAINE HYDROCHLORIDE 20 MG/ML
JELLY TOPICAL ONCE
OUTPATIENT
Start: 2023-03-15 | End: 2023-03-15

## 2023-03-15 RX ORDER — BETAMETHASONE DIPROPIONATE 0.05 %
OINTMENT (GRAM) TOPICAL ONCE
OUTPATIENT
Start: 2023-03-15 | End: 2023-03-15

## 2023-03-15 RX ORDER — NALOXONE HYDROCHLORIDE 4 MG/.1ML
1 SPRAY NASAL PRN
Qty: 1 EACH | Refills: 5 | Status: SHIPPED | OUTPATIENT
Start: 2023-03-15

## 2023-03-15 RX ORDER — LIDOCAINE HYDROCHLORIDE 40 MG/ML
SOLUTION TOPICAL ONCE
OUTPATIENT
Start: 2023-03-15 | End: 2023-03-15

## 2023-03-15 RX ORDER — LIDOCAINE 50 MG/G
OINTMENT TOPICAL ONCE
OUTPATIENT
Start: 2023-03-15 | End: 2023-03-15

## 2023-03-15 RX ORDER — GENTAMICIN SULFATE 1 MG/G
OINTMENT TOPICAL ONCE
OUTPATIENT
Start: 2023-03-15 | End: 2023-03-15

## 2023-03-15 RX ORDER — GINSENG 100 MG
CAPSULE ORAL ONCE
OUTPATIENT
Start: 2023-03-15 | End: 2023-03-15

## 2023-03-15 RX ORDER — LIDOCAINE HYDROCHLORIDE 40 MG/ML
SOLUTION TOPICAL ONCE
Status: COMPLETED | OUTPATIENT
Start: 2023-03-15 | End: 2023-03-15

## 2023-03-15 RX ORDER — LIDOCAINE 40 MG/G
CREAM TOPICAL ONCE
OUTPATIENT
Start: 2023-03-15 | End: 2023-03-15

## 2023-03-15 RX ORDER — BACITRACIN ZINC AND POLYMYXIN B SULFATE 500; 1000 [USP'U]/G; [USP'U]/G
OINTMENT TOPICAL ONCE
OUTPATIENT
Start: 2023-03-15 | End: 2023-03-15

## 2023-03-15 RX ORDER — OXYCODONE AND ACETAMINOPHEN 7.5; 325 MG/1; MG/1
1 TABLET ORAL EVERY 12 HOURS PRN
Qty: 60 TABLET | Refills: 0 | Status: SHIPPED | OUTPATIENT
Start: 2023-03-15 | End: 2023-04-14

## 2023-03-15 RX ORDER — MORPHINE SULFATE 15 MG/1
15 TABLET, FILM COATED, EXTENDED RELEASE ORAL 2 TIMES DAILY
Qty: 60 TABLET | Refills: 0 | Status: SHIPPED | OUTPATIENT
Start: 2023-03-15 | End: 2023-04-14

## 2023-03-15 RX ORDER — BACITRACIN, NEOMYCIN, POLYMYXIN B 400; 3.5; 5 [USP'U]/G; MG/G; [USP'U]/G
OINTMENT TOPICAL ONCE
OUTPATIENT
Start: 2023-03-15 | End: 2023-03-15

## 2023-03-15 RX ADMIN — LIDOCAINE HYDROCHLORIDE 10 ML: 40 SOLUTION TOPICAL at 15:15

## 2023-03-15 ASSESSMENT — PAIN DESCRIPTION - DESCRIPTORS: DESCRIPTORS: ACHING

## 2023-03-15 ASSESSMENT — PAIN DESCRIPTION - FREQUENCY: FREQUENCY: INTERMITTENT

## 2023-03-15 ASSESSMENT — PAIN - FUNCTIONAL ASSESSMENT: PAIN_FUNCTIONAL_ASSESSMENT: PREVENTS OR INTERFERES SOME ACTIVE ACTIVITIES AND ADLS

## 2023-03-15 ASSESSMENT — PAIN DESCRIPTION - LOCATION: LOCATION: LEG

## 2023-03-15 ASSESSMENT — PAIN DESCRIPTION - ONSET: ONSET: ON-GOING

## 2023-03-15 ASSESSMENT — PAIN DESCRIPTION - ORIENTATION: ORIENTATION: LEFT

## 2023-03-15 ASSESSMENT — PAIN DESCRIPTION - PAIN TYPE: TYPE: CHRONIC PAIN

## 2023-03-15 ASSESSMENT — PAIN SCALES - GENERAL: PAINLEVEL_OUTOF10: 7

## 2023-03-15 NOTE — DISCHARGE INSTRUCTIONS
Visit Discharge/Physician Orders     Discharge condition: Stable     Assessment of pain at discharge: mild     Anesthetic used: lido 4%     Discharge to: Home     Left via:Private automobile     Accompanied by: self     ECF/HHA: [facility]     Dressing Orders:  LEFT PRETIB & LATERAL LEG: cleanse with normal saline, apply calcium alginate ag, cover with dry dressing and secure . Change daily. Apply spandagrip. On in am and off in pm. Elevate legs as much as possible above level of the heart. Treatment Orders:Eat a diet high in protein and vitamin C. Take a multiple vitamin daily unless contraindicated. Elevate as much as possible     Follow with Infectious disease for IV antibiotics     [address] [place] [address],[address] followup visit: 1 week____________________________  (Please note your next appointment above and if you are unable to keep, kindly give a 24 hour notice. Thank you.)     Physician signature:__________________________      If you experience any of the following, please call the [facility] during business hours:     * Increase in Pain  * Temperature over 101  * Increase in drainage from your wound  * Drainage with a foul odor  * Bleeding  * Increase in swelling  * Need for compression bandage changes due to slippage, breakthrough drainage. If you need medical attention outside of the business hours of the [facility] please contact your PCP or go to the nearest emergency room.

## 2023-03-15 NOTE — PROGRESS NOTES
[facility] Followup Visit Note    Referring Physician : Yashira Byrd MD  [id number] [facility] RECORD NUMBER:  [id number]  AGE: 70 y.o. GENDER: female  : 1945  EPISODE DATE:  3/15/2023    Subjective:     Chief Complaint   Patient presents with    Wound Check     Left leg      HISTORY of PRESENT ILLNESS HPI   Polina Dawson is a 70 y.o. female who presents today in regards to follow up evaluation and treatment of wound/ulcer. That patient's past medical, family and social hx were reviewed and changes were made if present. History of Wound Context:  Patient has had left calf wound since ~ 2021. She has been putting a dressing on it. The wound has not been improving. She has a hx significant for venous stasis ulcerations of bilateral LE. She first was seen by myself in regards to these issues 2015. She eventually healed these wounds 2016. I last saw her 2021 at which time I recommended lymphedema therapy. She states she went and said it caused her to much pain and stopped going. She has chronic issues with bilateral calf pain, swelling and edema. She admits to not wearing her stockings because of the pain. She has used knee high 20-30 mm hg stockings in the past.       She is still seeing pain management and is down to percocet 7/5/325 mg daily.        21  aquacell  Double tubigrip  Culture done  Emphasized importance of getting in to more significant compression in the future  12/15/21  Culture reviewed - light growth, no tx  Wound slightly improved  Still significant drainage  Plan on compression wrap next week  21  Stable wound  Drainage slightly better  Pt would like to wait till next week because of holidays to start wrap  22  Wound larger  Profore  22  Wound appearance better  Refusing wrap - it rolled down last week and she cut off after 3 days  22  Wound appearance better  Still refusing wrap   3/2/22  periwound worse  Culture  drawtek  3/9/22  periwound much improved  levaquin 750 mg daily #10 script given culture   Wound itself stable  3/23/22  Left anterior calf wound improving overall  New blister/ulcerations left 3rd, 4th and 5th toes and lateral foot   Instructed to keep toes/foot dry, no ointment or corn starch   3/30/22  bistering resolved, other skin issues resolved  More dry than previously but overall stable  kailyn Barrera  Swelling is down, patient declining wrap  4/13/22  Wound slightly improved  4/20/2022  Wound stable, patient refusing compression wrap  5/4/22  Wound worse  Pt refusing wrap  Will change diet per her report to decrease swelling  5/11/22  Wound stable  kailyn and giacomo 2 - pt agreeable now after significant persuasion  5/18/22  Took off wrap within 24 hours due to pain  aquacell and spandigrip  She agreed to use wrap again if swelling not down 2 more cm next week  Wound slightly smaller  5/25/22  Wound improved   Left leg wound debrided   Continue aquacel   6/8/22  Wound larger  Agreeable to wrap - kailyn sena ag  Culture done  6/22/22  Wound stable in size  6/29/22  Wound slightly larger  Still having issues with wrap falling down  Will start hhc - MWF wraps  7/6/22  Improved  hhc starts this week  7/20/22  Appearance better, wound measuring larger  Continue with wraps  Leg edema improved  7/27/22  Slight improvement  8/3/22  Wound appearance and size worse  Issues still with wraps falling down  When doesn't have wraps on than usually uses spandigrips  8/10/2022  Wound cultures noted, Bactrim DS 1 tablet p.o. twice daily, wound looks fairly clean with some exudate only, mild recent lab work, patient recommended CBC and CMP  8/17/22  Refusing wrap today due to pain associated with  Will discuss with Dr Oliva Berrios her pain management  Slightly larger, appearance improved  8/24/22  Poor tolerance of wraps  Wound stable in size  9/7/22  Not tolerating wraps - emphasized importance of use  Change to drawtek  Wound slightly improved in size, appearance still concerning  Pumps are going to be deliver  9/14/22  Still not tolerating wraps  Wound appearance improved, size slightly better  Pumps deliovered- she has yet to be in serviced on them   10/2-12/22  Admitted with cellultis  10/19/22  Currently at [facility]  Wound much improved  Posterior wound healed  Tyson jones  [address]  She will follow at [facility] while admitted  11/9/22  She is now home  She is no longer with pain management - there was a disagreement as she states she was taking more because of more pain  [facility] - I left my phone number for them to call me re this pt  Wound has improved  Oarrs reviewed  Percocet 7.5/325 mg #60 (30 days), Ms Contin 15 mg #60 (30 days)  11/16/22  Wound improved but superficial areas medially and laterally cause measurements to be larger  11/30/22  Smaller  More superficial  12/7/22  Improved  Oarrs reviewed  Percocet 7.5/325 mg #60 (30 days), Ms Contin 15 mg #60 (30 days)  Emphasized importance of pain management  12/21/22  Slightly larger  Oarrs reviewed  Gabapentin 300 mg tid  1/4/23  Larger - new wound - so blocked  Some new skin is present  Pt will be calling to remind to get new scripts for pain med - she has still not established with pain management  1/11/23  Stable in size  Will culture - possible advance skin therapy  1/18/23  Stable, appearanc better  Cx - S Aureus, Pseudomonas, Acinetobacter - disc with DR Shyla Mayberry - will tx with levaquin and bactrim  If not improving after will have pt see him in office re possible IV abx  1/25/23  On abx  Wound larger, appearance worse but pain improved  1/26-2/1/23  Admitted after a fall  She than was admitted to [facility] for 2 weeks  2/15/23  Slightly worse, deeper  Oarrs reviewed  MS contin 15 mg q12 #60, Perocet 7.5/325 mg q12 #60  2/22/23  Measuring larger, appearance improved  Size 31 sq cm   3/1/23  Size 28 sq cm  Improving  New wound to left lateral leg  3/8/23  Size 33 sq cm , worse  Culture done  3/15/23  Size slightly smaller 31 sq cm but wound not much improved  Acinetobacter, MRSA, pseudomonas - disc with Dr. Claudia Tran - who will arrange for iv abx  Oarrs reviewed  MS contin 15 mg q12 #60, Perocet 7.5/325 mg q12 #60    \Wound/Ulcer Pain Timing/Severity: waxing and waning, mild  Quality of pain: aching, throbbing, pressure  Severity:  5 / 10   Modifying Factors: Pain worsens with debridement, dressing changes  Associated Signs/Symptoms: edema, drainage and pain    Ulcer Identification:  Ulcer Type: venous and lymphedema  Contributing Factors: edema, venous stasis and lymphedema    Diabetic/Pressure/Non Pressure Ulcers only:  Ulcer: Non-Pressure ulcer, fat layer exposed    Wound: N/A  PAST MEDICAL HISTORY      Diagnosis Date    Bursitis     CAD (coronary artery disease) 1993    heart attack    Cellulitis of leg, left 10/3/2022    COPD (chronic obstructive pulmonary disease) ([address] 6/19/2013    Dermatophytosis 1/27/2023    Emphysema     slight    GERD (gastroesophageal reflux disease)     Hiatal hernia     Hip pain     Hyperlipidemia     Hypertension     Lymphedema of both lower extremities 11/21/2018    Venous insufficiency of both lower extremities 11/21/2018    Venous stasis ulcer of left calf with fat layer exposed without varicose veins ([address] 12/8/2021    Venous stasis ulcer of left calf with fat layer exposed without varicose veins ([address] 12/8/2021    Longstanding ulcer over the shin of the left calf, with a new ulcer over the posterior aspect of the left calf, for the last 1 week    Venous ulcer with fat layer exposed ([address] 10/28/2015     Past Surgical History:   Procedure Laterality Date    APPENDECTOMY      BREAST ENHANCEMENT SURGERY      BREAST REDUCTION SURGERY      CHOLECYSTECTOMY      COLONOSCOPY      ECHO COMPL W DOP COLOR FLOW  3/11/2013         ENDOSCOPY, COLON, DIAGNOSTIC      HERNIA REPAIR N/A 4/16/2021 LAPAROSCOPIC ROBOTIC ASSISTED INGUINAL HERNIA REPAIR performed by Alexia Carbajal MD at [address] [facility] ([address] [facility])      JOINT REPLACEMENT  2009    l knee r hip    LEG DEBRIDEMENT Left 2015    LEG DEBRIDEMENT Left 2016     History reviewed. No pertinent family history. Social History     Tobacco Use    Smoking status: Former     Packs/day: 1.00     Years: 20.00     Pack years: 20.00     Types: Cigarettes     Quit date: 1995     Years since quittin.3    Smokeless tobacco: Never    Tobacco comments:     Quit   Vaping Use    Vaping Use: Never used   Substance Use Topics    Alcohol use: No    Drug use: No     Allergies   Allergen Reactions    Codeine Nausea And Vomiting and Other (See Comments)     hallucinate    Dilaudid [Hydromorphone Hcl] Rash    Naproxen Other (See Comments)     Shuts down kidney function    Singulair [Montelukast Sodium] Shortness Of Breath     Mucus in chest    Black Cohosh     Black Cohosh [Cimicifuga [place] (Black Cohosh)] Rash     Current Outpatient Medications on File Prior to Encounter   Medication Sig Dispense Refill    morphine (MS CONTIN) 15 MG extended release tablet Take 1 tablet by mouth 2 times daily for 30 days. Max Daily Amount: 30 mg 60 tablet 0    oxyCODONE-acetaminophen (PERCOCET) 7.5-325 MG per tablet Take 1 tablet by mouth 2 times daily for 30 days.  Intended supply: 30 days Max Daily Amount: 2 tablets 60 tablet 0    hydroCHLOROthiazide (HYDRODIURIL) 25 MG tablet Take 1 tablet by mouth daily      Cyanocobalamin (VITAMIN B 12) 500 MCG TABS Take by mouth daily      Multiple Vitamins-Minerals (THERAPEUTIC MULTIVITAMIN-MINERALS) tablet Take 1 tablet by mouth daily      zinc gluconate 50 MG tablet Take 50 mg by mouth daily      GENISTEIN PO Take 125 mg by mouth nightly      folic acid (FOLVITE) 341 MCG tablet Take 400 mcg by mouth nightly      Krill Oil 350 MG CAPS Take 350 mg by mouth nightly      gabapentin (NEURONTIN) 300 MG capsule Take 300 mg by mouth 3 times daily. simvastatin (ZOCOR) 40 MG tablet Take 40 mg by mouth nightly      aspirin 81 MG tablet Take 81 mg by mouth daily      Cholecalciferol (VITAMIN D) 2000 UNITS CAPS capsule Take 2,000 Units by mouth daily       ferrous sulfate 325 (65 FE) MG tablet Take 325 mg by mouth nightly       metoprolol (LOPRESSOR) 50 MG tablet Take 1 tablet by mouth 2 times daily 60 tablet 0    albuterol (PROVENTIL HFA;VENTOLIN HFA) 108 (90 BASE) MCG/ACT inhaler Inhale 2 puffs into the lungs every 6 hours as needed for Wheezing or Shortness of Breath       calcium carbonate (OYSTER SHELL CALCIUM 500 MG) 1250 MG tablet Take 2 tablets by mouth daily      Ascorbic Acid (VITAMIN C) 500 MG tablet Take 2,000 mg by mouth daily      omeprazole (PRILOSEC) 40 MG delayed release capsule Take 40 mg by mouth daily. diphenhydrAMINE (BENADRYL) 50 MG capsule Take 50 mg by mouth daily as needed for Allergies       Garlic 375 MG TABS Take 1,000 mg by mouth daily        No current facility-administered medications on file prior to encounter.        REVIEW OF SYSTEMS See HPI    Objective:    /66   Pulse 72   Temp 97.2 °F (36.2 °C) (Tympanic)   Resp 18   Ht 5' 7\" (1.702 m)   Wt 293 lb (132.9 kg)   BMI 45.89 kg/m²   Wt Readings from Last 3 Encounters:   03/15/23 293 lb (132.9 kg)   03/08/23 293 lb (132.9 kg)   02/22/23 293 lb (132.9 kg)     PHYSICAL EXAM  CONSTITUTIONAL:   Awake, alert, cooperative   EYES:  lids and lashes normal   ENT: external ears and nose without lesions   NECK:  supple, symmetrical, trachea midline   SKIN:  Open wound Present    Assessment:     Problem List Items Addressed This Visit       Venous insufficiency of both lower extremities (Chronic)    Venous stasis ulcer of left calf with fat layer exposed without varicose veins (HCC) - Primary (Chronic)    Relevant Orders    Initiate Outpatient Wound Care Protocol       Pre Debridement Measurements:  Are located in the [place]  Documentation Flow Sheet  Post Debridement Measurements:  Wound/Ulcer Descriptions are Pre Debridement except measurements:     Incision 04/20/16 Leg Left (Active)   Number of days: 2503       Incision 08/03/16 Leg Left (Active)   Number of days: 7955       Wound 12/08/21 Pretibial Left #1 (Active)   Wound Image   02/15/23 1342   Dressing Status New dressing applied 03/08/23 1620   Wound Cleansed Cleansed with saline 03/08/23 1620   Dressing/Treatment Alginate;Dry dressing 03/08/23 1620   Offloading for Diabetic Foot Ulcers Offloading not required 03/08/23 1620   Wound Length (cm) 6.8 cm 03/15/23 1512   Wound Width (cm) 4.7 cm 03/15/23 1512   Wound Depth (cm) 0.5 cm 03/15/23 1512   Wound Surface Area (cm^2) 31.96 cm^2 03/15/23 1512   Change in Wound Size % (l*w) -4465.71 03/15/23 1512   Wound Volume (cm^3) 15.98 cm^3 03/15/23 1512   Wound Healing % -16950 03/15/23 1512   Post-Procedure Length (cm) 7.1 cm 03/08/23 1458   Post-Procedure Width (cm) 4.7 cm 03/08/23 1458   Post-Procedure Depth (cm) 0.4 cm 03/08/23 1458   Post-Procedure Surface Area (cm^2) 33.37 cm^2 03/08/23 1458   Post-Procedure Volume (cm^3) 13.348 cm^3 03/08/23 1458   Wound Assessment Fibrin;Pink/red 03/15/23 1512   Drainage Amount Large 03/15/23 1512   Drainage Description Serosanguinous; Serous 03/15/23 1512   Odor None 03/15/23 1512   Kori-wound Assessment Blanchable erythema; Maceration 03/15/23 1512   Number of days: 462       Wound 03/01/23 Leg Left;Lateral;Lower #2 (Active)   Wound Image   03/01/23 1409   Wound Etiology Venous 03/08/23 1620   Dressing Status New dressing applied 03/08/23 1620   Wound Cleansed Cleansed with saline 03/08/23 1620   Dressing/Treatment Alginate;Dry dressing 03/08/23 1620   Wound Length (cm) 1.9 cm 03/15/23 1512   Wound Width (cm) 0.3 cm 03/15/23 1512   Wound Depth (cm) 0.2 cm 03/15/23 1512   Wound Surface Area (cm^2) 0.57 cm^2 03/15/23 1512   Change in Wound Size % (l*w) 90.03 03/15/23 1512   Wound Volume (cm^3) 0.114 cm^3 03/15/23 1512   Wound Healing % 80 03/15/23 1512   Post-Procedure Length (cm) 1.9 cm 03/08/23 1458   Post-Procedure Width (cm) 0.3 cm 03/08/23 1458   Post-Procedure Depth (cm) 0.3 cm 03/08/23 1458   Post-Procedure Surface Area (cm^2) 0.57 cm^2 03/08/23 1458   Post-Procedure Volume (cm^3) 0.171 cm^3 03/08/23 1458   Wound Assessment Fibrin;Granulation tissue 03/15/23 1512   Drainage Amount Small 03/15/23 1512   Drainage Description Serosanguinous;Brown 03/15/23 1512   Odor None 03/15/23 1512   Koir-wound Assessment Dry/flaky 03/15/23 1512   Number of days: 14     Incision 04/16/21 Abdomen (Active)   Number of days: 675       Procedure Note  Indications:  Based on my examination of this patient's wound(s)/ulcer(s) today, debridement is required to promote healing and evaluate the wound base. Performed by: Jade Crum MD    Consent obtained:  Yes    Time out taken:  Yes    Pain Control: Anesthetic  Anesthetic: 4% Lidocaine Liquid Topical     Debridement:Excisional Debridement    Using curette the wound(s)/ulcer(s) was/were sharply debrided down through and including the removal of epidermis, dermis, and subcutaneous tissue. Devitalized Tissue Debrided:  fibrin, biofilm, slough, and exudate to stimulate bleeding to promote healing, post debridement good bleeding base and wound edges noted    Wound/Ulcer #: 1    Percent of Wound/Ulcer Debrided: 70%    Total Surface Area Debrided: 20 sq cm     Estimated Blood Loss:  Minimal  Hemostasis Achieved:  by pressure    Procedural Pain:  10  / 10   Post Procedural Pain:  9 / 10     Response to treatment:  With complaints of pain. Plan:   Treatment Note please see attached Discharge Instructions    Written patient dismissal instructions given to patient and signed by patient or POA.          Discharge Instructions         Visit Discharge/Physician Orders     Discharge condition: Stable     Assessment of pain at discharge: mild Anesthetic used: lido 4%     Discharge to: Home     Left via:Private automobile     Accompanied by: self     ECF/HHA: [facility]     Dressing Orders:  LEFT PRETIB & LATERAL LEG: cleanse with normal saline, apply calcium alginate ag, cover with dry dressing and secure . Change daily. Apply spandagrip. On in am and off in pm. Elevate legs as much as possible above level of the heart. Treatment Orders:Eat a diet high in protein and vitamin C. Take a multiple vitamin daily unless contraindicated. Elevate as much as possible     C&S taken 3/8/23     [address] [place] [address],[address] followup visit: 1 week____________________________  (Please note your next appointment above and if you are unable to keep, kindly give a 24 hour notice. Thank you.)     Physician signature:__________________________      If you experience any of the following, please call the [facility] during business hours:     * Increase in Pain  * Temperature over 101  * Increase in drainage from your wound  * Drainage with a foul odor  * Bleeding  * Increase in swelling  * Need for compression bandage changes due to slippage, breakthrough drainage. If you need medical attention outside of the business hours of the [facility] please contact your PCP or go to the nearest emergency room.          Electronically signed by Chasity Luo MD

## 2023-03-15 NOTE — PLAN OF CARE
Problem: Wound:  Goal: Will show signs of wound healing; wound closure and no evidence of infection  Description: Will show signs of wound healing; wound closure and no evidence of infection  Outcome: Progressing     Problem: Venous:  Goal: Signs of wound healing will improve  Description: Signs of wound healing will improve  Outcome: Progressing No pertinent family history in first degree relatives

## 2023-03-21 RX ORDER — SODIUM CHLORIDE 9 MG/ML
5-250 INJECTION, SOLUTION INTRAVENOUS PRN
Status: CANCELLED | OUTPATIENT
Start: 2023-03-21

## 2023-03-21 RX ORDER — HEPARIN SODIUM (PORCINE) LOCK FLUSH IV SOLN 100 UNIT/ML 100 UNIT/ML
500 SOLUTION INTRAVENOUS PRN
Status: CANCELLED | OUTPATIENT
Start: 2023-03-21

## 2023-03-21 RX ORDER — SODIUM CHLORIDE 0.9 % (FLUSH) 0.9 %
5-40 SYRINGE (ML) INJECTION PRN
Status: CANCELLED | OUTPATIENT
Start: 2023-03-21

## 2023-03-21 NOTE — DISCHARGE INSTRUCTIONS
Visit Discharge/Physician Orders     Discharge condition: Stable     Assessment of pain at discharge: mild     Anesthetic used: lido 4%     Discharge to: Home     Left via:Private automobile     Accompanied by: self     [facility]: [facility]     Dressing Orders:  LEFT PRETIB & LATERAL LEG: cleanse with normal saline, apply calcium alginate ag, cover with dry dressing and secure . Change daily. Apply spandagrip. On in am and off in pm. Elevate legs as much as possible above level of the heart. Treatment Orders:Eat a diet high in protein and vitamin C. Take a multiple vitamin daily unless contraindicated. Elevate as much as possible     Follow with Infectious disease for IV antibiotics     [address] [place] [address],[address] followup visit: 1 week____________________________  (Please note your next appointment above and if you are unable to keep, kindly give a 24 hour notice. Thank you.)     Physician signature:__________________________      If you experience any of the following, please call the [facility] during business hours:     * Increase in Pain  * Temperature over 101  * Increase in drainage from your wound  * Drainage with a foul odor  * Bleeding  * Increase in swelling  * Need for compression bandage changes due to slippage, breakthrough drainage. If you need medical attention outside of the business hours of the [facility] please contact your PCP or go to the nearest emergency room.

## 2023-03-22 ENCOUNTER — HOSPITAL ENCOUNTER (OUTPATIENT)
Dept: WOUND CARE | Age: 78
Discharge: HOME | End: 2023-03-22

## 2023-03-22 ENCOUNTER — HOSPITAL ENCOUNTER (OUTPATIENT)
Dept: INFUSION THERAPY | Age: 78
Setting detail: INFUSION SERIES
Discharge: HOME | End: 2023-03-22
Payer: MEDICARE

## 2023-03-22 VITALS
RESPIRATION RATE: 16 BRPM | SYSTOLIC BLOOD PRESSURE: 132 MMHG | HEART RATE: 73 BPM | TEMPERATURE: 97.9 F | DIASTOLIC BLOOD PRESSURE: 71 MMHG | OXYGEN SATURATION: 90 %

## 2023-03-22 VITALS
SYSTOLIC BLOOD PRESSURE: 111 MMHG | RESPIRATION RATE: 16 BRPM | TEMPERATURE: 97 F | HEART RATE: 56 BPM | OXYGEN SATURATION: 97 % | DIASTOLIC BLOOD PRESSURE: 83 MMHG

## 2023-03-22 DIAGNOSIS — L97.222 VENOUS STASIS ULCER OF LEFT CALF WITH FAT LAYER EXPOSED WITHOUT VARICOSE VEINS (HCC): Primary | ICD-10-CM

## 2023-03-22 DIAGNOSIS — I87.2 VENOUS STASIS ULCER OF LEFT CALF WITH FAT LAYER EXPOSED WITHOUT VARICOSE VEINS (HCC): Primary | ICD-10-CM

## 2023-03-22 PROCEDURE — 96367 TX/PROPH/DG ADDL SEQ IV INF: CPT

## 2023-03-22 PROCEDURE — 96365 THER/PROPH/DIAG IV INF INIT: CPT

## 2023-03-22 PROCEDURE — 36569 INSJ PICC 5 YR+ W/O IMAGING: CPT

## 2023-03-22 PROCEDURE — 96366 THER/PROPH/DIAG IV INF ADDON: CPT

## 2023-03-22 PROCEDURE — 6360000002 HC RX W HCPCS: Performed by: INTERNAL MEDICINE

## 2023-03-22 PROCEDURE — 2580000003 HC RX 258: Performed by: INTERNAL MEDICINE

## 2023-03-22 PROCEDURE — C1751 CATH, INF, PER/CENT/MIDLINE: HCPCS

## 2023-03-22 PROCEDURE — 76937 US GUIDE VASCULAR ACCESS: CPT

## 2023-03-22 RX ORDER — SODIUM CHLORIDE 9 MG/ML
5-250 INJECTION, SOLUTION INTRAVENOUS PRN
OUTPATIENT
Start: 2023-03-22

## 2023-03-22 RX ORDER — HEPARIN SODIUM (PORCINE) LOCK FLUSH IV SOLN 100 UNIT/ML 100 UNIT/ML
3 SOLUTION INTRAVENOUS EVERY 12 HOURS SCHEDULED
Status: DISCONTINUED | OUTPATIENT
Start: 2023-03-22 | End: 2023-03-23 | Stop reason: HOSPADM

## 2023-03-22 RX ORDER — SODIUM CHLORIDE 0.9 % (FLUSH) 0.9 %
5-40 SYRINGE (ML) INJECTION EVERY 12 HOURS SCHEDULED
Status: DISCONTINUED | OUTPATIENT
Start: 2023-03-22 | End: 2023-03-23 | Stop reason: HOSPADM

## 2023-03-22 RX ORDER — SODIUM CHLORIDE 9 MG/ML
INJECTION, SOLUTION INTRAVENOUS PRN
Status: DISCONTINUED | OUTPATIENT
Start: 2023-03-22 | End: 2023-03-23 | Stop reason: HOSPADM

## 2023-03-22 RX ORDER — SODIUM CHLORIDE 0.9 % (FLUSH) 0.9 %
5-40 SYRINGE (ML) INJECTION PRN
Status: DISCONTINUED | OUTPATIENT
Start: 2023-03-22 | End: 2023-03-23 | Stop reason: HOSPADM

## 2023-03-22 RX ORDER — SODIUM CHLORIDE 9 MG/ML
5-250 INJECTION, SOLUTION INTRAVENOUS PRN
Status: DISCONTINUED | OUTPATIENT
Start: 2023-03-22 | End: 2023-03-23 | Stop reason: HOSPADM

## 2023-03-22 RX ORDER — HEPARIN SODIUM (PORCINE) LOCK FLUSH IV SOLN 100 UNIT/ML 100 UNIT/ML
500 SOLUTION INTRAVENOUS PRN
OUTPATIENT
Start: 2023-03-22

## 2023-03-22 RX ORDER — HEPARIN SODIUM (PORCINE) LOCK FLUSH IV SOLN 100 UNIT/ML 100 UNIT/ML
500 SOLUTION INTRAVENOUS PRN
Status: DISCONTINUED | OUTPATIENT
Start: 2023-03-22 | End: 2023-03-23 | Stop reason: HOSPADM

## 2023-03-22 RX ORDER — SODIUM CHLORIDE 0.9 % (FLUSH) 0.9 %
5-40 SYRINGE (ML) INJECTION PRN
OUTPATIENT
Start: 2023-03-22

## 2023-03-22 RX ORDER — LIDOCAINE HYDROCHLORIDE 10 MG/ML
5 INJECTION, SOLUTION EPIDURAL; INFILTRATION; INTRACAUDAL; PERINEURAL ONCE
Status: DISCONTINUED | OUTPATIENT
Start: 2023-03-22 | End: 2023-03-23 | Stop reason: HOSPADM

## 2023-03-22 RX ORDER — HEPARIN SODIUM (PORCINE) LOCK FLUSH IV SOLN 100 UNIT/ML 100 UNIT/ML
3 SOLUTION INTRAVENOUS PRN
Status: DISCONTINUED | OUTPATIENT
Start: 2023-03-22 | End: 2023-03-23 | Stop reason: HOSPADM

## 2023-03-22 RX ADMIN — SODIUM CHLORIDE, PRESERVATIVE FREE 10 ML: 5 INJECTION INTRAVENOUS at 15:40

## 2023-03-22 RX ADMIN — SODIUM CHLORIDE, PRESERVATIVE FREE 10 ML: 5 INJECTION INTRAVENOUS at 13:08

## 2023-03-22 RX ADMIN — HEPARIN 500 UNITS: 100 SYRINGE at 15:42

## 2023-03-22 RX ADMIN — VANCOMYCIN HYDROCHLORIDE 1500 MG: 10 INJECTION, POWDER, LYOPHILIZED, FOR SOLUTION INTRAVENOUS at 12:04

## 2023-03-22 RX ADMIN — HEPARIN 500 UNITS: 100 SYRINGE at 15:41

## 2023-03-22 RX ADMIN — SODIUM CHLORIDE, PRESERVATIVE FREE 10 ML: 5 INJECTION INTRAVENOUS at 15:41

## 2023-03-22 RX ADMIN — SODIUM CHLORIDE, PRESERVATIVE FREE 10 ML: 5 INJECTION INTRAVENOUS at 12:31

## 2023-03-22 RX ADMIN — SODIUM CHLORIDE, PRESERVATIVE FREE 10 ML: 5 INJECTION INTRAVENOUS at 12:30

## 2023-03-22 RX ADMIN — SODIUM CHLORIDE, PRESERVATIVE FREE 10 ML: 5 INJECTION INTRAVENOUS at 12:01

## 2023-03-22 NOTE — PROGRESS NOTES
Vancomycin run a slower rate over 2 hours. Patient tolerated very well. No further complaints of itching. Teaching on picc line, vancomycin and meropenn done. Patient has good understanding since she has had IV antibiotics and picc line in the past. She continues to have some bloody drainage from her picc line but only slightly. All questions answered to the patients satisfaction. Patient taken by wheelchair to her car in stable condition. Statistic sheet on her new piccline sent home with patient.

## 2023-03-22 NOTE — PROGRESS NOTES
Alicia Nettles from Dr. Burks Crew  office called back with instructions to restart Vancomycin and run over 90 minutes. Informed Alicia Nettles that that is what I started the drug at to begin with and that patient had pre medicated herself with oral benadryl 50 mg at home before coming. Again office reached out to DR. Hammond and  orders received to try Vancomycin again but over a slower rated and to run over 2 hours. Patient advised and will proceed.

## 2023-03-22 NOTE — PROGRESS NOTES
Report called to Home Care Nurse at this time. Notified nurse of patient's reaction to vancomycin this afternoon and that per MD order it is now to be run over 2 hours instead of 90 minutes. She states she will be calling patient soon.

## 2023-03-22 NOTE — DISCHARGE INSTRUCTIONS
Peripherally Inserted Central Catheter (PICC): Care Instructions  Overview     A peripherally inserted central catheter (PICC) is a soft, flexible tube that runs under your skin from a vein in your arm to a large vein near your heart. One end of the catheter stays outside your body. It is a type of central vascular access device, or central line. You may have it for weeks or months. A PICC is used to give you medicine, blood products, nutrients, or fluids. A PICC makes doing these things more comfortable for you because they are put directly into the catheter. So you will not be stuck with a needle every time. A PICC may be used to draw blood for tests only if another vein, such as in the hand or arm, can't be used. The end of the PICC sometimes has two or three openings so that you can get more than one type of fluid or medicine at a time. Your doctor may give you medicine to make you feel relaxed. You may feel a little pain when your doctor numbs your arm. Your doctor will then thread the catheter up a vein in your arm to a larger vein. You will not feel any pain. The doctor may use stitches or other devices to hold the catheter in place where it exits your arm. After the procedure, the site may be sore for a day or two. Follow-up care is a key part of your treatment and safety. Be sure to make and go to all appointments, and call your doctor if you are having problems. It's also a good idea to know your test results and keep a list of the medicines you take. How can you care for yourself at home? Do not wear jewelry, such as necklaces, that can catch on the catheter. If the catheter breaks, follow the instructions your doctor gave you. If you have no instructions, clamp or tie off the catheter. Then see a doctor as soon as possible. To help prevent infection, take a shower instead of a bath. Do not go swimming with the catheter. Try to keep the area dry.  When you shower, cover the area with meropenem? Tell your doctor if you have a history of allergies to any antibiotic medications. What is meropenem? Meropenem is an antibiotic that is used to treat severe infections of the skin or stomach. Meropenem is also used to treat bacterial meningitis (infection that causes inflammation of the tissue that covers the brain and spinal cord). Meropenem may also be used for purposes not listed in this medication guide. What should I discuss with my healthcare provider before using meropenem? You may not be able to use this medicine if you have ever had a severe allergic reaction to meropenem or to certain antibiotics, such as:  cefaclor, cefdinir, cefixime, cefpodoxime, cefprozil, cephalexin, Keflex, Omnicef, and others;  avibactam, relebactam, sulbactam, tazobactam, vaborbactam, and others; or  amoxicillin (Amoxil, Augmentin, Moxatag), ampicillin, dicloxacillin, oxacillin, penicillin, and others. Tell your doctor if you have ever had:  an allergy to any penicillin antibiotic;  a head injury or brain tumor;  epilepsy or other seizure disorder; or  kidney disease (or if you are on dialysis). Tell your doctor if you are pregnant or breastfeeding. How is meropenem given? Follow all directions on your prescription label and read all medication guides or instruction sheets. Use the medicine exactly as directed. Meropenem is given as an infusion into a vein. A healthcare provider will give your first dose and may teach you how to properly use the medication by yourself. Meropenem must be mixed with a liquid (diluent) before using it. Read and carefully follow any Instructions for Use provided with your medicine. Ask your doctor or pharmacist if you don't understand all instructions. Prepare an injection only when you are ready to give it. Do not mix other injectable medications in the same IV bag or tubing with meropenem. Do not use if the medicine has changed colors or has particles in it.  Call your

## 2023-03-22 NOTE — PROGRESS NOTES
CHG double lumen power picc Placement 3/22/2023    Product number: [id number]   Lot Number: [id number]      Ultrasound: yes   Right Brachial vein:                Upper Arm Circumference: (CM) 43    Size:(FR)/GUAGE 5.5/55 cm     Exposed Length: (CM) 3    Internal Length: (CM) 38   Cut: (CM) 14   Vein Measurement: 0.60    Jeniffer Denton RN  3/22/2023  11:16 AM      CHG double lumen power picc inserted using VPS guidance system. Tip placed at lower 1/3 of SVC/CAJ. Matheus obtained.

## 2023-03-22 NOTE — PROGRESS NOTES
Vancomycin started and within 5 minutes patient started to have intense itching of her lower legs bilaterally. Vancomycin stopped since itching was intense. No rash noted and itching started to subdue rather quickly once drug stopped. Nory Baker at Dr. De Hammans office called and she will text  fr further instructions.

## 2023-03-22 NOTE — PROGRESS NOTES
Patient called nurse to room that picc line insertion site is bleeding. Large amount of blood noted and dressing completely changed after pressure held.

## 2023-03-23 ENCOUNTER — APPOINTMENT (OUTPATIENT)
Dept: GENERAL RADIOLOGY | Age: 78
DRG: 602 | End: 2023-03-23
Payer: MEDICARE

## 2023-03-23 PROCEDURE — 99285 EMERGENCY DEPT VISIT HI MDM: CPT

## 2023-03-23 PROCEDURE — 80202 ASSAY OF VANCOMYCIN: CPT

## 2023-03-23 PROCEDURE — 85025 COMPLETE CBC W/AUTO DIFF WBC: CPT

## 2023-03-23 PROCEDURE — 71045 X-RAY EXAM CHEST 1 VIEW: CPT

## 2023-03-23 PROCEDURE — 36415 COLL VENOUS BLD VENIPUNCTURE: CPT

## 2023-03-23 PROCEDURE — 73590 X-RAY EXAM OF LOWER LEG: CPT

## 2023-03-23 PROCEDURE — 87040 BLOOD CULTURE FOR BACTERIA: CPT

## 2023-03-23 PROCEDURE — 80053 COMPREHEN METABOLIC PANEL: CPT

## 2023-03-23 ASSESSMENT — LIFESTYLE VARIABLES: HOW OFTEN DO YOU HAVE A DRINK CONTAINING ALCOHOL: NEVER

## 2023-03-24 ENCOUNTER — HOSPITAL ENCOUNTER (INPATIENT)
Age: 78
LOS: 6 days | Discharge: NURSING, REHAB OR LONG-TERM CARE | DRG: 602 | End: 2023-03-30
Attending: EMERGENCY MEDICINE | Admitting: FAMILY MEDICINE
Payer: MEDICARE

## 2023-03-24 DIAGNOSIS — L97.929 INFECTED STASIS ULCER OF LEFT LOWER EXTREMITY (HCC): Primary | ICD-10-CM

## 2023-03-24 DIAGNOSIS — N17.9 ACUTE KIDNEY INJURY (HCC): ICD-10-CM

## 2023-03-24 DIAGNOSIS — Z22.322 MRSA (METHICILLIN RESISTANT STAPH AUREUS) CULTURE POSITIVE: ICD-10-CM

## 2023-03-24 DIAGNOSIS — L97.222 VENOUS STASIS ULCER OF LEFT CALF WITH FAT LAYER EXPOSED WITHOUT VARICOSE VEINS (HCC): ICD-10-CM

## 2023-03-24 DIAGNOSIS — J96.01 ACUTE RESPIRATORY FAILURE WITH HYPOXIA (HCC): ICD-10-CM

## 2023-03-24 DIAGNOSIS — I83.229 INFECTED STASIS ULCER OF LEFT LOWER EXTREMITY (HCC): Primary | ICD-10-CM

## 2023-03-24 DIAGNOSIS — I87.2 VENOUS STASIS ULCER OF LEFT CALF WITH FAT LAYER EXPOSED WITHOUT VARICOSE VEINS (HCC): ICD-10-CM

## 2023-03-24 PROBLEM — E66.01 CLASS 3 SEVERE OBESITY DUE TO EXCESS CALORIES WITH SERIOUS COMORBIDITY IN ADULT (HCC): Chronic | Status: ACTIVE | Noted: 2023-03-24

## 2023-03-24 PROBLEM — E66.813 CLASS 3 SEVERE OBESITY DUE TO EXCESS CALORIES WITH SERIOUS COMORBIDITY IN ADULT: Chronic | Status: ACTIVE | Noted: 2023-03-24

## 2023-03-24 LAB
ALBUMIN SERPL-MCNC: 3.5 G/DL (ref 3.5–5.2)
ALP SERPL-CCNC: 110 U/L (ref 35–104)
ALT SERPL-CCNC: 19 U/L (ref 0–32)
ANION GAP SERPL CALCULATED.3IONS-SCNC: 13 MMOL/L (ref 7–16)
AST SERPL-CCNC: 40 U/L (ref 0–31)
BASOPHILS # BLD: 0.03 E9/L (ref 0–0.2)
BASOPHILS NFR BLD: 0.3 % (ref 0–2)
BILIRUB SERPL-MCNC: 0.3 MG/DL (ref 0–1.2)
BUN SERPL-MCNC: 52 MG/DL (ref 6–23)
CALCIUM SERPL-MCNC: 8.4 MG/DL (ref 8.6–10.2)
CHLORIDE SERPL-SCNC: 101 MMOL/L (ref 98–107)
CO2 SERPL-SCNC: 24 MMOL/L (ref 22–29)
CREAT SERPL-MCNC: 1.8 MG/DL (ref 0.5–1)
EKG ATRIAL RATE: 105 BPM
EKG P AXIS: 12 DEGREES
EKG P-R INTERVAL: 114 MS
EKG Q-T INTERVAL: 342 MS
EKG QRS DURATION: 106 MS
EKG QTC CALCULATION (BAZETT): 452 MS
EKG R AXIS: 51 DEGREES
EKG T AXIS: 52 DEGREES
EKG VENTRICULAR RATE: 105 BPM
EOSINOPHIL # BLD: 0.42 E9/L (ref 0.05–0.5)
EOSINOPHIL NFR BLD: 4.2 % (ref 0–6)
ERYTHROCYTE [DISTWIDTH] IN BLOOD BY AUTOMATED COUNT: 12.4 FL (ref 11.5–15)
GLUCOSE SERPL-MCNC: 120 MG/DL (ref 74–99)
HCT VFR BLD AUTO: 37.6 % (ref 34–48)
HGB BLD-MCNC: 11.6 G/DL (ref 11.5–15.5)
IMM GRANULOCYTES # BLD: 0.03 E9/L
IMM GRANULOCYTES NFR BLD: 0.3 % (ref 0–5)
INFLUENZA A BY PCR: NOT DETECTED
INFLUENZA B BY PCR: NOT DETECTED
LYMPHOCYTES # BLD: 0.77 E9/L (ref 1.5–4)
LYMPHOCYTES NFR BLD: 7.8 % (ref 20–42)
MCH RBC QN AUTO: 31 PG (ref 26–35)
MCHC RBC AUTO-ENTMCNC: 30.9 % (ref 32–34.5)
MCV RBC AUTO: 100.5 FL (ref 80–99.9)
MONOCYTES # BLD: 0.64 E9/L (ref 0.1–0.95)
MONOCYTES NFR BLD: 6.5 % (ref 2–12)
NEUTROPHILS # BLD: 8 E9/L (ref 1.8–7.3)
NEUTS SEG NFR BLD: 80.9 % (ref 43–80)
PLATELET # BLD AUTO: 175 E9/L (ref 130–450)
PMV BLD AUTO: 10.1 FL (ref 7–12)
POTASSIUM SERPL-SCNC: 4 MMOL/L (ref 3.5–5)
PROT SERPL-MCNC: 8 G/DL (ref 6.4–8.3)
RBC # BLD AUTO: 3.74 E12/L (ref 3.5–5.5)
SARS-COV-2 RDRP RESP QL NAA+PROBE: NOT DETECTED
SODIUM SERPL-SCNC: 138 MMOL/L (ref 132–146)
VANCOMYCIN TROUGH SERPL-MCNC: 30.2 MCG/ML (ref 5–16)
WBC # BLD: 9.9 E9/L (ref 4.5–11.5)

## 2023-03-24 PROCEDURE — 6370000000 HC RX 637 (ALT 250 FOR IP): Performed by: STUDENT IN AN ORGANIZED HEALTH CARE EDUCATION/TRAINING PROGRAM

## 2023-03-24 PROCEDURE — 97165 OT EVAL LOW COMPLEX 30 MIN: CPT

## 2023-03-24 PROCEDURE — 97161 PT EVAL LOW COMPLEX 20 MIN: CPT

## 2023-03-24 PROCEDURE — 93005 ELECTROCARDIOGRAM TRACING: CPT | Performed by: NURSE PRACTITIONER

## 2023-03-24 PROCEDURE — 93010 ELECTROCARDIOGRAM REPORT: CPT | Performed by: INTERNAL MEDICINE

## 2023-03-24 PROCEDURE — 2580000003 HC RX 258: Performed by: INTERNAL MEDICINE

## 2023-03-24 PROCEDURE — 97530 THERAPEUTIC ACTIVITIES: CPT

## 2023-03-24 PROCEDURE — 87502 INFLUENZA DNA AMP PROBE: CPT

## 2023-03-24 PROCEDURE — 2580000003 HC RX 258: Performed by: STUDENT IN AN ORGANIZED HEALTH CARE EDUCATION/TRAINING PROGRAM

## 2023-03-24 PROCEDURE — 87635 SARS-COV-2 COVID-19 AMP PRB: CPT

## 2023-03-24 PROCEDURE — 2700000000 HC OXYGEN THERAPY PER DAY

## 2023-03-24 PROCEDURE — 2060000000 HC ICU INTERMEDIATE R&B

## 2023-03-24 PROCEDURE — 94664 DEMO&/EVAL PT USE INHALER: CPT

## 2023-03-24 PROCEDURE — 6360000002 HC RX W HCPCS: Performed by: INTERNAL MEDICINE

## 2023-03-24 PROCEDURE — 6370000000 HC RX 637 (ALT 250 FOR IP): Performed by: INTERNAL MEDICINE

## 2023-03-24 PROCEDURE — 94640 AIRWAY INHALATION TREATMENT: CPT

## 2023-03-24 PROCEDURE — 87040 BLOOD CULTURE FOR BACTERIA: CPT

## 2023-03-24 PROCEDURE — 36415 COLL VENOUS BLD VENIPUNCTURE: CPT

## 2023-03-24 PROCEDURE — 97535 SELF CARE MNGMENT TRAINING: CPT

## 2023-03-24 RX ORDER — SODIUM CHLORIDE 9 MG/ML
INJECTION, SOLUTION INTRAVENOUS CONTINUOUS
Status: DISCONTINUED | OUTPATIENT
Start: 2023-03-24 | End: 2023-03-25

## 2023-03-24 RX ORDER — MEROPENEM 1 G/1
1000 INJECTION, POWDER, FOR SOLUTION INTRAVENOUS EVERY 12 HOURS
Qty: 42 EACH | Refills: 0 | Status: SHIPPED | OUTPATIENT
Start: 2023-03-24 | End: 2023-04-14

## 2023-03-24 RX ORDER — SODIUM CHLORIDE 0.9 % (FLUSH) 0.9 %
10 SYRINGE (ML) INJECTION PRN
Status: DISCONTINUED | OUTPATIENT
Start: 2023-03-24 | End: 2023-03-30 | Stop reason: HOSPADM

## 2023-03-24 RX ORDER — OXYCODONE HYDROCHLORIDE AND ACETAMINOPHEN 5; 325 MG/1; MG/1
1.5 TABLET ORAL ONCE
Status: COMPLETED | OUTPATIENT
Start: 2023-03-24 | End: 2023-03-24

## 2023-03-24 RX ORDER — ZINC SULFATE 50(220)MG
50 CAPSULE ORAL DAILY
Status: DISCONTINUED | OUTPATIENT
Start: 2023-03-24 | End: 2023-03-30 | Stop reason: HOSPADM

## 2023-03-24 RX ORDER — SODIUM CHLORIDE 9 MG/ML
INJECTION, SOLUTION INTRAVENOUS PRN
Status: DISCONTINUED | OUTPATIENT
Start: 2023-03-24 | End: 2023-03-30 | Stop reason: HOSPADM

## 2023-03-24 RX ORDER — ASPIRIN 81 MG/1
81 TABLET, CHEWABLE ORAL DAILY
Status: DISCONTINUED | OUTPATIENT
Start: 2023-03-24 | End: 2023-03-30 | Stop reason: HOSPADM

## 2023-03-24 RX ORDER — FOLIC ACID 1 MG/1
500 TABLET ORAL NIGHTLY
Status: DISCONTINUED | OUTPATIENT
Start: 2023-03-24 | End: 2023-03-30 | Stop reason: HOSPADM

## 2023-03-24 RX ORDER — MEROPENEM 1 G/1
1 INJECTION, POWDER, FOR SOLUTION INTRAVENOUS 3 TIMES DAILY
COMMUNITY
Start: 2023-03-23 | End: 2023-03-24 | Stop reason: SDUPTHER

## 2023-03-24 RX ORDER — ACETAMINOPHEN 325 MG/1
650 TABLET ORAL EVERY 6 HOURS PRN
Status: DISCONTINUED | OUTPATIENT
Start: 2023-03-24 | End: 2023-03-30 | Stop reason: HOSPADM

## 2023-03-24 RX ORDER — 0.9 % SODIUM CHLORIDE 0.9 %
500 INTRAVENOUS SOLUTION INTRAVENOUS ONCE
Status: COMPLETED | OUTPATIENT
Start: 2023-03-24 | End: 2023-03-24

## 2023-03-24 RX ORDER — IPRATROPIUM BROMIDE AND ALBUTEROL SULFATE 2.5; .5 MG/3ML; MG/3ML
1 SOLUTION RESPIRATORY (INHALATION) ONCE
Status: COMPLETED | OUTPATIENT
Start: 2023-03-24 | End: 2023-03-24

## 2023-03-24 RX ORDER — ONDANSETRON 2 MG/ML
4 INJECTION INTRAMUSCULAR; INTRAVENOUS EVERY 6 HOURS PRN
Status: DISCONTINUED | OUTPATIENT
Start: 2023-03-24 | End: 2023-03-30 | Stop reason: HOSPADM

## 2023-03-24 RX ORDER — GABAPENTIN 300 MG/1
300 CAPSULE ORAL 3 TIMES DAILY
Status: DISCONTINUED | OUTPATIENT
Start: 2023-03-24 | End: 2023-03-30 | Stop reason: HOSPADM

## 2023-03-24 RX ORDER — CHOLECALCIFEROL (VITAMIN D3) 50 MCG
2000 TABLET ORAL DAILY
Status: DISCONTINUED | OUTPATIENT
Start: 2023-03-24 | End: 2023-03-30 | Stop reason: HOSPADM

## 2023-03-24 RX ORDER — BUDESONIDE 0.5 MG/2ML
500 INHALANT ORAL 2 TIMES DAILY
Status: DISCONTINUED | OUTPATIENT
Start: 2023-03-24 | End: 2023-03-30 | Stop reason: HOSPADM

## 2023-03-24 RX ORDER — POLYETHYLENE GLYCOL 3350 17 G/17G
17 POWDER, FOR SOLUTION ORAL DAILY PRN
Status: DISCONTINUED | OUTPATIENT
Start: 2023-03-24 | End: 2023-03-30 | Stop reason: HOSPADM

## 2023-03-24 RX ORDER — UBIDECARENONE 75 MG
500 CAPSULE ORAL DAILY
Status: DISCONTINUED | OUTPATIENT
Start: 2023-03-24 | End: 2023-03-30 | Stop reason: HOSPADM

## 2023-03-24 RX ORDER — ACETAMINOPHEN 650 MG/1
650 SUPPOSITORY RECTAL EVERY 6 HOURS PRN
Status: DISCONTINUED | OUTPATIENT
Start: 2023-03-24 | End: 2023-03-30 | Stop reason: HOSPADM

## 2023-03-24 RX ORDER — PANTOPRAZOLE SODIUM 40 MG/1
40 TABLET, DELAYED RELEASE ORAL
Status: DISCONTINUED | OUTPATIENT
Start: 2023-03-25 | End: 2023-03-30 | Stop reason: HOSPADM

## 2023-03-24 RX ORDER — OXYCODONE HYDROCHLORIDE AND ACETAMINOPHEN 5; 325 MG/1; MG/1
1 TABLET ORAL EVERY 8 HOURS PRN
Status: DISCONTINUED | OUTPATIENT
Start: 2023-03-24 | End: 2023-03-29

## 2023-03-24 RX ORDER — CALCIUM CARBONATE 500(1250)
1000 TABLET ORAL DAILY
Status: DISCONTINUED | OUTPATIENT
Start: 2023-03-24 | End: 2023-03-30 | Stop reason: HOSPADM

## 2023-03-24 RX ORDER — ONDANSETRON 4 MG/1
4 TABLET, ORALLY DISINTEGRATING ORAL EVERY 8 HOURS PRN
Status: DISCONTINUED | OUTPATIENT
Start: 2023-03-24 | End: 2023-03-30 | Stop reason: HOSPADM

## 2023-03-24 RX ORDER — OXYCODONE HYDROCHLORIDE 5 MG/1
2.5 TABLET ORAL EVERY 8 HOURS PRN
Status: DISCONTINUED | OUTPATIENT
Start: 2023-03-24 | End: 2023-03-29

## 2023-03-24 RX ORDER — MORPHINE SULFATE 15 MG/1
15 TABLET, FILM COATED, EXTENDED RELEASE ORAL 2 TIMES DAILY
Status: DISCONTINUED | OUTPATIENT
Start: 2023-03-24 | End: 2023-03-30 | Stop reason: HOSPADM

## 2023-03-24 RX ORDER — ATORVASTATIN CALCIUM 20 MG/1
20 TABLET, FILM COATED ORAL DAILY
Status: DISCONTINUED | OUTPATIENT
Start: 2023-03-24 | End: 2023-03-30 | Stop reason: HOSPADM

## 2023-03-24 RX ORDER — FERROUS SULFATE 325(65) MG
325 TABLET ORAL NIGHTLY
Status: DISCONTINUED | OUTPATIENT
Start: 2023-03-24 | End: 2023-03-30 | Stop reason: HOSPADM

## 2023-03-24 RX ORDER — OXYCODONE AND ACETAMINOPHEN 7.5; 325 MG/1; MG/1
1 TABLET ORAL EVERY 8 HOURS PRN
Status: DISCONTINUED | OUTPATIENT
Start: 2023-03-24 | End: 2023-03-24 | Stop reason: SDUPTHER

## 2023-03-24 RX ORDER — ENOXAPARIN SODIUM 100 MG/ML
30 INJECTION SUBCUTANEOUS 2 TIMES DAILY
Status: DISCONTINUED | OUTPATIENT
Start: 2023-03-24 | End: 2023-03-30 | Stop reason: HOSPADM

## 2023-03-24 RX ORDER — DIPHENHYDRAMINE HCL 25 MG
25 TABLET ORAL EVERY 8 HOURS PRN
Status: DISCONTINUED | OUTPATIENT
Start: 2023-03-24 | End: 2023-03-30 | Stop reason: HOSPADM

## 2023-03-24 RX ORDER — ARFORMOTEROL TARTRATE 15 UG/2ML
15 SOLUTION RESPIRATORY (INHALATION) 2 TIMES DAILY
Status: DISCONTINUED | OUTPATIENT
Start: 2023-03-24 | End: 2023-03-30 | Stop reason: HOSPADM

## 2023-03-24 RX ORDER — OXYCODONE HYDROCHLORIDE AND ACETAMINOPHEN 5; 325 MG/1; MG/1
1 TABLET ORAL EVERY 4 HOURS PRN
Status: DISCONTINUED | OUTPATIENT
Start: 2023-03-24 | End: 2023-03-24

## 2023-03-24 RX ORDER — IPRATROPIUM BROMIDE AND ALBUTEROL SULFATE 2.5; .5 MG/3ML; MG/3ML
1 SOLUTION RESPIRATORY (INHALATION) EVERY 4 HOURS PRN
Status: DISCONTINUED | OUTPATIENT
Start: 2023-03-24 | End: 2023-03-30 | Stop reason: HOSPADM

## 2023-03-24 RX ORDER — SODIUM CHLORIDE 0.9 % (FLUSH) 0.9 %
5-40 SYRINGE (ML) INJECTION EVERY 12 HOURS SCHEDULED
Status: DISCONTINUED | OUTPATIENT
Start: 2023-03-24 | End: 2023-03-30 | Stop reason: HOSPADM

## 2023-03-24 RX ADMIN — MEROPENEM 1000 MG: 1 INJECTION, POWDER, FOR SOLUTION INTRAVENOUS at 21:26

## 2023-03-24 RX ADMIN — OXYCODONE AND ACETAMINOPHEN 1.5 TABLET: 5; 325 TABLET ORAL at 07:03

## 2023-03-24 RX ADMIN — GABAPENTIN 300 MG: 300 CAPSULE ORAL at 15:50

## 2023-03-24 RX ADMIN — ASPIRIN 81 MG CHEWABLE TABLET 81 MG: 81 TABLET CHEWABLE at 15:49

## 2023-03-24 RX ADMIN — Medication 2000 UNITS: at 15:49

## 2023-03-24 RX ADMIN — ENOXAPARIN SODIUM 30 MG: 100 INJECTION SUBCUTANEOUS at 21:10

## 2023-03-24 RX ADMIN — SODIUM CHLORIDE 500 ML: 9 INJECTION, SOLUTION INTRAVENOUS at 06:35

## 2023-03-24 RX ADMIN — SODIUM CHLORIDE: 9 INJECTION, SOLUTION INTRAVENOUS at 11:56

## 2023-03-24 RX ADMIN — ENOXAPARIN SODIUM 30 MG: 100 INJECTION SUBCUTANEOUS at 12:00

## 2023-03-24 RX ADMIN — GABAPENTIN 300 MG: 300 CAPSULE ORAL at 21:10

## 2023-03-24 RX ADMIN — MEROPENEM 1000 MG: 1 INJECTION, POWDER, FOR SOLUTION INTRAVENOUS at 12:00

## 2023-03-24 RX ADMIN — MORPHINE SULFATE 15 MG: 15 TABLET, FILM COATED, EXTENDED RELEASE ORAL at 21:10

## 2023-03-24 RX ADMIN — SODIUM CHLORIDE, PRESERVATIVE FREE 10 ML: 5 INJECTION INTRAVENOUS at 21:17

## 2023-03-24 RX ADMIN — OXYCODONE AND ACETAMINOPHEN 1 TABLET: 5; 325 TABLET ORAL at 15:08

## 2023-03-24 RX ADMIN — FERROUS SULFATE TAB 325 MG (65 MG ELEMENTAL FE) 325 MG: 325 (65 FE) TAB at 21:10

## 2023-03-24 RX ADMIN — IPRATROPIUM BROMIDE AND ALBUTEROL SULFATE 1 AMPULE: 2.5; .5 SOLUTION RESPIRATORY (INHALATION) at 06:50

## 2023-03-24 RX ADMIN — FOLIC ACID 500 MCG: 1 TABLET ORAL at 21:10

## 2023-03-24 ASSESSMENT — PAIN SCALES - GENERAL
PAINLEVEL_OUTOF10: 10

## 2023-03-24 ASSESSMENT — PAIN DESCRIPTION - ORIENTATION
ORIENTATION: RIGHT

## 2023-03-24 ASSESSMENT — PAIN DESCRIPTION - LOCATION
LOCATION: HIP;LEG
LOCATION: HIP;LEG
LOCATION: HIP
LOCATION: HIP

## 2023-03-24 ASSESSMENT — PAIN DESCRIPTION - DESCRIPTORS
DESCRIPTORS: ACHING
DESCRIPTORS: ACHING

## 2023-03-24 NOTE — PLAN OF CARE
Problem: Discharge Planning  Goal: Discharge to home or other facility with appropriate resources  Outcome: Progressing  Flowsheets (Taken 3/24/2023 3998)  Discharge to home or other facility with appropriate resources: Identify barriers to discharge with patient and caregiver     Problem: Safety - Adult  Goal: Free from fall injury  Outcome: Progressing     Problem: ABCDS Injury Assessment  Goal: Absence of physical injury  Outcome: Progressing

## 2023-03-24 NOTE — ACP (ADVANCE CARE PLANNING)
.Advance Care Planning   Healthcare Decision Maker:    Primary Decision Maker: Jerman Gutierres [facility] - [phone]    Click here to complete Healthcare Decision Makers including selection of the Healthcare Decision Maker Relationship (ie \"Primary\").

## 2023-03-24 NOTE — H&P
History and Physical      CHIEF COMPLAINT: Chronic leg wound      HISTORY OF PRESENT ILLNESS:      The patient is a 70 y.o. female patient of Dr. Zambrano Salts history of morbid obesity, COPD, chronic venous insufficiency, chronic pain, CAD who presents with chronic left leg ulcer with concern for infection. Patient was supposed to get outpatient home IV antibiotics but however this cannot be arranged. She presents to the ED with worsening left leg wound. No fevers or chills. No vomiting evaluation. No exacerbation relief. In ED patient was mildly hypotensive and treated with IV fluids. Patient was hypoxic on room air at 84% and placed on oxygen. Laboratory evaluation reveals creatinine elevated at 1.8. Baseline 0.8. Patient is admitted for further evaluation and treatment with arrangement for continued antibiotic therapy. Patient denies any trouble breathing cough or wheezing. She does note that she has required oxygen at home previously. Patient reports history of COPD. States she uses breathing treatments sometimes. She notes decreased oral intake recently.     Past Medical History:    Past Medical History:   Diagnosis Date    Bursitis     CAD (coronary artery disease) 1993    heart attack    Cellulitis of leg, left 10/03/2022    COPD (chronic obstructive pulmonary disease) ([address] 06/19/2013    Dermatophytosis 01/27/2023    Emphysema     slight    GERD (gastroesophageal reflux disease)     Hiatal hernia     Hip pain     Hx of blood clots     Hyperlipidemia     Hypertension     Lymphedema of both lower extremities 11/21/2018    Venous insufficiency of both lower extremities 11/21/2018    Venous stasis ulcer of left calf with fat layer exposed without varicose veins ([address] 12/08/2021    Venous stasis ulcer of left calf with fat layer exposed without varicose veins ([address] 12/08/2021    Longstanding ulcer over the shin of the left calf, with a new ulcer over the posterior aspect of the left calf, for the last 1

## 2023-03-24 NOTE — ED NOTES
Sticky pulse ox placed on forehead; patient 100% on 2 liters nasal cannula at this time     Len Michele RN  03/24/23 3222

## 2023-03-24 NOTE — DISCHARGE INSTRUCTIONS
values). Once stable check troughs once weekly or every third dose. Please do not call physician unless the trough is < 5 or >20. If the trough is <20 continue dosing as ordered. If the trough is >20 call the office for further orders. Do not hold the dose while waiting for the trough result. Amingoglycosides (e.g. Gentamicin, Tobramycin and Amikacin) peaks and troughs should be drawn twice weekly (preferably on Mondays and Thursdays) or every third dose. Aminoglycoside peaks are not to be drawn if patient on Once-Daily Dosing (ODD). Call physician or office if the trough is:     >1 for gentamicin,   >2 for tobramycin, or   >5 for amikacin  When clinically indicated obtain:  Urine culture. If the patient has a fever with purulent drainage from Jean or suprapubic catheter, or foul smelling urine. Do not irrigate a clogged Jean catheter. Replace it. Blood cultures and Wound Gram stain with culture & sensitivity. If the patient has a fever or increasing drainage or foul odor from a wound. Notify the treating physician in a timely manner  Stool specimen. If diarrhea occurs while on antibiotics, send stools for C. difficile and WBCs. When a drug is discontinued due to a low white blood cell count (WBCs) draw two consecutive CBC with differential and BUN, Ceatinine. ALLERGIC OR ADVERSE REACTIONS TO MEDICATIONS  Mild reaction: (itching, with or without rash):  Administer Benadryl 50mg po x 1, then 25mg po q6h prn. Notify office or physician in a timely matter. Moderate reaction (itching with or without rash and/or wheezing, dyspnea, itchy throat):  Administer Benadryl 50 mg IV push x 1. Notify office or physician in a timely manner. Severe reaction i.e. Anaphylaxis (wheezing or stidor, sudden rash, lightheadedness, hypotension):  Administer epinephrine subcutaneous 0.3mg (1:1000) x 1 dose. May repeat twice every 5 minutes if needed. Call EMS and notify office or physician immediately.   For all

## 2023-03-24 NOTE — ED PROVIDER NOTES
Department of Emergency Medicine   ED Provider Note  Admit Date/RoomTime: 3/24/2023  5:53 AM  [facility]          History of Present Illness:  3/24/23, Time: 6:04 AM EDT       Beckie Padron is a 70 y.o. female GERD, diastolic CHF, chronic venous stasis insufficiency, chronic wound of left lower extremity, presenting to the ED for IV antibiotics. Patient apparently tried to get home infusion of antibiotics, they were unable to teach her how to do home infusions, she states she states she did get 1 infusion in the hospital.  Patient recently positive for MRSA on wound culture of left lower extremity wound. Patient has had this wound for approximately the last 3 to 4 months. She follows with infectious disease as well as vascular surgery. Patient denies any fevers or chills. She states she occasionally has discharge from the wound. She has chronic pain in her hips, the wound to the left lower extremity. She is reportedly 85% SPO2 on room air when initially roomed, placed on 2 L O2 via nasal cannula. She denies any chest pain or shortness of breath or cough or fevers or chills. Denies abdominal pain or nausea or vomiting. She is not on home oxygen. Currently on vancomycin and meropenem intermittently for the past 2 days, starting on 3/22/2023 per chart review by infectious disease. Reviewed documentation from infectious disease yesterday on 3/23/2023, as below:    \"[facility] office pt started on IV antibiotics yesterday. nursing went out today and she is unable to learn infusion care. Nurse cielo [facility], they have call out to one but will likely be over a week to get her in. they were going to try and have patient direct admitted or go through the ER for her to be set up at nursing home.  [infectious disease ] was updated and he is going to speak with  [ vascular surgery ] and get back to me.  I will updated Mukesh, patient and option care once I

## 2023-03-24 NOTE — CARE COORDINATION
Social Work/ Transition of Care:    Pt presented to the ED yesterday 3/23 secondary to nursing facility placement for IV ATBs. Pt sent in to ED from ID. Pt has a PICC line in right arm placed on 3/22. Pt is admitted inpatient with infected statis ulcer of left lower extremity. Pt is on 3L oxygen. BENOIT met with pt who was sleeping but woke up to her name being called. Pt was active with [facility] for home IV ATBs but had no one to learn or assist with IV ATBs at home. Pt reports plan will be to go to [facility] upon discharge. BENOIT made referral to [facility] magdalene Sinclair. Pt will need PT/OT evals and insurance authorization prior to discharge. Infectious disease has also been consulted to see pt. Pt has been accepted [email or web address] and insurance authorization initiated.

## 2023-03-24 NOTE — CARE COORDINATION
Envelope,ambulance form, and completed PASRR in soft chart. Pt to go to [facility] once precert is obtained. Gino Darden, MSW, LSW

## 2023-03-24 NOTE — ED NOTES
Patient bed changed, new gown given, repositioned in bed multiple times. External urinary cath device intact.       Cassandra Torres RN  03/24/23 0033

## 2023-03-24 NOTE — LETTER
[id number] [facility] Encounter Date/Time: 3/24/2023 Brandee Sepulveda Account: [de-identified]    MRN: [id number]    Patient: Shabnam Chavez    Contact Serial #: [id number]      ENCOUNTER          Patient Class: I Private Enc? No Unit RM BDAlane Lesches [address] [place] [address] Service: MED   Encounter DX: MRSA (methicillin resist*   ADM Provider: Anjali Watkins DO   Procedure:     ATT Provider: Anjali Watkins DO   REF Provider:        Admission DX: MRSA (methicillin resistant staph aureus) culture positive, Acute kidney injury ([address] Acute respiratory failure with hypoxia ([address] Infected stasis ulcer of left lower extremity ([address] Venous stasis ulcer of left calf with fat layer exposed without varicose veins ([facility] and DX codes: Z22.322, N17.9, J96.01, I83.229, L97.929, I87.2, L97.222      PATIENT                 Name: Shabnam Chavez : 1945 (70 yrs)   Address: [address] [place] [address] Sex: Female   [place] city: [facility] [address]         Marital Status:    Employer: NONE         Denominational: Rastafari   Primary Care Provider: Joshua Mehta MD         Primary Phone: [phone]   EMERGENCY CONTACT   Contact Name Legal Guardian? Relationship to Patient Home Phone Work Phone   1. Erin Parks  2. Lenora Mitchell      Brother/Sister  Child [phone]              GUARANTOR            Guarantor: Shabnam Chavez     : 1945   Address: [address] [place] [address] Sex: Female     [place] [address]     Relation to Patient: Self       Home Phone: [phone]   Guarantor ID: [id number]       Work Phone:     Guarantor Employer: NONE         Status: NOT EMPLO*      COVERAGE        PRIMARY INSURANCE   Payor: [facility] MEDICARE Plan: Marva CHERRY*   Payor Address: ,          Group Number: [id number] Insurance Type: INDEMNITY   Subscriber Name: Calin Elizondo : 1945   Subscriber ID: [id number] Pat. Rel. to Sub: Self   SECONDARY INSURANCE   Payor:    [address]

## 2023-03-24 NOTE — CONSULTS
Department of Internal Medicine  Infectious Diseases   Consult Note      Reason for Consult:  leg wound infection       Requesting Physician:  Dr Ulices Dailey:               This is a 70 yrs old female with hx of CAD, COPD, morbid obesity, chronic venous insufficiency developed non healing wound of the left leg - she goes to [place] [address] wound care center for wound care . Her wound has not progressed as expected due to ongoing wound infection . Most recent cx grew MRSA , Acinetobacter and Pseudomonas . We attempted to start I abx at home (PICC was inserted . However, patient was not able to do home IV herself . So, pt was asked to come to the hospital for placement and continuation of IV antibiotic therapy .   Pt denies fever , chills or rigors     Past Medical History:      Past Medical History:   Diagnosis Date    Bursitis     CAD (coronary artery disease) 1993    heart attack    Cellulitis of leg, left 10/03/2022    COPD (chronic obstructive pulmonary disease) ([address] 06/19/2013    Dermatophytosis 01/27/2023    Emphysema     slight    GERD (gastroesophageal reflux disease)     Hiatal hernia     Hip pain     Hx of blood clots     Hyperlipidemia     Hypertension     Lymphedema of both lower extremities 11/21/2018    Venous insufficiency of both lower extremities 11/21/2018    Venous stasis ulcer of left calf with fat layer exposed without varicose veins ([address] 12/08/2021    Venous stasis ulcer of left calf with fat layer exposed without varicose veins ([address] 12/08/2021    Longstanding ulcer over the shin of the left calf, with a new ulcer over the posterior aspect of the left calf, for the last 1 week    Venous ulcer with fat layer exposed ([address] 10/28/2015       Past Surgical History:      Past Surgical History:   Procedure Laterality Date    APPENDECTOMY      BREAST ENHANCEMENT SURGERY      BREAST REDUCTION SURGERY      CHOLECYSTECTOMY      COLONOSCOPY      ECHO COMPL W DOP COLOR FLOW  3/11/2013

## 2023-03-24 NOTE — ED NOTES
Department of Emergency Medicine  FIRST PROVIDER TRIAGE NOTE             Independent MLP           3/23/23  8:54 PM EDT    Date of Encounter: 3/23/23   MRN: [id number]      HPI: Liu Staton is a 70 y.o. female who presents to the ED for No chief complaint on file. Sent in by infectious disease, Dr. Henrietta Gaviria for admission for IV antibiotics and nursing home placement. Patient with MRSA to left leg ulcer. ROS: Negative for cp or sob. PE: Gen Appearance/Constitutional: alert  HEENT: NC/NT. PERRLA,  Airway patent. Initial Plan of Care: All treatment areas with department are currently occupied. Plan to order/Initiate the following while awaiting opening in ED: labs and EKG.   Initiate Treatment-Testing, Proceed toTreatment Area When Bed Available for ED Attending/TOMASZP to Continue Care    Electronically signed by MESSI Mackey CNP   DD: 3/23/23       MESSI Mackey CNP  03/23/23 2058

## 2023-03-24 NOTE — ED NOTES
Assumed care of pt at this time; having a hard time getting pulse ox due to fingers being cold and patient movement; physicians aware     Paola Singh RN  03/24/23 3673

## 2023-03-24 NOTE — ED NOTES
Patient bed changed clean linen placed, new gown given, external urinary cath replaced.       Merline Portugal, RN  03/24/23 5495

## 2023-03-25 ENCOUNTER — APPOINTMENT (OUTPATIENT)
Dept: GENERAL RADIOLOGY | Age: 78
DRG: 602 | End: 2023-03-25
Payer: MEDICARE

## 2023-03-25 LAB
ANION GAP SERPL CALCULATED.3IONS-SCNC: 7 MMOL/L (ref 7–16)
BUN SERPL-MCNC: 20 MG/DL (ref 6–23)
CALCIUM SERPL-MCNC: 8.7 MG/DL (ref 8.6–10.2)
CHLORIDE SERPL-SCNC: 103 MMOL/L (ref 98–107)
CO2 SERPL-SCNC: 29 MMOL/L (ref 22–29)
CREAT SERPL-MCNC: 0.6 MG/DL (ref 0.5–1)
ERYTHROCYTE [DISTWIDTH] IN BLOOD BY AUTOMATED COUNT: 12.3 FL (ref 11.5–15)
GLUCOSE SERPL-MCNC: 95 MG/DL (ref 74–99)
HCT VFR BLD AUTO: 32.8 % (ref 34–48)
HGB BLD-MCNC: 10.3 G/DL (ref 11.5–15.5)
MCH RBC QN AUTO: 31.6 PG (ref 26–35)
MCHC RBC AUTO-ENTMCNC: 31.4 % (ref 32–34.5)
MCV RBC AUTO: 100.6 FL (ref 80–99.9)
PLATELET # BLD AUTO: 141 E9/L (ref 130–450)
PMV BLD AUTO: 10.4 FL (ref 7–12)
POTASSIUM SERPL-SCNC: 3.7 MMOL/L (ref 3.5–5)
RBC # BLD AUTO: 3.26 E12/L (ref 3.5–5.5)
SODIUM SERPL-SCNC: 139 MMOL/L (ref 132–146)
VANCOMYCIN TROUGH SERPL-MCNC: 13.5 MCG/ML (ref 5–16)
WBC # BLD: 7 E9/L (ref 4.5–11.5)

## 2023-03-25 PROCEDURE — 80202 ASSAY OF VANCOMYCIN: CPT

## 2023-03-25 PROCEDURE — 73502 X-RAY EXAM HIP UNI 2-3 VIEWS: CPT

## 2023-03-25 PROCEDURE — 94640 AIRWAY INHALATION TREATMENT: CPT

## 2023-03-25 PROCEDURE — 2060000000 HC ICU INTERMEDIATE R&B

## 2023-03-25 PROCEDURE — 80048 BASIC METABOLIC PNL TOTAL CA: CPT

## 2023-03-25 PROCEDURE — 36415 COLL VENOUS BLD VENIPUNCTURE: CPT

## 2023-03-25 PROCEDURE — 6370000000 HC RX 637 (ALT 250 FOR IP): Performed by: INTERNAL MEDICINE

## 2023-03-25 PROCEDURE — 2580000003 HC RX 258: Performed by: INTERNAL MEDICINE

## 2023-03-25 PROCEDURE — 6360000002 HC RX W HCPCS: Performed by: INTERNAL MEDICINE

## 2023-03-25 PROCEDURE — 71045 X-RAY EXAM CHEST 1 VIEW: CPT

## 2023-03-25 PROCEDURE — 85027 COMPLETE CBC AUTOMATED: CPT

## 2023-03-25 RX ORDER — POLYETHYLENE GLYCOL 3350 17 G/17G
17 POWDER, FOR SOLUTION ORAL DAILY PRN
Qty: 527 G | Refills: 1 | DISCHARGE
Start: 2023-03-25 | End: 2023-04-24

## 2023-03-25 RX ORDER — ARFORMOTEROL TARTRATE 15 UG/2ML
15 SOLUTION RESPIRATORY (INHALATION) 2 TIMES DAILY
Qty: 120 ML | Refills: 3 | DISCHARGE
Start: 2023-03-25

## 2023-03-25 RX ORDER — IPRATROPIUM BROMIDE AND ALBUTEROL SULFATE 2.5; .5 MG/3ML; MG/3ML
3 SOLUTION RESPIRATORY (INHALATION) EVERY 4 HOURS PRN
Qty: 360 ML | DISCHARGE
Start: 2023-03-25

## 2023-03-25 RX ORDER — MORPHINE SULFATE 15 MG/1
15 TABLET, FILM COATED, EXTENDED RELEASE ORAL 2 TIMES DAILY
Qty: 4 TABLET | Refills: 0 | Status: SHIPPED | OUTPATIENT
Start: 2023-03-25 | End: 2023-03-27

## 2023-03-25 RX ORDER — OXYCODONE AND ACETAMINOPHEN 7.5; 325 MG/1; MG/1
1 TABLET ORAL EVERY 12 HOURS PRN
Qty: 4 TABLET | Refills: 0 | Status: SHIPPED | OUTPATIENT
Start: 2023-03-25 | End: 2023-03-27

## 2023-03-25 RX ORDER — BUDESONIDE 0.5 MG/2ML
500 INHALANT ORAL 2 TIMES DAILY
Qty: 60 EACH | Refills: 3 | DISCHARGE
Start: 2023-03-25

## 2023-03-25 RX ADMIN — Medication 2000 UNITS: at 09:52

## 2023-03-25 RX ADMIN — ASPIRIN 81 MG CHEWABLE TABLET 81 MG: 81 TABLET CHEWABLE at 09:52

## 2023-03-25 RX ADMIN — MORPHINE SULFATE 15 MG: 15 TABLET, FILM COATED, EXTENDED RELEASE ORAL at 09:52

## 2023-03-25 RX ADMIN — PANTOPRAZOLE SODIUM 40 MG: 40 TABLET, DELAYED RELEASE ORAL at 05:42

## 2023-03-25 RX ADMIN — GABAPENTIN 300 MG: 300 CAPSULE ORAL at 09:52

## 2023-03-25 RX ADMIN — ENOXAPARIN SODIUM 30 MG: 100 INJECTION SUBCUTANEOUS at 20:45

## 2023-03-25 RX ADMIN — BUDESONIDE 500 MCG: 0.5 SUSPENSION RESPIRATORY (INHALATION) at 20:29

## 2023-03-25 RX ADMIN — FERROUS SULFATE TAB 325 MG (65 MG ELEMENTAL FE) 325 MG: 325 (65 FE) TAB at 20:45

## 2023-03-25 RX ADMIN — VANCOMYCIN HYDROCHLORIDE 1500 MG: 10 INJECTION, POWDER, LYOPHILIZED, FOR SOLUTION INTRAVENOUS at 13:09

## 2023-03-25 RX ADMIN — SODIUM CHLORIDE, PRESERVATIVE FREE 10 ML: 5 INJECTION INTRAVENOUS at 09:53

## 2023-03-25 RX ADMIN — SODIUM CHLORIDE, PRESERVATIVE FREE 10 ML: 5 INJECTION INTRAVENOUS at 20:46

## 2023-03-25 RX ADMIN — MEROPENEM 1000 MG: 1 INJECTION, POWDER, FOR SOLUTION INTRAVENOUS at 10:42

## 2023-03-25 RX ADMIN — ARFORMOTEROL TARTRATE 15 MCG: 15 SOLUTION RESPIRATORY (INHALATION) at 20:30

## 2023-03-25 RX ADMIN — MEROPENEM 1000 MG: 1 INJECTION, POWDER, FOR SOLUTION INTRAVENOUS at 23:55

## 2023-03-25 RX ADMIN — MORPHINE SULFATE 15 MG: 15 TABLET, FILM COATED, EXTENDED RELEASE ORAL at 20:45

## 2023-03-25 RX ADMIN — SODIUM CHLORIDE: 9 INJECTION, SOLUTION INTRAVENOUS at 05:33

## 2023-03-25 RX ADMIN — FOLIC ACID 500 MCG: 1 TABLET ORAL at 20:45

## 2023-03-25 RX ADMIN — GABAPENTIN 300 MG: 300 CAPSULE ORAL at 13:51

## 2023-03-25 RX ADMIN — GABAPENTIN 300 MG: 300 CAPSULE ORAL at 20:45

## 2023-03-25 RX ADMIN — OXYCODONE AND ACETAMINOPHEN 1 TABLET: 5; 325 TABLET ORAL at 16:36

## 2023-03-25 RX ADMIN — OXYCODONE AND ACETAMINOPHEN 1 TABLET: 5; 325 TABLET ORAL at 05:45

## 2023-03-25 RX ADMIN — ENOXAPARIN SODIUM 30 MG: 100 INJECTION SUBCUTANEOUS at 09:52

## 2023-03-25 ASSESSMENT — PAIN DESCRIPTION - LOCATION: LOCATION: HIP

## 2023-03-25 ASSESSMENT — PAIN DESCRIPTION - DESCRIPTORS: DESCRIPTORS: ACHING

## 2023-03-25 ASSESSMENT — PAIN DESCRIPTION - ORIENTATION: ORIENTATION: RIGHT

## 2023-03-25 NOTE — CARE COORDINATION
Message sent to Dottie to see if late auth was obtained for patient. Discharge on hold until insurance approves auth. Pasrr and ambulance on soft chart. Dottie called back, no precert for the weekend. For questions I can be reached at [phone].  Sanjay Juárez [place]

## 2023-03-25 NOTE — DISCHARGE INSTR - COC
Continuity of Care Form    Patient Name: Savita Tapia   :  1945  MRN:  [id number]    Admit date:  3/24/2023  Discharge date:  ***    Code Status Order: Full Code   Advance Directives:     Admitting Physician:  Areli Alvarado DO  PCP: Melvin Conway MD    Discharging Nurse: [facility] Unit/Room#: 1317/8072-N  Discharging Unit Phone Number: ***    Emergency Contact:   Extended Emergency Contact Information  Primary Emergency Contact: CharlyErin  Address: [address] [place] [address], [address] [place] [address] Phone: [phone]  Mobile Phone: [phone]  Relation: Brother/Sister  Preferred language: English   needed? No  Secondary Emergency Contact: Sandra Stark  Address: [address] [place] [address],[address]           [address] Phone: [phone]  Mobile Phone: [phone]  Relation: Child    Past Surgical History:  Past Surgical History:   Procedure Laterality Date    APPENDECTOMY      BREAST ENHANCEMENT SURGERY      BREAST REDUCTION SURGERY      CHOLECYSTECTOMY      COLONOSCOPY      ECHO COMPL W DOP COLOR FLOW  3/11/2013         ENDOSCOPY, COLON, DIAGNOSTIC      HERNIA REPAIR N/A 2021    LAPAROSCOPIC ROBOTIC ASSISTED INGUINAL HERNIA REPAIR performed by Jyoti Lauern MD at [facility] ([address] [place] [address])      JOINT REPLACEMENT  2009    l knee r hip    LEG DEBRIDEMENT Left 2015    LEG DEBRIDEMENT Left 2016       Immunization History: There is no immunization history on file for this patient.     Active Problems:  Patient Active Problem List   Diagnosis Code    Hypoxia R09.02    COPD (chronic obstructive pulmonary disease) ([facility]) J44.9    Primary hypertension I10    Hyperlipidemia E78.5    GERD (gastroesophageal reflux disease) M60.9    Diastolic CHF, chronic ([facility]) I50.32    Bursitis M71.9    Venous insufficiency of both lower extremities I87.2

## 2023-03-25 NOTE — PLAN OF CARE
Problem: Discharge Planning  Goal: Discharge to home or other facility with appropriate resources  Outcome: Progressing     Problem: Safety - Adult  Goal: Free from fall injury  Outcome: Progressing  Flowsheets (Taken 3/25/2023 1553)  Free From Fall Injury: Instruct family/caregiver on patient safety     Problem: ABCDS Injury Assessment  Goal: Absence of physical injury  Outcome: Progressing  Flowsheets (Taken 3/25/2023 1553)  Absence of Physical Injury: Implement safety measures based on patient assessment     Problem: Pain  Goal: Verbalizes/displays adequate comfort level or baseline comfort level  Outcome: Progressing

## 2023-03-26 LAB
ANION GAP SERPL CALCULATED.3IONS-SCNC: 8 MMOL/L (ref 7–16)
BUN SERPL-MCNC: 9 MG/DL (ref 6–23)
CALCIUM SERPL-MCNC: 8.9 MG/DL (ref 8.6–10.2)
CHLORIDE SERPL-SCNC: 107 MMOL/L (ref 98–107)
CO2 SERPL-SCNC: 29 MMOL/L (ref 22–29)
CREAT SERPL-MCNC: 0.5 MG/DL (ref 0.5–1)
ERYTHROCYTE [DISTWIDTH] IN BLOOD BY AUTOMATED COUNT: 12.5 FL (ref 11.5–15)
GLUCOSE SERPL-MCNC: 109 MG/DL (ref 74–99)
HCT VFR BLD AUTO: 33.5 % (ref 34–48)
HGB BLD-MCNC: 10.3 G/DL (ref 11.5–15.5)
MCH RBC QN AUTO: 30.8 PG (ref 26–35)
MCHC RBC AUTO-ENTMCNC: 30.7 % (ref 32–34.5)
MCV RBC AUTO: 100.3 FL (ref 80–99.9)
PLATELET # BLD AUTO: 147 E9/L (ref 130–450)
PMV BLD AUTO: 9.8 FL (ref 7–12)
POTASSIUM SERPL-SCNC: 3.7 MMOL/L (ref 3.5–5)
RBC # BLD AUTO: 3.34 E12/L (ref 3.5–5.5)
SODIUM SERPL-SCNC: 144 MMOL/L (ref 132–146)
WBC # BLD: 5.1 E9/L (ref 4.5–11.5)

## 2023-03-26 PROCEDURE — 2060000000 HC ICU INTERMEDIATE R&B

## 2023-03-26 PROCEDURE — 80048 BASIC METABOLIC PNL TOTAL CA: CPT

## 2023-03-26 PROCEDURE — 97535 SELF CARE MNGMENT TRAINING: CPT

## 2023-03-26 PROCEDURE — 6370000000 HC RX 637 (ALT 250 FOR IP): Performed by: INTERNAL MEDICINE

## 2023-03-26 PROCEDURE — 36415 COLL VENOUS BLD VENIPUNCTURE: CPT

## 2023-03-26 PROCEDURE — 97530 THERAPEUTIC ACTIVITIES: CPT

## 2023-03-26 PROCEDURE — 85027 COMPLETE CBC AUTOMATED: CPT

## 2023-03-26 PROCEDURE — 94640 AIRWAY INHALATION TREATMENT: CPT

## 2023-03-26 PROCEDURE — 6360000002 HC RX W HCPCS: Performed by: INTERNAL MEDICINE

## 2023-03-26 PROCEDURE — 2580000003 HC RX 258: Performed by: INTERNAL MEDICINE

## 2023-03-26 RX ORDER — METOPROLOL TARTRATE 50 MG/1
50 TABLET, FILM COATED ORAL 2 TIMES DAILY
Status: DISCONTINUED | OUTPATIENT
Start: 2023-03-26 | End: 2023-03-30 | Stop reason: HOSPADM

## 2023-03-26 RX ADMIN — SODIUM CHLORIDE, PRESERVATIVE FREE 10 ML: 5 INJECTION INTRAVENOUS at 08:46

## 2023-03-26 RX ADMIN — ARFORMOTEROL TARTRATE 15 MCG: 15 SOLUTION RESPIRATORY (INHALATION) at 20:55

## 2023-03-26 RX ADMIN — OXYCODONE AND ACETAMINOPHEN 1 TABLET: 5; 325 TABLET ORAL at 12:59

## 2023-03-26 RX ADMIN — VANCOMYCIN HYDROCHLORIDE 1500 MG: 10 INJECTION, POWDER, LYOPHILIZED, FOR SOLUTION INTRAVENOUS at 12:17

## 2023-03-26 RX ADMIN — SODIUM CHLORIDE, PRESERVATIVE FREE 10 ML: 5 INJECTION INTRAVENOUS at 20:50

## 2023-03-26 RX ADMIN — OXYCODONE HYDROCHLORIDE 2.5 MG: 5 TABLET ORAL at 23:55

## 2023-03-26 RX ADMIN — ENOXAPARIN SODIUM 30 MG: 100 INJECTION SUBCUTANEOUS at 08:46

## 2023-03-26 RX ADMIN — MORPHINE SULFATE 15 MG: 15 TABLET, FILM COATED, EXTENDED RELEASE ORAL at 20:50

## 2023-03-26 RX ADMIN — GABAPENTIN 300 MG: 300 CAPSULE ORAL at 13:44

## 2023-03-26 RX ADMIN — METOPROLOL TARTRATE 50 MG: 50 TABLET, FILM COATED ORAL at 10:17

## 2023-03-26 RX ADMIN — MORPHINE SULFATE 15 MG: 15 TABLET, FILM COATED, EXTENDED RELEASE ORAL at 08:47

## 2023-03-26 RX ADMIN — METOPROLOL TARTRATE 50 MG: 50 TABLET, FILM COATED ORAL at 20:50

## 2023-03-26 RX ADMIN — ENOXAPARIN SODIUM 30 MG: 100 INJECTION SUBCUTANEOUS at 20:50

## 2023-03-26 RX ADMIN — OXYCODONE AND ACETAMINOPHEN 1 TABLET: 5; 325 TABLET ORAL at 23:55

## 2023-03-26 RX ADMIN — BUDESONIDE 500 MCG: 0.5 SUSPENSION RESPIRATORY (INHALATION) at 20:55

## 2023-03-26 RX ADMIN — GABAPENTIN 300 MG: 300 CAPSULE ORAL at 20:49

## 2023-03-26 RX ADMIN — ASPIRIN 81 MG CHEWABLE TABLET 81 MG: 81 TABLET CHEWABLE at 08:47

## 2023-03-26 RX ADMIN — Medication 2000 UNITS: at 08:47

## 2023-03-26 RX ADMIN — FERROUS SULFATE TAB 325 MG (65 MG ELEMENTAL FE) 325 MG: 325 (65 FE) TAB at 20:49

## 2023-03-26 RX ADMIN — OXYCODONE HYDROCHLORIDE 2.5 MG: 5 TABLET ORAL at 00:00

## 2023-03-26 RX ADMIN — MEROPENEM 1000 MG: 1 INJECTION, POWDER, FOR SOLUTION INTRAVENOUS at 23:55

## 2023-03-26 RX ADMIN — OXYCODONE HYDROCHLORIDE 2.5 MG: 5 TABLET ORAL at 12:59

## 2023-03-26 RX ADMIN — OXYCODONE AND ACETAMINOPHEN 1 TABLET: 5; 325 TABLET ORAL at 00:05

## 2023-03-26 RX ADMIN — PANTOPRAZOLE SODIUM 40 MG: 40 TABLET, DELAYED RELEASE ORAL at 06:00

## 2023-03-26 RX ADMIN — FOLIC ACID 500 MCG: 1 TABLET ORAL at 20:49

## 2023-03-26 RX ADMIN — GABAPENTIN 300 MG: 300 CAPSULE ORAL at 08:47

## 2023-03-26 RX ADMIN — MEROPENEM 1000 MG: 1 INJECTION, POWDER, FOR SOLUTION INTRAVENOUS at 10:21

## 2023-03-26 ASSESSMENT — PAIN DESCRIPTION - LOCATION
LOCATION: HIP
LOCATION: HIP
LOCATION: HIP;LEG

## 2023-03-26 ASSESSMENT — PAIN SCALES - GENERAL
PAINLEVEL_OUTOF10: 8
PAINLEVEL_OUTOF10: 8
PAINLEVEL_OUTOF10: 10

## 2023-03-26 ASSESSMENT — PAIN DESCRIPTION - ORIENTATION
ORIENTATION: RIGHT
ORIENTATION: RIGHT

## 2023-03-26 NOTE — CONSULTS
Department of Orthopedic Trauma Surgery  Resident consult note      CHIEF COMPLAINT:   Chief Complaint   Patient presents with    Other     MRSA to left left. Dr. Hammond sent in for IV antibiotics, PICC in place.        HISTORY OF PRESENT ILLNESS:                Patient is a 78 y.o. female who presents to the hospital for a wound infection being managed by Dr. Hammond.  Patient has a total hip arthroplasty on the right side that was done approximately 10 years ago. Patient complains of right hip pain after a fall several weeks ago.  She ambulates without assistive devices.  She does admit to having pain to the right hip for several years now prior to the fall.  Her pain after falling is slightly worse than the pain prior to the fall she has had a trochanteric injection approximately 5 years ago that did help with her pain.  Complains of some mild numbness and paresthesias to the lower extremities in the feet and ankle secondary to her chronic neuropathy.  Denies any other orthopedic complaints at this time.         Past Medical History:        Diagnosis Date    Bursitis     CAD (coronary artery disease) 1993    heart attack    Cellulitis of leg, left 10/03/2022    COPD (chronic obstructive pulmonary disease) ([facility]) 06/19/2013    Dermatophytosis 01/27/2023    Emphysema     slight    GERD (gastroesophageal reflux disease)     Hiatal hernia     Hip pain     Hx of blood clots     Hyperlipidemia     Hypertension     Lymphedema of both lower extremities 11/21/2018    Venous insufficiency of both lower extremities 11/21/2018    Venous stasis ulcer of left calf with fat layer exposed without varicose veins ([facility]) 12/08/2021    Venous stasis ulcer of left calf with fat layer exposed without varicose veins ([facility]) 12/08/2021    Longstanding ulcer over the shin of the left calf, with a new ulcer over the posterior aspect of the left calf, for the last 1 week    Venous ulcer with fat layer exposed ([facility]) 10/28/2015     Past Surgical

## 2023-03-26 NOTE — PLAN OF CARE
Problem: Discharge Planning  Goal: Discharge to home or other facility with appropriate resources  Outcome: Progressing  Flowsheets (Taken 3/26/2023 0851)  Discharge to home or other facility with appropriate resources: Identify barriers to discharge with patient and caregiver     Problem: Safety - Adult  Goal: Free from fall injury  Outcome: Progressing  Flowsheets (Taken 3/26/2023 1835)  Free From Fall Injury: Instruct family/caregiver on patient safety     Problem: ABCDS Injury Assessment  Goal: Absence of physical injury  Outcome: Progressing  Flowsheets (Taken 3/26/2023 1835)  Absence of Physical Injury: Implement safety measures based on patient assessment     Problem: Pain  Goal: Verbalizes/displays adequate comfort level or baseline comfort level  Outcome: Progressing

## 2023-03-27 LAB
ANION GAP SERPL CALCULATED.3IONS-SCNC: 7 MMOL/L (ref 7–16)
BUN SERPL-MCNC: 4 MG/DL (ref 6–23)
CALCIUM SERPL-MCNC: 8.6 MG/DL (ref 8.6–10.2)
CHLORIDE SERPL-SCNC: 98 MMOL/L (ref 98–107)
CO2 SERPL-SCNC: 30 MMOL/L (ref 22–29)
CREAT SERPL-MCNC: 0.5 MG/DL (ref 0.5–1)
ERYTHROCYTE [DISTWIDTH] IN BLOOD BY AUTOMATED COUNT: 12.5 FL (ref 11.5–15)
GLUCOSE SERPL-MCNC: 92 MG/DL (ref 74–99)
HCT VFR BLD AUTO: 33.6 % (ref 34–48)
HGB BLD-MCNC: 10.7 G/DL (ref 11.5–15.5)
MCH RBC QN AUTO: 31.4 PG (ref 26–35)
MCHC RBC AUTO-ENTMCNC: 31.8 % (ref 32–34.5)
MCV RBC AUTO: 98.5 FL (ref 80–99.9)
PLATELET # BLD AUTO: 150 E9/L (ref 130–450)
PMV BLD AUTO: 10 FL (ref 7–12)
POTASSIUM SERPL-SCNC: 3.5 MMOL/L (ref 3.5–5)
RBC # BLD AUTO: 3.41 E12/L (ref 3.5–5.5)
SODIUM SERPL-SCNC: 135 MMOL/L (ref 132–146)
WBC # BLD: 3.9 E9/L (ref 4.5–11.5)

## 2023-03-27 PROCEDURE — 85027 COMPLETE CBC AUTOMATED: CPT

## 2023-03-27 PROCEDURE — 6360000002 HC RX W HCPCS: Performed by: INTERNAL MEDICINE

## 2023-03-27 PROCEDURE — 36415 COLL VENOUS BLD VENIPUNCTURE: CPT

## 2023-03-27 PROCEDURE — 2580000003 HC RX 258: Performed by: INTERNAL MEDICINE

## 2023-03-27 PROCEDURE — 94640 AIRWAY INHALATION TREATMENT: CPT

## 2023-03-27 PROCEDURE — 1200000000 HC SEMI PRIVATE

## 2023-03-27 PROCEDURE — 6370000000 HC RX 637 (ALT 250 FOR IP): Performed by: INTERNAL MEDICINE

## 2023-03-27 PROCEDURE — 80048 BASIC METABOLIC PNL TOTAL CA: CPT

## 2023-03-27 RX ORDER — HYDROCHLOROTHIAZIDE 25 MG/1
25 TABLET ORAL DAILY
Status: DISCONTINUED | OUTPATIENT
Start: 2023-03-27 | End: 2023-03-30 | Stop reason: HOSPADM

## 2023-03-27 RX ADMIN — ENOXAPARIN SODIUM 30 MG: 100 INJECTION SUBCUTANEOUS at 09:08

## 2023-03-27 RX ADMIN — ENOXAPARIN SODIUM 30 MG: 100 INJECTION SUBCUTANEOUS at 21:25

## 2023-03-27 RX ADMIN — Medication 2000 UNITS: at 09:05

## 2023-03-27 RX ADMIN — MORPHINE SULFATE 15 MG: 15 TABLET, FILM COATED, EXTENDED RELEASE ORAL at 09:05

## 2023-03-27 RX ADMIN — FERROUS SULFATE TAB 325 MG (65 MG ELEMENTAL FE) 325 MG: 325 (65 FE) TAB at 21:21

## 2023-03-27 RX ADMIN — MEROPENEM 1000 MG: 1 INJECTION, POWDER, FOR SOLUTION INTRAVENOUS at 21:25

## 2023-03-27 RX ADMIN — ARFORMOTEROL TARTRATE 15 MCG: 15 SOLUTION RESPIRATORY (INHALATION) at 20:29

## 2023-03-27 RX ADMIN — ARFORMOTEROL TARTRATE 15 MCG: 15 SOLUTION RESPIRATORY (INHALATION) at 09:18

## 2023-03-27 RX ADMIN — PANTOPRAZOLE SODIUM 40 MG: 40 TABLET, DELAYED RELEASE ORAL at 06:18

## 2023-03-27 RX ADMIN — OXYCODONE AND ACETAMINOPHEN 1 TABLET: 5; 325 TABLET ORAL at 09:12

## 2023-03-27 RX ADMIN — GABAPENTIN 300 MG: 300 CAPSULE ORAL at 21:21

## 2023-03-27 RX ADMIN — ASPIRIN 81 MG CHEWABLE TABLET 81 MG: 81 TABLET CHEWABLE at 09:06

## 2023-03-27 RX ADMIN — HYDROCHLOROTHIAZIDE 25 MG: 25 TABLET ORAL at 17:30

## 2023-03-27 RX ADMIN — BUDESONIDE 500 MCG: 0.5 SUSPENSION RESPIRATORY (INHALATION) at 20:29

## 2023-03-27 RX ADMIN — FOLIC ACID 500 MCG: 1 TABLET ORAL at 21:21

## 2023-03-27 RX ADMIN — METOPROLOL TARTRATE 50 MG: 50 TABLET, FILM COATED ORAL at 21:21

## 2023-03-27 RX ADMIN — Medication 500 MCG: at 09:05

## 2023-03-27 RX ADMIN — OXYCODONE HYDROCHLORIDE 2.5 MG: 5 TABLET ORAL at 19:24

## 2023-03-27 RX ADMIN — MEROPENEM 1000 MG: 1 INJECTION, POWDER, FOR SOLUTION INTRAVENOUS at 12:06

## 2023-03-27 RX ADMIN — GABAPENTIN 300 MG: 300 CAPSULE ORAL at 14:20

## 2023-03-27 RX ADMIN — ATORVASTATIN CALCIUM 20 MG: 20 TABLET, FILM COATED ORAL at 09:05

## 2023-03-27 RX ADMIN — SODIUM CHLORIDE, PRESERVATIVE FREE 10 ML: 5 INJECTION INTRAVENOUS at 09:08

## 2023-03-27 RX ADMIN — BUDESONIDE 500 MCG: 0.5 SUSPENSION RESPIRATORY (INHALATION) at 09:18

## 2023-03-27 RX ADMIN — Medication 1000 MG: at 09:08

## 2023-03-27 RX ADMIN — MORPHINE SULFATE 15 MG: 15 TABLET, FILM COATED, EXTENDED RELEASE ORAL at 21:21

## 2023-03-27 RX ADMIN — METOPROLOL TARTRATE 50 MG: 50 TABLET, FILM COATED ORAL at 09:05

## 2023-03-27 RX ADMIN — OXYCODONE HYDROCHLORIDE 2.5 MG: 5 TABLET ORAL at 09:11

## 2023-03-27 RX ADMIN — OXYCODONE AND ACETAMINOPHEN 1 TABLET: 5; 325 TABLET ORAL at 19:24

## 2023-03-27 RX ADMIN — Medication 50 MG: at 09:06

## 2023-03-27 RX ADMIN — VANCOMYCIN HYDROCHLORIDE 1500 MG: 10 INJECTION, POWDER, LYOPHILIZED, FOR SOLUTION INTRAVENOUS at 13:13

## 2023-03-27 RX ADMIN — GABAPENTIN 300 MG: 300 CAPSULE ORAL at 09:06

## 2023-03-27 ASSESSMENT — PAIN SCALES - GENERAL
PAINLEVEL_OUTOF10: 4
PAINLEVEL_OUTOF10: 9
PAINLEVEL_OUTOF10: 8
PAINLEVEL_OUTOF10: 4

## 2023-03-27 ASSESSMENT — PAIN DESCRIPTION - LOCATION
LOCATION: GENERALIZED
LOCATION: HIP
LOCATION: HIP

## 2023-03-27 ASSESSMENT — PAIN DESCRIPTION - DESCRIPTORS
DESCRIPTORS: DISCOMFORT
DESCRIPTORS: ACHING
DESCRIPTORS: DISCOMFORT

## 2023-03-27 ASSESSMENT — PAIN DESCRIPTION - ORIENTATION
ORIENTATION: RIGHT
ORIENTATION: LEFT

## 2023-03-27 NOTE — PLAN OF CARE
Patient's chart updated to reflect:      . - HF care plan, HF education points and HF discharge instructions.  -Orders: 2 gram sodium diet, daily weights, I/O.  -PCP and/or Cardiologist appointment to be scheduled within 7 days of hospital discharge.  -History of HF, not primary admission Dx.   Patient admitted for treatment of ASSESSMENT:       Principal Problem:    Infected stasis ulcer of left lower extremity ([address]  Active Problems:    DAYTON (acute kidney injury) ([address]    Respiratory failure with hypoxia ([address]    COPD (chronic obstructive pulmonary disease) ([address]    RAMAKRISHNA Dowd, RN RN, BSN  Heart Failure Navigator
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
## 2023-03-28 LAB — VANCOMYCIN SERPL-MCNC: 18.1 MCG/ML (ref 5–40)

## 2023-03-28 PROCEDURE — 6360000002 HC RX W HCPCS: Performed by: INTERNAL MEDICINE

## 2023-03-28 PROCEDURE — 97535 SELF CARE MNGMENT TRAINING: CPT

## 2023-03-28 PROCEDURE — 2580000003 HC RX 258: Performed by: INTERNAL MEDICINE

## 2023-03-28 PROCEDURE — 80202 ASSAY OF VANCOMYCIN: CPT

## 2023-03-28 PROCEDURE — 6370000000 HC RX 637 (ALT 250 FOR IP): Performed by: INTERNAL MEDICINE

## 2023-03-28 PROCEDURE — 6360000002 HC RX W HCPCS

## 2023-03-28 PROCEDURE — 97530 THERAPEUTIC ACTIVITIES: CPT

## 2023-03-28 PROCEDURE — 1200000000 HC SEMI PRIVATE

## 2023-03-28 RX ORDER — SODIUM CHLORIDE 0.9 % (FLUSH) 0.9 %
5-40 SYRINGE (ML) INJECTION EVERY 12 HOURS SCHEDULED
Status: DISCONTINUED | OUTPATIENT
Start: 2023-03-28 | End: 2023-03-30 | Stop reason: HOSPADM

## 2023-03-28 RX ORDER — HEPARIN SODIUM (PORCINE) LOCK FLUSH IV SOLN 100 UNIT/ML 100 UNIT/ML
SOLUTION INTRAVENOUS
Status: COMPLETED
Start: 2023-03-28 | End: 2023-03-28

## 2023-03-28 RX ORDER — LIDOCAINE HYDROCHLORIDE 10 MG/ML
5 INJECTION, SOLUTION EPIDURAL; INFILTRATION; INTRACAUDAL; PERINEURAL ONCE
Status: DISCONTINUED | OUTPATIENT
Start: 2023-03-28 | End: 2023-03-30 | Stop reason: HOSPADM

## 2023-03-28 RX ORDER — SODIUM CHLORIDE 0.9 % (FLUSH) 0.9 %
5-40 SYRINGE (ML) INJECTION PRN
Status: DISCONTINUED | OUTPATIENT
Start: 2023-03-28 | End: 2023-03-30 | Stop reason: HOSPADM

## 2023-03-28 RX ORDER — HEPARIN SODIUM (PORCINE) LOCK FLUSH IV SOLN 100 UNIT/ML 100 UNIT/ML
3 SOLUTION INTRAVENOUS EVERY 12 HOURS SCHEDULED
Status: DISCONTINUED | OUTPATIENT
Start: 2023-03-28 | End: 2023-03-30 | Stop reason: HOSPADM

## 2023-03-28 RX ORDER — HEPARIN SODIUM (PORCINE) LOCK FLUSH IV SOLN 100 UNIT/ML 100 UNIT/ML
3 SOLUTION INTRAVENOUS PRN
Status: DISCONTINUED | OUTPATIENT
Start: 2023-03-28 | End: 2023-03-30 | Stop reason: HOSPADM

## 2023-03-28 RX ORDER — SODIUM CHLORIDE 9 MG/ML
INJECTION, SOLUTION INTRAVENOUS PRN
Status: DISCONTINUED | OUTPATIENT
Start: 2023-03-28 | End: 2023-03-30 | Stop reason: HOSPADM

## 2023-03-28 RX ADMIN — VANCOMYCIN HYDROCHLORIDE 1500 MG: 10 INJECTION, POWDER, LYOPHILIZED, FOR SOLUTION INTRAVENOUS at 15:10

## 2023-03-28 RX ADMIN — PANTOPRAZOLE SODIUM 40 MG: 40 TABLET, DELAYED RELEASE ORAL at 09:04

## 2023-03-28 RX ADMIN — MORPHINE SULFATE 15 MG: 15 TABLET, FILM COATED, EXTENDED RELEASE ORAL at 09:04

## 2023-03-28 RX ADMIN — FOLIC ACID 500 MCG: 1 TABLET ORAL at 19:40

## 2023-03-28 RX ADMIN — MEROPENEM 1000 MG: 1 INJECTION, POWDER, FOR SOLUTION INTRAVENOUS at 15:11

## 2023-03-28 RX ADMIN — GABAPENTIN 300 MG: 300 CAPSULE ORAL at 14:53

## 2023-03-28 RX ADMIN — OXYCODONE AND ACETAMINOPHEN 1 TABLET: 5; 325 TABLET ORAL at 14:53

## 2023-03-28 RX ADMIN — OXYCODONE HYDROCHLORIDE 2.5 MG: 5 TABLET ORAL at 04:09

## 2023-03-28 RX ADMIN — ATORVASTATIN CALCIUM 20 MG: 20 TABLET, FILM COATED ORAL at 09:03

## 2023-03-28 RX ADMIN — FERROUS SULFATE TAB 325 MG (65 MG ELEMENTAL FE) 325 MG: 325 (65 FE) TAB at 19:40

## 2023-03-28 RX ADMIN — SODIUM CHLORIDE, PRESERVATIVE FREE 10 ML: 5 INJECTION INTRAVENOUS at 21:00

## 2023-03-28 RX ADMIN — ENOXAPARIN SODIUM 30 MG: 100 INJECTION SUBCUTANEOUS at 09:02

## 2023-03-28 RX ADMIN — Medication 2000 UNITS: at 09:03

## 2023-03-28 RX ADMIN — Medication 1000 MG: at 09:03

## 2023-03-28 RX ADMIN — Medication 50 MG: at 09:04

## 2023-03-28 RX ADMIN — OXYCODONE AND ACETAMINOPHEN 1 TABLET: 5; 325 TABLET ORAL at 22:53

## 2023-03-28 RX ADMIN — OXYCODONE HYDROCHLORIDE 2.5 MG: 5 TABLET ORAL at 22:53

## 2023-03-28 RX ADMIN — GABAPENTIN 300 MG: 300 CAPSULE ORAL at 09:03

## 2023-03-28 RX ADMIN — ONDANSETRON 4 MG: 4 TABLET, ORALLY DISINTEGRATING ORAL at 13:00

## 2023-03-28 RX ADMIN — HYDROCHLOROTHIAZIDE 25 MG: 25 TABLET ORAL at 09:04

## 2023-03-28 RX ADMIN — MEROPENEM 1000 MG: 1 INJECTION, POWDER, FOR SOLUTION INTRAVENOUS at 23:09

## 2023-03-28 RX ADMIN — ENOXAPARIN SODIUM 30 MG: 100 INJECTION SUBCUTANEOUS at 19:41

## 2023-03-28 RX ADMIN — METOPROLOL TARTRATE 50 MG: 50 TABLET, FILM COATED ORAL at 20:02

## 2023-03-28 RX ADMIN — METOPROLOL TARTRATE 50 MG: 50 TABLET, FILM COATED ORAL at 09:04

## 2023-03-28 RX ADMIN — MEROPENEM 1000 MG: 1 INJECTION, POWDER, FOR SOLUTION INTRAVENOUS at 04:08

## 2023-03-28 RX ADMIN — OXYCODONE AND ACETAMINOPHEN 1 TABLET: 5; 325 TABLET ORAL at 04:09

## 2023-03-28 RX ADMIN — SODIUM CHLORIDE, PRESERVATIVE FREE 10 ML: 5 INJECTION INTRAVENOUS at 19:40

## 2023-03-28 RX ADMIN — MORPHINE SULFATE 15 MG: 15 TABLET, FILM COATED, EXTENDED RELEASE ORAL at 20:02

## 2023-03-28 RX ADMIN — SODIUM CHLORIDE, PRESERVATIVE FREE 10 ML: 5 INJECTION INTRAVENOUS at 09:09

## 2023-03-28 RX ADMIN — Medication 500 MCG: at 09:03

## 2023-03-28 RX ADMIN — SODIUM CHLORIDE, PRESERVATIVE FREE: 5 INJECTION INTRAVENOUS at 15:21

## 2023-03-28 RX ADMIN — SODIUM CHLORIDE, PRESERVATIVE FREE: 5 INJECTION INTRAVENOUS at 15:20

## 2023-03-28 RX ADMIN — ASPIRIN 81 MG CHEWABLE TABLET 81 MG: 81 TABLET CHEWABLE at 09:03

## 2023-03-28 RX ADMIN — OXYCODONE HYDROCHLORIDE 2.5 MG: 5 TABLET ORAL at 14:53

## 2023-03-28 RX ADMIN — GABAPENTIN 300 MG: 300 CAPSULE ORAL at 19:40

## 2023-03-28 ASSESSMENT — PAIN DESCRIPTION - ORIENTATION
ORIENTATION: LEFT;LOWER
ORIENTATION: LEFT;RIGHT
ORIENTATION: LEFT

## 2023-03-28 ASSESSMENT — PAIN DESCRIPTION - PAIN TYPE: TYPE: CHRONIC PAIN

## 2023-03-28 ASSESSMENT — PAIN SCALES - GENERAL
PAINLEVEL_OUTOF10: 8
PAINLEVEL_OUTOF10: 10
PAINLEVEL_OUTOF10: 8

## 2023-03-28 ASSESSMENT — PAIN DESCRIPTION - DESCRIPTORS
DESCRIPTORS: SORE;STABBING
DESCRIPTORS: PENETRATING;PRESSURE
DESCRIPTORS: ACHING
DESCRIPTORS: SORE;ACHING;DISCOMFORT
DESCRIPTORS: SORE;ACHING;THROBBING

## 2023-03-28 ASSESSMENT — PAIN - FUNCTIONAL ASSESSMENT
PAIN_FUNCTIONAL_ASSESSMENT: ACTIVITIES ARE NOT PREVENTED

## 2023-03-28 ASSESSMENT — PAIN DESCRIPTION - LOCATION
LOCATION: GENERALIZED
LOCATION: LEG
LOCATION: LEG;HIP
LOCATION: GENERALIZED
LOCATION: LEG
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
## 2023-03-28 NOTE — PLAN OF CARE
Problem: Discharge Planning  Goal: Discharge to home or other facility with appropriate resources  3/27/2023 1216 by Yusuf Wolf RN  Outcome: Progressing     Problem: Safety - Adult  Goal: Free from fall injury  3/28/2023 0056 by Marcela Bledsoe RN  Outcome: Progressing  3/27/2023 1216 by Yusuf Wolf RN  Outcome: Progressing     Problem: ABCDS Injury Assessment  Goal: Absence of physical injury  3/28/2023 0056 by Marcela Bledsoe RN  Outcome: Progressing  3/27/2023 1216 by Yusuf Wolf RN  Outcome: Progressing     Problem: Cardiovascular - Adult  Goal: Maintains optimal cardiac output and hemodynamic stability  3/28/2023 0056 by Marcela Bledsoe RN  Outcome: Progressing  3/27/2023 1216 by Yusuf Wolf RN  Outcome: Progressing     Problem: Metabolic/Fluid and Electrolytes - Adult  Goal: Hemodynamic stability and optimal renal function maintained  3/27/2023 1216 by Yusuf Wolf RN  Outcome: Progressing     Problem: Chronic Conditions and Co-morbidities  Goal: Patient's chronic conditions and co-morbidity symptoms are monitored and maintained or improved  Outcome: Progressing

## 2023-03-29 ENCOUNTER — HOSPITAL ENCOUNTER (OUTPATIENT)
Dept: WOUND CARE | Age: 78
DRG: 602 | End: 2023-03-29
Payer: MEDICARE

## 2023-03-29 LAB
BACTERIA BLD CULT ORG #2: NORMAL
BACTERIA BLD CULT: NORMAL

## 2023-03-29 PROCEDURE — 6370000000 HC RX 637 (ALT 250 FOR IP): Performed by: INTERNAL MEDICINE

## 2023-03-29 PROCEDURE — C1751 CATH, INF, PER/CENT/MIDLINE: HCPCS

## 2023-03-29 PROCEDURE — 99232 SBSQ HOSP IP/OBS MODERATE 35: CPT

## 2023-03-29 PROCEDURE — 02HV33Z INSERTION OF INFUSION DEVICE INTO SUPERIOR VENA CAVA, PERCUTANEOUS APPROACH: ICD-10-PCS | Performed by: INTERNAL MEDICINE

## 2023-03-29 PROCEDURE — 76937 US GUIDE VASCULAR ACCESS: CPT

## 2023-03-29 PROCEDURE — 6360000002 HC RX W HCPCS: Performed by: INTERNAL MEDICINE

## 2023-03-29 PROCEDURE — 2580000003 HC RX 258: Performed by: INTERNAL MEDICINE

## 2023-03-29 PROCEDURE — 1200000000 HC SEMI PRIVATE

## 2023-03-29 PROCEDURE — 36569 INSJ PICC 5 YR+ W/O IMAGING: CPT

## 2023-03-29 PROCEDURE — 6370000000 HC RX 637 (ALT 250 FOR IP): Performed by: SURGERY

## 2023-03-29 RX ORDER — AMLODIPINE BESYLATE 5 MG/1
5 TABLET ORAL DAILY
Status: DISCONTINUED | OUTPATIENT
Start: 2023-03-29 | End: 2023-03-30 | Stop reason: HOSPADM

## 2023-03-29 RX ORDER — OXYCODONE AND ACETAMINOPHEN 10; 325 MG/1; MG/1
1 TABLET ORAL EVERY 6 HOURS PRN
Status: DISCONTINUED | OUTPATIENT
Start: 2023-03-29 | End: 2023-03-30 | Stop reason: HOSPADM

## 2023-03-29 RX ORDER — AMLODIPINE BESYLATE 5 MG/1
5 TABLET ORAL DAILY
Qty: 30 TABLET | Refills: 0 | DISCHARGE
Start: 2023-03-29

## 2023-03-29 RX ADMIN — METOPROLOL TARTRATE 50 MG: 50 TABLET, FILM COATED ORAL at 21:25

## 2023-03-29 RX ADMIN — VANCOMYCIN HYDROCHLORIDE 1500 MG: 10 INJECTION, POWDER, LYOPHILIZED, FOR SOLUTION INTRAVENOUS at 18:42

## 2023-03-29 RX ADMIN — FERROUS SULFATE TAB 325 MG (65 MG ELEMENTAL FE) 325 MG: 325 (65 FE) TAB at 21:25

## 2023-03-29 RX ADMIN — OXYCODONE AND ACETAMINOPHEN 1 TABLET: 5; 325 TABLET ORAL at 06:03

## 2023-03-29 RX ADMIN — Medication 50 MG: at 09:37

## 2023-03-29 RX ADMIN — OXYCODONE AND ACETAMINOPHEN 1 TABLET: 5; 325 TABLET ORAL at 14:27

## 2023-03-29 RX ADMIN — PANTOPRAZOLE SODIUM 40 MG: 40 TABLET, DELAYED RELEASE ORAL at 06:03

## 2023-03-29 RX ADMIN — AMLODIPINE BESYLATE 5 MG: 5 TABLET ORAL at 09:37

## 2023-03-29 RX ADMIN — OXYCODONE HYDROCHLORIDE 2.5 MG: 5 TABLET ORAL at 06:13

## 2023-03-29 RX ADMIN — ENOXAPARIN SODIUM 30 MG: 100 INJECTION SUBCUTANEOUS at 21:25

## 2023-03-29 RX ADMIN — ATORVASTATIN CALCIUM 20 MG: 20 TABLET, FILM COATED ORAL at 09:36

## 2023-03-29 RX ADMIN — MEROPENEM 1000 MG: 1 INJECTION, POWDER, FOR SOLUTION INTRAVENOUS at 06:10

## 2023-03-29 RX ADMIN — FOLIC ACID 500 MCG: 1 TABLET ORAL at 21:25

## 2023-03-29 RX ADMIN — SODIUM CHLORIDE, PRESERVATIVE FREE 10 ML: 5 INJECTION INTRAVENOUS at 21:28

## 2023-03-29 RX ADMIN — GABAPENTIN 300 MG: 300 CAPSULE ORAL at 21:25

## 2023-03-29 RX ADMIN — Medication 1000 MG: at 09:37

## 2023-03-29 RX ADMIN — SODIUM CHLORIDE, PRESERVATIVE FREE 10 ML: 5 INJECTION INTRAVENOUS at 09:42

## 2023-03-29 RX ADMIN — OXYCODONE AND ACETAMINOPHEN 1 TABLET: 325; 10 TABLET ORAL at 21:32

## 2023-03-29 RX ADMIN — OXYCODONE HYDROCHLORIDE 2.5 MG: 5 TABLET ORAL at 14:27

## 2023-03-29 RX ADMIN — MEROPENEM 1000 MG: 1 INJECTION, POWDER, FOR SOLUTION INTRAVENOUS at 18:43

## 2023-03-29 RX ADMIN — GABAPENTIN 300 MG: 300 CAPSULE ORAL at 09:37

## 2023-03-29 RX ADMIN — METOPROLOL TARTRATE 50 MG: 50 TABLET, FILM COATED ORAL at 09:36

## 2023-03-29 RX ADMIN — Medication 500 MCG: at 09:38

## 2023-03-29 RX ADMIN — Medication 2000 UNITS: at 09:36

## 2023-03-29 RX ADMIN — GABAPENTIN 300 MG: 300 CAPSULE ORAL at 13:37

## 2023-03-29 RX ADMIN — SODIUM CHLORIDE, PRESERVATIVE FREE 10 ML: 5 INJECTION INTRAVENOUS at 09:36

## 2023-03-29 RX ADMIN — MORPHINE SULFATE 15 MG: 15 TABLET, FILM COATED, EXTENDED RELEASE ORAL at 09:37

## 2023-03-29 RX ADMIN — ENOXAPARIN SODIUM 30 MG: 100 INJECTION SUBCUTANEOUS at 09:36

## 2023-03-29 RX ADMIN — ASPIRIN 81 MG CHEWABLE TABLET 81 MG: 81 TABLET CHEWABLE at 09:37

## 2023-03-29 RX ADMIN — HYDROCHLOROTHIAZIDE 25 MG: 25 TABLET ORAL at 09:36

## 2023-03-29 ASSESSMENT — PAIN DESCRIPTION - LOCATION
LOCATION: BACK;LEG
LOCATION: LEG
LOCATION: HIP;LEG
LOCATION: LEG

## 2023-03-29 ASSESSMENT — PAIN DESCRIPTION - ORIENTATION
ORIENTATION: LEFT
ORIENTATION: LEFT

## 2023-03-29 ASSESSMENT — PAIN SCALES - GENERAL
PAINLEVEL_OUTOF10: 8
PAINLEVEL_OUTOF10: 7
PAINLEVEL_OUTOF10: 8
PAINLEVEL_OUTOF10: 7
PAINLEVEL_OUTOF10: 6
PAINLEVEL_OUTOF10: 8

## 2023-03-29 ASSESSMENT — PAIN DESCRIPTION - DESCRIPTORS
DESCRIPTORS: ACHING;SORE;THROBBING
DESCRIPTORS: ACHING;SORE;THROBBING
DESCRIPTORS: ACHING;THROBBING;SORE;TENDER

## 2023-03-29 NOTE — CARE COORDINATION
3/29. Discharge plan is [facility] skilled nursing facility when insurance precert is received.  Sony Christine RN

## 2023-03-30 VITALS
RESPIRATION RATE: 18 BRPM | WEIGHT: 293 LBS | HEIGHT: 68 IN | OXYGEN SATURATION: 94 % | TEMPERATURE: 96.5 F | DIASTOLIC BLOOD PRESSURE: 60 MMHG | SYSTOLIC BLOOD PRESSURE: 135 MMHG | BODY MASS INDEX: 44.41 KG/M2 | HEART RATE: 66 BPM

## 2023-03-30 LAB
CREAT SERPL-MCNC: 0.7 MG/DL (ref 0.5–1)
HBA1C MFR BLD: 5.6 % (ref 4–5.6)
PREALB SERPL-MCNC: 15 MG/DL (ref 20–40)
SARS-COV-2 RDRP RESP QL NAA+PROBE: NOT DETECTED

## 2023-03-30 PROCEDURE — 6370000000 HC RX 637 (ALT 250 FOR IP): Performed by: SURGERY

## 2023-03-30 PROCEDURE — 2580000003 HC RX 258: Performed by: INTERNAL MEDICINE

## 2023-03-30 PROCEDURE — 6360000002 HC RX W HCPCS: Performed by: INTERNAL MEDICINE

## 2023-03-30 PROCEDURE — 83036 HEMOGLOBIN GLYCOSYLATED A1C: CPT

## 2023-03-30 PROCEDURE — 87635 SARS-COV-2 COVID-19 AMP PRB: CPT

## 2023-03-30 PROCEDURE — 6370000000 HC RX 637 (ALT 250 FOR IP): Performed by: INTERNAL MEDICINE

## 2023-03-30 PROCEDURE — 84134 ASSAY OF PREALBUMIN: CPT

## 2023-03-30 PROCEDURE — 82565 ASSAY OF CREATININE: CPT

## 2023-03-30 PROCEDURE — 36415 COLL VENOUS BLD VENIPUNCTURE: CPT

## 2023-03-30 RX ADMIN — GABAPENTIN 300 MG: 300 CAPSULE ORAL at 08:24

## 2023-03-30 RX ADMIN — OXYCODONE AND ACETAMINOPHEN 1 TABLET: 325; 10 TABLET ORAL at 10:25

## 2023-03-30 RX ADMIN — OXYCODONE AND ACETAMINOPHEN 1 TABLET: 325; 10 TABLET ORAL at 03:41

## 2023-03-30 RX ADMIN — ONDANSETRON 4 MG: 2 INJECTION INTRAMUSCULAR; INTRAVENOUS at 14:40

## 2023-03-30 RX ADMIN — MORPHINE SULFATE 15 MG: 15 TABLET, FILM COATED, EXTENDED RELEASE ORAL at 08:39

## 2023-03-30 RX ADMIN — MEROPENEM 1000 MG: 1 INJECTION, POWDER, FOR SOLUTION INTRAVENOUS at 16:08

## 2023-03-30 RX ADMIN — Medication 1000 MG: at 08:24

## 2023-03-30 RX ADMIN — ASPIRIN 81 MG CHEWABLE TABLET 81 MG: 81 TABLET CHEWABLE at 08:24

## 2023-03-30 RX ADMIN — GABAPENTIN 300 MG: 300 CAPSULE ORAL at 14:07

## 2023-03-30 RX ADMIN — VANCOMYCIN HYDROCHLORIDE 1500 MG: 10 INJECTION, POWDER, LYOPHILIZED, FOR SOLUTION INTRAVENOUS at 14:13

## 2023-03-30 RX ADMIN — MEROPENEM 1000 MG: 1 INJECTION, POWDER, FOR SOLUTION INTRAVENOUS at 03:09

## 2023-03-30 RX ADMIN — AMLODIPINE BESYLATE 5 MG: 5 TABLET ORAL at 08:25

## 2023-03-30 RX ADMIN — SODIUM CHLORIDE, PRESERVATIVE FREE 10 ML: 5 INJECTION INTRAVENOUS at 08:25

## 2023-03-30 RX ADMIN — OXYCODONE AND ACETAMINOPHEN 1 TABLET: 325; 10 TABLET ORAL at 16:17

## 2023-03-30 RX ADMIN — PANTOPRAZOLE SODIUM 40 MG: 40 TABLET, DELAYED RELEASE ORAL at 06:19

## 2023-03-30 RX ADMIN — ONDANSETRON 4 MG: 2 INJECTION INTRAMUSCULAR; INTRAVENOUS at 08:49

## 2023-03-30 RX ADMIN — Medication 500 MCG: at 08:24

## 2023-03-30 RX ADMIN — Medication 2000 UNITS: at 08:24

## 2023-03-30 RX ADMIN — ATORVASTATIN CALCIUM 20 MG: 20 TABLET, FILM COATED ORAL at 08:25

## 2023-03-30 RX ADMIN — MEROPENEM 1000 MG: 1 INJECTION, POWDER, FOR SOLUTION INTRAVENOUS at 06:01

## 2023-03-30 RX ADMIN — HYDROCHLOROTHIAZIDE 25 MG: 25 TABLET ORAL at 08:25

## 2023-03-30 RX ADMIN — ENOXAPARIN SODIUM 30 MG: 100 INJECTION SUBCUTANEOUS at 08:23

## 2023-03-30 RX ADMIN — HEPARIN 300 UNITS: 100 SYRINGE at 08:25

## 2023-03-30 RX ADMIN — Medication 50 MG: at 14:07

## 2023-03-30 RX ADMIN — METOPROLOL TARTRATE 50 MG: 50 TABLET, FILM COATED ORAL at 08:25

## 2023-03-30 ASSESSMENT — PAIN SCALES - GENERAL
PAINLEVEL_OUTOF10: 8
PAINLEVEL_OUTOF10: 3
PAINLEVEL_OUTOF10: 9
PAINLEVEL_OUTOF10: 3
PAINLEVEL_OUTOF10: 6
PAINLEVEL_OUTOF10: 7

## 2023-03-30 ASSESSMENT — PAIN DESCRIPTION - LOCATION
LOCATION: LEG;HIP
LOCATION: LEG
LOCATION: LEG

## 2023-03-30 ASSESSMENT — PAIN - FUNCTIONAL ASSESSMENT
PAIN_FUNCTIONAL_ASSESSMENT: ACTIVITIES ARE NOT PREVENTED

## 2023-03-30 ASSESSMENT — PAIN DESCRIPTION - PAIN TYPE
TYPE: CHRONIC PAIN
TYPE: CHRONIC PAIN

## 2023-03-30 ASSESSMENT — PAIN DESCRIPTION - ONSET
ONSET: GRADUAL
ONSET: GRADUAL

## 2023-03-30 ASSESSMENT — PAIN DESCRIPTION - FREQUENCY
FREQUENCY: INTERMITTENT
FREQUENCY: INTERMITTENT

## 2023-03-30 ASSESSMENT — PAIN DESCRIPTION - DESCRIPTORS
DESCRIPTORS: ACHING;DISCOMFORT;THROBBING
DESCRIPTORS: ACHING;THROBBING;SORE;TENDER
DESCRIPTORS: BURNING;ACHING;STABBING

## 2023-03-30 ASSESSMENT — PAIN DESCRIPTION - ORIENTATION
ORIENTATION: LEFT

## 2023-03-30 NOTE — CARE COORDINATION
3/30. Authorization received for [facility]. Transportation arranged with [facility] for 4:00. Nursing, facility and pt notifed. The pt staed she will call her sister and tell her the time of departure.  Tomás Kaur RN

## 2023-03-30 NOTE — DISCHARGE SUMMARY
the patient has a recurrence of symptoms, they are instructed to go to the ED. Preparing for this patient's discharge, including paperwork, orders, instructions, and meeting with patient did require > 30 minutes.     Marie Bah DO   11:20 AM  3/30/2023

## 2023-03-30 NOTE — PROGRESS NOTES
C/C: F/u of leg wound infection    The patient is awake and alert. Denies fever, chill    Denies cough , shortness of breath , or sputum expectoration    Afebrile      Objective:    Vitals:    03/26/23 0917   BP:    Pulse:    Resp: 18   Temp:    SpO2:          General : Awake, alert , no acute distress. Head:   Normocephalic, atraumatic  Eyes:    No pallor, no icterus  Ears:    No ear drainage.   Nose:   No nasal drainage,  Lungs:   Clear to auscultation bilaterally, no wheeze or crackles  Heart:    Regular rate and rhythm, no murmur  Abdomen:   Soft, non-tender, not distended,  bowel sounds present  Extremities: No edema,no open wound,no erythema, non  tender  Skin:              Erythema on legs             CBC with Differential:      Lab Results   Component Value Date/Time    WBC 5.1 03/26/2023 05:50 AM    RBC 3.34 03/26/2023 05:50 AM    HGB 10.3 03/26/2023 05:50 AM    HCT 33.5 03/26/2023 05:50 AM     03/26/2023 05:50 AM    .3 03/26/2023 05:50 AM    MCH 30.8 03/26/2023 05:50 AM    MCHC 30.7 03/26/2023 05:50 AM    RDW 12.5 03/26/2023 05:50 AM    NRBC 0.0 02/06/2023 07:00 AM    SEGSPCT 93 02/19/2014 09:40 AM    LYMPHOPCT 7.8 03/23/2023 11:41 PM    LYMPHOPCT 32.6 02/06/2023 07:00 AM    MONOPCT 6.5 03/23/2023 11:41 PM    BASOPCT 0.3 03/23/2023 11:41 PM    MONOSABS 0.64 03/23/2023 11:41 PM    LYMPHSABS 0.77 03/23/2023 11:41 PM    EOSABS 0.42 03/23/2023 11:41 PM    BASOSABS 0.03 03/23/2023 11:41 PM       CMP:    Lab Results   Component Value Date/Time     03/26/2023 05:50 AM    K 3.7 03/26/2023 05:50 AM    K 3.8 01/31/2023 03:40 AM     03/26/2023 05:50 AM    CO2 29 03/26/2023 05:50 AM    BUN 9 03/26/2023 05:50 AM    CREATININE 0.5 03/26/2023 05:50 AM    GFRAA > 60 02/06/2023 07:00 AM    LABGLOM >60 03/26/2023 05:50 AM    GLUCOSE 109 03/26/2023 05:50 AM    GLUCOSE 93 02/06/2023 07:00 AM    PROT 8.0 03/23/2023 11:41 PM    LABALBU 3.5 03/23/2023 11:41 PM    LABALBU 2.9 02/06/2023 07:00 AM
Dr. Noelle Jung notified that patients blood pressure was 186/91 and heart rate was 110 on this mornings vitals, awaiting response at this time.
Extended Infusion Policy     This patient is on medication that requires renal, weight, and/or indication dose adjustment. Date Body Weight IBW  Adjusted BW SCr  CrCl Dialysis status BMI   3/24/2023 295 lb (133.8 kg) Ideal body weight: 63.9 kg (140 lb 14 oz)  Adjusted ideal body weight: 91.9 kg (202 lb 8.4 oz) Serum creatinine: 1.8 mg/dL (H) 03/23/23 2341  Estimated creatinine clearance: 39 mL/min (A) N/a Body mass index is 44.85 kg/m². Pharmacy has dose-adjusted the following medication(s):    Ordered Medication: Meropenem 1000mg q8h     Order Changed/converted to: Meropenem 1000mg q12h    These changes were made per protocol according to the [facility]   Automatic Extended Infusion Dose Adjustment Policy. *Please note this dose may need readjusted if patient's condition changes. Please contact pharmacy with any questions regarding these changes.     Gemini Burgess, [facility]  3/24/2023  10:38 AM
Extended Infusion Policy     This patient is on medication that requires renal, weight, and/or indication dose adjustment. Date Body Weight IBW  Adjusted BW SCr  CrCl Dialysis status BMI   3/27/2023 295 lb (133.8 kg) Ideal body weight: 63.9 kg (140 lb 14 oz)  Adjusted ideal body weight: 91.9 kg (202 lb 8.4 oz) Serum creatinine: 0.5 mg/dL 03/27/23 0630  Estimated creatinine clearance: 139 mL/min N/a Body mass index is 44.85 kg/m². Pharmacy has dose-adjusted the following medication(s):    Ordered Medication: Meropenem 1000mg q12h     Order Changed/converted to: Meropenem 1000mg q8h    These changes were made per protocol according to the [facility]   Automatic Extended Infusion Dose Adjustment Policy. *Please note this dose may need readjusted if patient's condition changes. Please contact pharmacy with any questions regarding these changes.     [address] [place] [address], [address] [place] [address]  3/27/2023  2:26 PM
Hospitalist Progress Note      Synopsis: Patient admitted on 3/24/2023 70 y.o. female patient of Dr. Jesica Vail history of morbid obesity, COPD, chronic venous insufficiency, chronic pain, CAD who presents with chronic left leg ulcer with concern for infection. Patient was supposed to get outpatient home IV antibiotics but however this cannot be arranged. She presents to the ED with worsening left leg wound. No fevers or chills. No vomiting evaluation. No exacerbation relief. In ED patient was mildly hypotensive and treated with IV fluids. Patient was hypoxic on room air at 84% and placed on oxygen. Laboratory evaluation reveals creatinine elevated at 1.8. Baseline 0.8. Patient is admitted for further evaluation and treatment with arrangement for continued antibiotic therapy. Patient denies any trouble breathing cough or wheezing. She does note that she has required oxygen at home previously. Patient reports history of COPD. States she uses breathing treatments sometimes. She notes decreased oral intake recently. Subjective    Feeling better without complaint  No CP or SOB  No fever or chills   No uncontrolled pain  No vomiting or diarrhea   No events reported overnight     Exam:  BP (!) 162/87   Pulse 96   Temp 97.6 °F (36.4 °C) (Temporal)   Resp 17   Ht 5' 8\" (1.727 m)   Wt 295 lb (133.8 kg)   SpO2 97%   BMI 44.85 kg/m²   General appearance: No apparent distress, appears stated age and cooperative. HEENT: Pupils equal, round, and reactive to light. Conjunctivae/corneas clear. Neck: Supple. No jugular venous distention. Trachea midline. Respiratory:  Normal respiratory effort. Clear to auscultation, bilaterally without Rales/Wheezes/Rhonchi. Cardiovascular: Regular rate and rhythm with normal S1/S2 without murmurs, rubs or gallops. Abdomen: Soft, non-tender, non-distended with normal bowel sounds. Musculoskeletal: No clubbing, cyanosis or edema bilaterally. Brisk capillary refill.
Hospitalist Progress Note      Synopsis: Patient admitted on 3/24/2023 70 y.o. female patient of Dr. Jesica Vail history of morbid obesity, COPD, chronic venous insufficiency, chronic pain, CAD who presents with chronic left leg ulcer with concern for infection. Patient was supposed to get outpatient home IV antibiotics but however this cannot be arranged. She presents to the ED with worsening left leg wound. No fevers or chills. No vomiting evaluation. No exacerbation relief. In ED patient was mildly hypotensive and treated with IV fluids. Patient was hypoxic on room air at 84% and placed on oxygen. Laboratory evaluation reveals creatinine elevated at 1.8. Baseline 0.8. Patient is admitted for further evaluation and treatment with arrangement for continued antibiotic therapy. Patient denies any trouble breathing cough or wheezing. She does note that she has required oxygen at home previously. Patient reports history of COPD. States she uses breathing treatments sometimes. She notes decreased oral intake recently. Subjective    Feeling better without complaint  No CP or SOB  No fever or chills   No uncontrolled pain  No vomiting or diarrhea   No events reported overnight     Exam:  BP (!) 189/87   Pulse 93   Temp 97.3 °F (36.3 °C) (Temporal)   Resp 18   Ht 5' 8\" (1.727 m)   Wt 295 lb (133.8 kg)   SpO2 97%   BMI 44.85 kg/m²   General appearance: No apparent distress, appears stated age and cooperative. HEENT: Pupils equal, round, and reactive to light. Conjunctivae/corneas clear. Neck: Supple. No jugular venous distention. Trachea midline. Respiratory:  Normal respiratory effort. Clear to auscultation, bilaterally without Rales/Wheezes/Rhonchi. Cardiovascular: Regular rate and rhythm with normal S1/S2 without murmurs, rubs or gallops. Abdomen: Soft, non-tender, non-distended with normal bowel sounds. Musculoskeletal: No clubbing, cyanosis or edema bilaterally. Brisk capillary refill.
IV team messaged about order for PICC line.
Nurse to nurse given at this time
Ortho consult placed via perfect serve to on call ortho resident.   Dr Brittany Elise taking messages
Pharmacy Consultation Note  (Antibiotic Dosing and Monitoring)    Initial consult date: 03/24/23  Consulting physician/provider: Dr. Connie Field  Drug: Vancomycin  Indication: SSTI    Age/  Gender Height Weight IBW  Allergy Information   78 y.o./female 5' 8\" (172.7 cm) 295 lb (133.8 kg)     Ideal body weight: 63.9 kg (140 lb 14 oz)  Adjusted ideal body weight: 91.9 kg (202 lb 8.4 oz)   Codeine, Dilaudid [hydromorphone hcl], Naproxen, Singulair [montelukast sodium], Black cohosh, and Black cohosh [cimicifuga racemosa (black cohosh)]      Renal Function:  Recent Labs     03/27/23  0630   BUN 4*   CREATININE 0.5         Intake/Output Summary (Last 24 hours) at 3/29/2023 1327  Last data filed at 3/28/2023 2309  Gross per 24 hour   Intake --   Output 300 ml   Net -300 ml         Vancomycin Monitoring:  Trough:    No results for input(s): VANCOTROUGH in the last 72 hours. Random:    Recent Labs     03/28/23  0455   VANCORANDOM 18.1         No results for input(s): Tangela Delon in the last 72 hours. Historical Cultures:  Organism   Date Value Ref Range Status   03/08/2023 Acinetobacter baumannii (A)  Final   03/08/2023 Pseudomonas aeruginosa (A)  Final   03/08/2023 Staphylococcus aureus (A)  Final     No results for input(s): BC in the last 72 hours. Vancomycin Administration Times:  Recent vancomycin administrations                     vancomycin 1500 mg in sodium chloride 0.9% 300 mL IVPB (mg) 1,500 mg New Bag 03/28/23 1510     1,500 mg New Bag 03/27/23 1313             Assessment:  Patient is a 70 y.o. female who has been initiated on vancomycin  Estimated Creatinine Clearance: 139 mL/min (based on SCr of 0.5 mg/dL). Appears patient received dose of vancomycin 3/22/23 and was supposed to be doing IV antibiotics at home but was unable to complete process. Level 3/24 was 30.2 but unsure how much/if any doses were received at home  No Scr today.        Plan:  Vancomycin 1500 mg q24h (Predicted AUC/CRUZ = 504, Tr =
Pharmacy Consultation Note  (Antibiotic Dosing and Monitoring)    Initial consult date: 03/24/23  Consulting physician/provider: Dr. Laureen Perez  Drug: Vancomycin  Indication: SSTI    Age/  Gender Height Weight IBW  Allergy Information   78 y.o./female 5' 8\" (172.7 cm) 295 lb (133.8 kg)     Ideal body weight: 63.9 kg (140 lb 14 oz)  Adjusted ideal body weight: 91.9 kg (202 lb 8.4 oz)   Codeine, Dilaudid [hydromorphone hcl], Naproxen, Singulair [montelukast sodium], Black cohosh, and Black cohosh [cimicifuga racemosa (black cohosh)]      Renal Function:  Recent Labs     03/23/23  2341 03/25/23  0555 03/26/23  0550   BUN 52* 20 9   CREATININE 1.8* 0.6 0.5         Intake/Output Summary (Last 24 hours) at 3/26/2023 1145  Last data filed at 3/26/2023 0009  Gross per 24 hour   Intake 300 ml   Output --   Net 300 ml         Vancomycin Monitoring:  Trough:    Recent Labs     03/23/23  2341 03/25/23  0555   VANCOTROUGH 30.2* 13.5       Random:  No results for input(s): VANCORANDOM in the last 72 hours. Recent Labs     03/24/23  0629   BLOODCULT2 24 Hours no growth          Historical Cultures:  Organism   Date Value Ref Range Status   03/08/2023 Acinetobacter baumannii (A)  Final   03/08/2023 Pseudomonas aeruginosa (A)  Final   03/08/2023 Staphylococcus aureus (A)  Final     Recent Labs     03/23/23  2341   BC 24 Hours no growth         Vancomycin Administration Times:  Recent vancomycin administrations                     vancomycin 1500 mg in sodium chloride 0.9% 300 mL IVPB ()  Restarted 03/22/23 1310     1,500 mg New Bag  1204                    Assessment:  Patient is a 70 y.o. female who has been initiated on vancomycin  Estimated Creatinine Clearance: 139 mL/min (based on SCr of 0.5 mg/dL). Appears patient received dose of vancomycin 3/22/23 and was supposed to be doing IV antibiotics at home but was unable to complete process.  Level 3/24 was 30.2 but unsure how much/if any doses were received at
Pharmacy Consultation Note  (Antibiotic Dosing and Monitoring)    Initial consult date: 03/24/23  Consulting physician/provider: Dr. Noelle Jung  Drug: Vancomycin  Indication: SSTI    Age/  Gender Height Weight IBW  Allergy Information   78 y.o./female 5' 8\" (172.7 cm) 295 lb (133.8 kg)     Ideal body weight: 63.9 kg (140 lb 14 oz)  Adjusted ideal body weight: 91.9 kg (202 lb 8.4 oz)   Codeine, Dilaudid [hydromorphone hcl], Naproxen, Singulair [montelukast sodium], Black cohosh, and Black cohosh [cimicifuga racemosa (black cohosh)]      Renal Function:  Recent Labs     03/23/23  2341   BUN 52*   CREATININE 1.8*     No intake or output data in the 24 hours ending 03/24/23 1323    Vancomycin Monitoring:  Trough:    Recent Labs     03/23/23  2341   VANCOTROUGH 30.2*     Random:  No results for input(s): VANCORANDOM in the last 72 hours. No results for input(s): Deweyamrita Odonnell in the last 72 hours. Historical Cultures:  Organism   Date Value Ref Range Status   03/08/2023 Acinetobacter baumannii (A)  Final   03/08/2023 Pseudomonas aeruginosa (A)  Final   03/08/2023 Staphylococcus aureus (A)  Final     No results for input(s): BC in the last 72 hours. Vancomycin Administration Times:  Recent vancomycin administrations                     vancomycin 1500 mg in sodium chloride 0.9% 300 mL IVPB ()  Restarted 03/22/23 1310     1,500 mg New Bag  1204                    Assessment:  Patient is a 70 y.o. female who has been initiated on vancomycin  Estimated Creatinine Clearance: 39 mL/min (A) (based on SCr of 1.8 mg/dL (H)). Appears patient received dose of vancomycin 3/22/23 and was supposed to be doing IV antibiotics at home but was unable to complete process. Trough level today was 30.2      Plan:  ID ordered vancomycin 1500 mg IV every 24 hours (Initial plan prior to presenting to the ED was 1500 mg IV Q12H.  Re-timed starting dose for tomorrow at 1200  Will check vancomycin levels when appropriate  Will continue to monitor
Physician Progress Note      PATIENT:               Sharon Steward  CSN #:                  [id number]  :                       1945  ADMIT DATE:       3/24/2023 5:53 AM  DISCH DATE:  Geraldo Segundo  PROVIDER #:        Joselyn Del Angel DO          QUERY TEXT:    Patient admitted with Left lower leg wound infection. Noted documentation of    respiratory failure with hypoxia in H&P. In order to support the diagnosis of    respiratory failure with hypoxia, please include additional clinical   indicators in your documentation. Or please document if the diagnosis of   acute respiratory failure has been ruled out after further study. The medical record reflects the following:  Risk Factors: History of home oxygen 2 liters; COPD;  Clinical Indicators:  Per ED provider: She is reportedly 85% Sp02 on RA placed on 2 liters NC; No   labored breathing; diminished bilaterally. No respiratory distress  Per H&P: Respiratory failure with hypoxia: No apparent distress. Lungs: CTA  3/25/23: Per internal medicine progress note: transient hypoxia, history of   COPD.  no apparent distress; normal respiratory effort. clear to auscultation   bilaterally. 3/26/23: Active Problem: Hypoxia  3/24/23: pulse ox 92% RA; 83% on 3 liters; 100% 3 liters; Respiratory rate   16-24  Treatment: Nebulizers; Oxygen per protocol; pulse oximetry monitoring;    Incentive spirometry    Thank You,  Manuel Christensen BSN, R.N.  Clinical Documentation Improvement  [phone]    Acute Respiratory Failure Clinical Indicators per  MS-DRG Training Guide and   Quick Reference Guide:  pO2 < 60 mmHg  pCO2 > 50 and pH < 7.35  P/F ratio (pO2 / FIO2) < 300  pO2 decrease or pCO2 increase by 10 mmHg from baseline (if known)  Supplemental oxygen of 40% or more  Presence of respiratory distress, tachypnea, dyspnea, shortness of breath,   wheezing  Unable to speak in complete sentences  Use of accessory muscles to breathe  Extreme anxiety and feeling of
Vascular Access Procedure Note    Procedure Date:   3/29/2023    Pre-procedure Verification/Time-Out:  The proposed risks versus benefits of this procedure were discussed in detail by the physician with patient. written consent was obtained from the patient. Relevant documentation was reviewed prior to procedure including signed consent form and medications. All necessary equipment for procedure is available at time of procedure yes. An audible time out was done at 0855AM by team members, correctly identifying patients name, medical record number, correct side, correct site, and correct procedure to be performed with registered nurse members of the procedure team all in agreement.         Indication for Procedure:   Reason for Insertion: intravenous access and intravenous antibiotics    ASA Assessment (Required for Moderate & Deep Sedation):      Procedure:   Reason for Consultation: power PICC line    Clinician Performing Procedure:   Mendoza Garcia RN    Assistant:  none    Sedation:   Analgesia Used: lidocaine 1%    Procedure Details/Findings:  Catheter Telugu Size: 5.5  Lot Number: [id number]  Product #: [id number]  Expiration Date: 05/31/2024  Maximum Barrier Precautions: cap, eye shield, full body drape, gloves, gown, handwashing, and mask  Skin Prep: chlorhexidine  Technique: modified seldinger and ultrasound guided with VPS  Attempts: 1  Exposed (cm): 1  Total (cm): 39  Placement Site: left brachial vein  Vessel Size: 0.53  Dressing: securement device, transparent dressing, and biopatch  Blood Return: Yes   Ultrasound Guidance: Yes   Arm Circumference Mid-Bicep (cm): 38  Chest X-Ray Ordered: VasoNova VPS  End Placement: caj    Complications:   none     Post-operative Condition:  stable  Patient Tolerated Procedure: well     Comments/Post-operative Education:   Post Procedure Interventions: patient verbalized understanding of education    Dino Garber RN  03/29/23  10:36 AM
Vascular Surgery Progress Note    Pt is being seen in f/u today regarding chronic L LE wound    Subjective  Pt s/e. Pain is improved. Waiting on precert to go to nursing facility.     Current Medications:    sodium chloride      sodium chloride        oxyCODONE-acetaminophen, sodium chloride flush, sodium chloride, heparin flush, sodium chloride flush, sodium chloride, ondansetron **OR** ondansetron, polyethylene glycol, acetaminophen **OR** acetaminophen, ipratropium-albuterol, diphenhydrAMINE    amLODIPine  5 mg Oral Daily    lidocaine PF  5 mL IntraDERmal Once    sodium chloride flush  5-40 mL IntraVENous 2 times per day    heparin flush  3 mL IntraVENous 2 times per day    meropenem  1,000 mg IntraVENous Q8H    hydroCHLOROthiazide  25 mg Oral Daily    metoprolol tartrate  50 mg Oral BID    sodium chloride flush  5-40 mL IntraVENous 2 times per day    enoxaparin  30 mg SubCUTAneous BID    vancomycin  1,500 mg IntraVENous Q24H    budesonide  500 mcg Nebulization BID    arformoterol tartrate  15 mcg Nebulization BID    aspirin  81 mg Oral Daily    calcium elemental  1,000 mg Oral Daily    vitamin D  2,000 Units Oral Daily    vitamin B-12  500 mcg Oral Daily    ferrous sulfate  325 mg Oral Nightly    folic acid  845 mcg Oral Nightly    gabapentin  300 mg Oral TID    morphine  15 mg Oral BID    pantoprazole  40 mg Oral QAM AC    atorvastatin  20 mg Oral Daily    zinc sulfate  50 mg Oral Daily      PHYSICAL EXAM:    /60   Pulse 66   Temp (!) 96.5 °F (35.8 °C) (Temporal)   Resp 17   Ht 5' 8\" (1.727 m)   Wt 295 lb (133.8 kg)   SpO2 94%   BMI 44.85 kg/m²     Intake/Output Summary (Last 24 hours) at 3/30/2023 1626  Last data filed at 3/30/2023 1551  Gross per 24 hour   Intake 3363.53 ml   Output --   Net 3363.53 ml        Gen Awake, alert, oriented x3, in no apparent distress   CVS S1S2   Resp No increased work of breathing   Abd Soft, non-tender, non-distended   L LE Pretib leg wound with fibrinous
Wound care: Consulted for left leg wound. Vascular following and changing dressing. Order for tubi  double left leg- size F provided for nurse to apply with EZ slide.  Signed off- re consult if needed  Kevin Combs RN
2+radial pulses. Skin: Wound is dressed  Neurologic: awake, alert and following commands    Medications:  Reviewed    Infusion Medications    sodium chloride       Scheduled Medications    metoprolol tartrate  50 mg Oral BID    sodium chloride flush  5-40 mL IntraVENous 2 times per day    enoxaparin  30 mg SubCUTAneous BID    meropenem  1,000 mg IntraVENous Q12H    vancomycin  1,500 mg IntraVENous Q24H    budesonide  500 mcg Nebulization BID    arformoterol tartrate  15 mcg Nebulization BID    aspirin  81 mg Oral Daily    calcium elemental  1,000 mg Oral Daily    vitamin D  2,000 Units Oral Daily    vitamin B-12  500 mcg Oral Daily    ferrous sulfate  325 mg Oral Nightly    folic acid  327 mcg Oral Nightly    gabapentin  300 mg Oral TID    morphine  15 mg Oral BID    pantoprazole  40 mg Oral QAM AC    atorvastatin  20 mg Oral Daily    zinc sulfate  50 mg Oral Daily     PRN Meds: sodium chloride flush, sodium chloride, ondansetron **OR** ondansetron, polyethylene glycol, acetaminophen **OR** acetaminophen, ipratropium-albuterol, diphenhydrAMINE, oxyCODONE-acetaminophen **AND** oxyCODONE    I/O    Intake/Output Summary (Last 24 hours) at 3/26/2023 0901  Last data filed at 3/26/2023 0009  Gross per 24 hour   Intake 300 ml   Output --   Net 300 ml         Labs:   Recent Labs     03/23/23  2341 03/25/23  0518 03/26/23  0550   WBC 9.9 7.0 5.1   HGB 11.6 10.3* 10.3*   HCT 37.6 32.8* 33.5*    141 147         Recent Labs     03/23/23  2341 03/25/23  0555 03/26/23  0550    139 144   K 4.0 3.7 3.7    103 107   CO2 24 29 29   BUN 52* 20 9   CREATININE 1.8* 0.6 0.5   CALCIUM 8.4* 8.7 8.9         Recent Labs     03/23/23  2341   PROT 8.0   ALKPHOS 110*   ALT 19   AST 40*   BILITOT 0.3         No results for input(s): INR in the last 72 hours. No results for input(s): Blossom Khan in the last 72 hours.     Chronic labs:  Lab Results   Component Value Date    CHOL 126 02/02/2023    TRIG 62 02/02/2023
2+radial pulses. Skin: Wound is dressed  Neurologic: awake, alert and following commands    Medications:  Reviewed    Infusion Medications    sodium chloride      sodium chloride       Scheduled Medications    lidocaine PF  5 mL IntraDERmal Once    sodium chloride flush  5-40 mL IntraVENous 2 times per day    heparin flush  3 mL IntraVENous 2 times per day    meropenem  1,000 mg IntraVENous Q8H    hydroCHLOROthiazide  25 mg Oral Daily    metoprolol tartrate  50 mg Oral BID    sodium chloride flush  5-40 mL IntraVENous 2 times per day    enoxaparin  30 mg SubCUTAneous BID    vancomycin  1,500 mg IntraVENous Q24H    budesonide  500 mcg Nebulization BID    arformoterol tartrate  15 mcg Nebulization BID    aspirin  81 mg Oral Daily    calcium elemental  1,000 mg Oral Daily    vitamin D  2,000 Units Oral Daily    vitamin B-12  500 mcg Oral Daily    ferrous sulfate  325 mg Oral Nightly    folic acid  039 mcg Oral Nightly    gabapentin  300 mg Oral TID    morphine  15 mg Oral BID    pantoprazole  40 mg Oral QAM AC    atorvastatin  20 mg Oral Daily    zinc sulfate  50 mg Oral Daily     PRN Meds: sodium chloride flush, sodium chloride, heparin flush, sodium chloride flush, sodium chloride, ondansetron **OR** ondansetron, polyethylene glycol, acetaminophen **OR** acetaminophen, ipratropium-albuterol, diphenhydrAMINE, oxyCODONE-acetaminophen **AND** oxyCODONE    I/O    Intake/Output Summary (Last 24 hours) at 3/29/2023 0805  Last data filed at 3/28/2023 2309  Gross per 24 hour   Intake --   Output 300 ml   Net -300 ml       Labs:   Recent Labs     03/27/23  0630   WBC 3.9*   HGB 10.7*   HCT 33.6*          Recent Labs     03/27/23  0630      K 3.5   CL 98   CO2 30*   BUN 4*   CREATININE 0.5   CALCIUM 8.6       No results for input(s): PROT, ALB, ALKPHOS, ALT, AST, BILITOT, AMYLASE, LIPASE in the last 72 hours. No results for input(s): INR in the last 72 hours.     No results for input(s): Esther Townsend
3/28 @0455      Plan:  Vancomycin 1500 mg q24h (Predicted AUC/CRUZ = 504, Tr = 14)  Will check vancomycin levels when appropriate  Will continue to monitor renal function   Pharmacy to follow      DEMETRIUS Griffith [facility] 3/28/2023 9:23 AM
AUC/CRUZ = 597, Tr = 16.6)  Will check vancomycin levels when appropriate. Will continue to monitor renal function; ordered Scr with 3/31 AM labs. Pharmacy to follow.      Thank you for the consult,     Emily Allen, PharmD, [address]  PGY1 Pharmacy Resident     3/30/2023 1:20 PM
Plan:  Vancomycin 1500 mg IV every 24 hours  Will check vancomycin levels when appropriate  Will continue to monitor renal function   Pharmacy to follow      [facility] 3/27/2023 10:52 AM
Transfers Sit to stand: NT  Stand to sit: NT  Stand pivot: NT Sit to stand: SBA  Stand to sit: SBA  Stand pivot: SBA no AD (declined [employer]) Sit to stand: mod I  Stand to sit: mod I  Stand pivot: mod I   Ambulation    NT 20'x2 with no AD SBA 75 feet with AAD mod I   Stair negotiation: ascended and descended  NT NT 4 steps with 2 rail mod I   ROM BUE:  Defer to OT  BLE:  WFL     Strength BUE:  Defer to OT  BLE:  grossly assessed supine, 3-/5   Improve 1 MMT   Balance Sitting EOB:  NT  Dynamic Standing:  NT Sitting EOB:  SBA  Dynamic Standing: SBA no AD Sitting EOB:  IND  Dynamic Standing:  mod I with AAD   Pt is A & O x 3  Sensation:  Pt denies numbness and tingling to extremities  Edema:  unremarkable    Vitals:  SpO2 and HR were stable during session    Therapeutic Exercises:    Functional mobility    Patient education  Pt educated on safety with functional mobility    Patient response to education:   Pt verbalized understanding Pt demonstrated skill Pt requires further education in this area   yes yes reinforce     ASSESSMENT:  Comments:  Pt agreeable to PT. Pt performing all mobility without assist. Pt reports 8/10 R hip pain. Educated on [employer] use for improved stability, to correct antalgic gait pattern, and decrease pain. Pt declined, however continues to furniture cruise for support when ambulating in room. Patient would benefit from continued skilled PT to maximize functional mobility independence. Treatment:  Patient practiced and was instructed in the following treatment:    Bed mobility- verbal cues to facilitate independence  Functional transfers-Verbal cues for proper positioning and sequencing to perform transfers safely with maximum independence. Gait training-Verbal cues for proper positioning and sequencing to maximize functional mobility independence. PLAN:    Patient is making good progress towards established goals. Will continue with current POC.       Time in  0854  Time out
non-distended   L LE Pretib and lateral leg wound with fibrinous exudate    LABS:    Lab Results   Component Value Date    WBC 3.9 (L) 03/27/2023    HGB 10.7 (L) 03/27/2023    HCT 33.6 (L) 03/27/2023     03/27/2023    PROTIME 12.2 01/26/2023    INR 1.1 01/26/2023    APTT 30.4 10/02/2022    K 3.5 03/27/2023    BUN 4 (L) 03/27/2023    CREATININE 0.5 03/27/2023     A/P L LE wound infection, chronic venous insufficiency  Continue Medical management with ASA and statin  HgbA1C pending   optimization per PCP  Prelabumin pending  Nutrition will need optimized, consult nutrition  Discussed with pt tobacco use and relationship to PVD  pt currently is not a smoker  Pt understands tobacco use can contribute to worsening of pvd and development of  limb loss   Emphasized importance of foot care and to call if develops any wounds, ulcerations, changes in appearance, claudication and/or symptoms  Continue daily dressing changes to L LE with opticell ag, cover with dry dressing and secure - done today  Tubigrip to L LE while admitted  Elevate L LE  Abx per ID: merrem, PICC in place  Discharge planning: [facility]  Follow up in the wound care center  [facility] for discharge from vascular pov    Chucho Shabazz APRN - CNP
to supine: mod I  Scooting: mod I   Transfers Sit to stand: NT  Stand to sit: NT  Stand pivot: NT Sit to stand: SBA  Stand to sit: SBA  Stand pivot: min A with no AD Sit to stand: mod I  Stand to sit: mod I  Stand pivot: mod I   Ambulation    NT 20'x2 with no AD min A 75 feet with AAD mod I   Stair negotiation: ascended and descended  NT NT 4 steps with 2 rail mod I   ROM BUE:  Defer to OT  BLE:  WFL     Strength BUE:  Defer to OT  BLE:  grossly assessed supine, 3-/5   Improve 1 MMT   Balance Sitting EOB:  NT  Dynamic Standing:  NT Sitting EOB:  SBA  Dynamic Standing: min A with no AD Sitting EOB:  IND  Dynamic Standing:  mod I with AAD   Pt is A & O x 3  Sensation:  Pt denies numbness and tingling to extremities  Edema:  unremarkable    Vitals:  SpO2 and HR were stable during session    Therapeutic Exercises:    Bed mobility: supine<>sit, cued for positioning at EOB  Transfers: STS x1, cued for hand placement and postural correction  Ambulation: 20'x2 with no AD  BLE AROM    Patient education  Pt educated on safety with functional mobility    Patient response to education:   Pt verbalized understanding Pt demonstrated skill Pt requires further education in this area   yes yes reinforce     ASSESSMENT:  Comments:  Pt supine in bed upon entering, pt agreeable to participate. Pt instructed to transfer to EOB, completing transfer with no physical assistance. Pt sitting upright with good sitting balance. Pt with no c/o dizziness with position change. Pt then cued for hand placement and instructed to stand from EOB. Pt standing with good balance with no AD. Pt instructed to ambulate to tolerance. Pt ambulating with decreased jasmin and flexed trunk, cueing provided for safe room negotiation. Pt demonstrated fair tolerance to ambulation bout, reaching for walls and objects for increased support during bout.  Pt was assisted back to bedside and was transferred to supine per pt request. Pt positioned for comfort with all
when appropriate  Will continue to monitor renal function   Pharmacy to follow      [facility] Bernardo [facility] 3/25/2023 11:24 AM
INR 1.1 01/26/2023    LABA1C 5.9 (H) 10/05/2022       Radiology:  Imaging studies reviewed today. ASSESSMENT:    Principal Problem:    Infected stasis ulcer of left lower extremity ([facility]  Active Problems:    DAYTON (acute kidney injury) ([facility]    Hypoxia    COPD (chronic obstructive pulmonary disease) (HCC)    GERD (gastroesophageal reflux disease)    Diastolic CHF, chronic (HCC)    Venous insufficiency of both lower extremities    Class 3 severe obesity due to excess calories with serious comorbidity in adult [facility])  Resolved Problems:    * No resolved hospital problems. *       PLAN:    Wound infection   IV abx  WCx Acinetobacter oral Zac Pseudomonas aeruginosa Staphylococcus aureus-MRSA  BCx  IVF completed  ID Consult appreciated   Wound care   Monitor labs closely     DAYTON-mild-resolved  Suspect associated with mild dehydration from decreased oral intake  Treated IV fluids, encourage oral intake  Avoid nephrotoxins     Transient hypoxia, history of COPD  1 low pulse ox on room air in ED  Patient with history of home O2 and COPD. Denies any symptoms of exacerbation, dyspnea, respiratory distress. Supplemental O2 wean as tolerated-on room air  Nebulizers  Incentive spirometer  Check chest x-ray    Diet: ADULT DIET; Regular; Low Sodium (2 gm)  Code Status: Full Code  PT/OT Eval Status:     DVT Prophylaxis:     Recommended disposition at discharge: Medically stable for discharge to SNF    +++++++++++++++++++++++++++++++++++++++++++++++++  Yanna Vee MD   [facility].  +++++++++++++++++++++++++++++++++++++++++++++++++  NOTE: This report was transcribed using voice recognition software. Every effort was made to ensure accuracy; however, inadvertent computerized transcription errors may be present.
Modified Ophelia    LB Dressing Dependent  Min A  To don slip on shoes  SBA   Bathing Maximal Assist Min A  Simulated sitting and standing, education on use of LHS to reduce pain  SBA   Toileting Dependent  Min A  Using BSC, pt declined use of standard commode in bathroom  SBA   Bed Mobility  Log Roll: Maximal Assist  Supine to sit: NT   Sit to supine: NT  Deferred d/t severe R hip pain w/ light movement. Supine to sit: SBA   Sit to supine: SBA Supine to sit:    Sit to supine:   Ind   Functional Transfers Sit to stand:NT   Stand to sit:NT  Stand pivot: NT  Commode: NT  Sit to stand:SBA   Stand to sit:SBA  Stand pivot: SBA  Commode: SBA Sit to stand:    Stand to sit:    Stand pivot:   Commode:    Supervision       Functional Mobility NT    Min A   Without AD for short distance in room to/from bathroom  w/ use of Appropriate AD    SBA   Balance Sitting:     Static - NT     Dynamic - NT  Standing: NT Sitting:     Static - sup     Dynamic - SBA  Standing: Min A  Sitting:     Static: Modified Ophelia     Dynamic: Modified Ophelia  Standing:   SBA   Activity Tolerance Poor  Pt limited d/t severe R hip pain. Fair-  Good   Visual/  Perceptual Glasses: Yes  Appears WFL          Safety Fair  Fair Good  during ADL completion      Hand Dominance Right    AROM (PROM) Strength Additional Info:  Goal: (PRN)   RUE  WFL 4-/5 grossly tested good  and wfl FMC/dexterity noted during ADL tasks    Improve overall RUE strength WFL for participation in functional tasks         LUE WFL 4-/5 grossly tested Good  and wfl FMC/dexterity noted during ADL tasks    Improve overall LUE strength WFL for participation in functional tasks         Hearing: [facility]  Sensation:  No c/o numbness or tingling BUE  Tone: WFL   Edema: Global    Comments: Per RN, pt OK for treatment. Upon arrival pt supine in bed.  ADL retraining to increase safety and indep in dressing and toileting tasks, balance and trf training to increase participation
SUSCEPTIBILITY PANEL BY CRUZ       trimethoprim-sulfamethoxazole Sensitive <=^20 mcg/mL BACTERIAL SUSCEPTIBILITY PANEL BY CRUZ          Pseudomonas aeruginosa (2)     Antibiotic Interpretation Microscan   Method Status     cefepime Sensitive ^2 mcg/mL BACTERIAL SUSCEPTIBILITY PANEL BY CRUZ       Ceftolozane/Tazobactam (Zerbaxa) Sensitive ^0.5 mcg/mL BACTERIAL SUSCEPTIBILITY PANEL BY CRUZ       gentamicin Sensitive ^2 mcg/mL BACTERIAL SUSCEPTIBILITY PANEL BY CRUZ       levofloxacin Sensitive ^1 mcg/mL BACTERIAL SUSCEPTIBILITY PANEL BY CRUZ       meropenem Sensitive ^2 mcg/mL BACTERIAL SUSCEPTIBILITY PANEL BY CRUZ       piperacillin-tazobactam Sensitive ^8 mcg/mL BACTERIAL SUSCEPTIBILITY PANEL BY CRUZ       tobramycin Sensitive <=^1 mcg/mL BACTERIAL SUSCEPTIBILITY PANEL BY CRUZ          Staphylococcus aureus (3)               Antibiotic Interpretation Microscan   Method Status     clindamycin Resistant >=^4 mcg/mL BACTERIAL SUSCEPTIBILITY PANEL BY CRUZ       DAPTOmycin Sensitive ^0.5 mcg/mL BACTERIAL SUSCEPTIBILITY PANEL BY CRUZ       doxycycline Intermediate ^8 mcg/mL BACTERIAL SUSCEPTIBILITY PANEL BY CRUZ       erythromycin Resistant >=^8 mcg/mL BACTERIAL SUSCEPTIBILITY PANEL BY CRUZ       gentamicin Sensitive <=^0.5 mcg/mL BACTERIAL SUSCEPTIBILITY PANEL BY CRUZ       oxacillin Resistant ^0.5 mcg/mL BACTERIAL SUSCEPTIBILITY PANEL BY CRUZ       tigecycline Sensitive <=^0.12 mcg/mL BACTERIAL SUSCEPTIBILITY PANEL BY CRUZ       trimethoprim-sulfamethoxazole Sensitive <=^10 mcg/mL BACTERIAL SUSCEPTIBILITY PANEL BY CRUZ       vancomycin Sensitive <=^0.5 mcg/mL BACTERIAL SUSCEPTIBILITY PANEL BY CRUZ                      Assessment:  Left leg wound infection   Chronic venous insufficiency   Morbid obesity     Plan:  Meropenem 1 gram IV q 12 hrs  vancomycin 1500 mg IV q  24 hrs for 2-3 weeks   Local wound care   OK to discharge to [facility]. Follow up discharge planning.   ID follow up in 2 weeks           11:45 AM      3/25/2023
maximum independence. Rolling R/L x2 each side with assistance and cues for safety. Skillful positioning in bed to protect skin and joint integrity. Pillows placed on R side for comfort and to prevent skin breakdown. Vitals and symptoms monitored during session. Pt's/ family goals   1. Get better    Prognosis is good for reaching above PT goals. Patient and or family understand(s) diagnosis, prognosis, and plan of care. Yes     PHYSICAL THERAPY PLAN OF CARE:    PT POC is established based on physician order and patient diagnosis     Referring provider/PT Order:    03/24/23 1030  PT evaluation and treat  Start:  03/24/23 1030,   End:  03/24/23 1030,   ONE TIME,   Standing Count:  1 Occurrences,   R         Chip Gabriel MD     Diagnosis:  Infected stasis ulcer of left lower extremity ([facility] [I83.229, L97.929]  Specific instructions for next treatment:  progress functional mobility as tolerated. Current Treatment Recommendations:     [x] Strengthening to improve independence with functional mobility   [x] ROM to improve independence with functional mobility   [x] Balance Training to improve static/dynamic balance and to reduce fall risk  [x] Endurance Training to improve activity tolerance during functional mobility   [x] Transfer Training to improve safety and independence with all functional transfers   [x] Gait Training to improve gait mechanics, endurance and assess need for appropriate assistive device  [x] Stair Training in preparation for safe discharge home and/or into the community   [x] Positioning to prevent skin breakdown and contractures  [x] Safety and Education Training   [x] Patient/Caregiver Education   [x] HEP  [] Other     PT long term treatment goals are located in above grid    Frequency of treatments: 2-5x/week x 1-2 weeks.     Time in  1155  Time out  1225    Total Treatment Time  10 minutes     Evaluation Time includes thorough review of current medical information,
vomiting. GENITOURINARY:  Denies burning urination or frequency of urination  INTEGUMENT: leg wound   HEMATOLOGIC/LYMPHATIC:  Denies lymph node swelling, gum bleeding or easy bruising. MUSCULOSKELETAL:  chronic non healing left leg wound, pain   NEUROLOGICAL:  Denies light headed, dizziness, loss of consciousness, weakness of lower extremities, bowel or bladder incontinence. PHYSICAL EXAM:       Vitals:   BP (!) 189/87   Pulse 93   Temp 97.3 °F (36.3 °C) (Temporal)   Resp 18   Ht 5' 8\" (1.727 m)   Wt 295 lb (133.8 kg)   SpO2 97%   BMI 44.85 kg/m²     General Appearance:    Awake, alert , no acute distress. Head:    Normocephalic, atraumatic   Eyes:    No pallor, no icterus,   Ears:    No obvious deformity or drainage.    Nose:   No nasal drainage   Throat:   Mucosa moist, no oral thrush   Neck:   Supple, no lymphadenopathy   Back:     no CVA tenderness   Lungs:     Clear to auscultation bilaterally, no wheeze    Heart:    Regular rate and rhythm, no murmur, rub or gallop   Abdomen:     Soft, non-tender, bowel sounds present    Extremities:   ++ edema, mild erythema, tender    Pulses:   Dorsalis pedis palpable    Skin:   Erythema legs                 CBC with Differential:      Lab Results   Component Value Date/Time    WBC 3.9 03/27/2023 06:30 AM    RBC 3.41 03/27/2023 06:30 AM    HGB 10.7 03/27/2023 06:30 AM    HCT 33.6 03/27/2023 06:30 AM     03/27/2023 06:30 AM    MCV 98.5 03/27/2023 06:30 AM    MCH 31.4 03/27/2023 06:30 AM    MCHC 31.8 03/27/2023 06:30 AM    RDW 12.5 03/27/2023 06:30 AM    NRBC 0.0 02/06/2023 07:00 AM    SEGSPCT 93 02/19/2014 09:40 AM    LYMPHOPCT 7.8 03/23/2023 11:41 PM    LYMPHOPCT 32.6 02/06/2023 07:00 AM    MONOPCT 6.5 03/23/2023 11:41 PM    BASOPCT 0.3 03/23/2023 11:41 PM    MONOSABS 0.64 03/23/2023 11:41 PM    LYMPHSABS 0.77 03/23/2023 11:41 PM    EOSABS 0.42 03/23/2023 11:41 PM    BASOSABS 0.03 03/23/2023 11:41 PM       CMP:    Lab Results   Component Value
Pulses:   Dorsalis pedis palpable    Skin:   Erythema legs                 CBC with Differential:      Lab Results   Component Value Date/Time    WBC 3.9 03/27/2023 06:30 AM    RBC 3.41 03/27/2023 06:30 AM    HGB 10.7 03/27/2023 06:30 AM    HCT 33.6 03/27/2023 06:30 AM     03/27/2023 06:30 AM    MCV 98.5 03/27/2023 06:30 AM    MCH 31.4 03/27/2023 06:30 AM    MCHC 31.8 03/27/2023 06:30 AM    RDW 12.5 03/27/2023 06:30 AM    NRBC 0.0 02/06/2023 07:00 AM    SEGSPCT 93 02/19/2014 09:40 AM    LYMPHOPCT 7.8 03/23/2023 11:41 PM    LYMPHOPCT 32.6 02/06/2023 07:00 AM    MONOPCT 6.5 03/23/2023 11:41 PM    BASOPCT 0.3 03/23/2023 11:41 PM    MONOSABS 0.64 03/23/2023 11:41 PM    LYMPHSABS 0.77 03/23/2023 11:41 PM    EOSABS 0.42 03/23/2023 11:41 PM    BASOSABS 0.03 03/23/2023 11:41 PM       CMP:    Lab Results   Component Value Date/Time     03/27/2023 06:30 AM    K 3.5 03/27/2023 06:30 AM    K 3.8 01/31/2023 03:40 AM    CL 98 03/27/2023 06:30 AM    CO2 30 03/27/2023 06:30 AM    BUN 4 03/27/2023 06:30 AM    CREATININE 0.5 03/27/2023 06:30 AM    GFRAA > 60 02/06/2023 07:00 AM    LABGLOM >60 03/27/2023 06:30 AM    GLUCOSE 92 03/27/2023 06:30 AM    GLUCOSE 93 02/06/2023 07:00 AM    PROT 8.0 03/23/2023 11:41 PM    LABALBU 3.5 03/23/2023 11:41 PM    LABALBU 2.9 02/06/2023 07:00 AM    CALCIUM 8.6 03/27/2023 06:30 AM    BILITOT 0.3 03/23/2023 11:41 PM    ALKPHOS 110 03/23/2023 11:41 PM    AST 40 03/23/2023 11:41 PM    ALT 19 03/23/2023 11:41 PM       Hepatic Function Panel:    Lab Results   Component Value Date/Time    ALKPHOS 110 03/23/2023 11:41 PM    ALT 19 03/23/2023 11:41 PM    AST 40 03/23/2023 11:41 PM    PROT 8.0 03/23/2023 11:41 PM    BILITOT 0.3 03/23/2023 11:41 PM    BILIDIR 0.2 02/02/2023 06:55 AM    LABALBU 3.5 03/23/2023 11:41 PM    LABALBU 2.9 02/06/2023 07:00 AM         Microbiology :    Component 3/8/23 1500   Gram Stain Result Gram stain performed from tissue touch prep   Rare Polymorphonuclear leukocytes
Treatment Charges: Mins Units   Ther Ex  93177     Manual Therapy 14515     Thera Activities 37988     ADL/Home Mgt 09541 40 3   Neuro Re-ed 84244     Group Therapy      Orthotic manage/training  29313     Non-Billable Time     Total Timed Treatment       Zoë Orellana, [address] [facility], OTR/L [id number]
perform tasks. May have comorbidities that affect occupational performance. Evaluation time includes thorough review of current medical information, gathering information on past medical & social history & PLOF, completion of standardized testing, informal observation of tasks, consultation with other medical professions/disciplines, assessment of data & development of POC/goals. Time In: 12:00p  Time Out: 12:30p  Total Treatment Time: 15 minutes    Min Units   OT Eval Low 27300  x     OT Eval Medium 40755      OT Eval High 73141      OT Re-Eval [id number]       Therapeutic Ex 66532       Therapeutic Activities 72665       ADL/Self Care 99707  15 1    Orthotic Management 77639       Manual 13982     Neuro Re-Ed 82794       Non-Billable Time          Evaluation Time additionally includes thorough review of current medical information, gathering information on past medical history/social history and prior level of function, interpretation of standardized testing/informal observation of tasks, assessment of data and development of plan of care and goals.               Arash Maria OTR/L; [id number]
PANEL BY CRUZ          Staphylococcus aureus (3)               Antibiotic Interpretation Microscan   Method Status     clindamycin Resistant >=^4 mcg/mL BACTERIAL SUSCEPTIBILITY PANEL BY CRUZ       DAPTOmycin Sensitive ^0.5 mcg/mL BACTERIAL SUSCEPTIBILITY PANEL BY CRUZ       doxycycline Intermediate ^8 mcg/mL BACTERIAL SUSCEPTIBILITY PANEL BY CRUZ       erythromycin Resistant >=^8 mcg/mL BACTERIAL SUSCEPTIBILITY PANEL BY CRUZ       gentamicin Sensitive <=^0.5 mcg/mL BACTERIAL SUSCEPTIBILITY PANEL BY CRUZ       oxacillin Resistant ^0.5 mcg/mL BACTERIAL SUSCEPTIBILITY PANEL BY CRUZ       tigecycline Sensitive <=^0.12 mcg/mL BACTERIAL SUSCEPTIBILITY PANEL BY CRUZ       trimethoprim-sulfamethoxazole Sensitive <=^10 mcg/mL BACTERIAL SUSCEPTIBILITY PANEL BY CRUZ       vancomycin Sensitive <=^0.5 mcg/mL BACTERIAL SUSCEPTIBILITY PANEL BY CRUZ                Vancomycin random  level 18.1      Assessment:    Left leg wound infection /cellulitis improving   Chronic venous insufficiency   Morbid obesity     Plan:    Meropenem 1 gram IV q 12 hrs  vancomycin 1500 mg IV q  24 hrs ~ 2 weeks   Local wound care   Awaiting placement       10:21 AM      3/30/2023
mcg/mL BACTERIAL SUSCEPTIBILITY PANEL BY CRUZ          Staphylococcus aureus (3)               Antibiotic Interpretation Microscan   Method Status     clindamycin Resistant >=^4 mcg/mL BACTERIAL SUSCEPTIBILITY PANEL BY CRUZ       DAPTOmycin Sensitive ^0.5 mcg/mL BACTERIAL SUSCEPTIBILITY PANEL BY CRUZ       doxycycline Intermediate ^8 mcg/mL BACTERIAL SUSCEPTIBILITY PANEL BY CRUZ       erythromycin Resistant >=^8 mcg/mL BACTERIAL SUSCEPTIBILITY PANEL BY CRUZ       gentamicin Sensitive <=^0.5 mcg/mL BACTERIAL SUSCEPTIBILITY PANEL BY CRUZ       oxacillin Resistant ^0.5 mcg/mL BACTERIAL SUSCEPTIBILITY PANEL BY CRUZ       tigecycline Sensitive <=^0.12 mcg/mL BACTERIAL SUSCEPTIBILITY PANEL BY CRUZ       trimethoprim-sulfamethoxazole Sensitive <=^10 mcg/mL BACTERIAL SUSCEPTIBILITY PANEL BY CRUZ       vancomycin Sensitive <=^0.5 mcg/mL BACTERIAL SUSCEPTIBILITY PANEL BY CRUZ                Vancomycin random  level 18.1      Assessment:  Left leg wound infection   Chronic venous insufficiency   Morbid obesity     Plan:    Meropenem 1 gram IV q 12 hrs  vancomycin 1500 mg IV q  24 hrs  Local wound care   PICC line reinsertion       3:06 PM      3/28/2023
stated

## 2023-03-30 NOTE — ADT AUTH CERT
Infected stasis ulcer of left lower extremity       PLAN:       Wound infection    IV abx meropenem vancomycin   ID Consult appreciated    Wound care    Monitor labs closely         PT/OT/SLP/CM ASSESSMENT OR NOTES:   *PT   AM-PAC 6 Clicks 66/71   Patient would benefit from continued skilled PT to maximize functional mobility independence. **OT   AM-PAC Daily Activity Raw Score: 1624   Continue with current plan of care;       *CM   continues to await precert, and due to bad weather, they continue to not have electric. Plan is [facility] once precert and electric back on. Envelope, ambullete form, and completed PASRR in soft chart                 slr- pa recommendation- inpt by Luba Lomas RN       Review Status Review Entered   In Primary 3/28/2023 0848       Created By   Luba Lomas RN      Criteria Review   Name: Brady Irizarry   : 1945   CSN: [id number]   INSURANCE: [employer] [employer]    Date of admission: 23    Current status: Inpatient    We recommend that patient's status is APPROPRIATE     Rationale: 1. Left leg wound infection   2. Chronic venous insufficiency   3.  Morbid obesity      Plan:  Meropenem 1 gram IV q 12 hrs  vancomycin 1500 mg IV q  24 hrs  Local wound care   Awaiting placement           Clinical summary 70 yr old female with left leg wound infection  Vitals Stable   Labs and Imaging reviewed  Status decision based on clinical judgment and Commercial Utilization criteria, e.g., MCG, [employer]

## 2023-04-19 ENCOUNTER — HOSPITAL ENCOUNTER (OUTPATIENT)
Dept: WOUND CARE | Age: 78
Discharge: HOME | End: 2023-04-19
Payer: MEDICARE

## 2023-04-19 VITALS
RESPIRATION RATE: 18 BRPM | DIASTOLIC BLOOD PRESSURE: 68 MMHG | TEMPERATURE: 97.5 F | HEIGHT: 68 IN | SYSTOLIC BLOOD PRESSURE: 140 MMHG | HEART RATE: 78 BPM | WEIGHT: 293 LBS | BODY MASS INDEX: 44.41 KG/M2

## 2023-04-19 DIAGNOSIS — L97.929 INFECTED STASIS ULCER OF LEFT LOWER EXTREMITY (HCC): ICD-10-CM

## 2023-04-19 DIAGNOSIS — L97.222 VENOUS STASIS ULCER OF LEFT CALF WITH FAT LAYER EXPOSED WITHOUT VARICOSE VEINS (HCC): Primary | Chronic | ICD-10-CM

## 2023-04-19 DIAGNOSIS — I83.229 INFECTED STASIS ULCER OF LEFT LOWER EXTREMITY (HCC): ICD-10-CM

## 2023-04-19 DIAGNOSIS — I87.2 VENOUS STASIS ULCER OF LEFT CALF WITH FAT LAYER EXPOSED WITHOUT VARICOSE VEINS (HCC): Primary | Chronic | ICD-10-CM

## 2023-04-19 PROCEDURE — 11042 DBRDMT SUBQ TIS 1ST 20SQCM/<: CPT

## 2023-04-19 RX ORDER — BETAMETHASONE DIPROPIONATE 0.05 %
OINTMENT (GRAM) TOPICAL ONCE
OUTPATIENT
Start: 2023-04-19 | End: 2023-04-19

## 2023-04-19 RX ORDER — LIDOCAINE 40 MG/G
CREAM TOPICAL ONCE
OUTPATIENT
Start: 2023-04-19 | End: 2023-04-19

## 2023-04-19 RX ORDER — GINSENG 100 MG
CAPSULE ORAL ONCE
OUTPATIENT
Start: 2023-04-19 | End: 2023-04-19

## 2023-04-19 RX ORDER — LIDOCAINE HYDROCHLORIDE 40 MG/ML
SOLUTION TOPICAL ONCE
Status: COMPLETED | OUTPATIENT
Start: 2023-04-19 | End: 2023-04-19

## 2023-04-19 RX ORDER — GENTAMICIN SULFATE 1 MG/G
OINTMENT TOPICAL ONCE
OUTPATIENT
Start: 2023-04-19 | End: 2023-04-19

## 2023-04-19 RX ORDER — LIDOCAINE 50 MG/G
OINTMENT TOPICAL ONCE
OUTPATIENT
Start: 2023-04-19 | End: 2023-04-19

## 2023-04-19 RX ORDER — LIDOCAINE HYDROCHLORIDE 40 MG/ML
SOLUTION TOPICAL ONCE
OUTPATIENT
Start: 2023-04-19 | End: 2023-04-19

## 2023-04-19 RX ORDER — CLOBETASOL PROPIONATE 0.5 MG/G
OINTMENT TOPICAL ONCE
OUTPATIENT
Start: 2023-04-19 | End: 2023-04-19

## 2023-04-19 RX ORDER — LIDOCAINE HYDROCHLORIDE 20 MG/ML
JELLY TOPICAL ONCE
OUTPATIENT
Start: 2023-04-19 | End: 2023-04-19

## 2023-04-19 RX ORDER — BACITRACIN, NEOMYCIN, POLYMYXIN B 400; 3.5; 5 [USP'U]/G; MG/G; [USP'U]/G
OINTMENT TOPICAL ONCE
OUTPATIENT
Start: 2023-04-19 | End: 2023-04-19

## 2023-04-19 RX ORDER — BACITRACIN ZINC AND POLYMYXIN B SULFATE 500; 1000 [USP'U]/G; [USP'U]/G
OINTMENT TOPICAL ONCE
OUTPATIENT
Start: 2023-04-19 | End: 2023-04-19

## 2023-04-19 RX ADMIN — LIDOCAINE HYDROCHLORIDE 20 ML: 40 SOLUTION TOPICAL at 14:29

## 2023-04-19 ASSESSMENT — PAIN DESCRIPTION - PROGRESSION: CLINICAL_PROGRESSION: NOT CHANGED

## 2023-04-19 NOTE — PLAN OF CARE
Problem: Chronic Conditions and Co-morbidities  Goal: Patient's chronic conditions and co-morbidity symptoms are monitored and maintained or improved  Outcome: Progressing     Problem: Cognitive:  Goal: Knowledge of wound care  Description: Knowledge of wound care  Outcome: Progressing  Goal: Understands risk factors for wounds  Description: Understands risk factors for wounds  Outcome: Progressing     Problem: Wound:  Goal: Will show signs of wound healing; wound closure and no evidence of infection  Description: Will show signs of wound healing; wound closure and no evidence of infection  Outcome: Progressing     Problem: Venous:  Goal: Signs of wound healing will improve  Description: Signs of wound healing will improve  Outcome: Progressing

## 2023-04-20 RX ORDER — OXYCODONE AND ACETAMINOPHEN 7.5; 325 MG/1; MG/1
1 TABLET ORAL 2 TIMES DAILY
Qty: 60 TABLET | Refills: 0 | Status: SHIPPED | OUTPATIENT
Start: 2023-04-20 | End: 2023-05-20

## 2023-04-20 RX ORDER — MORPHINE SULFATE 15 MG/1
15 TABLET, FILM COATED, EXTENDED RELEASE ORAL 2 TIMES DAILY
Qty: 60 TABLET | Refills: 0 | Status: SHIPPED | OUTPATIENT
Start: 2023-04-20 | End: 2023-05-20

## 2023-04-21 NOTE — PROGRESS NOTES
worse  Culture  drawtek  3/9/22  periwound much improved  levaquin 750 mg daily #10 script given culture   Wound itself stable  3/23/22  Left anterior calf wound improving overall  New blister/ulcerations left 3rd, 4th and 5th toes and lateral foot   Instructed to keep toes/foot dry, no ointment or corn starch   3/30/22  bistering resolved, other skin issues resolved  More dry than previously but overall stable  kailyn Barrera  Swelling is down, patient declining wrap  4/13/22  Wound slightly improved  4/20/2022  Wound stable, patient refusing compression wrap  5/4/22  Wound worse  Pt refusing wrap  Will change diet per her report to decrease swelling  5/11/22  Wound stable  kailyn and giacomo 2 - pt agreeable now after significant persuasion  5/18/22  Took off wrap within 24 hours due to pain  aquacell and spandigrip  She agreed to use wrap again if swelling not down 2 more cm next week  Wound slightly smaller  5/25/22  Wound improved   Left leg wound debrided   Continue aquacel   6/8/22  Wound larger  Agreeable to wrap - kailyn sena ag  Culture done  6/22/22  Wound stable in size  6/29/22  Wound slightly larger  Still having issues with wrap falling down  Will start [facility] - MWF wraps  7/6/22  Improved  hhc starts this week  7/20/22  Appearance better, wound measuring larger  Continue with wraps  Leg edema improved  7/27/22  Slight improvement  8/3/22  Wound appearance and size worse  Issues still with wraps falling down  When doesn't have wraps on than usually uses spandigrips  8/10/2022  Wound cultures noted, Bactrim DS 1 tablet p.o. twice daily, wound looks fairly clean with some exudate only, mild recent lab work, patient recommended CBC and CMP  8/17/22  Refusing wrap today due to pain associated with  Will discuss with Dr Venkat Hughes her pain management  Slightly larger, appearance improved  8/24/22  Poor tolerance of wraps  Wound stable in size  9/7/22  Not tolerating wraps - emphasized importance of

## 2023-04-26 ENCOUNTER — HOSPITAL ENCOUNTER (OUTPATIENT)
Dept: WOUND CARE | Age: 78
Discharge: HOME | End: 2023-04-26
Payer: MEDICARE

## 2023-04-26 VITALS
DIASTOLIC BLOOD PRESSURE: 68 MMHG | HEART RATE: 86 BPM | TEMPERATURE: 97.1 F | WEIGHT: 293 LBS | SYSTOLIC BLOOD PRESSURE: 106 MMHG | RESPIRATION RATE: 18 BRPM | BODY MASS INDEX: 44.41 KG/M2 | HEIGHT: 68 IN

## 2023-04-26 DIAGNOSIS — I87.2 VENOUS INSUFFICIENCY OF BOTH LOWER EXTREMITIES: Chronic | ICD-10-CM

## 2023-04-26 DIAGNOSIS — L97.222 VENOUS STASIS ULCER OF LEFT CALF WITH FAT LAYER EXPOSED WITHOUT VARICOSE VEINS (HCC): Primary | Chronic | ICD-10-CM

## 2023-04-26 DIAGNOSIS — I87.2 VENOUS STASIS ULCER OF LEFT CALF WITH FAT LAYER EXPOSED WITHOUT VARICOSE VEINS (HCC): Primary | Chronic | ICD-10-CM

## 2023-04-26 PROCEDURE — 11045 DBRDMT SUBQ TISS EACH ADDL: CPT | Performed by: SURGERY

## 2023-04-26 PROCEDURE — 11042 DBRDMT SUBQ TIS 1ST 20SQCM/<: CPT

## 2023-04-26 PROCEDURE — 0JBP0ZZ EXCISION OF LEFT LOWER LEG SUBCUTANEOUS TISSUE AND FASCIA, OPEN APPROACH: ICD-10-PCS | Performed by: SURGERY

## 2023-04-26 PROCEDURE — 11042 DBRDMT SUBQ TIS 1ST 20SQCM/<: CPT | Performed by: SURGERY

## 2023-04-26 PROCEDURE — 11045 DBRDMT SUBQ TISS EACH ADDL: CPT

## 2023-04-26 RX ORDER — BACITRACIN, NEOMYCIN, POLYMYXIN B 400; 3.5; 5 [USP'U]/G; MG/G; [USP'U]/G
OINTMENT TOPICAL ONCE
OUTPATIENT
Start: 2023-04-26 | End: 2023-04-26

## 2023-04-26 RX ORDER — GENTAMICIN SULFATE 1 MG/G
OINTMENT TOPICAL ONCE
OUTPATIENT
Start: 2023-04-26 | End: 2023-04-26

## 2023-04-26 RX ORDER — GINSENG 100 MG
CAPSULE ORAL ONCE
OUTPATIENT
Start: 2023-04-26 | End: 2023-04-26

## 2023-04-26 RX ORDER — CLOBETASOL PROPIONATE 0.5 MG/G
OINTMENT TOPICAL ONCE
OUTPATIENT
Start: 2023-04-26 | End: 2023-04-26

## 2023-04-26 RX ORDER — LIDOCAINE HYDROCHLORIDE 40 MG/ML
SOLUTION TOPICAL ONCE
Status: COMPLETED | OUTPATIENT
Start: 2023-04-26 | End: 2023-04-26

## 2023-04-26 RX ORDER — BETAMETHASONE DIPROPIONATE 0.05 %
OINTMENT (GRAM) TOPICAL ONCE
OUTPATIENT
Start: 2023-04-26 | End: 2023-04-26

## 2023-04-26 RX ORDER — LIDOCAINE 40 MG/G
CREAM TOPICAL ONCE
OUTPATIENT
Start: 2023-04-26 | End: 2023-04-26

## 2023-04-26 RX ORDER — LIDOCAINE 50 MG/G
OINTMENT TOPICAL ONCE
OUTPATIENT
Start: 2023-04-26 | End: 2023-04-26

## 2023-04-26 RX ORDER — LIDOCAINE HYDROCHLORIDE 20 MG/ML
JELLY TOPICAL ONCE
OUTPATIENT
Start: 2023-04-26 | End: 2023-04-26

## 2023-04-26 RX ORDER — BACITRACIN ZINC AND POLYMYXIN B SULFATE 500; 1000 [USP'U]/G; [USP'U]/G
OINTMENT TOPICAL ONCE
OUTPATIENT
Start: 2023-04-26 | End: 2023-04-26

## 2023-04-26 RX ORDER — LIDOCAINE HYDROCHLORIDE 40 MG/ML
SOLUTION TOPICAL ONCE
OUTPATIENT
Start: 2023-04-26 | End: 2023-04-26

## 2023-04-26 RX ADMIN — LIDOCAINE HYDROCHLORIDE 10 ML: 40 SOLUTION TOPICAL at 14:39

## 2023-04-26 ASSESSMENT — PAIN DESCRIPTION - ORIENTATION: ORIENTATION: LEFT

## 2023-04-26 ASSESSMENT — PAIN DESCRIPTION - ONSET: ONSET: ON-GOING

## 2023-04-26 ASSESSMENT — PAIN DESCRIPTION - DESCRIPTORS: DESCRIPTORS: BURNING;ACHING

## 2023-04-26 ASSESSMENT — PAIN SCALES - GENERAL: PAINLEVEL_OUTOF10: 6

## 2023-04-26 ASSESSMENT — PAIN - FUNCTIONAL ASSESSMENT: PAIN_FUNCTIONAL_ASSESSMENT: ACTIVITIES ARE NOT PREVENTED

## 2023-04-26 ASSESSMENT — PAIN DESCRIPTION - LOCATION: LOCATION: LEG

## 2023-04-26 ASSESSMENT — PAIN DESCRIPTION - FREQUENCY: FREQUENCY: INTERMITTENT

## 2023-04-26 ASSESSMENT — PAIN DESCRIPTION - PAIN TYPE: TYPE: CHRONIC PAIN

## 2023-04-26 NOTE — PROGRESS NOTES
hours:     * Increase in Pain  * Temperature over 101  * Increase in drainage from your wound  * Drainage with a foul odor  * Bleeding  * Increase in swelling  * Need for compression bandage changes due to slippage, breakthrough drainage. If you need medical attention outside of the business hours of the [address] [place] [address] Road please contact your PCP or go to the nearest emergency room.            Electronically signed by Benjamín Starks MD

## 2023-04-26 NOTE — DISCHARGE INSTRUCTIONS
Visit Discharge/Physician Orders     Discharge condition: Stable     Assessment of pain at discharge: mild     Anesthetic used: lido 4%     Discharge to: Home     Left via:Private automobile     Accompanied by: self     ECF/HHA: [facility] *NEW REFERRAL      Dressing Orders:  LEFT PRETIB : cleanse with normal saline, apply calcium alginate ag, cover with dry dressing and secure . Change daily. Apply spandagrip. On in am and off in pm. Elevate legs as much as possible above level of the heart. Treatment Orders:Eat a diet high in protein and vitamin C. Take a multiple vitamin daily unless contraindicated. Elevate as much as possible        [address] [place] [address],[address] followup visit: 1 week____________________________  (Please note your next appointment above and if you are unable to keep, kindly give a 24 hour notice. Thank you.)     Physician signature:__________________________      If you experience any of the following, please call the [facility] during business hours:     * Increase in Pain  * Temperature over 101  * Increase in drainage from your wound  * Drainage with a foul odor  * Bleeding  * Increase in swelling  * Need for compression bandage changes due to slippage, breakthrough drainage. If you need medical attention outside of the business hours of the [facility] please contact your PCP or go to the nearest emergency room.

## 2023-04-26 NOTE — PLAN OF CARE
Problem: Chronic Conditions and Co-morbidities  Goal: Patient's chronic conditions and co-morbidity symptoms are monitored and maintained or improved  Outcome: Progressing     Problem: Cognitive:  Goal: Knowledge of wound care  Description: Knowledge of wound care  Outcome: Progressing  Goal: Understands risk factors for wounds  Description: Understands risk factors for wounds  Outcome: Progressing     Problem: Wound:  Goal: Will show signs of wound healing; wound closure and no evidence of infection  Description: Will show signs of wound healing; wound closure and no evidence of infection  Outcome: Progressing     Problem: Venous:  Goal: Signs of wound healing will improve  Description: Signs of wound healing will improve  Outcome: Progressing     Problem: Compression therapy:  Goal: Will be free from complications associated with compression therapy  Description: Will be free from complications associated with compression therapy  Outcome: Progressing

## 2023-04-28 ENCOUNTER — APPOINTMENT (OUTPATIENT)
Dept: GENERAL RADIOLOGY | Age: 78
DRG: 193 | End: 2023-04-28
Payer: MEDICARE

## 2023-04-28 ENCOUNTER — HOSPITAL ENCOUNTER (INPATIENT)
Age: 78
LOS: 8 days | Discharge: HOME | DRG: 193 | End: 2023-05-06
Attending: EMERGENCY MEDICINE | Admitting: STUDENT IN AN ORGANIZED HEALTH CARE EDUCATION/TRAINING PROGRAM
Payer: MEDICARE

## 2023-04-28 ENCOUNTER — APPOINTMENT (OUTPATIENT)
Dept: CT IMAGING | Age: 78
DRG: 193 | End: 2023-04-28
Payer: MEDICARE

## 2023-04-28 DIAGNOSIS — I83.229 INFECTED STASIS ULCER OF LEFT LOWER EXTREMITY (HCC): ICD-10-CM

## 2023-04-28 DIAGNOSIS — N17.9 AKI (ACUTE KIDNEY INJURY) (HCC): ICD-10-CM

## 2023-04-28 DIAGNOSIS — I73.9 PVD (PERIPHERAL VASCULAR DISEASE) (HCC): ICD-10-CM

## 2023-04-28 DIAGNOSIS — R74.01 TRANSAMINITIS: ICD-10-CM

## 2023-04-28 DIAGNOSIS — L97.929 INFECTED STASIS ULCER OF LEFT LOWER EXTREMITY (HCC): ICD-10-CM

## 2023-04-28 DIAGNOSIS — L97.829 SKIN ULCER OF LEFT PRETIBIAL REGION, UNSPECIFIED ULCER STAGE (HCC): ICD-10-CM

## 2023-04-28 DIAGNOSIS — J96.01 ACUTE RESPIRATORY FAILURE WITH HYPOXIA (HCC): Primary | ICD-10-CM

## 2023-04-28 DIAGNOSIS — L97.222 VENOUS STASIS ULCER OF LEFT CALF WITH FAT LAYER EXPOSED WITHOUT VARICOSE VEINS (HCC): Chronic | ICD-10-CM

## 2023-04-28 DIAGNOSIS — I21.4 NSTEMI (NON-ST ELEVATED MYOCARDIAL INFARCTION) (HCC): ICD-10-CM

## 2023-04-28 DIAGNOSIS — J18.9 PNEUMONIA OF RIGHT LOWER LOBE DUE TO INFECTIOUS ORGANISM: ICD-10-CM

## 2023-04-28 DIAGNOSIS — I87.2 VENOUS STASIS ULCER OF LEFT CALF WITH FAT LAYER EXPOSED WITHOUT VARICOSE VEINS (HCC): Chronic | ICD-10-CM

## 2023-04-28 PROBLEM — J15.9 COMMUNITY ACQUIRED BACTERIAL PNEUMONIA: Status: ACTIVE | Noted: 2023-04-28

## 2023-04-28 LAB
ALBUMIN SERPL-MCNC: 3.4 G/DL (ref 3.5–5.2)
ALP SERPL-CCNC: 213 U/L (ref 35–104)
ALT SERPL-CCNC: 79 U/L (ref 0–32)
ANION GAP SERPL CALCULATED.3IONS-SCNC: 9 MMOL/L (ref 7–16)
APTT BLD: 32.6 SEC (ref 24.5–35.1)
AST SERPL-CCNC: 168 U/L (ref 0–31)
B PARAP IS1001 DNA NPH QL NAA+NON-PROBE: NOT DETECTED
B PERT.PT PRMT NPH QL NAA+NON-PROBE: NOT DETECTED
BASOPHILS # BLD: 0.01 E9/L (ref 0–0.2)
BASOPHILS NFR BLD: 0.2 % (ref 0–2)
BILIRUB SERPL-MCNC: 0.4 MG/DL (ref 0–1.2)
BUN SERPL-MCNC: 34 MG/DL (ref 6–23)
C PNEUM DNA NPH QL NAA+NON-PROBE: NOT DETECTED
CALCIUM SERPL-MCNC: 8.9 MG/DL (ref 8.6–10.2)
CHLORIDE SERPL-SCNC: 102 MMOL/L (ref 98–107)
CO2 SERPL-SCNC: 30 MMOL/L (ref 22–29)
CREAT SERPL-MCNC: 1.5 MG/DL (ref 0.5–1)
D DIMER: 918 NG/ML DDU
EKG ATRIAL RATE: 65 BPM
EKG P AXIS: 57 DEGREES
EKG P-R INTERVAL: 164 MS
EKG Q-T INTERVAL: 438 MS
EKG QRS DURATION: 100 MS
EKG QTC CALCULATION (BAZETT): 455 MS
EKG R AXIS: 73 DEGREES
EKG T AXIS: -41 DEGREES
EKG VENTRICULAR RATE: 65 BPM
EOSINOPHIL # BLD: 0.04 E9/L (ref 0.05–0.5)
EOSINOPHIL NFR BLD: 0.9 % (ref 0–6)
ERYTHROCYTE [DISTWIDTH] IN BLOOD BY AUTOMATED COUNT: 13.6 FL (ref 11.5–15)
FLUAV RNA NPH QL NAA+NON-PROBE: NOT DETECTED
FLUBV RNA NPH QL NAA+NON-PROBE: NOT DETECTED
GLUCOSE SERPL-MCNC: 126 MG/DL (ref 74–99)
HADV DNA NPH QL NAA+NON-PROBE: NOT DETECTED
HCOV 229E RNA NPH QL NAA+NON-PROBE: NOT DETECTED
HCOV HKU1 RNA NPH QL NAA+NON-PROBE: NOT DETECTED
HCOV NL63 RNA NPH QL NAA+NON-PROBE: NOT DETECTED
HCOV OC43 RNA NPH QL NAA+NON-PROBE: NOT DETECTED
HCT VFR BLD AUTO: 34.7 % (ref 34–48)
HGB BLD-MCNC: 10.3 G/DL (ref 11.5–15.5)
HMPV RNA NPH QL NAA+NON-PROBE: NOT DETECTED
HPIV1 RNA NPH QL NAA+NON-PROBE: NOT DETECTED
HPIV2 RNA NPH QL NAA+NON-PROBE: NOT DETECTED
HPIV3 RNA NPH QL NAA+NON-PROBE: NOT DETECTED
HPIV4 RNA NPH QL NAA+NON-PROBE: NOT DETECTED
IMM GRANULOCYTES # BLD: 0.03 E9/L
IMM GRANULOCYTES NFR BLD: 0.7 % (ref 0–5)
INR BLD: 1.2
LACTATE BLDV-SCNC: 1.9 MMOL/L (ref 0.5–2.2)
LYMPHOCYTES # BLD: 0.6 E9/L (ref 1.5–4)
LYMPHOCYTES NFR BLD: 13 % (ref 20–42)
M PNEUMO DNA NPH QL NAA+NON-PROBE: NOT DETECTED
MAGNESIUM SERPL-MCNC: 1.9 MG/DL (ref 1.6–2.6)
MCH RBC QN AUTO: 30.5 PG (ref 26–35)
MCHC RBC AUTO-ENTMCNC: 29.7 % (ref 32–34.5)
MCV RBC AUTO: 102.7 FL (ref 80–99.9)
MONOCYTES # BLD: 0.52 E9/L (ref 0.1–0.95)
MONOCYTES NFR BLD: 11.3 % (ref 2–12)
NEUTROPHILS # BLD: 3.41 E9/L (ref 1.8–7.3)
NEUTS SEG NFR BLD: 73.9 % (ref 43–80)
PLATELET # BLD AUTO: 179 E9/L (ref 130–450)
PMV BLD AUTO: 10.2 FL (ref 7–12)
POTASSIUM SERPL-SCNC: 3.9 MMOL/L (ref 3.5–5)
PROCALCITONIN: 0.08 NG/ML (ref 0–0.08)
PROT SERPL-MCNC: 8.2 G/DL (ref 6.4–8.3)
PROTHROMBIN TIME: 12.7 SEC (ref 9.3–12.4)
RBC # BLD AUTO: 3.38 E12/L (ref 3.5–5.5)
RSV RNA NPH QL NAA+NON-PROBE: NOT DETECTED
RV+EV RNA NPH QL NAA+NON-PROBE: NOT DETECTED
SARS-COV-2 RNA NPH QL NAA+NON-PROBE: NOT DETECTED
SODIUM SERPL-SCNC: 141 MMOL/L (ref 132–146)
TROPONIN, HIGH SENSITIVITY: 264 NG/L (ref 0–9)
TROPONIN, HIGH SENSITIVITY: 292 NG/L (ref 0–9)
TROPONIN, HIGH SENSITIVITY: 297 NG/L (ref 0–9)
WBC # BLD: 4.6 E9/L (ref 4.5–11.5)

## 2023-04-28 PROCEDURE — 80053 COMPREHEN METABOLIC PANEL: CPT

## 2023-04-28 PROCEDURE — 0202U NFCT DS 22 TRGT SARS-COV-2: CPT

## 2023-04-28 PROCEDURE — 83735 ASSAY OF MAGNESIUM: CPT

## 2023-04-28 PROCEDURE — 93005 ELECTROCARDIOGRAM TRACING: CPT | Performed by: PHYSICIAN ASSISTANT

## 2023-04-28 PROCEDURE — 84145 PROCALCITONIN (PCT): CPT

## 2023-04-28 PROCEDURE — 2580000003 HC RX 258: Performed by: RADIOLOGY

## 2023-04-28 PROCEDURE — 99285 EMERGENCY DEPT VISIT HI MDM: CPT

## 2023-04-28 PROCEDURE — 6360000004 HC RX CONTRAST MEDICATION: Performed by: RADIOLOGY

## 2023-04-28 PROCEDURE — 71275 CT ANGIOGRAPHY CHEST: CPT

## 2023-04-28 PROCEDURE — 87186 SC STD MICRODIL/AGAR DIL: CPT

## 2023-04-28 PROCEDURE — 71045 X-RAY EXAM CHEST 1 VIEW: CPT

## 2023-04-28 PROCEDURE — 84484 ASSAY OF TROPONIN QUANT: CPT

## 2023-04-28 PROCEDURE — 6370000000 HC RX 637 (ALT 250 FOR IP): Performed by: STUDENT IN AN ORGANIZED HEALTH CARE EDUCATION/TRAINING PROGRAM

## 2023-04-28 PROCEDURE — 93010 ELECTROCARDIOGRAM REPORT: CPT | Performed by: INTERNAL MEDICINE

## 2023-04-28 PROCEDURE — 85378 FIBRIN DEGRADE SEMIQUANT: CPT

## 2023-04-28 PROCEDURE — 6360000002 HC RX W HCPCS: Performed by: STUDENT IN AN ORGANIZED HEALTH CARE EDUCATION/TRAINING PROGRAM

## 2023-04-28 PROCEDURE — 83605 ASSAY OF LACTIC ACID: CPT

## 2023-04-28 PROCEDURE — 2700000000 HC OXYGEN THERAPY PER DAY

## 2023-04-28 PROCEDURE — 96375 TX/PRO/DX INJ NEW DRUG ADDON: CPT

## 2023-04-28 PROCEDURE — 87077 CULTURE AEROBIC IDENTIFY: CPT

## 2023-04-28 PROCEDURE — 87205 SMEAR GRAM STAIN: CPT

## 2023-04-28 PROCEDURE — 96366 THER/PROPH/DIAG IV INF ADDON: CPT

## 2023-04-28 PROCEDURE — 2580000003 HC RX 258: Performed by: STUDENT IN AN ORGANIZED HEALTH CARE EDUCATION/TRAINING PROGRAM

## 2023-04-28 PROCEDURE — 85025 COMPLETE CBC W/AUTO DIFF WBC: CPT

## 2023-04-28 PROCEDURE — 2140000000 HC CCU INTERMEDIATE R&B

## 2023-04-28 PROCEDURE — 85610 PROTHROMBIN TIME: CPT

## 2023-04-28 PROCEDURE — 36415 COLL VENOUS BLD VENIPUNCTURE: CPT

## 2023-04-28 PROCEDURE — 87070 CULTURE OTHR SPECIMN AEROBIC: CPT

## 2023-04-28 PROCEDURE — 96365 THER/PROPH/DIAG IV INF INIT: CPT

## 2023-04-28 PROCEDURE — 73590 X-RAY EXAM OF LOWER LEG: CPT

## 2023-04-28 PROCEDURE — 87040 BLOOD CULTURE FOR BACTERIA: CPT

## 2023-04-28 PROCEDURE — 85730 THROMBOPLASTIN TIME PARTIAL: CPT

## 2023-04-28 RX ORDER — HEPARIN SODIUM 10000 [USP'U]/100ML
5-30 INJECTION, SOLUTION INTRAVENOUS CONTINUOUS
Status: DISCONTINUED | OUTPATIENT
Start: 2023-04-28 | End: 2023-05-05

## 2023-04-28 RX ORDER — HEPARIN SODIUM 1000 [USP'U]/ML
4000 INJECTION, SOLUTION INTRAVENOUS; SUBCUTANEOUS ONCE
Status: COMPLETED | OUTPATIENT
Start: 2023-04-28 | End: 2023-04-28

## 2023-04-28 RX ORDER — SODIUM CHLORIDE 0.9 % (FLUSH) 0.9 %
5-40 SYRINGE (ML) INJECTION EVERY 12 HOURS SCHEDULED
Status: DISCONTINUED | OUTPATIENT
Start: 2023-04-28 | End: 2023-05-06 | Stop reason: HOSPADM

## 2023-04-28 RX ORDER — ASPIRIN 81 MG/1
324 TABLET, CHEWABLE ORAL ONCE
Status: COMPLETED | OUTPATIENT
Start: 2023-04-28 | End: 2023-04-28

## 2023-04-28 RX ORDER — ACETAMINOPHEN 325 MG/1
650 TABLET ORAL EVERY 6 HOURS PRN
Status: DISCONTINUED | OUTPATIENT
Start: 2023-04-28 | End: 2023-05-01 | Stop reason: SDUPTHER

## 2023-04-28 RX ORDER — ACETAMINOPHEN 650 MG/1
650 SUPPOSITORY RECTAL EVERY 6 HOURS PRN
Status: DISCONTINUED | OUTPATIENT
Start: 2023-04-28 | End: 2023-05-06 | Stop reason: HOSPADM

## 2023-04-28 RX ORDER — ALBUTEROL SULFATE 90 UG/1
2 AEROSOL, METERED RESPIRATORY (INHALATION) EVERY 6 HOURS PRN
Status: DISCONTINUED | OUTPATIENT
Start: 2023-04-28 | End: 2023-04-28 | Stop reason: CLARIF

## 2023-04-28 RX ORDER — HEPARIN SODIUM 1000 [USP'U]/ML
4000 INJECTION, SOLUTION INTRAVENOUS; SUBCUTANEOUS ONCE
Status: DISCONTINUED | OUTPATIENT
Start: 2023-04-28 | End: 2023-04-28

## 2023-04-28 RX ORDER — OXYCODONE HYDROCHLORIDE 5 MG/1
2.5 TABLET ORAL 2 TIMES DAILY
Status: DISCONTINUED | OUTPATIENT
Start: 2023-04-28 | End: 2023-05-06 | Stop reason: HOSPADM

## 2023-04-28 RX ORDER — HEPARIN SODIUM 1000 [USP'U]/ML
4000 INJECTION, SOLUTION INTRAVENOUS; SUBCUTANEOUS PRN
Status: DISCONTINUED | OUTPATIENT
Start: 2023-04-28 | End: 2023-04-28

## 2023-04-28 RX ORDER — ALBUTEROL SULFATE 2.5 MG/3ML
2.5 SOLUTION RESPIRATORY (INHALATION) EVERY 6 HOURS PRN
Status: DISCONTINUED | OUTPATIENT
Start: 2023-04-28 | End: 2023-05-06 | Stop reason: HOSPADM

## 2023-04-28 RX ORDER — OXYCODONE HYDROCHLORIDE AND ACETAMINOPHEN 5; 325 MG/1; MG/1
1 TABLET ORAL ONCE
Status: COMPLETED | OUTPATIENT
Start: 2023-04-28 | End: 2023-04-28

## 2023-04-28 RX ORDER — HYDROCHLOROTHIAZIDE 25 MG/1
25 TABLET ORAL DAILY
Status: DISCONTINUED | OUTPATIENT
Start: 2023-04-28 | End: 2023-05-06 | Stop reason: HOSPADM

## 2023-04-28 RX ORDER — OXYCODONE HYDROCHLORIDE AND ACETAMINOPHEN 5; 325 MG/1; MG/1
1 TABLET ORAL 2 TIMES DAILY
Status: DISCONTINUED | OUTPATIENT
Start: 2023-04-28 | End: 2023-05-06 | Stop reason: HOSPADM

## 2023-04-28 RX ORDER — ATORVASTATIN CALCIUM 40 MG/1
40 TABLET, FILM COATED ORAL NIGHTLY
Status: DISCONTINUED | OUTPATIENT
Start: 2023-04-28 | End: 2023-05-06 | Stop reason: HOSPADM

## 2023-04-28 RX ORDER — MORPHINE SULFATE 2 MG/ML
2 INJECTION, SOLUTION INTRAMUSCULAR; INTRAVENOUS EVERY 4 HOURS PRN
Status: DISCONTINUED | OUTPATIENT
Start: 2023-04-28 | End: 2023-05-06 | Stop reason: HOSPADM

## 2023-04-28 RX ORDER — ONDANSETRON 4 MG/1
4 TABLET, ORALLY DISINTEGRATING ORAL EVERY 8 HOURS PRN
Status: DISCONTINUED | OUTPATIENT
Start: 2023-04-28 | End: 2023-05-06 | Stop reason: HOSPADM

## 2023-04-28 RX ORDER — HEPARIN SODIUM 1000 [USP'U]/ML
2000 INJECTION, SOLUTION INTRAVENOUS; SUBCUTANEOUS PRN
Status: DISCONTINUED | OUTPATIENT
Start: 2023-04-28 | End: 2023-05-05 | Stop reason: ALTCHOICE

## 2023-04-28 RX ORDER — ENOXAPARIN SODIUM 100 MG/ML
30 INJECTION SUBCUTANEOUS 2 TIMES DAILY
Status: DISCONTINUED | OUTPATIENT
Start: 2023-04-28 | End: 2023-04-28

## 2023-04-28 RX ORDER — OXYCODONE AND ACETAMINOPHEN 7.5; 325 MG/1; MG/1
1 TABLET ORAL 2 TIMES DAILY
Status: DISCONTINUED | OUTPATIENT
Start: 2023-04-28 | End: 2023-04-28 | Stop reason: CLARIF

## 2023-04-28 RX ORDER — SODIUM CHLORIDE 0.9 % (FLUSH) 0.9 %
10 SYRINGE (ML) INJECTION PRN
Status: COMPLETED | OUTPATIENT
Start: 2023-04-28 | End: 2023-04-28

## 2023-04-28 RX ORDER — AMLODIPINE BESYLATE 5 MG/1
5 TABLET ORAL DAILY
Status: DISCONTINUED | OUTPATIENT
Start: 2023-04-28 | End: 2023-05-06

## 2023-04-28 RX ORDER — SODIUM CHLORIDE 0.9 % (FLUSH) 0.9 %
5-40 SYRINGE (ML) INJECTION PRN
Status: DISCONTINUED | OUTPATIENT
Start: 2023-04-28 | End: 2023-05-06 | Stop reason: HOSPADM

## 2023-04-28 RX ORDER — SODIUM CHLORIDE 9 MG/ML
INJECTION, SOLUTION INTRAVENOUS PRN
Status: DISCONTINUED | OUTPATIENT
Start: 2023-04-28 | End: 2023-05-06 | Stop reason: HOSPADM

## 2023-04-28 RX ORDER — ASPIRIN 81 MG/1
81 TABLET, CHEWABLE ORAL DAILY
Status: DISCONTINUED | OUTPATIENT
Start: 2023-04-29 | End: 2023-05-06

## 2023-04-28 RX ORDER — METOPROLOL TARTRATE 50 MG/1
50 TABLET, FILM COATED ORAL 2 TIMES DAILY
Status: DISCONTINUED | OUTPATIENT
Start: 2023-04-28 | End: 2023-05-06

## 2023-04-28 RX ORDER — GABAPENTIN 300 MG/1
300 CAPSULE ORAL 3 TIMES DAILY
Status: DISCONTINUED | OUTPATIENT
Start: 2023-04-28 | End: 2023-05-06

## 2023-04-28 RX ORDER — ONDANSETRON 2 MG/ML
4 INJECTION INTRAMUSCULAR; INTRAVENOUS EVERY 6 HOURS PRN
Status: DISCONTINUED | OUTPATIENT
Start: 2023-04-28 | End: 2023-05-06 | Stop reason: HOSPADM

## 2023-04-28 RX ORDER — HEPARIN SODIUM 1000 [USP'U]/ML
4000 INJECTION, SOLUTION INTRAVENOUS; SUBCUTANEOUS PRN
Status: DISCONTINUED | OUTPATIENT
Start: 2023-04-28 | End: 2023-05-05 | Stop reason: ALTCHOICE

## 2023-04-28 RX ORDER — POLYETHYLENE GLYCOL 3350 17 G/17G
17 POWDER, FOR SOLUTION ORAL DAILY PRN
Status: DISCONTINUED | OUTPATIENT
Start: 2023-04-28 | End: 2023-04-29

## 2023-04-28 RX ADMIN — ASPIRIN 81 MG 324 MG: 81 TABLET ORAL at 15:52

## 2023-04-28 RX ADMIN — VANCOMYCIN HYDROCHLORIDE 2500 MG: 10 INJECTION, POWDER, LYOPHILIZED, FOR SOLUTION INTRAVENOUS at 14:47

## 2023-04-28 RX ADMIN — MORPHINE SULFATE 2 MG: 2 INJECTION, SOLUTION INTRAMUSCULAR; INTRAVENOUS at 19:04

## 2023-04-28 RX ADMIN — HEPARIN SODIUM 4000 UNITS: 1000 INJECTION INTRAVENOUS; SUBCUTANEOUS at 19:07

## 2023-04-28 RX ADMIN — Medication 10 ML: at 17:10

## 2023-04-28 RX ADMIN — IOPAMIDOL 70 ML: 755 INJECTION, SOLUTION INTRAVENOUS at 17:10

## 2023-04-28 RX ADMIN — OXYCODONE AND ACETAMINOPHEN 1 TABLET: 5; 325 TABLET ORAL at 16:43

## 2023-04-28 RX ADMIN — HEPARIN SODIUM 7 UNITS/KG/HR: 10000 INJECTION, SOLUTION INTRAVENOUS at 19:14

## 2023-04-28 ASSESSMENT — PAIN DESCRIPTION - DESCRIPTORS
DESCRIPTORS: ACHING
DESCRIPTORS: ACHING

## 2023-04-28 ASSESSMENT — PAIN DESCRIPTION - LOCATION
LOCATION: LEG
LOCATION: LEG

## 2023-04-28 ASSESSMENT — PAIN SCALES - GENERAL
PAINLEVEL_OUTOF10: 10
PAINLEVEL_OUTOF10: 10

## 2023-04-29 LAB
ANION GAP SERPL CALCULATED.3IONS-SCNC: 9 MMOL/L (ref 7–16)
APTT BLD: 40.5 SEC (ref 24.5–35.1)
APTT BLD: 44.3 SEC (ref 24.5–35.1)
APTT BLD: 55.4 SEC (ref 24.5–35.1)
APTT BLD: 79.5 SEC (ref 24.5–35.1)
APTT BLD: >240 SEC (ref 24.5–35.1)
BASOPHILS # BLD: 0.01 E9/L (ref 0–0.2)
BASOPHILS NFR BLD: 0.2 % (ref 0–2)
BNP BLD-MCNC: 4427 PG/ML (ref 0–450)
BUN SERPL-MCNC: 23 MG/DL (ref 6–23)
CALCIUM SERPL-MCNC: 8.6 MG/DL (ref 8.6–10.2)
CHLORIDE SERPL-SCNC: 103 MMOL/L (ref 98–107)
CO2 SERPL-SCNC: 29 MMOL/L (ref 22–29)
CREAT SERPL-MCNC: 0.9 MG/DL (ref 0.5–1)
EOSINOPHIL # BLD: 0.22 E9/L (ref 0.05–0.5)
EOSINOPHIL NFR BLD: 4.8 % (ref 0–6)
ERYTHROCYTE [DISTWIDTH] IN BLOOD BY AUTOMATED COUNT: 13.3 FL (ref 11.5–15)
GLUCOSE SERPL-MCNC: 98 MG/DL (ref 74–99)
HCT VFR BLD AUTO: 32.3 % (ref 34–48)
HGB BLD-MCNC: 10 G/DL (ref 11.5–15.5)
IMM GRANULOCYTES # BLD: 0.01 E9/L
IMM GRANULOCYTES NFR BLD: 0.2 % (ref 0–5)
LYMPHOCYTES # BLD: 0.61 E9/L (ref 1.5–4)
LYMPHOCYTES NFR BLD: 13.3 % (ref 20–42)
MCH RBC QN AUTO: 31.3 PG (ref 26–35)
MCHC RBC AUTO-ENTMCNC: 31 % (ref 32–34.5)
MCV RBC AUTO: 100.9 FL (ref 80–99.9)
MONOCYTES # BLD: 0.35 E9/L (ref 0.1–0.95)
MONOCYTES NFR BLD: 7.6 % (ref 2–12)
NEUTROPHILS # BLD: 3.4 E9/L (ref 1.8–7.3)
NEUTS SEG NFR BLD: 73.9 % (ref 43–80)
PLATELET # BLD AUTO: 152 E9/L (ref 130–450)
PMV BLD AUTO: 10 FL (ref 7–12)
POTASSIUM SERPL-SCNC: 3.8 MMOL/L (ref 3.5–5)
RBC # BLD AUTO: 3.2 E12/L (ref 3.5–5.5)
SODIUM SERPL-SCNC: 141 MMOL/L (ref 132–146)
TROPONIN, HIGH SENSITIVITY: 346 NG/L (ref 0–9)
VANCOMYCIN SERPL-MCNC: 40.5 MCG/ML (ref 5–40)
WBC # BLD: 4.6 E9/L (ref 4.5–11.5)

## 2023-04-29 PROCEDURE — APPSS60 APP SPLIT SHARED TIME 46-60 MINUTES: Performed by: PHYSICIAN ASSISTANT

## 2023-04-29 PROCEDURE — 6370000000 HC RX 637 (ALT 250 FOR IP): Performed by: STUDENT IN AN ORGANIZED HEALTH CARE EDUCATION/TRAINING PROGRAM

## 2023-04-29 PROCEDURE — 6360000002 HC RX W HCPCS: Performed by: INTERNAL MEDICINE

## 2023-04-29 PROCEDURE — 6370000000 HC RX 637 (ALT 250 FOR IP): Performed by: INTERNAL MEDICINE

## 2023-04-29 PROCEDURE — 2700000000 HC OXYGEN THERAPY PER DAY

## 2023-04-29 PROCEDURE — 85025 COMPLETE CBC W/AUTO DIFF WBC: CPT

## 2023-04-29 PROCEDURE — 2580000003 HC RX 258: Performed by: STUDENT IN AN ORGANIZED HEALTH CARE EDUCATION/TRAINING PROGRAM

## 2023-04-29 PROCEDURE — 83880 ASSAY OF NATRIURETIC PEPTIDE: CPT

## 2023-04-29 PROCEDURE — 93005 ELECTROCARDIOGRAM TRACING: CPT | Performed by: EMERGENCY MEDICINE

## 2023-04-29 PROCEDURE — 6360000002 HC RX W HCPCS: Performed by: STUDENT IN AN ORGANIZED HEALTH CARE EDUCATION/TRAINING PROGRAM

## 2023-04-29 PROCEDURE — 2580000003 HC RX 258: Performed by: INTERNAL MEDICINE

## 2023-04-29 PROCEDURE — 85730 THROMBOPLASTIN TIME PARTIAL: CPT

## 2023-04-29 PROCEDURE — 80202 ASSAY OF VANCOMYCIN: CPT

## 2023-04-29 PROCEDURE — 80048 BASIC METABOLIC PNL TOTAL CA: CPT

## 2023-04-29 PROCEDURE — 2140000000 HC CCU INTERMEDIATE R&B

## 2023-04-29 PROCEDURE — 84484 ASSAY OF TROPONIN QUANT: CPT

## 2023-04-29 PROCEDURE — 36415 COLL VENOUS BLD VENIPUNCTURE: CPT

## 2023-04-29 RX ORDER — FUROSEMIDE 10 MG/ML
40 INJECTION INTRAMUSCULAR; INTRAVENOUS DAILY
Status: DISCONTINUED | OUTPATIENT
Start: 2023-04-29 | End: 2023-05-06 | Stop reason: HOSPADM

## 2023-04-29 RX ORDER — GUAIFENESIN 400 MG/1
400 TABLET ORAL 3 TIMES DAILY
Status: DISCONTINUED | OUTPATIENT
Start: 2023-04-29 | End: 2023-05-06 | Stop reason: HOSPADM

## 2023-04-29 RX ORDER — HYDRALAZINE HYDROCHLORIDE 10 MG/1
10 TABLET, FILM COATED ORAL EVERY 8 HOURS SCHEDULED
Status: DISCONTINUED | OUTPATIENT
Start: 2023-04-29 | End: 2023-05-06 | Stop reason: HOSPADM

## 2023-04-29 RX ORDER — FLUTICASONE PROPIONATE 50 MCG
1 SPRAY, SUSPENSION (ML) NASAL DAILY
Status: DISCONTINUED | OUTPATIENT
Start: 2023-04-29 | End: 2023-05-06 | Stop reason: HOSPADM

## 2023-04-29 RX ORDER — IPRATROPIUM BROMIDE AND ALBUTEROL SULFATE 2.5; .5 MG/3ML; MG/3ML
1 SOLUTION RESPIRATORY (INHALATION) EVERY 4 HOURS PRN
Status: DISCONTINUED | OUTPATIENT
Start: 2023-04-29 | End: 2023-05-06 | Stop reason: HOSPADM

## 2023-04-29 RX ORDER — BENZONATATE 100 MG/1
100 CAPSULE ORAL 3 TIMES DAILY PRN
Status: DISCONTINUED | OUTPATIENT
Start: 2023-04-29 | End: 2023-05-06 | Stop reason: HOSPADM

## 2023-04-29 RX ORDER — DOCUSATE SODIUM 100 MG/1
100 CAPSULE, LIQUID FILLED ORAL DAILY
Status: DISCONTINUED | OUTPATIENT
Start: 2023-04-29 | End: 2023-05-06 | Stop reason: HOSPADM

## 2023-04-29 RX ORDER — POLYETHYLENE GLYCOL 3350 17 G/17G
17 POWDER, FOR SOLUTION ORAL DAILY
Status: DISCONTINUED | OUTPATIENT
Start: 2023-04-29 | End: 2023-05-06 | Stop reason: HOSPADM

## 2023-04-29 RX ADMIN — DOCUSATE SODIUM 100 MG: 100 CAPSULE, LIQUID FILLED ORAL at 17:07

## 2023-04-29 RX ADMIN — GABAPENTIN 300 MG: 300 CAPSULE ORAL at 05:15

## 2023-04-29 RX ADMIN — HEPARIN SODIUM 9 UNITS/KG/HR: 10000 INJECTION, SOLUTION INTRAVENOUS at 23:20

## 2023-04-29 RX ADMIN — OXYCODONE HYDROCHLORIDE 2.5 MG: 5 TABLET ORAL at 21:24

## 2023-04-29 RX ADMIN — OXYCODONE HYDROCHLORIDE AND ACETAMINOPHEN 1 TABLET: 5; 325 TABLET ORAL at 21:24

## 2023-04-29 RX ADMIN — METOPROLOL TARTRATE 50 MG: 50 TABLET, FILM COATED ORAL at 21:24

## 2023-04-29 RX ADMIN — GABAPENTIN 300 MG: 300 CAPSULE ORAL at 21:24

## 2023-04-29 RX ADMIN — GABAPENTIN 300 MG: 300 CAPSULE ORAL at 17:04

## 2023-04-29 RX ADMIN — Medication 10 ML: at 21:23

## 2023-04-29 RX ADMIN — HYDRALAZINE HYDROCHLORIDE 10 MG: 10 TABLET, FILM COATED ORAL at 21:24

## 2023-04-29 RX ADMIN — ATORVASTATIN CALCIUM 40 MG: 40 TABLET, FILM COATED ORAL at 21:24

## 2023-04-29 RX ADMIN — PIPERACILLIN AND TAZOBACTAM 3375 MG: 3; .375 INJECTION, POWDER, LYOPHILIZED, FOR SOLUTION INTRAVENOUS at 17:20

## 2023-04-29 RX ADMIN — HEPARIN SODIUM 2000 UNITS: 1000 INJECTION INTRAVENOUS; SUBCUTANEOUS at 23:19

## 2023-04-29 RX ADMIN — FLUTICASONE PROPIONATE 1 SPRAY: 50 SPRAY, METERED NASAL at 17:21

## 2023-04-29 RX ADMIN — GUAIFENESIN 400 MG: 400 TABLET ORAL at 17:03

## 2023-04-29 RX ADMIN — HEPARIN SODIUM 2000 UNITS: 1000 INJECTION INTRAVENOUS; SUBCUTANEOUS at 10:20

## 2023-04-29 RX ADMIN — MORPHINE SULFATE 2 MG: 2 INJECTION, SOLUTION INTRAMUSCULAR; INTRAVENOUS at 09:52

## 2023-04-29 RX ADMIN — PIPERACILLIN AND TAZOBACTAM 3375 MG: 3; .375 INJECTION, POWDER, LYOPHILIZED, FOR SOLUTION INTRAVENOUS at 21:23

## 2023-04-29 RX ADMIN — POLYETHYLENE GLYCOL 3350 17 G: 17 POWDER, FOR SOLUTION ORAL at 17:07

## 2023-04-29 RX ADMIN — HYDRALAZINE HYDROCHLORIDE 10 MG: 10 TABLET, FILM COATED ORAL at 17:03

## 2023-04-29 RX ADMIN — GUAIFENESIN 400 MG: 400 TABLET ORAL at 21:24

## 2023-04-29 RX ADMIN — ACETAMINOPHEN 650 MG: 325 TABLET ORAL at 17:03

## 2023-04-29 RX ADMIN — ATORVASTATIN CALCIUM 40 MG: 40 TABLET, FILM COATED ORAL at 05:15

## 2023-04-29 RX ADMIN — FUROSEMIDE 40 MG: 10 INJECTION, SOLUTION INTRAMUSCULAR; INTRAVENOUS at 17:04

## 2023-04-29 ASSESSMENT — PAIN DESCRIPTION - LOCATION: LOCATION: HIP

## 2023-04-29 ASSESSMENT — PAIN SCALES - GENERAL: PAINLEVEL_OUTOF10: 10

## 2023-04-30 LAB
ANION GAP SERPL CALCULATED.3IONS-SCNC: 11 MMOL/L (ref 7–16)
APTT BLD: 70.1 SEC (ref 24.5–35.1)
APTT BLD: 71.2 SEC (ref 24.5–35.1)
APTT BLD: 74.8 SEC (ref 24.5–35.1)
BUN SERPL-MCNC: 11 MG/DL (ref 6–23)
CALCIUM SERPL-MCNC: 8.7 MG/DL (ref 8.6–10.2)
CHLORIDE SERPL-SCNC: 101 MMOL/L (ref 98–107)
CO2 SERPL-SCNC: 32 MMOL/L (ref 22–29)
CREAT SERPL-MCNC: 0.7 MG/DL (ref 0.5–1)
ERYTHROCYTE [DISTWIDTH] IN BLOOD BY AUTOMATED COUNT: 13.2 FL (ref 11.5–15)
GLUCOSE SERPL-MCNC: 98 MG/DL (ref 74–99)
HCT VFR BLD AUTO: 32.5 % (ref 34–48)
HGB BLD-MCNC: 10 G/DL (ref 11.5–15.5)
MAGNESIUM SERPL-MCNC: 1.5 MG/DL (ref 1.6–2.6)
MCH RBC QN AUTO: 30.5 PG (ref 26–35)
MCHC RBC AUTO-ENTMCNC: 30.8 % (ref 32–34.5)
MCV RBC AUTO: 99.1 FL (ref 80–99.9)
PLATELET # BLD AUTO: 158 E9/L (ref 130–450)
PMV BLD AUTO: 9.9 FL (ref 7–12)
POTASSIUM SERPL-SCNC: 3.2 MMOL/L (ref 3.5–5)
RBC # BLD AUTO: 3.28 E12/L (ref 3.5–5.5)
SODIUM SERPL-SCNC: 144 MMOL/L (ref 132–146)
VANCOMYCIN SERPL-MCNC: 30.2 MCG/ML (ref 5–40)
WBC # BLD: 4.7 E9/L (ref 4.5–11.5)

## 2023-04-30 PROCEDURE — 6360000002 HC RX W HCPCS: Performed by: INTERNAL MEDICINE

## 2023-04-30 PROCEDURE — 6360000002 HC RX W HCPCS: Performed by: STUDENT IN AN ORGANIZED HEALTH CARE EDUCATION/TRAINING PROGRAM

## 2023-04-30 PROCEDURE — 6370000000 HC RX 637 (ALT 250 FOR IP): Performed by: STUDENT IN AN ORGANIZED HEALTH CARE EDUCATION/TRAINING PROGRAM

## 2023-04-30 PROCEDURE — 6370000000 HC RX 637 (ALT 250 FOR IP): Performed by: INTERNAL MEDICINE

## 2023-04-30 PROCEDURE — 85730 THROMBOPLASTIN TIME PARTIAL: CPT

## 2023-04-30 PROCEDURE — 80048 BASIC METABOLIC PNL TOTAL CA: CPT

## 2023-04-30 PROCEDURE — 2700000000 HC OXYGEN THERAPY PER DAY

## 2023-04-30 PROCEDURE — 99233 SBSQ HOSP IP/OBS HIGH 50: CPT | Performed by: INTERNAL MEDICINE

## 2023-04-30 PROCEDURE — 2580000003 HC RX 258: Performed by: INTERNAL MEDICINE

## 2023-04-30 PROCEDURE — 85027 COMPLETE CBC AUTOMATED: CPT

## 2023-04-30 PROCEDURE — 36415 COLL VENOUS BLD VENIPUNCTURE: CPT

## 2023-04-30 PROCEDURE — 80202 ASSAY OF VANCOMYCIN: CPT

## 2023-04-30 PROCEDURE — 2580000003 HC RX 258: Performed by: STUDENT IN AN ORGANIZED HEALTH CARE EDUCATION/TRAINING PROGRAM

## 2023-04-30 PROCEDURE — 83735 ASSAY OF MAGNESIUM: CPT

## 2023-04-30 PROCEDURE — 2140000000 HC CCU INTERMEDIATE R&B

## 2023-04-30 RX ORDER — MAGNESIUM SULFATE IN WATER 40 MG/ML
4000 INJECTION, SOLUTION INTRAVENOUS ONCE
Status: COMPLETED | OUTPATIENT
Start: 2023-04-30 | End: 2023-04-30

## 2023-04-30 RX ORDER — POTASSIUM CHLORIDE 20 MEQ/1
20 TABLET, EXTENDED RELEASE ORAL ONCE
Status: COMPLETED | OUTPATIENT
Start: 2023-04-30 | End: 2023-04-30

## 2023-04-30 RX ORDER — POTASSIUM CHLORIDE 20 MEQ/1
40 TABLET, EXTENDED RELEASE ORAL ONCE
Status: COMPLETED | OUTPATIENT
Start: 2023-04-30 | End: 2023-04-30

## 2023-04-30 RX ADMIN — PIPERACILLIN AND TAZOBACTAM 3375 MG: 3; .375 INJECTION, POWDER, LYOPHILIZED, FOR SOLUTION INTRAVENOUS at 23:10

## 2023-04-30 RX ADMIN — ATORVASTATIN CALCIUM 40 MG: 40 TABLET, FILM COATED ORAL at 20:38

## 2023-04-30 RX ADMIN — HYDRALAZINE HYDROCHLORIDE 10 MG: 10 TABLET, FILM COATED ORAL at 13:15

## 2023-04-30 RX ADMIN — HYDRALAZINE HYDROCHLORIDE 10 MG: 10 TABLET, FILM COATED ORAL at 23:10

## 2023-04-30 RX ADMIN — DOCUSATE SODIUM 100 MG: 100 CAPSULE, LIQUID FILLED ORAL at 07:39

## 2023-04-30 RX ADMIN — POTASSIUM CHLORIDE 20 MEQ: 1500 TABLET, EXTENDED RELEASE ORAL at 15:16

## 2023-04-30 RX ADMIN — GABAPENTIN 300 MG: 300 CAPSULE ORAL at 20:38

## 2023-04-30 RX ADMIN — PIPERACILLIN AND TAZOBACTAM 3375 MG: 3; .375 INJECTION, POWDER, LYOPHILIZED, FOR SOLUTION INTRAVENOUS at 05:14

## 2023-04-30 RX ADMIN — FUROSEMIDE 40 MG: 10 INJECTION, SOLUTION INTRAMUSCULAR; INTRAVENOUS at 07:39

## 2023-04-30 RX ADMIN — PIPERACILLIN AND TAZOBACTAM 3375 MG: 3; .375 INJECTION, POWDER, LYOPHILIZED, FOR SOLUTION INTRAVENOUS at 15:14

## 2023-04-30 RX ADMIN — POTASSIUM CHLORIDE 40 MEQ: 1500 TABLET, EXTENDED RELEASE ORAL at 08:30

## 2023-04-30 RX ADMIN — OXYCODONE HYDROCHLORIDE AND ACETAMINOPHEN 1 TABLET: 5; 325 TABLET ORAL at 07:42

## 2023-04-30 RX ADMIN — GUAIFENESIN 400 MG: 400 TABLET ORAL at 20:38

## 2023-04-30 RX ADMIN — GUAIFENESIN 400 MG: 400 TABLET ORAL at 13:15

## 2023-04-30 RX ADMIN — METOPROLOL TARTRATE 50 MG: 50 TABLET, FILM COATED ORAL at 20:38

## 2023-04-30 RX ADMIN — OXYCODONE HYDROCHLORIDE 2.5 MG: 5 TABLET ORAL at 07:42

## 2023-04-30 RX ADMIN — Medication 5 ML: at 07:39

## 2023-04-30 RX ADMIN — GUAIFENESIN 400 MG: 400 TABLET ORAL at 07:39

## 2023-04-30 RX ADMIN — GABAPENTIN 300 MG: 300 CAPSULE ORAL at 07:39

## 2023-04-30 RX ADMIN — POTASSIUM CHLORIDE 40 MEQ: 1500 TABLET, EXTENDED RELEASE ORAL at 15:15

## 2023-04-30 RX ADMIN — MORPHINE SULFATE 2 MG: 2 INJECTION, SOLUTION INTRAMUSCULAR; INTRAVENOUS at 05:15

## 2023-04-30 RX ADMIN — ASPIRIN 81 MG 81 MG: 81 TABLET ORAL at 07:38

## 2023-04-30 RX ADMIN — MAGNESIUM SULFATE HEPTAHYDRATE 4000 MG: 40 INJECTION, SOLUTION INTRAVENOUS at 10:07

## 2023-04-30 RX ADMIN — POTASSIUM CHLORIDE 20 MEQ: 1500 TABLET, EXTENDED RELEASE ORAL at 18:25

## 2023-04-30 RX ADMIN — METOPROLOL TARTRATE 50 MG: 50 TABLET, FILM COATED ORAL at 07:39

## 2023-04-30 RX ADMIN — POLYETHYLENE GLYCOL 3350 17 G: 17 POWDER, FOR SOLUTION ORAL at 07:38

## 2023-04-30 RX ADMIN — ACETAMINOPHEN 650 MG: 325 TABLET ORAL at 06:54

## 2023-04-30 RX ADMIN — GABAPENTIN 300 MG: 300 CAPSULE ORAL at 13:15

## 2023-04-30 RX ADMIN — ACETAMINOPHEN 650 MG: 325 TABLET ORAL at 17:45

## 2023-04-30 RX ADMIN — MORPHINE SULFATE 2 MG: 2 INJECTION, SOLUTION INTRAMUSCULAR; INTRAVENOUS at 17:44

## 2023-04-30 RX ADMIN — HYDRALAZINE HYDROCHLORIDE 10 MG: 10 TABLET, FILM COATED ORAL at 05:15

## 2023-04-30 RX ADMIN — FLUTICASONE PROPIONATE 1 SPRAY: 50 SPRAY, METERED NASAL at 07:38

## 2023-04-30 RX ADMIN — OXYCODONE HYDROCHLORIDE AND ACETAMINOPHEN 1 TABLET: 5; 325 TABLET ORAL at 20:38

## 2023-04-30 RX ADMIN — POTASSIUM CHLORIDE 40 MEQ: 1500 TABLET, EXTENDED RELEASE ORAL at 13:17

## 2023-04-30 RX ADMIN — MAGNESIUM SULFATE HEPTAHYDRATE 4000 MG: 40 INJECTION, SOLUTION INTRAVENOUS at 18:42

## 2023-04-30 RX ADMIN — OXYCODONE HYDROCHLORIDE 2.5 MG: 5 TABLET ORAL at 20:38

## 2023-04-30 RX ADMIN — Medication 10 ML: at 20:39

## 2023-04-30 ASSESSMENT — PAIN SCALES - GENERAL
PAINLEVEL_OUTOF10: 8
PAINLEVEL_OUTOF10: 7
PAINLEVEL_OUTOF10: 4

## 2023-04-30 ASSESSMENT — PAIN DESCRIPTION - ORIENTATION: ORIENTATION: RIGHT

## 2023-04-30 ASSESSMENT — PAIN DESCRIPTION - PAIN TYPE: TYPE: CHRONIC PAIN

## 2023-04-30 ASSESSMENT — PAIN DESCRIPTION - DESCRIPTORS: DESCRIPTORS: ACHING;THROBBING

## 2023-04-30 ASSESSMENT — PAIN DESCRIPTION - LOCATION: LOCATION: HIP

## 2023-05-01 LAB
ABO + RH BLD: NORMAL
ANION GAP SERPL CALCULATED.3IONS-SCNC: 7 MMOL/L (ref 7–16)
APTT BLD: 66.5 SEC (ref 24.5–35.1)
BACTERIA WND AEROBE CULT: ABNORMAL
BACTERIA WND AEROBE CULT: ABNORMAL
BLD GP AB SCN SERPL QL: NORMAL
BUN SERPL-MCNC: 7 MG/DL (ref 6–23)
CALCIUM SERPL-MCNC: 8.9 MG/DL (ref 8.6–10.2)
CHLORIDE SERPL-SCNC: 101 MMOL/L (ref 98–107)
CO2 SERPL-SCNC: 31 MMOL/L (ref 22–29)
CREAT SERPL-MCNC: 0.7 MG/DL (ref 0.5–1)
EKG ATRIAL RATE: 86 BPM
EKG P AXIS: 62 DEGREES
EKG P-R INTERVAL: 164 MS
EKG Q-T INTERVAL: 384 MS
EKG QRS DURATION: 92 MS
EKG QTC CALCULATION (BAZETT): 459 MS
EKG R AXIS: 13 DEGREES
EKG T AXIS: 29 DEGREES
EKG VENTRICULAR RATE: 86 BPM
GLUCOSE SERPL-MCNC: 99 MG/DL (ref 74–99)
GRAM STN SPEC: ABNORMAL
ORGANISM: ABNORMAL
POTASSIUM SERPL-SCNC: 4.5 MMOL/L (ref 3.5–5)
SODIUM SERPL-SCNC: 139 MMOL/L (ref 132–146)

## 2023-05-01 PROCEDURE — 85730 THROMBOPLASTIN TIME PARTIAL: CPT

## 2023-05-01 PROCEDURE — 6370000000 HC RX 637 (ALT 250 FOR IP): Performed by: INTERNAL MEDICINE

## 2023-05-01 PROCEDURE — 86850 RBC ANTIBODY SCREEN: CPT

## 2023-05-01 PROCEDURE — 6360000002 HC RX W HCPCS: Performed by: STUDENT IN AN ORGANIZED HEALTH CARE EDUCATION/TRAINING PROGRAM

## 2023-05-01 PROCEDURE — 4A023N7 MEASUREMENT OF CARDIAC SAMPLING AND PRESSURE, LEFT HEART, PERCUTANEOUS APPROACH: ICD-10-PCS | Performed by: INTERNAL MEDICINE

## 2023-05-01 PROCEDURE — 6360000002 HC RX W HCPCS: Performed by: INTERNAL MEDICINE

## 2023-05-01 PROCEDURE — 80048 BASIC METABOLIC PNL TOTAL CA: CPT

## 2023-05-01 PROCEDURE — 2140000000 HC CCU INTERMEDIATE R&B

## 2023-05-01 PROCEDURE — 97161 PT EVAL LOW COMPLEX 20 MIN: CPT

## 2023-05-01 PROCEDURE — C1769 GUIDE WIRE: HCPCS

## 2023-05-01 PROCEDURE — 6360000002 HC RX W HCPCS

## 2023-05-01 PROCEDURE — C1894 INTRO/SHEATH, NON-LASER: HCPCS

## 2023-05-01 PROCEDURE — 2709999900 HC NON-CHARGEABLE SUPPLY

## 2023-05-01 PROCEDURE — 6370000000 HC RX 637 (ALT 250 FOR IP): Performed by: STUDENT IN AN ORGANIZED HEALTH CARE EDUCATION/TRAINING PROGRAM

## 2023-05-01 PROCEDURE — 93458 L HRT ARTERY/VENTRICLE ANGIO: CPT

## 2023-05-01 PROCEDURE — 2580000003 HC RX 258: Performed by: INTERNAL MEDICINE

## 2023-05-01 PROCEDURE — 2500000003 HC RX 250 WO HCPCS

## 2023-05-01 PROCEDURE — B2111ZZ FLUOROSCOPY OF MULTIPLE CORONARY ARTERIES USING LOW OSMOLAR CONTRAST: ICD-10-PCS | Performed by: INTERNAL MEDICINE

## 2023-05-01 PROCEDURE — 97530 THERAPEUTIC ACTIVITIES: CPT

## 2023-05-01 PROCEDURE — 86900 BLOOD TYPING SEROLOGIC ABO: CPT

## 2023-05-01 PROCEDURE — 93458 L HRT ARTERY/VENTRICLE ANGIO: CPT | Performed by: INTERNAL MEDICINE

## 2023-05-01 PROCEDURE — 86901 BLOOD TYPING SEROLOGIC RH(D): CPT

## 2023-05-01 PROCEDURE — 2580000003 HC RX 258: Performed by: STUDENT IN AN ORGANIZED HEALTH CARE EDUCATION/TRAINING PROGRAM

## 2023-05-01 PROCEDURE — 99221 1ST HOSP IP/OBS SF/LOW 40: CPT

## 2023-05-01 PROCEDURE — 93010 ELECTROCARDIOGRAM REPORT: CPT | Performed by: INTERNAL MEDICINE

## 2023-05-01 PROCEDURE — 36415 COLL VENOUS BLD VENIPUNCTURE: CPT

## 2023-05-01 PROCEDURE — C1887 CATHETER, GUIDING: HCPCS

## 2023-05-01 PROCEDURE — 2700000000 HC OXYGEN THERAPY PER DAY

## 2023-05-01 RX ORDER — ACETAMINOPHEN 325 MG/1
650 TABLET ORAL EVERY 4 HOURS PRN
Status: DISCONTINUED | OUTPATIENT
Start: 2023-05-01 | End: 2023-05-06 | Stop reason: HOSPADM

## 2023-05-01 RX ADMIN — GUAIFENESIN 400 MG: 400 TABLET ORAL at 20:12

## 2023-05-01 RX ADMIN — METOPROLOL TARTRATE 50 MG: 50 TABLET, FILM COATED ORAL at 08:50

## 2023-05-01 RX ADMIN — GUAIFENESIN 400 MG: 400 TABLET ORAL at 08:48

## 2023-05-01 RX ADMIN — Medication 10 ML: at 20:13

## 2023-05-01 RX ADMIN — ASPIRIN 81 MG 81 MG: 81 TABLET ORAL at 08:48

## 2023-05-01 RX ADMIN — PIPERACILLIN AND TAZOBACTAM 3375 MG: 3; .375 INJECTION, POWDER, LYOPHILIZED, FOR SOLUTION INTRAVENOUS at 17:32

## 2023-05-01 RX ADMIN — FUROSEMIDE 40 MG: 10 INJECTION, SOLUTION INTRAMUSCULAR; INTRAVENOUS at 08:49

## 2023-05-01 RX ADMIN — DOCUSATE SODIUM 100 MG: 100 CAPSULE, LIQUID FILLED ORAL at 08:48

## 2023-05-01 RX ADMIN — GABAPENTIN 300 MG: 300 CAPSULE ORAL at 20:12

## 2023-05-01 RX ADMIN — MORPHINE SULFATE 2 MG: 2 INJECTION, SOLUTION INTRAMUSCULAR; INTRAVENOUS at 17:20

## 2023-05-01 RX ADMIN — GABAPENTIN 300 MG: 300 CAPSULE ORAL at 08:48

## 2023-05-01 RX ADMIN — GABAPENTIN 300 MG: 300 CAPSULE ORAL at 17:19

## 2023-05-01 RX ADMIN — GUAIFENESIN 400 MG: 400 TABLET ORAL at 17:19

## 2023-05-01 RX ADMIN — HYDRALAZINE HYDROCHLORIDE 10 MG: 10 TABLET, FILM COATED ORAL at 17:19

## 2023-05-01 RX ADMIN — OXYCODONE HYDROCHLORIDE AND ACETAMINOPHEN 1 TABLET: 5; 325 TABLET ORAL at 20:12

## 2023-05-01 RX ADMIN — HYDRALAZINE HYDROCHLORIDE 10 MG: 10 TABLET, FILM COATED ORAL at 20:12

## 2023-05-01 RX ADMIN — OXYCODONE HYDROCHLORIDE 2.5 MG: 5 TABLET ORAL at 20:15

## 2023-05-01 RX ADMIN — Medication 10 ML: at 08:49

## 2023-05-01 RX ADMIN — OXYCODONE HYDROCHLORIDE 2.5 MG: 5 TABLET ORAL at 08:49

## 2023-05-01 RX ADMIN — ATORVASTATIN CALCIUM 40 MG: 40 TABLET, FILM COATED ORAL at 20:12

## 2023-05-01 RX ADMIN — POLYETHYLENE GLYCOL 3350 17 G: 17 POWDER, FOR SOLUTION ORAL at 08:49

## 2023-05-01 RX ADMIN — PIPERACILLIN AND TAZOBACTAM 3375 MG: 3; .375 INJECTION, POWDER, LYOPHILIZED, FOR SOLUTION INTRAVENOUS at 06:54

## 2023-05-01 RX ADMIN — METOPROLOL TARTRATE 50 MG: 50 TABLET, FILM COATED ORAL at 20:12

## 2023-05-01 RX ADMIN — METOPROLOL TARTRATE 50 MG: 50 TABLET, FILM COATED ORAL at 08:48

## 2023-05-01 RX ADMIN — HEPARIN SODIUM 9 UNITS/KG/HR: 10000 INJECTION, SOLUTION INTRAVENOUS at 01:36

## 2023-05-01 RX ADMIN — HYDRALAZINE HYDROCHLORIDE 10 MG: 10 TABLET, FILM COATED ORAL at 06:55

## 2023-05-01 RX ADMIN — OXYCODONE HYDROCHLORIDE AND ACETAMINOPHEN 1 TABLET: 5; 325 TABLET ORAL at 08:49

## 2023-05-01 RX ADMIN — FLUTICASONE PROPIONATE 1 SPRAY: 50 SPRAY, METERED NASAL at 08:50

## 2023-05-01 ASSESSMENT — PAIN DESCRIPTION - LOCATION: LOCATION: HIP

## 2023-05-01 ASSESSMENT — PAIN DESCRIPTION - DESCRIPTORS: DESCRIPTORS: THROBBING

## 2023-05-01 ASSESSMENT — PAIN DESCRIPTION - ORIENTATION: ORIENTATION: RIGHT

## 2023-05-01 NOTE — PROCEDURES
Procedure:    Left heart catheterization with coronary angiography    Physician: Sharonda Santoyo. Taylor BAUTISTA Assistant: none    Indication: NSTEMI  AUC: 8  AUC indication: 4    Complication: None. Sedation: Intravenous Versed. Anesthesia: Xylocaine, fentanyl     Sedation time: I was present for sedation and ministration at 1552 and I ended sedation at 1622 for a total face-to-face sedation time of 30 minutes. Estimated blood loss: Minimal    Specimens: none    Contrast used: 55 cc    Hemodynamics:  Opening Aortic pressure: 284/28  LV systolic pressure: 470  LVEDP: 27  No significant gradient across the aortic valve    Angiographic Results/findings:  Left Main: No angiographic significant stenosis. LAD: Severe tortuous. No angiographically significant stenosis  D1: No angiographic significant stenosis. D2: 100 significant stenosis. Cx: Severely tortuous. OM1: Small vessel. OM2: Noncritical significant stenosis. Ramus: Absent. RCA: Dominant. No angiographic significant stenosis. PDA: No graphically significant stenosis. PLB: Moderate for significance    Procedure:   After obtaining informed consent the patient was taken to the cardiac Cath Lab where the area over the right radial artery was prepped and draped in a sterile fashion. Using ultrasound guidance and a micropuncture technique a 6 [place] slender rain sheath was placed in the right radial artery. This was aspirated & flushed several times throughout the procedure. This was medicated with verapamil and nitroglycerin. They were given heparin systemically. The subclavian and aorta were extremely tortuous making it very difficult. A 5 [place] TIG catheter was advanced over a wire to the root of the aorta. It was aspirated & flushed with saline. Pressures were obtained. It was filled with contrast.  This catheter was then manipulated into the right coronary artery and to orthogonal views were then obtained.   The TIG catheter was manipulated

## 2023-05-01 NOTE — DISCHARGE INSTRUCTIONS
LEFT PRETIB wound: cleanse with normal saline, apply calcium alginate ag, cover with dry dressing and secure . Change daily. Your information:  Name: Micha Green  : 1945    Your instructions:  Home care for dressing change:     LEFT PRETIB wound : cleanse with normal saline, apply calcium alginate ag, cover with dry dressing and wrap with kerlix from just above toes to just below knee. Change daily    What to do after you leave the hospital:    Recommended diet: {diet:73009}    Recommended activity: {discharge activity:57834}        The following personal items were collected during your admission and were returned to you:    Belongings  Dental Appliances: None  Vision - Corrective Lenses: Eyeglasses  Hearing Aid: None  Clothing: Pants, Shirt, Footwear  Jewelry: None  Electronic Devices: Cell Phone  Weapons (Notify Protective Services/Security): None  Home Medications: None  Valuables Given To: Patient  Provide Name(s) of Who Valuable(s) Were Given To: N/A    Information obtained by:  By signing below, I understand that if any problems occur once I leave the hospital I am to contact ***. I understand and acknowledge receipt of the instructions indicated above.

## 2023-05-01 NOTE — CONSULTS
Vascular Surgery Consultation Note    Reason for Consult: chronic L LE venous stasis ulcer    HPI : This is a 70 y.o. female admitted on 4/28/2023 11:10 AM with shortness of breath. She states that over the last few days she has been getting progressively short of breath and having subjective fevers associated with a cough. In the ED, she was noted to be hypoxic and was started on supplemental O2 via NC. Troponins were elevated so Cardiology was consulted for NSTEMI. Pt is known to Dr. Yaneth Chase in the wound care center. Patient has had left calf wound since ~ 6/2021. She had been putting a dressing on it. The wound had not been improving. She has a hx significant for venous stasis ulcerations of bilateral LE. She first was seen by Dr. Yaneth Chase in regards to these issues 8/2015. She eventually healed these wounds 11/2016. Pt was last seen in the wound care center on 4/26/23     Pt resting comfortably sitting up in a chair. She denies pain at this time. She is scheduled for a heart catheterization today. Left lower extremity dressing changed at the bedside. Vascular surgery is consulted in regards to chronic wound.       ROS : All others Negative if blank [], Positive if [x]  General Urinary   [] Fevers [] Hematuria   [] Chills [] Dysuria   [] Weight Loss Vascular   Skin [] Claudication   [x] Tissue Loss [] Rest Pain   Eyes Neurologic   [] Wears Glasses/Contacts [] Stroke/TIA   [] Vision Changes [] Focal weakness   Respiratory [] Slurred Speech    [] Shortness of breath ENT   Cardiovascular [] Difficulty swallowing   [] Chest Pain Endocrine    [x] Shortness of breath with exertion [] Increased Thirst   Gastrointestinal    [] Abdominal Pain    [] Melena   [] Hematochezia         Past Medical History:   Diagnosis Date    Bursitis     CAD (coronary artery disease) 1993    heart attack    Cellulitis of leg, left 10/03/2022    [facility] 06/19/2013

## 2023-05-01 NOTE — ACP (ADVANCE CARE PLANNING)
Advance Care Planning   Healthcare Decision Maker:    Primary Decision Maker: Alfonso Gaming - Child - [phone]    Click here to complete Healthcare Decision Makers including selection of the Healthcare Decision Maker Relationship (ie \"Primary\"). Cm spoke with pt bedside to discuss POA & Living will. Pt states she doesn't have a POA. Pt is not  & has 3 adult children. She wants her daughter Kasie Pruitt to be her primary contact.   Electronically signed by Chucho Alvarez RN on 5/1/2023 at 2:03 PM

## 2023-05-02 LAB
ANION GAP SERPL CALCULATED.3IONS-SCNC: 8 MMOL/L (ref 7–16)
APTT BLD: 30.2 SEC (ref 24.5–35.1)
BUN SERPL-MCNC: 8 MG/DL (ref 6–23)
CALCIUM SERPL-MCNC: 8.9 MG/DL (ref 8.6–10.2)
CHLORIDE SERPL-SCNC: 101 MMOL/L (ref 98–107)
CO2 SERPL-SCNC: 32 MMOL/L (ref 22–29)
CREAT SERPL-MCNC: 0.8 MG/DL (ref 0.5–1)
ERYTHROCYTE [DISTWIDTH] IN BLOOD BY AUTOMATED COUNT: 13.5 FL (ref 11.5–15)
GLUCOSE SERPL-MCNC: 89 MG/DL (ref 74–99)
HCT VFR BLD AUTO: 35.1 % (ref 34–48)
HGB BLD-MCNC: 11 G/DL (ref 11.5–15.5)
LV EF: 64 %
LVEF MODALITY: NORMAL
MAGNESIUM SERPL-MCNC: 1.9 MG/DL (ref 1.6–2.6)
MCH RBC QN AUTO: 31 PG (ref 26–35)
MCHC RBC AUTO-ENTMCNC: 31.3 % (ref 32–34.5)
MCV RBC AUTO: 98.9 FL (ref 80–99.9)
PLATELET # BLD AUTO: 178 E9/L (ref 130–450)
PMV BLD AUTO: 10.2 FL (ref 7–12)
POTASSIUM SERPL-SCNC: 3.9 MMOL/L (ref 3.5–5)
PREALB SERPL-MCNC: 11 MG/DL (ref 20–40)
RBC # BLD AUTO: 3.55 E12/L (ref 3.5–5.5)
SODIUM SERPL-SCNC: 141 MMOL/L (ref 132–146)
WBC # BLD: 5.3 E9/L (ref 4.5–11.5)

## 2023-05-02 PROCEDURE — 93306 TTE W/DOPPLER COMPLETE: CPT

## 2023-05-02 PROCEDURE — 84134 ASSAY OF PREALBUMIN: CPT

## 2023-05-02 PROCEDURE — 2580000003 HC RX 258: Performed by: STUDENT IN AN ORGANIZED HEALTH CARE EDUCATION/TRAINING PROGRAM

## 2023-05-02 PROCEDURE — 80048 BASIC METABOLIC PNL TOTAL CA: CPT

## 2023-05-02 PROCEDURE — 36415 COLL VENOUS BLD VENIPUNCTURE: CPT

## 2023-05-02 PROCEDURE — 6360000002 HC RX W HCPCS: Performed by: STUDENT IN AN ORGANIZED HEALTH CARE EDUCATION/TRAINING PROGRAM

## 2023-05-02 PROCEDURE — 6370000000 HC RX 637 (ALT 250 FOR IP): Performed by: STUDENT IN AN ORGANIZED HEALTH CARE EDUCATION/TRAINING PROGRAM

## 2023-05-02 PROCEDURE — 2580000003 HC RX 258: Performed by: INTERNAL MEDICINE

## 2023-05-02 PROCEDURE — 6370000000 HC RX 637 (ALT 250 FOR IP): Performed by: INTERNAL MEDICINE

## 2023-05-02 PROCEDURE — 2700000000 HC OXYGEN THERAPY PER DAY

## 2023-05-02 PROCEDURE — 2140000000 HC CCU INTERMEDIATE R&B

## 2023-05-02 PROCEDURE — 99231 SBSQ HOSP IP/OBS SF/LOW 25: CPT

## 2023-05-02 PROCEDURE — 85027 COMPLETE CBC AUTOMATED: CPT

## 2023-05-02 PROCEDURE — 83735 ASSAY OF MAGNESIUM: CPT

## 2023-05-02 PROCEDURE — 6360000002 HC RX W HCPCS: Performed by: INTERNAL MEDICINE

## 2023-05-02 PROCEDURE — 6360000004 HC RX CONTRAST MEDICATION: Performed by: PHYSICIAN ASSISTANT

## 2023-05-02 PROCEDURE — 85730 THROMBOPLASTIN TIME PARTIAL: CPT

## 2023-05-02 RX ORDER — DIPHENHYDRAMINE HCL 25 MG
25 TABLET ORAL EVERY 6 HOURS PRN
Status: DISCONTINUED | OUTPATIENT
Start: 2023-05-02 | End: 2023-05-06 | Stop reason: HOSPADM

## 2023-05-02 RX ADMIN — FLUTICASONE PROPIONATE 1 SPRAY: 50 SPRAY, METERED NASAL at 08:59

## 2023-05-02 RX ADMIN — ATORVASTATIN CALCIUM 40 MG: 40 TABLET, FILM COATED ORAL at 19:58

## 2023-05-02 RX ADMIN — OXYCODONE HYDROCHLORIDE AND ACETAMINOPHEN 1 TABLET: 5; 325 TABLET ORAL at 19:58

## 2023-05-02 RX ADMIN — MORPHINE SULFATE 2 MG: 2 INJECTION, SOLUTION INTRAMUSCULAR; INTRAVENOUS at 15:00

## 2023-05-02 RX ADMIN — MORPHINE SULFATE 2 MG: 2 INJECTION, SOLUTION INTRAMUSCULAR; INTRAVENOUS at 19:58

## 2023-05-02 RX ADMIN — ASPIRIN 81 MG 81 MG: 81 TABLET ORAL at 08:58

## 2023-05-02 RX ADMIN — GUAIFENESIN 400 MG: 400 TABLET ORAL at 14:57

## 2023-05-02 RX ADMIN — METOPROLOL TARTRATE 50 MG: 50 TABLET, FILM COATED ORAL at 19:58

## 2023-05-02 RX ADMIN — HYDRALAZINE HYDROCHLORIDE 10 MG: 10 TABLET, FILM COATED ORAL at 14:57

## 2023-05-02 RX ADMIN — GABAPENTIN 300 MG: 300 CAPSULE ORAL at 14:57

## 2023-05-02 RX ADMIN — OXYCODONE HYDROCHLORIDE 2.5 MG: 5 TABLET ORAL at 19:58

## 2023-05-02 RX ADMIN — GUAIFENESIN 400 MG: 400 TABLET ORAL at 19:58

## 2023-05-02 RX ADMIN — POLYETHYLENE GLYCOL 3350 17 G: 17 POWDER, FOR SOLUTION ORAL at 08:58

## 2023-05-02 RX ADMIN — PIPERACILLIN AND TAZOBACTAM 3375 MG: 3; .375 INJECTION, POWDER, LYOPHILIZED, FOR SOLUTION INTRAVENOUS at 18:34

## 2023-05-02 RX ADMIN — GABAPENTIN 300 MG: 300 CAPSULE ORAL at 08:59

## 2023-05-02 RX ADMIN — METOPROLOL TARTRATE 50 MG: 50 TABLET, FILM COATED ORAL at 08:57

## 2023-05-02 RX ADMIN — DOCUSATE SODIUM 100 MG: 100 CAPSULE, LIQUID FILLED ORAL at 08:58

## 2023-05-02 RX ADMIN — OXYCODONE HYDROCHLORIDE AND ACETAMINOPHEN 1 TABLET: 5; 325 TABLET ORAL at 08:57

## 2023-05-02 RX ADMIN — MORPHINE SULFATE 2 MG: 2 INJECTION, SOLUTION INTRAMUSCULAR; INTRAVENOUS at 06:06

## 2023-05-02 RX ADMIN — HYDRALAZINE HYDROCHLORIDE 10 MG: 10 TABLET, FILM COATED ORAL at 06:06

## 2023-05-02 RX ADMIN — Medication 10 ML: at 08:58

## 2023-05-02 RX ADMIN — GUAIFENESIN 400 MG: 400 TABLET ORAL at 08:57

## 2023-05-02 RX ADMIN — MORPHINE SULFATE 2 MG: 2 INJECTION, SOLUTION INTRAMUSCULAR; INTRAVENOUS at 00:47

## 2023-05-02 RX ADMIN — GABAPENTIN 300 MG: 300 CAPSULE ORAL at 19:59

## 2023-05-02 RX ADMIN — Medication 10 ML: at 19:59

## 2023-05-02 RX ADMIN — DIPHENHYDRAMINE HYDROCHLORIDE 25 MG: 25 TABLET ORAL at 11:00

## 2023-05-02 RX ADMIN — PIPERACILLIN AND TAZOBACTAM 3375 MG: 3; .375 INJECTION, POWDER, LYOPHILIZED, FOR SOLUTION INTRAVENOUS at 02:40

## 2023-05-02 RX ADMIN — DIPHENHYDRAMINE HYDROCHLORIDE 25 MG: 25 TABLET ORAL at 20:05

## 2023-05-02 RX ADMIN — ACETAMINOPHEN 650 MG: 325 TABLET ORAL at 16:36

## 2023-05-02 RX ADMIN — OXYCODONE HYDROCHLORIDE 2.5 MG: 5 TABLET ORAL at 08:57

## 2023-05-02 RX ADMIN — FUROSEMIDE 40 MG: 10 INJECTION, SOLUTION INTRAMUSCULAR; INTRAVENOUS at 08:58

## 2023-05-02 RX ADMIN — HYDRALAZINE HYDROCHLORIDE 10 MG: 10 TABLET, FILM COATED ORAL at 20:00

## 2023-05-02 RX ADMIN — PERFLUTREN 1.5 ML: 6.52 INJECTION, SUSPENSION INTRAVENOUS at 08:56

## 2023-05-02 RX ADMIN — PIPERACILLIN AND TAZOBACTAM 3375 MG: 3; .375 INJECTION, POWDER, LYOPHILIZED, FOR SOLUTION INTRAVENOUS at 09:08

## 2023-05-02 ASSESSMENT — PAIN SCALES - GENERAL
PAINLEVEL_OUTOF10: 8
PAINLEVEL_OUTOF10: 8
PAINLEVEL_OUTOF10: 10
PAINLEVEL_OUTOF10: 5

## 2023-05-02 ASSESSMENT — PAIN DESCRIPTION - DESCRIPTORS
DESCRIPTORS: ACHING;DISCOMFORT;POUNDING
DESCRIPTORS: ACHING;CRAMPING;DISCOMFORT
DESCRIPTORS: ACHING;CRAMPING;DISCOMFORT

## 2023-05-02 ASSESSMENT — PAIN DESCRIPTION - LOCATION
LOCATION: HEAD
LOCATION: HIP
LOCATION: LEG;HIP
LOCATION: HIP

## 2023-05-02 ASSESSMENT — PAIN - FUNCTIONAL ASSESSMENT
PAIN_FUNCTIONAL_ASSESSMENT: ACTIVITIES ARE NOT PREVENTED

## 2023-05-02 ASSESSMENT — PAIN DESCRIPTION - ORIENTATION
ORIENTATION: RIGHT
ORIENTATION: MID
ORIENTATION: MID
ORIENTATION: RIGHT

## 2023-05-02 NOTE — FLOWSHEET NOTE
Inpatient Wound Care ( Initial consult) [id number]    Admit Date: 4/28/2023 11:10 AM    Reason for consult:  Left lower leg    Significant history:  Per H&P    Chief Complaint:  Shortness of breath        History Of Present Illness:    70 y.o. female who presented to [facility] ED with shortness of breath. Patient reports that she had been admitted to a nursing facility for long term antibiotic treatment for a LE wound. She states that over the last few days she has been getting progressively short of breath and having subjective fevers associated with a cough. In the ED today, she was noted to be hypoxic and was started on supplemental O2 via NC. Currently on 6 L O2 via NC. She underwent a CT to rule out a PE and was noted to have no PE however she did have possible right lower lobe PNA with a pleural effusion. Patient was started on Vancomycin and Zosyn and admitted for further evaluation and management. Of note, patient was also noted to have an elevated troponin with no EKG changes suggestive of ischemia, was started on heparin gtt per ED and Cardiology consulted. Findings:     05/02/23 0953   Skin Integumentary    Skin Integrity Vascular discoloration or hemochromatosis/staining  (dry flaky)   Location BLE   Skin Integrity Site 2   Skin Integrity Location 2 Ecchymosis   Location 2 BUE   Wound 03/01/23 Leg Left; Lower #2   Date First Assessed/Time First Assessed: 03/01/23 1412   Present on Hospital Admission: Yes  Wound Approximate Age at First Assessment (Weeks): 2 weeks  Location: Leg  Wound Location Orientation: Left; Lower  Wound Description (Comments): #2   Wound Image    Wound Etiology Venous   Dressing Status New dressing applied   Wound Cleansed Cleansed with saline   Dressing/Treatment ABD; Alginate with Ag;Dry dressing;Roll gauze   Wound Length (cm) 7.3 cm   Wound Width (cm) 4 cm   Wound Depth (cm) 0.1 cm   Wound Surface Area (cm^2) 29.2 cm^2   Change in Wound Size % (l*w) -410.49   Wound

## 2023-05-03 ENCOUNTER — HOSPITAL ENCOUNTER (OUTPATIENT)
Dept: WOUND CARE | Age: 78
DRG: 193 | End: 2023-05-03
Payer: MEDICARE

## 2023-05-03 LAB
APTT BLD: 30.8 SEC (ref 24.5–35.1)
BACTERIA BLD CULT: NORMAL
BACTERIA BLD CULT: NORMAL

## 2023-05-03 PROCEDURE — 2140000000 HC CCU INTERMEDIATE R&B

## 2023-05-03 PROCEDURE — 85730 THROMBOPLASTIN TIME PARTIAL: CPT

## 2023-05-03 PROCEDURE — 2700000000 HC OXYGEN THERAPY PER DAY

## 2023-05-03 PROCEDURE — 6360000002 HC RX W HCPCS: Performed by: STUDENT IN AN ORGANIZED HEALTH CARE EDUCATION/TRAINING PROGRAM

## 2023-05-03 PROCEDURE — 2580000003 HC RX 258: Performed by: STUDENT IN AN ORGANIZED HEALTH CARE EDUCATION/TRAINING PROGRAM

## 2023-05-03 PROCEDURE — 36415 COLL VENOUS BLD VENIPUNCTURE: CPT

## 2023-05-03 PROCEDURE — 6360000002 HC RX W HCPCS: Performed by: INTERNAL MEDICINE

## 2023-05-03 PROCEDURE — 6370000000 HC RX 637 (ALT 250 FOR IP): Performed by: STUDENT IN AN ORGANIZED HEALTH CARE EDUCATION/TRAINING PROGRAM

## 2023-05-03 PROCEDURE — 6370000000 HC RX 637 (ALT 250 FOR IP): Performed by: INTERNAL MEDICINE

## 2023-05-03 PROCEDURE — 2580000003 HC RX 258: Performed by: INTERNAL MEDICINE

## 2023-05-03 RX ADMIN — GUAIFENESIN 400 MG: 400 TABLET ORAL at 21:09

## 2023-05-03 RX ADMIN — PIPERACILLIN AND TAZOBACTAM 3375 MG: 3; .375 INJECTION, POWDER, LYOPHILIZED, FOR SOLUTION INTRAVENOUS at 03:14

## 2023-05-03 RX ADMIN — METOPROLOL TARTRATE 50 MG: 50 TABLET, FILM COATED ORAL at 21:10

## 2023-05-03 RX ADMIN — FUROSEMIDE 40 MG: 10 INJECTION, SOLUTION INTRAMUSCULAR; INTRAVENOUS at 08:32

## 2023-05-03 RX ADMIN — POLYETHYLENE GLYCOL 3350 17 G: 17 POWDER, FOR SOLUTION ORAL at 08:31

## 2023-05-03 RX ADMIN — OXYCODONE HYDROCHLORIDE AND ACETAMINOPHEN 1 TABLET: 5; 325 TABLET ORAL at 08:31

## 2023-05-03 RX ADMIN — HYDRALAZINE HYDROCHLORIDE 10 MG: 10 TABLET, FILM COATED ORAL at 21:09

## 2023-05-03 RX ADMIN — GABAPENTIN 300 MG: 300 CAPSULE ORAL at 15:31

## 2023-05-03 RX ADMIN — HYDRALAZINE HYDROCHLORIDE 10 MG: 10 TABLET, FILM COATED ORAL at 15:31

## 2023-05-03 RX ADMIN — FLUTICASONE PROPIONATE 1 SPRAY: 50 SPRAY, METERED NASAL at 08:34

## 2023-05-03 RX ADMIN — OXYCODONE HYDROCHLORIDE 2.5 MG: 5 TABLET ORAL at 21:11

## 2023-05-03 RX ADMIN — DIPHENHYDRAMINE HYDROCHLORIDE 25 MG: 25 TABLET ORAL at 08:31

## 2023-05-03 RX ADMIN — ASPIRIN 81 MG 81 MG: 81 TABLET ORAL at 08:31

## 2023-05-03 RX ADMIN — OXYCODONE HYDROCHLORIDE 2.5 MG: 5 TABLET ORAL at 08:35

## 2023-05-03 RX ADMIN — ATORVASTATIN CALCIUM 40 MG: 40 TABLET, FILM COATED ORAL at 21:09

## 2023-05-03 RX ADMIN — GABAPENTIN 300 MG: 300 CAPSULE ORAL at 08:31

## 2023-05-03 RX ADMIN — PIPERACILLIN AND TAZOBACTAM 3375 MG: 3; .375 INJECTION, POWDER, LYOPHILIZED, FOR SOLUTION INTRAVENOUS at 18:19

## 2023-05-03 RX ADMIN — Medication 10 ML: at 21:17

## 2023-05-03 RX ADMIN — METOPROLOL TARTRATE 50 MG: 50 TABLET, FILM COATED ORAL at 08:33

## 2023-05-03 RX ADMIN — DOCUSATE SODIUM 100 MG: 100 CAPSULE, LIQUID FILLED ORAL at 08:31

## 2023-05-03 RX ADMIN — MORPHINE SULFATE 2 MG: 2 INJECTION, SOLUTION INTRAMUSCULAR; INTRAVENOUS at 05:06

## 2023-05-03 RX ADMIN — GUAIFENESIN 400 MG: 400 TABLET ORAL at 08:31

## 2023-05-03 RX ADMIN — DIPHENHYDRAMINE HYDROCHLORIDE 25 MG: 25 TABLET ORAL at 18:43

## 2023-05-03 RX ADMIN — PIPERACILLIN AND TAZOBACTAM 3375 MG: 3; .375 INJECTION, POWDER, LYOPHILIZED, FOR SOLUTION INTRAVENOUS at 08:49

## 2023-05-03 RX ADMIN — HYDRALAZINE HYDROCHLORIDE 10 MG: 10 TABLET, FILM COATED ORAL at 05:09

## 2023-05-03 RX ADMIN — GABAPENTIN 300 MG: 300 CAPSULE ORAL at 21:09

## 2023-05-03 RX ADMIN — GUAIFENESIN 400 MG: 400 TABLET ORAL at 15:30

## 2023-05-03 RX ADMIN — Medication 10 ML: at 08:32

## 2023-05-03 RX ADMIN — OXYCODONE HYDROCHLORIDE AND ACETAMINOPHEN 1 TABLET: 5; 325 TABLET ORAL at 21:09

## 2023-05-03 ASSESSMENT — PAIN DESCRIPTION - LOCATION
LOCATION: LEG;ANKLE
LOCATION: HIP;LEG
LOCATION: HIP

## 2023-05-03 ASSESSMENT — PAIN - FUNCTIONAL ASSESSMENT: PAIN_FUNCTIONAL_ASSESSMENT: ACTIVITIES ARE NOT PREVENTED

## 2023-05-03 ASSESSMENT — PAIN DESCRIPTION - ORIENTATION
ORIENTATION: RIGHT
ORIENTATION: MID
ORIENTATION: RIGHT;LEFT

## 2023-05-03 ASSESSMENT — PAIN DESCRIPTION - DESCRIPTORS
DESCRIPTORS: ACHING;CRAMPING;DISCOMFORT
DESCRIPTORS: ACHING
DESCRIPTORS: ACHING

## 2023-05-03 ASSESSMENT — PAIN SCALES - GENERAL: PAINLEVEL_OUTOF10: 7

## 2023-05-04 LAB
ERYTHROCYTE [DISTWIDTH] IN BLOOD BY AUTOMATED COUNT: 13.4 FL (ref 11.5–15)
HCT VFR BLD AUTO: 36 % (ref 34–48)
HGB BLD-MCNC: 11.2 G/DL (ref 11.5–15.5)
MCH RBC QN AUTO: 30.9 PG (ref 26–35)
MCHC RBC AUTO-ENTMCNC: 31.1 % (ref 32–34.5)
MCV RBC AUTO: 99.2 FL (ref 80–99.9)
PLATELET # BLD AUTO: 172 E9/L (ref 130–450)
PMV BLD AUTO: 10 FL (ref 7–12)
RBC # BLD AUTO: 3.63 E12/L (ref 3.5–5.5)
WBC # BLD: 5.3 E9/L (ref 4.5–11.5)

## 2023-05-04 PROCEDURE — 97535 SELF CARE MNGMENT TRAINING: CPT

## 2023-05-04 PROCEDURE — 97165 OT EVAL LOW COMPLEX 30 MIN: CPT

## 2023-05-04 PROCEDURE — 97530 THERAPEUTIC ACTIVITIES: CPT

## 2023-05-04 PROCEDURE — 2140000000 HC CCU INTERMEDIATE R&B

## 2023-05-04 PROCEDURE — 36415 COLL VENOUS BLD VENIPUNCTURE: CPT

## 2023-05-04 PROCEDURE — 6360000002 HC RX W HCPCS: Performed by: INTERNAL MEDICINE

## 2023-05-04 PROCEDURE — 2580000003 HC RX 258: Performed by: INTERNAL MEDICINE

## 2023-05-04 PROCEDURE — 2580000003 HC RX 258: Performed by: STUDENT IN AN ORGANIZED HEALTH CARE EDUCATION/TRAINING PROGRAM

## 2023-05-04 PROCEDURE — 2700000000 HC OXYGEN THERAPY PER DAY

## 2023-05-04 PROCEDURE — 85027 COMPLETE CBC AUTOMATED: CPT

## 2023-05-04 PROCEDURE — 6360000002 HC RX W HCPCS: Performed by: STUDENT IN AN ORGANIZED HEALTH CARE EDUCATION/TRAINING PROGRAM

## 2023-05-04 PROCEDURE — 6370000000 HC RX 637 (ALT 250 FOR IP): Performed by: STUDENT IN AN ORGANIZED HEALTH CARE EDUCATION/TRAINING PROGRAM

## 2023-05-04 PROCEDURE — 6370000000 HC RX 637 (ALT 250 FOR IP): Performed by: INTERNAL MEDICINE

## 2023-05-04 RX ADMIN — DIPHENHYDRAMINE HYDROCHLORIDE 25 MG: 25 TABLET ORAL at 14:11

## 2023-05-04 RX ADMIN — GUAIFENESIN 400 MG: 400 TABLET ORAL at 19:51

## 2023-05-04 RX ADMIN — OXYCODONE HYDROCHLORIDE AND ACETAMINOPHEN 1 TABLET: 5; 325 TABLET ORAL at 19:51

## 2023-05-04 RX ADMIN — HYDRALAZINE HYDROCHLORIDE 10 MG: 10 TABLET, FILM COATED ORAL at 05:47

## 2023-05-04 RX ADMIN — METOPROLOL TARTRATE 50 MG: 50 TABLET, FILM COATED ORAL at 09:13

## 2023-05-04 RX ADMIN — PIPERACILLIN AND TAZOBACTAM 3375 MG: 3; .375 INJECTION, POWDER, LYOPHILIZED, FOR SOLUTION INTRAVENOUS at 02:41

## 2023-05-04 RX ADMIN — MORPHINE SULFATE 2 MG: 2 INJECTION, SOLUTION INTRAMUSCULAR; INTRAVENOUS at 14:11

## 2023-05-04 RX ADMIN — PIPERACILLIN AND TAZOBACTAM 3375 MG: 3; .375 INJECTION, POWDER, LYOPHILIZED, FOR SOLUTION INTRAVENOUS at 18:34

## 2023-05-04 RX ADMIN — OXYCODONE HYDROCHLORIDE 2.5 MG: 5 TABLET ORAL at 19:51

## 2023-05-04 RX ADMIN — Medication 10 ML: at 09:12

## 2023-05-04 RX ADMIN — ASPIRIN 81 MG 81 MG: 81 TABLET ORAL at 09:13

## 2023-05-04 RX ADMIN — GUAIFENESIN 400 MG: 400 TABLET ORAL at 14:08

## 2023-05-04 RX ADMIN — ATORVASTATIN CALCIUM 40 MG: 40 TABLET, FILM COATED ORAL at 19:51

## 2023-05-04 RX ADMIN — GUAIFENESIN 400 MG: 400 TABLET ORAL at 09:13

## 2023-05-04 RX ADMIN — HYDRALAZINE HYDROCHLORIDE 10 MG: 10 TABLET, FILM COATED ORAL at 21:19

## 2023-05-04 RX ADMIN — OXYCODONE HYDROCHLORIDE AND ACETAMINOPHEN 1 TABLET: 5; 325 TABLET ORAL at 09:13

## 2023-05-04 RX ADMIN — GABAPENTIN 300 MG: 300 CAPSULE ORAL at 09:13

## 2023-05-04 RX ADMIN — Medication 10 ML: at 19:53

## 2023-05-04 RX ADMIN — MORPHINE SULFATE 2 MG: 2 INJECTION, SOLUTION INTRAMUSCULAR; INTRAVENOUS at 19:50

## 2023-05-04 RX ADMIN — GABAPENTIN 300 MG: 300 CAPSULE ORAL at 19:51

## 2023-05-04 RX ADMIN — FLUTICASONE PROPIONATE 1 SPRAY: 50 SPRAY, METERED NASAL at 09:18

## 2023-05-04 RX ADMIN — DOCUSATE SODIUM 100 MG: 100 CAPSULE, LIQUID FILLED ORAL at 09:14

## 2023-05-04 RX ADMIN — OXYCODONE HYDROCHLORIDE 2.5 MG: 5 TABLET ORAL at 09:14

## 2023-05-04 RX ADMIN — DIPHENHYDRAMINE HYDROCHLORIDE 25 MG: 25 TABLET ORAL at 19:51

## 2023-05-04 RX ADMIN — PIPERACILLIN AND TAZOBACTAM 3375 MG: 3; .375 INJECTION, POWDER, LYOPHILIZED, FOR SOLUTION INTRAVENOUS at 09:12

## 2023-05-04 RX ADMIN — FUROSEMIDE 40 MG: 10 INJECTION, SOLUTION INTRAMUSCULAR; INTRAVENOUS at 09:13

## 2023-05-04 RX ADMIN — MORPHINE SULFATE 2 MG: 2 INJECTION, SOLUTION INTRAMUSCULAR; INTRAVENOUS at 00:32

## 2023-05-04 RX ADMIN — GABAPENTIN 300 MG: 300 CAPSULE ORAL at 14:08

## 2023-05-04 RX ADMIN — MORPHINE SULFATE 2 MG: 2 INJECTION, SOLUTION INTRAMUSCULAR; INTRAVENOUS at 05:47

## 2023-05-04 RX ADMIN — METOPROLOL TARTRATE 50 MG: 50 TABLET, FILM COATED ORAL at 19:51

## 2023-05-04 ASSESSMENT — PAIN DESCRIPTION - DESCRIPTORS
DESCRIPTORS: ACHING
DESCRIPTORS: ACHING;CRAMPING;DISCOMFORT
DESCRIPTORS: ACHING
DESCRIPTORS: ACHING
DESCRIPTORS: ACHING;CRAMPING;DISCOMFORT

## 2023-05-04 ASSESSMENT — PAIN SCALES - GENERAL
PAINLEVEL_OUTOF10: 7
PAINLEVEL_OUTOF10: 7
PAINLEVEL_OUTOF10: 6
PAINLEVEL_OUTOF10: 7
PAINLEVEL_OUTOF10: 8

## 2023-05-04 ASSESSMENT — PAIN DESCRIPTION - LOCATION
LOCATION: HIP;LEG;KNEE
LOCATION: LEG
LOCATION: LEG
LOCATION: LEG;HIP
LOCATION: LEG

## 2023-05-04 ASSESSMENT — PAIN DESCRIPTION - ORIENTATION
ORIENTATION: MID
ORIENTATION: RIGHT;LEFT
ORIENTATION: MID
ORIENTATION: RIGHT;LEFT

## 2023-05-05 PROCEDURE — 6360000002 HC RX W HCPCS: Performed by: STUDENT IN AN ORGANIZED HEALTH CARE EDUCATION/TRAINING PROGRAM

## 2023-05-05 PROCEDURE — 6370000000 HC RX 637 (ALT 250 FOR IP): Performed by: STUDENT IN AN ORGANIZED HEALTH CARE EDUCATION/TRAINING PROGRAM

## 2023-05-05 PROCEDURE — 2140000000 HC CCU INTERMEDIATE R&B

## 2023-05-05 PROCEDURE — 2580000003 HC RX 258: Performed by: STUDENT IN AN ORGANIZED HEALTH CARE EDUCATION/TRAINING PROGRAM

## 2023-05-05 PROCEDURE — 6360000002 HC RX W HCPCS: Performed by: INTERNAL MEDICINE

## 2023-05-05 PROCEDURE — 6370000000 HC RX 637 (ALT 250 FOR IP): Performed by: INTERNAL MEDICINE

## 2023-05-05 PROCEDURE — 2700000000 HC OXYGEN THERAPY PER DAY

## 2023-05-05 PROCEDURE — 2580000003 HC RX 258: Performed by: INTERNAL MEDICINE

## 2023-05-05 RX ADMIN — PIPERACILLIN AND TAZOBACTAM 3375 MG: 3; .375 INJECTION, POWDER, LYOPHILIZED, FOR SOLUTION INTRAVENOUS at 10:30

## 2023-05-05 RX ADMIN — GABAPENTIN 300 MG: 300 CAPSULE ORAL at 08:40

## 2023-05-05 RX ADMIN — GUAIFENESIN 400 MG: 400 TABLET ORAL at 20:55

## 2023-05-05 RX ADMIN — DOCUSATE SODIUM 100 MG: 100 CAPSULE, LIQUID FILLED ORAL at 08:42

## 2023-05-05 RX ADMIN — DIPHENHYDRAMINE HYDROCHLORIDE 25 MG: 25 TABLET ORAL at 21:06

## 2023-05-05 RX ADMIN — OXYCODONE HYDROCHLORIDE AND ACETAMINOPHEN 1 TABLET: 5; 325 TABLET ORAL at 20:55

## 2023-05-05 RX ADMIN — ASPIRIN 81 MG 81 MG: 81 TABLET ORAL at 08:42

## 2023-05-05 RX ADMIN — MORPHINE SULFATE 2 MG: 2 INJECTION, SOLUTION INTRAMUSCULAR; INTRAVENOUS at 14:59

## 2023-05-05 RX ADMIN — GUAIFENESIN 400 MG: 400 TABLET ORAL at 14:57

## 2023-05-05 RX ADMIN — METOPROLOL TARTRATE 50 MG: 50 TABLET, FILM COATED ORAL at 08:41

## 2023-05-05 RX ADMIN — PIPERACILLIN AND TAZOBACTAM 3375 MG: 3; .375 INJECTION, POWDER, LYOPHILIZED, FOR SOLUTION INTRAVENOUS at 18:30

## 2023-05-05 RX ADMIN — HYDRALAZINE HYDROCHLORIDE 10 MG: 10 TABLET, FILM COATED ORAL at 14:57

## 2023-05-05 RX ADMIN — HYDRALAZINE HYDROCHLORIDE 10 MG: 10 TABLET, FILM COATED ORAL at 06:37

## 2023-05-05 RX ADMIN — OXYCODONE HYDROCHLORIDE 2.5 MG: 5 TABLET ORAL at 08:42

## 2023-05-05 RX ADMIN — FUROSEMIDE 40 MG: 10 INJECTION, SOLUTION INTRAMUSCULAR; INTRAVENOUS at 08:40

## 2023-05-05 RX ADMIN — GABAPENTIN 300 MG: 300 CAPSULE ORAL at 20:55

## 2023-05-05 RX ADMIN — FLUTICASONE PROPIONATE 1 SPRAY: 50 SPRAY, METERED NASAL at 08:43

## 2023-05-05 RX ADMIN — DIPHENHYDRAMINE HYDROCHLORIDE 25 MG: 25 TABLET ORAL at 09:16

## 2023-05-05 RX ADMIN — Medication 10 ML: at 08:42

## 2023-05-05 RX ADMIN — GABAPENTIN 300 MG: 300 CAPSULE ORAL at 14:57

## 2023-05-05 RX ADMIN — OXYCODONE HYDROCHLORIDE 2.5 MG: 5 TABLET ORAL at 20:57

## 2023-05-05 RX ADMIN — GUAIFENESIN 400 MG: 400 TABLET ORAL at 08:42

## 2023-05-05 RX ADMIN — ATORVASTATIN CALCIUM 40 MG: 40 TABLET, FILM COATED ORAL at 20:55

## 2023-05-05 RX ADMIN — OXYCODONE HYDROCHLORIDE AND ACETAMINOPHEN 1 TABLET: 5; 325 TABLET ORAL at 08:42

## 2023-05-05 RX ADMIN — PIPERACILLIN AND TAZOBACTAM 3375 MG: 3; .375 INJECTION, POWDER, LYOPHILIZED, FOR SOLUTION INTRAVENOUS at 03:23

## 2023-05-05 RX ADMIN — POLYETHYLENE GLYCOL 3350 17 G: 17 POWDER, FOR SOLUTION ORAL at 08:40

## 2023-05-05 RX ADMIN — MORPHINE SULFATE 2 MG: 2 INJECTION, SOLUTION INTRAMUSCULAR; INTRAVENOUS at 03:47

## 2023-05-05 RX ADMIN — METOPROLOL TARTRATE 50 MG: 50 TABLET, FILM COATED ORAL at 20:55

## 2023-05-05 ASSESSMENT — PAIN SCALES - GENERAL
PAINLEVEL_OUTOF10: 8
PAINLEVEL_OUTOF10: 8

## 2023-05-05 ASSESSMENT — PAIN DESCRIPTION - DESCRIPTORS
DESCRIPTORS: ACHING
DESCRIPTORS: BURNING;THROBBING;STABBING

## 2023-05-05 ASSESSMENT — PAIN DESCRIPTION - LOCATION
LOCATION: LEG
LOCATION: HIP;LEG

## 2023-05-05 ASSESSMENT — PAIN DESCRIPTION - ORIENTATION
ORIENTATION: RIGHT;LEFT
ORIENTATION: RIGHT

## 2023-05-06 VITALS
DIASTOLIC BLOOD PRESSURE: 59 MMHG | TEMPERATURE: 97.9 F | SYSTOLIC BLOOD PRESSURE: 122 MMHG | OXYGEN SATURATION: 94 % | BODY MASS INDEX: 44.41 KG/M2 | HEIGHT: 68 IN | RESPIRATION RATE: 16 BRPM | HEART RATE: 85 BPM | WEIGHT: 293 LBS

## 2023-05-06 LAB
ANION GAP SERPL CALCULATED.3IONS-SCNC: 8 MMOL/L (ref 7–16)
BNP BLD-MCNC: 540 PG/ML (ref 0–450)
BUN SERPL-MCNC: 19 MG/DL (ref 6–23)
CALCIUM SERPL-MCNC: 9.3 MG/DL (ref 8.6–10.2)
CHLORIDE SERPL-SCNC: 100 MMOL/L (ref 98–107)
CO2 SERPL-SCNC: 31 MMOL/L (ref 22–29)
CREAT SERPL-MCNC: 0.9 MG/DL (ref 0.5–1)
ERYTHROCYTE [DISTWIDTH] IN BLOOD BY AUTOMATED COUNT: 13.3 FL (ref 11.5–15)
GLUCOSE SERPL-MCNC: 82 MG/DL (ref 74–99)
HCT VFR BLD AUTO: 35.8 % (ref 34–48)
HGB BLD-MCNC: 11.2 G/DL (ref 11.5–15.5)
MAGNESIUM SERPL-MCNC: 1.9 MG/DL (ref 1.6–2.6)
MCH RBC QN AUTO: 31.3 PG (ref 26–35)
MCHC RBC AUTO-ENTMCNC: 31.3 % (ref 32–34.5)
MCV RBC AUTO: 100 FL (ref 80–99.9)
PLATELET # BLD AUTO: 188 E9/L (ref 130–450)
PMV BLD AUTO: 10.1 FL (ref 7–12)
POTASSIUM SERPL-SCNC: 4.1 MMOL/L (ref 3.5–5)
RBC # BLD AUTO: 3.58 E12/L (ref 3.5–5.5)
SODIUM SERPL-SCNC: 139 MMOL/L (ref 132–146)
WBC # BLD: 4.4 E9/L (ref 4.5–11.5)

## 2023-05-06 PROCEDURE — 6360000002 HC RX W HCPCS: Performed by: INTERNAL MEDICINE

## 2023-05-06 PROCEDURE — 83735 ASSAY OF MAGNESIUM: CPT

## 2023-05-06 PROCEDURE — 6360000002 HC RX W HCPCS: Performed by: STUDENT IN AN ORGANIZED HEALTH CARE EDUCATION/TRAINING PROGRAM

## 2023-05-06 PROCEDURE — 80048 BASIC METABOLIC PNL TOTAL CA: CPT

## 2023-05-06 PROCEDURE — 6370000000 HC RX 637 (ALT 250 FOR IP): Performed by: STUDENT IN AN ORGANIZED HEALTH CARE EDUCATION/TRAINING PROGRAM

## 2023-05-06 PROCEDURE — 6370000000 HC RX 637 (ALT 250 FOR IP): Performed by: INTERNAL MEDICINE

## 2023-05-06 PROCEDURE — 2580000003 HC RX 258: Performed by: INTERNAL MEDICINE

## 2023-05-06 PROCEDURE — 2580000003 HC RX 258: Performed by: STUDENT IN AN ORGANIZED HEALTH CARE EDUCATION/TRAINING PROGRAM

## 2023-05-06 PROCEDURE — 85027 COMPLETE CBC AUTOMATED: CPT

## 2023-05-06 PROCEDURE — 83880 ASSAY OF NATRIURETIC PEPTIDE: CPT

## 2023-05-06 PROCEDURE — 36415 COLL VENOUS BLD VENIPUNCTURE: CPT

## 2023-05-06 PROCEDURE — 2700000000 HC OXYGEN THERAPY PER DAY

## 2023-05-06 RX ORDER — GUAIFENESIN 400 MG/1
400 TABLET ORAL 3 TIMES DAILY
Qty: 56 TABLET | Refills: 0 | Status: SHIPPED | OUTPATIENT
Start: 2023-05-06

## 2023-05-06 RX ORDER — PSEUDOEPHEDRINE HCL 30 MG
100 TABLET ORAL DAILY
Qty: 30 CAPSULE | Refills: 0 | Status: SHIPPED | OUTPATIENT
Start: 2023-05-07 | End: 2023-06-06

## 2023-05-06 RX ORDER — GABAPENTIN 100 MG/1
200 CAPSULE ORAL 3 TIMES DAILY
Status: DISCONTINUED | OUTPATIENT
Start: 2023-05-06 | End: 2023-05-06 | Stop reason: HOSPADM

## 2023-05-06 RX ORDER — FLUTICASONE PROPIONATE 50 MCG
1 SPRAY, SUSPENSION (ML) NASAL DAILY
Qty: 16 G | Refills: 0 | Status: SHIPPED | OUTPATIENT
Start: 2023-05-07

## 2023-05-06 RX ORDER — FUROSEMIDE 20 MG/1
20 TABLET ORAL DAILY
Qty: 30 TABLET | Refills: 0 | Status: SHIPPED | OUTPATIENT
Start: 2023-05-06

## 2023-05-06 RX ORDER — BENZONATATE 100 MG/1
100 CAPSULE ORAL 3 TIMES DAILY PRN
Qty: 20 CAPSULE | Refills: 0 | Status: SHIPPED | OUTPATIENT
Start: 2023-05-06 | End: 2023-05-13

## 2023-05-06 RX ORDER — LORATADINE 10 MG/1
10 TABLET ORAL DAILY
Qty: 30 TABLET | Refills: 0 | Status: SHIPPED | OUTPATIENT
Start: 2023-05-06 | End: 2023-06-05

## 2023-05-06 RX ORDER — PANTOPRAZOLE SODIUM 40 MG/1
40 TABLET, DELAYED RELEASE ORAL
Status: DISCONTINUED | OUTPATIENT
Start: 2023-05-06 | End: 2023-05-06 | Stop reason: HOSPADM

## 2023-05-06 RX ORDER — POTASSIUM CHLORIDE 750 MG/1
10 TABLET, EXTENDED RELEASE ORAL
Qty: 30 TABLET | Refills: 0 | Status: SHIPPED | OUTPATIENT
Start: 2023-05-06 | End: 2023-06-05

## 2023-05-06 RX ORDER — POLYETHYLENE GLYCOL 3350 17 G/17G
17 POWDER, FOR SOLUTION ORAL DAILY
Qty: 527 G | Refills: 1 | Status: SHIPPED | OUTPATIENT
Start: 2023-05-07 | End: 2023-06-06

## 2023-05-06 RX ADMIN — DIPHENHYDRAMINE HYDROCHLORIDE 25 MG: 25 TABLET ORAL at 12:10

## 2023-05-06 RX ADMIN — SODIUM CHLORIDE, PRESERVATIVE FREE 10 ML: 5 INJECTION INTRAVENOUS at 18:35

## 2023-05-06 RX ADMIN — OXYCODONE HYDROCHLORIDE AND ACETAMINOPHEN 1 TABLET: 5; 325 TABLET ORAL at 09:50

## 2023-05-06 RX ADMIN — SODIUM CHLORIDE, PRESERVATIVE FREE 10 ML: 5 INJECTION INTRAVENOUS at 02:03

## 2023-05-06 RX ADMIN — Medication 10 ML: at 09:52

## 2023-05-06 RX ADMIN — GABAPENTIN 300 MG: 300 CAPSULE ORAL at 09:49

## 2023-05-06 RX ADMIN — FUROSEMIDE 40 MG: 10 INJECTION, SOLUTION INTRAMUSCULAR; INTRAVENOUS at 10:56

## 2023-05-06 RX ADMIN — MORPHINE SULFATE 2 MG: 2 INJECTION, SOLUTION INTRAMUSCULAR; INTRAVENOUS at 18:34

## 2023-05-06 RX ADMIN — MORPHINE SULFATE 2 MG: 2 INJECTION, SOLUTION INTRAMUSCULAR; INTRAVENOUS at 02:03

## 2023-05-06 RX ADMIN — VERAPAMIL HYDROCHLORIDE 180 MG: 180 TABLET, FILM COATED, EXTENDED RELEASE ORAL at 09:50

## 2023-05-06 RX ADMIN — MORPHINE SULFATE 2 MG: 2 INJECTION, SOLUTION INTRAMUSCULAR; INTRAVENOUS at 12:10

## 2023-05-06 RX ADMIN — GUAIFENESIN 400 MG: 400 TABLET ORAL at 09:49

## 2023-05-06 RX ADMIN — FLUTICASONE PROPIONATE 1 SPRAY: 50 SPRAY, METERED NASAL at 09:55

## 2023-05-06 RX ADMIN — PANTOPRAZOLE SODIUM 40 MG: 40 TABLET, DELAYED RELEASE ORAL at 09:49

## 2023-05-06 RX ADMIN — GUAIFENESIN 400 MG: 400 TABLET ORAL at 14:45

## 2023-05-06 RX ADMIN — HYDRALAZINE HYDROCHLORIDE 10 MG: 10 TABLET, FILM COATED ORAL at 14:45

## 2023-05-06 RX ADMIN — POLYETHYLENE GLYCOL 3350 17 G: 17 POWDER, FOR SOLUTION ORAL at 09:52

## 2023-05-06 RX ADMIN — SODIUM CHLORIDE, PRESERVATIVE FREE 10 ML: 5 INJECTION INTRAVENOUS at 10:56

## 2023-05-06 RX ADMIN — PIPERACILLIN AND TAZOBACTAM 3375 MG: 3; .375 INJECTION, POWDER, LYOPHILIZED, FOR SOLUTION INTRAVENOUS at 02:02

## 2023-05-06 RX ADMIN — DOCUSATE SODIUM 100 MG: 100 CAPSULE, LIQUID FILLED ORAL at 09:50

## 2023-05-06 RX ADMIN — OXYCODONE HYDROCHLORIDE 2.5 MG: 5 TABLET ORAL at 09:50

## 2023-05-06 RX ADMIN — GABAPENTIN 300 MG: 300 CAPSULE ORAL at 14:45

## 2023-05-06 ASSESSMENT — PAIN DESCRIPTION - LOCATION
LOCATION: LEG
LOCATION: LEG

## 2023-05-06 ASSESSMENT — PAIN DESCRIPTION - ORIENTATION
ORIENTATION: RIGHT;LEFT
ORIENTATION: RIGHT;LEFT

## 2023-05-06 ASSESSMENT — PAIN DESCRIPTION - DESCRIPTORS
DESCRIPTORS: ACHING;SORE;DISCOMFORT
DESCRIPTORS: SORE;DISCOMFORT

## 2023-05-06 ASSESSMENT — PAIN SCALES - GENERAL
PAINLEVEL_OUTOF10: 0
PAINLEVEL_OUTOF10: 8
PAINLEVEL_OUTOF10: 10

## 2023-05-06 NOTE — PLAN OF CARE

## 2023-05-06 NOTE — PROGRESS NOTES
2 D echo completed this am. Mary Colon administered 3 ml diluted definity contrast for LV wall motion assessment.
Attempt to complete echocardiogram, but patient GEORGE at cath lab. Will attempt tomorrow.
Department of Internal Medicine  Infectious Diseases  Progress  Note      C/C : left leg wound , pneumonia     Pt denies fever or chills  Reports SOB   Afebrile      Current Facility-Administered Medications   Medication Dose Route Frequency Provider Last Rate Last Admin    diphenhydrAMINE (BENADRYL) tablet 25 mg  25 mg Oral Q6H PRN Julio Grover MD   25 mg at 05/05/23 0916    acetaminophen (TYLENOL) tablet 650 mg  650 mg Oral Q4H PRN Giovanna Urbano, DO   650 mg at 05/02/23 1636    piperacillin-tazobactam (ZOSYN) 3,375 mg in sodium chloride 0.9 % 50 mL IVPB ([id number])  3,375 mg IntraVENous Q8H Epifanio Hammond MD 12.5 mL/hr at 05/05/23 1030 3,375 mg at 05/05/23 1030    docusate sodium (COLACE) capsule 100 mg  100 mg Oral Daily Julio Grover MD   100 mg at 05/05/23 0842    polyethylene glycol (GLYCOLAX) packet 17 g  17 g Oral Daily Julio Grover MD   17 g at 05/05/23 0840    ipratropium-albuterol (DUONEB) nebulizer solution 1 ampule  1 ampule Inhalation Q4H PRN Julio Grover MD        benzonatate (TESSALON) capsule 100 mg  100 mg Oral TID PRN Julio Grover MD        guaiFENesin tablet 400 mg  400 mg Oral TID Julio Grover MD   400 mg at 05/05/23 1457    hydrALAZINE (APRESOLINE) tablet 10 mg  10 mg Oral 3 times per day Giovannablake Urbano, DO   10 mg at 05/05/23 1457    furosemide (LASIX) injection 40 mg  40 mg IntraVENous Daily [facility], DO   40 mg at 05/05/23 0840    fluticasone (FLONASE) 50 MCG/ACT nasal spray 1 spray  1 spray Each Nostril Daily Julio Grover MD   1 spray at 05/05/23 0843    sodium chloride flush 0.9 % injection 5-40 mL  5-40 mL IntraVENous 2 times per day Jeb Forte MD   10 mL at 05/05/23 0842    sodium chloride flush 0.9 % injection 5-40 mL  5-40 mL IntraVENous PRN Jeb Forte MD        0.9 % sodium chloride infusion   IntraVENous PRN Jeb Forte MD        ondansetron (ZOFRAN-ODT) disintegrating tablet 4 mg  4 mg Oral Q8H PRN Jeb Forte MD        Or    ondansetron
Department of Internal Medicine  Infectious Diseases  Progress  Note      C/C : left leg wound , pneumonia     Pt denies fever or chills  Reports SOB   Afebrile      Current Facility-Administered Medications   Medication Dose Route Frequency Provider Last Rate Last Admin    diphenhydrAMINE (BENADRYL) tablet 25 mg  25 mg Oral Q6H PRN Lolis Ta MD   25 mg at 05/04/23 1411    acetaminophen (TYLENOL) tablet 650 mg  650 mg Oral Q4H PRN Ishan Webber DO   650 mg at 05/02/23 1636    piperacillin-tazobactam (ZOSYN) 3,375 mg in sodium chloride 0.9 % 50 mL IVPB ([id number])  3,375 mg IntraVENous Q8H Epifanio Hammond MD   Stopped at 05/04/23 1351    docusate sodium (COLACE) capsule 100 mg  100 mg Oral Daily Lolis Ta MD   100 mg at 05/04/23 0914    polyethylene glycol (GLYCOLAX) packet 17 g  17 g Oral Daily Lolis Ta MD   17 g at 05/03/23 0831    ipratropium-albuterol (DUONEB) nebulizer solution 1 ampule  1 ampule Inhalation Q4H PRN Lolis Ta MD        benzonatate (TESSALON) capsule 100 mg  100 mg Oral TID PRN Lolis Ta MD        guaiFENesin tablet 400 mg  400 mg Oral TID Lolis Ta MD   400 mg at 05/04/23 1408    hydrALAZINE (APRESOLINE) tablet 10 mg  10 mg Oral 3 times per day Ishan Webber DO   10 mg at 05/04/23 0547    furosemide (LASIX) injection 40 mg  40 mg IntraVENous Daily Ishan Webber DO   40 mg at 05/04/23 0913    fluticasone (FLONASE) 50 MCG/ACT nasal spray 1 spray  1 spray Each Nostril Daily Lolis Ta MD   1 spray at 05/04/23 0918    heparin (porcine) PF injection 2,000 Units  2,000 Units IntraVENous PRN Ollie Kyle DO   2,000 Units at 04/29/23 2319    heparin 25,000 units in dextrose 5% 250 mL (premix) infusion  5-30 Units/kg/hr IntraVENous Continuous Ollie Kyle DO   Stopped at 05/01/23 1435    heparin (porcine) PF injection 4,000 Units  4,000 Units IntraVENous PRN Ollie Kyle DO        sodium chloride flush 0.9 % injection 5-40 mL  5-40 mL
Department of Internal Medicine  Infectious Diseases  Progress  Note      C/C : left leg wound , pneumonia     Pt denies fever or chills  Reports SOB   Afebrile      Current Facility-Administered Medications   Medication Dose Route Frequency Provider Last Rate Last Admin    perflutren lipid microspheres (DEFINITY) injection 1.5 mL  1.5 mL IntraVENous ONCE PRN Frannie Bailey PA-C        piperacillin-tazobactam (ZOSYN) 3,375 mg in sodium chloride 0.9 % 50 mL IVPB ([id number])  3,375 mg IntraVENous Q8H Epifanio aHmmond MD   Stopped at 05/01/23 1005    docusate sodium (COLACE) capsule 100 mg  100 mg Oral Daily Bassem Pollock MD   100 mg at 05/01/23 0848    polyethylene glycol (GLYCOLAX) packet 17 g  17 g Oral Daily Bassem Pollock MD   17 g at 05/01/23 0849    ipratropium-albuterol (DUONEB) nebulizer solution 1 ampule  1 ampule Inhalation Q4H PRN Bassem Pollock MD        benzonatate (TESSALON) capsule 100 mg  100 mg Oral TID PRN Bassem Pollock MD        guaiFENesin tablet 400 mg  400 mg Oral TID Bassem Pollock MD   400 mg at 05/01/23 0848    hydrALAZINE (APRESOLINE) tablet 10 mg  10 mg Oral 3 times per day Kamar Gresham, DO   10 mg at 05/01/23 6928    furosemide (LASIX) injection 40 mg  40 mg IntraVENous Daily Kamar Gresham, DO   40 mg at 05/01/23 0849    fluticasone (FLONASE) 50 MCG/ACT nasal spray 1 spray  1 spray Each Nostril Daily Bassem Pollock MD   1 spray at 05/01/23 0850    heparin (porcine) PF injection 2,000 Units  2,000 Units IntraVENous PRN Dorathy Pae, DO   2,000 Units at 04/29/23 2319    heparin 25,000 units in dextrose 5% 250 mL (premix) infusion  5-30 Units/kg/hr IntraVENous Continuous Dorathy Pae, DO 12 mL/hr at 05/01/23 0136 9 Units/kg/hr at 05/01/23 0136    heparin (porcine) PF injection 4,000 Units  4,000 Units IntraVENous PRN Dorathy Pae, DO        sodium chloride flush 0.9 % injection 5-40 mL  5-40 mL IntraVENous 2 times per day Sharee Polanco MD   10 mL at 05/01/23 0138
Dressing changed to LLE per order. Pt tolerated well.
Hospitalist Progress Note      SYNOPSIS: Patient admitted on 2023 for Community acquired bacterial pneumonia      70 y.o. female who presented to [facility] ED with shortness of breath. Patient reports that she had been admitted to a nursing facility for long term antibiotic treatment for a LE wound. She states that over the last few days she has been getting progressively short of breath and having subjective fevers associated with a cough. In the ED, she was noted to be hypoxic and was started on supplemental O2 via NC. Currently on 6 L O2 via NC. CTA negative for PE however she did have possible right lower lobe PNA with a pleural effusion. Patient was started on Vancomycin and Zosyn   Of note, patient was also noted to have an elevated troponin with no EKG changes suggestive of ischemia, was started on heparin gtt and Cardiology consulted. 2023 s/p Cardiac cath negative  Patient known to Dr. Kateryna Landry, changed cardiology service. SUBJECTIVE:  Stable overnight. No other overnight issues reported. Patient seen and examined  Records reviewed. Endorses some cough, sob and sputum production. Also had constipation. Patient was concerned about her elevated heart enzymes(Troponin), however, that could be multifactorial and was discussed with patient. Temp (24hrs), Av °F (36.7 °C), Min:97.6 °F (36.4 °C), Max:98.2 °F (36.8 °C)    DIET: ADULT DIET; Regular; Low Fat/Low Chol/High Fiber/MARIBEL  ADULT ORAL NUTRITION SUPPLEMENT; Breakfast, Dinner; Standard High Calorie/High Protein Oral Supplement  ADULT ORAL NUTRITION SUPPLEMENT; Lunch, Dinner;  Wound Healing Oral Supplement  CODE: Full Code    Intake/Output Summary (Last 24 hours) at 2023 1718  Last data filed at 2023 4842  Gross per 24 hour   Intake --   Output 2500 ml   Net -2500 ml       Review of Systems  All bolded are positive; please see HPI  General:  Fever, chills, diaphoresis, fatigue, malaise, night sweats,
Hospitalist Progress Note      SYNOPSIS: Patient admitted on 2023 for Community acquired bacterial pneumonia      70 y.o. female who presented to [facility] ED with shortness of breath. Patient reports that she had been admitted to a nursing facility for long term antibiotic treatment for a LE wound. She states that over the last few days she has been getting progressively short of breath and having subjective fevers associated with a cough. In the ED, she was noted to be hypoxic and was started on supplemental O2 via NC. Currently on 6 L O2 via NC. CTA negative for PE however she did have possible right lower lobe PNA with a pleural effusion. Patient was started on Vancomycin and Zosyn   Of note, patient was also noted to have an elevated troponin with no EKG changes suggestive of ischemia, was started on heparin gtt and Cardiology consulted. 2023 s/p Cardiac cath negative  Patient known to Dr. Ariana Alexander, changed cardiology service. SUBJECTIVE:    Patient feels fatigued  Generalized weakness  Breathing is stable  Still having some coughing  No overnight events    Temp (24hrs), Av.8 °F (36.6 °C), Min:97.3 °F (36.3 °C), Max:98.2 °F (36.8 °C)    DIET: ADULT DIET; Regular; Low Fat/Low Chol/High Fiber/MARIBEL  ADULT ORAL NUTRITION SUPPLEMENT; Breakfast, Dinner; Standard High Calorie/High Protein Oral Supplement  ADULT ORAL NUTRITION SUPPLEMENT; Lunch, Dinner; Wound Healing Oral Supplement  CODE: Full Code    Intake/Output Summary (Last 24 hours) at 5/3/2023 1642  Last data filed at 5/3/2023 1442  Gross per 24 hour   Intake 480 ml   Output 2075 ml   Net -1595 ml       Review of Systems  All bolded are positive; please see HPI  General:  Fever, chills, diaphoresis, fatigue, malaise, night sweats, weight loss  Psychological:  Anxiety, disorientation, hallucinations. ENT:  Epistaxis, headaches, vertigo, visual changes.   Cardiovascular:  Chest pain, irregular heartbeats, palpitations,
Hospitalist Progress Note      SYNOPSIS: Patient admitted on 2023 for Community acquired bacterial pneumonia      70 y.o. female who presented to [facility] ED with shortness of breath. Patient reports that she had been admitted to a nursing facility for long term antibiotic treatment for a LE wound. She states that over the last few days she has been getting progressively short of breath and having subjective fevers associated with a cough. In the ED, she was noted to be hypoxic and was started on supplemental O2 via NC. Currently on 6 L O2 via NC. CTA negative for PE however she did have possible right lower lobe PNA with a pleural effusion. Patient was started on Vancomycin and Zosyn   Of note, patient was also noted to have an elevated troponin with no EKG changes suggestive of ischemia, was started on heparin gtt and Cardiology consulted. 2023 s/p Cardiac cath. SUBJECTIVE:  Stable overnight. No other overnight issues reported. Patient seen and examined  Records reviewed. Endorses some cough, sob and sputum production. Also had constipation. Patient was concerned about her elevated heart enzymes(Troponin), however, that could be multifactorial and was discussed with patient. Temp (24hrs), Av.9 °F (36.6 °C), Min:97.8 °F (36.6 °C), Max:98 °F (36.7 °C)    DIET: ADULT DIET; Regular; Low Fat/Low Chol/High Fiber/MARIBEL  CODE: Full Code    Intake/Output Summary (Last 24 hours) at 2023  Last data filed at 2023 1752  Gross per 24 hour   Intake --   Output 3100 ml   Net -3100 ml       Review of Systems  All bolded are positive; please see HPI  General:  Fever, chills, diaphoresis, fatigue, malaise, night sweats, weight loss  Psychological:  Anxiety, disorientation, hallucinations. ENT:  Epistaxis, headaches, vertigo, visual changes. Cardiovascular:  Chest pain, irregular heartbeats, palpitations, paroxysmal nocturnal dyspnea.  Chest tightness  Respiratory:
Occupational Therapy  Date:5/3/2023  Patient Name: Moon Encinas  MRN: [id number]  : 1945  Room: [address] [place] [address]     OT order received and appreciated. Chart reviewed. OT evaluation initiated. Pt provided social history: pt admitted from [place] [address] and plans to discharge to sisters [address] [place] home with 1st floor set-up. Walk-in shower in basement, 1/2 bath on 1st floor; Pt reported independence with ADLs and ambulated with cane prn. After obtaining social history, pt refusing any OOB activity. Pt educated on benefit and role of therapy. Will follow and attempt as able.      Willis Condon OTR/L #[id number]
Occupational Therapy  OT BEDSIDE TREATMENT NOTE   [facility] [address] [address]      Date:2023  Patient Name: Swathi Benjamin  MRN: [id number]  : 1945  Room: [address] [place] [address]       Attempted OT session this date:    [] unavailable due to other medical staff currently with pt   [] on hold per nursing staff   [] on hold per nursing staff secondary to lab / radiology results    [x] declined treatment  this date at 2:30pm requesting therapist to come back at 3:00pm due to eating lunch, pt still eating lunch at 3:00pm.  Benefits of participation in therapy reviewed with pt. Will re attempt   [] off unit   [] Other:      Continue with current OT P. [address] PET[place]/L [id number]
Occupational Therapy  OT eval and treat order received. Attempted OT eval and pt was off the unit at a test.Will attempt evaluation at a later time . Deysi Talbot OTR/L [id number]
PROGRESS NOTE       PATIENT PROBLEM LIST:  Patient Active Problem List   Diagnosis Code    Hypoxia R09.02    COPD (chronic obstructive pulmonary disease) ([facility]) J44.9    Primary hypertension I10    Hyperlipidemia E78.5    GERD (gastroesophageal reflux disease) L32.3    Diastolic CHF, chronic ([facility]) I50.32    Bursitis M71.9    Venous insufficiency of both lower extremities I87.2    Lymphedema I89.0    Venous stasis ulcer of left calf with fat layer exposed without varicose veins ([facility]) I87.2, L97.222    Sepsis due to cellulitis ([facility]) L03.90, A41.9    Wound of left leg S81.802A    Cellulitis of left lower extremity L03.116    DAYTON (acute kidney injury) ([facility] N17.9    Syncope R55    Drug-induced mucositis K12.32    Chronic pain syndrome G89.4    PVD (peripheral vascular disease) ([facility]) I73.9    Dermatophytosis B35.9    Infected stasis ulcer of left lower extremity ([facility]) I83.229, L97.929    Class 3 severe obesity due to excess calories with serious comorbidity in adult [facility]) E66.01    Community acquired bacterial pneumonia J15.9       SUBJECTIVE:  Una Douglas states she wishes to have salt for her food as she has ingested salt for 30 the past 32 years. She has no complaints referable to her antihypertensive medical change verapamil. REVIEW OF SYSTEMS:  General ROS: negative for - fatigue, malaise,  weight gain or weight loss  Psychological ROS: negative for - anxiety , depression  Ophthalmic ROS: negative for - decreased vision or visual distortion. ENT ROS: negative  Allergy and Immunology ROS: negative  Hematological and Lymphatic ROS: negative  Endocrine: no heat or cold intolerance and no polyphagia, polydipsia, or polyuria  Respiratory ROS: negative for - cough, pleuritic pain, shortness of breath, and wheezing  Cardiovascular ROS: positive for - edema.   Gastrointestinal ROS: no abdominal pain, change in bowel habits, or black or bloody stools  Genito-Urinary ROS: no nocturia, dysuria, trouble voiding, frequency
PULSE OX ON ROOM AIR SITTING 88%   PULSE OX ON 2 LITERS SITTING RECOVERY 93 %   PULSE OX ON ROOM AIR AMBULATING  84%   PULSE OX ON 2 LITERS AMBULATING RECOVERY 91 %
Per Dr. Opal Morales pt can be downgraded to med/surg tele.
Per Dr. Poonam Lopez it appears Vascular surgery has not seen pt as of yet. Dr. Poonam Bojorquez made aware of consult on 4/29/23. Message resent to vascular surgery at this time via perfect serve.
Physical Therapy  Physical Therapy Initial Assessment    Name: Vilma Espinoza  : 1945  MRN: [id number]      Date of Service: 2023    Evaluating PT:  Al Valdivia PT, DPT [id number]    Room #:  7412/4695-S  Diagnosis:  Transaminitis [R74.01]  Community acquired bacterial pneumonia [J15.9]  NSTEMI (non-ST elevated myocardial infarction) ([address] [I21.4]  DAYTON (acute kidney injury) ([address] [N17.9]  Acute respiratory failure with hypoxia ([address] [J96.01]  Pneumonia of right lower lobe due to infectious organism [J18.9]  Skin ulcer of left pretibial region, unspecified ulcer stage ([address] [L97.829]  PMHx/PSHx:   has a past medical history of Bursitis, CAD (coronary artery disease), Cellulitis of leg, left, COPD (chronic obstructive pulmonary disease) ([address] Dermatophytosis, Emphysema, GERD (gastroesophageal reflux disease), Hiatal hernia, Hip pain, Hx of blood clots, Hyperlipidemia, Hypertension, Lymphedema of both lower extremities, Venous insufficiency of both lower extremities, Venous stasis ulcer of left calf with fat layer exposed without varicose veins ([address] Venous stasis ulcer of left calf with fat layer exposed without varicose veins ([address] and Venous ulcer with fat layer exposed ([address] has a past surgical history that includes Cholecystectomy; Hysterectomy; Endoscopy, colon, diagnostic; Colonoscopy; Appendectomy; ECHO Compl W Dop Color Flow (3/11/2013); Leg Debridement (Left, 2015); joint replacement (2009); Breast reduction surgery; Breast enhancement surgery; Leg Debridement (Left, 2016); hernia repair (N/A, 2021); and hernia repair. Procedure/Surgery:  None  Precautions:  Falls, O2  Equipment Needs:  TBD    SUBJECTIVE:    Pt has been staying with sister in a 2 story home with 1 stair(s) to enter and 0 rail(s). Pt stays on 1st floor. Pt ambulated with cane as needed PTA.     OBJECTIVE:   Initial Evaluation  Date: 2023 Treatment Short Term/ Long Term   Goals   AM-PAC 6 Clicks
Physical Therapy  Physical Therapy Treatment    Name: Leo Bae  : 1945  MRN: [id number]      Date of Service: 2023    Evaluating PT:  Robe Soto PT, DPT [id number]    Room #:  4370/8869-G  Diagnosis:  Transaminitis [R74.01]  Community acquired bacterial pneumonia [J15.9]  NSTEMI (non-ST elevated myocardial infarction) ([address] [I21.4]  DAYTON (acute kidney injury) ([address] [N17.9]  Acute respiratory failure with hypoxia ([address] [J96.01]  Pneumonia of right lower lobe due to infectious organism [J18.9]  Skin ulcer of left pretibial region, unspecified ulcer stage ([address] [L97.829]  PMHx/PSHx:   has a past medical history of Bursitis, CAD (coronary artery disease), Cellulitis of leg, left, COPD (chronic obstructive pulmonary disease) ([address] Dermatophytosis, Emphysema, GERD (gastroesophageal reflux disease), Hiatal hernia, Hip pain, Hx of blood clots, Hyperlipidemia, Hypertension, Lymphedema of both lower extremities, Venous insufficiency of both lower extremities, Venous stasis ulcer of left calf with fat layer exposed without varicose veins (HCC), Venous stasis ulcer of left calf with fat layer exposed without varicose veins ([address] and Venous ulcer with fat layer exposed ([address] has a past surgical history that includes Cholecystectomy; Hysterectomy; Endoscopy, colon, diagnostic; Colonoscopy; Appendectomy; ECHO Compl W Dop Color Flow (3/11/2013); Leg Debridement (Left, 2015); joint replacement (2009); Breast reduction surgery; Breast enhancement surgery; Leg Debridement (Left, 2016); hernia repair (N/A, 2021); and hernia repair. Procedure/Surgery:  Left heart catheterization with coronary angiography (2023)  Precautions:  Falls, O2  Equipment Needs:  FWW; TBD    SUBJECTIVE:    Pt has been staying with sister in a 2 story home with 1 stair(s) to enter and 0 rail(s). Pt stays on 1st floor. Pt ambulated with cane as needed PTA.     OBJECTIVE:   Initial Evaluation  Date: 2023
Pt's daughter Marcus An just left the hospital and driving back to [facility] and would like the dr to call her after her cardiac catheter
Spoke to [place] with social work. Pt is set up for transportation home. Home health care resumed. Order faxed to phone number provided from case management note ([phone]). Oxygen to be delivered to house and oxygen at bedside.     Carley Patel RN
Spoke to patient about barriers for discharge, she states she does not have a ride home today and she states home is not ready, that her sister is rearranging and getting ready to have oxygen at home. Pt does have portable tank at bedside. Social work called to help with these concerns.
Vascular Surgery Progress Note    Pt is being seen in f/u today regarding chronic L LE wound    Subjective  Pt s/e. Sitting up in the chair. Pain is controlled. Wound care nurse just changed dressing.     Current Medications:    heparin (PORCINE) Infusion Stopped (05/01/23 5)    sodium chloride        diphenhydrAMINE, acetaminophen, ipratropium-albuterol, benzonatate, heparin (porcine) PF, heparin (porcine) PF, sodium chloride flush, sodium chloride, ondansetron **OR** ondansetron, [DISCONTINUED] acetaminophen **OR** acetaminophen, morphine, albuterol    piperacillin-tazobactam  3,375 mg IntraVENous Q8H    docusate sodium  100 mg Oral Daily    polyethylene glycol  17 g Oral Daily    guaiFENesin  400 mg Oral TID    hydrALAZINE  10 mg Oral 3 times per day    furosemide  40 mg IntraVENous Daily    fluticasone  1 spray Each Nostril Daily    sodium chloride flush  5-40 mL IntraVENous 2 times per day    aspirin  81 mg Oral Daily    atorvastatin  40 mg Oral Nightly    [Held by provider] amLODIPine  5 mg Oral Daily    gabapentin  300 mg Oral TID    [Held by provider] hydroCHLOROthiazide  25 mg Oral Daily    metoprolol tartrate  50 mg Oral BID    oxyCODONE-acetaminophen  1 tablet Oral BID    And    oxyCODONE  2.5 mg Oral BID      PHYSICAL EXAM:    BP (!) 163/96   Pulse 88   Temp 98.2 °F (36.8 °C) (Oral)   Resp 18   Wt (!) 305 lb (138.3 kg)   SpO2 94%   BMI 46.38 kg/m²     Intake/Output Summary (Last 24 hours) at 5/2/2023 1050  Last data filed at 5/2/2023 3740  Gross per 24 hour   Intake --   Output 3900 ml   Net -3900 ml        Gen Awake, alert, oriented x3, in no apparent distress   CVS S1S2   Resp No increased work of breathing   Abd Soft, non-tender, non-distended    L LE Dressing in place    LABS:    Lab Results   Component Value Date    WBC 5.3 05/02/2023    HGB 11.0 (L) 05/02/2023    HCT 35.1 05/02/2023     05/02/2023    PROTIME 12.7 (H) 04/28/2023    INR 1.2 04/28/2023    APTT 30.2 05/02/2023    K
Voicemail left for on call , Kay Diaz, in regards to barriers for today's discharge. Pt states she has no ride home today and her house is not ready.     Brock Roth RN
(Calculated): 4.1  Weight Adjustment For: No Adjustment                 BMI Categories: Obese Class 3 (BMI 40.0 or greater)    Estimated Daily Nutrient Needs:  Energy Requirements Based On: Formula  Weight Used for Energy Requirements: Current  Energy (kcal/day): 0417-0077 (REE 1925 x 1.3 SF) ((1.3-1.4))  Weight Used for Protein Requirements: Ideal  Protein (g/day): 125-145 (2.0-2.2g/kg IBW) (1.8-2)  Method Used for Fluid Requirements: 1 ml/kcal  Fluid (ml/day): 5989-7546    Nutrition Diagnosis:   Increased nutrient needs related to increase demand for energy/nutrients as evidenced by wounds    Nutrition Interventions:   Food and/or Nutrient Delivery: Start Oral Nutrition Supplement, Continue Current Diet ((high kcal/high pro and eleazar ))  Nutrition Education/Counseling: No recommendation at this time  Coordination of Nutrition Care: Continue to monitor while inpatient       Goals:     Goals: Meet at least 75% of estimated needs, by next RD assessment       Nutrition Monitoring and Evaluation:   Behavioral-Environmental Outcomes: None Identified  Food/Nutrient Intake Outcomes: Food and Nutrient Intake, Supplement Intake  Physical Signs/Symptoms Outcomes: Fluid Status or Edema, Biochemical Data, Weight, Skin, Nutrition Focused Physical Findings, Chewing or Swallowing, GI Status, Nausea or Vomiting, Meal Time Behavior    Discharge Planning:     Too soon to determine     Mariam Falcon RD, LD  Contact: [phone]
APTT 30.8 05/03/2023    K 3.9 05/02/2023    BUN 8 05/02/2023    CREATININE 0.8 05/02/2023     Assesment/Plan  Chronic L LE venous stasis ulcer  Continue daily dressing changes from the wound care center:   LEFT PRETIB : cleanse with normal saline, apply calcium alginate ag, cover with dry dressing and secure . Change daily.   Tubigrip the L LE  Continue to follow up in the wound care center at discharge  NSTEMI: Heart cath negative  PNA: abx per ID - zosyn  No vascular surgery interventions planned this admission    Molly Levin, APRN - CNP
Conjunctivae/corneas clear. Moist mucosa   Neck: Supple. No jugular venous distention. Thyroid not visible, non tender   Respiratory: Normal respiratory effort. Clear to auscultation bilaterally. No stridor/wheezing/rhonchi/crackles   Cardiovascular: Regular heart beats, normal S1-S2. No M/G/R  Abdomen: Non distended, soft, non tender, no visceromegaly, no mass, normal bowel sounds   Musculoskeletal: No clubbing, cyanosis, no bilateral lower extremity edema. Brisk capillary refill. Skin:  No rashes  on visible skin. Left lower extremity is wrapped in clean dressing  Neurologic: awake, alert and oriented x 3. Following commands. No focal neurological deficit. ASSESSMENT and PLAN:    Acute hypoxic respiratory failure likely secondary to right lower lobe PNA:  - Patient presented with sob and a cough. CT PE showed no PE but noted to have probable right lower lobe PNA with a right pleural effusion.   - s/p Vancomycin dc'd, currently on Zosyn to finish 5/6 at 2 AM   Currently on 3 L of oxygen with documented 96% , wean as tolerated   - c/w IV lasix for pleural effusion   - ID following      2. NSTEMI  Troponin elevation:  -  heparin gtt dc'd    - Continue asa and statin along with home BB dose  - Cardiac cath 5/1/2023 neagtive   - TTE LVEF 64+/- 5%. Diastolic function cannot be accurately assessed due to significant mitral   regurgitation. Normal LV segmental wall motion  - Cardiology following        3. Chronic LE venous stasis ulcer:  - Reports she was admitted to nursing home for Antibiotics for LE wound. Wound noted to be seeping and foul smelling. Cultures sent by ED- mixed growth, follow final Cx  - on Zosyn   - Wound care following,  - Blood Cx NGSF  - ID following  - Vascular surgery evaluated, no surgical intervention, conservative treatment       4. HTN:  - Hold anti-hypertensive agents while patients blood pressure is labile.  Will restart as appropriate  -Hydralazine PRN  -Cardiology following
urination  INTEGUMENT: left leg wound   HEMATOLOGIC/LYMPHATIC:  Denies lymph node swelling, gum bleeding or easy bruising. MUSCULOSKELETAL:  chronic non healing left leg wound, pain   NEUROLOGICAL:  weakness . PHYSICAL EXAM:      Vitals:     /74   Pulse 74   Temp 98 °F (36.7 °C) (Oral)   Resp 18   Ht 5' 8\" (1.727 m)   Wt (!) 305 lb (138.3 kg)   SpO2 95%   BMI 46.38 kg/m²     General Appearance:    Awake, alert , no acute distress. Head:    Normocephalic, atraumatic   Eyes:    No pallor, no icterus,   Ears:    No obvious deformity or drainage.    Nose:   No nasal drainage   Throat:   Mucosa moist, no oral thrush   Neck:   Supple, no lymphadenopathy   Back:     no CVA tenderness   Lungs:     Diminished breath sound     Heart:    Regular rate and rhythm, no murmur   Abdomen:     Soft, non-tender, bowel sounds present    Extremities:   ++ edema,left leg full thickness wound    Pulses:   Dorsalis pedis palpable    Skin:   No rash    CBC with Differential:      Lab Results   Component Value Date/Time    WBC 5.3 05/02/2023 04:45 AM    RBC 3.55 05/02/2023 04:45 AM    HGB 11.0 05/02/2023 04:45 AM    HCT 35.1 05/02/2023 04:45 AM     05/02/2023 04:45 AM    MCV 98.9 05/02/2023 04:45 AM    MCH 31.0 05/02/2023 04:45 AM    MCHC 31.3 05/02/2023 04:45 AM    RDW 13.5 05/02/2023 04:45 AM    NRBC 0.0 04/17/2023 06:15 AM    SEGSPCT 93 02/19/2014 09:40 AM    LYMPHOPCT 13.3 04/29/2023 05:08 AM    LYMPHOPCT 25.9 04/17/2023 06:15 AM    MONOPCT 7.6 04/29/2023 05:08 AM    BASOPCT 0.2 04/29/2023 05:08 AM    MONOSABS 0.35 04/29/2023 05:08 AM    LYMPHSABS 0.61 04/29/2023 05:08 AM    EOSABS 0.22 04/29/2023 05:08 AM    BASOSABS 0.01 04/29/2023 05:08 AM       CMP     Lab Results   Component Value Date/Time     05/02/2023 04:45 AM    K 3.9 05/02/2023 04:45 AM     05/02/2023 04:45 AM    CO2 32 05/02/2023 04:45 AM    BUN 8 05/02/2023 04:45 AM    CREATININE 0.8 05/02/2023 04:45 AM    GFRAA > 60 04/17/2023 06:15 AM
IntraVENous 2 times per day    aspirin  81 mg Oral Daily    atorvastatin  40 mg Oral Nightly    [Held by provider] amLODIPine  5 mg Oral Daily    gabapentin  300 mg Oral TID    [Held by provider] hydroCHLOROthiazide  25 mg Oral Daily    metoprolol tartrate  50 mg Oral BID    oxyCODONE-acetaminophen  1 tablet Oral BID    And    oxyCODONE  2.5 mg Oral BID     PRN Meds: diphenhydrAMINE, acetaminophen, ipratropium-albuterol, benzonatate, heparin (porcine) PF, heparin (porcine) PF, sodium chloride flush, sodium chloride, ondansetron **OR** ondansetron, [DISCONTINUED] acetaminophen **OR** acetaminophen, morphine, albuterol    Labs:     Recent Labs     05/02/23 0445 05/04/23 0441   WBC 5.3 5.3   HGB 11.0* 11.2*   HCT 35.1 36.0    172         Recent Labs     05/02/23 0445      K 3.9      CO2 32*   BUN 8   CREATININE 0.8   CALCIUM 8.9         No results for input(s): PROT, ALB, ALKPHOS, ALT, AST, BILITOT, AMYLASE, LIPASE in the last 72 hours. No results for input(s): INR in the last 72 hours. No results for input(s): Murvin Bridges in the last 72 hours. Chronic labs:    Lab Results   Component Value Date    CHOL 103 03/31/2023    TRIG 102 03/31/2023    HDL 33 (L) 03/31/2023    LDLCALC 69 10/05/2022    TSH 0.586 02/19/2014    INR 1.2 04/28/2023    LABA1C 5.6 03/30/2023       Radiology: REVIEWED DAILY    +++++++++++++++++++++++++++++++++++++++++++++++++  Anu King MD   Sound Physician - Hospitalist  [address], [address]  +++++++++++++++++++++++++++++++++++++++++++++++++  NOTE: This report was transcribed using voice recognition software. Every effort was made to ensure accuracy; however, inadvertent computerized transcription errors may be present.
balance and activity tolerance; impacting ADLs and functional activity) and short functional ambulation tasks with w/w (cuing on posture, w/w management and safety) - skilled cuing on sequencing, hand placement, posture, body mechanics, energy conservation techniques and safety. Therapist facilitated self-care retraining: UB/LB self-care tasks (robe, socks, brief), toileting hygiene task and standing grooming tasks while educating pt on modified techniques, posture, safety and energy conservation techniques. Skilled monitoring of HR, O2 sats and pts response to treatment. Rehab Potential: Good  for established goals     Patient / Family Goal: regain independence       Patient and/or family were instructed on functional diagnosis, prognosis/goals and OT plan of care. Demonstrated fair understanding. Eval Complexity: Low    Time In: 955  Time Out: 1020  Total Treatment Time: 10 minute    Min Units   OT Eval Low 97165  x  1   OT Eval Medium 85108      OT Eval High 58268      OT Re-Eval [id number]       Therapeutic Ex 53748       Therapeutic Activities 35730  2     ADL/Self Care 00472  8  1   Orthotic Management 36811       Manual 64337     Neuro Re-Ed 42887       Non-Billable Time          Evaluation Time additionally includes thorough review of current medical information, gathering information on past medical history/social history and prior level of function, interpretation of standardized testing/informal observation of tasks, assessment of data and development of plan of care and goals.           Deneen Loera OTR/L #2100
Gram negative rods   Mixed Gram positive organisms     Gram Stain Result Rare Polymorphonuclear leukocytes   Rare Epithelial cells   Moderate Gram positive cocci   Moderate Gram negative rods      Growth present, evaluating for:   Mixed Gram negative rods   Mixed Gram positive organisms   Mixed vinny isolated. Further workup and sensitivity testing   is not routinely indicated and will not be performed. Mixed vinny isolated includes:   Corynebacteria   Mixed Gram negative rods   Oxidase positive Gram negative rods    Abnormal       Gram Stain Result Rare Polymorphonuclear leukocytes   Rare Epithelial cells   Moderate Gram positive cocci   Moderate Gram negative rods     Organism Staphylococcus aureus Abnormal     WOUND/ABSCESS --    Moderate growth   Sensitivity to follow    Narrative:       IMPRESSION:     Right lung infiltrates / pneumonia . Finished Zosyn on 05/05  Left leg wound infection - cx due to colonization   Chronic venous insufficiency   Morbid obesity       RECOMMENDATIONS:       Monitor clinically off antibiotics.   Wound care
PCR Not Detected    Human Metapneumovirus by PCR Not Detected    Human Rhinovirus/Enterovirus by PCR Not Detected    Influenza A by PCR Not Detected    Influenza B by PCR Not Detected    Mycoplasma pneumoniae by PCR Not Detected    Parainfluenza Virus 1 by PCR Not Detected    Parainfluenza Virus 2 by PCR Not Detected    Parainfluenza Virus 3 by PCR Not Detected    Parainfluenza Virus 4 by PCR Not Detected    Respiratory Syncytial Virus by PCR Not Detected      Wound cx -    Specimen: Ulcer Updated: 04/29/23 1102    WOUND/ABSCESS --    Growth present, evaluating for:   Mixed Gram negative rods   Mixed Gram positive organisms     Gram Stain Result Rare Polymorphonuclear leukocytes   Rare Epithelial cells   Moderate Gram positive cocci   Moderate Gram negative rods      Growth present, evaluating for:   Mixed Gram negative rods   Mixed Gram positive organisms   Mixed vinny isolated. Further workup and sensitivity testing   is not routinely indicated and will not be performed.    Mixed vinny isolated includes:   Corynebacteria   Mixed Gram negative rods   Oxidase positive Gram negative rods    Abnormal       Gram Stain Result Rare Polymorphonuclear leukocytes   Rare Epithelial cells   Moderate Gram positive cocci   Moderate Gram negative rods     Organism Staphylococcus aureus Abnormal     WOUND/ABSCESS --    Moderate growth   Sensitivity to follow    Narrative:       IMPRESSION:     Right lung infiltrates / pneumonia    Left leg wound infection - cx due to colonization   Chronic venous insufficiency   Morbid obesity       RECOMMENDATIONS:       IV zosyn  3/.375 grams q 8 hrs ( day 5  of 7 )   Wound care

## 2023-05-06 NOTE — DISCHARGE SUMMARY
administration of intravenous contrast.  Multiplanar reformatted images are provided for review. MIP images are provided for review. Automated exposure control, iterative reconstruction, and/or weight based adjustment of the mA/kV was utilized to reduce the radiation dose to as low as reasonably achievable. 3D MIP, MPR images were obtained from the axial data. COMPARISON: None. HISTORY: ORDERING SYSTEM PROVIDED HISTORY: assess for pe. hypoxic, sob, elev dimer TECHNOLOGIST PROVIDED HISTORY: Reason for exam:->assess for pe. hypoxic, sob, elev dimer Decision Support Exception - unselect if not a suspected or confirmed emergency medical condition->Emergency Medical Condition (MA) What reading provider will be dictating this exam?->CRC FINDINGS: Pulmonary Arteries: Pulmonary arteries are adequately opacified for evaluation. No evidence of intraluminal filling defect to suggest pulmonary embolism. Main pulmonary artery is mildly dilated at up to 3.7 cm. Right and left pulmonary arteries appear prominent. Consider pulmonary artery hypertension. . Mediastinum: No evidence of mediastinal lymphadenopathy. The heart and pericardium demonstrate no acute abnormality. There is no acute abnormality of the thoracic aorta. Descending thoracic aorta is tortuous. Lungs/pleura: There is an area of alveolar consolidation in the posterior costophrenic angle on the right. There is a small associated right pleural effusion. No endobronchial lesions identified. Tiny perifissural nodules on the right involving the major fissure likely of no clinical significance. Upper Abdomen: Liver and spleen appear normal in size. There is unusual infiltration of the fat surrounding the hepatic flexure of the colon and inferior right hepatic margin. No focal mass. The area is not completely visualized. No free air in the upper abdomen. Normal adrenal glands. Pancreas appears atrophic. Soft Tissues/Bones:  There is accentuation of normal

## 2023-05-06 NOTE — CONSULTS
CARDIOLOGY CONSULTATION    Patient Name:  Ozzy Montgomery    :  1945    Reason for Consultation:   Re-assessment cardiac status    History of Present Illness:   Ozzy Montgomery presents to [facility] following further complications with chronic infection of her left lower extremity especially located by her calf with MRSA staph aureus. She denied any chest discomfort but did feel occasional palpitations. as well. She was discharged during the last week of March only to return on  with similar complaints. upon arrival to the emergency room her oxygen saturation was approximately 77% only to remarkably improve following 3 L O2 nasal cannula. .  Her previous cardiac history was suggestive of possible remote inferior wall MI which was not well documented as well as stage I diastolic dysfunction. Likewise she has a long-standing history of hypertension as well. And gastroesophageal reflux disease. Following admission she was found to have elevated troponins although without an acute rise and felt to represent a possible non-STEMI for which she underwent subsequent cardiac catheterization which did not demonstrate any evidence of severe ischemia in what appeared to be near normal coronary arteriograms. She is now readmitted to the hospital for further treatment of her left lower extremity venous stasis wound.     Past Medical History:   has a past medical history of Bursitis, CAD (coronary artery disease), Cellulitis of leg, left, COPD (chronic obstructive pulmonary disease) ([address] Dermatophytosis, Emphysema, GERD (gastroesophageal reflux disease), Hiatal hernia, Hip pain, Hx of blood clots, Hyperlipidemia, Hypertension, Lymphedema of both lower extremities, Venous insufficiency of both lower extremities, Venous stasis ulcer of left calf with fat layer exposed without varicose veins (HCC), Venous stasis ulcer of left calf with fat layer exposed without varicose veins ([address] and

## 2023-05-06 NOTE — CARE COORDINATION
5/4:  Update CM Note:  Pt presented to the ER for SOB from home. Pt is on 3L/NC at 100%, Iv Lasix & Iv Zosyn til 5/5 per ID's note. 2 D echo needed. CM met with pt bedside to discuss CM role & dc planning. PTA pt was independent with a cane PRN. Pt has no other DME & will need 02 testing. Pt was active with [facility] [address] [place] [address], PT & OT. Pt will need a resumption care order & fax order & clinicals to [phone]. Pt's dc plan was to to return home with her sister & family should be able to transport. Pt is now stating she would be open to SNF & choice [facility]. PT 13/24 OT 15/24. CM sent referral to [facility] & they don't have a bed available. Pt choice [facility], [address] [facility] sent referral to [address] [place] [address]. CM is waiting for a reply. Will need to start pre-cert today. HENS started, TANJA, Ambulette form started. Sw/CM will continue to follow for dc planning. Electronically signed by Olimpia Perez RN on 5/4/2023 at 10:35 AM    The Plan for Transition of Care is related to the following treatment goals: SNF    The Patient and/or patient representative  was provided with a choice of provider and agrees   with the discharge plan. [x] Yes [] No    Freedom of choice list was provided with basic dialogue that supports the patient's individualized plan of care/goals, treatment preferences and shares the quality data associated with the providers.  [x] Yes [] No
5/5:  Update CM Note:  Pt presented to the ER for SOB from home. Pt is on 3L/NC at 100%, Iv Lasix & Iv Zosyn til 5/5 per ID's note. CM met with pt bedside to discuss CM role & dc planning. PTA pt was independent with a cane PRN. Pt has no other DME & will need 02 testing. Pt was active with [facility] [address] [place] [address], PT & OT. Pt will need a resumption care order & fax order & clinicals to [phone] - order placed. Pt's dc plan was to to return home with her sister & family should be able to transport. PT 13/24 OT 15/24. CM met pt bedside & now she is saying she may need to go home 1st & then see about admitting into a SNF. CM explained that pt that it is harder to admit from the community to a SNF but it can be done. CM will ask [facility] to see if they can add a SW or to provide assistance at home. If pt needs DME, she has no preference & declined a list.  Cm placed call to [facility] to set up 02 for tomorrow. She stated have the SW/CM on call advise her over the weekend when dc. DME order placed. Pt's sister address is [address] [address]. She may need help with transportation. [facility] [address] at [phone]. Sw/CM will continue to follow for dc planning. Electronically signed by Yandy Boyle RN on 5/5/2023 at 11:56 AM    The Plan for Transition of Care is related to the following treatment goals: DMe/HHC    The Patient and/or patient representative  was provided with a choice of provider and agrees   with the discharge plan. [x] Yes [] No    Freedom of choice list was provided with basic dialogue that supports the patient's individualized plan of care/goals, treatment preferences and shares the quality data associated with the providers.  [x] Yes [] No
5/6, SW spoke with patient on discharging today. Patient will be going to her sister's home on [address] in [place] [address] and has keys on her to the home. Patient to contact her sister on own. Those informed of her leaving are [facility] for patient's 02. Patient has 02 in room but will need to call [facility] herself so that  has heard from patient. Hung Rose RN was called and informed that patient will be discharging today. [facility] will resume tomorrow and [facility] will contact patient. SARAH order and clinicals to be faxed to [facility] was contacted on patient and will pick patient up within a 4 hour window. They are aware of patient having 02 with her. Reservation # is [id number]. Floor RN was informed of the above information. SW to follow.       Linda Jimenez [facility] SURGICAL [facility] Case Management  [phone]
[phone]      Notes:    Factors facilitating achievement of predicted outcomes: Family support    Barriers to discharge: Iv heparin gtt & iv atbs    Additional Case Management Notes: The Plan for Transition of Care is related to the following treatment goals of Transaminitis [R74.01]  Community acquired bacterial pneumonia [J15.9]  NSTEMI (non-ST elevated myocardial infarction) ([facility] [I21.4]  DAYTON (acute kidney injury) ([facility] [N17.9]  Acute respiratory failure with hypoxia ([facility] [J96.01]  Pneumonia of right lower lobe due to infectious organism [J18.9]  Skin ulcer of left pretibial region, unspecified ulcer stage ([facility] [F84.631]    IF APPLICABLE: The Patient and/or patient representative Lakisha Trujillo and her family were provided with a choice of provider and agrees with the discharge plan. Freedom of choice list with basic dialogue that supports the patient's individualized plan of care/goals and shares the quality data associated with the providers was provided to:     Patient Representative Name:       The Patient and/or Patient Representative Agree with the Discharge Plan?       Berenice Berry RN  Case Management Department  Ph: [phone]

## 2023-05-16 NOTE — DISCHARGE INSTRUCTIONS
Visit Discharge/Physician Orders     Discharge condition: Stable     Assessment of pain at discharge: mild     Anesthetic used: lido 4%     Discharge to: Home     Left via:Private automobile     Accompanied by: self     ECF/HHA: [facility]      Dressing Orders:  LEFT PRETIB : cleanse with normal saline, apply calcium alginate AG, cover with dry dressing and secure . Change daily. Apply spandagrip. On in am and off in pm. Elevate legs as much as possible above level of the heart. Treatment Orders:Eat a diet high in protein and vitamin C. Take a multiple vitamin daily unless contraindicated. Elevate as much as possible        [address] [place] [address],[address] followup visit: 1 week____________________________  (Please note your next appointment above and if you are unable to keep, kindly give a 24 hour notice. Thank you.)     Physician signature:__________________________      If you experience any of the following, please call the [facility] during business hours:     * Increase in Pain  * Temperature over 101  * Increase in drainage from your wound  * Drainage with a foul odor  * Bleeding  * Increase in swelling  * Need for compression bandage changes due to slippage, breakthrough drainage. If you need medical attention outside of the business hours of the [facility] please contact your PCP or go to the nearest emergency room.

## 2023-05-17 ENCOUNTER — HOSPITAL ENCOUNTER (OUTPATIENT)
Dept: WOUND CARE | Age: 78
Discharge: HOME | End: 2023-05-17
Payer: MEDICARE

## 2023-05-17 VITALS
HEART RATE: 76 BPM | BODY MASS INDEX: 44.41 KG/M2 | SYSTOLIC BLOOD PRESSURE: 136 MMHG | TEMPERATURE: 97.8 F | DIASTOLIC BLOOD PRESSURE: 62 MMHG | HEIGHT: 68 IN | RESPIRATION RATE: 18 BRPM | WEIGHT: 293 LBS

## 2023-05-17 DIAGNOSIS — I87.2 VENOUS STASIS ULCER OF LEFT CALF WITH FAT LAYER EXPOSED WITHOUT VARICOSE VEINS (HCC): Primary | ICD-10-CM

## 2023-05-17 DIAGNOSIS — L97.222 VENOUS STASIS ULCER OF LEFT CALF WITH FAT LAYER EXPOSED WITHOUT VARICOSE VEINS (HCC): Primary | ICD-10-CM

## 2023-05-17 DIAGNOSIS — I89.0 LYMPHEDEMA: ICD-10-CM

## 2023-05-17 PROCEDURE — 11045 DBRDMT SUBQ TISS EACH ADDL: CPT

## 2023-05-17 PROCEDURE — 11042 DBRDMT SUBQ TIS 1ST 20SQCM/<: CPT

## 2023-05-17 RX ORDER — MORPHINE SULFATE 15 MG/1
15 TABLET, FILM COATED, EXTENDED RELEASE ORAL 2 TIMES DAILY
Qty: 60 TABLET | Refills: 0 | Status: SHIPPED | OUTPATIENT
Start: 2023-05-22 | End: 2023-06-21

## 2023-05-17 RX ORDER — BACITRACIN ZINC AND POLYMYXIN B SULFATE 500; 1000 [USP'U]/G; [USP'U]/G
OINTMENT TOPICAL ONCE
OUTPATIENT
Start: 2023-05-17 | End: 2023-05-17

## 2023-05-17 RX ORDER — LIDOCAINE HYDROCHLORIDE 20 MG/ML
JELLY TOPICAL ONCE
OUTPATIENT
Start: 2023-05-17 | End: 2023-05-17

## 2023-05-17 RX ORDER — GINSENG 100 MG
CAPSULE ORAL ONCE
OUTPATIENT
Start: 2023-05-17 | End: 2023-05-17

## 2023-05-17 RX ORDER — BACITRACIN, NEOMYCIN, POLYMYXIN B 400; 3.5; 5 [USP'U]/G; MG/G; [USP'U]/G
OINTMENT TOPICAL ONCE
OUTPATIENT
Start: 2023-05-17 | End: 2023-05-17

## 2023-05-17 RX ORDER — CLOBETASOL PROPIONATE 0.5 MG/G
OINTMENT TOPICAL ONCE
OUTPATIENT
Start: 2023-05-17 | End: 2023-05-17

## 2023-05-17 RX ORDER — LIDOCAINE 40 MG/G
CREAM TOPICAL ONCE
OUTPATIENT
Start: 2023-05-17 | End: 2023-05-17

## 2023-05-17 RX ORDER — LIDOCAINE HYDROCHLORIDE 40 MG/ML
SOLUTION TOPICAL ONCE
OUTPATIENT
Start: 2023-05-17 | End: 2023-05-17

## 2023-05-17 RX ORDER — BETAMETHASONE DIPROPIONATE 0.05 %
OINTMENT (GRAM) TOPICAL ONCE
OUTPATIENT
Start: 2023-05-17 | End: 2023-05-17

## 2023-05-17 RX ORDER — GENTAMICIN SULFATE 1 MG/G
OINTMENT TOPICAL ONCE
OUTPATIENT
Start: 2023-05-17 | End: 2023-05-17

## 2023-05-17 RX ORDER — LIDOCAINE HYDROCHLORIDE 40 MG/ML
SOLUTION TOPICAL ONCE
Status: COMPLETED | OUTPATIENT
Start: 2023-05-17 | End: 2023-05-17

## 2023-05-17 RX ORDER — OXYCODONE AND ACETAMINOPHEN 7.5; 325 MG/1; MG/1
1 TABLET ORAL 2 TIMES DAILY
Qty: 60 TABLET | Refills: 0 | Status: SHIPPED | OUTPATIENT
Start: 2023-05-22 | End: 2023-06-21

## 2023-05-17 RX ORDER — LIDOCAINE 50 MG/G
OINTMENT TOPICAL ONCE
OUTPATIENT
Start: 2023-05-17 | End: 2023-05-17

## 2023-05-17 RX ADMIN — LIDOCAINE HYDROCHLORIDE: 40 SOLUTION TOPICAL at 15:20

## 2023-05-17 ASSESSMENT — PAIN DESCRIPTION - DESCRIPTORS: DESCRIPTORS: BURNING

## 2023-05-17 ASSESSMENT — PAIN DESCRIPTION - ORIENTATION: ORIENTATION: LEFT

## 2023-05-17 ASSESSMENT — PAIN SCALES - GENERAL: PAINLEVEL_OUTOF10: 6

## 2023-05-17 ASSESSMENT — PAIN DESCRIPTION - LOCATION: LOCATION: LEG

## 2023-05-17 NOTE — PROGRESS NOTES
Wound Healing Center Followup Visit Note    Referring Physician : Trevon Burris MD  [id number] [facility] RECORD NUMBER:  [id number]  AGE: 70 y.o. GENDER: female  : 1945  EPISODE DATE:  2023    Subjective:     Chief Complaint   Patient presents with    Wound Check     Left lower leg       HISTORY of PRESENT ILLNESS HPI   Leo Hanna is a 70 y.o. female who presents today in regards to follow up evaluation and treatment of wound/ulcer. That patient's past medical, family and social hx were reviewed and changes were made if present. History of Wound Context:  Patient has had left calf wound since ~ 2021. She has been putting a dressing on it. The wound has not been improving. She has a hx significant for venous stasis ulcerations of bilateral LE. She first was seen by myself in regards to these issues 2015. She eventually healed these wounds 2016. I last saw her 2021 at which time I recommended lymphedema therapy. She states she went and said it caused her to much pain and stopped going. She has chronic issues with bilateral calf pain, swelling and edema. She admits to not wearing her stockings because of the pain. She has used knee high 20-30 mm hg stockings in the past.       She is still seeing pain management and is down to percocet 7/5/325 mg daily.        21  [facility]  Double tubigrip  Culture done  Emphasized importance of getting in to more significant compression in the future  12/15/21  Culture reviewed - light growth, no tx  Wound slightly improved  Still significant drainage  Plan on compression wrap next week  21  Stable wound  Drainage slightly better  Pt would like to wait till next week because of holidays to start wrap  22  Wound larger  Profore  22  Wound appearance better  Refusing wrap - it rolled down last week and she cut off after 3 days  22  Wound appearance better  Still refusing wrap

## 2023-05-18 ENCOUNTER — TELEPHONE (OUTPATIENT)
Dept: VASCULAR SURGERY | Age: 78
End: 2023-05-18

## 2023-05-18 NOTE — TELEPHONE ENCOUNTER
Almas Whyte called to notify office that patient cancelled her PT visit with week due to not feeling well. Department of Internal Medicine  Gastroenterology Consult Note  Brittany Chino. Zhane CHANDLER      Reason for Consult:  cirrhosis    Primary Care Physician:  Kristen Paul MD    History Obtained From:  patient    HISTORY OF PRESENT ILLNESS:              The patient is a 70 y.o.  male admitted with increased ascites. He has cirrhosis that was diagnosed in 2019 and followed by Dr Deana Rubio but he would like another opinion. The cirrhosis was felt to be on the basis of alcohol abuse as he was a heavy drinker and continued to do so until 2 weeks ago. His initial evaluation was in 2019 and he did have an elevated ASMA and REGLA. He has not had a liver biopsy, recent EGD for varices, regular [address] screening nor vaccination for Hep A or B. I suspect he has not been compliant with follow up with Dr Deana Rubio. Since admission he had a 10 liter paracentesis that showed a SAAG of 1.6 consistent with portal hypertension    Past Medical History:        Diagnosis Date    GERD (gastroesophageal reflux disease) 2000    Hypertension        Past Surgical History:        Procedure Laterality Date    CHOLECYSTECTOMY      VASECTOMY         Medications Prior to Admission:    Prior to Admission medications    Medication Sig Start Date End Date Taking?  Authorizing Provider   zinc gluconate 50 MG tablet Take 50 mg by mouth 2 times daily   Yes Historical Provider, MD   b complex vitamins capsule Take 1 capsule by mouth daily   Yes Historical Provider, MD   furosemide (LASIX) 40 MG tablet TAKE 1 tab am and pm  Patient taking differently: Take 40 mg by mouth 2 times daily TAKE 1 tab am and pm 8/30/21 4/15/22 Yes Venita Dillon MD   spironolactone (ALDACTONE) 50 MG tablet Take 2 tablets by mouth daily 8/30/21 2/26/22 Yes Venita Dillon MD   albuterol sulfate HFA (VENTOLIN HFA) 108 (90 Base) MCG/ACT inhaler Inhale 2 puffs into the lungs 4 times daily as needed for Wheezing 8/30/21  Yes Venita Dillon MD   Testosterone Cypionate 200 MG/ML KIT Inject into the muscle every 14 days. Yes Historical Provider, MD   tadalafil (CIALIS) 20 MG tablet Take 20 mg by mouth every 3 days    Yes Historical Provider, MD   celecoxib (CELEBREX) 200 MG capsule Take 1 capsule by mouth daily 8/9/21  Yes Lucinda Hager MD   DULoxetine (CYMBALTA) 60 MG extended release capsule TAKE 1 CAPSULE BY MOUTH ONCE DAILY 5/7/21 12/6/21 Yes Lucinda Hager MD   levothyroxine (SYNTHROID) 25 MCG tablet Take 1 tablet by mouth Daily 5/7/21 9/13/21 Yes Lucinda Hager MD   nadolol (CORGARD) 40 MG tablet Take 1 tablet by mouth daily 5/7/21 9/13/21 Yes Lucinda Hager MD   vitamin E 400 UNIT capsule Take 800 Units by mouth daily   Yes Historical Provider, MD   magnesium oxide (MAG-OX) 400 MG tablet Take 200 mg by mouth 2 times daily OTC 10/15/19  Yes Historical Provider, MD   Multiple Vitamin (MULTI VITAMIN MENS PO) Take 1 tablet by mouth daily   Yes Historical Provider, MD   ESOMEPRAZOLE MAGNESIUM PO Take 20 mg by mouth daily OTC   Yes Historical Provider, MD   [place]-3 1000 MG CAPS Take 1 capsule by mouth daily   Yes Historical Provider, MD   Lactulose (CONSTULOSE PO) Take by mouth    Historical Provider, MD       Allergies: Allergies   Allergen Reactions    Lisinopril Swelling     Tongue swelling      Zetia [Ezetimibe] Swelling     Facial swelling    Atorvastatin     Codeine Other (See Comments)     Blood pressure drops    Hydrochlorothiazide     Hydroxyzine      Fatigue and depressed    Lexapro [Escitalopram Oxalate]      Increased depression    Pravastatin    .     Social History:    TOBACCO:  No  ETOH:  Yes    Family History:   Family History   Problem Relation Age of Onset    Hypertension Brother     Diabetes Other        REVIEW OF SYSTEMS: (POSITIVES WILL BE UNDERLINED)  CONSTITUTIONAL:    Weight change,fatigue, fever, chills  EYES:  Diplopia, change in vision  EARS:  hearing loss, tinnitus, vertigo  NOSE:   epistaxis  MOUTH/THROAT: immunity  4) repeat REGLA, ASMA and gamma globulin to look for possible autoimmune hepatitis  5) need AFP and liver imaging every 6 months [facility] (already had CT so just need AFP now)  6) OK with me to discharge and follow up with me as outpatient but would suggest having dietician see before discharge if possible  7) I have ordered weekly BMP x 4- have him go to [facility] or the [facility] for weekly labs x 4  starting Monday        Travis Piña M.D

## 2023-05-22 NOTE — DISCHARGE INSTRUCTIONS
Visit Discharge/Physician Orders     Discharge condition: Stable     Assessment of pain at discharge: mild     Anesthetic used: lido 4%     Discharge to: Home     Left via:Private automobile     Accompanied by: self     ECF/HHA: [facility]      Dressing Orders:  LEFT PRETIB : cleanse with normal saline, apply calcium alginate AG, cover with dry dressing and secure . Change daily. Apply spandagrip. On in am and off in pm. Elevate legs as much as possible above level of the heart. Treatment Orders:Eat a diet high in protein and vitamin C. Take a multiple vitamin daily unless contraindicated. Elevate as much as possible        [address] [place] [address],[address] followup visit: 1 week____________________________  (Please note your next appointment above and if you are unable to keep, kindly give a 24 hour notice. Thank you.)     Physician signature:__________________________      If you experience any of the following, please call the [employer] during business hours:     * Increase in Pain  * Temperature over 101  * Increase in drainage from your wound  * Drainage with a foul odor  * Bleeding  * Increase in swelling  * Need for compression bandage changes due to slippage, breakthrough drainage. If you need medical attention outside of the business hours of the [employer] please contact your PCP or go to the nearest emergency room.

## 2023-05-24 ENCOUNTER — HOSPITAL ENCOUNTER (OUTPATIENT)
Dept: WOUND CARE | Age: 78
Discharge: HOME | End: 2023-05-24
Payer: MEDICARE

## 2023-05-24 VITALS
RESPIRATION RATE: 18 BRPM | DIASTOLIC BLOOD PRESSURE: 76 MMHG | HEART RATE: 78 BPM | SYSTOLIC BLOOD PRESSURE: 150 MMHG | TEMPERATURE: 97.1 F

## 2023-05-24 DIAGNOSIS — L97.222 VENOUS STASIS ULCER OF LEFT CALF WITH FAT LAYER EXPOSED WITHOUT VARICOSE VEINS (HCC): Primary | Chronic | ICD-10-CM

## 2023-05-24 DIAGNOSIS — I87.2 VENOUS STASIS ULCER OF LEFT CALF WITH FAT LAYER EXPOSED WITHOUT VARICOSE VEINS (HCC): Primary | Chronic | ICD-10-CM

## 2023-05-24 PROCEDURE — 11042 DBRDMT SUBQ TIS 1ST 20SQCM/<: CPT

## 2023-05-24 PROCEDURE — 11042 DBRDMT SUBQ TIS 1ST 20SQCM/<: CPT | Performed by: SURGERY

## 2023-05-24 RX ORDER — LIDOCAINE HYDROCHLORIDE 40 MG/ML
SOLUTION TOPICAL ONCE
Status: COMPLETED | OUTPATIENT
Start: 2023-05-24 | End: 2023-05-24

## 2023-05-24 RX ORDER — BETAMETHASONE DIPROPIONATE 0.05 %
OINTMENT (GRAM) TOPICAL ONCE
OUTPATIENT
Start: 2023-05-24 | End: 2023-05-24

## 2023-05-24 RX ORDER — LIDOCAINE 50 MG/G
OINTMENT TOPICAL ONCE
OUTPATIENT
Start: 2023-05-24 | End: 2023-05-24

## 2023-05-24 RX ORDER — CLOBETASOL PROPIONATE 0.5 MG/G
OINTMENT TOPICAL ONCE
OUTPATIENT
Start: 2023-05-24 | End: 2023-05-24

## 2023-05-24 RX ORDER — BACITRACIN ZINC AND POLYMYXIN B SULFATE 500; 1000 [USP'U]/G; [USP'U]/G
OINTMENT TOPICAL ONCE
OUTPATIENT
Start: 2023-05-24 | End: 2023-05-24

## 2023-05-24 RX ORDER — LIDOCAINE 40 MG/G
CREAM TOPICAL ONCE
OUTPATIENT
Start: 2023-05-24 | End: 2023-05-24

## 2023-05-24 RX ORDER — BACITRACIN, NEOMYCIN, POLYMYXIN B 400; 3.5; 5 [USP'U]/G; MG/G; [USP'U]/G
OINTMENT TOPICAL ONCE
OUTPATIENT
Start: 2023-05-24 | End: 2023-05-24

## 2023-05-24 RX ORDER — GINSENG 100 MG
CAPSULE ORAL ONCE
OUTPATIENT
Start: 2023-05-24 | End: 2023-05-24

## 2023-05-24 RX ORDER — LIDOCAINE HYDROCHLORIDE 20 MG/ML
JELLY TOPICAL ONCE
OUTPATIENT
Start: 2023-05-24 | End: 2023-05-24

## 2023-05-24 RX ORDER — LIDOCAINE HYDROCHLORIDE 40 MG/ML
SOLUTION TOPICAL ONCE
OUTPATIENT
Start: 2023-05-24 | End: 2023-05-24

## 2023-05-24 RX ORDER — GENTAMICIN SULFATE 1 MG/G
OINTMENT TOPICAL ONCE
OUTPATIENT
Start: 2023-05-24 | End: 2023-05-24

## 2023-05-24 RX ADMIN — LIDOCAINE HYDROCHLORIDE 5 ML: 40 SOLUTION TOPICAL at 15:41

## 2023-05-24 ASSESSMENT — PAIN DESCRIPTION - LOCATION: LOCATION: LEG

## 2023-05-24 ASSESSMENT — PAIN DESCRIPTION - DESCRIPTORS: DESCRIPTORS: BURNING

## 2023-05-24 ASSESSMENT — PAIN DESCRIPTION - ORIENTATION: ORIENTATION: LEFT

## 2023-05-24 ASSESSMENT — PAIN SCALES - GENERAL: PAINLEVEL_OUTOF10: 7

## 2023-05-24 NOTE — PLAN OF CARE
Problem: Pain  Goal: Verbalizes/displays adequate comfort level or baseline comfort level  5/24/2023 1544 by Jean Negrete RN  Outcome: Not Progressing  5/24/2023 1544 by Jean Negrete RN  Outcome: Not Progressing    Problem: Wound:  Goal: Will show signs of wound healing; wound closure and no evidence of infection  Description: Will show signs of wound healing; wound closure and no evidence of infection  Outcome: Progressing     Problem: Venous:  Goal: Signs of wound healing will improve  Description: Signs of wound healing will improve  Outcome: Progressing     Problem: Cognitive:  Goal: Knowledge of wound care  Description: Knowledge of wound care  Outcome: Adequate for Discharge  Goal: Understands risk factors for wounds  Description: Understands risk factors for wounds  Outcome: Adequate for Discharge

## 2023-05-24 NOTE — PROGRESS NOTES
Wound Healing Center Followup Visit Note    Referring Physician : Angel Johnson MD  [address] [facility] RECORD NUMBER:  [id number]  AGE: 70 y.o. GENDER: female  : 1945  EPISODE DATE:  2023    Subjective:     Chief Complaint   Patient presents with    Wound Check     Leg left      HISTORY of PRESENT ILLNESS HPI   Cierra Sexton is a 70 y.o. female who presents today in regards to follow up evaluation and treatment of wound/ulcer. That patient's past medical, family and social hx were reviewed and changes were made if present. History of Wound Context:  Patient has had left calf wound since ~ 2021. She has been putting a dressing on it. The wound has not been improving. She has a hx significant for venous stasis ulcerations of bilateral LE. She first was seen by myself in regards to these issues 2015. She eventually healed these wounds 2016. I last saw her 2021 at which time I recommended lymphedema therapy. She states she went and said it caused her to much pain and stopped going. She has chronic issues with bilateral calf pain, swelling and edema. She admits to not wearing her stockings because of the pain. She has used knee high 20-30 mm hg stockings in the past.       She is still seeing pain management and is down to percocet 7/5/325 mg daily.        21  [facility]  Double tubigrip  Culture done  Emphasized importance of getting in to more significant compression in the future  12/15/21  Culture reviewed - light growth, no tx  Wound slightly improved  Still significant drainage  Plan on compression wrap next week  21  Stable wound  Drainage slightly better  Pt would like to wait till next week because of holidays to start wrap  22  Wound larger  Profore  22  Wound appearance better  Refusing wrap - it rolled down last week and she cut off after 3 days  22  Wound appearance better  Still refusing wrap   3/2/22  periwound

## 2023-05-31 ENCOUNTER — HOSPITAL ENCOUNTER (OUTPATIENT)
Dept: WOUND CARE | Age: 78
Discharge: HOME | End: 2023-05-31

## 2023-06-06 NOTE — DISCHARGE INSTRUCTIONS
Visit Discharge/Physician Orders     Discharge condition: Stable     Assessment of pain at discharge: mild     Anesthetic used: lido 4%     Discharge to: Home     Left via:Private automobile     Accompanied by: self     ECF/HHA: [facility]      Dressing Orders:  LEFT PRETIB : cleanse with normal saline, apply calcium alginate AG, cover with dry dressing and secure . Change daily. Apply spandagrip. On in am and off in pm. Elevate legs as much as possible above level of the heart. Treatment Orders:Eat a diet high in protein and vitamin C. Take a multiple vitamin daily unless contraindicated. Elevate as much as possible        [address] [place] [address],[address] followup visit: 1 week____________________________  (Please note your next appointment above and if you are unable to keep, kindly give a 24 hour notice. Thank you.)     Physician signature:__________________________      If you experience any of the following, please call the [facility] during business hours:     * Increase in Pain  * Temperature over 101  * Increase in drainage from your wound  * Drainage with a foul odor  * Bleeding  * Increase in swelling  * Need for compression bandage changes due to slippage, breakthrough drainage. If you need medical attention outside of the business hours of the [employer] please contact your PCP or go to the nearest emergency room.

## 2023-06-07 ENCOUNTER — HOSPITAL ENCOUNTER (OUTPATIENT)
Dept: WOUND CARE | Age: 78
Discharge: HOME | End: 2023-06-07
Payer: MEDICARE

## 2023-06-07 VITALS
RESPIRATION RATE: 18 BRPM | SYSTOLIC BLOOD PRESSURE: 146 MMHG | DIASTOLIC BLOOD PRESSURE: 77 MMHG | TEMPERATURE: 97 F | HEART RATE: 105 BPM

## 2023-06-07 DIAGNOSIS — I87.2 VENOUS STASIS ULCER OF LEFT CALF WITH FAT LAYER EXPOSED WITHOUT VARICOSE VEINS (HCC): Primary | ICD-10-CM

## 2023-06-07 DIAGNOSIS — L97.222 VENOUS STASIS ULCER OF LEFT CALF WITH FAT LAYER EXPOSED WITHOUT VARICOSE VEINS (HCC): Primary | ICD-10-CM

## 2023-06-07 PROCEDURE — 11042 DBRDMT SUBQ TIS 1ST 20SQCM/<: CPT

## 2023-06-07 PROCEDURE — 11045 DBRDMT SUBQ TISS EACH ADDL: CPT

## 2023-06-07 RX ORDER — LIDOCAINE HYDROCHLORIDE 40 MG/ML
SOLUTION TOPICAL ONCE
OUTPATIENT
Start: 2023-06-07 | End: 2023-06-07

## 2023-06-07 RX ORDER — HYDROCHLOROTHIAZIDE 25 MG/1
25 TABLET ORAL DAILY
COMMUNITY

## 2023-06-07 RX ORDER — GINSENG 100 MG
CAPSULE ORAL ONCE
OUTPATIENT
Start: 2023-06-07 | End: 2023-06-07

## 2023-06-07 RX ORDER — LIDOCAINE HYDROCHLORIDE 20 MG/ML
JELLY TOPICAL ONCE
OUTPATIENT
Start: 2023-06-07 | End: 2023-06-07

## 2023-06-07 RX ORDER — BETAMETHASONE DIPROPIONATE 0.05 %
OINTMENT (GRAM) TOPICAL ONCE
OUTPATIENT
Start: 2023-06-07 | End: 2023-06-07

## 2023-06-07 RX ORDER — GENTAMICIN SULFATE 1 MG/G
OINTMENT TOPICAL ONCE
OUTPATIENT
Start: 2023-06-07 | End: 2023-06-07

## 2023-06-07 RX ORDER — CLOBETASOL PROPIONATE 0.5 MG/G
OINTMENT TOPICAL ONCE
OUTPATIENT
Start: 2023-06-07 | End: 2023-06-07

## 2023-06-07 RX ORDER — LIDOCAINE 50 MG/G
OINTMENT TOPICAL ONCE
OUTPATIENT
Start: 2023-06-07 | End: 2023-06-07

## 2023-06-07 RX ORDER — LIDOCAINE HYDROCHLORIDE 40 MG/ML
SOLUTION TOPICAL ONCE
Status: COMPLETED | OUTPATIENT
Start: 2023-06-07 | End: 2023-06-07

## 2023-06-07 RX ORDER — IBUPROFEN 200 MG
TABLET ORAL ONCE
OUTPATIENT
Start: 2023-06-07 | End: 2023-06-07

## 2023-06-07 RX ORDER — BACITRACIN ZINC AND POLYMYXIN B SULFATE 500; 1000 [USP'U]/G; [USP'U]/G
OINTMENT TOPICAL ONCE
OUTPATIENT
Start: 2023-06-07 | End: 2023-06-07

## 2023-06-07 RX ORDER — LIDOCAINE 40 MG/G
CREAM TOPICAL ONCE
OUTPATIENT
Start: 2023-06-07 | End: 2023-06-07

## 2023-06-07 RX ADMIN — LIDOCAINE HYDROCHLORIDE 5 ML: 40 SOLUTION TOPICAL at 15:47

## 2023-06-07 ASSESSMENT — PAIN SCALES - GENERAL: PAINLEVEL_OUTOF10: 7

## 2023-06-07 ASSESSMENT — PAIN DESCRIPTION - DESCRIPTORS: DESCRIPTORS: ACHING

## 2023-06-07 ASSESSMENT — PAIN DESCRIPTION - ORIENTATION: ORIENTATION: LEFT

## 2023-06-07 ASSESSMENT — PAIN DESCRIPTION - LOCATION: LOCATION: LEG

## 2023-06-07 NOTE — PLAN OF CARE
Problem: Wound:  Goal: Will show signs of wound healing; wound closure and no evidence of infection  Description: Will show signs of wound healing; wound closure and no evidence of infection  Outcome: Progressing     Problem: Venous:  Goal: Signs of wound healing will improve  Description: Signs of wound healing will improve  Outcome: Progressing     Problem: Cognitive:  Goal: Knowledge of wound care  Description: Knowledge of wound care  Outcome: Adequate for Discharge  Goal: Understands risk factors for wounds  Description: Understands risk factors for wounds  Outcome: Adequate for Discharge

## 2023-06-08 NOTE — PROGRESS NOTES
Wound Healing Center Followup Visit Note    Referring Physician : Bharathi Mitchell MD  [address] [facility] RECORD NUMBER:  [id number]  AGE: 70 y.o. GENDER: female  : 1945  EPISODE DATE:  2023    Subjective:     Chief Complaint   Patient presents with    Wound Check      HISTORY of PRESENT ILLNESS HPI   Jeremy Marvin is a 70 y.o. female who presents today in regards to follow up evaluation and treatment of wound/ulcer. That patient's past medical, family and social hx were reviewed and changes were made if present. History of Wound Context:  Patient has had left calf wound since ~ 2021. She has been putting a dressing on it. The wound has not been improving. She has a hx significant for venous stasis ulcerations of bilateral LE. She first was seen by myself in regards to these issues 2015. She eventually healed these wounds 2016. I last saw her 2021 at which time I recommended lymphedema therapy. She states she went and said it caused her to much pain and stopped going. She has chronic issues with bilateral calf pain, swelling and edema. She admits to not wearing her stockings because of the pain. She has used knee high 20-30 mm hg stockings in the past.       She is still seeing pain management and is down to percocet 7/5/325 mg daily.        [facility]  Double tubigrip  Culture done  Emphasized importance of getting in to more significant compression in the future  12/15/21  Culture reviewed - light growth, no tx  Wound slightly improved  Still significant drainage  Plan on compression wrap next week  21  Stable wound  Drainage slightly better  Pt would like to wait till next week because of holidays to start wrap  22  Wound larger  Profore  22  Wound appearance better  Refusing wrap - it rolled down last week and she cut off after 3 days  22  Wound appearance better  Still refusing wrap   3/2/22  periwound

## 2023-06-20 NOTE — DISCHARGE INSTRUCTIONS
Visit Discharge/Physician Orders     Discharge condition: Stable     Assessment of pain at discharge: mild     Anesthetic used: lido 4%     Discharge to: Home     Left via:Private automobile     Accompanied by: self     ECF/HHA: [facility]  *NEW ORDER     Dressing Orders:  LEFT PRETIB : cleanse with normal saline, apply drawtex, cover with dry dressing and secure. Change daily. Apply spandagrip. On in am and off in pm. Elevate legs as much as possible above level of the heart. Treatment Orders: Eat a diet high in protein and vitamin C. Take a multiple vitamin daily unless contraindicated. Elevate as much as possible     Referral to lymphedema therapy     [address] [place] [address],[address] followup visit: 1 week____________________________  (Please note your next appointment above and if you are unable to keep, kindly give a 24 hour notice. Thank you.)     Physician signature:__________________________      If you experience any of the following, please call the [employer] during business hours:     * Increase in Pain  * Temperature over 101  * Increase in drainage from your wound  * Drainage with a foul odor  * Bleeding  * Increase in swelling  * Need for compression bandage changes due to slippage, breakthrough drainage. If you need medical attention outside of the business hours of the [employer] please contact your PCP or go to the nearest emergency room.

## 2023-06-21 ENCOUNTER — HOSPITAL ENCOUNTER (OUTPATIENT)
Dept: WOUND CARE | Age: 78
Discharge: HOME | End: 2023-06-21
Payer: MEDICARE

## 2023-06-21 VITALS
WEIGHT: 293 LBS | HEIGHT: 68 IN | SYSTOLIC BLOOD PRESSURE: 163 MMHG | DIASTOLIC BLOOD PRESSURE: 73 MMHG | TEMPERATURE: 97 F | RESPIRATION RATE: 18 BRPM | BODY MASS INDEX: 44.41 KG/M2 | HEART RATE: 93 BPM

## 2023-06-21 DIAGNOSIS — I87.2 VENOUS STASIS ULCER OF LEFT CALF WITH FAT LAYER EXPOSED WITHOUT VARICOSE VEINS (HCC): Primary | Chronic | ICD-10-CM

## 2023-06-21 DIAGNOSIS — L97.222 VENOUS STASIS ULCER OF LEFT CALF WITH FAT LAYER EXPOSED WITHOUT VARICOSE VEINS (HCC): Primary | Chronic | ICD-10-CM

## 2023-06-21 DIAGNOSIS — I89.0 LYMPHEDEMA: ICD-10-CM

## 2023-06-21 PROCEDURE — 11045 DBRDMT SUBQ TISS EACH ADDL: CPT

## 2023-06-21 PROCEDURE — 11042 DBRDMT SUBQ TIS 1ST 20SQCM/<: CPT

## 2023-06-21 RX ORDER — LIDOCAINE HYDROCHLORIDE 40 MG/ML
SOLUTION TOPICAL ONCE
OUTPATIENT
Start: 2023-06-21 | End: 2023-06-21

## 2023-06-21 RX ORDER — IBUPROFEN 200 MG
TABLET ORAL ONCE
OUTPATIENT
Start: 2023-06-21 | End: 2023-06-21

## 2023-06-21 RX ORDER — MORPHINE SULFATE 15 MG/1
15 TABLET, FILM COATED, EXTENDED RELEASE ORAL 2 TIMES DAILY
Qty: 60 TABLET | Refills: 0 | Status: SHIPPED | OUTPATIENT
Start: 2023-06-21 | End: 2023-07-21

## 2023-06-21 RX ORDER — LIDOCAINE HYDROCHLORIDE 40 MG/ML
SOLUTION TOPICAL ONCE
Status: COMPLETED | OUTPATIENT
Start: 2023-06-21 | End: 2023-06-21

## 2023-06-21 RX ORDER — BACITRACIN ZINC AND POLYMYXIN B SULFATE 500; 1000 [USP'U]/G; [USP'U]/G
OINTMENT TOPICAL ONCE
OUTPATIENT
Start: 2023-06-21 | End: 2023-06-21

## 2023-06-21 RX ORDER — BETAMETHASONE DIPROPIONATE 0.05 %
OINTMENT (GRAM) TOPICAL ONCE
OUTPATIENT
Start: 2023-06-21 | End: 2023-06-21

## 2023-06-21 RX ORDER — LIDOCAINE 50 MG/G
OINTMENT TOPICAL ONCE
OUTPATIENT
Start: 2023-06-21 | End: 2023-06-21

## 2023-06-21 RX ORDER — GENTAMICIN SULFATE 1 MG/G
OINTMENT TOPICAL ONCE
OUTPATIENT
Start: 2023-06-21 | End: 2023-06-21

## 2023-06-21 RX ORDER — GINSENG 100 MG
CAPSULE ORAL ONCE
OUTPATIENT
Start: 2023-06-21 | End: 2023-06-21

## 2023-06-21 RX ORDER — LIDOCAINE HYDROCHLORIDE 20 MG/ML
JELLY TOPICAL ONCE
OUTPATIENT
Start: 2023-06-21 | End: 2023-06-21

## 2023-06-21 RX ORDER — CLOBETASOL PROPIONATE 0.5 MG/G
OINTMENT TOPICAL ONCE
OUTPATIENT
Start: 2023-06-21 | End: 2023-06-21

## 2023-06-21 RX ORDER — LIDOCAINE 40 MG/G
CREAM TOPICAL ONCE
OUTPATIENT
Start: 2023-06-21 | End: 2023-06-21

## 2023-06-21 RX ORDER — OXYCODONE AND ACETAMINOPHEN 7.5; 325 MG/1; MG/1
1 TABLET ORAL 2 TIMES DAILY
Qty: 60 TABLET | Refills: 0 | Status: SHIPPED | OUTPATIENT
Start: 2023-06-21 | End: 2023-07-21

## 2023-06-21 RX ADMIN — LIDOCAINE HYDROCHLORIDE 10 ML: 40 SOLUTION TOPICAL at 13:52

## 2023-06-21 ASSESSMENT — PAIN DESCRIPTION - DESCRIPTORS: DESCRIPTORS: ACHING

## 2023-06-21 ASSESSMENT — PAIN - FUNCTIONAL ASSESSMENT: PAIN_FUNCTIONAL_ASSESSMENT: ACTIVITIES ARE NOT PREVENTED

## 2023-06-21 ASSESSMENT — PAIN DESCRIPTION - FREQUENCY: FREQUENCY: INTERMITTENT

## 2023-06-21 ASSESSMENT — PAIN DESCRIPTION - ORIENTATION: ORIENTATION: LEFT

## 2023-06-21 ASSESSMENT — PAIN DESCRIPTION - LOCATION: LOCATION: LEG

## 2023-06-21 ASSESSMENT — PAIN DESCRIPTION - PAIN TYPE: TYPE: CHRONIC PAIN

## 2023-06-21 ASSESSMENT — PAIN DESCRIPTION - ONSET: ONSET: ON-GOING

## 2023-06-21 ASSESSMENT — PAIN SCALES - GENERAL: PAINLEVEL_OUTOF10: 7

## 2023-06-21 NOTE — PLAN OF CARE
Problem: Wound:  Goal: Will show signs of wound healing; wound closure and no evidence of infection  Description: Will show signs of wound healing; wound closure and no evidence of infection  Outcome: Not Progressing     Problem: Venous:  Goal: Signs of wound healing will improve  Description: Signs of wound healing will improve  Outcome: Not Progressing       Problem: Cognitive:  Goal: Knowledge of wound care  Description: Knowledge of wound care  Outcome: Adequate for Discharge  Goal: Understands risk factors for wounds  Description: Understands risk factors for wounds  Outcome: Adequate for Discharge

## 2023-06-21 NOTE — PROGRESS NOTES
Procedure Note  Indications:  Based on my examination of this patient's wound(s)/ulcer(s) today, debridement is required to promote healing and evaluate the wound base. Performed by: Connee Osgood, MD    Consent obtained:  Yes    Time out taken:  Yes    Pain Control: Anesthetic  Anesthetic: 4% Lidocaine Liquid Topical     Debridement:Excisional Debridement    Using curette the wound(s)/ulcer(s) was/were sharply debrided down through and including the removal of epidermis, dermis, and subcutaneous tissue. Devitalized Tissue Debrided:  fibrin, biofilm, slough, and exudate to stimulate bleeding to promote healing, post debridement good bleeding base and wound edges noted    Wound/Ulcer #: 1    Percent of Wound/Ulcer Debrided: 30%    Total Surface Area Debrided: 40 sq cm     Estimated Blood Loss:  Minimal  Hemostasis Achieved:  by pressure    Procedural Pain:  10  / 10   Post Procedural Pain:  9 / 10     Response to treatment:  With complaints of pain. Plan:   Treatment Note please see attached Discharge Instructions    Written patient dismissal instructions given to patient and signed by patient or POA. Discharge Instructions         Visit Discharge/Physician Orders     Discharge condition: Stable     Assessment of pain at discharge: mild     Anesthetic used: lido 4%     Discharge to: Home     Left via:Private automobile     Accompanied by: self     ECF/HHA: [facility]  *NEW ORDER     Dressing Orders:  LEFT PRETIB : cleanse with normal saline, apply drawtex, cover with dry dressing and secure. Change daily. Apply spandagrip. On in am and off in pm. Elevate legs as much as possible above level of the heart. Treatment Orders: Eat a diet high in protein and vitamin C. Take a multiple vitamin daily unless contraindicated.      Elevate as much as possible     Referral to lymphedema therapy     [facility] followup visit: 1 week____________________________  (Please note your next appointment

## 2023-06-28 ENCOUNTER — HOSPITAL ENCOUNTER (OUTPATIENT)
Dept: WOUND CARE | Age: 78
Discharge: HOME | End: 2023-06-28
Payer: MEDICARE

## 2023-06-28 VITALS
SYSTOLIC BLOOD PRESSURE: 147 MMHG | RESPIRATION RATE: 16 BRPM | HEART RATE: 71 BPM | TEMPERATURE: 97.4 F | DIASTOLIC BLOOD PRESSURE: 79 MMHG

## 2023-06-28 DIAGNOSIS — I87.2 VENOUS STASIS ULCER OF LEFT CALF WITH FAT LAYER EXPOSED WITHOUT VARICOSE VEINS (HCC): Primary | Chronic | ICD-10-CM

## 2023-06-28 DIAGNOSIS — L97.222 VENOUS STASIS ULCER OF LEFT CALF WITH FAT LAYER EXPOSED WITHOUT VARICOSE VEINS (HCC): Primary | Chronic | ICD-10-CM

## 2023-06-28 PROCEDURE — 11045 DBRDMT SUBQ TISS EACH ADDL: CPT

## 2023-06-28 PROCEDURE — 11042 DBRDMT SUBQ TIS 1ST 20SQCM/<: CPT

## 2023-06-28 RX ORDER — LIDOCAINE HYDROCHLORIDE 40 MG/ML
SOLUTION TOPICAL ONCE
OUTPATIENT
Start: 2023-06-28 | End: 2023-06-28

## 2023-06-28 RX ORDER — LIDOCAINE 40 MG/G
CREAM TOPICAL ONCE
OUTPATIENT
Start: 2023-06-28 | End: 2023-06-28

## 2023-06-28 RX ORDER — LIDOCAINE 50 MG/G
OINTMENT TOPICAL ONCE
OUTPATIENT
Start: 2023-06-28 | End: 2023-06-28

## 2023-06-28 RX ORDER — IBUPROFEN 200 MG
TABLET ORAL ONCE
OUTPATIENT
Start: 2023-06-28 | End: 2023-06-28

## 2023-06-28 RX ORDER — LIDOCAINE HYDROCHLORIDE 20 MG/ML
JELLY TOPICAL ONCE
OUTPATIENT
Start: 2023-06-28 | End: 2023-06-28

## 2023-06-28 RX ORDER — CLOBETASOL PROPIONATE 0.5 MG/G
OINTMENT TOPICAL ONCE
OUTPATIENT
Start: 2023-06-28 | End: 2023-06-28

## 2023-06-28 RX ORDER — LIDOCAINE HYDROCHLORIDE 40 MG/ML
SOLUTION TOPICAL ONCE
Status: COMPLETED | OUTPATIENT
Start: 2023-06-28 | End: 2023-06-28

## 2023-06-28 RX ORDER — BETAMETHASONE DIPROPIONATE 0.05 %
OINTMENT (GRAM) TOPICAL ONCE
OUTPATIENT
Start: 2023-06-28 | End: 2023-06-28

## 2023-06-28 RX ORDER — BACITRACIN ZINC AND POLYMYXIN B SULFATE 500; 1000 [USP'U]/G; [USP'U]/G
OINTMENT TOPICAL ONCE
OUTPATIENT
Start: 2023-06-28 | End: 2023-06-28

## 2023-06-28 RX ORDER — GENTAMICIN SULFATE 1 MG/G
OINTMENT TOPICAL ONCE
OUTPATIENT
Start: 2023-06-28 | End: 2023-06-28

## 2023-06-28 RX ORDER — GINSENG 100 MG
CAPSULE ORAL ONCE
OUTPATIENT
Start: 2023-06-28 | End: 2023-06-28

## 2023-06-28 RX ADMIN — LIDOCAINE HYDROCHLORIDE: 40 SOLUTION TOPICAL at 15:42

## 2023-06-28 ASSESSMENT — PAIN DESCRIPTION - DESCRIPTORS: DESCRIPTORS: THROBBING

## 2023-06-28 ASSESSMENT — PAIN DESCRIPTION - LOCATION: LOCATION: LEG

## 2023-06-28 ASSESSMENT — PAIN DESCRIPTION - PAIN TYPE: TYPE: CHRONIC PAIN

## 2023-06-28 ASSESSMENT — PAIN SCALES - GENERAL: PAINLEVEL_OUTOF10: 7

## 2023-06-28 ASSESSMENT — PAIN - FUNCTIONAL ASSESSMENT: PAIN_FUNCTIONAL_ASSESSMENT: ACTIVITIES ARE NOT PREVENTED

## 2023-06-28 ASSESSMENT — PAIN DESCRIPTION - ORIENTATION: ORIENTATION: LOWER;LEFT

## 2023-07-05 ENCOUNTER — HOSPITAL ENCOUNTER (OUTPATIENT)
Dept: WOUND CARE | Age: 78
Discharge: HOME | End: 2023-07-05

## 2023-07-05 NOTE — DISCHARGE INSTRUCTIONS
Visit Discharge/Physician Orders     Discharge condition: Stable     Assessment of pain at discharge: mild     Anesthetic used: lido 4%     Discharge to: Home     Left via:Private automobile     Accompanied by: self     ECF/HHA: [facility]  *NEW ORDER     Dressing Orders:  LEFT PRETIB : cleanse with normal saline, apply drawtex, cover with dry dressing and secure. Change daily. Apply spandagrip. On in am and off in pm. Elevate legs as much as possible above level of the heart. Treatment Orders: Eat a diet high in protein and vitamin C. Take a multiple vitamin daily unless contraindicated. Elevate as much as possible     Referral to lymphedema therapy     [address] [place] [address] followup visit: 1 week____________________________  (Please note your next appointment above and if you are unable to keep, kindly give a 24 hour notice. Thank you.)     Physician signature:__________________________      If you experience any of the following, please call the [facility] during business hours:     * Increase in Pain  * Temperature over 101  * Increase in drainage from your wound  * Drainage with a foul odor  * Bleeding  * Increase in swelling  * Need for compression bandage changes due to slippage, breakthrough drainage. If you need medical attention outside of the business hours of the [facility] please contact your PCP or go to the nearest emergency room.

## 2023-07-11 NOTE — DISCHARGE INSTRUCTIONS
Visit Discharge/Physician Orders     Discharge condition: Stable     Assessment of pain at discharge: mild     Anesthetic used: lido 4%     Discharge to: Home     Left via:Private automobile     Accompanied by: self     ECF/HHA: [facility]  *NEW ORDER     Dressing Orders:  LEFT PRETIB : cleanse with normal saline, apply calcium alginate, cover with dry dressing and secure. Change daily. Apply spandagrip. On in am and off in pm. Elevate legs as much as possible above level of the heart. Treatment Orders: Eat a diet high in protein and vitamin C. Take a multiple vitamin daily unless contraindicated. Elevate as much as possible     Start lymphedema therapy    C&S taken 7/12/23     [address] [place] [address] followup visit: 1 week____________________________  (Please note your next appointment above and if you are unable to keep, kindly give a 24 hour notice. Thank you.)     Physician signature:__________________________      If you experience any of the following, please call the [employer] during business hours:     * Increase in Pain  * Temperature over 101  * Increase in drainage from your wound  * Drainage with a foul odor  * Bleeding  * Increase in swelling  * Need for compression bandage changes due to slippage, breakthrough drainage. If you need medical attention outside of the business hours of the [employer] please contact your PCP or go to the nearest emergency room.

## 2023-07-12 ENCOUNTER — HOSPITAL ENCOUNTER (OUTPATIENT)
Dept: WOUND CARE | Age: 78
Discharge: HOME | End: 2023-07-12
Payer: MEDICARE

## 2023-07-12 VITALS
TEMPERATURE: 96.8 F | BODY MASS INDEX: 44.41 KG/M2 | WEIGHT: 293 LBS | RESPIRATION RATE: 16 BRPM | DIASTOLIC BLOOD PRESSURE: 84 MMHG | SYSTOLIC BLOOD PRESSURE: 151 MMHG | HEIGHT: 68 IN | HEART RATE: 96 BPM

## 2023-07-12 DIAGNOSIS — L97.222 VENOUS STASIS ULCER OF LEFT CALF WITH FAT LAYER EXPOSED WITHOUT VARICOSE VEINS (HCC): Primary | Chronic | ICD-10-CM

## 2023-07-12 DIAGNOSIS — I87.2 VENOUS STASIS ULCER OF LEFT CALF WITH FAT LAYER EXPOSED WITHOUT VARICOSE VEINS (HCC): Primary | Chronic | ICD-10-CM

## 2023-07-12 PROCEDURE — 87075 CULTR BACTERIA EXCEPT BLOOD: CPT

## 2023-07-12 PROCEDURE — 11045 DBRDMT SUBQ TISS EACH ADDL: CPT

## 2023-07-12 PROCEDURE — 87070 CULTURE OTHR SPECIMN AEROBIC: CPT

## 2023-07-12 PROCEDURE — 87186 SC STD MICRODIL/AGAR DIL: CPT

## 2023-07-12 PROCEDURE — 87205 SMEAR GRAM STAIN: CPT

## 2023-07-12 PROCEDURE — 11042 DBRDMT SUBQ TIS 1ST 20SQCM/<: CPT

## 2023-07-12 PROCEDURE — 87077 CULTURE AEROBIC IDENTIFY: CPT

## 2023-07-12 RX ORDER — GENTAMICIN SULFATE 1 MG/G
OINTMENT TOPICAL ONCE
OUTPATIENT
Start: 2023-07-12 | End: 2023-07-12

## 2023-07-12 RX ORDER — GINSENG 100 MG
CAPSULE ORAL ONCE
OUTPATIENT
Start: 2023-07-12 | End: 2023-07-12

## 2023-07-12 RX ORDER — LIDOCAINE 50 MG/G
OINTMENT TOPICAL ONCE
OUTPATIENT
Start: 2023-07-12 | End: 2023-07-12

## 2023-07-12 RX ORDER — CLOBETASOL PROPIONATE 0.5 MG/G
OINTMENT TOPICAL ONCE
OUTPATIENT
Start: 2023-07-12 | End: 2023-07-12

## 2023-07-12 RX ORDER — BETAMETHASONE DIPROPIONATE 0.05 %
OINTMENT (GRAM) TOPICAL ONCE
OUTPATIENT
Start: 2023-07-12 | End: 2023-07-12

## 2023-07-12 RX ORDER — LIDOCAINE HYDROCHLORIDE 20 MG/ML
JELLY TOPICAL ONCE
OUTPATIENT
Start: 2023-07-12 | End: 2023-07-12

## 2023-07-12 RX ORDER — LIDOCAINE HYDROCHLORIDE 40 MG/ML
SOLUTION TOPICAL ONCE
OUTPATIENT
Start: 2023-07-12 | End: 2023-07-12

## 2023-07-12 RX ORDER — LIDOCAINE HYDROCHLORIDE 40 MG/ML
SOLUTION TOPICAL ONCE
Status: COMPLETED | OUTPATIENT
Start: 2023-07-12 | End: 2023-07-12

## 2023-07-12 RX ORDER — LIDOCAINE 40 MG/G
CREAM TOPICAL ONCE
OUTPATIENT
Start: 2023-07-12 | End: 2023-07-12

## 2023-07-12 RX ORDER — IBUPROFEN 200 MG
TABLET ORAL ONCE
OUTPATIENT
Start: 2023-07-12 | End: 2023-07-12

## 2023-07-12 RX ORDER — BACITRACIN ZINC AND POLYMYXIN B SULFATE 500; 1000 [USP'U]/G; [USP'U]/G
OINTMENT TOPICAL ONCE
OUTPATIENT
Start: 2023-07-12 | End: 2023-07-12

## 2023-07-12 RX ADMIN — LIDOCAINE HYDROCHLORIDE: 40 SOLUTION TOPICAL at 15:33

## 2023-07-12 ASSESSMENT — PAIN DESCRIPTION - LOCATION: LOCATION: LEG

## 2023-07-12 ASSESSMENT — PAIN DESCRIPTION - ORIENTATION: ORIENTATION: LEFT

## 2023-07-12 ASSESSMENT — PAIN - FUNCTIONAL ASSESSMENT: PAIN_FUNCTIONAL_ASSESSMENT: PREVENTS OR INTERFERES SOME ACTIVE ACTIVITIES AND ADLS

## 2023-07-12 ASSESSMENT — PAIN DESCRIPTION - DESCRIPTORS: DESCRIPTORS: THROBBING;SHARP

## 2023-07-12 ASSESSMENT — PAIN DESCRIPTION - PAIN TYPE: TYPE: CHRONIC PAIN

## 2023-07-12 ASSESSMENT — PAIN SCALES - GENERAL: PAINLEVEL_OUTOF10: 10

## 2023-07-12 NOTE — PROGRESS NOTES
Wound Healing Center Followup Visit Note    Referring Physician : Mady Hernández MD  [id number] [facility] RECORD NUMBER:  [id number]  AGE: 70 y.o. GENDER: female  : 1945  EPISODE DATE:  2023    Subjective:     Chief Complaint   Patient presents with    Wound Check     Left leg       HISTORY of PRESENT ILLNESS HPI   Latisha Fisher is a 70 y.o. female who presents today in regards to follow up evaluation and treatment of wound/ulcer. That patient's past medical, family and social hx were reviewed and changes were made if present. History of Wound Context:  Patient has had left calf wound since ~ 2021. She has been putting a dressing on it. The wound has not been improving. She has a hx significant for venous stasis ulcerations of bilateral LE. She first was seen by myself in regards to these issues 2015. She eventually healed these wounds 2016. I last saw her 2021 at which time I recommended lymphedema therapy. She states she went and said it caused her to much pain and stopped going. She has chronic issues with bilateral calf pain, swelling and edema. She admits to not wearing her stockings because of the pain. She has used knee high 20-30 mm hg stockings in the past.       She is still seeing pain management and is down to percocet /5/325 mg daily.        [facility]  Double tubigrip  Culture done  Emphasized importance of getting in to more significant compression in the future  12/15/21  Culture reviewed - light growth, no tx  Wound slightly improved  Still significant drainage  Plan on compression wrap next week  21  Stable wound  Drainage slightly better  Pt would like to wait till next week because of holidays to start wrap  22  Wound larger  Profore  22  Wound appearance better  Refusing wrap - it rolled down last week and she cut off after 3 days  22  Wound appearance better  Still refusing wrap   3/2/22  periwound

## 2023-07-14 LAB
BACTERIA SPEC ANAEROBE CULT: NORMAL
BACTERIA WND AEROBE CULT: ABNORMAL
BACTERIA WND AEROBE CULT: ABNORMAL
GRAM STN SPEC: ABNORMAL
ORGANISM: ABNORMAL
ORGANISM: ABNORMAL

## 2023-07-17 NOTE — DISCHARGE INSTRUCTIONS
Visit Discharge/Physician Orders     Discharge condition: Stable     Assessment of pain at discharge: mild     Anesthetic used: lido 4%     Discharge to: Home     Left via:Private automobile     Accompanied by: self     ECF/HHA: [facility]  *NEW ORDER     Dressing Orders:  LEFT PRETIB : cleanse with normal saline, apply calcium alginate, cover with dry dressing and secure. Change daily. Apply spandagrip. On in am and off in pm. Elevate legs as much as possible above level of the heart. Treatment Orders: Eat a diet high in protein and vitamin C. Take a multiple vitamin daily unless contraindicated. Elevate as much as possible     Start lymphedema therapy     Antibiotics sent to pharmacy, take as prescribed     [address] [place] [address] followup visit: 1 week____________________________  (Please note your next appointment above and if you are unable to keep, kindly give a 24 hour notice. Thank you.)     Physician signature:__________________________      If you experience any of the following, please call the [employer] during business hours:     * Increase in Pain  * Temperature over 101  * Increase in drainage from your wound  * Drainage with a foul odor  * Bleeding  * Increase in swelling  * Need for compression bandage changes due to slippage, breakthrough drainage. If you need medical attention outside of the business hours of the [employer] please contact your PCP or go to the nearest emergency room.

## 2023-07-19 ENCOUNTER — HOSPITAL ENCOUNTER (OUTPATIENT)
Dept: WOUND CARE | Age: 78
Discharge: HOME | End: 2023-07-19
Payer: MEDICARE

## 2023-07-19 VITALS
HEART RATE: 96 BPM | RESPIRATION RATE: 18 BRPM | DIASTOLIC BLOOD PRESSURE: 82 MMHG | WEIGHT: 275 LBS | TEMPERATURE: 96.5 F | BODY MASS INDEX: 41.68 KG/M2 | SYSTOLIC BLOOD PRESSURE: 157 MMHG | HEIGHT: 68 IN

## 2023-07-19 DIAGNOSIS — L97.222 VENOUS STASIS ULCER OF LEFT CALF WITH FAT LAYER EXPOSED WITHOUT VARICOSE VEINS (HCC): Primary | Chronic | ICD-10-CM

## 2023-07-19 DIAGNOSIS — I89.0 LYMPHEDEMA: ICD-10-CM

## 2023-07-19 DIAGNOSIS — I87.2 VENOUS STASIS ULCER OF LEFT CALF WITH FAT LAYER EXPOSED WITHOUT VARICOSE VEINS (HCC): Primary | Chronic | ICD-10-CM

## 2023-07-19 PROCEDURE — 11042 DBRDMT SUBQ TIS 1ST 20SQCM/<: CPT

## 2023-07-19 PROCEDURE — 11045 DBRDMT SUBQ TISS EACH ADDL: CPT

## 2023-07-19 RX ORDER — LIDOCAINE 50 MG/G
OINTMENT TOPICAL ONCE
OUTPATIENT
Start: 2023-07-19 | End: 2023-07-19

## 2023-07-19 RX ORDER — GENTAMICIN SULFATE 1 MG/G
OINTMENT TOPICAL ONCE
OUTPATIENT
Start: 2023-07-19 | End: 2023-07-19

## 2023-07-19 RX ORDER — LIDOCAINE HYDROCHLORIDE 20 MG/ML
JELLY TOPICAL ONCE
OUTPATIENT
Start: 2023-07-19 | End: 2023-07-19

## 2023-07-19 RX ORDER — LIDOCAINE HYDROCHLORIDE 40 MG/ML
SOLUTION TOPICAL ONCE
Status: COMPLETED | OUTPATIENT
Start: 2023-07-19 | End: 2023-07-19

## 2023-07-19 RX ORDER — MORPHINE SULFATE 15 MG/1
15 TABLET, FILM COATED, EXTENDED RELEASE ORAL 2 TIMES DAILY
Qty: 60 TABLET | Refills: 0 | Status: SHIPPED | OUTPATIENT
Start: 2023-07-19 | End: 2023-08-18

## 2023-07-19 RX ORDER — BACITRACIN ZINC AND POLYMYXIN B SULFATE 500; 1000 [USP'U]/G; [USP'U]/G
OINTMENT TOPICAL ONCE
OUTPATIENT
Start: 2023-07-19 | End: 2023-07-19

## 2023-07-19 RX ORDER — LIDOCAINE HYDROCHLORIDE 40 MG/ML
SOLUTION TOPICAL ONCE
OUTPATIENT
Start: 2023-07-19 | End: 2023-07-19

## 2023-07-19 RX ORDER — IBUPROFEN 200 MG
TABLET ORAL ONCE
OUTPATIENT
Start: 2023-07-19 | End: 2023-07-19

## 2023-07-19 RX ORDER — CLOBETASOL PROPIONATE 0.5 MG/G
OINTMENT TOPICAL ONCE
OUTPATIENT
Start: 2023-07-19 | End: 2023-07-19

## 2023-07-19 RX ORDER — LIDOCAINE 40 MG/G
CREAM TOPICAL ONCE
OUTPATIENT
Start: 2023-07-19 | End: 2023-07-19

## 2023-07-19 RX ORDER — BETAMETHASONE DIPROPIONATE 0.05 %
OINTMENT (GRAM) TOPICAL ONCE
OUTPATIENT
Start: 2023-07-19 | End: 2023-07-19

## 2023-07-19 RX ORDER — GINSENG 100 MG
CAPSULE ORAL ONCE
OUTPATIENT
Start: 2023-07-19 | End: 2023-07-19

## 2023-07-19 RX ORDER — OXYCODONE AND ACETAMINOPHEN 7.5; 325 MG/1; MG/1
1 TABLET ORAL EVERY 8 HOURS PRN
Qty: 90 TABLET | Refills: 0 | Status: SHIPPED | OUTPATIENT
Start: 2023-07-19 | End: 2023-08-18

## 2023-07-19 RX ADMIN — LIDOCAINE HYDROCHLORIDE 10 ML: 40 SOLUTION TOPICAL at 15:14

## 2023-07-19 ASSESSMENT — PAIN SCALES - GENERAL: PAINLEVEL_OUTOF10: 10

## 2023-07-19 ASSESSMENT — PAIN DESCRIPTION - ONSET: ONSET: ON-GOING

## 2023-07-19 ASSESSMENT — PAIN - FUNCTIONAL ASSESSMENT: PAIN_FUNCTIONAL_ASSESSMENT: PREVENTS OR INTERFERES SOME ACTIVE ACTIVITIES AND ADLS

## 2023-07-19 ASSESSMENT — PAIN DESCRIPTION - DESCRIPTORS: DESCRIPTORS: THROBBING;SHARP;BURNING

## 2023-07-19 ASSESSMENT — PAIN DESCRIPTION - FREQUENCY: FREQUENCY: INTERMITTENT

## 2023-07-19 ASSESSMENT — PAIN DESCRIPTION - PAIN TYPE: TYPE: CHRONIC PAIN

## 2023-07-19 ASSESSMENT — PAIN DESCRIPTION - LOCATION: LOCATION: LEG

## 2023-07-19 ASSESSMENT — PAIN DESCRIPTION - ORIENTATION: ORIENTATION: LEFT

## 2023-07-19 NOTE — PROGRESS NOTES
Post-Procedure Surface Area (cm^2) 196.98 cm^2 07/12/23 1556   Post-Procedure Volume (cm^3) 118.188 cm^3 07/12/23 1556   Wound Assessment Pink/red;Fibrin;Slough 07/19/23 1516   Drainage Amount Large 07/19/23 1516   Drainage Description Serous; Yellow 07/19/23 1516   Odor None 07/19/23 1516   Kori-wound Assessment Maceration;Blanchable erythema 07/19/23 1516   Number of days: 140            Procedure Note  Indications:  Based on my examination of this patient's wound(s)/ulcer(s) today, debridement is required to promote healing and evaluate the wound base. Performed by: Carter Cornejo MD    Consent obtained:  Yes    Time out taken:  Yes    Pain Control: Anesthetic  Anesthetic: 4% Lidocaine Liquid Topical     Debridement:Excisional Debridement    Using curette the wound(s)/ulcer(s) was/were sharply debrided down through and including the removal of epidermis, dermis, and subcutaneous tissue. Devitalized Tissue Debrided:  fibrin, biofilm, slough, and exudate to stimulate bleeding to promote healing, post debridement good bleeding base and wound edges noted    Wound/Ulcer #: 1    Percent of Wound/Ulcer Debrided: 20%    Total Surface Area Debrided: 40 sq cm     Estimated Blood Loss:  Minimal  Hemostasis Achieved:  by pressure    Procedural Pain:  10  / 10   Post Procedural Pain:  9 / 10     Response to treatment:  With complaints of pain. Plan:   Treatment Note please see attached Discharge Instructions    Written patient dismissal instructions given to patient and signed by patient or POA. Discharge Instructions         Visit Discharge/Physician Orders     Discharge condition: Stable     Assessment of pain at discharge: mild     Anesthetic used: lido 4%     Discharge to: Home     Left via:Private automobile     Accompanied by: self     ECF/HHA: [facility]  *NEW ORDER     Dressing Orders:  LEFT PRETIB : cleanse with normal saline, apply calcium alginate, cover with dry dressing and secure.

## 2023-07-20 ENCOUNTER — TELEPHONE (OUTPATIENT)
Dept: WOUND CARE | Age: 78
End: 2023-07-20

## 2023-07-20 DIAGNOSIS — L97.222 VENOUS STASIS ULCER OF LEFT CALF WITH FAT LAYER EXPOSED WITHOUT VARICOSE VEINS (HCC): Primary | Chronic | ICD-10-CM

## 2023-07-20 DIAGNOSIS — I87.2 VENOUS STASIS ULCER OF LEFT CALF WITH FAT LAYER EXPOSED WITHOUT VARICOSE VEINS (HCC): Primary | Chronic | ICD-10-CM

## 2023-07-20 NOTE — TELEPHONE ENCOUNTER
Batsheva De Paz called to follow up on her antibiotics, called Dr. Kevin Mcleod for order clarification and updated the patient.

## 2023-07-25 NOTE — DISCHARGE INSTRUCTIONS
Visit Discharge/Physician Orders     Discharge condition: Stable     Assessment of pain at discharge: mild     Anesthetic used: lido 4%     Discharge to: Home     Left via:Private automobile     Accompanied by: self     ECF/HHA: [facility]  *NEW ORDER     Dressing Orders:  LEFT PRETIB : cleanse with normal saline, apply calcium alginate AG, cover with dry dressing and secure. Change daily. Apply spandagrip. On in am and off in pm. Elevate legs as much as possible above level of the heart. Treatment Orders: Eat a diet high in protein and vitamin C. Take a multiple vitamin daily unless contraindicated. Elevate as much as possible     Start lymphedema therapy     Antibiotics sent to pharmacy, take until finished     [facility] followup visit: 1 week____________________________  (Please note your next appointment above and if you are unable to keep, kindly give a 24 hour notice. Thank you.)     Physician signature:__________________________      If you experience any of the following, please call the [facility] during business hours:     * Increase in Pain  * Temperature over 101  * Increase in drainage from your wound  * Drainage with a foul odor  * Bleeding  * Increase in swelling  * Need for compression bandage changes due to slippage, breakthrough drainage. If you need medical attention outside of the business hours of the [facility] please contact your PCP or go to the nearest emergency room.

## 2023-07-26 ENCOUNTER — HOSPITAL ENCOUNTER (OUTPATIENT)
Dept: WOUND CARE | Age: 78
Discharge: HOME | End: 2023-07-26
Payer: MEDICARE

## 2023-07-26 VITALS
RESPIRATION RATE: 18 BRPM | SYSTOLIC BLOOD PRESSURE: 116 MMHG | HEART RATE: 85 BPM | TEMPERATURE: 96.8 F | DIASTOLIC BLOOD PRESSURE: 61 MMHG

## 2023-07-26 DIAGNOSIS — L97.222 VENOUS STASIS ULCER OF LEFT CALF WITH FAT LAYER EXPOSED WITHOUT VARICOSE VEINS (HCC): Primary | Chronic | ICD-10-CM

## 2023-07-26 DIAGNOSIS — I87.2 VENOUS STASIS ULCER OF LEFT CALF WITH FAT LAYER EXPOSED WITHOUT VARICOSE VEINS (HCC): Primary | Chronic | ICD-10-CM

## 2023-07-26 DIAGNOSIS — I89.0 LYMPHEDEMA: ICD-10-CM

## 2023-07-26 PROCEDURE — 11042 DBRDMT SUBQ TIS 1ST 20SQCM/<: CPT | Performed by: SURGERY

## 2023-07-26 PROCEDURE — 11042 DBRDMT SUBQ TIS 1ST 20SQCM/<: CPT

## 2023-07-26 RX ORDER — LIDOCAINE HYDROCHLORIDE 20 MG/ML
JELLY TOPICAL ONCE
OUTPATIENT
Start: 2023-07-26 | End: 2023-07-26

## 2023-07-26 RX ORDER — LIDOCAINE 40 MG/G
CREAM TOPICAL ONCE
OUTPATIENT
Start: 2023-07-26 | End: 2023-07-26

## 2023-07-26 RX ORDER — LIDOCAINE 50 MG/G
OINTMENT TOPICAL ONCE
OUTPATIENT
Start: 2023-07-26 | End: 2023-07-26

## 2023-07-26 RX ORDER — GENTAMICIN SULFATE 1 MG/G
OINTMENT TOPICAL ONCE
OUTPATIENT
Start: 2023-07-26 | End: 2023-07-26

## 2023-07-26 RX ORDER — CLOBETASOL PROPIONATE 0.5 MG/G
OINTMENT TOPICAL ONCE
OUTPATIENT
Start: 2023-07-26 | End: 2023-07-26

## 2023-07-26 RX ORDER — IBUPROFEN 200 MG
TABLET ORAL ONCE
OUTPATIENT
Start: 2023-07-26 | End: 2023-07-26

## 2023-07-26 RX ORDER — LIDOCAINE HYDROCHLORIDE 40 MG/ML
SOLUTION TOPICAL ONCE
OUTPATIENT
Start: 2023-07-26 | End: 2023-07-26

## 2023-07-26 RX ORDER — LIDOCAINE HYDROCHLORIDE 40 MG/ML
SOLUTION TOPICAL ONCE
Status: COMPLETED | OUTPATIENT
Start: 2023-07-26 | End: 2023-07-26

## 2023-07-26 RX ORDER — BETAMETHASONE DIPROPIONATE 0.05 %
OINTMENT (GRAM) TOPICAL ONCE
OUTPATIENT
Start: 2023-07-26 | End: 2023-07-26

## 2023-07-26 RX ORDER — BACITRACIN ZINC AND POLYMYXIN B SULFATE 500; 1000 [USP'U]/G; [USP'U]/G
OINTMENT TOPICAL ONCE
OUTPATIENT
Start: 2023-07-26 | End: 2023-07-26

## 2023-07-26 RX ORDER — GINSENG 100 MG
CAPSULE ORAL ONCE
OUTPATIENT
Start: 2023-07-26 | End: 2023-07-26

## 2023-07-26 RX ADMIN — LIDOCAINE HYDROCHLORIDE 10 ML: 40 SOLUTION TOPICAL at 15:12

## 2023-07-26 ASSESSMENT — PAIN DESCRIPTION - LOCATION: LOCATION: LEG

## 2023-07-26 ASSESSMENT — PAIN DESCRIPTION - DESCRIPTORS: DESCRIPTORS: SHOOTING;THROBBING

## 2023-07-26 ASSESSMENT — PAIN DESCRIPTION - FREQUENCY: FREQUENCY: INTERMITTENT

## 2023-07-26 ASSESSMENT — PAIN DESCRIPTION - ORIENTATION: ORIENTATION: LEFT

## 2023-07-26 ASSESSMENT — PAIN SCALES - GENERAL: PAINLEVEL_OUTOF10: 8

## 2023-07-26 ASSESSMENT — PAIN DESCRIPTION - ONSET: ONSET: ON-GOING

## 2023-07-26 ASSESSMENT — PAIN DESCRIPTION - PAIN TYPE: TYPE: CHRONIC PAIN

## 2023-07-26 NOTE — PROGRESS NOTES
Wound Healing Center Followup Visit Note    Referring Physician : Mady Hernández MD  [address] [facility] RECORD NUMBER:  [id number]  AGE: 70 y.o. GENDER: female  : 1945  EPISODE DATE:  2023    Subjective:     Chief Complaint   Patient presents with    Wound Check     Left leg      HISTORY of PRESENT ILLNESS HPI   Latisha Fisher is a 70 y.o. female who presents today in regards to follow up evaluation and treatment of wound/ulcer. That patient's past medical, family and social hx were reviewed and changes were made if present. History of Wound Context:  Patient has had left calf wound since ~ 2021. She has been putting a dressing on it. The wound has not been improving. She has a hx significant for venous stasis ulcerations of bilateral LE. She first was seen by myself in regards to these issues 2015. She eventually healed these wounds 2016. I last saw her 2021 at which time I recommended lymphedema therapy. She states she went and said it caused her to much pain and stopped going. She has chronic issues with bilateral calf pain, swelling and edema. She admits to not wearing her stockings because of the pain. She has used knee high 20-30 mm hg stockings in the past.       She is still seeing pain management and is down to percocet 7/5/325 mg daily.        [facility]  Double [facility]  Culture done  Emphasized importance of getting in to more significant compression in the future  12/15/21  Culture reviewed - light growth, no tx  Wound slightly improved  Still significant drainage  Plan on compression wrap next week  21  Stable wound  Drainage slightly better  Pt would like to wait till next week because of holidays to start wrap  22  Wound larger  Profore  22  Wound appearance better  Refusing wrap - it rolled down last week and she cut off after 3 days  22  Wound appearance better  Still refusing wrap   3/2/22  periwound

## 2023-07-26 NOTE — PLAN OF CARE
Problem: Chronic Conditions and Co-morbidities  Goal: Patient's chronic conditions and co-morbidity symptoms are monitored and maintained or improved  Outcome: Progressing     Problem: Pain  Goal: Verbalizes/displays adequate comfort level or baseline comfort level  Outcome: Progressing     Problem: Cognitive:  Goal: Knowledge of wound care  Description: Knowledge of wound care  Outcome: Progressing  Goal: Understands risk factors for wounds  Description: Understands risk factors for wounds  Outcome: Progressing     Problem: Wound:  Goal: Will show signs of wound healing; wound closure and no evidence of infection  Description: Will show signs of wound healing; wound closure and no evidence of infection  Outcome: Progressing     Problem: Venous:  Goal: Signs of wound healing will improve  Description: Signs of wound healing will improve  Outcome: Adequate for Discharge

## 2023-07-31 NOTE — DISCHARGE INSTRUCTIONS
Visit Discharge/Physician Orders     Discharge condition: Stable     Assessment of pain at discharge: mild     Anesthetic used: lido 4%     Discharge to: Home     Left via:Private automobile     Accompanied by: self     ECF/HHA: [facility]  *NEW ORDER     Dressing Orders:  LEFT PRETIB : cleanse with normal saline, apply calcium alginate AG, cover with dry dressing and secure. Change daily. Apply spandagrip. On in am and off in pm. Elevate legs as much as possible above level of the heart. Treatment Orders: Eat a diet high in protein and vitamin C. Take a multiple vitamin daily unless contraindicated. Elevate as much as possible     Start lymphedema therapy     Antibiotics sent to pharmacy, take until finished     [facility] followup visit: 1 week____________________________  (Please note your next appointment above and if you are unable to keep, kindly give a 24 hour notice. Thank you.)     Physician signature:__________________________      If you experience any of the following, please call the [facility] during business hours:     * Increase in Pain  * Temperature over 101  * Increase in drainage from your wound  * Drainage with a foul odor  * Bleeding  * Increase in swelling  * Need for compression bandage changes due to slippage, breakthrough drainage. If you need medical attention outside of the business hours of the [facility] please contact your PCP or go to the nearest emergency room.

## 2023-08-02 ENCOUNTER — HOSPITAL ENCOUNTER (OUTPATIENT)
Dept: WOUND CARE | Age: 78
Discharge: HOME | End: 2023-08-02

## 2023-08-03 ENCOUNTER — TELEPHONE (OUTPATIENT)
Dept: WOUND CARE | Age: 78
End: 2023-08-03

## 2023-08-03 DIAGNOSIS — L97.222 VENOUS STASIS ULCER OF LEFT CALF WITH FAT LAYER EXPOSED WITHOUT VARICOSE VEINS (HCC): Primary | Chronic | ICD-10-CM

## 2023-08-03 DIAGNOSIS — I87.2 VENOUS STASIS ULCER OF LEFT CALF WITH FAT LAYER EXPOSED WITHOUT VARICOSE VEINS (HCC): Primary | Chronic | ICD-10-CM

## 2023-08-03 NOTE — PROGRESS NOTES
[address]:     [facility] [address] [facility] [address] [facility], [address]  p: [phone]: [phone]     Ordering Center:     [address] [facility] [facility]  [place] [address]  [phone]  WOUND CARE Dept: [address] NUM[address]     Patient Information:      Santo Amadora  [address] [facility],[address]  [place] [address]   I have attempted without success to contact this patient by phone for Preadmission Testing phone assessment. Message left for pt to return call. : 1945  AGE: 70 y.o. GENDER: female   EPISODE DATE: 8/3/2023    Insurance:      PRIMARY INSURANCE:  Plan:   Coverage:   Effective Date:   Group Number: [unfilled]  Subscriber Number: [id number] - (Medicare Managed)    Payer/Plan Subscr  Sex Relation Sub. Ins. ID Effective Group Num   1. [address] [facility] Vanessa J 1945 Female Self [id number]                                     [address]   2. [place] Santo Mcpherson 1945 Female Self [id number] 10/16/18                                     [address]       Patient Wound Information:      Problem List Items Addressed This Visit          Other    Venous stasis ulcer of left calf with fat layer exposed without varicose veins ([address]) - Primary (Chronic)       WOUNDS REQUIRING DRESSING SUPPLIES:     Wound 23 Leg Left; Lower #2 (Active)   Wound Image   23 1516   Wound Etiology Traumatic 23 1620   Dressing Status New dressing applied;Clean;Dry; Intact 23 1608   Wound Cleansed Cleansed with saline 23 1608   Dressing/Treatment Alginate with Ag;ABD;Dry dressing 23 1608   Wound Length (cm) 10.8 cm 23 1508   Wound Width (cm) 12.2 cm 23 1508   Wound Depth (cm) 0.5 cm 23 1508   Wound Surface Area (cm^2) 131.76 cm^2 23 1508   Change in Wound Size % (l*w) -2203.5 23 1508   Wound Volume (cm^3) 65.88 cm^3 23 1508   Wound Healing % -[id number]

## 2023-08-07 NOTE — DISCHARGE INSTRUCTIONS
Visit Discharge/Physician Orders     Discharge condition: Stable     Assessment of pain at discharge: mild     Anesthetic used: lido 4%     Discharge to: Home     Left via:Private automobile     Accompanied by: self     ECF/HHA:      Dressing Orders:  LEFT PRETIB : cleanse with normal saline, apply calcium alginate AG, cover with dry dressing and secure. Change daily. Apply spandagrip. On in am and off in pm. Elevate legs as much as possible above level of the heart. Treatment Orders: Eat a diet high in protein and vitamin C. Take a multiple vitamin daily unless contraindicated. Elevate as much as possible    Continue lymphedema therapy     [facility] followup visit: 1 week____________________________  (Please note your next appointment above and if you are unable to keep, kindly give a 24 hour notice. Thank you.)     Physician signature:__________________________      If you experience any of the following, please call the [facility] during business hours:     * Increase in Pain  * Temperature over 101  * Increase in drainage from your wound  * Drainage with a foul odor  * Bleeding  * Increase in swelling  * Need for compression bandage changes due to slippage, breakthrough drainage. If you need medical attention outside of the business hours of the [facility] please contact your PCP or go to the nearest emergency room.

## 2023-08-09 ENCOUNTER — HOSPITAL ENCOUNTER (OUTPATIENT)
Dept: WOUND CARE | Age: 78
Discharge: HOME | End: 2023-08-09
Payer: MEDICARE

## 2023-08-09 VITALS
DIASTOLIC BLOOD PRESSURE: 70 MMHG | TEMPERATURE: 97.2 F | SYSTOLIC BLOOD PRESSURE: 160 MMHG | HEIGHT: 68 IN | WEIGHT: 275 LBS | RESPIRATION RATE: 18 BRPM | HEART RATE: 91 BPM | BODY MASS INDEX: 41.68 KG/M2

## 2023-08-09 DIAGNOSIS — I87.2 VENOUS STASIS ULCER OF LEFT CALF WITH FAT LAYER EXPOSED WITHOUT VARICOSE VEINS (HCC): Primary | Chronic | ICD-10-CM

## 2023-08-09 DIAGNOSIS — L97.222 VENOUS STASIS ULCER OF LEFT CALF WITH FAT LAYER EXPOSED WITHOUT VARICOSE VEINS (HCC): Primary | Chronic | ICD-10-CM

## 2023-08-09 PROBLEM — L03.90 SEPSIS DUE TO CELLULITIS (HCC): Status: RESOLVED | Noted: 2022-10-03 | Resolved: 2023-08-09

## 2023-08-09 PROBLEM — N17.9 AKI (ACUTE KIDNEY INJURY) (HCC): Status: RESOLVED | Noted: 2023-01-26 | Resolved: 2023-08-09

## 2023-08-09 PROBLEM — L97.929 INFECTED STASIS ULCER OF LEFT LOWER EXTREMITY (HCC): Status: RESOLVED | Noted: 2023-03-24 | Resolved: 2023-08-09

## 2023-08-09 PROBLEM — R55 SYNCOPE: Status: RESOLVED | Noted: 2023-01-26 | Resolved: 2023-08-09

## 2023-08-09 PROBLEM — A41.9 SEPSIS DUE TO CELLULITIS (HCC): Status: RESOLVED | Noted: 2022-10-03 | Resolved: 2023-08-09

## 2023-08-09 PROBLEM — I83.229 INFECTED STASIS ULCER OF LEFT LOWER EXTREMITY (HCC): Status: RESOLVED | Noted: 2023-03-24 | Resolved: 2023-08-09

## 2023-08-09 PROBLEM — L03.116 CELLULITIS OF LEFT LOWER EXTREMITY: Status: RESOLVED | Noted: 2022-10-03 | Resolved: 2023-08-09

## 2023-08-09 PROCEDURE — 11042 DBRDMT SUBQ TIS 1ST 20SQCM/<: CPT

## 2023-08-09 RX ORDER — LIDOCAINE 40 MG/G
CREAM TOPICAL ONCE
OUTPATIENT
Start: 2023-08-09 | End: 2023-08-09

## 2023-08-09 RX ORDER — BETAMETHASONE DIPROPIONATE 0.05 %
OINTMENT (GRAM) TOPICAL ONCE
OUTPATIENT
Start: 2023-08-09 | End: 2023-08-09

## 2023-08-09 RX ORDER — LIDOCAINE 50 MG/G
OINTMENT TOPICAL ONCE
OUTPATIENT
Start: 2023-08-09 | End: 2023-08-09

## 2023-08-09 RX ORDER — IBUPROFEN 200 MG
TABLET ORAL ONCE
OUTPATIENT
Start: 2023-08-09 | End: 2023-08-09

## 2023-08-09 RX ORDER — GINSENG 100 MG
CAPSULE ORAL ONCE
OUTPATIENT
Start: 2023-08-09 | End: 2023-08-09

## 2023-08-09 RX ORDER — LIDOCAINE HYDROCHLORIDE 40 MG/ML
SOLUTION TOPICAL ONCE
OUTPATIENT
Start: 2023-08-09 | End: 2023-08-09

## 2023-08-09 RX ORDER — CLOBETASOL PROPIONATE 0.5 MG/G
OINTMENT TOPICAL ONCE
OUTPATIENT
Start: 2023-08-09 | End: 2023-08-09

## 2023-08-09 RX ORDER — LIDOCAINE HYDROCHLORIDE 20 MG/ML
JELLY TOPICAL ONCE
OUTPATIENT
Start: 2023-08-09 | End: 2023-08-09

## 2023-08-09 RX ORDER — LIDOCAINE HYDROCHLORIDE 40 MG/ML
SOLUTION TOPICAL ONCE
Status: COMPLETED | OUTPATIENT
Start: 2023-08-09 | End: 2023-08-09

## 2023-08-09 RX ORDER — BACITRACIN ZINC AND POLYMYXIN B SULFATE 500; 1000 [USP'U]/G; [USP'U]/G
OINTMENT TOPICAL ONCE
OUTPATIENT
Start: 2023-08-09 | End: 2023-08-09

## 2023-08-09 RX ORDER — GENTAMICIN SULFATE 1 MG/G
OINTMENT TOPICAL ONCE
OUTPATIENT
Start: 2023-08-09 | End: 2023-08-09

## 2023-08-09 RX ADMIN — LIDOCAINE HYDROCHLORIDE 10 ML: 40 SOLUTION TOPICAL at 14:17

## 2023-08-09 ASSESSMENT — PAIN DESCRIPTION - ONSET: ONSET: ON-GOING

## 2023-08-09 ASSESSMENT — PAIN - FUNCTIONAL ASSESSMENT: PAIN_FUNCTIONAL_ASSESSMENT: PREVENTS OR INTERFERES SOME ACTIVE ACTIVITIES AND ADLS

## 2023-08-09 ASSESSMENT — PAIN DESCRIPTION - ORIENTATION: ORIENTATION: LEFT

## 2023-08-09 ASSESSMENT — PAIN DESCRIPTION - LOCATION: LOCATION: LEG

## 2023-08-09 ASSESSMENT — PAIN DESCRIPTION - PAIN TYPE: TYPE: CHRONIC PAIN

## 2023-08-09 ASSESSMENT — PAIN DESCRIPTION - FREQUENCY: FREQUENCY: INTERMITTENT

## 2023-08-09 ASSESSMENT — PAIN SCALES - GENERAL: PAINLEVEL_OUTOF10: 10

## 2023-08-09 ASSESSMENT — PAIN DESCRIPTION - DESCRIPTORS: DESCRIPTORS: SHOOTING;THROBBING

## 2023-08-09 NOTE — PROGRESS NOTES
Wound Healing Center Followup Visit Note    Referring Physician : Roney Hogan MD  [address] [facility] RECORD NUMBER:  [id number]  AGE: 70 y.o. GENDER: female  : 1945  EPISODE DATE:  2023    Subjective:     Chief Complaint   Patient presents with    Wound Check     Left leg      HISTORY of PRESENT ILLNESS HPI   Jaskaran Marquez is a 70 y.o. female who presents today in regards to follow up evaluation and treatment of wound/ulcer. That patient's past medical, family and social hx were reviewed and changes were made if present. History of Wound Context:  Patient has had left calf wound since ~ 2021. She has been putting a dressing on it. The wound has not been improving. She has a hx significant for venous stasis ulcerations of bilateral LE. She first was seen by myself in regards to these issues 2015. She eventually healed these wounds 2016. I last saw her 2021 at which time I recommended lymphedema therapy. She states she went and said it caused her to much pain and stopped going. She has chronic issues with bilateral calf pain, swelling and edema. She admits to not wearing her stockings because of the pain. She has used knee high 20-30 mm hg stockings in the past.       She is still seeing pain management and is down to percocet //325 mg daily.        21  aquacell  Double tubigrip  Culture done  Emphasized importance of getting in to more significant compression in the future  12/15/21  Culture reviewed - light growth, no tx  Wound slightly improved  Still significant drainage  Plan on compression wrap next week  21  Stable wound  Drainage slightly better  Pt would like to wait till next week because of holidays to start wrap  22  Wound larger  Profore  22  Wound appearance better  Refusing wrap - it rolled down last week and she cut off after 3 days  22  Wound appearance better  Still refusing wrap   3/2/22  periwound

## 2023-08-09 NOTE — PLAN OF CARE
Problem: Cognitive:  Goal: Knowledge of wound care  Description: Knowledge of wound care  Outcome: Adequate for Discharge  Goal: Understands risk factors for wounds  Description: Understands risk factors for wounds  Outcome: Adequate for Discharge     Problem: Wound:  Goal: Will show signs of wound healing; wound closure and no evidence of infection  Description: Will show signs of wound healing; wound closure and no evidence of infection  Outcome: Not Progressing     Problem: Venous:  Goal: Signs of wound healing will improve  Description: Signs of wound healing will improve  Outcome: Not Progressing

## 2023-08-15 NOTE — DISCHARGE INSTRUCTIONS
Visit Discharge/Physician Orders     Discharge condition: Stable     Assessment of pain at discharge: mild     Anesthetic used: lido 4%     Discharge to: Home     Left via:Private automobile     Accompanied by: self     ECF/HHA:      Dressing Orders:  LEFT PRETIB : cleanse with normal saline, apply calcium alginate AG, cover with dry dressing and secure. Change daily. Apply spandagrip. On in am and off in pm. Elevate legs as much as possible above level of the heart. Treatment Orders: Eat a diet high in protein and vitamin C. Take a multiple vitamin daily unless contraindicated. Elevate as much as possible     Continue lymphedema therapy     [address] [place] [address] followup visit: 1 week____________________________  (Please note your next appointment above and if you are unable to keep, kindly give a 24 hour notice. Thank you.)     Physician signature:__________________________      If you experience any of the following, please call the "[employer] during business hours:     * Increase in Pain  * Temperature over 101  * Increase in drainage from your wound  * Drainage with a foul odor  * Bleeding  * Increase in swelling  * Need for compression bandage changes due to slippage, breakthrough drainage. If you need medical attention outside of the business hours of the "[employer] please contact your PCP or go to the nearest emergency room.

## 2023-08-16 ENCOUNTER — HOSPITAL ENCOUNTER (OUTPATIENT)
Dept: WOUND CARE | Age: 78
Discharge: HOME | End: 2023-08-16

## 2023-08-21 ENCOUNTER — TELEPHONE (OUTPATIENT)
Dept: VASCULAR SURGERY | Age: 78
End: 2023-08-21

## 2023-08-21 NOTE — TELEPHONE ENCOUNTER
Patient called to request Rx for Morphine 15 mg and Oxycodone 7.5-325 mg be sent to [facility].

## 2023-08-23 ENCOUNTER — HOSPITAL ENCOUNTER (OUTPATIENT)
Dept: WOUND CARE | Age: 78
Discharge: HOME | End: 2023-08-23
Payer: MEDICARE

## 2023-08-23 VITALS
TEMPERATURE: 97.1 F | SYSTOLIC BLOOD PRESSURE: 146 MMHG | HEART RATE: 104 BPM | RESPIRATION RATE: 18 BRPM | DIASTOLIC BLOOD PRESSURE: 80 MMHG

## 2023-08-23 DIAGNOSIS — I87.2 VENOUS STASIS ULCER OF LEFT CALF WITH FAT LAYER EXPOSED WITHOUT VARICOSE VEINS (HCC): Primary | Chronic | ICD-10-CM

## 2023-08-23 DIAGNOSIS — L97.222 VENOUS STASIS ULCER OF LEFT CALF WITH FAT LAYER EXPOSED WITHOUT VARICOSE VEINS (HCC): Primary | Chronic | ICD-10-CM

## 2023-08-23 DIAGNOSIS — I89.0 LYMPHEDEMA: ICD-10-CM

## 2023-08-23 PROCEDURE — 11042 DBRDMT SUBQ TIS 1ST 20SQCM/<: CPT

## 2023-08-23 RX ORDER — LIDOCAINE HYDROCHLORIDE 20 MG/ML
JELLY TOPICAL ONCE
OUTPATIENT
Start: 2023-08-23 | End: 2023-08-23

## 2023-08-23 RX ORDER — BACITRACIN ZINC AND POLYMYXIN B SULFATE 500; 1000 [USP'U]/G; [USP'U]/G
OINTMENT TOPICAL ONCE
OUTPATIENT
Start: 2023-08-23 | End: 2023-08-23

## 2023-08-23 RX ORDER — MORPHINE SULFATE 15 MG/1
15 TABLET, FILM COATED, EXTENDED RELEASE ORAL 2 TIMES DAILY
Qty: 60 TABLET | Refills: 0 | Status: SHIPPED | OUTPATIENT
Start: 2023-08-23 | End: 2023-09-22

## 2023-08-23 RX ORDER — GINSENG 100 MG
CAPSULE ORAL ONCE
OUTPATIENT
Start: 2023-08-23 | End: 2023-08-23

## 2023-08-23 RX ORDER — CLOBETASOL PROPIONATE 0.5 MG/G
OINTMENT TOPICAL ONCE
OUTPATIENT
Start: 2023-08-23 | End: 2023-08-23

## 2023-08-23 RX ORDER — BETAMETHASONE DIPROPIONATE 0.05 %
OINTMENT (GRAM) TOPICAL ONCE
OUTPATIENT
Start: 2023-08-23 | End: 2023-08-23

## 2023-08-23 RX ORDER — LIDOCAINE HYDROCHLORIDE 40 MG/ML
SOLUTION TOPICAL ONCE
OUTPATIENT
Start: 2023-08-23 | End: 2023-08-23

## 2023-08-23 RX ORDER — LIDOCAINE 50 MG/G
OINTMENT TOPICAL ONCE
OUTPATIENT
Start: 2023-08-23 | End: 2023-08-23

## 2023-08-23 RX ORDER — IBUPROFEN 200 MG
TABLET ORAL ONCE
OUTPATIENT
Start: 2023-08-23 | End: 2023-08-23

## 2023-08-23 RX ORDER — LIDOCAINE HYDROCHLORIDE 40 MG/ML
SOLUTION TOPICAL ONCE
Status: COMPLETED | OUTPATIENT
Start: 2023-08-23 | End: 2023-08-23

## 2023-08-23 RX ORDER — OXYCODONE AND ACETAMINOPHEN 7.5; 325 MG/1; MG/1
1 TABLET ORAL EVERY 8 HOURS PRN
Qty: 90 TABLET | Refills: 0 | Status: SHIPPED | OUTPATIENT
Start: 2023-08-23 | End: 2023-09-22

## 2023-08-23 RX ORDER — LIDOCAINE 40 MG/G
CREAM TOPICAL ONCE
OUTPATIENT
Start: 2023-08-23 | End: 2023-08-23

## 2023-08-23 RX ORDER — GENTAMICIN SULFATE 1 MG/G
OINTMENT TOPICAL ONCE
OUTPATIENT
Start: 2023-08-23 | End: 2023-08-23

## 2023-08-23 RX ADMIN — LIDOCAINE HYDROCHLORIDE 10 ML: 40 SOLUTION TOPICAL at 15:16

## 2023-08-23 ASSESSMENT — PAIN DESCRIPTION - ORIENTATION: ORIENTATION: LEFT

## 2023-08-23 ASSESSMENT — PAIN - FUNCTIONAL ASSESSMENT: PAIN_FUNCTIONAL_ASSESSMENT: PREVENTS OR INTERFERES SOME ACTIVE ACTIVITIES AND ADLS

## 2023-08-23 ASSESSMENT — PAIN DESCRIPTION - LOCATION: LOCATION: LEG

## 2023-08-23 ASSESSMENT — PAIN SCALES - GENERAL: PAINLEVEL_OUTOF10: 6

## 2023-08-23 NOTE — DISCHARGE INSTRUCTIONS
Visit Discharge/Physician Orders     Discharge condition: Stable     Assessment of pain at discharge: mild     Anesthetic used: lido 4%     Discharge to: Home     Left via:Private automobile     Accompanied by: self     ECF/HHA:      Dressing Orders:  LEFT PRETIB : cleanse with normal saline, apply calcium alginate AG, cover with dry dressing and secure. Change daily. Apply spandagrip. On in am and off in pm. Elevate legs as much as possible above level of the heart. Treatment Orders: Eat a diet high in protein and vitamin C. Take a multiple vitamin daily unless contraindicated. Elevate as much as possible     Continue lymphedema therapy     [address] [place] [address] followup visit: 1 week____________________________  (Please note your next appointment above and if you are unable to keep, kindly give a 24 hour notice. Thank you.)     Physician signature:__________________________      If you experience any of the following, please call the [facility] during business hours:     * Increase in Pain  * Temperature over 101  * Increase in drainage from your wound  * Drainage with a foul odor  * Bleeding  * Increase in swelling  * Need for compression bandage changes due to slippage, breakthrough drainage. If you need medical attention outside of the business hours of the [employer] [facility] please contact your PCP or go to the nearest emergency room.

## 2023-08-23 NOTE — PROGRESS NOTES
Wound Healing Center Followup Visit Note    Referring Physician : Brigida Polo MD  [address] [facility] RECORD NUMBER:  [id number]  AGE: 70 y.o. GENDER: female  : 1945  EPISODE DATE:  2023    Subjective:     Chief Complaint   Patient presents with    Wound Check     Leg left      HISTORY of PRESENT ILLNESS HPI   Sourav Dove is a 70 y.o. female who presents today in regards to follow up evaluation and treatment of wound/ulcer. That patient's past medical, family and social hx were reviewed and changes were made if present. History of Wound Context:  Patient has had left calf wound since ~ 2021. She has been putting a dressing on it. The wound has not been improving. She has a hx significant for venous stasis ulcerations of bilateral LE. She first was seen by myself in regards to these issues 2015. She eventually healed these wounds 2016. I last saw her 2021 at which time I recommended lymphedema therapy. She states she went and said it caused her to much pain and stopped going. She has chronic issues with bilateral calf pain, swelling and edema. She admits to not wearing her stockings because of the pain. She has used knee high 20-30 mm hg stockings in the past.       She is still seeing pain management and is down to percocet 7/5/325 mg daily.        21  aquacell  Double tubigrip  Culture done  Emphasized importance of getting in to more significant compression in the future  12/15/21  Culture reviewed - light growth, no tx  Wound slightly improved  Still significant drainage  Plan on compression wrap next week  21  Stable wound  Drainage slightly better  Pt would like to wait till next week because of holidays to start wrap  22  Wound larger  Profore  22  Wound appearance better  Refusing wrap - it rolled down last week and she cut off after 3 days  22  Wound appearance better  Still refusing wrap   3/2/22  periwound

## 2023-08-30 ENCOUNTER — HOSPITAL ENCOUNTER (OUTPATIENT)
Dept: WOUND CARE | Age: 78
Discharge: HOME | End: 2023-08-30
Payer: MEDICARE

## 2023-08-30 VITALS
BODY MASS INDEX: 41.68 KG/M2 | HEART RATE: 86 BPM | WEIGHT: 275 LBS | HEIGHT: 68 IN | SYSTOLIC BLOOD PRESSURE: 152 MMHG | TEMPERATURE: 96.8 F | DIASTOLIC BLOOD PRESSURE: 75 MMHG | RESPIRATION RATE: 18 BRPM

## 2023-08-30 DIAGNOSIS — I87.2 VENOUS STASIS ULCER OF LEFT CALF WITH FAT LAYER EXPOSED WITHOUT VARICOSE VEINS (HCC): Primary | Chronic | ICD-10-CM

## 2023-08-30 DIAGNOSIS — L97.222 VENOUS STASIS ULCER OF LEFT CALF WITH FAT LAYER EXPOSED WITHOUT VARICOSE VEINS (HCC): Primary | Chronic | ICD-10-CM

## 2023-08-30 PROCEDURE — 11042 DBRDMT SUBQ TIS 1ST 20SQCM/<: CPT

## 2023-08-30 RX ORDER — BACITRACIN ZINC AND POLYMYXIN B SULFATE 500; 1000 [USP'U]/G; [USP'U]/G
OINTMENT TOPICAL ONCE
OUTPATIENT
Start: 2023-08-30 | End: 2023-08-30

## 2023-08-30 RX ORDER — LIDOCAINE HYDROCHLORIDE 40 MG/ML
SOLUTION TOPICAL ONCE
OUTPATIENT
Start: 2023-08-30 | End: 2023-08-30

## 2023-08-30 RX ORDER — LIDOCAINE 40 MG/G
CREAM TOPICAL ONCE
OUTPATIENT
Start: 2023-08-30 | End: 2023-08-30

## 2023-08-30 RX ORDER — LIDOCAINE HYDROCHLORIDE 20 MG/ML
JELLY TOPICAL ONCE
OUTPATIENT
Start: 2023-08-30 | End: 2023-08-30

## 2023-08-30 RX ORDER — BETAMETHASONE DIPROPIONATE 0.05 %
OINTMENT (GRAM) TOPICAL ONCE
OUTPATIENT
Start: 2023-08-30 | End: 2023-08-30

## 2023-08-30 RX ORDER — LIDOCAINE HYDROCHLORIDE 40 MG/ML
SOLUTION TOPICAL ONCE
Status: COMPLETED | OUTPATIENT
Start: 2023-08-30 | End: 2023-08-30

## 2023-08-30 RX ORDER — LIDOCAINE 50 MG/G
OINTMENT TOPICAL ONCE
OUTPATIENT
Start: 2023-08-30 | End: 2023-08-30

## 2023-08-30 RX ORDER — GINSENG 100 MG
CAPSULE ORAL ONCE
OUTPATIENT
Start: 2023-08-30 | End: 2023-08-30

## 2023-08-30 RX ORDER — IBUPROFEN 200 MG
TABLET ORAL ONCE
OUTPATIENT
Start: 2023-08-30 | End: 2023-08-30

## 2023-08-30 RX ORDER — GENTAMICIN SULFATE 1 MG/G
OINTMENT TOPICAL ONCE
OUTPATIENT
Start: 2023-08-30 | End: 2023-08-30

## 2023-08-30 RX ORDER — CLOBETASOL PROPIONATE 0.5 MG/G
OINTMENT TOPICAL ONCE
OUTPATIENT
Start: 2023-08-30 | End: 2023-08-30

## 2023-08-30 RX ADMIN — LIDOCAINE HYDROCHLORIDE 10 ML: 40 SOLUTION TOPICAL at 16:15

## 2023-08-30 ASSESSMENT — PAIN DESCRIPTION - FREQUENCY: FREQUENCY: INTERMITTENT

## 2023-08-30 ASSESSMENT — PAIN - FUNCTIONAL ASSESSMENT: PAIN_FUNCTIONAL_ASSESSMENT: PREVENTS OR INTERFERES SOME ACTIVE ACTIVITIES AND ADLS

## 2023-08-30 ASSESSMENT — PAIN DESCRIPTION - LOCATION: LOCATION: LEG

## 2023-08-30 ASSESSMENT — PAIN DESCRIPTION - PAIN TYPE: TYPE: CHRONIC PAIN

## 2023-08-30 ASSESSMENT — PAIN SCALES - GENERAL: PAINLEVEL_OUTOF10: 7

## 2023-08-30 ASSESSMENT — PAIN DESCRIPTION - ONSET: ONSET: ON-GOING

## 2023-08-30 ASSESSMENT — PAIN DESCRIPTION - ORIENTATION: ORIENTATION: LEFT

## 2023-08-30 ASSESSMENT — PAIN DESCRIPTION - DESCRIPTORS: DESCRIPTORS: BURNING

## 2023-08-30 NOTE — DISCHARGE INSTRUCTIONS
Visit Discharge/Physician Orders     Discharge condition: Stable     Assessment of pain at discharge: mild     Anesthetic used: lido 4%     Discharge to: Home     Left via:Private automobile     Accompanied by: self     ECF/HHA:      Dressing Orders:  LEFT PRETIB : cleanse with normal saline, apply calcium alginate AG, cover with dry dressing and secure. Change daily. Apply spandagrip. On in am and off in pm. Elevate legs as much as possible above level of the heart. Treatment Orders: Eat a diet high in protein and vitamin C. Take a multiple vitamin daily unless contraindicated. Elevate as much as possible     Continue lymphedema therapy     [address] [place] [address] followup visit: 1 week____________________________  (Please note your next appointment above and if you are unable to keep, kindly give a 24 hour notice. Thank you.)     Physician signature:__________________________      If you experience any of the following, please call the [facility] during business hours:     * Increase in Pain  * Temperature over 101  * Increase in drainage from your wound  * Drainage with a foul odor  * Bleeding  * Increase in swelling  * Need for compression bandage changes due to slippage, breakthrough drainage. If you need medical attention outside of the business hours of the [facility] please contact your PCP or go to the nearest emergency room.

## 2023-08-31 NOTE — PROGRESS NOTES
cm  Improving  New wound to left lateral leg  3/8/23  Size 33 sq cm , worse  Culture done  3/15/23  Size slightly smaller 31 sq cm but wound not much improved  Acinetobacter, MRSA, pseudomonas - disc with Dr. Jenni Buenrostro - who will arrange for iv abx  [facility] reviewed  MS contin 15 mg q12 #60, Perocet 7.5/325 mg q12 #60  4/19/23  Admitted with iv abx 3/2023  Was seen at [facility]  Wound improved since last seen  [facility]rs reviewed  MS contin 15 mg q12 #60, Perocet 7.5/325 mg q12 #60  Referral to pain management made  per pt she never got appointment  4/26/23  Wound slightly larger  More swelling  Refusing compression wraps  5/1/23  Hospital admission  Concern for NSTEMI  5/17/23  Wound stable  [facility]rs reviewed  MS contin 15 mg q12 #60, Perocet 7.5/325 mg q12 #60  5/24/23  Stable  6/7/23  Wound slightly larger  Appearance improved  6/14/23  Larger  Off lasix in creased swelling  Referral to lymphedema therapy  6/21/23  Wound worse - she didn't use dratwek - explained importance of to address increased drainage  [facility]rs reviewed  MS contin 15 mg q12 #60, Perocet 7.5/325 mg q12 #60  Spoke with [facility] - they are currently behind and will get her in as soon as able  6/28/23  Lymphedema therapy left message but pt did not receive - asked to call them back  Wound stable  Emphasized importance of using dressings to pull drainage away from wound - less mascerated  7/12/23  Wound worse  Pt not using drawtek or alginate   Emphasized importance of using dressings to address drainage  Culture done  Lymphedema therapy - she needs to call back to get appointment  7/19/23  Wound improved  MSSA, Pseudomonas cx - dsc with Dr Jenni Buenrostro -will start doxycyline, levaquin  Scripts for pain medication, given, OARRS reviewed - pain increased will increase percocet to q8  MS contin 15 mg q12 #60, Perocet 7.5/325 mg q8 #90  7/26/23  Wound improved  Started abx 7/24 Monday - as issues with my prescriptions  8/9/2023  Leg leg ulcers, stable, patient

## 2023-09-05 NOTE — DISCHARGE INSTRUCTIONS
Visit Discharge/Physician Orders     Discharge condition: Stable     Assessment of pain at discharge: mild     Anesthetic used: lido 4%     Discharge to: Home     Left via:Private automobile     Accompanied by: self     ECF/HHA:      Dressing Orders:  LEFT PRETIB : cleanse with normal saline, apply calcium alginate AG, cover with dry dressing and secure. Change daily. Apply spandagrip. On in am and off in pm. Elevate legs as much as possible above level of the heart. Treatment Orders: Eat a diet high in protein and vitamin C. Take a multiple vitamin daily unless contraindicated. Elevate as much as possible     Continue lymphedema therapy     [address] [place] [address] followup visit: 1 week____________________________  (Please note your next appointment above and if you are unable to keep, kindly give a 24 hour notice. Thank you.)     Physician signature:__________________________      If you experience any of the following, please call the [facility] during business hours:     * Increase in Pain  * Temperature over 101  * Increase in drainage from your wound  * Drainage with a foul odor  * Bleeding  * Increase in swelling  * Need for compression bandage changes due to slippage, breakthrough drainage. If you need medical attention outside of the business hours of the [facility] please contact your PCP or go to the nearest emergency room.

## 2023-09-06 ENCOUNTER — HOSPITAL ENCOUNTER (OUTPATIENT)
Dept: WOUND CARE | Age: 78
Discharge: HOME | End: 2023-09-06
Payer: MEDICARE

## 2023-09-06 VITALS
SYSTOLIC BLOOD PRESSURE: 169 MMHG | HEART RATE: 81 BPM | DIASTOLIC BLOOD PRESSURE: 80 MMHG | TEMPERATURE: 95.3 F | RESPIRATION RATE: 20 BRPM

## 2023-09-06 DIAGNOSIS — I87.2 VENOUS STASIS ULCER OF LEFT CALF WITH FAT LAYER EXPOSED WITHOUT VARICOSE VEINS (HCC): Primary | Chronic | ICD-10-CM

## 2023-09-06 DIAGNOSIS — L97.222 VENOUS STASIS ULCER OF LEFT CALF WITH FAT LAYER EXPOSED WITHOUT VARICOSE VEINS (HCC): Primary | Chronic | ICD-10-CM

## 2023-09-06 PROCEDURE — 11042 DBRDMT SUBQ TIS 1ST 20SQCM/<: CPT

## 2023-09-06 RX ORDER — LIDOCAINE 50 MG/G
OINTMENT TOPICAL ONCE
OUTPATIENT
Start: 2023-09-06 | End: 2023-09-06

## 2023-09-06 RX ORDER — LIDOCAINE HYDROCHLORIDE 40 MG/ML
SOLUTION TOPICAL ONCE
OUTPATIENT
Start: 2023-09-06 | End: 2023-09-06

## 2023-09-06 RX ORDER — GENTAMICIN SULFATE 1 MG/G
OINTMENT TOPICAL ONCE
OUTPATIENT
Start: 2023-09-06 | End: 2023-09-06

## 2023-09-06 RX ORDER — LIDOCAINE HYDROCHLORIDE 40 MG/ML
SOLUTION TOPICAL ONCE
Status: COMPLETED | OUTPATIENT
Start: 2023-09-06 | End: 2023-09-06

## 2023-09-06 RX ORDER — IBUPROFEN 200 MG
TABLET ORAL ONCE
OUTPATIENT
Start: 2023-09-06 | End: 2023-09-06

## 2023-09-06 RX ORDER — BETAMETHASONE DIPROPIONATE 0.05 %
OINTMENT (GRAM) TOPICAL ONCE
OUTPATIENT
Start: 2023-09-06 | End: 2023-09-06

## 2023-09-06 RX ORDER — LIDOCAINE HYDROCHLORIDE 20 MG/ML
JELLY TOPICAL ONCE
OUTPATIENT
Start: 2023-09-06 | End: 2023-09-06

## 2023-09-06 RX ORDER — CLOBETASOL PROPIONATE 0.5 MG/G
OINTMENT TOPICAL ONCE
OUTPATIENT
Start: 2023-09-06 | End: 2023-09-06

## 2023-09-06 RX ORDER — LIDOCAINE 40 MG/G
CREAM TOPICAL ONCE
OUTPATIENT
Start: 2023-09-06 | End: 2023-09-06

## 2023-09-06 RX ORDER — BACITRACIN ZINC AND POLYMYXIN B SULFATE 500; 1000 [USP'U]/G; [USP'U]/G
OINTMENT TOPICAL ONCE
OUTPATIENT
Start: 2023-09-06 | End: 2023-09-06

## 2023-09-06 RX ORDER — GINSENG 100 MG
CAPSULE ORAL ONCE
OUTPATIENT
Start: 2023-09-06 | End: 2023-09-06

## 2023-09-06 RX ADMIN — LIDOCAINE HYDROCHLORIDE: 40 SOLUTION TOPICAL at 15:17

## 2023-09-06 ASSESSMENT — PAIN DESCRIPTION - DESCRIPTORS: DESCRIPTORS: BURNING

## 2023-09-06 ASSESSMENT — PAIN DESCRIPTION - ORIENTATION: ORIENTATION: LEFT

## 2023-09-06 ASSESSMENT — PAIN DESCRIPTION - LOCATION: LOCATION: LEG

## 2023-09-06 NOTE — PROGRESS NOTES
[address] [address] [address]:     [facility] [address] [place] [address] 1000 [facility] [place], [address]  p: [phone] f: [phone]     Ordering Center:     [address] [place] [address] [address]  [facility] [address]  [phone]  WOUND CARE Dept: [address] [facility] [phone]    Patient Information:      Sourav Falling  [address] [facility]  [facility] [address]   I have attempted without success to contact this patient by phone for Preadmission Testing phone assessment. Message left for pt to return call. : 1945  AGE: 70 y.o. GENDER: female   EPISODE DATE: 2023    Insurance:      PRIMARY INSURANCE:  Plan: Michaela Keyes COMPLETE  Coverage: [facility] MEDICARE  Effective Date: 2022  Group Number: [unfilled]  Subscriber Number: [id number] - (Medicare Managed)    Payer/Plan Subscr  Sex Relation Sub. Ins. ID Effective Group Num   1. [facility] 1945 Female Self [id number]                                     [address]   2. [place] Sourav Falling 1945 Female Self [id number] 10/16/18                                     [address]       Patient Wound Information:      Problem List Items Addressed This Visit          Other    Venous stasis ulcer of left calf with fat layer exposed without varicose veins ([address]) - Primary (Chronic)    Relevant Orders    Initiate Outpatient Wound Care Protocol       WOUNDS REQUIRING DRESSING SUPPLIES:     Wound 23 Leg Left; Lower #2 (Active)   Wound Image   23 1514   Wound Etiology Traumatic 23 1620   Dressing Status New dressing applied;Clean;Dry; Intact 23 1700   Wound Cleansed Cleansed with saline 23 1700   Dressing/Treatment Alginate with Ag;ABD;Dry dressing 23 1700   Wound Length (cm) 11.3 cm 23 1515   Wound Width (cm) 13.4 cm 23 1515   Wound Depth (cm) 0.3 cm 23 1515   Wound Surface Area (cm^2) 151.42 cm^2 23 1515

## 2023-09-11 NOTE — DISCHARGE INSTRUCTIONS
Visit Discharge/Physician Orders     Discharge condition: Stable     Assessment of pain at discharge: mild     Anesthetic used: lido 4%     Discharge to: Home     Left via:Private automobile     Accompanied by: self     ECF/HHA:      Dressing Orders:  LEFT PRETIB : cleanse with normal saline, apply calcium alginate AG, cover with dry dressing and secure. Change daily. Apply spandagrip. On in am and off in pm. Elevate legs as much as possible above level of the heart. Treatment Orders: Eat a diet high in protein and vitamin C. Take a multiple vitamin daily unless contraindicated. Elevate as much as possible     Continue lymphedema therapy     [address] [place] [address] followup visit: 1 week____________________________  (Please note your next appointment above and if you are unable to keep, kindly give a 24 hour notice. Thank you.)     Physician signature:__________________________      If you experience any of the following, please call the [facility] during business hours:     * Increase in Pain  * Temperature over 101  * Increase in drainage from your wound  * Drainage with a foul odor  * Bleeding  * Increase in swelling  * Need for compression bandage changes due to slippage, breakthrough drainage. If you need medical attention outside of the business hours of the [facility] please contact your PCP or go to the nearest emergency room.

## 2023-09-13 ENCOUNTER — HOSPITAL ENCOUNTER (OUTPATIENT)
Dept: WOUND CARE | Age: 78
Discharge: HOME | End: 2023-09-13

## 2023-09-27 ENCOUNTER — HOSPITAL ENCOUNTER (OUTPATIENT)
Dept: WOUND CARE | Age: 78
Discharge: HOME | End: 2023-09-27
Payer: MEDICARE

## 2023-09-27 VITALS
DIASTOLIC BLOOD PRESSURE: 79 MMHG | HEART RATE: 98 BPM | HEIGHT: 68 IN | BODY MASS INDEX: 41.68 KG/M2 | SYSTOLIC BLOOD PRESSURE: 165 MMHG | TEMPERATURE: 96.5 F | RESPIRATION RATE: 20 BRPM | WEIGHT: 275 LBS

## 2023-09-27 DIAGNOSIS — I87.2 VENOUS STASIS ULCER OF LEFT CALF WITH FAT LAYER EXPOSED WITHOUT VARICOSE VEINS (HCC): Primary | ICD-10-CM

## 2023-09-27 DIAGNOSIS — L97.222 VENOUS STASIS ULCER OF LEFT CALF WITH FAT LAYER EXPOSED WITHOUT VARICOSE VEINS (HCC): Primary | ICD-10-CM

## 2023-09-27 PROCEDURE — 99213 OFFICE O/P EST LOW 20 MIN: CPT

## 2023-09-27 RX ORDER — LIDOCAINE 40 MG/G
CREAM TOPICAL ONCE
OUTPATIENT
Start: 2023-09-27 | End: 2023-09-27

## 2023-09-27 RX ORDER — GINSENG 100 MG
CAPSULE ORAL ONCE
OUTPATIENT
Start: 2023-09-27 | End: 2023-09-27

## 2023-09-27 RX ORDER — BACITRACIN ZINC AND POLYMYXIN B SULFATE 500; 1000 [USP'U]/G; [USP'U]/G
OINTMENT TOPICAL ONCE
OUTPATIENT
Start: 2023-09-27 | End: 2023-09-27

## 2023-09-27 RX ORDER — LIDOCAINE HYDROCHLORIDE 40 MG/ML
SOLUTION TOPICAL ONCE
Status: COMPLETED | OUTPATIENT
Start: 2023-09-27 | End: 2023-09-27

## 2023-09-27 RX ORDER — LIDOCAINE HYDROCHLORIDE 20 MG/ML
JELLY TOPICAL ONCE
OUTPATIENT
Start: 2023-09-27 | End: 2023-09-27

## 2023-09-27 RX ORDER — LIDOCAINE 50 MG/G
OINTMENT TOPICAL ONCE
OUTPATIENT
Start: 2023-09-27 | End: 2023-09-27

## 2023-09-27 RX ORDER — CLOBETASOL PROPIONATE 0.5 MG/G
OINTMENT TOPICAL ONCE
OUTPATIENT
Start: 2023-09-27 | End: 2023-09-27

## 2023-09-27 RX ORDER — LIDOCAINE HYDROCHLORIDE 40 MG/ML
SOLUTION TOPICAL ONCE
OUTPATIENT
Start: 2023-09-27 | End: 2023-09-27

## 2023-09-27 RX ORDER — GENTAMICIN SULFATE 1 MG/G
OINTMENT TOPICAL ONCE
OUTPATIENT
Start: 2023-09-27 | End: 2023-09-27

## 2023-09-27 RX ORDER — TRIAMCINOLONE ACETONIDE 1 MG/G
OINTMENT TOPICAL ONCE
OUTPATIENT
Start: 2023-09-27 | End: 2023-09-27

## 2023-09-27 RX ORDER — BETAMETHASONE DIPROPIONATE 0.05 %
OINTMENT (GRAM) TOPICAL ONCE
OUTPATIENT
Start: 2023-09-27 | End: 2023-09-27

## 2023-09-27 RX ORDER — IBUPROFEN 200 MG
TABLET ORAL ONCE
OUTPATIENT
Start: 2023-09-27 | End: 2023-09-27

## 2023-09-27 RX ADMIN — LIDOCAINE HYDROCHLORIDE 10 ML: 40 SOLUTION TOPICAL at 14:37

## 2023-09-27 ASSESSMENT — PAIN DESCRIPTION - DESCRIPTORS: DESCRIPTORS: THROBBING;BURNING

## 2023-09-27 ASSESSMENT — PAIN DESCRIPTION - ORIENTATION: ORIENTATION: LEFT

## 2023-09-27 ASSESSMENT — PAIN SCALES - GENERAL: PAINLEVEL_OUTOF10: 10

## 2023-09-27 ASSESSMENT — PAIN DESCRIPTION - LOCATION: LOCATION: LEG

## 2023-09-27 NOTE — PROGRESS NOTES
scheduled for lymphedema therapy  8/23/23  Wounds improved  She is doing lymphedema therapy   MS contin 15 mg q12 #60, Perocet 7.5/325 mg q8 #90, oarrs reviewed  8/30/23  Wound appearance improvedb but measuring larger 161  Difficulty tolerating debridement  9/6/23  Wound smaller 151  Still struggling with tolerance of debridement  9/27/23  Wound smaller 118  Would not allow debridement  Tolerating lymphedema therapy    \Wound/Ulcer Pain Timing/Severity: waxing and waning, mild  Quality of pain: aching, throbbing, pressure  Severity:  7 / 10   Modifying Factors: Pain worsens with debridement, dressing changes  Associated Signs/Symptoms: edema, drainage and pain    Ulcer Identification:  Ulcer Type: venous and lymphedema  Contributing Factors: edema, venous stasis and lymphedema    Diabetic/Pressure/Non Pressure Ulcers only:  Ulcer: Non-Pressure ulcer, fat layer exposed    Wound: N/A  PAST MEDICAL HISTORY      Diagnosis Date    Bursitis     CAD (coronary artery disease) 1993    heart attack    Cellulitis of leg, left 10/03/2022    COPD (chronic obstructive pulmonary disease) ([address]) 06/19/2013    Dermatophytosis 01/27/2023    Emphysema     slight    GERD (gastroesophageal reflux disease)     Hiatal hernia     Hip pain     Hx of blood clots     Hyperlipidemia     Hypertension     Lymphedema of both lower extremities 11/21/2018    Venous insufficiency of both lower extremities 11/21/2018    Venous stasis ulcer of left calf with fat layer exposed without varicose veins ([address]) 12/08/2021    Venous stasis ulcer of left calf with fat layer exposed without varicose veins ([address]) 12/08/2021    Longstanding ulcer over the shin of the left calf, with a new ulcer over the posterior aspect of the left calf, for the last 1 week    Venous ulcer with fat layer exposed ([address]) 10/28/2015     Past Surgical History:   Procedure Laterality Date    APPENDECTOMY      BREAST ENHANCEMENT SURGERY      BREAST REDUCTION SURGERY      CHOLECYSTECTOMY

## 2023-09-27 NOTE — DISCHARGE INSTRUCTIONS
Visit Discharge/Physician Orders     Discharge condition: Stable     Assessment of pain at discharge: mild     Anesthetic used: lido 4%     Discharge to: Home     Left via:Private automobile     Accompanied by: self     ECF/HHA:      Dressing Orders:  LEFT PRETIB : cleanse with normal saline, apply calcium alginate AG, cover with dry dressing and secure. Change daily. Apply spandagrip. On in am and off in pm. Elevate legs as much as possible above level of the heart. Treatment Orders: Eat a diet high in protein and vitamin C. Take a multiple vitamin daily unless contraindicated. Elevate as much as possible     Continue lymphedema therapy     [facility] followup visit: 1 week____________________________  (Please note your next appointment above and if you are unable to keep, kindly give a 24 hour notice. Thank you.)     Physician signature:__________________________      If you experience any of the following, please call the [facility] during business hours:     * Increase in Pain  * Temperature over 101  * Increase in drainage from your wound  * Drainage with a foul odor  * Bleeding  * Increase in swelling  * Need for compression bandage changes due to slippage, breakthrough drainage. If you need medical attention outside of the business hours of the [facility] please contact your PCP or go to the nearest emergency room.

## 2023-10-02 NOTE — DISCHARGE INSTRUCTIONS
Visit Discharge/Physician Orders     Discharge condition: Stable     Assessment of pain at discharge: mild     Anesthetic used: lido 4%     Discharge to: Home     Left via:Private automobile     Accompanied by: self     ECF/HHA:      Dressing Orders:  LEFT PRETIB : cleanse with normal saline, apply calcium alginate AG, cover with dry dressing and secure. Change daily. Apply spandagrip. On in am and off in pm. Elevate legs as much as possible above level of the heart. Treatment Orders: Eat a diet high in protein and vitamin C. Take a multiple vitamin daily unless contraindicated. Elevate as much as possible     Continue lymphedema therapy     [address] [place] [address] followup visit: 1 week____________________________  (Please note your next appointment above and if you are unable to keep, kindly give a 24 hour notice. Thank you.)     Physician signature:__________________________      If you experience any of the following, please call the [facility] during business hours:     * Increase in Pain  * Temperature over 101  * Increase in drainage from your wound  * Drainage with a foul odor  * Bleeding  * Increase in swelling  * Need for compression bandage changes due to slippage, breakthrough drainage. If you need medical attention outside of the business hours of the [facility] please contact your PCP or go to the nearest emergency room.

## 2023-10-04 ENCOUNTER — HOSPITAL ENCOUNTER (OUTPATIENT)
Dept: WOUND CARE | Age: 78
Discharge: HOME | End: 2023-10-04
Payer: MEDICARE

## 2023-10-04 VITALS
BODY MASS INDEX: 37.89 KG/M2 | RESPIRATION RATE: 20 BRPM | HEART RATE: 84 BPM | HEIGHT: 68 IN | SYSTOLIC BLOOD PRESSURE: 150 MMHG | TEMPERATURE: 95.9 F | WEIGHT: 250 LBS | DIASTOLIC BLOOD PRESSURE: 78 MMHG

## 2023-10-04 DIAGNOSIS — I89.0 LYMPHEDEMA: ICD-10-CM

## 2023-10-04 DIAGNOSIS — I87.2 VENOUS STASIS ULCER OF LEFT CALF WITH FAT LAYER EXPOSED WITHOUT VARICOSE VEINS (HCC): Primary | ICD-10-CM

## 2023-10-04 DIAGNOSIS — L97.222 VENOUS STASIS ULCER OF LEFT CALF WITH FAT LAYER EXPOSED WITHOUT VARICOSE VEINS (HCC): Primary | ICD-10-CM

## 2023-10-04 PROCEDURE — 11042 DBRDMT SUBQ TIS 1ST 20SQCM/<: CPT

## 2023-10-04 PROCEDURE — 11042 DBRDMT SUBQ TIS 1ST 20SQCM/<: CPT | Performed by: SURGERY

## 2023-10-04 RX ORDER — OXYCODONE AND ACETAMINOPHEN 7.5; 325 MG/1; MG/1
1 TABLET ORAL 2 TIMES DAILY
Qty: 60 TABLET | Refills: 0 | Status: SHIPPED | OUTPATIENT
Start: 2023-10-04 | End: 2023-11-03

## 2023-10-04 RX ORDER — BACITRACIN ZINC AND POLYMYXIN B SULFATE 500; 1000 [USP'U]/G; [USP'U]/G
OINTMENT TOPICAL ONCE
OUTPATIENT
Start: 2023-10-04 | End: 2023-10-04

## 2023-10-04 RX ORDER — GINSENG 100 MG
CAPSULE ORAL ONCE
OUTPATIENT
Start: 2023-10-04 | End: 2023-10-04

## 2023-10-04 RX ORDER — LIDOCAINE 50 MG/G
OINTMENT TOPICAL ONCE
OUTPATIENT
Start: 2023-10-04 | End: 2023-10-04

## 2023-10-04 RX ORDER — BETAMETHASONE DIPROPIONATE 0.05 %
OINTMENT (GRAM) TOPICAL ONCE
OUTPATIENT
Start: 2023-10-04 | End: 2023-10-04

## 2023-10-04 RX ORDER — MORPHINE SULFATE 15 MG/1
15 TABLET, FILM COATED, EXTENDED RELEASE ORAL 2 TIMES DAILY
Qty: 60 TABLET | Refills: 0 | Status: SHIPPED | OUTPATIENT
Start: 2023-10-04 | End: 2023-11-03

## 2023-10-04 RX ORDER — LIDOCAINE HYDROCHLORIDE 40 MG/ML
SOLUTION TOPICAL ONCE
OUTPATIENT
Start: 2023-10-04 | End: 2023-10-04

## 2023-10-04 RX ORDER — GENTAMICIN SULFATE 1 MG/G
OINTMENT TOPICAL ONCE
OUTPATIENT
Start: 2023-10-04 | End: 2023-10-04

## 2023-10-04 RX ORDER — LIDOCAINE HYDROCHLORIDE 20 MG/ML
JELLY TOPICAL ONCE
OUTPATIENT
Start: 2023-10-04 | End: 2023-10-04

## 2023-10-04 RX ORDER — IBUPROFEN 200 MG
TABLET ORAL ONCE
OUTPATIENT
Start: 2023-10-04 | End: 2023-10-04

## 2023-10-04 RX ORDER — TRIAMCINOLONE ACETONIDE 1 MG/G
OINTMENT TOPICAL ONCE
OUTPATIENT
Start: 2023-10-04 | End: 2023-10-04

## 2023-10-04 RX ORDER — LIDOCAINE HYDROCHLORIDE 40 MG/ML
SOLUTION TOPICAL ONCE
Status: COMPLETED | OUTPATIENT
Start: 2023-10-04 | End: 2023-10-04

## 2023-10-04 RX ORDER — CLOBETASOL PROPIONATE 0.5 MG/G
OINTMENT TOPICAL ONCE
OUTPATIENT
Start: 2023-10-04 | End: 2023-10-04

## 2023-10-04 RX ORDER — OXYCODONE AND ACETAMINOPHEN 7.5; 325 MG/1; MG/1
1 TABLET ORAL EVERY 8 HOURS PRN
Qty: 90 TABLET | Refills: 0 | Status: SHIPPED | OUTPATIENT
Start: 2023-10-04 | End: 2023-11-03

## 2023-10-04 RX ORDER — LIDOCAINE 40 MG/G
CREAM TOPICAL ONCE
OUTPATIENT
Start: 2023-10-04 | End: 2023-10-04

## 2023-10-04 RX ADMIN — LIDOCAINE HYDROCHLORIDE 10 ML: 40 SOLUTION TOPICAL at 15:57

## 2023-10-04 ASSESSMENT — PAIN DESCRIPTION - DESCRIPTORS: DESCRIPTORS: ACHING;BURNING;THROBBING

## 2023-10-04 ASSESSMENT — PAIN SCALES - GENERAL: PAINLEVEL_OUTOF10: 9

## 2023-10-04 ASSESSMENT — PAIN DESCRIPTION - FREQUENCY: FREQUENCY: CONTINUOUS

## 2023-10-04 ASSESSMENT — PAIN - FUNCTIONAL ASSESSMENT: PAIN_FUNCTIONAL_ASSESSMENT: PREVENTS OR INTERFERES SOME ACTIVE ACTIVITIES AND ADLS

## 2023-10-04 ASSESSMENT — PAIN DESCRIPTION - ORIENTATION: ORIENTATION: LEFT

## 2023-10-04 ASSESSMENT — PAIN DESCRIPTION - LOCATION: LOCATION: LEG

## 2023-10-04 ASSESSMENT — PAIN DESCRIPTION - PAIN TYPE: TYPE: CHRONIC PAIN

## 2023-10-04 ASSESSMENT — PAIN DESCRIPTION - ONSET: ONSET: ON-GOING

## 2023-10-04 NOTE — PROGRESS NOTES
Wound Healing Center Followup Visit Note    Referring Physician : Paul Mcneal MD  [id number] [facility] RECORD NUMBER:  [id number]  AGE: 70 y.o. GENDER: female  : 1945  EPISODE DATE:  10/4/2023    Subjective:     Chief Complaint   Patient presents with    Wound Check     Left leg       HISTORY of PRESENT ILLNESS HPI   Andrew Churchill is a 70 y.o. female who presents today in regards to follow up evaluation and treatment of wound/ulcer. That patient's past medical, family and social hx were reviewed and changes were made if present. History of Wound Context:  Patient has had left calf wound since ~ 2021. She has been putting a dressing on it. The wound has not been improving. She has a hx significant for venous stasis ulcerations of bilateral LE. She first was seen by myself in regards to these issues 2015. She eventually healed these wounds 2016. I last saw her 2021 at which time I recommended lymphedema therapy. She states she went and said it caused her to much pain and stopped going. She has chronic issues with bilateral calf pain, swelling and edema. She admits to not wearing her stockings because of the pain. She has used knee high 20-30 mm hg stockings in the past.       She is still seeing pain management and is down to percocet 7/5/325 mg daily.        21  [facility]  Double tubigrip  Culture done  Emphasized importance of getting in to more significant compression in the future  12/15/21  Culture reviewed - light growth, no tx  Wound slightly improved  Still significant drainage  Plan on compression wrap next week  21  Stable wound  Drainage slightly better  Pt would like to wait till next week because of holidays to start wrap  22  Wound larger  Profore  22  Wound appearance better  Refusing wrap - it rolled down last week and she cut off after 3 days  22  Wound appearance better  Still refusing wrap   3/2/22  periwound
with Ag;ABD;Dry dressing 09/27/23 1628   Wound Length (cm) 11.2 cm 10/04/23 1552   Wound Width (cm) 11.5 cm 10/04/23 1552   Wound Depth (cm) 0.2 cm 10/04/23 1552   Wound Surface Area (cm^2) 128.8 cm^2 10/04/23 1552   Change in Wound Size % (l*w) -2151.75 10/04/23 1552   Wound Volume (cm^3) 25.76 cm^3 10/04/23 1552   Wound Healing % -4403 10/04/23 1552   Post-Procedure Length (cm) 11.2 cm 10/04/23 1633   Post-Procedure Width (cm) 11.5 cm 10/04/23 1633   Post-Procedure Depth (cm) 0.2 cm 10/04/23 1633   Post-Procedure Surface Area (cm^2) 128.8 cm^2 10/04/23 1633   Post-Procedure Volume (cm^3) 25.76 cm^3 10/04/23 1633   Wound Assessment Fibrin;Pink/red;Granulation tissue;Slough 10/04/23 1552   Drainage Amount Large (50-75% saturated) 10/04/23 1552   Drainage Description Serosanguinous; Yellow 10/04/23 1552   Odor None 10/04/23 1552   Kori-wound Assessment Fragile 10/04/23 1552   Margins Attached edges 08/23/23 1514   Number of days: 217          Supplies Requested :      WOUND #: 2   PRIMARY DRESSING:  Alginate with silver pad   Cover and Secure with: ABD pad     FREQUENCY OF DRESSING CHANGES:  Daily           ADDITIONAL ITEMS:  [] Gloves Small  [x] Gloves Medium [] Gloves Large [] Gloves XLarge  [] Tape 1\" [x] Tape 2\" [] Tape 3\"  [] Medipore Tape  [x] Saline  [] Vashe  [] Skin Prep   [] Adhesive Remover   [] Cotton Tip Applicators   [] Other:    Patient Wound(s) Debrided: [x] Yes if yes please add date 10/4/23   [] No    Debribement Type: Excisional/Sharp    Is the patient currently on an antibiotic for their Wound(s): [] Yes if yes please add name and dose    [x] No    Patient currently being seen by Home Health: [] Yes   [x] No    Duration for needed supplies:  []15  []30  []60  [x]90 Days    Electronically signed by Tika Mitchell RN on 10/4/2023 at 4:43 PM     Provider Information:      Simon Crocker NAME:Dr. Nichole Drivers    NPI: [id number]

## 2023-10-09 NOTE — DISCHARGE INSTRUCTIONS
Visit Discharge/Physician Orders     Discharge condition: Stable     Assessment of pain at discharge: mild     Anesthetic used: lido 4%     Discharge to: Home     Left via:Private automobile     Accompanied by: self     ECF/HHA:      Dressing Orders:  LEFT PRETIB : cleanse with normal saline, apply calcium alginate AG, cover with dry dressing and secure. Change daily. Apply spandagrip. On in am and off in pm. Elevate legs as much as possible above level of the heart. Treatment Orders: Eat a diet high in protein and vitamin C. Take a multiple vitamin daily unless contraindicated. Elevate as much as possible     Continue lymphedema therapy     [address] [place] [address] followup visit: 1 week____________________________  (Please note your next appointment above and if you are unable to keep, kindly give a 24 hour notice. Thank you.)     Physician signature:__________________________      If you experience any of the following, please call the [facility] during business hours:     * Increase in Pain  * Temperature over 101  * Increase in drainage from your wound  * Drainage with a foul odor  * Bleeding  * Increase in swelling  * Need for compression bandage changes due to slippage, breakthrough drainage. If you need medical attention outside of the business hours of the [facility] please contact your PCP or go to the nearest emergency room.

## 2023-10-11 ENCOUNTER — HOSPITAL ENCOUNTER (OUTPATIENT)
Dept: WOUND CARE | Age: 78
Discharge: HOME | End: 2023-10-11
Attending: SURGERY

## 2023-10-16 NOTE — DISCHARGE INSTRUCTIONS
Visit Discharge/Physician Orders     Discharge condition: Stable     Assessment of pain at discharge: mild     Anesthetic used: lido 4%     Discharge to: Home     Left via:Private automobile     Accompanied by: self     ECF/HHA:      Dressing Orders:  LEFT PRETIB : cleanse with normal saline, apply calcium alginate AG, cover with dry dressing and secure. Change daily. Apply spandagrip. On in am and off in pm. Elevate legs as much as possible above level of the heart. Treatment Orders: Eat a diet high in protein and vitamin C. Take a multiple vitamin daily unless contraindicated. Elevate as much as possible     Continue lymphedema therapy     [address] [place] [address] followup visit: 1 week____________________________  (Please note your next appointment above and if you are unable to keep, kindly give a 24 hour notice. Thank you.)     Physician signature:__________________________      If you experience any of the following, please call the [facility] during business hours:     * Increase in Pain  * Temperature over 101  * Increase in drainage from your wound  * Drainage with a foul odor  * Bleeding  * Increase in swelling  * Need for compression bandage changes due to slippage, breakthrough drainage. If you need medical attention outside of the business hours of the [facility] please contact your PCP or go to the nearest emergency room.

## 2023-10-18 ENCOUNTER — HOSPITAL ENCOUNTER (OUTPATIENT)
Dept: WOUND CARE | Age: 78
Discharge: HOME | End: 2023-10-18
Attending: SURGERY
Payer: MEDICARE

## 2023-10-18 VITALS
HEIGHT: 68 IN | WEIGHT: 250 LBS | DIASTOLIC BLOOD PRESSURE: 67 MMHG | SYSTOLIC BLOOD PRESSURE: 132 MMHG | RESPIRATION RATE: 200 BRPM | BODY MASS INDEX: 37.89 KG/M2 | HEART RATE: 102 BPM | TEMPERATURE: 96.3 F

## 2023-10-18 DIAGNOSIS — I87.2 VENOUS STASIS ULCER OF LEFT CALF WITH FAT LAYER EXPOSED WITHOUT VARICOSE VEINS (HCC): Primary | ICD-10-CM

## 2023-10-18 DIAGNOSIS — L97.222 VENOUS STASIS ULCER OF LEFT CALF WITH FAT LAYER EXPOSED WITHOUT VARICOSE VEINS (HCC): Primary | ICD-10-CM

## 2023-10-18 PROCEDURE — 11045 DBRDMT SUBQ TISS EACH ADDL: CPT

## 2023-10-18 PROCEDURE — 11042 DBRDMT SUBQ TIS 1ST 20SQCM/<: CPT

## 2023-10-18 RX ORDER — LIDOCAINE HYDROCHLORIDE 40 MG/ML
SOLUTION TOPICAL ONCE
OUTPATIENT
Start: 2023-10-18 | End: 2023-10-18

## 2023-10-18 RX ORDER — BETAMETHASONE DIPROPIONATE 0.05 %
OINTMENT (GRAM) TOPICAL ONCE
OUTPATIENT
Start: 2023-10-18 | End: 2023-10-18

## 2023-10-18 RX ORDER — GINSENG 100 MG
CAPSULE ORAL ONCE
OUTPATIENT
Start: 2023-10-18 | End: 2023-10-18

## 2023-10-18 RX ORDER — GENTAMICIN SULFATE 1 MG/G
OINTMENT TOPICAL ONCE
OUTPATIENT
Start: 2023-10-18 | End: 2023-10-18

## 2023-10-18 RX ORDER — CLOBETASOL PROPIONATE 0.5 MG/G
OINTMENT TOPICAL ONCE
OUTPATIENT
Start: 2023-10-18 | End: 2023-10-18

## 2023-10-18 RX ORDER — LIDOCAINE 50 MG/G
OINTMENT TOPICAL ONCE
OUTPATIENT
Start: 2023-10-18 | End: 2023-10-18

## 2023-10-18 RX ORDER — LIDOCAINE 40 MG/G
CREAM TOPICAL ONCE
OUTPATIENT
Start: 2023-10-18 | End: 2023-10-18

## 2023-10-18 RX ORDER — IBUPROFEN 200 MG
TABLET ORAL ONCE
OUTPATIENT
Start: 2023-10-18 | End: 2023-10-18

## 2023-10-18 RX ORDER — ASPIRIN 81 MG/1
81 TABLET ORAL DAILY
COMMUNITY

## 2023-10-18 RX ORDER — BACITRACIN ZINC AND POLYMYXIN B SULFATE 500; 1000 [USP'U]/G; [USP'U]/G
OINTMENT TOPICAL ONCE
OUTPATIENT
Start: 2023-10-18 | End: 2023-10-18

## 2023-10-18 RX ORDER — LIDOCAINE HYDROCHLORIDE 20 MG/ML
JELLY TOPICAL ONCE
OUTPATIENT
Start: 2023-10-18 | End: 2023-10-18

## 2023-10-18 RX ORDER — LIDOCAINE HYDROCHLORIDE 40 MG/ML
SOLUTION TOPICAL ONCE
Status: COMPLETED | OUTPATIENT
Start: 2023-10-18 | End: 2023-10-18

## 2023-10-18 RX ORDER — TRIAMCINOLONE ACETONIDE 1 MG/G
OINTMENT TOPICAL ONCE
OUTPATIENT
Start: 2023-10-18 | End: 2023-10-18

## 2023-10-18 RX ADMIN — LIDOCAINE HYDROCHLORIDE 5 ML: 40 SOLUTION TOPICAL at 15:40

## 2023-10-18 ASSESSMENT — PAIN SCALES - GENERAL: PAINLEVEL_OUTOF10: 7

## 2023-10-18 ASSESSMENT — PAIN DESCRIPTION - LOCATION: LOCATION: LEG

## 2023-10-18 ASSESSMENT — PAIN DESCRIPTION - ORIENTATION: ORIENTATION: LEFT

## 2023-10-18 NOTE — PROGRESS NOTES
scheduled for lymphedema therapy  8/23/23  Wounds improved  She is doing lymphedema therapy   MS contin 15 mg q12 #60, Perocet 7.5/325 mg q8 #90, oarrs reviewed  8/30/23  Wound appearance improvedb but measuring larger 161  Difficulty tolerating debridement  9/6/23  Wound smaller 151  Still struggling with tolerance of debridement  9/27/23  Wound smaller 118  Would not allow debridement  Tolerating lymphedema therapy  10/4/23  Did not tolerate debridement  Wound larger 128  MS contin 15 mg q12 #60, Perocet 7.5/325 mg q8 #90, oarrs reviewed  10/18/2023  Wound stable, with her permission, debrided the wound partly      Wound/Ulcer Pain Timing/Severity: waxing and waning, mild  Quality of pain: aching, throbbing, pressure  Severity:  7 / 10   Modifying Factors: Pain worsens with debridement, dressing changes  Associated Signs/Symptoms: edema, drainage and pain    Ulcer Identification:  Ulcer Type: venous and lymphedema  Contributing Factors: edema, venous stasis and lymphedema    Diabetic/Pressure/Non Pressure Ulcers only:  Ulcer: Non-Pressure ulcer, fat layer exposed    Wound: N/A  PAST MEDICAL HISTORY      Diagnosis Date    Bursitis     CAD (coronary artery disease) 1993    heart attack    Cellulitis of leg, left 10/03/2022    COPD (chronic obstructive pulmonary disease) ([address] [facility]) 06/19/2013    Dermatophytosis 01/27/2023    Emphysema     slight    GERD (gastroesophageal reflux disease)     Hiatal hernia     Hip pain     Hx of blood clots     Hyperlipidemia     Hypertension     Lymphedema of both lower extremities 11/21/2018    Venous insufficiency of both lower extremities 11/21/2018    Venous stasis ulcer of left calf with fat layer exposed without varicose veins (HCC) 12/08/2021    Venous stasis ulcer of left calf with fat layer exposed without varicose veins (HCC) 12/08/2021    Longstanding ulcer over the shin of the left calf, with a new ulcer over the posterior aspect of the left calf, for the last 1 week    Venous

## 2023-10-23 NOTE — DISCHARGE INSTRUCTIONS
Written          Visit Discharge/Physician Orders     Discharge condition: Stable     Assessment of pain at discharge: mild     Anesthetic used: lido 4%     Discharge to: Home     Left via:Private automobile     Accompanied by: self     ECF/HHA:      Dressing Orders:  LEFT PRETIB : cleanse with normal saline, apply calcium alginate AG, cover with dry dressing and secure. Change daily. Apply spandagrip. On in am and off in pm. Elevate legs as much as possible above level of the heart. Treatment Orders: Eat a diet high in protein and vitamin C. Take a multiple vitamin daily unless contraindicated. Elevate as much as possible     Continue lymphedema therapy     [address] [place] [address] followup visit: 1 week____________________________  (Please note your next appointment above and if you are unable to keep, kindly give a 24 hour notice. Thank you.)     Physician signature:__________________________      If you experience any of the following, please call the [facility] during business hours:     * Increase in Pain  * Temperature over 101  * Increase in drainage from your wound  * Drainage with a foul odor  * Bleeding  * Increase in swelling  * Need for compression bandage changes due to slippage, breakthrough drainage. If you need medical attention outside of the business hours of the [facility] please contact your PCP or go to the nearest emergency room.

## 2023-10-25 ENCOUNTER — HOSPITAL ENCOUNTER (OUTPATIENT)
Dept: WOUND CARE | Age: 78
Discharge: HOME | End: 2023-10-25
Attending: SURGERY
Payer: MEDICARE

## 2023-10-25 VITALS
WEIGHT: 250 LBS | DIASTOLIC BLOOD PRESSURE: 83 MMHG | BODY MASS INDEX: 37.89 KG/M2 | SYSTOLIC BLOOD PRESSURE: 142 MMHG | HEART RATE: 112 BPM | RESPIRATION RATE: 112 BRPM | TEMPERATURE: 97.1 F | HEIGHT: 68 IN

## 2023-10-25 DIAGNOSIS — I87.2 VENOUS INSUFFICIENCY OF BOTH LOWER EXTREMITIES: Chronic | ICD-10-CM

## 2023-10-25 DIAGNOSIS — I87.2 VENOUS STASIS ULCER OF LEFT CALF WITH FAT LAYER EXPOSED WITHOUT VARICOSE VEINS (HCC): Primary | ICD-10-CM

## 2023-10-25 DIAGNOSIS — L97.222 VENOUS STASIS ULCER OF LEFT CALF WITH FAT LAYER EXPOSED WITHOUT VARICOSE VEINS (HCC): Primary | ICD-10-CM

## 2023-10-25 PROCEDURE — 11042 DBRDMT SUBQ TIS 1ST 20SQCM/<: CPT

## 2023-10-25 PROCEDURE — 11042 DBRDMT SUBQ TIS 1ST 20SQCM/<: CPT | Performed by: SURGERY

## 2023-10-25 RX ORDER — TRIAMCINOLONE ACETONIDE 1 MG/G
OINTMENT TOPICAL ONCE
OUTPATIENT
Start: 2023-10-25 | End: 2023-10-25

## 2023-10-25 RX ORDER — LIDOCAINE 40 MG/G
CREAM TOPICAL ONCE
OUTPATIENT
Start: 2023-10-25 | End: 2023-10-25

## 2023-10-25 RX ORDER — LIDOCAINE HYDROCHLORIDE 40 MG/ML
SOLUTION TOPICAL ONCE
OUTPATIENT
Start: 2023-10-25 | End: 2023-10-25

## 2023-10-25 RX ORDER — GINSENG 100 MG
CAPSULE ORAL ONCE
OUTPATIENT
Start: 2023-10-25 | End: 2023-10-25

## 2023-10-25 RX ORDER — IBUPROFEN 200 MG
TABLET ORAL ONCE
OUTPATIENT
Start: 2023-10-25 | End: 2023-10-25

## 2023-10-25 RX ORDER — LIDOCAINE HYDROCHLORIDE 20 MG/ML
JELLY TOPICAL ONCE
OUTPATIENT
Start: 2023-10-25 | End: 2023-10-25

## 2023-10-25 RX ORDER — BACITRACIN ZINC AND POLYMYXIN B SULFATE 500; 1000 [USP'U]/G; [USP'U]/G
OINTMENT TOPICAL ONCE
OUTPATIENT
Start: 2023-10-25 | End: 2023-10-25

## 2023-10-25 RX ORDER — CLOBETASOL PROPIONATE 0.5 MG/G
OINTMENT TOPICAL ONCE
OUTPATIENT
Start: 2023-10-25 | End: 2023-10-25

## 2023-10-25 RX ORDER — LIDOCAINE HYDROCHLORIDE 40 MG/ML
SOLUTION TOPICAL ONCE
Status: COMPLETED | OUTPATIENT
Start: 2023-10-25 | End: 2023-10-25

## 2023-10-25 RX ORDER — GENTAMICIN SULFATE 1 MG/G
OINTMENT TOPICAL ONCE
OUTPATIENT
Start: 2023-10-25 | End: 2023-10-25

## 2023-10-25 RX ORDER — BETAMETHASONE DIPROPIONATE 0.05 %
OINTMENT (GRAM) TOPICAL ONCE
OUTPATIENT
Start: 2023-10-25 | End: 2023-10-25

## 2023-10-25 RX ORDER — LIDOCAINE 50 MG/G
OINTMENT TOPICAL ONCE
OUTPATIENT
Start: 2023-10-25 | End: 2023-10-25

## 2023-10-25 RX ADMIN — LIDOCAINE HYDROCHLORIDE 20 ML: 40 SOLUTION TOPICAL at 15:25

## 2023-10-25 ASSESSMENT — PAIN DESCRIPTION - PROGRESSION: CLINICAL_PROGRESSION: NOT CHANGED

## 2023-10-25 NOTE — PLAN OF CARE
Problem: Chronic Conditions and Co-morbidities  Goal: Patient's chronic conditions and co-morbidity symptoms are monitored and maintained or improved  Outcome: Progressing     Problem: Cognitive:  Goal: Knowledge of wound care  Description: Knowledge of wound care  Outcome: Progressing  Goal: Understands risk factors for wounds  Description: Understands risk factors for wounds  Outcome: Progressing     Problem: Wound:  Goal: Will show signs of wound healing; wound closure and no evidence of infection  Description: Will show signs of wound healing; wound closure and no evidence of infection  Outcome: Progressing     Problem: Venous:  Goal: Signs of wound healing will improve  Description: Signs of wound healing will improve  Outcome: Adequate for Discharge

## 2023-10-26 NOTE — PROGRESS NOTES
Wound Healing Center Followup Visit Note    Referring Physician : Prashant Geronimo MD  [address] [facility] RECORD NUMBER:  [id number]  AGE: 70 y.o. GENDER: female  : 1945  EPISODE DATE:  10/25/2023    Subjective:     Chief Complaint   Patient presents with    Wound Check     Left lower leg      HISTORY of PRESENT ILLNESS HPI   Chuckie Hung is a 70 y.o. female who presents today in regards to follow up evaluation and treatment of wound/ulcer. That patient's past medical, family and social hx were reviewed and changes were made if present. History of Wound Context:  Patient has had left calf wound since ~ 2021. She has been putting a dressing on it. The wound has not been improving. She has a hx significant for venous stasis ulcerations of bilateral LE. She first was seen by myself in regards to these issues 2015. She eventually healed these wounds 2016. I last saw her 2021 at which time I recommended lymphedema therapy. She states she went and said it caused her to much pain and stopped going. She has chronic issues with bilateral calf pain, swelling and edema. She admits to not wearing her stockings because of the pain. She has used knee high 20-30 mm hg stockings in the past.       She is still seeing pain management and is down to percocet //325 mg daily.        21  [facility]  Double tubigrip  Culture done  Emphasized importance of getting in to more significant compression in the future  12/15/21  Culture reviewed - light growth, no tx  Wound slightly improved  Still significant drainage  Plan on compression wrap next week  21  Stable wound  Drainage slightly better  Pt would like to wait till next week because of holidays to start wrap  22  Wound larger  Profore  22  Wound appearance better  Refusing wrap - it rolled down last week and she cut off after 3 days  22  Wound appearance better  Still refusing wrap

## 2023-10-30 NOTE — DISCHARGE INSTRUCTIONS
Written             Written                        Visit Discharge/Physician Orders     Discharge condition: Stable     Assessment of pain at discharge: mild     Anesthetic used: lido 4%     Discharge to: Home     Left via:Private automobile     Accompanied by: self     ECF/HHA:      Dressing Orders:  LEFT PRETIB : cleanse with normal saline, apply calcium alginate AG, cover with dry dressing and secure. Change daily. Apply spandagrip. On in am and off in pm. Elevate legs as much as possible above level of the heart. Treatment Orders: Eat a diet high in protein and vitamin C. Take a multiple vitamin daily unless contraindicated. Elevate as much as possible     Continue lymphedema therapy     [address] [place] [address] followup visit: 1 week____________________________  (Please note your next appointment above and if you are unable to keep, kindly give a 24 hour notice. Thank you.)     Physician signature:__________________________      If you experience any of the following, please call the [facility] during business hours:     * Increase in Pain  * Temperature over 101  * Increase in drainage from your wound  * Drainage with a foul odor  * Bleeding  * Increase in swelling  * Need for compression bandage changes due to slippage, breakthrough drainage. If you need medical attention outside of the business hours of the [facility] please contact your PCP or go to the nearest emergency room.

## 2023-11-01 ENCOUNTER — HOSPITAL ENCOUNTER (OUTPATIENT)
Dept: WOUND CARE | Age: 78
Discharge: HOME | End: 2023-11-01
Attending: SURGERY
Payer: MEDICARE

## 2023-11-01 VITALS
SYSTOLIC BLOOD PRESSURE: 136 MMHG | BODY MASS INDEX: 40.18 KG/M2 | RESPIRATION RATE: 18 BRPM | HEART RATE: 92 BPM | TEMPERATURE: 97.3 F | WEIGHT: 250 LBS | HEIGHT: 66 IN | DIASTOLIC BLOOD PRESSURE: 86 MMHG

## 2023-11-01 DIAGNOSIS — I87.2 VENOUS INSUFFICIENCY OF BOTH LOWER EXTREMITIES: Chronic | ICD-10-CM

## 2023-11-01 DIAGNOSIS — L97.222 VENOUS STASIS ULCER OF LEFT CALF WITH FAT LAYER EXPOSED WITHOUT VARICOSE VEINS (HCC): Primary | ICD-10-CM

## 2023-11-01 DIAGNOSIS — I87.2 VENOUS STASIS ULCER OF LEFT CALF WITH FAT LAYER EXPOSED WITHOUT VARICOSE VEINS (HCC): Primary | ICD-10-CM

## 2023-11-01 PROCEDURE — 11042 DBRDMT SUBQ TIS 1ST 20SQCM/<: CPT

## 2023-11-01 RX ORDER — METOPROLOL SUCCINATE 50 MG/1
50 TABLET, EXTENDED RELEASE ORAL DAILY
COMMUNITY

## 2023-11-01 RX ORDER — LIDOCAINE HYDROCHLORIDE 20 MG/ML
JELLY TOPICAL ONCE
OUTPATIENT
Start: 2023-11-01 | End: 2023-11-01

## 2023-11-01 RX ORDER — TRIAMCINOLONE ACETONIDE 1 MG/G
OINTMENT TOPICAL ONCE
OUTPATIENT
Start: 2023-11-01 | End: 2023-11-01

## 2023-11-01 RX ORDER — LIDOCAINE 50 MG/G
OINTMENT TOPICAL ONCE
OUTPATIENT
Start: 2023-11-01 | End: 2023-11-01

## 2023-11-01 RX ORDER — IBUPROFEN 200 MG
TABLET ORAL ONCE
OUTPATIENT
Start: 2023-11-01 | End: 2023-11-01

## 2023-11-01 RX ORDER — OXYCODONE AND ACETAMINOPHEN 7.5; 325 MG/1; MG/1
1 TABLET ORAL EVERY 8 HOURS PRN
Qty: 90 TABLET | Refills: 0 | Status: SHIPPED | OUTPATIENT
Start: 2023-11-01 | End: 2023-12-01

## 2023-11-01 RX ORDER — BETAMETHASONE DIPROPIONATE 0.05 %
OINTMENT (GRAM) TOPICAL ONCE
OUTPATIENT
Start: 2023-11-01 | End: 2023-11-01

## 2023-11-01 RX ORDER — LIDOCAINE HYDROCHLORIDE 40 MG/ML
SOLUTION TOPICAL ONCE
Status: COMPLETED | OUTPATIENT
Start: 2023-11-01 | End: 2023-11-01

## 2023-11-01 RX ORDER — LIDOCAINE HYDROCHLORIDE 40 MG/ML
SOLUTION TOPICAL ONCE
OUTPATIENT
Start: 2023-11-01 | End: 2023-11-01

## 2023-11-01 RX ORDER — LIDOCAINE 40 MG/G
CREAM TOPICAL ONCE
OUTPATIENT
Start: 2023-11-01 | End: 2023-11-01

## 2023-11-01 RX ORDER — CLOBETASOL PROPIONATE 0.5 MG/G
OINTMENT TOPICAL ONCE
OUTPATIENT
Start: 2023-11-01 | End: 2023-11-01

## 2023-11-01 RX ORDER — GENTAMICIN SULFATE 1 MG/G
OINTMENT TOPICAL ONCE
OUTPATIENT
Start: 2023-11-01 | End: 2023-11-01

## 2023-11-01 RX ORDER — MORPHINE SULFATE 15 MG/1
15 TABLET, FILM COATED, EXTENDED RELEASE ORAL 2 TIMES DAILY
Qty: 60 TABLET | Refills: 0 | Status: SHIPPED | OUTPATIENT
Start: 2023-11-01 | End: 2023-12-01

## 2023-11-01 RX ORDER — GINSENG 100 MG
CAPSULE ORAL ONCE
OUTPATIENT
Start: 2023-11-01 | End: 2023-11-01

## 2023-11-01 RX ORDER — GABAPENTIN 300 MG/1
300 CAPSULE ORAL 3 TIMES DAILY
Qty: 90 CAPSULE | Refills: 3 | Status: SHIPPED | OUTPATIENT
Start: 2023-11-01 | End: 2024-02-29

## 2023-11-01 RX ORDER — BACITRACIN ZINC AND POLYMYXIN B SULFATE 500; 1000 [USP'U]/G; [USP'U]/G
OINTMENT TOPICAL ONCE
OUTPATIENT
Start: 2023-11-01 | End: 2023-11-01

## 2023-11-01 RX ADMIN — LIDOCAINE HYDROCHLORIDE 5 ML: 40 SOLUTION TOPICAL at 15:15

## 2023-11-01 ASSESSMENT — PAIN DESCRIPTION - DESCRIPTORS: DESCRIPTORS: STABBING;ACHING;BURNING

## 2023-11-01 ASSESSMENT — PAIN DESCRIPTION - LOCATION: LOCATION: LEG

## 2023-11-01 ASSESSMENT — PAIN SCALES - GENERAL: PAINLEVEL_OUTOF10: 5

## 2023-11-01 ASSESSMENT — PAIN DESCRIPTION - ORIENTATION: ORIENTATION: LEFT

## 2023-11-01 NOTE — PROGRESS NOTES
Wound Healing Center Followup Visit Note    Referring Physician : Claudia De Leon MD  [id number] [facility] RECORD NUMBER:  [id number]  AGE: 70 y.o. GENDER: female  : 1945  EPISODE DATE:  2023    Subjective:     Chief Complaint   Patient presents with    Wound Check     L lower leg      HISTORY of PRESENT ILLNESS HPI   Caryle Cable is a 70 y.o. female who presents today in regards to follow up evaluation and treatment of wound/ulcer. That patient's past medical, family and social hx were reviewed and changes were made if present. History of Wound Context:  Patient has had left calf wound since ~ 2021. She has been putting a dressing on it. The wound has not been improving. She has a hx significant for venous stasis ulcerations of bilateral LE. She first was seen by myself in regards to these issues 2015. She eventually healed these wounds 2016. I last saw her 2021 at which time I recommended lymphedema therapy. She states she went and said it caused her to much pain and stopped going. She has chronic issues with bilateral calf pain, swelling and edema. She admits to not wearing her stockings because of the pain. She has used knee high 20-30 mm hg stockings in the past.       She is still seeing pain management and is down to percocet 7/5/325 mg daily.        21  [facility]  Double tubigrip  Culture done  Emphasized importance of getting in to more significant compression in the future  12/15/21  Culture reviewed - light growth, no tx  Wound slightly improved  Still significant drainage  Plan on compression wrap next week  21  Stable wound  Drainage slightly better  Pt would like to wait till next week because of holidays to start wrap  22  Wound larger  Profore  22  Wound appearance better  Refusing wrap - it rolled down last week and she cut off after 3 days  22  Wound appearance better  Still refusing wrap   3/2/22  periwound

## 2023-11-06 NOTE — DISCHARGE INSTRUCTIONS
Visit Discharge/Physician Orders     Discharge condition: Stable     Assessment of pain at discharge: mild     Anesthetic used: lido 4%     Discharge to: Home     Left via:Private automobile     Accompanied by: self     ECF/HHA:      Dressing Orders:  LEFT PRETIB : cleanse with normal saline, apply calcium alginate AG, cover with dry dressing and secure. Change daily. Apply spandagrip. On in am and off in pm. Elevate legs as much as possible above level of the heart. Treatment Orders: Eat a diet high in protein and vitamin C. Take a multiple vitamin daily unless contraindicated. Elevate as much as possible     Continue lymphedema therapy     [address] [place] [address] followup visit: 1 week____________________________  (Please note your next appointment above and if you are unable to keep, kindly give a 24 hour notice. Thank you.)     Physician signature:__________________________      If you experience any of the following, please call the [facility] during business hours:     * Increase in Pain  * Temperature over 101  * Increase in drainage from your wound  * Drainage with a foul odor  * Bleeding  * Increase in swelling  * Need for compression bandage changes due to slippage, breakthrough drainage. If you need medical attention outside of the business hours of the [facility] please contact your PCP or go to the nearest emergency room.

## 2023-11-08 ENCOUNTER — HOSPITAL ENCOUNTER (OUTPATIENT)
Dept: WOUND CARE | Age: 78
Discharge: HOME | End: 2023-11-08
Attending: SURGERY
Payer: MEDICARE

## 2023-11-08 VITALS
SYSTOLIC BLOOD PRESSURE: 150 MMHG | HEART RATE: 80 BPM | DIASTOLIC BLOOD PRESSURE: 78 MMHG | TEMPERATURE: 97.2 F | RESPIRATION RATE: 18 BRPM

## 2023-11-08 DIAGNOSIS — L97.222 VENOUS STASIS ULCER OF LEFT CALF WITH FAT LAYER EXPOSED WITHOUT VARICOSE VEINS (HCC): Primary | ICD-10-CM

## 2023-11-08 DIAGNOSIS — I87.2 VENOUS STASIS ULCER OF LEFT CALF WITH FAT LAYER EXPOSED WITHOUT VARICOSE VEINS (HCC): Primary | ICD-10-CM

## 2023-11-08 DIAGNOSIS — I87.2 VENOUS INSUFFICIENCY OF BOTH LOWER EXTREMITIES: Chronic | ICD-10-CM

## 2023-11-08 PROCEDURE — 11042 DBRDMT SUBQ TIS 1ST 20SQCM/<: CPT

## 2023-11-08 PROCEDURE — 11042 DBRDMT SUBQ TIS 1ST 20SQCM/<: CPT | Performed by: SURGERY

## 2023-11-08 RX ORDER — LIDOCAINE HYDROCHLORIDE 20 MG/ML
JELLY TOPICAL ONCE
OUTPATIENT
Start: 2023-11-08 | End: 2023-11-08

## 2023-11-08 RX ORDER — GINSENG 100 MG
CAPSULE ORAL ONCE
OUTPATIENT
Start: 2023-11-08 | End: 2023-11-08

## 2023-11-08 RX ORDER — TRIAMCINOLONE ACETONIDE 1 MG/G
OINTMENT TOPICAL ONCE
OUTPATIENT
Start: 2023-11-08 | End: 2023-11-08

## 2023-11-08 RX ORDER — LIDOCAINE HYDROCHLORIDE 40 MG/ML
SOLUTION TOPICAL ONCE
Status: COMPLETED | OUTPATIENT
Start: 2023-11-08 | End: 2023-11-08

## 2023-11-08 RX ORDER — LIDOCAINE 50 MG/G
OINTMENT TOPICAL ONCE
OUTPATIENT
Start: 2023-11-08 | End: 2023-11-08

## 2023-11-08 RX ORDER — GENTAMICIN SULFATE 1 MG/G
OINTMENT TOPICAL ONCE
OUTPATIENT
Start: 2023-11-08 | End: 2023-11-08

## 2023-11-08 RX ORDER — LIDOCAINE 40 MG/G
CREAM TOPICAL ONCE
OUTPATIENT
Start: 2023-11-08 | End: 2023-11-08

## 2023-11-08 RX ORDER — BETAMETHASONE DIPROPIONATE 0.05 %
OINTMENT (GRAM) TOPICAL ONCE
OUTPATIENT
Start: 2023-11-08 | End: 2023-11-08

## 2023-11-08 RX ORDER — IBUPROFEN 200 MG
TABLET ORAL ONCE
OUTPATIENT
Start: 2023-11-08 | End: 2023-11-08

## 2023-11-08 RX ORDER — LIDOCAINE HYDROCHLORIDE 40 MG/ML
SOLUTION TOPICAL ONCE
OUTPATIENT
Start: 2023-11-08 | End: 2023-11-08

## 2023-11-08 RX ORDER — BACITRACIN ZINC AND POLYMYXIN B SULFATE 500; 1000 [USP'U]/G; [USP'U]/G
OINTMENT TOPICAL ONCE
OUTPATIENT
Start: 2023-11-08 | End: 2023-11-08

## 2023-11-08 RX ORDER — CLOBETASOL PROPIONATE 0.5 MG/G
OINTMENT TOPICAL ONCE
OUTPATIENT
Start: 2023-11-08 | End: 2023-11-08

## 2023-11-08 RX ADMIN — LIDOCAINE HYDROCHLORIDE 10 ML: 40 SOLUTION TOPICAL at 15:30

## 2023-11-08 ASSESSMENT — PAIN DESCRIPTION - DESCRIPTORS: DESCRIPTORS: DISCOMFORT;STABBING;SHARP

## 2023-11-08 ASSESSMENT — PAIN - FUNCTIONAL ASSESSMENT: PAIN_FUNCTIONAL_ASSESSMENT: PREVENTS OR INTERFERES SOME ACTIVE ACTIVITIES AND ADLS

## 2023-11-08 ASSESSMENT — PAIN SCALES - GENERAL: PAINLEVEL_OUTOF10: 5

## 2023-11-08 ASSESSMENT — PAIN DESCRIPTION - ORIENTATION: ORIENTATION: LEFT

## 2023-11-08 ASSESSMENT — PAIN DESCRIPTION - LOCATION: LOCATION: LEG

## 2023-11-08 ASSESSMENT — PAIN DESCRIPTION - PAIN TYPE: TYPE: CHRONIC PAIN

## 2023-11-08 NOTE — PROGRESS NOTES
Wound Healing Center Followup Visit Note    Referring Physician : Mady Hernández MD  [id number] [facility] RECORD NUMBER:  [id number]  AGE: 70 y.o. GENDER: female  : 1945  EPISODE DATE:  2023    Subjective:     Chief Complaint   Patient presents with    Wound Check     Left leg       HISTORY of PRESENT ILLNESS HPI   Latisha Fisher is a 70 y.o. female who presents today in regards to follow up evaluation and treatment of wound/ulcer. That patient's past medical, family and social hx were reviewed and changes were made if present. History of Wound Context:  Patient has had left calf wound since ~ 2021. She has been putting a dressing on it. The wound has not been improving. She has a hx significant for venous stasis ulcerations of bilateral LE. She first was seen by myself in regards to these issues 2015. She eventually healed these wounds 2016. I last saw her 2021 at which time I recommended lymphedema therapy. She states she went and said it caused her to much pain and stopped going. She has chronic issues with bilateral calf pain, swelling and edema. She admits to not wearing her stockings because of the pain. She has used knee high 20-30 mm hg stockings in the past.       She is still seeing pain management and is down to percocet 7/5/325 mg daily.        [facility]  [facility]  Culture done  Emphasized importance of getting in to more significant compression in the future  12/15/21  Culture reviewed - light growth, no tx  Wound slightly improved  Still significant drainage  Plan on compression wrap next week  21  Stable wound  Drainage slightly better  Pt would like to wait till next week because of holidays to start wrap  22  Wound larger  Profore  22  Wound appearance better  Refusing wrap - it rolled down last week and she cut off after 3 days  22  Wound appearance better  Still refusing wrap   3/2/22  periwound

## 2023-11-13 NOTE — DISCHARGE INSTRUCTIONS
Visit Discharge/Physician Orders     Discharge condition: Stable     Assessment of pain at discharge: mild     Anesthetic used: lido 4%     Discharge to: Home     Left via:Private automobile     Accompanied by: self     ECF/HHA:      Dressing Orders:  LEFT PRETIB : Cleanse with normal saline, apply alginate AG, cover with dry dressing and secure. Change daily. Once santyl arrives, apply to wound bed from edge to edge, nickel thick, cover with alginate AG, secure with dry dressing. Change daily. Apply spandagrip. On in am and off in pm. Elevate legs as much as possible above level of the heart. Treatment Orders: Eat a diet high in protein and vitamin C. Take a multiple vitamin daily unless contraindicated. Elevate as much as possible     Continue lymphedema therapy     [address] [place] [address] followup visit: 1 week____________________________  (Please note your next appointment above and if you are unable to keep, kindly give a 24 hour notice. Thank you.)     Physician signature:__________________________      If you experience any of the following, please call the [facility] during business hours:     * Increase in Pain  * Temperature over 101  * Increase in drainage from your wound  * Drainage with a foul odor  * Bleeding  * Increase in swelling  * Need for compression bandage changes due to slippage, breakthrough drainage. If you need medical attention outside of the business hours of the [employer] please contact your PCP or go to the nearest emergency room.

## 2023-11-15 ENCOUNTER — HOSPITAL ENCOUNTER (OUTPATIENT)
Dept: WOUND CARE | Age: 78
Discharge: HOME | End: 2023-11-15
Attending: SURGERY
Payer: MEDICARE

## 2023-11-15 VITALS
RESPIRATION RATE: 18 BRPM | HEART RATE: 97 BPM | HEIGHT: 66 IN | SYSTOLIC BLOOD PRESSURE: 168 MMHG | DIASTOLIC BLOOD PRESSURE: 92 MMHG | BODY MASS INDEX: 40.18 KG/M2 | TEMPERATURE: 95.8 F | WEIGHT: 250 LBS

## 2023-11-15 DIAGNOSIS — I89.0 LYMPHEDEMA: ICD-10-CM

## 2023-11-15 DIAGNOSIS — I87.2 VENOUS STASIS ULCER OF LEFT CALF WITH FAT LAYER EXPOSED WITHOUT VARICOSE VEINS (HCC): Primary | ICD-10-CM

## 2023-11-15 DIAGNOSIS — L97.222 VENOUS STASIS ULCER OF LEFT CALF WITH FAT LAYER EXPOSED WITHOUT VARICOSE VEINS (HCC): Primary | ICD-10-CM

## 2023-11-15 PROCEDURE — 11042 DBRDMT SUBQ TIS 1ST 20SQCM/<: CPT

## 2023-11-15 PROCEDURE — 11042 DBRDMT SUBQ TIS 1ST 20SQCM/<: CPT | Performed by: SURGERY

## 2023-11-15 PROCEDURE — 11045 DBRDMT SUBQ TISS EACH ADDL: CPT

## 2023-11-15 PROCEDURE — 11045 DBRDMT SUBQ TISS EACH ADDL: CPT | Performed by: SURGERY

## 2023-11-15 RX ORDER — CLOBETASOL PROPIONATE 0.5 MG/G
OINTMENT TOPICAL ONCE
OUTPATIENT
Start: 2023-11-15 | End: 2023-11-15

## 2023-11-15 RX ORDER — LIDOCAINE 50 MG/G
OINTMENT TOPICAL ONCE
OUTPATIENT
Start: 2023-11-15 | End: 2023-11-15

## 2023-11-15 RX ORDER — LIDOCAINE HYDROCHLORIDE 20 MG/ML
JELLY TOPICAL ONCE
OUTPATIENT
Start: 2023-11-15 | End: 2023-11-15

## 2023-11-15 RX ORDER — LIDOCAINE HYDROCHLORIDE 40 MG/ML
SOLUTION TOPICAL ONCE
OUTPATIENT
Start: 2023-11-15 | End: 2023-11-15

## 2023-11-15 RX ORDER — BACITRACIN ZINC AND POLYMYXIN B SULFATE 500; 1000 [USP'U]/G; [USP'U]/G
OINTMENT TOPICAL ONCE
OUTPATIENT
Start: 2023-11-15 | End: 2023-11-15

## 2023-11-15 RX ORDER — TRIAMCINOLONE ACETONIDE 1 MG/G
OINTMENT TOPICAL ONCE
OUTPATIENT
Start: 2023-11-15 | End: 2023-11-15

## 2023-11-15 RX ORDER — GINSENG 100 MG
CAPSULE ORAL ONCE
OUTPATIENT
Start: 2023-11-15 | End: 2023-11-15

## 2023-11-15 RX ORDER — LIDOCAINE HYDROCHLORIDE 40 MG/ML
SOLUTION TOPICAL ONCE
Status: COMPLETED | OUTPATIENT
Start: 2023-11-15 | End: 2023-11-15

## 2023-11-15 RX ORDER — IBUPROFEN 200 MG
TABLET ORAL ONCE
OUTPATIENT
Start: 2023-11-15 | End: 2023-11-15

## 2023-11-15 RX ORDER — LIDOCAINE 40 MG/G
CREAM TOPICAL ONCE
OUTPATIENT
Start: 2023-11-15 | End: 2023-11-15

## 2023-11-15 RX ORDER — GENTAMICIN SULFATE 1 MG/G
OINTMENT TOPICAL ONCE
OUTPATIENT
Start: 2023-11-15 | End: 2023-11-15

## 2023-11-15 RX ORDER — BETAMETHASONE DIPROPIONATE 0.05 %
OINTMENT (GRAM) TOPICAL ONCE
OUTPATIENT
Start: 2023-11-15 | End: 2023-11-15

## 2023-11-15 RX ADMIN — LIDOCAINE HYDROCHLORIDE 10 ML: 40 SOLUTION TOPICAL at 15:11

## 2023-11-15 NOTE — PROGRESS NOTES
heart.      Treatment Orders: Eat a diet high in protein and vitamin C. Take a multiple vitamin daily unless contraindicated. Elevate as much as possible     Continue lymphedema therapy     [facility] followup visit: 1 week____________________________  (Please note your next appointment above and if you are unable to keep, kindly give a 24 hour notice. Thank you.)     Physician signature:__________________________      If you experience any of the following, please call the [facility] during business hours:     * Increase in Pain  * Temperature over 101  * Increase in drainage from your wound  * Drainage with a foul odor  * Bleeding  * Increase in swelling  * Need for compression bandage changes due to slippage, breakthrough drainage. If you need medical attention outside of the business hours of the [facility] please contact your PCP or go to the nearest emergency room.         Electronically signed by Dustin Boeck, MD

## 2023-11-20 NOTE — DISCHARGE INSTRUCTIONS
Visit Discharge/Physician Orders     Discharge condition: Stable     Assessment of pain at discharge: mild     Anesthetic used: lido 4%     Discharge to: Home     Left via:Private automobile     Accompanied by: self     ECF/HHA:      Dressing Orders:  LEFT PRETIB : Cleanse with normal saline, apply alginate AG, cover with dry dressing and secure. Change daily. Once santyl arrives, apply to wound bed from edge to edge, nickel thick, cover with alginate AG, secure with dry dressing. Change daily. Apply spandagrip. On in am and off in pm. Elevate legs as much as possible above level of the heart. Treatment Orders: Eat a diet high in protein and vitamin C. Take a multiple vitamin daily unless contraindicated. Elevate as much as possible     Continue lymphedema therapy     [facility] followup visit: 1 week____________________________  (Please note your next appointment above and if you are unable to keep, kindly give a 24 hour notice. Thank you.)     Physician signature:__________________________      If you experience any of the following, please call the [facility] during business hours:     * Increase in Pain  * Temperature over 101  * Increase in drainage from your wound  * Drainage with a foul odor  * Bleeding  * Increase in swelling  * Need for compression bandage changes due to slippage, breakthrough drainage. If you need medical attention outside of the business hours of the [facility] please contact your PCP or go to the nearest emergency room.

## 2023-11-22 ENCOUNTER — HOSPITAL ENCOUNTER (OUTPATIENT)
Dept: WOUND CARE | Age: 78
Discharge: HOME | End: 2023-11-22
Attending: SURGERY

## 2023-11-27 NOTE — DISCHARGE INSTRUCTIONS
Visit Discharge/Physician Orders     Discharge condition: Stable     Assessment of pain at discharge: mild     Anesthetic used: lido 4%     Discharge to: Home     Left via:Private automobile     Accompanied by: self     ECF/HHA:      Dressing Orders:  LEFT PRETIB : Cleanse with normal saline, apply alginate AG, cover with dry dressing and secure. Change daily. Once santyl arrives, apply to wound bed from edge to edge, nickel thick, cover with alginate AG, secure with dry dressing. Change daily. Apply spandagrip. On in am and off in pm. Elevate legs as much as possible above level of the heart. Treatment Orders: Eat a diet high in protein and vitamin C. Take a multiple vitamin daily unless contraindicated. Elevate as much as possible     Continue lymphedema therapy     [address] [place] [address] followup visit: 1 week____________________________  (Please note your next appointment above and if you are unable to keep, kindly give a 24 hour notice. Thank you.)     Physician signature:__________________________      If you experience any of the following, please call the [facility] during business hours:     * Increase in Pain  * Temperature over 101  * Increase in drainage from your wound  * Drainage with a foul odor  * Bleeding  * Increase in swelling  * Need for compression bandage changes due to slippage, breakthrough drainage. If you need medical attention outside of the business hours of the [facility] please contact your PCP or go to the nearest emergency room.

## 2023-11-29 ENCOUNTER — HOSPITAL ENCOUNTER (OUTPATIENT)
Dept: WOUND CARE | Age: 78
Discharge: HOME | End: 2023-11-29
Attending: SURGERY
Payer: MEDICARE

## 2023-11-29 VITALS
HEART RATE: 99 BPM | HEIGHT: 66 IN | RESPIRATION RATE: 18 BRPM | WEIGHT: 250 LBS | SYSTOLIC BLOOD PRESSURE: 175 MMHG | TEMPERATURE: 97.1 F | BODY MASS INDEX: 40.18 KG/M2 | DIASTOLIC BLOOD PRESSURE: 89 MMHG

## 2023-11-29 DIAGNOSIS — I87.2 VENOUS STASIS ULCER OF LEFT CALF WITH FAT LAYER EXPOSED WITHOUT VARICOSE VEINS (HCC): Primary | ICD-10-CM

## 2023-11-29 DIAGNOSIS — L97.222 VENOUS STASIS ULCER OF LEFT CALF WITH FAT LAYER EXPOSED WITHOUT VARICOSE VEINS (HCC): Primary | ICD-10-CM

## 2023-11-29 DIAGNOSIS — I89.0 LYMPHEDEMA: ICD-10-CM

## 2023-11-29 PROCEDURE — 11042 DBRDMT SUBQ TIS 1ST 20SQCM/<: CPT | Performed by: SURGERY

## 2023-11-29 PROCEDURE — 11045 DBRDMT SUBQ TISS EACH ADDL: CPT | Performed by: SURGERY

## 2023-11-29 PROCEDURE — 11042 DBRDMT SUBQ TIS 1ST 20SQCM/<: CPT

## 2023-11-29 PROCEDURE — 11045 DBRDMT SUBQ TISS EACH ADDL: CPT

## 2023-11-29 RX ORDER — LIDOCAINE HYDROCHLORIDE 20 MG/ML
JELLY TOPICAL ONCE
OUTPATIENT
Start: 2023-11-29 | End: 2023-11-29

## 2023-11-29 RX ORDER — IBUPROFEN 200 MG
TABLET ORAL ONCE
OUTPATIENT
Start: 2023-11-29 | End: 2023-11-29

## 2023-11-29 RX ORDER — GINSENG 100 MG
CAPSULE ORAL ONCE
OUTPATIENT
Start: 2023-11-29 | End: 2023-11-29

## 2023-11-29 RX ORDER — CLOBETASOL PROPIONATE 0.5 MG/G
OINTMENT TOPICAL ONCE
OUTPATIENT
Start: 2023-11-29 | End: 2023-11-29

## 2023-11-29 RX ORDER — TRIAMCINOLONE ACETONIDE 1 MG/G
OINTMENT TOPICAL ONCE
OUTPATIENT
Start: 2023-11-29 | End: 2023-11-29

## 2023-11-29 RX ORDER — LIDOCAINE HYDROCHLORIDE 40 MG/ML
SOLUTION TOPICAL ONCE
Status: COMPLETED | OUTPATIENT
Start: 2023-11-29 | End: 2023-11-29

## 2023-11-29 RX ORDER — GENTAMICIN SULFATE 1 MG/G
OINTMENT TOPICAL ONCE
OUTPATIENT
Start: 2023-11-29 | End: 2023-11-29

## 2023-11-29 RX ORDER — BACITRACIN ZINC AND POLYMYXIN B SULFATE 500; 1000 [USP'U]/G; [USP'U]/G
OINTMENT TOPICAL ONCE
OUTPATIENT
Start: 2023-11-29 | End: 2023-11-29

## 2023-11-29 RX ORDER — LIDOCAINE HYDROCHLORIDE 40 MG/ML
SOLUTION TOPICAL ONCE
OUTPATIENT
Start: 2023-11-29 | End: 2023-11-29

## 2023-11-29 RX ORDER — LIDOCAINE 40 MG/G
CREAM TOPICAL ONCE
OUTPATIENT
Start: 2023-11-29 | End: 2023-11-29

## 2023-11-29 RX ORDER — LIDOCAINE 50 MG/G
OINTMENT TOPICAL ONCE
OUTPATIENT
Start: 2023-11-29 | End: 2023-11-29

## 2023-11-29 RX ORDER — BETAMETHASONE DIPROPIONATE 0.05 %
OINTMENT (GRAM) TOPICAL ONCE
OUTPATIENT
Start: 2023-11-29 | End: 2023-11-29

## 2023-11-29 RX ADMIN — LIDOCAINE HYDROCHLORIDE 5 ML: 40 SOLUTION TOPICAL at 14:58

## 2023-11-29 ASSESSMENT — PAIN DESCRIPTION - DESCRIPTORS: DESCRIPTORS: BURNING;THROBBING

## 2023-11-29 ASSESSMENT — PAIN DESCRIPTION - LOCATION: LOCATION: LEG

## 2023-11-29 ASSESSMENT — PAIN SCALES - GENERAL: PAINLEVEL_OUTOF10: 6

## 2023-11-29 ASSESSMENT — PAIN DESCRIPTION - ORIENTATION: ORIENTATION: LEFT

## 2023-11-29 NOTE — PROGRESS NOTES
Wound Healing Center Followup Visit Note    Referring Physician : Alisa Davis MD  [id number] [facility] RECORD NUMBER:  [id number]  AGE: 70 y.o. GENDER: female  : 1945  EPISODE DATE:  2023    Subjective:     Chief Complaint   Patient presents with    Wound Check     L lower leg      HISTORY of PRESENT ILLNESS HPI   Christian Hamman is a 70 y.o. female who presents today in regards to follow up evaluation and treatment of wound/ulcer. That patient's past medical, family and social hx were reviewed and changes were made if present. History of Wound Context:  Patient has had left calf wound since ~ 2021. She has been putting a dressing on it. The wound has not been improving. She has a hx significant for venous stasis ulcerations of bilateral LE. She first was seen by myself in regards to these issues 2015. She eventually healed these wounds 2016. I last saw her 2021 at which time I recommended lymphedema therapy. She states she went and said it caused her to much pain and stopped going. She has chronic issues with bilateral calf pain, swelling and edema. She admits to not wearing her stockings because of the pain. She has used knee high 20-30 mm hg stockings in the past.       She is still seeing pain management and is down to percocet 7/5/325 mg daily.        [facility]  [facility]  Culture done  Emphasized importance of getting in to more significant compression in the future  12/15/21  Culture reviewed - light growth, no tx  Wound slightly improved  Still significant drainage  Plan on compression wrap next week  21  Stable wound  Drainage slightly better  Pt would like to wait till next week because of holidays to start wrap  22  Wound larger  Profore  22  Wound appearance better  Refusing wrap - it rolled down last week and she cut off after 3 days  22  Wound appearance better  Still refusing wrap   3/2/22  periwound

## 2023-12-05 NOTE — DISCHARGE INSTRUCTIONS
Written        Visit Discharge/Physician Orders     Discharge condition: Stable     Assessment of pain at discharge: mild     Anesthetic used: lido 4%     Discharge to: Home     Left via:Private automobile     Accompanied by: self     ECF/HHA:      Dressing Orders:  LEFT PRETIB : Cleanse with normal saline, Apply santyl  to wound bed from edge to edge, nickel thick, cover with calcium alginate, secure with dry dressing. Change daily. Apply spandagrip. On in am and off in pm. Elevate legs as much as possible above level of the heart. Treatment Orders: Eat a diet high in protein and vitamin C. Take a multiple vitamin daily unless contraindicated. Elevate as much as possible     Continue lymphedema therapy     [facility] followup visit: 1 week____________________________  (Please note your next appointment above and if you are unable to keep, kindly give a 24 hour notice. Thank you.)     Physician signature:__________________________      If you experience any of the following, please call the [facility] during business hours:     * Increase in Pain  * Temperature over 101  * Increase in drainage from your wound  * Drainage with a foul odor  * Bleeding  * Increase in swelling  * Need for compression bandage changes due to slippage, breakthrough drainage. If you need medical attention outside of the business hours of the [facility] please contact your PCP or go to the nearest emergency room.

## 2023-12-06 ENCOUNTER — HOSPITAL ENCOUNTER (OUTPATIENT)
Dept: WOUND CARE | Age: 78
Discharge: HOME | End: 2023-12-06
Attending: SURGERY
Payer: MEDICARE

## 2023-12-06 VITALS
HEART RATE: 92 BPM | RESPIRATION RATE: 18 BRPM | BODY MASS INDEX: 40.18 KG/M2 | WEIGHT: 250 LBS | SYSTOLIC BLOOD PRESSURE: 170 MMHG | DIASTOLIC BLOOD PRESSURE: 88 MMHG | TEMPERATURE: 97.8 F | HEIGHT: 66 IN

## 2023-12-06 DIAGNOSIS — I89.0 LYMPHEDEMA: ICD-10-CM

## 2023-12-06 DIAGNOSIS — I87.2 VENOUS STASIS ULCER OF LEFT CALF WITH FAT LAYER EXPOSED WITHOUT VARICOSE VEINS (HCC): Primary | ICD-10-CM

## 2023-12-06 DIAGNOSIS — L97.222 VENOUS STASIS ULCER OF LEFT CALF WITH FAT LAYER EXPOSED WITHOUT VARICOSE VEINS (HCC): Primary | ICD-10-CM

## 2023-12-06 DIAGNOSIS — I87.2 VENOUS STASIS ULCER OF LEFT CALF WITH FAT LAYER EXPOSED WITHOUT VARICOSE VEINS (HCC): Primary | Chronic | ICD-10-CM

## 2023-12-06 DIAGNOSIS — L97.222 VENOUS STASIS ULCER OF LEFT CALF WITH FAT LAYER EXPOSED WITHOUT VARICOSE VEINS (HCC): Primary | Chronic | ICD-10-CM

## 2023-12-06 PROCEDURE — 11042 DBRDMT SUBQ TIS 1ST 20SQCM/<: CPT

## 2023-12-06 PROCEDURE — 11042 DBRDMT SUBQ TIS 1ST 20SQCM/<: CPT | Performed by: SURGERY

## 2023-12-06 PROCEDURE — 11045 DBRDMT SUBQ TISS EACH ADDL: CPT

## 2023-12-06 PROCEDURE — 11045 DBRDMT SUBQ TISS EACH ADDL: CPT | Performed by: SURGERY

## 2023-12-06 RX ORDER — LIDOCAINE 50 MG/G
OINTMENT TOPICAL ONCE
OUTPATIENT
Start: 2023-12-06 | End: 2023-12-06

## 2023-12-06 RX ORDER — MORPHINE SULFATE 15 MG/1
15 TABLET, FILM COATED, EXTENDED RELEASE ORAL 2 TIMES DAILY
Qty: 60 TABLET | Refills: 0 | Status: SHIPPED | OUTPATIENT
Start: 2023-12-06 | End: 2024-01-05

## 2023-12-06 RX ORDER — BETAMETHASONE DIPROPIONATE 0.05 %
OINTMENT (GRAM) TOPICAL ONCE
OUTPATIENT
Start: 2023-12-06 | End: 2023-12-06

## 2023-12-06 RX ORDER — LIDOCAINE HYDROCHLORIDE 20 MG/ML
JELLY TOPICAL ONCE
OUTPATIENT
Start: 2023-12-06 | End: 2023-12-06

## 2023-12-06 RX ORDER — LIDOCAINE HYDROCHLORIDE 40 MG/ML
SOLUTION TOPICAL ONCE
OUTPATIENT
Start: 2023-12-06 | End: 2023-12-06

## 2023-12-06 RX ORDER — IBUPROFEN 200 MG
TABLET ORAL ONCE
OUTPATIENT
Start: 2023-12-06 | End: 2023-12-06

## 2023-12-06 RX ORDER — LIDOCAINE 40 MG/G
CREAM TOPICAL ONCE
OUTPATIENT
Start: 2023-12-06 | End: 2023-12-06

## 2023-12-06 RX ORDER — TRIAMCINOLONE ACETONIDE 1 MG/G
OINTMENT TOPICAL ONCE
OUTPATIENT
Start: 2023-12-06 | End: 2023-12-06

## 2023-12-06 RX ORDER — BACITRACIN ZINC AND POLYMYXIN B SULFATE 500; 1000 [USP'U]/G; [USP'U]/G
OINTMENT TOPICAL ONCE
OUTPATIENT
Start: 2023-12-06 | End: 2023-12-06

## 2023-12-06 RX ORDER — OXYCODONE AND ACETAMINOPHEN 7.5; 325 MG/1; MG/1
1 TABLET ORAL EVERY 8 HOURS PRN
Qty: 90 TABLET | Refills: 0 | Status: SHIPPED | OUTPATIENT
Start: 2023-12-06 | End: 2024-01-05

## 2023-12-06 RX ORDER — CLOBETASOL PROPIONATE 0.5 MG/G
OINTMENT TOPICAL ONCE
OUTPATIENT
Start: 2023-12-06 | End: 2023-12-06

## 2023-12-06 RX ORDER — GINSENG 100 MG
CAPSULE ORAL ONCE
OUTPATIENT
Start: 2023-12-06 | End: 2023-12-06

## 2023-12-06 RX ORDER — LIDOCAINE HYDROCHLORIDE 40 MG/ML
SOLUTION TOPICAL ONCE
Status: COMPLETED | OUTPATIENT
Start: 2023-12-06 | End: 2023-12-06

## 2023-12-06 RX ORDER — GENTAMICIN SULFATE 1 MG/G
OINTMENT TOPICAL ONCE
OUTPATIENT
Start: 2023-12-06 | End: 2023-12-06

## 2023-12-06 RX ADMIN — LIDOCAINE HYDROCHLORIDE 5 ML: 40 SOLUTION TOPICAL at 15:32

## 2023-12-06 ASSESSMENT — PAIN SCALES - GENERAL: PAINLEVEL_OUTOF10: 6

## 2023-12-06 ASSESSMENT — PAIN DESCRIPTION - LOCATION: LOCATION: LEG

## 2023-12-06 ASSESSMENT — PAIN DESCRIPTION - ORIENTATION: ORIENTATION: LEFT

## 2023-12-06 ASSESSMENT — PAIN DESCRIPTION - DESCRIPTORS: DESCRIPTORS: BURNING;THROBBING

## 2023-12-06 NOTE — PLAN OF CARE
Problem: Chronic Conditions and Co-morbidities  Goal: Patient's chronic conditions and co-morbidity symptoms are monitored and maintained or improved  12/6/2023 1601 by Allison Powell RN  Outcome: Progressing  12/6/2023 1540 by Allison Powell RN  Outcome: Progressing     Problem: Pain  Goal: Verbalizes/displays adequate comfort level or baseline comfort level  12/6/2023 1601 by Allison Powell RN  Outcome: Progressing  12/6/2023 1540 by Allison Powell RN  Outcome: Progressing     Problem: Cognitive:  Goal: Knowledge of wound care  Description: Knowledge of wound care  12/6/2023 1601 by Allison Powell RN  Outcome: Progressing  12/6/2023 1540 by Allison Powell RN  Outcome: Progressing  Goal: Understands risk factors for wounds  Description: Understands risk factors for wounds  Outcome: Progressing     Problem: Wound:  Goal: Will show signs of wound healing; wound closure and no evidence of infection  Description: Will show signs of wound healing; wound closure and no evidence of infection  12/6/2023 1601 by Allison Powell RN  Outcome: Progressing  12/6/2023 1540 by Allison Powell RN  Outcome: Progressing     Problem: Venous:  Goal: Signs of wound healing will improve  Description: Signs of wound healing will improve  12/6/2023 1601 by Allison Powell RN  Outcome: Adequate for Discharge  12/6/2023 1540 by Allison Powell RN  Outcome: Progressing

## 2023-12-06 NOTE — PROGRESS NOTES
Wound Healing Center Followup Visit Note    Referring Physician : Jose Basurto MD  [address] [facility] RECORD NUMBER:  [id number]  AGE: 70 y.o. GENDER: female  : 1945  EPISODE DATE:  2023    Subjective:     Chief Complaint   Patient presents with    Wound Check     LLE      HISTORY of PRESENT ILLNESS GIACOMO Coelho is a 70 y.o. female who presents today in regards to follow up evaluation and treatment of wound/ulcer. That patient's past medical, family and social hx were reviewed and changes were made if present. History of Wound Context:  Patient has had left calf wound since ~ 2021. She has been putting a dressing on it. The wound has not been improving. She has a hx significant for venous stasis ulcerations of bilateral LE. She first was seen by myself in regards to these issues 2015. She eventually healed these wounds 2016. I last saw her 2021 at which time I recommended lymphedema therapy. She states she went and said it caused her to much pain and stopped going. She has chronic issues with bilateral calf pain, swelling and edema. She admits to not wearing her stockings because of the pain. She has used knee high 20-30 mm hg stockings in the past.       She is still seeing pain management and is down to percocet //325 mg daily.        21  [facility]  Double tubigrip  Culture done  Emphasized importance of getting in to more significant compression in the future  12/15/21  Culture reviewed - light growth, no tx  Wound slightly improved  Still significant drainage  Plan on compression wrap next week  21  Stable wound  Drainage slightly better  Pt would like to wait till next week because of holidays to start wrap  22  Wound larger  Profore  22  Wound appearance better  Refusing wrap - it rolled down last week and she cut off after 3 days  22  Wound appearance better  Still refusing wrap   3/2/22  periwound

## 2023-12-06 NOTE — PROGRESS NOTES
[address] [address] [address]:     [facility] [address] [facility] [address] [facility] [place], [address]  p: [phone] f: [phone]     Ordering Center:     [address] [place] [address] [address]  [place] [address]  [phone]  WOUND CARE Dept: [address] [facility] [phone]    Patient Information:      Shravan Jimenezhenri  [address] [facility],[address]  [facility] [address]   I have attempted without success to contact this patient by phone for Preadmission Testing phone assessment. Message left for pt to return call. : 1945  AGE: 70 y.o. GENDER: female   EPISODE DATE: 2023    Insurance:      PRIMARY INSURANCE:  Plan: [employer] COMPLETE  Coverage: [facility] MEDICARE  Effective Date: 2022  Group Number: [unfilled]  Subscriber Number: [id number] - (Medicare Managed)    Payer/Plan Subscr  Sex Relation Sub. Ins. ID Effective Group Num   1. [address] [facility] [place] 1945 Female Self [id number]                                     [address]   2. [place] Shravan Conrad 1945 Female Self [id number] 10/16/18                                     [address]       Patient Wound Information:      Problem List Items Addressed This Visit          Other    Venous stasis ulcer of left calf with fat layer exposed without varicose veins (HCC) - Primary (Chronic)    Relevant Orders    Initiate Outpatient Wound Care Protocol    Lymphedema       WOUNDS REQUIRING DRESSING SUPPLIES:     Wound 23 Leg Left; Lower #2 (Active)   Wound Image   10/18/23 1537   Wound Etiology Traumatic 23 1610   Dressing Status New dressing applied;Clean;Dry; Intact 23 1632   Wound Cleansed Cleansed with saline 23 1632   Dressing/Treatment Pharmaceutical agent (see MAR); Alginate;ABD;Roll gauze 23 1632   Wound Length (cm) 12 cm 23 1532   Wound Width (cm) 11.2 cm 23 1532   Wound Depth (cm) 0.4 cm 23 1532   Wound Surface Area

## 2023-12-20 NOTE — PLAN OF CARE
Case Management Assessment & Discharge Planning Note    Patient name Apryl Casas  Location /-01 MRN [id number]  : 1938 Date 2023       Current Admission Date: 2023  Current Admission Diagnosis:Ambulatory dysfunction   Patient Active Problem List    Diagnosis Date Noted    Supratherapeutic INR 2023    Ambulatory dysfunction 2023    Atrial fibrillation with rapid ventricular response (HCC) 2023    UTI (urinary tract infection) 2023    Hypomagnesemia 2023    Closed fracture of multiple ribs of right side 2023    Pulmonary nodules 2023    Hyponatremia 2023    At risk for delirium 2023    Fall 2023    Traumatic hematoma of left shoulder 2023    S/P aortic valve replacement 2023    Epilepsy (HCC) 2023      LOS (days): 2  Geometric Mean LOS (GMLOS) (days): 3.1  Days to GMLOS:1.3     OBJECTIVE:    Risk of Unplanned Readmission Score: 21.05         Current admission status: Inpatient       Preferred Pharmacy:   [facility] [id number] [place] [facility] [place] - [address]  [address] [address]  Phone: [phone] Fax: [phone]    [id number] [place] - [address]  [address]  [facility] [address]  Phone: [phone] Fax: [phone]    [id number] [place] - [address]  [address] [facility] [address]  Phone: [phone] Fax: [phone]    Primary Care Provider: Michael Oliver MD    Primary Insurance: BLUE CROSS [employer] REP  Secondary Insurance:     ASSESSMENT:  Active Health Care Proxies       Clayton Issa Health Care Representative - Nephew   Primary Phone: [phone] (Mobile)  Home Phone: [phone]                 Advance Directives  Does patient have a Health Care POA?: Yes  Does patient have Advance Directives?: Yes  Advance Directives: Power of  for health care  Primary Contact: Clayton Longo  Problem: Wound:  Goal: Will show signs of wound healing; wound closure and no evidence of infection  Description: Will show signs of wound healing; wound closure and no evidence of infection  Outcome: Progressing     Problem: Venous:  Goal: Signs of wound healing will improve  Description: Signs of wound healing will improve  Outcome: Progressing     Problem: Cognitive:  Goal: Knowledge of wound care  Description: Knowledge of wound care  Outcome: Adequate for Discharge  Goal: Understands risk factors for wounds  Description: Understands risk factors for wounds  Outcome: Adequate for Discharge Root Cause  30 Day Readmission: No    Patient Information  Admitted from:: Home  Mental Status: Alert  During Assessment patient was accompanied by: Not accompanied during assessment  Assessment information provided by:: Patient  Primary Caregiver: Self  Support Systems: Self, Family members  Home entry access options. Select all that apply.: No steps to enter home  Type of Current Residence: Facility  Upon entering residence, is there a bedroom on the main floor (no further steps)?: Yes  Upon entering residence, is there a bathroom on the main floor (no further steps)?: Yes    Activities of Daily Living Prior to Admission  Functional Status: Assistance  Completes ADLs independently?: No  Level of ADL dependence: Assistance  Ambulates independently?: No  Level of ambulatory dependence: Assistance  Does patient use assisted devices?: Yes  Assisted Devices (DME) used: Walker, Rollator, Wheelchair  Does patient currently own DME?: Yes  What DME does the patient currently own?: Wheelchair, Rollator, Walker  Does patient have a history of Outpatient Therapy (PT/OT)?: No  Does the patient have a history of Short-Term Rehab?: Yes ( [facility])  Does patient have a history of HHC?: No  Does patient currently have HHC?: No    Patient Information Continued  Does patient have prescription coverage?: Yes  Does patient receive dialysis treatments?: No  Does patient have a history of substance abuse?: No  Does patient have a history of Mental Health Diagnosis?: No    Housing Stability: Low Risk  (12/20/2023)    Housing Stability Vital Sign     Unable to Pay for Housing in the Last Year: No     Number of Places Lived in the Last Year: 1     Unstable Housing in the Last Year: No   Food Insecurity: No Food Insecurity (12/20/2023)    Hunger Vital Sign     Worried About Running Out of Food in the Last Year: Never true     Ran Out of Food in the Last Year: Never true   Transportation Needs: No Transportation Needs (12/20/2023)     PRAPARE - Transportation     Lack of Transportation (Medical): No     Lack of Transportation (Non-Medical): No   Utilities: Not At Risk (12/20/2023)    [facility] Utilities     Threatened with loss of utilities: No       DISCHARGE DETAILS:    Discharge planning discussed with:: patient and Carol at [facility]  Clare of Choice: Yes     CM contacted family/caregiver?: No- see comments (reports being in contact with her family)  Were Treatment Team discharge recommendations reviewed with patient/caregiver?: Yes  Did patient/caregiver verbalize understanding of patient care needs?: Yes  Were patient/caregiver advised of the risks associated with not following Treatment Team discharge recommendations?: Yes    Requested Home Health Care         Is the patient interested in HHC at discharge?: No    DME Referral Provided  Referral made for DME?: No    Additional Comments: CM was informed that pt was admitted from [facility]. CM spoke to Carol at [facility][note 2] to discuss pt's prior level of functioning. Per Carol, pt receives assist x1 with ADL's, pt uses a RW, rollator, and WC, staff administers medications. Pt has a roommate. Pt has a hx of rehab at [facility]. Met with pt to discuss same. Pt confirmed information.

## 2023-12-21 NOTE — DISCHARGE INSTRUCTIONS
Written       Visit Discharge/Physician Orders     Discharge condition: Stable     Assessment of pain at discharge: mild     Anesthetic used: lido 4%     Discharge to: Home     Left via:Private automobile     Accompanied by: self     ECF/HHA:      Dressing Orders:  LEFT PRETIB : Cleanse with normal saline, Apply santyl  to wound bed from edge to edge, nickel thick, cover with calcium alginate, secure with dry dressing. Change daily. Apply spandagrip. On in am and off in pm. Elevate legs as much as possible above level of the heart. Treatment Orders: Eat a diet high in protein and vitamin C. Take a multiple vitamin daily unless contraindicated. Elevate as much as possible     Continue lymphedema therapy     [address] [place] [address] followup visit: 1 week (in pm)____________________________  (Please note your next appointment above and if you are unable to keep, kindly give a 24 hour notice. Thank you.)     Physician signature:__________________________      If you experience any of the following, please call the [facility] during business hours:     * Increase in Pain  * Temperature over 101  * Increase in drainage from your wound  * Drainage with a foul odor  * Bleeding  * Increase in swelling  * Need for compression bandage changes due to slippage, breakthrough drainage. If you need medical attention outside of the business hours of the [facility] please contact your PCP or go to the nearest emergency room.

## 2023-12-27 ENCOUNTER — HOSPITAL ENCOUNTER (OUTPATIENT)
Dept: WOUND CARE | Age: 78
Discharge: HOME | End: 2023-12-27
Attending: SURGERY

## 2023-12-28 NOTE — DISCHARGE INSTRUCTIONS
Written       Visit Discharge/Physician Orders     Discharge condition: Stable     Assessment of pain at discharge: mild     Anesthetic used: lido 4%     Discharge to: Home     Left via:Private automobile     Accompanied by: self     ECF/HHA: [place]     Dressing Orders:  LEFT PRETIB : Cleanse with normal saline, cover with calcium alginate, secure with dry dressing. Change daily.      Apply spandagrip. On in am and off in pm. Elevate legs as much as possible above level of the heart.      Treatment Orders: Eat a diet high in protein and vitamin C. Take a multiple vitamin daily unless contraindicated.     Elevate as much as possible     Continue lymphedema therapy     [facility] followup visit: 1 week (in pm)____________________________  (Please note your next appointment above and if you are unable to keep, kindly give a 24 hour notice. Thank you.)     Physician signature:__________________________      If you experience any of the following, please call the Wound Care Center during business hours:     * Increase in Pain  * Temperature over 101  * Increase in drainage from your wound  * Drainage with a foul odor  * Bleeding  * Increase in swelling  * Need for compression bandage changes due to slippage, breakthrough drainage.     If you need medical attention outside of the business hours of the Wound Care Centers please contact your PCP or go to the nearest emergency room.

## 2024-01-03 ENCOUNTER — HOSPITAL ENCOUNTER (OUTPATIENT)
Dept: WOUND CARE | Age: 79
Discharge: HOME | End: 2024-01-03
Attending: SURGERY
Payer: MEDICARE

## 2024-01-03 VITALS
HEIGHT: 66 IN | RESPIRATION RATE: 18 BRPM | HEART RATE: 84 BPM | SYSTOLIC BLOOD PRESSURE: 128 MMHG | WEIGHT: 250 LBS | TEMPERATURE: 96.9 F | DIASTOLIC BLOOD PRESSURE: 68 MMHG | BODY MASS INDEX: 40.18 KG/M2

## 2024-01-03 DIAGNOSIS — I89.0 LYMPHEDEMA: ICD-10-CM

## 2024-01-03 DIAGNOSIS — L97.222 VENOUS STASIS ULCER OF LEFT CALF WITH FAT LAYER EXPOSED WITHOUT VARICOSE VEINS (HCC): Primary | Chronic | ICD-10-CM

## 2024-01-03 DIAGNOSIS — I87.2 VENOUS STASIS ULCER OF LEFT CALF WITH FAT LAYER EXPOSED WITHOUT VARICOSE VEINS (HCC): Primary | Chronic | ICD-10-CM

## 2024-01-03 PROCEDURE — 11042 DBRDMT SUBQ TIS 1ST 20SQCM/<: CPT

## 2024-01-03 RX ORDER — LIDOCAINE HYDROCHLORIDE 40 MG/ML
SOLUTION TOPICAL ONCE
Status: COMPLETED | OUTPATIENT
Start: 2024-01-03 | End: 2024-01-03

## 2024-01-03 RX ORDER — OXYCODONE AND ACETAMINOPHEN 7.5; 325 MG/1; MG/1
1 TABLET ORAL EVERY 8 HOURS PRN
Qty: 90 TABLET | Refills: 0 | Status: SHIPPED | OUTPATIENT
Start: 2024-01-03 | End: 2024-02-02

## 2024-01-03 RX ORDER — MORPHINE SULFATE 15 MG/1
15 TABLET, FILM COATED, EXTENDED RELEASE ORAL 2 TIMES DAILY
Qty: 60 TABLET | Refills: 0 | Status: SHIPPED | OUTPATIENT
Start: 2024-01-03 | End: 2024-02-02

## 2024-01-03 RX ADMIN — LIDOCAINE HYDROCHLORIDE 25 ML: 40 SOLUTION TOPICAL at 16:16

## 2024-01-03 ASSESSMENT — PAIN DESCRIPTION - PROGRESSION: CLINICAL_PROGRESSION: NOT CHANGED

## 2024-01-03 NOTE — PROGRESS NOTES
Wound Healing Center Followup Visit Note    Referring Physician : Tamie Santizo MD  Anastacia Morel  MEDICAL RECORD NUMBER:  [id number]  AGE: 78 y.o.   GENDER: female  : 1945  EPISODE DATE:  1/3/2024    Subjective:     Chief Complaint   Patient presents with    Wound Check     Left lower leg      HISTORY of PRESENT ILLNESS HPI   Anastacia Morel is a 78 y.o. female who presents today in regards to follow up evaluation and treatment of wound/ulcer.  That patient's past medical, family and social hx were reviewed and changes were made if present.    History of Wound Context:  Patient has had left calf wound since ~ 2021.  She has been putting a dressing on it.  The wound has not been improving.  She has a hx significant for venous stasis ulcerations of bilateral LE.  She first was seen by myself in regards to these issues 2015.  She eventually healed these wounds 2016.     I last saw her 2021 at which time I recommended lymphedema therapy.  She states she went and said it caused her to much pain and stopped going.  She has chronic issues with bilateral calf pain, swelling and edema.            She admits to not wearing her stockings because of the pain.    She has used knee high 20-30 mm hg stockings in the past.       She is still seeing pain management and is down to percocet /325 mg daily.       21  aquacell  Double tubigrip  Culture done  Emphasized importance of getting in to more significant compression in the future  12/15/21  Culture reviewed - light growth, no tx  Wound slightly improved  Still significant drainage  Plan on compression wrap next week  21  Stable wound  Drainage slightly better  Pt would like to wait till next week because of holidays to start wrap  22  Wound larger  Profore  22  Wound appearance better  Refusing wrap - it rolled down last week and she cut off after 3 days  22  Wound appearance better  Still refusing wrap   3/2/22  periwound 
Surface Area (cm^2) 144.9 cm^2 01/03/24 1608   Change in Wound Size % (l*w) -2433.22 01/03/24 1608   Wound Volume (cm^3) 57.96 cm^3 01/03/24 1608   Wound Healing % -15399 01/03/24 1608   Post-Procedure Length (cm) 11.5 cm 01/03/24 1631   Post-Procedure Width (cm) 12.6 cm 01/03/24 1631   Post-Procedure Depth (cm) 0.5 cm 01/03/24 1631   Post-Procedure Surface Area (cm^2) 144.9 cm^2 01/03/24 1631   Post-Procedure Volume (cm^3) 72.45 cm^3 01/03/24 1631   Wound Assessment Pink/red;Fibrin 01/03/24 1608   Drainage Amount Large (50-75% saturated) 01/03/24 1608   Drainage Description Serous;Yellow 01/03/24 1608   Odor None 01/03/24 1608   Kori-wound Assessment Blanchable erythema 01/03/24 1608   Margins Attached edges 11/15/23 1508   Wound Thickness Description not for Pressure Injury Full thickness 11/08/23 1528   Number of days: 308          Supplies Requested :      WOUND #: 2   PRIMARY DRESSING:  Alginate pad   Cover and Secure with: Other abdominal pad     FREQUENCY OF DRESSING CHANGES:  Daily       ADDITIONAL ITEMS:  [] Gloves Small  [x] Gloves Medium [] Gloves Large [] Gloves XLarge  [] Tape 1\" [x] Tape 2\" [] Tape 3\"  [] Medipore Tape  [x] Saline  [] Vashe  [] Skin Prep   [] Adhesive Remover   [] Cotton Tip Applicators   [] Other:    Patient Wound(s) Debrided: [x] Yes if yes please add date 01/03/2024   [] No    Debribement Type: Excisional/Sharp    Is the patient currently on an antibiotic for their Wound(s): [] Yes if yes please add name and dose    [x] No    Patient currently being seen by Home Health: [] Yes   [x] No    Duration for needed supplies:  []15  []30  []60  [x]90 Days    Electronically signed by Natalya Parks RN on 1/3/2024 at 4:52 PM     Provider Information:   PROVIDER'S NAME:Dr. Faith Dempsey    NPI: [id number]

## 2024-01-04 NOTE — DISCHARGE INSTRUCTIONS
Written       Visit Discharge/Physician Orders     Discharge condition: Stable     Assessment of pain at discharge: mild     Anesthetic used: lido 4%     Discharge to: Home     Left via:Private automobile     Accompanied by: self     ECF/HHA: [place]     Dressing Orders:  LEFT PRETIB : Cleanse with normal saline, cover with calcium alginate, and super absorber(hydralock SA 6x10\") ,secure with dry dressing. Change daily.      Apply spandagrip. On in am and off in pm. Elevate legs as much as possible above level of the heart.      Treatment Orders: Eat a diet high in protein and vitamin C. Take a multiple vitamin daily unless contraindicated.     Elevate as much as possible     Continue lymphedema therapy     [facility] followup visit: 1 week (in pm)____________________________  (Please note your next appointment above and if you are unable to keep, kindly give a 24 hour notice. Thank you.)     Physician signature:__________________________      If you experience any of the following, please call the Wound Care Center during business hours:     * Increase in Pain  * Temperature over 101  * Increase in drainage from your wound  * Drainage with a foul odor  * Bleeding  * Increase in swelling  * Need for compression bandage changes due to slippage, breakthrough drainage.     If you need medical attention outside of the business hours of the Wound Care Centers please contact your PCP or go to the nearest emergency room.

## 2024-01-10 ENCOUNTER — HOSPITAL ENCOUNTER (OUTPATIENT)
Dept: WOUND CARE | Age: 79
Discharge: HOME | End: 2024-01-10
Attending: SURGERY
Payer: MEDICARE

## 2024-01-10 VITALS
DIASTOLIC BLOOD PRESSURE: 78 MMHG | SYSTOLIC BLOOD PRESSURE: 145 MMHG | HEART RATE: 114 BPM | WEIGHT: 250 LBS | HEIGHT: 66 IN | TEMPERATURE: 96.7 F | BODY MASS INDEX: 40.18 KG/M2

## 2024-01-10 DIAGNOSIS — L97.222 VENOUS STASIS ULCER OF LEFT CALF WITH FAT LAYER EXPOSED WITHOUT VARICOSE VEINS (HCC): Primary | Chronic | ICD-10-CM

## 2024-01-10 DIAGNOSIS — I87.2 VENOUS STASIS ULCER OF LEFT CALF WITH FAT LAYER EXPOSED WITHOUT VARICOSE VEINS (HCC): Primary | Chronic | ICD-10-CM

## 2024-01-10 DIAGNOSIS — I87.2 VENOUS INSUFFICIENCY OF BOTH LOWER EXTREMITIES: Chronic | ICD-10-CM

## 2024-01-10 PROCEDURE — 11042 DBRDMT SUBQ TIS 1ST 20SQCM/<: CPT

## 2024-01-10 PROCEDURE — 11042 DBRDMT SUBQ TIS 1ST 20SQCM/<: CPT | Performed by: SURGERY

## 2024-01-10 RX ORDER — LIDOCAINE HYDROCHLORIDE 20 MG/ML
JELLY TOPICAL ONCE
OUTPATIENT
Start: 2024-01-10 | End: 2024-01-10

## 2024-01-10 RX ORDER — IBUPROFEN 200 MG
TABLET ORAL ONCE
OUTPATIENT
Start: 2024-01-10 | End: 2024-01-10

## 2024-01-10 RX ORDER — CLOBETASOL PROPIONATE 0.5 MG/G
OINTMENT TOPICAL ONCE
OUTPATIENT
Start: 2024-01-10 | End: 2024-01-10

## 2024-01-10 RX ORDER — BETAMETHASONE DIPROPIONATE 0.05 %
OINTMENT (GRAM) TOPICAL ONCE
OUTPATIENT
Start: 2024-01-10 | End: 2024-01-10

## 2024-01-10 RX ORDER — GINSENG 100 MG
CAPSULE ORAL ONCE
OUTPATIENT
Start: 2024-01-10 | End: 2024-01-10

## 2024-01-10 RX ORDER — LIDOCAINE 40 MG/G
CREAM TOPICAL ONCE
OUTPATIENT
Start: 2024-01-10 | End: 2024-01-10

## 2024-01-10 RX ORDER — TRIAMCINOLONE ACETONIDE 1 MG/G
OINTMENT TOPICAL ONCE
OUTPATIENT
Start: 2024-01-10 | End: 2024-01-10

## 2024-01-10 RX ORDER — GENTAMICIN SULFATE 1 MG/G
OINTMENT TOPICAL ONCE
OUTPATIENT
Start: 2024-01-10 | End: 2024-01-10

## 2024-01-10 RX ORDER — BACITRACIN ZINC AND POLYMYXIN B SULFATE 500; 1000 [USP'U]/G; [USP'U]/G
OINTMENT TOPICAL ONCE
OUTPATIENT
Start: 2024-01-10 | End: 2024-01-10

## 2024-01-10 RX ORDER — LIDOCAINE HYDROCHLORIDE 40 MG/ML
SOLUTION TOPICAL ONCE
OUTPATIENT
Start: 2024-01-10 | End: 2024-01-10

## 2024-01-10 RX ORDER — LIDOCAINE 50 MG/G
OINTMENT TOPICAL ONCE
OUTPATIENT
Start: 2024-01-10 | End: 2024-01-10

## 2024-01-10 ASSESSMENT — PAIN DESCRIPTION - ORIENTATION: ORIENTATION: LEFT

## 2024-01-10 ASSESSMENT — PAIN SCALES - GENERAL: PAINLEVEL_OUTOF10: 7

## 2024-01-10 ASSESSMENT — PAIN DESCRIPTION - LOCATION: LOCATION: LEG

## 2024-01-10 NOTE — PROGRESS NOTES
Wound Healing Center Followup Visit Note    Referring Physician : Tamie Santizo MD  Anastacia Morel  MEDICAL RECORD NUMBER:  [id number]  AGE: 78 y.o.   GENDER: female  : 1945  EPISODE DATE:  1/10/2024    Subjective:     Chief Complaint   Patient presents with    Wound Check     Left leg      HISTORY of PRESENT ILLNESS HPI   Anastacia Morel is a 78 y.o. female who presents today in regards to follow up evaluation and treatment of wound/ulcer.  That patient's past medical, family and social hx were reviewed and changes were made if present.    History of Wound Context:  Patient has had left calf wound since ~ 2021.  She has been putting a dressing on it.  The wound has not been improving.  She has a hx significant for venous stasis ulcerations of bilateral LE.  She first was seen by myself in regards to these issues 2015.  She eventually healed these wounds 2016.     I last saw her 2021 at which time I recommended lymphedema therapy.  She states she went and said it caused her to much pain and stopped going.  She has chronic issues with bilateral calf pain, swelling and edema.            She admits to not wearing her stockings because of the pain.    She has used knee high 20-30 mm hg stockings in the past.       She is still seeing pain management and is down to percocet 7/5/325 mg daily.       21  aquacell  Double tubigrip  Culture done  Emphasized importance of getting in to more significant compression in the future  12/15/21  Culture reviewed - light growth, no tx  Wound slightly improved  Still significant drainage  Plan on compression wrap next week  21  Stable wound  Drainage slightly better  Pt would like to wait till next week because of holidays to start wrap  22  Wound larger  Profore  22  Wound appearance better  Refusing wrap - it rolled down last week and she cut off after 3 days  22  Wound appearance better  Still refusing wrap   3/2/22  periwound 
(l*w) -2438.11 01/10/24 1059   Wound Volume (cm^3) 58.072 cm^3 01/10/24 1059   Wound Healing % -13137 01/10/24 1059   Post-Procedure Length (cm) 12 cm 01/10/24 1132   Post-Procedure Width (cm) 12.4 cm 01/10/24 1132   Post-Procedure Depth (cm) 0.5 cm 01/10/24 1132   Post-Procedure Surface Area (cm^2) 148.8 cm^2 01/10/24 1132   Post-Procedure Volume (cm^3) 74.4 cm^3 01/10/24 1132   Wound Assessment Pink/red;Fibrin 01/10/24 1059   Drainage Amount Large (50-75% saturated) 01/10/24 1059   Drainage Description Yellow 01/10/24 1059   Odor None 01/10/24 1059   Kori-wound Assessment Intact 01/10/24 1059   Margins Attached edges 01/10/24 1059   Wound Thickness Description not for Pressure Injury Full thickness 01/10/24 1059   Number of days: 314          Supplies Requested :      WOUND #: 2   PRIMARY DRESSING:  Other: already received   Cover and Secure with: Other Hydralock SA 6x10 inch     FREQUENCY OF DRESSING CHANGES:  Daily       ADDITIONAL ITEMS:  [] Gloves Small  [x] Gloves Medium [] Gloves Large [] Gloves XLarge  [] Tape 1\" [x] Tape 2\" [] Tape 3\"  [] Medipore Tape  [x] Saline  [] Vashe  [] Skin Prep   [] Adhesive Remover   [] Cotton Tip Applicators   [] Other:    Patient Wound(s) Debrided: [x] Yes if yes please add date 1/10/2024   [] No    Debribement Type: Excisional/Sharp    Is the patient currently on an antibiotic for their Wound(s): [] Yes if yes please add name and dose    [x] No    Patient currently being seen by Home Health: [] Yes   [x] No    Duration for needed supplies:  []15  []30  []60  [x]90 Days    Electronically signed by Natalya Parks RN on 1/10/2024 at 11:40 AM     Provider Information:      PROVIDER'S NAME:Dr. Faith Dempsey    NPI: [id number]

## 2024-01-16 NOTE — DISCHARGE INSTRUCTIONS
Written       Visit Discharge/Physician Orders     Discharge condition: Stable     Assessment of pain at discharge: mild     Anesthetic used: lido 4%     Discharge to: Home     Left via:Private automobile     Accompanied by: self     ECF/HHA: [place]     Dressing Orders:  LEFT PRETIB : Cleanse with normal saline, cover with calcium alginate, and super absorber(hydralock SA 6x10\") ,secure with dry dressing. Change daily.      Apply spandagrip. On in am and off in pm. Elevate legs as much as possible above level of the heart.      Treatment Orders: Eat a diet high in protein and vitamin C. Take a multiple vitamin daily unless contraindicated.     Elevate as much as possible     Continue lymphedema therapy     [facility] followup visit: 1 week (in pm)____________________________  (Please note your next appointment above and if you are unable to keep, kindly give a 24 hour notice. Thank you.)     Physician signature:__________________________      If you experience any of the following, please call the Wound Care Center during business hours:     * Increase in Pain  * Temperature over 101  * Increase in drainage from your wound  * Drainage with a foul odor  * Bleeding  * Increase in swelling  * Need for compression bandage changes due to slippage, breakthrough drainage.     If you need medical attention outside of the business hours of the Wound Care Centers please contact your PCP or go to the nearest emergency room.

## 2024-01-17 ENCOUNTER — HOSPITAL ENCOUNTER (OUTPATIENT)
Dept: WOUND CARE | Age: 79
Discharge: HOME | End: 2024-01-17
Attending: SURGERY

## 2024-01-24 ENCOUNTER — HOSPITAL ENCOUNTER (OUTPATIENT)
Dept: WOUND CARE | Age: 79
Discharge: HOME | End: 2024-01-24
Attending: SURGERY
Payer: MEDICARE

## 2024-01-24 VITALS — RESPIRATION RATE: 20 BRPM | TEMPERATURE: 96.8 F | HEART RATE: 92 BPM

## 2024-01-24 DIAGNOSIS — I87.2 VENOUS STASIS ULCER OF LEFT CALF WITH FAT LAYER EXPOSED WITHOUT VARICOSE VEINS (HCC): Primary | ICD-10-CM

## 2024-01-24 DIAGNOSIS — L97.222 VENOUS STASIS ULCER OF LEFT CALF WITH FAT LAYER EXPOSED WITHOUT VARICOSE VEINS (HCC): Primary | ICD-10-CM

## 2024-01-24 PROCEDURE — 11042 DBRDMT SUBQ TIS 1ST 20SQCM/<: CPT

## 2024-01-24 PROCEDURE — 11042 DBRDMT SUBQ TIS 1ST 20SQCM/<: CPT | Performed by: SURGERY

## 2024-01-24 RX ORDER — GINSENG 100 MG
CAPSULE ORAL ONCE
OUTPATIENT
Start: 2024-01-24 | End: 2024-01-24

## 2024-01-24 RX ORDER — TRIAMCINOLONE ACETONIDE 1 MG/G
OINTMENT TOPICAL ONCE
OUTPATIENT
Start: 2024-01-24 | End: 2024-01-24

## 2024-01-24 RX ORDER — LIDOCAINE HYDROCHLORIDE 40 MG/ML
SOLUTION TOPICAL ONCE
OUTPATIENT
Start: 2024-01-24 | End: 2024-01-24

## 2024-01-24 RX ORDER — LIDOCAINE 40 MG/G
CREAM TOPICAL ONCE
OUTPATIENT
Start: 2024-01-24 | End: 2024-01-24

## 2024-01-24 RX ORDER — LIDOCAINE 50 MG/G
OINTMENT TOPICAL ONCE
OUTPATIENT
Start: 2024-01-24 | End: 2024-01-24

## 2024-01-24 RX ORDER — BETAMETHASONE DIPROPIONATE 0.05 %
OINTMENT (GRAM) TOPICAL ONCE
OUTPATIENT
Start: 2024-01-24 | End: 2024-01-24

## 2024-01-24 RX ORDER — IBUPROFEN 200 MG
TABLET ORAL ONCE
OUTPATIENT
Start: 2024-01-24 | End: 2024-01-24

## 2024-01-24 RX ORDER — GENTAMICIN SULFATE 1 MG/G
OINTMENT TOPICAL ONCE
OUTPATIENT
Start: 2024-01-24 | End: 2024-01-24

## 2024-01-24 RX ORDER — BACITRACIN ZINC AND POLYMYXIN B SULFATE 500; 1000 [USP'U]/G; [USP'U]/G
OINTMENT TOPICAL ONCE
OUTPATIENT
Start: 2024-01-24 | End: 2024-01-24

## 2024-01-24 RX ORDER — LIDOCAINE HYDROCHLORIDE 20 MG/ML
JELLY TOPICAL ONCE
OUTPATIENT
Start: 2024-01-24 | End: 2024-01-24

## 2024-01-24 RX ORDER — LIDOCAINE HYDROCHLORIDE 40 MG/ML
SOLUTION TOPICAL ONCE
Status: COMPLETED | OUTPATIENT
Start: 2024-01-24 | End: 2024-01-24

## 2024-01-24 RX ORDER — CLOBETASOL PROPIONATE 0.5 MG/G
OINTMENT TOPICAL ONCE
OUTPATIENT
Start: 2024-01-24 | End: 2024-01-24

## 2024-01-24 RX ADMIN — LIDOCAINE HYDROCHLORIDE 10 ML: 40 SOLUTION TOPICAL at 14:47

## 2024-01-24 NOTE — PROGRESS NOTES
Wound Healing Center Followup Visit Note    Referring Physician : Tamie Santizo MD  Anastacia Morel  MEDICAL RECORD NUMBER:  [id number]  AGE: 79 y.o.   GENDER: female  : 1945  EPISODE DATE:  2024    Subjective:     Chief Complaint   Patient presents with    Wound Check     Left leg      HISTORY of PRESENT ILLNESS HPI   Anastacia Morel is a 79 y.o. female who presents today in regards to follow up evaluation and treatment of wound/ulcer.  That patient's past medical, family and social hx were reviewed and changes were made if present.    History of Wound Context:  Patient has had left calf wound since ~ 2021.  She has been putting a dressing on it.  The wound has not been improving.  She has a hx significant for venous stasis ulcerations of bilateral LE.  She first was seen by myself in regards to these issues 2015.  She eventually healed these wounds 2016.     I last saw her 2021 at which time I recommended lymphedema therapy.  She states she went and said it caused her to much pain and stopped going.  She has chronic issues with bilateral calf pain, swelling and edema.            She admits to not wearing her stockings because of the pain.    She has used knee high 20-30 mm hg stockings in the past.       She is still seeing pain management and is down to percocet 7/5/325 mg daily.       21  aquacell  Double tubigrip  Culture done  Emphasized importance of getting in to more significant compression in the future  12/15/21  Culture reviewed - light growth, no tx  Wound slightly improved  Still significant drainage  Plan on compression wrap next week  21  Stable wound  Drainage slightly better  Pt would like to wait till next week because of holidays to start wrap  22  Wound larger  Profore  22  Wound appearance better  Refusing wrap - it rolled down last week and she cut off after 3 days  22  Wound appearance better  Still refusing wrap   3/2/22  periwound

## 2024-01-24 NOTE — DISCHARGE INSTRUCTIONS
Written       Visit Discharge/Physician Orders     Discharge condition: Stable     Assessment of pain at discharge: mild     Anesthetic used: lido 4%     Discharge to: Home     Left via:Private automobile     Accompanied by: self     ECF/HHA: [place]     Dressing Orders:  LEFT PRETIB : Cleanse with normal saline, cover with calcium alginate, and super absorber(hydralock SA 6x10\") ,secure with dry dressing. Change daily.      Apply spandagrip. On in am and off in pm. Elevate legs as much as possible above level of the heart.      Treatment Orders: Eat a diet high in protein and vitamin C. Take a multiple vitamin daily unless contraindicated.     Elevate as much as possible     Continue lymphedema therapy     [facility] followup visit:    1 week ____________________________  (Please note your next appointment above and if you are unable to keep, kindly give a 24 hour notice. Thank you.)     Physician signature:__________________________      If you experience any of the following, please call the Wound Care Center during business hours:     * Increase in Pain  * Temperature over 101  * Increase in drainage from your wound  * Drainage with a foul odor  * Bleeding  * Increase in swelling  * Need for compression bandage changes due to slippage, breakthrough drainage.     If you need medical attention outside of the business hours of the Wound Care Centers please contact your PCP or go to the nearest emergency room.    Render Post-Care Instructions In Note?: yes Detail Level: Detailed Consent: The patient's verbal consent was obtained including but not limited to risks of crusting, scabbing, blistering, scarring, darker or lighter pigmentary change, recurrence, incomplete removal. Duration Of Freeze Thaw-Cycle (Seconds): 0 Post-Care Instructions: I reviewed with the patient in detail post-care instructions. Pt may apply Vaseline or Aquaphor to crusted or scabbing areas.

## 2024-01-30 NOTE — DISCHARGE INSTRUCTIONS
Written       Visit Discharge/Physician Orders     Discharge condition: Stable     Assessment of pain at discharge: mild     Anesthetic used: lido 4%     Discharge to: Home     Left via:Private automobile     Accompanied by: self     ECF/HHA: [place]     Dressing Orders:  LEFT PRETIB : Cleanse with normal saline, cover with calcium alginate, and super absorber(hydralock SA 6x10\") ,secure with dry dressing. Change daily.      Apply spandagrip. On in am and off in pm. Elevate legs as much as possible above level of the heart.      Treatment Orders: Eat a diet high in protein and vitamin C. Take a multiple vitamin daily unless contraindicated.     Elevate as much as possible     Continue lymphedema therapy     [facility] followup visit:    1 week ____________________________  (Please note your next appointment above and if you are unable to keep, kindly give a 24 hour notice. Thank you.)     Physician signature:__________________________      If you experience any of the following, please call the Wound Care Center during business hours:     * Increase in Pain  * Temperature over 101  * Increase in drainage from your wound  * Drainage with a foul odor  * Bleeding  * Increase in swelling  * Need for compression bandage changes due to slippage, breakthrough drainage.     If you need medical attention outside of the business hours of the Wound Care Centers please contact your PCP or go to the nearest emergency room.

## 2024-01-31 ENCOUNTER — HOSPITAL ENCOUNTER (OUTPATIENT)
Dept: WOUND CARE | Age: 79
Discharge: HOME | End: 2024-01-31
Attending: SURGERY
Payer: MEDICARE

## 2024-01-31 VITALS
HEIGHT: 66 IN | HEART RATE: 93 BPM | BODY MASS INDEX: 40.18 KG/M2 | RESPIRATION RATE: 18 BRPM | SYSTOLIC BLOOD PRESSURE: 140 MMHG | DIASTOLIC BLOOD PRESSURE: 76 MMHG | TEMPERATURE: 98.1 F | WEIGHT: 250 LBS

## 2024-01-31 DIAGNOSIS — I89.0 LYMPHEDEMA: ICD-10-CM

## 2024-01-31 DIAGNOSIS — L97.222 VENOUS STASIS ULCER OF LEFT CALF WITH FAT LAYER EXPOSED WITHOUT VARICOSE VEINS (HCC): Primary | ICD-10-CM

## 2024-01-31 DIAGNOSIS — I87.2 VENOUS STASIS ULCER OF LEFT CALF WITH FAT LAYER EXPOSED WITHOUT VARICOSE VEINS (HCC): Primary | ICD-10-CM

## 2024-01-31 PROCEDURE — 11042 DBRDMT SUBQ TIS 1ST 20SQCM/<: CPT | Performed by: SURGERY

## 2024-01-31 PROCEDURE — 11042 DBRDMT SUBQ TIS 1ST 20SQCM/<: CPT

## 2024-01-31 RX ORDER — LIDOCAINE HYDROCHLORIDE 40 MG/ML
SOLUTION TOPICAL ONCE
OUTPATIENT
Start: 2024-01-31 | End: 2024-01-31

## 2024-01-31 RX ORDER — GINSENG 100 MG
CAPSULE ORAL ONCE
OUTPATIENT
Start: 2024-01-31 | End: 2024-01-31

## 2024-01-31 RX ORDER — OXYCODONE AND ACETAMINOPHEN 7.5; 325 MG/1; MG/1
1 TABLET ORAL EVERY 8 HOURS PRN
Qty: 90 TABLET | Refills: 0 | Status: SHIPPED | OUTPATIENT
Start: 2024-01-31 | End: 2024-03-01

## 2024-01-31 RX ORDER — LIDOCAINE 50 MG/G
OINTMENT TOPICAL ONCE
OUTPATIENT
Start: 2024-01-31 | End: 2024-01-31

## 2024-01-31 RX ORDER — CLOBETASOL PROPIONATE 0.5 MG/G
OINTMENT TOPICAL ONCE
OUTPATIENT
Start: 2024-01-31 | End: 2024-01-31

## 2024-01-31 RX ORDER — LIDOCAINE HYDROCHLORIDE 20 MG/ML
JELLY TOPICAL ONCE
OUTPATIENT
Start: 2024-01-31 | End: 2024-01-31

## 2024-01-31 RX ORDER — BACITRACIN ZINC AND POLYMYXIN B SULFATE 500; 1000 [USP'U]/G; [USP'U]/G
OINTMENT TOPICAL ONCE
OUTPATIENT
Start: 2024-01-31 | End: 2024-01-31

## 2024-01-31 RX ORDER — TRIAMCINOLONE ACETONIDE 1 MG/G
OINTMENT TOPICAL ONCE
OUTPATIENT
Start: 2024-01-31 | End: 2024-01-31

## 2024-01-31 RX ORDER — IBUPROFEN 200 MG
TABLET ORAL ONCE
OUTPATIENT
Start: 2024-01-31 | End: 2024-01-31

## 2024-01-31 RX ORDER — LIDOCAINE 40 MG/G
CREAM TOPICAL ONCE
OUTPATIENT
Start: 2024-01-31 | End: 2024-01-31

## 2024-01-31 RX ORDER — MORPHINE SULFATE 15 MG/1
15 TABLET, FILM COATED, EXTENDED RELEASE ORAL 2 TIMES DAILY
Qty: 60 TABLET | Refills: 0 | Status: SHIPPED | OUTPATIENT
Start: 2024-01-31 | End: 2024-03-01

## 2024-01-31 RX ORDER — GENTAMICIN SULFATE 1 MG/G
OINTMENT TOPICAL ONCE
OUTPATIENT
Start: 2024-01-31 | End: 2024-01-31

## 2024-01-31 RX ORDER — BETAMETHASONE DIPROPIONATE 0.05 %
OINTMENT (GRAM) TOPICAL ONCE
OUTPATIENT
Start: 2024-01-31 | End: 2024-01-31

## 2024-01-31 RX ORDER — LIDOCAINE HYDROCHLORIDE 40 MG/ML
SOLUTION TOPICAL ONCE
Status: COMPLETED | OUTPATIENT
Start: 2024-01-31 | End: 2024-01-31

## 2024-01-31 RX ADMIN — LIDOCAINE HYDROCHLORIDE 10 ML: 40 SOLUTION TOPICAL at 15:14

## 2024-01-31 ASSESSMENT — PAIN - FUNCTIONAL ASSESSMENT: PAIN_FUNCTIONAL_ASSESSMENT: PREVENTS OR INTERFERES SOME ACTIVE ACTIVITIES AND ADLS

## 2024-01-31 ASSESSMENT — PAIN SCALES - GENERAL: PAINLEVEL_OUTOF10: 6

## 2024-01-31 ASSESSMENT — PAIN DESCRIPTION - DESCRIPTORS: DESCRIPTORS: BURNING;THROBBING

## 2024-01-31 ASSESSMENT — PAIN DESCRIPTION - PAIN TYPE: TYPE: CHRONIC PAIN

## 2024-01-31 ASSESSMENT — PAIN DESCRIPTION - LOCATION: LOCATION: LEG

## 2024-01-31 ASSESSMENT — PAIN DESCRIPTION - FREQUENCY: FREQUENCY: CONTINUOUS

## 2024-01-31 ASSESSMENT — PAIN DESCRIPTION - ORIENTATION: ORIENTATION: LEFT

## 2024-01-31 ASSESSMENT — PAIN DESCRIPTION - ONSET: ONSET: ON-GOING

## 2024-01-31 NOTE — PROGRESS NOTES
Wound Healing Center Followup Visit Note    Referring Physician : Tamie Santizo MD  Anastacia Morel  MEDICAL RECORD NUMBER:  [id number]  AGE: 79 y.o.   GENDER: female  : 1945  EPISODE DATE:  2024    Subjective:     Chief Complaint   Patient presents with    Wound Check     Left leg      HISTORY of PRESENT ILLNESS HPI   Anastacia Morel is a 79 y.o. female who presents today in regards to follow up evaluation and treatment of wound/ulcer.  That patient's past medical, family and social hx were reviewed and changes were made if present.    History of Wound Context:  Patient has had left calf wound since ~ 2021.  She has been putting a dressing on it.  The wound has not been improving.  She has a hx significant for venous stasis ulcerations of bilateral LE.  She first was seen by myself in regards to these issues 2015.  She eventually healed these wounds 2016.     I last saw her 2021 at which time I recommended lymphedema therapy.  She states she went and said it caused her to much pain and stopped going.  She has chronic issues with bilateral calf pain, swelling and edema.            She admits to not wearing her stockings because of the pain.    She has used knee high 20-30 mm hg stockings in the past.       She is still seeing pain management and is down to percocet 7/5/325 mg daily.       21  aquacell  Double tubigrip  Culture done  Emphasized importance of getting in to more significant compression in the future  12/15/21  Culture reviewed - light growth, no tx  Wound slightly improved  Still significant drainage  Plan on compression wrap next week  21  Stable wound  Drainage slightly better  Pt would like to wait till next week because of holidays to start wrap  22  Wound larger  Profore  22  Wound appearance better  Refusing wrap - it rolled down last week and she cut off after 3 days  22  Wound appearance better  Still refusing wrap   3/2/22  periwound

## 2024-01-31 NOTE — PLAN OF CARE
Problem: Chronic Conditions and Co-morbidities  Goal: Patient's chronic conditions and co-morbidity symptoms are monitored and maintained or improved  Outcome: Progressing     Problem: Pain  Goal: Verbalizes/displays adequate comfort level or baseline comfort level  Outcome: Progressing     Problem: Cognitive:  Goal: Knowledge of wound care  Description: Knowledge of wound care  Outcome: Progressing  Goal: Understands risk factors for wounds  Description: Understands risk factors for wounds  Outcome: Progressing     Problem: Wound:  Goal: Will show signs of wound healing; wound closure and no evidence of infection  Description: Will show signs of wound healing; wound closure and no evidence of infection  Outcome: Progressing     Problem: Venous:  Goal: Signs of wound healing will improve  Description: Signs of wound healing will improve  Outcome: Progressing

## 2024-02-05 NOTE — DISCHARGE INSTRUCTIONS
Visit Discharge/Physician Orders     Discharge condition: Stable     Assessment of pain at discharge: mild     Anesthetic used: lido 4%     Discharge to: Home     Left via:Private automobile     Accompanied by: self     ECF/HHA: [place]     Dressing Orders:  LEFT PRETIB : Cleanse with normal saline, cover with calcium alginate, and super absorber(hydralock SA 6x10\") ,secure with dry dressing. Change daily.      Apply spandagrip. On in am and off in pm. Elevate legs as much as possible above level of the heart.      Treatment Orders: Eat a diet high in protein and vitamin C. Take a multiple vitamin daily unless contraindicated.     Elevate as much as possible     Continue lymphedema therapy     [facility] followup visit: 1 week (in pm)____________________________  (Please note your next appointment above and if you are unable to keep, kindly give a 24 hour notice. Thank you.)     Physician signature:__________________________      If you experience any of the following, please call the Wound Care Center during business hours:     * Increase in Pain  * Temperature over 101  * Increase in drainage from your wound  * Drainage with a foul odor  * Bleeding  * Increase in swelling  * Need for compression bandage changes due to slippage, breakthrough drainage.     If you need medical attention outside of the business hours of the Wound Care Centers please contact your PCP or go to the nearest emergency room.

## 2024-02-07 ENCOUNTER — HOSPITAL ENCOUNTER (OUTPATIENT)
Dept: WOUND CARE | Age: 79
Discharge: HOME | End: 2024-02-07
Attending: SURGERY

## 2024-02-12 NOTE — PLAN OF CARE
Problem: Chronic Conditions and Co-morbidities  Goal: Patient's chronic conditions and co-morbidity symptoms are monitored and maintained or improved  Outcome: Progressing     Problem: Chronic Conditions and Co-morbidities  Goal: Patient's chronic conditions and co-morbidity symptoms are monitored and maintained or improved  Outcome: Progressing     Problem: Wound:  Goal: Will show signs of wound healing; wound closure and no evidence of infection  Description: Will show signs of wound healing; wound closure and no evidence of infection  Outcome: Progressing     Problem: Venous:  Goal: Signs of wound healing will improve  Description: Signs of wound healing will improve  Outcome: Progressing     Problem: Safety - Adult  Goal: Free from fall injury  Outcome: Progressing
no

## 2024-02-13 NOTE — DISCHARGE INSTRUCTIONS
Visit Discharge/Physician Orders     Discharge condition: Stable     Assessment of pain at discharge: mild     Anesthetic used: lido 4%     Discharge to: Home     Left via:Private automobile     Accompanied by: self     ECF/HHA: [place]     Dressing Orders:  LEFT PRETIB : Cleanse with normal saline, cover with calcium alginate, and super absorber(hydralock SA 6x10\") ,secure with dry dressing. Change daily.      Apply spandagrip. On in am and off in pm. Elevate legs as much as possible above level of the heart.      Treatment Orders: Eat a diet high in protein and vitamin C. Take a multiple vitamin daily unless contraindicated.     Elevate as much as possible     Restart lymphedema therapy     [facility] followup visit:    1 week ____________________________  (Please note your next appointment above and if you are unable to keep, kindly give a 24 hour notice. Thank you.)     Physician signature:__________________________      If you experience any of the following, please call the Wound Care Center during business hours:     * Increase in Pain  * Temperature over 101  * Increase in drainage from your wound  * Drainage with a foul odor  * Bleeding  * Increase in swelling  * Need for compression bandage changes due to slippage, breakthrough drainage.     If you need medical attention outside of the business hours of the Wound Care Centers please contact your PCP or go to the nearest emergency room.

## 2024-02-14 ENCOUNTER — HOSPITAL ENCOUNTER (OUTPATIENT)
Dept: WOUND CARE | Age: 79
Discharge: HOME | End: 2024-02-14
Attending: SURGERY
Payer: MEDICARE

## 2024-02-14 VITALS
TEMPERATURE: 97.3 F | SYSTOLIC BLOOD PRESSURE: 142 MMHG | HEART RATE: 88 BPM | BODY MASS INDEX: 40.18 KG/M2 | DIASTOLIC BLOOD PRESSURE: 74 MMHG | WEIGHT: 250 LBS | HEIGHT: 66 IN | RESPIRATION RATE: 18 BRPM

## 2024-02-14 DIAGNOSIS — I87.2 VENOUS STASIS ULCER OF LEFT CALF WITH FAT LAYER EXPOSED WITHOUT VARICOSE VEINS (HCC): Primary | ICD-10-CM

## 2024-02-14 DIAGNOSIS — I89.0 LYMPHEDEMA: ICD-10-CM

## 2024-02-14 DIAGNOSIS — L97.222 VENOUS STASIS ULCER OF LEFT CALF WITH FAT LAYER EXPOSED WITHOUT VARICOSE VEINS (HCC): Primary | ICD-10-CM

## 2024-02-14 PROCEDURE — 11042 DBRDMT SUBQ TIS 1ST 20SQCM/<: CPT

## 2024-02-14 PROCEDURE — 11042 DBRDMT SUBQ TIS 1ST 20SQCM/<: CPT | Performed by: SURGERY

## 2024-02-14 PROCEDURE — 11045 DBRDMT SUBQ TISS EACH ADDL: CPT

## 2024-02-14 PROCEDURE — 11045 DBRDMT SUBQ TISS EACH ADDL: CPT | Performed by: SURGERY

## 2024-02-14 RX ORDER — LIDOCAINE 40 MG/G
CREAM TOPICAL ONCE
OUTPATIENT
Start: 2024-02-14 | End: 2024-02-14

## 2024-02-14 RX ORDER — LIDOCAINE HYDROCHLORIDE 40 MG/ML
SOLUTION TOPICAL ONCE
OUTPATIENT
Start: 2024-02-14 | End: 2024-02-14

## 2024-02-14 RX ORDER — LIDOCAINE HYDROCHLORIDE 20 MG/ML
JELLY TOPICAL ONCE
OUTPATIENT
Start: 2024-02-14 | End: 2024-02-14

## 2024-02-14 RX ORDER — GINSENG 100 MG
CAPSULE ORAL ONCE
OUTPATIENT
Start: 2024-02-14 | End: 2024-02-14

## 2024-02-14 RX ORDER — LIDOCAINE HYDROCHLORIDE 40 MG/ML
SOLUTION TOPICAL ONCE
Status: COMPLETED | OUTPATIENT
Start: 2024-02-14 | End: 2024-02-14

## 2024-02-14 RX ORDER — GENTAMICIN SULFATE 1 MG/G
OINTMENT TOPICAL ONCE
OUTPATIENT
Start: 2024-02-14 | End: 2024-02-14

## 2024-02-14 RX ORDER — TRIAMCINOLONE ACETONIDE 1 MG/G
OINTMENT TOPICAL ONCE
OUTPATIENT
Start: 2024-02-14 | End: 2024-02-14

## 2024-02-14 RX ORDER — LIDOCAINE 50 MG/G
OINTMENT TOPICAL ONCE
OUTPATIENT
Start: 2024-02-14 | End: 2024-02-14

## 2024-02-14 RX ORDER — BACITRACIN ZINC AND POLYMYXIN B SULFATE 500; 1000 [USP'U]/G; [USP'U]/G
OINTMENT TOPICAL ONCE
OUTPATIENT
Start: 2024-02-14 | End: 2024-02-14

## 2024-02-14 RX ORDER — CLOBETASOL PROPIONATE 0.5 MG/G
OINTMENT TOPICAL ONCE
OUTPATIENT
Start: 2024-02-14 | End: 2024-02-14

## 2024-02-14 RX ORDER — BETAMETHASONE DIPROPIONATE 0.05 %
OINTMENT (GRAM) TOPICAL ONCE
OUTPATIENT
Start: 2024-02-14 | End: 2024-02-14

## 2024-02-14 RX ORDER — IBUPROFEN 200 MG
TABLET ORAL ONCE
OUTPATIENT
Start: 2024-02-14 | End: 2024-02-14

## 2024-02-14 RX ADMIN — LIDOCAINE HYDROCHLORIDE 25 ML: 40 SOLUTION TOPICAL at 15:50

## 2024-02-14 NOTE — PROGRESS NOTES
Wound Healing Center Followup Visit Note    Referring Physician : Tamie Santizo MD  Anastacia Morel  MEDICAL RECORD NUMBER:  [id number]  AGE: 79 y.o.   GENDER: female  : 1945  EPISODE DATE:  2024    Subjective:     Chief Complaint   Patient presents with    Wound Check     Left lower leg      HISTORY of PRESENT ILLNESS HPI   Anastacia Morel is a 79 y.o. female who presents today in regards to follow up evaluation and treatment of wound/ulcer.  That patient's past medical, family and social hx were reviewed and changes were made if present.    History of Wound Context:  Patient has had left calf wound since ~ 2021.  She has been putting a dressing on it.  The wound has not been improving.  She has a hx significant for venous stasis ulcerations of bilateral LE.  She first was seen by myself in regards to these issues 2015.  She eventually healed these wounds 2016.     I last saw her 2021 at which time I recommended lymphedema therapy.  She states she went and said it caused her to much pain and stopped going.  She has chronic issues with bilateral calf pain, swelling and edema.            She admits to not wearing her stockings because of the pain.    She has used knee high 20-30 mm hg stockings in the past.       She is still seeing pain management and is down to percocet /325 mg daily.       21  aquacell  Double tubigrip  Culture done  Emphasized importance of getting in to more significant compression in the future  12/15/21  Culture reviewed - light growth, no tx  Wound slightly improved  Still significant drainage  Plan on compression wrap next week  21  Stable wound  Drainage slightly better  Pt would like to wait till next week because of holidays to start wrap  22  Wound larger  Profore  22  Wound appearance better  Refusing wrap - it rolled down last week and she cut off after 3 days  22  Wound appearance better  Still refusing wrap

## 2024-02-14 NOTE — PLAN OF CARE
Problem: Cognitive:  Goal: Knowledge of wound care  Description: Knowledge of wound care  Outcome: Progressing  Goal: Understands risk factors for wounds  Description: Understands risk factors for wounds  Outcome: Progressing     Problem: Wound:  Goal: Will show signs of wound healing; wound closure and no evidence of infection  Description: Will show signs of wound healing; wound closure and no evidence of infection  Outcome: Progressing     Problem: Venous:  Goal: Signs of wound healing will improve  Description: Signs of wound healing will improve  Outcome: Progressing

## 2024-02-20 NOTE — DISCHARGE INSTRUCTIONS
Visit Discharge/Physician Orders     Discharge condition: Stable     Assessment of pain at discharge: mild     Anesthetic used: lido 4%     Discharge to: Home     Left via:Private automobile     Accompanied by: self     ECF/HHA: [place]     Dressing Orders:  LEFT PRETIB : Cleanse with normal saline, cover with calcium alginate, and super absorber(hydralock SA 6x10\") ,secure with dry dressing. Change daily.      Apply spandagrip. On in am and off in pm. Elevate legs as much as possible above level of the heart.      Treatment Orders: Eat a diet high in protein and vitamin C. Take a multiple vitamin daily unless contraindicated.     Elevate as much as possible     Restart lymphedema therapy     [facility] followup visit:    1 week ____________________________  (Please note your next appointment above and if you are unable to keep, kindly give a 24 hour notice. Thank you.)     Physician signature:__________________________      If you experience any of the following, please call the Wound Care Center during business hours:     * Increase in Pain  * Temperature over 101  * Increase in drainage from your wound  * Drainage with a foul odor  * Bleeding  * Increase in swelling  * Need for compression bandage changes due to slippage, breakthrough drainage.     If you need medical attention outside of the business hours of the Wound Care Centers please contact your PCP or go to the nearest emergency room.

## 2024-02-21 ENCOUNTER — HOSPITAL ENCOUNTER (OUTPATIENT)
Dept: WOUND CARE | Age: 79
Discharge: HOME | End: 2024-02-21
Attending: SURGERY
Payer: MEDICARE

## 2024-02-21 VITALS
DIASTOLIC BLOOD PRESSURE: 73 MMHG | BODY MASS INDEX: 40.18 KG/M2 | HEIGHT: 66 IN | RESPIRATION RATE: 18 BRPM | WEIGHT: 250 LBS | SYSTOLIC BLOOD PRESSURE: 161 MMHG | TEMPERATURE: 97.2 F

## 2024-02-21 DIAGNOSIS — I89.0 LYMPHEDEMA: ICD-10-CM

## 2024-02-21 DIAGNOSIS — L97.222 VENOUS STASIS ULCER OF LEFT CALF WITH FAT LAYER EXPOSED WITHOUT VARICOSE VEINS (HCC): Primary | ICD-10-CM

## 2024-02-21 DIAGNOSIS — I87.2 VENOUS STASIS ULCER OF LEFT CALF WITH FAT LAYER EXPOSED WITHOUT VARICOSE VEINS (HCC): Primary | ICD-10-CM

## 2024-02-21 PROCEDURE — 11042 DBRDMT SUBQ TIS 1ST 20SQCM/<: CPT

## 2024-02-21 PROCEDURE — 11042 DBRDMT SUBQ TIS 1ST 20SQCM/<: CPT | Performed by: SURGERY

## 2024-02-21 RX ORDER — BETAMETHASONE DIPROPIONATE 0.05 %
OINTMENT (GRAM) TOPICAL ONCE
OUTPATIENT
Start: 2024-02-21 | End: 2024-02-21

## 2024-02-21 RX ORDER — LIDOCAINE HYDROCHLORIDE 20 MG/ML
JELLY TOPICAL ONCE
OUTPATIENT
Start: 2024-02-21 | End: 2024-02-21

## 2024-02-21 RX ORDER — GENTAMICIN SULFATE 1 MG/G
OINTMENT TOPICAL ONCE
OUTPATIENT
Start: 2024-02-21 | End: 2024-02-21

## 2024-02-21 RX ORDER — BACITRACIN ZINC AND POLYMYXIN B SULFATE 500; 1000 [USP'U]/G; [USP'U]/G
OINTMENT TOPICAL ONCE
OUTPATIENT
Start: 2024-02-21 | End: 2024-02-21

## 2024-02-21 RX ORDER — GINSENG 100 MG
CAPSULE ORAL ONCE
OUTPATIENT
Start: 2024-02-21 | End: 2024-02-21

## 2024-02-21 RX ORDER — IBUPROFEN 200 MG
TABLET ORAL ONCE
OUTPATIENT
Start: 2024-02-21 | End: 2024-02-21

## 2024-02-21 RX ORDER — TRIAMCINOLONE ACETONIDE 1 MG/G
OINTMENT TOPICAL ONCE
OUTPATIENT
Start: 2024-02-21 | End: 2024-02-21

## 2024-02-21 RX ORDER — LIDOCAINE 50 MG/G
OINTMENT TOPICAL ONCE
OUTPATIENT
Start: 2024-02-21 | End: 2024-02-21

## 2024-02-21 RX ORDER — LIDOCAINE HYDROCHLORIDE 40 MG/ML
SOLUTION TOPICAL ONCE
OUTPATIENT
Start: 2024-02-21 | End: 2024-02-21

## 2024-02-21 RX ORDER — LIDOCAINE 40 MG/G
CREAM TOPICAL ONCE
OUTPATIENT
Start: 2024-02-21 | End: 2024-02-21

## 2024-02-21 RX ORDER — CLOBETASOL PROPIONATE 0.5 MG/G
OINTMENT TOPICAL ONCE
OUTPATIENT
Start: 2024-02-21 | End: 2024-02-21

## 2024-02-21 RX ORDER — LIDOCAINE HYDROCHLORIDE 40 MG/ML
SOLUTION TOPICAL ONCE
Status: COMPLETED | OUTPATIENT
Start: 2024-02-21 | End: 2024-02-21

## 2024-02-21 RX ADMIN — LIDOCAINE HYDROCHLORIDE 10 ML: 40 SOLUTION TOPICAL at 15:22

## 2024-02-21 ASSESSMENT — PAIN DESCRIPTION - DESCRIPTORS: DESCRIPTORS: BURNING;THROBBING

## 2024-02-21 ASSESSMENT — PAIN DESCRIPTION - ONSET: ONSET: ON-GOING

## 2024-02-21 ASSESSMENT — PAIN DESCRIPTION - PAIN TYPE: TYPE: CHRONIC PAIN

## 2024-02-21 ASSESSMENT — PAIN DESCRIPTION - FREQUENCY: FREQUENCY: CONTINUOUS

## 2024-02-21 ASSESSMENT — PAIN DESCRIPTION - LOCATION: LOCATION: LEG

## 2024-02-21 ASSESSMENT — PAIN DESCRIPTION - ORIENTATION: ORIENTATION: LEFT

## 2024-02-21 ASSESSMENT — PAIN - FUNCTIONAL ASSESSMENT: PAIN_FUNCTIONAL_ASSESSMENT: PREVENTS OR INTERFERES SOME ACTIVE ACTIVITIES AND ADLS

## 2024-02-21 ASSESSMENT — PAIN SCALES - GENERAL: PAINLEVEL_OUTOF10: 6

## 2024-02-21 NOTE — PROGRESS NOTES
history.  Social History     Tobacco Use    Smoking status: Former     Current packs/day: 0.00     Average packs/day: 1 pack/day for 20.0 years (20.0 ttl pk-yrs)     Types: Cigarettes     Start date: 1975     Quit date: 1995     Years since quittin.3    Smokeless tobacco: Never    Tobacco comments:     Quit   Vaping Use    Vaping Use: Never used   Substance Use Topics    Alcohol use: No    Drug use: No     Allergies   Allergen Reactions    Codeine Nausea And Vomiting and Other (See Comments)     hallucinate    Dilaudid [Hydromorphone Hcl] Rash    Naproxen Other (See Comments)     Shuts down kidney function    Singulair [Montelukast Sodium] Shortness Of Breath     Mucus in chest    Black Cohosh     Black Cohosh [Cimicifuga [place] (Black Cohosh)] Rash     Current Outpatient Medications on File Prior to Encounter   Medication Sig Dispense Refill    oxyCODONE-acetaminophen (PERCOCET) 7.5-325 MG per tablet Take 1 tablet by mouth every 8 hours as needed for Pain for up to 30 days. Intended supply: 30 days Max Daily Amount: 3 tablets 90 tablet 0    morphine (MS CONTIN) 15 MG extended release tablet Take 1 tablet by mouth 2 times daily for 30 days. Max Daily Amount: 30 mg 60 tablet 0    metoprolol succinate (TOPROL XL) 50 MG extended release tablet Take 1 tablet by mouth daily      gabapentin (NEURONTIN) 300 MG capsule Take 1 capsule by mouth 3 times daily for 120 days. Intended supply: 30 days 90 capsule 3    aspirin 81 MG EC tablet Take 1 tablet by mouth daily      Garlic 10 MG CAPS Take by mouth      hydroCHLOROthiazide (HYDRODIURIL) 25 MG tablet Take 1 tablet by mouth daily      potassium chloride (KLOR-CON M) 10 MEQ extended release tablet Take 1 tablet by mouth daily (with breakfast) 30 tablet 0    Multiple Vitamins-Minerals (THERAPEUTIC MULTIVITAMIN-MINERALS) tablet Take 1 tablet by mouth daily      simvastatin (ZOCOR) 40 MG tablet Take 1 tablet by mouth nightly      Cholecalciferol (VITAMIN D) 2000

## 2024-02-27 NOTE — DISCHARGE INSTRUCTIONS
Visit Discharge/Physician Orders     Discharge condition: Stable     Assessment of pain at discharge: mild     Anesthetic used: lido 4%     Discharge to: Home     Left via:Private automobile     Accompanied by: self     ECF/HHA: [place]     Dressing Orders:  LEFT PRETIB : Cleanse with normal saline, cover with calcium alginate, and super absorber(hydralock SA 6x10\") , apply coban 2 wrap. Leave wrap in place until drainage comes through, then cut off and do daily dressing changes.      Apply spandagrip. On in am and off in pm. Elevate legs as much as possible above level of the heart.      Treatment Orders: Eat a diet high in protein and vitamin C. Take a multiple vitamin daily unless contraindicated.     Elevate as much as possible     Restart lymphedema therapy     [facility] followup visit:    1 week ____________________________  (Please note your next appointment above and if you are unable to keep, kindly give a 24 hour notice. Thank you.)     Physician signature:__________________________      If you experience any of the following, please call the Wound Care Center during business hours:     * Increase in Pain  * Temperature over 101  * Increase in drainage from your wound  * Drainage with a foul odor  * Bleeding  * Increase in swelling  * Need for compression bandage changes due to slippage, breakthrough drainage.     If you need medical attention outside of the business hours of the Wound Care Centers please contact your PCP or go to the nearest emergency room.

## 2024-02-28 ENCOUNTER — HOSPITAL ENCOUNTER (OUTPATIENT)
Dept: WOUND CARE | Age: 79
Discharge: HOME | End: 2024-02-28
Attending: SURGERY
Payer: MEDICARE

## 2024-02-28 VITALS
TEMPERATURE: 97.1 F | SYSTOLIC BLOOD PRESSURE: 177 MMHG | HEART RATE: 80 BPM | DIASTOLIC BLOOD PRESSURE: 83 MMHG | RESPIRATION RATE: 18 BRPM

## 2024-02-28 DIAGNOSIS — L97.222 VENOUS STASIS ULCER OF LEFT CALF WITH FAT LAYER EXPOSED WITHOUT VARICOSE VEINS (HCC): Primary | ICD-10-CM

## 2024-02-28 DIAGNOSIS — I89.0 LYMPHEDEMA: ICD-10-CM

## 2024-02-28 DIAGNOSIS — I87.2 VENOUS STASIS ULCER OF LEFT CALF WITH FAT LAYER EXPOSED WITHOUT VARICOSE VEINS (HCC): Primary | ICD-10-CM

## 2024-02-28 PROCEDURE — 99213 OFFICE O/P EST LOW 20 MIN: CPT | Performed by: SURGERY

## 2024-02-28 PROCEDURE — 99213 OFFICE O/P EST LOW 20 MIN: CPT

## 2024-02-28 RX ORDER — LIDOCAINE HYDROCHLORIDE 20 MG/ML
JELLY TOPICAL ONCE
OUTPATIENT
Start: 2024-02-28 | End: 2024-02-28

## 2024-02-28 RX ORDER — MORPHINE SULFATE 15 MG/1
15 TABLET, FILM COATED, EXTENDED RELEASE ORAL 2 TIMES DAILY
Qty: 60 TABLET | Refills: 0 | Status: SHIPPED | OUTPATIENT
Start: 2024-02-28 | End: 2024-03-29

## 2024-02-28 RX ORDER — IBUPROFEN 200 MG
TABLET ORAL ONCE
OUTPATIENT
Start: 2024-02-28 | End: 2024-02-28

## 2024-02-28 RX ORDER — LIDOCAINE 50 MG/G
OINTMENT TOPICAL ONCE
OUTPATIENT
Start: 2024-02-28 | End: 2024-02-28

## 2024-02-28 RX ORDER — LIDOCAINE HYDROCHLORIDE 40 MG/ML
SOLUTION TOPICAL ONCE
OUTPATIENT
Start: 2024-02-28 | End: 2024-02-28

## 2024-02-28 RX ORDER — GENTAMICIN SULFATE 1 MG/G
OINTMENT TOPICAL ONCE
OUTPATIENT
Start: 2024-02-28 | End: 2024-02-28

## 2024-02-28 RX ORDER — LIDOCAINE 40 MG/G
CREAM TOPICAL ONCE
Status: DISCONTINUED | OUTPATIENT
Start: 2024-02-28 | End: 2024-02-29 | Stop reason: HOSPADM

## 2024-02-28 RX ORDER — LIDOCAINE 40 MG/G
CREAM TOPICAL ONCE
OUTPATIENT
Start: 2024-02-28 | End: 2024-02-28

## 2024-02-28 RX ORDER — TRIAMCINOLONE ACETONIDE 1 MG/G
OINTMENT TOPICAL ONCE
OUTPATIENT
Start: 2024-02-28 | End: 2024-02-28

## 2024-02-28 RX ORDER — GINSENG 100 MG
CAPSULE ORAL ONCE
OUTPATIENT
Start: 2024-02-28 | End: 2024-02-28

## 2024-02-28 RX ORDER — CLOBETASOL PROPIONATE 0.5 MG/G
OINTMENT TOPICAL ONCE
OUTPATIENT
Start: 2024-02-28 | End: 2024-02-28

## 2024-02-28 RX ORDER — BETAMETHASONE DIPROPIONATE 0.05 %
OINTMENT (GRAM) TOPICAL ONCE
OUTPATIENT
Start: 2024-02-28 | End: 2024-02-28

## 2024-02-28 RX ORDER — OXYCODONE AND ACETAMINOPHEN 7.5; 325 MG/1; MG/1
1 TABLET ORAL EVERY 8 HOURS PRN
Qty: 90 TABLET | Refills: 0 | Status: SHIPPED | OUTPATIENT
Start: 2024-02-28 | End: 2024-03-29

## 2024-02-28 RX ORDER — BACITRACIN ZINC AND POLYMYXIN B SULFATE 500; 1000 [USP'U]/G; [USP'U]/G
OINTMENT TOPICAL ONCE
OUTPATIENT
Start: 2024-02-28 | End: 2024-02-28

## 2024-02-28 ASSESSMENT — PAIN DESCRIPTION - ORIENTATION: ORIENTATION: LEFT

## 2024-02-28 ASSESSMENT — PAIN DESCRIPTION - DESCRIPTORS: DESCRIPTORS: BURNING;THROBBING

## 2024-02-28 ASSESSMENT — PAIN - FUNCTIONAL ASSESSMENT: PAIN_FUNCTIONAL_ASSESSMENT: PREVENTS OR INTERFERES SOME ACTIVE ACTIVITIES AND ADLS

## 2024-02-28 ASSESSMENT — PAIN DESCRIPTION - LOCATION: LOCATION: LEG

## 2024-02-28 ASSESSMENT — PAIN SCALES - GENERAL: PAINLEVEL_OUTOF10: 7

## 2024-02-28 NOTE — PROGRESS NOTES
On in am and off in pm. Elevate legs as much as possible above level of the heart.      Treatment Orders: Eat a diet high in protein and vitamin C. Take a multiple vitamin daily unless contraindicated.     Elevate as much as possible     Restart lymphedema therapy     [facility] followup visit:    1 week ____________________________  (Please note your next appointment above and if you are unable to keep, kindly give a 24 hour notice. Thank you.)     Physician signature:__________________________      If you experience any of the following, please call the Wound Care Center during business hours:     * Increase in Pain  * Temperature over 101  * Increase in drainage from your wound  * Drainage with a foul odor  * Bleeding  * Increase in swelling  * Need for compression bandage changes due to slippage, breakthrough drainage.     If you need medical attention outside of the business hours of the Wound Care Centers please contact your PCP or go to the nearest emergency room.             Electronically signed by Faith Dempsey MD

## 2024-03-04 RX ORDER — GABAPENTIN 300 MG/1
300 CAPSULE ORAL 3 TIMES DAILY
Qty: 90 CAPSULE | Refills: 3 | Status: SHIPPED | OUTPATIENT
Start: 2024-03-04 | End: 2024-07-02

## 2024-03-05 NOTE — DISCHARGE INSTRUCTIONS
Visit Discharge/Physician Orders     Discharge condition: Stable     Assessment of pain at discharge: mild     Anesthetic used: lido 4%     Discharge to: Home     Left via:Private automobile     Accompanied by: self     ECF/HHA: [facility] * New Referral*      Dressing Orders:  LEFT PRETIB : Cleanse with normal saline, cover with calcium alginate, and super absorber(hydralock SA 6x10\") , apply coban 2 wrap. Change M, W([facility]), F        Treatment Orders: Eat a diet high in protein and vitamin C. Take a multiple vitamin daily unless contraindicated.     Elevate as much as possible     [facility] followup visit:    1 week ____________________________  (Please note your next appointment above and if you are unable to keep, kindly give a 24 hour notice. Thank you.)     Physician signature:__________________________      If you experience any of the following, please call the Wound Care Center during business hours:     * Increase in Pain  * Temperature over 101  * Increase in drainage from your wound  * Drainage with a foul odor  * Bleeding  * Increase in swelling  * Need for compression bandage changes due to slippage, breakthrough drainage.     If you need medical attention outside of the business hours of the Wound Care Centers please contact your PCP or go to the nearest emergency room.

## 2024-03-06 ENCOUNTER — HOSPITAL ENCOUNTER (OUTPATIENT)
Dept: WOUND CARE | Age: 79
Discharge: HOME | End: 2024-03-06
Attending: SURGERY
Payer: MEDICARE

## 2024-03-06 VITALS
DIASTOLIC BLOOD PRESSURE: 68 MMHG | HEIGHT: 66 IN | WEIGHT: 250 LBS | BODY MASS INDEX: 40.18 KG/M2 | HEART RATE: 89 BPM | SYSTOLIC BLOOD PRESSURE: 157 MMHG | TEMPERATURE: 97.1 F | RESPIRATION RATE: 18 BRPM

## 2024-03-06 DIAGNOSIS — I87.2 VENOUS STASIS ULCER OF LEFT CALF WITH FAT LAYER EXPOSED WITHOUT VARICOSE VEINS (HCC): Primary | ICD-10-CM

## 2024-03-06 DIAGNOSIS — L97.222 VENOUS STASIS ULCER OF LEFT CALF WITH FAT LAYER EXPOSED WITHOUT VARICOSE VEINS (HCC): Primary | ICD-10-CM

## 2024-03-06 PROCEDURE — 11042 DBRDMT SUBQ TIS 1ST 20SQCM/<: CPT

## 2024-03-06 RX ORDER — LIDOCAINE 50 MG/G
OINTMENT TOPICAL ONCE
OUTPATIENT
Start: 2024-03-06 | End: 2024-03-06

## 2024-03-06 RX ORDER — IBUPROFEN 200 MG
TABLET ORAL ONCE
OUTPATIENT
Start: 2024-03-06 | End: 2024-03-06

## 2024-03-06 RX ORDER — LIDOCAINE HYDROCHLORIDE 20 MG/ML
JELLY TOPICAL ONCE
OUTPATIENT
Start: 2024-03-06 | End: 2024-03-06

## 2024-03-06 RX ORDER — BETAMETHASONE DIPROPIONATE 0.05 %
OINTMENT (GRAM) TOPICAL ONCE
OUTPATIENT
Start: 2024-03-06 | End: 2024-03-06

## 2024-03-06 RX ORDER — BACITRACIN ZINC AND POLYMYXIN B SULFATE 500; 1000 [USP'U]/G; [USP'U]/G
OINTMENT TOPICAL ONCE
OUTPATIENT
Start: 2024-03-06 | End: 2024-03-06

## 2024-03-06 RX ORDER — CLOBETASOL PROPIONATE 0.5 MG/G
OINTMENT TOPICAL ONCE
OUTPATIENT
Start: 2024-03-06 | End: 2024-03-06

## 2024-03-06 RX ORDER — LIDOCAINE HYDROCHLORIDE 40 MG/ML
SOLUTION TOPICAL ONCE
OUTPATIENT
Start: 2024-03-06 | End: 2024-03-06

## 2024-03-06 RX ORDER — LIDOCAINE 40 MG/G
CREAM TOPICAL ONCE
OUTPATIENT
Start: 2024-03-06 | End: 2024-03-06

## 2024-03-06 RX ORDER — LIDOCAINE HYDROCHLORIDE 40 MG/ML
SOLUTION TOPICAL ONCE
Status: COMPLETED | OUTPATIENT
Start: 2024-03-06 | End: 2024-03-06

## 2024-03-06 RX ORDER — GINSENG 100 MG
CAPSULE ORAL ONCE
OUTPATIENT
Start: 2024-03-06 | End: 2024-03-06

## 2024-03-06 RX ORDER — TRIAMCINOLONE ACETONIDE 1 MG/G
OINTMENT TOPICAL ONCE
OUTPATIENT
Start: 2024-03-06 | End: 2024-03-06

## 2024-03-06 RX ORDER — GENTAMICIN SULFATE 1 MG/G
OINTMENT TOPICAL ONCE
OUTPATIENT
Start: 2024-03-06 | End: 2024-03-06

## 2024-03-06 RX ADMIN — LIDOCAINE HYDROCHLORIDE 10 ML: 40 SOLUTION TOPICAL at 14:45

## 2024-03-06 ASSESSMENT — PAIN DESCRIPTION - PAIN TYPE: TYPE: CHRONIC PAIN

## 2024-03-06 ASSESSMENT — PAIN SCALES - GENERAL: PAINLEVEL_OUTOF10: 6

## 2024-03-06 ASSESSMENT — PAIN - FUNCTIONAL ASSESSMENT: PAIN_FUNCTIONAL_ASSESSMENT: PREVENTS OR INTERFERES SOME ACTIVE ACTIVITIES AND ADLS

## 2024-03-06 ASSESSMENT — PAIN DESCRIPTION - DESCRIPTORS: DESCRIPTORS: BURNING;THROBBING

## 2024-03-06 ASSESSMENT — PAIN DESCRIPTION - ORIENTATION: ORIENTATION: LEFT

## 2024-03-06 ASSESSMENT — PAIN DESCRIPTION - LOCATION: LOCATION: LEG

## 2024-03-06 ASSESSMENT — PAIN DESCRIPTION - ONSET: ONSET: ON-GOING

## 2024-03-06 ASSESSMENT — PAIN DESCRIPTION - FREQUENCY: FREQUENCY: CONTINUOUS

## 2024-03-06 NOTE — PLAN OF CARE
Problem: Chronic Conditions and Co-morbidities  Goal: Patient's chronic conditions and co-morbidity symptoms are monitored and maintained or improved  3/6/2024 1453 by Rosalinda Adams RN  Outcome: Progressing  3/6/2024 1450 by Rosalinda Adams RN  Outcome: Progressing     Problem: Pain  Goal: Verbalizes/displays adequate comfort level or baseline comfort level  3/6/2024 1453 by Rosalinda Adams RN  Outcome: Progressing  3/6/2024 1450 by Rosalinda Adams RN  Outcome: Progressing     Problem: Cognitive:  Goal: Knowledge of wound care  Description: Knowledge of wound care  3/6/2024 1453 by Rosalinda Adams RN  Outcome: Progressing  3/6/2024 1450 by Rosalinda Adams RN  Outcome: Progressing  Goal: Understands risk factors for wounds  Description: Understands risk factors for wounds  3/6/2024 1453 by Rosalinda Adams RN  Outcome: Progressing  3/6/2024 1450 by Rosalinda Adams RN  Outcome: Progressing     Problem: Wound:  Goal: Will show signs of wound healing; wound closure and no evidence of infection  Description: Will show signs of wound healing; wound closure and no evidence of infection  3/6/2024 1453 by Rosalinda Adams RN  Outcome: Progressing  3/6/2024 1450 by Rosalinda Adams RN  Outcome: Progressing

## 2024-03-07 NOTE — PROGRESS NOTES
Wound Care Request for Home Health      Home Health Company:     [facility] [phone]    Ordering Center:      WOUND CARE  [address] [id number] [phone]  WOUND CARE Dept: [phone]   FAX NUMBER [phone]    Patient Information:      Rock Pryor  [address] [address]   I have attempted without success to contact this patient by phone for Preadmission Testing phone assessment.   Message left for pt to return call.   : 1945  AGE: 79 y.o.     GENDER: female   EPISODE DATE: 3/6/2024    Insurance:      PRIMARY INSURANCE:  Plan: [employer] DUAL COMPLETE  Coverage: [facility] MEDICARE  Effective Date: 2022  Group Number: [unfilled]  Subscriber Number: [id number] - (Medicare Managed)    Payer/Plan Subscr  Sex Relation Sub. Ins. ID Effective Group Num   1. [facility] MEDICARE * ROCK PRYOR 1945 Female Self [id number]                                     [address]   2. [place]ICA[place] -* ROCK PRYOR 1945 Female Self [id number] 10/16/18                                     [address]       Patient Wound Information:      Problem List Items Addressed This Visit          Other    Venous stasis ulcer of left calf with fat layer exposed without varicose veins (HCC) - Primary (Chronic)    Relevant Orders    Initiate Outpatient Wound Care Protocol       Wound 23 Leg Left;Lower #2 (Active)   Wound Image   24 1520   Wound Etiology Traumatic 23 1610   Dressing Status New dressing applied;Clean;Dry;Intact 24 1608   Wound Cleansed Cleansed with saline 24 1608   Dressing/Treatment Alginate;ABD 24 1608   Wound Length (cm) 12 cm 24 1443   Wound Width (cm) 11.4 cm 24 1443   Wound Depth (cm) 0.9 cm 24 1443   Wound Surface Area (cm^2) 136.8 cm^2 24 1443   Change in Wound Size % (l*w) -2291.61 24 1443   Wound Volume (cm^3) 123.12 cm^3 24 1443   Wound Healing % -00207 24 1448 
  Wound Healing Center Followup Visit Note    Referring Physician : Tamie Santizo MD  Anastacia Morel  MEDICAL RECORD NUMBER:  [id number]  AGE: 79 y.o.   GENDER: female  : 1945  EPISODE DATE:  3/6/2024    Subjective:     Chief Complaint   Patient presents with    Wound Check     Left leg      HISTORY of PRESENT ILLNESS HPI   Anastacia Morel is a 79 y.o. female who presents today in regards to follow up evaluation and treatment of wound/ulcer.  That patient's past medical, family and social hx were reviewed and changes were made if present.    History of Wound Context:  Patient has had left calf wound since ~ 2021.  She has been putting a dressing on it.  The wound has not been improving.  She has a hx significant for venous stasis ulcerations of bilateral LE.  She first was seen by myself in regards to these issues 2015.  She eventually healed these wounds 2016.     I last saw her 2021 at which time I recommended lymphedema therapy.  She states she went and said it caused her to much pain and stopped going.  She has chronic issues with bilateral calf pain, swelling and edema.            She admits to not wearing her stockings because of the pain.    She has used knee high 20-30 mm hg stockings in the past.       She is still seeing pain management and is down to percocet 7/5/325 mg daily.       21  aquacell  Double tubigrip  Culture done  Emphasized importance of getting in to more significant compression in the future  12/15/21  Culture reviewed - light growth, no tx  Wound slightly improved  Still significant drainage  Plan on compression wrap next week  21  Stable wound  Drainage slightly better  Pt would like to wait till next week because of holidays to start wrap  22  Wound larger  Profore  22  Wound appearance better  Refusing wrap - it rolled down last week and she cut off after 3 days  22  Wound appearance better  Still refusing wrap   3/2/22  periwound 
03/06/24 1548   Post-Procedure Volume (cm^3) 136.8 cm^3 03/06/24 1548   Wound Assessment Pink/red;Granulation tissue;Fibrin;Slough 03/06/24 1443   Drainage Amount Moderate (25-50%) 03/06/24 1443   Drainage Description Serosanguinous 03/06/24 1443   Odor None 03/06/24 1443   Kori-wound Assessment Intact;Dry/flaky 03/06/24 1443   Margins Attached edges 02/28/24 1504   Wound Thickness Description not for Pressure Injury Full thickness 02/28/24 1504   Number of days: 371              Electronically signed by Cara Contreras RN on 3/6/2024 at 10:49 AM     Provider Information:      PROVIDER'S NAME: Dr Faith Dempsey    NPI: [id number]

## 2024-03-13 ENCOUNTER — HOSPITAL ENCOUNTER (OUTPATIENT)
Dept: WOUND CARE | Age: 79
Discharge: HOME | End: 2024-03-13
Attending: SURGERY

## 2024-03-13 NOTE — DISCHARGE INSTRUCTIONS
Visit Discharge/Physician Orders     Discharge condition: Stable     Assessment of pain at discharge: mild     Anesthetic used: lido 4%     Discharge to: Home     Left via:Private automobile     Accompanied by: self     ECF/HHA: Mukesh * New Referral*      Dressing Orders:  LEFT PRETIB : Cleanse with normal saline, cover with calcium alginate, and super absorber(hydralock SA 6x10\") , apply coban 2 wrap. Change M, W([facility]), F        Treatment Orders: Eat a diet high in protein and vitamin C. Take a multiple vitamin daily unless contraindicated.     Elevate as much as possible     [facility] followup visit:    1 week ____________________________  (Please note your next appointment above and if you are unable to keep, kindly give a 24 hour notice. Thank you.)     Physician signature:__________________________      If you experience any of the following, please call the Wound Care Center during business hours:     * Increase in Pain  * Temperature over 101  * Increase in drainage from your wound  * Drainage with a foul odor  * Bleeding  * Increase in swelling  * Need for compression bandage changes due to slippage, breakthrough drainage.     If you need medical attention outside of the business hours of the Wound Care Centers please contact your PCP or go to the nearest emergency room.

## 2024-03-15 NOTE — DISCHARGE INSTRUCTIONS
Visit Discharge/Physician Orders     Discharge condition: Stable     Assessment of pain at discharge: mild     Anesthetic used: lido 4%     Discharge to: Home     Left via:Private automobile     Accompanied by: self     ECF/HHA: Mukesh * New order*     Dressing Orders:  LEFT PRETIB : Cleanse with normal saline, cover with calcium alginate, and ABD pad, apply Profore Lite. Change M, W([facility]), F        Treatment Orders: Eat a diet high in protein and vitamin C. Take a multiple vitamin daily unless contraindicated.     Elevate as much as possible     [facility] followup visit:    1 week ____________________________  (Please note your next appointment above and if you are unable to keep, kindly give a 24 hour notice. Thank you.)     Physician signature:__________________________      If you experience any of the following, please call the Wound Care Center during business hours:     * Increase in Pain  * Temperature over 101  * Increase in drainage from your wound  * Drainage with a foul odor  * Bleeding  * Increase in swelling  * Need for compression bandage changes due to slippage, breakthrough drainage.     If you need medical attention outside of the business hours of the Wound Care Centers please contact your PCP or go to the nearest emergency room.

## 2024-03-20 ENCOUNTER — HOSPITAL ENCOUNTER (OUTPATIENT)
Dept: WOUND CARE | Age: 79
Discharge: HOME | End: 2024-03-20
Attending: SURGERY
Payer: MEDICARE

## 2024-03-20 VITALS
DIASTOLIC BLOOD PRESSURE: 71 MMHG | HEART RATE: 111 BPM | RESPIRATION RATE: 18 BRPM | TEMPERATURE: 98.1 F | SYSTOLIC BLOOD PRESSURE: 180 MMHG

## 2024-03-20 DIAGNOSIS — I87.2 VENOUS STASIS ULCER OF LEFT CALF WITH FAT LAYER EXPOSED WITHOUT VARICOSE VEINS (HCC): Primary | ICD-10-CM

## 2024-03-20 DIAGNOSIS — I89.0 LYMPHEDEMA: ICD-10-CM

## 2024-03-20 DIAGNOSIS — L97.222 VENOUS STASIS ULCER OF LEFT CALF WITH FAT LAYER EXPOSED WITHOUT VARICOSE VEINS (HCC): Primary | ICD-10-CM

## 2024-03-20 PROCEDURE — 11042 DBRDMT SUBQ TIS 1ST 20SQCM/<: CPT

## 2024-03-20 RX ORDER — LIDOCAINE HYDROCHLORIDE 40 MG/ML
SOLUTION TOPICAL ONCE
Status: COMPLETED | OUTPATIENT
Start: 2024-03-20 | End: 2024-03-20

## 2024-03-20 RX ORDER — BETAMETHASONE DIPROPIONATE 0.05 %
OINTMENT (GRAM) TOPICAL ONCE
OUTPATIENT
Start: 2024-03-20 | End: 2024-03-20

## 2024-03-20 RX ORDER — LIDOCAINE HYDROCHLORIDE 40 MG/ML
SOLUTION TOPICAL ONCE
OUTPATIENT
Start: 2024-03-20 | End: 2024-03-20

## 2024-03-20 RX ORDER — BACITRACIN ZINC AND POLYMYXIN B SULFATE 500; 1000 [USP'U]/G; [USP'U]/G
OINTMENT TOPICAL ONCE
OUTPATIENT
Start: 2024-03-20 | End: 2024-03-20

## 2024-03-20 RX ORDER — LIDOCAINE 40 MG/G
CREAM TOPICAL ONCE
OUTPATIENT
Start: 2024-03-20 | End: 2024-03-20

## 2024-03-20 RX ORDER — LIDOCAINE HYDROCHLORIDE 20 MG/ML
JELLY TOPICAL ONCE
OUTPATIENT
Start: 2024-03-20 | End: 2024-03-20

## 2024-03-20 RX ORDER — GENTAMICIN SULFATE 1 MG/G
OINTMENT TOPICAL ONCE
OUTPATIENT
Start: 2024-03-20 | End: 2024-03-20

## 2024-03-20 RX ORDER — LIDOCAINE 50 MG/G
OINTMENT TOPICAL ONCE
OUTPATIENT
Start: 2024-03-20 | End: 2024-03-20

## 2024-03-20 RX ORDER — LOSARTAN POTASSIUM 25 MG/1
25 TABLET ORAL DAILY
COMMUNITY
Start: 2024-03-05

## 2024-03-20 RX ORDER — CLOBETASOL PROPIONATE 0.5 MG/G
OINTMENT TOPICAL ONCE
OUTPATIENT
Start: 2024-03-20 | End: 2024-03-20

## 2024-03-20 RX ORDER — GINSENG 100 MG
CAPSULE ORAL ONCE
OUTPATIENT
Start: 2024-03-20 | End: 2024-03-20

## 2024-03-20 RX ORDER — TRIAMCINOLONE ACETONIDE 1 MG/G
OINTMENT TOPICAL ONCE
OUTPATIENT
Start: 2024-03-20 | End: 2024-03-20

## 2024-03-20 RX ORDER — IBUPROFEN 200 MG
TABLET ORAL ONCE
OUTPATIENT
Start: 2024-03-20 | End: 2024-03-20

## 2024-03-20 RX ADMIN — LIDOCAINE HYDROCHLORIDE 10 ML: 40 SOLUTION TOPICAL at 15:33

## 2024-03-20 NOTE — PROGRESS NOTES
Reported on 3/20/2024)      aspirin 81 MG EC tablet Take 1 tablet by mouth daily      Garlic 10 MG CAPS Take by mouth      hydroCHLOROthiazide (HYDRODIURIL) 25 MG tablet Take 1 tablet by mouth daily      potassium chloride (KLOR-CON M) 10 MEQ extended release tablet Take 1 tablet by mouth daily (with breakfast) 30 tablet 0    Multiple Vitamins-Minerals (THERAPEUTIC MULTIVITAMIN-MINERALS) tablet Take 1 tablet by mouth daily      simvastatin (ZOCOR) 40 MG tablet Take 1 tablet by mouth nightly      Cholecalciferol (VITAMIN D) 2000 UNITS CAPS capsule Take 2,000 Units by mouth daily       ferrous sulfate 325 (65 FE) MG tablet Take 1 tablet by mouth nightly      albuterol (PROVENTIL HFA;VENTOLIN HFA) 108 (90 BASE) MCG/ACT inhaler Inhale 2 puffs into the lungs every 6 hours as needed for Wheezing or Shortness of Breath      omeprazole (PRILOSEC) 40 MG delayed release capsule Take 1 capsule by mouth daily       No current facility-administered medications on file prior to encounter.       REVIEW OF SYSTEMS See HPI    Objective:    BP (!) 180/71   Pulse (!) 111   Temp 98.1 °F (36.7 °C) (Temporal)   Resp 18   Wt Readings from Last 3 Encounters:   03/06/24 113.4 kg (250 lb)   02/21/24 113.4 kg (250 lb)   02/14/24 113.4 kg (250 lb)     PHYSICAL EXAM  CONSTITUTIONAL:   Awake, alert, cooperative   EYES:  lids and lashes normal   ENT: external ears and nose without lesions   NECK:  supple, symmetrical, trachea midline   SKIN:  Open wound Present    Assessment:     Problem List Items Addressed This Visit       Venous stasis ulcer of left calf with fat layer exposed without varicose veins (HCC) - Primary (Chronic)    Relevant Orders    Initiate Outpatient Wound Care Protocol    Lymphedema       Pre Debridement Measurements:  Are located in the Wound/Ulcer Documentation Flow Sheet  Post Debridement Measurements:  Wound/Ulcer Descriptions are Pre Debridement except measurements:     Wound 03/01/23 Leg Left;Lower #2 (Active)   Wound

## 2024-03-22 NOTE — DISCHARGE INSTRUCTIONS
Visit Discharge/Physician Orders     Discharge condition: Stable     Assessment of pain at discharge: mild     Anesthetic used: lido 4%     Discharge to: Home     Left via:Private automobile     Accompanied by: self     ECF/HHA: Mukesh * New order*     Dressing Orders:  LEFT PRETIB : Cleanse with normal saline, cover with calcium alginate, and ABD pad, apply Profore Lite. Change M, W([facility]), F        Treatment Orders: Eat a diet high in protein and vitamin C. Take a multiple vitamin daily unless contraindicated.     Elevate as much as possible     [facility] followup visit:    1 week ____________________________  (Please note your next appointment above and if you are unable to keep, kindly give a 24 hour notice. Thank you.)     Physician signature:__________________________      If you experience any of the following, please call the Wound Care Center during business hours:     * Increase in Pain  * Temperature over 101  * Increase in drainage from your wound  * Drainage with a foul odor  * Bleeding  * Increase in swelling  * Need for compression bandage changes due to slippage, breakthrough drainage.     If you need medical attention outside of the business hours of the Wound Care Centers please contact your PCP or go to the nearest emergency room.

## 2024-03-27 ENCOUNTER — HOSPITAL ENCOUNTER (OUTPATIENT)
Dept: WOUND CARE | Age: 79
Discharge: HOME | End: 2024-03-27
Attending: SURGERY

## 2024-04-01 NOTE — DISCHARGE INSTRUCTIONS
Visit Discharge/Physician Orders     Discharge condition: Stable     Assessment of pain at discharge: mild     Anesthetic used: lido 4%     Discharge to: Home     Left via:Private automobile     Accompanied by: self     ECF/HHA: Akeso      Dressing Orders:  LEFT PRETIB : Cleanse with normal saline, cover with calcium alginate, and ABD pad, apply Profore Lite. Change M, W([facility]), F        Treatment Orders: Eat a diet high in protein and vitamin C. Take a multiple vitamin daily unless contraindicated.     Elevate as much as possible     [facility] followup visit:    1 week ____________________________  (Please note your next appointment above and if you are unable to keep, kindly give a 24 hour notice. Thank you.)     Physician signature:__________________________      If you experience any of the following, please call the Wound Care Center during business hours:     * Increase in Pain  * Temperature over 101  * Increase in drainage from your wound  * Drainage with a foul odor  * Bleeding  * Increase in swelling  * Need for compression bandage changes due to slippage, breakthrough drainage.     If you need medical attention outside of the business hours of the Wound Care Centers please contact your PCP or go to the nearest emergency room.

## 2024-04-03 ENCOUNTER — HOSPITAL ENCOUNTER (OUTPATIENT)
Dept: WOUND CARE | Age: 79
Discharge: HOME | End: 2024-04-03
Attending: SURGERY
Payer: MEDICARE

## 2024-04-03 VITALS
RESPIRATION RATE: 18 BRPM | HEART RATE: 97 BPM | DIASTOLIC BLOOD PRESSURE: 80 MMHG | TEMPERATURE: 97.2 F | SYSTOLIC BLOOD PRESSURE: 134 MMHG

## 2024-04-03 DIAGNOSIS — L97.222 VENOUS STASIS ULCER OF LEFT CALF WITH FAT LAYER EXPOSED WITHOUT VARICOSE VEINS (HCC): Primary | Chronic | ICD-10-CM

## 2024-04-03 DIAGNOSIS — I87.2 VENOUS STASIS ULCER OF LEFT CALF WITH FAT LAYER EXPOSED WITHOUT VARICOSE VEINS (HCC): Primary | Chronic | ICD-10-CM

## 2024-04-03 PROCEDURE — 99212 OFFICE O/P EST SF 10 MIN: CPT | Performed by: SURGERY

## 2024-04-03 PROCEDURE — 99213 OFFICE O/P EST LOW 20 MIN: CPT

## 2024-04-03 ASSESSMENT — PAIN DESCRIPTION - FREQUENCY: FREQUENCY: CONTINUOUS

## 2024-04-03 ASSESSMENT — PAIN DESCRIPTION - LOCATION: LOCATION: LEG

## 2024-04-03 ASSESSMENT — PAIN DESCRIPTION - ORIENTATION: ORIENTATION: LEFT

## 2024-04-03 ASSESSMENT — PAIN DESCRIPTION - DESCRIPTORS: DESCRIPTORS: BURNING;THROBBING

## 2024-04-03 ASSESSMENT — PAIN DESCRIPTION - ONSET: ONSET: ON-GOING

## 2024-04-03 ASSESSMENT — PAIN - FUNCTIONAL ASSESSMENT: PAIN_FUNCTIONAL_ASSESSMENT: PREVENTS OR INTERFERES SOME ACTIVE ACTIVITIES AND ADLS

## 2024-04-03 ASSESSMENT — PAIN SCALES - GENERAL: PAINLEVEL_OUTOF10: 6

## 2024-04-03 NOTE — PROGRESS NOTES
scheduled for lymphedema therapy  8/23/23  Wounds improved  She is doing lymphedema therapy   MS contin 15 mg q12 #60, Perocet 7.5/325 mg q8 #90, oarrs reviewed  8/30/23  Wound appearance improvedb but measuring larger 161  Difficulty tolerating debridement  9/6/23  Wound smaller 151  Still struggling with tolerance of debridement  9/27/23  Wound smaller 118  Would not allow debridement  Tolerating lymphedema therapy  10/4/23  Did not tolerate debridement  Wound larger 128  MS contin 15 mg q12 #60, Perocet 7.5/325 mg q8 #90, oarrs reviewed  10/18/2023  Wound stable, with her permission, debrided the wound partly  10/25/23  Larger 158  Appearance much improved  Allowed some debridement  Emphasized again elevation, drainage management, and restarting lymphedema therapy  11/1/23  Stable 155  Appearance stable  MS contin 15 mg q12 #60, Perocet 7.5/325 mg q8 #90, oarrs reviewed  Gabapentin 300 mg tid #90 refill 3  11/8/23  Appearance stable  Larger 173  Emphasized elevation, lymphedema therapy  11/15/23  Needs new referral to lymphedema  Slightly smaller 162  Drainage better  Start santyl - script sent  11/29/23  Has not got santyl yet  Smaller 148  12/6/23  Smaller 134  Will start using santyl  MS contin 15 mg q12 #60, Perocet 7.5/325 mg q8 #90, oarrs reviewed  12/20/23  Larger 143   - santyl burning will hold  Alginate  1/3/24  Size stable at 144  Poor tolerance of debridement  MS contin 15 mg q12 #60, Perocet 7.5/325 mg q8 #90, oarrs reviewed  1/10/24  Size stable at 145  Will try maxsorb  Still not tolerating debridement  1/24/24  134  Will start using maxorb  Still poor tolerance to debridement  Emphasized again elevation, compression   She has not followed through with lymphedema  1/31/24  136  Lymphedema referral  MS contin 15 mg q12 #60, Perocet 7.5/325 mg q8 #90, oarrs reviewed  2/14/24  153  Appearance better  Sent lymphedema referral again  2/21/24  Stable 156  2/28/24  Stable 151  MS contin 15 mg q12

## 2024-04-04 NOTE — DISCHARGE INSTRUCTIONS
Visit Discharge/Physician Orders     Discharge condition: Stable     Assessment of pain at discharge: mild     Anesthetic used: lido 4%     Discharge to: Home     Left via:Private automobile     Accompanied by: self     ECF/HHA: Akeso      Dressing Orders:  LEFT PRETIB : Cleanse with normal saline, cover with calcium alginate, and ABD pad, apply Profore Lite. Change M, W([facility]), F        Treatment Orders: Eat a diet high in protein and vitamin C. Take a multiple vitamin daily unless contraindicated.     Elevate as much as possible     [facility] followup visit:    1 week ____________________________  (Please note your next appointment above and if you are unable to keep, kindly give a 24 hour notice. Thank you.)     Physician signature:__________________________      If you experience any of the following, please call the Wound Care Center during business hours:     * Increase in Pain  * Temperature over 101  * Increase in drainage from your wound  * Drainage with a foul odor  * Bleeding  * Increase in swelling  * Need for compression bandage changes due to slippage, breakthrough drainage.     If you need medical attention outside of the business hours of the Wound Care Centers please contact your PCP or go to the nearest emergency room.

## 2024-04-08 ENCOUNTER — TELEPHONE (OUTPATIENT)
Dept: VASCULAR SURGERY | Age: 79
End: 2024-04-08

## 2024-04-08 DIAGNOSIS — I87.2 VENOUS STASIS ULCER OF LEFT CALF WITH FAT LAYER EXPOSED WITHOUT VARICOSE VEINS (HCC): Primary | Chronic | ICD-10-CM

## 2024-04-08 DIAGNOSIS — L97.222 VENOUS STASIS ULCER OF LEFT CALF WITH FAT LAYER EXPOSED WITHOUT VARICOSE VEINS (HCC): Primary | Chronic | ICD-10-CM

## 2024-04-08 RX ORDER — MORPHINE SULFATE 15 MG/1
15 TABLET, FILM COATED, EXTENDED RELEASE ORAL 2 TIMES DAILY
Qty: 60 TABLET | Refills: 0 | Status: SHIPPED | OUTPATIENT
Start: 2024-04-08 | End: 2024-05-08

## 2024-04-08 RX ORDER — OXYCODONE AND ACETAMINOPHEN 7.5; 325 MG/1; MG/1
1 TABLET ORAL EVERY 8 HOURS PRN
Qty: 90 TABLET | Refills: 0 | Status: SHIPPED | OUTPATIENT
Start: 2024-04-08 | End: 2024-05-08

## 2024-04-08 NOTE — TELEPHONE ENCOUNTER
Patient called, requesting Rx for MS Contin and Percocet to be sent to [facility] on [address].    SCRIPTS SENT

## 2024-04-10 ENCOUNTER — HOSPITAL ENCOUNTER (OUTPATIENT)
Dept: WOUND CARE | Age: 79
Discharge: HOME | End: 2024-04-10
Attending: SURGERY
Payer: MEDICARE

## 2024-04-10 VITALS
DIASTOLIC BLOOD PRESSURE: 67 MMHG | RESPIRATION RATE: 18 BRPM | HEIGHT: 66 IN | WEIGHT: 250 LBS | BODY MASS INDEX: 40.18 KG/M2 | HEART RATE: 98 BPM | SYSTOLIC BLOOD PRESSURE: 117 MMHG | TEMPERATURE: 96.5 F

## 2024-04-10 DIAGNOSIS — L97.222 VENOUS STASIS ULCER OF LEFT CALF WITH FAT LAYER EXPOSED WITHOUT VARICOSE VEINS (HCC): Primary | ICD-10-CM

## 2024-04-10 DIAGNOSIS — I87.2 VENOUS STASIS ULCER OF LEFT CALF WITH FAT LAYER EXPOSED WITHOUT VARICOSE VEINS (HCC): Primary | ICD-10-CM

## 2024-04-10 PROCEDURE — 99213 OFFICE O/P EST LOW 20 MIN: CPT | Performed by: SURGERY

## 2024-04-10 PROCEDURE — 99213 OFFICE O/P EST LOW 20 MIN: CPT

## 2024-04-10 RX ORDER — LIDOCAINE HYDROCHLORIDE 40 MG/ML
SOLUTION TOPICAL ONCE
Status: COMPLETED | OUTPATIENT
Start: 2024-04-10 | End: 2024-04-10

## 2024-04-10 RX ORDER — GENTAMICIN SULFATE 1 MG/G
OINTMENT TOPICAL ONCE
OUTPATIENT
Start: 2024-04-10 | End: 2024-04-10

## 2024-04-10 RX ORDER — LIDOCAINE 40 MG/G
CREAM TOPICAL ONCE
OUTPATIENT
Start: 2024-04-10 | End: 2024-04-10

## 2024-04-10 RX ORDER — LIDOCAINE HYDROCHLORIDE 20 MG/ML
JELLY TOPICAL ONCE
OUTPATIENT
Start: 2024-04-10 | End: 2024-04-10

## 2024-04-10 RX ORDER — TRIAMCINOLONE ACETONIDE 1 MG/G
OINTMENT TOPICAL ONCE
OUTPATIENT
Start: 2024-04-10 | End: 2024-04-10

## 2024-04-10 RX ORDER — IBUPROFEN 200 MG
TABLET ORAL ONCE
OUTPATIENT
Start: 2024-04-10 | End: 2024-04-10

## 2024-04-10 RX ORDER — LIDOCAINE HYDROCHLORIDE 40 MG/ML
SOLUTION TOPICAL ONCE
OUTPATIENT
Start: 2024-04-10 | End: 2024-04-10

## 2024-04-10 RX ORDER — BACITRACIN ZINC AND POLYMYXIN B SULFATE 500; 1000 [USP'U]/G; [USP'U]/G
OINTMENT TOPICAL ONCE
OUTPATIENT
Start: 2024-04-10 | End: 2024-04-10

## 2024-04-10 RX ORDER — BETAMETHASONE DIPROPIONATE 0.05 %
OINTMENT (GRAM) TOPICAL ONCE
OUTPATIENT
Start: 2024-04-10 | End: 2024-04-10

## 2024-04-10 RX ORDER — GINSENG 100 MG
CAPSULE ORAL ONCE
OUTPATIENT
Start: 2024-04-10 | End: 2024-04-10

## 2024-04-10 RX ORDER — CLOBETASOL PROPIONATE 0.5 MG/G
OINTMENT TOPICAL ONCE
OUTPATIENT
Start: 2024-04-10 | End: 2024-04-10

## 2024-04-10 RX ORDER — LIDOCAINE 50 MG/G
OINTMENT TOPICAL ONCE
OUTPATIENT
Start: 2024-04-10 | End: 2024-04-10

## 2024-04-10 RX ADMIN — LIDOCAINE HYDROCHLORIDE 20 ML: 40 SOLUTION TOPICAL at 15:25

## 2024-04-10 ASSESSMENT — PAIN DESCRIPTION - DESCRIPTORS: DESCRIPTORS: BURNING;THROBBING

## 2024-04-10 ASSESSMENT — PAIN DESCRIPTION - LOCATION: LOCATION: LEG

## 2024-04-10 ASSESSMENT — PAIN DESCRIPTION - PAIN TYPE: TYPE: CHRONIC PAIN

## 2024-04-10 ASSESSMENT — PAIN SCALES - GENERAL: PAINLEVEL_OUTOF10: 6

## 2024-04-10 ASSESSMENT — PAIN - FUNCTIONAL ASSESSMENT: PAIN_FUNCTIONAL_ASSESSMENT: PREVENTS OR INTERFERES SOME ACTIVE ACTIVITIES AND ADLS

## 2024-04-10 ASSESSMENT — PAIN DESCRIPTION - PROGRESSION: CLINICAL_PROGRESSION: NOT CHANGED

## 2024-04-10 ASSESSMENT — PAIN DESCRIPTION - ONSET: ONSET: ON-GOING

## 2024-04-10 ASSESSMENT — PAIN DESCRIPTION - FREQUENCY: FREQUENCY: CONTINUOUS

## 2024-04-10 ASSESSMENT — PAIN DESCRIPTION - ORIENTATION: ORIENTATION: LEFT

## 2024-04-11 NOTE — PROGRESS NOTES
ulcers, stable, patient scheduled for lymphedema therapy  8/23/23  Wounds improved  She is doing lymphedema therapy   MS contin 15 mg q12 #60, Perocet 7.5/325 mg q8 #90, oarrs reviewed  8/30/23  Wound appearance improvedb but measuring larger 161  Difficulty tolerating debridement  9/6/23  Wound smaller 151  Still struggling with tolerance of debridement  9/27/23  Wound smaller 118  Would not allow debridement  Tolerating lymphedema therapy  10/4/23  Did not tolerate debridement  Wound larger 128  MS contin 15 mg q12 #60, Perocet 7.5/325 mg q8 #90, oarrs reviewed  10/18/2023  Wound stable, with her permission, debrided the wound partly  10/25/23  Larger 158  Appearance much improved  Allowed some debridement  Emphasized again elevation, drainage management, and restarting lymphedema therapy  11/1/23  Stable 155  Appearance stable  MS contin 15 mg q12 #60, Perocet 7.5/325 mg q8 #90, oarrs reviewed  Gabapentin 300 mg tid #90 refill 3  11/8/23  Appearance stable  Larger 173  Emphasized elevation, lymphedema therapy  11/15/23  Needs new referral to lymphedema  Slightly smaller 162  Drainage better  Start santyl - script sent  11/29/23  Has not got santyl yet  Smaller 148  12/6/23  Smaller 134  Will start using santyl  MS contin 15 mg q12 #60, Perocet 7.5/325 mg q8 #90, oarrs reviewed  12/20/23  Larger 143   - santyl burning will hold  Alginate  1/3/24  Size stable at 144  Poor tolerance of debridement  MS contin 15 mg q12 #60, Perocet 7.5/325 mg q8 #90, oarrs reviewed  1/10/24  Size stable at 145  Will try maxsorb  Still not tolerating debridement  1/24/24  134  Will start using maxorb  Still poor tolerance to debridement  Emphasized again elevation, compression   She has not followed through with lymphedema  1/31/24  136  Lymphedema referral  MS contin 15 mg q12 #60, Perocet 7.5/325 mg q8 #90, oarrs reviewed  2/14/24  153  Appearance better  Sent lymphedema referral again  2/21/24  Stable 156  2/28/24  Stable

## 2024-04-12 NOTE — DISCHARGE INSTRUCTIONS
Visit Discharge/Physician Orders     Discharge condition: Stable     Assessment of pain at discharge: mild     Anesthetic used: lido 4%     Discharge to: Home     Left via:Private automobile     Accompanied by: self     ECF/HHA: Akeso      Dressing Orders:  LEFT PRETIB : Cleanse with normal saline, cover with calcium alginate, and ABD pad, apply Profore Lite. Change M, W([facility]), F        Treatment Orders: Eat a diet high in protein and vitamin C. Take a multiple vitamin daily unless contraindicated.     Elevate as much as possible    Wound Culture Sent 4/17/24     [facility] followup visit:    1 week ____________________________  (Please note your next appointment above and if you are unable to keep, kindly give a 24 hour notice. Thank you.)     Physician signature:__________________________      If you experience any of the following, please call the Wound Care Center during business hours:     * Increase in Pain  * Temperature over 101  * Increase in drainage from your wound  * Drainage with a foul odor  * Bleeding  * Increase in swelling  * Need for compression bandage changes due to slippage, breakthrough drainage.     If you need medical attention outside of the business hours of the Wound Care Centers please contact your PCP or go to the nearest emergency room.

## 2024-04-17 ENCOUNTER — HOSPITAL ENCOUNTER (OUTPATIENT)
Dept: WOUND CARE | Age: 79
Discharge: HOME | End: 2024-04-17
Attending: SURGERY
Payer: MEDICARE

## 2024-04-17 VITALS
BODY MASS INDEX: 40.18 KG/M2 | HEART RATE: 86 BPM | RESPIRATION RATE: 16 BRPM | WEIGHT: 250 LBS | HEIGHT: 66 IN | SYSTOLIC BLOOD PRESSURE: 161 MMHG | TEMPERATURE: 96.8 F | DIASTOLIC BLOOD PRESSURE: 82 MMHG

## 2024-04-17 DIAGNOSIS — I87.2 VENOUS STASIS ULCER OF LEFT CALF WITH FAT LAYER EXPOSED WITHOUT VARICOSE VEINS (HCC): Primary | ICD-10-CM

## 2024-04-17 DIAGNOSIS — I89.0 LYMPHEDEMA: ICD-10-CM

## 2024-04-17 DIAGNOSIS — L97.222 VENOUS STASIS ULCER OF LEFT CALF WITH FAT LAYER EXPOSED WITHOUT VARICOSE VEINS (HCC): Primary | ICD-10-CM

## 2024-04-17 PROCEDURE — 87077 CULTURE AEROBIC IDENTIFY: CPT

## 2024-04-17 PROCEDURE — 11042 DBRDMT SUBQ TIS 1ST 20SQCM/<: CPT | Performed by: SURGERY

## 2024-04-17 PROCEDURE — 11042 DBRDMT SUBQ TIS 1ST 20SQCM/<: CPT

## 2024-04-17 PROCEDURE — 87205 SMEAR GRAM STAIN: CPT

## 2024-04-17 PROCEDURE — 86403 PARTICLE AGGLUT ANTBDY SCRN: CPT

## 2024-04-17 PROCEDURE — 87070 CULTURE OTHR SPECIMN AEROBIC: CPT

## 2024-04-17 RX ORDER — LIDOCAINE HYDROCHLORIDE 20 MG/ML
JELLY TOPICAL ONCE
OUTPATIENT
Start: 2024-04-17 | End: 2024-04-17

## 2024-04-17 RX ORDER — LIDOCAINE 40 MG/G
CREAM TOPICAL ONCE
OUTPATIENT
Start: 2024-04-17 | End: 2024-04-17

## 2024-04-17 RX ORDER — CLOBETASOL PROPIONATE 0.5 MG/G
OINTMENT TOPICAL ONCE
OUTPATIENT
Start: 2024-04-17 | End: 2024-04-17

## 2024-04-17 RX ORDER — IBUPROFEN 200 MG
TABLET ORAL ONCE
OUTPATIENT
Start: 2024-04-17 | End: 2024-04-17

## 2024-04-17 RX ORDER — GINSENG 100 MG
CAPSULE ORAL ONCE
OUTPATIENT
Start: 2024-04-17 | End: 2024-04-17

## 2024-04-17 RX ORDER — LIDOCAINE 50 MG/G
OINTMENT TOPICAL ONCE
OUTPATIENT
Start: 2024-04-17 | End: 2024-04-17

## 2024-04-17 RX ORDER — LIDOCAINE HYDROCHLORIDE 40 MG/ML
SOLUTION TOPICAL ONCE
Status: COMPLETED | OUTPATIENT
Start: 2024-04-17 | End: 2024-04-17

## 2024-04-17 RX ORDER — BETAMETHASONE DIPROPIONATE 0.05 %
OINTMENT (GRAM) TOPICAL ONCE
OUTPATIENT
Start: 2024-04-17 | End: 2024-04-17

## 2024-04-17 RX ORDER — LIDOCAINE HYDROCHLORIDE 40 MG/ML
SOLUTION TOPICAL ONCE
OUTPATIENT
Start: 2024-04-17 | End: 2024-04-17

## 2024-04-17 RX ORDER — BACITRACIN ZINC AND POLYMYXIN B SULFATE 500; 1000 [USP'U]/G; [USP'U]/G
OINTMENT TOPICAL ONCE
OUTPATIENT
Start: 2024-04-17 | End: 2024-04-17

## 2024-04-17 RX ORDER — TRIAMCINOLONE ACETONIDE 1 MG/G
OINTMENT TOPICAL ONCE
OUTPATIENT
Start: 2024-04-17 | End: 2024-04-17

## 2024-04-17 RX ORDER — GENTAMICIN SULFATE 1 MG/G
OINTMENT TOPICAL ONCE
OUTPATIENT
Start: 2024-04-17 | End: 2024-04-17

## 2024-04-17 RX ADMIN — LIDOCAINE HYDROCHLORIDE 20 ML: 40 SOLUTION TOPICAL at 15:47

## 2024-04-17 ASSESSMENT — PAIN DESCRIPTION - ORIENTATION: ORIENTATION: LEFT

## 2024-04-17 ASSESSMENT — PAIN - FUNCTIONAL ASSESSMENT: PAIN_FUNCTIONAL_ASSESSMENT: PREVENTS OR INTERFERES SOME ACTIVE ACTIVITIES AND ADLS

## 2024-04-17 ASSESSMENT — PAIN DESCRIPTION - DESCRIPTORS: DESCRIPTORS: BURNING;THROBBING;TENDER

## 2024-04-17 ASSESSMENT — PAIN DESCRIPTION - FREQUENCY: FREQUENCY: CONTINUOUS

## 2024-04-17 ASSESSMENT — PAIN DESCRIPTION - ONSET: ONSET: ON-GOING

## 2024-04-17 ASSESSMENT — PAIN DESCRIPTION - LOCATION: LOCATION: LEG

## 2024-04-17 ASSESSMENT — PAIN DESCRIPTION - PAIN TYPE: TYPE: CHRONIC PAIN

## 2024-04-17 ASSESSMENT — PAIN SCALES - GENERAL: PAINLEVEL_OUTOF10: 9

## 2024-04-17 NOTE — PLAN OF CARE
Problem: Venous:  Goal: Signs of wound healing will improve  Description: Signs of wound healing will improve  Outcome: Progressing     Problem: Wound:  Goal: Will show signs of wound healing; wound closure and no evidence of infection  Description: Will show signs of wound healing; wound closure and no evidence of infection  Outcome: Progressing     Problem: Cognitive:  Goal: Knowledge of wound care  Description: Knowledge of wound care  Outcome: Progressing  Goal: Understands risk factors for wounds  Description: Understands risk factors for wounds  Outcome: Progressing     Problem: Pain  Goal: Verbalizes/displays adequate comfort level or baseline comfort level  Outcome: Progressing     Problem: Chronic Conditions and Co-morbidities  Goal: Patient's chronic conditions and co-morbidity symptoms are monitored and maintained or improved  Outcome: Progressing

## 2024-04-17 NOTE — PROGRESS NOTES
wound(s)/ulcer(s) today, debridement is required to promote healing and evaluate the wound base.    Performed by: Faith Dempsey MD    Consent obtained:  Yes    Time out taken:  Yes    Pain Control: Anesthetic  Anesthetic: 4% Lidocaine Liquid Topical     Debridement:Excisional Debridement    Using curette the wound(s)/ulcer(s) was/were sharply debrided down through and including the removal of epidermis, dermis, and subcutaneous tissue.        Devitalized Tissue Debrided:  fibrin, biofilm, slough, and exudate to stimulate bleeding to promote healing, post debridement good bleeding base and wound edges noted    Wound/Ulcer #: 1    Percent of Wound/Ulcer Debrided: 20%    Total Surface Area Debrided:  20 sq cm     Estimated Blood Loss:  Minimal  Hemostasis Achieved:  by pressure    Procedural Pain:  10  / 10   Post Procedural Pain:  10 / 10     Response to treatment:  With complaints of pain.     A culture was done.        Plan:   Treatment Note please see attached Discharge Instructions    Written patient dismissal instructions given to patient and signed by patient or POA.         Discharge Instructions         Visit Discharge/Physician Orders     Discharge condition: Stable     Assessment of pain at discharge: mild     Anesthetic used: lido 4%     Discharge to: Home     Left via:Private automobile     Accompanied by: self     ECF/HHA: Akeso      Dressing Orders:  LEFT PRETIB : Cleanse with normal saline, cover with calcium alginate, and ABD pad, apply Profore Lite. Change M, W([facility]), F        Treatment Orders: Eat a diet high in protein and vitamin C. Take a multiple vitamin daily unless contraindicated.     Elevate as much as possible    Wound Culture Sent 4/17/24     [facility] followup visit:    1 week ____________________________  (Please note your next appointment above and if you are unable to keep, kindly give a 24 hour notice. Thank you.)     Physician signature:__________________________      If you

## 2024-04-20 RX ORDER — DOXYCYCLINE HYCLATE 100 MG
100 TABLET ORAL 2 TIMES DAILY
Qty: 20 TABLET | Refills: 0 | Status: SHIPPED | OUTPATIENT
Start: 2024-04-20 | End: 2024-04-30

## 2024-04-20 RX ORDER — LEVOFLOXACIN 750 MG/1
750 TABLET, FILM COATED ORAL DAILY
Qty: 10 TABLET | Refills: 0 | Status: SHIPPED | OUTPATIENT
Start: 2024-04-20 | End: 2024-04-30

## 2024-04-21 LAB
MICROORGANISM SPEC CULT: ABNORMAL
MICROORGANISM/AGENT SPEC: ABNORMAL
SPECIMEN DESCRIPTION: ABNORMAL

## 2024-04-22 NOTE — DISCHARGE INSTRUCTIONS
Visit Discharge/Physician Orders     Discharge condition: Stable     Assessment of pain at discharge: mild     Anesthetic used: lido 4%     Discharge to: Home     Left via:Private automobile     Accompanied by: self     ECF/HHA: Mukesh *New order*      Dressing Orders:  LEFT PRETIB : Cleanse with normal saline, cover with calcium alginate AG, and ABD pad, apply Profore Lite. Change M, W([facility]), F        Treatment Orders: Eat a diet high in protein and vitamin C. Take a multiple vitamin daily unless contraindicated.     Elevate as much as possible     Start antibiotics as prescribed.      [facility] followup visit:    1 week ____________________________  (Please note your next appointment above and if you are unable to keep, kindly give a 24 hour notice. Thank you.)     Physician signature:__________________________      If you experience any of the following, please call the Wound Care Center during business hours:     * Increase in Pain  * Temperature over 101  * Increase in drainage from your wound  * Drainage with a foul odor  * Bleeding  * Increase in swelling  * Need for compression bandage changes due to slippage, breakthrough drainage.     If you need medical attention outside of the business hours of the Wound Care Centers please contact your PCP or go to the nearest emergency room.

## 2024-04-24 ENCOUNTER — HOSPITAL ENCOUNTER (OUTPATIENT)
Dept: WOUND CARE | Age: 79
Discharge: HOME | End: 2024-04-24
Attending: SURGERY
Payer: MEDICARE

## 2024-04-24 VITALS
TEMPERATURE: 96.5 F | RESPIRATION RATE: 18 BRPM | DIASTOLIC BLOOD PRESSURE: 82 MMHG | WEIGHT: 250 LBS | BODY MASS INDEX: 40.18 KG/M2 | SYSTOLIC BLOOD PRESSURE: 159 MMHG | HEART RATE: 89 BPM | HEIGHT: 66 IN

## 2024-04-24 DIAGNOSIS — I89.0 LYMPHEDEMA: ICD-10-CM

## 2024-04-24 DIAGNOSIS — L97.222 VENOUS STASIS ULCER OF LEFT CALF WITH FAT LAYER EXPOSED WITHOUT VARICOSE VEINS (HCC): Primary | ICD-10-CM

## 2024-04-24 DIAGNOSIS — I87.2 VENOUS STASIS ULCER OF LEFT CALF WITH FAT LAYER EXPOSED WITHOUT VARICOSE VEINS (HCC): Primary | ICD-10-CM

## 2024-04-24 PROCEDURE — 11045 DBRDMT SUBQ TISS EACH ADDL: CPT

## 2024-04-24 PROCEDURE — 11042 DBRDMT SUBQ TIS 1ST 20SQCM/<: CPT

## 2024-04-24 PROCEDURE — 11042 DBRDMT SUBQ TIS 1ST 20SQCM/<: CPT | Performed by: SURGERY

## 2024-04-24 PROCEDURE — 11045 DBRDMT SUBQ TISS EACH ADDL: CPT | Performed by: SURGERY

## 2024-04-24 RX ORDER — LIDOCAINE HYDROCHLORIDE 40 MG/ML
SOLUTION TOPICAL ONCE
Status: COMPLETED | OUTPATIENT
Start: 2024-04-24 | End: 2024-04-24

## 2024-04-24 RX ORDER — TRIAMCINOLONE ACETONIDE 1 MG/G
OINTMENT TOPICAL ONCE
OUTPATIENT
Start: 2024-04-24 | End: 2024-04-24

## 2024-04-24 RX ORDER — IBUPROFEN 200 MG
TABLET ORAL ONCE
OUTPATIENT
Start: 2024-04-24 | End: 2024-04-24

## 2024-04-24 RX ORDER — GENTAMICIN SULFATE 1 MG/G
OINTMENT TOPICAL ONCE
OUTPATIENT
Start: 2024-04-24 | End: 2024-04-24

## 2024-04-24 RX ORDER — LIDOCAINE HYDROCHLORIDE 40 MG/ML
SOLUTION TOPICAL ONCE
OUTPATIENT
Start: 2024-04-24 | End: 2024-04-24

## 2024-04-24 RX ORDER — BETAMETHASONE DIPROPIONATE 0.05 %
OINTMENT (GRAM) TOPICAL ONCE
OUTPATIENT
Start: 2024-04-24 | End: 2024-04-24

## 2024-04-24 RX ORDER — LIDOCAINE 50 MG/G
OINTMENT TOPICAL ONCE
OUTPATIENT
Start: 2024-04-24 | End: 2024-04-24

## 2024-04-24 RX ORDER — LIDOCAINE 40 MG/G
CREAM TOPICAL ONCE
OUTPATIENT
Start: 2024-04-24 | End: 2024-04-24

## 2024-04-24 RX ORDER — GINSENG 100 MG
CAPSULE ORAL ONCE
OUTPATIENT
Start: 2024-04-24 | End: 2024-04-24

## 2024-04-24 RX ORDER — BACITRACIN ZINC AND POLYMYXIN B SULFATE 500; 1000 [USP'U]/G; [USP'U]/G
OINTMENT TOPICAL ONCE
OUTPATIENT
Start: 2024-04-24 | End: 2024-04-24

## 2024-04-24 RX ORDER — LIDOCAINE HYDROCHLORIDE 20 MG/ML
JELLY TOPICAL ONCE
OUTPATIENT
Start: 2024-04-24 | End: 2024-04-24

## 2024-04-24 RX ORDER — CLOBETASOL PROPIONATE 0.5 MG/G
OINTMENT TOPICAL ONCE
OUTPATIENT
Start: 2024-04-24 | End: 2024-04-24

## 2024-04-24 RX ADMIN — LIDOCAINE HYDROCHLORIDE 15 ML: 40 SOLUTION TOPICAL at 14:48

## 2024-04-24 ASSESSMENT — PAIN DESCRIPTION - PAIN TYPE: TYPE: CHRONIC PAIN

## 2024-04-24 ASSESSMENT — PAIN DESCRIPTION - ONSET: ONSET: ON-GOING

## 2024-04-24 ASSESSMENT — PAIN DESCRIPTION - ORIENTATION: ORIENTATION: LOWER;LEFT

## 2024-04-24 ASSESSMENT — PAIN SCALES - GENERAL: PAINLEVEL_OUTOF10: 6

## 2024-04-24 ASSESSMENT — PAIN DESCRIPTION - DESCRIPTORS: DESCRIPTORS: BURNING;THROBBING

## 2024-04-24 ASSESSMENT — PAIN - FUNCTIONAL ASSESSMENT: PAIN_FUNCTIONAL_ASSESSMENT: PREVENTS OR INTERFERES SOME ACTIVE ACTIVITIES AND ADLS

## 2024-04-24 ASSESSMENT — PAIN DESCRIPTION - FREQUENCY: FREQUENCY: CONTINUOUS

## 2024-04-24 ASSESSMENT — PAIN DESCRIPTION - LOCATION: LOCATION: LEG

## 2024-04-24 NOTE — PROGRESS NOTES
use  Change to drawtek  Wound slightly improved in size, appearance still concerning  Pumps are going to be deliver  9/14/22  Still not tolerating wraps  Wound appearance improved, size slightly better  Pumps deliovered- she has yet to be in serviced on them   10/2-12/22  Admitted with cellultis  10/19/22  Currently at [facility]  Wound much improved  Posterior wound healed  Tyson Medley  She will follow at [facility] while admitted  11/9/22  She is now home  She is no longer with pain management - there was a disagreement as she states she was taking more because of more pain  [facility] - I left my phone number for them to call me re this pt  Wound has improved  Oarrs reviewed  Percocet 7.5/325 mg #60 (30 days), Ms Contin 15 mg #60 (30 days)  11/16/22  Wound improved but superficial areas medially and laterally cause measurements to be larger  11/30/22  Smaller  More superficial  12/7/22  Improved  Oarrs reviewed  Percocet 7.5/325 mg #60 (30 days), Ms Contin 15 mg #60 (30 days)  Emphasized importance of pain management  12/21/22  Slightly larger  Oarrs reviewed  Gabapentin 300 mg tid  1/4/23  Larger - new wound - so blocked  Some new skin is present  Pt will be calling to remind to get new scripts for pain med - she has still not established with pain management  1/11/23  Stable in size  Will culture - possible advance skin therapy  1/18/23  Stable, appearanc better  Cx - S Aureus, Pseudomonas, Acinetobacter - disc with DR Edward - will tx with levaquin and bactrim  If not improving after will have pt see him in office re possible IV abx  1/25/23  On abx  Wound larger, appearance worse but pain improved  1/26-2/1/23  Admitted after a fall  She than was admitted to [facility] for 2 weeks  2/15/23  Slightly worse, deeper  Oarrs reviewed  MS contin 15 mg q12 #60, Perocet 7.5/325 mg q12 #60  2/22/23  Measuring larger, appearance improved  Size 31 sq cm   3/1/23  Size 28 sq

## 2024-04-25 NOTE — DISCHARGE INSTRUCTIONS
Visit Discharge/Physician Orders     Discharge condition: Stable     Assessment of pain at discharge: mild     Anesthetic used: lido 4%     Discharge to: Home     Left via:Private automobile     Accompanied by: self     ECF/HHA: Mukesh *Please order Calcium Alginate AG* (Patient states that she currently has calcium alginate)      Dressing Orders:  LEFT PRETIB : Cleanse with normal saline, cover with calcium alginate AG, and ABD pad, apply Profore Lite. Change M, W([facility]), F        Treatment Orders: Eat a diet high in protein and vitamin C. Take a multiple vitamin daily unless contraindicated.     Elevate as much as possible     Start antibiotics as prescribed.      [facility] followup visit:    1 week ____________________________  (Please note your next appointment above and if you are unable to keep, kindly give a 24 hour notice. Thank you.)     Physician signature:__________________________      If you experience any of the following, please call the Wound Care Center during business hours:     * Increase in Pain  * Temperature over 101  * Increase in drainage from your wound  * Drainage with a foul odor  * Bleeding  * Increase in swelling  * Need for compression bandage changes due to slippage, breakthrough drainage.     If you need medical attention outside of the business hours of the Wound Care Centers please contact your PCP or go to the nearest emergency room.

## 2024-05-01 ENCOUNTER — HOSPITAL ENCOUNTER (OUTPATIENT)
Dept: WOUND CARE | Age: 79
Discharge: HOME | End: 2024-05-01
Attending: SURGERY
Payer: MEDICARE

## 2024-05-01 VITALS
WEIGHT: 250 LBS | DIASTOLIC BLOOD PRESSURE: 77 MMHG | HEART RATE: 76 BPM | TEMPERATURE: 97.4 F | HEIGHT: 66 IN | RESPIRATION RATE: 18 BRPM | SYSTOLIC BLOOD PRESSURE: 154 MMHG | BODY MASS INDEX: 40.18 KG/M2

## 2024-05-01 DIAGNOSIS — L97.222 VENOUS STASIS ULCER OF LEFT CALF WITH FAT LAYER EXPOSED WITHOUT VARICOSE VEINS (HCC): Primary | ICD-10-CM

## 2024-05-01 DIAGNOSIS — I87.2 VENOUS STASIS ULCER OF LEFT CALF WITH FAT LAYER EXPOSED WITHOUT VARICOSE VEINS (HCC): Primary | ICD-10-CM

## 2024-05-01 PROCEDURE — 11045 DBRDMT SUBQ TISS EACH ADDL: CPT

## 2024-05-01 PROCEDURE — 11042 DBRDMT SUBQ TIS 1ST 20SQCM/<: CPT

## 2024-05-01 RX ORDER — GINSENG 100 MG
CAPSULE ORAL ONCE
OUTPATIENT
Start: 2024-05-01 | End: 2024-05-01

## 2024-05-01 RX ORDER — CLOBETASOL PROPIONATE 0.5 MG/G
OINTMENT TOPICAL ONCE
OUTPATIENT
Start: 2024-05-01 | End: 2024-05-01

## 2024-05-01 RX ORDER — GENTAMICIN SULFATE 1 MG/G
OINTMENT TOPICAL ONCE
OUTPATIENT
Start: 2024-05-01 | End: 2024-05-01

## 2024-05-01 RX ORDER — LIDOCAINE HYDROCHLORIDE 40 MG/ML
SOLUTION TOPICAL ONCE
OUTPATIENT
Start: 2024-05-01 | End: 2024-05-01

## 2024-05-01 RX ORDER — OXYCODONE AND ACETAMINOPHEN 7.5; 325 MG/1; MG/1
1 TABLET ORAL EVERY 8 HOURS PRN
Qty: 90 TABLET | Refills: 0 | Status: SHIPPED | OUTPATIENT
Start: 2024-05-01 | End: 2024-05-31

## 2024-05-01 RX ORDER — BETAMETHASONE DIPROPIONATE 0.05 %
OINTMENT (GRAM) TOPICAL ONCE
OUTPATIENT
Start: 2024-05-01 | End: 2024-05-01

## 2024-05-01 RX ORDER — IBUPROFEN 200 MG
TABLET ORAL ONCE
OUTPATIENT
Start: 2024-05-01 | End: 2024-05-01

## 2024-05-01 RX ORDER — LIDOCAINE HYDROCHLORIDE 20 MG/ML
JELLY TOPICAL ONCE
OUTPATIENT
Start: 2024-05-01 | End: 2024-05-01

## 2024-05-01 RX ORDER — TRIAMCINOLONE ACETONIDE 1 MG/G
OINTMENT TOPICAL ONCE
OUTPATIENT
Start: 2024-05-01 | End: 2024-05-01

## 2024-05-01 RX ORDER — LIDOCAINE 50 MG/G
OINTMENT TOPICAL ONCE
OUTPATIENT
Start: 2024-05-01 | End: 2024-05-01

## 2024-05-01 RX ORDER — MORPHINE SULFATE 15 MG/1
15 TABLET, FILM COATED, EXTENDED RELEASE ORAL 2 TIMES DAILY
Qty: 60 TABLET | Refills: 0 | Status: SHIPPED | OUTPATIENT
Start: 2024-05-01 | End: 2024-05-31

## 2024-05-01 RX ORDER — LIDOCAINE 40 MG/G
CREAM TOPICAL ONCE
OUTPATIENT
Start: 2024-05-01 | End: 2024-05-01

## 2024-05-01 RX ORDER — BACITRACIN ZINC AND POLYMYXIN B SULFATE 500; 1000 [USP'U]/G; [USP'U]/G
OINTMENT TOPICAL ONCE
OUTPATIENT
Start: 2024-05-01 | End: 2024-05-01

## 2024-05-01 RX ORDER — LIDOCAINE HYDROCHLORIDE 40 MG/ML
SOLUTION TOPICAL ONCE
Status: COMPLETED | OUTPATIENT
Start: 2024-05-01 | End: 2024-05-01

## 2024-05-01 RX ORDER — DOXYCYCLINE HYCLATE 100 MG/1
100 CAPSULE ORAL 2 TIMES DAILY
COMMUNITY
End: 2024-05-05

## 2024-05-01 RX ADMIN — LIDOCAINE HYDROCHLORIDE 20 ML: 40 SOLUTION TOPICAL at 14:38

## 2024-05-01 ASSESSMENT — PAIN DESCRIPTION - LOCATION: LOCATION: LEG

## 2024-05-01 ASSESSMENT — PAIN DESCRIPTION - FREQUENCY: FREQUENCY: CONTINUOUS

## 2024-05-01 ASSESSMENT — PAIN DESCRIPTION - ONSET: ONSET: ON-GOING

## 2024-05-01 ASSESSMENT — PAIN DESCRIPTION - DESCRIPTORS: DESCRIPTORS: BURNING;ACHING;THROBBING

## 2024-05-01 ASSESSMENT — PAIN DESCRIPTION - PAIN TYPE: TYPE: CHRONIC PAIN

## 2024-05-01 ASSESSMENT — PAIN - FUNCTIONAL ASSESSMENT: PAIN_FUNCTIONAL_ASSESSMENT: PREVENTS OR INTERFERES SOME ACTIVE ACTIVITIES AND ADLS

## 2024-05-01 ASSESSMENT — PAIN DESCRIPTION - ORIENTATION: ORIENTATION: LEFT

## 2024-05-01 ASSESSMENT — PAIN SCALES - GENERAL: PAINLEVEL_OUTOF10: 5

## 2024-05-01 NOTE — PROGRESS NOTES
worse  Culture  drawtek  3/9/22  periwound much improved  levaquin 750 mg daily #10 script given culture   Wound itself stable  3/23/22  Left anterior calf wound improving overall  New blister/ulcerations left 3rd, 4th and 5th toes and lateral foot   Instructed to keep toes/foot dry, no ointment or corn starch   3/30/22  bistering resolved, other skin issues resolved  More dry than previously but overall stable  kailyn Barrera  Swelling is down, patient declining wrap  4/13/22  Wound slightly improved  4/20/2022  Wound stable, patient refusing compression wrap  5/4/22  Wound worse  Pt refusing wrap  Will change diet per her report to decrease swelling  5/11/22  Wound stable  kailyn and giacomo 2 - pt agreeable now after significant persuasion  5/18/22  Took off wrap within 24 hours due to pain  aquacell and spandigrip  She agreed to use wrap again if swelling not down 2 more cm next week  Wound slightly smaller  5/25/22  Wound improved   Left leg wound debrided   Continue aquacel   6/8/22  Wound larger  Agreeable to wrap - kailyn sena ag  Culture done  6/22/22  Wound stable in size  6/29/22  Wound slightly larger  Still having issues with wrap falling down  Will start hhc - MWF wraps  7/6/22  Improved  hhc starts this week  7/20/22  Appearance better, wound measuring larger  Continue with wraps  Leg edema improved  7/27/22  Slight improvement  8/3/22  Wound appearance and size worse  Issues still with wraps falling down  When doesn't have wraps on than usually uses spandigrips  8/10/2022  Wound cultures noted, Bactrim DS 1 tablet p.o. twice daily, wound looks fairly clean with some exudate only, mild recent lab work, patient recommended CBC and CMP  8/17/22  Refusing wrap today due to pain associated with  Will discuss with Dr Quarles her pain management  Slightly larger, appearance improved  8/24/22  Poor tolerance of wraps  Wound stable in size  9/7/22  Not tolerating wraps - emphasized importance of

## 2024-05-06 NOTE — DISCHARGE INSTRUCTIONS
Visit Discharge/Physician Orders     Discharge condition: Stable     Assessment of pain at discharge: mild     Anesthetic used: lido 4%     Discharge to: Home     Left via:Private automobile     Accompanied by: self     ECF/HHA: Raphael *Please order Calcium Alginate AG* (Patient states that she currently has calcium alginate)      Dressing Orders:  LEFT PRETIB : Cleanse with normal saline, cover with calcium alginate AG, and ABD pad, apply Profore Lite. Change M, W([facility]), F        Treatment Orders: Eat a diet high in protein and vitamin C. Take a multiple vitamin daily unless contraindicated.     Elevate as much as possible     Start antibiotics as prescribed.      [facility] followup visit:    1 week ____________________________  (Please note your next appointment above and if you are unable to keep, kindly give a 24 hour notice. Thank you.)     Physician signature:__________________________      If you experience any of the following, please call the Wound Care Center during business hours:     * Increase in Pain  * Temperature over 101  * Increase in drainage from your wound  * Drainage with a foul odor  * Bleeding  * Increase in swelling  * Need for compression bandage changes due to slippage, breakthrough drainage.     If you need medical attention outside of the business hours of the Wound Care Centers please contact your PCP or go to the nearest emergency room.

## 2024-05-08 ENCOUNTER — HOSPITAL ENCOUNTER (OUTPATIENT)
Dept: WOUND CARE | Age: 79
Discharge: HOME | End: 2024-05-08
Attending: SURGERY

## 2024-05-13 NOTE — DISCHARGE INSTRUCTIONS
Visit Discharge/Physician Orders     Discharge condition: Stable     Assessment of pain at discharge: mild     Anesthetic used: lido 4%     Discharge to: Home     Left via:Private automobile     Accompanied by: self     ECF/HHA: Mukesh *Please order Calcium Alginate AG* (Patient states that she currently has calcium alginate)      Dressing Orders:  LEFT PRETIB : Cleanse with normal saline, cover with calcium alginate AG, and ABD pad, apply Profore Lite. Change M, W([facility]), F        Treatment Orders: Eat a diet high in protein and vitamin C. Take a multiple vitamin daily unless contraindicated.     Elevate as much as possible     Start antibiotics as prescribed.      [facility] followup visit:    1 week ____________________________  (Please note your next appointment above and if you are unable to keep, kindly give a 24 hour notice. Thank you.)     Physician signature:__________________________      If you experience any of the following, please call the Wound Care Center during business hours:     * Increase in Pain  * Temperature over 101  * Increase in drainage from your wound  * Drainage with a foul odor  * Bleeding  * Increase in swelling  * Need for compression bandage changes due to slippage, breakthrough drainage.     If you need medical attention outside of the business hours of the Wound Care Centers please contact your PCP or go to the nearest emergency room.

## 2024-05-15 ENCOUNTER — HOSPITAL ENCOUNTER (OUTPATIENT)
Dept: WOUND CARE | Age: 79
Discharge: HOME | End: 2024-05-15
Attending: SURGERY
Payer: MEDICARE

## 2024-05-15 VITALS
WEIGHT: 243 LBS | BODY MASS INDEX: 38.14 KG/M2 | SYSTOLIC BLOOD PRESSURE: 138 MMHG | TEMPERATURE: 98.1 F | DIASTOLIC BLOOD PRESSURE: 78 MMHG | RESPIRATION RATE: 18 BRPM | HEIGHT: 67 IN | HEART RATE: 98 BPM

## 2024-05-15 DIAGNOSIS — I87.2 VENOUS STASIS ULCER OF LEFT CALF WITH FAT LAYER EXPOSED WITHOUT VARICOSE VEINS (HCC): Primary | ICD-10-CM

## 2024-05-15 DIAGNOSIS — L97.222 VENOUS STASIS ULCER OF LEFT CALF WITH FAT LAYER EXPOSED WITHOUT VARICOSE VEINS (HCC): Primary | ICD-10-CM

## 2024-05-15 DIAGNOSIS — I89.0 LYMPHEDEMA: ICD-10-CM

## 2024-05-15 PROCEDURE — 11042 DBRDMT SUBQ TIS 1ST 20SQCM/<: CPT

## 2024-05-15 PROCEDURE — 11045 DBRDMT SUBQ TISS EACH ADDL: CPT

## 2024-05-15 RX ORDER — GENTAMICIN SULFATE 1 MG/G
OINTMENT TOPICAL ONCE
OUTPATIENT
Start: 2024-05-15 | End: 2024-05-15

## 2024-05-15 RX ORDER — IBUPROFEN 200 MG
TABLET ORAL ONCE
OUTPATIENT
Start: 2024-05-15 | End: 2024-05-15

## 2024-05-15 RX ORDER — LIDOCAINE HYDROCHLORIDE 40 MG/ML
SOLUTION TOPICAL ONCE
OUTPATIENT
Start: 2024-05-15 | End: 2024-05-15

## 2024-05-15 RX ORDER — BETAMETHASONE DIPROPIONATE 0.05 %
OINTMENT (GRAM) TOPICAL ONCE
OUTPATIENT
Start: 2024-05-15 | End: 2024-05-15

## 2024-05-15 RX ORDER — GINSENG 100 MG
CAPSULE ORAL ONCE
OUTPATIENT
Start: 2024-05-15 | End: 2024-05-15

## 2024-05-15 RX ORDER — BACITRACIN ZINC AND POLYMYXIN B SULFATE 500; 1000 [USP'U]/G; [USP'U]/G
OINTMENT TOPICAL ONCE
OUTPATIENT
Start: 2024-05-15 | End: 2024-05-15

## 2024-05-15 RX ORDER — LIDOCAINE 40 MG/G
CREAM TOPICAL ONCE
OUTPATIENT
Start: 2024-05-15 | End: 2024-05-15

## 2024-05-15 RX ORDER — TRIAMCINOLONE ACETONIDE 1 MG/G
OINTMENT TOPICAL ONCE
OUTPATIENT
Start: 2024-05-15 | End: 2024-05-15

## 2024-05-15 RX ORDER — LIDOCAINE HYDROCHLORIDE 20 MG/ML
JELLY TOPICAL ONCE
OUTPATIENT
Start: 2024-05-15 | End: 2024-05-15

## 2024-05-15 RX ORDER — LIDOCAINE 50 MG/G
OINTMENT TOPICAL ONCE
OUTPATIENT
Start: 2024-05-15 | End: 2024-05-15

## 2024-05-15 RX ORDER — LIDOCAINE HYDROCHLORIDE 40 MG/ML
SOLUTION TOPICAL ONCE
Status: COMPLETED | OUTPATIENT
Start: 2024-05-15 | End: 2024-05-15

## 2024-05-15 RX ORDER — CLOBETASOL PROPIONATE 0.5 MG/G
OINTMENT TOPICAL ONCE
OUTPATIENT
Start: 2024-05-15 | End: 2024-05-15

## 2024-05-15 RX ADMIN — LIDOCAINE HYDROCHLORIDE 15 ML: 40 SOLUTION TOPICAL at 14:29

## 2024-05-15 ASSESSMENT — PAIN SCALES - GENERAL: PAINLEVEL_OUTOF10: 6

## 2024-05-15 ASSESSMENT — PAIN DESCRIPTION - ONSET: ONSET: ON-GOING

## 2024-05-15 ASSESSMENT — PAIN DESCRIPTION - ORIENTATION: ORIENTATION: LEFT

## 2024-05-15 ASSESSMENT — PAIN DESCRIPTION - PAIN TYPE: TYPE: CHRONIC PAIN

## 2024-05-15 ASSESSMENT — PAIN DESCRIPTION - DESCRIPTORS: DESCRIPTORS: BURNING;THROBBING

## 2024-05-15 ASSESSMENT — PAIN DESCRIPTION - LOCATION: LOCATION: LEG

## 2024-05-15 ASSESSMENT — PAIN DESCRIPTION - FREQUENCY: FREQUENCY: CONTINUOUS

## 2024-05-15 NOTE — PROGRESS NOTES
Wound Healing Center Followup Visit Note    Referring Physician : Tamie Santizo MD  Anastacia Morel  MEDICAL RECORD NUMBER:  [id number]  AGE: 79 y.o.   GENDER: female  : 1945  EPISODE DATE:  5/15/2024    Subjective:     Chief Complaint   Patient presents with    Wound Check     Left leg      HISTORY of PRESENT ILLNESS HPI   Anastacia Morel is a 79 y.o. female who presents today in regards to follow up evaluation and treatment of wound/ulcer.  That patient's past medical, family and social hx were reviewed and changes were made if present.    History of Wound Context:  Patient has had left calf wound since ~ 2021.  She has been putting a dressing on it.  The wound has not been improving.  She has a hx significant for venous stasis ulcerations of bilateral LE.  She first was seen by myself in regards to these issues 2015.  She eventually healed these wounds 2016.     I last saw her 2021 at which time I recommended lymphedema therapy.  She states she went and said it caused her to much pain and stopped going.  She has chronic issues with bilateral calf pain, swelling and edema.            She admits to not wearing her stockings because of the pain.    She has used knee high 20-30 mm hg stockings in the past.       She is still seeing pain management and is down to percocet 7/5/325 mg daily.       21  aquacell  Double tubigrip  Culture done  Emphasized importance of getting in to more significant compression in the future  12/15/21  Culture reviewed - light growth, no tx  Wound slightly improved  Still significant drainage  Plan on compression wrap next week  21  Stable wound  Drainage slightly better  Pt would like to wait till next week because of holidays to start wrap  22  Wound larger  Profore  22  Wound appearance better  Refusing wrap - it rolled down last week and she cut off after 3 days  22  Wound appearance better  Still refusing wrap   3/2/22  periwound

## 2024-05-20 NOTE — DISCHARGE INSTRUCTIONS
Visit Discharge/Physician Orders     Discharge condition: Stable     Assessment of pain at discharge: mild     Anesthetic used: lido 4%     Discharge to: Home     Left via:Private automobile     Accompanied by: self     ECF/HHA: Mukesh *Please order Calcium Alginate AG* (Patient states that she currently has calcium alginate)      Dressing Orders:  LEFT PRETIB : Cleanse with normal saline, cover with calcium alginate AG, and ABD pad, apply Profore Lite. Change M, W([facility]), F        Treatment Orders: Eat a diet high in protein and vitamin C. Take a multiple vitamin daily unless contraindicated.     Elevate as much as possible     Start antibiotics as prescribed.      [facility] followup visit:    1 week ____________________________  (Please note your next appointment above and if you are unable to keep, kindly give a 24 hour notice. Thank you.)     Physician signature:__________________________      If you experience any of the following, please call the Wound Care Center during business hours:     * Increase in Pain  * Temperature over 101  * Increase in drainage from your wound  * Drainage with a foul odor  * Bleeding  * Increase in swelling  * Need for compression bandage changes due to slippage, breakthrough drainage.     If you need medical attention outside of the business hours of the Wound Care Centers please contact your PCP or go to the nearest emergency room.

## 2024-05-22 ENCOUNTER — HOSPITAL ENCOUNTER (OUTPATIENT)
Dept: WOUND CARE | Age: 79
Discharge: HOME | End: 2024-05-22
Attending: SURGERY

## 2024-05-24 NOTE — DISCHARGE INSTRUCTIONS
Visit Discharge/Physician Orders     Discharge condition: Stable     Assessment of pain at discharge: mild     Anesthetic used: lido 4%     Discharge to: Home     Left via:Private automobile     Accompanied by: self     ECF/HHA: Akeso       Dressing Orders:  LEFT PRETIB : Cleanse with normal saline, cover with calcium alginate AG, and ABD pad, apply Profore Lite. Change M, W([facility]), F        Treatment Orders: Eat a diet high in protein and vitamin C. Take a multiple vitamin daily unless contraindicated.     Elevate as much as possible     Start antibiotics as prescribed.      [facility] followup visit:    1 week ____________________________  (Please note your next appointment above and if you are unable to keep, kindly give a 24 hour notice. Thank you.)     Physician signature:__________________________      If you experience any of the following, please call the Wound Care Center during business hours:     * Increase in Pain  * Temperature over 101  * Increase in drainage from your wound  * Drainage with a foul odor  * Bleeding  * Increase in swelling  * Need for compression bandage changes due to slippage, breakthrough drainage.     If you need medical attention outside of the business hours of the Wound Care Centers please contact your PCP or go to the nearest emergency room.

## 2024-05-29 ENCOUNTER — HOSPITAL ENCOUNTER (OUTPATIENT)
Dept: WOUND CARE | Age: 79
Discharge: HOME | End: 2024-05-29
Attending: SURGERY
Payer: MEDICARE

## 2024-05-29 VITALS
RESPIRATION RATE: 20 BRPM | HEART RATE: 87 BPM | DIASTOLIC BLOOD PRESSURE: 83 MMHG | TEMPERATURE: 97.8 F | SYSTOLIC BLOOD PRESSURE: 159 MMHG | HEIGHT: 67 IN | BODY MASS INDEX: 38.14 KG/M2 | WEIGHT: 243 LBS

## 2024-05-29 DIAGNOSIS — L97.222 VENOUS STASIS ULCER OF LEFT CALF WITH FAT LAYER EXPOSED WITHOUT VARICOSE VEINS (HCC): Primary | ICD-10-CM

## 2024-05-29 DIAGNOSIS — I87.2 VENOUS STASIS ULCER OF LEFT CALF WITH FAT LAYER EXPOSED WITHOUT VARICOSE VEINS (HCC): Primary | ICD-10-CM

## 2024-05-29 PROCEDURE — 11045 DBRDMT SUBQ TISS EACH ADDL: CPT | Performed by: SURGERY

## 2024-05-29 PROCEDURE — 11042 DBRDMT SUBQ TIS 1ST 20SQCM/<: CPT | Performed by: SURGERY

## 2024-05-29 PROCEDURE — 11045 DBRDMT SUBQ TISS EACH ADDL: CPT

## 2024-05-29 PROCEDURE — 11042 DBRDMT SUBQ TIS 1ST 20SQCM/<: CPT

## 2024-05-29 RX ORDER — LIDOCAINE HYDROCHLORIDE 20 MG/ML
JELLY TOPICAL ONCE
OUTPATIENT
Start: 2024-05-29 | End: 2024-05-29

## 2024-05-29 RX ORDER — LIDOCAINE HYDROCHLORIDE 40 MG/ML
SOLUTION TOPICAL ONCE
OUTPATIENT
Start: 2024-05-29 | End: 2024-05-29

## 2024-05-29 RX ORDER — GENTAMICIN SULFATE 1 MG/G
OINTMENT TOPICAL ONCE
OUTPATIENT
Start: 2024-05-29 | End: 2024-05-29

## 2024-05-29 RX ORDER — IBUPROFEN 200 MG
TABLET ORAL ONCE
OUTPATIENT
Start: 2024-05-29 | End: 2024-05-29

## 2024-05-29 RX ORDER — CLOBETASOL PROPIONATE 0.5 MG/G
OINTMENT TOPICAL ONCE
OUTPATIENT
Start: 2024-05-29 | End: 2024-05-29

## 2024-05-29 RX ORDER — LIDOCAINE 50 MG/G
OINTMENT TOPICAL ONCE
OUTPATIENT
Start: 2024-05-29 | End: 2024-05-29

## 2024-05-29 RX ORDER — TRIAMCINOLONE ACETONIDE 1 MG/G
OINTMENT TOPICAL ONCE
OUTPATIENT
Start: 2024-05-29 | End: 2024-05-29

## 2024-05-29 RX ORDER — BETAMETHASONE DIPROPIONATE 0.05 %
OINTMENT (GRAM) TOPICAL ONCE
OUTPATIENT
Start: 2024-05-29 | End: 2024-05-29

## 2024-05-29 RX ORDER — LIDOCAINE HYDROCHLORIDE 40 MG/ML
SOLUTION TOPICAL ONCE
Status: COMPLETED | OUTPATIENT
Start: 2024-05-29 | End: 2024-05-29

## 2024-05-29 RX ORDER — BACITRACIN ZINC AND POLYMYXIN B SULFATE 500; 1000 [USP'U]/G; [USP'U]/G
OINTMENT TOPICAL ONCE
OUTPATIENT
Start: 2024-05-29 | End: 2024-05-29

## 2024-05-29 RX ORDER — GINSENG 100 MG
CAPSULE ORAL ONCE
OUTPATIENT
Start: 2024-05-29 | End: 2024-05-29

## 2024-05-29 RX ORDER — LIDOCAINE 40 MG/G
CREAM TOPICAL ONCE
OUTPATIENT
Start: 2024-05-29 | End: 2024-05-29

## 2024-05-29 RX ADMIN — LIDOCAINE HYDROCHLORIDE 10 ML: 40 SOLUTION TOPICAL at 14:15

## 2024-05-29 ASSESSMENT — PAIN DESCRIPTION - ORIENTATION: ORIENTATION: LEFT

## 2024-05-29 ASSESSMENT — PAIN DESCRIPTION - FREQUENCY: FREQUENCY: CONTINUOUS

## 2024-05-29 ASSESSMENT — PAIN DESCRIPTION - ONSET: ONSET: ON-GOING

## 2024-05-29 ASSESSMENT — PAIN - FUNCTIONAL ASSESSMENT: PAIN_FUNCTIONAL_ASSESSMENT: PREVENTS OR INTERFERES SOME ACTIVE ACTIVITIES AND ADLS

## 2024-05-29 ASSESSMENT — PAIN SCALES - GENERAL: PAINLEVEL_OUTOF10: 10

## 2024-05-29 ASSESSMENT — PAIN DESCRIPTION - LOCATION: LOCATION: LEG

## 2024-05-29 ASSESSMENT — PAIN DESCRIPTION - DESCRIPTORS: DESCRIPTORS: BURNING;THROBBING

## 2024-05-29 ASSESSMENT — PAIN DESCRIPTION - PAIN TYPE: TYPE: CHRONIC PAIN

## 2024-06-04 NOTE — DISCHARGE INSTRUCTIONS
Visit Discharge/Physician Orders     Discharge condition: Stable     Assessment of pain at discharge: mild     Anesthetic used: lido 4%     Discharge to: Home     Left via:Private automobile     Accompanied by: self     ECF/HHA: Mukesh  *New order*      Dressing Orders:  LEFT PRETIB : Cleanse with normal saline, cover with calcium alginate, and ABD pad, apply Profore Lite. Change M, W([facility]), F       IN CLINIC 6/5/24: LEFT PRETIB: Cleanse with normal saline, cover with calcium alginate, apply Kerlix and Coban.      Treatment Orders: Eat a diet high in protein and vitamin C. Take a multiple vitamin daily unless contraindicated.     Elevate as much as possible     [facility] followup visit:    1 week ____________________________  (Please note your next appointment above and if you are unable to keep, kindly give a 24 hour notice. Thank you.)     Physician signature:__________________________      If you experience any of the following, please call the Wound Care Center during business hours:     * Increase in Pain  * Temperature over 101  * Increase in drainage from your wound  * Drainage with a foul odor  * Bleeding  * Increase in swelling  * Need for compression bandage changes due to slippage, breakthrough drainage.     If you need medical attention outside of the business hours of the Wound Care Centers please contact your PCP or go to the nearest emergency room.

## 2024-06-05 ENCOUNTER — HOSPITAL ENCOUNTER (OUTPATIENT)
Dept: WOUND CARE | Age: 79
Discharge: HOME | End: 2024-06-05
Attending: SURGERY
Payer: MEDICARE

## 2024-06-05 VITALS
SYSTOLIC BLOOD PRESSURE: 169 MMHG | TEMPERATURE: 97.4 F | HEIGHT: 67 IN | BODY MASS INDEX: 38.14 KG/M2 | WEIGHT: 243 LBS | DIASTOLIC BLOOD PRESSURE: 91 MMHG | HEART RATE: 103 BPM | RESPIRATION RATE: 18 BRPM

## 2024-06-05 DIAGNOSIS — L97.222 VENOUS STASIS ULCER OF LEFT CALF WITH FAT LAYER EXPOSED WITHOUT VARICOSE VEINS (HCC): Primary | ICD-10-CM

## 2024-06-05 DIAGNOSIS — I87.2 VENOUS STASIS ULCER OF LEFT CALF WITH FAT LAYER EXPOSED WITHOUT VARICOSE VEINS (HCC): Primary | ICD-10-CM

## 2024-06-05 PROCEDURE — 99213 OFFICE O/P EST LOW 20 MIN: CPT | Performed by: SURGERY

## 2024-06-05 PROCEDURE — 99213 OFFICE O/P EST LOW 20 MIN: CPT

## 2024-06-05 RX ORDER — LIDOCAINE HYDROCHLORIDE 40 MG/ML
SOLUTION TOPICAL ONCE
Status: COMPLETED | OUTPATIENT
Start: 2024-06-05 | End: 2024-06-05

## 2024-06-05 RX ORDER — LIDOCAINE 50 MG/G
OINTMENT TOPICAL ONCE
OUTPATIENT
Start: 2024-06-05 | End: 2024-06-05

## 2024-06-05 RX ORDER — MORPHINE SULFATE 15 MG/1
15 TABLET, FILM COATED, EXTENDED RELEASE ORAL 2 TIMES DAILY
Qty: 60 TABLET | Refills: 0 | Status: SHIPPED | OUTPATIENT
Start: 2024-06-05 | End: 2024-07-05

## 2024-06-05 RX ORDER — GENTAMICIN SULFATE 1 MG/G
OINTMENT TOPICAL ONCE
OUTPATIENT
Start: 2024-06-05 | End: 2024-06-05

## 2024-06-05 RX ORDER — LIDOCAINE HYDROCHLORIDE 40 MG/ML
SOLUTION TOPICAL ONCE
OUTPATIENT
Start: 2024-06-05 | End: 2024-06-05

## 2024-06-05 RX ORDER — GINSENG 100 MG
CAPSULE ORAL ONCE
OUTPATIENT
Start: 2024-06-05 | End: 2024-06-05

## 2024-06-05 RX ORDER — CLOBETASOL PROPIONATE 0.5 MG/G
OINTMENT TOPICAL ONCE
OUTPATIENT
Start: 2024-06-05 | End: 2024-06-05

## 2024-06-05 RX ORDER — BETAMETHASONE DIPROPIONATE 0.05 %
OINTMENT (GRAM) TOPICAL ONCE
OUTPATIENT
Start: 2024-06-05 | End: 2024-06-05

## 2024-06-05 RX ORDER — LIDOCAINE HYDROCHLORIDE 20 MG/ML
JELLY TOPICAL ONCE
OUTPATIENT
Start: 2024-06-05 | End: 2024-06-05

## 2024-06-05 RX ORDER — OXYCODONE AND ACETAMINOPHEN 7.5; 325 MG/1; MG/1
1 TABLET ORAL EVERY 8 HOURS PRN
Qty: 90 TABLET | Refills: 0 | Status: SHIPPED | OUTPATIENT
Start: 2024-06-05 | End: 2024-07-05

## 2024-06-05 RX ORDER — TRIAMCINOLONE ACETONIDE 1 MG/G
OINTMENT TOPICAL ONCE
OUTPATIENT
Start: 2024-06-05 | End: 2024-06-05

## 2024-06-05 RX ORDER — IBUPROFEN 200 MG
TABLET ORAL ONCE
OUTPATIENT
Start: 2024-06-05 | End: 2024-06-05

## 2024-06-05 RX ORDER — LIDOCAINE 40 MG/G
CREAM TOPICAL ONCE
OUTPATIENT
Start: 2024-06-05 | End: 2024-06-05

## 2024-06-05 RX ORDER — BACITRACIN ZINC AND POLYMYXIN B SULFATE 500; 1000 [USP'U]/G; [USP'U]/G
OINTMENT TOPICAL ONCE
OUTPATIENT
Start: 2024-06-05 | End: 2024-06-05

## 2024-06-05 RX ADMIN — LIDOCAINE HYDROCHLORIDE 20 ML: 40 SOLUTION TOPICAL at 14:13

## 2024-06-05 ASSESSMENT — PAIN DESCRIPTION - PAIN TYPE: TYPE: CHRONIC PAIN

## 2024-06-05 ASSESSMENT — PAIN DESCRIPTION - LOCATION: LOCATION: LEG

## 2024-06-05 ASSESSMENT — PAIN - FUNCTIONAL ASSESSMENT: PAIN_FUNCTIONAL_ASSESSMENT: PREVENTS OR INTERFERES SOME ACTIVE ACTIVITIES AND ADLS

## 2024-06-05 ASSESSMENT — PAIN DESCRIPTION - ONSET: ONSET: ON-GOING

## 2024-06-05 ASSESSMENT — PAIN DESCRIPTION - DESCRIPTORS: DESCRIPTORS: BURNING;THROBBING

## 2024-06-05 ASSESSMENT — PAIN SCALES - GENERAL: PAINLEVEL_OUTOF10: 6

## 2024-06-05 ASSESSMENT — PAIN DESCRIPTION - ORIENTATION: ORIENTATION: LEFT

## 2024-06-10 NOTE — DISCHARGE INSTRUCTIONS
Visit Discharge/Physician Orders     Discharge condition: Stable     Assessment of pain at discharge: mild     Anesthetic used: lido 4%     Discharge to: Home     Left via:Private automobile     Accompanied by: self     ECF/HHA: Mukesh  *New order*      Dressing Orders:  LEFT PRETIB : Cleanse with normal saline, cover with calcium alginate, and ABD pad, apply Profore Lite. Change M, W([facility]), F        IN CLINIC 6/5/24: LEFT PRETIB: Cleanse with normal saline, cover with calcium alginate, apply Kerlix and Coban.       Treatment Orders: Eat a diet high in protein and vitamin C. Take a multiple vitamin daily unless contraindicated.     Elevate as much as possible     [facility] followup visit:    1 week ____________________________  (Please note your next appointment above and if you are unable to keep, kindly give a 24 hour notice. Thank you.)     Physician signature:__________________________      If you experience any of the following, please call the Wound Care Center during business hours:     * Increase in Pain  * Temperature over 101  * Increase in drainage from your wound  * Drainage with a foul odor  * Bleeding  * Increase in swelling  * Need for compression bandage changes due to slippage, breakthrough drainage.     If you need medical attention outside of the business hours of the Wound Care Centers please contact your PCP or go to the nearest emergency room.

## 2024-06-12 ENCOUNTER — HOSPITAL ENCOUNTER (OUTPATIENT)
Dept: WOUND CARE | Age: 79
Discharge: HOME | End: 2024-06-12
Attending: SURGERY

## 2024-06-17 NOTE — DISCHARGE INSTRUCTIONS
Visit Discharge/Physician Orders     Discharge condition: Stable     Assessment of pain at discharge: mild     Anesthetic used: lido 4%     Discharge to: Home     Left via:Private automobile     Accompanied by: self     ECF/HHA: Mukesh  *New order*      Dressing Orders:  LEFT PRETIB : Cleanse with normal saline, cover with calcium alginate, and ABD pad, apply Profore Lite. Change M, W([facility]), F       IN CLINIC: LEFT PRETIBIAL:  Cleanse with normal saline, cover with calcium alginate, and ABD pad, dry dressing, and spandagrip.     Treatment Orders: Eat a diet high in protein and vitamin C. Take a multiple vitamin daily unless contraindicated.     Elevate as much as possible    Wound C&S 6/19/24     [facility] followup visit:    1 week ____________________________  (Please note your next appointment above and if you are unable to keep, kindly give a 24 hour notice. Thank you.)     Physician signature:__________________________      If you experience any of the following, please call the Wound Care Center during business hours:     * Increase in Pain  * Temperature over 101  * Increase in drainage from your wound  * Drainage with a foul odor  * Bleeding  * Increase in swelling  * Need for compression bandage changes due to slippage, breakthrough drainage.     If you need medical attention outside of the business hours of the Wound Care Centers please contact your PCP or go to the nearest emergency room.

## 2024-06-19 ENCOUNTER — HOSPITAL ENCOUNTER (OUTPATIENT)
Dept: WOUND CARE | Age: 79
Discharge: HOME | End: 2024-06-19
Attending: SURGERY
Payer: MEDICARE

## 2024-06-19 VITALS
SYSTOLIC BLOOD PRESSURE: 143 MMHG | BODY MASS INDEX: 38.14 KG/M2 | HEIGHT: 67 IN | TEMPERATURE: 96.4 F | RESPIRATION RATE: 18 BRPM | DIASTOLIC BLOOD PRESSURE: 68 MMHG | WEIGHT: 243 LBS | HEART RATE: 93 BPM

## 2024-06-19 DIAGNOSIS — L97.222 VENOUS STASIS ULCER OF LEFT CALF WITH FAT LAYER EXPOSED WITHOUT VARICOSE VEINS (HCC): Primary | ICD-10-CM

## 2024-06-19 DIAGNOSIS — I87.2 VENOUS STASIS ULCER OF LEFT CALF WITH FAT LAYER EXPOSED WITHOUT VARICOSE VEINS (HCC): Primary | ICD-10-CM

## 2024-06-19 PROCEDURE — 87070 CULTURE OTHR SPECIMN AEROBIC: CPT

## 2024-06-19 PROCEDURE — 87077 CULTURE AEROBIC IDENTIFY: CPT

## 2024-06-19 PROCEDURE — 87205 SMEAR GRAM STAIN: CPT

## 2024-06-19 PROCEDURE — 86403 PARTICLE AGGLUT ANTBDY SCRN: CPT

## 2024-06-19 PROCEDURE — 11042 DBRDMT SUBQ TIS 1ST 20SQCM/<: CPT

## 2024-06-19 RX ORDER — LIDOCAINE HYDROCHLORIDE 40 MG/ML
SOLUTION TOPICAL ONCE
OUTPATIENT
Start: 2024-06-19 | End: 2024-06-19

## 2024-06-19 RX ORDER — LIDOCAINE 50 MG/G
OINTMENT TOPICAL ONCE
OUTPATIENT
Start: 2024-06-19 | End: 2024-06-19

## 2024-06-19 RX ORDER — IBUPROFEN 200 MG
TABLET ORAL ONCE
OUTPATIENT
Start: 2024-06-19 | End: 2024-06-19

## 2024-06-19 RX ORDER — BACITRACIN ZINC AND POLYMYXIN B SULFATE 500; 1000 [USP'U]/G; [USP'U]/G
OINTMENT TOPICAL ONCE
OUTPATIENT
Start: 2024-06-19 | End: 2024-06-19

## 2024-06-19 RX ORDER — TRIAMCINOLONE ACETONIDE 1 MG/G
OINTMENT TOPICAL ONCE
OUTPATIENT
Start: 2024-06-19 | End: 2024-06-19

## 2024-06-19 RX ORDER — LIDOCAINE HYDROCHLORIDE 20 MG/ML
JELLY TOPICAL ONCE
OUTPATIENT
Start: 2024-06-19 | End: 2024-06-19

## 2024-06-19 RX ORDER — CLOBETASOL PROPIONATE 0.5 MG/G
OINTMENT TOPICAL ONCE
OUTPATIENT
Start: 2024-06-19 | End: 2024-06-19

## 2024-06-19 RX ORDER — GINSENG 100 MG
CAPSULE ORAL ONCE
OUTPATIENT
Start: 2024-06-19 | End: 2024-06-19

## 2024-06-19 RX ORDER — LIDOCAINE HYDROCHLORIDE 40 MG/ML
SOLUTION TOPICAL ONCE
Status: DISCONTINUED | OUTPATIENT
Start: 2024-06-19 | End: 2024-06-20 | Stop reason: HOSPADM

## 2024-06-19 RX ORDER — BETAMETHASONE DIPROPIONATE 0.05 %
OINTMENT (GRAM) TOPICAL ONCE
OUTPATIENT
Start: 2024-06-19 | End: 2024-06-19

## 2024-06-19 RX ORDER — LIDOCAINE 40 MG/G
CREAM TOPICAL ONCE
OUTPATIENT
Start: 2024-06-19 | End: 2024-06-19

## 2024-06-19 RX ORDER — GENTAMICIN SULFATE 1 MG/G
OINTMENT TOPICAL ONCE
OUTPATIENT
Start: 2024-06-19 | End: 2024-06-19

## 2024-06-19 ASSESSMENT — PAIN DESCRIPTION - ORIENTATION: ORIENTATION: LEFT

## 2024-06-19 ASSESSMENT — PAIN DESCRIPTION - ONSET: ONSET: ON-GOING

## 2024-06-19 ASSESSMENT — PAIN DESCRIPTION - LOCATION: LOCATION: LEG

## 2024-06-19 ASSESSMENT — PAIN DESCRIPTION - FREQUENCY: FREQUENCY: CONTINUOUS

## 2024-06-19 ASSESSMENT — PAIN DESCRIPTION - DESCRIPTORS: DESCRIPTORS: BURNING;THROBBING

## 2024-06-19 ASSESSMENT — PAIN DESCRIPTION - PAIN TYPE: TYPE: CHRONIC PAIN

## 2024-06-19 ASSESSMENT — PAIN SCALES - GENERAL: PAINLEVEL_OUTOF10: 7

## 2024-06-20 NOTE — PROGRESS NOTES
Wound Healing Center Followup Visit Note    Referring Physician : Tamie Santizo MD  Anastacia Morel  MEDICAL RECORD NUMBER:  [id number]  AGE: 79 y.o.   GENDER: female  : 1945  EPISODE DATE:  2024    Subjective:     Chief Complaint   Patient presents with    Wound Check     Left leg      HISTORY of PRESENT ILLNESS HPI   Anastacia Morel is a 79 y.o. female who presents today in regards to follow up evaluation and treatment of wound/ulcer.  That patient's past medical, family and social hx were reviewed and changes were made if present.    History of Wound Context:  Patient has had left calf wound since ~ 2021.  She has been putting a dressing on it.  The wound has not been improving.  She has a hx significant for venous stasis ulcerations of bilateral LE.  She first was seen by myself in regards to these issues 2015.  She eventually healed these wounds 2016.     I last saw her 2021 at which time I recommended lymphedema therapy.  She states she went and said it caused her to much pain and stopped going.  She has chronic issues with bilateral calf pain, swelling and edema.            She admits to not wearing her stockings because of the pain.    She has used knee high 20-30 mm hg stockings in the past.       She is still seeing pain management and is down to percocet 7/5/325 mg daily.       21  aquacell  Double tubigrip  Culture done  Emphasized importance of getting in to more significant compression in the future  12/15/21  Culture reviewed - light growth, no tx  Wound slightly improved  Still significant drainage  Plan on compression wrap next week  21  Stable wound  Drainage slightly better  Pt would like to wait till next week because of holidays to start wrap  22  Wound larger  Profore  22  Wound appearance better  Refusing wrap - it rolled down last week and she cut off after 3 days  22  Wound appearance better  Still refusing wrap   3/2/22  periwound

## 2024-06-24 NOTE — DISCHARGE INSTRUCTIONS
Visit Discharge/Physician Orders     Discharge condition: Stable     Assessment of pain at discharge: mild     Anesthetic used: lido 4%     Discharge to: Home     Left via:Private automobile     Accompanied by: self     ECF/HHA: Akeso       Dressing Orders:  LEFT PRETIB : Cleanse with normal saline, cover with calcium alginate, and ABD pad, apply Profore Lite. Change M, W([facility]), F     *If Profore Lite is removed, please apply calcium alginate, dry dressing and secure. Change twice a day.*      IN CLINIC: LEFT PRETIBIAL:  Cleanse with normal saline, cover with calcium alginate, and ABD pad, dry dressing, and spandagrip.      Treatment Orders: Eat a diet high in protein and vitamin C. Take a multiple vitamin daily unless contraindicated.     Elevate as much as possible     Consult with Infectious Disease      [facility] followup visit:    1 week Dr. HATFIELD  ____________________________  (Please note your next appointment above and if you are unable to keep, kindly give a 24 hour notice. Thank you.)     Physician signature:__________________________      If you experience any of the following, please call the Wound Care Center during business hours:     * Increase in Pain  * Temperature over 101  * Increase in drainage from your wound  * Drainage with a foul odor  * Bleeding  * Increase in swelling  * Need for compression bandage changes due to slippage, breakthrough drainage.     If you need medical attention outside of the business hours of the Wound Care Centers please contact your PCP or go to the nearest emergency room.

## 2024-06-26 ENCOUNTER — HOSPITAL ENCOUNTER (OUTPATIENT)
Dept: WOUND CARE | Age: 79
Discharge: HOME | End: 2024-06-26
Attending: SURGERY
Payer: MEDICARE

## 2024-06-26 VITALS
DIASTOLIC BLOOD PRESSURE: 82 MMHG | HEIGHT: 67 IN | RESPIRATION RATE: 18 BRPM | WEIGHT: 243 LBS | HEART RATE: 74 BPM | BODY MASS INDEX: 38.14 KG/M2 | TEMPERATURE: 96.9 F | SYSTOLIC BLOOD PRESSURE: 153 MMHG

## 2024-06-26 DIAGNOSIS — L97.222 VENOUS STASIS ULCER OF LEFT CALF WITH FAT LAYER EXPOSED WITHOUT VARICOSE VEINS (HCC): Primary | ICD-10-CM

## 2024-06-26 DIAGNOSIS — I87.2 VENOUS INSUFFICIENCY OF BOTH LOWER EXTREMITIES: Chronic | ICD-10-CM

## 2024-06-26 DIAGNOSIS — I87.2 VENOUS STASIS ULCER OF LEFT CALF WITH FAT LAYER EXPOSED WITHOUT VARICOSE VEINS (HCC): Primary | ICD-10-CM

## 2024-06-26 PROCEDURE — 11042 DBRDMT SUBQ TIS 1ST 20SQCM/<: CPT | Performed by: SURGERY

## 2024-06-26 PROCEDURE — 11042 DBRDMT SUBQ TIS 1ST 20SQCM/<: CPT

## 2024-06-26 RX ORDER — OXYCODONE AND ACETAMINOPHEN 7.5; 325 MG/1; MG/1
1 TABLET ORAL EVERY 8 HOURS PRN
Qty: 90 TABLET | Refills: 0 | Status: SHIPPED | OUTPATIENT
Start: 2024-07-03 | End: 2024-08-02

## 2024-06-26 RX ORDER — LIDOCAINE HYDROCHLORIDE 20 MG/ML
JELLY TOPICAL ONCE
OUTPATIENT
Start: 2024-06-26 | End: 2024-06-26

## 2024-06-26 RX ORDER — CLOBETASOL PROPIONATE 0.5 MG/G
OINTMENT TOPICAL ONCE
OUTPATIENT
Start: 2024-06-26 | End: 2024-06-26

## 2024-06-26 RX ORDER — TRIAMCINOLONE ACETONIDE 1 MG/G
OINTMENT TOPICAL ONCE
OUTPATIENT
Start: 2024-06-26 | End: 2024-06-26

## 2024-06-26 RX ORDER — GENTAMICIN SULFATE 1 MG/G
OINTMENT TOPICAL ONCE
OUTPATIENT
Start: 2024-06-26 | End: 2024-06-26

## 2024-06-26 RX ORDER — BETAMETHASONE DIPROPIONATE 0.05 %
OINTMENT (GRAM) TOPICAL ONCE
OUTPATIENT
Start: 2024-06-26 | End: 2024-06-26

## 2024-06-26 RX ORDER — LIDOCAINE HYDROCHLORIDE 40 MG/ML
SOLUTION TOPICAL ONCE
OUTPATIENT
Start: 2024-06-26 | End: 2024-06-26

## 2024-06-26 RX ORDER — LIDOCAINE 50 MG/G
OINTMENT TOPICAL ONCE
OUTPATIENT
Start: 2024-06-26 | End: 2024-06-26

## 2024-06-26 RX ORDER — BACITRACIN ZINC AND POLYMYXIN B SULFATE 500; 1000 [USP'U]/G; [USP'U]/G
OINTMENT TOPICAL ONCE
OUTPATIENT
Start: 2024-06-26 | End: 2024-06-26

## 2024-06-26 RX ORDER — IBUPROFEN 200 MG
TABLET ORAL ONCE
OUTPATIENT
Start: 2024-06-26 | End: 2024-06-26

## 2024-06-26 RX ORDER — LIDOCAINE 40 MG/G
CREAM TOPICAL ONCE
OUTPATIENT
Start: 2024-06-26 | End: 2024-06-26

## 2024-06-26 RX ORDER — MORPHINE SULFATE 15 MG/1
15 TABLET, FILM COATED, EXTENDED RELEASE ORAL 2 TIMES DAILY
Qty: 60 TABLET | Refills: 0 | Status: SHIPPED | OUTPATIENT
Start: 2024-07-03 | End: 2024-08-02

## 2024-06-26 RX ORDER — GINSENG 100 MG
CAPSULE ORAL ONCE
OUTPATIENT
Start: 2024-06-26 | End: 2024-06-26

## 2024-06-26 RX ORDER — LIDOCAINE HYDROCHLORIDE 40 MG/ML
SOLUTION TOPICAL ONCE
Status: COMPLETED | OUTPATIENT
Start: 2024-06-26 | End: 2024-06-26

## 2024-06-26 RX ADMIN — LIDOCAINE HYDROCHLORIDE 5 ML: 40 SOLUTION TOPICAL at 14:14

## 2024-06-26 ASSESSMENT — PAIN DESCRIPTION - ORIENTATION: ORIENTATION: LEFT

## 2024-06-26 ASSESSMENT — PAIN DESCRIPTION - FREQUENCY: FREQUENCY: CONTINUOUS

## 2024-06-26 ASSESSMENT — PAIN DESCRIPTION - ONSET: ONSET: ON-GOING

## 2024-06-26 ASSESSMENT — PAIN SCALES - GENERAL: PAINLEVEL_OUTOF10: 7

## 2024-06-26 ASSESSMENT — PAIN DESCRIPTION - LOCATION: LOCATION: LEG

## 2024-06-26 ASSESSMENT — PAIN DESCRIPTION - PAIN TYPE: TYPE: CHRONIC PAIN

## 2024-06-26 ASSESSMENT — PAIN DESCRIPTION - DESCRIPTORS: DESCRIPTORS: BURNING;THROBBING

## 2024-06-26 NOTE — PROGRESS NOTES
cm  Improving  New wound to left lateral leg  3/8/23  Size 33 sq cm , worse  Culture done  3/15/23  Size slightly smaller 31 sq cm but wound not much improved  Acinetobacter, MRSA, pseudomonas - disc with Dr. Hammond - who will arrange for iv abx  Oarrs reviewed  MS contin 15 mg q12 #60, Perocet 7.5/325 mg q12 #60  4/19/23  Admitted with iv abx 3/2023  Was seen at [facility]  Wound improved since last seen  Oarrs reviewed  MS contin 15 mg q12 #60, Perocet 7.5/325 mg q12 #60  Referral to pain management made  per pt she never got appointment  4/26/23  Wound slightly larger  More swelling  Refusing compression wraps  5/1/23  Hospital admission  Concern for NSTEMI  5/17/23  Wound stable  Oarrs reviewed  MS contin 15 mg q12 #60, Perocet 7.5/325 mg q12 #60  5/24/23  Stable  6/7/23  Wound slightly larger  Appearance improved  6/14/23  Larger  Off lasix in creased swelling  Referral to lymphedema therapy  6/21/23  Wound worse - she didn't use dratwek - explained importance of to address increased drainage  Oarrs reviewed  MS contin 15 mg q12 #60, Perocet 7.5/325 mg q12 #60  Spoke with [facility] - they are currently behind and will get her in as soon as able  6/28/23  Lymphedema therapy left message but pt did not receive - asked to call them back  Wound stable  Emphasized importance of using dressings to pull drainage away from wound - less mascerated  7/12/23  Wound worse  Pt not using drawtek or alginate   Emphasized importance of using dressings to address drainage  Culture done  Lymphedema therapy - she needs to call back to get appointment  7/19/23  Wound improved  MSSA, Pseudomonas cx - dsc with Dr Hammond -will start doxycyline, levaquin  Scripts for pain medication, given, OARRS reviewed - pain increased will increase percocet to q8  MS contin 15 mg q12 #60, Perocet 7.5/325 mg q8 #90  7/26/23  Wound improved  Started abx 7/24 Monday - as issues with my prescriptions  8/9/2023  Leg leg ulcers, stable, patient

## 2024-07-03 ENCOUNTER — HOSPITAL ENCOUNTER (OUTPATIENT)
Dept: WOUND CARE | Age: 79
Discharge: HOME | End: 2024-07-03
Attending: SURGERY
Payer: MEDICARE

## 2024-07-03 VITALS
DIASTOLIC BLOOD PRESSURE: 68 MMHG | SYSTOLIC BLOOD PRESSURE: 130 MMHG | HEART RATE: 68 BPM | TEMPERATURE: 97 F | RESPIRATION RATE: 18 BRPM

## 2024-07-03 DIAGNOSIS — I89.0 LYMPHEDEMA: ICD-10-CM

## 2024-07-03 DIAGNOSIS — L97.222 VENOUS STASIS ULCER OF LEFT CALF WITH FAT LAYER EXPOSED WITHOUT VARICOSE VEINS (HCC): Primary | ICD-10-CM

## 2024-07-03 DIAGNOSIS — I87.2 VENOUS STASIS ULCER OF LEFT CALF WITH FAT LAYER EXPOSED WITHOUT VARICOSE VEINS (HCC): Primary | ICD-10-CM

## 2024-07-03 PROCEDURE — 99213 OFFICE O/P EST LOW 20 MIN: CPT

## 2024-07-03 PROCEDURE — 99213 OFFICE O/P EST LOW 20 MIN: CPT | Performed by: SURGERY

## 2024-07-03 RX ORDER — LIDOCAINE HYDROCHLORIDE 40 MG/ML
SOLUTION TOPICAL ONCE
OUTPATIENT
Start: 2024-07-03 | End: 2024-07-03

## 2024-07-03 RX ORDER — LIDOCAINE HYDROCHLORIDE 20 MG/ML
JELLY TOPICAL ONCE
OUTPATIENT
Start: 2024-07-03 | End: 2024-07-03

## 2024-07-03 RX ORDER — BETAMETHASONE DIPROPIONATE 0.05 %
OINTMENT (GRAM) TOPICAL ONCE
OUTPATIENT
Start: 2024-07-03 | End: 2024-07-03

## 2024-07-03 RX ORDER — GINSENG 100 MG
CAPSULE ORAL ONCE
OUTPATIENT
Start: 2024-07-03 | End: 2024-07-03

## 2024-07-03 RX ORDER — LIDOCAINE 40 MG/G
CREAM TOPICAL ONCE
OUTPATIENT
Start: 2024-07-03 | End: 2024-07-03

## 2024-07-03 RX ORDER — LIDOCAINE 50 MG/G
OINTMENT TOPICAL ONCE
OUTPATIENT
Start: 2024-07-03 | End: 2024-07-03

## 2024-07-03 RX ORDER — CLOBETASOL PROPIONATE 0.5 MG/G
OINTMENT TOPICAL ONCE
OUTPATIENT
Start: 2024-07-03 | End: 2024-07-03

## 2024-07-03 RX ORDER — GENTAMICIN SULFATE 1 MG/G
OINTMENT TOPICAL ONCE
OUTPATIENT
Start: 2024-07-03 | End: 2024-07-03

## 2024-07-03 RX ORDER — IBUPROFEN 200 MG
TABLET ORAL ONCE
OUTPATIENT
Start: 2024-07-03 | End: 2024-07-03

## 2024-07-03 RX ORDER — BACITRACIN ZINC AND POLYMYXIN B SULFATE 500; 1000 [USP'U]/G; [USP'U]/G
OINTMENT TOPICAL ONCE
OUTPATIENT
Start: 2024-07-03 | End: 2024-07-03

## 2024-07-03 RX ORDER — TRIAMCINOLONE ACETONIDE 1 MG/G
OINTMENT TOPICAL ONCE
OUTPATIENT
Start: 2024-07-03 | End: 2024-07-03

## 2024-07-03 RX ORDER — LIDOCAINE HYDROCHLORIDE 40 MG/ML
SOLUTION TOPICAL ONCE
Status: COMPLETED | OUTPATIENT
Start: 2024-07-03 | End: 2024-07-03

## 2024-07-03 RX ADMIN — LIDOCAINE HYDROCHLORIDE 5 ML: 40 SOLUTION TOPICAL at 13:35

## 2024-07-03 ASSESSMENT — PAIN SCALES - GENERAL: PAINLEVEL_OUTOF10: 7

## 2024-07-03 ASSESSMENT — PAIN DESCRIPTION - LOCATION: LOCATION: LEG

## 2024-07-03 ASSESSMENT — PAIN DESCRIPTION - ORIENTATION: ORIENTATION: LEFT

## 2024-07-03 NOTE — PLAN OF CARE
Problem: Chronic Conditions and Co-morbidities  Goal: Patient's chronic conditions and co-morbidity symptoms are monitored and maintained or improved  Outcome: Progressing     Problem: Pain  Goal: Verbalizes/displays adequate comfort level or baseline comfort level  Outcome: Progressing     Problem: Cognitive:  Goal: Knowledge of wound care  Description: Knowledge of wound care  7/3/2024 1508 by Rosalinda Adams RN  Outcome: Progressing  7/3/2024 1415 by Rosalinda Adams RN  Outcome: Progressing  Goal: Understands risk factors for wounds  Description: Understands risk factors for wounds  Outcome: Progressing     Problem: Wound:  Goal: Will show signs of wound healing; wound closure and no evidence of infection  Description: Will show signs of wound healing; wound closure and no evidence of infection  7/3/2024 1508 by Rosalinda Adams RN  Outcome: Progressing  7/3/2024 1415 by Rosalinda Adams RN  Outcome: Progressing     Problem: Venous:  Goal: Signs of wound healing will improve  Description: Signs of wound healing will improve  7/3/2024 1508 by Rosalinda Adams RN  Outcome: Progressing  7/3/2024 1415 by Rosalinda Adams RN  Outcome: Progressing

## 2024-07-03 NOTE — PROGRESS NOTES
(cm^3) 121.104 cm^3 07/03/24 1331   Wound Healing % -[id number] 07/03/24 1331   Post-Procedure Length (cm) 11.9 cm 06/26/24 1447   Post-Procedure Width (cm) 12.5 cm 06/26/24 1447   Post-Procedure Depth (cm) 0.9 cm 06/26/24 1447   Post-Procedure Surface Area (cm^2) 148.75 cm^2 06/26/24 1447   Post-Procedure Volume (cm^3) 133.875 cm^3 06/26/24 1447   Wound Assessment Fibrin;Granulation tissue;Pink/red 07/03/24 1331   Drainage Amount Copious (>75 % saturated) 07/03/24 1331   Drainage Description Thick;Serosanguinous;Yellow 07/03/24 1331   Odor None 07/03/24 1331   Kori-wound Assessment Maceration 07/03/24 1331   Margins Attached edges 07/03/24 1331   Wound Thickness Description not for Pressure Injury Full thickness 07/03/24 1331   Number of days: 490        Refused debridement    Plan:   Treatment Note please see attached Discharge Instructions    Written patient dismissal instructions given to patient and signed by patient or POA.         Discharge Instructions         Visit Discharge/Physician Orders     Discharge condition: Stable     Assessment of pain at discharge: mild     Anesthetic used: lido 4%     Discharge to: Home     Left via:Private automobile     Accompanied by: self     ECF/HHA: Akeso       Dressing Orders:  LEFT PRETIB : Cleanse with normal saline, cover with calcium alginate, and ABD pad, apply Profore Lite. Change M, W([facility]), F     *If Profore Lite is removed, please apply calcium alginate, dry dressing and secure. Change twice a day.*     In clinic calcium alginate ,dry dressing and spandigrip today     Treatment Orders: Eat a diet high in protein and vitamin C. Take a multiple vitamin daily unless contraindicated.       Elevate as much as possible     Consult with Infectious Disease      [facility] followup visit:    1 week Dr. HATFIELD  ____________________________  (Please note your next appointment above and if you are unable to keep, kindly give a 24 hour notice. Thank you.)     Physician

## 2024-07-08 NOTE — DISCHARGE INSTRUCTIONS
Visit Discharge/Physician Orders     Discharge condition: Stable     Assessment of pain at discharge: mild     Anesthetic used: lido 4%     Discharge to: Home     Left via:Private automobile     Accompanied by: self     ECF/HHA: [facility]       Dressing Orders:  LEFT PRETIB : Cleanse with normal saline, cover with calcium alginate, and ABD pad, apply Profore Lite. Change M, W([facility]), F     *If Profore Lite is removed, please apply calcium alginate, dry dressing and secure. Change twice a day.*      In clinic calcium alginate ,dry dressing and spandigrip today     Treatment Orders: Eat a diet high in protein and vitamin C. Take a multiple vitamin daily unless contraindicated.        Elevate as much as possible     Consult with Infectious Disease      [facility] followup visit:    1 week Dr. HATFIELD  ____________________________  (Please note your next appointment above and if you are unable to keep, kindly give a 24 hour notice. Thank you.)     Physician signature:__________________________      If you experience any of the following, please call the Wound Care Center during business hours:     * Increase in Pain  * Temperature over 101  * Increase in drainage from your wound  * Drainage with a foul odor  * Bleeding  * Increase in swelling  * Need for compression bandage changes due to slippage, breakthrough drainage.     If you need medical attention outside of the business hours of the Wound Care Centers please contact your PCP or go to the nearest emergency room.

## 2024-07-10 ENCOUNTER — HOSPITAL ENCOUNTER (OUTPATIENT)
Dept: WOUND CARE | Age: 79
Discharge: HOME | End: 2024-07-10
Attending: SURGERY

## 2024-07-12 NOTE — DISCHARGE INSTRUCTIONS
Visit Discharge/Physician Orders     Discharge condition: Stable     Assessment of pain at discharge: mild     Anesthetic used: lido 4%     Discharge to: Home     Left via:Private automobile     Accompanied by: self     ECF/HHA: [facility]       Dressing Orders:  LEFT PRETIB : Cleanse with normal saline, cover with calcium alginate, and ABD pad, apply Profore Lite. Change M, W([facility]), F     *If Profore Lite is removed, please apply calcium alginate, dry dressing and secure. Change twice a day.*       Treatment Orders: Eat a diet high in protein and vitamin C. Take a multiple vitamin daily unless contraindicated.      Elevate as much as possible     Consult with Infectious Disease      [facility] followup visit: 1 week Dr. HATFIELD  ____________________________  (Please note your next appointment above and if you are unable to keep, kindly give a 24 hour notice. Thank you.)     Physician signature:__________________________      If you experience any of the following, please call the Wound Care Center during business hours:     * Increase in Pain  * Temperature over 101  * Increase in drainage from your wound  * Drainage with a foul odor  * Bleeding  * Increase in swelling  * Need for compression bandage changes due to slippage, breakthrough drainage.     If you need medical attention outside of the business hours of the Wound Care Centers please contact your PCP or go to the nearest emergency room.

## 2024-07-17 ENCOUNTER — HOSPITAL ENCOUNTER (OUTPATIENT)
Dept: WOUND CARE | Age: 79
Discharge: HOME | End: 2024-07-17
Attending: SURGERY
Payer: MEDICARE

## 2024-07-17 VITALS
TEMPERATURE: 95 F | SYSTOLIC BLOOD PRESSURE: 123 MMHG | RESPIRATION RATE: 18 BRPM | WEIGHT: 243 LBS | DIASTOLIC BLOOD PRESSURE: 52 MMHG | HEART RATE: 80 BPM | BODY MASS INDEX: 38.14 KG/M2 | HEIGHT: 67 IN

## 2024-07-17 DIAGNOSIS — I87.2 VENOUS STASIS ULCER OF LEFT CALF WITH FAT LAYER EXPOSED WITHOUT VARICOSE VEINS (HCC): Primary | ICD-10-CM

## 2024-07-17 DIAGNOSIS — L97.222 VENOUS STASIS ULCER OF LEFT CALF WITH FAT LAYER EXPOSED WITHOUT VARICOSE VEINS (HCC): Primary | ICD-10-CM

## 2024-07-17 PROCEDURE — 11042 DBRDMT SUBQ TIS 1ST 20SQCM/<: CPT

## 2024-07-17 PROCEDURE — 11042 DBRDMT SUBQ TIS 1ST 20SQCM/<: CPT | Performed by: SURGERY

## 2024-07-17 RX ORDER — LIDOCAINE HYDROCHLORIDE 20 MG/ML
JELLY TOPICAL ONCE
OUTPATIENT
Start: 2024-07-17 | End: 2024-07-17

## 2024-07-17 RX ORDER — BACITRACIN ZINC AND POLYMYXIN B SULFATE 500; 1000 [USP'U]/G; [USP'U]/G
OINTMENT TOPICAL ONCE
OUTPATIENT
Start: 2024-07-17 | End: 2024-07-17

## 2024-07-17 RX ORDER — GINSENG 100 MG
CAPSULE ORAL ONCE
OUTPATIENT
Start: 2024-07-17 | End: 2024-07-17

## 2024-07-17 RX ORDER — IBUPROFEN 200 MG
TABLET ORAL ONCE
OUTPATIENT
Start: 2024-07-17 | End: 2024-07-17

## 2024-07-17 RX ORDER — BETAMETHASONE DIPROPIONATE 0.05 %
OINTMENT (GRAM) TOPICAL ONCE
OUTPATIENT
Start: 2024-07-17 | End: 2024-07-17

## 2024-07-17 RX ORDER — GABAPENTIN 300 MG/1
300 CAPSULE ORAL 3 TIMES DAILY
Qty: 90 CAPSULE | Refills: 3 | Status: SHIPPED | OUTPATIENT
Start: 2024-07-17 | End: 2024-11-14

## 2024-07-17 RX ORDER — LIDOCAINE HYDROCHLORIDE 40 MG/ML
SOLUTION TOPICAL ONCE
OUTPATIENT
Start: 2024-07-17 | End: 2024-07-17

## 2024-07-17 RX ORDER — TRIAMCINOLONE ACETONIDE 1 MG/G
OINTMENT TOPICAL ONCE
OUTPATIENT
Start: 2024-07-17 | End: 2024-07-17

## 2024-07-17 RX ORDER — CLOBETASOL PROPIONATE 0.5 MG/G
OINTMENT TOPICAL ONCE
OUTPATIENT
Start: 2024-07-17 | End: 2024-07-17

## 2024-07-17 RX ORDER — GENTAMICIN SULFATE 1 MG/G
OINTMENT TOPICAL ONCE
OUTPATIENT
Start: 2024-07-17 | End: 2024-07-17

## 2024-07-17 RX ORDER — LIDOCAINE HYDROCHLORIDE 40 MG/ML
SOLUTION TOPICAL ONCE
Status: DISCONTINUED | OUTPATIENT
Start: 2024-07-17 | End: 2024-07-18 | Stop reason: HOSPADM

## 2024-07-17 RX ORDER — LIDOCAINE 50 MG/G
OINTMENT TOPICAL ONCE
OUTPATIENT
Start: 2024-07-17 | End: 2024-07-17

## 2024-07-17 RX ORDER — LIDOCAINE 40 MG/G
CREAM TOPICAL ONCE
OUTPATIENT
Start: 2024-07-17 | End: 2024-07-17

## 2024-07-17 ASSESSMENT — PAIN DESCRIPTION - DESCRIPTORS: DESCRIPTORS: THROBBING;BURNING

## 2024-07-17 ASSESSMENT — PAIN DESCRIPTION - ONSET: ONSET: ON-GOING

## 2024-07-17 ASSESSMENT — PAIN DESCRIPTION - PAIN TYPE: TYPE: CHRONIC PAIN

## 2024-07-17 ASSESSMENT — PAIN - FUNCTIONAL ASSESSMENT: PAIN_FUNCTIONAL_ASSESSMENT: PREVENTS OR INTERFERES SOME ACTIVE ACTIVITIES AND ADLS

## 2024-07-17 ASSESSMENT — PAIN DESCRIPTION - FREQUENCY: FREQUENCY: CONTINUOUS

## 2024-07-17 ASSESSMENT — PAIN DESCRIPTION - ORIENTATION: ORIENTATION: LEFT

## 2024-07-17 ASSESSMENT — PAIN DESCRIPTION - LOCATION: LOCATION: LEG

## 2024-07-17 NOTE — PLAN OF CARE
Problem: Chronic Conditions and Co-morbidities  Goal: Patient's chronic conditions and co-morbidity symptoms are monitored and maintained or improved  Outcome: Progressing     Problem: Pain  Goal: Verbalizes/displays adequate comfort level or baseline comfort level  Outcome: Progressing     Problem: Wound:  Goal: Will show signs of wound healing; wound closure and no evidence of infection  Description: Will show signs of wound healing; wound closure and no evidence of infection  Outcome: Progressing     Problem: Venous:  Goal: Signs of wound healing will improve  Description: Signs of wound healing will improve  Outcome: Adequate for Discharge     Problem: Compression therapy:  Goal: Will be free from complications associated with compression therapy  Description: Will be free from complications associated with compression therapy  Outcome: Progressing     Problem: Cognitive:  Goal: Knowledge of wound care  Description: Knowledge of wound care  Outcome: Progressing  Goal: Understands risk factors for wounds  Description: Understands risk factors for wounds  Outcome: Progressing

## 2024-07-17 NOTE — PROGRESS NOTES
Wound Healing Center Followup Visit Note    Referring Physician : Tamie Santizo MD  Anastacia Morel  MEDICAL RECORD NUMBER:  [id number]  AGE: 79 y.o.   GENDER: female  : 1945  EPISODE DATE:  2024    Subjective:     Chief Complaint   Patient presents with    Wound Check     Left leg       HISTORY of PRESENT ILLNESS HPI   Anastacia Morel is a 79 y.o. female who presents today in regards to follow up evaluation and treatment of wound/ulcer.  That patient's past medical, family and social hx were reviewed and changes were made if present.    History of Wound Context:  Patient has had left calf wound since ~ 2021.  She has been putting a dressing on it.  The wound has not been improving.  She has a hx significant for venous stasis ulcerations of bilateral LE.  She first was seen by myself in regards to these issues 2015.  She eventually healed these wounds 2016.     I last saw her 2021 at which time I recommended lymphedema therapy.  She states she went and said it caused her to much pain and stopped going.  She has chronic issues with bilateral calf pain, swelling and edema.            She admits to not wearing her stockings because of the pain.    She has used knee high 20-30 mm hg stockings in the past.       She is still seeing pain management and is down to percocet /325 mg daily.       21  aquacell  Double tubigrip  Culture done  Emphasized importance of getting in to more significant compression in the future  12/15/21  Culture reviewed - light growth, no tx  Wound slightly improved  Still significant drainage  Plan on compression wrap next week  21  Stable wound  Drainage slightly better  Pt would like to wait till next week because of holidays to start wrap  22  Wound larger  Profore  22  Wound appearance better  Refusing wrap - it rolled down last week and she cut off after 3 days  22  Wound appearance better  Still refusing wrap   3/2/22  periwound

## 2024-07-23 NOTE — DISCHARGE INSTRUCTIONS
Visit Discharge/Physician Orders     Discharge condition: Stable     Assessment of pain at discharge: mild     Anesthetic used: lido 4%     Discharge to: Home     Left via:Private automobile     Accompanied by: self     ECF/HHA: [facility]       Dressing Orders:  LEFT PRETIB : Cleanse with normal saline, cover with calcium alginate, and ABD pad, apply Profore Lite. Change M, W([facility]), F     *If Profore Lite is removed, please apply calcium alginate, dry dressing and secure. Change twice a day.*       Treatment Orders: Eat a diet high in protein and vitamin C. Take a multiple vitamin daily unless contraindicated.      Elevate as much as possible     Consult with Infectious Disease      [facility] followup visit: 1 week Dr. HATFIELD  ____________________________  (Please note your next appointment above and if you are unable to keep, kindly give a 24 hour notice. Thank you.)     Physician signature:__________________________      If you experience any of the following, please call the Wound Care Center during business hours:     * Increase in Pain  * Temperature over 101  * Increase in drainage from your wound  * Drainage with a foul odor  * Bleeding  * Increase in swelling  * Need for compression bandage changes due to slippage, breakthrough drainage.     If you need medical attention outside of the business hours of the Wound Care Centers please contact your PCP or go to the nearest emergency room.             
posterior

## 2024-07-24 ENCOUNTER — HOSPITAL ENCOUNTER (OUTPATIENT)
Dept: WOUND CARE | Age: 79
Discharge: HOME | End: 2024-07-24
Attending: SURGERY

## 2024-07-30 NOTE — DISCHARGE INSTRUCTIONS
Visit Discharge/Physician Orders     Discharge condition: Stable     Assessment of pain at discharge: mild     Anesthetic used: lido 4%     Discharge to: Home     Left via:Private automobile     Accompanied by: self     ECF/HHA: [facility]       Dressing Orders:  LEFT PRETIB : Cleanse with normal saline, cover with calcium alginate, and ABD pad, dry dressing. Change daily        Treatment Orders: Eat a diet high in protein and vitamin C. Take a multiple vitamin daily unless contraindicated.      Elevate as much as possible     Consult with Infectious Disease      [facility] followup visit: 1 week Dr. HATFIELD  ____________________________  (Please note your next appointment above and if you are unable to keep, kindly give a 24 hour notice. Thank you.)     Physician signature:__________________________      If you experience any of the following, please call the Wound Care Center during business hours:     * Increase in Pain  * Temperature over 101  * Increase in drainage from your wound  * Drainage with a foul odor  * Bleeding  * Increase in swelling  * Need for compression bandage changes due to slippage, breakthrough drainage.     If you need medical attention outside of the business hours of the Wound Care Centers please contact your PCP or go to the nearest emergency room.

## 2024-07-31 ENCOUNTER — HOSPITAL ENCOUNTER (OUTPATIENT)
Dept: WOUND CARE | Age: 79
Discharge: HOME | End: 2024-07-31
Attending: SURGERY
Payer: MEDICARE

## 2024-07-31 VITALS
TEMPERATURE: 97.1 F | RESPIRATION RATE: 18 BRPM | HEART RATE: 90 BPM | WEIGHT: 243 LBS | HEIGHT: 67 IN | BODY MASS INDEX: 38.14 KG/M2 | SYSTOLIC BLOOD PRESSURE: 148 MMHG | DIASTOLIC BLOOD PRESSURE: 75 MMHG

## 2024-07-31 DIAGNOSIS — I87.2 VENOUS STASIS ULCER OF LEFT CALF WITH FAT LAYER EXPOSED WITHOUT VARICOSE VEINS (HCC): Primary | ICD-10-CM

## 2024-07-31 DIAGNOSIS — L97.222 VENOUS STASIS ULCER OF LEFT CALF WITH FAT LAYER EXPOSED WITHOUT VARICOSE VEINS (HCC): Primary | ICD-10-CM

## 2024-07-31 PROCEDURE — 11045 DBRDMT SUBQ TISS EACH ADDL: CPT | Performed by: SURGERY

## 2024-07-31 PROCEDURE — 11045 DBRDMT SUBQ TISS EACH ADDL: CPT

## 2024-07-31 PROCEDURE — 11042 DBRDMT SUBQ TIS 1ST 20SQCM/<: CPT

## 2024-07-31 PROCEDURE — 11042 DBRDMT SUBQ TIS 1ST 20SQCM/<: CPT | Performed by: SURGERY

## 2024-07-31 RX ORDER — LIDOCAINE HYDROCHLORIDE 40 MG/ML
SOLUTION TOPICAL ONCE
OUTPATIENT
Start: 2024-07-31 | End: 2024-07-31

## 2024-07-31 RX ORDER — GENTAMICIN SULFATE 1 MG/G
OINTMENT TOPICAL ONCE
OUTPATIENT
Start: 2024-07-31 | End: 2024-07-31

## 2024-07-31 RX ORDER — OXYCODONE AND ACETAMINOPHEN 7.5; 325 MG/1; MG/1
1 TABLET ORAL EVERY 8 HOURS PRN
Qty: 90 TABLET | Refills: 0 | Status: SHIPPED | OUTPATIENT
Start: 2024-07-31 | End: 2024-08-30

## 2024-07-31 RX ORDER — BETAMETHASONE DIPROPIONATE 0.05 %
OINTMENT (GRAM) TOPICAL ONCE
OUTPATIENT
Start: 2024-07-31 | End: 2024-07-31

## 2024-07-31 RX ORDER — MORPHINE SULFATE 15 MG/1
15 TABLET, FILM COATED, EXTENDED RELEASE ORAL 2 TIMES DAILY
Qty: 60 TABLET | Refills: 0 | Status: SHIPPED | OUTPATIENT
Start: 2024-07-31 | End: 2024-08-30

## 2024-07-31 RX ORDER — BACITRACIN ZINC AND POLYMYXIN B SULFATE 500; 1000 [USP'U]/G; [USP'U]/G
OINTMENT TOPICAL ONCE
OUTPATIENT
Start: 2024-07-31 | End: 2024-07-31

## 2024-07-31 RX ORDER — LIDOCAINE 40 MG/G
CREAM TOPICAL ONCE
OUTPATIENT
Start: 2024-07-31 | End: 2024-07-31

## 2024-07-31 RX ORDER — LIDOCAINE HYDROCHLORIDE 20 MG/ML
JELLY TOPICAL ONCE
OUTPATIENT
Start: 2024-07-31 | End: 2024-07-31

## 2024-07-31 RX ORDER — LIDOCAINE HYDROCHLORIDE 40 MG/ML
SOLUTION TOPICAL ONCE
Status: COMPLETED | OUTPATIENT
Start: 2024-07-31 | End: 2024-07-31

## 2024-07-31 RX ORDER — IBUPROFEN 200 MG
TABLET ORAL ONCE
OUTPATIENT
Start: 2024-07-31 | End: 2024-07-31

## 2024-07-31 RX ORDER — TRIAMCINOLONE ACETONIDE 1 MG/G
OINTMENT TOPICAL ONCE
OUTPATIENT
Start: 2024-07-31 | End: 2024-07-31

## 2024-07-31 RX ORDER — LIDOCAINE 50 MG/G
OINTMENT TOPICAL ONCE
OUTPATIENT
Start: 2024-07-31 | End: 2024-07-31

## 2024-07-31 RX ORDER — GINSENG 100 MG
CAPSULE ORAL ONCE
OUTPATIENT
Start: 2024-07-31 | End: 2024-07-31

## 2024-07-31 RX ORDER — CLOBETASOL PROPIONATE 0.5 MG/G
OINTMENT TOPICAL ONCE
OUTPATIENT
Start: 2024-07-31 | End: 2024-07-31

## 2024-07-31 RX ADMIN — LIDOCAINE HYDROCHLORIDE 20 ML: 40 SOLUTION TOPICAL at 14:51

## 2024-07-31 ASSESSMENT — PAIN DESCRIPTION - FREQUENCY: FREQUENCY: CONTINUOUS

## 2024-07-31 ASSESSMENT — PAIN DESCRIPTION - LOCATION: LOCATION: LEG

## 2024-07-31 ASSESSMENT — PAIN SCALES - GENERAL: PAINLEVEL_OUTOF10: 6

## 2024-07-31 ASSESSMENT — PAIN DESCRIPTION - ORIENTATION: ORIENTATION: LEFT

## 2024-07-31 ASSESSMENT — PAIN DESCRIPTION - DESCRIPTORS: DESCRIPTORS: BURNING;THROBBING

## 2024-07-31 ASSESSMENT — PAIN - FUNCTIONAL ASSESSMENT: PAIN_FUNCTIONAL_ASSESSMENT: PREVENTS OR INTERFERES SOME ACTIVE ACTIVITIES AND ADLS

## 2024-07-31 ASSESSMENT — PAIN DESCRIPTION - PAIN TYPE: TYPE: CHRONIC PAIN

## 2024-07-31 ASSESSMENT — PAIN DESCRIPTION - ONSET: ONSET: ON-GOING

## 2024-08-07 ENCOUNTER — HOSPITAL ENCOUNTER (OUTPATIENT)
Dept: WOUND CARE | Age: 79
Discharge: HOME | End: 2024-08-07
Attending: SURGERY

## 2024-08-13 NOTE — DISCHARGE INSTRUCTIONS
Visit Discharge/Physician Orders     Discharge condition: Stable     Assessment of pain at discharge: mild     Anesthetic used: lido 4%     Discharge to: Home     Left via:Private automobile     Accompanied by: self     ECF/HHA: [facility]       Dressing Orders:  LEFT PRETIB : Cleanse with normal saline, cover with calcium alginate, and ABD pad, dry dressing and secure, and Spandagrip. Change daily        Treatment Orders: Eat a diet high in protein and vitamin C. Take a multiple vitamin daily unless contraindicated.      Elevate as much as possible    [facility] followup visit: 1 week Dr. HATFIELD  ____________________________  (Please note your next appointment above and if you are unable to keep, kindly give a 24 hour notice. Thank you.)     Physician signature:__________________________      If you experience any of the following, please call the Wound Care Center during business hours:     * Increase in Pain  * Temperature over 101  * Increase in drainage from your wound  * Drainage with a foul odor  * Bleeding  * Increase in swelling  * Need for compression bandage changes due to slippage, breakthrough drainage.     If you need medical attention outside of the business hours of the Wound Care Centers please contact your PCP or go to the nearest emergency room.

## 2024-08-14 ENCOUNTER — HOSPITAL ENCOUNTER (OUTPATIENT)
Dept: WOUND CARE | Age: 79
Discharge: HOME | End: 2024-08-14
Attending: SURGERY
Payer: MEDICARE

## 2024-08-14 VITALS
BODY MASS INDEX: 36.1 KG/M2 | HEART RATE: 88 BPM | TEMPERATURE: 97.4 F | DIASTOLIC BLOOD PRESSURE: 76 MMHG | HEIGHT: 67 IN | SYSTOLIC BLOOD PRESSURE: 142 MMHG | RESPIRATION RATE: 18 BRPM | WEIGHT: 230 LBS

## 2024-08-14 DIAGNOSIS — I89.0 LYMPHEDEMA: ICD-10-CM

## 2024-08-14 DIAGNOSIS — I87.2 VENOUS STASIS ULCER OF LEFT CALF WITH FAT LAYER EXPOSED WITHOUT VARICOSE VEINS (HCC): Primary | ICD-10-CM

## 2024-08-14 DIAGNOSIS — L97.222 VENOUS STASIS ULCER OF LEFT CALF WITH FAT LAYER EXPOSED WITHOUT VARICOSE VEINS (HCC): Primary | ICD-10-CM

## 2024-08-14 PROCEDURE — 11045 DBRDMT SUBQ TISS EACH ADDL: CPT

## 2024-08-14 PROCEDURE — 11045 DBRDMT SUBQ TISS EACH ADDL: CPT | Performed by: SURGERY

## 2024-08-14 PROCEDURE — 11042 DBRDMT SUBQ TIS 1ST 20SQCM/<: CPT | Performed by: SURGERY

## 2024-08-14 PROCEDURE — 11042 DBRDMT SUBQ TIS 1ST 20SQCM/<: CPT

## 2024-08-14 RX ORDER — LIDOCAINE HYDROCHLORIDE 20 MG/ML
JELLY TOPICAL ONCE
OUTPATIENT
Start: 2024-08-14 | End: 2024-08-14

## 2024-08-14 RX ORDER — GINSENG 100 MG
CAPSULE ORAL ONCE
OUTPATIENT
Start: 2024-08-14 | End: 2024-08-14

## 2024-08-14 RX ORDER — GENTAMICIN SULFATE 1 MG/G
OINTMENT TOPICAL ONCE
OUTPATIENT
Start: 2024-08-14 | End: 2024-08-14

## 2024-08-14 RX ORDER — LIDOCAINE 50 MG/G
OINTMENT TOPICAL ONCE
OUTPATIENT
Start: 2024-08-14 | End: 2024-08-14

## 2024-08-14 RX ORDER — LIDOCAINE 40 MG/G
CREAM TOPICAL ONCE
OUTPATIENT
Start: 2024-08-14 | End: 2024-08-14

## 2024-08-14 RX ORDER — IBUPROFEN 200 MG
TABLET ORAL ONCE
OUTPATIENT
Start: 2024-08-14 | End: 2024-08-14

## 2024-08-14 RX ORDER — BACITRACIN ZINC AND POLYMYXIN B SULFATE 500; 1000 [USP'U]/G; [USP'U]/G
OINTMENT TOPICAL ONCE
OUTPATIENT
Start: 2024-08-14 | End: 2024-08-14

## 2024-08-14 RX ORDER — BETAMETHASONE DIPROPIONATE 0.05 %
OINTMENT (GRAM) TOPICAL ONCE
OUTPATIENT
Start: 2024-08-14 | End: 2024-08-14

## 2024-08-14 RX ORDER — LIDOCAINE HYDROCHLORIDE 40 MG/ML
SOLUTION TOPICAL ONCE
OUTPATIENT
Start: 2024-08-14 | End: 2024-08-14

## 2024-08-14 RX ORDER — CLOBETASOL PROPIONATE 0.5 MG/G
OINTMENT TOPICAL ONCE
OUTPATIENT
Start: 2024-08-14 | End: 2024-08-14

## 2024-08-14 RX ORDER — TRIAMCINOLONE ACETONIDE 1 MG/G
OINTMENT TOPICAL ONCE
OUTPATIENT
Start: 2024-08-14 | End: 2024-08-14

## 2024-08-14 RX ORDER — LIDOCAINE HYDROCHLORIDE 40 MG/ML
SOLUTION TOPICAL ONCE
Status: COMPLETED | OUTPATIENT
Start: 2024-08-14 | End: 2024-08-14

## 2024-08-14 RX ADMIN — LIDOCAINE HYDROCHLORIDE 30 ML: 40 SOLUTION TOPICAL at 14:45

## 2024-08-14 ASSESSMENT — PAIN DESCRIPTION - LOCATION: LOCATION: LEG

## 2024-08-14 ASSESSMENT — PAIN DESCRIPTION - DESCRIPTORS: DESCRIPTORS: BURNING;THROBBING

## 2024-08-14 ASSESSMENT — PAIN DESCRIPTION - FREQUENCY: FREQUENCY: CONTINUOUS

## 2024-08-14 ASSESSMENT — PAIN DESCRIPTION - ORIENTATION: ORIENTATION: LEFT

## 2024-08-14 ASSESSMENT — PAIN DESCRIPTION - PROGRESSION: CLINICAL_PROGRESSION: NOT CHANGED

## 2024-08-14 ASSESSMENT — PAIN DESCRIPTION - PAIN TYPE: TYPE: CHRONIC PAIN

## 2024-08-14 ASSESSMENT — PAIN DESCRIPTION - ONSET: ONSET: ON-GOING

## 2024-08-14 ASSESSMENT — PAIN - FUNCTIONAL ASSESSMENT: PAIN_FUNCTIONAL_ASSESSMENT: PREVENTS OR INTERFERES SOME ACTIVE ACTIVITIES AND ADLS

## 2024-08-14 NOTE — PROGRESS NOTES
Wound Healing Center Followup Visit Note    Referring Physician : Tamie Santizo MD  Anastacia Morel  MEDICAL RECORD NUMBER:  [id number]  AGE: 79 y.o.   GENDER: female  : 1945  EPISODE DATE:  2024    Subjective:     Chief Complaint   Patient presents with    Wound Check     Left lower leg      HISTORY of PRESENT ILLNESS HPI   Anastacia Morel is a 79 y.o. female who presents today in regards to follow up evaluation and treatment of wound/ulcer.  That patient's past medical, family and social hx were reviewed and changes were made if present.    History of Wound Context:  Patient has had left calf wound since ~ 2021.  She has been putting a dressing on it.  The wound has not been improving.  She has a hx significant for venous stasis ulcerations of bilateral LE.  She first was seen by myself in regards to these issues 2015.  She eventually healed these wounds 2016.     I last saw her 2021 at which time I recommended lymphedema therapy.  She states she went and said it caused her to much pain and stopped going.  She has chronic issues with bilateral calf pain, swelling and edema.            She admits to not wearing her stockings because of the pain.    She has used knee high 20-30 mm hg stockings in the past.       She is still seeing pain management and is down to percocet /325 mg daily.       21  aquacell  Double tubigrip  Culture done  Emphasized importance of getting in to more significant compression in the future  12/15/21  Culture reviewed - light growth, no tx  Wound slightly improved  Still significant drainage  Plan on compression wrap next week  21  Stable wound  Drainage slightly better  Pt would like to wait till next week because of holidays to start wrap  22  Wound larger  Profore  22  Wound appearance better  Refusing wrap - it rolled down last week and she cut off after 3 days  22  Wound appearance better  Still refusing wrap

## 2024-08-14 NOTE — PLAN OF CARE
Problem: Wound:  Goal: Will show signs of wound healing; wound closure and no evidence of infection  Description: Will show signs of wound healing; wound closure and no evidence of infection  Outcome: Progressing     Problem: Venous:  Goal: Signs of wound healing will improve  Description: Signs of wound healing will improve  Outcome: Progressing     Problem: Compression therapy:  Goal: Will be free from complications associated with compression therapy  Description: Will be free from complications associated with compression therapy  Outcome: Progressing     Problem: Cognitive:  Goal: Knowledge of wound care  Description: Knowledge of wound care  Outcome: Progressing  Goal: Understands risk factors for wounds  Description: Understands risk factors for wounds  Outcome: Progressing

## 2024-08-19 NOTE — DISCHARGE INSTRUCTIONS
Visit Discharge/Physician Orders     Discharge condition: Stable     Assessment of pain at discharge: mild     Anesthetic used: lido 4%     Discharge to: Home     Left via:Private automobile     Accompanied by: self     ECF/HHA: [facility]      Dressing Orders:  LEFT PRETIB : Cleanse with normal saline, cover with calcium alginate, and ABD pad, dry dressing and secure, and Spandagrip. Change daily        Treatment Orders: Eat a diet high in protein and vitamin C. Take a multiple vitamin daily unless contraindicated.      Elevate as much as possible     C followup visit: 1 week Dr. HATFIELD  ____________________________  (Please note your next appointment above and if you are unable to keep, kindly give a 24 hour notice. Thank you.)     Physician signature:__________________________      If you experience any of the following, please call the Wound Care Center during business hours:     * Increase in Pain  * Temperature over 101  * Increase in drainage from your wound  * Drainage with a foul odor  * Bleeding  * Increase in swelling  * Need for compression bandage changes due to slippage, breakthrough drainage.     If you need medical attention outside of the business hours of the Wound Care Centers please contact your PCP or go to the nearest emergency room.

## 2024-08-21 ENCOUNTER — HOSPITAL ENCOUNTER (OUTPATIENT)
Dept: WOUND CARE | Age: 79
Discharge: HOME | End: 2024-08-21
Attending: SURGERY
Payer: MEDICARE

## 2024-08-21 VITALS
TEMPERATURE: 97.8 F | DIASTOLIC BLOOD PRESSURE: 84 MMHG | HEART RATE: 74 BPM | WEIGHT: 230 LBS | BODY MASS INDEX: 36.1 KG/M2 | SYSTOLIC BLOOD PRESSURE: 136 MMHG | HEIGHT: 67 IN

## 2024-08-21 DIAGNOSIS — I87.2 VENOUS STASIS ULCER OF LEFT CALF WITH FAT LAYER EXPOSED WITHOUT VARICOSE VEINS (HCC): Primary | ICD-10-CM

## 2024-08-21 DIAGNOSIS — L97.222 VENOUS STASIS ULCER OF LEFT CALF WITH FAT LAYER EXPOSED WITHOUT VARICOSE VEINS (HCC): Primary | ICD-10-CM

## 2024-08-21 DIAGNOSIS — I89.0 LYMPHEDEMA: ICD-10-CM

## 2024-08-21 PROCEDURE — 11045 DBRDMT SUBQ TISS EACH ADDL: CPT

## 2024-08-21 PROCEDURE — 11042 DBRDMT SUBQ TIS 1ST 20SQCM/<: CPT | Performed by: SURGERY

## 2024-08-21 PROCEDURE — 11045 DBRDMT SUBQ TISS EACH ADDL: CPT | Performed by: SURGERY

## 2024-08-21 PROCEDURE — 11042 DBRDMT SUBQ TIS 1ST 20SQCM/<: CPT

## 2024-08-21 RX ORDER — CLOBETASOL PROPIONATE 0.5 MG/G
OINTMENT TOPICAL ONCE
OUTPATIENT
Start: 2024-08-21 | End: 2024-08-21

## 2024-08-21 RX ORDER — LIDOCAINE HYDROCHLORIDE 20 MG/ML
JELLY TOPICAL ONCE
OUTPATIENT
Start: 2024-08-21 | End: 2024-08-21

## 2024-08-21 RX ORDER — IBUPROFEN 200 MG
TABLET ORAL ONCE
OUTPATIENT
Start: 2024-08-21 | End: 2024-08-21

## 2024-08-21 RX ORDER — BACITRACIN ZINC AND POLYMYXIN B SULFATE 500; 1000 [USP'U]/G; [USP'U]/G
OINTMENT TOPICAL ONCE
OUTPATIENT
Start: 2024-08-21 | End: 2024-08-21

## 2024-08-21 RX ORDER — LIDOCAINE 50 MG/G
OINTMENT TOPICAL ONCE
OUTPATIENT
Start: 2024-08-21 | End: 2024-08-21

## 2024-08-21 RX ORDER — LIDOCAINE 40 MG/G
CREAM TOPICAL ONCE
OUTPATIENT
Start: 2024-08-21 | End: 2024-08-21

## 2024-08-21 RX ORDER — LIDOCAINE HYDROCHLORIDE 40 MG/ML
SOLUTION TOPICAL ONCE
OUTPATIENT
Start: 2024-08-21 | End: 2024-08-21

## 2024-08-21 RX ORDER — LIDOCAINE HYDROCHLORIDE 40 MG/ML
SOLUTION TOPICAL ONCE
Status: COMPLETED | OUTPATIENT
Start: 2024-08-21 | End: 2024-08-21

## 2024-08-21 RX ORDER — TRIAMCINOLONE ACETONIDE 1 MG/G
OINTMENT TOPICAL ONCE
OUTPATIENT
Start: 2024-08-21 | End: 2024-08-21

## 2024-08-21 RX ORDER — GINSENG 100 MG
CAPSULE ORAL ONCE
OUTPATIENT
Start: 2024-08-21 | End: 2024-08-21

## 2024-08-21 RX ORDER — BETAMETHASONE DIPROPIONATE 0.05 %
OINTMENT (GRAM) TOPICAL ONCE
OUTPATIENT
Start: 2024-08-21 | End: 2024-08-21

## 2024-08-21 RX ORDER — GENTAMICIN SULFATE 1 MG/G
OINTMENT TOPICAL ONCE
OUTPATIENT
Start: 2024-08-21 | End: 2024-08-21

## 2024-08-21 RX ADMIN — LIDOCAINE HYDROCHLORIDE 15 ML: 40 SOLUTION TOPICAL at 15:19

## 2024-08-21 ASSESSMENT — PAIN DESCRIPTION - ORIENTATION: ORIENTATION: LEFT

## 2024-08-21 ASSESSMENT — PAIN DESCRIPTION - DESCRIPTORS: DESCRIPTORS: THROBBING;BURNING

## 2024-08-21 ASSESSMENT — PAIN SCALES - GENERAL: PAINLEVEL_OUTOF10: 7

## 2024-08-21 NOTE — PROGRESS NOTES
Wound Healing Center Followup Visit Note    Referring Physician : Tamie Santizo MD  Anastacia Morel  MEDICAL RECORD NUMBER:  [id number]  AGE: 79 y.o.   GENDER: female  : 1945  EPISODE DATE:  2024    Subjective:     Chief Complaint   Patient presents with    Wound Check     \"Left lower leg\"      HISTORY of PRESENT ILLNESS HPI   Anastacia Morel is a 79 y.o. female who presents today in regards to follow up evaluation and treatment of wound/ulcer.  That patient's past medical, family and social hx were reviewed and changes were made if present.    History of Wound Context:  Patient has had left calf wound since ~ 2021.  She has been putting a dressing on it.  The wound has not been improving.  She has a hx significant for venous stasis ulcerations of bilateral LE.  She first was seen by myself in regards to these issues 2015.  She eventually healed these wounds 2016.     I last saw her 2021 at which time I recommended lymphedema therapy.  She states she went and said it caused her to much pain and stopped going.  She has chronic issues with bilateral calf pain, swelling and edema.            She admits to not wearing her stockings because of the pain.    She has used knee high 20-30 mm hg stockings in the past.       She is still seeing pain management and is down to percocet /325 mg daily.       21  aquacell  Double tubigrip  Culture done  Emphasized importance of getting in to more significant compression in the future  12/15/21  Culture reviewed - light growth, no tx  Wound slightly improved  Still significant drainage  Plan on compression wrap next week  21  Stable wound  Drainage slightly better  Pt would like to wait till next week because of holidays to start wrap  22  Wound larger  Profore  22  Wound appearance better  Refusing wrap - it rolled down last week and she cut off after 3 days  22  Wound appearance better  Still refusing wrap

## 2024-08-21 NOTE — PROGRESS NOTES
Wound Care Supplies      Supply Company:     [facility] [address] [facility] [address] [place] [address]  p: [phone] f: [phone]     Ordering Center:     [facility]  [address]  [facility] [address]  [phone]  WOUND CARE Dept: [phone]   FAX NUMBER [phone]    Patient Information:      Rock J Adriel  [address]  [facility] [address]   [phone]   : 1945  AGE: 79 y.o.     GENDER: female   EPISODE DATE: 2024    Insurance:      PRIMARY INSURANCE:  Plan: Simple DUAL COMPLETE  Coverage: [facility] MEDICARE  Effective Date: 2022  Group Number: [unfilled]  Subscriber Number: [id number] - (Medicare Managed)    Payer/Plan Subscr  Sex Relation Sub. Ins. ID Effective Group Num   1. [employer] MEDICARE * ROCK PRYOR 1945 Female Self [id number]                                     [address]   2. [employer]* ROCK PRYOR 1945 Female Self [id number]                                   [address]       Patient Wound Information:      Problem List Items Addressed This Visit          Other    Venous stasis ulcer of left calf with fat layer exposed without varicose veins (HCC) - Primary (Chronic)    Relevant Orders    Initiate Outpatient Wound Care Protocol       WOUNDS REQUIRING DRESSING SUPPLIES:     Wound 23 Leg Left;Lower #2 (Active)   Wound Image   24 1438   Wound Etiology Traumatic 23 1610   Dressing Status New dressing applied 24 1525   Wound Cleansed Cleansed with saline 24 1525   Dressing/Treatment Alginate;ABD;Dry dressing 24 1525   Wound Length (cm) 11 cm 24 1515   Wound Width (cm) 10 cm 24 1515   Wound Depth (cm) 0.7 cm 24 1515   Wound Surface Area (cm^2) 110 cm^2 24 1515   Change in Wound Size % (l*w) -1823.08 24 1515   Wound Volume (cm^3) 77 cm^3 24 1515   Wound Healing % -84135 24 1519   Post-Procedure Length (cm)

## 2024-08-28 ENCOUNTER — HOSPITAL ENCOUNTER (OUTPATIENT)
Dept: WOUND CARE | Age: 79
Discharge: HOME | End: 2024-08-28
Attending: SURGERY
Payer: MEDICARE

## 2024-08-28 VITALS
DIASTOLIC BLOOD PRESSURE: 74 MMHG | HEART RATE: 88 BPM | SYSTOLIC BLOOD PRESSURE: 132 MMHG | HEIGHT: 67 IN | BODY MASS INDEX: 36.1 KG/M2 | TEMPERATURE: 96.5 F | WEIGHT: 230 LBS | RESPIRATION RATE: 18 BRPM

## 2024-08-28 DIAGNOSIS — L97.222 VENOUS STASIS ULCER OF LEFT CALF WITH FAT LAYER EXPOSED WITHOUT VARICOSE VEINS (HCC): Primary | ICD-10-CM

## 2024-08-28 DIAGNOSIS — I87.2 VENOUS STASIS ULCER OF LEFT CALF WITH FAT LAYER EXPOSED WITHOUT VARICOSE VEINS (HCC): Primary | ICD-10-CM

## 2024-08-28 PROCEDURE — 99213 OFFICE O/P EST LOW 20 MIN: CPT

## 2024-08-28 PROCEDURE — 99213 OFFICE O/P EST LOW 20 MIN: CPT | Performed by: SURGERY

## 2024-08-28 RX ORDER — LIDOCAINE HYDROCHLORIDE 20 MG/ML
JELLY TOPICAL ONCE
OUTPATIENT
Start: 2024-08-28 | End: 2024-08-28

## 2024-08-28 RX ORDER — GENTAMICIN SULFATE 1 MG/G
OINTMENT TOPICAL ONCE
OUTPATIENT
Start: 2024-08-28 | End: 2024-08-28

## 2024-08-28 RX ORDER — LIDOCAINE HYDROCHLORIDE 40 MG/ML
SOLUTION TOPICAL ONCE
Status: COMPLETED | OUTPATIENT
Start: 2024-08-28 | End: 2024-08-28

## 2024-08-28 RX ORDER — MORPHINE SULFATE 15 MG/1
15 TABLET, FILM COATED, EXTENDED RELEASE ORAL 2 TIMES DAILY
Qty: 60 TABLET | Refills: 0 | Status: SHIPPED | OUTPATIENT
Start: 2024-08-28 | End: 2024-09-27

## 2024-08-28 RX ORDER — GINSENG 100 MG
CAPSULE ORAL ONCE
OUTPATIENT
Start: 2024-08-28 | End: 2024-08-28

## 2024-08-28 RX ORDER — LIDOCAINE 50 MG/G
OINTMENT TOPICAL ONCE
OUTPATIENT
Start: 2024-08-28 | End: 2024-08-28

## 2024-08-28 RX ORDER — MUPIROCIN 20 MG/G
OINTMENT TOPICAL ONCE
OUTPATIENT
Start: 2024-08-28 | End: 2024-08-28

## 2024-08-28 RX ORDER — OXYCODONE AND ACETAMINOPHEN 7.5; 325 MG/1; MG/1
1 TABLET ORAL EVERY 8 HOURS PRN
Qty: 90 TABLET | Refills: 0 | Status: SHIPPED | OUTPATIENT
Start: 2024-08-28 | End: 2024-09-27

## 2024-08-28 RX ORDER — LIDOCAINE HYDROCHLORIDE 40 MG/ML
SOLUTION TOPICAL ONCE
OUTPATIENT
Start: 2024-08-28 | End: 2024-08-28

## 2024-08-28 RX ORDER — BACITRACIN ZINC AND POLYMYXIN B SULFATE 500; 1000 [USP'U]/G; [USP'U]/G
OINTMENT TOPICAL ONCE
OUTPATIENT
Start: 2024-08-28 | End: 2024-08-28

## 2024-08-28 RX ORDER — CLOBETASOL PROPIONATE 0.5 MG/G
OINTMENT TOPICAL ONCE
OUTPATIENT
Start: 2024-08-28 | End: 2024-08-28

## 2024-08-28 RX ORDER — NEOMYCIN/BACITRACIN/POLYMYXINB 3.5-400-5K
OINTMENT (GRAM) TOPICAL ONCE
OUTPATIENT
Start: 2024-08-28 | End: 2024-08-28

## 2024-08-28 RX ORDER — TRIAMCINOLONE ACETONIDE 1 MG/G
OINTMENT TOPICAL ONCE
OUTPATIENT
Start: 2024-08-28 | End: 2024-08-28

## 2024-08-28 RX ORDER — BETAMETHASONE DIPROPIONATE 0.05 %
OINTMENT (GRAM) TOPICAL ONCE
OUTPATIENT
Start: 2024-08-28 | End: 2024-08-28

## 2024-08-28 RX ORDER — SILVER SULFADIAZINE 10 MG/G
CREAM TOPICAL ONCE
OUTPATIENT
Start: 2024-08-28 | End: 2024-08-28

## 2024-08-28 RX ORDER — LIDOCAINE 40 MG/G
CREAM TOPICAL ONCE
OUTPATIENT
Start: 2024-08-28 | End: 2024-08-28

## 2024-08-28 RX ADMIN — LIDOCAINE HYDROCHLORIDE 25 ML: 40 SOLUTION TOPICAL at 14:05

## 2024-08-28 ASSESSMENT — PAIN DESCRIPTION - PAIN TYPE: TYPE: CHRONIC PAIN

## 2024-08-28 ASSESSMENT — PAIN DESCRIPTION - DESCRIPTORS: DESCRIPTORS: BURNING;THROBBING

## 2024-08-28 ASSESSMENT — PAIN DESCRIPTION - LOCATION: LOCATION: LEG

## 2024-08-28 ASSESSMENT — PAIN SCALES - GENERAL: PAINLEVEL_OUTOF10: 7

## 2024-08-28 ASSESSMENT — PAIN - FUNCTIONAL ASSESSMENT: PAIN_FUNCTIONAL_ASSESSMENT: PREVENTS OR INTERFERES SOME ACTIVE ACTIVITIES AND ADLS

## 2024-08-28 ASSESSMENT — PAIN DESCRIPTION - PROGRESSION: CLINICAL_PROGRESSION: NOT CHANGED

## 2024-08-28 ASSESSMENT — PAIN DESCRIPTION - FREQUENCY: FREQUENCY: CONTINUOUS

## 2024-08-28 ASSESSMENT — PAIN DESCRIPTION - ORIENTATION: ORIENTATION: LEFT

## 2024-08-28 ASSESSMENT — PAIN DESCRIPTION - ONSET: ONSET: ON-GOING

## 2024-08-28 NOTE — PROGRESS NOTES
Wound Healing Center Followup Visit Note    Referring Physician : Tamie Santizo MD  Anastacia Morel  MEDICAL RECORD NUMBER:  [id number]  AGE: 79 y.o.   GENDER: female  : 1945  EPISODE DATE:  2024    Subjective:     Chief Complaint   Patient presents with    Wound Check     Left lower leg      HISTORY of PRESENT ILLNESS HPI   Anastacia Morel is a 79 y.o. female who presents today in regards to follow up evaluation and treatment of wound/ulcer.  That patient's past medical, family and social hx were reviewed and changes were made if present.    History of Wound Context:  Patient has had left calf wound since ~ 2021.  She has been putting a dressing on it.  The wound has not been improving.  She has a hx significant for venous stasis ulcerations of bilateral LE.  She first was seen by myself in regards to these issues 2015.  She eventually healed these wounds 2016.     I last saw her 2021 at which time I recommended lymphedema therapy.  She states she went and said it caused her to much pain and stopped going.  She has chronic issues with bilateral calf pain, swelling and edema.            She admits to not wearing her stockings because of the pain.    She has used knee high 20-30 mm hg stockings in the past.       She is still seeing pain management and is down to percocet /325 mg daily.       21  aquacell  Double tubigrip  Culture done  Emphasized importance of getting in to more significant compression in the future  12/15/21  Culture reviewed - light growth, no tx  Wound slightly improved  Still significant drainage  Plan on compression wrap next week  21  Stable wound  Drainage slightly better  Pt would like to wait till next week because of holidays to start wrap  22  Wound larger  Profore  22  Wound appearance better  Refusing wrap - it rolled down last week and she cut off after 3 days  22  Wound appearance better  Still refusing wrap  posterior aspect of the left calf, for the last 1 week    Venous ulcer with fat layer exposed (HCC) 10/28/2015     Past Surgical History:   Procedure Laterality Date    APPENDECTOMY      BREAST ENHANCEMENT SURGERY      BREAST REDUCTION SURGERY      CATARACT REMOVAL Right 2024    CHOLECYSTECTOMY      COLONOSCOPY      ECHO COMPL W DOP COLOR FLOW  2013         ENDOSCOPY, COLON, DIAGNOSTIC      HERNIA REPAIR N/A 2021    LAPAROSCOPIC ROBOTIC ASSISTED INGUINAL HERNIA REPAIR performed by Gregory Tao MD at [facility] OR    HERNIA REPAIR      HYSTERECTOMY (CERVIX STATUS UNKNOWN)      JOINT REPLACEMENT  2009    l knee r hip    LEG DEBRIDEMENT Left 2015    LEG DEBRIDEMENT Left 2016     History reviewed. No pertinent family history.  Social History     Tobacco Use    Smoking status: Former     Current packs/day: 0.00     Average packs/day: 1 pack/day for 20.0 years (20.0 ttl pk-yrs)     Types: Cigarettes     Start date: 1975     Quit date: 1995     Years since quittin.8    Smokeless tobacco: Never    Tobacco comments:     Quit   Vaping Use    Vaping status: Never Used   Substance Use Topics    Alcohol use: No    Drug use: No     Allergies   Allergen Reactions    Codeine Nausea And Vomiting and Other (See Comments)     hallucinate    Dilaudid [Hydromorphone Hcl] Rash    Naproxen Other (See Comments)     Shuts down kidney function    Singulair [Montelukast Sodium] Shortness Of Breath     Mucus in chest    Black Cohosh     Black Cohosh [Cimicifuga Racemosa (Black Cohosh)] Rash     Current Outpatient Medications on File Prior to Encounter   Medication Sig Dispense Refill    oxyCODONE-acetaminophen (PERCOCET) 7.5-325 MG per tablet Take 1 tablet by mouth every 8 hours as needed for Pain for up to 30 days. Intended supply: 30 days Max Daily Amount: 3 tablets 90 tablet 0    morphine (MS CONTIN) 15 MG extended release tablet Take 1 tablet by mouth 2 times daily for 30 days. Max Daily

## 2024-09-03 RX ORDER — GABAPENTIN 300 MG/1
300 CAPSULE ORAL 3 TIMES DAILY
Qty: 90 CAPSULE | Refills: 3 | Status: SHIPPED | OUTPATIENT
Start: 2024-09-03 | End: 2025-01-01

## 2024-09-04 ENCOUNTER — HOSPITAL ENCOUNTER (OUTPATIENT)
Dept: WOUND CARE | Age: 79
Discharge: HOME | End: 2024-09-04
Attending: SURGERY
Payer: MEDICARE

## 2024-09-04 VITALS
DIASTOLIC BLOOD PRESSURE: 60 MMHG | WEIGHT: 230 LBS | HEART RATE: 78 BPM | TEMPERATURE: 97.6 F | BODY MASS INDEX: 36.1 KG/M2 | HEIGHT: 67 IN | SYSTOLIC BLOOD PRESSURE: 104 MMHG | RESPIRATION RATE: 18 BRPM

## 2024-09-04 DIAGNOSIS — L97.222 VENOUS STASIS ULCER OF LEFT CALF WITH FAT LAYER EXPOSED WITHOUT VARICOSE VEINS (HCC): Primary | ICD-10-CM

## 2024-09-04 DIAGNOSIS — I87.2 VENOUS STASIS ULCER OF LEFT CALF WITH FAT LAYER EXPOSED WITHOUT VARICOSE VEINS (HCC): Primary | ICD-10-CM

## 2024-09-04 DIAGNOSIS — I89.0 LYMPHEDEMA: ICD-10-CM

## 2024-09-04 PROCEDURE — 11042 DBRDMT SUBQ TIS 1ST 20SQCM/<: CPT

## 2024-09-04 PROCEDURE — 11042 DBRDMT SUBQ TIS 1ST 20SQCM/<: CPT | Performed by: SURGERY

## 2024-09-04 RX ORDER — TRIAMCINOLONE ACETONIDE 1 MG/G
OINTMENT TOPICAL ONCE
OUTPATIENT
Start: 2024-09-04 | End: 2024-09-04

## 2024-09-04 RX ORDER — BETAMETHASONE DIPROPIONATE 0.05 %
OINTMENT (GRAM) TOPICAL ONCE
OUTPATIENT
Start: 2024-09-04 | End: 2024-09-04

## 2024-09-04 RX ORDER — LIDOCAINE 50 MG/G
OINTMENT TOPICAL ONCE
OUTPATIENT
Start: 2024-09-04 | End: 2024-09-04

## 2024-09-04 RX ORDER — NEOMYCIN/BACITRACIN/POLYMYXINB 3.5-400-5K
OINTMENT (GRAM) TOPICAL ONCE
OUTPATIENT
Start: 2024-09-04 | End: 2024-09-04

## 2024-09-04 RX ORDER — SILVER SULFADIAZINE 10 MG/G
CREAM TOPICAL ONCE
OUTPATIENT
Start: 2024-09-04 | End: 2024-09-04

## 2024-09-04 RX ORDER — GINSENG 100 MG
CAPSULE ORAL ONCE
OUTPATIENT
Start: 2024-09-04 | End: 2024-09-04

## 2024-09-04 RX ORDER — GENTAMICIN SULFATE 1 MG/G
OINTMENT TOPICAL ONCE
OUTPATIENT
Start: 2024-09-04 | End: 2024-09-04

## 2024-09-04 RX ORDER — CLOBETASOL PROPIONATE 0.5 MG/G
OINTMENT TOPICAL ONCE
OUTPATIENT
Start: 2024-09-04 | End: 2024-09-04

## 2024-09-04 RX ORDER — LIDOCAINE HYDROCHLORIDE 20 MG/ML
JELLY TOPICAL ONCE
OUTPATIENT
Start: 2024-09-04 | End: 2024-09-04

## 2024-09-04 RX ORDER — LIDOCAINE HYDROCHLORIDE 40 MG/ML
SOLUTION TOPICAL ONCE
OUTPATIENT
Start: 2024-09-04 | End: 2024-09-04

## 2024-09-04 RX ORDER — BACITRACIN ZINC AND POLYMYXIN B SULFATE 500; 1000 [USP'U]/G; [USP'U]/G
OINTMENT TOPICAL ONCE
OUTPATIENT
Start: 2024-09-04 | End: 2024-09-04

## 2024-09-04 RX ORDER — LIDOCAINE HYDROCHLORIDE 40 MG/ML
SOLUTION TOPICAL ONCE
Status: COMPLETED | OUTPATIENT
Start: 2024-09-04 | End: 2024-09-04

## 2024-09-04 RX ORDER — MUPIROCIN 20 MG/G
OINTMENT TOPICAL ONCE
OUTPATIENT
Start: 2024-09-04 | End: 2024-09-04

## 2024-09-04 RX ORDER — LIDOCAINE 40 MG/G
CREAM TOPICAL ONCE
OUTPATIENT
Start: 2024-09-04 | End: 2024-09-04

## 2024-09-04 RX ADMIN — LIDOCAINE HYDROCHLORIDE 10 ML: 40 SOLUTION TOPICAL at 14:21

## 2024-09-04 ASSESSMENT — PAIN SCALES - GENERAL: PAINLEVEL_OUTOF10: 6

## 2024-09-04 ASSESSMENT — PAIN DESCRIPTION - FREQUENCY: FREQUENCY: CONTINUOUS

## 2024-09-04 ASSESSMENT — PAIN DESCRIPTION - ORIENTATION: ORIENTATION: LOWER

## 2024-09-04 ASSESSMENT — PAIN DESCRIPTION - DESCRIPTORS: DESCRIPTORS: BURNING;THROBBING

## 2024-09-04 ASSESSMENT — PAIN DESCRIPTION - LOCATION: LOCATION: LEG

## 2024-09-04 NOTE — PROGRESS NOTES
Wound Healing Center Followup Visit Note    Referring Physician : Tamie Santizo MD  Anastacia Morel  MEDICAL RECORD NUMBER:  [id number]  AGE: 79 y.o.   GENDER: female  : 1945  EPISODE DATE:  2024    Subjective:     Chief Complaint   Patient presents with    Wound Check     Left lower leg      HISTORY of PRESENT ILLNESS HPI   Anastacia Morel is a 79 y.o. female who presents today in regards to follow up evaluation and treatment of wound/ulcer.  That patient's past medical, family and social hx were reviewed and changes were made if present.    History of Wound Context:  Patient has had left calf wound since ~ 2021.  She has been putting a dressing on it.  The wound has not been improving.  She has a hx significant for venous stasis ulcerations of bilateral LE.  She first was seen by myself in regards to these issues 2015.  She eventually healed these wounds 2016.     I last saw her 2021 at which time I recommended lymphedema therapy.  She states she went and said it caused her to much pain and stopped going.  She has chronic issues with bilateral calf pain, swelling and edema.            She admits to not wearing her stockings because of the pain.    She has used knee high 20-30 mm hg stockings in the past.       She is still seeing pain management and is down to percocet /325 mg daily.       21  aquacell  Double tubigrip  Culture done  Emphasized importance of getting in to more significant compression in the future  12/15/21  Culture reviewed - light growth, no tx  Wound slightly improved  Still significant drainage  Plan on compression wrap next week  21  Stable wound  Drainage slightly better  Pt would like to wait till next week because of holidays to start wrap  22  Wound larger  Profore  22  Wound appearance better  Refusing wrap - it rolled down last week and she cut off after 3 days  22  Wound appearance better  Still refusing wrap   3/2/22  periwound

## 2024-09-11 ENCOUNTER — HOSPITAL ENCOUNTER (OUTPATIENT)
Dept: WOUND CARE | Age: 79
Discharge: HOME | End: 2024-09-11
Attending: SURGERY

## 2024-09-18 ENCOUNTER — HOSPITAL ENCOUNTER (OUTPATIENT)
Dept: WOUND CARE | Age: 79
Discharge: HOME | End: 2024-09-18
Attending: SURGERY
Payer: MEDICARE

## 2024-09-18 VITALS
HEART RATE: 88 BPM | RESPIRATION RATE: 16 BRPM | SYSTOLIC BLOOD PRESSURE: 130 MMHG | DIASTOLIC BLOOD PRESSURE: 72 MMHG | TEMPERATURE: 97.8 F

## 2024-09-18 DIAGNOSIS — I87.2 VENOUS STASIS ULCER OF LEFT CALF WITH FAT LAYER EXPOSED WITHOUT VARICOSE VEINS (HCC): Primary | ICD-10-CM

## 2024-09-18 DIAGNOSIS — L97.222 VENOUS STASIS ULCER OF LEFT CALF WITH FAT LAYER EXPOSED WITHOUT VARICOSE VEINS (HCC): Primary | ICD-10-CM

## 2024-09-18 PROCEDURE — 11042 DBRDMT SUBQ TIS 1ST 20SQCM/<: CPT

## 2024-09-18 PROCEDURE — 11045 DBRDMT SUBQ TISS EACH ADDL: CPT

## 2024-09-18 RX ORDER — GENTAMICIN SULFATE 1 MG/G
OINTMENT TOPICAL ONCE
OUTPATIENT
Start: 2024-09-18 | End: 2024-09-18

## 2024-09-18 RX ORDER — LIDOCAINE 50 MG/G
OINTMENT TOPICAL ONCE
OUTPATIENT
Start: 2024-09-18 | End: 2024-09-18

## 2024-09-18 RX ORDER — BETAMETHASONE DIPROPIONATE 0.05 %
OINTMENT (GRAM) TOPICAL ONCE
OUTPATIENT
Start: 2024-09-18 | End: 2024-09-18

## 2024-09-18 RX ORDER — LIDOCAINE HYDROCHLORIDE 40 MG/ML
SOLUTION TOPICAL ONCE
Status: COMPLETED | OUTPATIENT
Start: 2024-09-18 | End: 2024-09-18

## 2024-09-18 RX ORDER — NEOMYCIN/BACITRACIN/POLYMYXINB 3.5-400-5K
OINTMENT (GRAM) TOPICAL ONCE
OUTPATIENT
Start: 2024-09-18 | End: 2024-09-18

## 2024-09-18 RX ORDER — LIDOCAINE HYDROCHLORIDE 20 MG/ML
JELLY TOPICAL ONCE
OUTPATIENT
Start: 2024-09-18 | End: 2024-09-18

## 2024-09-18 RX ORDER — SILVER SULFADIAZINE 10 MG/G
CREAM TOPICAL ONCE
OUTPATIENT
Start: 2024-09-18 | End: 2024-09-18

## 2024-09-18 RX ORDER — BACITRACIN ZINC AND POLYMYXIN B SULFATE 500; 1000 [USP'U]/G; [USP'U]/G
OINTMENT TOPICAL ONCE
OUTPATIENT
Start: 2024-09-18 | End: 2024-09-18

## 2024-09-18 RX ORDER — CLOBETASOL PROPIONATE 0.5 MG/G
OINTMENT TOPICAL ONCE
OUTPATIENT
Start: 2024-09-18 | End: 2024-09-18

## 2024-09-18 RX ORDER — TRIAMCINOLONE ACETONIDE 1 MG/G
OINTMENT TOPICAL ONCE
OUTPATIENT
Start: 2024-09-18 | End: 2024-09-18

## 2024-09-18 RX ORDER — LIDOCAINE HYDROCHLORIDE 40 MG/ML
SOLUTION TOPICAL ONCE
OUTPATIENT
Start: 2024-09-18 | End: 2024-09-18

## 2024-09-18 RX ORDER — GINSENG 100 MG
CAPSULE ORAL ONCE
OUTPATIENT
Start: 2024-09-18 | End: 2024-09-18

## 2024-09-18 RX ORDER — MUPIROCIN 20 MG/G
OINTMENT TOPICAL ONCE
OUTPATIENT
Start: 2024-09-18 | End: 2024-09-18

## 2024-09-18 RX ORDER — LIDOCAINE 40 MG/G
CREAM TOPICAL ONCE
OUTPATIENT
Start: 2024-09-18 | End: 2024-09-18

## 2024-09-18 RX ADMIN — LIDOCAINE HYDROCHLORIDE 10 ML: 40 SOLUTION TOPICAL at 14:54

## 2024-09-25 ENCOUNTER — HOSPITAL ENCOUNTER (OUTPATIENT)
Dept: WOUND CARE | Age: 79
Discharge: HOME | End: 2024-09-25
Attending: SURGERY
Payer: MEDICARE

## 2024-09-25 VITALS
BODY MASS INDEX: 36.1 KG/M2 | SYSTOLIC BLOOD PRESSURE: 144 MMHG | HEART RATE: 83 BPM | TEMPERATURE: 97.8 F | HEIGHT: 67 IN | DIASTOLIC BLOOD PRESSURE: 76 MMHG | WEIGHT: 230 LBS | RESPIRATION RATE: 18 BRPM

## 2024-09-25 DIAGNOSIS — I89.0 LYMPHEDEMA: ICD-10-CM

## 2024-09-25 DIAGNOSIS — I87.2 VENOUS STASIS ULCER OF LEFT CALF WITH FAT LAYER EXPOSED WITHOUT VARICOSE VEINS (HCC): Primary | Chronic | ICD-10-CM

## 2024-09-25 DIAGNOSIS — L97.222 VENOUS STASIS ULCER OF LEFT CALF WITH FAT LAYER EXPOSED WITHOUT VARICOSE VEINS (HCC): Primary | Chronic | ICD-10-CM

## 2024-09-25 PROCEDURE — 99213 OFFICE O/P EST LOW 20 MIN: CPT

## 2024-09-25 PROCEDURE — 99213 OFFICE O/P EST LOW 20 MIN: CPT | Performed by: SURGERY

## 2024-09-25 RX ORDER — SILVER SULFADIAZINE 10 MG/G
CREAM TOPICAL ONCE
OUTPATIENT
Start: 2024-09-25 | End: 2024-09-25

## 2024-09-25 RX ORDER — OXYCODONE AND ACETAMINOPHEN 7.5; 325 MG/1; MG/1
1 TABLET ORAL EVERY 8 HOURS PRN
Qty: 90 TABLET | Refills: 0 | Status: SHIPPED | OUTPATIENT
Start: 2024-09-25 | End: 2024-10-25

## 2024-09-25 RX ORDER — LIDOCAINE 50 MG/G
OINTMENT TOPICAL ONCE
OUTPATIENT
Start: 2024-09-25 | End: 2024-09-25

## 2024-09-25 RX ORDER — GINSENG 100 MG
CAPSULE ORAL ONCE
OUTPATIENT
Start: 2024-09-25 | End: 2024-09-25

## 2024-09-25 RX ORDER — LIDOCAINE 40 MG/G
CREAM TOPICAL ONCE
OUTPATIENT
Start: 2024-09-25 | End: 2024-09-25

## 2024-09-25 RX ORDER — GENTAMICIN SULFATE 1 MG/G
OINTMENT TOPICAL ONCE
OUTPATIENT
Start: 2024-09-25 | End: 2024-09-25

## 2024-09-25 RX ORDER — NEOMYCIN/BACITRACIN/POLYMYXINB 3.5-400-5K
OINTMENT (GRAM) TOPICAL ONCE
OUTPATIENT
Start: 2024-09-25 | End: 2024-09-25

## 2024-09-25 RX ORDER — BACITRACIN ZINC AND POLYMYXIN B SULFATE 500; 1000 [USP'U]/G; [USP'U]/G
OINTMENT TOPICAL ONCE
OUTPATIENT
Start: 2024-09-25 | End: 2024-09-25

## 2024-09-25 RX ORDER — LIDOCAINE HYDROCHLORIDE 20 MG/ML
JELLY TOPICAL ONCE
OUTPATIENT
Start: 2024-09-25 | End: 2024-09-25

## 2024-09-25 RX ORDER — MORPHINE SULFATE 15 MG/1
15 TABLET, FILM COATED, EXTENDED RELEASE ORAL 2 TIMES DAILY
Qty: 60 TABLET | Refills: 0 | Status: SHIPPED | OUTPATIENT
Start: 2024-09-25 | End: 2024-10-25

## 2024-09-25 RX ORDER — MUPIROCIN 20 MG/G
OINTMENT TOPICAL ONCE
OUTPATIENT
Start: 2024-09-25 | End: 2024-09-25

## 2024-09-25 RX ORDER — LIDOCAINE HYDROCHLORIDE 40 MG/ML
SOLUTION TOPICAL ONCE
OUTPATIENT
Start: 2024-09-25 | End: 2024-09-25

## 2024-09-25 RX ORDER — LIDOCAINE HYDROCHLORIDE 40 MG/ML
SOLUTION TOPICAL ONCE
Status: COMPLETED | OUTPATIENT
Start: 2024-09-25 | End: 2024-09-25

## 2024-09-25 RX ORDER — TRIAMCINOLONE ACETONIDE 1 MG/G
OINTMENT TOPICAL ONCE
OUTPATIENT
Start: 2024-09-25 | End: 2024-09-25

## 2024-09-25 RX ORDER — BETAMETHASONE DIPROPIONATE 0.05 %
OINTMENT (GRAM) TOPICAL ONCE
OUTPATIENT
Start: 2024-09-25 | End: 2024-09-25

## 2024-09-25 RX ORDER — CLOBETASOL PROPIONATE 0.5 MG/G
OINTMENT TOPICAL ONCE
OUTPATIENT
Start: 2024-09-25 | End: 2024-09-25

## 2024-09-25 RX ORDER — GABAPENTIN 300 MG/1
300 CAPSULE ORAL 3 TIMES DAILY
Qty: 90 CAPSULE | Refills: 0 | Status: SHIPPED | OUTPATIENT
Start: 2024-09-25 | End: 2024-10-25

## 2024-09-25 RX ADMIN — LIDOCAINE HYDROCHLORIDE 15 ML: 40 SOLUTION TOPICAL at 14:23

## 2024-09-25 ASSESSMENT — PAIN DESCRIPTION - LOCATION: LOCATION: LEG

## 2024-09-25 ASSESSMENT — PAIN SCALES - GENERAL: PAINLEVEL_OUTOF10: 7

## 2024-09-25 ASSESSMENT — PAIN DESCRIPTION - ORIENTATION: ORIENTATION: LEFT;LOWER

## 2024-09-25 ASSESSMENT — PAIN DESCRIPTION - ONSET: ONSET: ON-GOING

## 2024-09-25 ASSESSMENT — PAIN DESCRIPTION - PAIN TYPE: TYPE: CHRONIC PAIN

## 2024-09-25 ASSESSMENT — PAIN DESCRIPTION - DESCRIPTORS: DESCRIPTORS: BURNING;THROBBING

## 2024-09-25 ASSESSMENT — PAIN - FUNCTIONAL ASSESSMENT: PAIN_FUNCTIONAL_ASSESSMENT: PREVENTS OR INTERFERES SOME ACTIVE ACTIVITIES AND ADLS

## 2024-09-25 ASSESSMENT — PAIN DESCRIPTION - FREQUENCY: FREQUENCY: CONTINUOUS

## 2024-10-02 ENCOUNTER — HOSPITAL ENCOUNTER (OUTPATIENT)
Dept: WOUND CARE | Age: 79
Discharge: HOME | End: 2024-10-02
Attending: SURGERY

## 2024-10-09 ENCOUNTER — HOSPITAL ENCOUNTER (OUTPATIENT)
Dept: WOUND CARE | Age: 79
Discharge: HOME | End: 2024-10-09
Attending: SURGERY

## 2024-10-16 ENCOUNTER — HOSPITAL ENCOUNTER (OUTPATIENT)
Dept: WOUND CARE | Age: 79
Discharge: HOME | End: 2024-10-16
Attending: SURGERY
Payer: MEDICARE

## 2024-10-16 VITALS
HEART RATE: 86 BPM | WEIGHT: 230 LBS | DIASTOLIC BLOOD PRESSURE: 49 MMHG | RESPIRATION RATE: 18 BRPM | TEMPERATURE: 97 F | SYSTOLIC BLOOD PRESSURE: 153 MMHG | HEIGHT: 67 IN | BODY MASS INDEX: 36.1 KG/M2

## 2024-10-16 DIAGNOSIS — I89.0 LYMPHEDEMA: ICD-10-CM

## 2024-10-16 DIAGNOSIS — L97.222 VENOUS STASIS ULCER OF LEFT CALF WITH FAT LAYER EXPOSED WITHOUT VARICOSE VEINS (HCC): Primary | ICD-10-CM

## 2024-10-16 DIAGNOSIS — I87.2 VENOUS STASIS ULCER OF LEFT CALF WITH FAT LAYER EXPOSED WITHOUT VARICOSE VEINS (HCC): Primary | ICD-10-CM

## 2024-10-16 PROCEDURE — 11042 DBRDMT SUBQ TIS 1ST 20SQCM/<: CPT

## 2024-10-16 PROCEDURE — 11045 DBRDMT SUBQ TISS EACH ADDL: CPT | Performed by: SURGERY

## 2024-10-16 PROCEDURE — 11045 DBRDMT SUBQ TISS EACH ADDL: CPT

## 2024-10-16 PROCEDURE — 11042 DBRDMT SUBQ TIS 1ST 20SQCM/<: CPT | Performed by: SURGERY

## 2024-10-16 RX ORDER — LIDOCAINE HYDROCHLORIDE 20 MG/ML
JELLY TOPICAL ONCE
OUTPATIENT
Start: 2024-10-16 | End: 2024-10-16

## 2024-10-16 RX ORDER — SILVER SULFADIAZINE 10 MG/G
CREAM TOPICAL ONCE
OUTPATIENT
Start: 2024-10-16 | End: 2024-10-16

## 2024-10-16 RX ORDER — GENTAMICIN SULFATE 1 MG/G
OINTMENT TOPICAL ONCE
OUTPATIENT
Start: 2024-10-16 | End: 2024-10-16

## 2024-10-16 RX ORDER — LIDOCAINE HYDROCHLORIDE 40 MG/ML
SOLUTION TOPICAL ONCE
OUTPATIENT
Start: 2024-10-16 | End: 2024-10-16

## 2024-10-16 RX ORDER — LIDOCAINE 50 MG/G
OINTMENT TOPICAL ONCE
OUTPATIENT
Start: 2024-10-16 | End: 2024-10-16

## 2024-10-16 RX ORDER — MUPIROCIN 20 MG/G
OINTMENT TOPICAL ONCE
OUTPATIENT
Start: 2024-10-16 | End: 2024-10-16

## 2024-10-16 RX ORDER — TRIAMCINOLONE ACETONIDE 1 MG/G
OINTMENT TOPICAL ONCE
OUTPATIENT
Start: 2024-10-16 | End: 2024-10-16

## 2024-10-16 RX ORDER — GINSENG 100 MG
CAPSULE ORAL ONCE
OUTPATIENT
Start: 2024-10-16 | End: 2024-10-16

## 2024-10-16 RX ORDER — BACITRACIN ZINC AND POLYMYXIN B SULFATE 500; 1000 [USP'U]/G; [USP'U]/G
OINTMENT TOPICAL ONCE
OUTPATIENT
Start: 2024-10-16 | End: 2024-10-16

## 2024-10-16 RX ORDER — BETAMETHASONE DIPROPIONATE 0.05 %
OINTMENT (GRAM) TOPICAL ONCE
OUTPATIENT
Start: 2024-10-16 | End: 2024-10-16

## 2024-10-16 RX ORDER — NEOMYCIN/BACITRACIN/POLYMYXINB 3.5-400-5K
OINTMENT (GRAM) TOPICAL ONCE
OUTPATIENT
Start: 2024-10-16 | End: 2024-10-16

## 2024-10-16 RX ORDER — CLOBETASOL PROPIONATE 0.5 MG/G
OINTMENT TOPICAL ONCE
OUTPATIENT
Start: 2024-10-16 | End: 2024-10-16

## 2024-10-16 RX ORDER — LIDOCAINE 40 MG/G
CREAM TOPICAL ONCE
OUTPATIENT
Start: 2024-10-16 | End: 2024-10-16

## 2024-10-16 RX ORDER — LIDOCAINE HYDROCHLORIDE 40 MG/ML
SOLUTION TOPICAL ONCE
Status: COMPLETED | OUTPATIENT
Start: 2024-10-16 | End: 2024-10-16

## 2024-10-16 RX ADMIN — LIDOCAINE HYDROCHLORIDE 15 ML: 40 SOLUTION TOPICAL at 11:27

## 2024-10-16 ASSESSMENT — PAIN DESCRIPTION - DESCRIPTORS: DESCRIPTORS: BURNING;ACHING;THROBBING

## 2024-10-16 ASSESSMENT — PAIN DESCRIPTION - PAIN TYPE: TYPE: CHRONIC PAIN

## 2024-10-16 ASSESSMENT — PAIN - FUNCTIONAL ASSESSMENT: PAIN_FUNCTIONAL_ASSESSMENT: PREVENTS OR INTERFERES SOME ACTIVE ACTIVITIES AND ADLS

## 2024-10-16 ASSESSMENT — PAIN DESCRIPTION - LOCATION: LOCATION: LEG

## 2024-10-16 ASSESSMENT — PAIN DESCRIPTION - ORIENTATION: ORIENTATION: LEFT

## 2024-10-16 ASSESSMENT — PAIN SCALES - GENERAL: PAINLEVEL_OUTOF10: 7

## 2024-10-17 NOTE — PROGRESS NOTES
thickness 07/03/24 1331   Number of days: 595          Procedure Note  Indications:  Based on my examination of this patient's wound(s)/ulcer(s) today, debridement is required to promote healing and evaluate the wound base.    Performed by: Faith Dempsey MD    Consent obtained:  Yes    Time out taken:  Yes    Pain Control: Anesthetic  Anesthetic: 4% Lidocaine Liquid Topical     Debridement:Excisional Debridement    Using curette the wound(s)/ulcer(s) was/were sharply debrided down through and including the removal of epidermis, dermis, and subcutaneous tissue.        Devitalized Tissue Debrided:  fibrin, biofilm, slough, necrotic/eschar, and exudate to stimulate bleeding to promote healing, post debridement good bleeding base and wound edges noted    Wound/Ulcer #: 1    Percent of Wound/Ulcer Debrided: 50%    Total Surface Area Debrided:  40 sq cm     Estimated Blood Loss:  Minimal  Hemostasis Achieved:  by pressure    Procedural Pain:  10  / 10   Post Procedural Pain:  9 / 10     Response to treatment:  With complaints of pain.     Plan:   Treatment Note please see attached Discharge Instructions    Written patient dismissal instructions given to patient and signed by patient or POA.         Discharge Instructions         Visit Discharge/Physician Orders     Discharge condition: Stable     Assessment of pain at discharge: mild     Anesthetic used: lido 4%     Discharge to: Home     Left via:Private automobile     Accompanied by: self     ECF/HHA: [place]      Dressing Orders:  LEFT PRETIB : Cleanse with normal saline, cover with calcium alginate, and ABD pad, dry dressing and secure, and Spandagrip. Change daily        Treatment Orders: Eat a diet high in protein and vitamin C. Take a multiple vitamin daily unless contraindicated.      Elevate as much as possible     [facility] followup visit: 1 week Dr. HATFIELD  ____________________________  (Please note your next appointment above and if you are unable to keep,

## 2024-10-23 ENCOUNTER — HOSPITAL ENCOUNTER (OUTPATIENT)
Dept: WOUND CARE | Age: 79
Discharge: HOME | End: 2024-10-23
Attending: SURGERY
Payer: MEDICARE

## 2024-10-23 VITALS
HEIGHT: 67 IN | BODY MASS INDEX: 36.1 KG/M2 | DIASTOLIC BLOOD PRESSURE: 94 MMHG | WEIGHT: 230 LBS | HEART RATE: 85 BPM | SYSTOLIC BLOOD PRESSURE: 154 MMHG | RESPIRATION RATE: 18 BRPM | TEMPERATURE: 97.9 F

## 2024-10-23 DIAGNOSIS — L97.222 VENOUS STASIS ULCER OF LEFT CALF WITH FAT LAYER EXPOSED WITHOUT VARICOSE VEINS (HCC): Primary | ICD-10-CM

## 2024-10-23 DIAGNOSIS — I87.2 VENOUS STASIS ULCER OF LEFT CALF WITH FAT LAYER EXPOSED WITHOUT VARICOSE VEINS (HCC): Primary | ICD-10-CM

## 2024-10-23 PROCEDURE — 11042 DBRDMT SUBQ TIS 1ST 20SQCM/<: CPT | Performed by: SURGERY

## 2024-10-23 PROCEDURE — 11042 DBRDMT SUBQ TIS 1ST 20SQCM/<: CPT

## 2024-10-23 RX ORDER — BETAMETHASONE DIPROPIONATE 0.05 %
OINTMENT (GRAM) TOPICAL ONCE
OUTPATIENT
Start: 2024-10-23 | End: 2024-10-23

## 2024-10-23 RX ORDER — LIDOCAINE HYDROCHLORIDE 40 MG/ML
SOLUTION TOPICAL ONCE
OUTPATIENT
Start: 2024-10-23 | End: 2024-10-23

## 2024-10-23 RX ORDER — LIDOCAINE HYDROCHLORIDE 20 MG/ML
JELLY TOPICAL ONCE
OUTPATIENT
Start: 2024-10-23 | End: 2024-10-23

## 2024-10-23 RX ORDER — GINSENG 100 MG
CAPSULE ORAL ONCE
OUTPATIENT
Start: 2024-10-23 | End: 2024-10-23

## 2024-10-23 RX ORDER — LIDOCAINE 50 MG/G
OINTMENT TOPICAL ONCE
OUTPATIENT
Start: 2024-10-23 | End: 2024-10-23

## 2024-10-23 RX ORDER — TRIAMCINOLONE ACETONIDE 1 MG/G
OINTMENT TOPICAL ONCE
OUTPATIENT
Start: 2024-10-23 | End: 2024-10-23

## 2024-10-23 RX ORDER — BACITRACIN ZINC AND POLYMYXIN B SULFATE 500; 1000 [USP'U]/G; [USP'U]/G
OINTMENT TOPICAL ONCE
OUTPATIENT
Start: 2024-10-23 | End: 2024-10-23

## 2024-10-23 RX ORDER — NEOMYCIN/BACITRACIN/POLYMYXINB 3.5-400-5K
OINTMENT (GRAM) TOPICAL ONCE
OUTPATIENT
Start: 2024-10-23 | End: 2024-10-23

## 2024-10-23 RX ORDER — CLOBETASOL PROPIONATE 0.5 MG/G
OINTMENT TOPICAL ONCE
OUTPATIENT
Start: 2024-10-23 | End: 2024-10-23

## 2024-10-23 RX ORDER — MORPHINE SULFATE 15 MG/1
15 TABLET, FILM COATED, EXTENDED RELEASE ORAL 2 TIMES DAILY
Qty: 60 TABLET | Refills: 0 | Status: SHIPPED | OUTPATIENT
Start: 2024-10-23 | End: 2024-11-22

## 2024-10-23 RX ORDER — MUPIROCIN 20 MG/G
OINTMENT TOPICAL ONCE
OUTPATIENT
Start: 2024-10-23 | End: 2024-10-23

## 2024-10-23 RX ORDER — LIDOCAINE 40 MG/G
CREAM TOPICAL ONCE
OUTPATIENT
Start: 2024-10-23 | End: 2024-10-23

## 2024-10-23 RX ORDER — GENTAMICIN SULFATE 1 MG/G
OINTMENT TOPICAL ONCE
OUTPATIENT
Start: 2024-10-23 | End: 2024-10-23

## 2024-10-23 RX ORDER — SILVER SULFADIAZINE 10 MG/G
CREAM TOPICAL ONCE
OUTPATIENT
Start: 2024-10-23 | End: 2024-10-23

## 2024-10-23 RX ORDER — LIDOCAINE HYDROCHLORIDE 40 MG/ML
SOLUTION TOPICAL ONCE
Status: COMPLETED | OUTPATIENT
Start: 2024-10-23 | End: 2024-10-23

## 2024-10-23 RX ORDER — OXYCODONE AND ACETAMINOPHEN 7.5; 325 MG/1; MG/1
1 TABLET ORAL 2 TIMES DAILY
Qty: 60 TABLET | Refills: 0 | Status: SHIPPED | OUTPATIENT
Start: 2024-10-23 | End: 2024-11-22

## 2024-10-23 RX ADMIN — LIDOCAINE HYDROCHLORIDE 10 ML: 40 SOLUTION TOPICAL at 11:44

## 2024-10-23 ASSESSMENT — PAIN DESCRIPTION - ORIENTATION: ORIENTATION: LEFT

## 2024-10-23 ASSESSMENT — PAIN - FUNCTIONAL ASSESSMENT: PAIN_FUNCTIONAL_ASSESSMENT: PREVENTS OR INTERFERES SOME ACTIVE ACTIVITIES AND ADLS

## 2024-10-23 ASSESSMENT — PAIN DESCRIPTION - DESCRIPTORS: DESCRIPTORS: BURNING;ACHING;THROBBING

## 2024-10-23 ASSESSMENT — PAIN SCALES - GENERAL: PAINLEVEL_OUTOF10: 7

## 2024-10-23 ASSESSMENT — PAIN DESCRIPTION - PAIN TYPE: TYPE: CHRONIC PAIN

## 2024-10-23 ASSESSMENT — PAIN DESCRIPTION - LOCATION: LOCATION: LEG

## 2024-10-23 NOTE — PROGRESS NOTES
Wound Healing Center Followup Visit Note    Referring Physician : Tamie Santizo MD  Anastacia Morel  MEDICAL RECORD NUMBER:  [id number]  AGE: 79 y.o.   GENDER: female  : 1945  EPISODE DATE:  10/23/2024    Subjective:     Chief Complaint   Patient presents with    Wound Check     Left leg      HISTORY of PRESENT ILLNESS HPI   Anastacia Morel is a 79 y.o. female who presents today in regards to follow up evaluation and treatment of wound/ulcer.  That patient's past medical, family and social hx were reviewed and changes were made if present.    History of Wound Context:  Patient has had left calf wound since ~ 2021.  She has been putting a dressing on it.  The wound has not been improving.  She has a hx significant for venous stasis ulcerations of bilateral LE.  She first was seen by myself in regards to these issues 2015.  She eventually healed these wounds 2016.     I last saw her 2021 at which time I recommended lymphedema therapy.  She states she went and said it caused her to much pain and stopped going.  She has chronic issues with bilateral calf pain, swelling and edema.            She admits to not wearing her stockings because of the pain.    She has used knee high 20-30 mm hg stockings in the past.       She is still seeing pain management and is down to percocet 7/5/325 mg daily.       21  aquacell  Double tubigrip  Culture done  Emphasized importance of getting in to more significant compression in the future  12/15/21  Culture reviewed - light growth, no tx  Wound slightly improved  Still significant drainage  Plan on compression wrap next week  21  Stable wound  Drainage slightly better  Pt would like to wait till next week because of holidays to start wrap  22  Wound larger  Profore  22  Wound appearance better  Refusing wrap - it rolled down last week and she cut off after 3 days  22  Wound appearance better  Still refusing wrap   3/2/22  periwound

## 2024-10-30 ENCOUNTER — HOSPITAL ENCOUNTER (OUTPATIENT)
Dept: WOUND CARE | Age: 79
Discharge: HOME | End: 2024-10-30
Attending: SURGERY
Payer: MEDICARE

## 2024-10-30 VITALS
WEIGHT: 230 LBS | DIASTOLIC BLOOD PRESSURE: 67 MMHG | SYSTOLIC BLOOD PRESSURE: 132 MMHG | HEIGHT: 67 IN | BODY MASS INDEX: 36.1 KG/M2 | RESPIRATION RATE: 16 BRPM | TEMPERATURE: 96.3 F | HEART RATE: 83 BPM

## 2024-10-30 DIAGNOSIS — I87.2 VENOUS STASIS ULCER OF LEFT CALF WITH FAT LAYER EXPOSED WITHOUT VARICOSE VEINS (HCC): Primary | ICD-10-CM

## 2024-10-30 DIAGNOSIS — L97.222 VENOUS STASIS ULCER OF LEFT CALF WITH FAT LAYER EXPOSED WITHOUT VARICOSE VEINS (HCC): Primary | ICD-10-CM

## 2024-10-30 PROCEDURE — 11042 DBRDMT SUBQ TIS 1ST 20SQCM/<: CPT

## 2024-10-30 RX ORDER — NEOMYCIN/BACITRACIN/POLYMYXINB 3.5-400-5K
OINTMENT (GRAM) TOPICAL ONCE
OUTPATIENT
Start: 2024-10-30 | End: 2024-10-30

## 2024-10-30 RX ORDER — TRIAMCINOLONE ACETONIDE 1 MG/G
OINTMENT TOPICAL ONCE
OUTPATIENT
Start: 2024-10-30 | End: 2024-10-30

## 2024-10-30 RX ORDER — LIDOCAINE HYDROCHLORIDE 40 MG/ML
SOLUTION TOPICAL ONCE
Status: COMPLETED | OUTPATIENT
Start: 2024-10-30 | End: 2024-10-30

## 2024-10-30 RX ORDER — LIDOCAINE 40 MG/G
CREAM TOPICAL ONCE
OUTPATIENT
Start: 2024-10-30 | End: 2024-10-30

## 2024-10-30 RX ORDER — SILVER SULFADIAZINE 10 MG/G
CREAM TOPICAL ONCE
OUTPATIENT
Start: 2024-10-30 | End: 2024-10-30

## 2024-10-30 RX ORDER — LIDOCAINE HYDROCHLORIDE 20 MG/ML
JELLY TOPICAL ONCE
OUTPATIENT
Start: 2024-10-30 | End: 2024-10-30

## 2024-10-30 RX ORDER — BETAMETHASONE DIPROPIONATE 0.05 %
OINTMENT (GRAM) TOPICAL ONCE
OUTPATIENT
Start: 2024-10-30 | End: 2024-10-30

## 2024-10-30 RX ORDER — LIDOCAINE 50 MG/G
OINTMENT TOPICAL ONCE
OUTPATIENT
Start: 2024-10-30 | End: 2024-10-30

## 2024-10-30 RX ORDER — LIDOCAINE HYDROCHLORIDE 40 MG/ML
SOLUTION TOPICAL ONCE
OUTPATIENT
Start: 2024-10-30 | End: 2024-10-30

## 2024-10-30 RX ORDER — GENTAMICIN SULFATE 1 MG/G
OINTMENT TOPICAL ONCE
OUTPATIENT
Start: 2024-10-30 | End: 2024-10-30

## 2024-10-30 RX ORDER — CLOBETASOL PROPIONATE 0.5 MG/G
OINTMENT TOPICAL ONCE
OUTPATIENT
Start: 2024-10-30 | End: 2024-10-30

## 2024-10-30 RX ORDER — BACITRACIN ZINC AND POLYMYXIN B SULFATE 500; 1000 [USP'U]/G; [USP'U]/G
OINTMENT TOPICAL ONCE
OUTPATIENT
Start: 2024-10-30 | End: 2024-10-30

## 2024-10-30 RX ORDER — MUPIROCIN 20 MG/G
OINTMENT TOPICAL ONCE
OUTPATIENT
Start: 2024-10-30 | End: 2024-10-30

## 2024-10-30 RX ORDER — GINSENG 100 MG
CAPSULE ORAL ONCE
OUTPATIENT
Start: 2024-10-30 | End: 2024-10-30

## 2024-10-30 RX ADMIN — LIDOCAINE HYDROCHLORIDE 15 ML: 40 SOLUTION TOPICAL at 10:24

## 2024-10-30 ASSESSMENT — PAIN SCALES - GENERAL: PAINLEVEL_OUTOF10: 7

## 2024-10-30 ASSESSMENT — PAIN - FUNCTIONAL ASSESSMENT: PAIN_FUNCTIONAL_ASSESSMENT: PREVENTS OR INTERFERES SOME ACTIVE ACTIVITIES AND ADLS

## 2024-10-30 ASSESSMENT — PAIN DESCRIPTION - LOCATION: LOCATION: LEG

## 2024-10-30 ASSESSMENT — PAIN DESCRIPTION - DESCRIPTORS: DESCRIPTORS: ACHING;BURNING;THROBBING

## 2024-10-30 ASSESSMENT — PAIN DESCRIPTION - PAIN TYPE: TYPE: CHRONIC PAIN

## 2024-10-30 ASSESSMENT — PAIN DESCRIPTION - ORIENTATION: ORIENTATION: LEFT

## 2024-10-30 NOTE — PLAN OF CARE
Problem: Wound:  Goal: Will show signs of wound healing; wound closure and no evidence of infection  Description: Will show signs of wound healing; wound closure and no evidence of infection  Outcome: Progressing     Problem: Venous:  Goal: Signs of wound healing will improve  Description: Signs of wound healing will improve  Outcome: Progressing     Problem: Cognitive:  Goal: Knowledge of wound care  Description: Knowledge of wound care  Outcome: Progressing  Goal: Understands risk factors for wounds  Description: Understands risk factors for wounds  Outcome: Progressing

## 2024-10-30 NOTE — PROGRESS NOTES
Wound Healing Center Followup Visit Note    Referring Physician : Tamie Santizo MD  Anastacia Morel  MEDICAL RECORD NUMBER:  [id number]  AGE: 79 y.o.   GENDER: female  : 1945  EPISODE DATE:  10/30/2024    Subjective:     Chief Complaint   Patient presents with    Wound Check     LLE      HISTORY of PRESENT ILLNESS HPI   Anastacia Morel is a 79 y.o. female who presents today in regards to follow up evaluation and treatment of wound/ulcer.  That patient's past medical, family and social hx were reviewed and changes were made if present.    History of Wound Context:  Patient has had left calf wound since ~ 2021.  She has been putting a dressing on it.  The wound has not been improving.  She has a hx significant for venous stasis ulcerations of bilateral LE.  She first was seen by myself in regards to these issues 2015.  She eventually healed these wounds 2016.     I last saw her 2021 at which time I recommended lymphedema therapy.  She states she went and said it caused her to much pain and stopped going.  She has chronic issues with bilateral calf pain, swelling and edema.            She admits to not wearing her stockings because of the pain.    She has used knee high 20-30 mm hg stockings in the past.       She is still seeing pain management and is down to percocet 7/5/325 mg daily.       21  aquacell  Double tubigrip  Culture done  Emphasized importance of getting in to more significant compression in the future  12/15/21  Culture reviewed - light growth, no tx  Wound slightly improved  Still significant drainage  Plan on compression wrap next week  21  Stable wound  Drainage slightly better  Pt would like to wait till next week because of holidays to start wrap  22  Wound larger  Profore  22  Wound appearance better  Refusing wrap - it rolled down last week and she cut off after 3 days  22  Wound appearance better  Still refusing wrap   3/2/22  periwound

## 2024-11-04 NOTE — DISCHARGE INSTRUCTIONS
Written       Visit Discharge/Physician Orders     Discharge condition: Stable     Assessment of pain at discharge: mild     Anesthetic used: lido 4%     Discharge to: Home     Left via:Private automobile     Accompanied by: self     ECF/H[facility]: [facility]      Dressing Orders:  LEFT PRETIB : Cleanse with normal saline, cover with calcium alginate, and ABD pad, dry dressing and secure, and Spandagrip. Change daily        Treatment Orders: Eat a diet high in protein and vitamin C. Take a multiple vitamin daily unless contraindicated.      Elevate as much as possible     C followup visit: 1 week Dr. HATFIELD  ____________________________  (Please note your next appointment above and if you are unable to keep, kindly give a 24 hour notice. Thank you.)     Physician signature:__________________________      If you experience any of the following, please call the Wound Care Center during business hours:     * Increase in Pain  * Temperature over 101  * Increase in drainage from your wound  * Drainage with a foul odor  * Bleeding  * Increase in swelling  * Need for compression bandage changes due to slippage, breakthrough drainage.     If you need medical attention outside of the business hours of the Wound Care Centers please contact your PCP or go to the nearest emergency room.

## 2024-11-06 ENCOUNTER — HOSPITAL ENCOUNTER (OUTPATIENT)
Dept: WOUND CARE | Age: 79
Discharge: HOME | End: 2024-11-06
Attending: SURGERY

## 2024-11-13 ENCOUNTER — HOSPITAL ENCOUNTER (OUTPATIENT)
Dept: WOUND CARE | Age: 79
Discharge: HOME | End: 2024-11-13
Attending: SURGERY
Payer: MEDICARE

## 2024-11-13 VITALS
BODY MASS INDEX: 36.1 KG/M2 | HEIGHT: 67 IN | DIASTOLIC BLOOD PRESSURE: 82 MMHG | HEART RATE: 80 BPM | WEIGHT: 230 LBS | RESPIRATION RATE: 18 BRPM | TEMPERATURE: 98.6 F | SYSTOLIC BLOOD PRESSURE: 156 MMHG

## 2024-11-13 DIAGNOSIS — I87.2 VENOUS STASIS ULCER OF LEFT CALF WITH FAT LAYER EXPOSED WITHOUT VARICOSE VEINS (HCC): Primary | Chronic | ICD-10-CM

## 2024-11-13 DIAGNOSIS — L97.222 VENOUS STASIS ULCER OF LEFT CALF WITH FAT LAYER EXPOSED WITHOUT VARICOSE VEINS (HCC): Primary | Chronic | ICD-10-CM

## 2024-11-13 PROCEDURE — 11045 DBRDMT SUBQ TISS EACH ADDL: CPT

## 2024-11-13 PROCEDURE — 11042 DBRDMT SUBQ TIS 1ST 20SQCM/<: CPT | Performed by: SURGERY

## 2024-11-13 PROCEDURE — 11045 DBRDMT SUBQ TISS EACH ADDL: CPT | Performed by: SURGERY

## 2024-11-13 PROCEDURE — 11042 DBRDMT SUBQ TIS 1ST 20SQCM/<: CPT

## 2024-11-13 RX ORDER — SILVER SULFADIAZINE 10 MG/G
CREAM TOPICAL ONCE
OUTPATIENT
Start: 2024-11-13 | End: 2024-11-13

## 2024-11-13 RX ORDER — OXYCODONE AND ACETAMINOPHEN 7.5; 325 MG/1; MG/1
1 TABLET ORAL EVERY 8 HOURS PRN
Qty: 120 TABLET | Refills: 0 | Status: SHIPPED | OUTPATIENT
Start: 2024-11-13 | End: 2024-12-23

## 2024-11-13 RX ORDER — GENTAMICIN SULFATE 1 MG/G
OINTMENT TOPICAL ONCE
OUTPATIENT
Start: 2024-11-13 | End: 2024-11-13

## 2024-11-13 RX ORDER — GINSENG 100 MG
CAPSULE ORAL ONCE
OUTPATIENT
Start: 2024-11-13 | End: 2024-11-13

## 2024-11-13 RX ORDER — LIDOCAINE HYDROCHLORIDE 20 MG/ML
JELLY TOPICAL ONCE
OUTPATIENT
Start: 2024-11-13 | End: 2024-11-13

## 2024-11-13 RX ORDER — CLOBETASOL PROPIONATE 0.5 MG/G
OINTMENT TOPICAL ONCE
OUTPATIENT
Start: 2024-11-13 | End: 2024-11-13

## 2024-11-13 RX ORDER — NEOMYCIN/BACITRACIN/POLYMYXINB 3.5-400-5K
OINTMENT (GRAM) TOPICAL ONCE
OUTPATIENT
Start: 2024-11-13 | End: 2024-11-13

## 2024-11-13 RX ORDER — MORPHINE SULFATE 15 MG/1
15 TABLET, FILM COATED, EXTENDED RELEASE ORAL 2 TIMES DAILY
Qty: 60 TABLET | Refills: 0 | Status: SHIPPED | OUTPATIENT
Start: 2024-11-23 | End: 2024-12-23

## 2024-11-13 RX ORDER — LIDOCAINE 50 MG/G
OINTMENT TOPICAL ONCE
OUTPATIENT
Start: 2024-11-13 | End: 2024-11-13

## 2024-11-13 RX ORDER — LIDOCAINE HYDROCHLORIDE 40 MG/ML
SOLUTION TOPICAL ONCE
Status: COMPLETED | OUTPATIENT
Start: 2024-11-13 | End: 2024-11-13

## 2024-11-13 RX ORDER — BETAMETHASONE DIPROPIONATE 0.05 %
OINTMENT (GRAM) TOPICAL ONCE
OUTPATIENT
Start: 2024-11-13 | End: 2024-11-13

## 2024-11-13 RX ORDER — TRIAMCINOLONE ACETONIDE 1 MG/G
OINTMENT TOPICAL ONCE
OUTPATIENT
Start: 2024-11-13 | End: 2024-11-13

## 2024-11-13 RX ORDER — BACITRACIN ZINC AND POLYMYXIN B SULFATE 500; 1000 [USP'U]/G; [USP'U]/G
OINTMENT TOPICAL ONCE
OUTPATIENT
Start: 2024-11-13 | End: 2024-11-13

## 2024-11-13 RX ORDER — LIDOCAINE 40 MG/G
CREAM TOPICAL ONCE
OUTPATIENT
Start: 2024-11-13 | End: 2024-11-13

## 2024-11-13 RX ORDER — MUPIROCIN 20 MG/G
OINTMENT TOPICAL ONCE
OUTPATIENT
Start: 2024-11-13 | End: 2024-11-13

## 2024-11-13 RX ORDER — LIDOCAINE HYDROCHLORIDE 40 MG/ML
SOLUTION TOPICAL ONCE
OUTPATIENT
Start: 2024-11-13 | End: 2024-11-13

## 2024-11-13 RX ADMIN — LIDOCAINE HYDROCHLORIDE 20 ML: 40 SOLUTION TOPICAL at 10:46

## 2024-11-13 ASSESSMENT — PAIN DESCRIPTION - PROGRESSION: CLINICAL_PROGRESSION: NOT CHANGED

## 2024-11-13 NOTE — PROGRESS NOTES
Wound Healing Center Followup Visit Note    Referring Physician : Tamie Santizo MD  Anastacia Morel  MEDICAL RECORD NUMBER:  [id number]  AGE: 79 y.o.   GENDER: female  : 1945  EPISODE DATE:  2024    Subjective:     Chief Complaint   Patient presents with    Wound Check     Left lower leg      HISTORY of PRESENT ILLNESS HPI   Anastacia Morel is a 79 y.o. female who presents today in regards to follow up evaluation and treatment of wound/ulcer.  That patient's past medical, family and social hx were reviewed and changes were made if present.    History of Wound Context:  Patient has had left calf wound since ~ 2021.  She has been putting a dressing on it.  The wound has not been improving.  She has a hx significant for venous stasis ulcerations of bilateral LE.  She first was seen by myself in regards to these issues 2015.  She eventually healed these wounds 2016.     I last saw her 2021 at which time I recommended lymphedema therapy.  She states she went and said it caused her to much pain and stopped going.  She has chronic issues with bilateral calf pain, swelling and edema.            She admits to not wearing her stockings because of the pain.    She has used knee high 20-30 mm hg stockings in the past.       She is still seeing pain management and is down to percocet /325 mg daily.       21  aquacell  Double tubigrip  Culture done  Emphasized importance of getting in to more significant compression in the future  12/15/21  Culture reviewed - light growth, no tx  Wound slightly improved  Still significant drainage  Plan on compression wrap next week  21  Stable wound  Drainage slightly better  Pt would like to wait till next week because of holidays to start wrap  22  Wound larger  Profore  22  Wound appearance better  Refusing wrap - it rolled down last week and she cut off after 3 days  22  Wound appearance better  Still refusing wrap

## 2024-11-18 NOTE — DISCHARGE INSTRUCTIONS
Visit Discharge/Physician Orders     Discharge condition: Stable     Assessment of pain at discharge: mild     Anesthetic used: lido 4%     Discharge to: Home     Left via:Private automobile     Accompanied by: self     [facility]F/[facility]: [facility]      Dressing Orders:  LEFT PRETIB : Cleanse with normal saline, cover with calcium alginate, and ABD pad, dry dressing and secure, and Spandagrip. Change daily        Treatment Orders: Eat a diet high in protein and vitamin C. Take a multiple vitamin daily unless contraindicated.      Elevate as much as possible    11/20/24 Compression to be ordered. ([facility])      [facility] followup visit: 2 week Dr. HATFIELD  ____________________________  (Please note your next appointment above and if you are unable to keep, kindly give a 24 hour notice. Thank you.)     Physician signature:__________________________      If you experience any of the following, please call the Wound Care Center during business hours:     * Increase in Pain  * Temperature over 101  * Increase in drainage from your wound  * Drainage with a foul odor  * Bleeding  * Increase in swelling  * Need for compression bandage changes due to slippage, breakthrough drainage.     If you need medical attention outside of the business hours of the Wound Care Centers please contact your PCP or go to the nearest emergency room.

## 2024-11-20 ENCOUNTER — HOSPITAL ENCOUNTER (OUTPATIENT)
Dept: WOUND CARE | Age: 79
Discharge: HOME | End: 2024-11-20
Attending: SURGERY
Payer: MEDICARE

## 2024-11-20 VITALS
DIASTOLIC BLOOD PRESSURE: 83 MMHG | HEART RATE: 82 BPM | RESPIRATION RATE: 18 BRPM | HEIGHT: 67 IN | TEMPERATURE: 96.9 F | SYSTOLIC BLOOD PRESSURE: 165 MMHG | WEIGHT: 230 LBS | BODY MASS INDEX: 36.1 KG/M2

## 2024-11-20 DIAGNOSIS — I87.2 VENOUS STASIS ULCER OF LEFT CALF WITH FAT LAYER EXPOSED WITHOUT VARICOSE VEINS (HCC): Primary | Chronic | ICD-10-CM

## 2024-11-20 DIAGNOSIS — L97.222 VENOUS STASIS ULCER OF LEFT CALF WITH FAT LAYER EXPOSED WITHOUT VARICOSE VEINS (HCC): Primary | Chronic | ICD-10-CM

## 2024-11-20 PROCEDURE — 11042 DBRDMT SUBQ TIS 1ST 20SQCM/<: CPT

## 2024-11-20 PROCEDURE — 11042 DBRDMT SUBQ TIS 1ST 20SQCM/<: CPT | Performed by: SURGERY

## 2024-11-20 RX ORDER — SILVER SULFADIAZINE 10 MG/G
CREAM TOPICAL ONCE
OUTPATIENT
Start: 2024-11-20 | End: 2024-11-20

## 2024-11-20 RX ORDER — BACITRACIN ZINC AND POLYMYXIN B SULFATE 500; 1000 [USP'U]/G; [USP'U]/G
OINTMENT TOPICAL ONCE
OUTPATIENT
Start: 2024-11-20 | End: 2024-11-20

## 2024-11-20 RX ORDER — GINSENG 100 MG
CAPSULE ORAL ONCE
OUTPATIENT
Start: 2024-11-20 | End: 2024-11-20

## 2024-11-20 RX ORDER — LIDOCAINE HYDROCHLORIDE 40 MG/ML
SOLUTION TOPICAL ONCE
Status: COMPLETED | OUTPATIENT
Start: 2024-11-20 | End: 2024-11-20

## 2024-11-20 RX ORDER — LIDOCAINE HYDROCHLORIDE 40 MG/ML
SOLUTION TOPICAL ONCE
OUTPATIENT
Start: 2024-11-20 | End: 2024-11-20

## 2024-11-20 RX ORDER — GENTAMICIN SULFATE 1 MG/G
OINTMENT TOPICAL ONCE
OUTPATIENT
Start: 2024-11-20 | End: 2024-11-20

## 2024-11-20 RX ORDER — LIDOCAINE 40 MG/G
CREAM TOPICAL ONCE
OUTPATIENT
Start: 2024-11-20 | End: 2024-11-20

## 2024-11-20 RX ORDER — CLOBETASOL PROPIONATE 0.5 MG/G
OINTMENT TOPICAL ONCE
OUTPATIENT
Start: 2024-11-20 | End: 2024-11-20

## 2024-11-20 RX ORDER — LIDOCAINE HYDROCHLORIDE 20 MG/ML
JELLY TOPICAL ONCE
OUTPATIENT
Start: 2024-11-20 | End: 2024-11-20

## 2024-11-20 RX ORDER — LIDOCAINE 50 MG/G
OINTMENT TOPICAL ONCE
OUTPATIENT
Start: 2024-11-20 | End: 2024-11-20

## 2024-11-20 RX ORDER — NEOMYCIN/BACITRACIN/POLYMYXINB 3.5-400-5K
OINTMENT (GRAM) TOPICAL ONCE
OUTPATIENT
Start: 2024-11-20 | End: 2024-11-20

## 2024-11-20 RX ORDER — BETAMETHASONE DIPROPIONATE 0.05 %
OINTMENT (GRAM) TOPICAL ONCE
OUTPATIENT
Start: 2024-11-20 | End: 2024-11-20

## 2024-11-20 RX ORDER — TRIAMCINOLONE ACETONIDE 1 MG/G
OINTMENT TOPICAL ONCE
OUTPATIENT
Start: 2024-11-20 | End: 2024-11-20

## 2024-11-20 RX ORDER — MUPIROCIN 20 MG/G
OINTMENT TOPICAL ONCE
OUTPATIENT
Start: 2024-11-20 | End: 2024-11-20

## 2024-11-20 RX ADMIN — LIDOCAINE HYDROCHLORIDE 10 ML: 40 SOLUTION TOPICAL at 11:26

## 2024-11-20 ASSESSMENT — PAIN DESCRIPTION - DESCRIPTORS: DESCRIPTORS: ACHING;BURNING

## 2024-11-20 ASSESSMENT — PAIN SCALES - GENERAL: PAINLEVEL_OUTOF10: 6

## 2024-11-20 ASSESSMENT — PAIN DESCRIPTION - ONSET: ONSET: ON-GOING

## 2024-11-20 ASSESSMENT — PAIN DESCRIPTION - PAIN TYPE: TYPE: CHRONIC PAIN

## 2024-11-20 ASSESSMENT — PAIN DESCRIPTION - ORIENTATION: ORIENTATION: LEFT

## 2024-11-20 ASSESSMENT — PAIN DESCRIPTION - FREQUENCY: FREQUENCY: INTERMITTENT

## 2024-11-20 ASSESSMENT — PAIN - FUNCTIONAL ASSESSMENT: PAIN_FUNCTIONAL_ASSESSMENT: PREVENTS OR INTERFERES SOME ACTIVE ACTIVITIES AND ADLS

## 2024-11-20 ASSESSMENT — PAIN DESCRIPTION - LOCATION: LOCATION: LEG

## 2024-11-20 NOTE — PROGRESS NOTES
Compression Garments    Supply Company for Compression Stockings:     [facility] [facility] [address] [facility] [address] [place] [address]  p: [phone] f: [phone]     Ordering Center:     [facility]  [address] [facility] [address]  [phone]  WOUND CARE Dept: [phone]   FAX NUMBER [phone]    Patient Information:      Rock Morel  [address] [place] [id number]   [phone]   : 1945  AGE: 79 y.o.     GENDER: female   TODAYS DATE:  2024    Insurance:      PRIMARY INSURANCE:  Plan: [employer] + DUAL COMPLETE  Coverage: [facility] MEDICARE  Effective Date: 2022  Group Number: [unfilled]  Subscriber Number: [id number] - (Medicare Managed)    Payer/Plan Subscr  Sex Relation Sub. Ins. ID Effective Group Num   1. [facility] MEDICARE * [place] JO 1945 Female Self [id number]                                     [address]       Patient Information:      Problem List Items Addressed This Visit          Other    Venous stasis ulcer of left calf with fat layer exposed without varicose veins (HCC) - Primary (Chronic)    Relevant Orders    Initiate Outpatient Wound Care Protocol       Wound 23 Leg Left;Lower #2 (Active)   Wound Image   10/23/24 1138   Wound Etiology Traumatic 23 1610   Dressing Status New dressing applied 24 1150   Wound Cleansed Cleansed with saline 24 1150   Dressing/Treatment Alginate;ABD;Dry dressing 24 1150   Wound Length (cm) 9.9 cm 24 1122   Wound Width (cm) 10.8 cm 24 1122   Wound Depth (cm) 0.5 cm 24 1122   Wound Surface Area (cm^2) 106.92 cm^2 24 1122   Change in Wound Size % (l*w) -1769.23 24 1122   Wound Volume (cm^3) 53.46 cm^3 24 1122   Wound Healing % -9246 24 1122   Post-Procedure Length (cm) 9.9 cm 24   Post-Procedure Width (cm) 10.8 cm 24   Post-Procedure Depth (cm) 0.6 cm 24   Post-Procedure Surface 
1995     Years since quittin.0    Smokeless tobacco: Never    Tobacco comments:     Quit   Vaping Use    Vaping status: Never Used   Substance Use Topics    Alcohol use: No    Drug use: No     Allergies   Allergen Reactions    Codeine Nausea And Vomiting and Other (See Comments)     hallucinate    Dilaudid [Hydromorphone Hcl] Rash    Naproxen Other (See Comments)     Shuts down kidney function    Singulair [Montelukast Sodium] Shortness Of Breath     Mucus in chest    Black Cohosh     Black Cohosh [Cimicifuga [place] (Black Cohosh)] Rash     Current Outpatient Medications on File Prior to Encounter   Medication Sig Dispense Refill    oxyCODONE-acetaminophen (PERCOCET) 7.5-325 MG per tablet Take 1 tablet by mouth every 8 hours as needed for Pain for up to 40 days. Intended supply: 30 days Max Daily Amount: 3 tablets 120 tablet 0    naloxone 4 MG/0.1ML LIQD nasal spray 1 spray by Nasal route as needed for Opioid Reversal 1 each 5    morphine (MS CONTIN) 15 MG extended release tablet Take 1 tablet by mouth 2 times daily for 30 days. Max Daily Amount: 30 mg 60 tablet 0    naloxone 4 MG/0.1ML LIQD nasal spray 1 spray by Nasal route as needed for Opioid Reversal 1 each 5    gabapentin (NEURONTIN) 300 MG capsule Take 1 capsule by mouth 3 times daily for 120 days. 90 capsule 3    losartan (COZAAR) 25 MG tablet Take 1 tablet by mouth daily      metoprolol succinate (TOPROL XL) 50 MG extended release tablet Take 1 tablet by mouth daily      aspirin 81 MG EC tablet Take 1 tablet by mouth daily      Garlic 10 MG CAPS Take by mouth      hydroCHLOROthiazide (HYDRODIURIL) 25 MG tablet Take 1 tablet by mouth daily      Multiple Vitamins-Minerals (THERAPEUTIC MULTIVITAMIN-MINERALS) tablet Take 1 tablet by mouth daily      simvastatin (ZOCOR) 40 MG tablet Take 1 tablet by mouth nightly      Cholecalciferol (VITAMIN D) 2000 UNITS CAPS capsule Take 1 capsule by mouth daily      ferrous sulfate 325 (65 FE) MG tablet Take 1

## 2024-12-02 NOTE — DISCHARGE INSTRUCTIONS
Visit Discharge/Physician Orders     Discharge condition: Stable     Assessment of pain at discharge: mild     Anesthetic used: lido 4%     Discharge to: Home     Left via:Private automobile     Accompanied by: self     ECF/H[facility]: [facility]      Dressing Orders:  LEFT PRETIB : Cleanse with normal saline, cover with calcium alginate, and ABD pad, dry dressing and secure, and Spandagrip. Change daily        Treatment Orders: Eat a diet high in protein and vitamin C. Take a multiple vitamin daily unless contraindicated.      Elevate as much as possible     11/20/24 Compression to be ordered. ([place])      [facility] followup visit: 2 week Dr. HATFIELD  ____________________________  (Please note your next appointment above and if you are unable to keep, kindly give a 24 hour notice. Thank you.)     Physician signature:__________________________      If you experience any of the following, please call the Wound Care Center during business hours:     * Increase in Pain  * Temperature over 101  * Increase in drainage from your wound  * Drainage with a foul odor  * Bleeding  * Increase in swelling  * Need for compression bandage changes due to slippage, breakthrough drainage.     If you need medical attention outside of the business hours of the Wound Care Centers please contact your PCP or go to the nearest emergency room.

## 2024-12-04 ENCOUNTER — HOSPITAL ENCOUNTER (OUTPATIENT)
Dept: WOUND CARE | Age: 79
Discharge: HOME | End: 2024-12-04
Attending: SURGERY

## 2024-12-09 NOTE — DISCHARGE INSTRUCTIONS
Visit Discharge/Physician Orders     Discharge condition: Stable     Assessment of pain at discharge: mild     Anesthetic used: lido 4%     Discharge to: Home     Left via:Private automobile     Accompanied by: self     ECF/HHA: [facility]      Dressing Orders:  LEFT PRETIB : Cleanse with normal saline, cover with calcium alginate, and ABD pad, dry dressing and secure, and Spandagrip. Change daily        Treatment Orders: Eat a diet high in protein and vitamin C. Take a multiple vitamin daily unless contraindicated.      Elevate as much as possible     11/20/24 Compression to be ordered. ([place])      [facility] followup visit: 1 week Dr. HATFIELD  ____________________________  (Please note your next appointment above and if you are unable to keep, kindly give a 24 hour notice. Thank you.)     Physician signature:__________________________      If you experience any of the following, please call the Wound Care Center during business hours:     * Increase in Pain  * Temperature over 101  * Increase in drainage from your wound  * Drainage with a foul odor  * Bleeding  * Increase in swelling  * Need for compression bandage changes due to slippage, breakthrough drainage.     If you need medical attention outside of the business hours of the Wound Care Centers please contact your PCP or go to the nearest emergency room.

## 2024-12-11 ENCOUNTER — HOSPITAL ENCOUNTER (OUTPATIENT)
Dept: WOUND CARE | Age: 79
Discharge: HOME | End: 2024-12-11
Attending: SURGERY
Payer: MEDICARE

## 2024-12-11 VITALS
BODY MASS INDEX: 36.1 KG/M2 | SYSTOLIC BLOOD PRESSURE: 152 MMHG | WEIGHT: 230 LBS | TEMPERATURE: 97.3 F | HEART RATE: 82 BPM | HEIGHT: 67 IN | RESPIRATION RATE: 18 BRPM | DIASTOLIC BLOOD PRESSURE: 84 MMHG

## 2024-12-11 DIAGNOSIS — L97.222 VENOUS STASIS ULCER OF LEFT CALF WITH FAT LAYER EXPOSED WITHOUT VARICOSE VEINS (HCC): Primary | Chronic | ICD-10-CM

## 2024-12-11 DIAGNOSIS — I87.2 VENOUS STASIS ULCER OF LEFT CALF WITH FAT LAYER EXPOSED WITHOUT VARICOSE VEINS (HCC): Primary | Chronic | ICD-10-CM

## 2024-12-11 PROCEDURE — 11042 DBRDMT SUBQ TIS 1ST 20SQCM/<: CPT

## 2024-12-11 PROCEDURE — 11045 DBRDMT SUBQ TISS EACH ADDL: CPT

## 2024-12-11 RX ORDER — LIDOCAINE 50 MG/G
OINTMENT TOPICAL ONCE
OUTPATIENT
Start: 2024-12-11 | End: 2024-12-11

## 2024-12-11 RX ORDER — MUPIROCIN 20 MG/G
OINTMENT TOPICAL ONCE
OUTPATIENT
Start: 2024-12-11 | End: 2024-12-11

## 2024-12-11 RX ORDER — BACITRACIN ZINC AND POLYMYXIN B SULFATE 500; 1000 [USP'U]/G; [USP'U]/G
OINTMENT TOPICAL ONCE
OUTPATIENT
Start: 2024-12-11 | End: 2024-12-11

## 2024-12-11 RX ORDER — GINSENG 100 MG
CAPSULE ORAL ONCE
OUTPATIENT
Start: 2024-12-11 | End: 2024-12-11

## 2024-12-11 RX ORDER — LIDOCAINE HYDROCHLORIDE 40 MG/ML
SOLUTION TOPICAL ONCE
Status: COMPLETED | OUTPATIENT
Start: 2024-12-11 | End: 2024-12-11

## 2024-12-11 RX ORDER — BETAMETHASONE DIPROPIONATE 0.05 %
OINTMENT (GRAM) TOPICAL ONCE
OUTPATIENT
Start: 2024-12-11 | End: 2024-12-11

## 2024-12-11 RX ORDER — GENTAMICIN SULFATE 1 MG/G
OINTMENT TOPICAL ONCE
OUTPATIENT
Start: 2024-12-11 | End: 2024-12-11

## 2024-12-11 RX ORDER — LIDOCAINE HYDROCHLORIDE 40 MG/ML
SOLUTION TOPICAL ONCE
OUTPATIENT
Start: 2024-12-11 | End: 2024-12-11

## 2024-12-11 RX ORDER — LIDOCAINE HYDROCHLORIDE 20 MG/ML
JELLY TOPICAL ONCE
OUTPATIENT
Start: 2024-12-11 | End: 2024-12-11

## 2024-12-11 RX ORDER — LIDOCAINE 40 MG/G
CREAM TOPICAL ONCE
OUTPATIENT
Start: 2024-12-11 | End: 2024-12-11

## 2024-12-11 RX ORDER — SILVER SULFADIAZINE 10 MG/G
CREAM TOPICAL ONCE
OUTPATIENT
Start: 2024-12-11 | End: 2024-12-11

## 2024-12-11 RX ORDER — NEOMYCIN/BACITRACIN/POLYMYXINB 3.5-400-5K
OINTMENT (GRAM) TOPICAL ONCE
OUTPATIENT
Start: 2024-12-11 | End: 2024-12-11

## 2024-12-11 RX ORDER — TRIAMCINOLONE ACETONIDE 1 MG/G
OINTMENT TOPICAL ONCE
OUTPATIENT
Start: 2024-12-11 | End: 2024-12-11

## 2024-12-11 RX ORDER — CLOBETASOL PROPIONATE 0.5 MG/G
OINTMENT TOPICAL ONCE
OUTPATIENT
Start: 2024-12-11 | End: 2024-12-11

## 2024-12-11 RX ADMIN — LIDOCAINE HYDROCHLORIDE: 40 SOLUTION TOPICAL at 10:47

## 2024-12-11 ASSESSMENT — PAIN - FUNCTIONAL ASSESSMENT: PAIN_FUNCTIONAL_ASSESSMENT: PREVENTS OR INTERFERES SOME ACTIVE ACTIVITIES AND ADLS

## 2024-12-11 ASSESSMENT — PAIN DESCRIPTION - LOCATION: LOCATION: LEG

## 2024-12-11 ASSESSMENT — PAIN DESCRIPTION - ONSET: ONSET: ON-GOING

## 2024-12-11 ASSESSMENT — PAIN SCALES - GENERAL: PAINLEVEL_OUTOF10: 6

## 2024-12-11 ASSESSMENT — PAIN DESCRIPTION - FREQUENCY: FREQUENCY: INTERMITTENT

## 2024-12-11 ASSESSMENT — PAIN DESCRIPTION - PAIN TYPE: TYPE: CHRONIC PAIN

## 2024-12-11 ASSESSMENT — PAIN DESCRIPTION - ORIENTATION: ORIENTATION: LEFT

## 2024-12-11 ASSESSMENT — PAIN DESCRIPTION - DESCRIPTORS: DESCRIPTORS: ACHING;BURNING

## 2024-12-26 ENCOUNTER — HOSPITAL ENCOUNTER (OUTPATIENT)
Dept: WOUND CARE | Age: 79
Discharge: HOME | End: 2024-12-26
Attending: SURGERY
Payer: MEDICARE

## 2024-12-26 VITALS
SYSTOLIC BLOOD PRESSURE: 164 MMHG | DIASTOLIC BLOOD PRESSURE: 75 MMHG | HEART RATE: 86 BPM | TEMPERATURE: 96.2 F | RESPIRATION RATE: 18 BRPM | WEIGHT: 230 LBS | BODY MASS INDEX: 36.1 KG/M2 | HEIGHT: 67 IN

## 2024-12-26 DIAGNOSIS — L97.222 VENOUS STASIS ULCER OF LEFT CALF WITH FAT LAYER EXPOSED WITHOUT VARICOSE VEINS (HCC): Chronic | ICD-10-CM

## 2024-12-26 DIAGNOSIS — I89.0 LYMPHEDEMA: Primary | ICD-10-CM

## 2024-12-26 DIAGNOSIS — I87.2 VENOUS STASIS ULCER OF LEFT CALF WITH FAT LAYER EXPOSED WITHOUT VARICOSE VEINS (HCC): Chronic | ICD-10-CM

## 2024-12-26 PROCEDURE — 11045 DBRDMT SUBQ TISS EACH ADDL: CPT

## 2024-12-26 PROCEDURE — 11042 DBRDMT SUBQ TIS 1ST 20SQCM/<: CPT

## 2024-12-26 RX ORDER — CLOBETASOL PROPIONATE 0.5 MG/G
OINTMENT TOPICAL ONCE
OUTPATIENT
Start: 2024-12-26 | End: 2024-12-26

## 2024-12-26 RX ORDER — GENTAMICIN SULFATE 1 MG/G
OINTMENT TOPICAL ONCE
OUTPATIENT
Start: 2024-12-26 | End: 2024-12-26

## 2024-12-26 RX ORDER — LIDOCAINE 40 MG/G
CREAM TOPICAL ONCE
OUTPATIENT
Start: 2024-12-26 | End: 2024-12-26

## 2024-12-26 RX ORDER — LIDOCAINE HYDROCHLORIDE 40 MG/ML
SOLUTION TOPICAL ONCE
Status: COMPLETED | OUTPATIENT
Start: 2024-12-26 | End: 2024-12-26

## 2024-12-26 RX ORDER — LIDOCAINE HYDROCHLORIDE 20 MG/ML
JELLY TOPICAL ONCE
OUTPATIENT
Start: 2024-12-26 | End: 2024-12-26

## 2024-12-26 RX ORDER — LIDOCAINE 50 MG/G
OINTMENT TOPICAL ONCE
OUTPATIENT
Start: 2024-12-26 | End: 2024-12-26

## 2024-12-26 RX ORDER — SILVER SULFADIAZINE 10 MG/G
CREAM TOPICAL ONCE
OUTPATIENT
Start: 2024-12-26 | End: 2024-12-26

## 2024-12-26 RX ORDER — MUPIROCIN 20 MG/G
OINTMENT TOPICAL ONCE
OUTPATIENT
Start: 2024-12-26 | End: 2024-12-26

## 2024-12-26 RX ORDER — LIDOCAINE HYDROCHLORIDE 40 MG/ML
SOLUTION TOPICAL ONCE
OUTPATIENT
Start: 2024-12-26 | End: 2024-12-26

## 2024-12-26 RX ORDER — BETAMETHASONE DIPROPIONATE 0.05 %
OINTMENT (GRAM) TOPICAL ONCE
OUTPATIENT
Start: 2024-12-26 | End: 2024-12-26

## 2024-12-26 RX ORDER — NEOMYCIN/BACITRACIN/POLYMYXINB 3.5-400-5K
OINTMENT (GRAM) TOPICAL ONCE
OUTPATIENT
Start: 2024-12-26 | End: 2024-12-26

## 2024-12-26 RX ORDER — TRIAMCINOLONE ACETONIDE 1 MG/G
OINTMENT TOPICAL ONCE
OUTPATIENT
Start: 2024-12-26 | End: 2024-12-26

## 2024-12-26 RX ORDER — BACITRACIN ZINC AND POLYMYXIN B SULFATE 500; 1000 [USP'U]/G; [USP'U]/G
OINTMENT TOPICAL ONCE
OUTPATIENT
Start: 2024-12-26 | End: 2024-12-26

## 2024-12-26 RX ORDER — GINSENG 100 MG
CAPSULE ORAL ONCE
OUTPATIENT
Start: 2024-12-26 | End: 2024-12-26

## 2024-12-26 RX ADMIN — LIDOCAINE HYDROCHLORIDE 10 ML: 40 SOLUTION TOPICAL at 13:47

## 2024-12-26 ASSESSMENT — PAIN DESCRIPTION - ORIENTATION: ORIENTATION: LEFT

## 2024-12-26 ASSESSMENT — PAIN DESCRIPTION - PAIN TYPE: TYPE: CHRONIC PAIN

## 2024-12-26 ASSESSMENT — PAIN SCALES - GENERAL: PAINLEVEL_OUTOF10: 6

## 2024-12-26 ASSESSMENT — PAIN DESCRIPTION - ONSET: ONSET: ON-GOING

## 2024-12-26 ASSESSMENT — PAIN DESCRIPTION - FREQUENCY: FREQUENCY: INTERMITTENT

## 2024-12-26 ASSESSMENT — PAIN - FUNCTIONAL ASSESSMENT: PAIN_FUNCTIONAL_ASSESSMENT: PREVENTS OR INTERFERES SOME ACTIVE ACTIVITIES AND ADLS

## 2024-12-26 ASSESSMENT — PAIN DESCRIPTION - DESCRIPTORS: DESCRIPTORS: ACHING;BURNING

## 2024-12-26 ASSESSMENT — PAIN DESCRIPTION - LOCATION: LOCATION: LEG

## 2024-12-26 NOTE — PROGRESS NOTES
Wound Healing Center Followup Visit Note    Referring Physician : Tamie Santizo MD  Anastacia Morel  MEDICAL RECORD NUMBER:  [id number]  AGE: 79 y.o.   GENDER: female  : 1945  EPISODE DATE:  2024    Subjective:     Chief Complaint   Patient presents with    Wound Check     Left leg      HISTORY of PRESENT ILLNESS HPI   Anastacia Morel is a 79 y.o. female who presents today in regards to follow up evaluation and treatment of wound/ulcer.  That patient's past medical, family and social hx were reviewed and changes were made if present.    History of Wound Context:  Patient has had left calf wound since ~ 2021.  She has been putting a dressing on it.  The wound has not been improving.  She has a hx significant for venous stasis ulcerations of bilateral LE.  She first was seen by myself in regards to these issues 2015.  She eventually healed these wounds 2016.     I last saw her 2021 at which time I recommended lymphedema therapy.  She states she went and said it caused her to much pain and stopped going.  She has chronic issues with bilateral calf pain, swelling and edema.            She admits to not wearing her stockings because of the pain.    She has used knee high 20-30 mm hg stockings in the past.       She is still seeing pain management and is down to percocet 7/5/325 mg daily.       21  aquacell  Double tubigrip  Culture done  Emphasized importance of getting in to more significant compression in the future  12/15/21  Culture reviewed - light growth, no tx  Wound slightly improved  Still significant drainage  Plan on compression wrap next week  21  Stable wound  Drainage slightly better  Pt would like to wait till next week because of holidays to start wrap  22  Wound larger  Profore  22  Wound appearance better  Refusing wrap - it rolled down last week and she cut off after 3 days  22  Wound appearance better  Still refusing wrap   3/2/22  periwound

## 2025-01-05 ENCOUNTER — APPOINTMENT (OUTPATIENT)
Dept: GENERAL RADIOLOGY | Age: 80
End: 2025-01-05
Payer: MEDICARE

## 2025-01-05 ENCOUNTER — APPOINTMENT (OUTPATIENT)
Dept: CT IMAGING | Age: 80
End: 2025-01-05
Payer: MEDICARE

## 2025-01-05 ENCOUNTER — HOSPITAL ENCOUNTER (INPATIENT)
Age: 80
LOS: 5 days | Discharge: HOME | End: 2025-01-10
Attending: EMERGENCY MEDICINE | Admitting: HOSPITALIST
Payer: MEDICARE

## 2025-01-05 DIAGNOSIS — L03.311 CELLULITIS OF ABDOMINAL WALL: ICD-10-CM

## 2025-01-05 DIAGNOSIS — I50.9 CONGESTIVE HEART FAILURE, UNSPECIFIED HF CHRONICITY, UNSPECIFIED HEART FAILURE TYPE (HCC): Primary | ICD-10-CM
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
## 2025-01-05 DIAGNOSIS — N17.9 ACUTE KIDNEY INJURY (HCC): ICD-10-CM

## 2025-01-05 DIAGNOSIS — E83.42 HYPOMAGNESEMIA: ICD-10-CM

## 2025-01-05 PROBLEM — R79.89 ELEVATED TROPONIN: Status: ACTIVE | Noted: 2025-01-05

## 2025-01-05 PROBLEM — E66.812 CLASS 2 SEVERE OBESITY WITH SERIOUS COMORBIDITY AND BODY MASS INDEX (BMI) OF 36.0 TO 36.9 IN ADULT: Status: ACTIVE | Noted: 2023-03-24

## 2025-01-05 PROBLEM — E66.01 CLASS 2 SEVERE OBESITY WITH SERIOUS COMORBIDITY AND BODY MASS INDEX (BMI) OF 36.0 TO 36.9 IN ADULT: Status: ACTIVE | Noted: 2023-03-24

## 2025-01-05 LAB
ALBUMIN SERPL-MCNC: 3.5 G/DL (ref 3.5–5.2)
ALP SERPL-CCNC: 137 U/L (ref 35–104)
ALT SERPL-CCNC: 33 U/L (ref 0–32)
ANION GAP SERPL CALCULATED.3IONS-SCNC: 12 MMOL/L (ref 7–16)
AST SERPL-CCNC: 45 U/L (ref 0–31)
BASOPHILS # BLD: 0.01 K/UL (ref 0–0.2)
BASOPHILS NFR BLD: 0 % (ref 0–2)
BILIRUB SERPL-MCNC: 0.5 MG/DL (ref 0–1.2)
BNP SERPL-MCNC: 2598 PG/ML (ref 0–450)
BUN SERPL-MCNC: 24 MG/DL (ref 6–23)
CALCIUM SERPL-MCNC: 8.7 MG/DL (ref 8.6–10.2)
CHLORIDE SERPL-SCNC: 98 MMOL/L (ref 98–107)
CHP ED QC CHECK: YES
CK SERPL-CCNC: 360 U/L (ref 20–180)
CO2 SERPL-SCNC: 31 MMOL/L (ref 22–29)
CREAT SERPL-MCNC: 1.8 MG/DL (ref 0.5–1)
EOSINOPHIL # BLD: 0.05 K/UL (ref 0.05–0.5)
EOSINOPHILS RELATIVE PERCENT: 1 % (ref 0–6)
ERYTHROCYTE [DISTWIDTH] IN BLOOD BY AUTOMATED COUNT: 12.3 % (ref 11.5–15)
GFR, ESTIMATED: 29 ML/MIN/1.73M2
GLUCOSE BLD-MCNC: 106 MG/DL
GLUCOSE BLD-MCNC: 106 MG/DL (ref 74–99)
GLUCOSE SERPL-MCNC: 116 MG/DL (ref 74–99)
HCT VFR BLD AUTO: 35.5 % (ref 34–48)
HGB BLD-MCNC: 11.2 G/DL (ref 11.5–15.5)
IMM GRANULOCYTES # BLD AUTO: <0.03 K/UL (ref 0–0.58)
IMM GRANULOCYTES NFR BLD: 0 % (ref 0–5)
LYMPHOCYTES NFR BLD: 1.05 K/UL (ref 1.5–4)
LYMPHOCYTES RELATIVE PERCENT: 16 % (ref 20–42)
MAGNESIUM SERPL-MCNC: 1.5 MG/DL (ref 1.6–2.6)
MCH RBC QN AUTO: 31.3 PG (ref 26–35)
MCHC RBC AUTO-ENTMCNC: 31.5 G/DL (ref 32–34.5)
MCV RBC AUTO: 99.2 FL (ref 80–99.9)
MONOCYTES NFR BLD: 0.45 K/UL (ref 0.1–0.95)
MONOCYTES NFR BLD: 7 % (ref 2–12)
NEUTROPHILS NFR BLD: 77 % (ref 43–80)
NEUTS SEG NFR BLD: 5.09 K/UL (ref 1.8–7.3)
PLATELET # BLD AUTO: 190 K/UL (ref 130–450)
PMV BLD AUTO: 10.2 FL (ref 7–12)
POTASSIUM SERPL-SCNC: 3.8 MMOL/L (ref 3.5–5)
PROT SERPL-MCNC: 7.8 G/DL (ref 6.4–8.3)
RBC # BLD AUTO: 3.58 M/UL (ref 3.5–5.5)
SODIUM SERPL-SCNC: 141 MMOL/L (ref 132–146)
TROPONIN I SERPL HS-MCNC: 65 NG/L (ref 0–9)
TROPONIN I SERPL HS-MCNC: 69 NG/L (ref 0–9)
WBC OTHER # BLD: 6.7 K/UL (ref 4.5–11.5)

## 2025-01-05 PROCEDURE — 2580000003 HC RX 258

## 2025-01-05 PROCEDURE — 83735 ASSAY OF MAGNESIUM: CPT

## 2025-01-05 PROCEDURE — 99285 EMERGENCY DEPT VISIT HI MDM: CPT

## 2025-01-05 PROCEDURE — 80053 COMPREHEN METABOLIC PANEL: CPT

## 2025-01-05 PROCEDURE — 82550 ASSAY OF CK (CPK): CPT

## 2025-01-05 PROCEDURE — 84550 ASSAY OF BLOOD/URIC ACID: CPT

## 2025-01-05 PROCEDURE — 70450 CT HEAD/BRAIN W/O DYE: CPT

## 2025-01-05 PROCEDURE — 2060000000 HC ICU INTERMEDIATE R&B

## 2025-01-05 PROCEDURE — 72170 X-RAY EXAM OF PELVIS: CPT

## 2025-01-05 PROCEDURE — 82962 GLUCOSE BLOOD TEST: CPT

## 2025-01-05 PROCEDURE — 84484 ASSAY OF TROPONIN QUANT: CPT

## 2025-01-05 PROCEDURE — 72125 CT NECK SPINE W/O DYE: CPT

## 2025-01-05 PROCEDURE — 74176 CT ABD & PELVIS W/O CONTRAST: CPT

## 2025-01-05 PROCEDURE — 99223 1ST HOSP IP/OBS HIGH 75: CPT | Performed by: HOSPITALIST

## 2025-01-05 PROCEDURE — 85025 COMPLETE CBC W/AUTO DIFF WBC: CPT

## 2025-01-05 PROCEDURE — 71046 X-RAY EXAM CHEST 2 VIEWS: CPT

## 2025-01-05 PROCEDURE — 93005 ELECTROCARDIOGRAM TRACING: CPT

## 2025-01-05 PROCEDURE — 83880 ASSAY OF NATRIURETIC PEPTIDE: CPT

## 2025-01-05 RX ORDER — MAGNESIUM SULFATE IN WATER 40 MG/ML
2000 INJECTION, SOLUTION INTRAVENOUS ONCE
Status: COMPLETED | OUTPATIENT
Start: 2025-01-05 | End: 2025-01-06

## 2025-01-05 RX ORDER — DOXYCYCLINE 100 MG/1
100 CAPSULE ORAL ONCE
Status: DISCONTINUED | OUTPATIENT
Start: 2025-01-05 | End: 2025-01-06

## 2025-01-05 RX ORDER — 0.9 % SODIUM CHLORIDE 0.9 %
1000 INTRAVENOUS SOLUTION INTRAVENOUS ONCE
Status: COMPLETED | OUTPATIENT
Start: 2025-01-05 | End: 2025-01-06

## 2025-01-05 RX ADMIN — SODIUM CHLORIDE 1000 ML: 9 INJECTION, SOLUTION INTRAVENOUS at 20:40

## 2025-01-05 NOTE — ED PROVIDER NOTES
MS.        RADIOLOGY:   Non-plain film images such as CT, Ultrasound and MRI are read by the radiologist. Plain radiographic images are visualized and preliminarily interpreted by the ED Provider with the below findings:    CT head shows no obvious intracranial Asser hemorrhage  CT cervical spine shows no obvious fracture or dislocation  CT on pelvis shows no obvious pneumoperitoneum  X-ray of the pelvis shows no obvious fracture dislocation  Chest x-ray shows no obvious pneumothorax or acute fracture    Interpretation per the Radiologist below, if available at the time of this note:    CT HEAD WO CONTRAST   Final Result   No acute intracranial abnormality.         CT CERVICAL SPINE WO CONTRAST   Final Result   No acute abnormality of the cervical spine.         CT ABDOMEN PELVIS WO CONTRAST Additional Contrast? None   Final Result   1.  No acute trauma injury to the intraperitoneal retroperitoneal structures   of the abdomen pelvis.      2.  Contusion of the subcutaneous soft tissues and above the left inguinal   area.  Please correlate clinically.      3.  Degenerative changes in the thoracolumbar spine.  Presence of a right hip   prosthesis.      4.  No acute fractures in the lumbar spine, pelvic bones and left hip.  The   prosthesis is in proper position.  No fractures in the proximal right and   left femurs.         XR PELVIS (1-2 VIEWS)   Final Result   No acute abnormality of the pelvis.         XR CHEST (2 VW)   Final Result   No acute process.         US RETROPERITONEAL COMPLETE    (Results Pending)     No results found.    No results found.    PROCEDURES   Unless otherwise noted below, none          CRITICAL CARE TIME (.cct)   N/a    PAST MEDICAL HISTORY/Chronic Conditions Affecting Care      has a past medical history of Bursitis, CAD (coronary artery disease) (1993), Cellulitis of leg, left (10/03/2022), COPD (chronic obstructive pulmonary disease) ([facility]) (06/19/2013), Dermatophytosis (01/27/2023),  shows bicarb 31, blood glucose 116, BUN of 24, creatinine of 1.8 up from baseline of 0.9.  Alkaline phosphatase 137, ALT of 33, AST of 45.  CK mildly elevated 3-60.  Magnesium is low at 1.5.  proBNP 2600.  Troponin 69 initially with repeat troponin delta of -4    CT head and cervical spine negative for acute traumatic pathology.  CT ab pelvis shows no acute traumatic pathology.  There is contusion and subcutaneous soft tissues above the left inguinal area dysuria is in the area of the patient's cellulitic findings..  Degenerative changes of the thoracic lumbar spine and right hip spaces.  No acute fractures noted.  Pelvic and chest x-ray is negative for acute pathology.    Patient given fluids for her [place].  Given magnesium as well for her hypomagnesemia.  Patient was given antibiotics however hospitalist team who does accept patient for admission recommended broader spectrum antibiotics.  At time of admission, the patient was in stable condition.    CONSULTS: (Who and What was discussed)  IP CONSULT TO INTERNAL MEDICINE  IP CONSULT TO GENERAL SURGERY  IP CONSULT TO PHARMACY        I am the Primary Clinician of Record.    FINAL IMPRESSION      1. Congestive heart failure, unspecified HF chronicity, unspecified heart failure type (HCC)    2. Cellulitis of abdominal wall    3. Fall, initial encounter    4. Acute kidney injury (HCC)    5. Hypomagnesemia          DISPOSITION/PLAN     DISPOSITION Admitted 01/05/2025 10:35:02 PM      PATIENT REFERRED TO:  No follow-up provider specified.    DISCHARGE MEDICATIONS:  New Prescriptions    No medications on file       DISCONTINUED MEDICATIONS:  Discontinued Medications    No medications on file              Patient was seen and evaluated by myself and my attending Eyal Carpenter MD. Assessment and Plan discussed with attending provider, please see attestation for final plan of care.     (Please note that portions of this note were completed with a voice recognition program.

## 2025-01-06 ENCOUNTER — APPOINTMENT (OUTPATIENT)
Dept: ULTRASOUND IMAGING | Age: 80
End: 2025-01-06
Payer: MEDICARE

## 2025-01-06 LAB
ANION GAP SERPL CALCULATED.3IONS-SCNC: 11 MMOL/L (ref 7–16)
BACTERIA URNS QL MICRO: ABNORMAL
BASOPHILS # BLD: 0.02 K/UL (ref 0–0.2)
BASOPHILS NFR BLD: 0 % (ref 0–2)
BILIRUB UR QL STRIP: NEGATIVE
BUN SERPL-MCNC: 21 MG/DL (ref 6–23)
CALCIUM SERPL-MCNC: 8.6 MG/DL (ref 8.6–10.2)
CHLORIDE SERPL-SCNC: 99 MMOL/L (ref 98–107)
CK SERPL-CCNC: 300 U/L (ref 20–180)
CLARITY UR: CLEAR
CO2 SERPL-SCNC: 29 MMOL/L (ref 22–29)
COLOR UR: YELLOW
CREAT SERPL-MCNC: 1.2 MG/DL (ref 0.5–1)
CREAT UR-MCNC: 206.2 MG/DL (ref 29–226)
EKG ATRIAL RATE: 92 BPM
EKG P AXIS: 54 DEGREES
EKG P-R INTERVAL: 130 MS
EKG Q-T INTERVAL: 378 MS
EKG QRS DURATION: 88 MS
EKG QTC CALCULATION (BAZETT): 467 MS
EKG R AXIS: 20 DEGREES
EKG T AXIS: 7 DEGREES
EKG VENTRICULAR RATE: 92 BPM
EOSINOPHIL # BLD: 0.09 K/UL (ref 0.05–0.5)
EOSINOPHILS RELATIVE PERCENT: 1 % (ref 0–6)
ERYTHROCYTE [DISTWIDTH] IN BLOOD BY AUTOMATED COUNT: 12.4 % (ref 11.5–15)
GFR, ESTIMATED: 46 ML/MIN/1.73M2
GLUCOSE SERPL-MCNC: 110 MG/DL (ref 74–99)
GLUCOSE UR STRIP-MCNC: NEGATIVE MG/DL
HCT VFR BLD AUTO: 36.5 % (ref 34–48)
HGB BLD-MCNC: 11.4 G/DL (ref 11.5–15.5)
HGB UR QL STRIP.AUTO: NEGATIVE
IMM GRANULOCYTES # BLD AUTO: <0.03 K/UL (ref 0–0.58)
IMM GRANULOCYTES NFR BLD: 0 % (ref 0–5)
KETONES UR STRIP-MCNC: NEGATIVE MG/DL
LEUKOCYTE ESTERASE UR QL STRIP: NEGATIVE
LYMPHOCYTES NFR BLD: 1.27 K/UL (ref 1.5–4)
LYMPHOCYTES RELATIVE PERCENT: 17 % (ref 20–42)
MCH RBC QN AUTO: 31.1 PG (ref 26–35)
MCHC RBC AUTO-ENTMCNC: 31.2 G/DL (ref 32–34.5)
MCV RBC AUTO: 99.5 FL (ref 80–99.9)
MONOCYTES NFR BLD: 0.56 K/UL (ref 0.1–0.95)
MONOCYTES NFR BLD: 8 % (ref 2–12)
NEUTROPHILS NFR BLD: 73 % (ref 43–80)
NEUTS SEG NFR BLD: 5.43 K/UL (ref 1.8–7.3)
NITRITE UR QL STRIP: NEGATIVE
PH UR STRIP: 6 [PH] (ref 5–9)
PLATELET # BLD AUTO: 191 K/UL (ref 130–450)
PMV BLD AUTO: 10.3 FL (ref 7–12)
POTASSIUM SERPL-SCNC: 3.7 MMOL/L (ref 3.5–5)
PROCALCITONIN SERPL-MCNC: 0.08 NG/ML (ref 0–0.08)
PROT UR STRIP-MCNC: NEGATIVE MG/DL
RBC # BLD AUTO: 3.67 M/UL (ref 3.5–5.5)
RBC #/AREA URNS HPF: ABNORMAL /HPF
SODIUM SERPL-SCNC: 139 MMOL/L (ref 132–146)
SODIUM UR-SCNC: 100 MMOL/L
SP GR UR STRIP: 1.02 (ref 1–1.03)
TROPONIN I SERPL HS-MCNC: 56 NG/L (ref 0–9)
URATE SERPL-MCNC: 8.2 MG/DL (ref 2.4–5.7)
UROBILINOGEN UR STRIP-ACNC: 0.2 EU/DL (ref 0–1)
WBC #/AREA URNS HPF: ABNORMAL /HPF
WBC OTHER # BLD: 7.4 K/UL (ref 4.5–11.5)

## 2025-01-06 PROCEDURE — 84484 ASSAY OF TROPONIN QUANT: CPT

## 2025-01-06 PROCEDURE — 2500000003 HC RX 250 WO HCPCS: Performed by: HOSPITALIST

## 2025-01-06 PROCEDURE — 99232 SBSQ HOSP IP/OBS MODERATE 35: CPT | Performed by: STUDENT IN AN ORGANIZED HEALTH CARE EDUCATION/TRAINING PROGRAM

## 2025-01-06 PROCEDURE — 84300 ASSAY OF URINE SODIUM: CPT

## 2025-01-06 PROCEDURE — 76770 US EXAM ABDO BACK WALL COMP: CPT

## 2025-01-06 PROCEDURE — 93010 ELECTROCARDIOGRAM REPORT: CPT | Performed by: INTERNAL MEDICINE

## 2025-01-06 PROCEDURE — 82550 ASSAY OF CK (CPK): CPT

## 2025-01-06 PROCEDURE — 6360000002 HC RX W HCPCS: Performed by: HOSPITALIST

## 2025-01-06 PROCEDURE — 86403 PARTICLE AGGLUT ANTBDY SCRN: CPT

## 2025-01-06 PROCEDURE — 81001 URINALYSIS AUTO W/SCOPE: CPT

## 2025-01-06 PROCEDURE — 6370000000 HC RX 637 (ALT 250 FOR IP): Performed by: HOSPITALIST

## 2025-01-06 PROCEDURE — 2580000003 HC RX 258: Performed by: HOSPITALIST

## 2025-01-06 PROCEDURE — 87070 CULTURE OTHR SPECIMN AEROBIC: CPT

## 2025-01-06 PROCEDURE — 82570 ASSAY OF URINE CREATININE: CPT

## 2025-01-06 PROCEDURE — 84145 PROCALCITONIN (PCT): CPT

## 2025-01-06 PROCEDURE — 2060000000 HC ICU INTERMEDIATE R&B

## 2025-01-06 PROCEDURE — 85025 COMPLETE CBC W/AUTO DIFF WBC: CPT

## 2025-01-06 PROCEDURE — 80048 BASIC METABOLIC PNL TOTAL CA: CPT

## 2025-01-06 PROCEDURE — 0HB7XZZ EXCISION OF ABDOMEN SKIN, EXTERNAL APPROACH: ICD-10-PCS

## 2025-01-06 PROCEDURE — 87077 CULTURE AEROBIC IDENTIFY: CPT

## 2025-01-06 PROCEDURE — 6360000002 HC RX W HCPCS

## 2025-01-06 PROCEDURE — 87040 BLOOD CULTURE FOR BACTERIA: CPT

## 2025-01-06 PROCEDURE — 87205 SMEAR GRAM STAIN: CPT

## 2025-01-06 RX ORDER — HYDROCHLOROTHIAZIDE 25 MG/1
25 TABLET ORAL DAILY
Status: DISCONTINUED | OUTPATIENT
Start: 2025-01-06 | End: 2025-01-10 | Stop reason: HOSPADM

## 2025-01-06 RX ORDER — ACETAMINOPHEN 650 MG/1
650 SUPPOSITORY RECTAL EVERY 6 HOURS PRN
Status: DISCONTINUED | OUTPATIENT
Start: 2025-01-06 | End: 2025-01-10 | Stop reason: HOSPADM

## 2025-01-06 RX ORDER — ACETAMINOPHEN 325 MG/1
650 TABLET ORAL EVERY 6 HOURS PRN
Status: DISCONTINUED | OUTPATIENT
Start: 2025-01-06 | End: 2025-01-10 | Stop reason: HOSPADM

## 2025-01-06 RX ORDER — MORPHINE SULFATE 15 MG/1
15 TABLET, FILM COATED, EXTENDED RELEASE ORAL 2 TIMES DAILY
Status: DISCONTINUED | OUTPATIENT
Start: 2025-01-06 | End: 2025-01-10 | Stop reason: HOSPADM

## 2025-01-06 RX ORDER — SODIUM CHLORIDE 9 MG/ML
INJECTION, SOLUTION INTRAVENOUS PRN
Status: DISCONTINUED | OUTPATIENT
Start: 2025-01-06 | End: 2025-01-10 | Stop reason: HOSPADM

## 2025-01-06 RX ORDER — ONDANSETRON 2 MG/ML
4 INJECTION INTRAMUSCULAR; INTRAVENOUS EVERY 6 HOURS PRN
Status: DISCONTINUED | OUTPATIENT
Start: 2025-01-06 | End: 2025-01-10 | Stop reason: HOSPADM

## 2025-01-06 RX ORDER — GABAPENTIN 300 MG/1
300 CAPSULE ORAL 3 TIMES DAILY
Status: DISCONTINUED | OUTPATIENT
Start: 2025-01-06 | End: 2025-01-10 | Stop reason: HOSPADM

## 2025-01-06 RX ORDER — LIDOCAINE HYDROCHLORIDE AND EPINEPHRINE 10; 10 MG/ML; UG/ML
20 INJECTION, SOLUTION INFILTRATION; PERINEURAL ONCE
Status: COMPLETED | OUTPATIENT
Start: 2025-01-06 | End: 2025-01-06

## 2025-01-06 RX ORDER — ZINC SULFATE 50(220)MG
100 CAPSULE ORAL NIGHTLY
COMMUNITY

## 2025-01-06 RX ORDER — OXYCODONE AND ACETAMINOPHEN 7.5; 325 MG/1; MG/1
1 TABLET ORAL EVERY 8 HOURS PRN
Status: DISCONTINUED | OUTPATIENT
Start: 2025-01-06 | End: 2025-01-06 | Stop reason: ALTCHOICE

## 2025-01-06 RX ORDER — ENOXAPARIN SODIUM 100 MG/ML
40 INJECTION SUBCUTANEOUS DAILY
Status: DISCONTINUED | OUTPATIENT
Start: 2025-01-06 | End: 2025-01-06 | Stop reason: DRUGHIGH

## 2025-01-06 RX ORDER — POLYETHYLENE GLYCOL 3350 17 G/17G
17 POWDER, FOR SOLUTION ORAL DAILY PRN
Status: DISCONTINUED | OUTPATIENT
Start: 2025-01-06 | End: 2025-01-10 | Stop reason: HOSPADM

## 2025-01-06 RX ORDER — ONDANSETRON 4 MG/1
4 TABLET, ORALLY DISINTEGRATING ORAL EVERY 8 HOURS PRN
Status: DISCONTINUED | OUTPATIENT
Start: 2025-01-06 | End: 2025-01-10 | Stop reason: HOSPADM

## 2025-01-06 RX ORDER — MAGNESIUM SULFATE IN WATER 40 MG/ML
2000 INJECTION, SOLUTION INTRAVENOUS PRN
Status: DISCONTINUED | OUTPATIENT
Start: 2025-01-06 | End: 2025-01-10 | Stop reason: HOSPADM

## 2025-01-06 RX ORDER — POTASSIUM CHLORIDE 1500 MG/1
40 TABLET, EXTENDED RELEASE ORAL PRN
Status: DISCONTINUED | OUTPATIENT
Start: 2025-01-06 | End: 2025-01-10 | Stop reason: HOSPADM

## 2025-01-06 RX ORDER — ALBUTEROL SULFATE 90 UG/1
2 INHALANT RESPIRATORY (INHALATION) EVERY 4 HOURS PRN
Status: DISCONTINUED | OUTPATIENT
Start: 2025-01-06 | End: 2025-01-06 | Stop reason: CLARIF

## 2025-01-06 RX ORDER — OXYCODONE HYDROCHLORIDE 5 MG/1
2.5 TABLET ORAL EVERY 8 HOURS PRN
Status: DISCONTINUED | OUTPATIENT
Start: 2025-01-06 | End: 2025-01-10 | Stop reason: HOSPADM

## 2025-01-06 RX ORDER — OXYCODONE AND ACETAMINOPHEN 5; 325 MG/1; MG/1
1 TABLET ORAL EVERY 8 HOURS PRN
Status: DISCONTINUED | OUTPATIENT
Start: 2025-01-06 | End: 2025-01-10 | Stop reason: HOSPADM

## 2025-01-06 RX ORDER — ALBUTEROL SULFATE 0.83 MG/ML
2.5 SOLUTION RESPIRATORY (INHALATION) EVERY 4 HOURS PRN
Status: DISCONTINUED | OUTPATIENT
Start: 2025-01-06 | End: 2025-01-10 | Stop reason: HOSPADM

## 2025-01-06 RX ORDER — METOPROLOL SUCCINATE 50 MG/1
50 TABLET, EXTENDED RELEASE ORAL DAILY
Status: DISCONTINUED | OUTPATIENT
Start: 2025-01-06 | End: 2025-01-10 | Stop reason: HOSPADM

## 2025-01-06 RX ORDER — ASPIRIN 81 MG/1
81 TABLET ORAL DAILY
Status: DISCONTINUED | OUTPATIENT
Start: 2025-01-06 | End: 2025-01-10 | Stop reason: HOSPADM

## 2025-01-06 RX ORDER — ATORVASTATIN CALCIUM 20 MG/1
20 TABLET, FILM COATED ORAL NIGHTLY
Status: DISCONTINUED | OUTPATIENT
Start: 2025-01-06 | End: 2025-01-10 | Stop reason: HOSPADM

## 2025-01-06 RX ORDER — SODIUM CHLORIDE 0.9 % (FLUSH) 0.9 %
5-40 SYRINGE (ML) INJECTION PRN
Status: DISCONTINUED | OUTPATIENT
Start: 2025-01-06 | End: 2025-01-10 | Stop reason: HOSPADM

## 2025-01-06 RX ORDER — ENOXAPARIN SODIUM 100 MG/ML
30 INJECTION SUBCUTANEOUS 2 TIMES DAILY
Status: DISCONTINUED | OUTPATIENT
Start: 2025-01-06 | End: 2025-01-10 | Stop reason: HOSPADM

## 2025-01-06 RX ORDER — HYDRALAZINE HYDROCHLORIDE 20 MG/ML
10 INJECTION INTRAMUSCULAR; INTRAVENOUS EVERY 6 HOURS PRN
Status: DISCONTINUED | OUTPATIENT
Start: 2025-01-06 | End: 2025-01-10 | Stop reason: HOSPADM

## 2025-01-06 RX ORDER — PANTOPRAZOLE SODIUM 40 MG/1
40 TABLET, DELAYED RELEASE ORAL
Status: DISCONTINUED | OUTPATIENT
Start: 2025-01-06 | End: 2025-01-10 | Stop reason: HOSPADM

## 2025-01-06 RX ORDER — SODIUM CHLORIDE 0.9 % (FLUSH) 0.9 %
5-40 SYRINGE (ML) INJECTION EVERY 12 HOURS SCHEDULED
Status: DISCONTINUED | OUTPATIENT
Start: 2025-01-06 | End: 2025-01-10 | Stop reason: HOSPADM

## 2025-01-06 RX ORDER — SODIUM CHLORIDE 9 MG/ML
INJECTION, SOLUTION INTRAVENOUS CONTINUOUS
Status: DISCONTINUED | OUTPATIENT
Start: 2025-01-06 | End: 2025-01-06

## 2025-01-06 RX ORDER — POTASSIUM CHLORIDE 7.45 MG/ML
10 INJECTION INTRAVENOUS PRN
Status: DISCONTINUED | OUTPATIENT
Start: 2025-01-06 | End: 2025-01-10 | Stop reason: HOSPADM

## 2025-01-06 RX ORDER — FERROUS SULFATE 325(65) MG
325 TABLET ORAL NIGHTLY
Status: DISCONTINUED | OUTPATIENT
Start: 2025-01-06 | End: 2025-01-10 | Stop reason: HOSPADM

## 2025-01-06 RX ORDER — GABAPENTIN 300 MG/1
300 CAPSULE ORAL 3 TIMES DAILY
Status: ON HOLD | COMMUNITY
End: 2025-01-10 | Stop reason: HOSPADM

## 2025-01-06 RX ADMIN — OXYCODONE HYDROCHLORIDE AND ACETAMINOPHEN 1 TABLET: 5; 325 TABLET ORAL at 15:55

## 2025-01-06 RX ADMIN — OXYCODONE 2.5 MG: 5 TABLET ORAL at 15:55

## 2025-01-06 RX ADMIN — CEFEPIME 2000 MG: 2 INJECTION, POWDER, FOR SOLUTION INTRAVENOUS at 23:39

## 2025-01-06 RX ADMIN — GABAPENTIN 300 MG: 300 CAPSULE ORAL at 19:33

## 2025-01-06 RX ADMIN — SODIUM CHLORIDE, PRESERVATIVE FREE 10 ML: 5 INJECTION INTRAVENOUS at 19:39

## 2025-01-06 RX ADMIN — ATORVASTATIN CALCIUM 20 MG: 20 TABLET, FILM COATED ORAL at 19:33

## 2025-01-06 RX ADMIN — HYDROCHLOROTHIAZIDE 25 MG: 25 TABLET ORAL at 07:35

## 2025-01-06 RX ADMIN — GABAPENTIN 300 MG: 300 CAPSULE ORAL at 07:35

## 2025-01-06 RX ADMIN — ENOXAPARIN SODIUM 30 MG: 100 INJECTION SUBCUTANEOUS at 07:34

## 2025-01-06 RX ADMIN — CEFEPIME 2000 MG: 2 INJECTION, POWDER, FOR SOLUTION INTRAVENOUS at 02:50

## 2025-01-06 RX ADMIN — FERROUS SULFATE TAB 325 MG (65 MG ELEMENTAL FE) 325 MG: 325 (65 FE) TAB at 02:00

## 2025-01-06 RX ADMIN — MORPHINE SULFATE 15 MG: 15 TABLET, FILM COATED, EXTENDED RELEASE ORAL at 02:00

## 2025-01-06 RX ADMIN — GABAPENTIN 300 MG: 300 CAPSULE ORAL at 15:55

## 2025-01-06 RX ADMIN — PANTOPRAZOLE SODIUM 40 MG: 40 TABLET, DELAYED RELEASE ORAL at 07:35

## 2025-01-06 RX ADMIN — ATORVASTATIN CALCIUM 20 MG: 20 TABLET, FILM COATED ORAL at 02:00

## 2025-01-06 RX ADMIN — MORPHINE SULFATE 15 MG: 15 TABLET, FILM COATED, EXTENDED RELEASE ORAL at 19:33

## 2025-01-06 RX ADMIN — SODIUM CHLORIDE: 9 INJECTION, SOLUTION INTRAVENOUS at 02:49

## 2025-01-06 RX ADMIN — FERROUS SULFATE TAB 325 MG (65 MG ELEMENTAL FE) 325 MG: 325 (65 FE) TAB at 19:33

## 2025-01-06 RX ADMIN — ASPIRIN 81 MG: 81 TABLET, COATED ORAL at 07:35

## 2025-01-06 RX ADMIN — OXYCODONE HYDROCHLORIDE AND ACETAMINOPHEN 1 TABLET: 5; 325 TABLET ORAL at 07:45

## 2025-01-06 RX ADMIN — LIDOCAINE HYDROCHLORIDE AND EPINEPHRINE 20 ML: 10; 10 INJECTION, SOLUTION INFILTRATION; PERINEURAL at 18:35

## 2025-01-06 RX ADMIN — MAGNESIUM SULFATE IN WATER 2000 MG: 40 INJECTION, SOLUTION INTRAVENOUS at 07:44

## 2025-01-06 RX ADMIN — METOPROLOL SUCCINATE 50 MG: 25 TABLET, EXTENDED RELEASE ORAL at 07:35

## 2025-01-06 RX ADMIN — ENOXAPARIN SODIUM 30 MG: 100 INJECTION SUBCUTANEOUS at 19:33

## 2025-01-06 RX ADMIN — VANCOMYCIN HYDROCHLORIDE 2000 MG: 10 INJECTION, POWDER, LYOPHILIZED, FOR SOLUTION INTRAVENOUS at 06:06

## 2025-01-06 ASSESSMENT — PAIN DESCRIPTION - LOCATION: LOCATION: LEG

## 2025-01-06 ASSESSMENT — PAIN SCALES - GENERAL
PAINLEVEL_OUTOF10: 0
PAINLEVEL_OUTOF10: 0
PAINLEVEL_OUTOF10: 6
PAINLEVEL_OUTOF10: 0

## 2025-01-06 ASSESSMENT — PAIN DESCRIPTION - DESCRIPTORS: DESCRIPTORS: ACHING

## 2025-01-06 ASSESSMENT — PAIN DESCRIPTION - ORIENTATION: ORIENTATION: LEFT;LOWER

## 2025-01-06 NOTE — DISCHARGE INSTRUCTIONS
Visit Discharge/Physician Orders     Discharge condition: Stable     Assessment of pain at discharge: mild     Anesthetic used: lido 4%     Discharge to: Home     Left via:Private automobile     Accompanied by: self     ECF/HHA: [facility]      Dressing Orders:  LEFT PRETIB : Cleanse with normal saline, cover with calcium alginate, and ABD pad, dry dressing and secure, and Spandagrip. Change daily        Treatment Orders: Eat a diet high in protein and vitamin C. Take a multiple vitamin daily unless contraindicated.      Elevate as much as possible     11/20/24 Compression to be ordered. ([place])      [facility] followup visit:  Dr. HATFIELD  1/8/25____________________________  (Please note your next appointment above and if you are unable to keep, kindly give a 24 hour notice. Thank you.)     Physician signature:__________________________      If you experience any of the following, please call the Wound Care Center during business hours:     * Increase in Pain  * Temperature over 101  * Increase in drainage from your wound  * Drainage with a foul odor  * Bleeding  * Increase in swelling  * Need for compression bandage changes due to slippage, breakthrough drainage.     If you need medical attention outside of the business hours of the Wound Care Centers please contact your PCP or go to the nearest emergency room.

## 2025-01-06 NOTE — H&P
abnormality. SINUSES: Mild ethmoid sinus mucosal thickening SOFT TISSUES/SKULL:  No acute abnormality of the visualized skull or soft tissues.     No acute intracranial abnormality.     CT CERVICAL SPINE WO CONTRAST    Result Date: 1/5/2025  EXAMINATION: CT OF THE CERVICAL SPINE WITHOUT CONTRAST 1/5/2025 8:55 pm TECHNIQUE: CT of the cervical spine was performed without the administration of intravenous contrast. Multiplanar reformatted images are provided for review. Automated exposure control, iterative reconstruction, and/or weight based adjustment of the mA/kV was utilized to reduce the radiation dose to as low as reasonably achievable. COMPARISON: None. HISTORY: ORDERING SYSTEM PROVIDED HISTORY: fall TECHNOLOGIST PROVIDED HISTORY: Reason for exam:->fall Decision Support Exception - unselect if not a suspected or confirmed emergency medical condition->Emergency Medical Condition (MA) What reading provider will be dictating this exam?->CRC FINDINGS: BONES/ALIGNMENT: There is no acute fracture or traumatic malalignment. DEGENERATIVE CHANGES: C5-C6 degenerative changes. SOFT TISSUES: There is no prevertebral soft tissue swelling.     No acute abnormality of the cervical spine.     XR PELVIS (1-2 VIEWS)    Result Date: 1/5/2025  EXAMINATION: ONE XRAY VIEW OF THE PELVIS 1/5/2025 7:33 pm COMPARISON: None. HISTORY: ORDERING SYSTEM PROVIDED HISTORY: fall TECHNOLOGIST PROVIDED HISTORY: Reason for exam:->fall FINDINGS: No evidence of pelvic fracture.  Right hip arthroplasty.  Degenerative changes left hip joint..  No focal osseous lesion.  SI joints are symmetric.     No acute abnormality of the pelvis.     XR CHEST (2 VW)    Result Date: 1/5/2025  EXAMINATION: TWO XRAY VIEWS OF THE CHEST 1/5/2025 7:33 pm COMPARISON: 04/28/2023 HISTORY: ORDERING SYSTEM PROVIDED HISTORY: fall TECHNOLOGIST PROVIDED HISTORY: Reason for exam:->fall FINDINGS: The lungs are without acute focal process.  There is no effusion or pneumothorax. The  [phone]  Relation: Brother/Sister  Preferred language: English   needed? No  Secondary Emergency Contact: Pallavi Morel  Address: [address]           [address] [place] [address] [facility]  Home Phone: [phone]  Mobile Phone: [phone]  Relation: Child    Admit status: [] Observation [x] Inpatient   Disposition: [x]Med/Surg [] Intermediate [] ICU/CCU    +++++++++++++++++++++++++++++++++++++++++++++++++  Eyal Carpenter MD PhD  [facility] Medicine  +++++++++++++++++++++++++++++++++++++++++++++++++  NOTE: Report could be transcribed using voice recognition software. Every effort was made to ensure accuracy; however, inadvertent computerized transcription errors may be present.

## 2025-01-06 NOTE — PROCEDURES
Abdominal wall debridement procedure note    Indication: Left lower quadrant hematoma with overlying necrotic skin    Procedure: Patient was positioned supine on the patient cart.  Timeout was performed.  Left lower quadrant wound was prepped and draped in usual sterile fashion with Betadine prep.  Local anesthesia was achieved with 1% lidocaine with epinephrine.  The lesion was noted to measure 3 cm x 2 cm.  All necrotic skin was divided from the surrounding healthy/bleeding tissue with an 11 blade.  There was noted to be drainage of old hematoma/blood without purulence which tracked medially approximately 2 to 3 cm.  Cultures were taken during the procedure and tissue sample was taken for tissue culture.  Cavity was irrigated with sterile saline until clear.  Wound was then packed with iodoform packing and covered with sterile 4 x 4/ABD and paper tape.    The patient tolerated the procedure well.    Complications: None    Electronically signed by Patricia Mohan DO on 1/6/2025 at 4:56 PM

## 2025-01-06 NOTE — PROGRESS NOTES
Breast cardiology consulted for syncope, elevated troponin, and elevated proBNP.  Patient follows with Dr. Holt, and prefers to stay with him, so we will defer consult to Dr. Holt.  Bedside nurse aware.    Kevin Ulloa, MESSI - CNP

## 2025-01-06 NOTE — PROGRESS NOTES
DVT Prophylaxis Adjustment Policy (DVT Prophylaxis)     This patient is on DVT Prophylaxis medication that requires a dose adjustment      Date Body Weight IBW  Adjusted BW SCr  CrCl Dialysis status   1/6/2025 104.3 kg (230 lb) Ideal body weight: 61.6 kg (135 lb 12.9 oz)  Adjusted ideal body weight: 78.7 kg (173 lb 7.7 oz) Serum creatinine: 1.8 mg/dL (H) 01/05/25 1807  Estimated creatinine clearance: 33 mL/min (A) N/a       Pharmacy has dose-adjusted the DVT Prophylaxis regimen to match   the recommendations from the following table        Ordered Medication:Lovenox 40mg daily    Order Changed/converted to: Lovenox 30mg BID      These changes were made per protocol according to the [facility] Pharmacist   Review for Appropriate Use and Automatic Dose Adjustments of   Subcutaneous Anticoagulants Policy     *Please note this dose may need readjusted if patient's condition changes.    Please contact pharmacy with any questions regarding these changes.    Kevin Mayes RPH  1/6/2025  12:12 AM

## 2025-01-06 NOTE — PROGRESS NOTES
Pharmacy was consulted to dose/monitor vancomycin which has now been discontinued. Clinical Pharmacy will sign off at this time.    Shayna Hawkins, PharmD, BCIDP, BCPS 1/6/2025 1:35 PM  [phone]

## 2025-01-06 NOTE — PROGRESS NOTES
Antibiotic Extended Infusion Policy     This patient is on medication that requires renal, weight, and/or indication dose adjustment.      Date Body Weight IBW  Adjusted BW SCr  CrCl Dialysis status BMI   1/6/2025 104.3 kg (230 lb) Ideal body weight: 61.6 kg (135 lb 12.9 oz)  Adjusted ideal body weight: 78.7 kg (173 lb 7.7 oz) Serum creatinine: 1.8 mg/dL (H) 01/05/25 1807  Estimated creatinine clearance: 33 mL/min (A) N/a Body mass index is 36.02 kg/m².       Pharmacy has dose-adjusted the following medication(s):    Ordered Medication: Cefepime 2000mg q12h     Order Changed/converted to: Cefepime 2000mg q24h    These changes were made per protocol according to the [facility]   Automatic Extended Infusion Dose Adjustment Policy.     *Please note this dose may need readjusted if patient's condition changes.    Please contact pharmacy with any questions regarding these changes.    Kevin Mayes RPH  1/6/2025  12:08 AM

## 2025-01-06 NOTE — ED NOTES
Patient spoke with admitting MD Dr Carpenter stated that she didn't want to stay in HOSP and wanted to leave AMA

## 2025-01-06 NOTE — CONSULTS
[facility]  [facility]    Consultation Note     Admit Date: 1/5/2025  5:36 PM    Reason for Consult:   Abdominal wound left quadrant draining lower extremity wounds draining    Attending Physician:  Mai Méndez MD     Chief Complaint: Weakness and loss of consciousness    HISTORY OF PRESENT ILLNESS:   The patient is a 79 y.o. woman known to the Infectious Diseases service. The patient is admitted to the emergency room after having couple days of problems associated with falls and worsening wounds to left lower abdomen and left lower leg.  She is very collates that she had been sitting on the commode a couple nights ago and lost consciousness and woke woke up several hours later in the tub.  She was sent in the emergency room after seeing her physician.  Chest x-ray was consistent with CHF.  She is admitted with cellulitis of the abdominal wall CHF lower extremity ulcers.  Labs emergency room showed that her BUN/creatinine was 24 and 1.8 and a proBNP was 2598.  Total CK was 360 and troponins were 65.  ALT and AST were 33 and 45 respectively.  White count was 6.7 hemoglobin 11.2.  Patient had a CT scan of the abdomen pelvis cervical spine CT of the head as well as a chest x-ray.  CT cervical spine showed degenerative changes CT of the head was nondiagnostic for bleeding.  Currently she has been followed up at the wound clinic    April 2023: Seen by Dr. Gerber during an admission after completing a course of vancomycin and meropenem was found to be in CHF with a BNP of 4427.  Her Pro-Reinier was only 0.08.  Because of the lower leg ulceration and the possibility of right lung pneumonia she was started on Zosyn and treated      March 2023: Seen by Dr. Hammond with the same nonhealing ulcer left lower extremity.  At this time she has been going to the wound clinic but the wound is progressed and recently at that time had grown MRSA Acinetobacter Pseudomonas.  She was treated with IV antibiotics  99.2 99.5    191   NEUTROABS 5.09 5.43     Lab Results   Component Value Date    CRP 1.80 (H) 04/10/2023    CRP 1.00 (H) 04/03/2023    CRP 1.10 (H) 02/07/2023     No results found for: \"CRPHS\"  Lab Results   Component Value Date    SEDRATE 16 04/10/2023    SEDRATE 117 (H) 04/03/2023    SEDRATE 61 (H) 02/07/2023     Lab Results   Component Value Date    ALT 33 (H) 01/05/2025    AST 45 (H) 01/05/2025    ALKPHOS 137 (H) 01/05/2025    BILITOT 0.5 01/05/2025     Lab Results   Component Value Date/Time     01/06/2025 05:15 AM    K 3.7 01/06/2025 05:15 AM    K 3.9 05/02/2023 04:45 AM    CL 99 01/06/2025 05:15 AM    CO2 29 01/06/2025 05:15 AM    BUN 21 01/06/2025 05:15 AM    CREATININE 1.2 01/06/2025 05:15 AM    GFRAA > 60 04/17/2023 06:15 AM    LABGLOM 46 01/06/2025 05:15 AM    LABGLOM >60 05/06/2023 11:22 AM    GLUCOSE 110 01/06/2025 05:15 AM    GLUCOSE 112 04/17/2023 06:15 AM    CALCIUM 8.6 01/06/2025 05:15 AM    BILITOT 0.5 01/05/2025 06:07 PM    ALKPHOS 137 01/05/2025 06:07 PM    AST 45 01/05/2025 06:07 PM    ALT 33 01/05/2025 06:07 PM       Lab Results   Component Value Date/Time    PROTIME 12.7 04/28/2023 12:10 PM    PROTIME 14.3 02/16/2011 05:10 AM    INR 1.2 04/28/2023 12:10 PM       Lab Results   Component Value Date/Time    TSH 0.586 02/19/2014 09:40 AM       Lab Results   Component Value Date/Time    COLORU Yellow 01/06/2025 02:43 AM    PHUR 6.0 01/06/2025 02:43 AM    PHUR 6.0 01/26/2023 07:40 PM    WBCUA 0 TO 5 01/06/2025 02:43 AM    RBCUA 0 TO 2 01/06/2025 02:43 AM    YEAST Present 01/26/2023 07:40 PM    BACTERIA 1+ 01/06/2025 02:43 AM    CLARITYU Clear 01/26/2023 07:40 PM    LEUKOCYTESUR NEGATIVE 01/06/2025 02:43 AM    UROBILINOGEN 0.2 01/06/2025 02:43 AM    BILIRUBINUR NEGATIVE 01/06/2025 02:43 AM    BLOODU LARGE 01/26/2023 07:40 PM    GLUCOSEU NEGATIVE 01/06/2025 02:43 AM       No results found for: \"DYZ4YRM\", \"BEART\", \"C6BTSUOY\", \"PHART\", \"THGBART\", \"HPV9DGQ\", \"PO2ART\",

## 2025-01-06 NOTE — PROGRESS NOTES
Hospitalist Progress Note      Chief Complaint:  had concerns including Fall (Fell 2 days ago in tub, wound check on abdomen post all, hit head and left hip pain and \"wants kidneys checked\").    Admission Date: 2025     SYNOPSIS:Ms. Anastacia Morel, a 79 y.o. year old female  with PMH of class II obesity, HTN, HLD, PVD, GERD, HFpEF, COPD presented to ED from for evaluation of syncope and left lower abdominal wound which was foul-smelling and purulent.  CT head and cervical spine was negative, cardiology consulted for syncope, infectious disease and general surgery following for left lower abdominal wound, she was given vancomycin, cefepime, vancomycin stopped    SUBJECTIVE:    Patient seen and examined at bedside, not in acute distress, complains of abdominal pain, foul-smelling discharge, denies chest pain, shortness of breath, palpitation  Records reviewed.   Stable overnight. No overnight issues reported     Temp (24hrs), Av.6 °F (36.4 °C), Min:97 °F (36.1 °C), Max:98 °F (36.7 °C)    DIET: ADULT DIET; Regular; 4 carb choices (60 gm/meal)  CODE: Full Code    Intake/Output Summary (Last 24 hours) at 2025 1344  Last data filed at 2025 0748  Gross per 24 hour   Intake 291.31 ml   Output --   Net 291.31 ml       OBJECTIVE:    BP (!) 128/57   Pulse 90   Temp 98 °F (36.7 °C)   Resp 18   Wt 104.3 kg (230 lb)   SpO2 96%   BMI 36.02 kg/m²     General appearance: No apparent distress, appears stated age and cooperative.   HEENT:  Normocephalic. Conjunctivae/corneas clear. Moist mucosa   Neck: Supple. No jugular venous distention. Thyroid not visible, non tender   Respiratory: Normal respiratory effort. Clear to auscultation bilaterally. No stridor/wheezing/rhonchi/crackles   Cardiovascular: Regular heart beats, normal S1-S2. No M/G/R  Abdomen: Non distended, soft,  no visceromegaly, no mass, normal bowel sounds, left abdominal fold wound, purulent discharge, tender to touch  Musculoskeletal: No

## 2025-01-06 NOTE — CONSULTS
APPENDECTOMY      BREAST ENHANCEMENT SURGERY      BREAST REDUCTION SURGERY      CATARACT REMOVAL Right 06/20/2024    CHOLECYSTECTOMY      COLONOSCOPY      ECHO COMPL W DOP COLOR FLOW  03/11/2013         ENDOSCOPY, COLON, DIAGNOSTIC      HERNIA REPAIR N/A 04/16/2021    LAPAROSCOPIC ROBOTIC ASSISTED INGUINAL HERNIA REPAIR performed by Gregory Tao MD at [facility] OR    HERNIA REPAIR      HYSTERECTOMY (CERVIX STATUS UNKNOWN)      JOINT REPLACEMENT  2009 2011    l knee r hip    LEG DEBRIDEMENT Left 11/18/2015    LEG DEBRIDEMENT Left 08/03/2016       Medications Prior to Admission:    Prior to Admission medications    Medication Sig Start Date End Date Taking? Authorizing Provider   gabapentin (NEURONTIN) 300 MG capsule Take 1 capsule by mouth 3 times daily.    ProviderAr MD   zinc sulfate (ZINCATE) 220 (50 Zn)  mg capsule - elemental zinc Take 2 capsules by mouth at bedtime    ProviderAr MD   Potassium 99 MG TABS Take 2 tablets by mouth at bedtime    Ar Nevarez MD   morphine (MS CONTIN) 15 MG extended release tablet Take 1 tablet by mouth 2 times daily for 30 days. Max Daily Amount: 30 mg  Patient not taking: Reported on 1/6/2025 12/25/24 1/24/25  Faith Dempsey MD   gabapentin (NEURONTIN) 300 MG capsule Take 1 capsule by mouth 3 times daily for 30 days. Intended supply: 30 days  Patient not taking: Reported on 1/6/2025 12/25/24 1/24/25  Faith Dempsey MD   morphine (MS CONTIN) 15 MG extended release tablet Take 1 tablet by mouth 2 times daily for 30 days. Max Daily Amount: 30 mg 12/18/24 1/17/25  Faith Dempsey MD   oxyCODONE-acetaminophen (PERCOCET) 7.5-325 MG per tablet Take 1 tablet by mouth every 8 hours as needed for Pain for up to 30 days. Max Daily Amount: 3 tablets 12/18/24 1/17/25  Faith Dempsey MD   gabapentin (NEURONTIN) 300 MG capsule Take 1 capsule by mouth 3 times daily for 30 days. Intended supply: 30 days  Patient not taking:  Reported on 1/6/2025 12/18/24 1/17/25  Faith Dempsey MD   naloxone 4 MG/0.1ML LIQD nasal spray 1 spray by Nasal route as needed for Opioid Reversal  Patient not taking: Reported on 1/6/2025 11/13/24   Faith Dempsey MD   naloxone 4 MG/0.1ML LIQD nasal spray 1 spray by Nasal route as needed for Opioid Reversal 10/23/24   Faith Dempsey MD   losartan (COZAAR) 25 MG tablet Take 1 tablet by mouth daily    Ar Nevarez MD   metoprolol succinate (TOPROL XL) 50 MG extended release tablet Take 1 tablet by mouth daily    Ar Nevarez MD   aspirin 81 MG EC tablet Take 1 tablet by mouth daily    Ar Nevarez MD   Garlic 10 MG CAPS Take by mouth    Ar Nevarez MD   hydroCHLOROthiazide 12.5 MG capsule Take 1 capsule by mouth daily    Ar Nevarez MD   potassium chloride (KLOR-CON M) 10 MEQ extended release tablet Take 1 tablet by mouth daily (with breakfast) 5/6/23 11/13/24  Milanes Marino, Maria, MD   Multiple Vitamins-Minerals (THERAPEUTIC MULTIVITAMIN-MINERALS) tablet Take 1 tablet by mouth daily    Ar Nevarez MD   simvastatin (ZOCOR) 40 MG tablet Take 1 tablet by mouth nightly    Ar Nevarez MD   vitamin D (CHOLECALCIFEROL) 50 MCG (2000 UT) TABS tablet Take 1 tablet by mouth daily    Ar Nevarez MD   ferrous sulfate 325 (65 FE) MG tablet Take 1 tablet by mouth nightly    ProviderAr MD   albuterol (PROVENTIL HFA;VENTOLIN HFA) 108 (90 BASE) MCG/ACT inhaler Inhale 2 puffs into the lungs every 6 hours as needed for Wheezing or Shortness of Breath    ProviderAr MD   omeprazole (PRILOSEC) 40 MG delayed release capsule Take 1 capsule by mouth daily    Ar Nevarez MD       Allergies   Allergen Reactions    Codeine Nausea And Vomiting and Other (See Comments)     hallucinate    Dilaudid [Hydromorphone Hcl] Rash    Naproxen Other (See Comments)     Shuts down kidney function    Singulair

## 2025-01-07 ENCOUNTER — APPOINTMENT (OUTPATIENT)
Age: 80
End: 2025-01-07
Attending: HOSPITALIST
Payer: MEDICARE

## 2025-01-07 LAB
ANION GAP SERPL CALCULATED.3IONS-SCNC: 13 MMOL/L (ref 7–16)
BASOPHILS # BLD: 0.01 K/UL (ref 0–0.2)
BASOPHILS NFR BLD: 0 % (ref 0–2)
BUN SERPL-MCNC: 17 MG/DL (ref 6–23)
CALCIUM SERPL-MCNC: 8.1 MG/DL (ref 8.6–10.2)
CHLORIDE SERPL-SCNC: 99 MMOL/L (ref 98–107)
CK SERPL-CCNC: 205 U/L (ref 20–180)
CO2 SERPL-SCNC: 25 MMOL/L (ref 22–29)
CREAT SERPL-MCNC: 0.9 MG/DL (ref 0.5–1)
ECHO AO ASC DIAM: 3.8 CM
ECHO AO ASCENDING AORTA INDEX: 1.78 CM/M2
ECHO AV AREA PEAK VELOCITY: 2 CM2
ECHO AV AREA VTI: 2.4 CM2
ECHO AV AREA/BSA PEAK VELOCITY: 0.9 CM2/M2
ECHO AV AREA/BSA VTI: 1.1 CM2/M2
ECHO AV CUSP MM: 1.5 CM
ECHO AV MEAN GRADIENT: 7 MMHG
ECHO AV MEAN VELOCITY: 1.3 M/S
ECHO AV PEAK GRADIENT: 14 MMHG
ECHO AV PEAK VELOCITY: 1.9 M/S
ECHO AV VELOCITY RATIO: 0.58
ECHO AV VTI: 39.6 CM
ECHO BSA: 2.22 M2
ECHO EST RA PRESSURE: 15 MMHG
ECHO LA DIAMETER INDEX: 2.06 CM/M2
ECHO LA DIAMETER: 4.4 CM
ECHO LA VOL A-L A2C: 64 ML (ref 22–52)
ECHO LA VOL A-L A4C: 61 ML (ref 22–52)
ECHO LA VOL MOD A2C: 61 ML (ref 22–52)
ECHO LA VOL MOD A4C: 60 ML (ref 22–52)
ECHO LA VOLUME AREA LENGTH: 66 ML
ECHO LA VOLUME INDEX A-L A2C: 30 ML/M2 (ref 16–34)
ECHO LA VOLUME INDEX A-L A4C: 29 ML/M2 (ref 16–34)
ECHO LA VOLUME INDEX AREA LENGTH: 31 ML/M2 (ref 16–34)
ECHO LA VOLUME INDEX MOD A2C: 29 ML/M2 (ref 16–34)
ECHO LA VOLUME INDEX MOD A4C: 28 ML/M2 (ref 16–34)
ECHO LV EF PHYSICIAN: 62 %
ECHO LV FRACTIONAL SHORTENING: 27 % (ref 28–44)
ECHO LV INTERNAL DIMENSION DIASTOLE INDEX: 1.54 CM/M2
ECHO LV INTERNAL DIMENSION DIASTOLIC: 3.3 CM (ref 3.9–5.3)
ECHO LV INTERNAL DIMENSION SYSTOLIC INDEX: 1.12 CM/M2
ECHO LV INTERNAL DIMENSION SYSTOLIC: 2.4 CM
ECHO LV IVSD: 1.2 CM (ref 0.6–0.9)
ECHO LV IVSS: 1.3 CM
ECHO LV MASS 2D: 124.8 G (ref 67–162)
ECHO LV MASS INDEX 2D: 58.3 G/M2 (ref 43–95)
ECHO LV POSTERIOR WALL DIASTOLIC: 1.2 CM (ref 0.6–0.9)
ECHO LV POSTERIOR WALL SYSTOLIC: 1.5 CM
ECHO LV RELATIVE WALL THICKNESS RATIO: 0.73
ECHO LVOT AREA: 3.5 CM2
ECHO LVOT AV VTI INDEX: 0.67
ECHO LVOT DIAM: 2.1 CM
ECHO LVOT MEAN GRADIENT: 3 MMHG
ECHO LVOT PEAK GRADIENT: 4 MMHG
ECHO LVOT PEAK VELOCITY: 1.1 M/S
ECHO LVOT STROKE VOLUME INDEX: 43 ML/M2
ECHO LVOT SV: 92.1 ML
ECHO LVOT VTI: 26.6 CM
ECHO MV A VELOCITY: 1.3 M/S
ECHO MV AREA PHT: 2 CM2
ECHO MV AREA VTI: 2.3 CM2
ECHO MV E DECELERATION TIME (DT): 351.1 MS
ECHO MV E VELOCITY: 0.83 M/S
ECHO MV E/A RATIO: 0.64
ECHO MV LVOT VTI INDEX: 1.52
ECHO MV MAX VELOCITY: 1.3 M/S
ECHO MV MEAN GRADIENT: 3 MMHG
ECHO MV MEAN VELOCITY: 0.8 M/S
ECHO MV PEAK GRADIENT: 7 MMHG
ECHO MV PRESSURE HALF TIME (PHT): 109.9 MS
ECHO MV VTI: 40.4 CM
ECHO PV MAX VELOCITY: 1.2 M/S
ECHO PV MEAN GRADIENT: 3 MMHG
ECHO PV MEAN VELOCITY: 0.8 M/S
ECHO PV PEAK GRADIENT: 6 MMHG
ECHO PV VTI: 23.2 CM
ECHO RV INTERNAL DIMENSION: 3.8 CM
ECHO RV TAPSE: 2.1 CM (ref 1.7–?)
EOSINOPHIL # BLD: 0.19 K/UL (ref 0.05–0.5)
EOSINOPHILS RELATIVE PERCENT: 3 % (ref 0–6)
ERYTHROCYTE [DISTWIDTH] IN BLOOD BY AUTOMATED COUNT: 12.6 % (ref 11.5–15)
GFR, ESTIMATED: 62 ML/MIN/1.73M2
GLUCOSE SERPL-MCNC: 95 MG/DL (ref 74–99)
HCT VFR BLD AUTO: 35.2 % (ref 34–48)
HGB BLD-MCNC: 10.9 G/DL (ref 11.5–15.5)
IMM GRANULOCYTES # BLD AUTO: <0.03 K/UL (ref 0–0.58)
IMM GRANULOCYTES NFR BLD: 0 % (ref 0–5)
LYMPHOCYTES NFR BLD: 1.24 K/UL (ref 1.5–4)
LYMPHOCYTES RELATIVE PERCENT: 18 % (ref 20–42)
MCH RBC QN AUTO: 31.3 PG (ref 26–35)
MCHC RBC AUTO-ENTMCNC: 31 G/DL (ref 32–34.5)
MCV RBC AUTO: 101.1 FL (ref 80–99.9)
MONOCYTES NFR BLD: 0.53 K/UL (ref 0.1–0.95)
MONOCYTES NFR BLD: 8 % (ref 2–12)
NEUTROPHILS NFR BLD: 71 % (ref 43–80)
NEUTS SEG NFR BLD: 4.86 K/UL (ref 1.8–7.3)
PLATELET # BLD AUTO: 217 K/UL (ref 130–450)
PMV BLD AUTO: 10 FL (ref 7–12)
POTASSIUM SERPL-SCNC: 4 MMOL/L (ref 3.5–5)
RBC # BLD AUTO: 3.48 M/UL (ref 3.5–5.5)
SODIUM SERPL-SCNC: 137 MMOL/L (ref 132–146)
WBC OTHER # BLD: 6.9 K/UL (ref 4.5–11.5)

## 2025-01-07 PROCEDURE — 85025 COMPLETE CBC W/AUTO DIFF WBC: CPT

## 2025-01-07 PROCEDURE — 2060000000 HC ICU INTERMEDIATE R&B

## 2025-01-07 PROCEDURE — 6360000002 HC RX W HCPCS: Performed by: HOSPITALIST

## 2025-01-07 PROCEDURE — 99232 SBSQ HOSP IP/OBS MODERATE 35: CPT | Performed by: STUDENT IN AN ORGANIZED HEALTH CARE EDUCATION/TRAINING PROGRAM

## 2025-01-07 PROCEDURE — 93306 TTE W/DOPPLER COMPLETE: CPT

## 2025-01-07 PROCEDURE — 6370000000 HC RX 637 (ALT 250 FOR IP): Performed by: HOSPITALIST

## 2025-01-07 PROCEDURE — 2580000003 HC RX 258: Performed by: HOSPITALIST

## 2025-01-07 PROCEDURE — 80048 BASIC METABOLIC PNL TOTAL CA: CPT

## 2025-01-07 PROCEDURE — 2500000003 HC RX 250 WO HCPCS: Performed by: HOSPITALIST

## 2025-01-07 PROCEDURE — 82550 ASSAY OF CK (CPK): CPT

## 2025-01-07 PROCEDURE — 6360000002 HC RX W HCPCS: Performed by: STUDENT IN AN ORGANIZED HEALTH CARE EDUCATION/TRAINING PROGRAM

## 2025-01-07 RX ORDER — CALCIUM GLUCONATE 20 MG/ML
1000 INJECTION, SOLUTION INTRAVENOUS ONCE
Status: COMPLETED | OUTPATIENT
Start: 2025-01-07 | End: 2025-01-07

## 2025-01-07 RX ADMIN — CALCIUM GLUCONATE 1000 MG: 20 INJECTION, SOLUTION INTRAVENOUS at 08:04

## 2025-01-07 RX ADMIN — PANTOPRAZOLE SODIUM 40 MG: 40 TABLET, DELAYED RELEASE ORAL at 07:19

## 2025-01-07 RX ADMIN — CEFEPIME 2000 MG: 2 INJECTION, POWDER, FOR SOLUTION INTRAVENOUS at 23:19

## 2025-01-07 RX ADMIN — FERROUS SULFATE TAB 325 MG (65 MG ELEMENTAL FE) 325 MG: 325 (65 FE) TAB at 21:18

## 2025-01-07 RX ADMIN — OXYCODONE HYDROCHLORIDE AND ACETAMINOPHEN 1 TABLET: 5; 325 TABLET ORAL at 17:15

## 2025-01-07 RX ADMIN — OXYCODONE HYDROCHLORIDE AND ACETAMINOPHEN 1 TABLET: 5; 325 TABLET ORAL at 08:09

## 2025-01-07 RX ADMIN — MORPHINE SULFATE 15 MG: 15 TABLET, FILM COATED, EXTENDED RELEASE ORAL at 21:17

## 2025-01-07 RX ADMIN — MORPHINE SULFATE 15 MG: 15 TABLET, FILM COATED, EXTENDED RELEASE ORAL at 07:57

## 2025-01-07 RX ADMIN — ATORVASTATIN CALCIUM 20 MG: 20 TABLET, FILM COATED ORAL at 21:17

## 2025-01-07 RX ADMIN — METOPROLOL SUCCINATE 50 MG: 25 TABLET, EXTENDED RELEASE ORAL at 07:57

## 2025-01-07 RX ADMIN — ENOXAPARIN SODIUM 30 MG: 100 INJECTION SUBCUTANEOUS at 21:17

## 2025-01-07 RX ADMIN — ASPIRIN 81 MG: 81 TABLET, COATED ORAL at 07:57

## 2025-01-07 RX ADMIN — ENOXAPARIN SODIUM 30 MG: 100 INJECTION SUBCUTANEOUS at 07:59

## 2025-01-07 RX ADMIN — OXYCODONE HYDROCHLORIDE AND ACETAMINOPHEN 1 TABLET: 5; 325 TABLET ORAL at 00:00

## 2025-01-07 RX ADMIN — SODIUM CHLORIDE, PRESERVATIVE FREE 10 ML: 5 INJECTION INTRAVENOUS at 21:18

## 2025-01-07 RX ADMIN — GABAPENTIN 300 MG: 300 CAPSULE ORAL at 15:02

## 2025-01-07 RX ADMIN — GABAPENTIN 300 MG: 300 CAPSULE ORAL at 21:18

## 2025-01-07 RX ADMIN — GABAPENTIN 300 MG: 300 CAPSULE ORAL at 07:57

## 2025-01-07 RX ADMIN — SODIUM CHLORIDE, PRESERVATIVE FREE 10 ML: 5 INJECTION INTRAVENOUS at 08:02

## 2025-01-07 RX ADMIN — OXYCODONE 2.5 MG: 5 TABLET ORAL at 17:14

## 2025-01-07 RX ADMIN — OXYCODONE 2.5 MG: 5 TABLET ORAL at 08:09

## 2025-01-07 ASSESSMENT — PAIN SCALES - GENERAL
PAINLEVEL_OUTOF10: 3
PAINLEVEL_OUTOF10: 7
PAINLEVEL_OUTOF10: 3
PAINLEVEL_OUTOF10: 10
PAINLEVEL_OUTOF10: 9
PAINLEVEL_OUTOF10: 6

## 2025-01-07 ASSESSMENT — PAIN DESCRIPTION - ORIENTATION
ORIENTATION: LEFT

## 2025-01-07 ASSESSMENT — PAIN DESCRIPTION - DESCRIPTORS
DESCRIPTORS: ACHING;DISCOMFORT;SORE
DESCRIPTORS: THROBBING
DESCRIPTORS: ACHING;DISCOMFORT
DESCRIPTORS: SORE;ACHING;DISCOMFORT
DESCRIPTORS: ACHING

## 2025-01-07 ASSESSMENT — PAIN DESCRIPTION - LOCATION
LOCATION: GENERALIZED
LOCATION: LEG
LOCATION: GENERALIZED
LOCATION: LEG
LOCATION: ABDOMEN;LEG

## 2025-01-07 ASSESSMENT — PAIN DESCRIPTION - FREQUENCY: FREQUENCY: INTERMITTENT

## 2025-01-07 ASSESSMENT — PAIN - FUNCTIONAL ASSESSMENT
PAIN_FUNCTIONAL_ASSESSMENT: PREVENTS OR INTERFERES SOME ACTIVE ACTIVITIES AND ADLS

## 2025-01-07 ASSESSMENT — PAIN DESCRIPTION - ONSET: ONSET: ON-GOING

## 2025-01-07 ASSESSMENT — PAIN DESCRIPTION - PAIN TYPE: TYPE: ACUTE PAIN;CHRONIC PAIN

## 2025-01-07 NOTE — PROGRESS NOTES
GENERAL SURGERY  DAILY PROGRESS NOTE    Patient's Name/Date of Birth: Anastacia Morel / 1945    Date: 2025     Chief Complaint   Patient presents with    Fall     Fell 2 days ago in tub, wound check on abdomen post all, hit head and left hip pain and \"wants kidneys checked\"        Subjective:  Patient resting comfortably this a.m.  Patient states that her pain is well-controlled over her left lower quadrant debridement site.  Patient did have dressing change x 1 for drainage of old hematoma on dressing noted overnight.      Objective:  Last 24Hrs  Temp  Av °F (36.7 °C)  Min: 97.8 °F (36.6 °C)  Max: 98.1 °F (36.7 °C)  Resp  Av.2  Min: 16  Max: 23  Pulse  Av.8  Min: 79  Max: 95  Systolic (24hrs), Av , Min:98 , Max:148     Diastolic (24hrs), Av, Min:56, Max:78    SpO2  Av.3 %  Min: 90 %  Max: 98 %    I/O last 3 completed shifts:  In: 291.3 [I.V.:291.3]  Out: -       General: In no acute distress, alert and oriented x4  Cardiovascular: Warm throughout  Respiratory: no respiratory distress, equal chest rise  Abdomen: Obese abdomen noted, soft, nondistended, minimally tender around left lower quadrant debridement site.  Prior debridement site with noted healthy underlying tissue with packing in place with minimal amount of serosanguineous drainage noted.  Skin: no obvious rashes or lesions appreciated, no jaundice  Extremities: Nonpitting edema noted over bilateral lower extremities worse on the left with chronic venous stasis wound noted over the left lower extremity.      CBC  Recent Labs     25  1807 25  0515 25  0351   WBC 6.7 7.4 6.9   RBC 3.58 3.67 3.48*   HGB 11.2* 11.4* 10.9*   HCT 35.5 36.5 35.2   MCV 99.2 99.5 101.1*   MCH 31.3 31.1 31.3   MCHC 31.5* 31.2* 31.0*   RDW 12.3 12.4 12.6    191 217   MPV 10.2 10.3 10.0       CMP  Recent Labs     25  1807 25  1828 25  0515 25  0351     --  139 137   K 3.8  --  3.7 4.0

## 2025-01-07 NOTE — PROGRESS NOTES
4 Eyes Skin Assessment     NAME:  Anastacia Morel  YOB: 1945  MEDICAL RECORD NUMBER:  [id number]    The patient is being assessed for  Admission    I agree that at least one RN has performed a thorough Head to Toe Skin Assessment on the patient. ALL assessment sites listed below have been assessed.      Areas assessed by both nurses:    Head, Face, Ears, Shoulders, Back, Chest, Arms, Elbows, Hands, Sacrum. Buttock, Coccyx, Ischium, Legs. Feet and Heels, and Under Medical Devices         Does the Patient have a Wound? Yes wound(s) were present on assessment. LDA wound assessment was Initiated and completed by RN       Rich Prevention initiated by RN: No  Wound Care Orders initiated by RN: Yes    Pressure Injury (Stage 3,4, Unstageable, DTI, NWPT, and Complex wounds) if present, place Wound referral order by RN under : No    New Ostomies, if present place, Ostomy referral order under : No     Nurse 1 eSignature: Electronically signed by Yasmin Matt RN on 1/7/25 at 5:36 PM EST    **SHARE this note so that the co-signing nurse can place an eSignature**    Nurse 2 eSignature: Electronically signed by Yasmin Rocha RN on 1/7/25 at 5:38 PM EST

## 2025-01-07 NOTE — PROGRESS NOTES
Patient states her LLE wound is chronic and refused the dressing to be removed as she stated the dressing was done before coming to the unit. Patient states Dr. HATFIELD in the wound clinic manages her wound and she sees him weekly on Wednesdays. Assessment charted per what RN can see with lifting dressing at this time.

## 2025-01-07 NOTE — PROGRESS NOTES
Hospitalist Progress Note      Chief Complaint:  had concerns including Fall (Fell 2 days ago in tub, wound check on abdomen post all, hit head and left hip pain and \"wants kidneys checked\").    Admission Date: 2025     SYNOPSIS:Ms. Anastacia Morel, a 79 y.o. year old female  with PMH of class II obesity, HTN, HLD, PVD, GERD, HFpEF, COPD presented to ED from for evaluation of syncope and left lower abdominal wound which was foul-smelling and purulent.  CT head and cervical spine was negative, cardiology consulted for syncope, infectious disease and general surgery following for left lower abdominal wound, she was given vancomycin, cefepime, vancomycin stopped    SUBJECTIVE:    Patient seen and examined at bedside, not in acute distress, Records reviewed.   Stable overnight. No overnight issues reported     Temp (24hrs), Av °F (36.7 °C), Min:97.8 °F (36.6 °C), Max:98.1 °F (36.7 °C)    DIET: ADULT DIET; Regular; 4 carb choices (60 gm/meal)  CODE: Full Code    Intake/Output Summary (Last 24 hours) at 2025 1246  Last data filed at 2025  Gross per 24 hour   Intake 240 ml   Output 0 ml   Net 240 ml       OBJECTIVE:    /65   Pulse 88   Temp 97.9 °F (36.6 °C) (Oral)   Resp 17   Wt 104.3 kg (230 lb)   SpO2 94%   BMI 36.02 kg/m²     General appearance: No apparent distress, appears stated age and cooperative.   HEENT:  Normocephalic. Conjunctivae/corneas clear. Moist mucosa   Neck: Supple. No jugular venous distention. Thyroid not visible, non tender   Respiratory: Normal respiratory effort. Clear to auscultation bilaterally. No stridor/wheezing/rhonchi/crackles   Cardiovascular: Regular heart beats, normal S1-S2. No M/G/R  Abdomen: Non distended, soft,  no visceromegaly, no mass, normal bowel sounds, left abdominal fold wound,   Musculoskeletal: No clubbing, cyanosis, no bilateral lower extremity edema. Brisk capillary refill.   Skin:  No rashes  on visible skin  Neurologic: awake,

## 2025-01-07 NOTE — PROGRESS NOTES
Infectious Disease  Outpatient Progress Note  [facility]    Chief Complaint: Anastacia Morel is a 79 y.o. female who presents for Fall (Fell 2 days ago in tub, wound check on abdomen post all, hit head and left hip pain and \"wants kidneys checked\")         Subjective:    Feels much better today  Surgery I&D the abdominal pannus ulceration; cultures pending  Lower extremity cultures are mixed vinny taken from the left lower extremity ulcerations associated with chronic venous insufficiency    ROS:  Constitutional:No Fever, chills or rigors. No unexplained weight loss.  HEENT: No headache, dizziness or lightheadedness. No recent change in vision or hearing. No sore throat or runny nose.  Respiratory: no cough, chest pain, shortness of breath or wheeze.  Cardiovascular: no chest pain or palpitations  Gastrointestinal: no nausea, vomiting, diarrhea, constipation. No blood in stool.  Genitourinary: No h/o dysuria, hematuria, urgency, frequency or incontinence.  Musculoskeletal: No h/o joint pain anywhere in body, or bodyache.  Neurologic: No h/o passing out, focal weakness or seizure.  Skin: No h/o rashes or easy bruising.  Hematologic: No gum bleeding or easy bruising. No hemoptysis.       Current Facility-Administered Medications:     aspirin EC tablet 81 mg, 81 mg, Oral, Daily, Eyal Carpenter MD, 81 mg at 01/07/25 0757    ferrous sulfate (IRON 325) tablet 325 mg, 325 mg, Oral, Nightly, Eyal Carpenter MD, 325 mg at 01/06/25 1933    gabapentin (NEURONTIN) capsule 300 mg, 300 mg, Oral, TID, Eyal Carpenter MD, 300 mg at 01/07/25 0757    [Held by provider] hydroCHLOROthiazide (HYDRODIURIL) tablet 25 mg, 25 mg, Oral, Daily, Eyal Carpenter MD, 25 mg at 01/06/25 0735    metoprolol succinate (TOPROL XL) extended release tablet 50 mg, 50 mg, Oral, Daily, Eyal Carpenter MD, 50 mg at 01/07/25 0757    morphine (MS CONTIN) extended release tablet 15 mg, 15 mg, Oral, BID, Eyal Carpenter MD, 15 mg at 01/07/25 0757    pantoprazole (PROTONIX) tablet 40 mg,  symmetric.     No acute abnormality of the pelvis.     XR CHEST (2 VW)    Result Date: 1/5/2025  EXAMINATION: TWO XRAY VIEWS OF THE CHEST 1/5/2025 7:33 pm COMPARISON: 04/28/2023 HISTORY: ORDERING SYSTEM PROVIDED HISTORY: fall TECHNOLOGIST PROVIDED HISTORY: Reason for exam:->fall FINDINGS: The lungs are without acute focal process.  There is no effusion or pneumothorax. The cardiomediastinal silhouette is without acute process. The osseous structures are without acute process.  Kyphotic and degenerative changes thoracic spine.     No acute process.        Assessment  1. Congestive heart failure, unspecified HF chronicity, unspecified heart failure type (HCC)    2. Cellulitis of abdominal wall    3. Fall, initial encounter    4. Acute kidney injury (HCC)    5. Hypomagnesemia    6.  Chronic ulcer left lower extremity associated with chronic venous insufficiency         Previous cultures have grown MSSA Acinetobacter and Pseudomonas group G strep  Plan  Continue cefepime  Local wound care  Follow-up cultures      Electronically signed by Caleb Newton MD on 1/7/2025 at 11:36 AM

## 2025-01-07 NOTE — PLAN OF CARE
Problem: Skin/Tissue Integrity  Goal: Absence of new skin breakdown  Description: 1.  Monitor for areas of redness and/or skin breakdown  2.  Assess vascular access sites hourly  3.  Every 4-6 hours minimum:  Change oxygen saturation probe site  4.  Every 4-6 hours:  If on nasal continuous positive airway pressure, respiratory therapy assess nares and determine need for appliance change or resting period.  Outcome: Progressing     Problem: ABCDS Injury Assessment  Goal: Absence of physical injury  Outcome: Progressing     Problem: Safety - Adult  Goal: Free from fall injury  Outcome: Progressing     Problem: Chronic Conditions and Co-morbidities  Goal: Patient's chronic conditions and co-morbidity symptoms are monitored and maintained or improved  Outcome: Progressing  Flowsheets (Taken 1/7/2025 1717)  Care Plan - Patient's Chronic Conditions and Co-Morbidity Symptoms are Monitored and Maintained or Improved:   Monitor and assess patient's chronic conditions and comorbid symptoms for stability, deterioration, or improvement   Collaborate with multidisciplinary team to address chronic and comorbid conditions and prevent exacerbation or deterioration   Update acute care plan with appropriate goals if chronic or comorbid symptoms are exacerbated and prevent overall improvement and discharge     Problem: Discharge Planning  Goal: Discharge to home or other facility with appropriate resources  Outcome: Progressing  Flowsheets (Taken 1/7/2025 1717)  Discharge to home or other facility with appropriate resources:   Identify barriers to discharge with patient and caregiver   Refer to discharge planning if patient needs post-hospital services based on physician order or complex needs related to functional status, cognitive ability or social support system     Problem: Pain  Goal: Verbalizes/displays adequate comfort level or baseline comfort level  Outcome: Progressing     Problem: Respiratory - Adult  Goal: Achieves  optimal ventilation and oxygenation  Outcome: Progressing     Problem: Cardiovascular - Adult  Goal: Maintains optimal cardiac output and hemodynamic stability  Outcome: Progressing  Goal: Absence of cardiac dysrhythmias or at baseline  Outcome: Progressing     Problem: Skin/Tissue Integrity - Adult  Goal: Skin integrity remains intact  Outcome: Progressing  Goal: Incisions, wounds, or drain sites healing without S/S of infection  Outcome: Progressing  Goal: Oral mucous membranes remain intact  Outcome: Progressing     Problem: Musculoskeletal - Adult  Goal: Return mobility to safest level of function  Outcome: Progressing  Goal: Maintain proper alignment of affected body part  Outcome: Progressing  Goal: Return ADL status to a safe level of function  Outcome: Progressing     Problem: Genitourinary - Adult  Goal: Absence of urinary retention  Outcome: Progressing     Problem: Infection - Adult  Goal: Absence of infection during hospitalization  Outcome: Progressing     Problem: Metabolic/Fluid and Electrolytes - Adult  Goal: Electrolytes maintained within normal limits  Outcome: Progressing  Goal: Hemodynamic stability and optimal renal function maintained  Outcome: Progressing

## 2025-01-08 ENCOUNTER — HOSPITAL ENCOUNTER (OUTPATIENT)
Dept: WOUND CARE | Age: 80
Discharge: HOME | End: 2025-01-08
Attending: SURGERY

## 2025-01-08 LAB
ANION GAP SERPL CALCULATED.3IONS-SCNC: 8 MMOL/L (ref 7–16)
B PARAP IS1001 DNA NPH QL NAA+NON-PROBE: NOT DETECTED
B PERT DNA SPEC QL NAA+PROBE: NOT DETECTED
BASOPHILS # BLD: 0.01 K/UL (ref 0–0.2)
BASOPHILS NFR BLD: 0 % (ref 0–2)
BUN SERPL-MCNC: 15 MG/DL (ref 6–23)
C PNEUM DNA NPH QL NAA+NON-PROBE: NOT DETECTED
CALCIUM SERPL-MCNC: 8.4 MG/DL (ref 8.6–10.2)
CHLORIDE SERPL-SCNC: 105 MMOL/L (ref 98–107)
CO2 SERPL-SCNC: 29 MMOL/L (ref 22–29)
CREAT SERPL-MCNC: 0.9 MG/DL (ref 0.5–1)
EOSINOPHIL # BLD: 0.19 K/UL (ref 0.05–0.5)
EOSINOPHILS RELATIVE PERCENT: 4 % (ref 0–6)
ERYTHROCYTE [DISTWIDTH] IN BLOOD BY AUTOMATED COUNT: 12.7 % (ref 11.5–15)
FLUAV H3 RNA NPH QL NAA+NON-PROBE: ABNORMAL
FLUAV RNA NPH QL NAA+NON-PROBE: ABNORMAL
FLUBV RNA NPH QL NAA+NON-PROBE: NOT DETECTED
GFR, ESTIMATED: 69 ML/MIN/1.73M2
GLUCOSE SERPL-MCNC: 90 MG/DL (ref 74–99)
HADV DNA NPH QL NAA+NON-PROBE: NOT DETECTED
HCOV 229E RNA NPH QL NAA+NON-PROBE: NOT DETECTED
HCOV HKU1 RNA NPH QL NAA+NON-PROBE: NOT DETECTED
HCOV NL63 RNA NPH QL NAA+NON-PROBE: NOT DETECTED
HCOV OC43 RNA NPH QL NAA+NON-PROBE: NOT DETECTED
HCT VFR BLD AUTO: 29.1 % (ref 34–48)
HGB BLD-MCNC: 9.1 G/DL (ref 11.5–15.5)
HMPV RNA NPH QL NAA+NON-PROBE: NOT DETECTED
HPIV1 RNA NPH QL NAA+NON-PROBE: NOT DETECTED
HPIV2 RNA NPH QL NAA+NON-PROBE: NOT DETECTED
HPIV3 RNA NPH QL NAA+NON-PROBE: NOT DETECTED
HPIV4 RNA NPH QL NAA+NON-PROBE: NOT DETECTED
IMM GRANULOCYTES # BLD AUTO: <0.03 K/UL (ref 0–0.58)
IMM GRANULOCYTES NFR BLD: 0 % (ref 0–5)
INFLUENZA A BY PCR: NOT DETECTED
INFLUENZA B BY PCR: NOT DETECTED
LYMPHOCYTES NFR BLD: 0.96 K/UL (ref 1.5–4)
LYMPHOCYTES RELATIVE PERCENT: 19 % (ref 20–42)
M PNEUMO DNA NPH QL NAA+NON-PROBE: NOT DETECTED
MCH RBC QN AUTO: 31.4 PG (ref 26–35)
MCHC RBC AUTO-ENTMCNC: 31.3 G/DL (ref 32–34.5)
MCV RBC AUTO: 100.3 FL (ref 80–99.9)
MONOCYTES NFR BLD: 0.49 K/UL (ref 0.1–0.95)
MONOCYTES NFR BLD: 10 % (ref 2–12)
NEUTROPHILS NFR BLD: 67 % (ref 43–80)
NEUTS SEG NFR BLD: 3.39 K/UL (ref 1.8–7.3)
PLATELET # BLD AUTO: 200 K/UL (ref 130–450)
PMV BLD AUTO: 10.1 FL (ref 7–12)
POTASSIUM SERPL-SCNC: 4.1 MMOL/L (ref 3.5–5)
RBC # BLD AUTO: 2.9 M/UL (ref 3.5–5.5)
RSV RNA NPH QL NAA+NON-PROBE: NOT DETECTED
RV+EV RNA NPH QL NAA+NON-PROBE: NOT DETECTED
SARS-COV-2 RNA NPH QL NAA+NON-PROBE: NOT DETECTED
SODIUM SERPL-SCNC: 142 MMOL/L (ref 132–146)
SPECIMEN DESCRIPTION: ABNORMAL
WBC OTHER # BLD: 5.1 K/UL (ref 4.5–11.5)

## 2025-01-08 PROCEDURE — 2580000003 HC RX 258: Performed by: HOSPITALIST

## 2025-01-08 PROCEDURE — 97165 OT EVAL LOW COMPLEX 30 MIN: CPT

## 2025-01-08 PROCEDURE — 87502 INFLUENZA DNA AMP PROBE: CPT

## 2025-01-08 PROCEDURE — 2060000000 HC ICU INTERMEDIATE R&B

## 2025-01-08 PROCEDURE — 0202U NFCT DS 22 TRGT SARS-COV-2: CPT

## 2025-01-08 PROCEDURE — 80048 BASIC METABOLIC PNL TOTAL CA: CPT

## 2025-01-08 PROCEDURE — 97535 SELF CARE MNGMENT TRAINING: CPT

## 2025-01-08 PROCEDURE — 2700000000 HC OXYGEN THERAPY PER DAY

## 2025-01-08 PROCEDURE — 99232 SBSQ HOSP IP/OBS MODERATE 35: CPT | Performed by: STUDENT IN AN ORGANIZED HEALTH CARE EDUCATION/TRAINING PROGRAM

## 2025-01-08 PROCEDURE — 36415 COLL VENOUS BLD VENIPUNCTURE: CPT

## 2025-01-08 PROCEDURE — 6370000000 HC RX 637 (ALT 250 FOR IP): Performed by: HOSPITALIST

## 2025-01-08 PROCEDURE — 6360000002 HC RX W HCPCS: Performed by: HOSPITALIST

## 2025-01-08 PROCEDURE — 85025 COMPLETE CBC W/AUTO DIFF WBC: CPT

## 2025-01-08 PROCEDURE — 2500000003 HC RX 250 WO HCPCS: Performed by: HOSPITALIST

## 2025-01-08 PROCEDURE — 6370000000 HC RX 637 (ALT 250 FOR IP): Performed by: STUDENT IN AN ORGANIZED HEALTH CARE EDUCATION/TRAINING PROGRAM

## 2025-01-08 RX ORDER — OSELTAMIVIR PHOSPHATE 75 MG/1
75 CAPSULE ORAL 2 TIMES DAILY
Status: DISCONTINUED | OUTPATIENT
Start: 2025-01-08 | End: 2025-01-08

## 2025-01-08 RX ADMIN — ENOXAPARIN SODIUM 30 MG: 100 INJECTION SUBCUTANEOUS at 21:53

## 2025-01-08 RX ADMIN — MORPHINE SULFATE 15 MG: 15 TABLET, FILM COATED, EXTENDED RELEASE ORAL at 21:53

## 2025-01-08 RX ADMIN — METOPROLOL SUCCINATE 50 MG: 25 TABLET, EXTENDED RELEASE ORAL at 10:06

## 2025-01-08 RX ADMIN — GABAPENTIN 300 MG: 300 CAPSULE ORAL at 13:59

## 2025-01-08 RX ADMIN — GABAPENTIN 300 MG: 300 CAPSULE ORAL at 10:06

## 2025-01-08 RX ADMIN — CEFEPIME 2000 MG: 2 INJECTION, POWDER, FOR SOLUTION INTRAVENOUS at 11:11

## 2025-01-08 RX ADMIN — CEFEPIME 2000 MG: 2 INJECTION, POWDER, FOR SOLUTION INTRAVENOUS at 21:55

## 2025-01-08 RX ADMIN — FERROUS SULFATE TAB 325 MG (65 MG ELEMENTAL FE) 325 MG: 325 (65 FE) TAB at 21:53

## 2025-01-08 RX ADMIN — GABAPENTIN 300 MG: 300 CAPSULE ORAL at 21:53

## 2025-01-08 RX ADMIN — ATORVASTATIN CALCIUM 20 MG: 20 TABLET, FILM COATED ORAL at 21:53

## 2025-01-08 RX ADMIN — MORPHINE SULFATE 15 MG: 15 TABLET, FILM COATED, EXTENDED RELEASE ORAL at 10:06

## 2025-01-08 RX ADMIN — ENOXAPARIN SODIUM 30 MG: 100 INJECTION SUBCUTANEOUS at 10:08

## 2025-01-08 RX ADMIN — HYDROCHLOROTHIAZIDE 25 MG: 25 TABLET ORAL at 10:06

## 2025-01-08 RX ADMIN — SODIUM CHLORIDE, PRESERVATIVE FREE 10 ML: 5 INJECTION INTRAVENOUS at 21:53

## 2025-01-08 RX ADMIN — ASPIRIN 81 MG: 81 TABLET, COATED ORAL at 10:06

## 2025-01-08 RX ADMIN — SODIUM CHLORIDE, PRESERVATIVE FREE 10 ML: 5 INJECTION INTRAVENOUS at 10:07

## 2025-01-08 RX ADMIN — OXYCODONE HYDROCHLORIDE AND ACETAMINOPHEN 1 TABLET: 5; 325 TABLET ORAL at 02:42

## 2025-01-08 RX ADMIN — OXYCODONE 2.5 MG: 5 TABLET ORAL at 02:43

## 2025-01-08 ASSESSMENT — PAIN SCALES - GENERAL
PAINLEVEL_OUTOF10: 0
PAINLEVEL_OUTOF10: 7
PAINLEVEL_OUTOF10: 10
PAINLEVEL_OUTOF10: 2
PAINLEVEL_OUTOF10: 5

## 2025-01-08 ASSESSMENT — PAIN DESCRIPTION - LOCATION
LOCATION: LEG
LOCATION: GENERALIZED

## 2025-01-08 ASSESSMENT — PAIN SCALES - WONG BAKER: WONGBAKER_NUMERICALRESPONSE: NO HURT

## 2025-01-08 ASSESSMENT — PAIN DESCRIPTION - DESCRIPTORS: DESCRIPTORS: ACHING;BURNING;STABBING

## 2025-01-08 ASSESSMENT — PAIN DESCRIPTION - ORIENTATION: ORIENTATION: LEFT;LOWER

## 2025-01-08 ASSESSMENT — PAIN - FUNCTIONAL ASSESSMENT: PAIN_FUNCTIONAL_ASSESSMENT: ACTIVITIES ARE NOT PREVENTED

## 2025-01-08 NOTE — PROGRESS NOTES
ID consult called to ID answering service awaiting accepting provider     Electronically signed by Yasmin Rocha RN on 1/8/2025 at 12:31 PM

## 2025-01-08 NOTE — PROGRESS NOTES
Hospitalist Progress Note      Chief Complaint:  had concerns including Fall (Fell 2 days ago in tub, wound check on abdomen post all, hit head and left hip pain and \"wants kidneys checked\").    Admission Date: 2025     SYNOPSIS:Ms. Anastacia Morel, a 79 y.o. year old female  with PMH of class II obesity, HTN, HLD, PVD, GERD, HFpEF, COPD presented to ED from for evaluation of syncope and left lower abdominal wound which was foul-smelling and purulent.  CT head and cervical spine was negative, cardiology consulted for syncope, infectious disease and general surgery following for left lower abdominal wound, she was given vancomycin, cefepime, vancomycin stopped    SUBJECTIVE:    Conclude discharge pending final ID recs.    Plan is to be discharged home.    Temp (24hrs), Av.5 °F (36.4 °C), Min:97.2 °F (36.2 °C), Max:97.9 °F (36.6 °C)    DIET: ADULT DIET; Regular; 4 carb choices (60 gm/meal)  CODE: Full Code    Intake/Output Summary (Last 24 hours) at 2025 1408  Last data filed at 2025 1316  Gross per 24 hour   Intake 941.71 ml   Output 200 ml   Net 741.71 ml       OBJECTIVE:    BP (!) 133/57   Pulse 84   Temp 97.4 °F (36.3 °C) (Temporal)   Resp 18   Ht 1.702 m (5' 7\")   Wt 104 kg (229 lb 4.5 oz)   SpO2 93%   BMI 35.91 kg/m²     General appearance: No apparent distress, appears stated age and cooperative.   HEENT:  Normocephalic. Conjunctivae/corneas clear. Moist mucosa   Neck: Supple. No jugular venous distention. Thyroid not visible, non tender   Respiratory: Normal respiratory effort. Clear to auscultation bilaterally. No stridor/wheezing/rhonchi/crackles   Cardiovascular: Regular heart beats, normal S1-S2. No M/G/R  Abdomen: Non distended, soft,  no visceromegaly, no mass, normal bowel sounds, left abdominal fold wound,   Musculoskeletal: No clubbing, cyanosis, no bilateral lower extremity edema. Brisk capillary refill.   Skin:  No rashes  on visible skin  Neurologic: awake, alert and  oriented x 3. Following commands. No focal neurological deficit. Cranial nerves 2-12 grossly normal      ASSESSMENT and PLAN:    Syncope: Orthostatic vitals normal, troponins elevated proBNP elevated, cardiology consulted for further evaluation patient known to Dr. Holt, Awaiting     Left lower abdominal cellulitis: Neurosurgery consulted for possible I&D, on cefepime, ID following, await blood cultures, wound cultures    DAYTON: Likely prerenal, Swathi 0.6%, started on IV fluids at 75 cc/h, looks like patient is tolerating oral intake improvement in renal function cr: 18> 1.2   Continue hydrochlorothiazide r    Hypertension: hydrochlorothiazide continue metoprolol as needed hydralazine    History of COPD: Not in exacerbation, continue breathing treatments    DVT prophylaxis: Lovenox     DISPOSITION: Remains admitted, On IV Abx, Await final c/s.     Medications:  REVIEWED DAILY    Infusion Medications    sodium chloride       Scheduled Medications    cefepime  2,000 mg IntraVENous Q12H    aspirin  81 mg Oral Daily    ferrous sulfate  325 mg Oral Nightly    gabapentin  300 mg Oral TID    hydroCHLOROthiazide  25 mg Oral Daily    metoprolol succinate  50 mg Oral Daily    morphine  15 mg Oral BID    pantoprazole  40 mg Oral QAM AC    atorvastatin  20 mg Oral Nightly    sodium chloride flush  5-40 mL IntraVENous 2 times per day    enoxaparin  30 mg SubCUTAneous BID     PRN Meds: sodium chloride flush, sodium chloride, potassium chloride **OR** potassium alternative oral replacement **OR** potassium chloride, magnesium sulfate, ondansetron **OR** ondansetron, polyethylene glycol, acetaminophen **OR** acetaminophen, sulfur hexafluoride microspheres, albuterol, oxyCODONE-acetaminophen **AND** oxyCODONE, hydrALAZINE    Labs:     Recent Labs     01/06/25 0515 01/07/25  0351 01/08/25  0538   WBC 7.4 6.9 5.1   HGB 11.4* 10.9* 9.1*   HCT 36.5 35.2 29.1*    217 200       Recent Labs     01/06/25  0515 01/07/25  0351

## 2025-01-08 NOTE — PROGRESS NOTES
Per Dr Holt patient is to remain on telemetry until seen by him. Will continue monitoring    Electronically signed by Yasmin Rocha RN on 1/8/2025 at 1:31 PM

## 2025-01-08 NOTE — PROGRESS NOTES
[facility]  [facility]  Progress Note      Chief Complaint   Patient presents with    Fall     Fell 2 days ago in tub, wound check on abdomen post all, hit head and left hip pain and \"wants kidneys checked\"       SUBJECTIVE:  Patient is tolerating medications. No reported adverse drug reactions.  No nausea, vomiting, diarrhea.  Afebrile  Review of systems:  As stated above in the chief complaint, otherwise negative.    Medications:  Scheduled Meds:   cefepime  2,000 mg IntraVENous Q12H    aspirin  81 mg Oral Daily    ferrous sulfate  325 mg Oral Nightly    gabapentin  300 mg Oral TID    hydroCHLOROthiazide  25 mg Oral Daily    metoprolol succinate  50 mg Oral Daily    morphine  15 mg Oral BID    pantoprazole  40 mg Oral QAM AC    atorvastatin  20 mg Oral Nightly    sodium chloride flush  5-40 mL IntraVENous 2 times per day    enoxaparin  30 mg SubCUTAneous BID     Continuous Infusions:   sodium chloride       PRN Meds:sodium chloride flush, sodium chloride, potassium chloride **OR** potassium alternative oral replacement **OR** potassium chloride, magnesium sulfate, ondansetron **OR** ondansetron, polyethylene glycol, acetaminophen **OR** acetaminophen, sulfur hexafluoride microspheres, albuterol, oxyCODONE-acetaminophen **AND** oxyCODONE, hydrALAZINE    OBJECTIVE:  BP (!) 133/57   Pulse 84   Temp 97.4 °F (36.3 °C) (Temporal)   Resp 18   Ht 1.702 m (5' 7\")   Wt 104 kg (229 lb 4.5 oz)   SpO2 93%   BMI 35.91 kg/m²   Temp  Av.5 °F (36.4 °C)  Min: 97.2 °F (36.2 °C)  Max: 97.9 °F (36.6 °C)  Constitutional: The patient is awake, alert, and oriented.   Skin: Warm and dry. No rashes were noted.   HEENT: Round and reactive pupils.  Moist mucous membranes.  No ulcerations or thrush.  Neck: Supple to movements.   Chest: No use of accessory muscles to breathe. Symmetrical expansion.  No wheezing, crackles or rhonchi.  Cardiovascular: S1 and S2 are rhythmic and regular. No murmurs  Staphylococcus aureus 07/12/2023 04:00 PM    ORG Pseudomonas aeruginosa 07/12/2023 04:00 PM    ORG Staphylococcus aureus 04/28/2023 02:11 PM     Lab Results   Component Value Date/Time    BLOODCULT2 5 Days no growth 03/24/2023 06:29 AM    BLOODCULT2 5 Days no growth 01/26/2023 10:00 AM    BLOODCULT2 5 Days no growth 10/02/2022 08:34 PM    ORG Staphylococcus aureus 07/12/2023 04:00 PM    ORG Pseudomonas aeruginosa 07/12/2023 04:00 PM    ORG Staphylococcus aureus 04/28/2023 02:11 PM     WOUND/ABSCESS   Date Value Ref Range Status   07/12/2023 Moderate growth  Final   07/12/2023 Heavy growth  Final   04/28/2023 (A)  Final    Mixed vinny isolated. Further workup and sensitivity testing  is not routinely indicated and will not be performed.  Mixed vinny isolated includes:  Corynebacteria  Mixed Gram negative rods  Oxidase positive Gram negative rods     04/28/2023 Moderate growth  Final     No results found for: \"RESPSMEAR\"  No results found for: \"MPNEUMO\", \"CLAMYDCU\", \"LABLEGI\", \"AFBCX\", \"FUNGSM\", \"LABFUNG\"  No results found for: \"CULTRESP\"  No results found for: \"CXCATHTIP\"  No results found for: \"BFCS\"  Culture Surgical   Date Value Ref Range Status   05/18/2016 Light growth  Final     Urine Culture, Routine   Date Value Ref Range Status   01/26/2023 Growth not present  Final   10/02/2022   Final    10 to 100,000 CFU/mL  Mixed vinny isolated. Further workup and sensitivity testing  is not routinely indicated and will not be performed.  Mixed vinny isolated includes:  Mixed gram positive organisms  Mixed gram negative rods       No results found for: \"MRSAC\"    ssessment  1. Congestive heart failure, unspecified HF chronicity, unspecified heart failure type (HCC)    2. Cellulitis of abdominal wall    3. Fall, initial encounter    4. Acute kidney injury (HCC)    5. Hypomagnesemia    6.  Chronic ulcer left lower extremity associated with chronic venous insufficiency         Previous cultures have grown MSSA Acinetobacter

## 2025-01-08 NOTE — PROGRESS NOTES
Physical Therapy    PT order received and medical chart reviewed 1/8. Pt eating lunch on first attempt. When therapist returned, pt was sleeping. Pt requested that therapy to return at a later time. Will re-attempt at a later time/date. Thank you.    Jeovanny Lyn, PT, DPT  [id number]

## 2025-01-08 NOTE — PLAN OF CARE
Problem: Skin/Tissue Integrity  Goal: Absence of new skin breakdown  Description: 1.  Monitor for areas of redness and/or skin breakdown  2.  Assess vascular access sites hourly  3.  Every 4-6 hours minimum:  Change oxygen saturation probe site  4.  Every 4-6 hours:  If on nasal continuous positive airway pressure, respiratory therapy assess nares and determine need for appliance change or resting period.  1/7/2025 2305 by Rosalinda Beauchamp RN  Outcome: Progressing  1/7/2025 1738 by Yasmin Matt RN  Outcome: Progressing     Problem: ABCDS Injury Assessment  Goal: Absence of physical injury  1/7/2025 2305 by Rosalinda Beauchamp RN  Outcome: Progressing  Flowsheets (Taken 1/7/2025 2305)  Absence of Physical Injury: Implement safety measures based on patient assessment  1/7/2025 1738 by Ysamin Matt RN  Outcome: Progressing     Problem: Safety - Adult  Goal: Free from fall injury  1/7/2025 2305 by Rosalinda Beauchamp RN  Outcome: Progressing  1/7/2025 1738 by Yasmin Matt RN  Outcome: Progressing     Problem: Chronic Conditions and Co-morbidities  Goal: Patient's chronic conditions and co-morbidity symptoms are monitored and maintained or improved  1/7/2025 2305 by Rosalinda Beauchamp RN  Outcome: Progressing  Flowsheets (Taken 1/7/2025 2117)  Care Plan - Patient's Chronic Conditions and Co-Morbidity Symptoms are Monitored and Maintained or Improved: Monitor and assess patient's chronic conditions and comorbid symptoms for stability, deterioration, or improvement  1/7/2025 1738 by Yasmin Matt RN  Outcome: Progressing  Flowsheets (Taken 1/7/2025 1717)  Care Plan - Patient's Chronic Conditions and Co-Morbidity Symptoms are Monitored and Maintained or Improved:   Monitor and assess patient's chronic conditions and comorbid symptoms for stability, deterioration, or improvement   Collaborate with multidisciplinary team to address chronic and comorbid conditions and prevent exacerbation or  maintained within normal limits  1/7/2025 2305 by Rosalinda Beauchamp RN  Outcome: Progressing  Flowsheets (Taken 1/7/2025 2117)  Electrolytes maintained within normal limits: Monitor labs and assess patient for signs and symptoms of electrolyte imbalances  1/7/2025 1738 by Yasmin Matt RN  Outcome: Progressing  Goal: Hemodynamic stability and optimal renal function maintained  1/7/2025 2305 by Rosalinda Beauchamp RN  Outcome: Progressing  Flowsheets (Taken 1/7/2025 2117)  Hemodynamic stability and optimal renal function maintained: Monitor labs and assess for signs and symptoms of volume excess or deficit  1/7/2025 1738 by Ysamin Matt RN  Outcome: Progressing

## 2025-01-08 NOTE — PROGRESS NOTES
Dr Holt paged to clarify cardio plan awaiting return call     Electronically signed by Yasmin Rocha RN on 1/8/2025 at 1:23 PM

## 2025-01-08 NOTE — CARE COORDINATION
1/8/25 CM Note Pt admitted 1/5/25 [place].  Wound to LLE.  IV Maxipime.  Discharge plan to return home with sister once medically stable.  [facility] vs continued wound care clinic upon DC. Family to provide transport.   Tamie JCN RN-BC  [phone]

## 2025-01-08 NOTE — CONSULTS
CARDIOLOGY CONSULTATION    Patient Name:  Anastacia Morel    :  1945    Reason for Consultation:   Syncope    History of Present Illness:   Anastacia Morel presents to [facility] following a history of what she feels is getting up off the commode and turning and suddenly falling and remained unconscious for at least 1 hour.  It is unclear if she lost consciousness on the commode or while getting up.  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  Since admission she has not demonstrated any significant arrhythmia but apparently also has been on pain medications.  She is now admitted for further observation to rule out malignant arrhythmia as a potential etiology for her loss of consciousness.  Of note is that she did have a two-dimensional echocardiogram obtained somewhat earlier today which demonstrated normal left ventricular systolic function and no demonstrable means for her elevated proBNP albeit her serum creatinine was elevated as well.  Interestingly however a two-dimensional echocardiogram obtained in May 2023 suggested hemodynamically significant mitral regurgitation and.  She also has been treated for chronic venous ulceration left lower extremity through the wound clinic.    Past Medical History:   has a past medical history of Bursitis, CAD (coronary artery disease), Cellulitis of leg, left, COPD (chronic obstructive pulmonary disease) ([facility]), Dermatophytosis, Emphysema, GERD (gastroesophageal reflux disease), Hiatal hernia, Hip pain, Hx of blood clots, Hyperlipidemia, Hypertension, Lymphedema of both lower extremities, Venous insufficiency of both lower extremities, Venous stasis ulcer of left calf with fat layer exposed without varicose veins (HCC), Venous stasis ulcer of left calf  with fat layer exposed without varicose veins (HCC), and Venous ulcer with fat layer exposed (HCC).    Surgical History:   has a past surgical history that includes Cholecystectomy; Hysterectomy; Endoscopy, colon, diagnostic; Colonoscopy; Appendectomy; ECHO Compl W Dop Color Flow (03/11/2013); Leg Debridement (Left, 11/18/2015); joint replacement (2009 2011); Breast reduction surgery; Breast enhancement surgery; Leg Debridement (Left, 08/03/2016); hernia repair (N/A, 04/16/2021); hernia repair; and Cataract removal (Right, 06/20/2024).     Social History:   reports that she quit smoking about 29 years ago. Her smoking use included cigarettes. She started smoking about 49 years ago. She has a 20 pack-year smoking history. She has never used smokeless tobacco. She reports that she does not drink alcohol and does not use drugs.     Family History:  family history is not on file.     Medications:  Prior to Admission medications    Medication Sig Start Date End Date Taking? Authorizing Provider   gabapentin (NEURONTIN) 300 MG capsule Take 1 capsule by mouth 3 times daily.    ProviderAr MD   zinc sulfate (ZINCATE) 220 (50 Zn)  mg capsule - elemental zinc Take 2 capsules by mouth at bedtime    Ar Nevarez MD   Potassium 99 MG TABS Take 2 tablets by mouth at bedtime    Ar Nevarez MD   morphine (MS CONTIN) 15 MG extended release tablet Take 1 tablet by mouth 2 times daily for 30 days. Max Daily Amount: 30 mg  Patient not taking: Reported on 1/6/2025 12/25/24 1/24/25  Faith Dempsey MD   gabapentin (NEURONTIN) 300 MG capsule Take 1 capsule by mouth 3 times daily for 30 days. Intended supply: 30 days  Patient not taking: Reported on 1/6/2025 12/25/24 1/24/25  Faith Dempsey MD   morphine (MS CONTIN) 15 MG extended release tablet Take 1 tablet by mouth 2 times daily for 30 days. Max Daily Amount: 30 mg 12/18/24 1/17/25  Faith Dempsey MD

## 2025-01-08 NOTE — PROGRESS NOTES
OCCUPATIONAL THERAPY INITIAL EVALUATION    [facility]  [address], [place]          Date:2025                                                   Patient Name: Anastacia Morel     MRN: [id number]     : 1945     Room: [address] [place] [address]       Evaluating OT: Kristin Rawls OTD, OTR/L, [id number]      Referring Provider: Eyal Carpenter MD     Specific Provider Orders/Date: OT eval and treat (25)    Diagnosis: Cellulitis of abdominal wall [L03.311]  Hypomagnesemia [E83.42]  DAY[place] (acute kidney injury) (HCC) [N17.9]  Acute kidney injury (HCC) [N17.9]  Fall, initial encounter [W19.XXXA]  Congestive heart failure, unspecified HF chronicity, unspecified heart failure type (HCC) [I50.9]    Surgeries/Procedures:  : Abdominal wall debridement      Pt admitted after fall at home, abdominal wall cellulitis, chronic LLE wound      Pertinent Medical History:       has a past medical history of Bursitis, CAD (coronary artery disease), Cellulitis of leg, left, COPD (chronic obstructive pulmonary disease) (HCC), Dermatophytosis, Emphysema, GERD (gastroesophageal reflux disease), Hiatal hernia, Hip pain, Hx of blood clots, Hyperlipidemia, Hypertension, Lymphedema of both lower extremities, Venous insufficiency of both lower extremities, Venous stasis ulcer of left calf with fat layer exposed without varicose veins (HCC), Venous stasis ulcer of left calf with fat layer exposed without varicose veins (HCC), and Venous ulcer with fat layer exposed (HCC).         Precautions:  Fall Risk, O2, chronic LLE wound, alarms+     Assessment of current deficits    [x] Functional mobility  [x]ADLs  [x] Strength               []Cognition    [x] Functional transfers   [x] IADLs         [x] Safety Awareness   [x]Endurance    [] Fine Coordination              [x] Balance      [] Vision/perception   [x]Sensation     []Gross Motor Coordination  [x] ROM  [] Delirium

## 2025-01-08 NOTE — PLAN OF CARE
Problem: Skin/Tissue Integrity  Goal: Absence of new skin breakdown  Description: 1.  Monitor for areas of redness and/or skin breakdown  2.  Assess vascular access sites hourly  3.  Every 4-6 hours minimum:  Change oxygen saturation probe site  4.  Every 4-6 hours:  If on nasal continuous positive airway pressure, respiratory therapy assess nares and determine need for appliance change or resting period.  1/8/2025 1019 by Yasmin Rocha RN  Outcome: Progressing     Problem: ABCDS Injury Assessment  Goal: Absence of physical injury  1/8/2025 1019 by Yasmin Rocha RN  Outcome: Progressing     Problem: Safety - Adult  Goal: Free from fall injury  1/8/2025 1019 by Yasmin Rocha RN  Outcome: Progressing     Problem: Chronic Conditions and Co-morbidities  Goal: Patient's chronic conditions and co-morbidity symptoms are monitored and maintained or improved  1/8/2025 1019 by Yasmin Rocha RN  Outcome: Progressing     Problem: Discharge Planning  Goal: Discharge to home or other facility with appropriate resources  1/8/2025 1019 by Yasmin Rocha RN  Outcome: Progressing     Problem: Pain  Goal: Verbalizes/displays adequate comfort level or baseline comfort level  1/8/2025 1019 by Yasmin Rocha RN  Outcome: Progressing     Problem: Respiratory - Adult  Goal: Achieves optimal ventilation and oxygenation  1/8/2025 1019 by Yasmin Rocha RN  Outcome: Progressing     Problem: Cardiovascular - Adult  Goal: Maintains optimal cardiac output and hemodynamic stability  1/8/2025 1019 by Yasmin Rocha RN  Outcome: Progressing     Problem: Cardiovascular - Adult  Goal: Absence of cardiac dysrhythmias or at baseline  1/8/2025 1019 by Yasmin Rocha RN  Outcome: Progressing     Problem: Skin/Tissue Integrity - Adult  Goal: Skin integrity remains intact  1/8/2025 1019 by Yasmin Rocha RN  Outcome: Progressing     Problem: Skin/Tissue Integrity - Adult  Goal: Incisions, wounds, or drain sites
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
## 2025-01-08 NOTE — PROGRESS NOTES
Messaged Rosey WILLAMS to notify of new consult     Electronically signed by Yasmin Rocha RN on 1/8/2025 at 3:10 PM

## 2025-01-09 LAB
MICROORGANISM SPEC CULT: ABNORMAL
MICROORGANISM/AGENT SPEC: ABNORMAL
SERVICE CMNT-IMP: ABNORMAL
SPECIMEN DESCRIPTION: ABNORMAL

## 2025-01-09 PROCEDURE — 97161 PT EVAL LOW COMPLEX 20 MIN: CPT

## 2025-01-09 PROCEDURE — 6370000000 HC RX 637 (ALT 250 FOR IP): Performed by: HOSPITALIST

## 2025-01-09 PROCEDURE — 6360000002 HC RX W HCPCS: Performed by: SPECIALIST

## 2025-01-09 PROCEDURE — 97530 THERAPEUTIC ACTIVITIES: CPT

## 2025-01-09 PROCEDURE — 2580000003 HC RX 258: Performed by: SPECIALIST

## 2025-01-09 PROCEDURE — 6360000002 HC RX W HCPCS: Performed by: HOSPITALIST

## 2025-01-09 PROCEDURE — 2500000003 HC RX 250 WO HCPCS: Performed by: HOSPITALIST

## 2025-01-09 PROCEDURE — 2580000003 HC RX 258: Performed by: HOSPITALIST

## 2025-01-09 PROCEDURE — 6370000000 HC RX 637 (ALT 250 FOR IP): Performed by: STUDENT IN AN ORGANIZED HEALTH CARE EDUCATION/TRAINING PROGRAM

## 2025-01-09 PROCEDURE — 2060000000 HC ICU INTERMEDIATE R&B

## 2025-01-09 PROCEDURE — 97535 SELF CARE MNGMENT TRAINING: CPT

## 2025-01-09 PROCEDURE — 99232 SBSQ HOSP IP/OBS MODERATE 35: CPT | Performed by: STUDENT IN AN ORGANIZED HEALTH CARE EDUCATION/TRAINING PROGRAM

## 2025-01-09 RX ADMIN — MORPHINE SULFATE 15 MG: 15 TABLET, FILM COATED, EXTENDED RELEASE ORAL at 20:30

## 2025-01-09 RX ADMIN — ENOXAPARIN SODIUM 30 MG: 100 INJECTION SUBCUTANEOUS at 20:31

## 2025-01-09 RX ADMIN — ENOXAPARIN SODIUM 30 MG: 100 INJECTION SUBCUTANEOUS at 09:23

## 2025-01-09 RX ADMIN — GABAPENTIN 300 MG: 300 CAPSULE ORAL at 09:23

## 2025-01-09 RX ADMIN — CEFEPIME 2000 MG: 2 INJECTION, POWDER, FOR SOLUTION INTRAVENOUS at 11:25

## 2025-01-09 RX ADMIN — GABAPENTIN 300 MG: 300 CAPSULE ORAL at 20:30

## 2025-01-09 RX ADMIN — ATORVASTATIN CALCIUM 20 MG: 20 TABLET, FILM COATED ORAL at 20:30

## 2025-01-09 RX ADMIN — ASPIRIN 81 MG: 81 TABLET, COATED ORAL at 09:23

## 2025-01-09 RX ADMIN — METOPROLOL SUCCINATE 50 MG: 25 TABLET, EXTENDED RELEASE ORAL at 09:23

## 2025-01-09 RX ADMIN — MORPHINE SULFATE 15 MG: 15 TABLET, FILM COATED, EXTENDED RELEASE ORAL at 09:23

## 2025-01-09 RX ADMIN — OXYCODONE 2.5 MG: 5 TABLET ORAL at 14:39

## 2025-01-09 RX ADMIN — HYDROCHLOROTHIAZIDE 25 MG: 25 TABLET ORAL at 09:23

## 2025-01-09 RX ADMIN — GABAPENTIN 300 MG: 300 CAPSULE ORAL at 13:18

## 2025-01-09 RX ADMIN — SODIUM CHLORIDE, PRESERVATIVE FREE 10 ML: 5 INJECTION INTRAVENOUS at 20:31

## 2025-01-09 RX ADMIN — OXYCODONE HYDROCHLORIDE AND ACETAMINOPHEN 1 TABLET: 5; 325 TABLET ORAL at 14:40

## 2025-01-09 RX ADMIN — FERROUS SULFATE TAB 325 MG (65 MG ELEMENTAL FE) 325 MG: 325 (65 FE) TAB at 20:30

## 2025-01-09 RX ADMIN — PANTOPRAZOLE SODIUM 40 MG: 40 TABLET, DELAYED RELEASE ORAL at 05:09

## 2025-01-09 RX ADMIN — SODIUM CHLORIDE, PRESERVATIVE FREE 10 ML: 5 INJECTION INTRAVENOUS at 09:23

## 2025-01-09 RX ADMIN — PIPERACILLIN AND TAZOBACTAM 3375 MG: 3; .375 INJECTION, POWDER, LYOPHILIZED, FOR SOLUTION INTRAVENOUS at 16:18

## 2025-01-09 ASSESSMENT — PAIN SCALES - GENERAL
PAINLEVEL_OUTOF10: 5
PAINLEVEL_OUTOF10: 0
PAINLEVEL_OUTOF10: 0
PAINLEVEL_OUTOF10: 7
PAINLEVEL_OUTOF10: 6

## 2025-01-09 ASSESSMENT — PAIN DESCRIPTION - ORIENTATION
ORIENTATION: LEFT

## 2025-01-09 ASSESSMENT — PAIN DESCRIPTION - LOCATION
LOCATION: LEG

## 2025-01-09 ASSESSMENT — PAIN SCALES - WONG BAKER
WONGBAKER_NUMERICALRESPONSE: HURTS WHOLE LOT
WONGBAKER_NUMERICALRESPONSE: NO HURT
WONGBAKER_NUMERICALRESPONSE: NO HURT

## 2025-01-09 ASSESSMENT — PAIN DESCRIPTION - DESCRIPTORS
DESCRIPTORS: ACHING;BURNING;STABBING
DESCRIPTORS: ACHING;SHARP;STABBING;BURNING

## 2025-01-09 ASSESSMENT — PAIN DESCRIPTION - FREQUENCY: FREQUENCY: CONTINUOUS

## 2025-01-09 ASSESSMENT — PAIN DESCRIPTION - ONSET: ONSET: ON-GOING

## 2025-01-09 ASSESSMENT — PAIN - FUNCTIONAL ASSESSMENT: PAIN_FUNCTIONAL_ASSESSMENT: ACTIVITIES ARE NOT PREVENTED

## 2025-01-09 NOTE — PROGRESS NOTES
Occupational Therapy  OCCUPATIONAL THERAPY TREATMENT NOTE    [facility]  OT BEDSIDE TREATMENT NOTE      Date:2025  Patient Name: Anastacia Morel  MRN: [id number]  : 1945  Room: [address] [place] [address]     Evaluating OT: Kristin Rawls OTD, OTR/L, [id number]       Referring Provider: Eyal Carpenter MD     Specific Provider Orders/Date: OT eval and treat (25)    Diagnosis: Cellulitis of abdominal wall [L03.311]  Hypomagnesemia [E83.42]  DAYTON (acute kidney injury) (HCC) [N17.9]  Acute kidney injury (HCC) [N17.9]  Fall, initial encounter [W19.XXXA]  Congestive heart failure, unspecified HF chronicity, unspecified heart failure type (HCC) [I50.9]    Surgeries/Procedures:  : Abdominal wall debridement       Pt admitted after fall at home, abdominal wall cellulitis, chronic LLE wound       Pertinent Medical History:       has a past medical history of Bursitis, CAD (coronary artery disease), Cellulitis of leg, left, COPD (chronic obstructive pulmonary disease) (HCC), Dermatophytosis, Emphysema, GERD (gastroesophageal reflux disease), Hiatal hernia, Hip pain, Hx of blood clots, Hyperlipidemia, Hypertension, Lymphedema of both lower extremities, Venous insufficiency of both lower extremities, Venous stasis ulcer of left calf with fat layer exposed without varicose veins (HCC), Venous stasis ulcer of left calf with fat layer exposed without varicose veins (HCC), and Venous ulcer with fat layer exposed (HCC).           Precautions:  Fall Risk, O2, chronic LLE wound, alarms+      Assessment of current deficits    [x] Functional mobility            [x]ADLs           [x] Strength                   []Cognition    [x] Functional transfers          [x] IADLs          [x] Safety Awareness   [x]Endurance    [] Fine Coordination              [x] Balance      [] Vision/perception    [x]Sensation      []Gross Motor Coordination  [x] ROM           [] Delirium                   [] Motor Control      OT PLAN OF CARE  OT treatment provided this date includes:   ADL-  Instruction/training on safety and adapted techniques for completion of ADLs: Therapist facilitated & pt educated on activity modifications/adaptations to promote implementation of fall prevention strategies, EC/WS strategies, & safety awareness throughout ADLs.   Mobility-  Instruction/training on safety and improved independence with bed mobility/functional transfers  and functional mobility.   Sitting/Standing Balance/Tolerance:  to increase balance and activity tolerance during ADLs and facilitate proper posture and positioning.   Activity tolerance- Instruction/training on energy conservation/work simplification for completion of ADLs:.     Neuromuscular Reeducation to facilitate balance/righting reactions for increased function with ADLs tasks:    Neuromuscular Facilitation of  UE functional movement/ROM./fine motor dexterity    Cognitive retraining -  Cues for safety, sequencing, and problem solving    Visual/Perception-Facilitation of Visual Perceptual Skills for increased safety and independence with ADLs.    Skilled positioning/alignment-  Therapist facilitated proper positioning/alignment throughout session to maintain skin/joint integrity & proper body mechanics.   Skilled monitoring of O2 sats- To maximize safe participation throughout functional activities.    Therapeutic Exercises- Instruction on UE ROM exercises to improve strength and function of UE for improved indep with ADLs/    Pt has made good progress towards set goals.   Continue with current plan of care      Treatment Time In:1030            Treatment Time Out: 1055                Treatment Charges: Mins Units   Ther Ex  [id number]     Manual Therapy 75904     Thera Activities 19775 10 1   ADL/Home Mgt 36581 15 1   Neuro Re-ed 47849     Group Therapy      Orthotic manage/training  36455     Non-Billable Time     Total Timed Treatment 25 2         Aileen PETER/HERMELINDA [id number]

## 2025-01-09 NOTE — CARE COORDINATION
1/9/25 Updated CM Note Pt admitted 1/5/25 [place]. Wound to LLE. IV Zosyn. PT16/OT18. Ambulated 50 feet x2. Order noted for HHC.  Pt declines HHC.  Follows weekly with wound care clinic and manages own dressing changes.  Active with [facility] for [facility] care supplies. Discharge plan to return home with family providing transport  Tamie DEL RIO RN-BC  [phone]

## 2025-01-09 NOTE — PROGRESS NOTES
Hospitalist Progress Note      Chief Complaint:  had concerns including Fall (Fell 2 days ago in tub, wound check on abdomen post all, hit head and left hip pain and \"wants kidneys checked\").    Admission Date: 2025     SYNOPSIS:Ms. Anastacia Morel, a 79 y.o. year old female  with PMH of class II obesity, HTN, HLD, PVD, GERD, HFpEF, COPD presented to ED from for evaluation of syncope and left lower abdominal wound which was foul-smelling and purulent.  CT head and cervical spine was negative, cardiology consulted for syncope, infectious disease and general surgery following for left lower abdominal wound, she was given vancomycin, cefepime, vancomycin stopped    SUBJECTIVE:    Conclude discharge pending final ID recs.    Plan is to be discharged home.    Temp (24hrs), Av.1 °F (36.7 °C), Min:97.8 °F (36.6 °C), Max:98.4 °F (36.9 °C)    DIET: ADULT DIET; Regular; 4 carb choices (60 gm/meal)  CODE: Full Code    Intake/Output Summary (Last 24 hours) at 2025 1241  Last data filed at 2025 0932  Gross per 24 hour   Intake 2061.95 ml   Output --   Net 2061.95 ml       OBJECTIVE:    /60   Pulse 94   Temp 98.3 °F (36.8 °C) (Temporal)   Resp 18   Ht 1.702 m (5' 7\")   Wt 104 kg (229 lb 4.5 oz)   SpO2 96%   BMI 35.91 kg/m²     General appearance: No apparent distress, appears stated age and cooperative.   HEENT:  Normocephalic. Conjunctivae/corneas clear. Moist mucosa   Neck: Supple. No jugular venous distention. Thyroid not visible, non tender   Respiratory: Normal respiratory effort. Clear to auscultation bilaterally. No stridor/wheezing/rhonchi/crackles   Cardiovascular: Regular heart beats, normal S1-S2. No M/G/R  Abdomen: Non distended, soft,  no visceromegaly, no mass, normal bowel sounds, left abdominal fold wound,   Musculoskeletal: No clubbing, cyanosis, no bilateral lower extremity edema. Brisk capillary refill.   Skin:  No rashes  on visible skin  Neurologic: awake, alert and    CL 99 105   CO2 25 29   BUN 17 15   CREATININE 0.9 0.9   CALCIUM 8.1* 8.4*       No results for input(s): \"ALKPHOS\", \"ALT\", \"AST\", \"BILITOT\", \"AMYLASE\", \"LIPASE\" in the last 72 hours.    Invalid input(s): \"PROT\", \"ALB\"      No results for input(s): \"INR\" in the last 72 hours.    Recent Labs     01/07/25  0351   CKTOTAL 205*       Chronic labs:    Lab Results   Component Value Date    CHOL 103 03/31/2023    TRIG 102 03/31/2023    HDL 33 (L) 03/31/2023    TSH 0.586 02/19/2014    INR 1.2 04/28/2023    LABA1C 5.6 03/30/2023       Radiology: REVIEWED DAILY    +++++++++++++++++++++++++++++++++++++++++++++++++  Loyda Ring MD  [facility]   [place], [place]  +++++++++++++++++++++++++++++++++++++++++++++++++  NOTE: This report was transcribed using voice recognition software. Every effort was made to ensure accuracy; however, inadvertent computerized transcription errors may be present.

## 2025-01-09 NOTE — PROGRESS NOTES
Physical Therapy  Physical Therapy Initial Assessment     Name: Anastacia Morel  : 1945  MRN: [id number]      Date of Service: 2025    Evaluating PT:  Romeo Cabral PT, DPT    Room #:  7408/7408-A  Diagnosis:  Cellulitis of abdominal wall [L03.311]  Hypomagnesemia [E83.42]  [place] (acute kidney injury) (HCC) [N17.9]  Acute kidney injury (HCC) [N17.9]  Fall, initial encounter [W19.XXXA]  Congestive heart failure, unspecified HF chronicity, unspecified heart failure type (HCC) [I50.9]  PMHx/PSHx:  obesity, GERD, COPD  Procedure/Surgery:  N/A  Precautions:  fall risk, LLE wound, bed/chair alarm  Equipment Owned: cane, ww  Equipment Needs:  none anticipated    SUBJECTIVE:    Pt lives with sister in a 2 story home with 2 steps to enter and no handrail.  Bed/bath is on 1st floor.  Pt ambulated with cane PTA.    OBJECTIVE:   Initial Evaluation  Date: 25 Treatment Short Term/ Long Term   Goals   AM-PAC 6 Clicks      Was pt agreeable to Eval/treatment? yes     Does pt have pain? 7/10 LLE     Bed Mobility  Rolling: NT  Supine to sit: SBA  Sit to supine: NT  Scooting: SBA  Rolling: IND  Supine to sit: IND  Sit to supine: IND  Scooting: IND   Transfers Sit to stand: SBA  Stand to sit: SBA  Stand pivot: SBA with cane  Sit to stand: mod I  Stand to sit: mod I  Stand pivot: mod I with AAD   Ambulation    50'x2 with cane SBA  200'+ with AAD mod I   Stair negotiation: ascended and descended NT  2 steps with AAD and no handrail mod I     Strength/ROM:   BLE grossly 3+/5  BLE AROM WFL    Balance:   Static Sitting: Supervision  Dynamic Sitting: Supervision  Static Standing: SBA with cane  Dynamic Standing: SBA with cane    Pt is A & O x 3  Sensation:  Pt denies numbness and tingling to extremities  Edema:  BLE swelling    Vitals:  SpO2 and HR were stable during session    Therapeutic Exercises:    Bed mobility: supine<>sit, cued for EOB positioning  Transfers: STS x3, cued for hand placement and postural  correction  Ambulation: 50'x2 with cane  BLE AROM    Patient education  Pt educated on role of PT, importance of functional mobility during hospital stay, safety with functional mobility    Patient response to education:   Pt verbalized understanding Pt demonstrated skill Pt requires further education in this area   yes yes reinforce     ASSESSMENT:    Conditions Requiring Skilled Therapeutic Intervention:    [x]Decreased strength     []Decreased ROM  [x]Decreased functional mobility  [x]Decreased balance   [x]Decreased endurance   [x]Decreased posture  []Decreased sensation  []Decreased coordination   []Decreased vision  []Decreased safety awareness   [x]Increased pain       Comments:  Pt supine in bed upon entering, pt agreeable to participate. Pt instructed to transfer to EOB, completing transfer with no physical assistance. Pt sitting upright with good sitting balance. Pt with no c/o dizziness with position change. Pt then cued for hand placement and instructed to stand from EOB. Pt standing with good balance with cane. Pt instructed to ambulate to tolerance. Pt ambulating with decreased jasmin and flexed posture, cueing provided for energy conservation. Pt demonstrated good tolerance to ambulation bout. Pt was assisted back to bedside and was transferred to bedside chair. Pt positioned for comfort with all needs met and call bell in reach prior to exiting.     Treatment:  Patient practiced and was instructed in the following treatment:    Bed mobility training - pt given verbal and tactile cues to facilitate proper sequencing and safety during rolling and supine>sit as well as provided with stand by assistance to complete task   STS and pivot transfer training - pt educated on proper hand and foot placement, safety and sequencing, and use of verbal and tactile cues to safely complete sit<>stand and pivot transfers with stand by assistance to complete task safely   Gait training- pt was given verbal and tactile

## 2025-01-09 NOTE — PROGRESS NOTES
[facility]  [facility]  Progress Note      Chief Complaint   Patient presents with    Fall     Fell 2 days ago in tub, wound check on abdomen post all, hit head and left hip pain and \"wants kidneys checked\"       SUBJECTIVE:  Patient is tolerating medications. No reported adverse drug reactions.  No nausea, vomiting, diarrhea.  Afebrile  Up in a chair and doing much better more alert and awake and on the phone  Review of systems:  As stated above in the chief complaint, otherwise negative.    Medications:  Scheduled Meds:   piperacillin-tazobactam  3,375 mg IntraVENous Q8H    aspirin  81 mg Oral Daily    ferrous sulfate  325 mg Oral Nightly    gabapentin  300 mg Oral TID    hydroCHLOROthiazide  25 mg Oral Daily    metoprolol succinate  50 mg Oral Daily    morphine  15 mg Oral BID    pantoprazole  40 mg Oral QAM AC    atorvastatin  20 mg Oral Nightly    sodium chloride flush  5-40 mL IntraVENous 2 times per day    enoxaparin  30 mg SubCUTAneous BID     Continuous Infusions:   sodium chloride       PRN Meds:sodium chloride flush, sodium chloride, potassium chloride **OR** potassium alternative oral replacement **OR** potassium chloride, magnesium sulfate, ondansetron **OR** ondansetron, polyethylene glycol, acetaminophen **OR** acetaminophen, sulfur hexafluoride microspheres, albuterol, oxyCODONE-acetaminophen **AND** oxyCODONE, hydrALAZINE    OBJECTIVE:  /60   Pulse 94   Temp 98.3 °F (36.8 °C) (Temporal)   Resp 18   Ht 1.702 m (5' 7\")   Wt 104 kg (229 lb 4.5 oz)   SpO2 96%   BMI 35.91 kg/m²   Temp  Av.1 °F (36.7 °C)  Min: 97.8 °F (36.6 °C)  Max: 98.4 °F (36.9 °C)  Constitutional: The patient is awake, alert, and oriented.   Skin: Warm and dry. No rashes were noted.   HEENT: Round and reactive pupils.  Moist mucous membranes.  No ulcerations or thrush.  Neck: Supple to movements.   Chest: No use of accessory muscles to breathe. Symmetrical expansion.  No wheezing,

## 2025-01-09 NOTE — DISCHARGE INSTRUCTIONS
Visit Discharge/Physician Orders     Discharge condition: Stable     Assessment of pain at discharge: mild     Anesthetic used: lido 4%     Discharge to: Home     Left via:Private automobile     Accompanied by: self     ECF/HHA: [facility]      Dressing Orders:  LEFT PRETIB : Cleanse with normal saline, cover with calcium alginate, and ABD pad, dry dressing and secure, and Spandagrip. Change daily    LEFT LOWER ABDOMEN WOUND: Cleanse with normal saline, pack with Calcium Alginate Rope Stitched, and Excel SAP Silicone bordered dressing. Change daily.       Treatment Orders: Eat a diet high in protein and vitamin C. Take a multiple vitamin daily unless contraindicated.      Elevate as much as possible     11/20/24 Compression to be ordered. (Fence)      1/15/25 Wound Culture     [facility] followup visit:  Dr. HATFIELD 1 wk____________________________  (Please note your next appointment above and if you are unable to keep, kindly give a 24 hour notice. Thank you.)     Physician signature:__________________________      If you experience any of the following, please call the Wound Care Center during business hours:     * Increase in Pain  * Temperature over 101  * Increase in drainage from your wound  * Drainage with a foul odor  * Bleeding  * Increase in swelling  * Need for compression bandage changes due to slippage, breakthrough drainage.     If you need medical attention outside of the business hours of the Wound Care Centers please contact your PCP or go to the nearest emergency room.

## 2025-01-09 NOTE — DISCHARGE INSTR - COC
Discharging to Facility/ Agency   Name:   Address:  Phone:  Fax:    Dialysis Facility (if applicable)   Name:  Address:  Dialysis Schedule:  Phone:  Fax:    / signature: {Esignature:[id number]}    PHYSICIAN SECTION    Prognosis: Fair    Condition at Discharge: Stable    Rehab Potential (if transferring to Rehab): Fair    Recommended Labs or Other Treatments After Discharge: cbc and bmp in 1 week. Cont abx per ID     Physician Certification: I certify the above information and transfer of Anastacia Morel  is necessary for the continuing treatment of the diagnosis listed and that she requires Home Care for greater 30 days.     Update Admission H&P: No change in H&P    PHYSICIAN SIGNATURE:  Electronically signed by Loyda Ring MD on 1/9/25 at 9:52 AM EST

## 2025-01-09 NOTE — PLAN OF CARE
Problem: Skin/Tissue Integrity  Goal: Absence of new skin breakdown  Description: 1.  Monitor for areas of redness and/or skin breakdown  2.  Assess vascular access sites hourly  3.  Every 4-6 hours minimum:  Change oxygen saturation probe site  4.  Every 4-6 hours:  If on nasal continuous positive airway pressure, respiratory therapy assess nares and determine need for appliance change or resting period.  Outcome: Progressing     Problem: ABCDS Injury Assessment  Goal: Absence of physical injury  Outcome: Progressing     Problem: Safety - Adult  Goal: Free from fall injury  Outcome: Progressing     Problem: Chronic Conditions and Co-morbidities  Goal: Patient's chronic conditions and co-morbidity symptoms are monitored and maintained or improved  Outcome: Progressing     Problem: Discharge Planning  Goal: Discharge to home or other facility with appropriate resources  Outcome: Progressing     Problem: Pain  Goal: Verbalizes/displays adequate comfort level or baseline comfort level  Outcome: Progressing     Problem: Respiratory - Adult  Goal: Achieves optimal ventilation and oxygenation  Outcome: Progressing     Problem: Cardiovascular - Adult  Goal: Maintains optimal cardiac output and hemodynamic stability  Outcome: Progressing  Goal: Absence of cardiac dysrhythmias or at baseline  Outcome: Progressing     Problem: Musculoskeletal - Adult  Goal: Return mobility to safest level of function  Outcome: Progressing  Goal: Maintain proper alignment of affected body part  Outcome: Progressing  Goal: Return ADL status to a safe level of function  Outcome: Progressing     Problem: Genitourinary - Adult  Goal: Absence of urinary retention  Outcome: Progressing     Problem: Infection - Adult  Goal: Absence of infection during hospitalization  Outcome: Progressing     Problem: Metabolic/Fluid and Electrolytes - Adult  Goal: Electrolytes maintained within normal limits  Outcome: Progressing  Goal: Hemodynamic stability and  optimal renal function maintained  Outcome: Progressing

## 2025-01-09 NOTE — PLAN OF CARE
Problem: Skin/Tissue Integrity  Goal: Absence of new skin breakdown  Description: 1.  Monitor for areas of redness and/or skin breakdown  2.  Assess vascular access sites hourly  3.  Every 4-6 hours minimum:  Change oxygen saturation probe site  4.  Every 4-6 hours:  If on nasal continuous positive airway pressure, respiratory therapy assess nares and determine need for appliance change or resting period.  1/8/2025 1941 by Abel Kramer RN  Outcome: Progressing  1/8/2025 1019 by Yasmin Rocha RN  Outcome: Progressing     Problem: ABCDS Injury Assessment  Goal: Absence of physical injury  1/8/2025 1941 by Abel Kramer RN  Outcome: Progressing  1/8/2025 1019 by Yasmin Rocha RN  Outcome: Progressing     Problem: Safety - Adult  Goal: Free from fall injury  1/8/2025 1941 by Abel Kramer RN  Outcome: Progressing  1/8/2025 1019 by Yasmin Rocha RN  Outcome: Progressing     Problem: Chronic Conditions and Co-morbidities  Goal: Patient's chronic conditions and co-morbidity symptoms are monitored and maintained or improved  1/8/2025 1941 by Abel Kramer RN  Outcome: Progressing  1/8/2025 1019 by Yasmin Rocha RN  Outcome: Progressing     Problem: Discharge Planning  Goal: Discharge to home or other facility with appropriate resources  1/8/2025 1941 by Abel Kramer RN  Outcome: Progressing  1/8/2025 1019 by Yasmin Rocha RN  Outcome: Progressing     Problem: Pain  Goal: Verbalizes/displays adequate comfort level or baseline comfort level  1/8/2025 1941 by Abel Kramer RN  Outcome: Progressing  1/8/2025 1019 by Yasmin Rocha RN  Outcome: Progressing     Problem: Respiratory - Adult  Goal: Achieves optimal ventilation and oxygenation  1/8/2025 1941 by Abel Kramer RN  Outcome: Progressing  1/8/2025 1019 by Yasmin Rocha RN  Outcome: Progressing     Problem: Cardiovascular - Adult  Goal: Maintains optimal cardiac output and hemodynamic stability  1/8/2025 1941 by  RN  Outcome: Progressing  1/8/2025 1019 by Yasmin Rocha RN  Outcome: Progressing  Goal: Hemodynamic stability and optimal renal function maintained  1/8/2025 1941 by Abel Kramer RN  Outcome: Progressing  1/8/2025 1019 by Yasmin Rocha RN  Outcome: Progressing

## 2025-01-09 NOTE — PLAN OF CARE
Problem: Skin/Tissue Integrity  Goal: Absence of new skin breakdown  Description: 1.  Monitor for areas of redness and/or skin breakdown  2.  Assess vascular access sites hourly  3.  Every 4-6 hours minimum:  Change oxygen saturation probe site  4.  Every 4-6 hours:  If on nasal continuous positive airway pressure, respiratory therapy assess nares and determine need for appliance change or resting period.  1/9/2025 1743 by Kaela Stephenson RN  Outcome: Progressing  1/9/2025 0951 by Yasmin Rocha RN  Outcome: Progressing     Problem: ABCDS Injury Assessment  Goal: Absence of physical injury  1/9/2025 1743 by Kaela Stephenson RN  Outcome: Progressing  1/9/2025 0951 by Yasmin Rocha RN  Outcome: Progressing     Problem: Safety - Adult  Goal: Free from fall injury  1/9/2025 1743 by Kaela Stephenson RN  Outcome: Progressing  1/9/2025 0951 by Yasmin Rocha RN  Outcome: Progressing     Problem: Chronic Conditions and Co-morbidities  Goal: Patient's chronic conditions and co-morbidity symptoms are monitored and maintained or improved  1/9/2025 1743 by Kaela Stephenson RN  Outcome: Progressing  1/9/2025 0951 by Yasmin Rocha RN  Outcome: Progressing     Problem: Discharge Planning  Goal: Discharge to home or other facility with appropriate resources  1/9/2025 1743 by Kaela Stephenson RN  Outcome: Progressing  1/9/2025 0951 by Yasmin Rocha RN  Outcome: Progressing     Problem: Pain  Goal: Verbalizes/displays adequate comfort level or baseline comfort level  1/9/2025 1743 by Kaela Stephensno RN  Outcome: Progressing  1/9/2025 0951 by Yasmin Rocha RN  Outcome: Progressing     Problem: Respiratory - Adult  Goal: Achieves optimal ventilation and oxygenation  1/9/2025 1743 by Kaela Stephenson RN  Outcome: Progressing  1/9/2025 0951 by Yasmin Rocha RN  Outcome: Progressing     Problem: Cardiovascular - Adult  Goal: Maintains optimal cardiac output and hemodynamic stability  1/9/2025 1743 by Kaela Stephenson  RN  Outcome: Progressing  1/9/2025 0951 by Yasmin Rocha RN  Outcome: Progressing  Goal: Absence of cardiac dysrhythmias or at baseline  1/9/2025 1743 by Kaela Stephenson RN  Outcome: Progressing  1/9/2025 0951 by Yasmin Rocha RN  Outcome: Progressing     Problem: Skin/Tissue Integrity - Adult  Goal: Skin integrity remains intact  1/9/2025 1743 by Kaela Stephenson RN  Outcome: Progressing  Flowsheets (Taken 1/9/2025 1742)  Skin Integrity Remains Intact: Monitor for areas of redness and/or skin breakdown  1/9/2025 0951 by Yasmin Rocha RN  Outcome: Progressing  Goal: Incisions, wounds, or drain sites healing without S/S of infection  1/9/2025 1743 by Kaela Stephenson, RN  Outcome: Progressing  1/9/2025 0951 by Yasmin Rocha RN  Outcome: Progressing  Goal: Oral mucous membranes remain intact  1/9/2025 1743 by Kaela Stephenson RN  Outcome: Progressing  1/9/2025 0951 by Yasmin Rocha RN  Outcome: Progressing     Problem: Musculoskeletal - Adult  Goal: Return mobility to safest level of function  1/9/2025 0951 by Yasmin Rocha RN  Outcome: Progressing  Goal: Maintain proper alignment of affected body part  1/9/2025 0951 by Yasmin Rocha RN  Outcome: Progressing  Goal: Return ADL status to a safe level of function  1/9/2025 0951 by Yasmin Rocha RN  Outcome: Progressing     Problem: Genitourinary - Adult  Goal: Absence of urinary retention  1/9/2025 0951 by Yasmin Rocha RN  Outcome: Progressing     Problem: Infection - Adult  Goal: Absence of infection during hospitalization  1/9/2025 0951 by Yasmin Rocha RN  Outcome: Progressing     Problem: Metabolic/Fluid and Electrolytes - Adult  Goal: Electrolytes maintained within normal limits  1/9/2025 0951 by Yasmin Rocha RN  Outcome: Progressing  Goal: Hemodynamic stability and optimal renal function maintained  1/9/2025 0951 by Yasmin Rocha RN  Outcome: Progressing

## 2025-01-10 VITALS
BODY MASS INDEX: 35.99 KG/M2 | HEART RATE: 71 BPM | WEIGHT: 229.28 LBS | OXYGEN SATURATION: 90 % | TEMPERATURE: 97 F | HEIGHT: 67 IN | RESPIRATION RATE: 16 BRPM | DIASTOLIC BLOOD PRESSURE: 55 MMHG | SYSTOLIC BLOOD PRESSURE: 112 MMHG

## 2025-01-10 PROCEDURE — 6370000000 HC RX 637 (ALT 250 FOR IP): Performed by: STUDENT IN AN ORGANIZED HEALTH CARE EDUCATION/TRAINING PROGRAM

## 2025-01-10 PROCEDURE — 2500000003 HC RX 250 WO HCPCS: Performed by: HOSPITALIST

## 2025-01-10 PROCEDURE — 2580000003 HC RX 258: Performed by: SPECIALIST

## 2025-01-10 PROCEDURE — 6360000002 HC RX W HCPCS: Performed by: HOSPITALIST

## 2025-01-10 PROCEDURE — 6370000000 HC RX 637 (ALT 250 FOR IP): Performed by: HOSPITALIST

## 2025-01-10 PROCEDURE — 99239 HOSP IP/OBS DSCHRG MGMT >30: CPT | Performed by: STUDENT IN AN ORGANIZED HEALTH CARE EDUCATION/TRAINING PROGRAM

## 2025-01-10 PROCEDURE — 6360000002 HC RX W HCPCS: Performed by: SPECIALIST

## 2025-01-10 RX ORDER — LEVOFLOXACIN 500 MG/1
500 TABLET, FILM COATED ORAL DAILY
Qty: 7 TABLET | Refills: 0 | Status: SHIPPED | OUTPATIENT
Start: 2025-01-10 | End: 2025-01-17

## 2025-01-10 RX ADMIN — METOPROLOL SUCCINATE 50 MG: 25 TABLET, EXTENDED RELEASE ORAL at 08:07

## 2025-01-10 RX ADMIN — SODIUM CHLORIDE, PRESERVATIVE FREE 10 ML: 5 INJECTION INTRAVENOUS at 08:19

## 2025-01-10 RX ADMIN — HYDROCHLOROTHIAZIDE 25 MG: 25 TABLET ORAL at 08:07

## 2025-01-10 RX ADMIN — ASPIRIN 81 MG: 81 TABLET, COATED ORAL at 08:07

## 2025-01-10 RX ADMIN — OXYCODONE 2.5 MG: 5 TABLET ORAL at 03:05

## 2025-01-10 RX ADMIN — GABAPENTIN 300 MG: 300 CAPSULE ORAL at 08:07

## 2025-01-10 RX ADMIN — MORPHINE SULFATE 15 MG: 15 TABLET, FILM COATED, EXTENDED RELEASE ORAL at 08:06

## 2025-01-10 RX ADMIN — PIPERACILLIN AND TAZOBACTAM 3375 MG: 3; .375 INJECTION, POWDER, LYOPHILIZED, FOR SOLUTION INTRAVENOUS at 01:02

## 2025-01-10 RX ADMIN — ENOXAPARIN SODIUM 30 MG: 100 INJECTION SUBCUTANEOUS at 08:07

## 2025-01-10 RX ADMIN — OXYCODONE HYDROCHLORIDE AND ACETAMINOPHEN 1 TABLET: 5; 325 TABLET ORAL at 13:29

## 2025-01-10 RX ADMIN — OXYCODONE HYDROCHLORIDE AND ACETAMINOPHEN 1 TABLET: 5; 325 TABLET ORAL at 03:01

## 2025-01-10 RX ADMIN — PANTOPRAZOLE SODIUM 40 MG: 40 TABLET, DELAYED RELEASE ORAL at 05:08

## 2025-01-10 RX ADMIN — PIPERACILLIN AND TAZOBACTAM 3375 MG: 3; .375 INJECTION, POWDER, LYOPHILIZED, FOR SOLUTION INTRAVENOUS at 08:19

## 2025-01-10 RX ADMIN — OXYCODONE 2.5 MG: 5 TABLET ORAL at 13:30

## 2025-01-10 ASSESSMENT — PAIN DESCRIPTION - DESCRIPTORS
DESCRIPTORS: ACHING;DISCOMFORT;DULL
DESCRIPTORS: DISCOMFORT
DESCRIPTORS: ACHING;DISCOMFORT;DULL

## 2025-01-10 ASSESSMENT — PAIN DESCRIPTION - LOCATION
LOCATION: LEG
LOCATION: LEG;HIP
LOCATION: LEG;HIP

## 2025-01-10 ASSESSMENT — PAIN DESCRIPTION - ORIENTATION
ORIENTATION: LEFT;RIGHT
ORIENTATION: RIGHT;LEFT
ORIENTATION: RIGHT

## 2025-01-10 ASSESSMENT — PAIN SCALES - WONG BAKER
WONGBAKER_NUMERICALRESPONSE: NO HURT
WONGBAKER_NUMERICALRESPONSE: NO HURT

## 2025-01-10 ASSESSMENT — PAIN SCALES - GENERAL
PAINLEVEL_OUTOF10: 2
PAINLEVEL_OUTOF10: 8
PAINLEVEL_OUTOF10: 7
PAINLEVEL_OUTOF10: 7

## 2025-01-10 NOTE — DISCHARGE SUMMARY
taking these medications      albuterol sulfate  (90 Base) MCG/ACT inhaler  Commonly known as: PROVENTIL;VENTOLIN;PROAIR     aspirin 81 MG EC tablet     ferrous sulfate 325 (65 Fe) MG tablet  Commonly known as: IRON 325     Garlic 10 MG Caps     hydroCHLOROthiazide 12.5 MG capsule     losartan 25 MG tablet  Commonly known as: COZAAR     metoprolol succinate 50 MG extended release tablet  Commonly known as: TOPROL XL     omeprazole 40 MG delayed release capsule  Commonly known as: PRILOSEC     oxyCODONE-acetaminophen 7.5-325 MG per tablet  Commonly known as: PERCOCET  Take 1 tablet by mouth every 8 hours as needed for Pain for up to 30 days. Max Daily Amount: 3 tablets     Potassium 99 MG Tabs     simvastatin 40 MG tablet  Commonly known as: ZOCOR     therapeutic multivitamin-minerals tablet     vitamin D 50 MCG (2000 UT) Tabs tablet  Commonly known as: CHOLECALCIFEROL     zinc sulfate 220 (50 Zn)  mg capsule - elemental zinc  Commonly known as: ZINCATE            STOP taking these medications      gabapentin 300 MG capsule  Commonly known as: NEURONTIN     potassium chloride 10 MEQ extended release tablet  Commonly known as: KLOR-CON M            ASK your doctor about these medications      gabapentin 300 MG capsule  Commonly known as: NEURONTIN  Take 1 capsule by mouth 3 times daily for 30 days. Intended supply: 30 days               Where to Get Your Medications        These medications were sent to [facility] [place] [address] Maggy Haji - P [phone] - F [phone]  [address] [place], [place] [address] [address]      Phone: [phone]   levoFLOXacin 500 MG tablet         No discharge procedures on file.    Time Spent on discharge is  34 mins in patient examination, evaluation, counseling as well as medication reconciliation, prescriptions for required medications, discharge plan and follow up.    Electronically signed by   Loyda Ring MD  1/10/2025  10:23 AM      Thank you   Tamie Santizo MD for the opportunity to be involved in this patient's care.

## 2025-01-10 NOTE — PLAN OF CARE
Problem: Skin/Tissue Integrity  Goal: Absence of new skin breakdown  Description: 1.  Monitor for areas of redness and/or skin breakdown  2.  Assess vascular access sites hourly  3.  Every 4-6 hours minimum:  Change oxygen saturation probe site  4.  Every 4-6 hours:  If on nasal continuous positive airway pressure, respiratory therapy assess nares and determine need for appliance change or resting period.  1/9/2025 1918 by Abel Kramer RN  Outcome: Progressing  1/9/2025 1743 by Kaela Stephenson RN  Outcome: Progressing  1/9/2025 0951 by Yasmin Rocha RN  Outcome: Progressing     Problem: ABCDS Injury Assessment  Goal: Absence of physical injury  1/9/2025 1918 by Abel Kramer RN  Outcome: Progressing  1/9/2025 1743 by Kaela Stephenson RN  Outcome: Progressing  1/9/2025 0951 by Yasmin Rocha RN  Outcome: Progressing     Problem: Safety - Adult  Goal: Free from fall injury  1/9/2025 1918 by Abel Kramer RN  Outcome: Progressing  1/9/2025 1743 by Kaela Stephenson RN  Outcome: Progressing  1/9/2025 0951 by Yasmin Rocha RN  Outcome: Progressing     Problem: Chronic Conditions and Co-morbidities  Goal: Patient's chronic conditions and co-morbidity symptoms are monitored and maintained or improved  1/9/2025 1918 by Abel Kramer RN  Outcome: Progressing  1/9/2025 1743 by Kaela Stephenson RN  Outcome: Progressing  1/9/2025 0951 by Yasmin Rocha RN  Outcome: Progressing     Problem: Discharge Planning  Goal: Discharge to home or other facility with appropriate resources  1/9/2025 1918 by Abel Kramer RN  Outcome: Progressing  1/9/2025 1743 by Kaela Stephenson RN  Outcome: Progressing  1/9/2025 0951 by Yasmin Rocha RN  Outcome: Progressing     Problem: Pain  Goal: Verbalizes/displays adequate comfort level or baseline comfort level  1/9/2025 1918 by Abel Kramer RN  Outcome: Progressing  1/9/2025 1743 by Kaela Stephenson RN  Outcome: Progressing  1/9/2025 0951 by Yasmin Rocha  RN  Outcome: Progressing     Problem: Respiratory - Adult  Goal: Achieves optimal ventilation and oxygenation  1/9/2025 1918 by Abel Kramer RN  Outcome: Progressing  1/9/2025 1743 by Kaela Stephenson RN  Outcome: Progressing  1/9/2025 0951 by Yasmin Rocha RN  Outcome: Progressing     Problem: Cardiovascular - Adult  Goal: Maintains optimal cardiac output and hemodynamic stability  1/9/2025 1918 by Abel Kramer RN  Outcome: Progressing  1/9/2025 1743 by Kaela Stephenson RN  Outcome: Progressing  1/9/2025 0951 by Yasmin Rocha RN  Outcome: Progressing  Goal: Absence of cardiac dysrhythmias or at baseline  1/9/2025 1918 by Abel Kramer RN  Outcome: Progressing  1/9/2025 1743 by Kaela Stephenson RN  Outcome: Progressing  1/9/2025 0951 by Yasmin Rocha RN  Outcome: Progressing     Problem: Skin/Tissue Integrity - Adult  Goal: Skin integrity remains intact  1/9/2025 1918 by Abel Kramer RN  Outcome: Progressing  1/9/2025 1743 by Kaela Stephenson RN  Outcome: Progressing  Flowsheets (Taken 1/9/2025 1742)  Skin Integrity Remains Intact: Monitor for areas of redness and/or skin breakdown  1/9/2025 0951 by Yasmin Rocha RN  Outcome: Progressing  Goal: Incisions, wounds, or drain sites healing without S/S of infection  1/9/2025 1918 by Abel Kramer RN  Outcome: Progressing  1/9/2025 1743 by Kaela Stephenson RN  Outcome: Progressing  1/9/2025 0951 by Yasmin Rocha RN  Outcome: Progressing  Goal: Oral mucous membranes remain intact  1/9/2025 1918 by Abel Kramer RN  Outcome: Progressing  1/9/2025 1743 by Kaela Stephenson RN  Outcome: Progressing  1/9/2025 0951 by Yasmin Rocha RN  Outcome: Progressing     Problem: Musculoskeletal - Adult  Goal: Return mobility to safest level of function  1/9/2025 1918 by Abel Kramer RN  Outcome: Progressing  1/9/2025 0951 by Yasmin Rocha RN  Outcome: Progressing  Goal: Maintain proper alignment of affected body part  1/9/2025 1918 by

## 2025-01-10 NOTE — CARE COORDINATION
1/10/25 Updated CM Note Pt admitted 1/5/25 [place]. Wound to LLE. Transitioned to oral antibiotics. Discharge order noted. Pt declines HHC. Follows weekly with wound care clinic and manages own dressing changes. Active with [facility] for wound care supplies. Discharge plan to return home with family providing transport   Tamie DEL RIO RN-BC  [phone]

## 2025-01-10 NOTE — PLAN OF CARE
Problem: Skin/Tissue Integrity  Goal: Absence of new skin breakdown  Description: 1.  Monitor for areas of redness and/or skin breakdown  2.  Assess vascular access sites hourly  3.  Every 4-6 hours minimum:  Change oxygen saturation probe site  4.  Every 4-6 hours:  If on nasal continuous positive airway pressure, respiratory therapy assess nares and determine need for appliance change or resting period.  Outcome: Progressing     Problem: ABCDS Injury Assessment  Goal: Absence of physical injury  Outcome: Progressing     Problem: Safety - Adult  Goal: Free from fall injury  Outcome: Progressing     Problem: Chronic Conditions and Co-morbidities  Goal: Patient's chronic conditions and co-morbidity symptoms are monitored and maintained or improved  Outcome: Progressing     Problem: Discharge Planning  Goal: Discharge to home or other facility with appropriate resources  Outcome: Progressing     Problem: Pain  Goal: Verbalizes/displays adequate comfort level or baseline comfort level  Outcome: Progressing     Problem: Respiratory - Adult  Goal: Achieves optimal ventilation and oxygenation  Outcome: Progressing     Problem: Cardiovascular - Adult  Goal: Maintains optimal cardiac output and hemodynamic stability  Outcome: Progressing

## 2025-01-10 NOTE — PROGRESS NOTES
PROGRESS NOTE       PATIENT PROBLEM LIST:  Patient Active Problem List   Diagnosis Code    Hypoxia R09.02    COPD (chronic obstructive pulmonary disease) ([facility]) J44.9    Primary hypertension I10    Hyperlipidemia E78.5    GERD (gastroesophageal reflux disease) K21.9    Diastolic CHF, chronic ([facility]) I50.32    Bursitis M71.9    Venous insufficiency of both lower extremities I87.2    Lymphedema I89.0    Venous stasis ulcer of left calf with fat layer exposed without varicose veins ([facility]) I87.2, L97.222    DAY[place] (acute kidney injury) ([facility]) N17.9    Syncope R55    Drug-induced mucositis K12.32    Chronic pain syndrome G89.4    PVD (peripheral vascular disease) ([facility]) I73.9    Dermatophytosis B35.9    Class 2 severe obesity with serious comorbidity and body mass index (BMI) of 36.0 to 36.9 in adult E66.812, Z68.36, E66.01    Community acquired bacterial pneumonia J15.9    Elevated troponin R79.89       SUBJECTIVE:  Anastacia Morel states She feels somewhat better but is lying on her back at the side of her bed with her feet stretched over the edge of the bed as well.She is concerned with her thick mucinous pulmonary drainage associated with her underlying cough.    REVIEW OF SYSTEMS:  General ROS: POSITIVEfor - fatigue, malaise,  Psychological ROS: negative for - anxiety , depression  Ophthalmic ROS: negative for - decreased vision or visual distortion.  ENT ROS: negative  Allergy and Immunology ROS: negative  Hematological and Lymphatic ROS: negative  Endocrine: no heat or cold intolerance and no polyphagia, polydipsia, or polyuria  Respiratory ROS: positive for - cough, shortness of breath, sputum changes, and wheezing  Cardiovascular ROS: positive for - dyspnea on exertion, edema, irregular heartbeat, and shortness of breath.  Gastrointestinal ROS: no abdominal pain, change in bowel habits, or black or bloody stools  Genito-Urinary ROS: no nocturia, dysuria, trouble voiding, frequency or hematuria  Musculoskeletal ROS:

## 2025-01-10 NOTE — PROGRESS NOTES
CLINICAL PHARMACY NOTE: MEDS TO BEDS    Total # of Prescriptions Filled: 1   The following medications were delivered to the patient:  LEVOFLOXACIN 500 MG    Additional Documentation: TO PATIENT

## 2025-01-11 LAB
MICROORGANISM SPEC CULT: NORMAL
MICROORGANISM SPEC CULT: NORMAL
SERVICE CMNT-IMP: NORMAL
SERVICE CMNT-IMP: NORMAL
SPECIMEN DESCRIPTION: NORMAL
SPECIMEN DESCRIPTION: NORMAL

## 2025-01-15 ENCOUNTER — HOSPITAL ENCOUNTER (OUTPATIENT)
Dept: WOUND CARE | Age: 80
Discharge: HOME | End: 2025-01-15
Attending: SURGERY
Payer: MEDICARE

## 2025-01-15 VITALS
RESPIRATION RATE: 18 BRPM | SYSTOLIC BLOOD PRESSURE: 110 MMHG | BODY MASS INDEX: 35.94 KG/M2 | HEART RATE: 97 BPM | TEMPERATURE: 96.8 F | DIASTOLIC BLOOD PRESSURE: 57 MMHG | HEIGHT: 67 IN | WEIGHT: 229 LBS

## 2025-01-15 DIAGNOSIS — I87.2 VENOUS STASIS ULCER OF LEFT CALF WITH FAT LAYER EXPOSED WITHOUT VARICOSE VEINS (HCC): Primary | Chronic | ICD-10-CM

## 2025-01-15 DIAGNOSIS — L98.492 SKIN ULCER OF ABDOMEN WITH FAT LAYER EXPOSED (HCC): Chronic | ICD-10-CM

## 2025-01-15 DIAGNOSIS — L97.222 VENOUS STASIS ULCER OF LEFT CALF WITH FAT LAYER EXPOSED WITHOUT VARICOSE VEINS (HCC): Primary | Chronic | ICD-10-CM

## 2025-01-15 PROCEDURE — 87070 CULTURE OTHR SPECIMN AEROBIC: CPT

## 2025-01-15 PROCEDURE — 87205 SMEAR GRAM STAIN: CPT

## 2025-01-15 PROCEDURE — 87077 CULTURE AEROBIC IDENTIFY: CPT

## 2025-01-15 PROCEDURE — 11045 DBRDMT SUBQ TISS EACH ADDL: CPT | Performed by: SURGERY

## 2025-01-15 PROCEDURE — 11042 DBRDMT SUBQ TIS 1ST 20SQCM/<: CPT | Performed by: SURGERY

## 2025-01-15 PROCEDURE — 11042 DBRDMT SUBQ TIS 1ST 20SQCM/<: CPT

## 2025-01-15 PROCEDURE — 11045 DBRDMT SUBQ TISS EACH ADDL: CPT

## 2025-01-15 ASSESSMENT — PAIN DESCRIPTION - PROGRESSION: CLINICAL_PROGRESSION: NOT CHANGED

## 2025-01-15 NOTE — PROGRESS NOTES
Wound Care Supplies      Supply Company:     [facility] [address] [facility] [address] [place] [address]  p: [phone] f: [phone]     Referral [facility] [id number]    Ordering Center:     [facility]  [address] [place] [address]  [phone]  WOUND CARE Dept: [phone]   FAX NUMBER [phone]    Patient Information:      Rock Morel  [address] [facility] [address]   [phone]   : 1945  AGE: 80 y.o.     GENDER: female   EPISODE DATE: 1/15/2025    Insurance:      PRIMARY INSURANCE:  Plan: [employer] DUAL ADVANTAGE  Coverage: BCBS MEDICARE  Effective Date: 2025  Group Number: [unfilled]  Subscriber Number: [id number] - (Medicare Managed)    Payer/Plan Subscr  Sex Relation Sub. Ins. ID Effective Group Num   1. MEDICARE - MEBIJU RAMSAYETTA SANDRO 1945 Female Self [id number]                                    [address]    2. BCBS MEDICAREROCK RAMSAY 1945 Female Self [id number] [facility]                                          Patient Wound Information:      Problem List Items Addressed This Visit          Other    Venous stasis ulcer of left calf with fat layer exposed without varicose veins (HCC) - Primary (Chronic)       WOUNDS REQUIRING DRESSING SUPPLIES:     Wound 23 Leg Left;Lower #2 (Active)   Wound Image   01/15/25 1041   Wound Etiology Traumatic 23 1610   Dressing Status New dressing applied 01/10/25 0522   Wound Cleansed Cleansed with saline 01/10/25 0522   Dressing/Treatment ABD;Alginate;Gauze dressing/dressing sponge 01/10/25 05   Dressing Change Due 01/10/25 01/10/25 05   Wound Length (cm) 10.9 cm 01/15/25 1041   Wound Width (cm) 10.7 cm 01/15/25 104   Wound Depth (cm) 0.4 cm 01/15/25 1041   Wound Surface Area (cm^2) 116.63 cm^2 01/15/25 1041   Change in Wound Size % (l*w) -1938.99 01/15/25 1041   Wound Volume (cm^3) 46.652 cm^3 01/15/25 1041   Wound Healing % -8056 01/15/25 1041 
Spandagrip. Change daily    LEFT LOWER ABDOMEN WOUND: Cleanse with normal saline, pack with Calcium Alginate Rope Stitched, and Excel SAP Silicone bordered dressing. Change daily.       Treatment Orders: Eat a diet high in protein and vitamin C. Take a multiple vitamin daily unless contraindicated.      Elevate as much as possible     11/20/24 Compression to be ordered. ([place])      1/15/25 Wound Culture     [facility] followup visit:  Dr. HATFIELD 1 wk____________________________  (Please note your next appointment above and if you are unable to keep, kindly give a 24 hour notice. Thank you.)     Physician signature:__________________________      If you experience any of the following, please call the Wound Care Center during business hours:     * Increase in Pain  * Temperature over 101  * Increase in drainage from your wound  * Drainage with a foul odor  * Bleeding  * Increase in swelling  * Need for compression bandage changes due to slippage, breakthrough drainage.     If you need medical attention outside of the business hours of the Wound Care Centers please contact your PCP or go to the nearest emergency room.                    Electronically signed by Faith Dempsey MD

## 2025-01-16 NOTE — DISCHARGE INSTRUCTIONS
Visit Discharge/Physician Orders     Discharge condition: Stable     Assessment of pain at discharge: mild     Anesthetic used: lido 4%     Discharge to: Home     Left via:Private automobile     Accompanied by: self     ECF/HHA: [facility]      Dressing Orders:  LEFT PRETIB : Cleanse with normal saline, cover with calcium alginate, and ABD pad, dry dressing and secure, and Spandagrip. Change daily    LEFT LOWER ABDOMEN WOUND: Cleanse with normal saline, pack with Calcium Alginate Rope Stitched, and Excel SAP Silicone bordered dressing. Change daily.        Treatment Orders: Eat a diet high in protein and vitamin C. Take a multiple vitamin daily unless contraindicated.      Elevate as much as possible     11/20/24 Compression to be ordered. ([place])      1/15/25 Wound Culture      [facility] followup visit:  Dr. HATFIELD 1 wk____________________________  (Please note your next appointment above and if you are unable to keep, kindly give a 24 hour notice. Thank you.)     Physician signature:__________________________      If you experience any of the following, please call the Wound Care Center during business hours:     * Increase in Pain  * Temperature over 101  * Increase in drainage from your wound  * Drainage with a foul odor  * Bleeding  * Increase in swelling  * Need for compression bandage changes due to slippage, breakthrough drainage.     If you need medical attention outside of the business hours of the Wound Care Centers please contact your PCP or go to the nearest emergency room.

## 2025-01-17 ENCOUNTER — TELEPHONE (OUTPATIENT)
Dept: VASCULAR SURGERY | Age: 80
End: 2025-01-17

## 2025-01-17 ENCOUNTER — TRANSCRIBE ORDERS (OUTPATIENT)
Dept: ADMINISTRATIVE | Age: 80
End: 2025-01-17

## 2025-01-17 DIAGNOSIS — L97.222 VENOUS STASIS ULCER OF LEFT CALF WITH FAT LAYER EXPOSED WITHOUT VARICOSE VEINS (HCC): Primary | ICD-10-CM

## 2025-01-17 DIAGNOSIS — I87.2 VENOUS STASIS ULCER OF LEFT CALF WITH FAT LAYER EXPOSED WITHOUT VARICOSE VEINS (HCC): Primary | ICD-10-CM

## 2025-01-17 RX ORDER — OXYCODONE AND ACETAMINOPHEN 7.5; 325 MG/1; MG/1
1 TABLET ORAL EVERY 8 HOURS PRN
Qty: 90 TABLET | Refills: 0 | Status: SHIPPED | OUTPATIENT
Start: 2025-01-17 | End: 2025-02-16

## 2025-01-17 NOTE — PROGRESS NOTES
Physician Progress Note      PATIENT:               ROCK PRYOR  CSN #:                  [id number]  :                       1945  ADMIT DATE:       2025 5:36 PM  DISCH DATE:        1/10/2025 3:22 PM  RESPONDING  PROVIDER #:        Loyda Ring MD        QUERY TEXT:    Type of Anemia: Please provide further specificity, if known.    Clinical indicators include:  Options provided:  -- Anemia due to acute blood loss  -- Anemia due to chronic blood loss  -- Anemia due to iron deficiency  -- Anemia due to postoperative blood loss  -- Anemia due to chronic disease  -- Other - I will add my own diagnosis  -- Disagree - Not applicable / Not valid  -- Disagree - Clinically Unable to determine / Unknown        PROVIDER RESPONSE TEXT:    The patient has anemia due to acute blood loss.      Electronically signed by:  Loyda Ring MD 2025 9:00 AM

## 2025-01-17 NOTE — TELEPHONE ENCOUNTER
Anastacia called and wanted to speak with Dr. Dempsey regarding a prescription for Oxicodone. She stated she had spoken to the Pharmacy, and they said she could pick it up on January 20,2025. She said Dr. Dempsey had discussed it with her, and she needed the prescription sent to the Pharmacy.    L:et her know I Sent to pharmacy    [email or web address]

## 2025-01-17 NOTE — TELEPHONE ENCOUNTER
I called Anastacia and informed her ,the  prescription was filled by Dr Dempsey. She had called earlier to have it filled.

## 2025-01-22 ENCOUNTER — HOSPITAL ENCOUNTER (OUTPATIENT)
Dept: WOUND CARE | Age: 80
Discharge: HOME | End: 2025-01-22
Attending: SURGERY

## 2025-01-24 NOTE — DISCHARGE INSTRUCTIONS
Visit Discharge/Physician Orders     Discharge condition: Stable     Assessment of pain at discharge: mild     Anesthetic used: lido 4%     Discharge to: Home     Left via:Private automobile     Accompanied by: self     ECF/HHA: [facility]      Dressing Orders:  LEFT PRETIB : Cleanse with normal saline, cover with calcium alginate, and ABD pad, dry dressing and secure, and Spandagrip. Change daily    LEFT LOWER ABDOMEN WOUND: Cleanse with normal saline, pack with Calcium Alginate Rope Stitched, and Excel SAP Silicone bordered dressing. Change daily.        Treatment Orders: Eat a diet high in protein and vitamin C. Take a multiple vitamin daily unless contraindicated.      Elevate as much as possible     11/20/24 Compression to be ordered. ([place])      1/15/25 Wound Culture      [facility] followup visit:  Dr. HATFIELD 1 wk____________________________  (Please note your next appointment above and if you are unable to keep, kindly give a 24 hour notice. Thank you.)     Physician signature:__________________________      If you experience any of the following, please call the Wound Care Center during business hours:     * Increase in Pain  * Temperature over 101  * Increase in drainage from your wound  * Drainage with a foul odor  * Bleeding  * Increase in swelling  * Need for compression bandage changes due to slippage, breakthrough drainage.     If you need medical attention outside of the business hours of the Wound Care Centers please contact your PCP or go to the nearest emergency room.

## 2025-01-27 ENCOUNTER — TELEPHONE (OUTPATIENT)
Dept: VASCULAR SURGERY | Age: 80
End: 2025-01-27

## 2025-01-27 DIAGNOSIS — L97.222 VENOUS STASIS ULCER OF LEFT CALF WITH FAT LAYER EXPOSED WITHOUT VARICOSE VEINS (HCC): Primary | ICD-10-CM

## 2025-01-27 DIAGNOSIS — I87.2 VENOUS STASIS ULCER OF LEFT CALF WITH FAT LAYER EXPOSED WITHOUT VARICOSE VEINS (HCC): Primary | ICD-10-CM

## 2025-01-27 RX ORDER — MORPHINE SULFATE 15 MG/1
15 TABLET, FILM COATED, EXTENDED RELEASE ORAL 2 TIMES DAILY
Qty: 60 TABLET | Refills: 0 | Status: SHIPPED | OUTPATIENT
Start: 2025-01-27 | End: 2025-01-28 | Stop reason: ALTCHOICE

## 2025-01-28 ENCOUNTER — TELEPHONE (OUTPATIENT)
Dept: VASCULAR SURGERY | Age: 80
End: 2025-01-28

## 2025-01-28 DIAGNOSIS — L97.222 VENOUS STASIS ULCER OF LEFT CALF WITH FAT LAYER EXPOSED WITHOUT VARICOSE VEINS (HCC): Primary | ICD-10-CM

## 2025-01-28 DIAGNOSIS — I87.2 VENOUS STASIS ULCER OF LEFT CALF WITH FAT LAYER EXPOSED WITHOUT VARICOSE VEINS (HCC): Primary | ICD-10-CM

## 2025-01-28 RX ORDER — MORPHINE SULFATE 15 MG/1
15 TABLET, FILM COATED, EXTENDED RELEASE ORAL 2 TIMES DAILY
Qty: 60 TABLET | Refills: 0 | Status: SHIPPED
Start: 2025-01-28 | End: 2025-01-28 | Stop reason: ALTCHOICE

## 2025-01-28 NOTE — TELEPHONE ENCOUNTER
Patient called, [facility] doesn't have any MS Contin, please send the Rx to the [facility] in [place], thank you.    New script sent    fidel

## 2025-01-29 ENCOUNTER — HOSPITAL ENCOUNTER (OUTPATIENT)
Dept: WOUND CARE | Age: 80
Discharge: HOME | End: 2025-01-29
Attending: SURGERY
Payer: MEDICAID

## 2025-01-29 VITALS
TEMPERATURE: 98.1 F | DIASTOLIC BLOOD PRESSURE: 70 MMHG | SYSTOLIC BLOOD PRESSURE: 117 MMHG | HEART RATE: 81 BPM | HEIGHT: 67 IN | WEIGHT: 229 LBS | RESPIRATION RATE: 18 BRPM | BODY MASS INDEX: 35.94 KG/M2

## 2025-01-29 DIAGNOSIS — L97.222 VENOUS STASIS ULCER OF LEFT CALF WITH FAT LAYER EXPOSED WITHOUT VARICOSE VEINS (HCC): Primary | Chronic | ICD-10-CM

## 2025-01-29 DIAGNOSIS — L98.492 SKIN ULCER OF ABDOMEN WITH FAT LAYER EXPOSED (HCC): Chronic | ICD-10-CM

## 2025-01-29 DIAGNOSIS — I87.2 VENOUS STASIS ULCER OF LEFT CALF WITH FAT LAYER EXPOSED WITHOUT VARICOSE VEINS (HCC): Primary | Chronic | ICD-10-CM

## 2025-01-29 PROCEDURE — 11045 DBRDMT SUBQ TISS EACH ADDL: CPT

## 2025-01-29 PROCEDURE — 11042 DBRDMT SUBQ TIS 1ST 20SQCM/<: CPT | Performed by: SURGERY

## 2025-01-29 PROCEDURE — 11045 DBRDMT SUBQ TISS EACH ADDL: CPT | Performed by: SURGERY

## 2025-01-29 PROCEDURE — 11042 DBRDMT SUBQ TIS 1ST 20SQCM/<: CPT

## 2025-01-29 ASSESSMENT — PAIN DESCRIPTION - LOCATION: LOCATION: LEG

## 2025-01-29 ASSESSMENT — PAIN DESCRIPTION - DESCRIPTORS: DESCRIPTORS: ACHING

## 2025-01-29 ASSESSMENT — PAIN DESCRIPTION - ONSET: ONSET: ON-GOING

## 2025-01-29 ASSESSMENT — PAIN DESCRIPTION - PAIN TYPE: TYPE: CHRONIC PAIN

## 2025-01-29 ASSESSMENT — PAIN SCALES - WONG BAKER: WONGBAKER_NUMERICALRESPONSE: NO HURT

## 2025-01-29 ASSESSMENT — PAIN - FUNCTIONAL ASSESSMENT: PAIN_FUNCTIONAL_ASSESSMENT: ACTIVITIES ARE NOT PREVENTED

## 2025-01-29 ASSESSMENT — PAIN DESCRIPTION - FREQUENCY: FREQUENCY: CONTINUOUS

## 2025-01-29 ASSESSMENT — PAIN SCALES - GENERAL: PAINLEVEL_OUTOF10: 6

## 2025-01-29 ASSESSMENT — PAIN DESCRIPTION - ORIENTATION: ORIENTATION: LEFT

## 2025-01-29 NOTE — PROGRESS NOTES
151  MS contin 15 mg q12 #60, Perocet 7.5/325 mg q8 #90, oarrs reviewed  Lymphedema is moving to new location   Swelling is much improved  Pt agreeable to wrap - coban 2 and alginate with maxsorb if no debridement  3/6/24  Improved 138 , swelling much better  Pt wore wrap for 3 days  Willing to have hhc and wraps again  3/20/24  Stable 142, swelling slightly worse  Profore lite  She doesn't like coban 2, or maxsorb so will stop them  4/3/2024  Wound, pictures compared to last time, stable, does have some exudate but no cellulitis  Patient did not want me to debride the wound today, as such the wound was not debrided at patient's request  4/10/24  Wound appearance and size improved 111  Patient refusing debridement due to pain  4/17/24  Wound size slightly larger 134 but apperance of wound improved  Culture done   4/24/24  Culture reviewed  S aureus, Pseuodomonas, Providencia heavy growth - levaquin and doxycycline script sent  5/1/24  Wound size stable 134  But appearance improved  5/15/24  Wound size 128  5/29/24  148 - larger but appearance better  Admits to using peppermint soap on it - cautioned against that in future  6/5/24  143  Increase pain appearance stable  MS contin 15 mg q12 #60, Perocet 7.5/325 mg q8 #90, oarrs reviewed  6/19/24  Increasing pain  Increased drainage  Admits to not elevating as much  Culture done  6/26/24  Culture - pseuodomonas - will have her see Dr Hammond ID in regards to IV abx for tx   Have previously treated with levaquin 4/24/24  Continued increase in pain, drainage  Wound stable in size 147  MS contin 15 mg q12 #60, Perocet 7.5/325 mg q8 #90, oarrs reviewed - for 7/3  7/3/24  Drainage and odor is better  Scheduled to see Dr Hammond  Wound size and appearance improved 132  Does not want debrided as her daughter is here   Explained to daughter and pt again importance of compliance  7/17/24  Wound size stable  Admits to falling asleep with legs down  Pt frustrated - emphasized

## 2025-01-31 NOTE — DISCHARGE INSTRUCTIONS
Visit Discharge/Physician Orders     Discharge condition: Stable     Assessment of pain at discharge: mild     Anesthetic used: lido 4%     Discharge to: Home     Left via:Private automobile     Accompanied by: self     ECF/HHA: Vin      Dressing Orders:  LEFT PRETIB : Cleanse with normal saline, cover with calcium alginate, and ABD pad, dry dressing and secure, and Spandagrip. Change daily    LEFT LOWER ABDOMEN WOUND: Cleanse with normal saline, pack with Calcium Alginate Rope Stitched, and Excel SAP Silicone bordered dressing. Change daily.        Treatment Orders: Eat a diet high in protein and vitamin C. Take a multiple vitamin daily unless contraindicated.      Elevate as much as possible     11/20/24 Compression to be ordered. (Vin)      [facility] followup visit:  Dr. HATFIELD 1 wk____________________________  (Please note your next appointment above and if you are unable to keep, kindly give a 24 hour notice. Thank you.)     Physician signature:__________________________      If you experience any of the following, please call the Wound Care Center during business hours:     * Increase in Pain  * Temperature over 101  * Increase in drainage from your wound  * Drainage with a foul odor  * Bleeding  * Increase in swelling  * Need for compression bandage changes due to slippage, breakthrough drainage.     If you need medical attention outside of the business hours of the Wound Care Centers please contact your PCP or go to the nearest emergency room.

## 2025-02-04 PROBLEM — R79.89 ELEVATED TROPONIN: Status: RESOLVED | Noted: 2025-01-05 | Resolved: 2025-02-04

## 2025-02-05 ENCOUNTER — HOSPITAL ENCOUNTER (OUTPATIENT)
Dept: WOUND CARE | Age: 80
Discharge: HOME | End: 2025-02-05
Attending: SURGERY
Payer: MEDICARE

## 2025-02-05 VITALS
TEMPERATURE: 97.2 F | HEIGHT: 67 IN | DIASTOLIC BLOOD PRESSURE: 4 MMHG | SYSTOLIC BLOOD PRESSURE: 124 MMHG | HEART RATE: 80 BPM | WEIGHT: 229 LBS | BODY MASS INDEX: 35.94 KG/M2

## 2025-02-05 DIAGNOSIS — L97.222 VENOUS STASIS ULCER OF LEFT CALF WITH FAT LAYER EXPOSED WITHOUT VARICOSE VEINS (HCC): Primary | Chronic | ICD-10-CM

## 2025-02-05 DIAGNOSIS — L98.492 SKIN ULCER OF ABDOMEN WITH FAT LAYER EXPOSED (HCC): Chronic | ICD-10-CM

## 2025-02-05 DIAGNOSIS — I87.2 VENOUS STASIS ULCER OF LEFT CALF WITH FAT LAYER EXPOSED WITHOUT VARICOSE VEINS (HCC): Primary | Chronic | ICD-10-CM

## 2025-02-05 PROCEDURE — 11042 DBRDMT SUBQ TIS 1ST 20SQCM/<: CPT

## 2025-02-05 PROCEDURE — 11045 DBRDMT SUBQ TISS EACH ADDL: CPT

## 2025-02-05 PROCEDURE — 11045 DBRDMT SUBQ TISS EACH ADDL: CPT | Performed by: SURGERY

## 2025-02-05 PROCEDURE — 11042 DBRDMT SUBQ TIS 1ST 20SQCM/<: CPT | Performed by: SURGERY

## 2025-02-05 RX ORDER — NEOMYCIN/BACITRACIN/POLYMYXINB 3.5-400-5K
OINTMENT (GRAM) TOPICAL ONCE
OUTPATIENT
Start: 2025-02-05 | End: 2025-02-05

## 2025-02-05 RX ORDER — LIDOCAINE HYDROCHLORIDE 20 MG/ML
JELLY TOPICAL ONCE
OUTPATIENT
Start: 2025-02-05 | End: 2025-02-05

## 2025-02-05 RX ORDER — CLOBETASOL PROPIONATE 0.5 MG/G
OINTMENT TOPICAL ONCE
OUTPATIENT
Start: 2025-02-05 | End: 2025-02-05

## 2025-02-05 RX ORDER — SILVER SULFADIAZINE 10 MG/G
CREAM TOPICAL ONCE
OUTPATIENT
Start: 2025-02-05 | End: 2025-02-05

## 2025-02-05 RX ORDER — GINSENG 100 MG
CAPSULE ORAL ONCE
OUTPATIENT
Start: 2025-02-05 | End: 2025-02-05

## 2025-02-05 RX ORDER — TRIAMCINOLONE ACETONIDE 1 MG/G
OINTMENT TOPICAL ONCE
OUTPATIENT
Start: 2025-02-05 | End: 2025-02-05

## 2025-02-05 RX ORDER — BETAMETHASONE DIPROPIONATE 0.05 %
OINTMENT (GRAM) TOPICAL ONCE
OUTPATIENT
Start: 2025-02-05 | End: 2025-02-05

## 2025-02-05 RX ORDER — LIDOCAINE 50 MG/G
OINTMENT TOPICAL ONCE
OUTPATIENT
Start: 2025-02-05 | End: 2025-02-05

## 2025-02-05 RX ORDER — LIDOCAINE HYDROCHLORIDE 40 MG/ML
SOLUTION TOPICAL ONCE
OUTPATIENT
Start: 2025-02-05 | End: 2025-02-05

## 2025-02-05 RX ORDER — MUPIROCIN 20 MG/G
OINTMENT TOPICAL ONCE
OUTPATIENT
Start: 2025-02-05 | End: 2025-02-05

## 2025-02-05 RX ORDER — LIDOCAINE 40 MG/G
CREAM TOPICAL ONCE
OUTPATIENT
Start: 2025-02-05 | End: 2025-02-05

## 2025-02-05 RX ORDER — GENTAMICIN SULFATE 1 MG/G
OINTMENT TOPICAL ONCE
OUTPATIENT
Start: 2025-02-05 | End: 2025-02-05

## 2025-02-05 RX ORDER — BACITRACIN ZINC AND POLYMYXIN B SULFATE 500; 1000 [USP'U]/G; [USP'U]/G
OINTMENT TOPICAL ONCE
OUTPATIENT
Start: 2025-02-05 | End: 2025-02-05

## 2025-02-05 ASSESSMENT — PAIN - FUNCTIONAL ASSESSMENT: PAIN_FUNCTIONAL_ASSESSMENT: PREVENTS OR INTERFERES SOME ACTIVE ACTIVITIES AND ADLS

## 2025-02-05 ASSESSMENT — PAIN DESCRIPTION - PAIN TYPE: TYPE: CHRONIC PAIN

## 2025-02-05 ASSESSMENT — PAIN DESCRIPTION - LOCATION: LOCATION: LEG

## 2025-02-05 ASSESSMENT — PAIN SCALES - GENERAL: PAINLEVEL_OUTOF10: 5

## 2025-02-05 ASSESSMENT — PAIN DESCRIPTION - ONSET: ONSET: ON-GOING

## 2025-02-05 ASSESSMENT — PAIN DESCRIPTION - ORIENTATION: ORIENTATION: LEFT

## 2025-02-05 ASSESSMENT — PAIN DESCRIPTION - FREQUENCY: FREQUENCY: CONTINUOUS

## 2025-02-05 ASSESSMENT — PAIN DESCRIPTION - DESCRIPTORS: DESCRIPTORS: SHARP;THROBBING

## 2025-02-06 NOTE — PROGRESS NOTES
3/2/22  periwound worse  Culture  drawtek  3/9/22  periwound much improved  levaquin 750 mg daily #10 script given culture   Wound itself stable  3/23/22  Left anterior calf wound improving overall  New blister/ulcerations left 3rd, 4th and 5th toes and lateral foot   Instructed to keep toes/foot dry, no ointment or corn starch   3/30/22  bistering resolved, other skin issues resolved  More dry than previously but overall stable  kailyn Barrera  Swelling is down, patient declining wrap  4/13/22  Wound slightly improved  4/20/2022  Wound stable, patient refusing compression wrap  5/4/22  Wound worse  Pt refusing wrap  Will change diet per her report to decrease swelling  5/11/22  Wound stable  kailyn and giacomo 2 - pt agreeable now after significant persuasion  5/18/22  Took off wrap within 24 hours due to pain  aquacell and spandigrip  She agreed to use wrap again if swelling not down 2 more cm next week  Wound slightly smaller  5/25/22  Wound improved   Left leg wound debrided   Continue aquacel   6/8/22  Wound larger  Agreeable to wrap - kailyn sena ag  Culture done  6/22/22  Wound stable in size  6/29/22  Wound slightly larger  Still having issues with wrap falling down  Will start hhc - MWF wraps  7/6/22  Improved  hhc starts this week  7/20/22  Appearance better, wound measuring larger  Continue with wraps  Leg edema improved  7/27/22  Slight improvement  8/3/22  Wound appearance and size worse  Issues still with wraps falling down  When doesn't have wraps on than usually uses spandigrips  8/10/2022  Wound cultures noted, Bactrim DS 1 tablet p.o. twice daily, wound looks fairly clean with some exudate only, mild recent lab work, patient recommended CBC and CMP  8/17/22  Refusing wrap today due to pain associated with  Will discuss with Dr Quarles her pain management  Slightly larger, appearance improved  8/24/22  Poor tolerance of wraps  Wound stable in size  9/7/22  Not tolerating wraps -

## 2025-02-07 NOTE — DISCHARGE INSTRUCTIONS
Visit Discharge/Physician Orders     Discharge condition: Stable     Assessment of pain at discharge: mild     Anesthetic used: lido 4%     Discharge to: Home     Left via:Private automobile     Accompanied by: self     ECF/HHA: Vin      Dressing Orders:  LEFT PRETIB : Cleanse with normal saline, cover with calcium alginate, and ABD pad, dry dressing and secure, and Spandagrip. Change daily    LEFT LOWER ABDOMEN WOUND: Cleanse with normal saline, pack with Calcium Alginate Rope Stitched, and Excel SAP Silicone bordered dressing. Change daily.        Treatment Orders: Eat a diet high in protein and vitamin C. Take a multiple vitamin daily unless contraindicated.      Elevate as much as possible     11/20/24 Compression to be ordered. (Vin)      [facility] followup visit:  Dr. HATFIELD 1 wk____________________________  (Please note your next appointment above and if you are unable to keep, kindly give a 24 hour notice. Thank you.)     Physician signature:__________________________      If you experience any of the following, please call the Wound Care Center during business hours:     * Increase in Pain  * Temperature over 101  * Increase in drainage from your wound  * Drainage with a foul odor  * Bleeding  * Increase in swelling  * Need for compression bandage changes due to slippage, breakthrough drainage.     If you need medical attention outside of the business hours of the Wound Care Centers please contact your PCP or go to the nearest emergency room.

## 2025-02-12 ENCOUNTER — HOSPITAL ENCOUNTER (OUTPATIENT)
Dept: WOUND CARE | Age: 80
Discharge: HOME | End: 2025-02-12
Attending: SURGERY

## 2025-02-13 NOTE — DISCHARGE INSTRUCTIONS
Visit Discharge/Physician Orders     Discharge condition: Stable     Assessment of pain at discharge: mild     Anesthetic used: lido 4%     Discharge to: Home     Left via:Private automobile     Accompanied by: self     ECF/HHA: Vin      Dressing Orders:  LEFT PRETIB : Cleanse with normal saline, cover with calcium alginate, and ABD pad, dry dressing and secure, and Spandagrip. Change daily    LEFT LOWER ABDOMEN WOUND: Cleanse with normal saline, pack with Calcium Alginate Rope Stitched, and Excel SAP Silicone bordered dressing. Change daily.        Treatment Orders: Eat a diet high in protein and vitamin C. Take a multiple vitamin daily unless contraindicated.      Elevate as much as possible     11/20/24 Compression to be ordered. (Vin)      [facility] followup visit:  Dr. HATFIELD 1 wk____________________________  (Please note your next appointment above and if you are unable to keep, kindly give a 24 hour notice. Thank you.)     Physician signature:__________________________      If you experience any of the following, please call the Wound Care Center during business hours:     * Increase in Pain  * Temperature over 101  * Increase in drainage from your wound  * Drainage with a foul odor  * Bleeding  * Increase in swelling  * Need for compression bandage changes due to slippage, breakthrough drainage.     If you need medical attention outside of the business hours of the Wound Care Centers please contact your PCP or go to the nearest emergency room.

## 2025-02-19 ENCOUNTER — HOSPITAL ENCOUNTER (OUTPATIENT)
Dept: WOUND CARE | Age: 80
Discharge: HOME | End: 2025-02-19
Attending: SURGERY
Payer: MEDICARE

## 2025-02-19 VITALS
WEIGHT: 229 LBS | TEMPERATURE: 97.2 F | SYSTOLIC BLOOD PRESSURE: 157 MMHG | HEIGHT: 67 IN | RESPIRATION RATE: 18 BRPM | HEART RATE: 94 BPM | DIASTOLIC BLOOD PRESSURE: 85 MMHG | BODY MASS INDEX: 35.94 KG/M2

## 2025-02-19 DIAGNOSIS — L97.222 VENOUS STASIS ULCER OF LEFT CALF WITH FAT LAYER EXPOSED WITHOUT VARICOSE VEINS (HCC): Primary | Chronic | ICD-10-CM

## 2025-02-19 DIAGNOSIS — L98.492 SKIN ULCER OF ABDOMEN WITH FAT LAYER EXPOSED (HCC): Chronic | ICD-10-CM

## 2025-02-19 DIAGNOSIS — I87.2 VENOUS STASIS ULCER OF LEFT CALF WITH FAT LAYER EXPOSED WITHOUT VARICOSE VEINS (HCC): Primary | Chronic | ICD-10-CM

## 2025-02-19 PROCEDURE — 11045 DBRDMT SUBQ TISS EACH ADDL: CPT | Performed by: SURGERY

## 2025-02-19 PROCEDURE — 11045 DBRDMT SUBQ TISS EACH ADDL: CPT

## 2025-02-19 PROCEDURE — 11042 DBRDMT SUBQ TIS 1ST 20SQCM/<: CPT | Performed by: SURGERY

## 2025-02-19 PROCEDURE — 11042 DBRDMT SUBQ TIS 1ST 20SQCM/<: CPT

## 2025-02-19 RX ORDER — OXYCODONE AND ACETAMINOPHEN 7.5; 325 MG/1; MG/1
1 TABLET ORAL EVERY 8 HOURS PRN
Qty: 90 TABLET | Refills: 0 | Status: SHIPPED | OUTPATIENT
Start: 2025-02-20 | End: 2025-03-22

## 2025-02-19 RX ORDER — MUPIROCIN 20 MG/G
OINTMENT TOPICAL ONCE
OUTPATIENT
Start: 2025-02-19 | End: 2025-02-19

## 2025-02-19 RX ORDER — LIDOCAINE 40 MG/G
CREAM TOPICAL ONCE
OUTPATIENT
Start: 2025-02-19 | End: 2025-02-19

## 2025-02-19 RX ORDER — CLOBETASOL PROPIONATE 0.5 MG/G
OINTMENT TOPICAL ONCE
OUTPATIENT
Start: 2025-02-19 | End: 2025-02-19

## 2025-02-19 RX ORDER — BACITRACIN ZINC AND POLYMYXIN B SULFATE 500; 1000 [USP'U]/G; [USP'U]/G
OINTMENT TOPICAL ONCE
OUTPATIENT
Start: 2025-02-19 | End: 2025-02-19

## 2025-02-19 RX ORDER — BETAMETHASONE DIPROPIONATE 0.05 %
OINTMENT (GRAM) TOPICAL ONCE
OUTPATIENT
Start: 2025-02-19 | End: 2025-02-19

## 2025-02-19 RX ORDER — GENTAMICIN SULFATE 1 MG/G
OINTMENT TOPICAL ONCE
OUTPATIENT
Start: 2025-02-19 | End: 2025-02-19

## 2025-02-19 RX ORDER — MORPHINE SULFATE 15 MG/1
15 TABLET, FILM COATED, EXTENDED RELEASE ORAL 2 TIMES DAILY
Qty: 60 TABLET | Refills: 0 | Status: SHIPPED | OUTPATIENT
Start: 2025-02-20 | End: 2025-03-22

## 2025-02-19 RX ORDER — LIDOCAINE HYDROCHLORIDE 40 MG/ML
SOLUTION TOPICAL ONCE
OUTPATIENT
Start: 2025-02-19 | End: 2025-02-19

## 2025-02-19 RX ORDER — LIDOCAINE HYDROCHLORIDE 40 MG/ML
SOLUTION TOPICAL ONCE
Status: COMPLETED | OUTPATIENT
Start: 2025-02-19 | End: 2025-02-19

## 2025-02-19 RX ORDER — NEOMYCIN/BACITRACIN/POLYMYXINB 3.5-400-5K
OINTMENT (GRAM) TOPICAL ONCE
OUTPATIENT
Start: 2025-02-19 | End: 2025-02-19

## 2025-02-19 RX ORDER — SILVER SULFADIAZINE 10 MG/G
CREAM TOPICAL ONCE
OUTPATIENT
Start: 2025-02-19 | End: 2025-02-19

## 2025-02-19 RX ORDER — GINSENG 100 MG
CAPSULE ORAL ONCE
OUTPATIENT
Start: 2025-02-19 | End: 2025-02-19

## 2025-02-19 RX ORDER — LIDOCAINE HYDROCHLORIDE 20 MG/ML
JELLY TOPICAL ONCE
OUTPATIENT
Start: 2025-02-19 | End: 2025-02-19

## 2025-02-19 RX ORDER — TRIAMCINOLONE ACETONIDE 1 MG/G
OINTMENT TOPICAL ONCE
OUTPATIENT
Start: 2025-02-19 | End: 2025-02-19

## 2025-02-19 RX ORDER — LIDOCAINE 50 MG/G
OINTMENT TOPICAL ONCE
OUTPATIENT
Start: 2025-02-19 | End: 2025-02-19

## 2025-02-19 RX ADMIN — LIDOCAINE HYDROCHLORIDE 10 ML: 40 SOLUTION TOPICAL at 10:24

## 2025-02-19 ASSESSMENT — PAIN - FUNCTIONAL ASSESSMENT: PAIN_FUNCTIONAL_ASSESSMENT: PREVENTS OR INTERFERES SOME ACTIVE ACTIVITIES AND ADLS

## 2025-02-19 ASSESSMENT — PAIN DESCRIPTION - ONSET: ONSET: ON-GOING

## 2025-02-19 ASSESSMENT — PAIN DESCRIPTION - DESCRIPTORS: DESCRIPTORS: SHARP;THROBBING

## 2025-02-19 ASSESSMENT — PAIN DESCRIPTION - ORIENTATION: ORIENTATION: LEFT

## 2025-02-19 ASSESSMENT — PAIN SCALES - GENERAL: PAINLEVEL_OUTOF10: 5

## 2025-02-19 ASSESSMENT — PAIN DESCRIPTION - FREQUENCY: FREQUENCY: INTERMITTENT

## 2025-02-19 ASSESSMENT — PAIN DESCRIPTION - LOCATION: LOCATION: LEG

## 2025-02-19 ASSESSMENT — PAIN DESCRIPTION - PAIN TYPE: TYPE: CHRONIC PAIN

## 2025-02-19 NOTE — PROGRESS NOTES
[facility]aling Center Followup Visit Note    Referring Physician : Tamie Santizo MD  Anastacia Morel  MEDICAL RECORD NUMBER:  [id number]  AGE: 80 y.o.   GENDER: female  : 1945  EPISODE DATE:  2025    Subjective:     Chief Complaint   Patient presents with    Wound Check     Leg and abdomen      HISTORY of PRESENT ILLNESS HPI   Anastacia oMrel is a 80 y.o. female who presents today in regards to follow up evaluation and treatment of wound/ulcer.  That patient's past medical, family and social hx were reviewed and changes were made if present.    History of Wound Context:  Patient has had left calf wound since ~ 2021.  She has been putting a dressing on it.  The wound has not been improving.  She has a hx significant for venous stasis ulcerations of bilateral LE.  She first was seen by myself in regards to these issues 2015.  She eventually healed these wounds 2016.     I last saw her 2021 at which time I recommended lymphedema therapy.  She states she went and said it caused her to much pain and stopped going.  She has chronic issues with bilateral calf pain, swelling and edema.            She admits to not wearing her stockings because of the pain.    She has used knee high 20-30 mm hg stockings in the past.       She is still seeing pain management and is down to percocet /325 mg daily.       21  aquacell  Double tubigrip  Culture done  Emphasized importance of getting in to more significant compression in the future  12/15/21  Culture reviewed - light growth, no tx  Wound slightly improved  Still significant drainage  Plan on compression wrap next week  21  Stable wound  Drainage slightly better  Pt would like to wait till next week because of holidays to start wrap  22  Wound larger  Profore  22  Wound appearance better  Refusing wrap - it rolled down last week and she cut off after 3 days  22  Wound appearance better  Still refusing wrap

## 2025-02-20 NOTE — DISCHARGE INSTRUCTIONS
Visit Discharge/Physician Orders     Discharge condition: Stable     Assessment of pain at discharge: mild     Anesthetic used: lido 4%     Discharge to: Home     Left via:Private automobile     Accompanied by: self     ECF/HHA: Vin      Dressing Orders:  LEFT PRETIB : Cleanse with normal saline, cover with calcium alginate, and ABD pad, dry dressing and secure, and Spandagrip. Change daily    LEFT LOWER ABDOMEN WOUND: Cleanse with normal saline, pack with Calcium Alginate Rope Stitched, and Excel SAP Silicone bordered dressing. Change daily.        Treatment Orders: Eat a diet high in protein and vitamin C. Take a multiple vitamin daily unless contraindicated.      Elevate as much as possible     11/20/24 Compression to be ordered. (Vin)      [facility] followup visit:  Dr. HATFIELD 1 wk____________________________  (Please note your next appointment above and if you are unable to keep, kindly give a 24 hour notice. Thank you.)     Physician signature:__________________________      If you experience any of the following, please call the Wound Care Center during business hours:     * Increase in Pain  * Temperature over 101  * Increase in drainage from your wound  * Drainage with a foul odor  * Bleeding  * Increase in swelling  * Need for compression bandage changes due to slippage, breakthrough drainage.     If you need medical attention outside of the business hours of the Wound Care Centers please contact your PCP or go to the nearest emergency room.

## 2025-02-26 ENCOUNTER — HOSPITAL ENCOUNTER (OUTPATIENT)
Dept: WOUND CARE | Age: 80
Discharge: HOME | End: 2025-02-26
Attending: SURGERY

## 2025-02-27 NOTE — DISCHARGE INSTRUCTIONS
Visit Discharge/Physician Orders     Discharge condition: Stable     Assessment of pain at discharge: mild     Anesthetic used: lido 4%     Discharge to: Home     Left via:Private automobile     Accompanied by: self     ECF/HHA: Vin      Dressing Orders:  LEFT PRETIB : Cleanse with normal saline, cover with calcium alginate, and ABD pad, dry dressing and secure, and Spandagrip. Change daily    LEFT LOWER ABDOMEN WOUND: Cleanse with normal saline, pack with Calcium Alginate Rope Stitched, and Excel SAP Silicone bordered dressing. Change daily.        Treatment Orders: Eat a diet high in protein and vitamin C. Take a multiple vitamin daily unless contraindicated.      Elevate as much as possible     11/20/24 Compression to be ordered. (Vin)      [facility] followup visit:  Dr. HATFIELD 1 wk____________________________  (Please note your next appointment above and if you are unable to keep, kindly give a 24 hour notice. Thank you.)     Physician signature:__________________________      If you experience any of the following, please call the Wound Care Center during business hours:     * Increase in Pain  * Temperature over 101  * Increase in drainage from your wound  * Drainage with a foul odor  * Bleeding  * Increase in swelling  * Need for compression bandage changes due to slippage, breakthrough drainage.     If you need medical attention outside of the business hours of the Wound Care Centers please contact your PCP or go to the nearest emergency room.

## 2025-03-05 ENCOUNTER — HOSPITAL ENCOUNTER (OUTPATIENT)
Dept: WOUND CARE | Age: 80
Discharge: HOME | End: 2025-03-05
Attending: SURGERY

## 2025-03-07 NOTE — DISCHARGE INSTRUCTIONS
Visit Discharge/Physician Orders     Discharge condition: Stable     Assessment of pain at discharge: mild     Anesthetic used: lido 4%     Discharge to: Home     Left via:Private automobile     Accompanied by: self     ECF/HHA: Vin      Dressing Orders:  LEFT PRETIB : Cleanse with normal saline, cover with calcium alginate, and ABD pad, dry dressing and secure, and Spandagrip. Change daily    LEFT LOWER ABDOMEN WOUND: Healed         Treatment Orders: Eat a diet high in protein and vitamin C. Take a multiple vitamin daily unless contraindicated.      Elevate as much as possible     11/20/24 Compression to be ordered. (Vin)      [facility] followup visit:  Dr. HATFIELD 1 wk____________________________  (Please note your next appointment above and if you are unable to keep, kindly give a 24 hour notice. Thank you.)     Physician signature:__________________________      If you experience any of the following, please call the Wound Care Center during business hours:     * Increase in Pain  * Temperature over 101  * Increase in drainage from your wound  * Drainage with a foul odor  * Bleeding  * Increase in swelling  * Need for compression bandage changes due to slippage, breakthrough drainage.     If you need medical attention outside of the business hours of the Wound Care Centers please contact your PCP or go to the nearest emergency room.        Additional Notes: Patient consent was obtained to proceed with the visit and recommended plan of care after discussion of all risks and benefits, including the risks of COVID-19 exposure. Detail Level: Simple

## 2025-03-12 ENCOUNTER — APPOINTMENT (OUTPATIENT)
Dept: CT IMAGING | Age: 80
End: 2025-03-12
Payer: MEDICARE

## 2025-03-12 ENCOUNTER — HOSPITAL ENCOUNTER (OUTPATIENT)
Dept: WOUND CARE | Age: 80
Discharge: HOME | End: 2025-03-12
Attending: SURGERY
Payer: MEDICARE

## 2025-03-12 ENCOUNTER — HOSPITAL ENCOUNTER (EMERGENCY)
Age: 80
Discharge: HOME | End: 2025-03-12
Payer: MEDICARE

## 2025-03-12 VITALS
BODY MASS INDEX: 34.53 KG/M2 | DIASTOLIC BLOOD PRESSURE: 78 MMHG | HEIGHT: 67 IN | TEMPERATURE: 96.9 F | WEIGHT: 220 LBS | HEART RATE: 88 BPM | SYSTOLIC BLOOD PRESSURE: 136 MMHG | RESPIRATION RATE: 18 BRPM

## 2025-03-12 VITALS
SYSTOLIC BLOOD PRESSURE: 148 MMHG | HEART RATE: 68 BPM | DIASTOLIC BLOOD PRESSURE: 69 MMHG | TEMPERATURE: 97.4 F | RESPIRATION RATE: 17 BRPM | OXYGEN SATURATION: 91 %

## 2025-03-12 DIAGNOSIS — M51.26 LUMBAR HERNIATED DISC: ICD-10-CM

## 2025-03-12 DIAGNOSIS — L97.222 VENOUS STASIS ULCER OF LEFT CALF WITH FAT LAYER EXPOSED WITHOUT VARICOSE VEINS (HCC): Primary | Chronic | ICD-10-CM

## 2025-03-12 DIAGNOSIS — I87.2 VENOUS STASIS ULCER OF LEFT CALF WITH FAT LAYER EXPOSED WITHOUT VARICOSE VEINS (HCC): Primary | Chronic | ICD-10-CM

## 2025-03-12 DIAGNOSIS — M48.061 SPINAL STENOSIS OF LUMBAR REGION WITHOUT NEUROGENIC CLAUDICATION: Primary | ICD-10-CM

## 2025-03-12 DIAGNOSIS — M51.362 DEGENERATION OF INTERVERTEBRAL DISC OF LUMBAR REGION WITH DISCOGENIC BACK PAIN AND LOWER EXTREMITY PAIN: ICD-10-CM

## 2025-03-12 PROCEDURE — 96372 THER/PROPH/DIAG INJ SC/IM: CPT

## 2025-03-12 PROCEDURE — 6360000002 HC RX W HCPCS: Performed by: PHYSICIAN ASSISTANT

## 2025-03-12 PROCEDURE — 99284 EMERGENCY DEPT VISIT MOD MDM: CPT

## 2025-03-12 PROCEDURE — 99213 OFFICE O/P EST LOW 20 MIN: CPT

## 2025-03-12 PROCEDURE — 99213 OFFICE O/P EST LOW 20 MIN: CPT | Performed by: SURGERY

## 2025-03-12 PROCEDURE — 72131 CT LUMBAR SPINE W/O DYE: CPT

## 2025-03-12 RX ORDER — OXYCODONE AND ACETAMINOPHEN 7.5; 325 MG/1; MG/1
1 TABLET ORAL EVERY 8 HOURS PRN
Qty: 90 TABLET | Refills: 0 | Status: SHIPPED | OUTPATIENT
Start: 2025-03-19 | End: 2025-04-18

## 2025-03-12 RX ORDER — ORPHENADRINE CITRATE 30 MG/ML
60 INJECTION INTRAMUSCULAR; INTRAVENOUS ONCE
Status: COMPLETED | OUTPATIENT
Start: 2025-03-12 | End: 2025-03-12

## 2025-03-12 RX ORDER — CYCLOBENZAPRINE HCL 5 MG
5 TABLET ORAL 2 TIMES DAILY PRN
Qty: 10 TABLET | Refills: 0 | Status: SHIPPED | OUTPATIENT
Start: 2025-03-12 | End: 2025-03-22

## 2025-03-12 RX ORDER — LIDOCAINE HYDROCHLORIDE 40 MG/ML
SOLUTION TOPICAL ONCE
Status: COMPLETED | OUTPATIENT
Start: 2025-03-12 | End: 2025-03-12

## 2025-03-12 RX ORDER — MORPHINE SULFATE 15 MG/1
15 TABLET, FILM COATED, EXTENDED RELEASE ORAL 2 TIMES DAILY
Qty: 60 TABLET | Refills: 0 | Status: SHIPPED | OUTPATIENT
Start: 2025-03-26 | End: 2025-04-25

## 2025-03-12 RX ADMIN — LIDOCAINE HYDROCHLORIDE 5 ML: 40 SOLUTION TOPICAL at 11:05

## 2025-03-12 RX ADMIN — ORPHENADRINE CITRATE 60 MG: 30 INJECTION, SOLUTION INTRAMUSCULAR; INTRAVENOUS at 13:29

## 2025-03-12 NOTE — ED PROVIDER NOTES
Independent [facility] Visit.    HPI:  3/12/25,   Time: 1:21 PM EDT         Anastacia Morel is a 80 y.o. female presenting to the ED for low back pain.  Started a couple days ago.  Patient believes that she slept wrong.  Woke up with sudden onset left lower back pain.  No injury or trauma.  Describes it as sharp and constant.  No radiation down the legs.  No numbness or tingling.  She states that she is on chronic pain medication at home already, oxycodone and morphine.  She states these medications help alleviate the pain but have not resolved completely.  Denies history of back problems.  Denies fever, chills, body aches or chest pain or shortness of breath, abdominal pain, nausea, vomiting, diarrhea, hematuria, dysuria, saddle anesthesia, or bladder or bowel incontinence.     ROS:   Pertinent positives and negatives are stated within HPI, all other systems reviewed and are negative.  --------------------------------------------- PAST HISTORY ---------------------------------------------  Past Medical History:  has a past medical history of Bursitis, CAD (coronary artery disease), Cellulitis of leg, left, COPD (chronic obstructive pulmonary disease) (HCC), Dermatophytosis, Emphysema, GERD (gastroesophageal reflux disease), Hiatal hernia, Hip pain, Hx of blood clots, Hyperlipidemia, Hypertension, Lymphedema of both lower extremities, Skin ulcer of abdomen with fat layer exposed (HCC), Venous insufficiency of both lower extremities, Venous stasis ulcer of left calf with fat layer exposed without varicose veins (HCC), Venous stasis ulcer of left calf with fat layer exposed without varicose veins (HCC), and Venous ulcer with fat layer exposed (HCC).    Past Surgical History:  has a past surgical history that includes Cholecystectomy; Hysterectomy; Endoscopy, colon, diagnostic; Colonoscopy; Appendectomy; ECHO Compl W Dop Color Flow (03/11/2013); Leg Debridement (Left, 11/18/2015); joint replacement (2009 2011); Breast  reduction surgery; Breast enhancement surgery; Leg Debridement (Left, 08/03/2016); hernia repair (N/A, 04/16/2021); hernia repair; and Cataract removal (Right, 06/20/2024).    Social History:  reports that she quit smoking about 29 years ago. Her smoking use included cigarettes. She started smoking about 49 years ago. She has a 20 pack-year smoking history. She has never used smokeless tobacco. She reports that she does not drink alcohol and does not use drugs.    Family History: family history is not on file.     The patient’s home medications have been reviewed.    Allergies: Codeine, Dilaudid [hydromorphone hcl], Naproxen, Singulair [montelukast sodium], Black cohosh, and Black cohosh [cimicifuga racemosa (black cohosh)]    -------------------------------------------------- RESULTS -------------------------------------------------  All laboratory and radiology results have been personally reviewed by myself   LABS:  No results found for this visit on 03/12/25.    RADIOLOGY:  Interpreted by Radiologist.  CT LUMBAR SPINE WO CONTRAST   Final Result   1. No acute fracture or subluxation detected.   2. Grade 1-2 anterolisthesis of L4 on L5 apparently, stable   3. Circumferential canal stenosis at L4-5 due to anterolisthesis, bulging   disc and facet degenerative change. The findings are chronic.             ------------------------- NURSING NOTES AND VITALS REVIEWED ---------------------------   The nursing notes within the ED encounter and vital signs as below have been reviewed.   BP (!) 147/103   Pulse 91   Temp 97.4 °F (36.3 °C) (Temporal)   Resp 17   SpO2 93%   Oxygen Saturation Interpretation: Normal      ---------------------------------------------------PHYSICAL EXAM--------------------------------------      Constitutional/General: Alert and oriented x3, well appearing, non toxic in NAD  Head: NC/AT  Eyes: PERRL, EOMI  Mouth: Oropharynx clear, handling secretions, no trismus  Neck: Supple, full ROM, no

## 2025-03-12 NOTE — PROGRESS NOTES
CLINICAL PHARMACY NOTE: MEDS TO BEDS    Total # of Prescriptions Filled: 1   The following medications were delivered to the patient:  Cyclobenzaprine 5 mg    Additional Documentation:

## 2025-03-13 NOTE — DISCHARGE INSTRUCTIONS
Visit Discharge/Physician Orders     Discharge condition: Stable     Assessment of pain at discharge: mild     Anesthetic used: lido 4%     Discharge to: Home     Left via:Private automobile     Accompanied by: self     ECF/HHA: Vin      Dressing Orders:  LEFT PRETIB : Cleanse with normal saline, cover with calcium alginate, and ABD pad, dry dressing and secure, and Spandagrip. Change daily       Treatment Orders: Eat a diet high in protein and vitamin C. Take a multiple vitamin daily unless contraindicated.      Elevate as much as possible     11/20/24 Compression to be ordered. (Vin)      [facility] followup visit:  Dr. HATFIELD 1 wk____________________________  (Please note your next appointment above and if you are unable to keep, kindly give a 24 hour notice. Thank you.)     Physician signature:__________________________      If you experience any of the following, please call the Wound Care Center during business hours:     * Increase in Pain  * Temperature over 101  * Increase in drainage from your wound  * Drainage with a foul odor  * Bleeding  * Increase in swelling  * Need for compression bandage changes due to slippage, breakthrough drainage.     If you need medical attention outside of the business hours of the Wound Care Centers please contact your PCP or go to the nearest emergency room.

## 2025-03-13 NOTE — PROGRESS NOTES
Dressing/Treatment Alginate;Dry dressing 02/19/25 1055   Wound Length (cm) 0 cm 03/12/25 1057   Wound Width (cm) 0 cm 03/12/25 1057   Wound Depth (cm) 0 cm 03/12/25 1057   Wound Surface Area (cm^2) 0 cm^2 03/12/25 1057   Change in Wound Size % (l*w) 100 03/12/25 1057   Wound Volume (cm^3) 0 cm^3 03/12/25 1057   Wound Healing % 100 03/12/25 1057   Post-Procedure Length (cm) 0 cm 03/12/25 1158   Post-Procedure Width (cm) 0 cm 03/12/25 1158   Post-Procedure Depth (cm) 0 cm 03/12/25 1158   Post-Procedure Surface Area (cm^2) 0 cm^2 03/12/25 1158   Post-Procedure Volume (cm^3) 0 cm^3 03/12/25 1158   Wound Assessment Pink/red;Bleeding;Fibrin 02/19/25 1019   Drainage Amount Small (< 25%) 02/19/25 1019   Drainage Description Serosanguinous 02/19/25 1019   Odor None 02/19/25 1019   Kori-wound Assessment Intact 02/19/25 1019   Margins Attached edges 01/15/25 1041   Wound Thickness Description not for Pressure Injury Full thickness 01/15/25 1041   Number of days: 56        NO debridement due to pts back pain  Plan:   Treatment Note please see attached Discharge Instructions    Written patient dismissal instructions given to patient and signed by patient or POA.         Discharge Instructions         Visit Discharge/Physician Orders     Discharge condition: Stable     Assessment of pain at discharge: mild     Anesthetic used: lido 4%     Discharge to: Home     Left via:Private automobile     Accompanied by: self     ECF/HHA: Vin      Dressing Orders:  LEFT PRETIB : Cleanse with normal saline, cover with calcium alginate, and ABD pad, dry dressing and secure, and Spandagrip. Change daily    LEFT LOWER ABDOMEN WOUND: Healed         Treatment Orders: Eat a diet high in protein and vitamin C. Take a multiple vitamin daily unless contraindicated.      Elevate as much as possible     11/20/24 Compression to be ordered. (Vin)      [facility] followup visit:  Dr. HATFIELD 1 wk____________________________  (Please note your next appointment

## 2025-03-19 ENCOUNTER — HOSPITAL ENCOUNTER (OUTPATIENT)
Dept: WOUND CARE | Age: 80
Discharge: HOME | End: 2025-03-19
Attending: SURGERY
Payer: MEDICARE

## 2025-03-19 VITALS
SYSTOLIC BLOOD PRESSURE: 150 MMHG | HEART RATE: 78 BPM | TEMPERATURE: 96.4 F | DIASTOLIC BLOOD PRESSURE: 82 MMHG | WEIGHT: 220 LBS | BODY MASS INDEX: 34.53 KG/M2 | HEIGHT: 67 IN | RESPIRATION RATE: 18 BRPM

## 2025-03-19 DIAGNOSIS — I87.2 VENOUS STASIS ULCER OF LEFT CALF WITH FAT LAYER EXPOSED WITHOUT VARICOSE VEINS: Primary | Chronic | ICD-10-CM

## 2025-03-19 DIAGNOSIS — L97.222 VENOUS STASIS ULCER OF LEFT CALF WITH FAT LAYER EXPOSED WITHOUT VARICOSE VEINS: Primary | Chronic | ICD-10-CM

## 2025-03-19 DIAGNOSIS — I89.0 LYMPHEDEMA: ICD-10-CM

## 2025-03-19 PROCEDURE — 11042 DBRDMT SUBQ TIS 1ST 20SQCM/<: CPT | Performed by: SURGERY

## 2025-03-19 PROCEDURE — 11045 DBRDMT SUBQ TISS EACH ADDL: CPT | Performed by: SURGERY

## 2025-03-19 PROCEDURE — 11042 DBRDMT SUBQ TIS 1ST 20SQCM/<: CPT

## 2025-03-19 PROCEDURE — 11045 DBRDMT SUBQ TISS EACH ADDL: CPT

## 2025-03-19 RX ORDER — BACITRACIN ZINC AND POLYMYXIN B SULFATE 500; 1000 [USP'U]/G; [USP'U]/G
OINTMENT TOPICAL ONCE
OUTPATIENT
Start: 2025-03-19 | End: 2025-03-19

## 2025-03-19 RX ORDER — GENTAMICIN SULFATE 1 MG/G
OINTMENT TOPICAL ONCE
OUTPATIENT
Start: 2025-03-19 | End: 2025-03-19

## 2025-03-19 RX ORDER — GINSENG 100 MG
CAPSULE ORAL ONCE
OUTPATIENT
Start: 2025-03-19 | End: 2025-03-19

## 2025-03-19 RX ORDER — LIDOCAINE HYDROCHLORIDE 40 MG/ML
SOLUTION TOPICAL ONCE
OUTPATIENT
Start: 2025-03-19 | End: 2025-03-19

## 2025-03-19 RX ORDER — SILVER SULFADIAZINE 10 MG/G
CREAM TOPICAL ONCE
OUTPATIENT
Start: 2025-03-19 | End: 2025-03-19

## 2025-03-19 RX ORDER — LIDOCAINE 40 MG/G
CREAM TOPICAL ONCE
OUTPATIENT
Start: 2025-03-19 | End: 2025-03-19

## 2025-03-19 RX ORDER — LIDOCAINE 50 MG/G
OINTMENT TOPICAL ONCE
OUTPATIENT
Start: 2025-03-19 | End: 2025-03-19

## 2025-03-19 RX ORDER — TRIAMCINOLONE ACETONIDE 1 MG/G
OINTMENT TOPICAL ONCE
OUTPATIENT
Start: 2025-03-19 | End: 2025-03-19

## 2025-03-19 RX ORDER — NEOMYCIN/BACITRACIN/POLYMYXINB 3.5-400-5K
OINTMENT (GRAM) TOPICAL ONCE
OUTPATIENT
Start: 2025-03-19 | End: 2025-03-19

## 2025-03-19 RX ORDER — LIDOCAINE HYDROCHLORIDE 40 MG/ML
SOLUTION TOPICAL ONCE
Status: COMPLETED | OUTPATIENT
Start: 2025-03-19 | End: 2025-03-19

## 2025-03-19 RX ORDER — MUPIROCIN 20 MG/G
OINTMENT TOPICAL ONCE
OUTPATIENT
Start: 2025-03-19 | End: 2025-03-19

## 2025-03-19 RX ORDER — LIDOCAINE HYDROCHLORIDE 20 MG/ML
JELLY TOPICAL ONCE
OUTPATIENT
Start: 2025-03-19 | End: 2025-03-19

## 2025-03-19 RX ORDER — CLOBETASOL PROPIONATE 0.5 MG/G
OINTMENT TOPICAL ONCE
OUTPATIENT
Start: 2025-03-19 | End: 2025-03-19

## 2025-03-19 RX ORDER — BETAMETHASONE DIPROPIONATE 0.05 %
OINTMENT (GRAM) TOPICAL ONCE
OUTPATIENT
Start: 2025-03-19 | End: 2025-03-19

## 2025-03-19 RX ADMIN — LIDOCAINE HYDROCHLORIDE 15 ML: 40 SOLUTION TOPICAL at 11:44

## 2025-03-19 ASSESSMENT — PAIN DESCRIPTION - LOCATION: LOCATION: LEG

## 2025-03-19 ASSESSMENT — PAIN DESCRIPTION - ONSET: ONSET: ON-GOING

## 2025-03-19 ASSESSMENT — PAIN DESCRIPTION - ORIENTATION: ORIENTATION: LEFT

## 2025-03-19 ASSESSMENT — PAIN - FUNCTIONAL ASSESSMENT: PAIN_FUNCTIONAL_ASSESSMENT: PREVENTS OR INTERFERES SOME ACTIVE ACTIVITIES AND ADLS

## 2025-03-19 ASSESSMENT — PAIN DESCRIPTION - DESCRIPTORS: DESCRIPTORS: BURNING;THROBBING;SHARP

## 2025-03-19 ASSESSMENT — PAIN DESCRIPTION - FREQUENCY: FREQUENCY: INTERMITTENT

## 2025-03-19 ASSESSMENT — PAIN DESCRIPTION - PAIN TYPE: TYPE: CHRONIC PAIN

## 2025-03-19 ASSESSMENT — PAIN DESCRIPTION - PROGRESSION: CLINICAL_PROGRESSION: NOT CHANGED

## 2025-03-19 ASSESSMENT — PAIN SCALES - GENERAL: PAINLEVEL_OUTOF10: 5

## 2025-03-20 NOTE — DISCHARGE INSTRUCTIONS
Visit Discharge/Physician Orders     Discharge condition: Stable     Assessment of pain at discharge: mild     Anesthetic used: lido 4%     Discharge to: Home     Left via:Private automobile     Accompanied by: self     ECF/HHA: Vin      Dressing Orders:  LEFT PRETIB : Cleanse with normal saline, cover with calcium alginate, and ABD pad, dry dressing and secure, and Spandagrip. Change daily       Treatment Orders: Eat a diet high in protein and vitamin C. Take a multiple vitamin daily unless contraindicated.      Elevate as much as possible     11/20/24 Compression to be ordered. (Vin)      [facility] followup visit:  Dr. HATFIELD 1 wk____________________________  (Please note your next appointment above and if you are unable to keep, kindly give a 24 hour notice. Thank you.)     Physician signature:__________________________      If you experience any of the following, please call the Wound Care Center during business hours:     * Increase in Pain  * Temperature over 101  * Increase in drainage from your wound  * Drainage with a foul odor  * Bleeding  * Increase in swelling  * Need for compression bandage changes due to slippage, breakthrough drainage.     If you need medical attention outside of the business hours of the Wound Care Centers please contact your PCP or go to the nearest emergency room.

## 2025-03-20 NOTE — PROGRESS NOTES
following, please call the Wound Care Center during business hours:     * Increase in Pain  * Temperature over 101  * Increase in drainage from your wound  * Drainage with a foul odor  * Bleeding  * Increase in swelling  * Need for compression bandage changes due to slippage, breakthrough drainage.     If you need medical attention outside of the business hours of the Wound Care Centers please contact your PCP or go to the nearest emergency room.                Electronically signed by Faith Dempsey MD

## 2025-03-26 ENCOUNTER — HOSPITAL ENCOUNTER (OUTPATIENT)
Dept: WOUND CARE | Age: 80
Discharge: HOME | End: 2025-03-26
Attending: SURGERY

## 2025-03-27 NOTE — DISCHARGE INSTRUCTIONS
Visit Discharge/Physician Orders     Discharge condition: Stable     Assessment of pain at discharge: mild     Anesthetic used: lido 4%     Discharge to: Home     Left via:Private automobile     Accompanied by: self     ECF/HHA: [facility]      Dressing Orders:  LEFT PRETIB : Cleanse with normal saline, cover with calcium alginate, and ABD pad, dry dressing and secure, and Spandagrip. Change daily       Treatment Orders: Eat a diet high in protein and vitamin C. Take a multiple vitamin daily unless contraindicated.      Elevate as much as possible     11/20/24 Compression to be ordered. ([facility])      [facility] followup visit:  Dr. HATFIELD 1 wk____________________________  (Please note your next appointment above and if you are unable to keep, kindly give a 24 hour notice. Thank you.)     Physician signature:__________________________      If you experience any of the following, please call the Wound Care Center during business hours:     * Increase in Pain  * Temperature over 101  * Increase in drainage from your wound  * Drainage with a foul odor  * Bleeding  * Increase in swelling  * Need for compression bandage changes due to slippage, breakthrough drainage.     If you need medical attention outside of the business hours of the Wound Care Centers please contact your PCP or go to the nearest emergency room.

## 2025-04-02 ENCOUNTER — HOSPITAL ENCOUNTER (OUTPATIENT)
Dept: WOUND CARE | Age: 80
Discharge: HOME | End: 2025-04-02
Attending: SURGERY

## 2025-04-03 NOTE — DISCHARGE INSTRUCTIONS
Visit Discharge/Physician Orders     Discharge condition: Stable     Assessment of pain at discharge: mild     Anesthetic used: lido 4%     Discharge to: Home     Left via:Private automobile     Accompanied by: self     ECF/HHA: [facility]      Dressing Orders:  LEFT PRETIB : Cleanse with normal saline, cover with calcium alginate, and ABD pad, dry dressing and secure, and Spandagrip. Change daily       Treatment Orders: Eat a diet high in protein and vitamin C. Take a multiple vitamin daily unless contraindicated.      Elevate as much as possible     11/20/24 Compression to be ordered. ([place])      [facility] followup visit:  Dr. HATFIELD 1 wk____________________________  (Please note your next appointment above and if you are unable to keep, kindly give a 24 hour notice. Thank you.)     Physician signature:__________________________      If you experience any of the following, please call the Wound Care Center during business hours:     * Increase in Pain  * Temperature over 101  * Increase in drainage from your wound  * Drainage with a foul odor  * Bleeding  * Increase in swelling  * Need for compression bandage changes due to slippage, breakthrough drainage.     If you need medical attention outside of the business hours of the Wound Care Centers please contact your PCP or go to the nearest emergency room.

## 2025-04-08 RX ORDER — GABAPENTIN 300 MG/1
300 CAPSULE ORAL 3 TIMES DAILY
Qty: 90 CAPSULE | Refills: 0 | Status: SHIPPED | OUTPATIENT
Start: 2025-04-08 | End: 2025-05-08

## 2025-04-09 ENCOUNTER — HOSPITAL ENCOUNTER (OUTPATIENT)
Dept: WOUND CARE | Age: 80
Discharge: HOME | End: 2025-04-09
Attending: SURGERY
Payer: MEDICARE

## 2025-04-09 VITALS
BODY MASS INDEX: 34.53 KG/M2 | TEMPERATURE: 97.1 F | HEART RATE: 108 BPM | RESPIRATION RATE: 18 BRPM | HEIGHT: 67 IN | DIASTOLIC BLOOD PRESSURE: 72 MMHG | SYSTOLIC BLOOD PRESSURE: 151 MMHG | WEIGHT: 220 LBS

## 2025-04-09 DIAGNOSIS — I87.2 VENOUS STASIS ULCER OF LEFT CALF WITH FAT LAYER EXPOSED WITHOUT VARICOSE VEINS: Primary | Chronic | ICD-10-CM

## 2025-04-09 DIAGNOSIS — L97.222 VENOUS STASIS ULCER OF LEFT CALF WITH FAT LAYER EXPOSED WITHOUT VARICOSE VEINS: Primary | Chronic | ICD-10-CM

## 2025-04-09 PROCEDURE — 11045 DBRDMT SUBQ TISS EACH ADDL: CPT

## 2025-04-09 PROCEDURE — 11045 DBRDMT SUBQ TISS EACH ADDL: CPT | Performed by: SURGERY

## 2025-04-09 PROCEDURE — 11042 DBRDMT SUBQ TIS 1ST 20SQCM/<: CPT | Performed by: SURGERY

## 2025-04-09 PROCEDURE — 11042 DBRDMT SUBQ TIS 1ST 20SQCM/<: CPT

## 2025-04-09 RX ORDER — MUPIROCIN 20 MG/G
OINTMENT TOPICAL ONCE
OUTPATIENT
Start: 2025-04-09 | End: 2025-04-09

## 2025-04-09 RX ORDER — LIDOCAINE HYDROCHLORIDE 20 MG/ML
JELLY TOPICAL ONCE
OUTPATIENT
Start: 2025-04-09 | End: 2025-04-09

## 2025-04-09 RX ORDER — GINSENG 100 MG
CAPSULE ORAL ONCE
OUTPATIENT
Start: 2025-04-09 | End: 2025-04-09

## 2025-04-09 RX ORDER — LIDOCAINE 40 MG/G
CREAM TOPICAL ONCE
OUTPATIENT
Start: 2025-04-09 | End: 2025-04-09

## 2025-04-09 RX ORDER — LIDOCAINE HYDROCHLORIDE 40 MG/ML
SOLUTION TOPICAL ONCE
OUTPATIENT
Start: 2025-04-09 | End: 2025-04-09

## 2025-04-09 RX ORDER — BACITRACIN ZINC AND POLYMYXIN B SULFATE 500; 1000 [USP'U]/G; [USP'U]/G
OINTMENT TOPICAL ONCE
OUTPATIENT
Start: 2025-04-09 | End: 2025-04-09

## 2025-04-09 RX ORDER — BETAMETHASONE DIPROPIONATE 0.05 %
OINTMENT (GRAM) TOPICAL ONCE
OUTPATIENT
Start: 2025-04-09 | End: 2025-04-09

## 2025-04-09 RX ORDER — GENTAMICIN SULFATE 1 MG/G
OINTMENT TOPICAL ONCE
OUTPATIENT
Start: 2025-04-09 | End: 2025-04-09

## 2025-04-09 RX ORDER — NEOMYCIN/BACITRACIN/POLYMYXINB 3.5-400-5K
OINTMENT (GRAM) TOPICAL ONCE
OUTPATIENT
Start: 2025-04-09 | End: 2025-04-09

## 2025-04-09 RX ORDER — TRIAMCINOLONE ACETONIDE 1 MG/G
OINTMENT TOPICAL ONCE
OUTPATIENT
Start: 2025-04-09 | End: 2025-04-09

## 2025-04-09 RX ORDER — SILVER SULFADIAZINE 10 MG/G
CREAM TOPICAL ONCE
OUTPATIENT
Start: 2025-04-09 | End: 2025-04-09

## 2025-04-09 RX ORDER — LIDOCAINE 50 MG/G
OINTMENT TOPICAL ONCE
OUTPATIENT
Start: 2025-04-09 | End: 2025-04-09

## 2025-04-09 RX ORDER — CLOBETASOL PROPIONATE 0.5 MG/G
OINTMENT TOPICAL ONCE
OUTPATIENT
Start: 2025-04-09 | End: 2025-04-09

## 2025-04-09 RX ORDER — LIDOCAINE HYDROCHLORIDE 40 MG/ML
SOLUTION TOPICAL ONCE
Status: COMPLETED | OUTPATIENT
Start: 2025-04-09 | End: 2025-04-09

## 2025-04-09 RX ADMIN — LIDOCAINE HYDROCHLORIDE 10 ML: 40 SOLUTION TOPICAL at 11:54

## 2025-04-09 ASSESSMENT — PAIN SCALES - GENERAL: PAINLEVEL_OUTOF10: 6

## 2025-04-09 ASSESSMENT — PAIN DESCRIPTION - ORIENTATION: ORIENTATION: LEFT

## 2025-04-09 ASSESSMENT — PAIN DESCRIPTION - LOCATION: LOCATION: LEG

## 2025-04-09 ASSESSMENT — PAIN DESCRIPTION - FREQUENCY: FREQUENCY: CONTINUOUS

## 2025-04-09 ASSESSMENT — PAIN DESCRIPTION - PAIN TYPE: TYPE: CHRONIC PAIN

## 2025-04-09 ASSESSMENT — PAIN DESCRIPTION - ONSET: ONSET: ON-GOING

## 2025-04-09 NOTE — PROGRESS NOTES
Wound Healing Center Followup Visit Note    Referring Physician : Tamie Santizo MD  Anastacia Morel  MEDICAL RECORD NUMBER:  [id number]  AGE: 80 y.o.   GENDER: female  : 1945  EPISODE DATE:  2025    Subjective:     Chief Complaint   Patient presents with    Wound Check     Left leg      HISTORY of PRESENT ILLNESS HPI   Anastacia Morel is a 80 y.o. female who presents today in regards to follow up evaluation and treatment of wound/ulcer.  That patient's past medical, family and social hx were reviewed and changes were made if present.    History of Wound Context:  Patient has had left calf wound since ~ 2021.  She has been putting a dressing on it.  The wound has not been improving.  She has a hx significant for venous stasis ulcerations of bilateral LE.  She first was seen by myself in regards to these issues 2015.  She eventually healed these wounds 2016.     I last saw her 2021 at which time I recommended lymphedema therapy.  She states she went and said it caused her to much pain and stopped going.  She has chronic issues with bilateral calf pain, swelling and edema.            She admits to not wearing her stockings because of the pain.    She has used knee high 20-30 mm hg stockings in the past.       She is still seeing pain management and is down to percocet 7/5/325 mg daily.       21  aquacell  Double tubigrip  Culture done  Emphasized importance of getting in to more significant compression in the future  12/15/21  Culture reviewed - light growth, no tx  Wound slightly improved  Still significant drainage  Plan on compression wrap next week  21  Stable wound  Drainage slightly better  Pt would like to wait till next week because of holidays to start wrap  22  Wound larger  Profore  22  Wound appearance better  Refusing wrap - it rolled down last week and she cut off after 3 days  22  Wound appearance better  Still refusing wrap   3/2/22  periwound

## 2025-04-14 NOTE — DISCHARGE INSTRUCTIONS
Visit Discharge/Physician Orders     Discharge condition: Stable     Assessment of pain at discharge: mild     Anesthetic used: lido 4%     Discharge to: Home     Left via:Private automobile     Accompanied by: self     ECF/HHA: [facility]      Dressing Orders:  LEFT PRETIB : Cleanse with normal saline, cover with calcium alginate, and ABD pad, dry dressing and secure, and Spandagrip. Change daily       Treatment Orders: Eat a diet high in protein and vitamin C. Take a multiple vitamin daily unless contraindicated.      Elevate as much as possible     11/20/24 Compression to be ordered. ([facility]in)      [facility] followup visit:  Dr. HATFIELD 2 wk____________________________  (Please note your next appointment above and if you are unable to keep, kindly give a 24 hour notice. Thank you.)     Physician signature:__________________________      If you experience any of the following, please call the Wound Care Center during business hours:     * Increase in Pain  * Temperature over 101  * Increase in drainage from your wound  * Drainage with a foul odor  * Bleeding  * Increase in swelling  * Need for compression bandage changes due to slippage, breakthrough drainage.     If you need medical attention outside of the business hours of the Wound Care Centers please contact your PCP or go to the nearest emergency room.

## 2025-04-16 ENCOUNTER — HOSPITAL ENCOUNTER (OUTPATIENT)
Dept: WOUND CARE | Age: 80
Discharge: HOME | End: 2025-04-16
Attending: SURGERY
Payer: MEDICARE

## 2025-04-16 VITALS
SYSTOLIC BLOOD PRESSURE: 142 MMHG | HEART RATE: 111 BPM | TEMPERATURE: 98.9 F | DIASTOLIC BLOOD PRESSURE: 69 MMHG | RESPIRATION RATE: 18 BRPM

## 2025-04-16 DIAGNOSIS — I87.2 VENOUS STASIS ULCER OF LEFT CALF WITH FAT LAYER EXPOSED WITHOUT VARICOSE VEINS: Primary | ICD-10-CM

## 2025-04-16 DIAGNOSIS — I89.0 LYMPHEDEMA: ICD-10-CM

## 2025-04-16 DIAGNOSIS — L97.222 VENOUS STASIS ULCER OF LEFT CALF WITH FAT LAYER EXPOSED WITHOUT VARICOSE VEINS: Primary | ICD-10-CM

## 2025-04-16 DIAGNOSIS — I87.2 VENOUS INSUFFICIENCY OF BOTH LOWER EXTREMITIES: Chronic | ICD-10-CM

## 2025-04-16 PROCEDURE — 11042 DBRDMT SUBQ TIS 1ST 20SQCM/<: CPT

## 2025-04-16 RX ORDER — LIDOCAINE 50 MG/G
OINTMENT TOPICAL ONCE
OUTPATIENT
Start: 2025-04-16 | End: 2025-04-16

## 2025-04-16 RX ORDER — LIDOCAINE HYDROCHLORIDE 20 MG/ML
JELLY TOPICAL ONCE
OUTPATIENT
Start: 2025-04-16 | End: 2025-04-16

## 2025-04-16 RX ORDER — MUPIROCIN 20 MG/G
OINTMENT TOPICAL ONCE
OUTPATIENT
Start: 2025-04-16 | End: 2025-04-16

## 2025-04-16 RX ORDER — BACITRACIN ZINC AND POLYMYXIN B SULFATE 500; 1000 [USP'U]/G; [USP'U]/G
OINTMENT TOPICAL ONCE
OUTPATIENT
Start: 2025-04-16 | End: 2025-04-16

## 2025-04-16 RX ORDER — TRIAMCINOLONE ACETONIDE 1 MG/G
OINTMENT TOPICAL ONCE
OUTPATIENT
Start: 2025-04-16 | End: 2025-04-16

## 2025-04-16 RX ORDER — LIDOCAINE HYDROCHLORIDE 40 MG/ML
SOLUTION TOPICAL ONCE
Status: COMPLETED | OUTPATIENT
Start: 2025-04-16 | End: 2025-04-16

## 2025-04-16 RX ORDER — GENTAMICIN SULFATE 1 MG/G
OINTMENT TOPICAL ONCE
OUTPATIENT
Start: 2025-04-16 | End: 2025-04-16

## 2025-04-16 RX ORDER — NEOMYCIN/BACITRACIN/POLYMYXINB 3.5-400-5K
OINTMENT (GRAM) TOPICAL ONCE
OUTPATIENT
Start: 2025-04-16 | End: 2025-04-16

## 2025-04-16 RX ORDER — SILVER SULFADIAZINE 10 MG/G
CREAM TOPICAL ONCE
OUTPATIENT
Start: 2025-04-16 | End: 2025-04-16

## 2025-04-16 RX ORDER — LIDOCAINE HYDROCHLORIDE 40 MG/ML
SOLUTION TOPICAL ONCE
OUTPATIENT
Start: 2025-04-16 | End: 2025-04-16

## 2025-04-16 RX ORDER — CLOBETASOL PROPIONATE 0.5 MG/G
OINTMENT TOPICAL ONCE
OUTPATIENT
Start: 2025-04-16 | End: 2025-04-16

## 2025-04-16 RX ORDER — GINSENG 100 MG
CAPSULE ORAL ONCE
OUTPATIENT
Start: 2025-04-16 | End: 2025-04-16

## 2025-04-16 RX ORDER — BETAMETHASONE DIPROPIONATE 0.05 %
OINTMENT (GRAM) TOPICAL ONCE
OUTPATIENT
Start: 2025-04-16 | End: 2025-04-16

## 2025-04-16 RX ORDER — LIDOCAINE 40 MG/G
CREAM TOPICAL ONCE
OUTPATIENT
Start: 2025-04-16 | End: 2025-04-16

## 2025-04-16 RX ADMIN — LIDOCAINE HYDROCHLORIDE: 40 SOLUTION TOPICAL at 10:25

## 2025-04-16 ASSESSMENT — PAIN DESCRIPTION - ORIENTATION: ORIENTATION: LEFT

## 2025-04-16 ASSESSMENT — PAIN DESCRIPTION - ONSET: ONSET: ON-GOING

## 2025-04-16 ASSESSMENT — PAIN DESCRIPTION - LOCATION: LOCATION: LEG

## 2025-04-16 ASSESSMENT — PAIN - FUNCTIONAL ASSESSMENT: PAIN_FUNCTIONAL_ASSESSMENT: PREVENTS OR INTERFERES SOME ACTIVE ACTIVITIES AND ADLS

## 2025-04-16 ASSESSMENT — PAIN DESCRIPTION - DESCRIPTORS: DESCRIPTORS: DISCOMFORT

## 2025-04-16 ASSESSMENT — PAIN SCALES - GENERAL: PAINLEVEL_OUTOF10: 5

## 2025-04-16 ASSESSMENT — PAIN DESCRIPTION - PAIN TYPE: TYPE: CHRONIC PAIN

## 2025-04-16 ASSESSMENT — PAIN DESCRIPTION - FREQUENCY: FREQUENCY: INTERMITTENT

## 2025-04-19 RX ORDER — OXYCODONE AND ACETAMINOPHEN 7.5; 325 MG/1; MG/1
1 TABLET ORAL EVERY 8 HOURS PRN
Qty: 90 TABLET | Refills: 0 | Status: SHIPPED | OUTPATIENT
Start: 2025-04-19 | End: 2025-05-19

## 2025-04-19 RX ORDER — MORPHINE SULFATE 15 MG/1
15 TABLET, FILM COATED, EXTENDED RELEASE ORAL 2 TIMES DAILY
Qty: 60 TABLET | Refills: 0 | Status: SHIPPED | OUTPATIENT
Start: 2025-04-19 | End: 2025-05-19

## 2025-04-24 NOTE — DISCHARGE INSTRUCTIONS
Visit Discharge/Physician Orders     Discharge condition: Stable     Assessment of pain at discharge: mild     Anesthetic used: lido 4%     Discharge to: Home     Left via:Private automobile     Accompanied by: self     ECF/HHA: [facility]      Dressing Orders:  LEFT PRETIB : Cleanse with normal saline, cover with calcium alginate, and ABD pad, dry dressing and secure, and Spandagrip. Change daily       Treatment Orders: Eat a diet high in protein and vitamin C. Take a multiple vitamin daily unless contraindicated.      Elevate as much as possible     11/20/24 Compression to be ordered. ([facility])      [facility] followup visit:  Dr. HATFIELD 1 wk____________________________  (Please note your next appointment above and if you are unable to keep, kindly give a 24 hour notice. Thank you.)     Physician signature:__________________________      If you experience any of the following, please call the Wound Care Center during business hours:     * Increase in Pain  * Temperature over 101  * Increase in drainage from your wound  * Drainage with a foul odor  * Bleeding  * Increase in swelling  * Need for compression bandage changes due to slippage, breakthrough drainage.     If you need medical attention outside of the business hours of the Wound Care Centers please contact your PCP or go to the nearest emergency room.

## 2025-04-30 ENCOUNTER — HOSPITAL ENCOUNTER (OUTPATIENT)
Dept: WOUND CARE | Age: 80
Discharge: HOME | End: 2025-04-30
Attending: SURGERY
Payer: MEDICARE

## 2025-04-30 VITALS
DIASTOLIC BLOOD PRESSURE: 70 MMHG | TEMPERATURE: 97.7 F | HEART RATE: 71 BPM | SYSTOLIC BLOOD PRESSURE: 144 MMHG | RESPIRATION RATE: 18 BRPM

## 2025-04-30 DIAGNOSIS — I87.2 VENOUS STASIS ULCER OF LEFT CALF WITH FAT LAYER EXPOSED WITHOUT VARICOSE VEINS (HCC): Primary | ICD-10-CM

## 2025-04-30 DIAGNOSIS — L97.222 VENOUS STASIS ULCER OF LEFT CALF WITH FAT LAYER EXPOSED WITHOUT VARICOSE VEINS (HCC): Primary | ICD-10-CM

## 2025-04-30 PROCEDURE — 11045 DBRDMT SUBQ TISS EACH ADDL: CPT | Performed by: SURGERY

## 2025-04-30 PROCEDURE — 11042 DBRDMT SUBQ TIS 1ST 20SQCM/<: CPT | Performed by: SURGERY

## 2025-04-30 PROCEDURE — 11045 DBRDMT SUBQ TISS EACH ADDL: CPT

## 2025-04-30 PROCEDURE — 11042 DBRDMT SUBQ TIS 1ST 20SQCM/<: CPT

## 2025-04-30 RX ORDER — TRIAMCINOLONE ACETONIDE 1 MG/G
OINTMENT TOPICAL ONCE
OUTPATIENT
Start: 2025-04-30 | End: 2025-04-30

## 2025-04-30 RX ORDER — NEOMYCIN/BACITRACIN/POLYMYXINB 3.5-400-5K
OINTMENT (GRAM) TOPICAL ONCE
OUTPATIENT
Start: 2025-04-30 | End: 2025-04-30

## 2025-04-30 RX ORDER — LIDOCAINE HYDROCHLORIDE 20 MG/ML
JELLY TOPICAL ONCE
OUTPATIENT
Start: 2025-04-30 | End: 2025-04-30

## 2025-04-30 RX ORDER — GENTAMICIN SULFATE 1 MG/G
OINTMENT TOPICAL ONCE
OUTPATIENT
Start: 2025-04-30 | End: 2025-04-30

## 2025-04-30 RX ORDER — CLOBETASOL PROPIONATE 0.5 MG/G
OINTMENT TOPICAL ONCE
OUTPATIENT
Start: 2025-04-30 | End: 2025-04-30

## 2025-04-30 RX ORDER — LIDOCAINE HYDROCHLORIDE 40 MG/ML
SOLUTION TOPICAL ONCE
OUTPATIENT
Start: 2025-04-30 | End: 2025-04-30

## 2025-04-30 RX ORDER — GINSENG 100 MG
CAPSULE ORAL ONCE
OUTPATIENT
Start: 2025-04-30 | End: 2025-04-30

## 2025-04-30 RX ORDER — BACITRACIN ZINC AND POLYMYXIN B SULFATE 500; 1000 [USP'U]/G; [USP'U]/G
OINTMENT TOPICAL ONCE
OUTPATIENT
Start: 2025-04-30 | End: 2025-04-30

## 2025-04-30 RX ORDER — LIDOCAINE HYDROCHLORIDE 40 MG/ML
SOLUTION TOPICAL ONCE
Status: COMPLETED | OUTPATIENT
Start: 2025-04-30 | End: 2025-04-30

## 2025-04-30 RX ORDER — MUPIROCIN 20 MG/G
OINTMENT TOPICAL ONCE
OUTPATIENT
Start: 2025-04-30 | End: 2025-04-30

## 2025-04-30 RX ORDER — SILVER SULFADIAZINE 10 MG/G
CREAM TOPICAL ONCE
OUTPATIENT
Start: 2025-04-30 | End: 2025-04-30

## 2025-04-30 RX ORDER — BETAMETHASONE DIPROPIONATE 0.05 %
OINTMENT (GRAM) TOPICAL ONCE
OUTPATIENT
Start: 2025-04-30 | End: 2025-04-30

## 2025-04-30 RX ORDER — LIDOCAINE 40 MG/G
CREAM TOPICAL ONCE
OUTPATIENT
Start: 2025-04-30 | End: 2025-04-30

## 2025-04-30 RX ORDER — LIDOCAINE 50 MG/G
OINTMENT TOPICAL ONCE
OUTPATIENT
Start: 2025-04-30 | End: 2025-04-30

## 2025-04-30 RX ADMIN — LIDOCAINE HYDROCHLORIDE: 40 SOLUTION TOPICAL at 11:00

## 2025-04-30 ASSESSMENT — PAIN DESCRIPTION - ONSET: ONSET: ON-GOING

## 2025-04-30 ASSESSMENT — PAIN DESCRIPTION - PAIN TYPE: TYPE: CHRONIC PAIN

## 2025-04-30 ASSESSMENT — PAIN DESCRIPTION - LOCATION: LOCATION: LEG

## 2025-04-30 ASSESSMENT — PAIN DESCRIPTION - FREQUENCY: FREQUENCY: CONTINUOUS

## 2025-04-30 ASSESSMENT — PAIN - FUNCTIONAL ASSESSMENT: PAIN_FUNCTIONAL_ASSESSMENT: PREVENTS OR INTERFERES SOME ACTIVE ACTIVITIES AND ADLS

## 2025-04-30 ASSESSMENT — PAIN DESCRIPTION - ORIENTATION: ORIENTATION: LEFT

## 2025-04-30 ASSESSMENT — PAIN DESCRIPTION - DESCRIPTORS: DESCRIPTORS: DISCOMFORT

## 2025-04-30 ASSESSMENT — PAIN SCALES - GENERAL: PAINLEVEL_OUTOF10: 4

## 2025-05-01 NOTE — PROGRESS NOTES
Wound Care Supplies      Supply Company:     [facility] [address] [facility] [address] [place] [id number]  p: [phone] f: [phone]     Please call patient for approval of any out of pocket expense prior to shipment of supplies.    Ordering Center:     [facility]  [address]  [facility] [address]  [phone]  WOUND CARE Dept: [phone]   FAX NUMBER [phone]    Patient Information:      Rock Pryor  [address] [facility] [address]   [phone]   : 1945  AGE: 80 y.o.     GENDER: female   EPISODE DATE: 2025    Insurance:      PRIMARY INSURANCE:  Plan: [employer] DUAL ADVANTAGE  Coverage: BCBS MEDICARE  Effective Date: 2025  Group Number: [unfilled]  Subscriber Number: [id number] - (Medicare Managed)    Payer/Plan Subscr  Sex Relation Sub. Ins. ID Effective Group Num   1. BCBS MEDICARE* ROCK PRYOR 1945 Female Self [id number]                                      2. [place] -* BIJU PRYOREMILY JO 1945 Female Self [id number]                                    [address]       Patient Wound Information:      Problem List Items Addressed This Visit          Musculoskeletal and Integument    Venous stasis ulcer of left calf with fat layer exposed without varicose veins (HCC) - Primary (Chronic)    Relevant Orders    Initiate Outpatient Wound Care Protocol       WOUNDS REQUIRING DRESSING SUPPLIES:     Wound 23 Leg Left;Lower #2 (Active)   Wound Image   25 1145   Wound Etiology Traumatic 23 1610   Dressing Status New dressing applied 25 1131   Wound Cleansed Cleansed with saline 25 1131   Dressing/Treatment Alginate;ABD;Dry dressing 25 1131   Wound Length (cm) 9.1 cm 25 1054   Wound Width (cm) 9.5 cm 25 1054   Wound Depth (cm) 0.3 cm 25 1054   Wound Surface Area (cm^2) 86.45 cm^2 25 1054   Change in Wound Size % (l*w) -1411.36 25 
2018    Venous stasis ulcer of left calf with fat layer exposed without varicose veins (HCC) 2021    Venous stasis ulcer of left calf with fat layer exposed without varicose veins (HCC) 2021    Longstanding ulcer over the shin of the left calf, with a new ulcer over the posterior aspect of the left calf, for the last 1 week    Venous ulcer with fat layer exposed (HCC) 10/28/2015     Past Surgical History:   Procedure Laterality Date    APPENDECTOMY      BREAST ENHANCEMENT SURGERY      BREAST REDUCTION SURGERY      CATARACT REMOVAL Right 2024    CHOLECYSTECTOMY      COLONOSCOPY      ECHO COMPL W DOP COLOR FLOW  2013         ENDOSCOPY, COLON, DIAGNOSTIC      HERNIA REPAIR N/A 2021    LAPAROSCOPIC ROBOTIC ASSISTED INGUINAL HERNIA REPAIR performed by Gregory Tao MD at [facility] OR    HERNIA REPAIR      HYSTERECTOMY (CERVIX STATUS UNKNOWN)      JOINT REPLACEMENT  2009    l knee r hip    LEG DEBRIDEMENT Left 2015    LEG DEBRIDEMENT Left 2016     History reviewed. No pertinent family history.  Social History     Tobacco Use    Smoking status: Former     Current packs/day: 0.00     Average packs/day: 1 pack/day for 20.0 years (20.0 ttl pk-yrs)     Types: Cigarettes     Start date: 1975     Quit date: 1995     Years since quittin.5    Smokeless tobacco: Never    Tobacco comments:     Quit   Vaping Use    Vaping status: Never Used   Substance Use Topics    Alcohol use: No    Drug use: No     Allergies   Allergen Reactions    Codeine Nausea And Vomiting and Other (See Comments)     hallucinate    Dilaudid [Hydromorphone Hcl] Rash    Naproxen Other (See Comments)     Shuts down kidney function    Singulair [Montelukast Sodium] Shortness Of Breath     Mucus in chest    Black Cohosh     Black Cohosh [Cimicifuga [place]emosa (Black Cohosh)] Rash     Current Outpatient Medications on File Prior to Encounter   Medication Sig Dispense Refill    Ascorbic Acid Buffered

## 2025-05-01 NOTE — DISCHARGE INSTRUCTIONS
Visit Discharge/Physician Orders     Discharge condition: Stable     Assessment of pain at discharge: Assessed      Anesthetic used: lido 4%     Discharge to: Home     Left via:Private automobile     Accompanied by: self     ECF/HHA: [facility]      Dressing Orders:  LEFT PRETIB : Cleanse with normal saline, cover with calcium alginate, and ABD pad, dry dressing and secure, and Spandagrip. Change daily       Treatment Orders: Eat a diet high in protein and vitamin C. Take a multiple vitamin daily unless contraindicated.      Elevate as much as possible     11/20/24 Compression to be ordered. ([place])      [facility] followup visit:  Dr. HATFIELD 1 wk____________________________  (Please note your next appointment above and if you are unable to keep, kindly give a 24 hour notice. Thank you.)     Physician signature:__________________________      If you experience any of the following, please call the Wound Care Center during business hours:     * Increase in Pain  * Temperature over 101  * Increase in drainage from your wound  * Drainage with a foul odor  * Bleeding  * Increase in swelling  * Need for compression bandage changes due to slippage, breakthrough drainage.     If you need medical attention outside of the business hours of the Wound Care Centers please contact your PCP or go to the nearest emergency room.

## 2025-05-07 ENCOUNTER — HOSPITAL ENCOUNTER (OUTPATIENT)
Dept: WOUND CARE | Age: 80
Discharge: HOME | End: 2025-05-07
Attending: SURGERY
Payer: MEDICARE

## 2025-05-07 VITALS
HEART RATE: 80 BPM | DIASTOLIC BLOOD PRESSURE: 68 MMHG | HEIGHT: 67 IN | SYSTOLIC BLOOD PRESSURE: 130 MMHG | RESPIRATION RATE: 18 BRPM | TEMPERATURE: 98.8 F | WEIGHT: 220 LBS | BODY MASS INDEX: 34.53 KG/M2

## 2025-05-07 DIAGNOSIS — L97.222 VENOUS STASIS ULCER OF LEFT CALF WITH FAT LAYER EXPOSED WITHOUT VARICOSE VEINS (HCC): Primary | ICD-10-CM

## 2025-05-07 DIAGNOSIS — I87.2 VENOUS STASIS ULCER OF LEFT CALF WITH FAT LAYER EXPOSED WITHOUT VARICOSE VEINS (HCC): Primary | ICD-10-CM

## 2025-05-07 PROCEDURE — 11042 DBRDMT SUBQ TIS 1ST 20SQCM/<: CPT

## 2025-05-07 PROCEDURE — 11045 DBRDMT SUBQ TISS EACH ADDL: CPT | Performed by: SURGERY

## 2025-05-07 PROCEDURE — 11042 DBRDMT SUBQ TIS 1ST 20SQCM/<: CPT | Performed by: SURGERY

## 2025-05-07 PROCEDURE — 11045 DBRDMT SUBQ TISS EACH ADDL: CPT

## 2025-05-07 RX ORDER — BETAMETHASONE DIPROPIONATE 0.05 %
OINTMENT (GRAM) TOPICAL ONCE
OUTPATIENT
Start: 2025-05-07 | End: 2025-05-07

## 2025-05-07 RX ORDER — SILVER SULFADIAZINE 10 MG/G
CREAM TOPICAL ONCE
OUTPATIENT
Start: 2025-05-07 | End: 2025-05-07

## 2025-05-07 RX ORDER — GENTAMICIN SULFATE 1 MG/G
OINTMENT TOPICAL ONCE
OUTPATIENT
Start: 2025-05-07 | End: 2025-05-07

## 2025-05-07 RX ORDER — GINSENG 100 MG
CAPSULE ORAL ONCE
OUTPATIENT
Start: 2025-05-07 | End: 2025-05-07

## 2025-05-07 RX ORDER — MUPIROCIN 20 MG/G
OINTMENT TOPICAL ONCE
OUTPATIENT
Start: 2025-05-07 | End: 2025-05-07

## 2025-05-07 RX ORDER — LIDOCAINE HYDROCHLORIDE 40 MG/ML
SOLUTION TOPICAL ONCE
OUTPATIENT
Start: 2025-05-07 | End: 2025-05-07

## 2025-05-07 RX ORDER — BACITRACIN ZINC AND POLYMYXIN B SULFATE 500; 1000 [USP'U]/G; [USP'U]/G
OINTMENT TOPICAL ONCE
OUTPATIENT
Start: 2025-05-07 | End: 2025-05-07

## 2025-05-07 RX ORDER — CLOBETASOL PROPIONATE 0.5 MG/G
OINTMENT TOPICAL ONCE
OUTPATIENT
Start: 2025-05-07 | End: 2025-05-07

## 2025-05-07 RX ORDER — NEOMYCIN/BACITRACIN/POLYMYXINB 3.5-400-5K
OINTMENT (GRAM) TOPICAL ONCE
OUTPATIENT
Start: 2025-05-07 | End: 2025-05-07

## 2025-05-07 RX ORDER — LIDOCAINE HYDROCHLORIDE 20 MG/ML
JELLY TOPICAL ONCE
OUTPATIENT
Start: 2025-05-07 | End: 2025-05-07

## 2025-05-07 RX ORDER — TRIAMCINOLONE ACETONIDE 1 MG/G
OINTMENT TOPICAL ONCE
OUTPATIENT
Start: 2025-05-07 | End: 2025-05-07

## 2025-05-07 RX ORDER — LIDOCAINE 50 MG/G
OINTMENT TOPICAL ONCE
OUTPATIENT
Start: 2025-05-07 | End: 2025-05-07

## 2025-05-07 RX ORDER — LIDOCAINE HYDROCHLORIDE 40 MG/ML
SOLUTION TOPICAL ONCE
Status: COMPLETED | OUTPATIENT
Start: 2025-05-07 | End: 2025-05-07

## 2025-05-07 RX ORDER — LIDOCAINE 40 MG/G
CREAM TOPICAL ONCE
OUTPATIENT
Start: 2025-05-07 | End: 2025-05-07

## 2025-05-07 RX ADMIN — LIDOCAINE HYDROCHLORIDE 10 ML: 40 SOLUTION TOPICAL at 11:06

## 2025-05-07 ASSESSMENT — PAIN DESCRIPTION - DESCRIPTORS: DESCRIPTORS: DISCOMFORT;BURNING

## 2025-05-07 ASSESSMENT — PAIN SCALES - GENERAL: PAINLEVEL_OUTOF10: 4

## 2025-05-07 ASSESSMENT — PAIN DESCRIPTION - PAIN TYPE: TYPE: CHRONIC PAIN

## 2025-05-07 ASSESSMENT — PAIN DESCRIPTION - LOCATION: LOCATION: LEG

## 2025-05-07 NOTE — PLAN OF CARE
Problem: Wound:  Goal: Will show signs of wound healing; wound closure and no evidence of infection  Description: Will show signs of wound healing; wound closure and no evidence of infection  Outcome: Progressing     Problem: Cognitive:  Goal: Knowledge of wound care  Description: Knowledge of wound care  Outcome: Progressing  Goal: Understands risk factors for wounds  Description: Understands risk factors for wounds  Outcome: Progressing

## 2025-05-07 NOTE — PROGRESS NOTES
Note  Indications:  Based on my examination of this patient's wound(s)/ulcer(s) today, debridement is required to promote healing and evaluate the wound base.    Performed by: Faith Dempsey MD    Consent obtained:  Yes    Time out taken:  Yes    Pain Control:       Debridement:Excisional Debridement    Using curette the wound(s)/ulcer(s) was/were sharply debrided down through and including the removal of epidermis, dermis, and subcutaneous tissue.        Devitalized Tissue Debrided:  fibrin, biofilm, slough, necrotic/eschar, and exudate to stimulate bleeding to promote healing, post debridement good bleeding base and wound edges noted    Wound/Ulcer #: 1    Percent of Wound/Ulcer Debrided: 60%    Total Surface Area Debrided:  40 sq cm     Estimated Blood Loss:  Minimal  Hemostasis Achieved:  by pressure    Procedural Pain:  10  / 10   Post Procedural Pain:  9 / 10     Response to treatment:  With complaints of pain.     Plan:   Treatment Note please see attached Discharge Instructions    Written patient dismissal instructions given to patient and signed by patient or POA.         Discharge Instructions         Visit Discharge/Physician Orders     Discharge condition: Stable     Assessment of pain at discharge: mild     Anesthetic used: lido 4%     Discharge to: Home     Left via:Private automobile     Accompanied by: self     ECF/HHA: [place]      Dressing Orders:  LEFT PRETIB : Cleanse with normal saline, cover with calcium alginate, and ABD pad, dry dressing and secure, and Spandagrip. Change daily       Treatment Orders: Eat a diet high in protein and vitamin C. Take a multiple vitamin daily unless contraindicated.      Elevate as much as possible     11/20/24 Compression to be ordered. (Vin)      [facility] followup visit:  Dr. HATFIELD 1 wk____________________________  (Please note your next appointment above and if you are unable to keep, kindly give a 24 hour notice. Thank you.)     Physician

## 2025-05-08 NOTE — DISCHARGE INSTRUCTIONS
Visit Discharge/Physician Orders     Discharge condition: Stable     Assessment of pain at discharge: Assessed      Anesthetic used: lido 4%     Discharge to: Home     Left via:Private automobile     Accompanied by: self     ECF/HHA: [place]      Dressing Orders:  LEFT PRETIB : Cleanse with normal saline, cover with calcium alginate, and ABD pad, dry dressing and secure, and Spandagrip. Change daily       Treatment Orders: Eat a diet high in protein and vitamin C. Take a multiple vitamin daily unless contraindicated.      Elevate as much as possible     C followup visit:  Dr. HATFIELD 1 wk____________________________  (Please note your next appointment above and if you are unable to keep, kindly give a 24 hour notice. Thank you.)     Physician signature:__________________________      If you experience any of the following, please call the Wound Care Center during business hours:     * Increase in Pain  * Temperature over 101  * Increase in drainage from your wound  * Drainage with a foul odor  * Bleeding  * Increase in swelling  * Need for compression bandage changes due to slippage, breakthrough drainage.     If you need medical attention outside of the business hours of the Wound Care Centers please contact your PCP or go to the nearest emergency room.

## 2025-05-14 ENCOUNTER — HOSPITAL ENCOUNTER (OUTPATIENT)
Dept: WOUND CARE | Age: 80
Discharge: HOME | End: 2025-05-14
Attending: SURGERY
Payer: MEDICARE

## 2025-05-14 VITALS
HEART RATE: 76 BPM | BODY MASS INDEX: 34.53 KG/M2 | SYSTOLIC BLOOD PRESSURE: 138 MMHG | WEIGHT: 220 LBS | HEIGHT: 67 IN | DIASTOLIC BLOOD PRESSURE: 76 MMHG | RESPIRATION RATE: 16 BRPM | TEMPERATURE: 98.7 F

## 2025-05-14 DIAGNOSIS — I87.2 VENOUS STASIS ULCER OF LEFT CALF WITH FAT LAYER EXPOSED WITHOUT VARICOSE VEINS (HCC): Primary | Chronic | ICD-10-CM

## 2025-05-14 DIAGNOSIS — L97.222 VENOUS STASIS ULCER OF LEFT CALF WITH FAT LAYER EXPOSED WITHOUT VARICOSE VEINS (HCC): Primary | Chronic | ICD-10-CM

## 2025-05-14 PROCEDURE — 11045 DBRDMT SUBQ TISS EACH ADDL: CPT | Performed by: SURGERY

## 2025-05-14 PROCEDURE — 11042 DBRDMT SUBQ TIS 1ST 20SQCM/<: CPT | Performed by: SURGERY

## 2025-05-14 PROCEDURE — 11045 DBRDMT SUBQ TISS EACH ADDL: CPT

## 2025-05-14 PROCEDURE — 11042 DBRDMT SUBQ TIS 1ST 20SQCM/<: CPT

## 2025-05-14 RX ORDER — GINSENG 100 MG
CAPSULE ORAL ONCE
OUTPATIENT
Start: 2025-05-14 | End: 2025-05-14

## 2025-05-14 RX ORDER — LIDOCAINE HYDROCHLORIDE 40 MG/ML
SOLUTION TOPICAL ONCE
OUTPATIENT
Start: 2025-05-14 | End: 2025-05-14

## 2025-05-14 RX ORDER — SILVER SULFADIAZINE 10 MG/G
CREAM TOPICAL ONCE
OUTPATIENT
Start: 2025-05-14 | End: 2025-05-14

## 2025-05-14 RX ORDER — BACITRACIN ZINC AND POLYMYXIN B SULFATE 500; 1000 [USP'U]/G; [USP'U]/G
OINTMENT TOPICAL ONCE
OUTPATIENT
Start: 2025-05-14 | End: 2025-05-14

## 2025-05-14 RX ORDER — CLOBETASOL PROPIONATE 0.5 MG/G
OINTMENT TOPICAL ONCE
OUTPATIENT
Start: 2025-05-14 | End: 2025-05-14

## 2025-05-14 RX ORDER — LIDOCAINE 50 MG/G
OINTMENT TOPICAL ONCE
OUTPATIENT
Start: 2025-05-14 | End: 2025-05-14

## 2025-05-14 RX ORDER — LIDOCAINE HYDROCHLORIDE 20 MG/ML
JELLY TOPICAL ONCE
OUTPATIENT
Start: 2025-05-14 | End: 2025-05-14

## 2025-05-14 RX ORDER — LIDOCAINE HYDROCHLORIDE 40 MG/ML
SOLUTION TOPICAL ONCE
Status: COMPLETED | OUTPATIENT
Start: 2025-05-14 | End: 2025-05-14

## 2025-05-14 RX ORDER — MUPIROCIN 20 MG/G
OINTMENT TOPICAL ONCE
OUTPATIENT
Start: 2025-05-14 | End: 2025-05-14

## 2025-05-14 RX ORDER — NEOMYCIN/BACITRACIN/POLYMYXINB 3.5-400-5K
OINTMENT (GRAM) TOPICAL ONCE
OUTPATIENT
Start: 2025-05-14 | End: 2025-05-14

## 2025-05-14 RX ORDER — BETAMETHASONE DIPROPIONATE 0.05 %
OINTMENT (GRAM) TOPICAL ONCE
OUTPATIENT
Start: 2025-05-14 | End: 2025-05-14

## 2025-05-14 RX ORDER — LIDOCAINE 40 MG/G
CREAM TOPICAL ONCE
OUTPATIENT
Start: 2025-05-14 | End: 2025-05-14

## 2025-05-14 RX ORDER — TRIAMCINOLONE ACETONIDE 1 MG/G
OINTMENT TOPICAL ONCE
OUTPATIENT
Start: 2025-05-14 | End: 2025-05-14

## 2025-05-14 RX ORDER — GENTAMICIN SULFATE 1 MG/G
OINTMENT TOPICAL ONCE
OUTPATIENT
Start: 2025-05-14 | End: 2025-05-14

## 2025-05-14 RX ADMIN — LIDOCAINE HYDROCHLORIDE 25 ML: 40 SOLUTION TOPICAL at 11:31

## 2025-05-14 ASSESSMENT — PAIN DESCRIPTION - LOCATION: LOCATION: LEG

## 2025-05-14 ASSESSMENT — PAIN DESCRIPTION - ORIENTATION: ORIENTATION: LEFT

## 2025-05-14 ASSESSMENT — PAIN SCALES - GENERAL: PAINLEVEL_OUTOF10: 4

## 2025-05-14 ASSESSMENT — PAIN DESCRIPTION - FREQUENCY: FREQUENCY: CONTINUOUS

## 2025-05-14 ASSESSMENT — PAIN DESCRIPTION - ONSET: ONSET: ON-GOING

## 2025-05-14 ASSESSMENT — PAIN DESCRIPTION - PAIN TYPE: TYPE: CHRONIC PAIN

## 2025-05-14 ASSESSMENT — PAIN - FUNCTIONAL ASSESSMENT: PAIN_FUNCTIONAL_ASSESSMENT: PREVENTS OR INTERFERES SOME ACTIVE ACTIVITIES AND ADLS

## 2025-05-14 ASSESSMENT — PAIN DESCRIPTION - DESCRIPTORS: DESCRIPTORS: DISCOMFORT;BURNING

## 2025-05-14 ASSESSMENT — PAIN DESCRIPTION - PROGRESSION: CLINICAL_PROGRESSION: NOT CHANGED

## 2025-05-15 RX ORDER — OXYCODONE AND ACETAMINOPHEN 7.5; 325 MG/1; MG/1
1 TABLET ORAL EVERY 8 HOURS PRN
Qty: 18 TABLET | Refills: 0 | Status: SHIPPED | OUTPATIENT
Start: 2025-05-20 | End: 2025-05-27

## 2025-05-15 RX ORDER — MORPHINE SULFATE 15 MG/1
15 TABLET, FILM COATED, EXTENDED RELEASE ORAL 2 TIMES DAILY
Qty: 60 TABLET | Refills: 0 | Status: SHIPPED | OUTPATIENT
Start: 2025-05-26 | End: 2025-06-25

## 2025-05-15 RX ORDER — OXYCODONE AND ACETAMINOPHEN 7.5; 325 MG/1; MG/1
1 TABLET ORAL EVERY 8 HOURS PRN
Qty: 90 TABLET | Refills: 0 | Status: SHIPPED | OUTPATIENT
Start: 2025-05-26 | End: 2025-06-25

## 2025-05-15 NOTE — DISCHARGE INSTRUCTIONS
Visit Discharge/Physician Orders     Discharge condition: Stable     Assessment of pain at discharge: Assessed      Anesthetic used: lido 4%     Discharge to: Home     Left via:Private automobile     Accompanied by: self     ECF/HHA: [place]      Dressing Orders:  LEFT PRETIB : Cleanse with normal saline, cover with calcium alginate, and ABD pad, dry dressing and secure, and Spandagrip. Change daily       Treatment Orders: Eat a diet high in protein and vitamin C. Take a multiple vitamin daily unless contraindicated.      Elevate as much as possible     C followup visit:  Dr. HATFIELD 1 wk____________________________  (Please note your next appointment above and if you are unable to keep, kindly give a 24 hour notice. Thank you.)     Physician signature:__________________________      If you experience any of the following, please call the Wound Care Center during business hours:     * Increase in Pain  * Temperature over 101  * Increase in drainage from your wound  * Drainage with a foul odor  * Bleeding  * Increase in swelling  * Need for compression bandage changes due to slippage, breakthrough drainage.     If you need medical attention outside of the business hours of the Wound Care Centers please contact your PCP or go to the nearest emergency room.

## 2025-05-15 NOTE — PROGRESS NOTES
Breath     Mucus in chest    Black Cohosh     Black Cohosh [Cimicifuga [place] (Black Cohosh)] Rash     Current Outpatient Medications on File Prior to Encounter   Medication Sig Dispense Refill    Ascorbic Acid Buffered (VITAMIN C EFFERVESCENT PO) Take by mouth      morphine (MS CONTIN) 15 MG extended release tablet Take 1 tablet by mouth 2 times daily for 30 days. Max Daily Amount: 30 mg 60 tablet 0    oxyCODONE-acetaminophen (PERCOCET) 7.5-325 MG per tablet Take 1 tablet by mouth every 8 hours as needed for Pain for up to 30 days. Max Daily Amount: 3 tablets 90 tablet 0    gabapentin (NEURONTIN) 300 MG capsule Take 1 capsule by mouth 3 times daily for 30 days. Intended supply: 30 days 90 capsule 0    zinc sulfate (ZINCATE) 220 (50 Zn)  mg capsule - elemental zinc Take 2 capsules by mouth at bedtime      Potassium 99 MG TABS Take 2 tablets by mouth at bedtime      losartan (COZAAR) 25 MG tablet Take 1 tablet by mouth daily      metoprolol succinate (TOPROL XL) 50 MG extended release tablet Take 1 tablet by mouth daily      aspirin 81 MG EC tablet Take 1 tablet by mouth daily      Garlic 10 MG CAPS Take by mouth      hydroCHLOROthiazide 12.5 MG capsule Take 1 capsule by mouth daily      Multiple Vitamins-Minerals (THERAPEUTIC MULTIVITAMIN-MINERALS) tablet Take 1 tablet by mouth daily      simvastatin (ZOCOR) 40 MG tablet Take 1 tablet by mouth nightly      vitamin D (CHOLECALCIFEROL) 50 MCG (2000 UT) TABS tablet Take 1 tablet by mouth daily      ferrous sulfate 325 (65 FE) MG tablet Take 1 tablet by mouth nightly      albuterol (PROVENTIL HFA;VENTOLIN HFA) 108 (90 BASE) MCG/ACT inhaler Inhale 2 puffs into the lungs every 6 hours as needed for Wheezing or Shortness of Breath      omeprazole (PRILOSEC) 40 MG delayed release capsule Take 1 capsule by mouth daily       No current facility-administered medications on file prior to encounter.       REVIEW OF SYSTEMS See HPI    Objective:    /76   Pulse 76

## 2025-05-21 ENCOUNTER — HOSPITAL ENCOUNTER (OUTPATIENT)
Dept: WOUND CARE | Age: 80
Discharge: HOME | End: 2025-05-21
Attending: SURGERY

## 2025-06-02 NOTE — DISCHARGE INSTRUCTIONS
Visit Discharge/Physician Orders     Discharge condition: Stable     Assessment of pain at discharge: Assessed      Anesthetic used: lido 4%     Discharge to: Home     Left via:Private automobile     Accompanied by: self     ECF/HHA: [place]      Dressing Orders:  LEFT PRETIB : Cleanse with normal saline, cover with calcium alginate, and ABD pad, dry dressing and secure, and Spandagrip. Change daily       Treatment Orders: Eat a diet high in protein and vitamin C. Take a multiple vitamin daily unless contraindicated.      Elevate as much as possible     C followup visit:  Dr. HATFIELD 1 wk____________________________  (Please note your next appointment above and if you are unable to keep, kindly give a 24 hour notice. Thank you.)     Physician signature:__________________________      If you experience any of the following, please call the Wound Care Center during business hours:     * Increase in Pain  * Temperature over 101  * Increase in drainage from your wound  * Drainage with a foul odor  * Bleeding  * Increase in swelling  * Need for compression bandage changes due to slippage, breakthrough drainage.     If you need medical attention outside of the business hours of the Wound Care Centers please contact your PCP or go to the nearest emergency room.

## 2025-06-04 ENCOUNTER — HOSPITAL ENCOUNTER (OUTPATIENT)
Dept: WOUND CARE | Age: 80
Discharge: HOME | End: 2025-06-04
Attending: SURGERY

## 2025-06-12 NOTE — DISCHARGE INSTRUCTIONS
Visit Discharge/Physician Orders     Discharge condition: Stable     Assessment of pain at discharge: Assessed      Anesthetic used: lido 4%     Discharge to: Home     Left via:Private automobile     Accompanied by: self     ECF/HHA: [facility]      Dressing Orders:  LEFT PRETIB : Cleanse with normal saline, cover with calcium alginate, and ABD pad, dry dressing and secure, and Spandagrip. Change daily       Treatment Orders: Eat a diet high in protein and vitamin C. Take a multiple vitamin daily unless contraindicated.      Elevate as much as possible     C followup visit:  Dr. HATFIELD 1 wk____________________________  (Please note your next appointment above and if you are unable to keep, kindly give a 24 hour notice. Thank you.)     Physician signature:__________________________      If you experience any of the following, please call the Wound Care Center during business hours:     * Increase in Pain  * Temperature over 101  * Increase in drainage from your wound  * Drainage with a foul odor  * Bleeding  * Increase in swelling  * Need for compression bandage changes due to slippage, breakthrough drainage.     If you need medical attention outside of the business hours of the Wound Care Centers please contact your PCP or go to the nearest emergency room.

## 2025-06-18 ENCOUNTER — HOSPITAL ENCOUNTER (OUTPATIENT)
Dept: WOUND CARE | Age: 80
Discharge: HOME | End: 2025-06-18
Attending: SURGERY
Payer: MEDICARE

## 2025-06-18 VITALS
DIASTOLIC BLOOD PRESSURE: 71 MMHG | SYSTOLIC BLOOD PRESSURE: 172 MMHG | RESPIRATION RATE: 18 BRPM | HEART RATE: 80 BPM | TEMPERATURE: 97.2 F

## 2025-06-18 DIAGNOSIS — L97.222 VENOUS STASIS ULCER OF LEFT CALF WITH FAT LAYER EXPOSED WITHOUT VARICOSE VEINS (HCC): Chronic | ICD-10-CM

## 2025-06-18 DIAGNOSIS — L97.222 VENOUS STASIS ULCER OF LEFT CALF WITH FAT LAYER EXPOSED WITHOUT VARICOSE VEINS (HCC): Primary | Chronic | ICD-10-CM

## 2025-06-18 DIAGNOSIS — I89.0 LYMPHEDEMA: ICD-10-CM

## 2025-06-18 DIAGNOSIS — I87.2 VENOUS STASIS ULCER OF LEFT CALF WITH FAT LAYER EXPOSED WITHOUT VARICOSE VEINS (HCC): Primary | Chronic | ICD-10-CM

## 2025-06-18 DIAGNOSIS — I87.2 VENOUS STASIS ULCER OF LEFT CALF WITH FAT LAYER EXPOSED WITHOUT VARICOSE VEINS (HCC): Chronic | ICD-10-CM

## 2025-06-18 PROBLEM — L98.492 SKIN ULCER OF ABDOMEN WITH FAT LAYER EXPOSED (HCC): Chronic | Status: RESOLVED | Noted: 2025-01-15 | Resolved: 2025-06-18

## 2025-06-18 PROCEDURE — 11042 DBRDMT SUBQ TIS 1ST 20SQCM/<: CPT | Performed by: SURGERY

## 2025-06-18 PROCEDURE — 11042 DBRDMT SUBQ TIS 1ST 20SQCM/<: CPT

## 2025-06-18 PROCEDURE — 11045 DBRDMT SUBQ TISS EACH ADDL: CPT

## 2025-06-18 RX ORDER — BETAMETHASONE DIPROPIONATE 0.05 %
OINTMENT (GRAM) TOPICAL ONCE
OUTPATIENT
Start: 2025-06-18 | End: 2025-06-18

## 2025-06-18 RX ORDER — CLOBETASOL PROPIONATE 0.5 MG/G
OINTMENT TOPICAL ONCE
OUTPATIENT
Start: 2025-06-18 | End: 2025-06-18

## 2025-06-18 RX ORDER — MORPHINE SULFATE 15 MG/1
15 TABLET, FILM COATED, EXTENDED RELEASE ORAL 2 TIMES DAILY
Qty: 60 TABLET | Refills: 0 | Status: SHIPPED | OUTPATIENT
Start: 2025-06-18 | End: 2025-07-18

## 2025-06-18 RX ORDER — LIDOCAINE 50 MG/G
OINTMENT TOPICAL ONCE
OUTPATIENT
Start: 2025-06-18 | End: 2025-06-18

## 2025-06-18 RX ORDER — MUPIROCIN 2 %
OINTMENT (GRAM) TOPICAL ONCE
OUTPATIENT
Start: 2025-06-18 | End: 2025-06-18

## 2025-06-18 RX ORDER — SILVER SULFADIAZINE 10 MG/G
CREAM TOPICAL ONCE
OUTPATIENT
Start: 2025-06-18 | End: 2025-06-18

## 2025-06-18 RX ORDER — LIDOCAINE HYDROCHLORIDE 20 MG/ML
JELLY TOPICAL ONCE
OUTPATIENT
Start: 2025-06-18 | End: 2025-06-18

## 2025-06-18 RX ORDER — GABAPENTIN 300 MG/1
300 CAPSULE ORAL 3 TIMES DAILY
Qty: 90 CAPSULE | Refills: 0 | Status: SHIPPED | OUTPATIENT
Start: 2025-06-18 | End: 2025-07-18

## 2025-06-18 RX ORDER — TRIAMCINOLONE ACETONIDE 1 MG/G
OINTMENT TOPICAL ONCE
OUTPATIENT
Start: 2025-06-18 | End: 2025-06-18

## 2025-06-18 RX ORDER — LIDOCAINE HYDROCHLORIDE 40 MG/ML
SOLUTION TOPICAL ONCE
OUTPATIENT
Start: 2025-06-18 | End: 2025-06-18

## 2025-06-18 RX ORDER — BACITRACIN ZINC AND POLYMYXIN B SULFATE 500; 1000 [USP'U]/G; [USP'U]/G
OINTMENT TOPICAL ONCE
OUTPATIENT
Start: 2025-06-18 | End: 2025-06-18

## 2025-06-18 RX ORDER — OXYCODONE AND ACETAMINOPHEN 7.5; 325 MG/1; MG/1
1 TABLET ORAL EVERY 8 HOURS PRN
Qty: 90 TABLET | Refills: 0 | Status: SHIPPED | OUTPATIENT
Start: 2025-06-18 | End: 2025-07-18

## 2025-06-18 RX ORDER — GINSENG 100 MG
CAPSULE ORAL ONCE
OUTPATIENT
Start: 2025-06-18 | End: 2025-06-18

## 2025-06-18 RX ORDER — GENTAMICIN SULFATE 1 MG/G
OINTMENT TOPICAL ONCE
OUTPATIENT
Start: 2025-06-18 | End: 2025-06-18

## 2025-06-18 RX ORDER — LIDOCAINE 40 MG/G
CREAM TOPICAL ONCE
OUTPATIENT
Start: 2025-06-18 | End: 2025-06-18

## 2025-06-18 RX ORDER — NEOMYCIN/BACITRACIN/POLYMYXINB 3.5-400-5K
OINTMENT (GRAM) TOPICAL ONCE
OUTPATIENT
Start: 2025-06-18 | End: 2025-06-18

## 2025-06-18 RX ORDER — LIDOCAINE HYDROCHLORIDE 40 MG/ML
SOLUTION TOPICAL ONCE
Status: COMPLETED | OUTPATIENT
Start: 2025-06-18 | End: 2025-06-18

## 2025-06-18 RX ADMIN — LIDOCAINE HYDROCHLORIDE 10 ML: 40 SOLUTION TOPICAL at 11:14

## 2025-06-18 ASSESSMENT — PAIN DESCRIPTION - PAIN TYPE: TYPE: CHRONIC PAIN

## 2025-06-18 ASSESSMENT — PAIN - FUNCTIONAL ASSESSMENT: PAIN_FUNCTIONAL_ASSESSMENT: PREVENTS OR INTERFERES SOME ACTIVE ACTIVITIES AND ADLS

## 2025-06-18 ASSESSMENT — PAIN DESCRIPTION - DESCRIPTORS: DESCRIPTORS: BURNING;DISCOMFORT

## 2025-06-18 ASSESSMENT — PAIN DESCRIPTION - FREQUENCY: FREQUENCY: CONTINUOUS

## 2025-06-18 ASSESSMENT — PAIN DESCRIPTION - LOCATION: LOCATION: LEG

## 2025-06-18 ASSESSMENT — PAIN SCALES - GENERAL: PAINLEVEL_OUTOF10: 5

## 2025-06-18 ASSESSMENT — PAIN DESCRIPTION - ORIENTATION: ORIENTATION: LEFT

## 2025-06-18 NOTE — PROGRESS NOTES
[facility] Followup Visit Note    Referring Physician : Tamie Santizo MD  Anastacia Morel  MEDICAL RECORD NUMBER:  [id number]  AGE: 80 y.o.   GENDER: female  : 1945  EPISODE DATE:  2025    Subjective:     Chief Complaint   Patient presents with    Wound Check     Left leg      HISTORY of PRESENT ILLNESS HPI   Anastacia Morel is a 80 y.o. female who presents today in regards to follow up evaluation and treatment of wound/ulcer.  That patient's past medical, family and social hx were reviewed and changes were made if present.    History of Wound Context:  Patient has had left calf wound since ~ 2021.  She has been putting a dressing on it.  The wound has not been improving.  She has a hx significant for venous stasis ulcerations of bilateral LE.  She first was seen by myself in regards to these issues 2015.  She eventually healed these wounds 2016.     I last saw her 2021 at which time I recommended lymphedema therapy.  She states she went and said it caused her to much pain and stopped going.  She has chronic issues with bilateral calf pain, swelling and edema.            She admits to not wearing her stockings because of the pain.    She has used knee high 20-30 mm hg stockings in the past.       She is still seeing pain management and is down to percocet 7/5/325 mg daily.       21  aquacell  Double tubigrip  Culture done  Emphasized importance of getting in to more significant compression in the future  12/15/21  Culture reviewed - light growth, no tx  Wound slightly improved  Still significant drainage  Plan on compression wrap next week  21  Stable wound  Drainage slightly better  Pt would like to wait till next week because of holidays to start wrap  22  Wound larger  Profore  22  Wound appearance better  Refusing wrap - it rolled down last week and she cut off after 3 days  22  Wound appearance better  Still refusing wrap   3/2/22  periwound

## 2025-06-25 ENCOUNTER — HOSPITAL ENCOUNTER (OUTPATIENT)
Dept: WOUND CARE | Age: 80
Discharge: HOME | End: 2025-06-25
Attending: SURGERY
Payer: MEDICARE

## 2025-06-25 VITALS
RESPIRATION RATE: 18 BRPM | HEIGHT: 67 IN | SYSTOLIC BLOOD PRESSURE: 160 MMHG | DIASTOLIC BLOOD PRESSURE: 83 MMHG | BODY MASS INDEX: 34.53 KG/M2 | WEIGHT: 220 LBS | TEMPERATURE: 97.8 F | HEART RATE: 81 BPM

## 2025-06-25 DIAGNOSIS — I87.2 VENOUS STASIS ULCER OF LEFT CALF WITH FAT LAYER EXPOSED WITHOUT VARICOSE VEINS (HCC): Primary | Chronic | ICD-10-CM

## 2025-06-25 DIAGNOSIS — I89.0 LYMPHEDEMA: ICD-10-CM

## 2025-06-25 DIAGNOSIS — L97.222 VENOUS STASIS ULCER OF LEFT CALF WITH FAT LAYER EXPOSED WITHOUT VARICOSE VEINS (HCC): Primary | Chronic | ICD-10-CM

## 2025-06-25 PROCEDURE — 11045 DBRDMT SUBQ TISS EACH ADDL: CPT | Performed by: SURGERY

## 2025-06-25 PROCEDURE — 11045 DBRDMT SUBQ TISS EACH ADDL: CPT

## 2025-06-25 PROCEDURE — 11042 DBRDMT SUBQ TIS 1ST 20SQCM/<: CPT

## 2025-06-25 PROCEDURE — 11042 DBRDMT SUBQ TIS 1ST 20SQCM/<: CPT | Performed by: SURGERY

## 2025-06-25 RX ORDER — LIDOCAINE HYDROCHLORIDE 20 MG/ML
JELLY TOPICAL ONCE
OUTPATIENT
Start: 2025-06-25 | End: 2025-06-25

## 2025-06-25 RX ORDER — NEOMYCIN/BACITRACIN/POLYMYXINB 3.5-400-5K
OINTMENT (GRAM) TOPICAL ONCE
OUTPATIENT
Start: 2025-06-25 | End: 2025-06-25

## 2025-06-25 RX ORDER — TRIAMCINOLONE ACETONIDE 1 MG/G
OINTMENT TOPICAL ONCE
OUTPATIENT
Start: 2025-06-25 | End: 2025-06-25

## 2025-06-25 RX ORDER — LIDOCAINE 50 MG/G
OINTMENT TOPICAL ONCE
OUTPATIENT
Start: 2025-06-25 | End: 2025-06-25

## 2025-06-25 RX ORDER — BACITRACIN ZINC AND POLYMYXIN B SULFATE 500; 1000 [USP'U]/G; [USP'U]/G
OINTMENT TOPICAL ONCE
OUTPATIENT
Start: 2025-06-25 | End: 2025-06-25

## 2025-06-25 RX ORDER — LIDOCAINE HYDROCHLORIDE 40 MG/ML
SOLUTION TOPICAL ONCE
Status: COMPLETED | OUTPATIENT
Start: 2025-06-25 | End: 2025-06-25

## 2025-06-25 RX ORDER — SILVER SULFADIAZINE 10 MG/G
CREAM TOPICAL ONCE
OUTPATIENT
Start: 2025-06-25 | End: 2025-06-25

## 2025-06-25 RX ORDER — GENTAMICIN SULFATE 1 MG/G
OINTMENT TOPICAL ONCE
OUTPATIENT
Start: 2025-06-25 | End: 2025-06-25

## 2025-06-25 RX ORDER — GINSENG 100 MG
CAPSULE ORAL ONCE
OUTPATIENT
Start: 2025-06-25 | End: 2025-06-25

## 2025-06-25 RX ORDER — CLOBETASOL PROPIONATE 0.5 MG/G
OINTMENT TOPICAL ONCE
OUTPATIENT
Start: 2025-06-25 | End: 2025-06-25

## 2025-06-25 RX ORDER — BETAMETHASONE DIPROPIONATE 0.05 %
OINTMENT (GRAM) TOPICAL ONCE
OUTPATIENT
Start: 2025-06-25 | End: 2025-06-25

## 2025-06-25 RX ORDER — MUPIROCIN 2 %
OINTMENT (GRAM) TOPICAL ONCE
OUTPATIENT
Start: 2025-06-25 | End: 2025-06-25

## 2025-06-25 RX ORDER — LIDOCAINE HYDROCHLORIDE 40 MG/ML
SOLUTION TOPICAL ONCE
OUTPATIENT
Start: 2025-06-25 | End: 2025-06-25

## 2025-06-25 RX ORDER — LIDOCAINE 40 MG/G
CREAM TOPICAL ONCE
OUTPATIENT
Start: 2025-06-25 | End: 2025-06-25

## 2025-06-25 RX ADMIN — LIDOCAINE HYDROCHLORIDE 15 ML: 40 SOLUTION TOPICAL at 10:41

## 2025-06-25 ASSESSMENT — PAIN DESCRIPTION - DESCRIPTORS: DESCRIPTORS: BURNING;DISCOMFORT

## 2025-06-25 ASSESSMENT — PAIN DESCRIPTION - PAIN TYPE: TYPE: CHRONIC PAIN

## 2025-06-25 ASSESSMENT — PAIN DESCRIPTION - ORIENTATION: ORIENTATION: LEFT

## 2025-06-25 ASSESSMENT — PAIN SCALES - GENERAL: PAINLEVEL_OUTOF10: 5

## 2025-06-25 ASSESSMENT — PAIN DESCRIPTION - FREQUENCY: FREQUENCY: INTERMITTENT

## 2025-06-25 ASSESSMENT — PAIN DESCRIPTION - LOCATION: LOCATION: LEG

## 2025-06-25 ASSESSMENT — PAIN - FUNCTIONAL ASSESSMENT: PAIN_FUNCTIONAL_ASSESSMENT: PREVENTS OR INTERFERES SOME ACTIVE ACTIVITIES AND ADLS

## 2025-06-25 ASSESSMENT — PAIN DESCRIPTION - ONSET: ONSET: ON-GOING

## 2025-06-25 NOTE — PROGRESS NOTES
2023    Emphysema     slight    GERD (gastroesophageal reflux disease)     Hiatal hernia     Hip pain     Hx of blood clots     Hyperlipidemia     Hypertension     Lymphedema of both lower extremities 2018    Skin ulcer of abdomen with fat layer exposed (HCC) 01/15/2025    Venous insufficiency of both lower extremities 2018    Venous stasis ulcer of left calf with fat layer exposed without varicose veins (HCC) 2021    Venous stasis ulcer of left calf with fat layer exposed without varicose veins (HCC) 2021    Longstanding ulcer over the shin of the left calf, with a new ulcer over the posterior aspect of the left calf, for the last 1 week    Venous ulcer with fat layer exposed (HCC) 10/28/2015     Past Surgical History:   Procedure Laterality Date    APPENDECTOMY      BREAST ENHANCEMENT SURGERY      BREAST REDUCTION SURGERY      CATARACT REMOVAL Right 2024    CHOLECYSTECTOMY      COLONOSCOPY      ECHO COMPL W DOP COLOR FLOW  2013         ENDOSCOPY, COLON, DIAGNOSTIC      HERNIA REPAIR N/A 2021    LAPAROSCOPIC ROBOTIC ASSISTED INGUINAL HERNIA REPAIR performed by Gregory Tao MD at [facility] OR    HERNIA REPAIR      HYSTERECTOMY (CERVIX STATUS UNKNOWN)      JOINT REPLACEMENT  2009    l knee r hip    LEG DEBRIDEMENT Left 2015    LEG DEBRIDEMENT Left 2016     History reviewed. No pertinent family history.  Social History     Tobacco Use    Smoking status: Former     Current packs/day: 0.00     Average packs/day: 1 pack/day for 20.0 years (20.0 ttl pk-yrs)     Types: Cigarettes     Start date: 1975     Quit date: 1995     Years since quittin.6    Smokeless tobacco: Never    Tobacco comments:     Quit   Vaping Use    Vaping status: Never Used   Substance Use Topics    Alcohol use: No    Drug use: No     Allergies   Allergen Reactions    Codeine Nausea And Vomiting and Other (See Comments)     hallucinate    Dilaudid [Hydromorphone Hcl] Rash

## 2025-06-25 NOTE — DISCHARGE INSTRUCTIONS
Visit Discharge/Physician Orders     Discharge condition: Stable     Assessment of pain at discharge: Assessed      Anesthetic used: lido 4%     Discharge to: Home     Left via:Private automobile     Accompanied by: self     ECF/HHA: [place]      Dressing Orders:  LEFT PRETIB : Cleanse with normal saline, cover with calcium alginate, and ABD pad, dry dressing and secure, and Spandagrip. Change daily       Treatment Orders: Eat a diet high in protein and vitamin C. Take a multiple vitamin daily unless contraindicated.  Elevate as much as possible     C followup visit:  Dr. HATFIELD 1 wk____________________________  (Please note your next appointment above and if you are unable to keep, kindly give a 24 hour notice. Thank you.)     Physician signature:__________________________      If you experience any of the following, please call the Wound Care Center during business hours:     * Increase in Pain  * Temperature over 101  * Increase in drainage from your wound  * Drainage with a foul odor  * Bleeding  * Increase in swelling  * Need for compression bandage changes due to slippage, breakthrough drainage.     If you need medical attention outside of the business hours of the Wound Care Centers please contact your PCP or go to the nearest emergency room.

## 2025-06-30 NOTE — DISCHARGE INSTRUCTIONS
Visit Discharge/Physician Orders     Discharge condition: Stable     Assessment of pain at discharge: Assessed      Anesthetic used: lido 4%     Discharge to: Home     Left via:Private automobile     Accompanied by: self     ECF/HHA: [place]      Dressing Orders:  LEFT PRETIB : Cleanse with normal saline, cover with calcium alginate, and ABD pad, dry dressing and secure, and Spandagrip. Change daily       Treatment Orders: Eat a diet high in protein and vitamin C. Take a multiple vitamin daily unless contraindicated.  Elevate as much as possible     C followup visit:  Dr. HATFIELD 1 wk____________________________  (Please note your next appointment above and if you are unable to keep, kindly give a 24 hour notice. Thank you.)     Physician signature:__________________________      If you experience any of the following, please call the Wound Care Center during business hours:     * Increase in Pain  * Temperature over 101  * Increase in drainage from your wound  * Drainage with a foul odor  * Bleeding  * Increase in swelling  * Need for compression bandage changes due to slippage, breakthrough drainage.     If you need medical attention outside of the business hours of the W[facility] Care Centers please contact your PCP or go to the nearest emergency room.

## 2025-07-02 ENCOUNTER — HOSPITAL ENCOUNTER (OUTPATIENT)
Dept: WOUND CARE | Age: 80
Discharge: HOME | End: 2025-07-02
Attending: SURGERY
Payer: MEDICARE

## 2025-07-02 VITALS
RESPIRATION RATE: 18 BRPM | HEIGHT: 67 IN | WEIGHT: 220 LBS | DIASTOLIC BLOOD PRESSURE: 57 MMHG | HEART RATE: 86 BPM | TEMPERATURE: 97.2 F | SYSTOLIC BLOOD PRESSURE: 110 MMHG | BODY MASS INDEX: 34.53 KG/M2

## 2025-07-02 DIAGNOSIS — L97.222 VENOUS STASIS ULCER OF LEFT CALF WITH FAT LAYER EXPOSED WITHOUT VARICOSE VEINS (HCC): Primary | Chronic | ICD-10-CM

## 2025-07-02 DIAGNOSIS — I87.2 VENOUS STASIS ULCER OF LEFT CALF WITH FAT LAYER EXPOSED WITHOUT VARICOSE VEINS (HCC): Primary | Chronic | ICD-10-CM

## 2025-07-02 DIAGNOSIS — I89.0 LYMPHEDEMA: ICD-10-CM

## 2025-07-02 PROCEDURE — 11045 DBRDMT SUBQ TISS EACH ADDL: CPT

## 2025-07-02 PROCEDURE — 11042 DBRDMT SUBQ TIS 1ST 20SQCM/<: CPT

## 2025-07-02 PROCEDURE — 11045 DBRDMT SUBQ TISS EACH ADDL: CPT | Performed by: SURGERY

## 2025-07-02 PROCEDURE — 11042 DBRDMT SUBQ TIS 1ST 20SQCM/<: CPT | Performed by: SURGERY

## 2025-07-02 RX ORDER — LIDOCAINE 40 MG/G
CREAM TOPICAL ONCE
OUTPATIENT
Start: 2025-07-02 | End: 2025-07-02

## 2025-07-02 RX ORDER — GINSENG 100 MG
CAPSULE ORAL ONCE
OUTPATIENT
Start: 2025-07-02 | End: 2025-07-02

## 2025-07-02 RX ORDER — BETAMETHASONE DIPROPIONATE 0.05 %
OINTMENT (GRAM) TOPICAL ONCE
OUTPATIENT
Start: 2025-07-02 | End: 2025-07-02

## 2025-07-02 RX ORDER — LIDOCAINE HYDROCHLORIDE 20 MG/ML
JELLY TOPICAL ONCE
OUTPATIENT
Start: 2025-07-02 | End: 2025-07-02

## 2025-07-02 RX ORDER — LIDOCAINE HYDROCHLORIDE 40 MG/ML
SOLUTION TOPICAL ONCE
OUTPATIENT
Start: 2025-07-02 | End: 2025-07-02

## 2025-07-02 RX ORDER — LIDOCAINE HYDROCHLORIDE 40 MG/ML
SOLUTION TOPICAL ONCE
Status: COMPLETED | OUTPATIENT
Start: 2025-07-02 | End: 2025-07-02

## 2025-07-02 RX ORDER — TRIAMCINOLONE ACETONIDE 1 MG/G
OINTMENT TOPICAL ONCE
OUTPATIENT
Start: 2025-07-02 | End: 2025-07-02

## 2025-07-02 RX ORDER — BACITRACIN ZINC AND POLYMYXIN B SULFATE 500; 1000 [USP'U]/G; [USP'U]/G
OINTMENT TOPICAL ONCE
OUTPATIENT
Start: 2025-07-02 | End: 2025-07-02

## 2025-07-02 RX ORDER — MUPIROCIN 2 %
OINTMENT (GRAM) TOPICAL ONCE
OUTPATIENT
Start: 2025-07-02 | End: 2025-07-02

## 2025-07-02 RX ORDER — LIDOCAINE 50 MG/G
OINTMENT TOPICAL ONCE
OUTPATIENT
Start: 2025-07-02 | End: 2025-07-02

## 2025-07-02 RX ORDER — NEOMYCIN/BACITRACIN/POLYMYXINB 3.5-400-5K
OINTMENT (GRAM) TOPICAL ONCE
OUTPATIENT
Start: 2025-07-02 | End: 2025-07-02

## 2025-07-02 RX ORDER — CLOBETASOL PROPIONATE 0.5 MG/G
OINTMENT TOPICAL ONCE
OUTPATIENT
Start: 2025-07-02 | End: 2025-07-02

## 2025-07-02 RX ORDER — SILVER SULFADIAZINE 10 MG/G
CREAM TOPICAL ONCE
OUTPATIENT
Start: 2025-07-02 | End: 2025-07-02

## 2025-07-02 RX ORDER — GENTAMICIN SULFATE 1 MG/G
OINTMENT TOPICAL ONCE
OUTPATIENT
Start: 2025-07-02 | End: 2025-07-02

## 2025-07-02 RX ADMIN — LIDOCAINE HYDROCHLORIDE 10 ML: 40 SOLUTION TOPICAL at 11:07

## 2025-07-02 ASSESSMENT — PAIN DESCRIPTION - LOCATION: LOCATION: LEG

## 2025-07-02 ASSESSMENT — PAIN DESCRIPTION - ONSET: ONSET: ON-GOING

## 2025-07-02 ASSESSMENT — PAIN DESCRIPTION - DESCRIPTORS: DESCRIPTORS: BURNING

## 2025-07-02 ASSESSMENT — PAIN DESCRIPTION - FREQUENCY: FREQUENCY: INTERMITTENT

## 2025-07-02 ASSESSMENT — PAIN DESCRIPTION - ORIENTATION: ORIENTATION: LEFT

## 2025-07-02 ASSESSMENT — PAIN SCALES - GENERAL: PAINLEVEL_OUTOF10: 6

## 2025-07-02 ASSESSMENT — PAIN - FUNCTIONAL ASSESSMENT: PAIN_FUNCTIONAL_ASSESSMENT: PREVENTS OR INTERFERES SOME ACTIVE ACTIVITIES AND ADLS

## 2025-07-02 ASSESSMENT — PAIN DESCRIPTION - PAIN TYPE: TYPE: CHRONIC PAIN

## 2025-07-09 ENCOUNTER — HOSPITAL ENCOUNTER (OUTPATIENT)
Dept: WOUND CARE | Age: 80
Discharge: HOME | End: 2025-07-09
Attending: SURGERY

## 2025-07-10 NOTE — DISCHARGE INSTRUCTIONS
Visit Discharge/Physician Orders     Discharge condition: Stable     Assessment of pain at discharge: Assessed      Anesthetic used: lido 4%     Discharge to: Home     Left via:Private automobile     Accompanied by: self     ECF/HHA: [place]      Dressing Orders:  LEFT PRETIB : Cleanse with normal saline, cover with calcium alginate, and ABD pad, dry dressing and secure, and Spandagrip. Change daily       Treatment Orders: 7/16 Casper given   Eat a diet high in protein and vitamin C. Take a multiple vitamin daily unless contraindicated.  Elevate as much as possible     [facility] followup visit:  Dr. HATFIELD 1 wk____________________________  (Please note your next appointment above and if you are unable to keep, kindly give a 24 hour notice. Thank you.)     Physician signature:__________________________      If you experience any of the following, please call the Wound Care Center during business hours:     * Increase in Pain  * Temperature over 101  * Increase in drainage from your wound  * Drainage with a foul odor  * Bleeding  * Increase in swelling  * Need for compression bandage changes due to slippage, breakthrough drainage.     If you need medical attention outside of the business hours of the Wound Care Centers please contact your PCP or go to the nearest emergency room.

## 2025-07-16 ENCOUNTER — HOSPITAL ENCOUNTER (OUTPATIENT)
Dept: WOUND CARE | Age: 80
Discharge: HOME | End: 2025-07-16
Attending: SURGERY
Payer: MEDICARE

## 2025-07-16 VITALS
HEART RATE: 84 BPM | HEIGHT: 67 IN | WEIGHT: 220 LBS | BODY MASS INDEX: 34.53 KG/M2 | SYSTOLIC BLOOD PRESSURE: 138 MMHG | DIASTOLIC BLOOD PRESSURE: 74 MMHG | TEMPERATURE: 97.7 F | RESPIRATION RATE: 18 BRPM

## 2025-07-16 DIAGNOSIS — I87.2 VENOUS STASIS ULCER OF LEFT CALF WITH FAT LAYER EXPOSED WITHOUT VARICOSE VEINS (HCC): Primary | Chronic | ICD-10-CM

## 2025-07-16 DIAGNOSIS — L97.222 VENOUS STASIS ULCER OF LEFT CALF WITH FAT LAYER EXPOSED WITHOUT VARICOSE VEINS (HCC): Primary | Chronic | ICD-10-CM

## 2025-07-16 PROCEDURE — 11042 DBRDMT SUBQ TIS 1ST 20SQCM/<: CPT

## 2025-07-16 RX ORDER — GINSENG 100 MG
CAPSULE ORAL ONCE
OUTPATIENT
Start: 2025-07-16 | End: 2025-07-16

## 2025-07-16 RX ORDER — BACITRACIN ZINC AND POLYMYXIN B SULFATE 500; 1000 [USP'U]/G; [USP'U]/G
OINTMENT TOPICAL ONCE
OUTPATIENT
Start: 2025-07-16 | End: 2025-07-16

## 2025-07-16 RX ORDER — LIDOCAINE HYDROCHLORIDE 20 MG/ML
JELLY TOPICAL ONCE
OUTPATIENT
Start: 2025-07-16 | End: 2025-07-16

## 2025-07-16 RX ORDER — LIDOCAINE 40 MG/G
CREAM TOPICAL ONCE
OUTPATIENT
Start: 2025-07-16 | End: 2025-07-16

## 2025-07-16 RX ORDER — SILVER SULFADIAZINE 10 MG/G
CREAM TOPICAL ONCE
OUTPATIENT
Start: 2025-07-16 | End: 2025-07-16

## 2025-07-16 RX ORDER — LIDOCAINE 50 MG/G
OINTMENT TOPICAL ONCE
OUTPATIENT
Start: 2025-07-16 | End: 2025-07-16

## 2025-07-16 RX ORDER — BETAMETHASONE DIPROPIONATE 0.05 %
OINTMENT (GRAM) TOPICAL ONCE
OUTPATIENT
Start: 2025-07-16 | End: 2025-07-16

## 2025-07-16 RX ORDER — LIDOCAINE HYDROCHLORIDE 40 MG/ML
SOLUTION TOPICAL ONCE
Status: COMPLETED | OUTPATIENT
Start: 2025-07-16 | End: 2025-07-16

## 2025-07-16 RX ORDER — NEOMYCIN/BACITRACIN/POLYMYXINB 3.5-400-5K
OINTMENT (GRAM) TOPICAL ONCE
OUTPATIENT
Start: 2025-07-16 | End: 2025-07-16

## 2025-07-16 RX ORDER — LIDOCAINE HYDROCHLORIDE 40 MG/ML
SOLUTION TOPICAL ONCE
OUTPATIENT
Start: 2025-07-16 | End: 2025-07-16

## 2025-07-16 RX ORDER — TRIAMCINOLONE ACETONIDE 1 MG/G
OINTMENT TOPICAL ONCE
OUTPATIENT
Start: 2025-07-16 | End: 2025-07-16

## 2025-07-16 RX ORDER — MUPIROCIN 2 %
OINTMENT (GRAM) TOPICAL ONCE
OUTPATIENT
Start: 2025-07-16 | End: 2025-07-16

## 2025-07-16 RX ORDER — GENTAMICIN SULFATE 1 MG/G
OINTMENT TOPICAL ONCE
OUTPATIENT
Start: 2025-07-16 | End: 2025-07-16

## 2025-07-16 RX ORDER — CLOBETASOL PROPIONATE 0.5 MG/G
OINTMENT TOPICAL ONCE
OUTPATIENT
Start: 2025-07-16 | End: 2025-07-16

## 2025-07-16 RX ADMIN — LIDOCAINE HYDROCHLORIDE 20 ML: 40 SOLUTION TOPICAL at 11:39

## 2025-07-16 ASSESSMENT — PAIN DESCRIPTION - PROGRESSION: CLINICAL_PROGRESSION: NOT CHANGED

## 2025-07-21 NOTE — DISCHARGE INSTRUCTIONS
Visit Discharge/Physician Orders     Discharge condition: Stable     Assessment of pain at discharge: Assessed      Anesthetic used: lido 4%     Discharge to: Home     Left via:Private automobile     Accompanied by: self     ECF/HHA: [place]      Dressing Orders:  LEFT PRETIB : Cleanse with normal saline, cover with calcium alginate, and ABD pad, dry dressing and secure, and Spandagrip. Change daily       Treatment Orders: Drink Casper 2x/day.   Eat a diet high in protein and vitamin C. Take a multiple vitamin daily unless contraindicated.  Elevate as much as possible     [facility] followup visit:  Dr. HATFIELD 2 wk____________________________  (Please note your next appointment above and if you are unable to keep, kindly give a 24 hour notice. Thank you.)     Physician signature:__________________________      If you experience any of the following, please call the Wound Care Center during business hours:     * Increase in Pain  * Temperature over 101  * Increase in drainage from your wound  * Drainage with a foul odor  * Bleeding  * Increase in swelling  * Need for compression bandage changes due to slippage, breakthrough drainage.     If you need medical attention outside of the business hours of the Wound Care Centers please contact your PCP or go to the nearest emergency room.

## 2025-07-23 ENCOUNTER — HOSPITAL ENCOUNTER (OUTPATIENT)
Dept: WOUND CARE | Age: 80
Discharge: HOME | End: 2025-07-23
Attending: SURGERY
Payer: MEDICARE

## 2025-07-23 VITALS
HEIGHT: 67 IN | HEART RATE: 75 BPM | BODY MASS INDEX: 34.53 KG/M2 | SYSTOLIC BLOOD PRESSURE: 158 MMHG | WEIGHT: 220 LBS | RESPIRATION RATE: 18 BRPM | DIASTOLIC BLOOD PRESSURE: 55 MMHG | TEMPERATURE: 96.7 F

## 2025-07-23 DIAGNOSIS — I87.2 VENOUS STASIS ULCER OF LEFT CALF WITH FAT LAYER EXPOSED WITHOUT VARICOSE VEINS (HCC): Primary | ICD-10-CM

## 2025-07-23 DIAGNOSIS — L97.222 VENOUS STASIS ULCER OF LEFT CALF WITH FAT LAYER EXPOSED WITHOUT VARICOSE VEINS (HCC): Primary | ICD-10-CM

## 2025-07-23 PROCEDURE — 11042 DBRDMT SUBQ TIS 1ST 20SQCM/<: CPT | Performed by: SURGERY

## 2025-07-23 PROCEDURE — 11042 DBRDMT SUBQ TIS 1ST 20SQCM/<: CPT

## 2025-07-23 RX ORDER — LIDOCAINE HYDROCHLORIDE 40 MG/ML
SOLUTION TOPICAL ONCE
OUTPATIENT
Start: 2025-07-23 | End: 2025-07-23

## 2025-07-23 RX ORDER — SILVER SULFADIAZINE 10 MG/G
CREAM TOPICAL ONCE
OUTPATIENT
Start: 2025-07-23 | End: 2025-07-23

## 2025-07-23 RX ORDER — LIDOCAINE 50 MG/G
OINTMENT TOPICAL ONCE
OUTPATIENT
Start: 2025-07-23 | End: 2025-07-23

## 2025-07-23 RX ORDER — BETAMETHASONE DIPROPIONATE 0.05 %
OINTMENT (GRAM) TOPICAL ONCE
OUTPATIENT
Start: 2025-07-23 | End: 2025-07-23

## 2025-07-23 RX ORDER — MORPHINE SULFATE 15 MG/1
15 TABLET, FILM COATED, EXTENDED RELEASE ORAL 2 TIMES DAILY
Qty: 60 TABLET | Refills: 0 | Status: SHIPPED | OUTPATIENT
Start: 2025-07-23 | End: 2025-08-22

## 2025-07-23 RX ORDER — CLOBETASOL PROPIONATE 0.5 MG/G
OINTMENT TOPICAL ONCE
OUTPATIENT
Start: 2025-07-23 | End: 2025-07-23

## 2025-07-23 RX ORDER — GABAPENTIN 300 MG/1
300 CAPSULE ORAL 3 TIMES DAILY
Qty: 90 CAPSULE | Refills: 0 | Status: SHIPPED | OUTPATIENT
Start: 2025-07-23 | End: 2025-08-22

## 2025-07-23 RX ORDER — GINSENG 100 MG
CAPSULE ORAL ONCE
OUTPATIENT
Start: 2025-07-23 | End: 2025-07-23

## 2025-07-23 RX ORDER — MUPIROCIN 2 %
OINTMENT (GRAM) TOPICAL ONCE
OUTPATIENT
Start: 2025-07-23 | End: 2025-07-23

## 2025-07-23 RX ORDER — NEOMYCIN/BACITRACIN/POLYMYXINB 3.5-400-5K
OINTMENT (GRAM) TOPICAL ONCE
OUTPATIENT
Start: 2025-07-23 | End: 2025-07-23

## 2025-07-23 RX ORDER — GENTAMICIN SULFATE 1 MG/G
OINTMENT TOPICAL ONCE
OUTPATIENT
Start: 2025-07-23 | End: 2025-07-23

## 2025-07-23 RX ORDER — LIDOCAINE HYDROCHLORIDE 40 MG/ML
SOLUTION TOPICAL ONCE
Status: COMPLETED | OUTPATIENT
Start: 2025-07-23 | End: 2025-07-23

## 2025-07-23 RX ORDER — OXYCODONE AND ACETAMINOPHEN 7.5; 325 MG/1; MG/1
1 TABLET ORAL EVERY 8 HOURS PRN
Qty: 90 TABLET | Refills: 0 | Status: SHIPPED | OUTPATIENT
Start: 2025-07-23 | End: 2025-08-22

## 2025-07-23 RX ORDER — BACITRACIN ZINC AND POLYMYXIN B SULFATE 500; 1000 [USP'U]/G; [USP'U]/G
OINTMENT TOPICAL ONCE
OUTPATIENT
Start: 2025-07-23 | End: 2025-07-23

## 2025-07-23 RX ORDER — LIDOCAINE HYDROCHLORIDE 20 MG/ML
JELLY TOPICAL ONCE
OUTPATIENT
Start: 2025-07-23 | End: 2025-07-23

## 2025-07-23 RX ORDER — LIDOCAINE 40 MG/G
CREAM TOPICAL ONCE
OUTPATIENT
Start: 2025-07-23 | End: 2025-07-23

## 2025-07-23 RX ORDER — TRIAMCINOLONE ACETONIDE 1 MG/G
OINTMENT TOPICAL ONCE
OUTPATIENT
Start: 2025-07-23 | End: 2025-07-23

## 2025-07-23 RX ADMIN — LIDOCAINE HYDROCHLORIDE: 40 SOLUTION TOPICAL at 11:14

## 2025-07-23 ASSESSMENT — PAIN SCALES - GENERAL: PAINLEVEL_OUTOF10: 7

## 2025-07-23 ASSESSMENT — PAIN DESCRIPTION - ORIENTATION: ORIENTATION: LEFT

## 2025-07-23 ASSESSMENT — PAIN DESCRIPTION - LOCATION: LOCATION: LEG

## 2025-07-23 ASSESSMENT — PAIN DESCRIPTION - DESCRIPTORS: DESCRIPTORS: BURNING;DISCOMFORT

## 2025-07-23 NOTE — PROGRESS NOTES
[facility]aling Center Followup Visit Note    Referring Physician : Kandy Parmar MD  Anastacia Morel  MEDICAL RECORD NUMBER:  [id number]  AGE: 80 y.o.   GENDER: female  : 1945  EPISODE DATE:  2025    Subjective:     Chief Complaint   Patient presents with    Wound Check      L leg      HISTORY of PRESENT ILLNESS HPI   Anastacia Morel is a 80 y.o. female who presents today in regards to follow up evaluation and treatment of wound/ulcer.  That patient's past medical, family and social hx were reviewed and changes were made if present.    History of Wound Context:  Patient has had left calf wound since ~ 2021.  She has been putting a dressing on it.  The wound has not been improving.  She has a hx significant for venous stasis ulcerations of bilateral LE.  She first was seen by myself in regards to these issues 2015.  She eventually healed these wounds 2016.     I last saw her 2021 at which time I recommended lymphedema therapy.  She states she went and said it caused her to much pain and stopped going.  She has chronic issues with bilateral calf pain, swelling and edema.            She admits to not wearing her stockings because of the pain.    She has used knee high 20-30 mm hg stockings in the past.       She is still seeing pain management and is down to percocet //325 mg daily.       21  aquacell  Double tubigrip  Culture done  Emphasized importance of getting in to more significant compression in the future  12/15/21  Culture reviewed - light growth, no tx  Wound slightly improved  Still significant drainage  Plan on compression wrap next week  21  Stable wound  Drainage slightly better  Pt would like to wait till next week because of holidays to start wrap  22  Wound larger  Profore  22  Wound appearance better  Refusing wrap - it rolled down last week and she cut off after 3 days  22  Wound appearance better  Still refusing wrap   3/2/22  periwound

## 2025-08-06 ENCOUNTER — HOSPITAL ENCOUNTER (OUTPATIENT)
Dept: WOUND CARE | Age: 80
Discharge: HOME | End: 2025-08-06
Attending: SURGERY

## 2025-08-09 ENCOUNTER — HOSPITAL ENCOUNTER (INPATIENT)
Age: 80
LOS: 13 days | Discharge: NURSING, REHAB OR LONG-TERM CARE | End: 2025-08-22
Attending: EMERGENCY MEDICINE | Admitting: FAMILY MEDICINE
Payer: MEDICARE

## 2025-08-09 ENCOUNTER — APPOINTMENT (OUTPATIENT)
Dept: CT IMAGING | Age: 80
End: 2025-08-09
Payer: MEDICARE

## 2025-08-09 ENCOUNTER — APPOINTMENT (OUTPATIENT)
Dept: GENERAL RADIOLOGY | Age: 80
End: 2025-08-09
Payer: MEDICARE

## 2025-08-09 DIAGNOSIS — A41.9 SEVERE SEPSIS (HCC): ICD-10-CM

## 2025-08-09 DIAGNOSIS — E87.20 LACTIC ACIDOSIS: ICD-10-CM

## 2025-08-09 DIAGNOSIS — N17.9 AKI (ACUTE KIDNEY INJURY): ICD-10-CM

## 2025-08-09 DIAGNOSIS — R65.20 SEVERE SEPSIS (HCC): ICD-10-CM

## 2025-08-09 DIAGNOSIS — M62.82 NON-TRAUMATIC RHABDOMYOLYSIS: Primary | ICD-10-CM

## 2025-08-09 DIAGNOSIS — L03.116 CELLULITIS OF LEFT LOWER EXTREMITY: ICD-10-CM

## 2025-08-09 DIAGNOSIS — G89.4 CHRONIC PAIN SYNDROME: ICD-10-CM

## 2025-08-09 DIAGNOSIS — I73.9 PVD (PERIPHERAL VASCULAR DISEASE): ICD-10-CM

## 2025-08-09 DIAGNOSIS — R55 SYNCOPE, UNSPECIFIED SYNCOPE TYPE: ICD-10-CM

## 2025-08-09 PROBLEM — T79.6XXA TRAUMATIC RHABDOMYOLYSIS: Status: ACTIVE | Noted: 2025-08-09

## 2025-08-09 LAB
ALBUMIN SERPL-MCNC: 3.3 G/DL (ref 3.5–5.2)
ALP SERPL-CCNC: 150 U/L (ref 35–104)
ALT SERPL-CCNC: 19 U/L (ref 0–35)
ANION GAP SERPL CALCULATED.3IONS-SCNC: 15 MMOL/L (ref 7–16)
AST SERPL-CCNC: 102 U/L (ref 0–35)
BACTERIA URNS QL MICRO: ABNORMAL
BASOPHILS # BLD: 0.04 K/UL (ref 0–0.2)
BASOPHILS NFR BLD: 0 % (ref 0–2)
BILIRUB SERPL-MCNC: 0.7 MG/DL (ref 0–1.2)
BILIRUB UR QL STRIP: NEGATIVE
BUN SERPL-MCNC: 88 MG/DL (ref 8–23)
CALCIUM SERPL-MCNC: 9.5 MG/DL (ref 8.8–10.2)
CHLORIDE SERPL-SCNC: 101 MMOL/L (ref 98–107)
CK SERPL-CCNC: 4347 U/L (ref 0–170)
CLARITY UR: CLEAR
CO2 SERPL-SCNC: 21 MMOL/L (ref 22–29)
COLOR UR: YELLOW
CREAT SERPL-MCNC: 1.4 MG/DL (ref 0.5–1)
EOSINOPHIL # BLD: 0.04 K/UL (ref 0.05–0.5)
EOSINOPHILS RELATIVE PERCENT: 0 % (ref 0–6)
EPI CELLS #/AREA URNS HPF: ABNORMAL /HPF
ERYTHROCYTE [DISTWIDTH] IN BLOOD BY AUTOMATED COUNT: 13.4 % (ref 11.5–15)
GFR, ESTIMATED: 37 ML/MIN/1.73M2
GLUCOSE SERPL-MCNC: 127 MG/DL (ref 74–99)
GLUCOSE UR STRIP-MCNC: NEGATIVE MG/DL
HCT VFR BLD AUTO: 40.1 % (ref 34–48)
HGB BLD-MCNC: 13 G/DL (ref 11.5–15.5)
HGB UR QL STRIP.AUTO: ABNORMAL
IMM GRANULOCYTES # BLD AUTO: <0.03 K/UL (ref 0–0.58)
IMM GRANULOCYTES NFR BLD: 0 % (ref 0–5)
KETONES UR STRIP-MCNC: NEGATIVE MG/DL
LACTATE BLDV-SCNC: 2.1 MMOL/L (ref 0.5–1.9)
LACTATE BLDV-SCNC: 3.8 MMOL/L (ref 0.5–1.9)
LACTATE BLDV-SCNC: 4.3 MMOL/L (ref 0.5–2.2)
LEUKOCYTE ESTERASE UR QL STRIP: NEGATIVE
LYMPHOCYTES NFR BLD: 0.31 K/UL (ref 1.5–4)
LYMPHOCYTES RELATIVE PERCENT: 3 % (ref 20–42)
MCH RBC QN AUTO: 31 PG (ref 26–35)
MCHC RBC AUTO-ENTMCNC: 32.4 G/DL (ref 32–34.5)
MCV RBC AUTO: 95.7 FL (ref 80–99.9)
MONOCYTES NFR BLD: 0.36 K/UL (ref 0.1–0.95)
MONOCYTES NFR BLD: 4 % (ref 2–12)
NEUTROPHILS NFR BLD: 92 % (ref 43–80)
NEUTS SEG NFR BLD: 9.12 K/UL (ref 1.8–7.3)
NITRITE UR QL STRIP: NEGATIVE
PH UR STRIP: 6 [PH] (ref 5–8)
PLATELET, FLUORESCENCE: 130 K/UL (ref 130–450)
PMV BLD AUTO: 10.4 FL (ref 7–12)
POTASSIUM SERPL-SCNC: 4.4 MMOL/L (ref 3.5–5.1)
PROCALCITONIN SERPL-MCNC: 36.8 NG/ML (ref 0–0.08)
PROT SERPL-MCNC: 7.6 G/DL (ref 6.4–8.3)
PROT UR STRIP-MCNC: NEGATIVE MG/DL
RBC # BLD AUTO: 4.19 M/UL (ref 3.5–5.5)
RBC # BLD: NORMAL 10*6/UL
RBC #/AREA URNS HPF: ABNORMAL /HPF
SODIUM SERPL-SCNC: 137 MMOL/L (ref 136–145)
SP GR UR STRIP: <1.005 (ref 1–1.03)
TROPONIN I SERPL HS-MCNC: 58 NG/L (ref 0–14)
UROBILINOGEN UR STRIP-ACNC: 0.2 EU/DL (ref 0–1)
WBC #/AREA URNS HPF: ABNORMAL /HPF
WBC OTHER # BLD: 9.9 K/UL (ref 4.5–11.5)

## 2025-08-09 PROCEDURE — 71045 X-RAY EXAM CHEST 1 VIEW: CPT

## 2025-08-09 PROCEDURE — 87086 URINE CULTURE/COLONY COUNT: CPT

## 2025-08-09 PROCEDURE — 96375 TX/PRO/DX INJ NEW DRUG ADDON: CPT

## 2025-08-09 PROCEDURE — 72125 CT NECK SPINE W/O DYE: CPT

## 2025-08-09 PROCEDURE — 2580000003 HC RX 258: Performed by: EMERGENCY MEDICINE

## 2025-08-09 PROCEDURE — 87150 DNA/RNA AMPLIFIED PROBE: CPT

## 2025-08-09 PROCEDURE — 85025 COMPLETE CBC W/AUTO DIFF WBC: CPT

## 2025-08-09 PROCEDURE — 96365 THER/PROPH/DIAG IV INF INIT: CPT

## 2025-08-09 PROCEDURE — 99222 1ST HOSP IP/OBS MODERATE 55: CPT | Performed by: FAMILY MEDICINE

## 2025-08-09 PROCEDURE — 96366 THER/PROPH/DIAG IV INF ADDON: CPT

## 2025-08-09 PROCEDURE — 84145 PROCALCITONIN (PCT): CPT

## 2025-08-09 PROCEDURE — 99285 EMERGENCY DEPT VISIT HI MDM: CPT

## 2025-08-09 PROCEDURE — 6370000000 HC RX 637 (ALT 250 FOR IP): Performed by: FAMILY MEDICINE

## 2025-08-09 PROCEDURE — 93005 ELECTROCARDIOGRAM TRACING: CPT

## 2025-08-09 PROCEDURE — 71275 CT ANGIOGRAPHY CHEST: CPT

## 2025-08-09 PROCEDURE — 2580000003 HC RX 258: Performed by: FAMILY MEDICINE

## 2025-08-09 PROCEDURE — 6360000002 HC RX W HCPCS: Performed by: FAMILY MEDICINE

## 2025-08-09 PROCEDURE — 2500000003 HC RX 250 WO HCPCS: Performed by: FAMILY MEDICINE

## 2025-08-09 PROCEDURE — 1200000000 HC SEMI PRIVATE

## 2025-08-09 PROCEDURE — 6360000002 HC RX W HCPCS

## 2025-08-09 PROCEDURE — 84484 ASSAY OF TROPONIN QUANT: CPT

## 2025-08-09 PROCEDURE — 83605 ASSAY OF LACTIC ACID: CPT

## 2025-08-09 PROCEDURE — 2580000003 HC RX 258

## 2025-08-09 PROCEDURE — 82550 ASSAY OF CK (CPK): CPT

## 2025-08-09 PROCEDURE — 80053 COMPREHEN METABOLIC PANEL: CPT

## 2025-08-09 PROCEDURE — 6360000004 HC RX CONTRAST MEDICATION: Performed by: RADIOLOGY

## 2025-08-09 PROCEDURE — 6360000002 HC RX W HCPCS: Performed by: EMERGENCY MEDICINE

## 2025-08-09 PROCEDURE — 36415 COLL VENOUS BLD VENIPUNCTURE: CPT

## 2025-08-09 PROCEDURE — 87040 BLOOD CULTURE FOR BACTERIA: CPT

## 2025-08-09 PROCEDURE — 86403 PARTICLE AGGLUT ANTBDY SCRN: CPT

## 2025-08-09 PROCEDURE — 81001 URINALYSIS AUTO W/SCOPE: CPT

## 2025-08-09 PROCEDURE — 70450 CT HEAD/BRAIN W/O DYE: CPT

## 2025-08-09 PROCEDURE — 74177 CT ABD & PELVIS W/CONTRAST: CPT

## 2025-08-09 RX ORDER — POLYETHYLENE GLYCOL 3350 17 G/17G
17 POWDER, FOR SOLUTION ORAL DAILY PRN
Status: DISCONTINUED | OUTPATIENT
Start: 2025-08-09 | End: 2025-08-22 | Stop reason: HOSPADM

## 2025-08-09 RX ORDER — IOPAMIDOL 755 MG/ML
75 INJECTION, SOLUTION INTRAVASCULAR
Status: COMPLETED | OUTPATIENT
Start: 2025-08-09 | End: 2025-08-09

## 2025-08-09 RX ORDER — ONDANSETRON 2 MG/ML
4 INJECTION INTRAMUSCULAR; INTRAVENOUS ONCE
Status: COMPLETED | OUTPATIENT
Start: 2025-08-09 | End: 2025-08-09

## 2025-08-09 RX ORDER — ONDANSETRON 4 MG/1
4 TABLET, ORALLY DISINTEGRATING ORAL EVERY 8 HOURS PRN
Status: DISCONTINUED | OUTPATIENT
Start: 2025-08-09 | End: 2025-08-22 | Stop reason: HOSPADM

## 2025-08-09 RX ORDER — HYDROCHLOROTHIAZIDE 12.5 MG/1
12.5 TABLET ORAL DAILY
Status: DISCONTINUED | OUTPATIENT
Start: 2025-08-10 | End: 2025-08-22 | Stop reason: HOSPADM

## 2025-08-09 RX ORDER — POTASSIUM CHLORIDE 1500 MG/1
40 TABLET, EXTENDED RELEASE ORAL PRN
Status: DISCONTINUED | OUTPATIENT
Start: 2025-08-09 | End: 2025-08-22 | Stop reason: HOSPADM

## 2025-08-09 RX ORDER — ACETAMINOPHEN 650 MG/1
650 SUPPOSITORY RECTAL EVERY 6 HOURS PRN
Status: DISCONTINUED | OUTPATIENT
Start: 2025-08-09 | End: 2025-08-22 | Stop reason: HOSPADM

## 2025-08-09 RX ORDER — MIDAZOLAM HYDROCHLORIDE 2 MG/2ML
1 INJECTION, SOLUTION INTRAMUSCULAR; INTRAVENOUS ONCE
Status: COMPLETED | OUTPATIENT
Start: 2025-08-09 | End: 2025-08-09

## 2025-08-09 RX ORDER — ASPIRIN 81 MG/1
81 TABLET ORAL DAILY
Status: DISCONTINUED | OUTPATIENT
Start: 2025-08-10 | End: 2025-08-22 | Stop reason: HOSPADM

## 2025-08-09 RX ORDER — OXYCODONE HYDROCHLORIDE 5 MG/1
5 TABLET ORAL EVERY 6 HOURS PRN
Refills: 0 | Status: DISCONTINUED | OUTPATIENT
Start: 2025-08-09 | End: 2025-08-12

## 2025-08-09 RX ORDER — ONDANSETRON 2 MG/ML
4 INJECTION INTRAMUSCULAR; INTRAVENOUS EVERY 6 HOURS PRN
Status: DISCONTINUED | OUTPATIENT
Start: 2025-08-09 | End: 2025-08-22 | Stop reason: HOSPADM

## 2025-08-09 RX ORDER — SODIUM CHLORIDE 0.9 % (FLUSH) 0.9 %
5-40 SYRINGE (ML) INJECTION PRN
Status: DISCONTINUED | OUTPATIENT
Start: 2025-08-09 | End: 2025-08-22 | Stop reason: HOSPADM

## 2025-08-09 RX ORDER — OXYBUTYNIN CHLORIDE 5 MG/1
5 TABLET, EXTENDED RELEASE ORAL DAILY
COMMUNITY

## 2025-08-09 RX ORDER — SODIUM CHLORIDE 9 MG/ML
INJECTION, SOLUTION INTRAVENOUS CONTINUOUS
Status: DISCONTINUED | OUTPATIENT
Start: 2025-08-09 | End: 2025-08-14

## 2025-08-09 RX ORDER — OXYBUTYNIN CHLORIDE 5 MG/1
5 TABLET, EXTENDED RELEASE ORAL DAILY
Status: DISCONTINUED | OUTPATIENT
Start: 2025-08-10 | End: 2025-08-22 | Stop reason: HOSPADM

## 2025-08-09 RX ORDER — SODIUM CHLORIDE 0.9 % (FLUSH) 0.9 %
5-40 SYRINGE (ML) INJECTION EVERY 12 HOURS SCHEDULED
Status: DISCONTINUED | OUTPATIENT
Start: 2025-08-09 | End: 2025-08-22 | Stop reason: HOSPADM

## 2025-08-09 RX ORDER — ATORVASTATIN CALCIUM 20 MG/1
10 TABLET, FILM COATED ORAL DAILY
Status: DISCONTINUED | OUTPATIENT
Start: 2025-08-10 | End: 2025-08-22 | Stop reason: HOSPADM

## 2025-08-09 RX ORDER — LOSARTAN POTASSIUM 25 MG/1
25 TABLET ORAL DAILY
Status: DISCONTINUED | OUTPATIENT
Start: 2025-08-10 | End: 2025-08-22 | Stop reason: HOSPADM

## 2025-08-09 RX ORDER — ALBUTEROL SULFATE 0.83 MG/ML
2.5 SOLUTION RESPIRATORY (INHALATION) EVERY 6 HOURS PRN
Status: DISCONTINUED | OUTPATIENT
Start: 2025-08-09 | End: 2025-08-22 | Stop reason: HOSPADM

## 2025-08-09 RX ORDER — 0.9 % SODIUM CHLORIDE 0.9 %
1000 INTRAVENOUS SOLUTION INTRAVENOUS ONCE
Status: COMPLETED | OUTPATIENT
Start: 2025-08-09 | End: 2025-08-09

## 2025-08-09 RX ORDER — MAGNESIUM SULFATE IN WATER 40 MG/ML
2000 INJECTION, SOLUTION INTRAVENOUS PRN
Status: DISCONTINUED | OUTPATIENT
Start: 2025-08-09 | End: 2025-08-22 | Stop reason: HOSPADM

## 2025-08-09 RX ORDER — POTASSIUM CHLORIDE 7.45 MG/ML
10 INJECTION INTRAVENOUS PRN
Status: DISCONTINUED | OUTPATIENT
Start: 2025-08-09 | End: 2025-08-22 | Stop reason: HOSPADM

## 2025-08-09 RX ORDER — PANTOPRAZOLE SODIUM 40 MG/1
40 TABLET, DELAYED RELEASE ORAL
Status: DISCONTINUED | OUTPATIENT
Start: 2025-08-10 | End: 2025-08-22 | Stop reason: HOSPADM

## 2025-08-09 RX ORDER — SODIUM CHLORIDE 9 MG/ML
INJECTION, SOLUTION INTRAVENOUS PRN
Status: DISCONTINUED | OUTPATIENT
Start: 2025-08-09 | End: 2025-08-22 | Stop reason: HOSPADM

## 2025-08-09 RX ORDER — MORPHINE SULFATE 4 MG/ML
4 INJECTION, SOLUTION INTRAMUSCULAR; INTRAVENOUS
Refills: 0 | Status: COMPLETED | OUTPATIENT
Start: 2025-08-09 | End: 2025-08-09

## 2025-08-09 RX ORDER — ACETAMINOPHEN 325 MG/1
650 TABLET ORAL EVERY 6 HOURS PRN
Status: DISCONTINUED | OUTPATIENT
Start: 2025-08-09 | End: 2025-08-22 | Stop reason: HOSPADM

## 2025-08-09 RX ORDER — ENOXAPARIN SODIUM 100 MG/ML
40 INJECTION SUBCUTANEOUS DAILY
Status: DISCONTINUED | OUTPATIENT
Start: 2025-08-09 | End: 2025-08-11

## 2025-08-09 RX ORDER — METOPROLOL SUCCINATE 50 MG/1
50 TABLET, EXTENDED RELEASE ORAL DAILY
Status: DISCONTINUED | OUTPATIENT
Start: 2025-08-10 | End: 2025-08-21

## 2025-08-09 RX ORDER — DROPERIDOL 2.5 MG/ML
1.25 INJECTION, SOLUTION INTRAMUSCULAR; INTRAVENOUS ONCE
Status: COMPLETED | OUTPATIENT
Start: 2025-08-09 | End: 2025-08-09

## 2025-08-09 RX ORDER — GABAPENTIN 300 MG/1
300 CAPSULE ORAL 3 TIMES DAILY
Status: DISCONTINUED | OUTPATIENT
Start: 2025-08-09 | End: 2025-08-22 | Stop reason: HOSPADM

## 2025-08-09 RX ADMIN — CEFEPIME 2000 MG: 2 INJECTION, POWDER, FOR SOLUTION INTRAVENOUS at 15:11

## 2025-08-09 RX ADMIN — SODIUM CHLORIDE 1000 ML: 9 INJECTION, SOLUTION INTRAVENOUS at 11:41

## 2025-08-09 RX ADMIN — SODIUM CHLORIDE, PRESERVATIVE FREE 10 ML: 5 INJECTION INTRAVENOUS at 22:33

## 2025-08-09 RX ADMIN — GABAPENTIN 300 MG: 300 CAPSULE ORAL at 22:39

## 2025-08-09 RX ADMIN — MIDAZOLAM HYDROCHLORIDE 1 MG: 1 INJECTION, SOLUTION INTRAMUSCULAR; INTRAVENOUS at 15:17

## 2025-08-09 RX ADMIN — ENOXAPARIN SODIUM 40 MG: 100 INJECTION SUBCUTANEOUS at 22:42

## 2025-08-09 RX ADMIN — IOPAMIDOL 75 ML: 755 INJECTION, SOLUTION INTRAVENOUS at 16:36

## 2025-08-09 RX ADMIN — ONDANSETRON 4 MG: 2 INJECTION, SOLUTION INTRAMUSCULAR; INTRAVENOUS at 11:41

## 2025-08-09 RX ADMIN — MORPHINE SULFATE 4 MG: 4 INJECTION, SOLUTION INTRAMUSCULAR; INTRAVENOUS at 11:41

## 2025-08-09 RX ADMIN — SODIUM CHLORIDE: 0.9 INJECTION, SOLUTION INTRAVENOUS at 22:37

## 2025-08-09 RX ADMIN — SODIUM CHLORIDE 1000 ML: 9 INJECTION, SOLUTION INTRAVENOUS at 15:22

## 2025-08-09 RX ADMIN — DROPERIDOL 1.25 MG: 2.5 INJECTION, SOLUTION INTRAMUSCULAR; INTRAVENOUS at 15:19

## 2025-08-09 ASSESSMENT — PAIN SCALES - GENERAL
PAINLEVEL_OUTOF10: 10

## 2025-08-09 ASSESSMENT — LIFESTYLE VARIABLES
HOW MANY STANDARD DRINKS CONTAINING ALCOHOL DO YOU HAVE ON A TYPICAL DAY: PATIENT DOES NOT DRINK
HOW OFTEN DO YOU HAVE A DRINK CONTAINING ALCOHOL: NEVER

## 2025-08-09 ASSESSMENT — PAIN - FUNCTIONAL ASSESSMENT: PAIN_FUNCTIONAL_ASSESSMENT: 0-10

## 2025-08-09 ASSESSMENT — PAIN DESCRIPTION - LOCATION: LOCATION: LEG

## 2025-08-10 ENCOUNTER — APPOINTMENT (OUTPATIENT)
Dept: CT IMAGING | Age: 80
End: 2025-08-10
Attending: INTERNAL MEDICINE
Payer: MEDICARE

## 2025-08-10 PROBLEM — M62.82 NON-TRAUMATIC RHABDOMYOLYSIS: Status: ACTIVE | Noted: 2025-08-10

## 2025-08-10 LAB
ANION GAP SERPL CALCULATED.3IONS-SCNC: 12 MMOL/L (ref 7–16)
BASOPHILS # BLD: 0.04 K/UL (ref 0–0.2)
BASOPHILS NFR BLD: 0 % (ref 0–2)
BUN SERPL-MCNC: 51 MG/DL (ref 8–23)
CALCIUM SERPL-MCNC: 8.6 MG/DL (ref 8.8–10.2)
CHLORIDE SERPL-SCNC: 106 MMOL/L (ref 98–107)
CK SERPL-CCNC: 1915 U/L (ref 0–170)
CO2 SERPL-SCNC: 26 MMOL/L (ref 22–29)
CREAT SERPL-MCNC: 0.9 MG/DL (ref 0.5–1)
EOSINOPHIL # BLD: 0 K/UL (ref 0.05–0.5)
EOSINOPHILS RELATIVE PERCENT: 0 % (ref 0–6)
ERYTHROCYTE [DISTWIDTH] IN BLOOD BY AUTOMATED COUNT: 13.7 % (ref 11.5–15)
GFR, ESTIMATED: 67 ML/MIN/1.73M2
GLUCOSE SERPL-MCNC: 97 MG/DL (ref 74–99)
HCT VFR BLD AUTO: 33.4 % (ref 34–48)
HGB BLD-MCNC: 10.8 G/DL (ref 11.5–15.5)
IMM GRANULOCYTES # BLD AUTO: 0.09 K/UL (ref 0–0.58)
IMM GRANULOCYTES NFR BLD: 1 % (ref 0–5)
LACTATE BLDV-SCNC: 3.2 MMOL/L (ref 0.5–2.2)
LYMPHOCYTES NFR BLD: 0.55 K/UL (ref 1.5–4)
LYMPHOCYTES RELATIVE PERCENT: 5 % (ref 20–42)
MAGNESIUM SERPL-MCNC: 1.8 MG/DL (ref 1.6–2.4)
MCH RBC QN AUTO: 30.9 PG (ref 26–35)
MCHC RBC AUTO-ENTMCNC: 32.3 G/DL (ref 32–34.5)
MCV RBC AUTO: 95.7 FL (ref 80–99.9)
MICROORGANISM SPEC CULT: NO GROWTH
MONOCYTES NFR BLD: 1.07 K/UL (ref 0.1–0.95)
MONOCYTES NFR BLD: 9 % (ref 2–12)
NEUTROPHILS NFR BLD: 86 % (ref 43–80)
NEUTS SEG NFR BLD: 10.33 K/UL (ref 1.8–7.3)
PLATELET # BLD AUTO: 115 K/UL (ref 130–450)
PMV BLD AUTO: 10.5 FL (ref 7–12)
POTASSIUM SERPL-SCNC: 3.4 MMOL/L (ref 3.5–5.1)
RBC # BLD AUTO: 3.49 M/UL (ref 3.5–5.5)
RBC # BLD: ABNORMAL 10*6/UL
SODIUM SERPL-SCNC: 143 MMOL/L (ref 136–145)
SPECIMEN DESCRIPTION: NORMAL
WBC OTHER # BLD: 12.1 K/UL (ref 4.5–11.5)

## 2025-08-10 PROCEDURE — 73700 CT LOWER EXTREMITY W/O DYE: CPT

## 2025-08-10 PROCEDURE — 2500000003 HC RX 250 WO HCPCS: Performed by: FAMILY MEDICINE

## 2025-08-10 PROCEDURE — 87040 BLOOD CULTURE FOR BACTERIA: CPT

## 2025-08-10 PROCEDURE — 83735 ASSAY OF MAGNESIUM: CPT

## 2025-08-10 PROCEDURE — 6370000000 HC RX 637 (ALT 250 FOR IP): Performed by: FAMILY MEDICINE

## 2025-08-10 PROCEDURE — 36415 COLL VENOUS BLD VENIPUNCTURE: CPT

## 2025-08-10 PROCEDURE — 83605 ASSAY OF LACTIC ACID: CPT

## 2025-08-10 PROCEDURE — 6360000002 HC RX W HCPCS: Performed by: INTERNAL MEDICINE

## 2025-08-10 PROCEDURE — 82550 ASSAY OF CK (CPK): CPT

## 2025-08-10 PROCEDURE — 85025 COMPLETE CBC W/AUTO DIFF WBC: CPT

## 2025-08-10 PROCEDURE — 6360000002 HC RX W HCPCS: Performed by: FAMILY MEDICINE

## 2025-08-10 PROCEDURE — 2580000003 HC RX 258: Performed by: FAMILY MEDICINE

## 2025-08-10 PROCEDURE — 1200000000 HC SEMI PRIVATE

## 2025-08-10 PROCEDURE — 80048 BASIC METABOLIC PNL TOTAL CA: CPT

## 2025-08-10 PROCEDURE — 2500000003 HC RX 250 WO HCPCS: Performed by: INTERNAL MEDICINE

## 2025-08-10 PROCEDURE — 99233 SBSQ HOSP IP/OBS HIGH 50: CPT | Performed by: INTERNAL MEDICINE

## 2025-08-10 RX ADMIN — WATER 2000 MG: 1 INJECTION INTRAMUSCULAR; INTRAVENOUS; SUBCUTANEOUS at 00:02

## 2025-08-10 RX ADMIN — WATER 2000 MG: 1 INJECTION INTRAMUSCULAR; INTRAVENOUS; SUBCUTANEOUS at 11:42

## 2025-08-10 RX ADMIN — METOPROLOL SUCCINATE 50 MG: 50 TABLET, EXTENDED RELEASE ORAL at 08:45

## 2025-08-10 RX ADMIN — OXYCODONE HYDROCHLORIDE 5 MG: 5 TABLET ORAL at 11:22

## 2025-08-10 RX ADMIN — ATORVASTATIN CALCIUM 10 MG: 20 TABLET, FILM COATED ORAL at 08:45

## 2025-08-10 RX ADMIN — ENOXAPARIN SODIUM 40 MG: 100 INJECTION SUBCUTANEOUS at 08:47

## 2025-08-10 RX ADMIN — SODIUM CHLORIDE, PRESERVATIVE FREE 10 ML: 5 INJECTION INTRAVENOUS at 08:45

## 2025-08-10 RX ADMIN — GABAPENTIN 300 MG: 300 CAPSULE ORAL at 08:45

## 2025-08-10 RX ADMIN — ASPIRIN 81 MG: 81 TABLET, COATED ORAL at 08:46

## 2025-08-10 RX ADMIN — OXYCODONE HYDROCHLORIDE 5 MG: 5 TABLET ORAL at 17:42

## 2025-08-10 RX ADMIN — SODIUM CHLORIDE, PRESERVATIVE FREE 10 ML: 5 INJECTION INTRAVENOUS at 20:30

## 2025-08-10 RX ADMIN — PANTOPRAZOLE SODIUM 40 MG: 40 TABLET, DELAYED RELEASE ORAL at 05:14

## 2025-08-10 RX ADMIN — SODIUM CHLORIDE: 0.9 INJECTION, SOLUTION INTRAVENOUS at 09:06

## 2025-08-10 RX ADMIN — WATER 2000 MG: 1 INJECTION INTRAMUSCULAR; INTRAVENOUS; SUBCUTANEOUS at 08:45

## 2025-08-10 RX ADMIN — OXYBUTYNIN CHLORIDE 5 MG: 5 TABLET, EXTENDED RELEASE ORAL at 08:45

## 2025-08-10 ASSESSMENT — PAIN SCALES - GENERAL
PAINLEVEL_OUTOF10: 8
PAINLEVEL_OUTOF10: 7

## 2025-08-10 ASSESSMENT — PAIN - FUNCTIONAL ASSESSMENT
PAIN_FUNCTIONAL_ASSESSMENT: 0-10
PAIN_FUNCTIONAL_ASSESSMENT: 0-10

## 2025-08-10 ASSESSMENT — PAIN DESCRIPTION - LOCATION
LOCATION: LEG
LOCATION: LEG

## 2025-08-10 ASSESSMENT — PAIN DESCRIPTION - ORIENTATION
ORIENTATION: RIGHT;POSTERIOR
ORIENTATION: RIGHT

## 2025-08-10 ASSESSMENT — PAIN DESCRIPTION - DESCRIPTORS: DESCRIPTORS: BURNING

## 2025-08-11 ENCOUNTER — APPOINTMENT (OUTPATIENT)
Age: 80
End: 2025-08-11
Attending: INTERNAL MEDICINE
Payer: MEDICARE

## 2025-08-11 LAB
ANION GAP SERPL CALCULATED.3IONS-SCNC: 7 MMOL/L (ref 7–16)
BASOPHILS # BLD: 0.02 K/UL (ref 0–0.2)
BASOPHILS NFR BLD: 0 % (ref 0–2)
BUN SERPL-MCNC: 30 MG/DL (ref 8–23)
CALCIUM SERPL-MCNC: 8.3 MG/DL (ref 8.8–10.2)
CHLORIDE SERPL-SCNC: 107 MMOL/L (ref 98–107)
CK SERPL-CCNC: 1419 U/L (ref 0–170)
CO2 SERPL-SCNC: 27 MMOL/L (ref 22–29)
CREAT SERPL-MCNC: 0.8 MG/DL (ref 0.5–1)
EKG ATRIAL RATE: 77 BPM
EKG P AXIS: 73 DEGREES
EKG P-R INTERVAL: 144 MS
EKG Q-T INTERVAL: 378 MS
EKG QRS DURATION: 94 MS
EKG QTC CALCULATION (BAZETT): 427 MS
EKG R AXIS: -3 DEGREES
EKG T AXIS: 30 DEGREES
EKG VENTRICULAR RATE: 77 BPM
EOSINOPHIL # BLD: 0.01 K/UL (ref 0.05–0.5)
EOSINOPHILS RELATIVE PERCENT: 0 % (ref 0–6)
ERYTHROCYTE [DISTWIDTH] IN BLOOD BY AUTOMATED COUNT: 13.8 % (ref 11.5–15)
GFR, ESTIMATED: 79 ML/MIN/1.73M2
GLUCOSE SERPL-MCNC: 111 MG/DL (ref 74–99)
HCT VFR BLD AUTO: 29.8 % (ref 34–48)
HGB BLD-MCNC: 9.4 G/DL (ref 11.5–15.5)
IMM GRANULOCYTES # BLD AUTO: 0.06 K/UL (ref 0–0.58)
IMM GRANULOCYTES NFR BLD: 1 % (ref 0–5)
LACTATE BLDV-SCNC: 1 MMOL/L (ref 0.5–2.2)
LYMPHOCYTES NFR BLD: 0.88 K/UL (ref 1.5–4)
LYMPHOCYTES RELATIVE PERCENT: 8 % (ref 20–42)
MCH RBC QN AUTO: 31 PG (ref 26–35)
MCHC RBC AUTO-ENTMCNC: 31.5 G/DL (ref 32–34.5)
MCV RBC AUTO: 98.3 FL (ref 80–99.9)
MONOCYTES NFR BLD: 0.73 K/UL (ref 0.1–0.95)
MONOCYTES NFR BLD: 6 % (ref 2–12)
NEUTROPHILS NFR BLD: 85 % (ref 43–80)
NEUTS SEG NFR BLD: 9.9 K/UL (ref 1.8–7.3)
PLATELET # BLD AUTO: 114 K/UL (ref 130–450)
PMV BLD AUTO: 10.9 FL (ref 7–12)
POTASSIUM SERPL-SCNC: 3.5 MMOL/L (ref 3.5–5.1)
RBC # BLD AUTO: 3.03 M/UL (ref 3.5–5.5)
RBC # BLD: NORMAL 10*6/UL
SODIUM SERPL-SCNC: 141 MMOL/L (ref 136–145)
TROPONIN I SERPL HS-MCNC: 36 NG/L (ref 0–14)
WBC OTHER # BLD: 11.6 K/UL (ref 4.5–11.5)

## 2025-08-11 PROCEDURE — 6360000002 HC RX W HCPCS: Performed by: FAMILY MEDICINE

## 2025-08-11 PROCEDURE — 2580000003 HC RX 258: Performed by: INTERNAL MEDICINE

## 2025-08-11 PROCEDURE — 85025 COMPLETE CBC W/AUTO DIFF WBC: CPT

## 2025-08-11 PROCEDURE — 93010 ELECTROCARDIOGRAM REPORT: CPT | Performed by: INTERNAL MEDICINE

## 2025-08-11 PROCEDURE — 82550 ASSAY OF CK (CPK): CPT

## 2025-08-11 PROCEDURE — 83605 ASSAY OF LACTIC ACID: CPT

## 2025-08-11 PROCEDURE — 2500000003 HC RX 250 WO HCPCS: Performed by: INTERNAL MEDICINE

## 2025-08-11 PROCEDURE — 6360000002 HC RX W HCPCS: Performed by: INTERNAL MEDICINE

## 2025-08-11 PROCEDURE — 6370000000 HC RX 637 (ALT 250 FOR IP): Performed by: FAMILY MEDICINE

## 2025-08-11 PROCEDURE — 2500000003 HC RX 250 WO HCPCS: Performed by: FAMILY MEDICINE

## 2025-08-11 PROCEDURE — 99232 SBSQ HOSP IP/OBS MODERATE 35: CPT | Performed by: INTERNAL MEDICINE

## 2025-08-11 PROCEDURE — 93306 TTE W/DOPPLER COMPLETE: CPT

## 2025-08-11 PROCEDURE — 1200000000 HC SEMI PRIVATE

## 2025-08-11 PROCEDURE — 80048 BASIC METABOLIC PNL TOTAL CA: CPT

## 2025-08-11 PROCEDURE — 84484 ASSAY OF TROPONIN QUANT: CPT

## 2025-08-11 PROCEDURE — 36415 COLL VENOUS BLD VENIPUNCTURE: CPT

## 2025-08-11 RX ORDER — ENOXAPARIN SODIUM 100 MG/ML
30 INJECTION SUBCUTANEOUS 2 TIMES DAILY
Status: DISCONTINUED | OUTPATIENT
Start: 2025-08-12 | End: 2025-08-22 | Stop reason: HOSPADM

## 2025-08-11 RX ADMIN — PANTOPRAZOLE SODIUM 40 MG: 40 TABLET, DELAYED RELEASE ORAL at 06:13

## 2025-08-11 RX ADMIN — METOPROLOL SUCCINATE 50 MG: 50 TABLET, EXTENDED RELEASE ORAL at 08:51

## 2025-08-11 RX ADMIN — ACETAMINOPHEN 650 MG: 325 TABLET ORAL at 23:41

## 2025-08-11 RX ADMIN — ENOXAPARIN SODIUM 40 MG: 100 INJECTION SUBCUTANEOUS at 08:51

## 2025-08-11 RX ADMIN — GABAPENTIN 300 MG: 300 CAPSULE ORAL at 21:29

## 2025-08-11 RX ADMIN — SODIUM CHLORIDE, PRESERVATIVE FREE 10 ML: 5 INJECTION INTRAVENOUS at 20:14

## 2025-08-11 RX ADMIN — OXYCODONE HYDROCHLORIDE 5 MG: 5 TABLET ORAL at 00:42

## 2025-08-11 RX ADMIN — ASPIRIN 81 MG: 81 TABLET, COATED ORAL at 08:51

## 2025-08-11 RX ADMIN — OXYCODONE HYDROCHLORIDE 5 MG: 5 TABLET ORAL at 13:16

## 2025-08-11 RX ADMIN — OXYCODONE HYDROCHLORIDE 5 MG: 5 TABLET ORAL at 06:49

## 2025-08-11 RX ADMIN — WATER 2000 MG: 1 INJECTION INTRAMUSCULAR; INTRAVENOUS; SUBCUTANEOUS at 11:15

## 2025-08-11 RX ADMIN — VANCOMYCIN HYDROCHLORIDE 1000 MG: 1 INJECTION, POWDER, LYOPHILIZED, FOR SOLUTION INTRAVENOUS at 23:36

## 2025-08-11 RX ADMIN — OXYCODONE HYDROCHLORIDE 5 MG: 5 TABLET ORAL at 20:14

## 2025-08-11 RX ADMIN — ONDANSETRON 4 MG: 2 INJECTION, SOLUTION INTRAMUSCULAR; INTRAVENOUS at 08:51

## 2025-08-11 ASSESSMENT — PAIN SCALES - GENERAL
PAINLEVEL_OUTOF10: 10
PAINLEVEL_OUTOF10: 7
PAINLEVEL_OUTOF10: 4
PAINLEVEL_OUTOF10: 8
PAINLEVEL_OUTOF10: 0
PAINLEVEL_OUTOF10: 10
PAINLEVEL_OUTOF10: 6

## 2025-08-11 ASSESSMENT — PAIN - FUNCTIONAL ASSESSMENT
PAIN_FUNCTIONAL_ASSESSMENT: ACTIVITIES ARE NOT PREVENTED
PAIN_FUNCTIONAL_ASSESSMENT: PREVENTS OR INTERFERES SOME ACTIVE ACTIVITIES AND ADLS
PAIN_FUNCTIONAL_ASSESSMENT: 0-10
PAIN_FUNCTIONAL_ASSESSMENT: PREVENTS OR INTERFERES SOME ACTIVE ACTIVITIES AND ADLS
PAIN_FUNCTIONAL_ASSESSMENT: PREVENTS OR INTERFERES SOME ACTIVE ACTIVITIES AND ADLS
PAIN_FUNCTIONAL_ASSESSMENT: 0-10
PAIN_FUNCTIONAL_ASSESSMENT: ACTIVITIES ARE NOT PREVENTED
PAIN_FUNCTIONAL_ASSESSMENT: 0-10
PAIN_FUNCTIONAL_ASSESSMENT: ACTIVITIES ARE NOT PREVENTED
PAIN_FUNCTIONAL_ASSESSMENT: 0-10
PAIN_FUNCTIONAL_ASSESSMENT: 0-10

## 2025-08-11 ASSESSMENT — PAIN DESCRIPTION - DESCRIPTORS
DESCRIPTORS: ACHING;DISCOMFORT
DESCRIPTORS: TENDER;SORE;DISCOMFORT
DESCRIPTORS: ACHING;TENDER

## 2025-08-11 ASSESSMENT — PAIN DESCRIPTION - FREQUENCY
FREQUENCY: CONTINUOUS

## 2025-08-11 ASSESSMENT — PAIN DESCRIPTION - LOCATION
LOCATION: LEG

## 2025-08-11 ASSESSMENT — PAIN DESCRIPTION - ORIENTATION
ORIENTATION: LEFT
ORIENTATION: RIGHT;LEFT
ORIENTATION: RIGHT
ORIENTATION: RIGHT;LEFT

## 2025-08-11 ASSESSMENT — PAIN DESCRIPTION - ONSET
ONSET: ON-GOING

## 2025-08-11 ASSESSMENT — PAIN DESCRIPTION - PAIN TYPE
TYPE: ACUTE PAIN
TYPE: CHRONIC PAIN
TYPE: ACUTE PAIN
TYPE: CHRONIC PAIN
TYPE: ACUTE PAIN

## 2025-08-12 ENCOUNTER — APPOINTMENT (OUTPATIENT)
Dept: MRI IMAGING | Age: 80
End: 2025-08-12
Payer: MEDICARE

## 2025-08-12 LAB
ACB COMPLEX DNA BLD POS QL NAA+NON-PROBE: NOT DETECTED
ACB COMPLEX DNA BLD POS QL NAA+NON-PROBE: NOT DETECTED
ANION GAP SERPL CALCULATED.3IONS-SCNC: 6 MMOL/L (ref 7–16)
B FRAGILIS DNA BLD POS QL NAA+NON-PROBE: NOT DETECTED
B FRAGILIS DNA BLD POS QL NAA+NON-PROBE: NOT DETECTED
BASOPHILS # BLD: 0.02 K/UL (ref 0–0.2)
BASOPHILS NFR BLD: 0 % (ref 0–2)
BIOFIRE TEST COMMENT: ABNORMAL
BUN SERPL-MCNC: 12 MG/DL (ref 8–23)
C ALBICANS DNA BLD POS QL NAA+NON-PROBE: NOT DETECTED
C ALBICANS DNA BLD POS QL NAA+NON-PROBE: NOT DETECTED
C AURIS DNA BLD POS QL NAA+NON-PROBE: NOT DETECTED
C AURIS DNA BLD POS QL NAA+NON-PROBE: NOT DETECTED
C GATTII+NEOFOR DNA BLD POS QL NAA+N-PRB: NOT DETECTED
C GATTII+NEOFOR DNA BLD POS QL NAA+N-PRB: NOT DETECTED
C GLABRATA DNA BLD POS QL NAA+NON-PROBE: NOT DETECTED
C GLABRATA DNA BLD POS QL NAA+NON-PROBE: NOT DETECTED
C KRUSEI DNA BLD POS QL NAA+NON-PROBE: NOT DETECTED
C KRUSEI DNA BLD POS QL NAA+NON-PROBE: NOT DETECTED
C PARAP DNA BLD POS QL NAA+NON-PROBE: NOT DETECTED
C PARAP DNA BLD POS QL NAA+NON-PROBE: NOT DETECTED
C TROPICLS DNA BLD POS QL NAA+NON-PROBE: NOT DETECTED
C TROPICLS DNA BLD POS QL NAA+NON-PROBE: NOT DETECTED
CALCIUM SERPL-MCNC: 7.9 MG/DL (ref 8.8–10.2)
CHLORIDE SERPL-SCNC: 109 MMOL/L (ref 98–107)
CK SERPL-CCNC: 1258 U/L (ref 0–170)
CO2 SERPL-SCNC: 26 MMOL/L (ref 22–29)
CREAT SERPL-MCNC: 0.7 MG/DL (ref 0.5–1)
E CLOAC COMP DNA BLD POS NAA+NON-PROBE: NOT DETECTED
E CLOAC COMP DNA BLD POS NAA+NON-PROBE: NOT DETECTED
E COLI DNA BLD POS QL NAA+NON-PROBE: NOT DETECTED
E COLI DNA BLD POS QL NAA+NON-PROBE: NOT DETECTED
E FAECALIS DNA BLD POS QL NAA+NON-PROBE: NOT DETECTED
E FAECALIS DNA BLD POS QL NAA+NON-PROBE: NOT DETECTED
E FAECIUM DNA BLD POS QL NAA+NON-PROBE: NOT DETECTED
E FAECIUM DNA BLD POS QL NAA+NON-PROBE: NOT DETECTED
ENTEROBACTERALES DNA BLD POS NAA+N-PRB: NOT DETECTED
ENTEROBACTERALES DNA BLD POS NAA+N-PRB: NOT DETECTED
EOSINOPHIL # BLD: 0.05 K/UL (ref 0.05–0.5)
EOSINOPHILS RELATIVE PERCENT: 1 % (ref 0–6)
ERYTHROCYTE [DISTWIDTH] IN BLOOD BY AUTOMATED COUNT: 13.4 % (ref 11.5–15)
GFR, ESTIMATED: 89 ML/MIN/1.73M2
GLUCOSE SERPL-MCNC: 95 MG/DL (ref 74–99)
GP B STREP DNA BLD POS QL NAA+NON-PROBE: NOT DETECTED
GP B STREP DNA BLD POS QL NAA+NON-PROBE: NOT DETECTED
HAEM INFLU DNA BLD POS QL NAA+NON-PROBE: NOT DETECTED
HAEM INFLU DNA BLD POS QL NAA+NON-PROBE: NOT DETECTED
HCT VFR BLD AUTO: 27.8 % (ref 34–48)
HGB BLD-MCNC: 8.9 G/DL (ref 11.5–15.5)
IMM GRANULOCYTES # BLD AUTO: 0.03 K/UL (ref 0–0.58)
IMM GRANULOCYTES NFR BLD: 1 % (ref 0–5)
K OXYTOCA DNA BLD POS QL NAA+NON-PROBE: NOT DETECTED
K OXYTOCA DNA BLD POS QL NAA+NON-PROBE: NOT DETECTED
KLEBSIELLA SP DNA BLD POS QL NAA+NON-PRB: NOT DETECTED
L MONOCYTOG DNA BLD POS QL NAA+NON-PROBE: NOT DETECTED
L MONOCYTOG DNA BLD POS QL NAA+NON-PROBE: NOT DETECTED
LACTATE BLDV-SCNC: 0.9 MMOL/L (ref 0.5–2.2)
LYMPHOCYTES NFR BLD: 1.02 K/UL (ref 1.5–4)
LYMPHOCYTES RELATIVE PERCENT: 16 % (ref 20–42)
MCH RBC QN AUTO: 31.1 PG (ref 26–35)
MCHC RBC AUTO-ENTMCNC: 32 G/DL (ref 32–34.5)
MCV RBC AUTO: 97.2 FL (ref 80–99.9)
MICROORGANISM SPEC CULT: ABNORMAL
MICROORGANISM SPEC CULT: ABNORMAL
MICROORGANISM/AGENT SPEC: ABNORMAL
MICROORGANISM/AGENT SPEC: ABNORMAL
MONOCYTES NFR BLD: 0.43 K/UL (ref 0.1–0.95)
MONOCYTES NFR BLD: 7 % (ref 2–12)
N MEN DNA BLD POS QL NAA+NON-PROBE: NOT DETECTED
N MEN DNA BLD POS QL NAA+NON-PROBE: NOT DETECTED
NEUTROPHILS NFR BLD: 75 % (ref 43–80)
NEUTS SEG NFR BLD: 4.73 K/UL (ref 1.8–7.3)
P AERUGINOSA DNA BLD POS NAA+NON-PROBE: NOT DETECTED
P AERUGINOSA DNA BLD POS NAA+NON-PROBE: NOT DETECTED
PLATELET # BLD AUTO: 121 K/UL (ref 130–450)
PMV BLD AUTO: 10.6 FL (ref 7–12)
POTASSIUM SERPL-SCNC: 3.3 MMOL/L (ref 3.5–5.1)
PROTEUS SP DNA BLD POS QL NAA+NON-PROBE: NOT DETECTED
PROTEUS SP DNA BLD POS QL NAA+NON-PROBE: NOT DETECTED
RBC # BLD AUTO: 2.86 M/UL (ref 3.5–5.5)
S AUREUS DNA BLD POS QL NAA+NON-PROBE: NOT DETECTED
S AUREUS DNA BLD POS QL NAA+NON-PROBE: NOT DETECTED
S AUREUS+CONS DNA BLD POS NAA+NON-PROBE: NOT DETECTED
S AUREUS+CONS DNA BLD POS NAA+NON-PROBE: NOT DETECTED
S EPIDERMIDIS DNA BLD POS QL NAA+NON-PRB: NOT DETECTED
S EPIDERMIDIS DNA BLD POS QL NAA+NON-PRB: NOT DETECTED
S LUGDUNENSIS DNA BLD POS QL NAA+NON-PRB: NOT DETECTED
S LUGDUNENSIS DNA BLD POS QL NAA+NON-PRB: NOT DETECTED
S MALTOPHILIA DNA BLD POS QL NAA+NON-PRB: NOT DETECTED
S MALTOPHILIA DNA BLD POS QL NAA+NON-PRB: NOT DETECTED
S MARCESCENS DNA BLD POS NAA+NON-PROBE: NOT DETECTED
S MARCESCENS DNA BLD POS NAA+NON-PROBE: NOT DETECTED
S PNEUM DNA BLD POS QL NAA+NON-PROBE: NOT DETECTED
S PNEUM DNA BLD POS QL NAA+NON-PROBE: NOT DETECTED
S PYO DNA BLD POS QL NAA+NON-PROBE: NOT DETECTED
S PYO DNA BLD POS QL NAA+NON-PROBE: NOT DETECTED
SALMONELLA DNA BLD POS QL NAA+NON-PROBE: NOT DETECTED
SALMONELLA DNA BLD POS QL NAA+NON-PROBE: NOT DETECTED
SERVICE CMNT-IMP: ABNORMAL
SERVICE CMNT-IMP: ABNORMAL
SODIUM SERPL-SCNC: 141 MMOL/L (ref 136–145)
SPECIMEN DESCRIPTION: ABNORMAL
SPECIMEN DESCRIPTION: ABNORMAL
STREPTOCOCCUS DNA BLD POS NAA+NON-PROBE: DETECTED
STREPTOCOCCUS DNA BLD POS NAA+NON-PROBE: NOT DETECTED
WBC OTHER # BLD: 6.3 K/UL (ref 4.5–11.5)

## 2025-08-12 PROCEDURE — 83605 ASSAY OF LACTIC ACID: CPT

## 2025-08-12 PROCEDURE — 99232 SBSQ HOSP IP/OBS MODERATE 35: CPT | Performed by: INTERNAL MEDICINE

## 2025-08-12 PROCEDURE — 85025 COMPLETE CBC W/AUTO DIFF WBC: CPT

## 2025-08-12 PROCEDURE — 6370000000 HC RX 637 (ALT 250 FOR IP): Performed by: FAMILY MEDICINE

## 2025-08-12 PROCEDURE — 1200000000 HC SEMI PRIVATE

## 2025-08-12 PROCEDURE — 2500000003 HC RX 250 WO HCPCS: Performed by: FAMILY MEDICINE

## 2025-08-12 PROCEDURE — 72148 MRI LUMBAR SPINE W/O DYE: CPT

## 2025-08-12 PROCEDURE — 2500000003 HC RX 250 WO HCPCS: Performed by: INTERNAL MEDICINE

## 2025-08-12 PROCEDURE — 6360000002 HC RX W HCPCS: Performed by: FAMILY MEDICINE

## 2025-08-12 PROCEDURE — 82550 ASSAY OF CK (CPK): CPT

## 2025-08-12 PROCEDURE — 6360000002 HC RX W HCPCS: Performed by: INTERNAL MEDICINE

## 2025-08-12 PROCEDURE — 36415 COLL VENOUS BLD VENIPUNCTURE: CPT

## 2025-08-12 PROCEDURE — 6370000000 HC RX 637 (ALT 250 FOR IP): Performed by: INTERNAL MEDICINE

## 2025-08-12 PROCEDURE — 2580000003 HC RX 258: Performed by: FAMILY MEDICINE

## 2025-08-12 PROCEDURE — 80048 BASIC METABOLIC PNL TOTAL CA: CPT

## 2025-08-12 RX ORDER — OXYCODONE AND ACETAMINOPHEN 5; 325 MG/1; MG/1
1 TABLET ORAL ONCE
Refills: 0 | Status: COMPLETED | OUTPATIENT
Start: 2025-08-12 | End: 2025-08-12

## 2025-08-12 RX ORDER — OXYCODONE HYDROCHLORIDE 5 MG/1
2.5 TABLET ORAL ONCE
Refills: 0 | Status: COMPLETED | OUTPATIENT
Start: 2025-08-12 | End: 2025-08-12

## 2025-08-12 RX ORDER — OXYCODONE AND ACETAMINOPHEN 7.5; 325 MG/1; MG/1
1 TABLET ORAL EVERY 8 HOURS PRN
Refills: 0 | Status: DISCONTINUED | OUTPATIENT
Start: 2025-08-12 | End: 2025-08-12 | Stop reason: RX

## 2025-08-12 RX ORDER — OXYCODONE HYDROCHLORIDE 5 MG/1
2.5 TABLET ORAL EVERY 8 HOURS PRN
Refills: 0 | Status: DISCONTINUED | OUTPATIENT
Start: 2025-08-12 | End: 2025-08-18

## 2025-08-12 RX ORDER — OXYCODONE AND ACETAMINOPHEN 5; 325 MG/1; MG/1
1 TABLET ORAL EVERY 8 HOURS PRN
Refills: 0 | Status: DISCONTINUED | OUTPATIENT
Start: 2025-08-12 | End: 2025-08-18

## 2025-08-12 RX ORDER — MORPHINE SULFATE 15 MG/1
15 TABLET, FILM COATED, EXTENDED RELEASE ORAL 2 TIMES DAILY
Refills: 0 | Status: DISCONTINUED | OUTPATIENT
Start: 2025-08-12 | End: 2025-08-22 | Stop reason: HOSPADM

## 2025-08-12 RX ADMIN — OXYCODONE HYDROCHLORIDE 5 MG: 5 TABLET ORAL at 04:30

## 2025-08-12 RX ADMIN — WATER 2000 MG: 1 INJECTION INTRAMUSCULAR; INTRAVENOUS; SUBCUTANEOUS at 10:32

## 2025-08-12 RX ADMIN — PANTOPRAZOLE SODIUM 40 MG: 40 TABLET, DELAYED RELEASE ORAL at 05:04

## 2025-08-12 RX ADMIN — OXYCODONE AND ACETAMINOPHEN 1 TABLET: 5; 325 TABLET ORAL at 16:41

## 2025-08-12 RX ADMIN — ASPIRIN 81 MG: 81 TABLET, COATED ORAL at 08:05

## 2025-08-12 RX ADMIN — MORPHINE SULFATE 15 MG: 15 TABLET, FILM COATED, EXTENDED RELEASE ORAL at 21:14

## 2025-08-12 RX ADMIN — METOPROLOL SUCCINATE 50 MG: 50 TABLET, EXTENDED RELEASE ORAL at 08:05

## 2025-08-12 RX ADMIN — POTASSIUM CHLORIDE 40 MEQ: 1500 TABLET, EXTENDED RELEASE ORAL at 14:57

## 2025-08-12 RX ADMIN — OXYCODONE AND ACETAMINOPHEN 1 TABLET: 5; 325 TABLET ORAL at 12:45

## 2025-08-12 RX ADMIN — SODIUM CHLORIDE: 0.9 INJECTION, SOLUTION INTRAVENOUS at 04:45

## 2025-08-12 RX ADMIN — SODIUM CHLORIDE, PRESERVATIVE FREE 10 ML: 5 INJECTION INTRAVENOUS at 21:14

## 2025-08-12 RX ADMIN — ENOXAPARIN SODIUM 30 MG: 100 INJECTION SUBCUTANEOUS at 08:05

## 2025-08-12 RX ADMIN — GABAPENTIN 300 MG: 300 CAPSULE ORAL at 22:58

## 2025-08-12 RX ADMIN — OXYCODONE HYDROCHLORIDE 2.5 MG: 5 TABLET ORAL at 16:42

## 2025-08-12 RX ADMIN — MORPHINE SULFATE 15 MG: 15 TABLET, FILM COATED, EXTENDED RELEASE ORAL at 10:32

## 2025-08-12 RX ADMIN — ENOXAPARIN SODIUM 30 MG: 100 INJECTION SUBCUTANEOUS at 21:14

## 2025-08-12 ASSESSMENT — PAIN SCALES - GENERAL
PAINLEVEL_OUTOF10: 5
PAINLEVEL_OUTOF10: 7
PAINLEVEL_OUTOF10: 7
PAINLEVEL_OUTOF10: 6
PAINLEVEL_OUTOF10: 5
PAINLEVEL_OUTOF10: 5
PAINLEVEL_OUTOF10: 8
PAINLEVEL_OUTOF10: 8
PAINLEVEL_OUTOF10: 5
PAINLEVEL_OUTOF10: 10

## 2025-08-12 ASSESSMENT — PAIN DESCRIPTION - LOCATION
LOCATION: LEG

## 2025-08-12 ASSESSMENT — PAIN DESCRIPTION - ORIENTATION
ORIENTATION: LEFT
ORIENTATION: LEFT
ORIENTATION: RIGHT;LEFT
ORIENTATION: RIGHT;LEFT
ORIENTATION: LEFT

## 2025-08-12 ASSESSMENT — PAIN DESCRIPTION - FREQUENCY
FREQUENCY: CONTINUOUS
FREQUENCY: CONTINUOUS

## 2025-08-12 ASSESSMENT — PAIN - FUNCTIONAL ASSESSMENT
PAIN_FUNCTIONAL_ASSESSMENT: 0-10
PAIN_FUNCTIONAL_ASSESSMENT: 0-10
PAIN_FUNCTIONAL_ASSESSMENT: ACTIVITIES ARE NOT PREVENTED
PAIN_FUNCTIONAL_ASSESSMENT: ACTIVITIES ARE NOT PREVENTED
PAIN_FUNCTIONAL_ASSESSMENT: 0-10
PAIN_FUNCTIONAL_ASSESSMENT: ACTIVITIES ARE NOT PREVENTED
PAIN_FUNCTIONAL_ASSESSMENT: 0-10

## 2025-08-12 ASSESSMENT — PAIN DESCRIPTION - PAIN TYPE
TYPE: CHRONIC PAIN
TYPE: CHRONIC PAIN

## 2025-08-12 ASSESSMENT — PAIN DESCRIPTION - DESCRIPTORS
DESCRIPTORS: ACHING;BURNING;DISCOMFORT;THROBBING
DESCRIPTORS: ACHING;DISCOMFORT
DESCRIPTORS: ACHING;DISCOMFORT

## 2025-08-12 ASSESSMENT — PAIN DESCRIPTION - ONSET
ONSET: ON-GOING
ONSET: ON-GOING

## 2025-08-13 ENCOUNTER — APPOINTMENT (OUTPATIENT)
Dept: ULTRASOUND IMAGING | Age: 80
End: 2025-08-13
Payer: MEDICARE

## 2025-08-13 ENCOUNTER — HOSPITAL ENCOUNTER (OUTPATIENT)
Dept: WOUND CARE | Age: 80
Discharge: HOME | End: 2025-08-13
Attending: SURGERY

## 2025-08-13 LAB
ANION GAP SERPL CALCULATED.3IONS-SCNC: 9 MMOL/L (ref 7–16)
BASOPHILS # BLD: 0.02 K/UL (ref 0–0.2)
BASOPHILS NFR BLD: 0 % (ref 0–2)
BUN SERPL-MCNC: 10 MG/DL (ref 8–23)
CALCIUM SERPL-MCNC: 7.9 MG/DL (ref 8.8–10.2)
CHLORIDE SERPL-SCNC: 109 MMOL/L (ref 98–107)
CK SERPL-CCNC: 819 U/L (ref 0–170)
CO2 SERPL-SCNC: 23 MMOL/L (ref 22–29)
CREAT SERPL-MCNC: 0.6 MG/DL (ref 0.5–1)
EOSINOPHIL # BLD: 0.14 K/UL (ref 0.05–0.5)
EOSINOPHILS RELATIVE PERCENT: 2 % (ref 0–6)
ERYTHROCYTE [DISTWIDTH] IN BLOOD BY AUTOMATED COUNT: 13.2 % (ref 11.5–15)
GFR, ESTIMATED: >90 ML/MIN/1.73M2
GLUCOSE SERPL-MCNC: 95 MG/DL (ref 74–99)
HCT VFR BLD AUTO: 28.9 % (ref 34–48)
HGB BLD-MCNC: 9.2 G/DL (ref 11.5–15.5)
IMM GRANULOCYTES # BLD AUTO: 0.03 K/UL (ref 0–0.58)
IMM GRANULOCYTES NFR BLD: 1 % (ref 0–5)
LACTATE BLDV-SCNC: 0.9 MMOL/L (ref 0.5–2.2)
LYMPHOCYTES NFR BLD: 0.9 K/UL (ref 1.5–4)
LYMPHOCYTES RELATIVE PERCENT: 15 % (ref 20–42)
MCH RBC QN AUTO: 31.3 PG (ref 26–35)
MCHC RBC AUTO-ENTMCNC: 31.8 G/DL (ref 32–34.5)
MCV RBC AUTO: 98.3 FL (ref 80–99.9)
MONOCYTES NFR BLD: 0.45 K/UL (ref 0.1–0.95)
MONOCYTES NFR BLD: 8 % (ref 2–12)
NEUTROPHILS NFR BLD: 74 % (ref 43–80)
NEUTS SEG NFR BLD: 4.38 K/UL (ref 1.8–7.3)
PLATELET # BLD AUTO: 128 K/UL (ref 130–450)
PMV BLD AUTO: 10.3 FL (ref 7–12)
POTASSIUM SERPL-SCNC: 3.8 MMOL/L (ref 3.5–5.1)
RBC # BLD AUTO: 2.94 M/UL (ref 3.5–5.5)
SODIUM SERPL-SCNC: 141 MMOL/L (ref 136–145)
WBC OTHER # BLD: 5.9 K/UL (ref 4.5–11.5)

## 2025-08-13 PROCEDURE — 80048 BASIC METABOLIC PNL TOTAL CA: CPT

## 2025-08-13 PROCEDURE — 83605 ASSAY OF LACTIC ACID: CPT

## 2025-08-13 PROCEDURE — 6360000002 HC RX W HCPCS: Performed by: FAMILY MEDICINE

## 2025-08-13 PROCEDURE — 36415 COLL VENOUS BLD VENIPUNCTURE: CPT

## 2025-08-13 PROCEDURE — 85025 COMPLETE CBC W/AUTO DIFF WBC: CPT

## 2025-08-13 PROCEDURE — 82550 ASSAY OF CK (CPK): CPT

## 2025-08-13 PROCEDURE — 6370000000 HC RX 637 (ALT 250 FOR IP): Performed by: STUDENT IN AN ORGANIZED HEALTH CARE EDUCATION/TRAINING PROGRAM

## 2025-08-13 PROCEDURE — 6360000002 HC RX W HCPCS: Performed by: INTERNAL MEDICINE

## 2025-08-13 PROCEDURE — 1200000000 HC SEMI PRIVATE

## 2025-08-13 PROCEDURE — 2500000003 HC RX 250 WO HCPCS: Performed by: INTERNAL MEDICINE

## 2025-08-13 PROCEDURE — 93971 EXTREMITY STUDY: CPT

## 2025-08-13 PROCEDURE — 2500000003 HC RX 250 WO HCPCS: Performed by: FAMILY MEDICINE

## 2025-08-13 PROCEDURE — 99232 SBSQ HOSP IP/OBS MODERATE 35: CPT | Performed by: STUDENT IN AN ORGANIZED HEALTH CARE EDUCATION/TRAINING PROGRAM

## 2025-08-13 PROCEDURE — 6370000000 HC RX 637 (ALT 250 FOR IP): Performed by: FAMILY MEDICINE

## 2025-08-13 PROCEDURE — 6370000000 HC RX 637 (ALT 250 FOR IP): Performed by: INTERNAL MEDICINE

## 2025-08-13 RX ORDER — OXYCODONE HYDROCHLORIDE 5 MG/1
2.5 TABLET ORAL ONCE
Refills: 0 | Status: COMPLETED | OUTPATIENT
Start: 2025-08-13 | End: 2025-08-13

## 2025-08-13 RX ORDER — OXYCODONE AND ACETAMINOPHEN 5; 325 MG/1; MG/1
1 TABLET ORAL ONCE
Refills: 0 | Status: COMPLETED | OUTPATIENT
Start: 2025-08-13 | End: 2025-08-13

## 2025-08-13 RX ADMIN — GABAPENTIN 300 MG: 300 CAPSULE ORAL at 17:25

## 2025-08-13 RX ADMIN — OXYCODONE AND ACETAMINOPHEN 1 TABLET: 5; 325 TABLET ORAL at 17:16

## 2025-08-13 RX ADMIN — MORPHINE SULFATE 15 MG: 15 TABLET, FILM COATED, EXTENDED RELEASE ORAL at 08:00

## 2025-08-13 RX ADMIN — MORPHINE SULFATE 15 MG: 15 TABLET, FILM COATED, EXTENDED RELEASE ORAL at 22:08

## 2025-08-13 RX ADMIN — SODIUM CHLORIDE, PRESERVATIVE FREE 10 ML: 5 INJECTION INTRAVENOUS at 22:08

## 2025-08-13 RX ADMIN — OXYCODONE 2.5 MG: 5 TABLET ORAL at 14:16

## 2025-08-13 RX ADMIN — ENOXAPARIN SODIUM 30 MG: 100 INJECTION SUBCUTANEOUS at 22:07

## 2025-08-13 RX ADMIN — OXYCODONE 2.5 MG: 5 TABLET ORAL at 17:15

## 2025-08-13 RX ADMIN — METOPROLOL SUCCINATE 50 MG: 50 TABLET, EXTENDED RELEASE ORAL at 08:00

## 2025-08-13 RX ADMIN — WATER 2000 MG: 1 INJECTION INTRAMUSCULAR; INTRAVENOUS; SUBCUTANEOUS at 11:52

## 2025-08-13 RX ADMIN — PANTOPRAZOLE SODIUM 40 MG: 40 TABLET, DELAYED RELEASE ORAL at 06:13

## 2025-08-13 RX ADMIN — OXYCODONE AND ACETAMINOPHEN 1 TABLET: 5; 325 TABLET ORAL at 06:12

## 2025-08-13 RX ADMIN — OXYCODONE AND ACETAMINOPHEN 1 TABLET: 5; 325 TABLET ORAL at 23:12

## 2025-08-13 RX ADMIN — OXYCODONE AND ACETAMINOPHEN 1 TABLET: 5; 325 TABLET ORAL at 14:16

## 2025-08-13 RX ADMIN — ENOXAPARIN SODIUM 30 MG: 100 INJECTION SUBCUTANEOUS at 08:00

## 2025-08-13 RX ADMIN — ASPIRIN 81 MG: 81 TABLET, COATED ORAL at 08:00

## 2025-08-13 ASSESSMENT — PAIN DESCRIPTION - LOCATION
LOCATION: LEG;NECK
LOCATION: LEG

## 2025-08-13 ASSESSMENT — PAIN DESCRIPTION - FREQUENCY
FREQUENCY: CONTINUOUS

## 2025-08-13 ASSESSMENT — PAIN - FUNCTIONAL ASSESSMENT
PAIN_FUNCTIONAL_ASSESSMENT: 0-10
PAIN_FUNCTIONAL_ASSESSMENT: 0-10
PAIN_FUNCTIONAL_ASSESSMENT: ACTIVITIES ARE NOT PREVENTED
PAIN_FUNCTIONAL_ASSESSMENT: ACTIVITIES ARE NOT PREVENTED
PAIN_FUNCTIONAL_ASSESSMENT: 0-10
PAIN_FUNCTIONAL_ASSESSMENT: ACTIVITIES ARE NOT PREVENTED
PAIN_FUNCTIONAL_ASSESSMENT: ACTIVITIES ARE NOT PREVENTED
PAIN_FUNCTIONAL_ASSESSMENT: 0-10
PAIN_FUNCTIONAL_ASSESSMENT: 0-10
PAIN_FUNCTIONAL_ASSESSMENT: ACTIVITIES ARE NOT PREVENTED
PAIN_FUNCTIONAL_ASSESSMENT: 0-10

## 2025-08-13 ASSESSMENT — PAIN SCALES - GENERAL
PAINLEVEL_OUTOF10: 9
PAINLEVEL_OUTOF10: 9
PAINLEVEL_OUTOF10: 6
PAINLEVEL_OUTOF10: 8
PAINLEVEL_OUTOF10: 7
PAINLEVEL_OUTOF10: 5
PAINLEVEL_OUTOF10: 8
PAINLEVEL_OUTOF10: 3

## 2025-08-13 ASSESSMENT — PAIN DESCRIPTION - DESCRIPTORS
DESCRIPTORS: BURNING
DESCRIPTORS: ACHING;DISCOMFORT;THROBBING;BURNING
DESCRIPTORS: ACHING;DISCOMFORT;BURNING;THROBBING
DESCRIPTORS: ACHING;DISCOMFORT
DESCRIPTORS: ACHING;BURNING;THROBBING;DISCOMFORT
DESCRIPTORS: ACHING;DISCOMFORT

## 2025-08-13 ASSESSMENT — PAIN DESCRIPTION - PAIN TYPE
TYPE: CHRONIC PAIN

## 2025-08-13 ASSESSMENT — PAIN DESCRIPTION - ORIENTATION
ORIENTATION: RIGHT;LEFT
ORIENTATION: RIGHT
ORIENTATION: LEFT
ORIENTATION: RIGHT;LEFT
ORIENTATION: LEFT
ORIENTATION: RIGHT;LEFT

## 2025-08-13 ASSESSMENT — PAIN DESCRIPTION - ONSET
ONSET: ON-GOING

## 2025-08-14 LAB
ANION GAP SERPL CALCULATED.3IONS-SCNC: 9 MMOL/L (ref 7–16)
BASOPHILS # BLD: 0.02 K/UL (ref 0–0.2)
BASOPHILS NFR BLD: 0 % (ref 0–2)
BUN SERPL-MCNC: 11 MG/DL (ref 8–23)
CALCIUM SERPL-MCNC: 8 MG/DL (ref 8.8–10.2)
CHLORIDE SERPL-SCNC: 105 MMOL/L (ref 98–107)
CK SERPL-CCNC: 471 U/L (ref 0–170)
CO2 SERPL-SCNC: 23 MMOL/L (ref 22–29)
CREAT SERPL-MCNC: 0.6 MG/DL (ref 0.5–1)
EOSINOPHIL # BLD: 0.13 K/UL (ref 0.05–0.5)
EOSINOPHILS RELATIVE PERCENT: 2 % (ref 0–6)
ERYTHROCYTE [DISTWIDTH] IN BLOOD BY AUTOMATED COUNT: 13.2 % (ref 11.5–15)
GFR, ESTIMATED: >90 ML/MIN/1.73M2
GLUCOSE SERPL-MCNC: 112 MG/DL (ref 74–99)
HCT VFR BLD AUTO: 28.4 % (ref 34–48)
HGB BLD-MCNC: 8.8 G/DL (ref 11.5–15.5)
IMM GRANULOCYTES # BLD AUTO: 0.05 K/UL (ref 0–0.58)
IMM GRANULOCYTES NFR BLD: 1 % (ref 0–5)
LACTATE BLDV-SCNC: 1.7 MMOL/L (ref 0.5–2.2)
LYMPHOCYTES NFR BLD: 1 K/UL (ref 1.5–4)
LYMPHOCYTES RELATIVE PERCENT: 16 % (ref 20–42)
MCH RBC QN AUTO: 30.8 PG (ref 26–35)
MCHC RBC AUTO-ENTMCNC: 31 G/DL (ref 32–34.5)
MCV RBC AUTO: 99.3 FL (ref 80–99.9)
MONOCYTES NFR BLD: 0.61 K/UL (ref 0.1–0.95)
MONOCYTES NFR BLD: 10 % (ref 2–12)
NEUTROPHILS NFR BLD: 71 % (ref 43–80)
NEUTS SEG NFR BLD: 4.42 K/UL (ref 1.8–7.3)
PLATELET # BLD AUTO: 146 K/UL (ref 130–450)
PMV BLD AUTO: 10.4 FL (ref 7–12)
POTASSIUM SERPL-SCNC: 3.9 MMOL/L (ref 3.5–5.1)
RBC # BLD AUTO: 2.86 M/UL (ref 3.5–5.5)
SODIUM SERPL-SCNC: 137 MMOL/L (ref 136–145)
WBC OTHER # BLD: 6.2 K/UL (ref 4.5–11.5)

## 2025-08-14 PROCEDURE — 97161 PT EVAL LOW COMPLEX 20 MIN: CPT

## 2025-08-14 PROCEDURE — 83605 ASSAY OF LACTIC ACID: CPT

## 2025-08-14 PROCEDURE — 6370000000 HC RX 637 (ALT 250 FOR IP): Performed by: FAMILY MEDICINE

## 2025-08-14 PROCEDURE — 2500000003 HC RX 250 WO HCPCS: Performed by: FAMILY MEDICINE

## 2025-08-14 PROCEDURE — 80048 BASIC METABOLIC PNL TOTAL CA: CPT

## 2025-08-14 PROCEDURE — 6360000002 HC RX W HCPCS: Performed by: FAMILY MEDICINE

## 2025-08-14 PROCEDURE — 82550 ASSAY OF CK (CPK): CPT

## 2025-08-14 PROCEDURE — 6360000002 HC RX W HCPCS: Performed by: INTERNAL MEDICINE

## 2025-08-14 PROCEDURE — 36415 COLL VENOUS BLD VENIPUNCTURE: CPT

## 2025-08-14 PROCEDURE — 2580000003 HC RX 258: Performed by: INTERNAL MEDICINE

## 2025-08-14 PROCEDURE — 97530 THERAPEUTIC ACTIVITIES: CPT

## 2025-08-14 PROCEDURE — 6370000000 HC RX 637 (ALT 250 FOR IP): Performed by: INTERNAL MEDICINE

## 2025-08-14 PROCEDURE — 99204 OFFICE O/P NEW MOD 45 MIN: CPT | Performed by: PSYCHIATRY & NEUROLOGY

## 2025-08-14 PROCEDURE — 85025 COMPLETE CBC W/AUTO DIFF WBC: CPT

## 2025-08-14 PROCEDURE — 99232 SBSQ HOSP IP/OBS MODERATE 35: CPT | Performed by: STUDENT IN AN ORGANIZED HEALTH CARE EDUCATION/TRAINING PROGRAM

## 2025-08-14 PROCEDURE — 1200000000 HC SEMI PRIVATE

## 2025-08-14 PROCEDURE — 97535 SELF CARE MNGMENT TRAINING: CPT

## 2025-08-14 PROCEDURE — 97165 OT EVAL LOW COMPLEX 30 MIN: CPT

## 2025-08-14 PROCEDURE — 2500000003 HC RX 250 WO HCPCS: Performed by: INTERNAL MEDICINE

## 2025-08-14 RX ADMIN — GABAPENTIN 300 MG: 300 CAPSULE ORAL at 15:04

## 2025-08-14 RX ADMIN — MORPHINE SULFATE 15 MG: 15 TABLET, FILM COATED, EXTENDED RELEASE ORAL at 09:28

## 2025-08-14 RX ADMIN — OXYCODONE AND ACETAMINOPHEN 1 TABLET: 5; 325 TABLET ORAL at 09:32

## 2025-08-14 RX ADMIN — OXYCODONE AND ACETAMINOPHEN 1 TABLET: 5; 325 TABLET ORAL at 22:24

## 2025-08-14 RX ADMIN — METOPROLOL SUCCINATE 50 MG: 50 TABLET, EXTENDED RELEASE ORAL at 09:30

## 2025-08-14 RX ADMIN — SODIUM CHLORIDE: 0.9 INJECTION, SOLUTION INTRAVENOUS at 03:57

## 2025-08-14 RX ADMIN — MORPHINE SULFATE 15 MG: 15 TABLET, FILM COATED, EXTENDED RELEASE ORAL at 20:48

## 2025-08-14 RX ADMIN — OXYCODONE 2.5 MG: 5 TABLET ORAL at 03:47

## 2025-08-14 RX ADMIN — SODIUM CHLORIDE, PRESERVATIVE FREE 10 ML: 5 INJECTION INTRAVENOUS at 20:49

## 2025-08-14 RX ADMIN — GABAPENTIN 300 MG: 300 CAPSULE ORAL at 09:29

## 2025-08-14 RX ADMIN — PANTOPRAZOLE SODIUM 40 MG: 40 TABLET, DELAYED RELEASE ORAL at 06:10

## 2025-08-14 RX ADMIN — ASPIRIN 81 MG: 81 TABLET, COATED ORAL at 09:30

## 2025-08-14 RX ADMIN — WATER 2000 MG: 1 INJECTION INTRAMUSCULAR; INTRAVENOUS; SUBCUTANEOUS at 11:44

## 2025-08-14 RX ADMIN — GABAPENTIN 300 MG: 300 CAPSULE ORAL at 20:48

## 2025-08-14 RX ADMIN — OXYBUTYNIN CHLORIDE 5 MG: 5 TABLET, EXTENDED RELEASE ORAL at 09:30

## 2025-08-14 RX ADMIN — ENOXAPARIN SODIUM 30 MG: 100 INJECTION SUBCUTANEOUS at 09:30

## 2025-08-14 RX ADMIN — ENOXAPARIN SODIUM 30 MG: 100 INJECTION SUBCUTANEOUS at 20:49

## 2025-08-14 ASSESSMENT — PAIN DESCRIPTION - FREQUENCY: FREQUENCY: CONTINUOUS

## 2025-08-14 ASSESSMENT — PAIN DESCRIPTION - LOCATION
LOCATION: BACK
LOCATION: BACK
LOCATION: LEG
LOCATION: LEG

## 2025-08-14 ASSESSMENT — PAIN SCALES - GENERAL
PAINLEVEL_OUTOF10: 9
PAINLEVEL_OUTOF10: 10
PAINLEVEL_OUTOF10: 0
PAINLEVEL_OUTOF10: 10
PAINLEVEL_OUTOF10: 4
PAINLEVEL_OUTOF10: 6
PAINLEVEL_OUTOF10: 10

## 2025-08-14 ASSESSMENT — PAIN DESCRIPTION - DESCRIPTORS
DESCRIPTORS: ACHING
DESCRIPTORS: ACHING
DESCRIPTORS: ACHING;BURNING;DISCOMFORT;THROBBING

## 2025-08-14 ASSESSMENT — PAIN - FUNCTIONAL ASSESSMENT
PAIN_FUNCTIONAL_ASSESSMENT: 0-10
PAIN_FUNCTIONAL_ASSESSMENT: ACTIVITIES ARE NOT PREVENTED
PAIN_FUNCTIONAL_ASSESSMENT: 0-10
PAIN_FUNCTIONAL_ASSESSMENT: 0-10

## 2025-08-14 ASSESSMENT — PAIN DESCRIPTION - ORIENTATION: ORIENTATION: RIGHT;LEFT

## 2025-08-14 ASSESSMENT — PAIN DESCRIPTION - ONSET: ONSET: ON-GOING

## 2025-08-14 ASSESSMENT — PAIN DESCRIPTION - PAIN TYPE: TYPE: CHRONIC PAIN

## 2025-08-15 LAB
ANION GAP SERPL CALCULATED.3IONS-SCNC: 7 MMOL/L (ref 7–16)
BASOPHILS # BLD: 0.01 K/UL (ref 0–0.2)
BASOPHILS NFR BLD: 0 % (ref 0–2)
BUN SERPL-MCNC: 12 MG/DL (ref 8–23)
CALCIUM SERPL-MCNC: 8.1 MG/DL (ref 8.8–10.2)
CHLORIDE SERPL-SCNC: 104 MMOL/L (ref 98–107)
CK SERPL-CCNC: 348 U/L (ref 0–170)
CO2 SERPL-SCNC: 27 MMOL/L (ref 22–29)
CREAT SERPL-MCNC: 0.6 MG/DL (ref 0.5–1)
EOSINOPHIL # BLD: 0.17 K/UL (ref 0.05–0.5)
EOSINOPHILS RELATIVE PERCENT: 3 % (ref 0–6)
ERYTHROCYTE [DISTWIDTH] IN BLOOD BY AUTOMATED COUNT: 13.4 % (ref 11.5–15)
GFR, ESTIMATED: >90 ML/MIN/1.73M2
GLUCOSE SERPL-MCNC: 99 MG/DL (ref 74–99)
HCT VFR BLD AUTO: 28.9 % (ref 34–48)
HGB BLD-MCNC: 9 G/DL (ref 11.5–15.5)
IMM GRANULOCYTES # BLD AUTO: 0.05 K/UL (ref 0–0.58)
IMM GRANULOCYTES NFR BLD: 1 % (ref 0–5)
LACTATE BLDV-SCNC: 1.2 MMOL/L (ref 0.5–2.2)
LYMPHOCYTES NFR BLD: 1.09 K/UL (ref 1.5–4)
LYMPHOCYTES RELATIVE PERCENT: 16 % (ref 20–42)
MCH RBC QN AUTO: 30.5 PG (ref 26–35)
MCHC RBC AUTO-ENTMCNC: 31.1 G/DL (ref 32–34.5)
MCV RBC AUTO: 98 FL (ref 80–99.9)
MICROORGANISM SPEC CULT: NORMAL
MICROORGANISM SPEC CULT: NORMAL
MONOCYTES NFR BLD: 0.64 K/UL (ref 0.1–0.95)
MONOCYTES NFR BLD: 10 % (ref 2–12)
NEUTROPHILS NFR BLD: 71 % (ref 43–80)
NEUTS SEG NFR BLD: 4.76 K/UL (ref 1.8–7.3)
PLATELET # BLD AUTO: 180 K/UL (ref 130–450)
PMV BLD AUTO: 10.1 FL (ref 7–12)
POTASSIUM SERPL-SCNC: 3.7 MMOL/L (ref 3.5–5.1)
RBC # BLD AUTO: 2.95 M/UL (ref 3.5–5.5)
SERVICE CMNT-IMP: NORMAL
SERVICE CMNT-IMP: NORMAL
SODIUM SERPL-SCNC: 138 MMOL/L (ref 136–145)
SPECIMEN DESCRIPTION: NORMAL
SPECIMEN DESCRIPTION: NORMAL
WBC OTHER # BLD: 6.7 K/UL (ref 4.5–11.5)

## 2025-08-15 PROCEDURE — 36415 COLL VENOUS BLD VENIPUNCTURE: CPT

## 2025-08-15 PROCEDURE — 6370000000 HC RX 637 (ALT 250 FOR IP): Performed by: FAMILY MEDICINE

## 2025-08-15 PROCEDURE — 2500000003 HC RX 250 WO HCPCS: Performed by: FAMILY MEDICINE

## 2025-08-15 PROCEDURE — 83605 ASSAY OF LACTIC ACID: CPT

## 2025-08-15 PROCEDURE — 2500000003 HC RX 250 WO HCPCS: Performed by: INTERNAL MEDICINE

## 2025-08-15 PROCEDURE — 85025 COMPLETE CBC W/AUTO DIFF WBC: CPT

## 2025-08-15 PROCEDURE — 6360000002 HC RX W HCPCS: Performed by: FAMILY MEDICINE

## 2025-08-15 PROCEDURE — 6370000000 HC RX 637 (ALT 250 FOR IP): Performed by: INTERNAL MEDICINE

## 2025-08-15 PROCEDURE — 6360000002 HC RX W HCPCS: Performed by: INTERNAL MEDICINE

## 2025-08-15 PROCEDURE — 82550 ASSAY OF CK (CPK): CPT

## 2025-08-15 PROCEDURE — 80048 BASIC METABOLIC PNL TOTAL CA: CPT

## 2025-08-15 PROCEDURE — 1200000000 HC SEMI PRIVATE

## 2025-08-15 PROCEDURE — 99232 SBSQ HOSP IP/OBS MODERATE 35: CPT | Performed by: STUDENT IN AN ORGANIZED HEALTH CARE EDUCATION/TRAINING PROGRAM

## 2025-08-15 RX ADMIN — OXYCODONE 2.5 MG: 5 TABLET ORAL at 16:10

## 2025-08-15 RX ADMIN — OXYCODONE AND ACETAMINOPHEN 1 TABLET: 5; 325 TABLET ORAL at 04:33

## 2025-08-15 RX ADMIN — GABAPENTIN 300 MG: 300 CAPSULE ORAL at 14:32

## 2025-08-15 RX ADMIN — WATER 2000 MG: 1 INJECTION INTRAMUSCULAR; INTRAVENOUS; SUBCUTANEOUS at 11:41

## 2025-08-15 RX ADMIN — POLYETHYLENE GLYCOL 3350 17 G: 17 POWDER, FOR SOLUTION ORAL at 16:32

## 2025-08-15 RX ADMIN — PANTOPRAZOLE SODIUM 40 MG: 40 TABLET, DELAYED RELEASE ORAL at 05:27

## 2025-08-15 RX ADMIN — MORPHINE SULFATE 15 MG: 15 TABLET, FILM COATED, EXTENDED RELEASE ORAL at 08:01

## 2025-08-15 RX ADMIN — MORPHINE SULFATE 15 MG: 15 TABLET, FILM COATED, EXTENDED RELEASE ORAL at 21:25

## 2025-08-15 RX ADMIN — ASPIRIN 81 MG: 81 TABLET, COATED ORAL at 08:02

## 2025-08-15 RX ADMIN — METOPROLOL SUCCINATE 50 MG: 50 TABLET, EXTENDED RELEASE ORAL at 08:03

## 2025-08-15 RX ADMIN — OXYCODONE AND ACETAMINOPHEN 1 TABLET: 5; 325 TABLET ORAL at 22:48

## 2025-08-15 RX ADMIN — ENOXAPARIN SODIUM 30 MG: 100 INJECTION SUBCUTANEOUS at 21:25

## 2025-08-15 RX ADMIN — ENOXAPARIN SODIUM 30 MG: 100 INJECTION SUBCUTANEOUS at 08:13

## 2025-08-15 RX ADMIN — SODIUM CHLORIDE, PRESERVATIVE FREE 10 ML: 5 INJECTION INTRAVENOUS at 21:25

## 2025-08-15 RX ADMIN — SODIUM CHLORIDE, PRESERVATIVE FREE 10 ML: 5 INJECTION INTRAVENOUS at 08:03

## 2025-08-15 RX ADMIN — OXYCODONE 2.5 MG: 5 TABLET ORAL at 08:02

## 2025-08-15 RX ADMIN — OXYCODONE AND ACETAMINOPHEN 1 TABLET: 5; 325 TABLET ORAL at 14:32

## 2025-08-15 RX ADMIN — OXYBUTYNIN CHLORIDE 5 MG: 5 TABLET, EXTENDED RELEASE ORAL at 08:04

## 2025-08-15 RX ADMIN — DAPTOMYCIN 500 MG: 500 INJECTION, POWDER, LYOPHILIZED, FOR SOLUTION INTRAVENOUS at 16:12

## 2025-08-15 RX ADMIN — GABAPENTIN 300 MG: 300 CAPSULE ORAL at 08:03

## 2025-08-15 RX ADMIN — GABAPENTIN 300 MG: 300 CAPSULE ORAL at 21:25

## 2025-08-15 ASSESSMENT — PAIN DESCRIPTION - DESCRIPTORS
DESCRIPTORS: BURNING;THROBBING
DESCRIPTORS: ACHING
DESCRIPTORS: ACHING;DISCOMFORT;SORE
DESCRIPTORS: CRUSHING
DESCRIPTORS: ACHING;DISCOMFORT
DESCRIPTORS: ACHING;DISCOMFORT;PRESSURE;BURNING

## 2025-08-15 ASSESSMENT — PAIN DESCRIPTION - LOCATION
LOCATION: LEG
LOCATION: LEG
LOCATION: BACK
LOCATION: LEG

## 2025-08-15 ASSESSMENT — PAIN - FUNCTIONAL ASSESSMENT
PAIN_FUNCTIONAL_ASSESSMENT: 0-10
PAIN_FUNCTIONAL_ASSESSMENT: PREVENTS OR INTERFERES SOME ACTIVE ACTIVITIES AND ADLS
PAIN_FUNCTIONAL_ASSESSMENT: PREVENTS OR INTERFERES SOME ACTIVE ACTIVITIES AND ADLS
PAIN_FUNCTIONAL_ASSESSMENT: 0-10

## 2025-08-15 ASSESSMENT — PAIN SCALES - GENERAL
PAINLEVEL_OUTOF10: 10
PAINLEVEL_OUTOF10: 7
PAINLEVEL_OUTOF10: 10

## 2025-08-15 ASSESSMENT — PAIN DESCRIPTION - ORIENTATION
ORIENTATION: RIGHT
ORIENTATION: LEFT;RIGHT;DISTAL
ORIENTATION: RIGHT;LEFT;POSTERIOR
ORIENTATION: RIGHT;LEFT

## 2025-08-16 PROBLEM — M84.48XA PATHOLOGIC FRACTURE OF VERTEBRAE: Status: ACTIVE | Noted: 2025-08-16
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
## 2025-08-16 PROBLEM — R42 DIZZINESS: Status: ACTIVE | Noted: 2025-08-16

## 2025-08-16 LAB
ANION GAP SERPL CALCULATED.3IONS-SCNC: 7 MMOL/L (ref 7–16)
BASOPHILS # BLD: 0.01 K/UL (ref 0–0.2)
BASOPHILS NFR BLD: 0 % (ref 0–2)
BUN SERPL-MCNC: 17 MG/DL (ref 8–23)
CALCIUM SERPL-MCNC: 7.9 MG/DL (ref 8.8–10.2)
CHLORIDE SERPL-SCNC: 104 MMOL/L (ref 98–107)
CK SERPL-CCNC: 238 U/L (ref 0–170)
CO2 SERPL-SCNC: 26 MMOL/L (ref 22–29)
CREAT SERPL-MCNC: 0.6 MG/DL (ref 0.5–1)
ECHO AO ASC DIAM: 3.6 CM
ECHO AO ASCENDING AORTA INDEX: 1.67 CM/M2
ECHO AV AREA PEAK VELOCITY: 1.9 CM2
ECHO AV AREA VTI: 2.1 CM2
ECHO AV AREA/BSA PEAK VELOCITY: 0.9 CM2/M2
ECHO AV AREA/BSA VTI: 1 CM2/M2
ECHO AV CUSP MM: 1.5 CM
ECHO AV MEAN GRADIENT: 8 MMHG
ECHO AV MEAN VELOCITY: 1.4 M/S
ECHO AV PEAK GRADIENT: 16 MMHG
ECHO AV PEAK VELOCITY: 2 M/S
ECHO AV VELOCITY RATIO: 0.6
ECHO AV VTI: 39.6 CM
ECHO BSA: 2.23 M2
ECHO EST RA PRESSURE: 8 MMHG
ECHO LA DIAMETER INDEX: 2.23 CM/M2
ECHO LA DIAMETER: 4.8 CM
ECHO LA VOL A-L A2C: 75 ML (ref 22–52)
ECHO LA VOL A-L A4C: 101 ML (ref 22–52)
ECHO LA VOL BP: 83 ML (ref 22–52)
ECHO LA VOL MOD A2C: 72 ML (ref 22–52)
ECHO LA VOL MOD A4C: 92 ML (ref 22–52)
ECHO LA VOL/BSA BIPLANE: 39 ML/M2 (ref 16–34)
ECHO LA VOLUME AREA LENGTH: 89 ML
ECHO LA VOLUME INDEX A-L A2C: 35 ML/M2 (ref 16–34)
ECHO LA VOLUME INDEX A-L A4C: 47 ML/M2 (ref 16–34)
ECHO LA VOLUME INDEX AREA LENGTH: 41 ML/M2 (ref 16–34)
ECHO LA VOLUME INDEX MOD A2C: 33 ML/M2 (ref 16–34)
ECHO LA VOLUME INDEX MOD A4C: 43 ML/M2 (ref 16–34)
ECHO LV EDV A2C: 121 ML
ECHO LV EDV A4C: 99 ML
ECHO LV EDV BP: 111 ML (ref 56–104)
ECHO LV EDV INDEX A4C: 46 ML/M2
ECHO LV EDV INDEX BP: 52 ML/M2
ECHO LV EDV NDEX A2C: 56 ML/M2
ECHO LV EF PHYSICIAN: 66 %
ECHO LV EJECTION FRACTION A2C: 69 %
ECHO LV EJECTION FRACTION A4C: 68 %
ECHO LV EJECTION FRACTION BIPLANE: 68 % (ref 55–100)
ECHO LV ESV A2C: 37 ML
ECHO LV ESV A4C: 31 ML
ECHO LV ESV BP: 35 ML (ref 19–49)
ECHO LV ESV INDEX A2C: 17 ML/M2
ECHO LV ESV INDEX A4C: 14 ML/M2
ECHO LV ESV INDEX BP: 16 ML/M2
ECHO LV FRACTIONAL SHORTENING: 29 % (ref 28–44)
ECHO LV INTERNAL DIMENSION DIASTOLE INDEX: 1.91 CM/M2
ECHO LV INTERNAL DIMENSION DIASTOLIC: 4.1 CM (ref 3.9–5.3)
ECHO LV INTERNAL DIMENSION SYSTOLIC INDEX: 1.35 CM/M2
ECHO LV INTERNAL DIMENSION SYSTOLIC: 2.9 CM
ECHO LV ISOVOLUMETRIC RELAXATION TIME (IVRT): 129.4 MS
ECHO LV IVSD: 1 CM (ref 0.6–0.9)
ECHO LV IVSS: 1.4 CM
ECHO LV MASS 2D: 141.5 G (ref 67–162)
ECHO LV MASS INDEX 2D: 65.8 G/M2 (ref 43–95)
ECHO LV POSTERIOR WALL DIASTOLIC: 1.1 CM (ref 0.6–0.9)
ECHO LV POSTERIOR WALL SYSTOLIC: 1.6 CM
ECHO LV RELATIVE WALL THICKNESS RATIO: 0.54
ECHO LVOT AREA: 3.1 CM2
ECHO LVOT AV VTI INDEX: 0.67
ECHO LVOT DIAM: 2 CM
ECHO LVOT MEAN GRADIENT: 3 MMHG
ECHO LVOT PEAK GRADIENT: 6 MMHG
ECHO LVOT PEAK VELOCITY: 1.2 M/S
ECHO LVOT STROKE VOLUME INDEX: 38.8 ML/M2
ECHO LVOT SV: 83.5 ML
ECHO LVOT VTI: 26.6 CM
ECHO MV "A" WAVE DURATION: 121.8 MSEC
ECHO MV A VELOCITY: 1.4 M/S
ECHO MV AREA PHT: 2.7 CM2
ECHO MV AREA VTI: 2.4 CM2
ECHO MV E DECELERATION TIME (DT): 275.6 MS
ECHO MV E VELOCITY: 0.81 M/S
ECHO MV E/A RATIO: 0.58
ECHO MV LVOT VTI INDEX: 1.33
ECHO MV MAX VELOCITY: 1.5 M/S
ECHO MV MEAN GRADIENT: 4 MMHG
ECHO MV MEAN VELOCITY: 1 M/S
ECHO MV PEAK GRADIENT: 9 MMHG
ECHO MV PRESSURE HALF TIME (PHT): 80.5 MS
ECHO MV VTI: 35.4 CM
ECHO PV MAX VELOCITY: 1.2 M/S
ECHO PV MEAN GRADIENT: 3 MMHG
ECHO PV MEAN VELOCITY: 0.8 M/S
ECHO PV PEAK GRADIENT: 6 MMHG
ECHO PV VTI: 22.4 CM
ECHO RV INTERNAL DIMENSION: 4.1 CM
ECHO RV LONGITUDINAL DIMENSION: 6.8 CM
ECHO RV MID DIMENSION: 3.2 CM
ECHO RV TAPSE: 2.1 CM (ref 1.7–?)
EOSINOPHIL # BLD: 0.15 K/UL (ref 0.05–0.5)
EOSINOPHILS RELATIVE PERCENT: 2 % (ref 0–6)
ERYTHROCYTE [DISTWIDTH] IN BLOOD BY AUTOMATED COUNT: 13.6 % (ref 11.5–15)
GFR, ESTIMATED: >90 ML/MIN/1.73M2
GLUCOSE SERPL-MCNC: 112 MG/DL (ref 74–99)
HCT VFR BLD AUTO: 25.9 % (ref 34–48)
HGB BLD-MCNC: 8.1 G/DL (ref 11.5–15.5)
IMM GRANULOCYTES # BLD AUTO: 0.04 K/UL (ref 0–0.58)
IMM GRANULOCYTES NFR BLD: 1 % (ref 0–5)
LACTATE BLDV-SCNC: 0.7 MMOL/L (ref 0.5–2.2)
LYMPHOCYTES NFR BLD: 1.18 K/UL (ref 1.5–4)
LYMPHOCYTES RELATIVE PERCENT: 16 % (ref 20–42)
MCH RBC QN AUTO: 30.7 PG (ref 26–35)
MCHC RBC AUTO-ENTMCNC: 31.3 G/DL (ref 32–34.5)
MCV RBC AUTO: 98.1 FL (ref 80–99.9)
MONOCYTES NFR BLD: 0.6 K/UL (ref 0.1–0.95)
MONOCYTES NFR BLD: 8 % (ref 2–12)
NEUTROPHILS NFR BLD: 73 % (ref 43–80)
NEUTS SEG NFR BLD: 5.33 K/UL (ref 1.8–7.3)
PLATELET # BLD AUTO: 203 K/UL (ref 130–450)
PMV BLD AUTO: 9.6 FL (ref 7–12)
POTASSIUM SERPL-SCNC: 3.8 MMOL/L (ref 3.5–5.1)
RBC # BLD AUTO: 2.64 M/UL (ref 3.5–5.5)
SODIUM SERPL-SCNC: 137 MMOL/L (ref 136–145)
URATE SERPL-MCNC: 4.1 MG/DL (ref 2.4–5.7)
WBC OTHER # BLD: 7.3 K/UL (ref 4.5–11.5)

## 2025-08-16 PROCEDURE — 6370000000 HC RX 637 (ALT 250 FOR IP): Performed by: PSYCHIATRY & NEUROLOGY

## 2025-08-16 PROCEDURE — 99232 SBSQ HOSP IP/OBS MODERATE 35: CPT | Performed by: STUDENT IN AN ORGANIZED HEALTH CARE EDUCATION/TRAINING PROGRAM

## 2025-08-16 PROCEDURE — 82550 ASSAY OF CK (CPK): CPT

## 2025-08-16 PROCEDURE — 6360000002 HC RX W HCPCS: Performed by: INTERNAL MEDICINE

## 2025-08-16 PROCEDURE — 6370000000 HC RX 637 (ALT 250 FOR IP): Performed by: INTERNAL MEDICINE

## 2025-08-16 PROCEDURE — 85025 COMPLETE CBC W/AUTO DIFF WBC: CPT

## 2025-08-16 PROCEDURE — 36415 COLL VENOUS BLD VENIPUNCTURE: CPT

## 2025-08-16 PROCEDURE — 1200000000 HC SEMI PRIVATE

## 2025-08-16 PROCEDURE — 6360000002 HC RX W HCPCS: Performed by: FAMILY MEDICINE

## 2025-08-16 PROCEDURE — 83605 ASSAY OF LACTIC ACID: CPT

## 2025-08-16 PROCEDURE — 84550 ASSAY OF BLOOD/URIC ACID: CPT

## 2025-08-16 PROCEDURE — 6370000000 HC RX 637 (ALT 250 FOR IP): Performed by: FAMILY MEDICINE

## 2025-08-16 PROCEDURE — 2500000003 HC RX 250 WO HCPCS: Performed by: FAMILY MEDICINE

## 2025-08-16 PROCEDURE — 80048 BASIC METABOLIC PNL TOTAL CA: CPT

## 2025-08-16 PROCEDURE — 2500000003 HC RX 250 WO HCPCS: Performed by: INTERNAL MEDICINE

## 2025-08-16 PROCEDURE — 2580000003 HC RX 258: Performed by: FAMILY MEDICINE

## 2025-08-16 RX ORDER — 0.9 % SODIUM CHLORIDE 0.9 %
1000 INTRAVENOUS SOLUTION INTRAVENOUS ONCE
Status: COMPLETED | OUTPATIENT
Start: 2025-08-16 | End: 2025-08-16

## 2025-08-16 RX ORDER — DULOXETIN HYDROCHLORIDE 30 MG/1
30 CAPSULE, DELAYED RELEASE ORAL 2 TIMES DAILY
Status: DISCONTINUED | OUTPATIENT
Start: 2025-08-16 | End: 2025-08-22 | Stop reason: HOSPADM

## 2025-08-16 RX ORDER — COLCHICINE 0.6 MG/1
0.6 TABLET ORAL 2 TIMES DAILY
Status: DISCONTINUED | OUTPATIENT
Start: 2025-08-16 | End: 2025-08-18

## 2025-08-16 RX ADMIN — ENOXAPARIN SODIUM 30 MG: 100 INJECTION SUBCUTANEOUS at 20:18

## 2025-08-16 RX ADMIN — OXYCODONE AND ACETAMINOPHEN 1 TABLET: 5; 325 TABLET ORAL at 06:38

## 2025-08-16 RX ADMIN — COLCHICINE 0.6 MG: 0.6 TABLET, FILM COATED ORAL at 20:19

## 2025-08-16 RX ADMIN — GABAPENTIN 300 MG: 300 CAPSULE ORAL at 08:46

## 2025-08-16 RX ADMIN — ASPIRIN 81 MG: 81 TABLET, COATED ORAL at 08:46

## 2025-08-16 RX ADMIN — GABAPENTIN 300 MG: 300 CAPSULE ORAL at 20:18

## 2025-08-16 RX ADMIN — OXYBUTYNIN CHLORIDE 5 MG: 5 TABLET, EXTENDED RELEASE ORAL at 08:46

## 2025-08-16 RX ADMIN — POLYETHYLENE GLYCOL 3350 17 G: 17 POWDER, FOR SOLUTION ORAL at 08:53

## 2025-08-16 RX ADMIN — DULOXETINE 30 MG: 30 CAPSULE, DELAYED RELEASE ORAL at 08:46

## 2025-08-16 RX ADMIN — ACETAMINOPHEN 650 MG: 325 TABLET ORAL at 06:00

## 2025-08-16 RX ADMIN — SODIUM CHLORIDE, PRESERVATIVE FREE 10 ML: 5 INJECTION INTRAVENOUS at 08:51

## 2025-08-16 RX ADMIN — SODIUM CHLORIDE, PRESERVATIVE FREE 10 ML: 5 INJECTION INTRAVENOUS at 20:19

## 2025-08-16 RX ADMIN — MORPHINE SULFATE 15 MG: 15 TABLET, FILM COATED, EXTENDED RELEASE ORAL at 20:18

## 2025-08-16 RX ADMIN — PANTOPRAZOLE SODIUM 40 MG: 40 TABLET, DELAYED RELEASE ORAL at 06:00

## 2025-08-16 RX ADMIN — SODIUM CHLORIDE 1000 ML: 0.9 INJECTION, SOLUTION INTRAVENOUS at 03:26

## 2025-08-16 RX ADMIN — ENOXAPARIN SODIUM 30 MG: 100 INJECTION SUBCUTANEOUS at 08:46

## 2025-08-16 RX ADMIN — DULOXETINE 30 MG: 30 CAPSULE, DELAYED RELEASE ORAL at 20:17

## 2025-08-16 RX ADMIN — DAPTOMYCIN 500 MG: 500 INJECTION, POWDER, LYOPHILIZED, FOR SOLUTION INTRAVENOUS at 17:02

## 2025-08-16 RX ADMIN — TIZANIDINE 4 MG: 4 TABLET ORAL at 01:10

## 2025-08-16 RX ADMIN — TIZANIDINE 4 MG: 4 TABLET ORAL at 20:18

## 2025-08-16 RX ADMIN — DULOXETINE 30 MG: 30 CAPSULE, DELAYED RELEASE ORAL at 01:10

## 2025-08-16 RX ADMIN — OXYCODONE AND ACETAMINOPHEN 1 TABLET: 5; 325 TABLET ORAL at 15:28

## 2025-08-16 RX ADMIN — MORPHINE SULFATE 15 MG: 15 TABLET, FILM COATED, EXTENDED RELEASE ORAL at 08:46

## 2025-08-16 RX ADMIN — METOPROLOL SUCCINATE 50 MG: 50 TABLET, EXTENDED RELEASE ORAL at 08:46

## 2025-08-16 RX ADMIN — ONDANSETRON 4 MG: 2 INJECTION, SOLUTION INTRAMUSCULAR; INTRAVENOUS at 11:15

## 2025-08-16 ASSESSMENT — PAIN SCALES - GENERAL
PAINLEVEL_OUTOF10: 10
PAINLEVEL_OUTOF10: 8
PAINLEVEL_OUTOF10: 10
PAINLEVEL_OUTOF10: 9
PAINLEVEL_OUTOF10: 10

## 2025-08-16 ASSESSMENT — PAIN DESCRIPTION - LOCATION
LOCATION: LEG

## 2025-08-16 ASSESSMENT — PAIN DESCRIPTION - DESCRIPTORS
DESCRIPTORS: ACHING;DISCOMFORT;SORE
DESCRIPTORS: ACHING;DISCOMFORT;NUMBNESS
DESCRIPTORS: ACHING;BURNING
DESCRIPTORS: BURNING;THROBBING;POUNDING
DESCRIPTORS: ACHING;DISCOMFORT;SORE

## 2025-08-16 ASSESSMENT — PAIN DESCRIPTION - ORIENTATION
ORIENTATION: RIGHT;LEFT
ORIENTATION: RIGHT;LEFT
ORIENTATION: RIGHT;LEFT;LOWER;DISTAL
ORIENTATION: RIGHT;LEFT
ORIENTATION: RIGHT;LEFT

## 2025-08-16 ASSESSMENT — PAIN - FUNCTIONAL ASSESSMENT
PAIN_FUNCTIONAL_ASSESSMENT: PREVENTS OR INTERFERES SOME ACTIVE ACTIVITIES AND ADLS
PAIN_FUNCTIONAL_ASSESSMENT: 0-10
PAIN_FUNCTIONAL_ASSESSMENT: PREVENTS OR INTERFERES SOME ACTIVE ACTIVITIES AND ADLS
PAIN_FUNCTIONAL_ASSESSMENT: 0-10
PAIN_FUNCTIONAL_ASSESSMENT: ACTIVITIES ARE NOT PREVENTED
PAIN_FUNCTIONAL_ASSESSMENT: 0-10
PAIN_FUNCTIONAL_ASSESSMENT: PREVENTS OR INTERFERES WITH MANY ACTIVE NOT PASSIVE ACTIVITIES
PAIN_FUNCTIONAL_ASSESSMENT: 0-10
PAIN_FUNCTIONAL_ASSESSMENT: 0-10

## 2025-08-17 ENCOUNTER — APPOINTMENT (OUTPATIENT)
Dept: CT IMAGING | Age: 80
End: 2025-08-17
Attending: INTERNAL MEDICINE
Payer: MEDICARE

## 2025-08-17 LAB
ANION GAP SERPL CALCULATED.3IONS-SCNC: 10 MMOL/L (ref 7–16)
BASOPHILS # BLD: 0.01 K/UL (ref 0–0.2)
BASOPHILS NFR BLD: 0 % (ref 0–2)
BUN SERPL-MCNC: 17 MG/DL (ref 8–23)
CALCIUM SERPL-MCNC: 8.2 MG/DL (ref 8.8–10.2)
CHLORIDE SERPL-SCNC: 103 MMOL/L (ref 98–107)
CK SERPL-CCNC: 191 U/L (ref 0–170)
CO2 SERPL-SCNC: 24 MMOL/L (ref 22–29)
CREAT SERPL-MCNC: 0.6 MG/DL (ref 0.5–1)
EOSINOPHIL # BLD: 0.16 K/UL (ref 0.05–0.5)
EOSINOPHILS RELATIVE PERCENT: 2 % (ref 0–6)
ERYTHROCYTE [DISTWIDTH] IN BLOOD BY AUTOMATED COUNT: 13.6 % (ref 11.5–15)
GFR, ESTIMATED: >90 ML/MIN/1.73M2
GLUCOSE SERPL-MCNC: 100 MG/DL (ref 74–99)
HCT VFR BLD AUTO: 27.2 % (ref 34–48)
HGB BLD-MCNC: 8.4 G/DL (ref 11.5–15.5)
IMM GRANULOCYTES # BLD AUTO: 0.04 K/UL (ref 0–0.58)
IMM GRANULOCYTES NFR BLD: 1 % (ref 0–5)
LACTATE BLDV-SCNC: 1 MMOL/L (ref 0.5–2.2)
LYMPHOCYTES NFR BLD: 1.03 K/UL (ref 1.5–4)
LYMPHOCYTES RELATIVE PERCENT: 13 % (ref 20–42)
MCH RBC QN AUTO: 30.4 PG (ref 26–35)
MCHC RBC AUTO-ENTMCNC: 30.9 G/DL (ref 32–34.5)
MCV RBC AUTO: 98.6 FL (ref 80–99.9)
MONOCYTES NFR BLD: 0.63 K/UL (ref 0.1–0.95)
MONOCYTES NFR BLD: 8 % (ref 2–12)
NEUTROPHILS NFR BLD: 76 % (ref 43–80)
NEUTS SEG NFR BLD: 6.01 K/UL (ref 1.8–7.3)
PLATELET # BLD AUTO: 249 K/UL (ref 130–450)
PMV BLD AUTO: 9.7 FL (ref 7–12)
POTASSIUM SERPL-SCNC: 4.1 MMOL/L (ref 3.5–5.1)
RBC # BLD AUTO: 2.76 M/UL (ref 3.5–5.5)
SODIUM SERPL-SCNC: 136 MMOL/L (ref 136–145)
WBC OTHER # BLD: 7.9 K/UL (ref 4.5–11.5)

## 2025-08-17 PROCEDURE — 2500000003 HC RX 250 WO HCPCS: Performed by: INTERNAL MEDICINE

## 2025-08-17 PROCEDURE — 6360000002 HC RX W HCPCS: Performed by: INTERNAL MEDICINE

## 2025-08-17 PROCEDURE — 80048 BASIC METABOLIC PNL TOTAL CA: CPT

## 2025-08-17 PROCEDURE — 83605 ASSAY OF LACTIC ACID: CPT

## 2025-08-17 PROCEDURE — 82550 ASSAY OF CK (CPK): CPT

## 2025-08-17 PROCEDURE — 73702 CT LWR EXTREMITY W/O&W/DYE: CPT

## 2025-08-17 PROCEDURE — 99232 SBSQ HOSP IP/OBS MODERATE 35: CPT | Performed by: STUDENT IN AN ORGANIZED HEALTH CARE EDUCATION/TRAINING PROGRAM

## 2025-08-17 PROCEDURE — 6370000000 HC RX 637 (ALT 250 FOR IP): Performed by: PSYCHIATRY & NEUROLOGY

## 2025-08-17 PROCEDURE — 6360000002 HC RX W HCPCS: Performed by: FAMILY MEDICINE

## 2025-08-17 PROCEDURE — 1200000000 HC SEMI PRIVATE

## 2025-08-17 PROCEDURE — 2500000003 HC RX 250 WO HCPCS: Performed by: FAMILY MEDICINE

## 2025-08-17 PROCEDURE — 85025 COMPLETE CBC W/AUTO DIFF WBC: CPT

## 2025-08-17 PROCEDURE — 36415 COLL VENOUS BLD VENIPUNCTURE: CPT

## 2025-08-17 PROCEDURE — 6360000004 HC RX CONTRAST MEDICATION: Performed by: SPECIALIST

## 2025-08-17 PROCEDURE — 6370000000 HC RX 637 (ALT 250 FOR IP): Performed by: INTERNAL MEDICINE

## 2025-08-17 PROCEDURE — 6370000000 HC RX 637 (ALT 250 FOR IP): Performed by: FAMILY MEDICINE

## 2025-08-17 RX ORDER — IOPAMIDOL 755 MG/ML
75 INJECTION, SOLUTION INTRAVASCULAR
Status: COMPLETED | OUTPATIENT
Start: 2025-08-17 | End: 2025-08-17

## 2025-08-17 RX ADMIN — DULOXETINE 30 MG: 30 CAPSULE, DELAYED RELEASE ORAL at 21:20

## 2025-08-17 RX ADMIN — OXYBUTYNIN CHLORIDE 5 MG: 5 TABLET, EXTENDED RELEASE ORAL at 08:25

## 2025-08-17 RX ADMIN — OXYCODONE 2.5 MG: 5 TABLET ORAL at 01:26

## 2025-08-17 RX ADMIN — OXYCODONE AND ACETAMINOPHEN 1 TABLET: 5; 325 TABLET ORAL at 14:32

## 2025-08-17 RX ADMIN — OXYCODONE AND ACETAMINOPHEN 1 TABLET: 5; 325 TABLET ORAL at 01:26

## 2025-08-17 RX ADMIN — GABAPENTIN 300 MG: 300 CAPSULE ORAL at 08:24

## 2025-08-17 RX ADMIN — COLCHICINE 0.6 MG: 0.6 TABLET, FILM COATED ORAL at 21:20

## 2025-08-17 RX ADMIN — SODIUM CHLORIDE, PRESERVATIVE FREE 10 ML: 5 INJECTION INTRAVENOUS at 21:20

## 2025-08-17 RX ADMIN — MORPHINE SULFATE 15 MG: 15 TABLET, FILM COATED, EXTENDED RELEASE ORAL at 21:19

## 2025-08-17 RX ADMIN — POLYETHYLENE GLYCOL 3350 17 G: 17 POWDER, FOR SOLUTION ORAL at 21:19

## 2025-08-17 RX ADMIN — ENOXAPARIN SODIUM 30 MG: 100 INJECTION SUBCUTANEOUS at 21:19

## 2025-08-17 RX ADMIN — METOPROLOL SUCCINATE 50 MG: 50 TABLET, EXTENDED RELEASE ORAL at 08:22

## 2025-08-17 RX ADMIN — SODIUM CHLORIDE, PRESERVATIVE FREE 10 ML: 5 INJECTION INTRAVENOUS at 08:21

## 2025-08-17 RX ADMIN — GABAPENTIN 300 MG: 300 CAPSULE ORAL at 21:20

## 2025-08-17 RX ADMIN — DULOXETINE 30 MG: 30 CAPSULE, DELAYED RELEASE ORAL at 08:23

## 2025-08-17 RX ADMIN — PANTOPRAZOLE SODIUM 40 MG: 40 TABLET, DELAYED RELEASE ORAL at 06:23

## 2025-08-17 RX ADMIN — ACETAMINOPHEN 650 MG: 325 TABLET ORAL at 16:40

## 2025-08-17 RX ADMIN — DAPTOMYCIN 500 MG: 500 INJECTION, POWDER, LYOPHILIZED, FOR SOLUTION INTRAVENOUS at 16:44

## 2025-08-17 RX ADMIN — GABAPENTIN 300 MG: 300 CAPSULE ORAL at 14:22

## 2025-08-17 RX ADMIN — ACETAMINOPHEN 650 MG: 325 TABLET ORAL at 06:22

## 2025-08-17 RX ADMIN — TIZANIDINE 4 MG: 4 TABLET ORAL at 21:19

## 2025-08-17 RX ADMIN — OXYCODONE 2.5 MG: 5 TABLET ORAL at 10:55

## 2025-08-17 RX ADMIN — COLCHICINE 0.6 MG: 0.6 TABLET, FILM COATED ORAL at 08:24

## 2025-08-17 RX ADMIN — ASPIRIN 81 MG: 81 TABLET, COATED ORAL at 08:22

## 2025-08-17 RX ADMIN — MORPHINE SULFATE 15 MG: 15 TABLET, FILM COATED, EXTENDED RELEASE ORAL at 08:22

## 2025-08-17 RX ADMIN — IOPAMIDOL 75 ML: 755 INJECTION, SOLUTION INTRAVENOUS at 09:48

## 2025-08-17 RX ADMIN — ENOXAPARIN SODIUM 30 MG: 100 INJECTION SUBCUTANEOUS at 08:27

## 2025-08-17 ASSESSMENT — PAIN SCALES - GENERAL
PAINLEVEL_OUTOF10: 10
PAINLEVEL_OUTOF10: 7
PAINLEVEL_OUTOF10: 9
PAINLEVEL_OUTOF10: 10
PAINLEVEL_OUTOF10: 10
PAINLEVEL_OUTOF10: 8
PAINLEVEL_OUTOF10: 10

## 2025-08-17 ASSESSMENT — PAIN DESCRIPTION - LOCATION
LOCATION: ANKLE
LOCATION: LEG
LOCATION: ANKLE;LEG
LOCATION: LEG
LOCATION: ANKLE;LEG

## 2025-08-17 ASSESSMENT — PAIN DESCRIPTION - ORIENTATION
ORIENTATION: RIGHT;LEFT
ORIENTATION: RIGHT
ORIENTATION: RIGHT;LEFT
ORIENTATION: RIGHT;LEFT
ORIENTATION: RIGHT

## 2025-08-17 ASSESSMENT — PAIN - FUNCTIONAL ASSESSMENT
PAIN_FUNCTIONAL_ASSESSMENT: 0-10
PAIN_FUNCTIONAL_ASSESSMENT: PREVENTS OR INTERFERES WITH MANY ACTIVE NOT PASSIVE ACTIVITIES

## 2025-08-17 ASSESSMENT — PAIN DESCRIPTION - DESCRIPTORS
DESCRIPTORS: ACHING;BURNING
DESCRIPTORS: ACHING;BURNING;DISCOMFORT;GNAWING
DESCRIPTORS: ACHING;BURNING;HEAVINESS
DESCRIPTORS: ACHING;SHOOTING
DESCRIPTORS: ACHING;CRUSHING
DESCRIPTORS: ACHING;SHOOTING;STABBING;THROBBING

## 2025-08-17 ASSESSMENT — PAIN DESCRIPTION - ONSET: ONSET: ON-GOING

## 2025-08-17 ASSESSMENT — PAIN DESCRIPTION - FREQUENCY: FREQUENCY: CONTINUOUS

## 2025-08-17 ASSESSMENT — PAIN DESCRIPTION - PAIN TYPE: TYPE: CHRONIC PAIN

## 2025-08-18 PROCEDURE — 6360000002 HC RX W HCPCS: Performed by: INTERNAL MEDICINE

## 2025-08-18 PROCEDURE — 6360000002 HC RX W HCPCS: Performed by: FAMILY MEDICINE

## 2025-08-18 PROCEDURE — 1200000000 HC SEMI PRIVATE

## 2025-08-18 PROCEDURE — 6370000000 HC RX 637 (ALT 250 FOR IP): Performed by: INTERNAL MEDICINE

## 2025-08-18 PROCEDURE — 97530 THERAPEUTIC ACTIVITIES: CPT

## 2025-08-18 PROCEDURE — 6370000000 HC RX 637 (ALT 250 FOR IP): Performed by: FAMILY MEDICINE

## 2025-08-18 PROCEDURE — 99232 SBSQ HOSP IP/OBS MODERATE 35: CPT | Performed by: INTERNAL MEDICINE

## 2025-08-18 PROCEDURE — 2500000003 HC RX 250 WO HCPCS: Performed by: INTERNAL MEDICINE

## 2025-08-18 PROCEDURE — 2500000003 HC RX 250 WO HCPCS: Performed by: FAMILY MEDICINE

## 2025-08-18 PROCEDURE — 6370000000 HC RX 637 (ALT 250 FOR IP): Performed by: PSYCHIATRY & NEUROLOGY

## 2025-08-18 RX ORDER — OXYCODONE HYDROCHLORIDE 5 MG/1
5 TABLET ORAL EVERY 6 HOURS PRN
Refills: 0 | Status: DISCONTINUED | OUTPATIENT
Start: 2025-08-18 | End: 2025-08-19

## 2025-08-18 RX ORDER — OXYCODONE AND ACETAMINOPHEN 5; 325 MG/1; MG/1
1 TABLET ORAL EVERY 6 HOURS PRN
Refills: 0 | Status: DISCONTINUED | OUTPATIENT
Start: 2025-08-18 | End: 2025-08-18

## 2025-08-18 RX ORDER — BISACODYL 5 MG/1
5 TABLET, DELAYED RELEASE ORAL DAILY PRN
Status: DISCONTINUED | OUTPATIENT
Start: 2025-08-18 | End: 2025-08-22 | Stop reason: HOSPADM

## 2025-08-18 RX ORDER — OXYCODONE HYDROCHLORIDE 5 MG/1
2.5 TABLET ORAL EVERY 6 HOURS PRN
Refills: 0 | Status: DISCONTINUED | OUTPATIENT
Start: 2025-08-18 | End: 2025-08-18

## 2025-08-18 RX ORDER — OXYCODONE AND ACETAMINOPHEN 5; 325 MG/1; MG/1
1 TABLET ORAL EVERY 6 HOURS PRN
Refills: 0 | Status: DISCONTINUED | OUTPATIENT
Start: 2025-08-18 | End: 2025-08-19

## 2025-08-18 RX ADMIN — DAPTOMYCIN 500 MG: 500 INJECTION, POWDER, LYOPHILIZED, FOR SOLUTION INTRAVENOUS at 17:28

## 2025-08-18 RX ADMIN — GABAPENTIN 300 MG: 300 CAPSULE ORAL at 20:25

## 2025-08-18 RX ADMIN — GABAPENTIN 300 MG: 300 CAPSULE ORAL at 09:57

## 2025-08-18 RX ADMIN — SODIUM CHLORIDE, PRESERVATIVE FREE 10 ML: 5 INJECTION INTRAVENOUS at 09:58

## 2025-08-18 RX ADMIN — GABAPENTIN 300 MG: 300 CAPSULE ORAL at 14:04

## 2025-08-18 RX ADMIN — ENOXAPARIN SODIUM 30 MG: 100 INJECTION SUBCUTANEOUS at 20:21

## 2025-08-18 RX ADMIN — ASPIRIN 81 MG: 81 TABLET, COATED ORAL at 09:58

## 2025-08-18 RX ADMIN — METOPROLOL SUCCINATE 50 MG: 50 TABLET, EXTENDED RELEASE ORAL at 09:58

## 2025-08-18 RX ADMIN — COLCHICINE 0.6 MG: 0.6 TABLET, FILM COATED ORAL at 09:54

## 2025-08-18 RX ADMIN — PANTOPRAZOLE SODIUM 40 MG: 40 TABLET, DELAYED RELEASE ORAL at 06:48

## 2025-08-18 RX ADMIN — OXYCODONE AND ACETAMINOPHEN 1 TABLET: 5; 325 TABLET ORAL at 17:25

## 2025-08-18 RX ADMIN — BISACODYL 5 MG: 5 TABLET, COATED ORAL at 17:25

## 2025-08-18 RX ADMIN — ONDANSETRON 4 MG: 2 INJECTION, SOLUTION INTRAMUSCULAR; INTRAVENOUS at 14:00

## 2025-08-18 RX ADMIN — OXYBUTYNIN CHLORIDE 5 MG: 5 TABLET, EXTENDED RELEASE ORAL at 09:58

## 2025-08-18 RX ADMIN — TIZANIDINE 4 MG: 4 TABLET ORAL at 20:20

## 2025-08-18 RX ADMIN — DULOXETINE 30 MG: 30 CAPSULE, DELAYED RELEASE ORAL at 09:57

## 2025-08-18 RX ADMIN — MORPHINE SULFATE 15 MG: 15 TABLET, FILM COATED, EXTENDED RELEASE ORAL at 09:57

## 2025-08-18 RX ADMIN — OXYCODONE AND ACETAMINOPHEN 1 TABLET: 5; 325 TABLET ORAL at 01:36

## 2025-08-18 RX ADMIN — SODIUM CHLORIDE, PRESERVATIVE FREE 10 ML: 5 INJECTION INTRAVENOUS at 20:21

## 2025-08-18 RX ADMIN — OXYCODONE 2.5 MG: 5 TABLET ORAL at 14:04

## 2025-08-18 RX ADMIN — OXYCODONE AND ACETAMINOPHEN 1 TABLET: 5; 325 TABLET ORAL at 10:01

## 2025-08-18 RX ADMIN — MORPHINE SULFATE 15 MG: 15 TABLET, FILM COATED, EXTENDED RELEASE ORAL at 20:21

## 2025-08-18 RX ADMIN — DULOXETINE 30 MG: 30 CAPSULE, DELAYED RELEASE ORAL at 20:19

## 2025-08-18 RX ADMIN — SODIUM CHLORIDE, PRESERVATIVE FREE 10 ML: 5 INJECTION INTRAVENOUS at 14:00

## 2025-08-18 RX ADMIN — ENOXAPARIN SODIUM 30 MG: 100 INJECTION SUBCUTANEOUS at 09:58

## 2025-08-18 ASSESSMENT — PAIN DESCRIPTION - ORIENTATION
ORIENTATION: RIGHT;LEFT
ORIENTATION: RIGHT
ORIENTATION: RIGHT;LEFT
ORIENTATION: RIGHT
ORIENTATION: RIGHT;LEFT
ORIENTATION: DISTAL

## 2025-08-18 ASSESSMENT — PAIN - FUNCTIONAL ASSESSMENT
PAIN_FUNCTIONAL_ASSESSMENT: 0-10
PAIN_FUNCTIONAL_ASSESSMENT: ACTIVITIES ARE NOT PREVENTED
PAIN_FUNCTIONAL_ASSESSMENT: 0-10
PAIN_FUNCTIONAL_ASSESSMENT: ACTIVITIES ARE NOT PREVENTED
PAIN_FUNCTIONAL_ASSESSMENT: 0-10
PAIN_FUNCTIONAL_ASSESSMENT: ACTIVITIES ARE NOT PREVENTED

## 2025-08-18 ASSESSMENT — PAIN SCALES - GENERAL
PAINLEVEL_OUTOF10: 4
PAINLEVEL_OUTOF10: 10
PAINLEVEL_OUTOF10: 4
PAINLEVEL_OUTOF10: 7
PAINLEVEL_OUTOF10: 6
PAINLEVEL_OUTOF10: 10
PAINLEVEL_OUTOF10: 10

## 2025-08-18 ASSESSMENT — PAIN DESCRIPTION - LOCATION
LOCATION: LEG

## 2025-08-18 ASSESSMENT — PAIN DESCRIPTION - DESCRIPTORS
DESCRIPTORS: DISCOMFORT;THROBBING;BURNING
DESCRIPTORS: ACHING;CRAMPING
DESCRIPTORS: ACHING

## 2025-08-19 PROCEDURE — 6370000000 HC RX 637 (ALT 250 FOR IP): Performed by: INTERNAL MEDICINE

## 2025-08-19 PROCEDURE — 1200000000 HC SEMI PRIVATE

## 2025-08-19 PROCEDURE — 6370000000 HC RX 637 (ALT 250 FOR IP): Performed by: STUDENT IN AN ORGANIZED HEALTH CARE EDUCATION/TRAINING PROGRAM

## 2025-08-19 PROCEDURE — 2500000003 HC RX 250 WO HCPCS: Performed by: FAMILY MEDICINE

## 2025-08-19 PROCEDURE — 6370000000 HC RX 637 (ALT 250 FOR IP): Performed by: FAMILY MEDICINE

## 2025-08-19 PROCEDURE — 6370000000 HC RX 637 (ALT 250 FOR IP): Performed by: PSYCHIATRY & NEUROLOGY

## 2025-08-19 PROCEDURE — 99232 SBSQ HOSP IP/OBS MODERATE 35: CPT | Performed by: STUDENT IN AN ORGANIZED HEALTH CARE EDUCATION/TRAINING PROGRAM

## 2025-08-19 PROCEDURE — 6360000002 HC RX W HCPCS: Performed by: FAMILY MEDICINE

## 2025-08-19 RX ORDER — OXYCODONE AND ACETAMINOPHEN 5; 325 MG/1; MG/1
1 TABLET ORAL EVERY 6 HOURS PRN
Refills: 0 | Status: DISCONTINUED | OUTPATIENT
Start: 2025-08-19 | End: 2025-08-19

## 2025-08-19 RX ORDER — OXYCODONE HYDROCHLORIDE 10 MG/1
10 TABLET ORAL EVERY 4 HOURS PRN
Refills: 0 | Status: DISCONTINUED | OUTPATIENT
Start: 2025-08-19 | End: 2025-08-22 | Stop reason: HOSPADM

## 2025-08-19 RX ORDER — LINEZOLID 600 MG/1
600 TABLET, FILM COATED ORAL EVERY 12 HOURS SCHEDULED
Status: DISCONTINUED | OUTPATIENT
Start: 2025-08-19 | End: 2025-08-22 | Stop reason: HOSPADM

## 2025-08-19 RX ORDER — LINEZOLID 600 MG/1
600 TABLET, FILM COATED ORAL EVERY 12 HOURS SCHEDULED
DISCHARGE
Start: 2025-08-19 | End: 2025-08-23

## 2025-08-19 RX ADMIN — LINEZOLID 600 MG: 600 TABLET, FILM COATED ORAL at 20:45

## 2025-08-19 RX ADMIN — OXYCODONE AND ACETAMINOPHEN 1 TABLET: 5; 325 TABLET ORAL at 03:18

## 2025-08-19 RX ADMIN — TIZANIDINE 4 MG: 4 TABLET ORAL at 20:45

## 2025-08-19 RX ADMIN — DULOXETINE 30 MG: 30 CAPSULE, DELAYED RELEASE ORAL at 20:45

## 2025-08-19 RX ADMIN — SODIUM CHLORIDE, PRESERVATIVE FREE 10 ML: 5 INJECTION INTRAVENOUS at 20:46

## 2025-08-19 RX ADMIN — GABAPENTIN 300 MG: 300 CAPSULE ORAL at 17:57

## 2025-08-19 RX ADMIN — MORPHINE SULFATE 15 MG: 15 TABLET, FILM COATED, EXTENDED RELEASE ORAL at 20:45

## 2025-08-19 RX ADMIN — GABAPENTIN 300 MG: 300 CAPSULE ORAL at 09:19

## 2025-08-19 RX ADMIN — OXYCODONE 5 MG: 5 TABLET ORAL at 03:34

## 2025-08-19 RX ADMIN — OXYCODONE AND ACETAMINOPHEN 1 TABLET: 5; 325 TABLET ORAL at 09:34

## 2025-08-19 RX ADMIN — OXYCODONE HYDROCHLORIDE 10 MG: 10 TABLET ORAL at 18:37

## 2025-08-19 RX ADMIN — METOPROLOL SUCCINATE 50 MG: 50 TABLET, EXTENDED RELEASE ORAL at 09:19

## 2025-08-19 RX ADMIN — OXYCODONE HYDROCHLORIDE 10 MG: 10 TABLET ORAL at 14:14

## 2025-08-19 RX ADMIN — MORPHINE SULFATE 15 MG: 15 TABLET, FILM COATED, EXTENDED RELEASE ORAL at 09:19

## 2025-08-19 RX ADMIN — ENOXAPARIN SODIUM 30 MG: 100 INJECTION SUBCUTANEOUS at 20:45

## 2025-08-19 RX ADMIN — OXYCODONE HYDROCHLORIDE 10 MG: 10 TABLET ORAL at 23:12

## 2025-08-19 RX ADMIN — ENOXAPARIN SODIUM 30 MG: 100 INJECTION SUBCUTANEOUS at 09:19

## 2025-08-19 RX ADMIN — ASPIRIN 81 MG: 81 TABLET, COATED ORAL at 09:18

## 2025-08-19 RX ADMIN — SODIUM CHLORIDE, PRESERVATIVE FREE 10 ML: 5 INJECTION INTRAVENOUS at 09:19

## 2025-08-19 RX ADMIN — DULOXETINE 30 MG: 30 CAPSULE, DELAYED RELEASE ORAL at 09:19

## 2025-08-19 RX ADMIN — OXYCODONE 5 MG: 5 TABLET ORAL at 09:35

## 2025-08-19 RX ADMIN — PANTOPRAZOLE SODIUM 40 MG: 40 TABLET, DELAYED RELEASE ORAL at 05:32

## 2025-08-19 RX ADMIN — GABAPENTIN 300 MG: 300 CAPSULE ORAL at 23:13

## 2025-08-19 ASSESSMENT — PAIN SCALES - GENERAL
PAINLEVEL_OUTOF10: 7
PAINLEVEL_OUTOF10: 10
PAINLEVEL_OUTOF10: 10
PAINLEVEL_OUTOF10: 9
PAINLEVEL_OUTOF10: 10

## 2025-08-19 ASSESSMENT — PAIN DESCRIPTION - LOCATION
LOCATION: LEG
LOCATION: LEG
LOCATION: LEG;FOOT
LOCATION: LEG
LOCATION: LEG;FOOT
LOCATION: LEG
LOCATION: LEG

## 2025-08-19 ASSESSMENT — PAIN - FUNCTIONAL ASSESSMENT
PAIN_FUNCTIONAL_ASSESSMENT: 0-10
PAIN_FUNCTIONAL_ASSESSMENT: 0-10
PAIN_FUNCTIONAL_ASSESSMENT: PREVENTS OR INTERFERES SOME ACTIVE ACTIVITIES AND ADLS
PAIN_FUNCTIONAL_ASSESSMENT: 0-10
PAIN_FUNCTIONAL_ASSESSMENT: ACTIVITIES ARE NOT PREVENTED
PAIN_FUNCTIONAL_ASSESSMENT: 0-10
PAIN_FUNCTIONAL_ASSESSMENT: ACTIVITIES ARE NOT PREVENTED

## 2025-08-19 ASSESSMENT — PAIN DESCRIPTION - ORIENTATION
ORIENTATION: RIGHT;LEFT
ORIENTATION: RIGHT
ORIENTATION: RIGHT
ORIENTATION: RIGHT;LEFT
ORIENTATION: RIGHT;LEFT

## 2025-08-19 ASSESSMENT — PAIN DESCRIPTION - DESCRIPTORS
DESCRIPTORS: ACHING;SORE
DESCRIPTORS: ACHING;NAGGING;DISCOMFORT
DESCRIPTORS: ACHING;SHOOTING
DESCRIPTORS: ACHING
DESCRIPTORS: THROBBING;DISCOMFORT;BURNING
DESCRIPTORS: THROBBING;DISCOMFORT;BURNING

## 2025-08-20 PROCEDURE — 6370000000 HC RX 637 (ALT 250 FOR IP): Performed by: STUDENT IN AN ORGANIZED HEALTH CARE EDUCATION/TRAINING PROGRAM

## 2025-08-20 PROCEDURE — 6360000002 HC RX W HCPCS: Performed by: FAMILY MEDICINE

## 2025-08-20 PROCEDURE — 6370000000 HC RX 637 (ALT 250 FOR IP): Performed by: PSYCHIATRY & NEUROLOGY

## 2025-08-20 PROCEDURE — 6370000000 HC RX 637 (ALT 250 FOR IP): Performed by: INTERNAL MEDICINE

## 2025-08-20 PROCEDURE — 2500000003 HC RX 250 WO HCPCS: Performed by: FAMILY MEDICINE

## 2025-08-20 PROCEDURE — 1200000000 HC SEMI PRIVATE

## 2025-08-20 PROCEDURE — 6360000002 HC RX W HCPCS: Performed by: STUDENT IN AN ORGANIZED HEALTH CARE EDUCATION/TRAINING PROGRAM

## 2025-08-20 PROCEDURE — 6370000000 HC RX 637 (ALT 250 FOR IP): Performed by: FAMILY MEDICINE

## 2025-08-20 PROCEDURE — 99232 SBSQ HOSP IP/OBS MODERATE 35: CPT | Performed by: STUDENT IN AN ORGANIZED HEALTH CARE EDUCATION/TRAINING PROGRAM

## 2025-08-20 RX ORDER — KETOROLAC TROMETHAMINE 30 MG/ML
15 INJECTION, SOLUTION INTRAMUSCULAR; INTRAVENOUS EVERY 8 HOURS
Status: DISCONTINUED | OUTPATIENT
Start: 2025-08-20 | End: 2025-08-22 | Stop reason: HOSPADM

## 2025-08-20 RX ADMIN — MORPHINE SULFATE 15 MG: 15 TABLET, FILM COATED, EXTENDED RELEASE ORAL at 09:05

## 2025-08-20 RX ADMIN — LINEZOLID 600 MG: 600 TABLET, FILM COATED ORAL at 09:04

## 2025-08-20 RX ADMIN — MORPHINE SULFATE 15 MG: 15 TABLET, FILM COATED, EXTENDED RELEASE ORAL at 21:06

## 2025-08-20 RX ADMIN — LINEZOLID 600 MG: 600 TABLET, FILM COATED ORAL at 21:05

## 2025-08-20 RX ADMIN — GABAPENTIN 300 MG: 300 CAPSULE ORAL at 21:06

## 2025-08-20 RX ADMIN — METOPROLOL SUCCINATE 50 MG: 50 TABLET, EXTENDED RELEASE ORAL at 09:04

## 2025-08-20 RX ADMIN — DULOXETINE 30 MG: 30 CAPSULE, DELAYED RELEASE ORAL at 21:05

## 2025-08-20 RX ADMIN — SODIUM CHLORIDE, PRESERVATIVE FREE 10 ML: 5 INJECTION INTRAVENOUS at 09:09

## 2025-08-20 RX ADMIN — PANTOPRAZOLE SODIUM 40 MG: 40 TABLET, DELAYED RELEASE ORAL at 05:26

## 2025-08-20 RX ADMIN — OXYCODONE HYDROCHLORIDE 10 MG: 10 TABLET ORAL at 05:26

## 2025-08-20 RX ADMIN — ASPIRIN 81 MG: 81 TABLET, COATED ORAL at 09:04

## 2025-08-20 RX ADMIN — OXYCODONE HYDROCHLORIDE 10 MG: 10 TABLET ORAL at 21:14

## 2025-08-20 RX ADMIN — ENOXAPARIN SODIUM 30 MG: 100 INJECTION SUBCUTANEOUS at 21:08

## 2025-08-20 RX ADMIN — SODIUM CHLORIDE, PRESERVATIVE FREE 10 ML: 5 INJECTION INTRAVENOUS at 21:06

## 2025-08-20 RX ADMIN — KETOROLAC TROMETHAMINE 15 MG: 30 INJECTION, SOLUTION INTRAMUSCULAR at 18:16

## 2025-08-20 RX ADMIN — OXYCODONE HYDROCHLORIDE 10 MG: 10 TABLET ORAL at 14:50

## 2025-08-20 RX ADMIN — DULOXETINE 30 MG: 30 CAPSULE, DELAYED RELEASE ORAL at 09:05

## 2025-08-20 RX ADMIN — ENOXAPARIN SODIUM 30 MG: 100 INJECTION SUBCUTANEOUS at 09:09

## 2025-08-20 RX ADMIN — TIZANIDINE 4 MG: 4 TABLET ORAL at 21:06

## 2025-08-20 ASSESSMENT — PAIN SCALES - GENERAL
PAINLEVEL_OUTOF10: 5
PAINLEVEL_OUTOF10: 10
PAINLEVEL_OUTOF10: 7
PAINLEVEL_OUTOF10: 10
PAINLEVEL_OUTOF10: 7
PAINLEVEL_OUTOF10: 8

## 2025-08-20 ASSESSMENT — PAIN DESCRIPTION - LOCATION
LOCATION: LEG
LOCATION: LEG
LOCATION: LEG;FOOT
LOCATION: LEG;FOOT
LOCATION: ANKLE
LOCATION: LEG

## 2025-08-20 ASSESSMENT — PAIN DESCRIPTION - ORIENTATION
ORIENTATION: RIGHT
ORIENTATION: RIGHT
ORIENTATION: UPPER;MID
ORIENTATION: RIGHT;LEFT
ORIENTATION: LEFT;RIGHT
ORIENTATION: RIGHT;LEFT

## 2025-08-20 ASSESSMENT — PAIN - FUNCTIONAL ASSESSMENT
PAIN_FUNCTIONAL_ASSESSMENT: 0-10
PAIN_FUNCTIONAL_ASSESSMENT: 0-10
PAIN_FUNCTIONAL_ASSESSMENT: ACTIVITIES ARE NOT PREVENTED
PAIN_FUNCTIONAL_ASSESSMENT: ACTIVITIES ARE NOT PREVENTED
PAIN_FUNCTIONAL_ASSESSMENT: 0-10

## 2025-08-20 ASSESSMENT — PAIN DESCRIPTION - DESCRIPTORS
DESCRIPTORS: ACHING;SORE
DESCRIPTORS: ACHING;SORE
DESCRIPTORS: DISCOMFORT;SHARP;SHOOTING
DESCRIPTORS: ACHING;SORE
DESCRIPTORS: BURNING;TINGLING;DISCOMFORT

## 2025-08-21 PROCEDURE — 6360000002 HC RX W HCPCS: Performed by: FAMILY MEDICINE

## 2025-08-21 PROCEDURE — 97530 THERAPEUTIC ACTIVITIES: CPT

## 2025-08-21 PROCEDURE — 6370000000 HC RX 637 (ALT 250 FOR IP): Performed by: STUDENT IN AN ORGANIZED HEALTH CARE EDUCATION/TRAINING PROGRAM

## 2025-08-21 PROCEDURE — 97535 SELF CARE MNGMENT TRAINING: CPT

## 2025-08-21 PROCEDURE — 1200000000 HC SEMI PRIVATE

## 2025-08-21 PROCEDURE — 6370000000 HC RX 637 (ALT 250 FOR IP): Performed by: FAMILY MEDICINE

## 2025-08-21 PROCEDURE — 2500000003 HC RX 250 WO HCPCS: Performed by: FAMILY MEDICINE

## 2025-08-21 PROCEDURE — 6370000000 HC RX 637 (ALT 250 FOR IP): Performed by: INTERNAL MEDICINE

## 2025-08-21 PROCEDURE — 6360000002 HC RX W HCPCS: Performed by: STUDENT IN AN ORGANIZED HEALTH CARE EDUCATION/TRAINING PROGRAM

## 2025-08-21 PROCEDURE — 6370000000 HC RX 637 (ALT 250 FOR IP): Performed by: PSYCHIATRY & NEUROLOGY

## 2025-08-21 PROCEDURE — 99232 SBSQ HOSP IP/OBS MODERATE 35: CPT | Performed by: STUDENT IN AN ORGANIZED HEALTH CARE EDUCATION/TRAINING PROGRAM

## 2025-08-21 RX ORDER — METOPROLOL SUCCINATE 25 MG/1
25 TABLET, EXTENDED RELEASE ORAL DAILY
Status: DISCONTINUED | OUTPATIENT
Start: 2025-08-22 | End: 2025-08-22 | Stop reason: HOSPADM

## 2025-08-21 RX ADMIN — TIZANIDINE 4 MG: 4 TABLET ORAL at 21:11

## 2025-08-21 RX ADMIN — MORPHINE SULFATE 15 MG: 15 TABLET, FILM COATED, EXTENDED RELEASE ORAL at 08:14

## 2025-08-21 RX ADMIN — KETOROLAC TROMETHAMINE 15 MG: 30 INJECTION, SOLUTION INTRAMUSCULAR at 08:15

## 2025-08-21 RX ADMIN — METOPROLOL SUCCINATE 50 MG: 50 TABLET, EXTENDED RELEASE ORAL at 08:15

## 2025-08-21 RX ADMIN — LINEZOLID 600 MG: 600 TABLET, FILM COATED ORAL at 08:15

## 2025-08-21 RX ADMIN — LINEZOLID 600 MG: 600 TABLET, FILM COATED ORAL at 21:11

## 2025-08-21 RX ADMIN — ENOXAPARIN SODIUM 30 MG: 100 INJECTION SUBCUTANEOUS at 21:11

## 2025-08-21 RX ADMIN — PANTOPRAZOLE SODIUM 40 MG: 40 TABLET, DELAYED RELEASE ORAL at 05:31

## 2025-08-21 RX ADMIN — KETOROLAC TROMETHAMINE 15 MG: 30 INJECTION, SOLUTION INTRAMUSCULAR at 01:37

## 2025-08-21 RX ADMIN — GABAPENTIN 300 MG: 300 CAPSULE ORAL at 14:33

## 2025-08-21 RX ADMIN — OXYCODONE HYDROCHLORIDE 10 MG: 10 TABLET ORAL at 09:12

## 2025-08-21 RX ADMIN — SODIUM CHLORIDE, PRESERVATIVE FREE 10 ML: 5 INJECTION INTRAVENOUS at 08:15

## 2025-08-21 RX ADMIN — KETOROLAC TROMETHAMINE 15 MG: 30 INJECTION, SOLUTION INTRAMUSCULAR at 17:44

## 2025-08-21 RX ADMIN — DULOXETINE 30 MG: 30 CAPSULE, DELAYED RELEASE ORAL at 08:14

## 2025-08-21 RX ADMIN — ENOXAPARIN SODIUM 30 MG: 100 INJECTION SUBCUTANEOUS at 08:13

## 2025-08-21 RX ADMIN — ASPIRIN 81 MG: 81 TABLET, COATED ORAL at 08:15

## 2025-08-21 RX ADMIN — OXYCODONE HYDROCHLORIDE 10 MG: 10 TABLET ORAL at 17:44

## 2025-08-21 RX ADMIN — SODIUM CHLORIDE, PRESERVATIVE FREE 10 ML: 5 INJECTION INTRAVENOUS at 21:12

## 2025-08-21 RX ADMIN — GABAPENTIN 300 MG: 300 CAPSULE ORAL at 08:14

## 2025-08-21 RX ADMIN — GABAPENTIN 300 MG: 300 CAPSULE ORAL at 21:11

## 2025-08-21 RX ADMIN — MORPHINE SULFATE 15 MG: 15 TABLET, FILM COATED, EXTENDED RELEASE ORAL at 21:11

## 2025-08-21 RX ADMIN — OXYCODONE HYDROCHLORIDE 10 MG: 10 TABLET ORAL at 23:01

## 2025-08-21 RX ADMIN — DULOXETINE 30 MG: 30 CAPSULE, DELAYED RELEASE ORAL at 21:11

## 2025-08-21 ASSESSMENT — PAIN DESCRIPTION - DESCRIPTORS
DESCRIPTORS: ACHING;SORE
DESCRIPTORS: ACHING;SORE;TENDER
DESCRIPTORS: ACHING
DESCRIPTORS: ACHING
DESCRIPTORS: BURNING;THROBBING;SORE

## 2025-08-21 ASSESSMENT — PAIN SCALES - GENERAL
PAINLEVEL_OUTOF10: 10
PAINLEVEL_OUTOF10: 10
PAINLEVEL_OUTOF10: 6
PAINLEVEL_OUTOF10: 10
PAINLEVEL_OUTOF10: 7
PAINLEVEL_OUTOF10: 2
PAINLEVEL_OUTOF10: 8
PAINLEVEL_OUTOF10: 0

## 2025-08-21 ASSESSMENT — PAIN DESCRIPTION - LOCATION
LOCATION: BACK
LOCATION: BACK
LOCATION: LEG

## 2025-08-21 ASSESSMENT — PAIN - FUNCTIONAL ASSESSMENT
PAIN_FUNCTIONAL_ASSESSMENT: 0-10

## 2025-08-21 ASSESSMENT — PAIN DESCRIPTION - ORIENTATION
ORIENTATION: RIGHT
ORIENTATION: RIGHT
ORIENTATION: RIGHT;LEFT

## 2025-08-22 VITALS
HEART RATE: 62 BPM | BODY MASS INDEX: 47.09 KG/M2 | RESPIRATION RATE: 14 BRPM | OXYGEN SATURATION: 99 % | SYSTOLIC BLOOD PRESSURE: 115 MMHG | WEIGHT: 293 LBS | TEMPERATURE: 98.1 F | DIASTOLIC BLOOD PRESSURE: 55 MMHG | HEIGHT: 66 IN

## 2025-08-22 PROCEDURE — 2500000003 HC RX 250 WO HCPCS: Performed by: FAMILY MEDICINE

## 2025-08-22 PROCEDURE — 6360000002 HC RX W HCPCS: Performed by: FAMILY MEDICINE

## 2025-08-22 PROCEDURE — 6370000000 HC RX 637 (ALT 250 FOR IP): Performed by: FAMILY MEDICINE

## 2025-08-22 PROCEDURE — 6370000000 HC RX 637 (ALT 250 FOR IP): Performed by: INTERNAL MEDICINE

## 2025-08-22 PROCEDURE — 6370000000 HC RX 637 (ALT 250 FOR IP): Performed by: STUDENT IN AN ORGANIZED HEALTH CARE EDUCATION/TRAINING PROGRAM

## 2025-08-22 PROCEDURE — 99239 HOSP IP/OBS DSCHRG MGMT >30: CPT | Performed by: STUDENT IN AN ORGANIZED HEALTH CARE EDUCATION/TRAINING PROGRAM

## 2025-08-22 PROCEDURE — 6370000000 HC RX 637 (ALT 250 FOR IP): Performed by: PSYCHIATRY & NEUROLOGY

## 2025-08-22 PROCEDURE — 6360000002 HC RX W HCPCS: Performed by: STUDENT IN AN ORGANIZED HEALTH CARE EDUCATION/TRAINING PROGRAM

## 2025-08-22 RX ORDER — OXYCODONE AND ACETAMINOPHEN 10; 325 MG/1; MG/1
1 TABLET ORAL EVERY 6 HOURS PRN
Qty: 28 TABLET | Refills: 0 | Status: SHIPPED | OUTPATIENT
Start: 2025-08-22 | End: 2025-08-22 | Stop reason: HOSPADM

## 2025-08-22 RX ORDER — OXYCODONE HYDROCHLORIDE 10 MG/1
10 TABLET ORAL EVERY 4 HOURS PRN
Qty: 42 TABLET | Refills: 0 | Status: SHIPPED | OUTPATIENT
Start: 2025-08-22 | End: 2025-08-29

## 2025-08-22 RX ORDER — POLYETHYLENE GLYCOL 3350 17 G/17G
17 POWDER, FOR SOLUTION ORAL DAILY PRN
DISCHARGE
Start: 2025-08-22 | End: 2025-09-21

## 2025-08-22 RX ORDER — DULOXETIN HYDROCHLORIDE 30 MG/1
30 CAPSULE, DELAYED RELEASE ORAL 2 TIMES DAILY
DISCHARGE
Start: 2025-08-22

## 2025-08-22 RX ORDER — MORPHINE SULFATE 15 MG/1
15 TABLET, FILM COATED, EXTENDED RELEASE ORAL 2 TIMES DAILY
Qty: 14 TABLET | Refills: 0 | Status: SHIPPED | OUTPATIENT
Start: 2025-08-22 | End: 2025-08-29

## 2025-08-22 RX ORDER — METOPROLOL SUCCINATE 25 MG/1
25 TABLET, EXTENDED RELEASE ORAL DAILY
DISCHARGE
Start: 2025-08-22

## 2025-08-22 RX ADMIN — METOPROLOL SUCCINATE 25 MG: 25 TABLET, EXTENDED RELEASE ORAL at 08:35

## 2025-08-22 RX ADMIN — OXYCODONE HYDROCHLORIDE 10 MG: 10 TABLET ORAL at 09:40

## 2025-08-22 RX ADMIN — KETOROLAC TROMETHAMINE 15 MG: 30 INJECTION, SOLUTION INTRAMUSCULAR at 08:33

## 2025-08-22 RX ADMIN — ENOXAPARIN SODIUM 30 MG: 100 INJECTION SUBCUTANEOUS at 08:33

## 2025-08-22 RX ADMIN — LINEZOLID 600 MG: 600 TABLET, FILM COATED ORAL at 08:36

## 2025-08-22 RX ADMIN — SODIUM CHLORIDE, PRESERVATIVE FREE 10 ML: 5 INJECTION INTRAVENOUS at 08:33

## 2025-08-22 RX ADMIN — KETOROLAC TROMETHAMINE 15 MG: 30 INJECTION, SOLUTION INTRAMUSCULAR at 01:37

## 2025-08-22 RX ADMIN — MORPHINE SULFATE 15 MG: 15 TABLET, FILM COATED, EXTENDED RELEASE ORAL at 08:36

## 2025-08-22 RX ADMIN — PANTOPRAZOLE SODIUM 40 MG: 40 TABLET, DELAYED RELEASE ORAL at 05:52

## 2025-08-22 RX ADMIN — ASPIRIN 81 MG: 81 TABLET, COATED ORAL at 08:35

## 2025-08-22 RX ADMIN — DULOXETINE 30 MG: 30 CAPSULE, DELAYED RELEASE ORAL at 08:35

## 2025-08-22 RX ADMIN — GABAPENTIN 300 MG: 300 CAPSULE ORAL at 08:35

## 2025-08-22 ASSESSMENT — PAIN DESCRIPTION - LOCATION
LOCATION: LEG
LOCATION: BACK
LOCATION: LEG
LOCATION: BACK

## 2025-08-22 ASSESSMENT — PAIN - FUNCTIONAL ASSESSMENT
PAIN_FUNCTIONAL_ASSESSMENT: 0-10

## 2025-08-22 ASSESSMENT — PAIN SCALES - GENERAL
PAINLEVEL_OUTOF10: 7
PAINLEVEL_OUTOF10: 1
PAINLEVEL_OUTOF10: 7

## 2025-08-22 ASSESSMENT — PAIN DESCRIPTION - DESCRIPTORS
DESCRIPTORS: ACHING;SHARP;THROBBING
DESCRIPTORS: ACHING;SHARP;THROBBING
DESCRIPTORS: ACHING
DESCRIPTORS: ACHING

## 2025-08-22 ASSESSMENT — PAIN DESCRIPTION - ORIENTATION
ORIENTATION: RIGHT
ORIENTATION: RIGHT

## 2025-08-22 ASSESSMENT — PAIN DESCRIPTION - FREQUENCY: FREQUENCY: CONTINUOUS

## (undated) DEVICE — GOWN,SIRUS,FABRNF,XL,20/CS: Brand: MEDLINE

## (undated) DEVICE — CHLORAPREP 26ML ORANGE

## (undated) DEVICE — INSUFFLATION TUBING SET WITH FILTER, FUNNEL CONNECTOR AND LUER LOCK: Brand: JOSNOE MEDICAL INC

## (undated) DEVICE — INSUFFLATION NEEDLE TO ESTABLISH PNEUMOPERITONEUM.: Brand: INSUFFLATION NEEDLE

## (undated) DEVICE — ELECTRODE PT RET AD L9FT HI MOIST COND ADH HYDRGEL CORDED

## (undated) DEVICE — TIP COVER ACCESSORY

## (undated) DEVICE — ARM DRAPE

## (undated) DEVICE — COVER,MAYO STAND,STERILE: Brand: MEDLINE

## (undated) DEVICE — GLOVE SURG SZ 75 CRM LTX FREE POLYISOPRENE POLYMER BEAD ANTI

## (undated) DEVICE — TOWEL,OR,DSP,ST,BLUE,STD,6/PK,12PK/CS: Brand: MEDLINE

## (undated) DEVICE — PROGRASP FORCEPS: Brand: ENDOWRIST

## (undated) DEVICE — SYRINGE 20ML LL S/C 50

## (undated) DEVICE — DRAPE,LAP,CHOLE,W/TROUGHS,STERILE: Brand: MEDLINE

## (undated) DEVICE — SUTURE V-LOC 180 SZ 2-0 L12IN ABSRB GRN V-20 L26MM 1/2 CIR VLOCL0615

## (undated) DEVICE — ADHESIVE SKIN CLSR 0.7ML TOP DERMBND ADV

## (undated) DEVICE — PACK PROCEDURE SURG GEN CUST

## (undated) DEVICE — NEEDLE HYPO 22GA L1.5IN BLK POLYPR HUB S STL REG BVL STR

## (undated) DEVICE — KIT,ANTI FOG,W/SPONGE & FLUID,SOFT PACK: Brand: MEDLINE

## (undated) DEVICE — INTENDED FOR TISSUE SEPARATION, AND OTHER PROCEDURES THAT REQUIRE A SHARP SURGICAL BLADE TO PUNCTURE OR CUT.: Brand: BARD-PARKER ® STAINLESS STEEL BLADES

## (undated) DEVICE — WARMER SCP 2 ANTIFOG LAP DISP

## (undated) DEVICE — DOUBLE BASIN SET: Brand: MEDLINE INDUSTRIES, INC.

## (undated) DEVICE — BLADELESS OBTURATOR: Brand: WECK VISTA

## (undated) DEVICE — INSTRUMENT CLAMP TOWEL LARGE REUSABLE

## (undated) DEVICE — CADIERE FORCEPS: Brand: ENDOWRIST

## (undated) DEVICE — TROCAR: Brand: KII FIOS FIRST ENTRY